# Patient Record
Sex: MALE | Race: WHITE | NOT HISPANIC OR LATINO
[De-identification: names, ages, dates, MRNs, and addresses within clinical notes are randomized per-mention and may not be internally consistent; named-entity substitution may affect disease eponyms.]

---

## 2017-01-31 PROBLEM — Z00.00 ENCOUNTER FOR PREVENTIVE HEALTH EXAMINATION: Status: ACTIVE | Noted: 2017-01-31

## 2017-02-07 ENCOUNTER — APPOINTMENT (OUTPATIENT)
Age: 57
End: 2017-02-07

## 2017-02-07 DIAGNOSIS — S81.801A UNSPECIFIED OPEN WOUND, RIGHT LOWER LEG, INITIAL ENCOUNTER: ICD-10-CM

## 2017-05-13 ENCOUNTER — INPATIENT (INPATIENT)
Facility: HOSPITAL | Age: 57
LOS: 4 days | Discharge: NURSING, REHAB OR LONG-TERM CARE | End: 2017-05-18
Attending: INTERNAL MEDICINE | Admitting: INTERNAL MEDICINE

## 2017-06-28 DIAGNOSIS — R94.5 ABNORMAL RESULTS OF LIVER FUNCTION STUDIES: ICD-10-CM

## 2017-06-28 DIAGNOSIS — E87.1 HYPO-OSMOLALITY AND HYPONATREMIA: ICD-10-CM

## 2017-06-28 DIAGNOSIS — L30.8 OTHER SPECIFIED DERMATITIS: ICD-10-CM

## 2017-06-28 DIAGNOSIS — M10.9 GOUT, UNSPECIFIED: ICD-10-CM

## 2017-06-28 DIAGNOSIS — B87.0 CUTANEOUS MYIASIS: ICD-10-CM

## 2017-06-28 DIAGNOSIS — I87.8 OTHER SPECIFIED DISORDERS OF VEINS: ICD-10-CM

## 2017-06-28 DIAGNOSIS — R26.89 OTHER ABNORMALITIES OF GAIT AND MOBILITY: ICD-10-CM
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
## 2017-06-28 DIAGNOSIS — M19.90 UNSPECIFIED OSTEOARTHRITIS, UNSPECIFIED SITE: ICD-10-CM

## 2017-06-28 DIAGNOSIS — F10.20 ALCOHOL DEPENDENCE, UNCOMPLICATED: ICD-10-CM

## 2017-06-28 DIAGNOSIS — L03.116 CELLULITIS OF LEFT LOWER LIMB: ICD-10-CM

## 2017-06-28 DIAGNOSIS — D72.818 OTHER DECREASED WHITE BLOOD CELL COUNT: ICD-10-CM

## 2017-06-28 DIAGNOSIS — I10 ESSENTIAL (PRIMARY) HYPERTENSION: ICD-10-CM

## 2017-06-28 DIAGNOSIS — Z91.013 ALLERGY TO SEAFOOD: ICD-10-CM

## 2017-06-28 DIAGNOSIS — Z91.018 ALLERGY TO OTHER FOODS: ICD-10-CM

## 2019-09-04 ENCOUNTER — EMERGENCY (EMERGENCY)
Facility: HOSPITAL | Age: 59
LOS: 0 days | Discharge: NURSING, REHAB OR LONG-TERM CARE | End: 2019-09-04
Attending: EMERGENCY MEDICINE | Admitting: EMERGENCY MEDICINE
Payer: MEDICAID

## 2019-09-04 VITALS
RESPIRATION RATE: 18 BRPM | SYSTOLIC BLOOD PRESSURE: 154 MMHG | DIASTOLIC BLOOD PRESSURE: 87 MMHG | HEART RATE: 90 BPM | OXYGEN SATURATION: 99 %

## 2019-09-04 VITALS
TEMPERATURE: 97 F | OXYGEN SATURATION: 99 % | SYSTOLIC BLOOD PRESSURE: 143 MMHG | HEART RATE: 113 BPM | RESPIRATION RATE: 17 BRPM | DIASTOLIC BLOOD PRESSURE: 75 MMHG

## 2019-09-04 DIAGNOSIS — Z91.018 ALLERGY TO OTHER FOODS: ICD-10-CM

## 2019-09-04 DIAGNOSIS — F10.129 ALCOHOL ABUSE WITH INTOXICATION, UNSPECIFIED: ICD-10-CM

## 2019-09-04 DIAGNOSIS — Z87.891 PERSONAL HISTORY OF NICOTINE DEPENDENCE: ICD-10-CM

## 2019-09-04 PROCEDURE — 99284 EMERGENCY DEPT VISIT MOD MDM: CPT

## 2019-09-04 NOTE — ED PROVIDER NOTE - NSFOLLOWUPINSTRUCTIONS_ED_ALL_ED_FT
Alcohol Use Disorder  Alcohol use disorder is when your drinking disrupts your daily life. When you have this condition, you drink too much alcohol and you cannot control your drinking.    Alcohol use disorder can cause serious problems with your physical health. It can affect your brain, heart, liver, pancreas, immune system, stomach, and intestines. Alcohol use disorder can increase your risk for certain cancers and cause problems with your mental health, such as depression, anxiety, psychosis, delirium, and dementia. People with this disorder risk hurting themselves and others.    What are the causes?  This condition is caused by drinking too much alcohol over time. It is not caused by drinking too much alcohol only one or two times. Some people with this condition drink alcohol to cope with or escape from negative life events. Others drink to relieve pain or symptoms of mental illness.    What increases the risk?  You are more likely to develop this condition if:    You have a family history of alcohol use disorder.  Your culture encourages drinking to the point of intoxication, or makes alcohol easy to get.  You had a mood or conduct disorder in childhood.  You have been a victim of abuse.  You are an adolescent and:    You have poor grades or difficulties in school.  Your caregivers do not talk to you about saying no to alcohol, or supervise your activities.  You are impulsive or you have trouble with self-control.      What are the signs or symptoms?  Symptoms of this condition include:    Drinking more than you want to.  Drinking for longer than you want to.  Trying several times to drink less or to control your drinking.  Spending a lot of time getting alcohol, drinking, or recovering from drinking.  Craving alcohol.  Having problems at work, at school, or at home due to drinking.  Having problems in relationships due to drinking.  Drinking when it is dangerous to drink, such as before driving a car.  Continuing to drink even though you know you might have a physical or mental problem related to drinking.  Needing more and more alcohol to get the same effect you want from the alcohol (building up tolerance).  Having symptoms of withdrawal when you stop drinking. Symptoms of withdrawal include:    Fatigue.  Nightmares.  Trouble sleeping.  Depression.  Anxiety.  Fever.  Seizures.  Severe confusion.  Feeling or seeing things that are not there (hallucinations).  Tremors.  Rapid heart rate.  Rapid breathing.  High blood pressure.    Drinking to avoid symptoms of withdrawal.    How is this diagnosed?  This condition is diagnosed with an assessment. Your health care provider may start the assessment by asking three or four questions about your drinking.    Your health care provider may perform a physical exam or do lab tests to see if you have physical problems resulting from alcohol use. She or he may refer you to a mental health professional for evaluation.    How is this treated?  Some people with alcohol use disorder are able to reduce their alcohol use to low-risk levels. Others need to completely quit drinking alcohol. When necessary, mental health professionals with specialized training in substance use treatment can help. Your health care provider can help you decide how severe your alcohol use disorder is and what type of treatment you need. The following forms of treatment are available:    Detoxification. Detoxification involves quitting drinking and using prescription medicines within the first week to help lessen withdrawal symptoms. This treatment is important for people who have had withdrawal symptoms before and for heavy drinkers who are likely to have withdrawal symptoms. Alcohol withdrawal can be dangerous, and in severe cases, it can cause death. Detoxification may be provided in a home, community, or primary care setting, or in a hospital or substance use treatment facility.  Counseling. This treatment is also called talk therapy. It is provided by substance use treatment counselors. A counselor can address the reasons you use alcohol and suggest ways to keep you from drinking again or to prevent problem drinking. The goals of talk therapy are to:    Find healthy activities and ways for you to cope with stress.  Identify and avoid the things that trigger your alcohol use.  Help you learn how to handle cravings.    Medicines. Medicines can help treat alcohol use disorder by:    Decreasing alcohol cravings.  Decreasing the positive feeling you have when you drink alcohol.  Causing an uncomfortable physical reaction when you drink alcohol (aversion therapy).    Support groups. Support groups are led by people who have quit drinking. They provide emotional support, advice, and guidance.    ImageThese forms of treatment are often combined. Some people with this condition benefit from a combination of treatments provided by specialized substance use treatment centers.    Follow these instructions at home:  Take over-the-counter and prescription medicines only as told by your health care provider.  Check with your health care provider before starting any new medicines.  Ask friends and family members not to offer you alcohol.  Avoid situations where alcohol is served, including gatherings where others are drinking alcohol.  Create a plan for what to do when you are tempted to use alcohol.  Find hobbies or activities that you enjoy that do not include alcohol.  Keep all follow-up visits as told by your health care provider. This is important.  How is this prevented?  If you drink, limit alcohol intake to no more than 1 drink a day for nonpregnant women and 2 drinks a day for men. One drink equals 12 oz of beer, 5 oz of wine, or 1½ oz of hard liquor.  If you have a mental health condition, get treatment and support.  Do not give alcohol to adolescents.  If you are an adolescent:    Do not drink alcohol.  Do not be afraid to say no if someone offers you alcohol. Speak up about why you do not want to drink. You can be a positive role model for your friends and set a good example for those around you by not drinking alcohol.  If your friends drink, spend time with others who do not drink alcohol. Make new friends who do not use alcohol.  Find healthy ways to manage stress and emotions, such as meditation or deep breathing, exercise, spending time in nature, listening to music, or talking with a trusted friend or family member.    Contact a health care provider if:  You are not able to take your medicines as told.  Your symptoms get worse.  You return to drinking alcohol (relapse) and your symptoms get worse.  Get help right away if:  You have thoughts about hurting yourself or others.  If you ever feel like you may hurt yourself or others, or have thoughts about taking your own life, get help right away. You can go to your nearest emergency department or call:     Your local emergency services (911 in the [place]   A suicide crisis helpline, such as the National Suicide Prevention Lifeline at 1-987.168.4483. This is open 24 hours a day.     Summary  Alcohol use disorder is when your drinking disrupts your daily life. When you have this condition, you drink too much alcohol and you cannot control your drinking.  Treatment may include detoxification, counseling, medicine, and support groups.  Ask friends and family members not to offer you alcohol. Avoid situations where alcohol is served.  Get help right away if you have thoughts about hurting yourself or others.  This information is not intended to replace advice given to you by your health care provider. Make sure you discuss any questions you have with your health care provider.

## 2019-09-04 NOTE — ED PROVIDER NOTE - PHYSICAL EXAMINATION
VITALS:  I have reviewed the initial vital signs.  GENERAL: Well-developed, well-nourished, in no acute distress. Nontoxic.  HEENT: NC/AT. Sclera clear. No conjunctival injection. EOMI, PERRLA. Mucous membranes moist. No tongue fasciculations.   NECK: supple w FROM.  CARDIO: irregularly irregular, normal rate. No murmurs, rubs, or gallops. No peripheral edema. 2+ radial pulses b/l.   PULM: Normal effort. No tachypnea or retractions. CTA b/l without wheezes, rales, or rhonchi.  MSK: Normal, steady gait. FROM to extremities x4.  GI: Abdomen soft and non-distended.  SKIN: Warm, dry. No pallor or rashes. Capillary refill <2 seconds.  NEURO: A&Ox3. Speech clear. No gross motor/sensory deficits. No tremor.  PSYCH: No SI. Calm, cooperative.

## 2019-09-04 NOTE — ED PROVIDER NOTE - OBJECTIVE STATEMENT
59 year old male w hx of etoh abuse presents to the ED via EMS from [facility] for alcohol intoxication. Patient states he drinks multiple beers a day, last drank 2 hours prior to arrival. Denies additional substance use. Denies SI/HI, visual/auditory hallucinations, falls, head trauma, LOC, vomiting, diarrhea. Does not wish to have detox from alcohol. 59 year old male w hx of etoh abuse, HTN presents to the ED via EMS from [facility] for alcohol intoxication. Patient states he drinks multiple beers a day, last drank 2 hours prior to arrival. Denies additional substance use. Denies SI/HI, visual/auditory hallucinations, falls, head trauma, LOC, vomiting, diarrhea. Does not wish to have detox from alcohol.

## 2019-09-04 NOTE — ED PROVIDER NOTE - CLINICAL SUMMARY MEDICAL DECISION MAKING FREE TEXT BOX
Patient is asymptomatic at this time he denies any headche, chest pain, sob suicidal or homicidal ideation he is tolerating po and ambulating with a steady gait with no trauma I will discharge at this time

## 2019-09-04 NOTE — ED PROVIDER NOTE - NS ED ROS FT
CONSTITUTIONAL: (-) fevers, (-) chills, (-) decreased appetite  EYES: (-) vision changes, (-) blurry vision, (-) double vision  ENT: (-) congestion, (-) rhinorrhea  NECK: (-) neck pain, (-) neck stiffness  CARDIO: (-) chest pain, (-) palpitations  PULM: (-) cough, (-) sputum, (-) shortness of breath  GI: (-) nausea, (-) vomiting, (-) diarrhea, (-) abdominal pain  MSK: (-) joint swelling, (-) joint stiffness, (-) gait difficulty  SKIN: (-) rashes, (-) wounds, (-) ecchymosis  NEURO: (-) headache, (-) head injury, (-) LOC, (-) dizziness, (-) lightheadedness, (-) syncope, (-) speech changes, (-) confusion, (-) weakness, (-) seizures  PSYCH: (-) suicidal ideation, (-) homicidal ideation, (-) depression, (-) anxiety, (-) visual hallucinations, (-) auditory hallucinations, (-) agitation    *all other systems negative except as documented above and in the HPI*

## 2019-09-04 NOTE — ED ADULT NURSE NOTE - NSIMPLEMENTINTERV_GEN_ALL_ED
Implemented All Fall Risk Interventions:  [place] to call system. Call bell, personal items and telephone within reach. Instruct patient to call for assistance. Room bathroom lighting operational. Non-slip footwear when patient is off stretcher. Physically safe environment: no spills, clutter or unnecessary equipment. Stretcher in lowest position, wheels locked, appropriate side rails in place. Provide visual cue, wrist band, yellow gown, etc. Monitor gait and stability. Monitor for mental status changes and reorient to person, place, and time. Review medications for side effects contributing to fall risk. Reinforce activity limits and safety measures with patient and family.

## 2019-09-04 NOTE — ED PROVIDER NOTE - PATIENT PORTAL LINK FT
You can access the FollowMyHealth Patient Portal offered by [facility] by registering at the following website: [email or web address]. By joining [facility]’s FollowMyHealth portal, you will also be able to view your health information using other applications (apps) compatible with our system.

## 2019-09-04 NOTE — ED ADULT NURSE NOTE - CHIEF COMPLAINT QUOTE
BIBA via PCA from [facility], [facility] states pt was sent for alcohol intoxication.

## 2019-09-04 NOTE — ED PROVIDER NOTE - NSFOLLOWUPCLINICS_GEN_ALL_ED_FT
[facility]  Detox Mgmt  [address] [place] [address]  Phone: [phone]  Fax:   Follow Up Time: Routine

## 2019-09-04 NOTE — ED PROVIDER NOTE - ATTENDING CONTRIBUTION TO CARE
I was present for and supervised the key and critical aspects of the procedures performed during the care of the patient. Patient presents for evaluation of alcohol intoxication patient states he was drinking today, he is not homicidal or suicidal he denies any chest pain or shortness of breath he denies any trauma or falls   On physical exam he is nc/at perrla eomi oropharynx clear cta b/l, rrr s1s2 noted abd-soft nt nd bs+ ext from with no focal deficits   A/P- we obtained accucheck which is normal patient is tolerating po at this time I will discharge

## 2019-09-04 NOTE — ED ADULT TRIAGE NOTE - CHIEF COMPLAINT QUOTE
BIBA via PCA from [facility], [facility] states pt was sent for alcohol intoxication.

## 2019-09-04 NOTE — ED PROVIDER NOTE - PROGRESS NOTE DETAILS
Patient ambulating with steady gait, at full meal. No complaints at this time. Will dc back to [facility].

## 2019-09-07 ENCOUNTER — INPATIENT (INPATIENT)
Facility: HOSPITAL | Age: 59
LOS: 3 days | Discharge: HOME | End: 2019-09-11
Attending: INTERNAL MEDICINE | Admitting: INTERNAL MEDICINE
Payer: MEDICAID

## 2019-09-07 VITALS
HEART RATE: 126 BPM | OXYGEN SATURATION: 97 % | RESPIRATION RATE: 19 BRPM | DIASTOLIC BLOOD PRESSURE: 84 MMHG | SYSTOLIC BLOOD PRESSURE: 162 MMHG | TEMPERATURE: 98 F | WEIGHT: 253.09 LBS

## 2019-09-07 LAB
ANION GAP SERPL CALC-SCNC: 23 MMOL/L — HIGH (ref 7–14)
APAP SERPL-MCNC: <5 UG/ML — LOW (ref 10–30)
APPEARANCE UR: CLEAR — SIGNIFICANT CHANGE UP
BACTERIA # UR AUTO: ABNORMAL
BASE EXCESS BLDV CALC-SCNC: -6.2 MMOL/L — LOW (ref -2–2)
BASOPHILS # BLD AUTO: 0.13 K/UL — SIGNIFICANT CHANGE UP (ref 0–0.2)
BASOPHILS NFR BLD AUTO: 1.6 % — HIGH (ref 0–1)
BILIRUB UR-MCNC: NEGATIVE — SIGNIFICANT CHANGE UP
BUN SERPL-MCNC: 20 MG/DL — SIGNIFICANT CHANGE UP (ref 10–20)
CA-I SERPL-SCNC: 1.13 MMOL/L — SIGNIFICANT CHANGE UP (ref 1.12–1.3)
CALCIUM SERPL-MCNC: 9.6 MG/DL — SIGNIFICANT CHANGE UP (ref 8.5–10.1)
CHLORIDE SERPL-SCNC: 104 MMOL/L — SIGNIFICANT CHANGE UP (ref 98–110)
CO2 SERPL-SCNC: 18 MMOL/L — SIGNIFICANT CHANGE UP (ref 17–32)
COLOR SPEC: YELLOW — SIGNIFICANT CHANGE UP
CREAT SERPL-MCNC: 2 MG/DL — HIGH (ref 0.7–1.5)
DIFF PNL FLD: ABNORMAL
EOSINOPHIL # BLD AUTO: 0.01 K/UL — SIGNIFICANT CHANGE UP (ref 0–0.7)
EOSINOPHIL NFR BLD AUTO: 0.1 % — SIGNIFICANT CHANGE UP (ref 0–8)
EPI CELLS # UR: ABNORMAL /HPF
ETHANOL SERPL-MCNC: 271 MG/DL — HIGH
GAS PNL BLDV: 144 MMOL/L — SIGNIFICANT CHANGE UP (ref 136–145)
GAS PNL BLDV: SIGNIFICANT CHANGE UP
GLUCOSE SERPL-MCNC: 102 MG/DL — HIGH (ref 70–99)
GLUCOSE UR QL: NEGATIVE MG/DL — SIGNIFICANT CHANGE UP
HCO3 BLDV-SCNC: 18 MMOL/L — LOW (ref 22–29)
HCT VFR BLD CALC: 32.7 % — LOW (ref 42–52)
HCT VFR BLDA CALC: 33.9 % — LOW (ref 34–44)
HGB BLD CALC-MCNC: 11.1 G/DL — LOW (ref 14–18)
HGB BLD-MCNC: 11.2 G/DL — LOW (ref 14–18)
HOROWITZ INDEX BLDV+IHG-RTO: 21 — SIGNIFICANT CHANGE UP
IMM GRANULOCYTES NFR BLD AUTO: 0.4 % — HIGH (ref 0.1–0.3)
KETONES UR-MCNC: ABNORMAL
LACTATE BLDV-MCNC: 6.7 MMOL/L — HIGH (ref 0.5–1.6)
LACTATE SERPL-SCNC: 6.8 MMOL/L — CRITICAL HIGH (ref 0.5–2.2)
LEUKOCYTE ESTERASE UR-ACNC: NEGATIVE — SIGNIFICANT CHANGE UP
LYMPHOCYTES # BLD AUTO: 2.2 K/UL — SIGNIFICANT CHANGE UP (ref 1.2–3.4)
LYMPHOCYTES # BLD AUTO: 27 % — SIGNIFICANT CHANGE UP (ref 20.5–51.1)
MCHC RBC-ENTMCNC: 33.7 PG — HIGH (ref 27–31)
MCHC RBC-ENTMCNC: 34.3 G/DL — SIGNIFICANT CHANGE UP (ref 32–37)
MCV RBC AUTO: 98.5 FL — HIGH (ref 80–94)
MONOCYTES # BLD AUTO: 0.38 K/UL — SIGNIFICANT CHANGE UP (ref 0.1–0.6)
MONOCYTES NFR BLD AUTO: 4.7 % — SIGNIFICANT CHANGE UP (ref 1.7–9.3)
NEUTROPHILS # BLD AUTO: 5.41 K/UL — SIGNIFICANT CHANGE UP (ref 1.4–6.5)
NEUTROPHILS NFR BLD AUTO: 66.2 % — SIGNIFICANT CHANGE UP (ref 42.2–75.2)
NITRITE UR-MCNC: NEGATIVE — SIGNIFICANT CHANGE UP
NRBC # BLD: 0 /100 WBCS — SIGNIFICANT CHANGE UP (ref 0–0)
PCO2 BLDV: 28 MMHG — LOW (ref 41–51)
PH BLDV: 7.4 — SIGNIFICANT CHANGE UP (ref 7.26–7.43)
PH UR: 5.5 — SIGNIFICANT CHANGE UP (ref 5–8)
PLATELET # BLD AUTO: 242 K/UL — SIGNIFICANT CHANGE UP (ref 130–400)
PO2 BLDV: 40 MMHG — SIGNIFICANT CHANGE UP (ref 20–40)
POTASSIUM BLDV-SCNC: 4.4 MMOL/L — SIGNIFICANT CHANGE UP (ref 3.3–5.6)
POTASSIUM SERPL-MCNC: 4.7 MMOL/L — SIGNIFICANT CHANGE UP (ref 3.5–5)
POTASSIUM SERPL-SCNC: 4.7 MMOL/L — SIGNIFICANT CHANGE UP (ref 3.5–5)
PROT UR-MCNC: ABNORMAL MG/DL
RBC # BLD: 3.32 M/UL — LOW (ref 4.7–6.1)
RBC # FLD: 13.7 % — SIGNIFICANT CHANGE UP (ref 11.5–14.5)
RBC CASTS # UR COMP ASSIST: SIGNIFICANT CHANGE UP /HPF
SALICYLATES SERPL-MCNC: <0.3 MG/DL — LOW (ref 4–30)
SAO2 % BLDV: 68 % — SIGNIFICANT CHANGE UP
SODIUM SERPL-SCNC: 145 MMOL/L — SIGNIFICANT CHANGE UP (ref 135–146)
SP GR SPEC: 1.02 — SIGNIFICANT CHANGE UP (ref 1.01–1.03)
UROBILINOGEN FLD QL: 0.2 MG/DL — SIGNIFICANT CHANGE UP (ref 0.2–0.2)
WBC # BLD: 8.16 K/UL — SIGNIFICANT CHANGE UP (ref 4.8–10.8)
WBC # FLD AUTO: 8.16 K/UL — SIGNIFICANT CHANGE UP (ref 4.8–10.8)

## 2019-09-07 PROCEDURE — 71045 X-RAY EXAM CHEST 1 VIEW: CPT | Mod: 26

## 2019-09-07 PROCEDURE — 99285 EMERGENCY DEPT VISIT HI MDM: CPT

## 2019-09-07 RX ORDER — LOSARTAN POTASSIUM 100 MG/1
100 TABLET, FILM COATED ORAL DAILY
Refills: 0 | Status: DISCONTINUED | OUTPATIENT
Start: 2019-09-07 | End: 2019-09-11

## 2019-09-07 RX ORDER — SODIUM CHLORIDE 9 MG/ML
1000 INJECTION INTRAMUSCULAR; INTRAVENOUS; SUBCUTANEOUS ONCE
Refills: 0 | Status: COMPLETED | OUTPATIENT
Start: 2019-09-07 | End: 2019-09-07

## 2019-09-07 RX ORDER — ZOLPIDEM TARTRATE 10 MG/1
5 TABLET ORAL ONCE
Refills: 0 | Status: DISCONTINUED | OUTPATIENT
Start: 2019-09-07 | End: 2019-09-07

## 2019-09-07 RX ORDER — FOLIC ACID 0.8 MG
1 TABLET ORAL DAILY
Refills: 0 | Status: DISCONTINUED | OUTPATIENT
Start: 2019-09-07 | End: 2019-09-11

## 2019-09-07 RX ORDER — MECLIZINE HCL 12.5 MG
12.5 TABLET ORAL THREE TIMES A DAY
Refills: 0 | Status: DISCONTINUED | OUTPATIENT
Start: 2019-09-07 | End: 2019-09-11

## 2019-09-07 RX ORDER — THIAMINE MONONITRATE (VIT B1) 100 MG
100 TABLET ORAL ONCE
Refills: 0 | Status: COMPLETED | OUTPATIENT
Start: 2019-09-07 | End: 2019-09-07

## 2019-09-07 RX ORDER — HEPARIN SODIUM 5000 [USP'U]/ML
5000 INJECTION INTRAVENOUS; SUBCUTANEOUS EVERY 12 HOURS
Refills: 0 | Status: DISCONTINUED | OUTPATIENT
Start: 2019-09-07 | End: 2019-09-11

## 2019-09-07 RX ORDER — PANTOPRAZOLE SODIUM 20 MG/1
40 TABLET, DELAYED RELEASE ORAL
Refills: 0 | Status: DISCONTINUED | OUTPATIENT
Start: 2019-09-07 | End: 2019-09-11

## 2019-09-07 RX ORDER — SODIUM CHLORIDE 9 MG/ML
1000 INJECTION, SOLUTION INTRAVENOUS ONCE
Refills: 0 | Status: COMPLETED | OUTPATIENT
Start: 2019-09-07 | End: 2019-09-07

## 2019-09-07 RX ORDER — SODIUM CHLORIDE 9 MG/ML
1000 INJECTION, SOLUTION INTRAVENOUS
Refills: 0 | Status: DISCONTINUED | OUTPATIENT
Start: 2019-09-07 | End: 2019-09-10

## 2019-09-07 RX ADMIN — SODIUM CHLORIDE 1000 MILLILITER(S): 9 INJECTION, SOLUTION INTRAVENOUS at 19:21

## 2019-09-07 RX ADMIN — SODIUM CHLORIDE 75 MILLILITER(S): 9 INJECTION, SOLUTION INTRAVENOUS at 22:16

## 2019-09-07 RX ADMIN — Medication 12.5 MILLIGRAM(S): at 22:41

## 2019-09-07 RX ADMIN — Medication 50 MILLIGRAM(S): at 22:11

## 2019-09-07 RX ADMIN — Medication 1 MILLIGRAM(S): at 20:31

## 2019-09-07 RX ADMIN — SODIUM CHLORIDE 1000 MILLILITER(S): 9 INJECTION INTRAMUSCULAR; INTRAVENOUS; SUBCUTANEOUS at 15:43

## 2019-09-07 RX ADMIN — Medication 100 MILLIGRAM(S): at 20:30

## 2019-09-07 NOTE — ED PROVIDER NOTE - PROGRESS NOTE DETAILS
FS normal, tolerating po w/o difficulty. Moving all extremities w/o difficulty. Dc  to [facility]. Return for fever/chills, CP, SOB, fall, AMS. noted to be tachycardic upon dc. Holding dc, will order IVF and reassess. Pt asymptoamtic at this time, repeat physical exam unremarkable aside from HR. endorsed to Dr Oconnor dr. carrillo aware of admission

## 2019-09-07 NOTE — ED PROVIDER NOTE - NSFOLLOWUPCLINICS_GEN_ALL_ED_FT
A Family Medicine Doctor  Family Medicine  .  [place]   Phone:   Fax:   Follow Up Time: 1-3 Days    [facility]  Detox Mgmt  [address] [place] [address]  Phone: [phone]  Fax:   Follow Up Time: 1-3 Days

## 2019-09-07 NOTE — H&P ADULT - ATTENDING COMMENTS
I agree with the above history ,physical and plan which I Have reviewed and examined independently    patient seen and examined    NAD  chest clear  heart s1 and s2 normal   ext no edema    lab as above    admitted w ethanol withdrawal     librium protocol  stable now  detox  consult I agree with the above history ,physical and plan which I Have reviewed and examined independently    patient seen and examined    NAD  chest clear  heart s1 and s2 normal   ext no edema    lab as above    admitted w ethanol withdrawal   tremor   librium protocol  stable now  detox  consult  IVF

## 2019-09-07 NOTE — H&P ADULT - ASSESSMENT
A/P:    1. EtOH withdrawal  -Pt wants to go to detox  -Start librium taper  -CIWA Ativan protocol    2. Tachycardia and tremors 2/2 above  -As per above management     3. Mixed picture: HAGMA + Metabolic alkalosis  -Due to EtOH/lactate and dehydration  -C/w IVFs (LR)  -Recheck a BMP and lactate in the AM    4. Hx of HTN  -C/w home meds    5. Hx of vertigo  -C/w home meds    GI and DVT PPX A/P:    1. EtOH withdrawal  -Pt wants to go to detox  -Start librium taper  -CIWA Ativan protocol    2. Tachycardia and tremors 2/2 above  -As per above management     3. ANDRZEJ likely pre-renal  -Will send urine lytes  -Repeat BMp in the AM    4. Mixed picture: HAGMA + Metabolic alkalosis  -Due to EtOH/lactate and dehydration  -C/w IVFs (LR)  -Recheck a BMP and lactate in the AM    5. Hx of HTN  -C/w home meds    6. Hx of vertigo  -C/w home meds    GI and DVT PPX

## 2019-09-07 NOTE — ED PROVIDER NOTE - PHYSICAL EXAMINATION
Constitutional: Well developed, well nourished. NAD. Good general hygiene  Head: Atraumatic.  Eyes: PERRLA. EOMI without discomfort.   ENT: No nasal discharge. Mucous membranes moist.  Neck: Supple. Painless ROM.  Cardiovascular: Sinus tachycardia. No murmurs.  Pulmonary: Normal respiratory rate and effort. Lungs clear to auscultation bilaterally. No wheezing, rales, or rhonchi. Bilateral, equal lung expansion.   Abdominal: Soft. Nondistended. Nontender. No rebound or guarding.   Extremities. Pelvis stable. No lower extremity edema. Symmetric calves.  Skin: No rashes.   Neuro: AAOx3. No focal neurological deficits.

## 2019-09-07 NOTE — ED ADULT NURSE NOTE - NSIMPLEMENTINTERV_GEN_ALL_ED
Implemented All Fall Risk Interventions:  [place] to call system. Call bell, personal items and telephone within reach. Instruct patient to call for assistance. Room bathroom lighting operational. Non-slip footwear when patient is off stretcher. Physically safe environment: no spills, clutter or unnecessary equipment. Stretcher in lowest position, wheels locked, appropriate side rails in place. Provide visual cue, wrist band, yellow gown, etc. Monitor gait and stability. Monitor for mental status changes and reorient to person, place, and time. Review medications for side effects contributing to fall risk. Reinforce activity limits and safety measures with patient and family.

## 2019-09-07 NOTE — ED PROVIDER NOTE - ATTENDING CONTRIBUTION TO CARE
59y male sent from [facility] for intox, pt admits to drinking, has no complaints, on exam vital signs appreciated, head nc/at, perrla, EOMI, conj pink op clear neck supple cor tachy, reg,  lungs cta abd snt no c/c/e chronic venous stasis changes pulses equal calves nontender neuro intact, will discharge once clinically sober

## 2019-09-07 NOTE — ED PROVIDER NOTE - CARE PLAN
Principal Discharge DX:	Alcohol abuse Principal Discharge DX:	Alcohol abuse  Secondary Diagnosis:	ANDRZEJ (acute kidney injury) Principal Discharge DX:	Alcohol abuse  Secondary Diagnosis:	ANDRZEJ (acute kidney injury)  Secondary Diagnosis:	Lactic acidosis  Secondary Diagnosis:	Tachycardia

## 2019-09-07 NOTE — ED PROVIDER NOTE - PATIENT PORTAL LINK FT
You can access the FollowMyHealth Patient Portal offered by [facility] by registering at the following website: [email or web address]. By joining [facility]’s FollowMyHealth portal, you will also be able to view your health information using other applications (apps) compatible with our system.

## 2019-09-07 NOTE — H&P ADULT - HISTORY OF PRESENT ILLNESS
58 YO M with a PMH of EtOH abuse (Hx of EtOH withdrawals), vertigo, and HTN who presented to the hospital intoxicated requesting detox. Denies any symptoms presently. Drinks 1-2 pints of whiskey daily for past several years. Last drink was this AM. No N/V/D, ABD pain, fevers, chills, CP, SOB, dysuria, black/bloody stools, hematemesis, or leg swelling.     In the ED, it was noted that the pt was tachy with an elevated lactate. A work-up was negative for source. IVFs and Ativan administered. Given thiamine. Downtrending lactate. + for ANDRZEJ. Admit to medicine.

## 2019-09-07 NOTE — ED PROVIDER NOTE - OBJECTIVE STATEMENT
59M h/o alcohol dependence BIBEMS from [facility] for alcohol intox. Last drink this AM was pint of vodka. Normally drinks daily. Denies any complaints -- no recent falls, CP, SOB, HA, back pain, fever, chills, vomiting, diarrhea, rash.

## 2019-09-07 NOTE — ED PROVIDER NOTE - CLINICAL SUMMARY MEDICAL DECISION MAKING FREE TEXT BOX
Chart reviewed. Labs noted for elevated Cr, etoh and lactate. Still tachycardic despite IVF. Repeat lactate 6.7. Will admit.

## 2019-09-07 NOTE — ED ADULT NURSE REASSESSMENT NOTE - NS ED NURSE REASSESS COMMENT FT1
Upon discharge vitals, patient noted to be tachycardic to 135bpm. MD Quintero made aware and ordered for IV fluids and EKG to be done. Transport canceled and d/c postponed

## 2019-09-07 NOTE — H&P ADULT - NSHPSOCIALHISTORY_GEN_ALL_CORE
SHX:  -Denies any tobacco or drug use  -EtOH use daily (1-2 pints of whiskey daily)  -Lives at NH ([facility])  -Ambulates without cane/walker at home

## 2019-09-07 NOTE — ED PROVIDER NOTE - NS ED ROS FT
General: No fever, chills, or weakness.  Eyes:  No visual changes, eye pain or discharge.  ENMT:  No hearing changes, pain, no sore throat or runny nose, no difficulty swallowing  Cardiac:  No chest pain, SOB or edema. No chest pain with exertion.  Respiratory:  No cough or respiratory distress. No hemoptysis. No history of asthma or RAD.  GI:  No nausea, vomiting, diarrhea or abdominal pain.  :  No dysuria, frequency or burning.  MS:  No myalgia, muscle weakness, joint pain or back pain.  Neuro:  No headache.  No LOC. No change in ambulation. No dizziness.  Skin:  No skin rash.

## 2019-09-07 NOTE — H&P ADULT - NSHPPHYSICALEXAM_GEN_ALL_CORE
Vital Signs Last 24 Hrs  T(C): 37.2 (07 Sep 2019 21:10), Max: 37.8 (07 Sep 2019 17:02)  T(F): 99 (07 Sep 2019 21:10), Max: 100.1 (07 Sep 2019 17:02)  HR: 116 (07 Sep 2019 21:10) (116 - 146)  BP: 133/63 (07 Sep 2019 21:10) (133/63 - 162/84)  BP(mean): --  RR: 18 (07 Sep 2019 21:10) (18 - 19)  SpO2: 97% (07 Sep 2019 21:10) (95% - 97%)    General: WN/WD NAD  Neurology: A&Ox3, nonfocal, FELDER x 4  Eyes: PERRL/EOMI  ENT/Neck: Tongue fasciculations present  Respiratory: CTA B/L, No wheezing, rales, rhonchi  CV: Tachycardic, S1S2, No M/G/R  Abdominal: Soft, NT, ND +BS, Obese  Extremities: No edema, + peripheral pulses. Visible tremors of B/L UEs  Skin: No Rashes, Hematoma, Ecchymosis

## 2019-09-08 LAB
-  COAGULASE NEGATIVE STAPHYLOCOCCUS: SIGNIFICANT CHANGE UP
ALBUMIN SERPL ELPH-MCNC: 4.1 G/DL — SIGNIFICANT CHANGE UP (ref 3.5–5.2)
ALP SERPL-CCNC: 85 U/L — SIGNIFICANT CHANGE UP (ref 30–115)
ALT FLD-CCNC: 31 U/L — SIGNIFICANT CHANGE UP (ref 0–41)
ANION GAP SERPL CALC-SCNC: 16 MMOL/L — HIGH (ref 7–14)
AST SERPL-CCNC: 79 U/L — HIGH (ref 0–41)
BILIRUB DIRECT SERPL-MCNC: 0.5 MG/DL — HIGH (ref 0–0.2)
BILIRUB INDIRECT FLD-MCNC: 1.7 MG/DL — HIGH (ref 0.2–1.2)
BILIRUB SERPL-MCNC: 2.2 MG/DL — HIGH (ref 0.2–1.2)
BUN SERPL-MCNC: 12 MG/DL — SIGNIFICANT CHANGE UP (ref 10–20)
CALCIUM SERPL-MCNC: 8.8 MG/DL — SIGNIFICANT CHANGE UP (ref 8.5–10.1)
CALCIUM UR-MCNC: 3 MG/DL — SIGNIFICANT CHANGE UP
CHLORIDE SERPL-SCNC: 102 MMOL/L — SIGNIFICANT CHANGE UP (ref 98–110)
CHLORIDE UR-SCNC: 118 — SIGNIFICANT CHANGE UP
CO2 SERPL-SCNC: 22 MMOL/L — SIGNIFICANT CHANGE UP (ref 17–32)
CREAT ?TM UR-MCNC: 95 MG/DL — SIGNIFICANT CHANGE UP
CREAT SERPL-MCNC: 1.5 MG/DL — SIGNIFICANT CHANGE UP (ref 0.7–1.5)
GLUCOSE SERPL-MCNC: 107 MG/DL — HIGH (ref 70–99)
GRAM STN FLD: SIGNIFICANT CHANGE UP
GRAM STN FLD: SIGNIFICANT CHANGE UP
LACTATE SERPL-SCNC: 0.9 MMOL/L — SIGNIFICANT CHANGE UP (ref 0.5–2.2)
MAGNESIUM SERPL-MCNC: 1.7 MG/DL — LOW (ref 1.8–2.4)
MAGNESIUM UR-MCNC: 3.2 MG/DL — SIGNIFICANT CHANGE UP
METHOD TYPE: SIGNIFICANT CHANGE UP
OSMOLALITY UR: 512 MOS/KG — SIGNIFICANT CHANGE UP (ref 50–1400)
PHOSPHATE SERPL-MCNC: 2.5 MG/DL — SIGNIFICANT CHANGE UP (ref 2.1–4.9)
POTASSIUM SERPL-MCNC: 4.3 MMOL/L — SIGNIFICANT CHANGE UP (ref 3.5–5)
POTASSIUM SERPL-SCNC: 4.3 MMOL/L — SIGNIFICANT CHANGE UP (ref 3.5–5)
POTASSIUM UR-SCNC: 40 MMOL/L — SIGNIFICANT CHANGE UP
PROT SERPL-MCNC: 6.3 G/DL — SIGNIFICANT CHANGE UP (ref 6–8)
SODIUM SERPL-SCNC: 140 MMOL/L — SIGNIFICANT CHANGE UP (ref 135–146)
SODIUM UR-SCNC: 117 MMOL/L — SIGNIFICANT CHANGE UP
SPECIMEN SOURCE: SIGNIFICANT CHANGE UP
SPECIMEN SOURCE: SIGNIFICANT CHANGE UP

## 2019-09-08 RX ORDER — MAGNESIUM OXIDE 400 MG ORAL TABLET 241.3 MG
400 TABLET ORAL ONCE
Refills: 0 | Status: COMPLETED | OUTPATIENT
Start: 2019-09-08 | End: 2019-09-08

## 2019-09-08 RX ORDER — BENZOCAINE AND MENTHOL 5; 1 G/100ML; G/100ML
1 LIQUID ORAL
Refills: 0 | Status: COMPLETED | OUTPATIENT
Start: 2019-09-08 | End: 2019-09-09

## 2019-09-08 RX ORDER — CEFAZOLIN SODIUM 1 G
1000 VIAL (EA) INJECTION EVERY 8 HOURS
Refills: 0 | Status: DISCONTINUED | OUTPATIENT
Start: 2019-09-08 | End: 2019-09-10

## 2019-09-08 RX ORDER — ONDANSETRON 8 MG/1
4 TABLET, FILM COATED ORAL EVERY 8 HOURS
Refills: 0 | Status: DISCONTINUED | OUTPATIENT
Start: 2019-09-08 | End: 2019-09-11

## 2019-09-08 RX ADMIN — Medication 2 MILLIGRAM(S): at 07:35

## 2019-09-08 RX ADMIN — ONDANSETRON 4 MILLIGRAM(S): 8 TABLET, FILM COATED ORAL at 02:14

## 2019-09-08 RX ADMIN — PANTOPRAZOLE SODIUM 40 MILLIGRAM(S): 20 TABLET, DELAYED RELEASE ORAL at 05:05

## 2019-09-08 RX ADMIN — HEPARIN SODIUM 5000 UNIT(S): 5000 INJECTION INTRAVENOUS; SUBCUTANEOUS at 05:05

## 2019-09-08 RX ADMIN — ZOLPIDEM TARTRATE 5 MILLIGRAM(S): 10 TABLET ORAL at 00:37

## 2019-09-08 RX ADMIN — Medication 2 MILLIGRAM(S): at 02:38

## 2019-09-08 RX ADMIN — Medication 2 MILLIGRAM(S): at 00:01

## 2019-09-08 RX ADMIN — Medication 25 MILLIGRAM(S): at 22:04

## 2019-09-08 RX ADMIN — Medication 100 MILLIGRAM(S): at 22:04

## 2019-09-08 RX ADMIN — Medication 12.5 MILLIGRAM(S): at 22:02

## 2019-09-08 RX ADMIN — Medication 12.5 MILLIGRAM(S): at 05:05

## 2019-09-08 RX ADMIN — SODIUM CHLORIDE 75 MILLILITER(S): 9 INJECTION, SOLUTION INTRAVENOUS at 11:48

## 2019-09-08 RX ADMIN — Medication 1 MILLIGRAM(S): at 11:48

## 2019-09-08 RX ADMIN — MAGNESIUM OXIDE 400 MG ORAL TABLET 400 MILLIGRAM(S): 241.3 TABLET ORAL at 16:34

## 2019-09-08 RX ADMIN — Medication 2 MILLIGRAM(S): at 16:34

## 2019-09-08 RX ADMIN — LOSARTAN POTASSIUM 100 MILLIGRAM(S): 100 TABLET, FILM COATED ORAL at 05:05

## 2019-09-08 RX ADMIN — Medication 12.5 MILLIGRAM(S): at 16:34

## 2019-09-08 RX ADMIN — Medication 1 TABLET(S): at 11:48

## 2019-09-08 NOTE — CHART NOTE - NSCHARTNOTEFT_GEN_A_CORE
Patient says Dr. Tellez is his PMD.  Discussed with Dr. Shi - he will see the pt today.   Pt transferred to his service

## 2019-09-08 NOTE — CHART NOTE - NSCHARTNOTEFT_GEN_A_CORE
MEDICAL PA NOTES   CALL FROM THE LAB FOR BLOOD C&S SEND ON 9/7 RESULT SHOWS   GROWTH IN ANAEROBIC BOTTLE   GRAM POSITIVE COCCI IN CLUSTERS AND GRAM POSITIVE COCCI IN PAIRS AND CHAINS .  RESULT D&W DR EDGAR OLIVEIRA   PATIENT IS ON ANCEF IV 1 GRAM Q 8 HOURS   ID CONSULT ORDERED TO EVALUATE THE RESULT

## 2019-09-08 NOTE — CHART NOTE - NSCHARTNOTEFT_GEN_A_CORE
Called for positive blood cultures for GPC in clusters. Will start Ancef but suspect contaminent as pateint is afebrile Called for positive blood cultures for GPC in clusters. Will start Ancef but suspect containment as patient is afebrile. I spoke to patient about this

## 2019-09-09 LAB
-  COAGULASE NEGATIVE STAPHYLOCOCCUS: SIGNIFICANT CHANGE UP
CULTURE RESULTS: SIGNIFICANT CHANGE UP
CULTURE RESULTS: SIGNIFICANT CHANGE UP
ERYTHROCYTE [SEDIMENTATION RATE] IN BLOOD: 45 MM/HR — HIGH (ref 0–10)
GLUCOSE BLDC GLUCOMTR-MCNC: 137 MG/DL — HIGH (ref 70–99)
HCV AB S/CO SERPL IA: 0.19 S/CO — SIGNIFICANT CHANGE UP (ref 0–0.99)
HCV AB SERPL-IMP: SIGNIFICANT CHANGE UP
METHOD TYPE: SIGNIFICANT CHANGE UP
ORGANISM # SPEC MICROSCOPIC CNT: SIGNIFICANT CHANGE UP
ORGANISM # SPEC MICROSCOPIC CNT: SIGNIFICANT CHANGE UP
SPECIMEN SOURCE: SIGNIFICANT CHANGE UP
SPECIMEN SOURCE: SIGNIFICANT CHANGE UP

## 2019-09-09 RX ORDER — MAGNESIUM OXIDE 400 MG ORAL TABLET 241.3 MG
400 TABLET ORAL EVERY 8 HOURS
Refills: 0 | Status: COMPLETED | OUTPATIENT
Start: 2019-09-09 | End: 2019-09-09

## 2019-09-09 RX ADMIN — SODIUM CHLORIDE 75 MILLILITER(S): 9 INJECTION, SOLUTION INTRAVENOUS at 11:57

## 2019-09-09 RX ADMIN — HEPARIN SODIUM 5000 UNIT(S): 5000 INJECTION INTRAVENOUS; SUBCUTANEOUS at 05:35

## 2019-09-09 RX ADMIN — BENZOCAINE AND MENTHOL 1 LOZENGE: 5; 1 LIQUID ORAL at 11:56

## 2019-09-09 RX ADMIN — BENZOCAINE AND MENTHOL 1 LOZENGE: 5; 1 LIQUID ORAL at 17:56

## 2019-09-09 RX ADMIN — Medication 12.5 MILLIGRAM(S): at 05:34

## 2019-09-09 RX ADMIN — LOSARTAN POTASSIUM 100 MILLIGRAM(S): 100 TABLET, FILM COATED ORAL at 05:35

## 2019-09-09 RX ADMIN — Medication 1 MILLIGRAM(S): at 11:56

## 2019-09-09 RX ADMIN — HEPARIN SODIUM 5000 UNIT(S): 5000 INJECTION INTRAVENOUS; SUBCUTANEOUS at 17:55

## 2019-09-09 RX ADMIN — Medication 12.5 MILLIGRAM(S): at 21:21

## 2019-09-09 RX ADMIN — PANTOPRAZOLE SODIUM 40 MILLIGRAM(S): 20 TABLET, DELAYED RELEASE ORAL at 05:34

## 2019-09-09 RX ADMIN — Medication 10 MILLIGRAM(S): at 17:54

## 2019-09-09 RX ADMIN — MAGNESIUM OXIDE 400 MG ORAL TABLET 400 MILLIGRAM(S): 241.3 TABLET ORAL at 13:41

## 2019-09-09 RX ADMIN — MAGNESIUM OXIDE 400 MG ORAL TABLET 400 MILLIGRAM(S): 241.3 TABLET ORAL at 21:21

## 2019-09-09 RX ADMIN — Medication 100 MILLIGRAM(S): at 05:33

## 2019-09-09 RX ADMIN — Medication 10 MILLIGRAM(S): at 12:34

## 2019-09-09 RX ADMIN — Medication 100 MILLIGRAM(S): at 21:21

## 2019-09-09 RX ADMIN — Medication 1 TABLET(S): at 11:56

## 2019-09-09 RX ADMIN — Medication 12.5 MILLIGRAM(S): at 13:41

## 2019-09-09 RX ADMIN — Medication 100 MILLIGRAM(S): at 13:40

## 2019-09-09 NOTE — CONSULT NOTE ADULT - ASSESSMENT
ASSESSMENT  59y M admitted with ANDRZEJ ACUTE KIDNEY INJURY ALCOHOL ABUSE      PROBLEMS  1. Alcohol abuse     2. Pt with lactic acidosis    New problem with additional W/U   acute illness with systemic symptoms     On ceFAZolin   IVPB 1000 milliGRAM(s) IV Intermittent every 8 hours    B/C with  Gram Positive Cocci in Clusters    Gram Positive Cocci in Pairs and Chains  Grew Coagulase negative Staph  Pt was afebrile    2. Resolving ANDRZEJ    3. Hypomagnesemia      PLAN  Would continue cefazolin for now.  Await anaerobic blood culture to make sure that pt doesn't grow anaerobic strep  - Repeat Blood Cultures x2  ESR, CRP

## 2019-09-09 NOTE — CHART NOTE - NSCHARTNOTEFT_GEN_A_CORE
Saw Pt at bed side.    Pt denies any new complaints. SAVANNAH Ortiz and I drew blood cultures using an aseptic technique. Pt understood the purpose of repeat blood cultures. Pt is receiving IV ABX/ANCEF now.     Reviewed labs, imaging and new consults. Ordered blood work for tomorrow to monitor the progress of Hypomagnesemia, ammonia, LFTs. Magnesium was replated.     Pt is on Librium protocol, 3 doses as per attending.     Case discussed with the attending

## 2019-09-09 NOTE — CONSULT NOTE ADULT - SUBJECTIVE AND OBJECTIVE BOX
CAT ALSTON  59y, Male  Allergy: Beef (Unknown)  Cheese (Unknown)  Milk (Unknown)  No Known Drug Allergies  shellfish (Unknown)      CHIEF COMPLAINT: EtOH intoxication (07 Sep 2019 21:28)      HPI:  60 YO M with a PMH of EtOH abuse (Hx of EtOH withdrawals), vertigo, and HTN who presented to the hospital intoxicated requesting detox. Denies any symptoms presently. Drinks 1-2 pints of whiskey daily for past several years. Last drink was this AM. No N/V/D, ABD pain, fevers, chills, CP, SOB, dysuria, black/bloody stools, hematemesis, or leg swelling.     In the ED, it was noted that the pt was tachy with an elevated lactate. A work-up was negative for source. IVFs and Ativan administered. Given thiamine. Downtrending lactate. + for ANDRZEJ. Admit to medicine. (07 Sep 2019 21:28)    FAMILY HISTORY:  No pertinent family history in first degree relatives    PAST MEDICAL & SURGICAL HISTORY:  Anemia  Vertigo  Alcohol dependence  No significant past surgical history      Substance Use (  ) never used  (  ) IVDU (  ) Other:  Tobacco Usage:  (   ) never smoked   (   ) former smoker   (   ) current smoker   Alcohol Usage: (   ) social  ( x  ) daily use (   ) denies  Sexual History: NA      ROS  General: Denies fevers, chills, nightsweats, weight loss  HEENT: Denies headache, rhinorrhea, sore throat, eye pain  CV: Denies CP, palpitations  PULM: Denies SOB, cough  GI: Denies abdominal pain, diarrhea  : Denies dysuria, hematuria  MSK: Denies arthralgias  SKIN: Denies rash   NEURO: Denies paresthesias, weakness  PSYCH: Denies depression    VITALS:  T(F): 97.9, Max: 99.4 (19 @ 05:22)  HR: 104  BP: 149/72  RR: 16Vital Signs Last 24 Hrs  T(C): 36.6 (09 Sep 2019 09:54), Max: 37.4 (09 Sep 2019 05:22)  T(F): 97.9 (09 Sep 2019 09:54), Max: 99.4 (09 Sep 2019 05:22)  HR: 104 (09 Sep 2019 05:22) (90 - 104)  BP: 149/72 (09 Sep 2019 05:22) (137/77 - 152/69)  BP(mean): --  RR: 16 (09 Sep 2019 05:22) (16 - 16)  SpO2: --    PHYSICAL EXAM:  Gen: NAD, resting in bed  HEENT: Normocephalic, atraumatic  Neck: supple, no lymphadenopathy  CV: Regular rate & regular rhythm  Lungs: decreased BS at bases, no fremitus  Abdomen: Soft, BS present  Ext: Warm, well perfused  Neuro: non focal, awake  Skin: no rash, no erythema    TESTS & MEASUREMENTS:                        11.2   8.16  )-----------( 242      ( 07 Sep 2019 17:10 )             32.7         140  |  102  |  12  ----------------------------<  107<H>  4.3   |  22  |  1.5    Ca    8.8      08 Sep 2019 09:10  Phos  2.5       Mg     1.7         TPro  6.3  /  Alb  4.1  /  TBili  2.2<H>  /  DBili  0.5<H>  /  AST  79<H>  /  ALT  31  /  AlkPhos  85        LIVER FUNCTIONS - ( 08 Sep 2019 09:10 )  Alb: 4.1 g/dL / Pro: 6.3 g/dL / ALK PHOS: 85 U/L / ALT: 31 U/L / AST: 79 U/L / GGT: x           Urinalysis Basic - ( 07 Sep 2019 20:00 )    Color: Yellow / Appearance: Clear / S.025 / pH: x  Gluc: x / Ketone: Trace  / Bili: Negative / Urobili: 0.2 mg/dL   Blood: x / Protein: Trace mg/dL / Nitrite: Negative   Leuk Esterase: Negative / RBC: 1-2 /HPF / WBC x   Sq Epi: x / Non Sq Epi: Occasional /HPF / Bacteria: Few        Culture - Urine (collected 19 @ 20:00)  Source: .Urine Clean Catch (Midstream)  Final Report (19 @ 07:02):    <10,000 CFU/mL Normal Urogenital Sandra    Culture - Blood (collected 19 @ 17:37)  Source: .Blood Blood  Gram Stain (19 @ 23:14):    Growth in anaerobic bottle:    Gram Positive Cocci in Clusters    Gram Positive Cocci in Pairs and Chains  Preliminary Report (19 @ 23:15):    Growth in anaerobic bottle:    Gram Positive Cocci in Clusters    Gram Positive Cocci in Pairs and Chains    "Due to technical problems, Proteus sp. will Not be reported as part of    the BCID panel until further notice"    ***Blood Panel PCR results on thisspecimen are available    approximately 3 hours after the Gram stain result.***    Gram stain, PCR, and/or culture results may not always    correspond due to difference in methodologies.    ************************************************************    This PCR assay was performed using [employer].    The following targets are tested for: Enterococcus,    vancomycin resistant enterococci, Listeria monocytogenes,    coagulase negative staphylococci, S. aureus,    methicillin resistant S. aureus, Streptococcus agalactiae    (Group B), S. pneumoniae, S. pyogenes (Group A),    Acinetobacter baumannii, Enterobacter cloacae, E. coli,    Klebsiella oxytoca, K. pneumoniae, Proteus sp.,    Serratia marcescens, Haemophilus influenzae,    Neisseria meningitidis, Pseudomonas aeruginosa, Candida    albicans, C. glabrata, C krusei, C parapsilosis,    C. tropicalis and the KPC resistance gene.  Organism: Blood Culture PCR (19 @ 01:00)  Organism: Blood Culture PCR (19 @ 01:00)      -  Coagulase negative Staphylococcus: Detec      Method Type: PCR    Culture - Blood (collected 19 @ 17:30)  Source: .Blood Blood  Gram Stain (19 @ 20:12):    Growth in aerobic bottle: Gram Positive Cocci in Clusters  Preliminary Report (19 @ 20:12):    Growth in aerobic bottle: Gram Positive Cocci in Clusters    "Due to technical problems, Proteus sp. will Not be reported as part of    the BCID panel until further notice"    ***Blood Panel PCR results on this specimen are available    approximately 3 hours after the Gram stain result.***    Gram stain, PCR, and/or culture results may not always    correspond due to difference in methodologies.    ************************************************************    This PCR assay was performed using [employer].    The following targets are tested for: Enterococcus,    vancomycin resistant enterococci, Listeria monocytogenes,    coagulase negative staphylococci, S. aureus,    methicillin resistant S. aureus, Streptococcus agalactiae    (Group B), S. pneumoniae, S.pyogenes (Group A),    Acinetobacter baumannii, Enterobacter cloacae, E. coli,    Klebsiella oxytoca, K. pneumoniae, Proteus sp.,    Serratia marcescens, Haemophilus influenzae,    Neisseria meningitidis, Pseudomonas aeruginosa, Candida    albicans, C. glabrata, C krusei, C parapsilosis,    C. tropicalis and the KPC resistance gene.  Organism: Blood Culture PCR (19 @ 21:53)  Organism: Blood Culture PCR (19 @ 21:53)      -  Coagulase negative Staphylococcus: Detec      Method Type: PCR        Lactate, Blood: 0.9 mmol/L (19 @ 09:10)  Blood Gas Venous - Lactate: 6.7 mmoL/L (19 @ 20:50)  Lactate, Blood: 6.8 mmol/L (19 @ 17:37)      INFECTIOUS DISEASES TESTING      RADIOLOGY & ADDITIONAL TESTS:  I have personally reviewed the last Chest xray  CXR  Xray Chest 1 View- PORTABLE-Urgent:   EXAM:  XR CHEST PORTABLE URGENT 1V            PROCEDURE DATE:  2019            INTERPRETATION:  Clinical History / Reason for exam: Shortness of breath    Comparison : Chest radiograph 2017.      Technique/Positioning: Portable frontal view.    Findings:    Support devices: None.    Cardiac/mediastinum/hilum: Unremarkable.    Lung parenchyma/Pleura: Within normal limits.    Skeleton/soft tissues: Chronic fracture deformity and sclerosis of the   right fifth rib    Impression:      Noradiographic evidence of acute cardiopulmonary disease.                    HALEIGH TITUS M.D., ATTENDING RADIOLOGIST  This document has been electronically signed. Sep  8 2019  6:29AM             (19 @ 19:07)      CT      CARDIOLOGY TESTING  12 Lead ECG:   Ventricular Rate 135 BPM    Atrial Rate 135 BPM    P-R Interval 136 ms    QRS Duration 82 ms    Q-T Interval 292 ms    QTC Calculation(Bezet) 438 ms    P Axis 51 degrees    R Axis 43 degrees    T Axis 49 degrees    Diagnosis Line Sinus tachycardia  Otherwise normal ECG    Confirmed by GABBY HERNANDEZ MD ([id number]) on 2019 8:38:42 AM (19 @ 15:22)      MEDICATIONS  benzocaine 15 mG/menthol 3.6 mG Lozenge 1  ceFAZolin   IVPB 1000  chlordiazePOXIDE 10  folic acid 1  heparin  Injectable 5000  lactated ringers. 1000  losartan 100  meclizine 12.5  multivitamin 1  pantoprazole    Tablet 40      ANTIBIOTICS:  ceFAZolin   IVPB 1000 milliGRAM(s) IV Intermittent every 8 hours      All available historical data has been reviewed

## 2019-09-10 ENCOUNTER — TRANSCRIPTION ENCOUNTER (OUTPATIENT)
Age: 59
End: 2019-09-10

## 2019-09-10 DIAGNOSIS — M10.49 OTHER SECONDARY GOUT, MULTIPLE SITES: ICD-10-CM

## 2019-09-10 DIAGNOSIS — R78.81 BACTEREMIA: ICD-10-CM

## 2019-09-10 LAB
-  AMPICILLIN/SULBACTAM: SIGNIFICANT CHANGE UP
-  CEFAZOLIN: SIGNIFICANT CHANGE UP
-  CLINDAMYCIN: SIGNIFICANT CHANGE UP
-  ERYTHROMYCIN: SIGNIFICANT CHANGE UP
-  GENTAMICIN: SIGNIFICANT CHANGE UP
-  OXACILLIN: SIGNIFICANT CHANGE UP
-  RIFAMPIN: SIGNIFICANT CHANGE UP
-  TETRACYCLINE: SIGNIFICANT CHANGE UP
-  TRIMETHOPRIM/SULFAMETHOXAZOLE: SIGNIFICANT CHANGE UP
-  VANCOMYCIN: SIGNIFICANT CHANGE UP
ALBUMIN SERPL ELPH-MCNC: 3.9 G/DL — SIGNIFICANT CHANGE UP (ref 3.5–5.2)
ALP SERPL-CCNC: 75 U/L — SIGNIFICANT CHANGE UP (ref 30–115)
ALT FLD-CCNC: 25 U/L — SIGNIFICANT CHANGE UP (ref 0–41)
AMMONIA BLD-MCNC: 25 UMOL/L — SIGNIFICANT CHANGE UP (ref 11–55)
ANION GAP SERPL CALC-SCNC: 12 MMOL/L — SIGNIFICANT CHANGE UP (ref 7–14)
AST SERPL-CCNC: 46 U/L — HIGH (ref 0–41)
BILIRUB DIRECT SERPL-MCNC: 0.4 MG/DL — HIGH (ref 0–0.2)
BILIRUB INDIRECT FLD-MCNC: 1 MG/DL — SIGNIFICANT CHANGE UP (ref 0.2–1.2)
BILIRUB SERPL-MCNC: 1.4 MG/DL — HIGH (ref 0.2–1.2)
BUN SERPL-MCNC: 9 MG/DL — LOW (ref 10–20)
CALCIUM SERPL-MCNC: 8.8 MG/DL — SIGNIFICANT CHANGE UP (ref 8.5–10.1)
CHLORIDE SERPL-SCNC: 104 MMOL/L — SIGNIFICANT CHANGE UP (ref 98–110)
CO2 SERPL-SCNC: 25 MMOL/L — SIGNIFICANT CHANGE UP (ref 17–32)
CREAT SERPL-MCNC: 1.5 MG/DL — SIGNIFICANT CHANGE UP (ref 0.7–1.5)
CRP SERPL-MCNC: 3.36 MG/DL — HIGH (ref 0–0.4)
CULTURE RESULTS: SIGNIFICANT CHANGE UP
GLUCOSE SERPL-MCNC: 92 MG/DL — SIGNIFICANT CHANGE UP (ref 70–99)
HCT VFR BLD CALC: 28.9 % — LOW (ref 42–52)
HGB BLD-MCNC: 10 G/DL — LOW (ref 14–18)
MAGNESIUM SERPL-MCNC: 1.5 MG/DL — LOW (ref 1.8–2.4)
MCHC RBC-ENTMCNC: 33.1 PG — HIGH (ref 27–31)
MCHC RBC-ENTMCNC: 34.6 G/DL — SIGNIFICANT CHANGE UP (ref 32–37)
MCV RBC AUTO: 95.7 FL — HIGH (ref 80–94)
METHOD TYPE: SIGNIFICANT CHANGE UP
NRBC # BLD: 0 /100 WBCS — SIGNIFICANT CHANGE UP (ref 0–0)
ORGANISM # SPEC MICROSCOPIC CNT: SIGNIFICANT CHANGE UP
PLATELET # BLD AUTO: 107 K/UL — LOW (ref 130–400)
POTASSIUM SERPL-MCNC: 3.9 MMOL/L — SIGNIFICANT CHANGE UP (ref 3.5–5)
POTASSIUM SERPL-SCNC: 3.9 MMOL/L — SIGNIFICANT CHANGE UP (ref 3.5–5)
PROT SERPL-MCNC: 6.4 G/DL — SIGNIFICANT CHANGE UP (ref 6–8)
RBC # BLD: 3.02 M/UL — LOW (ref 4.7–6.1)
RBC # FLD: 12.9 % — SIGNIFICANT CHANGE UP (ref 11.5–14.5)
SODIUM SERPL-SCNC: 141 MMOL/L — SIGNIFICANT CHANGE UP (ref 135–146)
SPECIMEN SOURCE: SIGNIFICANT CHANGE UP
WBC # BLD: 4.61 K/UL — LOW (ref 4.8–10.8)
WBC # FLD AUTO: 4.61 K/UL — LOW (ref 4.8–10.8)

## 2019-09-10 RX ORDER — MAGNESIUM SULFATE 500 MG/ML
1 VIAL (ML) INJECTION ONCE
Refills: 0 | Status: COMPLETED | OUTPATIENT
Start: 2019-09-10 | End: 2019-09-10

## 2019-09-10 RX ORDER — ACETAMINOPHEN 500 MG
650 TABLET ORAL EVERY 6 HOURS
Refills: 0 | Status: DISCONTINUED | OUTPATIENT
Start: 2019-09-10 | End: 2019-09-11

## 2019-09-10 RX ADMIN — Medication 40 MILLIGRAM(S): at 08:50

## 2019-09-10 RX ADMIN — Medication 10 MILLIGRAM(S): at 05:03

## 2019-09-10 RX ADMIN — Medication 12.5 MILLIGRAM(S): at 05:03

## 2019-09-10 RX ADMIN — HEPARIN SODIUM 5000 UNIT(S): 5000 INJECTION INTRAVENOUS; SUBCUTANEOUS at 17:55

## 2019-09-10 RX ADMIN — Medication 650 MILLIGRAM(S): at 02:02

## 2019-09-10 RX ADMIN — Medication 12.5 MILLIGRAM(S): at 14:51

## 2019-09-10 RX ADMIN — Medication 12.5 MILLIGRAM(S): at 21:55

## 2019-09-10 RX ADMIN — SODIUM CHLORIDE 75 MILLILITER(S): 9 INJECTION, SOLUTION INTRAVENOUS at 05:04

## 2019-09-10 RX ADMIN — PANTOPRAZOLE SODIUM 40 MILLIGRAM(S): 20 TABLET, DELAYED RELEASE ORAL at 06:11

## 2019-09-10 RX ADMIN — Medication 100 GRAM(S): at 17:55

## 2019-09-10 RX ADMIN — Medication 650 MILLIGRAM(S): at 01:02

## 2019-09-10 RX ADMIN — Medication 1 TABLET(S): at 11:29

## 2019-09-10 RX ADMIN — Medication 1 MILLIGRAM(S): at 11:29

## 2019-09-10 RX ADMIN — LOSARTAN POTASSIUM 100 MILLIGRAM(S): 100 TABLET, FILM COATED ORAL at 05:03

## 2019-09-10 RX ADMIN — Medication 100 MILLIGRAM(S): at 05:04

## 2019-09-10 RX ADMIN — HEPARIN SODIUM 5000 UNIT(S): 5000 INJECTION INTRAVENOUS; SUBCUTANEOUS at 05:03

## 2019-09-10 NOTE — DISCHARGE NOTE PROVIDER - NSDCCPCAREPLAN_GEN_ALL_CORE_FT
PRINCIPAL DISCHARGE DIAGNOSIS  Diagnosis: Alcohol abuse  Assessment and Plan of Treatment: - Resolved      SECONDARY DISCHARGE DIAGNOSES  Diagnosis: Gout  Assessment and Plan of Treatment: On steroids PO   - Continue PO steroids as prescribed    Diagnosis: Tachycardia  Assessment and Plan of Treatment: - resolved    Diagnosis: Lactic acidosis  Assessment and Plan of Treatment: - resolved    Diagnosis: ANDRZEJ (acute kidney injury)  Assessment and Plan of Treatment: - resolved

## 2019-09-10 NOTE — CHART NOTE - NSCHARTNOTEFT_GEN_A_CORE
Saw Pt at bed side while having dinner.    Pt denies any new complaints. Pt understands that he has to continue taking prednisone after discharge as prescribed.     Reviewed labs, imaging and new consults     Case discussed with the attending Saw Pt at bed side while having dinner.    Pt denies any new complaints. Pt understands that he has to continue taking prednisone after discharge as prescribed.     Reviewed labs, imaging and new consults. Replated Mg for hypomagnesemia. Low Platelets - will repeat CBC tomorrow in am.     Case discussed with the attending

## 2019-09-10 NOTE — PROGRESS NOTE ADULT - PROBLEM SELECTOR PLAN 1
symptoms suggestive of crystal arthropathy   trial of oral steroids   NO clinical evidence of infection   dc planning

## 2019-09-10 NOTE — DISCHARGE NOTE PROVIDER - CARE PROVIDER_API CALL
Pauly Walls)  Internal Medicine  [address] [place] [address]  Phone: [phone]  Fax: [phone]  Follow Up Time: 2 weeks

## 2019-09-11 ENCOUNTER — TRANSCRIPTION ENCOUNTER (OUTPATIENT)
Age: 59
End: 2019-09-11

## 2019-09-11 VITALS
DIASTOLIC BLOOD PRESSURE: 61 MMHG | TEMPERATURE: 97 F | SYSTOLIC BLOOD PRESSURE: 130 MMHG | HEART RATE: 84 BPM | RESPIRATION RATE: 16 BRPM

## 2019-09-11 LAB
ANION GAP SERPL CALC-SCNC: 11 MMOL/L — SIGNIFICANT CHANGE UP (ref 7–14)
BUN SERPL-MCNC: 14 MG/DL — SIGNIFICANT CHANGE UP (ref 10–20)
CALCIUM SERPL-MCNC: 9.3 MG/DL — SIGNIFICANT CHANGE UP (ref 8.5–10.1)
CHLORIDE SERPL-SCNC: 106 MMOL/L — SIGNIFICANT CHANGE UP (ref 98–110)
CO2 SERPL-SCNC: 25 MMOL/L — SIGNIFICANT CHANGE UP (ref 17–32)
CREAT SERPL-MCNC: 1.3 MG/DL — SIGNIFICANT CHANGE UP (ref 0.7–1.5)
GLUCOSE SERPL-MCNC: 116 MG/DL — HIGH (ref 70–99)
HCT VFR BLD CALC: 28.1 % — LOW (ref 42–52)
HGB BLD-MCNC: 9.8 G/DL — LOW (ref 14–18)
MAGNESIUM SERPL-MCNC: 1.9 MG/DL — SIGNIFICANT CHANGE UP (ref 1.8–2.4)
MCHC RBC-ENTMCNC: 33.6 PG — HIGH (ref 27–31)
MCHC RBC-ENTMCNC: 34.9 G/DL — SIGNIFICANT CHANGE UP (ref 32–37)
MCV RBC AUTO: 96.2 FL — HIGH (ref 80–94)
NRBC # BLD: 0 /100 WBCS — SIGNIFICANT CHANGE UP (ref 0–0)
PLATELET # BLD AUTO: 104 K/UL — LOW (ref 130–400)
POTASSIUM SERPL-MCNC: 4 MMOL/L — SIGNIFICANT CHANGE UP (ref 3.5–5)
POTASSIUM SERPL-SCNC: 4 MMOL/L — SIGNIFICANT CHANGE UP (ref 3.5–5)
RBC # BLD: 2.92 M/UL — LOW (ref 4.7–6.1)
RBC # FLD: 13 % — SIGNIFICANT CHANGE UP (ref 11.5–14.5)
SODIUM SERPL-SCNC: 142 MMOL/L — SIGNIFICANT CHANGE UP (ref 135–146)
WBC # BLD: 7.13 K/UL — SIGNIFICANT CHANGE UP (ref 4.8–10.8)
WBC # FLD AUTO: 7.13 K/UL — SIGNIFICANT CHANGE UP (ref 4.8–10.8)

## 2019-09-11 RX ORDER — MAGNESIUM SULFATE 500 MG/ML
2 VIAL (ML) INJECTION ONCE
Refills: 0 | Status: DISCONTINUED | OUTPATIENT
Start: 2019-09-11 | End: 2019-09-11

## 2019-09-11 RX ADMIN — Medication 1 MILLIGRAM(S): at 11:24

## 2019-09-11 RX ADMIN — Medication 40 MILLIGRAM(S): at 05:02

## 2019-09-11 RX ADMIN — HEPARIN SODIUM 5000 UNIT(S): 5000 INJECTION INTRAVENOUS; SUBCUTANEOUS at 05:02

## 2019-09-11 RX ADMIN — Medication 12.5 MILLIGRAM(S): at 05:02

## 2019-09-11 RX ADMIN — Medication 12.5 MILLIGRAM(S): at 13:27

## 2019-09-11 RX ADMIN — PANTOPRAZOLE SODIUM 40 MILLIGRAM(S): 20 TABLET, DELAYED RELEASE ORAL at 05:02

## 2019-09-11 RX ADMIN — Medication 1 TABLET(S): at 11:24

## 2019-09-11 RX ADMIN — LOSARTAN POTASSIUM 100 MILLIGRAM(S): 100 TABLET, FILM COATED ORAL at 05:02

## 2019-09-11 NOTE — SBIRT NOTE ADULT - NSSBIRTBRIEFINTDET_GEN_A_CORE
ARU offered and greatly encouraged.  Pt declined.  Out-pt services and locations offered including AA meetings.  Pt's health and ETOH use discussed.  Pt will think about attending out-pt.

## 2019-09-11 NOTE — PROGRESS NOTE ADULT - ASSESSMENT
ac gouty arthritis  no bacteremia/no sepsis  etoh withdrawal new  martha  lactic acidosis new  hypomagnesemia new      dc iv abx  po prednisone  dc ivf  id apprec  rehab/pt suppl mag  anticipate dc in am    spent greater than 30 mins
ac gouty arthritis  no bacteremia/no sepsis  etoh withdrawal new  martha  lactic acidosis new  hypomagnesemia new      dc iv abx  po prednisone for 3 more days  fu lytes at adult home  dc today
etoh withdrawal new  martha  lactic acidosis new  hypomagnesemia new  sepsis new  bacteremia gram positive cocci    id apprec  iv ancef  ?2decho  librium prn  continue ivf   fu lytes  oob/chair dvt proph

## 2019-09-11 NOTE — PROGRESS NOTE ADULT - SUBJECTIVE AND OBJECTIVE BOX
Patient was seen and examined. Spoke with RN. Chart reviewed.  febrile, c/o pain right knee, worse with mopvement,, fatigue malaise, left wrist pain mildly improved   ha  other ros negative      No events overnight.  Vital Signs Last 24 Hrs  T(F): 99.3 (10 Sep 2019 05:11), Max: 101.1 (10 Sep 2019 01:00)  HR: 83 (10 Sep 2019 05:11) (83 - 101)  BP: 112/66 (10 Sep 2019 05:11) (112/66 - 154/71)  SpO2: 96% (10 Sep 2019 06:23) (96% - 96%)  MEDICATIONS  (STANDING):  folic acid 1 milliGRAM(s) Oral daily  heparin  Injectable 5000 Unit(s) SubCutaneous every 12 hours  lactated ringers. 1000 milliLiter(s) (75 mL/Hr) IV Continuous <Continuous>  losartan 100 milliGRAM(s) Oral daily  meclizine 12.5 milliGRAM(s) Oral three times a day  multivitamin 1 Tablet(s) Oral daily  pantoprazole    Tablet 40 milliGRAM(s) Oral before breakfast  predniSONE   Tablet 40 milliGRAM(s) Oral daily    MEDICATIONS  (PRN):  acetaminophen   Tablet .. 650 milliGRAM(s) Oral every 6 hours PRN Temp greater or equal to 38C (100.4F), Mild Pain (1 - 3)  LORazepam     Tablet 2 milliGRAM(s) Oral every 2 hours PRN CIWA-Ar score increase by 2 points and a total score of 7 or less  ondansetron Injectable 4 milliGRAM(s) IV Push every 8 hours PRN Nausea and/or Vomiting    Labs:                Culture - Urine (collected 07 Sep 2019 20:00)  Source: .Urine Clean Catch (Midstream)  Final Report (09 Sep 2019 07:02):    <10,000 CFU/mL Normal Urogenital Sandra    Culture - Blood (collected 07 Sep 2019 17:37)  Source: .Blood Blood  Gram Stain (08 Sep 2019 23:14):    Growth in anaerobic bottle:    Gram Positive Cocci in Clusters    Gram Positive Cocci in Pairs and Chains  Final Report (09 Sep 2019 20:06):    Growth in anaerobic bottle:    Staphylococcus epidermidis and    Granulicatella adiacens "Susceptibilities not performed"    "Due to technical problems, Proteus sp. will Not be reported as part of    the BCID panel until further notice"    ***Blood Panel PCRresults on this specimen are available    approximately 3 hours after the Gram stain result.***    Gram stain, PCR, and/or culture results may not always    correspond due to difference in methodologies.    ************************************************************    This PCR assay was performed using [employer].    The following targets are tested for: Enterococcus,    vancomycin resistant enterococci, Listeria monocytogenes,    coagulase negative staphylococci, S. aureus,    methicillin resistant S. aureus, Streptococcus agalactiae    (Group B), S. pneumoniae, S. pyogenes (Group A),    Acinetobacter baumannii, Enterobacter cloacae, E. coli,    Klebsiella oxytoca, K. pneumoniae, Proteus sp.,    Serratia marcescens, Haemophilus influenzae,    Neisseria meningitidis, Pseudomonas aeruginosa, Candida    albicans, C. glabrata, C krusei, C parapsilosis,    C. tropicalis and the KPC resistance gene.  Organism: Blood Culture PCR (09 Sep 2019 20:06)  Organism: Blood Culture PCR (09 Sep 2019 20:06)    Culture - Blood (collected 07 Sep 2019 17:30)  Source: .Blood Blood  Gram Stain (08 Sep 2019 20:12):    Growth in aerobic bottle: Gram Positive Cocci in Clusters  Preliminary Report (09 Sep 2019 18:14):    Growth in aerobic bottle: Staphylococcus epidermidis    "Due to technical problems, Proteus sp. will Not be reported as part of    the BCID panel until further notice"    ***Blood Panel PCR results on this specimen are available    approximately 3 hours after the Gram stain result.***    Gram stain, PCR, and/or culture results may not always    correspond due to difference in methodologies.    ************************************************************    This PCR assay was performed using [employer].    The following targets are tested for: Enterococcus,    vancomycin resistant enterococci, Listeria monocytogenes,    coagulase negative staphylococci, S. aureus,    methicillin resistant S. aureus, Streptococcus agalactiae    (Group B), S. pneumoniae, S. pyogenes (Group A),    Acinetobacter baumannii, Enterobacter cloacae, E. coli,    Klebsiella oxytoca, K. pneumoniae, Proteus sp.,    Serratia marcescens, Haemophilus influenzae,    Neisseria meningitidis, Pseudomonas aeruginosa, Candida    albicans, C. glabrata, C krusei, C parapsilosis,    C. tropicalis and the KPC resistance gene.  Organism: Blood Culture PCR (08 Sep 2019 21:53)  Organism: Blood Culture PCR (08 Sep 2019 21:53)        Radiology:    General: comfortable, NAD  Neurology: A&Ox3, nonfocal  Head:  Normocephalic, atraumatic  ENT:  Mucosa moist, no ulcerations  Neck:  Supple, no JVD,   Skin: no breakdowns (as per RN)  Resp: CTA B/L  CV: RRR, S1S2,   GI: Soft, NT, bowel sounds  MS: right knee swollen/effusion edema, + peripheral pulses, FROM all 4 extremity  left wrist swoeeln
CAT ALSTON  59y, Male  Allergy: Beef (Unknown)  Cheese (Unknown)  Milk (Unknown)  No Known Drug Allergies  shellfish (Unknown)      CHIEF COMPLAINT: EtOH intoxication (09 Sep 2019 11:30)      INTERVAL EVENTS/HPI  - No acute events overnight  - T(F): , Max: 101.1 (09-10-19 @ 01:00)  - Denies any worsening symptoms  - Tolerating medication    ROS  10 system review- neg     SOCIAL HISTORY    Substance Use (  X) never used  (  ) IVDU (  ) Other:  Tobacco Usage:  (   ) never smoked   ( X  ) former smoker   (   ) current smoker   Alcohol Usage: (   ) social  (   X) daily use (   ) denies  Sexual History: not relevant       FH noncontributory     VITALS:  T(F): 99.3, Max: 101.1 (09-10-19 @ 01:00)  HR: 83  BP: 112/66  RR: 16Vital Signs Last 24 Hrs  T(C): 37.4 (10 Sep 2019 05:11), Max: 38.4 (10 Sep 2019 01:00)  T(F): 99.3 (10 Sep 2019 05:11), Max: 101.1 (10 Sep 2019 01:00)  HR: 83 (10 Sep 2019 05:11) (83 - 101)  BP: 112/66 (10 Sep 2019 05:11) (112/66 - 154/71)  BP(mean): --  RR: 16 (10 Sep 2019 05:11) (16 - 17)  SpO2: 96% (10 Sep 2019 06:23) (96% - 96%)    PHYSICAL EXAM:  Gen: NAD, resting in bed  HEENT: Normocephalic, atraumatic  Neck: supple, no lymphadenopathy  CV:s1 s 2+   Lungs: clear   Abdomen: Soft, BS present  Ext: Warm, well perfused. Tender Right ankle and bilat elbows- swollen   Neuro: non focal, awake  Skin: no rash, no erythema      TESTS & MEASUREMENTS:    09-08    140  |  102  |  12  ----------------------------<  107<H>  4.3   |  22  |  1.5    Ca    8.8      08 Sep 2019 09:10  Phos  2.5     09-08  Mg     1.7     09-08    TPro  6.3  /  Alb  4.1  /  TBili  2.2<H>  /  DBili  0.5<H>  /  AST  79<H>  /  ALT  31  /  AlkPhos  85  09-08      LIVER FUNCTIONS - ( 08 Sep 2019 09:10 )  Alb: 4.1 g/dL / Pro: 6.3 g/dL / ALK PHOS: 85 U/L / ALT: 31 U/L / AST: 79 U/L / GGT: x               Culture - Urine (collected 09-07-19 @ 20:00)  Source: .Urine Clean Catch (Midstream)  Final Report (09-09-19 @ 07:02):    <10,000 CFU/mL Normal Urogenital Sandra    Culture - Blood (collected 09-07-19 @ 17:37)  Source: .Blood Blood  Gram Stain (09-08-19 @ 23:14):    Growth in anaerobic bottle:    Gram Positive Cocci in Clusters    Gram Positive Cocci in Pairs and Chains  Final Report (09-09-19 @ 20:06):    Growth in anaerobic bottle:    Staphylococcus epidermidis and    Granulicatella adiacens "Susceptibilities not performed"    "Due to technical problems, Proteus sp. will Not be reported as part of    the BCID panel until further notice"    ***Blood Panel PCRresults on this specimen are available    approximately 3 hours after the Gram stain result.***    Gram stain, PCR, and/or culture results may not always    correspond due to difference in methodologies.    ************************************************************    This PCR assay was performed using [employer]    The following targets are tested for: Enterococcus,    vancomycin resistant enterococci, Listeria monocytogenes,    coagulase negative staphylococci, S. aureus,    methicillin resistant S. aureus, Streptococcus agalactiae    (Group B), S. pneumoniae, S. pyogenes (Group A),    Acinetobacter baumannii, Enterobacter cloacae, E. coli,    Klebsiella oxytoca, K. pneumoniae, Proteus sp.,    Serratia marcescens, Haemophilus influenzae,    Neisseria meningitidis, Pseudomonas aeruginosa, Candida    albicans, C. glabrata, C krusei, C parapsilosis,    C. tropicalis and the KPC resistance gene.  Organism: Blood Culture PCR (09-09-19 @ 20:06)  Organism: Blood Culture PCR (09-09-19 @ 20:06)      -  Coagulase negative Staphylococcus: Detec      Method Type: PCR    Culture - Blood (collected 09-07-19 @ 17:30)  Source: .Blood Blood  Gram Stain (09-08-19 @ 20:12):    Growth in aerobic bottle: Gram Positive Cocci in Clusters  Preliminary Report (09-09-19 @ 18:14):    Growth in aerobic bottle: Staphylococcus epidermidis    "Due to technical problems, Proteus sp. will Not be reported as part of    the BCID panel until further notice"    ***Blood Panel PCR results on this specimen are available    approximately 3 hours after the Gram stain result.***    Gram stain, PCR, and/or culture results may not always    correspond due to difference in methodologies.    ************************************************************    This PCR assay was performed using [employer]    The following targets are tested for: Enterococcus,    vancomycin resistant enterococci, Listeria monocytogenes,    coagulase negative staphylococci, S. aureus,    methicillin resistant S. aureus, Streptococcus agalactiae    (Group B), S. pneumoniae, S. pyogenes (Group A),    Acinetobacter baumannii, Enterobacter cloacae, E. coli,    Klebsiella oxytoca, K. pneumoniae, Proteus sp.,    Serratia marcescens, Haemophilus influenzae,    Neisseria meningitidis, Pseudomonas aeruginosa, Candida    albicans, C. glabrata, C krusei, C parapsilosis,    C. tropicalis and the KPC resistance gene.  Organism: Blood Culture PCR (09-08-19 @ 21:53)  Organism: Blood Culture PCR (09-08-19 @ 21:53)      -  Coagulase negative Staphylococcus: Detec      Method Type: PCR        Lactate, Blood: 0.9 mmol/L (09-08-19 @ 09:10)  Blood Gas Venous - Lactate: 6.7 mmoL/L (09-07-19 @ 20:50)  Lactate, Blood: 6.8 mmol/L (09-07-19 @ 17:37)      INFECTIOUS DISEASES TESTING  Hepatitis C Virus Interpretation: Nonreact (09-08-19 @ 09:10)      RADIOLOGY & ADDITIONAL TESTS:  I have personally reviewed the last Chest xray  CXR  Xray Chest 1 View- PORTABLE-Urgent:   EXAM:  XR CHEST PORTABLE URGENT 1V            PROCEDURE DATE:  09/07/2019            INTERPRETATION:  Clinical History / Reason for exam: Shortness of breath    Comparison : Chest radiograph 5/13/2017.      Technique/Positioning: Portable frontal view.    Findings:    Support devices: None.    Cardiac/mediastinum/hilum: Unremarkable.    Lung parenchyma/Pleura: Within normal limits.    Skeleton/soft tissues: Chronic fracture deformity and sclerosis of the   right fifth rib    Impression:      Noradiographic evidence of acute cardiopulmonary disease.                    HALEIGH TITUS M.D., ATTENDING RADIOLOGIST  This document has been electronically signed. Sep  8 2019  6:29AM             (09-07-19 @ 19:07)      CT      CARDIOLOGY TESTING  12 Lead ECG:   Ventricular Rate 135 BPM    Atrial Rate 135 BPM    P-R Interval 136 ms    QRS Duration 82 ms    Q-T Interval 292 ms    QTC Calculation(Bezet) 438 ms    P Axis 51 degrees    R Axis 43 degrees    T Axis 49 degrees    Diagnosis Line Sinus tachycardia  Otherwise normal ECG    Confirmed by DAVID OLIVEIRA, GABBY ([id number]) on 9/8/2019 8:38:42 AM (09-07-19 @ 15:22)      MEDICATIONS  folic acid 1  heparin  Injectable 5000  lactated ringers. 1000  losartan 100  meclizine 12.5  multivitamin 1  pantoprazole    Tablet 40  predniSONE   Tablet 40      ANTIBIOTICS:
Patient was seen and examined. Spoke with RN. Chart reviewed.  comf, intermittent jitteriness  ros othe negative,    No events overnight.  Vital Signs Last 24 Hrs  T(F): 97.9 (09 Sep 2019 09:54), Max: 99.4 (09 Sep 2019 05:22)  HR: 104 (09 Sep 2019 05:22) (90 - 104)  BP: 149/72 (09 Sep 2019 05:22) (137/77 - 152/69)  SpO2: --  MEDICATIONS  (STANDING):  benzocaine 15 mG/menthol 3.6 mG Lozenge 1 Lozenge Oral four times a day  ceFAZolin   IVPB 1000 milliGRAM(s) IV Intermittent every 8 hours  chlordiazePOXIDE 10 milliGRAM(s) Oral once  folic acid 1 milliGRAM(s) Oral daily  heparin  Injectable 5000 Unit(s) SubCutaneous every 12 hours  lactated ringers. 1000 milliLiter(s) (75 mL/Hr) IV Continuous <Continuous>  losartan 100 milliGRAM(s) Oral daily  meclizine 12.5 milliGRAM(s) Oral three times a day  multivitamin 1 Tablet(s) Oral daily  pantoprazole    Tablet 40 milliGRAM(s) Oral before breakfast    MEDICATIONS  (PRN):  LORazepam     Tablet 2 milliGRAM(s) Oral every 2 hours PRN CIWA-Ar score increase by 2 points and a total score of 7 or less  ondansetron Injectable 4 milliGRAM(s) IV Push every 8 hours PRN Nausea and/or Vomiting    Labs:                        11.2   8.16  )-----------( 242      ( 07 Sep 2019 17:10 )             32.7     08 Sep 2019 09:10    140    |  102    |  12     ----------------------------<  107    4.3     |  22     |  1.5    07 Sep 2019 17:10    145    |  104    |  20     ----------------------------<  102    4.7     |  18     |  2.0      Ca    8.8        08 Sep 2019 09:10  Ca    9.6        07 Sep 2019 17:10  Phos  2.5       08 Sep 2019 09:10  Mg     1.7       08 Sep 2019 09:10    TPro  6.3    /  Alb  4.1    /  TBili  2.2    /  DBili  0.5    /  AST  79     /  ALT  31     /  AlkPhos  85     08 Sep 2019 09:10      Urinalysis Basic - ( 07 Sep 2019 20:00 )    Color: Yellow / Appearance: Clear / S.025 / pH: x  Gluc: x / Ketone: Trace  / Bili: Negative / Urobili: 0.2 mg/dL   Blood: x / Protein: Trace mg/dL / Nitrite: Negative   Leuk Esterase: Negative / RBC: 1-2 /HPF / WBC x   Sq Epi: x / Non Sq Epi: Occasional /HPF / Bacteria: Few        Culture - Urine (collected 07 Sep 2019 20:00)  Source: .Urine Clean Catch (Midstream)  Final Report (09 Sep 2019 07:02):    <10,000 CFU/mL Normal Urogenital Sandra    Culture - Blood (collected 07 Sep 2019 17:37)  Source: .Blood Blood  Gram Stain (08 Sep 2019 23:14):    Growth in anaerobic bottle:    Gram Positive Cocci in Clusters    Gram Positive Cocci in Pairs and Chains  Preliminary Report (08 Sep 2019 23:15):    Growth in anaerobic bottle:    Gram Positive Cocci in Clusters    Gram Positive Cocci in Pairs and Chains    "Due to technical problems, Proteus sp. will Not be reported as part of    the BCID panel until further notice"    ***Blood Panel PCR results on thisspecimen are available    approximately 3 hours after the Gram stain result.***    Gram stain, PCR, and/or culture results may not always    correspond due to difference in methodologies.    ************************************************************    This PCR assay was performed using [employer].    The following targets are tested for: Enterococcus,    vancomycin resistant enterococci, Listeria monocytogenes,    coagulase negative staphylococci, S. aureus,    methicillin resistant S. aureus, Streptococcus agalactiae    (Group B), S. pneumoniae, S. pyogenes (Group A),    Acinetobacter baumannii, Enterobacter cloacae, E. coli,    Klebsiella oxytoca, K. pneumoniae, Proteus sp.,    Serratia marcescens, Haemophilus influenzae,    Neisseria meningitidis, Pseudomonas aeruginosa, Candida    albicans, C. glabrata, C krusei, C parapsilosis,    C. tropicalis and the KPC resistance gene.  Organism: Blood Culture PCR (09 Sep 2019 01:00)  Organism: Blood Culture PCR (09 Sep 2019 01:00)    Culture - Blood (collected 07 Sep 2019 17:30)  Source: .Blood Blood  Gram Stain (08 Sep 2019 20:12):    Growth in aerobic bottle: Gram Positive Cocci in Clusters  Preliminary Report (08 Sep 2019 20:12):    Growth in aerobic bottle: Gram Positive Cocci in Clusters    "Due to technical problems, Proteus sp. will Not be reported as part of    the BCID panel until further notice"    ***Blood Panel PCR results on this specimen are available    approximately 3 hours after the Gram stain result.***    Gram stain, PCR, and/or culture results may not always    correspond due to difference in methodologies.    ************************************************************    This PCR assay was performed using [employer].    The following targets are tested for: Enterococcus,    vancomycin resistant enterococci, Listeria monocytogenes,    coagulase negative staphylococci, S. aureus,    methicillin resistant S. aureus, Streptococcus agalactiae    (Group B), S. pneumoniae, S.pyogenes (Group A),    Acinetobacter baumannii, Enterobacter cloacae, E. coli,    Klebsiella oxytoca, K. pneumoniae, Proteus sp.,    Serratia marcescens, Haemophilus influenzae,    Neisseria meningitidis, Pseudomonas aeruginosa, Candida    albicans, C. glabrata, C krusei, C parapsilosis,    C. tropicalis and the KPC resistance gene.  Organism: Blood Culture PCR (08 Sep 2019 21:53)  Organism: Blood Culture PCR (08 Sep 2019 21:53)        Radiology:    General: comfortable, NAD  Neurology: A&Ox3, nonfocal  Head:  Normocephalic, atraumatic  ENT:  Mucosa moist, no ulcerations  Neck:  Supple, no JVD,   Skin: no breakdowns (as per RN)  Resp: CTA B/L  CV: RRR, S1S2,   GI: Soft, NT, bowel sounds  MS: No edema, + peripheral pulses, FROM all 4 extremity
Patient was seen and examined. Spoke with RN. Chart reviewed.  pain in right knee and left wrist improved,   ambulating well  other ros luzma    counseled pt on avoiding alcohol/abuse and spent greater than 30 mins on dc planning to adult home/with pa/staff  dc today      No events overnight.  Vital Signs Last 24 Hrs  T(F): 97.1 (11 Sep 2019 05:31), Max: 98 (10 Sep 2019 14:07)  HR: 84 (11 Sep 2019 05:31) (84 - 99)  BP: 130/61 (11 Sep 2019 05:31) (128/71 - 165/83)  SpO2: --  MEDICATIONS  (STANDING):  folic acid 1 milliGRAM(s) Oral daily  heparin  Injectable 5000 Unit(s) SubCutaneous every 12 hours  losartan 100 milliGRAM(s) Oral daily  meclizine 12.5 milliGRAM(s) Oral three times a day  multivitamin 1 Tablet(s) Oral daily  pantoprazole    Tablet 40 milliGRAM(s) Oral before breakfast  predniSONE   Tablet 40 milliGRAM(s) Oral daily    MEDICATIONS  (PRN):  acetaminophen   Tablet .. 650 milliGRAM(s) Oral every 6 hours PRN Temp greater or equal to 38C (100.4F), Mild Pain (1 - 3)  LORazepam     Tablet 2 milliGRAM(s) Oral every 2 hours PRN CIWA-Ar score increase by 2 points and a total score of 7 or less  ondansetron Injectable 4 milliGRAM(s) IV Push every 8 hours PRN Nausea and/or Vomiting    Labs:                        9.8    7.13  )-----------( 104      ( 11 Sep 2019 06:16 )             28.1                         10.0   4.61  )-----------( 107      ( 10 Sep 2019 07:03 )             28.9     11 Sep 2019 06:16    142    |  106    |  14     ----------------------------<  116    4.0     |  25     |  1.3    10 Sep 2019 07:03    141    |  104    |  9      ----------------------------<  92     3.9     |  25     |  1.5      Ca    9.3        11 Sep 2019 06:16  Ca    8.8        10 Sep 2019 07:03  Mg     1.9       11 Sep 2019 06:16  Mg     1.5       10 Sep 2019 07:03    TPro  6.4    /  Alb  3.9    /  TBili  1.4    /  DBili  0.4    /  AST  46     /  ALT  25     /  AlkPhos  75     10 Sep 2019 07:03          Culture - Blood (collected 09 Sep 2019 15:15)  Source: .Blood None  Preliminary Report (10 Sep 2019 22:01):    No growth to date.        Radiology:    General: comfortable, NAD  Neurology: A&Ox3, nonfocal  Head:  Normocephalic, atraumatic  ENT:  Mucosa moist, no ulcerations  Neck:  Supple, no JVD,   Skin: no breakdown  Resp: CTA B/L  CV: RRR, S1S2,   GI: Soft, NT, bowel sounds  MS: No edema, + peripheral pulses, FROM all 4 extremity

## 2019-09-11 NOTE — DISCHARGE NOTE NURSING/CASE MANAGEMENT/SOCIAL WORK - PATIENT PORTAL LINK FT
You can access the FollowMyHealth Patient Portal offered by [facility] by registering at the following website: [email or web address]. By joining [employer]’s FollowMyHealth portal, you will also be able to view your health information using other applications (apps) compatible with our system.

## 2019-09-13 DIAGNOSIS — E87.2 ACIDOSIS: ICD-10-CM

## 2019-09-13 DIAGNOSIS — N17.9 ACUTE KIDNEY FAILURE, UNSPECIFIED: ICD-10-CM

## 2019-09-13 DIAGNOSIS — F10.229 ALCOHOL DEPENDENCE WITH INTOXICATION, UNSPECIFIED: ICD-10-CM

## 2019-09-13 DIAGNOSIS — I10 ESSENTIAL (PRIMARY) HYPERTENSION: ICD-10-CM

## 2019-09-13 DIAGNOSIS — F10.10 ALCOHOL ABUSE, UNCOMPLICATED: ICD-10-CM

## 2019-09-13 DIAGNOSIS — M10.49 OTHER SECONDARY GOUT, MULTIPLE SITES: ICD-10-CM

## 2019-09-13 DIAGNOSIS — F10.239 ALCOHOL DEPENDENCE WITH WITHDRAWAL, UNSPECIFIED: ICD-10-CM

## 2019-09-13 DIAGNOSIS — E83.42 HYPOMAGNESEMIA: ICD-10-CM

## 2019-09-13 DIAGNOSIS — R00.0 TACHYCARDIA, UNSPECIFIED: ICD-10-CM

## 2019-09-14 LAB
CULTURE RESULTS: SIGNIFICANT CHANGE UP
SPECIMEN SOURCE: SIGNIFICANT CHANGE UP

## 2019-10-06 ENCOUNTER — EMERGENCY (EMERGENCY)
Facility: HOSPITAL | Age: 59
LOS: 0 days | Discharge: NURSING, REHAB OR LONG-TERM CARE | End: 2019-10-06
Attending: EMERGENCY MEDICINE | Admitting: EMERGENCY MEDICINE
Payer: MEDICAID

## 2019-10-06 VITALS
DIASTOLIC BLOOD PRESSURE: 72 MMHG | SYSTOLIC BLOOD PRESSURE: 136 MMHG | HEART RATE: 98 BPM | TEMPERATURE: 97 F | OXYGEN SATURATION: 99 % | WEIGHT: 220.02 LBS | RESPIRATION RATE: 19 BRPM

## 2019-10-06 DIAGNOSIS — F10.229 ALCOHOL DEPENDENCE WITH INTOXICATION, UNSPECIFIED: ICD-10-CM

## 2019-10-06 DIAGNOSIS — Z91.011 ALLERGY TO MILK PRODUCTS: ICD-10-CM

## 2019-10-06 DIAGNOSIS — Z91.018 ALLERGY TO OTHER FOODS: ICD-10-CM

## 2019-10-06 DIAGNOSIS — Z91.013 ALLERGY TO SEAFOOD: ICD-10-CM

## 2019-10-06 PROCEDURE — 99284 EMERGENCY DEPT VISIT MOD MDM: CPT

## 2019-10-06 RX ORDER — THIAMINE MONONITRATE (VIT B1) 100 MG
100 TABLET ORAL ONCE
Refills: 0 | Status: COMPLETED | OUTPATIENT
Start: 2019-10-06 | End: 2019-10-06

## 2019-10-06 RX ADMIN — Medication 100 MILLIGRAM(S): at 18:34

## 2019-10-06 NOTE — ED PROVIDER NOTE - PHYSICAL EXAMINATION
VITALS:  I have reviewed the initial vital signs.  GENERAL: NC/AT. Well-developed, well-nourished, in no acute distress. Nontoxic.  HEENT: Sclera clear. No conjunctival injection or pallor. EOMI, PERRLA. No nystagmus. Mucous membranes moist. No tongue fasciculations.   NECK: supple w FROM. No paraspinal muscle ttp. No midline cervical spinous tenderness, step offs, or deformity.  CARDIO: RRR, nl S1 and S2. No murmurs, rubs, or gallops.  PULM: Normal effort. CTA b/l without wheezes, rales, or rhonchi.  MSK: No midline thoracic or lumbar spinous tenderness, step offs, or deformity. Normal, steady gait. FROM to extremities x4. No joint swelling, erythema, or ttp.  GI: Abdomen soft and non-distended. Nontender.  SKIN: Warm, dry.  NEURO: A&Ox3. Speech clear. CN II-XII intact. 5/5 strength to upper and lower extremities b/l. Sensation intact and equal throughout. No pronator drift. Finger to nose intact. Normal heel to shin.

## 2019-10-06 NOTE — ED PROVIDER NOTE - OBJECTIVE STATEMENT
59 year old male w hx of EtOH abuse presents to the ED via EMS from [place] for alcohol intoxication. Patient drinks daily, admits to drinking 1 pint of vodka and whiskey just prior to arrival. Denies falls, head injury, LOC, vision changes, chest pain, shortness of breath, n/v, seizures, SI/HI, visual/auditory hallucinations. Denies other recreational drug use.

## 2019-10-06 NOTE — ED PROVIDER NOTE - CLINICAL SUMMARY MEDICAL DECISION MAKING FREE TEXT BOX
pt with clinical intoxication, ambualted in ed, tolerated po. stable for dc back to [facility]

## 2019-10-06 NOTE — ED PROVIDER NOTE - NS ED ROS FT
CONSTITUTIONAL: (-) fevers, (-) chills  EYES: (-) vision changes, (-) blurry vision, (-) double vision  NECK: (-) neck pain, (-) neck stiffness  CARDIO: (-) chest pain, (-) palpitations, (-) edema  PULM: (-) cough, (-) sputum, (-) shortness of breath  GI: (-) nausea, (-) vomiting, (-) abdominal pain  HEME: (-) easy bruising, (-) easy bleeding  MSK: (-) back pain, (-) gait difficulty  SKIN: (-) rashes, (-) wounds  NEURO: (-) headache, (-) head injury, (-) LOC, (-) dizziness, (-) lightheadedness, (-) syncope, (-) speech changes, (-) confusion, (-) numbness, (-) weakness, (-) paresthesias, (-) seizures  PSYCH: (-) suicidal ideation, (-) homicidal ideation, (-) depression, (-) anxiety, (-) visual hallucinations, (-) auditory hallucinations, (-) agitation    *all other systems negative except as documented above and in the HPI*

## 2019-10-06 NOTE — ED ADULT NURSE NOTE - CHIEF COMPLAINT QUOTE
pt states he drank 1/2 pint of whisky and vodka, Denies Suicidal or Homicidal ideations, BIBA via [facility] from [facility] residence.

## 2019-10-06 NOTE — ED ADULT TRIAGE NOTE - CHIEF COMPLAINT QUOTE
pt states he drank 1/2 pint of whisky and vodka, Denies Suicidal or Homicidal ideations, BIBA via [facility] from [facility] residence.

## 2019-10-06 NOTE — ED PROVIDER NOTE - NSFOLLOWUPCLINICS_GEN_ALL_ED_FT
[facility]  Detox Mgmt  [address] [place] [address]  Phone: [phone]  Fax:   Follow Up Time: 1-3 Days

## 2019-10-06 NOTE — ED PROVIDER NOTE - PATIENT PORTAL LINK FT
You can access the FollowMyHealth Patient Portal offered by [facility] by registering at the following website: [email or web address]. By joining [employer]’s FollowMyHealth portal, you will also be able to view your health information using other applications (apps) compatible with our system.

## 2019-10-06 NOTE — ED PROVIDER NOTE - ATTENDING CONTRIBUTION TO CARE
59 yr old male [place] here for eval of alcohol intox. pt admit to drinking prior to arrival. no si/hi. no falls. pt on exam moving all ext, ncat, s1s2 ctab soft nt/nd, clinically intoxicated

## 2019-10-07 PROBLEM — F10.20 ALCOHOL DEPENDENCE, UNCOMPLICATED: Chronic | Status: ACTIVE | Noted: 2019-09-07

## 2019-10-08 ENCOUNTER — EMERGENCY (EMERGENCY)
Facility: HOSPITAL | Age: 59
LOS: 0 days | Discharge: HOME | End: 2019-10-08
Attending: EMERGENCY MEDICINE | Admitting: EMERGENCY MEDICINE
Payer: MEDICAID

## 2019-10-08 VITALS
RESPIRATION RATE: 17 BRPM | TEMPERATURE: 98 F | DIASTOLIC BLOOD PRESSURE: 78 MMHG | SYSTOLIC BLOOD PRESSURE: 167 MMHG | HEART RATE: 111 BPM | OXYGEN SATURATION: 97 %

## 2019-10-08 VITALS
DIASTOLIC BLOOD PRESSURE: 62 MMHG | RESPIRATION RATE: 18 BRPM | HEART RATE: 126 BPM | OXYGEN SATURATION: 97 % | SYSTOLIC BLOOD PRESSURE: 160 MMHG

## 2019-10-08 DIAGNOSIS — F10.129 ALCOHOL ABUSE WITH INTOXICATION, UNSPECIFIED: ICD-10-CM

## 2019-10-08 DIAGNOSIS — F10.229 ALCOHOL DEPENDENCE WITH INTOXICATION, UNSPECIFIED: ICD-10-CM

## 2019-10-08 DIAGNOSIS — Z91.013 ALLERGY TO SEAFOOD: ICD-10-CM

## 2019-10-08 DIAGNOSIS — Z91.011 ALLERGY TO MILK PRODUCTS: ICD-10-CM

## 2019-10-08 DIAGNOSIS — Z91.018 ALLERGY TO OTHER FOODS: ICD-10-CM

## 2019-10-08 PROCEDURE — 99284 EMERGENCY DEPT VISIT MOD MDM: CPT

## 2019-10-08 RX ORDER — THIAMINE MONONITRATE (VIT B1) 100 MG
100 TABLET ORAL ONCE
Refills: 0 | Status: COMPLETED | OUTPATIENT
Start: 2019-10-08 | End: 2019-10-08

## 2019-10-08 RX ORDER — ACETAMINOPHEN 500 MG
650 TABLET ORAL ONCE
Refills: 0 | Status: COMPLETED | OUTPATIENT
Start: 2019-10-08 | End: 2019-10-08

## 2019-10-08 RX ADMIN — Medication 100 MILLIGRAM(S): at 13:20

## 2019-10-08 RX ADMIN — Medication 650 MILLIGRAM(S): at 14:38

## 2019-10-08 RX ADMIN — Medication 50 MILLIGRAM(S): at 14:42

## 2019-10-08 NOTE — ED PROVIDER NOTE - PROGRESS NOTE DETAILS
The patient is now awake and alert, clinically sober.  Able to walk a straight line.  Repeat exam and neuro/cranial nerve exams normal.  No evidence of head/neck trauma.  Patient denies any pain or other complaints.  Denies cp/sob/ha/abd pain.  Abd soft, lungs clear, heart exam normal.  Gomez po chal-lenge.  Patient says only used alcohol no other substances.  Denies any physical or sexual assault.  Feels much better and pt feels safe for discharge.  No evidence of intoxication at this time or alcohol withdrawal.  No other complaints on discharge.

## 2019-10-08 NOTE — ED ADULT NURSE NOTE - NS ED PATIENT SAFETY CONCERN
It would be better for him to increase the atorvastatin to 40 mg daily (less risk of side effects with some lowering of triglycerides).    Unable to assess due to medical condition

## 2019-10-08 NOTE — ED ADULT NURSE NOTE - NSIMPLEMENTINTERV_GEN_ALL_ED
Implemented All Fall with Harm Risk Interventions:  [place] to call system. Call bell, personal items and telephone within reach. Instruct patient to call for assistance. Room bathroom lighting operational. Non-slip footwear when patient is off stretcher. Physically safe environment: no spills, clutter or unnecessary equipment. Stretcher in lowest position, wheels locked, appropriate side rails in place. Provide visual cue, wrist band, yellow gown, etc. Monitor gait and stability. Monitor for mental status changes and reorient to person, place, and time. Review medications for side effects contributing to fall risk. Reinforce activity limits and safety measures with patient and family. Provide visual clues: red socks.

## 2019-10-08 NOTE — ED PROVIDER NOTE - PATIENT PORTAL LINK FT
You can access the FollowMyHealth Patient Portal offered by [facility] by registering at the following website: [email or web address]. By joining [employer]’s FollowMyHealth portal, you will also be able to view your health information using other applications (apps) compatible with our system.

## 2019-10-08 NOTE — ED PROVIDER NOTE - ATTENDING CONTRIBUTION TO CARE
Pt sent for eval secondary to etoh intox.  Pt is cooperative.  Neg si/hi/ah/vh.  L:CTAB, CV:tachy, s1s2, Neg abd ttp.  a/p: Pt refused detox eval or admission.  Will be kept in the ED till clinically sober.

## 2019-10-08 NOTE — ED PROVIDER NOTE - OBJECTIVE STATEMENT
59 year old male hx etoh abuse presenting with intox. Patient states he drinks everyday and drank an unknown amount of alcohol, last drink 2 hours ago. Denies hx of seizures from etoh withdrawal. Patient sent in by [facility] for intox. Patient denies falls, injuries, chills, n/v/d, chest pain, shortness of breath, abd pain. Patient seen in ED yesterday with similar presentation and d/c back to nursing home.

## 2019-10-08 NOTE — ED PROVIDER NOTE - CLINICAL SUMMARY MEDICAL DECISION MAKING FREE TEXT BOX
Patient felt better during ED stay. Gomez po.  Ambulates with steady gait.  Pt wishes to be d/c home.  Patient was discharged from the ED. Verbal instructions were given, including instructions to return to ED immediately for any new, worsening, or concerning symptoms.  I also discussed the follow-up plan and post ED care.  Patient verbalized understanding to me. Written discharge instructions additionally given.

## 2020-01-07 ENCOUNTER — EMERGENCY (EMERGENCY)
Facility: HOSPITAL | Age: 60
LOS: 0 days | Discharge: HOME | End: 2020-01-07
Attending: EMERGENCY MEDICINE | Admitting: EMERGENCY MEDICINE
Payer: MEDICAID

## 2020-01-07 VITALS
DIASTOLIC BLOOD PRESSURE: 62 MMHG | RESPIRATION RATE: 18 BRPM | SYSTOLIC BLOOD PRESSURE: 117 MMHG | HEART RATE: 98 BPM | OXYGEN SATURATION: 96 % | TEMPERATURE: 98 F

## 2020-01-07 VITALS
SYSTOLIC BLOOD PRESSURE: 161 MMHG | RESPIRATION RATE: 18 BRPM | HEART RATE: 128 BPM | HEIGHT: 70 IN | TEMPERATURE: 97 F | OXYGEN SATURATION: 97 % | DIASTOLIC BLOOD PRESSURE: 66 MMHG | WEIGHT: 229.94 LBS

## 2020-01-07 DIAGNOSIS — Z91.018 ALLERGY TO OTHER FOODS: ICD-10-CM

## 2020-01-07 DIAGNOSIS — F10.129 ALCOHOL ABUSE WITH INTOXICATION, UNSPECIFIED: ICD-10-CM

## 2020-01-07 DIAGNOSIS — Z91.013 ALLERGY TO SEAFOOD: ICD-10-CM

## 2020-01-07 PROCEDURE — 99284 EMERGENCY DEPT VISIT MOD MDM: CPT

## 2020-01-07 NOTE — ED PROVIDER NOTE - OBJECTIVE STATEMENT
sx gradual onset mild no exac/relieving sx 60 yo male, here for etoh intoxc. sx gradual onset mild no exac/relieving sx. denies si/hi, cp, sob. no complaints at this time.

## 2020-01-07 NOTE — ED PROVIDER NOTE - PATIENT PORTAL LINK FT
You can access the FollowMyHealth Patient Portal offered by [facility] by registering at the following website: [email or web address]. By joining [employer]’s FollowMyHealth portal, you will also be able to view your health information using other applications (apps) compatible with our system.

## 2020-01-07 NOTE — ED PROVIDER NOTE - PHYSICAL EXAMINATION
Physical Exam    Vital Signs: I have reviewed the initial vital signs.  Constitutional: well-nourished, appears stated age, no acute distress  Eyes: Conjunctiva pink, Sclera clear,   Cardiovascular: S1 and S2, regular rate, regular rhythm, well-perfused extremities, radial pulses equal and 2+  Respiratory: unlabored respiratory effort, clear to auscultation bilaterally no wheezing, rales and rhonchi  Gastrointestinal: soft, non-tender abdomen, no pulsatile mass, normal bowl sounds  Musculoskeletal: supple neck, no lower extremity edema, no midline tenderness  Integumentary: warm, dry, no rash  Neurologic: awake, alert, nvi

## 2020-01-07 NOTE — ED PROVIDER NOTE - ATTENDING CONTRIBUTION TO CARE
60yo M history as above presenting with intoxication. Per pt, has drank today, no other complaints. Denies trauma. Does not want detox.   Constitutional: Well appearing. No acute distress. Non toxic.   Eyes: PERRLA. Extraocular movements intact, no entrapment. Conjunctiva normal.   ENT: No nasal discharge. Moist mucus membranes.  Neck: Supple, non tender, full range of motion.  CV: RRR no murmurs, rubs, or gallops. +S1S2.   Pulm: Clear to auscultation bilaterally. Normal work of breathing.  Abd: soft NT ND +BS.   Ext: Warm and well perfused x4, moving all extremities, no edema.   Psy: Cooperative, appropriate.   Skin: Warm, dry, no rash  Neuro: CN2-12 grossly intact no sensory or motor deficits throughout, no drift

## 2020-01-07 NOTE — ED PROVIDER NOTE - NSFOLLOWUPINSTRUCTIONS_ED_ALL_ED_FT
Alcohol Abuse    Alcohol intoxication occurs when the amount of alcohol that a person has consumed impairs his or her ability to mentally and physically function. Chronic alcohol consumption can also lead to a variety of health issues including neurological disease, stomach disease, heart disease, liver disease, etc. Do not drive after drinking alcohol.     SEEK IMMEDIATE MEDICAL CARE IF YOU HAVE THE FOLLOWING SYMPTOMS: seizures, vomiting blood, blood in your stool, lightheadedness/dizziness, or becoming shaky to tremulous when you stop drinking.

## 2020-01-07 NOTE — ED PROVIDER NOTE - NS ED ATTENDING STATEMENT MOD
I have personally performed a face to face diagnostic evaluation on this patient. I have reviewed the ACP note and agree with the history, exam and plan of care, except as noted.
caffeine

## 2020-01-07 NOTE — ED ADULT NURSE NOTE - NSIMPLEMENTINTERV_GEN_ALL_ED
Implemented All Fall with Harm Risk Interventions:  [place] to call system. Call bell, personal items and telephone within reach. Instruct patient to call for assistance. Room bathroom lighting operational. Non-slip footwear when patient is off stretcher. Physically safe environment: no spills, clutter or unnecessary equipment. Stretcher in lowest position, wheels locked, appropriate side rails in place. Provide visual cue, wrist band, yellow gown, etc. Monitor gait and stability. Monitor for mental status changes and reorient to person, place, and time. Review medications for side effects contributing to fall risk. Reinforce activity limits and safety measures with patient and family. Provide visual clues: red socks.

## 2020-01-07 NOTE — ED PROVIDER NOTE - NSFOLLOWUPCLINICS_GEN_ALL_ED_FT
[facility]  Detox Mgmt  [address] [place] [address]  Phone: [phone]  Fax:   Follow Up Time:

## 2020-01-07 NOTE — ED PROVIDER NOTE - CLINICAL SUMMARY MEDICAL DECISION MAKING FREE TEXT BOX
58yo M history as above presenting with intoxication. Per pt, has drank today, no other complaints. Denies trauma. Does not want detox. Pt awake, alert, no slurring of speech, ambulatory without difficulty, no ataxia, PERRLA, RRR no MRG, lungs CTA b/l, abd S NT ND. No complaints at this time, denies SI, HI, AV hallucinations, wishes to be discharged, pt clinically sober and displays capacity- no further questions or concerns at this time.

## 2020-01-08 ENCOUNTER — EMERGENCY (EMERGENCY)
Facility: HOSPITAL | Age: 60
LOS: 0 days | Discharge: NURSING, REHAB OR LONG-TERM CARE | End: 2020-01-08
Attending: EMERGENCY MEDICINE | Admitting: EMERGENCY MEDICINE
Payer: MEDICAID

## 2020-01-08 VITALS
RESPIRATION RATE: 18 BRPM | OXYGEN SATURATION: 95 % | SYSTOLIC BLOOD PRESSURE: 100 MMHG | HEART RATE: 100 BPM | DIASTOLIC BLOOD PRESSURE: 56 MMHG

## 2020-01-08 VITALS
SYSTOLIC BLOOD PRESSURE: 136 MMHG | HEIGHT: 70 IN | DIASTOLIC BLOOD PRESSURE: 68 MMHG | RESPIRATION RATE: 18 BRPM | HEART RATE: 88 BPM | OXYGEN SATURATION: 96 % | TEMPERATURE: 97 F

## 2020-01-08 DIAGNOSIS — F10.120 ALCOHOL ABUSE WITH INTOXICATION, UNCOMPLICATED: ICD-10-CM

## 2020-01-08 DIAGNOSIS — Z91.011 ALLERGY TO MILK PRODUCTS: ICD-10-CM

## 2020-01-08 DIAGNOSIS — Z91.013 ALLERGY TO SEAFOOD: ICD-10-CM

## 2020-01-08 DIAGNOSIS — Z91.018 ALLERGY TO OTHER FOODS: ICD-10-CM

## 2020-01-08 PROCEDURE — 99284 EMERGENCY DEPT VISIT MOD MDM: CPT

## 2020-01-08 NOTE — ED PROVIDER NOTE - PATIENT PORTAL LINK FT
You can access the FollowMyHealth Patient Portal offered by [facility] by registering at the following website: [email or web address]. By joining [employer]’s FollowMyHealth portal, you will also be able to view your health information using other applications (apps) compatible with our system.

## 2020-01-08 NOTE — ED ADULT TRIAGE NOTE - CHIEF COMPLAINT QUOTE
BIBA from [facility] as per EMS pt is intoxicated. per pt "I was drinking vodka"

## 2020-01-08 NOTE — ED PROVIDER NOTE - PHYSICAL EXAMINATION
CONST: NAD  CARD: S1 S2; No jvd  RESP: Equal BS B/L, No wheezes, rhonchi or rales. No distress  SKIN: Warm, dry, no acute rashes. Good turgor  NEURO: A&Ox3, No focal deficits. Steady gait.

## 2020-01-08 NOTE — ED PROVIDER NOTE - OBJECTIVE STATEMENT
59y M pmh etoh abuse sent from [place] for alcohol intake. Now pt states he drank a small amount of vodka. Denies fever, ha, cp, sob, weakness, numbness, n/v, trauma

## 2020-01-08 NOTE — ED PROVIDER NOTE - ATTENDING CONTRIBUTION TO CARE
59 nyo M PMHx noted presents from residence for alcohol intoxication.  Pt admits to drinking vodka, not suicidal, no CP, no n/v. On exam pt in NAD AAO x 3, no signs of trauma, steady gait, Lungs CTA  B/L, no wrr, OP clear, PERRL, no tongue fasiculations, no tremor, abd nt nd, soft

## 2020-01-08 NOTE — ED PROVIDER NOTE - NS ED ROS FT
Constitutional: (-) fever  Eyes/ENT: (-) blurry vision, (-) epistaxis  Cardiovascular: (-) chest pain, (-) syncope  Respiratory: (-) cough, (-) shortness of breath  Gastrointestinal: (-) vomiting, (-) diarrhea  Musculoskeletal: (-) neck pain, (-) back pain, (-) joint pain  Integumentary: (-) rash, (-) edema  Neurological: (-) headache, (-) altered mental status  Psychiatric: (-) si/hi,  (-) hallucinations  Allergic/Immunologic: (-) pruritus

## 2020-01-10 ENCOUNTER — INPATIENT (INPATIENT)
Facility: HOSPITAL | Age: 60
LOS: 2 days | Discharge: HOME | End: 2020-01-13
Attending: INTERNAL MEDICINE | Admitting: INTERNAL MEDICINE
Payer: MEDICAID

## 2020-01-10 VITALS
DIASTOLIC BLOOD PRESSURE: 72 MMHG | HEIGHT: 70 IN | RESPIRATION RATE: 16 BRPM | HEART RATE: 124 BPM | TEMPERATURE: 98 F | SYSTOLIC BLOOD PRESSURE: 155 MMHG

## 2020-01-10 DIAGNOSIS — I10 ESSENTIAL (PRIMARY) HYPERTENSION: ICD-10-CM

## 2020-01-10 DIAGNOSIS — N18.9 CHRONIC KIDNEY DISEASE, UNSPECIFIED: ICD-10-CM

## 2020-01-10 DIAGNOSIS — F10.20 ALCOHOL DEPENDENCE, UNCOMPLICATED: ICD-10-CM

## 2020-01-10 LAB
ALBUMIN SERPL ELPH-MCNC: 4.7 G/DL — SIGNIFICANT CHANGE UP (ref 3.5–5.2)
ALP SERPL-CCNC: 81 U/L — SIGNIFICANT CHANGE UP (ref 30–115)
ALT FLD-CCNC: 21 U/L — SIGNIFICANT CHANGE UP (ref 0–41)
ANION GAP SERPL CALC-SCNC: 24 MMOL/L — HIGH (ref 7–14)
APAP SERPL-MCNC: <5 UG/ML — LOW (ref 10–30)
AST SERPL-CCNC: 56 U/L — HIGH (ref 0–41)
BASE EXCESS BLDV CALC-SCNC: -6 MMOL/L — LOW (ref -2–2)
BASOPHILS # BLD AUTO: 0.1 K/UL — SIGNIFICANT CHANGE UP (ref 0–0.2)
BASOPHILS NFR BLD AUTO: 1.3 % — HIGH (ref 0–1)
BILIRUB SERPL-MCNC: 1.9 MG/DL — HIGH (ref 0.2–1.2)
BUN SERPL-MCNC: 20 MG/DL — SIGNIFICANT CHANGE UP (ref 10–20)
CA-I SERPL-SCNC: 1.17 MMOL/L — SIGNIFICANT CHANGE UP (ref 1.12–1.3)
CALCIUM SERPL-MCNC: 9.5 MG/DL — SIGNIFICANT CHANGE UP (ref 8.5–10.1)
CHLORIDE SERPL-SCNC: 97 MMOL/L — LOW (ref 98–110)
CO2 SERPL-SCNC: 17 MMOL/L — SIGNIFICANT CHANGE UP (ref 17–32)
CREAT SERPL-MCNC: 1.6 MG/DL — HIGH (ref 0.7–1.5)
EOSINOPHIL # BLD AUTO: 0 K/UL — SIGNIFICANT CHANGE UP (ref 0–0.7)
EOSINOPHIL NFR BLD AUTO: 0 % — SIGNIFICANT CHANGE UP (ref 0–8)
ETHANOL SERPL-MCNC: 43 MG/DL — HIGH
GAS PNL BLDV: 136 MMOL/L — SIGNIFICANT CHANGE UP (ref 136–145)
GAS PNL BLDV: SIGNIFICANT CHANGE UP
GLUCOSE SERPL-MCNC: 102 MG/DL — HIGH (ref 70–99)
HAV IGM SER-ACNC: SIGNIFICANT CHANGE UP
HBV CORE IGM SER-ACNC: SIGNIFICANT CHANGE UP
HBV SURFACE AG SER-ACNC: SIGNIFICANT CHANGE UP
HCO3 BLDV-SCNC: 19 MMOL/L — LOW (ref 22–29)
HCT VFR BLD CALC: 33.2 % — LOW (ref 42–52)
HCT VFR BLDA CALC: 36.5 % — SIGNIFICANT CHANGE UP (ref 34–44)
HCV AB S/CO SERPL IA: 0.2 S/CO — SIGNIFICANT CHANGE UP (ref 0–0.99)
HCV AB SERPL-IMP: SIGNIFICANT CHANGE UP
HGB BLD CALC-MCNC: 11.9 G/DL — LOW (ref 14–18)
HGB BLD-MCNC: 11.5 G/DL — LOW (ref 14–18)
IMM GRANULOCYTES NFR BLD AUTO: 0.3 % — SIGNIFICANT CHANGE UP (ref 0.1–0.3)
LACTATE BLDV-MCNC: 4.6 MMOL/L — HIGH (ref 0.5–1.6)
LYMPHOCYTES # BLD AUTO: 1.01 K/UL — LOW (ref 1.2–3.4)
LYMPHOCYTES # BLD AUTO: 13.2 % — LOW (ref 20.5–51.1)
MCHC RBC-ENTMCNC: 32.1 PG — HIGH (ref 27–31)
MCHC RBC-ENTMCNC: 34.6 G/DL — SIGNIFICANT CHANGE UP (ref 32–37)
MCV RBC AUTO: 92.7 FL — SIGNIFICANT CHANGE UP (ref 80–94)
MONOCYTES # BLD AUTO: 0.4 K/UL — SIGNIFICANT CHANGE UP (ref 0.1–0.6)
MONOCYTES NFR BLD AUTO: 5.2 % — SIGNIFICANT CHANGE UP (ref 1.7–9.3)
NEUTROPHILS # BLD AUTO: 6.12 K/UL — SIGNIFICANT CHANGE UP (ref 1.4–6.5)
NEUTROPHILS NFR BLD AUTO: 80 % — HIGH (ref 42.2–75.2)
NRBC # BLD: 0 /100 WBCS — SIGNIFICANT CHANGE UP (ref 0–0)
PCO2 BLDV: 33 MMHG — LOW (ref 41–51)
PH BLDV: 7.36 — SIGNIFICANT CHANGE UP (ref 7.26–7.43)
PLATELET # BLD AUTO: 132 K/UL — SIGNIFICANT CHANGE UP (ref 130–400)
PO2 BLDV: 51 MMHG — HIGH (ref 20–40)
POTASSIUM BLDV-SCNC: 5.1 MMOL/L — SIGNIFICANT CHANGE UP (ref 3.3–5.6)
POTASSIUM SERPL-MCNC: 5.4 MMOL/L — HIGH (ref 3.5–5)
POTASSIUM SERPL-SCNC: 5.4 MMOL/L — HIGH (ref 3.5–5)
PROT SERPL-MCNC: 7.4 G/DL — SIGNIFICANT CHANGE UP (ref 6–8)
RBC # BLD: 3.58 M/UL — LOW (ref 4.7–6.1)
RBC # FLD: 14.2 % — SIGNIFICANT CHANGE UP (ref 11.5–14.5)
SALICYLATES SERPL-MCNC: <0.3 MG/DL — LOW (ref 4–30)
SAO2 % BLDV: 80 % — SIGNIFICANT CHANGE UP
SODIUM SERPL-SCNC: 138 MMOL/L — SIGNIFICANT CHANGE UP (ref 135–146)
WBC # BLD: 7.65 K/UL — SIGNIFICANT CHANGE UP (ref 4.8–10.8)
WBC # FLD AUTO: 7.65 K/UL — SIGNIFICANT CHANGE UP (ref 4.8–10.8)

## 2020-01-10 PROCEDURE — 93010 ELECTROCARDIOGRAM REPORT: CPT

## 2020-01-10 PROCEDURE — 99285 EMERGENCY DEPT VISIT HI MDM: CPT

## 2020-01-10 RX ORDER — DIAZEPAM 5 MG
10 TABLET ORAL ONCE
Refills: 0 | Status: DISCONTINUED | OUTPATIENT
Start: 2020-01-10 | End: 2020-01-10

## 2020-01-10 RX ORDER — MECLIZINE HCL 12.5 MG
1 TABLET ORAL
Qty: 0 | Refills: 0 | DISCHARGE

## 2020-01-10 RX ORDER — MAGNESIUM HYDROXIDE 400 MG/1
30 TABLET, CHEWABLE ORAL ONCE
Refills: 0 | Status: DISCONTINUED | OUTPATIENT
Start: 2020-01-10 | End: 2020-01-13

## 2020-01-10 RX ORDER — ONDANSETRON 8 MG/1
4 TABLET, FILM COATED ORAL EVERY 6 HOURS
Refills: 0 | Status: DISCONTINUED | OUTPATIENT
Start: 2020-01-10 | End: 2020-01-13

## 2020-01-10 RX ORDER — FOLIC ACID 0.8 MG
1 TABLET ORAL DAILY
Refills: 0 | Status: DISCONTINUED | OUTPATIENT
Start: 2020-01-11 | End: 2020-01-13

## 2020-01-10 RX ORDER — PREGABALIN 225 MG/1
1000 CAPSULE ORAL DAILY
Refills: 0 | Status: DISCONTINUED | OUTPATIENT
Start: 2020-01-11 | End: 2020-01-13

## 2020-01-10 RX ORDER — SODIUM CHLORIDE 9 MG/ML
1000 INJECTION, SOLUTION INTRAVENOUS ONCE
Refills: 0 | Status: COMPLETED | OUTPATIENT
Start: 2020-01-10 | End: 2020-01-10

## 2020-01-10 RX ORDER — ACAMPROSATE CALCIUM 333 MG/1
2 TABLET, DELAYED RELEASE ORAL
Qty: 0 | Refills: 0 | DISCHARGE

## 2020-01-10 RX ORDER — THIAMINE MONONITRATE (VIT B1) 100 MG
10 TABLET ORAL ONCE
Refills: 0 | Status: DISCONTINUED | OUTPATIENT
Start: 2020-01-10 | End: 2020-01-10

## 2020-01-10 RX ORDER — LOSARTAN POTASSIUM 100 MG/1
1 TABLET, FILM COATED ORAL
Qty: 0 | Refills: 0 | DISCHARGE

## 2020-01-10 RX ORDER — INDOMETHACIN 50 MG
50 CAPSULE ORAL EVERY 8 HOURS
Refills: 0 | Status: DISCONTINUED | OUTPATIENT
Start: 2020-01-10 | End: 2020-01-13

## 2020-01-10 RX ORDER — PANTOPRAZOLE SODIUM 20 MG/1
40 TABLET, DELAYED RELEASE ORAL
Refills: 0 | Status: DISCONTINUED | OUTPATIENT
Start: 2020-01-11 | End: 2020-01-13

## 2020-01-10 RX ORDER — THIAMINE MONONITRATE (VIT B1) 100 MG
100 TABLET ORAL ONCE
Refills: 0 | Status: COMPLETED | OUTPATIENT
Start: 2020-01-10 | End: 2020-01-10

## 2020-01-10 RX ORDER — ACETAMINOPHEN 500 MG
650 TABLET ORAL EVERY 8 HOURS
Refills: 0 | Status: DISCONTINUED | OUTPATIENT
Start: 2020-01-10 | End: 2020-01-13

## 2020-01-10 RX ORDER — METHOCARBAMOL 500 MG/1
500 TABLET, FILM COATED ORAL EVERY 6 HOURS
Refills: 0 | Status: DISCONTINUED | OUTPATIENT
Start: 2020-01-10 | End: 2020-01-13

## 2020-01-10 RX ORDER — AMLODIPINE BESYLATE 2.5 MG/1
5 TABLET ORAL DAILY
Refills: 0 | Status: DISCONTINUED | OUTPATIENT
Start: 2020-01-11 | End: 2020-01-13

## 2020-01-10 RX ORDER — HYDROXYZINE HCL 10 MG
50 TABLET ORAL EVERY 6 HOURS
Refills: 0 | Status: DISCONTINUED | OUTPATIENT
Start: 2020-01-10 | End: 2020-01-13

## 2020-01-10 RX ORDER — VALSARTAN 80 MG/1
320 TABLET ORAL DAILY
Refills: 0 | Status: DISCONTINUED | OUTPATIENT
Start: 2020-01-10 | End: 2020-01-13

## 2020-01-10 RX ORDER — GUAIFENESIN/DEXTROMETHORPHAN 600MG-30MG
5 TABLET, EXTENDED RELEASE 12 HR ORAL EVERY 4 HOURS
Refills: 0 | Status: DISCONTINUED | OUTPATIENT
Start: 2020-01-10 | End: 2020-01-13

## 2020-01-10 RX ORDER — SODIUM CHLORIDE 9 MG/ML
1000 INJECTION INTRAMUSCULAR; INTRAVENOUS; SUBCUTANEOUS ONCE
Refills: 0 | Status: COMPLETED | OUTPATIENT
Start: 2020-01-10 | End: 2020-01-10

## 2020-01-10 RX ORDER — HYDROXYZINE HCL 10 MG
100 TABLET ORAL AT BEDTIME
Refills: 0 | Status: DISCONTINUED | OUTPATIENT
Start: 2020-01-10 | End: 2020-01-13

## 2020-01-10 RX ADMIN — SODIUM CHLORIDE 2000 MILLILITER(S): 9 INJECTION, SOLUTION INTRAVENOUS at 08:03

## 2020-01-10 RX ADMIN — SODIUM CHLORIDE 1000 MILLILITER(S): 9 INJECTION INTRAMUSCULAR; INTRAVENOUS; SUBCUTANEOUS at 13:32

## 2020-01-10 RX ADMIN — Medication 100 MILLIGRAM(S): at 08:03

## 2020-01-10 RX ADMIN — Medication 10 MILLIGRAM(S): at 08:03

## 2020-01-10 RX ADMIN — SODIUM CHLORIDE 1000 MILLILITER(S): 9 INJECTION, SOLUTION INTRAVENOUS at 07:28

## 2020-01-10 RX ADMIN — Medication 25 MILLIGRAM(S): at 05:41

## 2020-01-10 RX ADMIN — Medication 10 MILLIGRAM(S): at 09:31

## 2020-01-10 RX ADMIN — Medication 50 MILLIGRAM(S): at 07:27

## 2020-01-10 NOTE — H&P ADULT - NSHPLABSRESULTS_GEN_ALL_CORE
11.5   7.65  )-----------( 132      ( 10 Rich 2020 07:06 )             33.2       01-10    138  |  97<L>  |  20  ----------------------------<  102<H>  5.4<H>   |  17  |  1.6<H>    Ca    9.5      10 Rich 2020 07:06    TPro  7.4  /  Alb  4.7  /  TBili  1.9<H>  /  DBili  x   /  AST  56<H>  /  ALT  21  /  AlkPhos  81  01-10            POCT Blood Glucose.: 101 mg/dL (10 Rich 2020 05:40)

## 2020-01-10 NOTE — ED PROVIDER NOTE - PHYSICAL EXAMINATION
VITAL SIGNS: I have reviewed nursing notes and confirm.  CONSTITUTIONAL: Well-developed; well-nourished; in no acute distress. pt comfortable.  SKIN: skin exam is warm and dry, no acute rash.   HEAD: Normocephalic; atraumatic.  EYES:  EOM intact; conjunctiva and sclera clear.  ENT: No nasal discharge; airway clear. moist oral mucosa; uvula at midline. no pharyngeal erythema, edema exudate or vesicles.  TMs with good light reflex b/l.    NECK: Supple; non tender.  CARD: S1, S2 normal; no murmurs, gallops, or rubs. Regular rate and rhythm. posterior tibial and radial pulses 2+  RESP: No wheezes, rales or rhonchi. cta b/l. no use of accessory muscles. no retractions  ABD: Normal bowel sounds; soft; non-distended; non-tender; no rebound. negative psoas, rovsign's and murphys.  EXT: Normal ROM. No  cyanosis or edema. +tremors to hands   BACK: No cva tenderness  LYMPH: No acute cervical adenopathy.  NEURO: Alert, oriented, grossly unremarkable.    PSYCH: Cooperative, appropriate.

## 2020-01-10 NOTE — ED PROVIDER NOTE - OBJECTIVE STATEMENT
60y/o M w/ hx of alcohol dependency, vertigo, anemia presents for tremors that started about an hour ago; no hx of of seizures.  pt last drink was at 12am (had 3 beers).  pt denies cp or sob. no vomiting or diarrhea.   no other symptoms.

## 2020-01-10 NOTE — ED ADULT NURSE NOTE - NSIMPLEMENTINTERV_GEN_ALL_ED
Implemented All Fall Risk Interventions:  [place] to call system. Call bell, personal items and telephone within reach. Instruct patient to call for assistance. Room bathroom lighting operational. Non-slip footwear when patient is off stretcher. Physically safe environment: no spills, clutter or unnecessary equipment. Stretcher in lowest position, wheels locked, appropriate side rails in place. Provide visual cue, wrist band, yellow gown, etc. Monitor gait and stability. Monitor for mental status changes and reorient to person, place, and time. Review medications for side effects contributing to fall risk. Reinforce activity limits and safety measures with patient and family.

## 2020-01-10 NOTE — ED ADULT TRIAGE NOTE - CHIEF COMPLAINT QUOTE
Pt reports having tremors since the afternoon. Pt report last drink of ETOH at 12, 3 cans of beer. Pt awake, speaking in full sentences, denies chest pain and sob.

## 2020-01-10 NOTE — H&P ADULT - NSHPPHYSICALEXAM_GEN_ALL_CORE
PHYSICAL EXAM:    Vital Signs Last 24 Hrs    T(F): 98 (01-10-20 @ 16:11), Max: 98 (01-10-20 @ 04:56)  HR: 104 (01-10-20 @ 16:11) (104 - 124)  BP: 173/81 (01-10-20 @ 16:11)  RR: 16 (01-10-20 @ 16:11) (16 - 16)  SpO2: 98% (01-10-20 @ 06:45) (98% - 98%)    Constitutional: NAD, A&O    Eyes: PERRLA    Respiratory: +air entry, no rales, no rhonchi, no wheezes    Cardiovascular: +S1 and S2, regular rate and rhythm    Gastrointestinal: +BS, soft, non-tender, not distended    Extremities:  ** + Hand tremors,no edema, no calf tenderness    Vascular: +dorsal pedis and radial pulses, no extremity cyanosis    Neurological: sensation intact, ROM equal B/L, CN II-XII intact    Skin: no rashes, normal turgor

## 2020-01-10 NOTE — SBIRT NOTE ADULT - NSSBIRTBRIEFINTDET_GEN_A_CORE
Provided SBIRT services: Full screen positive. Referral to Treatment attempted. Screening results were reviewed with the patient and patient was provided information about healthy guidelines and potential negative consequences associated with level of risk. Motivation and readiness to reduce or stop use was discussed and goals and activities to make changes were suggested/offered. Options discussed for further evaluation and treatment, but referral to treatment was not completed because patient already going to detox/referral handled by site staff.

## 2020-01-10 NOTE — H&P ADULT - NSHPREVIEWOFSYSTEMS_GEN_ALL_CORE
y/o male admitted for Alcohol Detox.  -Drinking X 10-15 yrs  -Drinks 4-5 days/week  -AVG intake:  3  12oz beers, and 1/2-1 Pt Vodka/day  -Tremors: Yes,  Blackouts: Yes, Hallucinations: No, Withdrawal Seizures: No    Drugs: No  Psych Hx: No  HIV, Syphillis, Hepatitis exposure/testing: he doesn't know results

## 2020-01-10 NOTE — H&P ADULT - ATTENDING COMMENTS
Patient interviewed and examined. Pt is A&O in NAD He states his withdrawal symptoms are improving VS: 133/6875/16/98.1 Edentulous    Chart reviewed.    PA's H&P noted and modified, as appropriate. Pt with multiple medical problems/CKD    Case discussed on team rounds    Following is my summary of the case.    Admitted for detox: from __X__ED, ___Intake, ____Med/Surg Floor    Alcohol___X_   Opioid_____  Benzo___ Other_____    Substance amount, duration of use, last usage, and prior attempts at detox or rehabs, are outlined above in the H&P and discussed with patient.    Associated withdrawal symptoms presents.  Comorbid conditions noted. Chronic and Stable.    Past Medical Hx, Psych Hx, family Hx, Social Hx from H&P reviewed and NO changes.    Old medical record and medication Hx. Reviewed    Following items reviewed and addressed:  1. labs  2. EKG  3. Imaging from PACs module Renal Diet    Examination: no change from PA's exam.    Place on following protocol  _____Medically Managed  __X__Medically Supervised    Ciwa___X__Librium taper____Ativan taper___Methadone taper___ Phenobarb taper____ Suboxone Induction____MMTP____    Narcan Kit Offered    Psych Consult __X__N/A  ___Ordered    Physical Therapy  ___X    ___  Ordered    Aftercare disposition to be addressed by counselors.    Estimated length of stay 3-5 days.

## 2020-01-10 NOTE — ED PROVIDER NOTE - ATTENDING CONTRIBUTION TO CARE
60 yo male with PMH alcohol abuse presents c/o tremors this AM. States he feels the "shakes" when he is withdrawing and has been drinking daily the past week. Denies any HA, dizziness, CP, SOB, fevers, V/D or abdominal pain. No reported hx seizures. Last detox admission "a long time ago" per pt. Lives in [facility].    VITAL SIGNS: noted  CONSTITUTIONAL: Well-developed; well-nourished; in no acute distress  HEAD: Normocephalic; atraumatic  EYES: PERRL, EOM intact; conjunctiva and sclera clear  ENT: No nasal discharge; airway clear. MMM  NECK: Supple; non tender.    CARD: S1, S2 normal; no murmurs, gallops, or rubs. Regular rate and rhythm  RESP: CTAB/L, no wheezes, rales or rhonchi  ABD: Normal bowel sounds; soft; non-distended; non-tender   EXT: Normal ROM. No calf tenderness or edema. Distal pulses intact  NEURO: Alert, oriented. Grossly unremarkable. No focal deficits, + mild tremors   SKIN: Skin exam is warm and dry

## 2020-01-10 NOTE — H&P ADULT - NSHPSOCIALHISTORY_GEN_ALL_CORE
Tobacco use:  X18 yrs, 1 1/2 PPD stopped 15 yrs ago.  EtOH use: Yes  Illicit drug use: No  Marital Status: Single

## 2020-01-10 NOTE — H&P ADULT - NSICDXPASTMEDICALHX_GEN_ALL_CORE_FT
PAST MEDICAL HISTORY:  Alcohol dependence     CKD (chronic kidney disease)     Gout     Hard of hearing     HTN (hypertension)     Seasonal allergies     Tobacco dependency     Vertigo

## 2020-01-10 NOTE — H&P ADULT - HISTORY OF PRESENT ILLNESS
60 y/o male admitted for Alcohol Detox.  -Drinking X 10-15 yrs  -Drinks 4-5 days/week  -AVG intake:  3  12oz beers, and 1/2-1 Pt Vodka/day  -Tremors: Yes,  Blackouts: Yes, Hallucinations: No, Withdrawal Seizures: No    Drugs: No  Psych Hx: No  HIV, Syphillis, Hepatitis exposure/testing: he doesn't know results

## 2020-01-10 NOTE — ED PROVIDER NOTE - PROGRESS NOTE DETAILS
Pt requesting detox admission- spoke with detox and bed available. Pts name given and per detox pt has bed. Labs sent, EKG sinus tachy 113. Pt s/o to me ~ 7 am and seen shortly after signout. Pt is admitted to detox awaiting TP> Pt is awake and alert. Tremulous. Added additional benzos and IVF. labs with AG will get VBG and additioanl IVF. Pt s/o to me ~ 7 am and seen shortly after signout. Pt is admitted to detox awaiting TP> Pt is awake and alert. Tremulous. Added additional benzos and IV as well as folate. labs with AG will get VBG and additioanl IVF. pt stable from prior eval; slight improvement in tremor. Additional valium was ordered. Awaiting on bed in detox

## 2020-01-11 LAB
ANION GAP SERPL CALC-SCNC: 17 MMOL/L — HIGH (ref 7–14)
BUN SERPL-MCNC: 14 MG/DL — SIGNIFICANT CHANGE UP (ref 10–20)
CALCIUM SERPL-MCNC: 9.5 MG/DL — SIGNIFICANT CHANGE UP (ref 8.5–10.1)
CHLORIDE SERPL-SCNC: 102 MMOL/L — SIGNIFICANT CHANGE UP (ref 98–110)
CO2 SERPL-SCNC: 22 MMOL/L — SIGNIFICANT CHANGE UP (ref 17–32)
CREAT SERPL-MCNC: 1.4 MG/DL — SIGNIFICANT CHANGE UP (ref 0.7–1.5)
GLUCOSE SERPL-MCNC: 119 MG/DL — HIGH (ref 70–99)
POTASSIUM SERPL-MCNC: 4.3 MMOL/L — SIGNIFICANT CHANGE UP (ref 3.5–5)
POTASSIUM SERPL-SCNC: 4.3 MMOL/L — SIGNIFICANT CHANGE UP (ref 3.5–5)
SODIUM SERPL-SCNC: 141 MMOL/L — SIGNIFICANT CHANGE UP (ref 135–146)

## 2020-01-11 PROCEDURE — 99221 1ST HOSP IP/OBS SF/LOW 40: CPT

## 2020-01-11 RX ADMIN — AMLODIPINE BESYLATE 5 MILLIGRAM(S): 2.5 TABLET ORAL at 08:29

## 2020-01-11 RX ADMIN — Medication 1 TABLET(S): at 08:29

## 2020-01-11 RX ADMIN — Medication 1 MILLIGRAM(S): at 08:29

## 2020-01-11 RX ADMIN — VALSARTAN 320 MILLIGRAM(S): 80 TABLET ORAL at 08:29

## 2020-01-11 RX ADMIN — PREGABALIN 1000 MICROGRAM(S): 225 CAPSULE ORAL at 08:29

## 2020-01-11 RX ADMIN — PANTOPRAZOLE SODIUM 40 MILLIGRAM(S): 20 TABLET, DELAYED RELEASE ORAL at 05:54

## 2020-01-12 PROCEDURE — 99231 SBSQ HOSP IP/OBS SF/LOW 25: CPT

## 2020-01-12 RX ADMIN — Medication 1 MILLIGRAM(S): at 09:28

## 2020-01-12 RX ADMIN — PANTOPRAZOLE SODIUM 40 MILLIGRAM(S): 20 TABLET, DELAYED RELEASE ORAL at 06:18

## 2020-01-12 RX ADMIN — PREGABALIN 1000 MICROGRAM(S): 225 CAPSULE ORAL at 09:28

## 2020-01-12 RX ADMIN — Medication 1 TABLET(S): at 09:28

## 2020-01-12 RX ADMIN — Medication 50 MILLIGRAM(S): at 09:29

## 2020-01-12 RX ADMIN — AMLODIPINE BESYLATE 5 MILLIGRAM(S): 2.5 TABLET ORAL at 09:28

## 2020-01-12 RX ADMIN — VALSARTAN 320 MILLIGRAM(S): 80 TABLET ORAL at 09:29

## 2020-01-12 NOTE — CHART NOTE - NSCHARTNOTEFT_GEN_A_CORE
Subsequent Inpatient Encounter                                       Detox Unit    [facility]   59y   Male      Chief Complaint:    Follow up for Alcohol  Dependency    HPI:     I reviewed previous notes. No Change, except if noted below.             Detail:_    ROS:   I reviewed with patient.  No changes from previous notes except if noted below.             Detail: _    PFSH I reviewed with patient. No changes from previous notes except if noted below.             Detail_    Medication reconciliation performed.    MEDICATIONS  (STANDING):  amLODIPine   Tablet 5 milliGRAM(s) Oral daily  cyanocobalamin 1000 MICROGram(s) Oral daily  folic acid 1 milliGRAM(s) Oral daily  multivitamin 1 Tablet(s) Oral daily  pantoprazole    Tablet 40 milliGRAM(s) Oral before breakfast  valsartan 320 milliGRAM(s) Oral daily      MEDICATIONS  (PRN):  acetaminophen   Tablet .. 650 milliGRAM(s) Oral every 8 hours PRN Mild Pain (1 - 3)  aluminum hydroxide/magnesium hydroxide/simethicone Suspension 30 milliLiter(s) Oral every 6 hours PRN Heartburn  bismuth subsalicylate Liquid 30 milliLiter(s) Oral every 6 hours PRN Diarrhea  chlordiazePOXIDE 25 milliGRAM(s) Oral every 2 hours PRN CIWA-Ar score  8 - 15  chlordiazePOXIDE 50 milliGRAM(s) Oral every 1 hour PRN CIWA-Ar score GREATER THAN 15  guaifenesin/dextromethorphan  Syrup 5 milliLiter(s) Oral every 4 hours PRN Cough  hydrOXYzine hydrochloride 50 milliGRAM(s) Oral every 6 hours PRN Anxiety  hydrOXYzine hydrochloride 100 milliGRAM(s) Oral at bedtime PRN insomnia  indomethacin 50 milliGRAM(s) Oral every 8 hours PRN Mild Pain (1 - 3)  magnesium hydroxide Suspension 30 milliLiter(s) Oral once PRN Constipation  methocarbamol 500 milliGRAM(s) Oral every 6 hours PRN muscle pain  ondansetron   Disintegrating Tablet 4 milliGRAM(s) Oral every 6 hours PRN Nausea and/or Vomiting      T(C): 36.6 (01-12-20 @ 00:00), Max: 37.3 (01-11-20 @ 14:07)  HR: 91 (01-12-20 @ 00:00) (91 - 111)  BP: 120/66 (01-12-20 @ 00:00) (120/66 - 143/70)  RR: 16 (01-12-20 @ 00:00) (16 - 16)  SpO2: --    PHYSICAL EXAM:      Constitutional: NAD, A&O x3    Eyes: PERRLA, no conjuctivitis    Neck: no lymphadenopathy    Respiratory: +air entry, no rales, no rhonchi, no wheezes    Cardiovascular: +S1 and S2, regular rate and rhythm    Gastrointestinal: +BS, soft, non-tender, not distended    Extremities:  no edema, no calf tenderness    Skin: no rashes, normal turgor      01-11    141  |  102  |  14  ----------------------------<  119<H>  4.3   |  22  |  1.4    Ca    9.5      11 Jan 2020 07:55      Hepatitis B Surface Antigen: Nonreact (01-10-20 @ 07:06)  Hepatitis C Virus S/CO Ratio: 0.20 S/CO (01-10-20 @ 07:06)    Hepatitis C Virus Interpretation: Nonreact (01-10-20 @ 07:06)            Impression and Plan:    Primary Diagnosis:  Alcohol Dependency                                Medication: Librium Protocol/ CIWA Protocol    Secondary Diagnosis:      HTN                                                            Medication: on meds    Tertiary Diagnosis:                                                                       Medication      Continue Detox Protocols. Use of PRNS as needed for withdrawal and comfort.    Adjustments to protocols:    Labs/ Tests reviewed.    Tests ordered:     Likely Disposition: _X__Home       ___Rehab       ___Outpatient Program    ___Self Help     _____Other    Estimated Length of stay:_3___

## 2020-01-13 VITALS
TEMPERATURE: 97 F | SYSTOLIC BLOOD PRESSURE: 154 MMHG | DIASTOLIC BLOOD PRESSURE: 72 MMHG | HEART RATE: 92 BPM | RESPIRATION RATE: 18 BRPM

## 2020-01-13 PROCEDURE — 99231 SBSQ HOSP IP/OBS SF/LOW 25: CPT

## 2020-01-13 RX ADMIN — Medication 1 TABLET(S): at 09:32

## 2020-01-13 RX ADMIN — PREGABALIN 1000 MICROGRAM(S): 225 CAPSULE ORAL at 09:32

## 2020-01-13 RX ADMIN — PANTOPRAZOLE SODIUM 40 MILLIGRAM(S): 20 TABLET, DELAYED RELEASE ORAL at 09:32

## 2020-01-13 RX ADMIN — VALSARTAN 320 MILLIGRAM(S): 80 TABLET ORAL at 09:32

## 2020-01-13 RX ADMIN — AMLODIPINE BESYLATE 5 MILLIGRAM(S): 2.5 TABLET ORAL at 09:32

## 2020-01-13 RX ADMIN — Medication 1 MILLIGRAM(S): at 09:32

## 2020-01-21 NOTE — CHART NOTE - NSCHARTNOTEFT_GEN_A_CORE
Subsequent Inpatient Encounter      1/13/2020                                 Detox Unit    [facility]   59y   Male      Chief Complaint: Pt states he is feeling wel and ready to be discharged    Follow up for Alcohol  Dependency    HPI:     I reviewed previous notes. No Change, except if noted below.             Detail:_    ROS:   I reviewed with patient.  No changes from previous notes except if noted below.             Detail: _    PFSH I reviewed with patient. No changes from previous notes except if noted below.             Detail_    Medication reconciliation performed.    MEDICATIONS  (STANDING):      MEDICATIONS  (PRN):      T(C): --97.2  HR: --92  BP: --119/69  RR: --16  SpO2: --    PHYSICAL EXAM:      Constitutional: NAD, A&O x3    Impression and Plan: Detoxification completed, pt is stable for discharge  home    Primary Diagnosis:  Alcohol Dependency                                Medication: Librium Protocol/ CIWA Protocol    Secondary Diagnosis:                                                                  Medication:    Tertiary Diagnosis:                                                                       Medication      Continue Detox Protocols. Use of PRNS as needed for withdrawal and comfort.    Adjustments to protocols:    Labs/ Tests reviewed.    Tests ordered:     Likely Disposition: _X__Home       ___Rehab       ___Outpatient Program    ___Self Help     _____Other    Estimated Length of stay:___3_

## 2020-01-21 NOTE — CHART NOTE - NSCHARTNOTEFT_GEN_A_CORE
Subsequent Inpatient Encounter                                       Detox Unit    CAT ALSTON   59y   Male      Chief Complaint:  Pt without complaints of withdrawal    Follow up for Alcohol  Dependency    HPI:     I reviewed previous notes. No Change, except if noted below.             Detail:_    ROS:   I reviewed with patient.  No changes from previous notes except if noted below.             Detail: _    PFSH I reviewed with patient. No changes from previous notes except if noted below.             Detail_    Medication reconciliation performed.    MEDICATIONS  (STANDING):      MEDICATIONS  (PRN):      T(C): --98.7  HR: --78  BP: --118/63  RR: --16  SpO2: --    PHYSICAL EXAM:      Constitutional: NAD, A&O x3    Impression and Plan: Continue Librium Protocol    Primary Diagnosis:  Alcohol Dependency                                Medication: Librium Protocol/ CIWA Protocol    Secondary Diagnosis:                                                                  Medication:    Tertiary Diagnosis:                                                                       Medication      Continue Detox Protocols. Use of PRNS as needed for withdrawal and comfort.    Adjustments to protocols:    Labs/ Tests reviewed.    Tests ordered:     Likely Disposition: ___Home       ___Rehab       ___Outpatient Program    ___Self Help     _____Other [facility]    Estimated Length of stay:__3__ Subsequent Inpatient Encounter         1/12/2020                              Detox Unit    CAT ALSTON   59y   Male      Chief Complaint:  Pt without complaints of withdrawal    Follow up for Alcohol  Dependency    HPI:     I reviewed previous notes. No Change, except if noted below.             Detail:_    ROS:   I reviewed with patient.  No changes from previous notes except if noted below.             Detail: _    PFSH I reviewed with patient. No changes from previous notes except if noted below.             Detail_    Medication reconciliation performed.    MEDICATIONS  (STANDING):      MEDICATIONS  (PRN):      T(C): --98.7  HR: --78  BP: --118/63  RR: --16  SpO2: --    PHYSICAL EXAM:      Constitutional: NAD, A&O x3    Impression and Plan: Continue Librium Protocol    Primary Diagnosis:  Alcohol Dependency                                Medication: Librium Protocol/ CIWA Protocol    Secondary Diagnosis:                                                                  Medication:    Tertiary Diagnosis:                                                                       Medication      Continue Detox Protocols. Use of PRNS as needed for withdrawal and comfort.    Adjustments to protocols:    Labs/ Tests reviewed.    Tests ordered:     Likely Disposition: ___Home       ___Rehab       ___Outpatient Program    ___Self Help     _____Other [facility]    Estimated Length of stay:__3__

## 2020-01-21 NOTE — CHART NOTE - NSCHARTNOTEFT_GEN_A_CORE
Initial Inpatient Encounter   1/11//2020                                    Detox Unit    CAT ALSTON   59y   Male      Chief Complaint: Alcohol Withdrawal    Follow up for Alcohol  Dependency    HPI:     I reviewed the Admission H/P. No Change, except if noted below.             Detail:_    ROS:   I reviewed with patient.  withdrawal symptoms improving.             Detail: _    PFSH I reviewed with patient. No changes from previous notes except if noted below.             Detail_    Medication reconciliation performed.    MEDICATIONS  (STANDING):      MEDICATIONS  (PRN):      T(C): --97.6  HR: --81  BP: --133/68  RR: --16  SpO2: --    PHYSICAL EXAM:    Constitutional: NAD, A&O x3    Impression and Plan:    Primary Diagnosis:  Alcohol Dependency                                Medication: Librium Protocol/ CIWA Protocol    Secondary Diagnosis:                                                                  Medication:    Tertiary Diagnosis:                                                                       Medication      Continue Detox Protocols. Use of PRNS as needed for withdrawal and comfort.    Adjustments to protocols:    Labs/ Tests reviewed.    Tests ordered:     Likely Disposition: ___Home       ___Rehab       ___Outpatient Program    ___Self Help     ___X__Other    Estimated Length of stay:___3_

## 2020-01-23 DIAGNOSIS — Z91.018 ALLERGY TO OTHER FOODS: ICD-10-CM

## 2020-01-23 DIAGNOSIS — F17.210 NICOTINE DEPENDENCE, CIGARETTES, UNCOMPLICATED: ICD-10-CM

## 2020-01-23 DIAGNOSIS — F10.20 ALCOHOL DEPENDENCE, UNCOMPLICATED: ICD-10-CM

## 2020-01-23 DIAGNOSIS — Z91.013 ALLERGY TO SEAFOOD: ICD-10-CM

## 2020-01-23 DIAGNOSIS — E87.5 HYPERKALEMIA: ICD-10-CM

## 2020-01-23 DIAGNOSIS — M10.9 GOUT, UNSPECIFIED: ICD-10-CM

## 2020-01-23 DIAGNOSIS — I12.9 HYPERTENSIVE CHRONIC KIDNEY DISEASE WITH STAGE 1 THROUGH STAGE 4 CHRONIC KIDNEY DISEASE, OR UNSPECIFIED CHRONIC KIDNEY DISEASE: ICD-10-CM

## 2020-01-23 DIAGNOSIS — N18.3 CHRONIC KIDNEY DISEASE, STAGE 3 (MODERATE): ICD-10-CM

## 2020-02-04 NOTE — CHART NOTE - NSCHARTNOTEFT_GEN_A_CORE
The patient was admitted to the inpt detox unit CDU, for   ETOH__X__ Opioid___  Benzo____Polysubstance _____ Dependency.    Pt was admitted from ED_X___, Intake____, Med/surg Floor_______.    Details are present in the preceding History & Physical section and follow up chart notes.  Patient was evaluated on daily detox team  rounds.  Withdrawal symptoms and signs were reviewed on a daily basis, and the protocols were adjusted accordingly.    Labs and imaging results were reviewed and discussed with the patient.    All questions from the patient were addressed.  The patient was seen by the Chemical dependency counselors, and different options for after care were discussed.  The patient attended groups, meetings and 1:1 sessions with the counselors.  Narcane Kit was offered and instructions given prior to discharge.    Psychiatry consultation reviewed___X___, N/A______    Physical therapy evaluation reviewed__X___, N/A____    Pt was given copies of labs and imaging reports, if applicable.    Prescriptions if needed, were sent through [employer] system to the pharmacy and are noted in the discharge instruction sheet.    After care was arranged by counselors and pt was discharged to:    Home__X-[facility] _, Outpt. Program___, Rehab ___, Long term____ [facility] ____ IPP____ SNF____, AMA___, Admin Discharge____    Principal Diagnosis: Alcohol Dependency___X_ Opioid Dependency___ Benzo Dependency____ Polysubstance Dependency____

## 2020-03-16 ENCOUNTER — INPATIENT (INPATIENT)
Facility: HOSPITAL | Age: 60
LOS: 2 days | Discharge: HOME | End: 2020-03-19
Attending: INTERNAL MEDICINE | Admitting: INTERNAL MEDICINE
Payer: MEDICAID

## 2020-03-16 VITALS
RESPIRATION RATE: 19 BRPM | WEIGHT: 250 LBS | SYSTOLIC BLOOD PRESSURE: 166 MMHG | HEART RATE: 115 BPM | OXYGEN SATURATION: 100 % | TEMPERATURE: 98 F | DIASTOLIC BLOOD PRESSURE: 72 MMHG | HEIGHT: 71 IN

## 2020-03-16 LAB
ALBUMIN SERPL ELPH-MCNC: 4.4 G/DL — SIGNIFICANT CHANGE UP (ref 3.5–5.2)
ALP SERPL-CCNC: 95 U/L — SIGNIFICANT CHANGE UP (ref 30–115)
ALT FLD-CCNC: 39 U/L — SIGNIFICANT CHANGE UP (ref 0–41)
ANION GAP SERPL CALC-SCNC: 22 MMOL/L — HIGH (ref 7–14)
APAP SERPL-MCNC: <5 UG/ML — LOW (ref 10–30)
AST SERPL-CCNC: 80 U/L — HIGH (ref 0–41)
BASE EXCESS BLDV CALC-SCNC: -14.3 MMOL/L — LOW (ref -2–2)
BASE EXCESS BLDV CALC-SCNC: -5.6 MMOL/L — LOW (ref -2–2)
BILIRUB DIRECT SERPL-MCNC: 0.7 MG/DL — HIGH (ref 0–0.2)
BILIRUB INDIRECT FLD-MCNC: 2.5 MG/DL — HIGH (ref 0.2–1.2)
BILIRUB SERPL-MCNC: 3.2 MG/DL — HIGH (ref 0.2–1.2)
BLD GP AB SCN SERPL QL: SIGNIFICANT CHANGE UP
BUN SERPL-MCNC: 32 MG/DL — HIGH (ref 10–20)
CA-I SERPL-SCNC: 0.81 MMOL/L — LOW (ref 1.12–1.3)
CA-I SERPL-SCNC: 1.2 MMOL/L — SIGNIFICANT CHANGE UP (ref 1.12–1.3)
CALCIUM SERPL-MCNC: 9.2 MG/DL — SIGNIFICANT CHANGE UP (ref 8.5–10.1)
CHLORIDE SERPL-SCNC: 93 MMOL/L — LOW (ref 98–110)
CO2 SERPL-SCNC: 17 MMOL/L — SIGNIFICANT CHANGE UP (ref 17–32)
CREAT SERPL-MCNC: 2.8 MG/DL — HIGH (ref 0.7–1.5)
ETHANOL SERPL-MCNC: <10 MG/DL — SIGNIFICANT CHANGE UP
GAS PNL BLDV: 135 MMOL/L — LOW (ref 136–145)
GAS PNL BLDV: 142 MMOL/L — SIGNIFICANT CHANGE UP (ref 136–145)
GAS PNL BLDV: SIGNIFICANT CHANGE UP
GAS PNL BLDV: SIGNIFICANT CHANGE UP
GLUCOSE SERPL-MCNC: 131 MG/DL — HIGH (ref 70–99)
HCO3 BLDV-SCNC: 10 MMOL/L — LOW (ref 22–29)
HCO3 BLDV-SCNC: 18 MMOL/L — LOW (ref 22–29)
HCT VFR BLD CALC: 21 % — LOW (ref 42–52)
HCT VFR BLD CALC: 27 % — LOW (ref 42–52)
HCT VFR BLDA CALC: 15.8 % — LOW (ref 34–44)
HCT VFR BLDA CALC: 38.9 % — SIGNIFICANT CHANGE UP (ref 34–44)
HGB BLD CALC-MCNC: 12.7 G/DL — LOW (ref 14–18)
HGB BLD CALC-MCNC: 5.1 G/DL — LOW (ref 14–18)
HGB BLD-MCNC: 7.3 G/DL — LOW (ref 14–18)
HGB BLD-MCNC: 9.4 G/DL — LOW (ref 14–18)
HOROWITZ INDEX BLDV+IHG-RTO: 21 — SIGNIFICANT CHANGE UP
HOROWITZ INDEX BLDV+IHG-RTO: 21 — SIGNIFICANT CHANGE UP
LACTATE BLDV-MCNC: 0.6 MMOL/L — SIGNIFICANT CHANGE UP (ref 0.5–1.6)
LACTATE BLDV-MCNC: 1.3 MMOL/L — SIGNIFICANT CHANGE UP (ref 0.5–1.6)
LDH SERPL L TO P-CCNC: 371 U/L — HIGH (ref 50–242)
LIDOCAIN IGE QN: 85 U/L — HIGH (ref 7–60)
MAGNESIUM SERPL-MCNC: 1.6 MG/DL — LOW (ref 1.8–2.4)
MCHC RBC-ENTMCNC: 31.9 PG — HIGH (ref 27–31)
MCHC RBC-ENTMCNC: 32.7 PG — HIGH (ref 27–31)
MCHC RBC-ENTMCNC: 34.8 G/DL — SIGNIFICANT CHANGE UP (ref 32–37)
MCHC RBC-ENTMCNC: 34.8 G/DL — SIGNIFICANT CHANGE UP (ref 32–37)
MCV RBC AUTO: 91.5 FL — SIGNIFICANT CHANGE UP (ref 80–94)
MCV RBC AUTO: 94.2 FL — HIGH (ref 80–94)
NRBC # BLD: 0 /100 WBCS — SIGNIFICANT CHANGE UP (ref 0–0)
NRBC # BLD: 0 /100 WBCS — SIGNIFICANT CHANGE UP (ref 0–0)
PCO2 BLDV: 20 MMHG — LOW (ref 41–51)
PCO2 BLDV: 30 MMHG — LOW (ref 41–51)
PH BLDV: 7.32 — SIGNIFICANT CHANGE UP (ref 7.26–7.43)
PH BLDV: 7.39 — SIGNIFICANT CHANGE UP (ref 7.26–7.43)
PHOSPHATE SERPL-MCNC: 2.6 MG/DL — SIGNIFICANT CHANGE UP (ref 2.1–4.9)
PLATELET # BLD AUTO: 31 K/UL — LOW (ref 130–400)
PLATELET # BLD AUTO: 70 K/UL — LOW (ref 130–400)
PO2 BLDV: 46 MMHG — HIGH (ref 20–40)
PO2 BLDV: 70 MMHG — HIGH (ref 20–40)
POTASSIUM BLDV-SCNC: 2.3 MMOL/L — LOW (ref 3.3–5.6)
POTASSIUM BLDV-SCNC: 4.7 MMOL/L — SIGNIFICANT CHANGE UP (ref 3.3–5.6)
POTASSIUM SERPL-MCNC: 5.3 MMOL/L — HIGH (ref 3.5–5)
POTASSIUM SERPL-SCNC: 5.3 MMOL/L — HIGH (ref 3.5–5)
PROT SERPL-MCNC: 6.9 G/DL — SIGNIFICANT CHANGE UP (ref 6–8)
RBC # BLD: 2.23 M/UL — LOW (ref 4.7–6.1)
RBC # BLD: 2.95 M/UL — LOW (ref 4.7–6.1)
RBC # FLD: 15.7 % — HIGH (ref 11.5–14.5)
RBC # FLD: 15.9 % — HIGH (ref 11.5–14.5)
SALICYLATES SERPL-MCNC: <0.3 MG/DL — LOW (ref 4–30)
SAO2 % BLDV: 81 % — SIGNIFICANT CHANGE UP
SAO2 % BLDV: 93 % — SIGNIFICANT CHANGE UP
SODIUM SERPL-SCNC: 132 MMOL/L — LOW (ref 135–146)
TROPONIN T SERPL-MCNC: <0.01 NG/ML — SIGNIFICANT CHANGE UP
WBC # BLD: 3.86 K/UL — LOW (ref 4.8–10.8)
WBC # BLD: 4.56 K/UL — LOW (ref 4.8–10.8)
WBC # FLD AUTO: 3.86 K/UL — LOW (ref 4.8–10.8)
WBC # FLD AUTO: 4.56 K/UL — LOW (ref 4.8–10.8)

## 2020-03-16 PROCEDURE — 71045 X-RAY EXAM CHEST 1 VIEW: CPT | Mod: 26

## 2020-03-16 PROCEDURE — 99285 EMERGENCY DEPT VISIT HI MDM: CPT

## 2020-03-16 RX ORDER — SODIUM CHLORIDE 9 MG/ML
2000 INJECTION INTRAMUSCULAR; INTRAVENOUS; SUBCUTANEOUS ONCE
Refills: 0 | Status: COMPLETED | OUTPATIENT
Start: 2020-03-16 | End: 2020-03-16

## 2020-03-16 RX ORDER — PANTOPRAZOLE SODIUM 20 MG/1
40 TABLET, DELAYED RELEASE ORAL ONCE
Refills: 0 | Status: COMPLETED | OUTPATIENT
Start: 2020-03-16 | End: 2020-03-16

## 2020-03-16 RX ORDER — SODIUM CHLORIDE 9 MG/ML
1000 INJECTION, SOLUTION INTRAVENOUS ONCE
Refills: 0 | Status: COMPLETED | OUTPATIENT
Start: 2020-03-16 | End: 2020-03-16

## 2020-03-16 RX ORDER — THIAMINE MONONITRATE (VIT B1) 100 MG
100 TABLET ORAL ONCE
Refills: 0 | Status: COMPLETED | OUTPATIENT
Start: 2020-03-16 | End: 2020-03-16

## 2020-03-16 RX ORDER — FOLIC ACID 0.8 MG
1 TABLET ORAL ONCE
Refills: 0 | Status: COMPLETED | OUTPATIENT
Start: 2020-03-16 | End: 2020-03-16

## 2020-03-16 RX ORDER — SODIUM CHLORIDE 9 MG/ML
1000 INJECTION, SOLUTION INTRAVENOUS
Refills: 0 | Status: DISCONTINUED | OUTPATIENT
Start: 2020-03-16 | End: 2020-03-17

## 2020-03-16 RX ORDER — MAGNESIUM SULFATE 500 MG/ML
2 VIAL (ML) INJECTION ONCE
Refills: 0 | Status: COMPLETED | OUTPATIENT
Start: 2020-03-16 | End: 2020-03-16

## 2020-03-16 RX ADMIN — Medication 50 MILLIGRAM(S): at 20:30

## 2020-03-16 RX ADMIN — Medication 1 MILLIGRAM(S): at 21:34

## 2020-03-16 RX ADMIN — SODIUM CHLORIDE 2000 MILLILITER(S): 9 INJECTION INTRAMUSCULAR; INTRAVENOUS; SUBCUTANEOUS at 20:26

## 2020-03-16 RX ADMIN — Medication 50 GRAM(S): at 21:35

## 2020-03-16 RX ADMIN — Medication 1 MILLIGRAM(S): at 21:39

## 2020-03-16 RX ADMIN — PANTOPRAZOLE SODIUM 40 MILLIGRAM(S): 20 TABLET, DELAYED RELEASE ORAL at 20:31

## 2020-03-16 RX ADMIN — Medication 100 MILLIGRAM(S): at 20:31

## 2020-03-16 RX ADMIN — SODIUM CHLORIDE 1000 MILLILITER(S): 9 INJECTION, SOLUTION INTRAVENOUS at 23:10

## 2020-03-16 NOTE — ED PROVIDER NOTE - OBJECTIVE STATEMENT
Pt is a 59 year old male with PMH ETOH abuse, HTN,  presents to ED c/o abdominal pain. Pt states pain began this afternoon. Pain is mild, aching, locates to mid-epigastric and RUQ, non radiating with no alleviating or aggravating factors. Pt is a 59 year old male with PMH ETOH abuse, HTN,  presents to ED c/o abdominal pain. Pt states pain began this afternoon. Pain is mild, aching, locates to mid-epigastric and RUQ, non radiating with no alleviating or aggravating factors. Pt states also attests to nausea and vomiting. Vomit described as dark, non coffee ground appearance or BRB. Pt denies chest pain, sob. Denies fever chills or body aches. Pt is a 59 year old male with PMH ETOH abuse, HTN,  presents to ED c/o abdominal pain. Pt states pain began this afternoon. Pain is mild, aching, locates to mid-epigastric and RUQ, non radiating with no alleviating or aggravating factors. Pt states also attests to nausea and vomiting. Vomit described as dark, non coffee ground appearance or BRB. Pt denies chest pain, sob. Denies fever chills or body aches. Drinks beer and hard liquor daily, last intake in AM

## 2020-03-16 NOTE — ED PROVIDER NOTE - NS ED ROS FT
Constitutional: (-) fever  Eyes/ENT: (-) blurry vision, (-) epistaxis  Cardiovascular: (-) chest pain, (-) syncope  Respiratory: (-) cough, (-) shortness of breath  Gastrointestinal: (+) vomiting, (-) diarrhea (+) nausea (+) abdominal pain  Musculoskeletal: (-) neck pain, (-) back pain, (-) joint pain  Integumentary: (-) rash, (-) edema  Neurological: (-) headache, (-) altered mental status  Psychiatric: (-) hallucinations  Allergic/Immunologic: (-) pruritus

## 2020-03-16 NOTE — ED PROVIDER NOTE - PHYSICAL EXAMINATION
Physical Exam    Vital Signs: I have reviewed the initial vital signs.  Constitutional: well-nourished, appears stated age, no acute distress  Eyes: Conjunctiva pink, Sclera clear, PERRLA, EOMI.  Cardiovascular: S1 and S2, regular rate, regular rhythm, well-perfused extremities, radial pulses equal and 2+  Respiratory: unlabored respiratory effort, clear to auscultation bilaterally no wheezing, rales and rhonchi  Gastrointestinal: soft, tender abdomen to RUQ and mid-epigastric area, no pulsatile mass, normal bowl sounds  Rectal:  Musculoskeletal: supple neck, no lower extremity edema, no midline tenderness  Integumentary: warm, dry, no rash  Neurologic: awake, alert, cranial nerves II-XII grossly intact, extremities’ motor and sensory functions grossly intact  Psychiatric: appropriate mood, appropriate affect Physical Exam    Vital Signs: I have reviewed the initial vital signs.  Constitutional: well-nourished, appears stated age, no acute distress  Eyes: Conjunctiva pink, Sclera clear, PERRLA, EOMI.  Cardiovascular: S1 and S2, regular rate, regular rhythm, well-perfused extremities, radial pulses equal and 2+  Respiratory: unlabored respiratory effort, clear to auscultation bilaterally no wheezing, rales and rhonchi  Gastrointestinal: soft, tender abdomen to RUQ and mid-epigastric area, no pulsatile mass, normal bowl sounds  Rectal: no BRBPR, no ext hem or fissures noted  Musculoskeletal: supple neck, no lower extremity edema, no midline tenderness  Integumentary: warm, dry, no rash  Neurologic: awake, alert, cranial nerves II-XII grossly intact, extremities’ motor and sensory functions grossly intact  Psychiatric: appropriate mood, appropriate affect

## 2020-03-16 NOTE — ED PROVIDER NOTE - CLINICAL SUMMARY MEDICAL DECISION MAKING FREE TEXT BOX
59yM pmhx etoh daily,  ckd htn  pw tremors  - last  beer 1130am -  a/w n/v,  abdominal pain in ED  Pt pancytopenia, acute on chronic  kidney injury ,  met acidosis -  ETOH < 10 -   ativan  given  d5W for  aka,  CT  no acute pathology   admitted to medicine

## 2020-03-16 NOTE — ED PROVIDER NOTE - PMH
Alcohol dependence    CKD (chronic kidney disease)    Gout    Hard of hearing    HTN (hypertension)    Seasonal allergies    Tobacco dependency    Vertigo

## 2020-03-16 NOTE — ED PROVIDER NOTE - CARE PLAN
Principal Discharge DX:	Acute kidney injury  Secondary Diagnosis:	Anemia  Secondary Diagnosis:	Abdominal pain  Secondary Diagnosis:	Alcohol withdrawal

## 2020-03-16 NOTE — ED PROVIDER NOTE - PROGRESS NOTE DETAILS
discrepancy between CMP K and VBG K, will order ABG prior to treatment I was directly involved in the care of this patient. PA Fellow Covarrubias note/plan reviewed and agreed.

## 2020-03-17 PROBLEM — J30.2 OTHER SEASONAL ALLERGIC RHINITIS: Chronic | Status: ACTIVE | Noted: 2020-01-10

## 2020-03-17 PROBLEM — I10 ESSENTIAL (PRIMARY) HYPERTENSION: Chronic | Status: ACTIVE | Noted: 2020-01-10

## 2020-03-17 PROBLEM — H91.90 UNSPECIFIED HEARING LOSS, UNSPECIFIED EAR: Chronic | Status: ACTIVE | Noted: 2020-01-10

## 2020-03-17 LAB
ALBUMIN SERPL ELPH-MCNC: 3.6 G/DL — SIGNIFICANT CHANGE UP (ref 3.5–5.2)
ALBUMIN SERPL ELPH-MCNC: 3.7 G/DL — SIGNIFICANT CHANGE UP (ref 3.5–5.2)
ALP SERPL-CCNC: 84 U/L — SIGNIFICANT CHANGE UP (ref 30–115)
ALP SERPL-CCNC: 88 U/L — SIGNIFICANT CHANGE UP (ref 30–115)
ALT FLD-CCNC: 29 U/L — SIGNIFICANT CHANGE UP (ref 0–41)
ALT FLD-CCNC: 29 U/L — SIGNIFICANT CHANGE UP (ref 0–41)
ANION GAP SERPL CALC-SCNC: 14 MMOL/L — SIGNIFICANT CHANGE UP (ref 7–14)
ANION GAP SERPL CALC-SCNC: 14 MMOL/L — SIGNIFICANT CHANGE UP (ref 7–14)
APPEARANCE UR: CLEAR — SIGNIFICANT CHANGE UP
AST SERPL-CCNC: 57 U/L — HIGH (ref 0–41)
AST SERPL-CCNC: 58 U/L — HIGH (ref 0–41)
BASOPHILS # BLD AUTO: 0.03 K/UL — SIGNIFICANT CHANGE UP (ref 0–0.2)
BASOPHILS NFR BLD AUTO: 0.9 % — SIGNIFICANT CHANGE UP (ref 0–1)
BILIRUB DIRECT SERPL-MCNC: 0.7 MG/DL — HIGH (ref 0–0.2)
BILIRUB INDIRECT FLD-MCNC: 1.7 MG/DL — HIGH (ref 0.2–1.2)
BILIRUB SERPL-MCNC: 2.4 MG/DL — HIGH (ref 0.2–1.2)
BILIRUB SERPL-MCNC: 2.5 MG/DL — HIGH (ref 0.2–1.2)
BILIRUB UR-MCNC: NEGATIVE — SIGNIFICANT CHANGE UP
BUN SERPL-MCNC: 19 MG/DL — SIGNIFICANT CHANGE UP (ref 10–20)
BUN SERPL-MCNC: 22 MG/DL — HIGH (ref 10–20)
CALCIUM SERPL-MCNC: 8.4 MG/DL — LOW (ref 8.5–10.1)
CALCIUM SERPL-MCNC: 8.5 MG/DL — SIGNIFICANT CHANGE UP (ref 8.5–10.1)
CHLORIDE SERPL-SCNC: 102 MMOL/L — SIGNIFICANT CHANGE UP (ref 98–110)
CHLORIDE SERPL-SCNC: 102 MMOL/L — SIGNIFICANT CHANGE UP (ref 98–110)
CO2 SERPL-SCNC: 21 MMOL/L — SIGNIFICANT CHANGE UP (ref 17–32)
CO2 SERPL-SCNC: 22 MMOL/L — SIGNIFICANT CHANGE UP (ref 17–32)
COLOR SPEC: YELLOW — SIGNIFICANT CHANGE UP
CREAT SERPL-MCNC: 1.8 MG/DL — HIGH (ref 0.7–1.5)
CREAT SERPL-MCNC: 1.9 MG/DL — HIGH (ref 0.7–1.5)
DIFF PNL FLD: NEGATIVE — SIGNIFICANT CHANGE UP
EOSINOPHIL # BLD AUTO: 0.05 K/UL — SIGNIFICANT CHANGE UP (ref 0–0.7)
EOSINOPHIL NFR BLD AUTO: 1.4 % — SIGNIFICANT CHANGE UP (ref 0–8)
FERRITIN SERPL-MCNC: 1647 NG/ML — HIGH (ref 30–400)
FOLATE SERPL-MCNC: >20 NG/ML — SIGNIFICANT CHANGE UP
GLUCOSE SERPL-MCNC: 136 MG/DL — HIGH (ref 70–99)
GLUCOSE SERPL-MCNC: 171 MG/DL — HIGH (ref 70–99)
GLUCOSE UR QL: NEGATIVE MG/DL — SIGNIFICANT CHANGE UP
HCT VFR BLD CALC: 27.4 % — LOW (ref 42–52)
HCT VFR BLD CALC: 28.2 % — LOW (ref 42–52)
HGB BLD-MCNC: 9.5 G/DL — LOW (ref 14–18)
HGB BLD-MCNC: 9.8 G/DL — LOW (ref 14–18)
HYPOCHROMIA BLD QL: SLIGHT — SIGNIFICANT CHANGE UP
IMM GRANULOCYTES NFR BLD AUTO: 0.3 % — SIGNIFICANT CHANGE UP (ref 0.1–0.3)
IRON SATN MFR SERPL: 154 UG/DL — HIGH (ref 35–150)
IRON SATN MFR SERPL: 82 % — HIGH (ref 15–50)
KETONES UR-MCNC: 15
LEUKOCYTE ESTERASE UR-ACNC: NEGATIVE — SIGNIFICANT CHANGE UP
LYMPHOCYTES # BLD AUTO: 0.78 K/UL — LOW (ref 1.2–3.4)
LYMPHOCYTES # BLD AUTO: 22.2 % — SIGNIFICANT CHANGE UP (ref 20.5–51.1)
MAGNESIUM SERPL-MCNC: 2 MG/DL — SIGNIFICANT CHANGE UP (ref 1.8–2.4)
MANUAL SMEAR VERIFICATION: SIGNIFICANT CHANGE UP
MCHC RBC-ENTMCNC: 31.8 PG — HIGH (ref 27–31)
MCHC RBC-ENTMCNC: 32.1 PG — HIGH (ref 27–31)
MCHC RBC-ENTMCNC: 34.7 G/DL — SIGNIFICANT CHANGE UP (ref 32–37)
MCHC RBC-ENTMCNC: 34.8 G/DL — SIGNIFICANT CHANGE UP (ref 32–37)
MCV RBC AUTO: 91.6 FL — SIGNIFICANT CHANGE UP (ref 80–94)
MCV RBC AUTO: 92.6 FL — SIGNIFICANT CHANGE UP (ref 80–94)
MONOCYTES # BLD AUTO: 0.21 K/UL — SIGNIFICANT CHANGE UP (ref 0.1–0.6)
MONOCYTES NFR BLD AUTO: 6 % — SIGNIFICANT CHANGE UP (ref 1.7–9.3)
NEUTROPHILS # BLD AUTO: 2.44 K/UL — SIGNIFICANT CHANGE UP (ref 1.4–6.5)
NEUTROPHILS NFR BLD AUTO: 69.2 % — SIGNIFICANT CHANGE UP (ref 42.2–75.2)
NITRITE UR-MCNC: NEGATIVE — SIGNIFICANT CHANGE UP
NRBC # BLD: 0 /100 WBCS — SIGNIFICANT CHANGE UP (ref 0–0)
NRBC # BLD: 0 /100 WBCS — SIGNIFICANT CHANGE UP (ref 0–0)
PH UR: 6 — SIGNIFICANT CHANGE UP (ref 5–8)
PHOSPHATE SERPL-MCNC: 1.7 MG/DL — LOW (ref 2.1–4.9)
PLAT MORPH BLD: NORMAL — SIGNIFICANT CHANGE UP
PLATELET # BLD AUTO: 62 K/UL — LOW (ref 130–400)
PLATELET # BLD AUTO: 63 K/UL — LOW (ref 130–400)
PLATELET CLUMP BLD QL SMEAR: SIGNIFICANT CHANGE UP
PLATELET COUNT - ESTIMATE: ABNORMAL
POTASSIUM SERPL-MCNC: 4.1 MMOL/L — SIGNIFICANT CHANGE UP (ref 3.5–5)
POTASSIUM SERPL-MCNC: 4.6 MMOL/L — SIGNIFICANT CHANGE UP (ref 3.5–5)
POTASSIUM SERPL-SCNC: 4.1 MMOL/L — SIGNIFICANT CHANGE UP (ref 3.5–5)
POTASSIUM SERPL-SCNC: 4.6 MMOL/L — SIGNIFICANT CHANGE UP (ref 3.5–5)
PROT SERPL-MCNC: 5.9 G/DL — LOW (ref 6–8)
PROT SERPL-MCNC: 5.9 G/DL — LOW (ref 6–8)
PROT UR-MCNC: NEGATIVE MG/DL — SIGNIFICANT CHANGE UP
RBC # BLD: 2.96 M/UL — LOW (ref 4.7–6.1)
RBC # BLD: 3.08 M/UL — LOW (ref 4.7–6.1)
RBC # FLD: 15.5 % — HIGH (ref 11.5–14.5)
RBC # FLD: 15.7 % — HIGH (ref 11.5–14.5)
RBC BLD AUTO: NORMAL — SIGNIFICANT CHANGE UP
SODIUM SERPL-SCNC: 137 MMOL/L — SIGNIFICANT CHANGE UP (ref 135–146)
SODIUM SERPL-SCNC: 138 MMOL/L — SIGNIFICANT CHANGE UP (ref 135–146)
SP GR SPEC: 1.01 — SIGNIFICANT CHANGE UP (ref 1.01–1.03)
TIBC SERPL-MCNC: 187 UG/DL — LOW (ref 220–430)
UIBC SERPL-MCNC: 33 UG/DL — LOW (ref 110–370)
UROBILINOGEN FLD QL: 0.2 MG/DL — SIGNIFICANT CHANGE UP (ref 0.2–0.2)
VIT B12 SERPL-MCNC: 399 PG/ML — SIGNIFICANT CHANGE UP (ref 232–1245)
WBC # BLD: 3.52 K/UL — LOW (ref 4.8–10.8)
WBC # BLD: 3.89 K/UL — LOW (ref 4.8–10.8)
WBC # FLD AUTO: 3.52 K/UL — LOW (ref 4.8–10.8)
WBC # FLD AUTO: 3.89 K/UL — LOW (ref 4.8–10.8)

## 2020-03-17 PROCEDURE — 74176 CT ABD & PELVIS W/O CONTRAST: CPT | Mod: 26

## 2020-03-17 PROCEDURE — 76770 US EXAM ABDO BACK WALL COMP: CPT | Mod: 26

## 2020-03-17 PROCEDURE — 99223 1ST HOSP IP/OBS HIGH 75: CPT

## 2020-03-17 RX ORDER — FOLIC ACID 0.8 MG
1 TABLET ORAL DAILY
Refills: 0 | Status: DISCONTINUED | OUTPATIENT
Start: 2020-03-17 | End: 2020-03-19

## 2020-03-17 RX ORDER — SODIUM CHLORIDE 9 MG/ML
1000 INJECTION INTRAMUSCULAR; INTRAVENOUS; SUBCUTANEOUS
Refills: 0 | Status: DISCONTINUED | OUTPATIENT
Start: 2020-03-17 | End: 2020-03-19

## 2020-03-17 RX ORDER — AMLODIPINE BESYLATE 2.5 MG/1
5 TABLET ORAL DAILY
Refills: 0 | Status: DISCONTINUED | OUTPATIENT
Start: 2020-03-17 | End: 2020-03-19

## 2020-03-17 RX ORDER — THIAMINE MONONITRATE (VIT B1) 100 MG
100 TABLET ORAL DAILY
Refills: 0 | Status: COMPLETED | OUTPATIENT
Start: 2020-03-17 | End: 2020-03-19

## 2020-03-17 RX ORDER — PANTOPRAZOLE SODIUM 20 MG/1
40 TABLET, DELAYED RELEASE ORAL
Refills: 0 | Status: DISCONTINUED | OUTPATIENT
Start: 2020-03-17 | End: 2020-03-19

## 2020-03-17 RX ORDER — VALSARTAN 80 MG/1
320 TABLET ORAL DAILY
Refills: 0 | Status: DISCONTINUED | OUTPATIENT
Start: 2020-03-17 | End: 2020-03-17

## 2020-03-17 RX ORDER — TAMSULOSIN HYDROCHLORIDE 0.4 MG/1
0.4 CAPSULE ORAL AT BEDTIME
Refills: 0 | Status: DISCONTINUED | OUTPATIENT
Start: 2020-03-17 | End: 2020-03-19

## 2020-03-17 RX ORDER — HEPARIN SODIUM 5000 [USP'U]/ML
5000 INJECTION INTRAVENOUS; SUBCUTANEOUS EVERY 12 HOURS
Refills: 0 | Status: DISCONTINUED | OUTPATIENT
Start: 2020-03-17 | End: 2020-03-18

## 2020-03-17 RX ORDER — CHLORHEXIDINE GLUCONATE 213 G/1000ML
1 SOLUTION TOPICAL
Refills: 0 | Status: DISCONTINUED | OUTPATIENT
Start: 2020-03-17 | End: 2020-03-19

## 2020-03-17 RX ADMIN — Medication 1 MILLIGRAM(S): at 12:57

## 2020-03-17 RX ADMIN — PANTOPRAZOLE SODIUM 40 MILLIGRAM(S): 20 TABLET, DELAYED RELEASE ORAL at 06:30

## 2020-03-17 RX ADMIN — HEPARIN SODIUM 5000 UNIT(S): 5000 INJECTION INTRAVENOUS; SUBCUTANEOUS at 17:15

## 2020-03-17 RX ADMIN — Medication 100 MILLIGRAM(S): at 12:57

## 2020-03-17 RX ADMIN — AMLODIPINE BESYLATE 5 MILLIGRAM(S): 2.5 TABLET ORAL at 06:30

## 2020-03-17 RX ADMIN — SODIUM CHLORIDE 200 MILLILITER(S): 9 INJECTION, SOLUTION INTRAVENOUS at 00:13

## 2020-03-17 RX ADMIN — SODIUM CHLORIDE 75 MILLILITER(S): 9 INJECTION INTRAMUSCULAR; INTRAVENOUS; SUBCUTANEOUS at 03:17

## 2020-03-17 RX ADMIN — Medication 1 TABLET(S): at 12:57

## 2020-03-17 RX ADMIN — CHLORHEXIDINE GLUCONATE 1 APPLICATION(S): 213 SOLUTION TOPICAL at 06:30

## 2020-03-17 RX ADMIN — TAMSULOSIN HYDROCHLORIDE 0.4 MILLIGRAM(S): 0.4 CAPSULE ORAL at 21:22

## 2020-03-17 NOTE — H&P ADULT - ASSESSMENT
58 yo M presented with complaint of abdominal pain.    #Abdominal Pain, resolved.    #ANDRZEJ on CKD III, Urinary Retention: monitor BUN/Cr, f/u kidney bladder US, castellon initial output:    #Pancytopenia: f/u repeat CBC with differential in AM    #ETOH Abuse: cessation counseled, CIWA score of 3, continue monitoring    #Suspected Folate, Thiamine, Magnesium Deficiency: repleted, continue supplementation    #HTN: continue home medications     Disposition: Return to facility once medically stable. 58 yo M presented with complaint of abdominal pain.    #Abdominal Pain, resolved.    #ANDRZEJ on CKD III, Urinary Retention: monitor BUN/Cr, f/u kidney bladder US, castellon initial output:    #Pancytopenia: f/u repeat CBC with differential in AM    #ETOH Abuse: cessation counseled, CIWA score of 3, continue monitoring, patient unsure if he is still taking campral please f/u with residence in AM    #Suspected Folate, Thiamine, Magnesium Deficiency: repleted, continue supplementation    #HTN: continue home medications     Disposition: Return to facility once medically stable. 58 yo M presented with complaint of abdominal pain.    #Abdominal Pain, resolved.    #ANDRZEJ on CKD III, Urinary Retention: monitor BUN/Cr, f/u kidney bladder US, castellon initial output: 450    #Pancytopenia: f/u repeat CBC with differential in AM    #ETOH Abuse: cessation counseled, CIWA score of 3, continue monitoring, patient unsure if he is still taking campral please f/u with residence in AM    #Suspected Folate, Thiamine, Magnesium Deficiency: repleted, continue supplementation    #HTN: continue home medications     Disposition: Return to facility once medically stable.

## 2020-03-17 NOTE — H&P ADULT - NSHPSOCIALHISTORY_GEN_ALL_CORE
patient states he has cut back from drinking now drinks only 1 can of beer a day, former smoker, denies illicit drug abuse

## 2020-03-17 NOTE — CONSULT NOTE ADULT - ASSESSMENT
59 yr old male with c/o abdominal pain, possible ETOH withdrawals and also found to have urinary retention and ANDRZEJ on CKD; no hydronephrosis on CT or sono; ANDRZEJ improving after castellon placement/hydration 59 yr old male with c/o abdominal pain, possible ETOH withdrawals and also found to have urinary retention and ANDRZEJ on CKD; no hydronephrosis on CT or sono; ANDRZEJ improving after castellon placement/hydration    1. R/O Bladder outlet obstruction  Case D/W Dr. Mccarty:  - keep castellon for now   - start flomax   - may give TOV after being on flomax for 48 hrs. If patient is to be discharged home prior to that, D/C with castellon to leg bag and F/U in office for TOV

## 2020-03-17 NOTE — CONSULT NOTE ADULT - SUBJECTIVE AND OBJECTIVE BOX
CAT ALSTON [id number]  59y Male      HPI:  58 yo M from [facility] with PMHx of ETOH Abuse, HTN, CKD III presented with complaint of epigastric, aching, nonradiating pain following episode of nonbloody, nonbilious vomitus on day of presentation, abdominal pain has since resolved while in ED. Patient also complains of feeling tremulous, last drink of one can of beer on morning of presentation. Patient denies fever, chills, nausea, chest pain, sick contacts or recent travel. (17 Mar 2020 01:07)    UROLOGY:   consult requested due to CT showing distended bladder as well as bedside sono showing approx 500 cc urine; castellon placed at that time while on medical floor. Pt is poor historian, a resident of [facility]: denies any difficulty urinating; admits to nocturia but cannot give accurate frequency; has never seen a urologist nor has he been told he has an enlarged prostate. He has hx ETOH abuse.  He also has hx CKD, follows with Dr. Tellez    PAST MEDICAL & SURGICAL HISTORY:  CKD (chronic kidney disease)  Seasonal allergies  Gout  Hard of hearing  HTN (hypertension)  Tobacco dependency  Vertigo  Alcohol dependence  No significant past surgical history    MEDICATIONS  (STANDING):  amLODIPine   Tablet 5 milliGRAM(s) Oral daily  chlorhexidine 4% Liquid 1 Application(s) Topical <User Schedule>  folic acid 1 milliGRAM(s) Oral daily  multivitamin 1 Tablet(s) Oral daily  pantoprazole    Tablet 40 milliGRAM(s) Oral before breakfast  sodium chloride 0.9%. 1000 milliLiter(s) (75 mL/Hr) IV Continuous <Continuous>  thiamine 100 milliGRAM(s) Oral daily    MEDICATIONS  (PRN):  LORazepam   Injectable 2 milliGRAM(s) IV Push every 2 hours PRN CIWA-Ar score increase by 2 points and a total score of 7 or less      Allergies    No Known Allergies    Intolerances    REVIEW OF SYSTEMS    [x ] A ten-point review of systems was otherwise negative except as noted.  [ ] Due to altered mental status/intubation, subjective information were not able to be obtained from the patient. History was obtained, to the extent possible, from review of the chart and collateral sources of information.      Vital Signs Last 24 Hrs  T(C): 37 (17 Mar 2020 14:41), Max: 37.4 (16 Mar 2020 21:20)  T(F): 98.6 (17 Mar 2020 14:41), Max: 99.4 (16 Mar 2020 21:20)  HR: 92 (17 Mar 2020 14:41) (88 - 115)  BP: 119/63 (17 Mar 2020 14:41) (119/63 - 166/72)  BP(mean): --  RR: 16 (17 Mar 2020 14:41) (16 - 19)  SpO2: 99% (17 Mar 2020 09:46) (97% - 100%)    I&O's Detail    16 Mar 2020 07:01  -  17 Mar 2020 07:00  --------------------------------------------------------  IN:    sodium chloride 0.9%.: 75 mL  Total IN: 75 mL    OUT:    Indwelling Catheter - Urethral: 900 mL  Total OUT: 900 mL    Total NET: -825 mL      17 Mar 2020 07:01  -  17 Mar 2020 15:06  --------------------------------------------------------  IN:    Oral Fluid: 540 mL    sodium chloride 0.9%.: 600 mL  Total IN: 1140 mL    OUT:    Indwelling Catheter - Urethral: 750 mL (mildly concentrated yellow)  Total OUT: 750 mL    Total NET: 390 mL    PHYSICAL EXAM:  GENERAL: NAD, well-appearing  CHEST/LUNG: Clear to auscultation bilaterally  HEART: Regular rate and rhythm  ABDOMEN: Soft, Nontender, Nondistended;   EXTREMITIES:  No clubbing, cyanosis, or edema      LABS:  Labs:  CAPILLARY BLOOD GLUCOSE                              9.8    3.89  )-----------( 62       ( 17 Mar 2020 11:27 )             28.2       Auto Immature Granulocyte %: 0.3 % (20 @ 04:30)  Auto Neutrophil %: 69.2 % (20 @ 04:30)        137  |  102  |  19  ----------------------------<  171<H>  4.6   |  21  |  1.8<H>      Calcium, Total Serum: 8.4 mg/dL (20 @ 11:27)      LFTs:             5.9  | 2.5  | 57       ------------------[88      ( 17 Mar 2020 11:27 )  3.6  | x    | 29          Lipase:x      Amylase:x         Blood Gas Venous - Lactate: 1.3 mmoL/L (20 @ 23:25)  Blood Gas Venous - Lactate: 0.6 mmoL/L (20 @ 22:18)      Coags:    CARDIAC MARKERS ( 16 Mar 2020 20:41 )  x     / <0.01 ng/mL / x     / x     / x            Alcohol, Blood: <10 mg/dL (20 @ 20:39)    Urinalysis Basic - ( 17 Mar 2020 06:49 )    Color: Yellow / Appearance: Clear / S.015 / pH: x  Gluc: x / Ketone: 15  / Bili: Negative / Urobili: 0.2 mg/dL   Blood: x / Protein: Negative mg/dL / Nitrite: Negative   Leuk Esterase: Negative / RBC: x / WBC x   Sq Epi: x / Non Sq Epi: x / Bacteria: x            RADIOLOGY & ADDITIONAL STUDIES: CAT ALSTON [id number]  59y Male      HPI:  58 yo M from [facility] with PMHx of ETOH Abuse, HTN, CKD III presented with complaint of epigastric, aching, nonradiating pain following episode of nonbloody, nonbilious vomitus on day of presentation, abdominal pain has since resolved while in ED. Patient also complains of feeling tremulous, last drink of one can of beer on morning of presentation. Patient denies fever, chills, nausea, chest pain, sick contacts or recent travel. (17 Mar 2020 01:07)    UROLOGY:   consult requested due to CT showing distended bladder as well as bedside sono showing approx 500 cc urine; castellon placed at that time while on medical floor. Pt is poor historian, a resident of [facility]: denies any difficulty urinating; admits to nocturia but cannot give accurate frequency; has never seen a urologist nor has he been told he has an enlarged prostate. He has hx ETOH abuse.  He now has ANDRZEJ on CKD, (baseline  creat approx 1.4), improved (2.8 -- 1.8)    PAST MEDICAL & SURGICAL HISTORY:  CKD (chronic kidney disease)  Seasonal allergies  Gout  Hard of hearing  HTN (hypertension)  Tobacco dependency  Vertigo  Alcohol dependence  No significant past surgical history    MEDICATIONS  (STANDING):  amLODIPine   Tablet 5 milliGRAM(s) Oral daily  chlorhexidine 4% Liquid 1 Application(s) Topical <User Schedule>  folic acid 1 milliGRAM(s) Oral daily  multivitamin 1 Tablet(s) Oral daily  pantoprazole    Tablet 40 milliGRAM(s) Oral before breakfast  sodium chloride 0.9%. 1000 milliLiter(s) (75 mL/Hr) IV Continuous <Continuous>  thiamine 100 milliGRAM(s) Oral daily    MEDICATIONS  (PRN):  LORazepam   Injectable 2 milliGRAM(s) IV Push every 2 hours PRN CIWA-Ar score increase by 2 points and a total score of 7 or less      Allergies    No Known Allergies    Intolerances    REVIEW OF SYSTEMS    [x ] A ten-point review of systems was otherwise negative except as noted.  [ ] Due to altered mental status/intubation, subjective information were not able to be obtained from the patient. History was obtained, to the extent possible, from review of the chart and collateral sources of information.      Vital Signs Last 24 Hrs  T(C): 37 (17 Mar 2020 14:41), Max: 37.4 (16 Mar 2020 21:20)  T(F): 98.6 (17 Mar 2020 14:41), Max: 99.4 (16 Mar 2020 21:20)  HR: 92 (17 Mar 2020 14:41) (88 - 115)  BP: 119/63 (17 Mar 2020 14:41) (119/63 - 166/72)  BP(mean): --  RR: 16 (17 Mar 2020 14:41) (16 - 19)  SpO2: 99% (17 Mar 2020 09:46) (97% - 100%)    I&O's Detail    16 Mar 2020 07:01  -  17 Mar 2020 07:00  --------------------------------------------------------  IN:    sodium chloride 0.9%.: 75 mL  Total IN: 75 mL    OUT:    Indwelling Catheter - Urethral: 900 mL  Total OUT: 900 mL    Total NET: -825 mL      17 Mar 2020 07:01  -  17 Mar 2020 15:06  --------------------------------------------------------  IN:    Oral Fluid: 540 mL    sodium chloride 0.9%.: 600 mL  Total IN: 1140 mL    OUT:    Indwelling Catheter - Urethral: 750 mL (mildly concentrated yellow)  Total OUT: 750 mL    Total NET: 390 mL    PHYSICAL EXAM:  GENERAL: NAD, well-appearing  ABDOMEN: Soft, Nontender, Nondistended; castellon in place, patent      LABS:  Labs:  CAPILLARY BLOOD GLUCOSE                              9.8    3.89  )-----------( 62       ( 17 Mar 2020 11:27 )             28.2       Auto Immature Granulocyte %: 0.3 % (20 @ 04:30)  Auto Neutrophil %: 69.2 % (20 @ 04:30)        137  |  102  |  19  ----------------------------<  171<H>  4.6   |  21  |  1.8<H>        Creatinine: 1.8 ( @ 11:27)    Creatinine: 1.9 ( @ 04:30)    Creatinine: 2.8 ( 20:39)        Calcium, Total Serum: 8.4 mg/dL (20 @ 11:27)      LFTs:             5.9  | 2.5  | 57       ------------------[88      ( 17 Mar 2020 11:27 )  3.6  | x    | 29          Lipase:x      Amylase:x         Blood Gas Venous - Lactate: 1.3 mmoL/L (20 @ 23:25)  Blood Gas Venous - Lactate: 0.6 mmoL/L (20 @ 22:18)      Coags:    CARDIAC MARKERS ( 16 Mar 2020 20:41 )  x     / <0.01 ng/mL / x     / x     / x            Alcohol, Blood: <10 mg/dL (20 @ 20:39)    Urinalysis Basic - ( 17 Mar 2020 06:49 )    Color: Yellow / Appearance: Clear / S.015 / pH: x  Gluc: x / Ketone: 15  / Bili: Negative / Urobili: 0.2 mg/dL   Blood: x / Protein: Negative mg/dL / Nitrite: Negative   Leuk Esterase: Negative / RBC: x / WBC x   Sq Epi: x / Non Sq Epi: x / Bacteria: x    RADIOLOGY & ADDITIONAL STUDIES:  US Kidney and Bladder (20 @ 08:58) >  URINARY BLADDER: Contracted around a Castellon catheter.     IMPRESSION:    No hydronephrosis    CT Abdomen and Pelvis No Cont (20 @ 00:08) >  IMPRESSION:     No CT evidence for acute abdominopelvic pathology.    Hepatic steatosis.    Additional comments: Urinary bladder distended to the level of the umbilicus. Correlation can be made for urinary retention.

## 2020-03-17 NOTE — H&P ADULT - HISTORY OF PRESENT ILLNESS
58 yo M from [facility] with PMHx of ETOH Abuse, HTN, CKD III presented with complaint of epigastric, aching, nonradiating pain following episode of nonbloody, nonbilious vomitus on day of presentation, abdominal pain has since resolved while in ED. Patient also complains of feeling tremulous, last drink of one can of beer on morning of presentation. Patient denies fever, chills, nausea, chest pain, sick contacts or recent travel.

## 2020-03-17 NOTE — CHART NOTE - NSCHARTNOTEFT_GEN_A_CORE
Patient seen and examined.  Chart, Labs and imaging reviewed.   Necessary labs and consults ordered.   Plan of care discussed with Patient. Follow up Urology recs. Continue Will for today and monitor BMP.

## 2020-03-17 NOTE — H&P ADULT - NSHPPHYSICALEXAM_GEN_ALL_CORE
PHYSICAL EXAM:    CONSTITUTIONAL: NAD, obese  ENMT: EOMI, PERRLA, No tonsillar erythema, exudates, or enlargement, neck supple, No JVD, poor dentition   PSYCH: Alert & Oriented X3, denies auditory or visual hallucinations   RESPIRATORY: Clear to percussion bilaterally; No rales, rhonchi, wheezing, or rubs  CARDIOVASCULAR: Regular rate and rhythm; No murmurs, rubs, or gallops, negative edema  GASTROINTESTINAL: Soft, Nontender, Nondistended; Bowel sounds present  EXTREMITIES:  2+ Peripheral Pulses, No clubbing, cyanosis  SKIN: No rashes or lesions

## 2020-03-17 NOTE — H&P ADULT - NSHPLABSRESULTS_GEN_ALL_CORE
9.4    4.56  )-----------( 70       ( 16 Mar 2020 23:45 )             27.0       03-16    132<L>  |  93<L>  |  32<H>  ----------------------------<  131<H>  5.3<H>   |  17  |  2.8<H>    Ca    9.2      16 Mar 2020 20:39  Phos  2.6     03-16  Mg     1.6     03-16    TPro  6.9  /  Alb  4.4  /  TBili  3.2<H>  /  DBili  0.7<H>  /  AST  80<H>  /  ALT  39  /  AlkPhos  95  03-16           CARDIAC MARKERS ( 16 Mar 2020 20:41 )  x     / <0.01 ng/mL / x     / x     / x            CT ABDOMEN AND PELVIS    IMPRESSION:     No CT evidence for acute abdominopelvic pathology.    Hepatic steatosis.    Additional comments: Urinary bladder distended to the level of the umbilicus. Correlation can be made for urinary retention.

## 2020-03-18 LAB
ANION GAP SERPL CALC-SCNC: 12 MMOL/L — SIGNIFICANT CHANGE UP (ref 7–14)
ANISOCYTOSIS BLD QL: SLIGHT — SIGNIFICANT CHANGE UP
BUN SERPL-MCNC: 14 MG/DL — SIGNIFICANT CHANGE UP (ref 10–20)
CALCIUM SERPL-MCNC: 8.4 MG/DL — LOW (ref 8.5–10.1)
CHLORIDE SERPL-SCNC: 104 MMOL/L — SIGNIFICANT CHANGE UP (ref 98–110)
CO2 SERPL-SCNC: 22 MMOL/L — SIGNIFICANT CHANGE UP (ref 17–32)
CREAT SERPL-MCNC: 1.4 MG/DL — SIGNIFICANT CHANGE UP (ref 0.7–1.5)
GLUCOSE SERPL-MCNC: 114 MG/DL — HIGH (ref 70–99)
HCT VFR BLD CALC: 27.5 % — LOW (ref 42–52)
HGB BLD-MCNC: 9.5 G/DL — LOW (ref 14–18)
MCHC RBC-ENTMCNC: 32.1 PG — HIGH (ref 27–31)
MCHC RBC-ENTMCNC: 34.5 G/DL — SIGNIFICANT CHANGE UP (ref 32–37)
MCV RBC AUTO: 92.9 FL — SIGNIFICANT CHANGE UP (ref 80–94)
NEUTS BAND # BLD: 2 % — SIGNIFICANT CHANGE UP (ref 0–6)
NRBC # BLD: 0 /100 WBCS — SIGNIFICANT CHANGE UP (ref 0–0)
NRBC # BLD: 0 /100 — SIGNIFICANT CHANGE UP (ref 0–0)
PLAT MORPH BLD: NORMAL — SIGNIFICANT CHANGE UP
PLATELET # BLD AUTO: 50 K/UL — LOW (ref 130–400)
PLATELET COUNT - ESTIMATE: ABNORMAL
POTASSIUM SERPL-MCNC: 4 MMOL/L — SIGNIFICANT CHANGE UP (ref 3.5–5)
POTASSIUM SERPL-SCNC: 4 MMOL/L — SIGNIFICANT CHANGE UP (ref 3.5–5)
RBC # BLD: 2.96 M/UL — LOW (ref 4.7–6.1)
RBC # FLD: 15.7 % — HIGH (ref 11.5–14.5)
RBC BLD AUTO: SIGNIFICANT CHANGE UP
SODIUM SERPL-SCNC: 138 MMOL/L — SIGNIFICANT CHANGE UP (ref 135–146)
WBC # BLD: 2.66 K/UL — LOW (ref 4.8–10.8)
WBC # FLD AUTO: 2.66 K/UL — LOW (ref 4.8–10.8)

## 2020-03-18 PROCEDURE — 99233 SBSQ HOSP IP/OBS HIGH 50: CPT

## 2020-03-18 RX ADMIN — Medication 1 TABLET(S): at 11:44

## 2020-03-18 RX ADMIN — SODIUM CHLORIDE 75 MILLILITER(S): 9 INJECTION INTRAMUSCULAR; INTRAVENOUS; SUBCUTANEOUS at 21:13

## 2020-03-18 RX ADMIN — HEPARIN SODIUM 5000 UNIT(S): 5000 INJECTION INTRAVENOUS; SUBCUTANEOUS at 05:19

## 2020-03-18 RX ADMIN — TAMSULOSIN HYDROCHLORIDE 0.4 MILLIGRAM(S): 0.4 CAPSULE ORAL at 21:13

## 2020-03-18 RX ADMIN — Medication 1 MILLIGRAM(S): at 11:44

## 2020-03-18 RX ADMIN — AMLODIPINE BESYLATE 5 MILLIGRAM(S): 2.5 TABLET ORAL at 05:19

## 2020-03-18 RX ADMIN — Medication 100 MILLIGRAM(S): at 11:44

## 2020-03-18 RX ADMIN — PANTOPRAZOLE SODIUM 40 MILLIGRAM(S): 20 TABLET, DELAYED RELEASE ORAL at 05:19

## 2020-03-18 NOTE — PROGRESS NOTE ADULT - ASSESSMENT
60 yo M from [facility] with PMHx of ETOH Abuse, HTN, CKD III presented with complaint of epigastric, aching, nonradiating pain following episode of nonbloody, nonbilious vomitus on day of presentation, abdominal pain has since resolved while in ED. Patient also complains of feeling tremulous, last drink of one can of beer on morning of presentation. Patient denies fever, chills, nausea, chest pain, sick contacts or recent travel.        1) Abdominal Pain  -resolved.    2) ANDRZEJ on CKD III/Acute Urinary Retention:   -BUN/Cr improved numbers, f/u kidney bladder US with no hydronephrosis   -continue with castellon (initial output: 450cc)  - follow up     3) Thrombocytopenia  -likely underlying ETOH hx     4) ETOH Abuse:  -monitor for withdrawals     5) Suspected Folate, Thiamine, Magnesium and Phosphorus Deficiency  -repleted, continue supplementation     6) HTN:   -continue home medications   -monitor BP     compression device for dvt ppx   GI ppx     # Progress Note Handoff  PENDING as follows  consults:  follow up   Test: Kidney function   Family discussion: discussed with patient   Disposition: home once clinically stable     Attending Physician Dr. Prerna Coto # [id number]

## 2020-03-18 NOTE — PHYSICAL THERAPY INITIAL EVALUATION ADULT - PASSIVE RANGE OF MOTION EXAMINATION, REHAB EVAL
no Passive ROM deficits were identified/BLE AAROM / PROM WFL grossly throughout , assessed in sitting

## 2020-03-18 NOTE — PHYSICAL THERAPY INITIAL EVALUATION ADULT - GENERAL OBSERVATIONS, REHAB EVAL
11:05 - 11:28. Chart reviewed. Patient available to be seen for physical therapy, confirmed with nurse. Patient encountered semi-reclined in bed, +castellon. Denies pain at rest, would like to walk with PT now.

## 2020-03-18 NOTE — PHYSICAL THERAPY INITIAL EVALUATION ADULT - GAIT DEVIATIONS NOTED, PT EVAL
decreased step length/decreased heel strike / push off/decreased magnolia/increased time in double stance/decreased weight-shifting ability

## 2020-03-18 NOTE — PROGRESS NOTE ADULT - SUBJECTIVE AND OBJECTIVE BOX
CAT ALSTON  59y  Male      Patient is a 59y old  Male who presents with a chief complaint of ANDRZEJ (17 Mar 2020 15:00)      INTERVAL HPI/OVERNIGHT EVENTS:  pt seen and examined at bedside this morning   - follow up     Vital Signs Last 24 Hrs  T(C): 36.4 (18 Mar 2020 05:40), Max: 37.2 (17 Mar 2020 21:10)  T(F): 97.5 (18 Mar 2020 05:40), Max: 98.9 (17 Mar 2020 21:10)  HR: 87 (18 Mar 2020 05:40) (80 - 92)  BP: 123/60 (18 Mar 2020 05:40) (113/59 - 123/60)  RR: 16 (18 Mar 2020 05:40) (16 - 16)  SpO2: 99% (17 Mar 2020 09:46) (99% - 99%)    PHYSICAL EXAM:  GENERAL: NAD, well-groomed, well-developed  HEAD:  Atraumatic, Normocephalic  EYES: EOMI, PERRLA, conjunctiva and sclera clear  NERVOUS SYSTEM:  Alert & Oriented X 4, Good concentration; Motor Strength 5/5 B/L upper and lower extremities; DTRs 2+ intact and symmetric  CHEST/LUNG: Clear to percussion bilaterally; No rales, rhonchi, wheezing, or rubs  CV/HEART: Regular rate and rhythm; No murmurs, rubs, or gallops  GI/ABDOMEN: Soft, Nontender, Nondistended; Bowel sounds present  EXTREMITIES:  2+ Peripheral Pulses, No clubbing, cyanosis, or edema  SKIN: No rashes or lesions    LAB:                        9.8    3.89  )-----------( 62       ( 17 Mar 2020 11:27 )             28.2     03-17    137  |  102  |  19  ----------------------------<  171<H>  4.6   |  21  |  1.8<H>    Ca    8.4<L>      17 Mar 2020 11:27  Phos  1.7     03-17  Mg     2.0     -17    TPro  5.9<L>  /  Alb  3.6  /  TBili  2.5<H>  /  DBili  x   /  AST  57<H>  /  ALT  29  /  AlkPhos  88  03-17    CARDIAC MARKERS ( 16 Mar 2020 20:41 )  x     / <0.01 ng/mL / x     / x     / x          Daily     Daily   CAPILLARY BLOOD GLUCOSE      Urinalysis Basic - ( 17 Mar 2020 06:49 )    Color: Yellow / Appearance: Clear / S.015 / pH: x  Gluc: x / Ketone: 15  / Bili: Negative / Urobili: 0.2 mg/dL   Blood: x / Protein: Negative mg/dL / Nitrite: Negative   Leuk Esterase: Negative / RBC: x / WBC x   Sq Epi: x / Non Sq Epi: x / Bacteria: x    LIVER FUNCTIONS - ( 17 Mar 2020 11:27 )  Alb: 3.6 g/dL / Pro: 5.9 g/dL / ALK PHOS: 88 U/L / ALT: 29 U/L / AST: 57 U/L / GGT: x           RADIOLOGY:    Imaging Personally Reviewed:  [ Y ] YES  [ ] NO    HEALTH ISSUES - PROBLEM Dx:    MEDS:  amLODIPine   Tablet 5 milliGRAM(s) Oral daily  chlorhexidine 4% Liquid 1 Application(s) Topical <User Schedule>  folic acid 1 milliGRAM(s) Oral daily  heparin  Injectable 5000 Unit(s) SubCutaneous every 12 hours  LORazepam   Injectable 2 milliGRAM(s) IV Push every 2 hours PRN  multivitamin 1 Tablet(s) Oral daily  pantoprazole    Tablet 40 milliGRAM(s) Oral before breakfast  sodium chloride 0.9%. 1000 milliLiter(s) IV Continuous <Continuous>  tamsulosin 0.4 milliGRAM(s) Oral at bedtime  thiamine 100 milliGRAM(s) Oral daily        # Progress Note Handoff  PENDING as follows  consults:  Test: CBC  Other:  Family discussion:  Disposition:  Home ____ or SNF _likely____ Other _____ Unknown at this time ____ GAMALIEL CAT  59y  Male      Patient is a 59y old  Male who presents with a chief complaint of ANDRZEJ (17 Mar 2020 15:00)      INTERVAL HPI/OVERNIGHT EVENTS:  pt seen and examined at bedside this morning   - follow up   -Will renewed     Vital Signs Last 24 Hrs  T(C): 36.4 (18 Mar 2020 05:40), Max: 37.2 (17 Mar 2020 21:10)  T(F): 97.5 (18 Mar 2020 05:40), Max: 98.9 (17 Mar 2020 21:10)  HR: 87 (18 Mar 2020 05:40) (80 - 92)  BP: 123/60 (18 Mar 2020 05:40) (113/59 - 123/60)  RR: 16 (18 Mar 2020 05:40) (16 - 16)  SpO2: 99% (17 Mar 2020 09:46) (99% - 99%)    PHYSICAL EXAM:  GENERAL: NAD, well-groomed, well-developed  HEAD:  Atraumatic, Normocephalic  EYES: EOMI, PERRLA, conjunctiva and sclera clear  NERVOUS SYSTEM:  Alert & Oriented X 4, Good concentration; Motor Strength 5/5 B/L upper and lower extremities; DTRs 2+ intact and symmetric  CHEST/LUNG: Clear to percussion bilaterally; No rales, rhonchi, wheezing, or rubs  CV/HEART: Regular rate and rhythm; No murmurs, rubs, or gallops  GI/ABDOMEN: Soft, Nontender, Nondistended; Bowel sounds present  EXTREMITIES:  2+ Peripheral Pulses, No clubbing, cyanosis, or edema  SKIN: No rashes or lesions    LAB:                        9.8    3.89  )-----------( 62       ( 17 Mar 2020 11:27 )             28.2     03-17    137  |  102  |  19  ----------------------------<  171<H>  4.6   |  21  |  1.8<H>    Ca    8.4<L>      17 Mar 2020 11:27  Phos  1.7     03-17  Mg     2.0     03-17    TPro  5.9<L>  /  Alb  3.6  /  TBili  2.5<H>  /  DBili  x   /  AST  57<H>  /  ALT  29  /  AlkPhos  88  03-17    CARDIAC MARKERS ( 16 Mar 2020 20:41 )  x     / <0.01 ng/mL / x     / x     / x          Daily     Daily   CAPILLARY BLOOD GLUCOSE      Urinalysis Basic - ( 17 Mar 2020 06:49 )    Color: Yellow / Appearance: Clear / S.015 / pH: x  Gluc: x / Ketone: 15  / Bili: Negative / Urobili: 0.2 mg/dL   Blood: x / Protein: Negative mg/dL / Nitrite: Negative   Leuk Esterase: Negative / RBC: x / WBC x   Sq Epi: x / Non Sq Epi: x / Bacteria: x    LIVER FUNCTIONS - ( 17 Mar 2020 11:27 )  Alb: 3.6 g/dL / Pro: 5.9 g/dL / ALK PHOS: 88 U/L / ALT: 29 U/L / AST: 57 U/L / GGT: x           RADIOLOGY:    Imaging Personally Reviewed:  [ Y ] YES  [ ] NO    HEALTH ISSUES - PROBLEM Dx:    MEDS:  amLODIPine   Tablet 5 milliGRAM(s) Oral daily  chlorhexidine 4% Liquid 1 Application(s) Topical <User Schedule>  folic acid 1 milliGRAM(s) Oral daily  heparin  Injectable 5000 Unit(s) SubCutaneous every 12 hours  LORazepam   Injectable 2 milliGRAM(s) IV Push every 2 hours PRN  multivitamin 1 Tablet(s) Oral daily  pantoprazole    Tablet 40 milliGRAM(s) Oral before breakfast  sodium chloride 0.9%. 1000 milliLiter(s) IV Continuous <Continuous>  tamsulosin 0.4 milliGRAM(s) Oral at bedtime  thiamine 100 milliGRAM(s) Oral daily

## 2020-03-19 ENCOUNTER — TRANSCRIPTION ENCOUNTER (OUTPATIENT)
Age: 60
End: 2020-03-19

## 2020-03-19 VITALS
RESPIRATION RATE: 16 BRPM | HEART RATE: 111 BPM | TEMPERATURE: 98 F | DIASTOLIC BLOOD PRESSURE: 70 MMHG | SYSTOLIC BLOOD PRESSURE: 146 MMHG

## 2020-03-19 LAB
ANION GAP SERPL CALC-SCNC: 13 MMOL/L — SIGNIFICANT CHANGE UP (ref 7–14)
BUN SERPL-MCNC: 12 MG/DL — SIGNIFICANT CHANGE UP (ref 10–20)
CALCIUM SERPL-MCNC: 8.8 MG/DL — SIGNIFICANT CHANGE UP (ref 8.5–10.1)
CHLORIDE SERPL-SCNC: 103 MMOL/L — SIGNIFICANT CHANGE UP (ref 98–110)
CO2 SERPL-SCNC: 22 MMOL/L — SIGNIFICANT CHANGE UP (ref 17–32)
CREAT SERPL-MCNC: 1.3 MG/DL — SIGNIFICANT CHANGE UP (ref 0.7–1.5)
GLUCOSE SERPL-MCNC: 100 MG/DL — HIGH (ref 70–99)
HCT VFR BLD CALC: 27.6 % — LOW (ref 42–52)
HGB BLD-MCNC: 9.5 G/DL — LOW (ref 14–18)
MAGNESIUM SERPL-MCNC: 1.5 MG/DL — LOW (ref 1.8–2.4)
MCHC RBC-ENTMCNC: 32 PG — HIGH (ref 27–31)
MCHC RBC-ENTMCNC: 34.4 G/DL — SIGNIFICANT CHANGE UP (ref 32–37)
MCV RBC AUTO: 92.9 FL — SIGNIFICANT CHANGE UP (ref 80–94)
NRBC # BLD: 0 /100 WBCS — SIGNIFICANT CHANGE UP (ref 0–0)
PHOSPHATE SERPL-MCNC: 2.6 MG/DL — SIGNIFICANT CHANGE UP (ref 2.1–4.9)
PLATELET # BLD AUTO: 51 K/UL — LOW (ref 130–400)
POTASSIUM SERPL-MCNC: 4.2 MMOL/L — SIGNIFICANT CHANGE UP (ref 3.5–5)
POTASSIUM SERPL-SCNC: 4.2 MMOL/L — SIGNIFICANT CHANGE UP (ref 3.5–5)
RBC # BLD: 2.97 M/UL — LOW (ref 4.7–6.1)
RBC # FLD: 15.4 % — HIGH (ref 11.5–14.5)
SODIUM SERPL-SCNC: 138 MMOL/L — SIGNIFICANT CHANGE UP (ref 135–146)
WBC # BLD: 4.34 K/UL — LOW (ref 4.8–10.8)
WBC # FLD AUTO: 4.34 K/UL — LOW (ref 4.8–10.8)

## 2020-03-19 PROCEDURE — 99239 HOSP IP/OBS DSCHRG MGMT >30: CPT

## 2020-03-19 RX ORDER — TAMSULOSIN HYDROCHLORIDE 0.4 MG/1
1 CAPSULE ORAL
Qty: 30 | Refills: 0
Start: 2020-03-19 | End: 2020-04-17

## 2020-03-19 RX ORDER — TAMSULOSIN HYDROCHLORIDE 0.4 MG/1
1 CAPSULE ORAL
Qty: 0 | Refills: 0 | DISCHARGE
Start: 2020-03-19

## 2020-03-19 RX ORDER — ACAMPROSATE CALCIUM 333 MG/1
1 TABLET, DELAYED RELEASE ORAL
Qty: 0 | Refills: 0 | DISCHARGE

## 2020-03-19 RX ORDER — ACETAMINOPHEN 500 MG
650 TABLET ORAL EVERY 6 HOURS
Refills: 0 | Status: DISCONTINUED | OUTPATIENT
Start: 2020-03-19 | End: 2020-03-19

## 2020-03-19 RX ORDER — VALSARTAN 80 MG/1
1 TABLET ORAL
Qty: 0 | Refills: 0 | DISCHARGE

## 2020-03-19 RX ORDER — MAGNESIUM OXIDE 400 MG ORAL TABLET 241.3 MG
400 TABLET ORAL ONCE
Refills: 0 | Status: DISCONTINUED | OUTPATIENT
Start: 2020-03-19 | End: 2020-03-19

## 2020-03-19 RX ORDER — INDOMETHACIN 50 MG
1 CAPSULE ORAL
Qty: 0 | Refills: 0 | DISCHARGE

## 2020-03-19 RX ADMIN — Medication 100 MILLIGRAM(S): at 11:00

## 2020-03-19 RX ADMIN — Medication 1 MILLIGRAM(S): at 11:00

## 2020-03-19 RX ADMIN — PANTOPRAZOLE SODIUM 40 MILLIGRAM(S): 20 TABLET, DELAYED RELEASE ORAL at 05:26

## 2020-03-19 RX ADMIN — AMLODIPINE BESYLATE 5 MILLIGRAM(S): 2.5 TABLET ORAL at 05:26

## 2020-03-19 RX ADMIN — SODIUM CHLORIDE 75 MILLILITER(S): 9 INJECTION INTRAMUSCULAR; INTRAVENOUS; SUBCUTANEOUS at 11:00

## 2020-03-19 RX ADMIN — Medication 650 MILLIGRAM(S): at 10:43

## 2020-03-19 RX ADMIN — Medication 1 TABLET(S): at 11:00

## 2020-03-19 RX ADMIN — Medication 650 MILLIGRAM(S): at 10:16

## 2020-03-19 NOTE — DISCHARGE NOTE PROVIDER - HOSPITAL COURSE
to be completed by attending ..        58 yo M from [facility] with PMHx of ETOH Abuse, HTN, CKD III presented with complaint of epigastric, aching, nonradiating pain following episode of nonbloody, nonbilious vomitus on day of presentation, abdominal pain has since resolved while in ED. Patient also complains of feeling tremulous, last drink of one can of beer on morning of presentation. Patient denies fever, chills, nausea, chest pain, sick contacts or recent travel.          ANDRZEJ and abdominal pain resolved.  Pt with acute urinary retention/castellon inserted with TOV (passes/voided); seen by ; stable for discharge         -pt also evaluated by PT, did well using walker and does not require Rehab facility dispo    -seen and examined on the day of discharge and stable for d/c back to [facility]    -spent over 35 mins d/c planning ...        58 yo M from [facility] with PMHx of ETOH Abuse, HTN, CKD III presented with complaint of epigastric, aching, nonradiating pain following episode of nonbloody, nonbilious vomitus on day of presentation, abdominal pain has since resolved while in ED. Patient also complains of feeling tremulous, last drink of one can of beer on morning of presentation. Patient denies fever, chills, nausea, chest pain, sick contacts or recent travel.          ANDRZEJ and abdominal pain resolved.  Pt with acute urinary retention/castellon inserted with TOV (passes/voided); seen by ; stable for discharge         -pt also evaluated by PT, did well using walker and does not require Rehab facility dispo    -seen and examined on the day of discharge and stable for d/c back to [facility]    -spent over 35 mins d/c planning

## 2020-03-19 NOTE — DISCHARGE NOTE PROVIDER - NSDCCPCAREPLAN_GEN_ALL_CORE_FT
PRINCIPAL DISCHARGE DIAGNOSIS  Diagnosis: Acute kidney injury  Assessment and Plan of Treatment: You had worsening of your kidney function because you were unable to urinate.  A catheter was placed and helped to drain your bladder.  It was removed and *******.  You were started on a medication to help you urinate called Flomax, continue to take this,    Make sure to follow up with your Urologist and Primary Doctor on discharge.  Your      SECONDARY DISCHARGE DIAGNOSES  Diagnosis: Alcohol withdrawal  Assessment and Plan of Treatment: Resolved.  Abstain from Alcohol    Diagnosis: Abdominal pain  Assessment and Plan of Treatment: Resolved    Diagnosis: Anemia  Assessment and Plan of Treatment: Outpatient follow up with your primary doctor PRINCIPAL DISCHARGE DIAGNOSIS  Diagnosis: Acute kidney injury  Assessment and Plan of Treatment: You had worsening of your kidney function because you were unable to urinate.  A catheter was placed and helped to drain your bladder.  It was removed and you urinated.  You were started on a medication to help you urinate called Flomax, continue to take this,    Make sure to follow up with your Urologist and Primary Doctor on discharge.  Your      SECONDARY DISCHARGE DIAGNOSES  Diagnosis: Alcohol withdrawal  Assessment and Plan of Treatment: Resolved.  Abstain from Alcohol    Diagnosis: Abdominal pain  Assessment and Plan of Treatment: Resolved    Diagnosis: Anemia  Assessment and Plan of Treatment: Outpatient follow up with your primary doctor

## 2020-03-19 NOTE — PROGRESS NOTE ADULT - SUBJECTIVE AND OBJECTIVE BOX
GAMALIEL CAT  59y  Male      Patient is a 59y old  Male who presents with a chief complaint of ANDRZEJ (17 Mar 2020 15:00)      INTERVAL HPI/OVERNIGHT EVENTS:  pt seen and examined at bedside this morning   - follow up noted; TOV today  -pt ambulated with PT using walker; safe for d./c back to previous home/[facility]; no need for STR dispo  -stable for home d/c once voids; if fails, re-insert castellon and d/c with the castellon with an outpatient follow up with      Vital Signs Last 24 Hrs  T(C): 37.2 (19 Mar 2020 05:48), Max: 37.2 (19 Mar 2020 05:48)  T(F): 99 (19 Mar 2020 05:48), Max: 99 (19 Mar 2020 05:48)  HR: 87 (19 Mar 2020 05:48) (83 - 94)  BP: 145/64 (19 Mar 2020 05:48) (120/58 - 145/64)  RR: 16 (19 Mar 2020 05:48) (16 - 16)    PHYSICAL EXAM:  GENERAL: NAD, well-groomed, well-developed  HEAD:  Atraumatic, Normocephalic  EYES: EOMI, PERRLA, conjunctiva and sclera clear  NERVOUS SYSTEM:  Alert & Oriented X 4, Good concentration; Motor Strength 5/5 B/L upper and lower extremities; DTRs 2+ intact and symmetric  CHEST/LUNG: Clear to percussion bilaterally; No rales, rhonchi, wheezing, or rubs  CV/HEART: Regular rate and rhythm; No murmurs, rubs, or gallops  GI/ABDOMEN: Soft, Nontender, Nondistended; Bowel sounds present  EXTREMITIES:  2+ Peripheral Pulses, No clubbing, cyanosis, or edema  SKIN: No rashes or lesions    LAB:                           9.5    4.34  )-----------( 51       ( 19 Mar 2020 06:34 )             27.6   03-19    138  |  103  |  12  ----------------------------<  100<H>  4.2   |  22  |  1.3    Ca    8.8      19 Mar 2020 06:34  Phos  2.6     03-19  Mg     1.5     03-19    CARDIAC MARKERS ( 16 Mar 2020 20:41 )  x     / <0.01 ng/mL / x     / x     / x          Daily     Daily   CAPILLARY BLOOD GLUCOSE      Urinalysis Basic - ( 17 Mar 2020 06:49 )    Color: Yellow / Appearance: Clear / S.015 / pH: x  Gluc: x / Ketone: 15  / Bili: Negative / Urobili: 0.2 mg/dL   Blood: x / Protein: Negative mg/dL / Nitrite: Negative   Leuk Esterase: Negative / RBC: x / WBC x   Sq Epi: x / Non Sq Epi: x / Bacteria: x    LIVER FUNCTIONS - ( 17 Mar 2020 11:27 )  Alb: 3.6 g/dL / Pro: 5.9 g/dL / ALK PHOS: 88 U/L / ALT: 29 U/L / AST: 57 U/L / GGT: x           RADIOLOGY:    Imaging Personally Reviewed:  [ Y ] YES  [ ] NO    HEALTH ISSUES - PROBLEM Dx:    MEDICATIONS  (STANDING):  amLODIPine   Tablet 5 milliGRAM(s) Oral daily  chlorhexidine 4% Liquid 1 Application(s) Topical <User Schedule>  folic acid 1 milliGRAM(s) Oral daily  multivitamin 1 Tablet(s) Oral daily  pantoprazole    Tablet 40 milliGRAM(s) Oral before breakfast  sodium chloride 0.9%. 1000 milliLiter(s) (75 mL/Hr) IV Continuous <Continuous>  tamsulosin 0.4 milliGRAM(s) Oral at bedtime    MEDICATIONS  (PRN):  acetaminophen   Tablet .. 650 milliGRAM(s) Oral every 6 hours PRN Mild Pain (1 - 3)  LORazepam   Injectable 2 milliGRAM(s) IV Push every 2 hours PRN CIWA-Ar score increase by 2 points and a total score of 7 or less

## 2020-03-19 NOTE — SBIRT NOTE ADULT - NSSBIRTBRIEFINTDET_GEN_A_CORE
Various out programs discussed including AA meetings.  Health concerns & pt's comorbidities discussed at length.

## 2020-03-19 NOTE — DISCHARGE NOTE PROVIDER - PROVIDER TOKENS
PROVIDER:[TOKEN:[[id number]]],PROVIDER:[TOKEN:[[id number]]] PROVIDER:[TOKEN:[[id number]]],PROVIDER:[TOKEN:[[id number]]],PROVIDER:[TOKEN:[[id number]]]

## 2020-03-19 NOTE — DISCHARGE NOTE NURSING/CASE MANAGEMENT/SOCIAL WORK - PATIENT PORTAL LINK FT
You can access the FollowMyHealth Patient Portal offered by [facility] by registering at the following website: [email or web address]. By joining [employer]’s FollowMyHealth portal, you will also be able to view your health information using other applications (apps) compatible with our system.

## 2020-03-19 NOTE — DISCHARGE NOTE PROVIDER - NSDCMRMEDTOKEN_GEN_ALL_CORE_FT
folic acid 1 mg oral tablet: 1 tab(s) orally once a day  Norvasc 5 mg oral tablet: 1 tab(s) orally once a day  Protonix 40 mg oral granule, delayed release: 1 each orally once a day  tamsulosin 0.4 mg oral capsule: 1 cap(s) orally once a day (at bedtime)  Vitamin B-12 100 mcg oral tablet: 1 tab(s) orally once a day

## 2020-03-19 NOTE — DISCHARGE NOTE PROVIDER - CARE PROVIDER_API CALL
Eugene Tellez)  Internal Medicine  [address] [place] [address]  Phone: [phone]  Fax: [phone]  Follow Up Time:     Edinson Mccarty)  Urology  [address] [place] [address], [address]  [place] [address]  Phone: [phone]  Fax: [phone]  Follow Up Time: Eugene Tellez)  Internal Medicine  [address] [place] [address]  Phone: [phone]  Fax: [phone]  Follow Up Time:     Edinson Mccarty)  Urology  [address] [place] [address], [address]  [place] [address]  Phone: [phone]  Fax: [phone]  Follow Up Time:     Ml Alvarez)  HematologyOncology; Internal Medicine; Medical Oncology  [address] [place] [address]  Phone: [phone]  Fax: [phone]  Follow Up Time:

## 2020-03-19 NOTE — PROGRESS NOTE ADULT - ASSESSMENT
60 yo M from [facility] with PMHx of ETOH Abuse, HTN, CKD III presented with complaint of epigastric, aching, nonradiating pain following episode of nonbloody, nonbilious vomitus on day of presentation, abdominal pain has since resolved while in ED. Patient also complains of feeling tremulous, last drink of one can of beer on morning of presentation. Patient denies fever, chills, nausea, chest pain, sick contacts or recent travel.        1) Abdominal Pain  -resolved.    2) ANDRZEJ on CKD III/Acute Urinary Retention:   -BUN/Cr improved numbers, kidney bladder US with no hydronephrosis   - follow up noted  -TOV today; if fails, re-insert castellon and d/c with castellon with an outpatient follow up with      3) Thrombocytopenia  -likely underlying ETOH hx   -outpatient follow up with GI and lab monitoring     4) ETOH Abuse:  -monitor for withdrawals     5) Suspected Folate, Thiamine, Magnesium and Phosphorus Deficiency  -repleted, continue supplementation     6) HTN:   -continue home medications   -monitor BP     compression device for dvt ppx   GI ppx     # Progress Note Handoff  PENDING as follows  consults:  follow up   Test: Kidney function   Family discussion: discussed with patient who comprehends his medical care and is agreeable for TOV today with d/c planning   Disposition: [facility] today [facility]    Attending Physician Dr. Prerna Coto # [id number]

## 2020-03-19 NOTE — DISCHARGE NOTE PROVIDER - CARE PROVIDERS DIRECT ADDRESSES
[email or web address]

## 2020-03-23 DIAGNOSIS — N17.9 ACUTE KIDNEY FAILURE, UNSPECIFIED: ICD-10-CM

## 2020-03-23 DIAGNOSIS — R10.9 UNSPECIFIED ABDOMINAL PAIN: ICD-10-CM

## 2020-03-23 DIAGNOSIS — I12.9 HYPERTENSIVE CHRONIC KIDNEY DISEASE WITH STAGE 1 THROUGH STAGE 4 CHRONIC KIDNEY DISEASE, OR UNSPECIFIED CHRONIC KIDNEY DISEASE: ICD-10-CM

## 2020-03-23 DIAGNOSIS — F10.239 ALCOHOL DEPENDENCE WITH WITHDRAWAL, UNSPECIFIED: ICD-10-CM

## 2020-03-23 DIAGNOSIS — D69.6 THROMBOCYTOPENIA, UNSPECIFIED: ICD-10-CM

## 2020-03-23 DIAGNOSIS — D64.9 ANEMIA, UNSPECIFIED: ICD-10-CM

## 2020-03-23 DIAGNOSIS — Z87.891 PERSONAL HISTORY OF NICOTINE DEPENDENCE: ICD-10-CM

## 2020-03-23 DIAGNOSIS — N18.3 CHRONIC KIDNEY DISEASE, STAGE 3 (MODERATE): ICD-10-CM

## 2020-12-12 NOTE — ED PROVIDER NOTE - NSFOLLOWUPINSTRUCTIONS_ED_ALL_ED_FT
Patient is seen and examined at the bed side, spiked fever earlier and currently is afebrile. The WBC count is normal.       REVIEW OF SYSTEMS: All other review systems are negative      ALLERGIES: No Known Allergies      Vital Signs Last 24 Hrs  T(C): 37.1 (12 Dec 2020 15:47), Max: 39.3 (11 Dec 2020 20:47)  T(F): 98.8 (12 Dec 2020 15:47), Max: 102.8 (11 Dec 2020 20:47)  HR: 103 (12 Dec 2020 13:54) (93 - 115)  BP: 117/80 (12 Dec 2020 13:54) (111/65 - 124/81)  BP(mean): --  RR: 17 (12 Dec 2020 13:54) (17 - 18)  SpO2: 97% (12 Dec 2020 13:54) (96% - 100%)      PHYSICAL EXAM:  GENERAL: Not in distress, on oxygen via NC  CHEST/LUNG: Not using accessory muscles   HEART: s1 and s2 present  ABDOMEN:  Nontender and  Nondistended  EXTREMITIES: B/L UE edematous improved  CNS: Awake and Alert         LABS:                        8.3    9.01  )-----------( 319      ( 12 Dec 2020 06:49 )             28.3                           8.0    7.91  )-----------( 316      ( 11 Dec 2020 06:51 )             27.2                9.4    13.14 )-----------( 149      ( 14 Nov 2020 07:58 )             28.7         12-12    143  |  105  |  11  ----------------------------<  93  4.1   |  34<H>  |  0.60    Ca    8.4      12 Dec 2020 06:49  Phos  3.4     12-12  Mg     1.9     12-12    TPro  6.7  /  Alb  1.9<L>  /  TBili  1.4<H>  /  DBili  x   /  AST  49<H>  /  ALT  35  /  AlkPhos  333<H>  12-12 11-06    138  |  104  |  37<H>  ----------------------------<  81  3.9   |  25  |  2.37<H>    Ca    8.1<L>      06 Nov 2020 06:20  Phos  3.4     11-06  Mg     2.0     11-06    TPro  5.1<L>  /  Alb  2.8<L>  /  TBili  9.2<H>  /  DBili  x   /  AST  233<H>  /  ALT  99<H>  /  AlkPhos  96  11-06      Vancomycin Level, Trough (11.07.20 @ 21:49)   Vancomycin Level, Trough: 16.1:     Vancomycin Level, Trough (11.07.20 @ 07:08) : 19.5  Vancomycin Level, Trough (11.06.20 @ 06:20) : 19.9:   Procalcitonin, Serum (11.28.20 @ 15:38) : 1.55:       MEDICATIONS  (STANDING):    ascorbic acid 500 milliGRAM(s) Oral two times a day  chlorhexidine 2% Cloths 1 Application(s) Topical daily  DAPTOmycin IVPB 350 milliGRAM(s) IV Intermittent every 24 hours  enoxaparin Injectable 50 milliGRAM(s) SubCutaneous daily  fluconAZOLE IVPB 200 milliGRAM(s) IV Intermittent every 24 hours  fluconAZOLE IVPB      furosemide    Tablet 20 milliGRAM(s) Oral daily  lactobacillus acidophilus 1 Tablet(s) Oral daily  multivitamin/minerals 1 Tablet(s) Oral daily  mupirocin 2% Ointment 1 Application(s) Topical every 12 hours  nystatin Powder 1 Application(s) Topical two times a day  pantoprazole   Suspension 40 milliGRAM(s) Enteral Tube daily  rifAXIMin 550 milliGRAM(s) Oral two times a day  zinc sulfate 220 milliGRAM(s) Oral daily        RADIOLOGY & ADDITIONAL TESTS:    12/9/20 : Xray Chest 1 View- PORTABLE-Routine (Xray Chest 1 View- PORTABLE-Routine in AM.) (12.09.20 @ 11:54) >  IMPRESSION: Persistent effusions left greater than right.      11/27/20 : Xray Chest 1 View- PORTABLE-Urgent (Xray Chest 1 View- PORTABLE-Urgent .) (11.27.20 @ 06:53) Increased interstitial lung markings with bilateral consolidations right greater than left. Small right pleural effusion. Overall worsening compared to prior exam.      11/24/20 : MR Pelvis Bony Only No Cont (11.24.20 @ 18:02) : Osteomyelitis of the lower sacrum and coccyx with overlying cutaneous ulceration,      11/19/20 : Xray Chest 1 View- PORTABLE-Urgent (Xray Chest 1 View- PORTABLE-Urgent .) (11.19.20 @ 23:53): Improvement in bilateral airspace disease with persistent bibasilar consolidation and small effusions.    11/4/20 : Xray Chest 1 View- PORTABLE-Routine (Xray Chest 1 View- PORTABLE-Routine in AM.) (11.04.20 @ 10:59) Reexpansion of the left lung with residual left pleural effusion and/or infiltrate.  Right pulmonary edema unchanged.  Tubes and catheters in satisfactory position.      10/25/20: Xray Chest 1 View- PORTABLE-Routine (Xray Chest 1 View- PORTABLE-Routine in AM.) (10.25.20 @ 09:21) Frontal expiratory view of the chest shows the heart to be similar in size. Endotracheal tube, right jugular line and feeding tube remain present. The lungs show similar left upper lobe infiltrate with progression of right perihilar infiltrate. Pleural effusions are similar. There is no evidence of pneumothorax.    10/21/20 : CT Abdomen and Pelvis w/ Oral Cont and w/ IV Cont (10.21.20 @ 15:59) Mural thickening of the left colon and rectum. Liquid stool in the colon. Apparent segmental mural thickening of the mid to distal small bowel and the proximal duodenum. Findings may represent nonspecific enterocolitis, noninfectious inflammatory bowel disease, or ischemic bowel. Clinical correlation is recommended. The celiac axis artery, SMA, and KINA are patent without stenosis.    Dilatation of the mid small bowel is again noted. Oral contrast has reached the terminal ileum. Findings may represent small bowel obstruction, or ileus related to nonspecific enterocolitis.   Cholelithiasis. Moderate to large ascites in the abdomen, increased since the previous examination. 5 mm nonspecific noncalcified left upper lobe lung nodule; if the patient's is in the high risk category (i.e. smoker), follow-up chest CT may be pursued in 12 months to ensure stability. Combination of atelectasis and consolidation in the left lower lobe.  Possible 1.0 cm hypodense lesion in the left lobe of the thyroid. Thyroid ultrasound may be pursued for further evaluation.   Mild bilateral pleural effusions.  Aging determinate compression fracture at T5 vertebra.        MICROBIOLOGY DATA:  MRSA/MSSA PCR (12.01.20 @ 01:16)   MRSA PCR Result.: Detected:    MRSA/MSSA PCR (11.28.20 @ 11:34)   MRSA PCR Result.: Detected:     Urine Microscopic-Add On (NC) (11.20.20 @ 04:12)   Red Blood Cell - Urine: 5-10 /HPF   White Blood Cell - Urine: 11-25 /HPF   Calcium Oxalate Crystals: Few /HPF   Bacteria: Moderate /HPF   Comment - Urine: few amorphous urates   Epithelial Cells: Few /HPF     Culture - Blood (11.19.20 @ 10:19)   Specimen Source: .Blood Blood-Peripheral   Culture Results: No growth to date.     Culture - Surgical Swab (11.12.20 @ 05:01)   - Ampicillin: R >8 Predicts results to ampicillin/sulbactam, amoxacillin-clavulanate and piperacillin-tazobactam.   - Levofloxacin: R >4   - Linezolid: S 1   - Tetra/Doxy: R >8   - Vancomycin: R >16   Specimen Source: .Surgical Swab Sacral decub   Culture Results:   Few Enterococcus faecium (vancomycin resistant)   Rare Candida albicans "Susceptibilities not performed"   Organism Identification: Enterococcus faecium (vancomycin resistant)   Organism: Enterococcus faecium (vancomycin resistant)   Method Type: STEWART     Culture - Surgical Swab (11.12.20 @ 05:01)   Specimen Source: .Surgical Swab Sacral decub   Culture Results:  Few Enterococcus faecium Susceptibility to follow.   Rare Candida albicans "Susceptibilities not performed"     Culture - Sputum . (10.26.20 @ 00:26)   - Ampicillin/Sulbactam: R <=8/4   - Cefazolin: R >16   - Ceftazidime: I 16   - Clindamycin: R >4   - Erythromycin: R >4   - Gentamicin: S <=1 Should not be used as monotherapy   - Levofloxacin: S <=0.5   - Linezolid: S 2   - Oxacillin: R >2   - Penicillin: R >8   - RIF- Rifampin: S <=1 Should not be used as monotherapy   - Tetra/Doxy: S <=1   - Trimethoprim/Sulfamethoxazole: S <=0.5/9.5   - Trimethoprim/Sulfamethoxazole: S <=0.5/9.5   - Vancomycin: S 1     MRSA/MSSA PCR (10.21.20 @ 09:41)   MRSA PCR Result.: Detected:       Culture - Blood (10.20.20 @ 22:09)   Specimen Source: .Blood Blood   Culture Results:  No growth to date.     Culture - Blood (10.20.20 @ 22:09)   Specimen Source: .Blood Blood   Culture Results:   No growth to date.     Culture - Blood in AM (10.16.20 @ 10:13)   Specimen Source: .Blood Blood-Peripheral   Culture Results: No growth to date.     Culture - Blood in AM (10.16.20 @ 10:13)   Specimen Source: .Blood Blood-Peripheral   Culture Results: No growth to date.     Culture - Blood (10.13.20 @ 22:23)   Growth in anaerobic bottle: Gram Negative Rods   Specimen Source: .Blood Blood-Peripheral   Organism: Blood Culture PCR   Culture Results: Growth in anaerobic bottle: Bacteroides fragilis   "Susceptibilities not performed"     Culture - Blood (10.13.20 @ 22:23)   Specimen Source: .Blood Blood-Peripheral   Culture Results:   No growth to date.          -Follow up with your Primary Care Provider in 1-3 days  -Return to ED for worsening symptoms or concerns.    Alcohol Abuse    Alcohol intoxication occurs when the amount of alcohol that a person has consumed impairs his or her ability to mentally and physically function. Chronic alcohol consumption can also lead to a variety of health issues including neurological disease, stomach disease, heart disease, liver disease, etc. Do not drive after drinking alcohol. Drinking enough alcohol to end up in an Emergency Room suggests you may have an alcohol abuse problem. Seek help at a drug addiction center.    SEEK IMMEDIATE MEDICAL CARE IF YOU HAVE ANY OF THE FOLLOWING SYMPTOMS: seizures, vomiting blood, blood in your stool, lightheadedness/dizziness, or becoming shaky to tremulous when you stop drinking.

## 2021-04-08 ENCOUNTER — EMERGENCY (EMERGENCY)
Facility: HOSPITAL | Age: 61
LOS: 0 days | Discharge: HOME | End: 2021-04-08
Attending: EMERGENCY MEDICINE | Admitting: EMERGENCY MEDICINE
Payer: MEDICAID

## 2021-04-08 VITALS
DIASTOLIC BLOOD PRESSURE: 72 MMHG | OXYGEN SATURATION: 99 % | SYSTOLIC BLOOD PRESSURE: 149 MMHG | RESPIRATION RATE: 18 BRPM | WEIGHT: 259.04 LBS | HEART RATE: 80 BPM | HEIGHT: 66 IN | TEMPERATURE: 97 F

## 2021-04-08 VITALS
HEART RATE: 98 BPM | TEMPERATURE: 98 F | SYSTOLIC BLOOD PRESSURE: 129 MMHG | OXYGEN SATURATION: 98 % | DIASTOLIC BLOOD PRESSURE: 64 MMHG | RESPIRATION RATE: 18 BRPM
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
## 2021-04-08 DIAGNOSIS — F10.129 ALCOHOL ABUSE WITH INTOXICATION, UNSPECIFIED: ICD-10-CM

## 2021-04-08 DIAGNOSIS — Z79.899 OTHER LONG TERM (CURRENT) DRUG THERAPY: ICD-10-CM
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
## 2021-04-08 DIAGNOSIS — F10.10 ALCOHOL ABUSE, UNCOMPLICATED: ICD-10-CM

## 2021-04-08 DIAGNOSIS — I10 ESSENTIAL (PRIMARY) HYPERTENSION: ICD-10-CM

## 2021-04-08 DIAGNOSIS — Z88.8 ALLERGY STATUS TO OTHER DRUGS, MEDICAMENTS AND BIOLOGICAL SUBSTANCES: ICD-10-CM

## 2021-04-08 DIAGNOSIS — Z91.013 ALLERGY TO SEAFOOD: ICD-10-CM
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
## 2021-04-08 PROCEDURE — 70450 CT HEAD/BRAIN W/O DYE: CPT | Mod: 26

## 2021-04-08 PROCEDURE — 72125 CT NECK SPINE W/O DYE: CPT | Mod: 26

## 2021-04-08 PROCEDURE — 71045 X-RAY EXAM CHEST 1 VIEW: CPT | Mod: 26

## 2021-04-08 PROCEDURE — 72170 X-RAY EXAM OF PELVIS: CPT | Mod: 26

## 2021-04-08 PROCEDURE — 99285 EMERGENCY DEPT VISIT HI MDM: CPT

## 2021-04-08 NOTE — ED PROVIDER NOTE - ATTENDING CONTRIBUTION TO CARE
61 yo M PMHx noted presents from residence for evaluation of fall.  Pt was drinking beer and felt unsteady and hit head against wall, no LOC, no n/v.  On exma pt in NAD AAO x 3, GCS 15, no obvious signs of head injury, PERRL, no midline vertebral tenderness, ext atrumatic, FROM, abd soft nt nd

## 2021-04-08 NOTE — ED ADULT NURSE NOTE - DRUG PRE-SCREENING (DAST -1)
Patient seen and examined at bedside.  Does not look very good. Had episodes of desaturation throughout the night, one episode of uncontrolled shaking this morning. His mentation is also slightly less compared to yesterday. Lasix drip was off for a little while.    Positive mild JVP  S1 and S2 regular,  One plus edema  Minimal crackles in the back.    Vital signs 103/68 pulse 83 respirations 20 SPO2 100%.    Assessment/plan    End-stage coronary artery disease  End-stage congestive heart failure, cold and wet    We again had discussion today regarding his treatment which we were not able to escalate and appears to be pretty ineffective. He needs to make a decision if he wants to be referred to advanced heart failure/transplant to evaluate his further options. In case he refuses, he was made aware that his prognosis would be very poor, a few months at most.   Statement Selected

## 2021-04-08 NOTE — ED PROVIDER NOTE - OBJECTIVE STATEMENT
60 year old M from [facility], etoh abuse, HTN presenting to ED after fall. Pt sts was drinking beer, felt unsteady and hit L side of head onto wall. No ac use 60 year old M from [facility], etoh abuse, HTN presenting to ED after fall. Pt sts was drinking beer, felt unsteady and hit L side of head onto wall. No ac use. Pt denies any other complaints. No headache, neck pain, dizziness, nausea, vomiting, back pain, abd pain, decreased rom, abrasions.

## 2021-04-08 NOTE — ED PROVIDER NOTE - PHYSICAL EXAMINATION
CONSTITUTIONAL: Well-appearing; well-nourished; in no apparent distress.   EYES: PERRL; EOM intact.   ENT: normal nose; no rhinorrhea; normal pharynx with no tonsillar hypertrophy.   NECK: Supple; non-tender; no cervical lymphadenopathy.   CARDIOVASCULAR: Normal S1, S2; no murmurs, rubs, or gallops.   RESPIRATORY: Normal chest excursion with respiration; breath sounds clear and equal bilaterally; no wheezes, rhonchi, or rales.  GI/: Normal bowel sounds; non-distended; non-tender; no palpable organomegaly.   MS: No evidence of trauma or deformity. Normal ROM in all four extremities; non-tender to palpation; distal pulses are normal.   SKIN: Normal for age and race; warm; dry; good turgor; no apparent lesions or exudate.   NEURO/PSYCH: A & O x 3; Pt intox but in nad. Following commands well. Moving all extrem. No apparent harm to self/others

## 2021-04-08 NOTE — ED ADULT TRIAGE NOTE - CHIEF COMPLAINT QUOTE
sent   from    [facility]    residence   for    drinking   and   falling    sustaining    bruise   to   scalp    no   LOC

## 2021-04-08 NOTE — ED PROVIDER NOTE - PATIENT PORTAL LINK FT
You can access the FollowMyHealth Patient Portal offered by [facility] by registering at the following website: [email or web address]. By joining [facility]’s FollowMyHealth portal, you will also be able to view your health information using other applications (apps) compatible with our system.

## 2021-04-08 NOTE — ED PROVIDER NOTE - NSFOLLOWUPINSTRUCTIONS_ED_ALL_ED_FT
Fall Prevention in the Home, Adult  Falls can cause injuries and can affect people from all age groups. There are many simple things that you can do to make your home safe and to help prevent falls. Ask for help when making these changes, if needed.    What actions can I take to prevent falls?  General instructions     Use good lighting in all rooms. Replace any light bulbs that burn out.  Turn on lights if it is dark. Use night-lights.  Place frequently used items in easy-to-reach places. Lower the shelves around your home if necessary.  Set up furniture so that there are clear paths around it. Avoid moving your furniture around.  Remove throw rugs and other tripping hazards from the floor.  Avoid walking on wet floors.  Fix any uneven floor surfaces.  Add color or contrast paint or tape to grab bars and handrails in your home. Place contrasting color strips on the first and last steps of stairways.  When you use a stepladder, make sure that it is completely opened and that the sides are firmly locked. Have someone hold the ladder while you are using it. Do not climb a closed stepladder.  Be aware of any and all pets.  What can I do in the bathroom?     Keep the floor dry. Immediately clean up any water that spills onto the floor.  Remove soap buildup in the tub or shower on a regular basis.  Use non-skid mats or decals on the floor of the tub or shower.  Attach bath mats securely with double-sided, non-slip rug tape.  If you need to sit down while you are in the shower, use a plastic, non-slip stool.  Image ImageInstall grab bars by the toilet and in the tub and shower. Do not use towel bars as grab bars.  What can I do in the bedroom?     Make sure that a bedside light is easy to reach.  Do not use oversized bedding that drapes onto the floor.  Have a firm chair that has side arms to use for getting dressed.  What can I do in the kitchen?     Clean up any spills right away.  If you need to reach for something above you, use a sturdy step stool that has a grab bar.  Keep electrical cables out of the way.  Do not use floor polish or wax that makes floors slippery. If you must use wax, make sure that it is non-skid floor wax.  What can I do in the stairways?     Do not leave any items on the stairs.  Make sure that you have a light switch at the top of the stairs and the bottom of the stairs. Have them installed if you do not have them.  Make sure that there are handrails on both sides of the stairs. Fix handrails that are broken or loose. Make sure that handrails are as long as the stairways.  Install non-slip stair treads on all stairs in your home.  Avoid having throw rugs at the top or bottom of stairways, or secure the rugs with carpet tape to prevent them from moving.  Choose a carpet design that does not hide the edge of steps on the stairway.  Check any carpeting to make sure that it is firmly attached to the stairs. Fix any carpet that is loose or worn.  What can I do on the outside of my home?     Use bright outdoor lighting.  Regularly repair the edges of walkways and driveways and fix any cracks.  Remove high doorway thresholds.  Trim any shrubbery on the main path into your home.  Regularly check that handrails are securely fastened and in good repair. Both sides of any steps should have handrails.  Install guardrails along the edges of any raised decks or porches.  Clear walkways of debris and clutter, including tools and rocks.  Have leaves, snow, and ice cleared regularly.  Use sand or salt on walkways during winter months.  In the garage, clean up any spills right away, including grease or oil spills.  What other actions can I take?     Wear closed-toe shoes that fit well and support your feet. Wear shoes that have rubber soles or low heels.  Use mobility aids as needed, such as canes, walkers, scooters, and crutches.  Review your medicines with your health care provider. Some medicines can cause dizziness or changes in blood pressure, which increase your risk of falling.  Talk with your health care provider about other ways that you can decrease your risk of falls. This may include working with a physical therapist or  to improve your strength, balance, and endurance.    Where to find more information  Centers for Disease Control and Prevention, TRU: https://www.cdc.gov  [employer] [place] [employer] Aging: https://wz6ebnx.jadyn.nih.gov  Contact a health care provider if:  You are afraid of falling at home.  You feel weak, drowsy, or dizzy at home.  You fall at home.  Summary  There are many simple things that you can do to make your home safe and to help prevent falls.  Ways to make your home safe include removing tripping hazards and installing grab bars in the bathroom.  Ask for help when making these changes in your home.  This information is not intended to replace advice given to you by your health care provider. Make sure you discuss any questions you have with your health care provider.

## 2021-04-08 NOTE — ED PROVIDER NOTE - WET READ LAUNCH FT
There are 2 Wet Read(s) to document. There is 1 Wet Read(s) to document. There are no Wet Read(s) to document.

## 2021-04-08 NOTE — ED PROVIDER NOTE - CLINICAL SUMMARY MEDICAL DECISION MAKING FREE TEXT BOX
No Imaging reviewed and results d/w pt.  Pt observed until clinical sobriety.  Pt able to bend down to tie shoes, ambulate with steady gait, clear speech and po tolerant,  Will d/c back to residence

## 2021-04-08 NOTE — ED ADULT NURSE NOTE - CHIEF COMPLAINT QUOTE
sent   from    [facility]    residence   for    drinking   and   falling    sustaining    bruise   to   scalp    no   LOC

## 2021-04-09 NOTE — SBIRT NOTE ADULT - NSSBIRTAUDITSCORE_GEN_A_CORE_CAL
Oral xeloda being sent through [facility] as referral number [id number] states. Writer reached out to patient, patient states that her insurance changed. Pt is now using [employer] which is on file in Epic. RN notified auth department. Margie in auth department states they will look into this and send us a message regarding the new pharmacy to send Xeloda to.  
[phone]

## 2021-05-27 ENCOUNTER — INPATIENT (INPATIENT)
Facility: HOSPITAL | Age: 61
LOS: 6 days | Discharge: NURSING, REHAB OR LONG-TERM CARE | End: 2021-06-03
Attending: INTERNAL MEDICINE | Admitting: INTERNAL MEDICINE
Payer: MEDICAID

## 2021-05-27 VITALS
OXYGEN SATURATION: 97 % | SYSTOLIC BLOOD PRESSURE: 138 MMHG | HEART RATE: 121 BPM | DIASTOLIC BLOOD PRESSURE: 78 MMHG | RESPIRATION RATE: 20 BRPM | HEIGHT: 66 IN | TEMPERATURE: 99 F

## 2021-05-27 LAB
ALBUMIN SERPL ELPH-MCNC: 4.6 G/DL — SIGNIFICANT CHANGE UP (ref 3.5–5.2)
ALP SERPL-CCNC: 106 U/L — SIGNIFICANT CHANGE UP (ref 30–115)
ALT FLD-CCNC: 37 U/L — SIGNIFICANT CHANGE UP (ref 0–41)
ANION GAP SERPL CALC-SCNC: 24 MMOL/L — HIGH (ref 7–14)
ANISOCYTOSIS BLD QL: SLIGHT — SIGNIFICANT CHANGE UP
AST SERPL-CCNC: 117 U/L — HIGH (ref 0–41)
BASE EXCESS BLDV CALC-SCNC: 2.9 MMOL/L — HIGH (ref -2–2)
BASOPHILS # BLD AUTO: 0.13 K/UL — SIGNIFICANT CHANGE UP (ref 0–0.2)
BASOPHILS NFR BLD AUTO: 2.6 % — HIGH (ref 0–1)
BILIRUB SERPL-MCNC: 2.5 MG/DL — HIGH (ref 0.2–1.2)
BUN SERPL-MCNC: 4 MG/DL — LOW (ref 10–20)
CA-I SERPL-SCNC: 1.1 MMOL/L — LOW (ref 1.12–1.3)
CALCIUM SERPL-MCNC: 8.7 MG/DL — SIGNIFICANT CHANGE UP (ref 8.5–10.1)
CHLORIDE SERPL-SCNC: 96 MMOL/L — LOW (ref 98–110)
CO2 SERPL-SCNC: 17 MMOL/L — SIGNIFICANT CHANGE UP (ref 17–32)
CREAT SERPL-MCNC: 1.2 MG/DL — SIGNIFICANT CHANGE UP (ref 0.7–1.5)
EOSINOPHIL # BLD AUTO: 0 K/UL — SIGNIFICANT CHANGE UP (ref 0–0.7)
EOSINOPHIL NFR BLD AUTO: 0 % — SIGNIFICANT CHANGE UP (ref 0–8)
ETHANOL SERPL-MCNC: 79 MG/DL — HIGH
GAS PNL BLDV: 138 MMOL/L — SIGNIFICANT CHANGE UP (ref 136–145)
GAS PNL BLDV: SIGNIFICANT CHANGE UP
GAS PNL BLDV: SIGNIFICANT CHANGE UP
GIANT PLATELETS BLD QL SMEAR: PRESENT — SIGNIFICANT CHANGE UP
GLUCOSE SERPL-MCNC: 116 MG/DL — HIGH (ref 70–99)
HCO3 BLDV-SCNC: 27 MMOL/L — SIGNIFICANT CHANGE UP (ref 22–29)
HCT VFR BLD CALC: 33.1 % — LOW (ref 42–52)
HCT VFR BLDA CALC: 36 % — SIGNIFICANT CHANGE UP (ref 34–44)
HGB BLD CALC-MCNC: 11.7 G/DL — LOW (ref 14–18)
HGB BLD-MCNC: 11.4 G/DL — LOW (ref 14–18)
LACTATE BLDV-MCNC: 2.6 MMOL/L — HIGH (ref 0.5–1.6)
LACTATE SERPL-SCNC: 1.2 MMOL/L — SIGNIFICANT CHANGE UP (ref 0.7–2)
LACTATE SERPL-SCNC: 7.3 MMOL/L — CRITICAL HIGH (ref 0.7–2)
LYMPHOCYTES # BLD AUTO: 0.4 K/UL — LOW (ref 1.2–3.4)
LYMPHOCYTES # BLD AUTO: 7.9 % — LOW (ref 20.5–51.1)
MAGNESIUM SERPL-MCNC: 1.2 MG/DL — LOW (ref 1.8–2.4)
MANUAL SMEAR VERIFICATION: SIGNIFICANT CHANGE UP
MCHC RBC-ENTMCNC: 34.4 G/DL — SIGNIFICANT CHANGE UP (ref 32–37)
MCHC RBC-ENTMCNC: 34.5 PG — HIGH (ref 27–31)
MCV RBC AUTO: 100.3 FL — HIGH (ref 80–94)
MONOCYTES # BLD AUTO: 0.05 K/UL — LOW (ref 0.1–0.6)
MONOCYTES NFR BLD AUTO: 0.9 % — LOW (ref 1.7–9.3)
NEUTROPHILS # BLD AUTO: 4.5 K/UL — SIGNIFICANT CHANGE UP (ref 1.4–6.5)
NEUTROPHILS NFR BLD AUTO: 88.6 % — HIGH (ref 42.2–75.2)
PCO2 BLDV: 40 MMHG — LOW (ref 41–51)
PH BLDV: 7.44 — HIGH (ref 7.26–7.43)
PLAT MORPH BLD: NORMAL — SIGNIFICANT CHANGE UP
PLATELET # BLD AUTO: 84 K/UL — LOW (ref 130–400)
PO2 BLDV: 30 MMHG — SIGNIFICANT CHANGE UP (ref 20–40)
POTASSIUM BLDV-SCNC: 3.7 MMOL/L — SIGNIFICANT CHANGE UP (ref 3.3–5.6)
POTASSIUM SERPL-MCNC: 4.6 MMOL/L — SIGNIFICANT CHANGE UP (ref 3.5–5)
POTASSIUM SERPL-SCNC: 4.6 MMOL/L — SIGNIFICANT CHANGE UP (ref 3.5–5)
PROT SERPL-MCNC: 7.4 G/DL — SIGNIFICANT CHANGE UP (ref 6–8)
RBC # BLD: 3.3 M/UL — LOW (ref 4.7–6.1)
RBC # FLD: 15.6 % — HIGH (ref 11.5–14.5)
RBC BLD AUTO: ABNORMAL
SAO2 % BLDV: 53 % — SIGNIFICANT CHANGE UP
SARS-COV-2 RNA SPEC QL NAA+PROBE: SIGNIFICANT CHANGE UP
SODIUM SERPL-SCNC: 137 MMOL/L — SIGNIFICANT CHANGE UP (ref 135–146)
STOMATOCYTES BLD QL SMEAR: SLIGHT — SIGNIFICANT CHANGE UP
WBC # BLD: 5.08 K/UL — SIGNIFICANT CHANGE UP (ref 4.8–10.8)
WBC # FLD AUTO: 5.08 K/UL — SIGNIFICANT CHANGE UP (ref 4.8–10.8)

## 2021-05-27 PROCEDURE — 93010 ELECTROCARDIOGRAM REPORT: CPT | Mod: 76

## 2021-05-27 PROCEDURE — 99291 CRITICAL CARE FIRST HOUR: CPT

## 2021-05-27 PROCEDURE — 99222 1ST HOSP IP/OBS MODERATE 55: CPT

## 2021-05-27 RX ORDER — ONDANSETRON 8 MG/1
4 TABLET, FILM COATED ORAL ONCE
Refills: 0 | Status: COMPLETED | OUTPATIENT
Start: 2021-05-27 | End: 2021-05-27

## 2021-05-27 RX ORDER — SODIUM CHLORIDE 9 MG/ML
1000 INJECTION, SOLUTION INTRAVENOUS ONCE
Refills: 0 | Status: COMPLETED | OUTPATIENT
Start: 2021-05-27 | End: 2021-05-27

## 2021-05-27 RX ORDER — THIAMINE MONONITRATE (VIT B1) 100 MG
500 TABLET ORAL DAILY
Refills: 0 | Status: COMPLETED | OUTPATIENT
Start: 2021-05-27 | End: 2021-05-30

## 2021-05-27 RX ORDER — AMLODIPINE BESYLATE 2.5 MG/1
5 TABLET ORAL DAILY
Refills: 0 | Status: DISCONTINUED | OUTPATIENT
Start: 2021-05-27 | End: 2021-06-01

## 2021-05-27 RX ORDER — TAMSULOSIN HYDROCHLORIDE 0.4 MG/1
0.4 CAPSULE ORAL AT BEDTIME
Refills: 0 | Status: DISCONTINUED | OUTPATIENT
Start: 2021-05-27 | End: 2021-06-03

## 2021-05-27 RX ORDER — HEPARIN SODIUM 5000 [USP'U]/ML
5000 INJECTION INTRAVENOUS; SUBCUTANEOUS EVERY 12 HOURS
Refills: 0 | Status: DISCONTINUED | OUTPATIENT
Start: 2021-05-27 | End: 2021-06-03

## 2021-05-27 RX ORDER — FOLIC ACID 0.8 MG
1 TABLET ORAL
Qty: 0 | Refills: 0 | DISCHARGE

## 2021-05-27 RX ORDER — PANTOPRAZOLE SODIUM 20 MG/1
1 TABLET, DELAYED RELEASE ORAL
Qty: 0 | Refills: 0 | DISCHARGE

## 2021-05-27 RX ORDER — PREGABALIN 225 MG/1
1 CAPSULE ORAL
Qty: 0 | Refills: 0 | DISCHARGE

## 2021-05-27 RX ORDER — FOLIC ACID 0.8 MG
1 TABLET ORAL DAILY
Refills: 0 | Status: DISCONTINUED | OUTPATIENT
Start: 2021-05-27 | End: 2021-06-03

## 2021-05-27 RX ORDER — SODIUM CHLORIDE 9 MG/ML
1000 INJECTION, SOLUTION INTRAVENOUS
Refills: 0 | Status: DISCONTINUED | OUTPATIENT
Start: 2021-05-27 | End: 2021-05-28

## 2021-05-27 RX ORDER — THIAMINE MONONITRATE (VIT B1) 100 MG
100 TABLET ORAL ONCE
Refills: 0 | Status: COMPLETED | OUTPATIENT
Start: 2021-05-27 | End: 2021-05-27

## 2021-05-27 RX ORDER — MAGNESIUM SULFATE 500 MG/ML
2 VIAL (ML) INJECTION ONCE
Refills: 0 | Status: COMPLETED | OUTPATIENT
Start: 2021-05-27 | End: 2021-05-27

## 2021-05-27 RX ADMIN — Medication 1 MILLIGRAM(S): at 10:24

## 2021-05-27 RX ADMIN — Medication 25 MILLIGRAM(S): at 10:24

## 2021-05-27 RX ADMIN — ONDANSETRON 4 MILLIGRAM(S): 8 TABLET, FILM COATED ORAL at 13:28

## 2021-05-27 RX ADMIN — SODIUM CHLORIDE 1000 MILLILITER(S): 9 INJECTION, SOLUTION INTRAVENOUS at 14:45

## 2021-05-27 RX ADMIN — Medication 100 MILLIGRAM(S): at 10:24

## 2021-05-27 RX ADMIN — TAMSULOSIN HYDROCHLORIDE 0.4 MILLIGRAM(S): 0.4 CAPSULE ORAL at 20:53

## 2021-05-27 RX ADMIN — SODIUM CHLORIDE 125 MILLILITER(S): 9 INJECTION, SOLUTION INTRAVENOUS at 16:45

## 2021-05-27 RX ADMIN — Medication 50 GRAM(S): at 11:29

## 2021-05-27 RX ADMIN — HEPARIN SODIUM 5000 UNIT(S): 5000 INJECTION INTRAVENOUS; SUBCUTANEOUS at 19:01

## 2021-05-27 RX ADMIN — SODIUM CHLORIDE 1000 MILLILITER(S): 9 INJECTION, SOLUTION INTRAVENOUS at 10:24

## 2021-05-27 RX ADMIN — Medication 1 MILLIGRAM(S): at 10:49

## 2021-05-27 RX ADMIN — Medication 3 MILLIGRAM(S): at 20:52

## 2021-05-27 RX ADMIN — Medication 3 MILLIGRAM(S): at 16:45

## 2021-05-27 RX ADMIN — Medication 2 MILLIGRAM(S): at 13:28

## 2021-05-27 RX ADMIN — Medication 2 MILLIGRAM(S): at 11:45

## 2021-05-27 NOTE — H&P ADULT - HISTORY OF PRESENT ILLNESS
61 yr M with  ETOH Abuse, HTN, CKD III and gout presented to ER for tremors.  pt is chronic alcohol abuser, last drink was this morning, he presented to ER for excessive hands tremor, shaking and not feeling well.  pt denies fever, LOC, fall, seizure, syncope, chest pain, SOB, abd pain or urinary symptoms.    in ER; Tachycardia 124, Lactate 7, ciwa score 10, alcohol level of 70, s/p librium and ativan, repeated lactate 2.7, admitted for alcohol intoxication.  60 yr M with  ETOH Abuse, HTN, CKD III and gout presented to ER for tremors.  pt is chronic alcohol abuser, last drink was this morning, he presented to ER for excessive hands tremor, shaking and not feeling well.  pt denies fever, LOC, fall, seizure, syncope, chest pain, SOB, abd pain or urinary symptoms.    in ER; Tachycardia 124, Lactate 7, ciwa score 10, alcohol level of 70, s/p librium and ativan, repeated lactate 2.7, admitted for alcohol intoxication.

## 2021-05-27 NOTE — ED ADULT NURSE NOTE - OBJECTIVE STATEMENT
Pt c/o shaking that started early this am. Pt states he drinks 1 pint of vodka a day. Pt said he stopped drinking earlier than normal last night. Tried drinking vodka this am to stop shaking but did not stop

## 2021-05-27 NOTE — H&P ADULT - NSHPLABSRESULTS_GEN_ALL_CORE
LABS:                        11.4   5.08  )-----------( 84       ( 27 May 2021 10:24 )             33.1     27 May 2021 10:24    137    |  96     |  4      ----------------------------<  116    4.6     |  17     |  1.2      Ca    8.7        27 May 2021 10:24  Mg     1.2       27 May 2021 10:24    TPro  7.4    /  Alb  4.6    /  TBili  2.5    /  DBili  x      /  AST  117    /  ALT  37     /  AlkPhos  106    27 May 2021 10:24    Lactate, Blood: 7.3: TYPE:(C=Critical, N=Notification, A=Abnormal) C  TESTS: LACT  DATE/TIME CALLED: 05/27/2021 14:01:40 EDT  CALLED TO: SAVANNAH WILKS  READ BACK (2 Patient Identifiers)(Y/N): Y  READ BACK VALUES (Y/N): Y  CALLED BY: RG mmol/L (05.27.21 @ 10:24)  Blood Gas Venous - Lactate: 2.6: Elevated lactate. Consider ordering follow-up lactate to trend. mmoL/L (05.27.21 @ 14:39)

## 2021-05-27 NOTE — ED PROVIDER NOTE - PHYSICAL EXAMINATION
CONSTITUTIONAL: anxious appearing male, +tremors, +nausea  SKIN: skin exam is warm and dry  HEAD: Normocephalic; atraumatic.  EYES: PERRL, 3 mm bilateral, no nystagmus, EOM intact; conjunctiva and sclera clear.  ENT: MMM  NECK: ROM intact.  CARD: tachycardiac, normal rhythm  RESP: Good air movement bilaterally  ABD: soft; non-distended; non-tender.  EXT: Normal ROM.   NEURO: awake, alert, following commands, oriented, grossly unremarkable. No Focal deficits. GCS 15.

## 2021-05-27 NOTE — ED PROVIDER NOTE - ATTENDING CONTRIBUTION TO CARE
67 y/o male h/o HTN, CAD, gout, daily alcohol use (1 pint vodka / day) now p/w BL UE tremors since waking this AM, persistent, denies modifying factors (drank some vodka this AM w/o improvement), denies n/v, diaphoresis, headache, clouded sensorium, anxiety, tactile disturbances, visual or auditory hallucinations or other complaints at present. No signs trauma, infection, cerebrovascular event.    Old chart reviewed.  I have reviewed and agree with the initial nursing note, except as documented in my note.    VSS, mildly tachycardic, awake, alert, non-toxic appearing, lying comfortably, in NAD, + BL UE tremor, does not appear agitated, ears clear, oropharynx clear, mmm, no JVD or bruit, no skin rash, diaphoresis, cutaneous signs of trauma, chest CTAB, non-labored breathing, no w/r/r, +S1/S2, RRR, no m/r/g, abdomen soft, NT, ND, +BS, no peripheral edema or deformities, alert to person, place and time, clear speech and steady gait.

## 2021-05-27 NOTE — ED ADULT NURSE NOTE - NSIMPLEMENTINTERV_GEN_ALL_ED
Implemented All Fall with Harm Risk Interventions:  [place] to call system. Call bell, personal items and telephone within reach. Instruct patient to call for assistance. Room bathroom lighting operational. Non-slip footwear when patient is off stretcher. Physically safe environment: no spills, clutter or unnecessary equipment. Stretcher in lowest position, wheels locked, appropriate side rails in place. Provide visual cue, wrist band, yellow gown, etc. Monitor gait and stability. Monitor for mental status changes and reorient to person, place, and time. Review medications for side effects contributing to fall risk. Reinforce activity limits and safety measures with patient and family. Provide visual clues: red socks.

## 2021-05-27 NOTE — ED PROVIDER NOTE - CRITICAL CARE ATTENDING CONTRIBUTION TO CARE
65 y/o male h/o HTN, CAD, gout, daily alcohol use (1 pint vodka / day) now p/w BL UE tremors since waking this AM, persistent, denies modifying factors (drank some vodka this AM w/o improvement), denies n/v, diaphoresis, headache, clouded sensorium, anxiety, tactile disturbances, visual or auditory hallucinations or other complaints at present. No signs trauma, infection, cerebrovascular event.    Old chart reviewed.  I have reviewed and agree with the initial nursing note, except as documented in my note.    VSS, mildly tachycardic, awake, alert, non-toxic appearing, lying comfortably, in NAD, + BL UE tremor, does not appear agitated, ears clear, oropharynx clear, mmm, no JVD or bruit, no skin rash, diaphoresis, cutaneous signs of trauma, chest CTAB, non-labored breathing, no w/r/r, +S1/S2, RRR, no m/r/g, abdomen soft, NT, ND, +BS, no peripheral edema or deformities, alert to person, place and time, clear speech and steady gait. I personally evaluated the patient. I reviewed the Physician Assistant’s note (as assigned above), and agree with the findings and plan except as documented in my note.    67 y/o male h/o HTN, CAD, gout, daily alcohol use (1 pint vodka / day) now p/w BL UE tremors since waking this AM, persistent, denies modifying factors (drank some vodka this AM w/o improvement), denies n/v, diaphoresis, headache, clouded sensorium, anxiety, tactile disturbances, visual or auditory hallucinations or other complaints at present. No signs trauma, infection, cerebrovascular event.    Old chart reviewed.  I have reviewed and agree with the initial nursing note, except as documented in my note.    VSS, mildly tachycardic, awake, alert, non-toxic appearing, lying comfortably, in NAD, + BL UE tremor, does not appear agitated, ears clear, oropharynx clear, mmm, no JVD or bruit, no skin rash, diaphoresis, cutaneous signs of trauma, chest CTAB, non-labored breathing, no w/r/r, +S1/S2, RRR, no m/r/g, abdomen soft, NT, ND, +BS, no peripheral edema or deformities, alert to person, place and time, clear speech and steady gait.

## 2021-05-27 NOTE — CONSULT NOTE ADULT - ASSESSMENT
IMPRESSION:    ETOH withdrawal  No hx of DTs or withdrawal seziures  Lactic acidosis    PLAN:    CNS: CIWA protocol, ativan prn, thiamine, folate, multivitamin    HEENT:  Oral care    PULMONARY:  HOB @ 45 degrees. aspiration precaution    CARDIOVASCULAR: LR 100cc/h, repeat lactic acid    GI: NPO    RENAL:  F/u  lytes.  Correct as needed.  Accurate I/O    INFECTIOUS DISEASE: no abx    HEMATOLOGICAL:  DVT prophylaxis.     ENDOCRINE:  Follow up FS.  Insulin protocol if needed.    MUSCULOSKELETAL: Bedrest    LINES/GOMEZ: none    CODE STATUS: FULL CODE    DISPOSITION: Pt does not require ICU at this time, recall if any change in status     IMPRESSION:    ETOH withdrawal  No hx of DTs or withdrawal seziures  Lactic acidosis  Chronic thrombocytopenia     PLAN:    CNS: CIWA protocol, IV ativan standing taper for now, thiamine, folate, multivitamin    HEENT:  Oral care    PULMONARY:  HOB @ 45 degrees. aspiration precaution    CARDIOVASCULAR: LR 100cc/h, repeat lactic acid    GI: NPO, SLP eval    RENAL:  F/u  lytes.  Correct as needed.  Accurate I/O    INFECTIOUS DISEASE: no abx    HEMATOLOGICAL:  DVT prophylaxis.     ENDOCRINE:  Follow up FS.  Insulin protocol if needed.    MUSCULOSKELETAL: Bedrest    LINES/GOMEZ: none    CODE STATUS: FULL CODE    DISPOSITION: Pt does not require ICU at this time, recall if any change in status     IMPRESSION:    ETOH withdrawal  No hx of DTs or withdrawal seziures  Lactic acidosis  Chronic thrombocytopenia     PLAN:    CNS: CIWA protocol, symptom-triggered therapy with IV ativan, thiamine, folate, multivitamin    HEENT:  Oral care    PULMONARY:  HOB @ 45 degrees. aspiration precaution    CARDIOVASCULAR: LR 100cc/h, repeat lactic acid    GI: NPO, SLP eval    RENAL:  F/u  lytes.  Correct as needed.  Accurate I/O    INFECTIOUS DISEASE: no abx    HEMATOLOGICAL:  DVT prophylaxis.     ENDOCRINE:  Follow up FS.  Insulin protocol if needed.    MUSCULOSKELETAL: Bedrest    LINES/GOMEZ: none    CODE STATUS: FULL CODE    DISPOSITION: Pt does not require ICU at this time, recall if any change in status     IMPRESSION:    ETOH withdrawal  No hx of DTs or withdrawal seziures  Lactic acidosis  Chronic thrombocytopenia     PLAN:    CNS: CIWA protocol, symptom-triggered therapy with IV ativan, can use precedex if need, thiamine, folate, multivitamin    HEENT:  Oral care    PULMONARY:  HOB @ 45 degrees. aspiration precaution    CARDIOVASCULAR: LR 100cc/h, repeat lactic acid    GI: NPO, SLP eval    RENAL:  F/u  lytes.  Correct as needed.  Accurate I/O    INFECTIOUS DISEASE: no abx    HEMATOLOGICAL:  DVT prophylaxis.     ENDOCRINE:  Follow up FS.  Insulin protocol if needed.    MUSCULOSKELETAL: Bedrest    LINES/GOMEZ: none    CODE STATUS: FULL CODE    DISPOSITION: Step down unit     IMPRESSION:    ETOH withdrawal   No hx of DTs or withdrawal seziures  Lactic acidosis  Chronic thrombocytopenia     PLAN:    CNS: CIWA protocol, symptom-triggered therapy with IV ativan, can use precedex if need, thiamine, folate, multivitamin    HEENT:  Oral care    PULMONARY:  HOB @ 45 degrees. aspiration precaution    CARDIOVASCULAR: LR 100cc/h, repeat lactic acid    GI: NPO, SLP eval    RENAL:  F/u  lytes.  Correct as needed.  Accurate I/O    INFECTIOUS DISEASE: pancx  HEMATOLOGICAL:  DVT prophylaxis.     ENDOCRINE:  Follow up FS.  Insulin protocol if needed.    MUSCULOSKELETAL: Bedrest    LINES/GOMEZ: none    CODE STATUS: FULL CODE    DISPOSITION: Step down unit

## 2021-05-27 NOTE — ED PROVIDER NOTE - MDM ORDERS SUBMITTED SELECTION
Please let him know: His A1c is 7.1, up from 6.6 3 months ago. His total cholesterol is 183, down from 256 four months ago. Triglycerides are 206. HDL is 36. The LDL is 111, down from 151 last year. Overall, his cholesterol has improved dramatically. Meanwhile, his renal function labs show that he is in acute kidney failure with a creatinine of 1.92 and his BUN of 30. His albumin is mildly low at 3.7. LFTs are normal.  His CBC shows a mildly low hemoglobin of 12.5. MCV is normal.  Continues to drink at least 8 cups of water each day in order to prevent dehydration that caused his acute kidney failure. Please schedule him for labs next week. We will recheck his renal function.     Dr. Vy Mayberry  [facility] [address], [address] [place] [address] Str.  Phone: [phone]  Fax: [phone] Labs/EKG/Imaging Studies/Medications

## 2021-05-27 NOTE — ED PROVIDER NOTE - OBJECTIVE STATEMENT
60 year old male, past medical history alcohol use disorder, htn, hdl, who presents with alcohol withdrawal. patient reports began having tremors last night, last drink this morning, endorses daily alcohol use 1-2 pints vodka/day.

## 2021-05-27 NOTE — CONSULT NOTE ADULT - ATTENDING COMMENTS
events noted, alcohol withdrawal/ high LA ? seizure/ active drinker    - IVF  - thiamine/ folic acid  - ativan protocol  - pancx  - if needed add precedex  - SDU

## 2021-05-27 NOTE — ED PROVIDER NOTE - NS ED ROS FT
Review of Systems:  	•	CONSTITUTIONAL: no fever, no chills  	•	SKIN: no rash  	•	HEMATOLOGIC: no bleeding, no bruising  	•	EYES: no eye pain, no blurry vision  	•	ENT: no sore throat  	•	RESPIRATORY: no shortness of breath, no cough  	•	CARDIAC: no chest pain, no palpitations  	•	GI: +nausea. no abd pain, no vomiting, no diarrhea  	•	MUSCULOSKELETAL: no joint paint, no swelling, no redness  	•	NEUROLOGIC: +tremors, +weakness. no syncope, no loc  	•	PSYCH: no anxiety, non suicidal, non homicidal, no hallucination, no depression

## 2021-05-27 NOTE — H&P ADULT - ASSESSMENT
61 yr M with  ETOH Abuse, HTN, CKD III and gout presented to ER for tremors.    #alcohol intoxication  - start CIWA protcol with Ativan IV taper  - IV RL 100cc/hr  - repeat lactate level   - seizure and fall precaution  - monitor K and Mg, replete accordingly  - addiction meds    #HAGMA due to alcohol intoxication  - c/w IVF and repeat lactate    #HTN: Norvasc    #urinary retention due to suspected BPH: flomax    #chronic thrombocytopenia: at baseline, likley from alcohol induce BM suppression VS cirrhosis    #elevated LFTs and bili: suspected underlying alcoholic cirrhosis VS hepatic steatohepatitis  - trend LFTs, send coag, check RUQ    #CKD3: GFR at baseline    #suspected folate and thiamione def: start supplements     #Hx of gout       #Diet: CLL for now  #DVT pro: heparin sq  #GI pro: not indicated  #Dispo: SDU   60 yr M with  ETOH Abuse, HTN, CKD III and gout presented to ER for tremors.    #alcohol intoxication  - start CIWA protcol with Ativan IV taper  - IV RL 100cc/hr  - repeat lactate level   - seizure and fall precaution  - monitor K and Mg, replete accordingly  - addiction meds    #HAGMA due to alcohol intoxication  - c/w IVF and repeat lactate    #HTN: Norvasc    #urinary retention due to suspected BPH: flomax    #chronic thrombocytopenia: at baseline, likley from alcohol induce BM suppression VS cirrhosis    #elevated LFTs and bili: suspected underlying alcoholic cirrhosis VS hepatic steatohepatitis  - trend LFTs, send coag, check RUQ    #CKD3: GFR at baseline    #suspected folate and thiamione def: start supplements     #Hx of gout       #Diet: CLL for now  #DVT pro: heparin sq  #GI pro: not indicated  #Dispo: SDU

## 2021-05-27 NOTE — H&P ADULT - ATTENDING COMMENTS
61 yo unfortunate man with  ETOH Abuse, HTN, CKD III and gout admitted with lactic acidosis due to alcohol intoxication; noted with early withdrawal symptoms- now improved    Pt seen and examined- spoke with RN    Feeling better    Had straight cath with 800 cc    Chart reviewed- agree with above    IV fluids    Lactate much improved, continue to monitor    ativan protocoal    thiamine/folate    monitor closely for urinary retention- cath as needed    CATCH team    addiction medicine eval    OOB; fall precautions    DVT proph

## 2021-05-27 NOTE — ED PROVIDER NOTE - PROGRESS NOTE DETAILS
WILKS: patient tachycardiac, obvious tremors, no tongue fasciculations, speaking full sentences. denies chest pain/sob, n/v/d. rectal temp 99. will give thiamine/ativan/librium and reassess. LASHAUN: patient with persistent tremors, will give ativan and reasssess patient comfortable. VSS. sleeping, arousable, improvement of tremors. patient actively vomiting, nb/nb emesis. CIWA. will give anti-emetic and ativan 2. discussed case w/ icu for evaluation, will evaluate patient in ed.

## 2021-05-27 NOTE — CONSULT NOTE ADULT - SUBJECTIVE AND OBJECTIVE BOX
Patient is a 60y old  Male who presents with a chief complaint of     HPI:      Allergies    Beef (Hives)  dairy products (Hives)  No Known Drug Allergies  shellfish (Hives)    Intolerances        folic acid 1 mg oral tablet: 1 tab(s) orally once a day (10 Rich 2020 17:33)  Norvasc 5 mg oral tablet: 1 tab(s) orally once a day (10 Rich 2020 17:33)  Protonix 40 mg oral granule, delayed release: 1 each orally once a day (10 Rich 2020 17:33)  Vitamin B-12 100 mcg oral tablet: 1 tab(s) orally once a day (10 Rich 2020 17:33)      PAST MEDICAL & SURGICAL HISTORY:  Alcohol dependence    Vertigo    Tobacco dependency    HTN (hypertension)    Hard of hearing    Gout    Seasonal allergies    CKD (chronic kidney disease)    No significant past surgical history        SOCIAL HX:     Smoking                          ETOH  Occupation                             FAMILY HISTORY:  Family history of gastric cancer  Mom    Family hx of lung cancer  Smoker      :  No known cardiovacular/pulmonary family hisotry     All ROS are negative except per HPI     PHYSICAL EXAM    ICU Vital Signs Last 24 Hrs  T(C): 37 (27 May 2021 09:28), Max: 37 (27 May 2021 09:28)  T(F): 98.6 (27 May 2021 09:28), Max: 98.6 (27 May 2021 09:28)  HR: 117 (27 May 2021 13:28) (92 - 121)  BP: 157/89 (27 May 2021 13:28) (138/78 - 157/89)  BP(mean): --  ABP: --  ABP(mean): --  RR: 20 (27 May 2021 09:28) (20 - 20)  SpO2: 97% (27 May 2021 09:28) (97% - 97%)      CONSTITUTIONAL:  Well nourished.  NAD    ENT:   Airway patent,   Mouth with normal mucosa.   No thrush    EYES:   pupils equal,   round and reactive to light.    CARDIAC:   Normal rate,   Regular rhythm.    Heart sounds S1, S2.   No edema    Vascular:   normal systolic impulse  no bruits    RESPIRATORY:   No wheezing   Normal chest expansion  No use of accessory muscles    GASTROINTESTINAL:  Abdomen soft   Non-tender,   No guarding,   + BS    GENITOURINARY  normal genitalia for sex  no edema    MUSCULOSKELETAL:   Range of motion is not limited,  Nno clubbing, cyanosis    NEUROLOGICAL:   Alert and oriented   No motor or sensory deficits.  Pertinent DTRs normal    SKIN:   Skin normal color for race,   Warm and dry  No evidence of rash.    PSYCHIATRIC:   Normal mood and affect.   No apparent risk to self or others.    HEME LYMPH:   No cervical  lymphadenopathy.  No inguinal lymphadenopathy        LABS:                          11.4   5.08  )-----------( 84       ( 27 May 2021 10:24 )             33.1                                               05-27    137  |  96<L>  |  4<L>  ----------------------------<  116<H>  4.6   |  17  |  1.2    Ca    8.7      27 May 2021 10:24  Mg     1.2     05-27    TPro  7.4  /  Alb  4.6  /  TBili  2.5<H>  /  DBili  x   /  AST  117<H>  /  ALT  37  /  AlkPhos  106  05-27                                                                                           LIVER FUNCTIONS - ( 27 May 2021 10:24 )  Alb: 4.6 g/dL / Pro: 7.4 g/dL / ALK PHOS: 106 U/L / ALT: 37 U/L / AST: 117 U/L / GGT: x                                                                                                                                                CXR interpreted by me:    MEDICATIONS  (STANDING):  lactated ringers Bolus 1000 milliLiter(s) IV Bolus once    MEDICATIONS  (PRN):         Patient is a 60y old  Male who presents with a chief complaint of tremors    HPI:    61 yo M poor historian, etoh abuse with multiple presentations to ed for intoxication, no hx of DTs or withdrawal seizures, HTN, CKD, doesnt take any meds, presented to ed for tremors and not feeling well. Tachycardic at presentation, other vitals stable, not in distress, lactic acidosis, high alcohol level. ICU called to eval for withdrawls. Given ativan 6, librium 25, he is calm on my exam.    Allergies    Beef (Hives)  dairy products (Hives)  No Known Drug Allergies  shellfish (Hives)    Intolerances        folic acid 1 mg oral tablet: 1 tab(s) orally once a day (10 Rich 2020 17:33)  Norvasc 5 mg oral tablet: 1 tab(s) orally once a day (10 Rich 2020 17:33)  Protonix 40 mg oral granule, delayed release: 1 each orally once a day (10 Rich 2020 17:33)  Vitamin B-12 100 mcg oral tablet: 1 tab(s) orally once a day (10 Rich 2020 17:33)      PAST MEDICAL & SURGICAL HISTORY:  Alcohol dependence    Vertigo    Tobacco dependency    HTN (hypertension)    Hard of hearing    Gout    Seasonal allergies    CKD (chronic kidney disease)    No significant past surgical history        SOCIAL HX:     Smoking   former heavy smoker                       ETOH pint vodka per day                         FAMILY HISTORY:  Family history of gastric cancer  Mom    Family hx of lung cancer  Smoker      :  No known cardiovacular/pulmonary family hisotry     All ROS are negative except per HPI     PHYSICAL EXAM    ICU Vital Signs Last 24 Hrs  T(C): 37 (27 May 2021 09:28), Max: 37 (27 May 2021 09:28)  T(F): 98.6 (27 May 2021 09:28), Max: 98.6 (27 May 2021 09:28)  HR: 117 (27 May 2021 13:28) (92 - 121)  BP: 157/89 (27 May 2021 13:28) (138/78 - 157/89)  BP(mean): --  ABP: --  ABP(mean): --  RR: 20 (27 May 2021 09:28) (20 - 20)  SpO2: 97% (27 May 2021 09:28) (97% - 97%)      CONSTITUTIONAL:  chronic ill appearing    ENT:   poor dentition  Airway patent,   Mouth with normal mucosa.   No thrush    EYES:   pupils equal,   round and reactive to light.    CARDIAC:   Normal rate,   Regular rhythm.    Heart sounds S1, S2.   No edema    Vascular:   normal systolic impulse  no bruits    RESPIRATORY:   No wheezing   Normal chest expansion  No use of accessory muscles    GASTROINTESTINAL:  Abdomen soft   Non-tender,   No guarding,   + BS    GENITOURINARY  normal genitalia for sex  no edema    MUSCULOSKELETAL:   Range of motion is not limited,  Nno clubbing, cyanosis  leg swelling angel, stasis dermaitits    NEUROLOGICAL:   drowsy but arousal to physical stimuli, not tremulous  follows simple commands  a/ox2  No motor or sensory deficits.  Pertinent DTRs normal    SKIN:   Skin normal color for race,   Warm and dry  No evidence of rash.    HEME LYMPH:   No cervical  lymphadenopathy.  No inguinal lymphadenopathy        LABS:                          11.4   5.08  )-----------( 84       ( 27 May 2021 10:24 )             33.1                                               05-27    137  |  96<L>  |  4<L>  ----------------------------<  116<H>  4.6   |  17  |  1.2    Ca    8.7      27 May 2021 10:24  Mg     1.2     05-27    TPro  7.4  /  Alb  4.6  /  TBili  2.5<H>  /  DBili  x   /  AST  117<H>  /  ALT  37  /  AlkPhos  106  05-27                                                                                           LIVER FUNCTIONS - ( 27 May 2021 10:24 )  Alb: 4.6 g/dL / Pro: 7.4 g/dL / ALK PHOS: 106 U/L / ALT: 37 U/L / AST: 117 U/L / GGT: x                                                                                                                                                CXR interpreted by me:    MEDICATIONS  (STANDING):  lactated ringers Bolus 1000 milliLiter(s) IV Bolus once    MEDICATIONS  (PRN):         Patient is a 60y old  Male who presents with a chief complaint of tremors    HPI:    61 yo M poor historian, etoh abuse with multiple presentations to ed for intoxication, no hx of DTs or withdrawal seizures ( detox admission 4 years ago), HTN, CKD, doesnt take any meds, presented to ed for tremors and not feeling well. in ed was Tachycardic at presentation, not in distress, lactic acidosis, high alcohol level. ICU called to eval for withdrawals Given ativan 6, librium 25, was given also IVF with improvement in lactic acidosis    Allergies    Beef (Hives)  dairy products (Hives)  No Known Drug Allergies  shellfish (Hives)    Intolerances        folic acid 1 mg oral tablet: 1 tab(s) orally once a day (10 Rich 2020 17:33)  Norvasc 5 mg oral tablet: 1 tab(s) orally once a day (10 Rich 2020 17:33)  Protonix 40 mg oral granule, delayed release: 1 each orally once a day (10 Rich 2020 17:33)  Vitamin B-12 100 mcg oral tablet: 1 tab(s) orally once a day (10 Rich 2020 17:33)      PAST MEDICAL & SURGICAL HISTORY:  Alcohol dependence    Vertigo    Tobacco dependency    HTN (hypertension)    Hard of hearing    Gout    Seasonal allergies    CKD (chronic kidney disease)    No significant past surgical history        SOCIAL HX:     Smoking   former heavy smoker                       ETOH pint vodka per day                         FAMILY HISTORY:  Family history of gastric cancer  Mom    Family hx of lung cancer  Smoker      :  No known cardiovacular/pulmonary family hisotry     All ROS are negative except per HPI     PHYSICAL EXAM    ICU Vital Signs Last 24 Hrs  T(C): 37 (27 May 2021 09:28), Max: 37 (27 May 2021 09:28)  T(F): 98.6 (27 May 2021 09:28), Max: 98.6 (27 May 2021 09:28)  HR: 117 (27 May 2021 13:28) (92 - 121)  BP: 157/89 (27 May 2021 13:28) (138/78 - 157/89)  RR: 20 (27 May 2021 09:28) (20 - 20)  SpO2: 97% (27 May 2021 09:28) (97% - 97%)      CONSTITUTIONAL:  chronic ill appearing    ENT:   poor dentition  Airway patent,   Mouth with normal mucosa.   No thrush    EYES:   pupils equal,   round and reactive to light.    CARDIAC:   Normal rate,   Regular rhythm.    Heart sounds S1, S2.   No edema    Vascular:   normal systolic impulse  no bruits    RESPIRATORY:   No wheezing   Normal chest expansion  No use of accessory muscles    GASTROINTESTINAL:  Abdomen soft   Non-tender,   No guarding,   + BS      MUSCULOSKELETAL:   Range of motion is not limited,  Nno clubbing, cyanosis  leg swelling angel, stasis dermaitits    NEUROLOGICAL:   drowsy but arousal to physical stimuli, tremor  follows simple commands  a/ox2  No motor or sensory deficits.  Pertinent DTRs normal    SKIN:   Skin normal color for race,   Warm and dry  No evidence of rash.        LABS:                          11.4   5.08  )-----------( 84       ( 27 May 2021 10:24 )             33.1                                               05-27    137  |  96<L>  |  4<L>  ----------------------------<  116<H>  4.6   |  17  |  1.2    Ca    8.7      27 May 2021 10:24  Mg     1.2     05-27    TPro  7.4  /  Alb  4.6  /  TBili  2.5<H>  /  DBili  x   /  AST  117<H>  /  ALT  37  /  AlkPhos  106  05-27                                                                                           LIVER FUNCTIONS - ( 27 May 2021 10:24 )  Alb: 4.6 g/dL / Pro: 7.4 g/dL / ALK PHOS: 106 U/L / ALT: 37 U/L / AST: 117 U/L / GGT: x                                                                                                                                                CXR interpreted by me:    MEDICATIONS  (STANDING):  lactated ringers Bolus 1000 milliLiter(s) IV Bolus once    MEDICATIONS  (PRN):

## 2021-05-27 NOTE — ED ADULT TRIAGE NOTE - IDEAL BODY WEIGHT(KG)
May 8, 2019    Jazzy Borges  [address] [facility] [address]      Dear Jazzy Borges:    Your doctor has referred you to the [facility]. We are sending this letter to remind you to make an appointment with us to complete the referral process.     Please call us at [phone] to schedule an appointment. We look forward to seeing you soon.     If you received a call and have been scheduled, please disregard this letter.       Sincerely,        [facility]   [address] [place] [id number]  [phone]   64

## 2021-05-27 NOTE — SBIRT NOTE ADULT - NSSBIRTBRIEFINTDET_GEN_A_CORE
Provided SBIRT services:  Full Screen Positive. Brief Intervention Performed. Screening results were reviewed with the patient and patient was provided information about healthy guidelines and potential negative consequences associated with level of risk. Motivation and readiness to reduce or stop use was discussed and goals and activities to make changes were suggested/offered.     Provided SBIRT services: Full screen positive. Referral to Treatment attempted. Screening results were reviewed with the patient and patient was provided information about healthy guidelines and potential negative consequences associated with level of risk. Motivation and readiness to reduce or stop use was discussed and goals and activities to make changes were suggested/offered.   Options discussed for further evaluation and treatment, but referral to treatment was not completed because patient requires medical care. Pt ambivalent about substance use treatment. Requested [facility] CATCH meet with pt and reeval during admission.

## 2021-05-27 NOTE — H&P ADULT - NSHPPHYSICALEXAM_GEN_ALL_CORE
CONSTITUTIONAL: No acute distress, although  AAOx3 but drowsy  HEAD: Atraumatic, normocephalic  EYES: EOM intact, PERRLA, conjunctiva and sclera clear  ENT: Supple, no masses, no thyromegaly, no bruits, no JVD; moist mucous membranes  PULMONARY: Clear to auscultation bilaterally; no wheezes, rales, or rhonchi  CARDIOVASCULAR: Regular rate and rhythm; no murmurs, rubs, or gallops  GASTROINTESTINAL: Soft, non-tender, non-distended; bowel sounds present  MUSCULOSKELETAL: 2+ peripheral pulses; no clubbing, no cyanosis, no edema  NEUROLOGY: non-focal  SKIN: No rashes or lesions; warm and dry

## 2021-05-28 LAB
ALBUMIN SERPL ELPH-MCNC: 4.2 G/DL — SIGNIFICANT CHANGE UP (ref 3.5–5.2)
ALBUMIN SERPL ELPH-MCNC: 4.4 G/DL — SIGNIFICANT CHANGE UP (ref 3.5–5.2)
ALP SERPL-CCNC: 102 U/L — SIGNIFICANT CHANGE UP (ref 30–115)
ALP SERPL-CCNC: 98 U/L — SIGNIFICANT CHANGE UP (ref 30–115)
ALT FLD-CCNC: 27 U/L — SIGNIFICANT CHANGE UP (ref 0–41)
ALT FLD-CCNC: 28 U/L — SIGNIFICANT CHANGE UP (ref 0–41)
ANION GAP SERPL CALC-SCNC: 13 MMOL/L — SIGNIFICANT CHANGE UP (ref 7–14)
ANION GAP SERPL CALC-SCNC: 14 MMOL/L — SIGNIFICANT CHANGE UP (ref 7–14)
ANION GAP SERPL CALC-SCNC: 18 MMOL/L — HIGH (ref 7–14)
APTT BLD: 35.6 SEC — SIGNIFICANT CHANGE UP (ref 27–39.2)
AST SERPL-CCNC: 63 U/L — HIGH (ref 0–41)
AST SERPL-CCNC: 64 U/L — HIGH (ref 0–41)
BASOPHILS # BLD AUTO: 0.02 K/UL — SIGNIFICANT CHANGE UP (ref 0–0.2)
BASOPHILS # BLD AUTO: 0.04 K/UL — SIGNIFICANT CHANGE UP (ref 0–0.2)
BASOPHILS NFR BLD AUTO: 0.5 % — SIGNIFICANT CHANGE UP (ref 0–1)
BASOPHILS NFR BLD AUTO: 1 % — SIGNIFICANT CHANGE UP (ref 0–1)
BILIRUB SERPL-MCNC: 3.8 MG/DL — HIGH (ref 0.2–1.2)
BILIRUB SERPL-MCNC: 4.1 MG/DL — HIGH (ref 0.2–1.2)
BUN SERPL-MCNC: 5 MG/DL — LOW (ref 10–20)
BUN SERPL-MCNC: 6 MG/DL — LOW (ref 10–20)
BUN SERPL-MCNC: 6 MG/DL — LOW (ref 10–20)
CALCIUM SERPL-MCNC: 8.8 MG/DL — SIGNIFICANT CHANGE UP (ref 8.5–10.1)
CALCIUM SERPL-MCNC: 8.8 MG/DL — SIGNIFICANT CHANGE UP (ref 8.5–10.1)
CALCIUM SERPL-MCNC: 9.2 MG/DL — SIGNIFICANT CHANGE UP (ref 8.5–10.1)
CHLORIDE SERPL-SCNC: 95 MMOL/L — LOW (ref 98–110)
CHLORIDE SERPL-SCNC: 96 MMOL/L — LOW (ref 98–110)
CHLORIDE SERPL-SCNC: 96 MMOL/L — LOW (ref 98–110)
CO2 SERPL-SCNC: 24 MMOL/L — SIGNIFICANT CHANGE UP (ref 17–32)
CO2 SERPL-SCNC: 27 MMOL/L — SIGNIFICANT CHANGE UP (ref 17–32)
CO2 SERPL-SCNC: 27 MMOL/L — SIGNIFICANT CHANGE UP (ref 17–32)
CREAT SERPL-MCNC: 0.9 MG/DL — SIGNIFICANT CHANGE UP (ref 0.7–1.5)
EOSINOPHIL # BLD AUTO: 0.01 K/UL — SIGNIFICANT CHANGE UP (ref 0–0.7)
EOSINOPHIL # BLD AUTO: 0.02 K/UL — SIGNIFICANT CHANGE UP (ref 0–0.7)
EOSINOPHIL NFR BLD AUTO: 0.3 % — SIGNIFICANT CHANGE UP (ref 0–8)
EOSINOPHIL NFR BLD AUTO: 0.5 % — SIGNIFICANT CHANGE UP (ref 0–8)
GLUCOSE SERPL-MCNC: 109 MG/DL — HIGH (ref 70–99)
GLUCOSE SERPL-MCNC: 86 MG/DL — SIGNIFICANT CHANGE UP (ref 70–99)
GLUCOSE SERPL-MCNC: 87 MG/DL — SIGNIFICANT CHANGE UP (ref 70–99)
HCT VFR BLD CALC: 35.4 % — LOW (ref 42–52)
HCT VFR BLD CALC: 35.6 % — LOW (ref 42–52)
HGB BLD-MCNC: 12.5 G/DL — LOW (ref 14–18)
HGB BLD-MCNC: 12.6 G/DL — LOW (ref 14–18)
IMM GRANULOCYTES NFR BLD AUTO: 0.2 % — SIGNIFICANT CHANGE UP (ref 0.1–0.3)
IMM GRANULOCYTES NFR BLD AUTO: 0.5 % — HIGH (ref 0.1–0.3)
INR BLD: 1.2 RATIO — SIGNIFICANT CHANGE UP (ref 0.65–1.3)
LACTATE SERPL-SCNC: 1.3 MMOL/L — SIGNIFICANT CHANGE UP (ref 0.7–2)
LYMPHOCYTES # BLD AUTO: 0.79 K/UL — LOW (ref 1.2–3.4)
LYMPHOCYTES # BLD AUTO: 0.81 K/UL — LOW (ref 1.2–3.4)
LYMPHOCYTES # BLD AUTO: 18.9 % — LOW (ref 20.5–51.1)
LYMPHOCYTES # BLD AUTO: 20.3 % — LOW (ref 20.5–51.1)
MAGNESIUM SERPL-MCNC: 1.7 MG/DL — LOW (ref 1.8–2.4)
MCHC RBC-ENTMCNC: 34.8 PG — HIGH (ref 27–31)
MCHC RBC-ENTMCNC: 35.1 G/DL — SIGNIFICANT CHANGE UP (ref 32–37)
MCHC RBC-ENTMCNC: 35.1 PG — HIGH (ref 27–31)
MCHC RBC-ENTMCNC: 35.6 G/DL — SIGNIFICANT CHANGE UP (ref 32–37)
MCV RBC AUTO: 98.6 FL — HIGH (ref 80–94)
MCV RBC AUTO: 99.2 FL — HIGH (ref 80–94)
MONOCYTES # BLD AUTO: 0.2 K/UL — SIGNIFICANT CHANGE UP (ref 0.1–0.6)
MONOCYTES # BLD AUTO: 0.21 K/UL — SIGNIFICANT CHANGE UP (ref 0.1–0.6)
MONOCYTES NFR BLD AUTO: 5 % — SIGNIFICANT CHANGE UP (ref 1.7–9.3)
MONOCYTES NFR BLD AUTO: 5 % — SIGNIFICANT CHANGE UP (ref 1.7–9.3)
NEUTROPHILS # BLD AUTO: 2.93 K/UL — SIGNIFICANT CHANGE UP (ref 1.4–6.5)
NEUTROPHILS # BLD AUTO: 3.1 K/UL — SIGNIFICANT CHANGE UP (ref 1.4–6.5)
NEUTROPHILS NFR BLD AUTO: 73.4 % — SIGNIFICANT CHANGE UP (ref 42.2–75.2)
NEUTROPHILS NFR BLD AUTO: 74.4 % — SIGNIFICANT CHANGE UP (ref 42.2–75.2)
NRBC # BLD: 0 /100 WBCS — SIGNIFICANT CHANGE UP (ref 0–0)
NRBC # BLD: 0 /100 WBCS — SIGNIFICANT CHANGE UP (ref 0–0)
PHOSPHATE SERPL-MCNC: 2 MG/DL — LOW (ref 2.1–4.9)
PHOSPHATE SERPL-MCNC: 2.1 MG/DL — SIGNIFICANT CHANGE UP (ref 2.1–4.9)
PLATELET # BLD AUTO: 62 K/UL — LOW (ref 130–400)
PLATELET # BLD AUTO: 68 K/UL — LOW (ref 130–400)
POTASSIUM SERPL-MCNC: 3.3 MMOL/L — LOW (ref 3.5–5)
POTASSIUM SERPL-MCNC: 3.4 MMOL/L — LOW (ref 3.5–5)
POTASSIUM SERPL-MCNC: 3.5 MMOL/L — SIGNIFICANT CHANGE UP (ref 3.5–5)
POTASSIUM SERPL-SCNC: 3.3 MMOL/L — LOW (ref 3.5–5)
POTASSIUM SERPL-SCNC: 3.4 MMOL/L — LOW (ref 3.5–5)
POTASSIUM SERPL-SCNC: 3.5 MMOL/L — SIGNIFICANT CHANGE UP (ref 3.5–5)
PROT SERPL-MCNC: 6.8 G/DL — SIGNIFICANT CHANGE UP (ref 6–8)
PROT SERPL-MCNC: 6.9 G/DL — SIGNIFICANT CHANGE UP (ref 6–8)
PROTHROM AB SERPL-ACNC: 13.8 SEC — HIGH (ref 9.95–12.87)
RBC # BLD: 3.59 M/UL — LOW (ref 4.7–6.1)
RBC # BLD: 3.59 M/UL — LOW (ref 4.7–6.1)
RBC # FLD: 14.6 % — HIGH (ref 11.5–14.5)
RBC # FLD: 14.7 % — HIGH (ref 11.5–14.5)
SODIUM SERPL-SCNC: 135 MMOL/L — SIGNIFICANT CHANGE UP (ref 135–146)
SODIUM SERPL-SCNC: 137 MMOL/L — SIGNIFICANT CHANGE UP (ref 135–146)
SODIUM SERPL-SCNC: 138 MMOL/L — SIGNIFICANT CHANGE UP (ref 135–146)
WBC # BLD: 3.99 K/UL — LOW (ref 4.8–10.8)
WBC # BLD: 4.17 K/UL — LOW (ref 4.8–10.8)
WBC # FLD AUTO: 3.99 K/UL — LOW (ref 4.8–10.8)
WBC # FLD AUTO: 4.17 K/UL — LOW (ref 4.8–10.8)

## 2021-05-28 PROCEDURE — 76705 ECHO EXAM OF ABDOMEN: CPT | Mod: 26

## 2021-05-28 PROCEDURE — 99232 SBSQ HOSP IP/OBS MODERATE 35: CPT

## 2021-05-28 RX ORDER — MAGNESIUM SULFATE 500 MG/ML
2 VIAL (ML) INJECTION EVERY 4 HOURS
Refills: 0 | Status: COMPLETED | OUTPATIENT
Start: 2021-05-28 | End: 2021-05-28

## 2021-05-28 RX ORDER — POTASSIUM CHLORIDE 20 MEQ
40 PACKET (EA) ORAL ONCE
Refills: 0 | Status: COMPLETED | OUTPATIENT
Start: 2021-05-28 | End: 2021-05-28

## 2021-05-28 RX ORDER — SODIUM CHLORIDE 9 MG/ML
1000 INJECTION, SOLUTION INTRAVENOUS
Refills: 0 | Status: DISCONTINUED | OUTPATIENT
Start: 2021-05-28 | End: 2021-06-01

## 2021-05-28 RX ORDER — POTASSIUM CHLORIDE 20 MEQ
20 PACKET (EA) ORAL DAILY
Refills: 0 | Status: DISCONTINUED | OUTPATIENT
Start: 2021-05-28 | End: 2021-06-03

## 2021-05-28 RX ADMIN — Medication 20 MILLIEQUIVALENT(S): at 11:30

## 2021-05-28 RX ADMIN — Medication 1 MILLIGRAM(S): at 11:30

## 2021-05-28 RX ADMIN — Medication 3 MILLIGRAM(S): at 09:35

## 2021-05-28 RX ADMIN — TAMSULOSIN HYDROCHLORIDE 0.4 MILLIGRAM(S): 0.4 CAPSULE ORAL at 22:02

## 2021-05-28 RX ADMIN — Medication 25 GRAM(S): at 15:25

## 2021-05-28 RX ADMIN — Medication 2 MILLIGRAM(S): at 22:02

## 2021-05-28 RX ADMIN — Medication 105 MILLIGRAM(S): at 12:30

## 2021-05-28 RX ADMIN — SODIUM CHLORIDE 75 MILLILITER(S): 9 INJECTION, SOLUTION INTRAVENOUS at 22:06

## 2021-05-28 RX ADMIN — Medication 2 MILLIGRAM(S): at 15:39

## 2021-05-28 RX ADMIN — HEPARIN SODIUM 5000 UNIT(S): 5000 INJECTION INTRAVENOUS; SUBCUTANEOUS at 17:05

## 2021-05-28 RX ADMIN — AMLODIPINE BESYLATE 5 MILLIGRAM(S): 2.5 TABLET ORAL at 06:20

## 2021-05-28 RX ADMIN — HEPARIN SODIUM 5000 UNIT(S): 5000 INJECTION INTRAVENOUS; SUBCUTANEOUS at 06:21

## 2021-05-28 RX ADMIN — Medication 3 MILLIGRAM(S): at 01:15

## 2021-05-28 RX ADMIN — SODIUM CHLORIDE 75 MILLILITER(S): 9 INJECTION, SOLUTION INTRAVENOUS at 11:12

## 2021-05-28 RX ADMIN — Medication 40 MILLIEQUIVALENT(S): at 12:01

## 2021-05-28 RX ADMIN — SODIUM CHLORIDE 125 MILLILITER(S): 9 INJECTION, SOLUTION INTRAVENOUS at 06:21

## 2021-05-28 RX ADMIN — Medication 25 GRAM(S): at 17:05

## 2021-05-28 RX ADMIN — Medication 2 MILLIGRAM(S): at 18:00

## 2021-05-28 RX ADMIN — Medication 3 MILLIGRAM(S): at 06:20

## 2021-05-28 NOTE — CHART NOTE - NSCHARTNOTEFT_GEN_A_CORE
Transfer Note    Transfer from: [facility]  Transfer to:  (X) Medicine    (  ) Telemetry    (  ) RCU    (  ) Palliative    (  ) Stroke Unit    (  ) _______________  Accepting physician:      Breanne COURSE:  Patient admitted for [facility] unit for ETOH withdrawal w/ CIWA score of 16. Continued on CIWA protocol w/ Ativan taper. CIWA score decreased to 7 this AM. Patient stable for d/g to medical floor.    ASSESSMENT & PLAN:   60 yr M with  ETOH Abuse, HTN, CKD III and gout presented to ER for tremors    #Alcohol Intoxication  - c/w CIWA protocol with Ativan IV taper  - c/w LR @ 75cc/hr  - Seizure and fall precaution  - Addiction meds consult    #HAGMA due to alcohol intoxication  - c/w IVF    #HTN - controlled  - c/w Norvasc    #urinary retention  - Likely due to BPH  - c/w Flomax    #Chronic Thrombocytopenia  - At baseline, likely from alcohol induce BM suppression VS cirrhosis    #elevated LFTs and bili: suspected underlying alcoholic cirrhosis VS hepatic steatohepatitis  - RUQ: Diffuse hepatic steatosis. Gallbladder sludge and punctate cholelithiasis  - /AST 63/ALT 27  - INR 1.2  - Trend LFTs    #CKD3: GFR at baseline    #Suspected folate and thiamine def  - c/w Thiamine and Folate supplements     #Hx of Gout     #Diet: CLL for now  #DVT pro: heparin sq  #GI pro: not indicated  #Dispo: SDU        For Follow-Up:          Vital Signs Last 24 Hrs  T(C): 36.2 (28 May 2021 08:00), Max: 36.6 (28 May 2021 04:00)  T(F): 97.2 (28 May 2021 08:00), Max: 97.9 (28 May 2021 04:00)  HR: 90 (28 May 2021 12:00) (81 - 106)  BP: 143/74 (28 May 2021 12:00) (128/72 - 144/80)  BP(mean): 101 (28 May 2021 12:00) (95 - 106)  RR: 18 (28 May 2021 12:00) (18 - 20)  SpO2: 99% (28 May 2021 12:00) (96% - 99%)  I&O's Summary    27 May 2021 07:01  -  28 May 2021 07:00  --------------------------------------------------------  IN: 0 mL / OUT: 800 mL / NET: -800 mL    28 May 2021 07:01  -  28 May 2021 13:59  --------------------------------------------------------  IN: 1195 mL / OUT: 0 mL / NET: 1195 mL          MEDICATIONS  (STANDING):  amLODIPine   Tablet 5 milliGRAM(s) Oral daily  folic acid 1 milliGRAM(s) Oral daily  heparin   Injectable 5000 Unit(s) SubCutaneous every 12 hours  lactated ringers. 1000 milliLiter(s) (75 mL/Hr) IV Continuous <Continuous>  LORazepam   Injectable   IV Push   LORazepam   Injectable 2 milliGRAM(s) IV Push every 4 hours  magnesium sulfate  IVPB 2 Gram(s) IV Intermittent every 4 hours  potassium chloride    Tablet ER 20 milliEquivalent(s) Oral daily  tamsulosin 0.4 milliGRAM(s) Oral at bedtime  thiamine IVPB 500 milliGRAM(s) IV Intermittent daily    MEDICATIONS  (PRN):        LABS                                            12.6                  Neurophils% (auto):   73.4   (05-28 @ 12:00):    3.99 )-----------(62           Lymphocytes% (auto):  20.3                                          35.4                   Eosinphils% (auto):   0.3      Manual%: Neutrophils x    ; Lymphocytes x    ; Eosinophils x    ; Bands%: x    ; Blasts x                                    135    |  95     |  5                   Calcium: 8.8   / iCa: x      (05-28 @ 12:00)    ----------------------------<  109       Magnesium: 1.7                              3.3     |  27     |  0.9              Phosphorous: x        TPro  6.8    /  Alb  4.2    /  TBili  3.8    /  DBili  x      /  AST  63     /  ALT  27     /  AlkPhos  102    28 May 2021 12:00    ( 05-28 @ 07:44 )   PT: 13.80 sec;   INR: 1.20 ratio  aPTT: 35.6 sec

## 2021-05-29 LAB
ALBUMIN SERPL ELPH-MCNC: 3.8 G/DL — SIGNIFICANT CHANGE UP (ref 3.5–5.2)
ALBUMIN SERPL ELPH-MCNC: 4.1 G/DL — SIGNIFICANT CHANGE UP (ref 3.5–5.2)
ALP SERPL-CCNC: 80 U/L — SIGNIFICANT CHANGE UP (ref 30–115)
ALP SERPL-CCNC: 89 U/L — SIGNIFICANT CHANGE UP (ref 30–115)
ALT FLD-CCNC: 23 U/L — SIGNIFICANT CHANGE UP (ref 0–41)
ALT FLD-CCNC: 28 U/L — SIGNIFICANT CHANGE UP (ref 0–41)
ANION GAP SERPL CALC-SCNC: 12 MMOL/L — SIGNIFICANT CHANGE UP (ref 7–14)
ANION GAP SERPL CALC-SCNC: 12 MMOL/L — SIGNIFICANT CHANGE UP (ref 7–14)
AST SERPL-CCNC: 67 U/L — HIGH (ref 0–41)
AST SERPL-CCNC: 73 U/L — HIGH (ref 0–41)
BASOPHILS # BLD AUTO: 0.03 K/UL — SIGNIFICANT CHANGE UP (ref 0–0.2)
BASOPHILS NFR BLD AUTO: 0.9 % — SIGNIFICANT CHANGE UP (ref 0–1)
BILIRUB SERPL-MCNC: 2.1 MG/DL — HIGH (ref 0.2–1.2)
BILIRUB SERPL-MCNC: 2.7 MG/DL — HIGH (ref 0.2–1.2)
BUN SERPL-MCNC: 3 MG/DL — LOW (ref 10–20)
BUN SERPL-MCNC: 4 MG/DL — LOW (ref 10–20)
CALCIUM SERPL-MCNC: 8.7 MG/DL — SIGNIFICANT CHANGE UP (ref 8.5–10.1)
CALCIUM SERPL-MCNC: 8.7 MG/DL — SIGNIFICANT CHANGE UP (ref 8.5–10.1)
CHLORIDE SERPL-SCNC: 100 MMOL/L — SIGNIFICANT CHANGE UP (ref 98–110)
CHLORIDE SERPL-SCNC: 99 MMOL/L — SIGNIFICANT CHANGE UP (ref 98–110)
CO2 SERPL-SCNC: 24 MMOL/L — SIGNIFICANT CHANGE UP (ref 17–32)
CO2 SERPL-SCNC: 25 MMOL/L — SIGNIFICANT CHANGE UP (ref 17–32)
COVID-19 SPIKE DOMAIN AB INTERP: POSITIVE
COVID-19 SPIKE DOMAIN ANTIBODY RESULT: >250 U/ML — HIGH
CREAT SERPL-MCNC: 0.8 MG/DL — SIGNIFICANT CHANGE UP (ref 0.7–1.5)
CREAT SERPL-MCNC: 0.9 MG/DL — SIGNIFICANT CHANGE UP (ref 0.7–1.5)
EOSINOPHIL # BLD AUTO: 0.05 K/UL — SIGNIFICANT CHANGE UP (ref 0–0.7)
EOSINOPHIL NFR BLD AUTO: 1.6 % — SIGNIFICANT CHANGE UP (ref 0–8)
GLUCOSE SERPL-MCNC: 113 MG/DL — HIGH (ref 70–99)
GLUCOSE SERPL-MCNC: 118 MG/DL — HIGH (ref 70–99)
HCT VFR BLD CALC: 32.2 % — LOW (ref 42–52)
HGB BLD-MCNC: 11.4 G/DL — LOW (ref 14–18)
IMM GRANULOCYTES NFR BLD AUTO: 0.3 % — SIGNIFICANT CHANGE UP (ref 0.1–0.3)
INR BLD: 1.24 RATIO — SIGNIFICANT CHANGE UP (ref 0.65–1.3)
LYMPHOCYTES # BLD AUTO: 0.59 K/UL — LOW (ref 1.2–3.4)
LYMPHOCYTES # BLD AUTO: 18.6 % — LOW (ref 20.5–51.1)
MAGNESIUM SERPL-MCNC: 2.1 MG/DL — SIGNIFICANT CHANGE UP (ref 1.8–2.4)
MCHC RBC-ENTMCNC: 35.4 G/DL — SIGNIFICANT CHANGE UP (ref 32–37)
MCHC RBC-ENTMCNC: 35.5 PG — HIGH (ref 27–31)
MCV RBC AUTO: 100.3 FL — HIGH (ref 80–94)
MONOCYTES # BLD AUTO: 0.19 K/UL — SIGNIFICANT CHANGE UP (ref 0.1–0.6)
MONOCYTES NFR BLD AUTO: 6 % — SIGNIFICANT CHANGE UP (ref 1.7–9.3)
NEUTROPHILS # BLD AUTO: 2.31 K/UL — SIGNIFICANT CHANGE UP (ref 1.4–6.5)
NEUTROPHILS NFR BLD AUTO: 72.6 % — SIGNIFICANT CHANGE UP (ref 42.2–75.2)
NRBC # BLD: 0 /100 WBCS — SIGNIFICANT CHANGE UP (ref 0–0)
PHOSPHATE SERPL-MCNC: 1.8 MG/DL — LOW (ref 2.1–4.9)
PLATELET # BLD AUTO: 54 K/UL — LOW (ref 130–400)
POTASSIUM SERPL-MCNC: 3.4 MMOL/L — LOW (ref 3.5–5)
POTASSIUM SERPL-MCNC: 4 MMOL/L — SIGNIFICANT CHANGE UP (ref 3.5–5)
POTASSIUM SERPL-SCNC: 3.4 MMOL/L — LOW (ref 3.5–5)
POTASSIUM SERPL-SCNC: 4 MMOL/L — SIGNIFICANT CHANGE UP (ref 3.5–5)
PROT SERPL-MCNC: 6.1 G/DL — SIGNIFICANT CHANGE UP (ref 6–8)
PROT SERPL-MCNC: 6.2 G/DL — SIGNIFICANT CHANGE UP (ref 6–8)
PROTHROM AB SERPL-ACNC: 14.3 SEC — HIGH (ref 9.95–12.87)
RBC # BLD: 3.21 M/UL — LOW (ref 4.7–6.1)
RBC # FLD: 14.7 % — HIGH (ref 11.5–14.5)
SARS-COV-2 IGG+IGM SERPL QL IA: >250 U/ML — HIGH
SARS-COV-2 IGG+IGM SERPL QL IA: POSITIVE
SODIUM SERPL-SCNC: 135 MMOL/L — SIGNIFICANT CHANGE UP (ref 135–146)
SODIUM SERPL-SCNC: 137 MMOL/L — SIGNIFICANT CHANGE UP (ref 135–146)
WBC # BLD: 3.18 K/UL — LOW (ref 4.8–10.8)
WBC # FLD AUTO: 3.18 K/UL — LOW (ref 4.8–10.8)

## 2021-05-29 PROCEDURE — 99232 SBSQ HOSP IP/OBS MODERATE 35: CPT

## 2021-05-29 PROCEDURE — 71045 X-RAY EXAM CHEST 1 VIEW: CPT | Mod: 26

## 2021-05-29 RX ORDER — POTASSIUM PHOSPHATE, MONOBASIC POTASSIUM PHOSPHATE, DIBASIC 236; 224 MG/ML; MG/ML
30 INJECTION, SOLUTION INTRAVENOUS ONCE
Refills: 0 | Status: COMPLETED | OUTPATIENT
Start: 2021-05-29 | End: 2021-05-29

## 2021-05-29 RX ADMIN — Medication 2 MILLIGRAM(S): at 02:16

## 2021-05-29 RX ADMIN — AMLODIPINE BESYLATE 5 MILLIGRAM(S): 2.5 TABLET ORAL at 05:35

## 2021-05-29 RX ADMIN — Medication 1 MILLIGRAM(S): at 14:47

## 2021-05-29 RX ADMIN — HEPARIN SODIUM 5000 UNIT(S): 5000 INJECTION INTRAVENOUS; SUBCUTANEOUS at 05:35

## 2021-05-29 RX ADMIN — Medication 105 MILLIGRAM(S): at 17:45

## 2021-05-29 RX ADMIN — Medication 20 MILLIEQUIVALENT(S): at 13:07

## 2021-05-29 RX ADMIN — Medication 1 MILLIGRAM(S): at 17:44

## 2021-05-29 RX ADMIN — Medication 2 MILLIGRAM(S): at 10:41

## 2021-05-29 RX ADMIN — TAMSULOSIN HYDROCHLORIDE 0.4 MILLIGRAM(S): 0.4 CAPSULE ORAL at 21:28

## 2021-05-29 RX ADMIN — SODIUM CHLORIDE 75 MILLILITER(S): 9 INJECTION, SOLUTION INTRAVENOUS at 18:45

## 2021-05-29 RX ADMIN — HEPARIN SODIUM 5000 UNIT(S): 5000 INJECTION INTRAVENOUS; SUBCUTANEOUS at 17:45

## 2021-05-29 RX ADMIN — Medication 2 MILLIGRAM(S): at 05:40

## 2021-05-29 RX ADMIN — Medication 1 MILLIGRAM(S): at 21:28

## 2021-05-29 RX ADMIN — POTASSIUM PHOSPHATE, MONOBASIC POTASSIUM PHOSPHATE, DIBASIC 83.33 MILLIMOLE(S): 236; 224 INJECTION, SOLUTION INTRAVENOUS at 19:38

## 2021-05-29 RX ADMIN — Medication 1 MILLIGRAM(S): at 13:07

## 2021-05-30 LAB
ALBUMIN SERPL ELPH-MCNC: 4.2 G/DL — SIGNIFICANT CHANGE UP (ref 3.5–5.2)
ALP SERPL-CCNC: 84 U/L — SIGNIFICANT CHANGE UP (ref 30–115)
ALT FLD-CCNC: 26 U/L — SIGNIFICANT CHANGE UP (ref 0–41)
ANION GAP SERPL CALC-SCNC: 11 MMOL/L — SIGNIFICANT CHANGE UP (ref 7–14)
AST SERPL-CCNC: 63 U/L — HIGH (ref 0–41)
BASOPHILS # BLD AUTO: 0.03 K/UL — SIGNIFICANT CHANGE UP (ref 0–0.2)
BASOPHILS NFR BLD AUTO: 0.6 % — SIGNIFICANT CHANGE UP (ref 0–1)
BILIRUB SERPL-MCNC: 2.6 MG/DL — HIGH (ref 0.2–1.2)
BUN SERPL-MCNC: 3 MG/DL — LOW (ref 10–20)
CALCIUM SERPL-MCNC: 9.4 MG/DL — SIGNIFICANT CHANGE UP (ref 8.5–10.1)
CHLORIDE SERPL-SCNC: 98 MMOL/L — SIGNIFICANT CHANGE UP (ref 98–110)
CO2 SERPL-SCNC: 26 MMOL/L — SIGNIFICANT CHANGE UP (ref 17–32)
CREAT SERPL-MCNC: 0.8 MG/DL — SIGNIFICANT CHANGE UP (ref 0.7–1.5)
EOSINOPHIL # BLD AUTO: 0.07 K/UL — SIGNIFICANT CHANGE UP (ref 0–0.7)
EOSINOPHIL NFR BLD AUTO: 1.3 % — SIGNIFICANT CHANGE UP (ref 0–8)
GLUCOSE SERPL-MCNC: 119 MG/DL — HIGH (ref 70–99)
HCT VFR BLD CALC: 34.6 % — LOW (ref 42–52)
HGB BLD-MCNC: 12.4 G/DL — LOW (ref 14–18)
IMM GRANULOCYTES NFR BLD AUTO: 0.2 % — SIGNIFICANT CHANGE UP (ref 0.1–0.3)
LYMPHOCYTES # BLD AUTO: 1.02 K/UL — LOW (ref 1.2–3.4)
LYMPHOCYTES # BLD AUTO: 19.6 % — LOW (ref 20.5–51.1)
MAGNESIUM SERPL-MCNC: 1.8 MG/DL — SIGNIFICANT CHANGE UP (ref 1.8–2.4)
MCHC RBC-ENTMCNC: 35.8 G/DL — SIGNIFICANT CHANGE UP (ref 32–37)
MCHC RBC-ENTMCNC: 36.4 PG — HIGH (ref 27–31)
MCV RBC AUTO: 101.5 FL — HIGH (ref 80–94)
MONOCYTES # BLD AUTO: 0.42 K/UL — SIGNIFICANT CHANGE UP (ref 0.1–0.6)
MONOCYTES NFR BLD AUTO: 8.1 % — SIGNIFICANT CHANGE UP (ref 1.7–9.3)
NEUTROPHILS # BLD AUTO: 3.65 K/UL — SIGNIFICANT CHANGE UP (ref 1.4–6.5)
NEUTROPHILS NFR BLD AUTO: 70.2 % — SIGNIFICANT CHANGE UP (ref 42.2–75.2)
NRBC # BLD: 0 /100 WBCS — SIGNIFICANT CHANGE UP (ref 0–0)
PHOSPHATE SERPL-MCNC: 3.2 MG/DL — SIGNIFICANT CHANGE UP (ref 2.1–4.9)
PLATELET # BLD AUTO: 74 K/UL — LOW (ref 130–400)
POTASSIUM SERPL-MCNC: 4.3 MMOL/L — SIGNIFICANT CHANGE UP (ref 3.5–5)
POTASSIUM SERPL-SCNC: 4.3 MMOL/L — SIGNIFICANT CHANGE UP (ref 3.5–5)
PROT SERPL-MCNC: 6.7 G/DL — SIGNIFICANT CHANGE UP (ref 6–8)
RBC # BLD: 3.41 M/UL — LOW (ref 4.7–6.1)
RBC # FLD: 14.8 % — HIGH (ref 11.5–14.5)
SODIUM SERPL-SCNC: 135 MMOL/L — SIGNIFICANT CHANGE UP (ref 135–146)
WBC # BLD: 5.2 K/UL — SIGNIFICANT CHANGE UP (ref 4.8–10.8)
WBC # FLD AUTO: 5.2 K/UL — SIGNIFICANT CHANGE UP (ref 4.8–10.8)

## 2021-05-30 PROCEDURE — 73080 X-RAY EXAM OF ELBOW: CPT | Mod: 26,RT

## 2021-05-30 RX ORDER — IBUPROFEN 200 MG
600 TABLET ORAL EVERY 8 HOURS
Refills: 0 | Status: DISCONTINUED | OUTPATIENT
Start: 2021-05-30 | End: 2021-06-03

## 2021-05-30 RX ADMIN — AMLODIPINE BESYLATE 5 MILLIGRAM(S): 2.5 TABLET ORAL at 05:07

## 2021-05-30 RX ADMIN — Medication 40 MILLIGRAM(S): at 16:48

## 2021-05-30 RX ADMIN — Medication 0.5 MILLIGRAM(S): at 14:19

## 2021-05-30 RX ADMIN — Medication 0.5 MILLIGRAM(S): at 17:38

## 2021-05-30 RX ADMIN — Medication 0.5 MILLIGRAM(S): at 22:03

## 2021-05-30 RX ADMIN — HEPARIN SODIUM 5000 UNIT(S): 5000 INJECTION INTRAVENOUS; SUBCUTANEOUS at 17:38

## 2021-05-30 RX ADMIN — TAMSULOSIN HYDROCHLORIDE 0.4 MILLIGRAM(S): 0.4 CAPSULE ORAL at 22:03

## 2021-05-30 RX ADMIN — Medication 105 MILLIGRAM(S): at 11:15

## 2021-05-30 RX ADMIN — Medication 600 MILLIGRAM(S): at 17:38

## 2021-05-30 RX ADMIN — HEPARIN SODIUM 5000 UNIT(S): 5000 INJECTION INTRAVENOUS; SUBCUTANEOUS at 05:08

## 2021-05-30 RX ADMIN — Medication 1 MILLIGRAM(S): at 10:41

## 2021-05-30 RX ADMIN — Medication 1 MILLIGRAM(S): at 11:15

## 2021-05-30 RX ADMIN — Medication 20 MILLIEQUIVALENT(S): at 11:15

## 2021-05-30 RX ADMIN — Medication 1 MILLIGRAM(S): at 05:07

## 2021-05-30 RX ADMIN — Medication 600 MILLIGRAM(S): at 18:08

## 2021-05-30 NOTE — CHART NOTE - NSCHARTNOTEFT_GEN_A_CORE
"Subjective:       Patient ID: Yao Alan is a 43 y.o. male.    Chief Complaint: Malignant neoplasm of upper lobe of right lung  Oncologic History:  Mr. Yao Alan is a 42-year-old male who has a long history of smoking and was seen in the Pulmonary Clinic by Dr. Valle on 03/05/2015 with abnormal CT scan.    Apparently, he went to the [facility] in February with coughing as well as chest pain. A chest x-ray was done, which revealed a left upper lobe lung mass. Followup CT scan was done on 02/12/2015 and that revealed posterior superior mediastinal mass adjacent and prominent enlarged upper left mediastinal lymph nodes, abutting the aortic arch as well as left subclavian artery. A  CT scan of the abdomen was done on 03/03/2015 without contrast and that revealed nonobstructive nephrolithiasis, but a 2.1 cm lytic lesion involving the left iliac wing concerning for metastatic disease given the presence of emphysema and a mediastinal soft tissue mass on the CT chest from 02/12/2015. He saw Dr. Valle after that on 03/05/2015 and underwent an IR guided biopsy of the bone lesion on 03/17/2015. Pathology from that revealed high-grade adenocarcinoma, most likely of lung origin.    His EGFR/EML/ALK is negative.    His PET scan on 3/25/15 revealed Left upper lobe lung lesion with an adjacent para-aortic lymph node, right anterior rib metastasis, right iliac metastasis, and pancreatic metastasis. A pancreatic cancer primary neoplasm is less likely.  MRI brain was negative for mets.  He completed 6 cycles of Carboplatin and Alimta and was on maintenance Alimta until end of March 2016.  His bone scan from 3/16 revealed stable disease. His CT scans also from end of March 2016 revealed "Interval enlargement of left upper lobe lung mass measuring 5.9 x 3.9 cm (previously 3.3 x 2.5 cm). This mass appears to invade the mediastinum and abutting the aortic arch and left subclavian artery"     He is now on Opdivo since March " Patient noted to have severe right elbow pain  Examined patient at bedside, elbow appears to be hot, mildly swollen, no tenderness upon palpation. But patient has significant pain with extension of right elbow.  Given patient's hx of gout and admission for alcohol withdrawal, suspicion for Acute gouty flare is high.  Xray of right elbow, NSAIDS for pain, start prednisone for 5 days   Re-assess pain 2016                HPI Mr. Alan returns for follow up and Opdivo. He notes tooth pain and associated headache  He denies any nausea, vomiting, diarrhea, constipation, abdominal pain, weight loss or loss of appetite, chest pain, shortness of breath, leg swelling, fatigue, pain, headache, dizziness, or mood changes. His ECOG PS is zero. He is by himself          Review of Systems   Constitutional: Negative for appetite change, fatigue and unexpected weight change.   HENT: Negative for mouth sores.    Eyes: Negative for visual disturbance.   Respiratory: Negative for cough and shortness of breath.    Cardiovascular: Negative for chest pain.   Gastrointestinal: Negative for abdominal pain and diarrhea.   Genitourinary: Negative for frequency.   Musculoskeletal: Negative for back pain.   Skin: Negative for rash.   Neurological: Negative for headaches.   Hematological: Negative for adenopathy.   Psychiatric/Behavioral: The patient is not nervous/anxious.    All other systems reviewed and are negative.      Objective:      Physical Exam   Constitutional: He is oriented to person, place, and time. He appears well-developed and well-nourished.   HENT:   Mouth/Throat: No oropharyngeal exudate.   Cardiovascular: Normal rate and normal heart sounds.   Pulmonary/Chest: Effort normal and breath sounds normal. He has no wheezes.   Abdominal: Soft. Bowel sounds are normal. There is no tenderness.   Musculoskeletal: He exhibits no edema or tenderness.   Lymphadenopathy:     He has no cervical adenopathy.   Neurological: He is alert and oriented to person, place, and time. Coordination normal.   Skin: Skin is warm and dry. No rash noted.   Psychiatric: He has a normal mood and affect. Judgment and thought content normal.   Vitals reviewed.      LABS:  WBC   Date Value Ref Range Status   03/25/2019 6.67 3.90 - 12.70 K/uL Final     Hemoglobin   Date Value Ref Range Status   03/25/2019 13.4 (L) 14.0 - 18.0 g/dL Final     Hematocrit    Date Value Ref Range Status   03/25/2019 41.8 40.0 - 54.0 % Final     Platelets   Date Value Ref Range Status   03/25/2019 272 150 - 350 K/uL Final     Gran # (ANC)   Date Value Ref Range Status   03/25/2019 3.6 1.8 - 7.7 K/uL Final     Comment:     The ANC is based on a white cell differential from an   automated cell counter. It has not been microscopically   reviewed for the presence of abnormal cells. Clinical   correlation is required.         Chemistry        Component Value Date/Time     03/25/2019 0808    K 4.2 03/25/2019 0808     03/25/2019 0808    CO2 23 03/25/2019 0808    BUN 12 03/25/2019 0808    CREATININE 1.4 03/25/2019 0808    GLU 84 03/25/2019 0808        Component Value Date/Time    CALCIUM 9.6 03/25/2019 0808    ALKPHOS 63 03/25/2019 0808    AST 20 03/25/2019 0808    ALT 20 03/25/2019 0808    BILITOT 0.3 03/25/2019 0808    ESTGFRAFRICA >60 03/25/2019 0808    EGFRNONAA >60 03/25/2019 0808        TSH   Date Value Ref Range Status   03/08/2019 11.075 (H) 0.400 - 4.000 uIU/mL Final     Free T4   Date Value Ref Range Status   03/08/2019 0.75 0.71 - 1.51 ng/dL Final       Assessment:       1. Primary malignant neoplasm of right lung metastatic to other site    2. Hypothyroidism due to medication    3. Deep vein thrombosis (DVT) of left lower extremity, unspecified chronicity, unspecified vein    4. Pain    5. Tooth abscess        Plan:        1,2. He is doing well and will proceed with Opdivo and will return in 2 weeks for next dose  3. Stable on Xarelto, refill sent  4. Advised him to taper his percocet as he has no evidence of cancer related pain  5. Started Augmentin, advised him to see his dentist, letter provided to take to dentist.    Above care plan was discussed with patient and all questions were addressed to his satisfaction

## 2021-05-31 LAB
ALBUMIN SERPL ELPH-MCNC: 4.2 G/DL — SIGNIFICANT CHANGE UP (ref 3.5–5.2)
ALP SERPL-CCNC: 90 U/L — SIGNIFICANT CHANGE UP (ref 30–115)
ALT FLD-CCNC: 24 U/L — SIGNIFICANT CHANGE UP (ref 0–41)
ANION GAP SERPL CALC-SCNC: 10 MMOL/L — SIGNIFICANT CHANGE UP (ref 7–14)
AST SERPL-CCNC: 37 U/L — SIGNIFICANT CHANGE UP (ref 0–41)
BASOPHILS # BLD AUTO: 0.01 K/UL — SIGNIFICANT CHANGE UP (ref 0–0.2)
BASOPHILS NFR BLD AUTO: 0.2 % — SIGNIFICANT CHANGE UP (ref 0–1)
BILIRUB SERPL-MCNC: 2.6 MG/DL — HIGH (ref 0.2–1.2)
BUN SERPL-MCNC: 6 MG/DL — LOW (ref 10–20)
CALCIUM SERPL-MCNC: 9.3 MG/DL — SIGNIFICANT CHANGE UP (ref 8.5–10.1)
CHLORIDE SERPL-SCNC: 101 MMOL/L — SIGNIFICANT CHANGE UP (ref 98–110)
CO2 SERPL-SCNC: 24 MMOL/L — SIGNIFICANT CHANGE UP (ref 17–32)
CREAT SERPL-MCNC: 0.8 MG/DL — SIGNIFICANT CHANGE UP (ref 0.7–1.5)
EOSINOPHIL # BLD AUTO: 0 K/UL — SIGNIFICANT CHANGE UP (ref 0–0.7)
EOSINOPHIL NFR BLD AUTO: 0 % — SIGNIFICANT CHANGE UP (ref 0–8)
GLUCOSE SERPL-MCNC: 138 MG/DL — HIGH (ref 70–99)
HCT VFR BLD CALC: 36.3 % — LOW (ref 42–52)
HGB BLD-MCNC: 12.8 G/DL — LOW (ref 14–18)
IMM GRANULOCYTES NFR BLD AUTO: 0.4 % — HIGH (ref 0.1–0.3)
LYMPHOCYTES # BLD AUTO: 0.41 K/UL — LOW (ref 1.2–3.4)
LYMPHOCYTES # BLD AUTO: 7.4 % — LOW (ref 20.5–51.1)
MAGNESIUM SERPL-MCNC: 1.9 MG/DL — SIGNIFICANT CHANGE UP (ref 1.8–2.4)
MCHC RBC-ENTMCNC: 35.3 G/DL — SIGNIFICANT CHANGE UP (ref 32–37)
MCHC RBC-ENTMCNC: 35.5 PG — HIGH (ref 27–31)
MCV RBC AUTO: 100.6 FL — HIGH (ref 80–94)
MONOCYTES # BLD AUTO: 0.31 K/UL — SIGNIFICANT CHANGE UP (ref 0.1–0.6)
MONOCYTES NFR BLD AUTO: 5.6 % — SIGNIFICANT CHANGE UP (ref 1.7–9.3)
NEUTROPHILS # BLD AUTO: 4.81 K/UL — SIGNIFICANT CHANGE UP (ref 1.4–6.5)
NEUTROPHILS NFR BLD AUTO: 86.4 % — HIGH (ref 42.2–75.2)
NRBC # BLD: 0 /100 WBCS — SIGNIFICANT CHANGE UP (ref 0–0)
PLATELET # BLD AUTO: 95 K/UL — LOW (ref 130–400)
POTASSIUM SERPL-MCNC: 4.1 MMOL/L — SIGNIFICANT CHANGE UP (ref 3.5–5)
POTASSIUM SERPL-SCNC: 4.1 MMOL/L — SIGNIFICANT CHANGE UP (ref 3.5–5)
PROT SERPL-MCNC: 6.9 G/DL — SIGNIFICANT CHANGE UP (ref 6–8)
RBC # BLD: 3.61 M/UL — LOW (ref 4.7–6.1)
RBC # FLD: 14.4 % — SIGNIFICANT CHANGE UP (ref 11.5–14.5)
SODIUM SERPL-SCNC: 135 MMOL/L — SIGNIFICANT CHANGE UP (ref 135–146)
WBC # BLD: 5.56 K/UL — SIGNIFICANT CHANGE UP (ref 4.8–10.8)
WBC # FLD AUTO: 5.56 K/UL — SIGNIFICANT CHANGE UP (ref 4.8–10.8)

## 2021-05-31 RX ADMIN — HEPARIN SODIUM 5000 UNIT(S): 5000 INJECTION INTRAVENOUS; SUBCUTANEOUS at 05:17

## 2021-05-31 RX ADMIN — HEPARIN SODIUM 5000 UNIT(S): 5000 INJECTION INTRAVENOUS; SUBCUTANEOUS at 17:08

## 2021-05-31 RX ADMIN — Medication 1 MILLIGRAM(S): at 11:24

## 2021-05-31 RX ADMIN — Medication 0.5 MILLIGRAM(S): at 05:18

## 2021-05-31 RX ADMIN — Medication 40 MILLIGRAM(S): at 05:17

## 2021-05-31 RX ADMIN — Medication 0.5 MILLIGRAM(S): at 10:35

## 2021-05-31 RX ADMIN — SODIUM CHLORIDE 75 MILLILITER(S): 9 INJECTION, SOLUTION INTRAVENOUS at 08:50

## 2021-05-31 RX ADMIN — SODIUM CHLORIDE 75 MILLILITER(S): 9 INJECTION, SOLUTION INTRAVENOUS at 10:35

## 2021-05-31 RX ADMIN — TAMSULOSIN HYDROCHLORIDE 0.4 MILLIGRAM(S): 0.4 CAPSULE ORAL at 21:29

## 2021-05-31 RX ADMIN — Medication 20 MILLIEQUIVALENT(S): at 11:24

## 2021-05-31 RX ADMIN — AMLODIPINE BESYLATE 5 MILLIGRAM(S): 2.5 TABLET ORAL at 05:17

## 2021-05-31 NOTE — DIETITIAN INITIAL EVALUATION ADULT. - OTHER INFO
Admit for Alcohol Intoxication. WA protocol. HAGMA due to alcohol intoxication on IVF. HTN - controlled. Suspected folate and thiamine def on supplements

## 2021-05-31 NOTE — PROGRESS NOTE ADULT - ASSESSMENT
IMPRESSION:    ETOH withdrawal   No hx of DTs or withdrawal seziures  Lactic acidosis  Chronic thrombocytopenia     PLAN:    CNS: CIWA protocol, symptom-triggered therapy with IV ativan, can use precedex if need, thiamine, folate, multivitamin    HEENT:  Oral care    PULMONARY:  HOB @ 45 degrees. aspiration precaution    CARDIOVASCULAR: LR 75cc/h, repeat lactic acid reviewed    GI: NPO, SLP eval    RENAL:  F/u  lytes.  Correct as needed.  Accurate I/O    INFECTIOUS DISEASE: pancx  HEMATOLOGICAL:  DVT prophylaxis.     ENDOCRINE:  Follow up FS.  Insulin protocol if needed.    MUSCULOSKELETAL: Bedrest    LINES/GOMEZ: none    CODE STATUS: FULL CODE    DISPOSITION: Step down unit    
60 yr M with  ETOH Abuse, HTN, CKD III and gout presented to ER for tremors    #Alcohol Intoxication  - c/w CIWA protocol with Ativan IV taper  - c/w LR @ 75cc/hr  - Seizure and fall precaution  - Addiction meds consult    #HAGMA due to alcohol intoxication  - c/w IVF    #HTN - controlled  - c/w Norvasc    #urinary retention  - Likely due to BPH  - c/w Flomax    #Chronic Thrombocytopenia  - At baseline, likely from alcohol induce BM suppression VS cirrhosis    #elevated LFTs and bili: suspected underlying alcoholic cirrhosis VS hepatic steatohepatitis  - RUQ: Diffuse hepatic steatosis. Gallbladder sludge and punctate cholelithiasis  - /AST 63/ALT 27  - INR 1.2  - Trend LFTs    #CKD3: GFR at baseline    #Suspected folate and thiamine def  - c/w Thiamine and Folate supplements     #Hx of Gout/ acute flare  - c/w prednisone 40mg (day 2/5)     #Diet: CLL for now  #DVT pro: heparin sq  #GI pro: not indicated  #Dispo: SDU      
IMPRESSION:    ETOH withdrawal   No hx of DTs or withdrawal seziures  Lactic acidosis resolved  Chronic thrombocytopenia     PLAN:    CNS: CIWA protocol, symptom-triggered therapy with IV ativan, po thiamine/ folic acid    HEENT:  Oral care    PULMONARY:  HOB @ 45 degrees. aspiration precaution    CARDIOVASCULAR: dc ivf    GI: NPO, SLP eval    RENAL:  F/u  lytes.  Correct as needed.  Accurate I/O    INFECTIOUS DISEASE: pancx  HEMATOLOGICAL:  DVT prophylaxis.     ENDOCRINE:  Follow up FS.  Insulin protocol if needed.    MUSCULOSKELETAL: Bedrest    LINES/GOMEZ: none    CODE STATUS: FULL CODE    floor

## 2021-05-31 NOTE — DIETITIAN INITIAL EVALUATION ADULT. - OTHER CALCULATIONS
Calories; 1760 - 1936 kcals (MSJ x 1.0 - 1.1 AF) Protein: 70 - 84 gms (1.0 - 1.2 gm/kg  IBW) Fluid: 1ml/kcal or per LIP

## 2021-05-31 NOTE — DIETITIAN INITIAL EVALUATION ADULT. - ORAL INTAKE PTA/DIET HISTORY
pt reports he eats 3 meals/day at home. Avoids beef/shellfish d/t gout. He says he eats them occasionally. Avoids dairy d/t GI issues. Pt missing most teeth. He says he takes a vitamin but unsure what kind ("whatever my facility gives me").

## 2021-05-31 NOTE — DIETITIAN INITIAL EVALUATION ADULT. - PERSON TAUGHT/METHOD
provided gout diet edu. encouraged more vegetable intake when d/c. receptive./verbal instruction/patient instructed

## 2021-05-31 NOTE — DIETITIAN INITIAL EVALUATION ADULT. - PERTINENT MEDS FT
MEDICATIONS  (STANDING):  amLODIPine   Tablet 5 milliGRAM(s) Oral daily  folic acid 1 milliGRAM(s) Oral daily  heparin   Injectable 5000 Unit(s) SubCutaneous every 12 hours  lactated ringers. 1000 milliLiter(s) (75 mL/Hr) IV Continuous <Continuous>  LORazepam   Injectable 0.5 milliGRAM(s) IV Push every 12 hours  LORazepam   Injectable   IV Push   potassium chloride    Tablet ER 20 milliEquivalent(s) Oral daily  predniSONE   Tablet 40 milliGRAM(s) Oral daily  tamsulosin 0.4 milliGRAM(s) Oral at bedtime    MEDICATIONS  (PRN):  ibuprofen  Tablet. 600 milliGRAM(s) Oral every 8 hours PRN Moderate Pain (4 - 6)

## 2021-05-31 NOTE — DIETITIAN INITIAL EVALUATION ADULT. - PHYSCIAL ASSESSMENT
Pt states he was intentionally losing weight by walking more. Used to weight 240#. Now he says he is ~225#. 1+ edema  R elbow. Ecchymosis. No physical signs of malnutrition observed./obese

## 2021-06-01 ENCOUNTER — TRANSCRIPTION ENCOUNTER (OUTPATIENT)
Age: 61
End: 2021-06-01

## 2021-06-01 LAB
ALBUMIN SERPL ELPH-MCNC: 3.9 G/DL — SIGNIFICANT CHANGE UP (ref 3.5–5.2)
ALP SERPL-CCNC: 71 U/L — SIGNIFICANT CHANGE UP (ref 30–115)
ALT FLD-CCNC: 26 U/L — SIGNIFICANT CHANGE UP (ref 0–41)
ANION GAP SERPL CALC-SCNC: 11 MMOL/L — SIGNIFICANT CHANGE UP (ref 7–14)
AST SERPL-CCNC: 72 U/L — HIGH (ref 0–41)
BASOPHILS # BLD AUTO: 0.03 K/UL — SIGNIFICANT CHANGE UP (ref 0–0.2)
BASOPHILS NFR BLD AUTO: 0.5 % — SIGNIFICANT CHANGE UP (ref 0–1)
BILIRUB SERPL-MCNC: 1.9 MG/DL — HIGH (ref 0.2–1.2)
BUN SERPL-MCNC: 8 MG/DL — LOW (ref 10–20)
CALCIUM SERPL-MCNC: 9.1 MG/DL — SIGNIFICANT CHANGE UP (ref 8.5–10.1)
CHLORIDE SERPL-SCNC: 102 MMOL/L — SIGNIFICANT CHANGE UP (ref 98–110)
CO2 SERPL-SCNC: 26 MMOL/L — SIGNIFICANT CHANGE UP (ref 17–32)
CREAT SERPL-MCNC: 0.8 MG/DL — SIGNIFICANT CHANGE UP (ref 0.7–1.5)
EOSINOPHIL # BLD AUTO: 0.04 K/UL — SIGNIFICANT CHANGE UP (ref 0–0.7)
EOSINOPHIL NFR BLD AUTO: 0.7 % — SIGNIFICANT CHANGE UP (ref 0–8)
GLUCOSE SERPL-MCNC: 82 MG/DL — SIGNIFICANT CHANGE UP (ref 70–99)
HCT VFR BLD CALC: 31.3 % — LOW (ref 42–52)
HGB BLD-MCNC: 10.9 G/DL — LOW (ref 14–18)
IMM GRANULOCYTES NFR BLD AUTO: 0.2 % — SIGNIFICANT CHANGE UP (ref 0.1–0.3)
LYMPHOCYTES # BLD AUTO: 1.28 K/UL — SIGNIFICANT CHANGE UP (ref 1.2–3.4)
LYMPHOCYTES # BLD AUTO: 22.4 % — SIGNIFICANT CHANGE UP (ref 20.5–51.1)
MAGNESIUM SERPL-MCNC: 1.7 MG/DL — LOW (ref 1.8–2.4)
MCHC RBC-ENTMCNC: 34.8 G/DL — SIGNIFICANT CHANGE UP (ref 32–37)
MCHC RBC-ENTMCNC: 35.4 PG — HIGH (ref 27–31)
MCV RBC AUTO: 101.6 FL — HIGH (ref 80–94)
MONOCYTES # BLD AUTO: 0.55 K/UL — SIGNIFICANT CHANGE UP (ref 0.1–0.6)
MONOCYTES NFR BLD AUTO: 9.6 % — HIGH (ref 1.7–9.3)
NEUTROPHILS # BLD AUTO: 3.8 K/UL — SIGNIFICANT CHANGE UP (ref 1.4–6.5)
NEUTROPHILS NFR BLD AUTO: 66.6 % — SIGNIFICANT CHANGE UP (ref 42.2–75.2)
NRBC # BLD: 0 /100 WBCS — SIGNIFICANT CHANGE UP (ref 0–0)
PLATELET # BLD AUTO: 104 K/UL — LOW (ref 130–400)
POTASSIUM SERPL-MCNC: 3.5 MMOL/L — SIGNIFICANT CHANGE UP (ref 3.5–5)
POTASSIUM SERPL-SCNC: 3.5 MMOL/L — SIGNIFICANT CHANGE UP (ref 3.5–5)
PROT SERPL-MCNC: 6 G/DL — SIGNIFICANT CHANGE UP (ref 6–8)
RBC # BLD: 3.08 M/UL — LOW (ref 4.7–6.1)
RBC # FLD: 14.9 % — HIGH (ref 11.5–14.5)
SARS-COV-2 RNA SPEC QL NAA+PROBE: SIGNIFICANT CHANGE UP
SODIUM SERPL-SCNC: 139 MMOL/L — SIGNIFICANT CHANGE UP (ref 135–146)
WBC # BLD: 5.71 K/UL — SIGNIFICANT CHANGE UP (ref 4.8–10.8)
WBC # FLD AUTO: 5.71 K/UL — SIGNIFICANT CHANGE UP (ref 4.8–10.8)

## 2021-06-01 RX ORDER — THIAMINE MONONITRATE (VIT B1) 100 MG
1 TABLET ORAL
Qty: 0 | Refills: 0 | DISCHARGE
Start: 2021-06-01

## 2021-06-01 RX ORDER — AMLODIPINE BESYLATE 2.5 MG/1
10 TABLET ORAL DAILY
Refills: 0 | Status: DISCONTINUED | OUTPATIENT
Start: 2021-06-01 | End: 2021-06-03

## 2021-06-01 RX ORDER — FOLIC ACID 0.8 MG
1 TABLET ORAL
Qty: 0 | Refills: 0 | DISCHARGE
Start: 2021-06-01

## 2021-06-01 RX ORDER — AMLODIPINE BESYLATE 2.5 MG/1
5 TABLET ORAL ONCE
Refills: 0 | Status: COMPLETED | OUTPATIENT
Start: 2021-06-01 | End: 2021-06-01

## 2021-06-01 RX ORDER — THIAMINE MONONITRATE (VIT B1) 100 MG
100 TABLET ORAL DAILY
Refills: 0 | Status: COMPLETED | OUTPATIENT
Start: 2021-06-01 | End: 2021-06-03

## 2021-06-01 RX ADMIN — Medication 100 MILLIGRAM(S): at 12:09

## 2021-06-01 RX ADMIN — HEPARIN SODIUM 5000 UNIT(S): 5000 INJECTION INTRAVENOUS; SUBCUTANEOUS at 05:48

## 2021-06-01 RX ADMIN — Medication 20 MILLIEQUIVALENT(S): at 11:28

## 2021-06-01 RX ADMIN — Medication 0.5 MILLIGRAM(S): at 05:47

## 2021-06-01 RX ADMIN — AMLODIPINE BESYLATE 5 MILLIGRAM(S): 2.5 TABLET ORAL at 11:28

## 2021-06-01 RX ADMIN — TAMSULOSIN HYDROCHLORIDE 0.4 MILLIGRAM(S): 0.4 CAPSULE ORAL at 21:29

## 2021-06-01 RX ADMIN — HEPARIN SODIUM 5000 UNIT(S): 5000 INJECTION INTRAVENOUS; SUBCUTANEOUS at 17:05

## 2021-06-01 RX ADMIN — AMLODIPINE BESYLATE 5 MILLIGRAM(S): 2.5 TABLET ORAL at 05:48

## 2021-06-01 RX ADMIN — Medication 1 MILLIGRAM(S): at 11:27

## 2021-06-01 RX ADMIN — Medication 40 MILLIGRAM(S): at 05:48

## 2021-06-01 NOTE — DISCHARGE NOTE PROVIDER - NSDCMRMEDTOKEN_GEN_ALL_CORE_FT
Flomax 0.4 mg oral capsule: 1 cap(s) orally once a day (at bedtime)  folic acid 1 mg oral tablet: 1 tab(s) orally once a day  Norvasc 5 mg oral tablet: 1 tab(s) orally once a day  predniSONE 20 mg oral tablet: 2 tab(s) orally once a day  thiamine 100 mg oral tablet: 1 tab(s) orally once a day   Flomax 0.4 mg oral capsule: 1 cap(s) orally once a day (at bedtime)  folic acid 1 mg oral tablet: 1 tab(s) orally once a day  Norvasc 5 mg oral tablet: 1 tab(s) orally once a day  predniSONE 20 mg oral tablet: 2 tab(s) orally once a day for 2 days, then 1 tablet once a day for 1 day.   stop after 3 days total upon discharge.   thiamine 100 mg oral tablet: 1 tab(s) orally once a day

## 2021-06-01 NOTE — CONSULT NOTE ADULT - SUBJECTIVE AND OBJECTIVE BOX
Spoke with medical team regarding addiction consult placed for alcohol use disorder and recent admission for lactic acidosis secondary to alcohol intoxication.  Patient has completed an ativan taper for withdrawals, is currently not in withdrawal, and medical team does not need further medical recommendations.  They request that  sees the patient for offering outpatient treatment and resources.

## 2021-06-01 NOTE — DISCHARGE NOTE PROVIDER - CARE PROVIDER_API CALL
Eugene Tellez  INTERNAL MEDICINE  [address] [place] [address]  Phone: [phone]  Fax: [phone]  Follow Up Time: 1 week

## 2021-06-01 NOTE — DISCHARGE NOTE PROVIDER - NSDCCPCAREPLAN_GEN_ALL_CORE_FT
PRINCIPAL DISCHARGE DIAGNOSIS  Diagnosis: Alcohol withdrawal  Assessment and Plan of Treatment: You came to the hospital with tremors and a general feeling of malaise in the context of a high alcohol level and recent attestation of alcohol use. Yo were admitted for alcohol withdrawal, started on IV fluids to treat the acidosis from your high alcohol levels, and started on medications to prevent you from going into severe alchol withdrawal.   We began to decrease the amount of withdrawal medication you received and you tolerated this well. This morning you wer enot experiencing any symptoms of alchohol withdrawal, and your most recent acid level showed significant improvement from    Please take your medications as directed, follow up with your primary care physician, and please refrain from heavy alcohol consumption which can damage your brain and your liver. If you experience similar symptoms on discharge please return ot the hospital immediately.

## 2021-06-01 NOTE — PHYSICAL THERAPY INITIAL EVALUATION ADULT - GENERAL OBSERVATIONS, REHAB EVAL
Pt encountered semifowler in bed in NAD, + IV lock R UE,, no complaints, agreeable to PT. 10:55-11:16. Pt encountered semifowler in bed in NAD, + IV lock R UE,, no complaints, agreeable to PT.

## 2021-06-01 NOTE — PHYSICAL THERAPY INITIAL EVALUATION ADULT - GAIT DISTANCE, PT EVAL
5 steps. Pt reported feeling lightheaded/dizzy. BP was taken in sitting R /78mmHg, HR 94/bed to chair

## 2021-06-02 LAB
ANION GAP SERPL CALC-SCNC: 9 MMOL/L — SIGNIFICANT CHANGE UP (ref 7–14)
BASOPHILS # BLD AUTO: 0.01 K/UL — SIGNIFICANT CHANGE UP (ref 0–0.2)
BASOPHILS NFR BLD AUTO: 0.2 % — SIGNIFICANT CHANGE UP (ref 0–1)
BUN SERPL-MCNC: 12 MG/DL — SIGNIFICANT CHANGE UP (ref 10–20)
CALCIUM SERPL-MCNC: 9.5 MG/DL — SIGNIFICANT CHANGE UP (ref 8.5–10.1)
CHLORIDE SERPL-SCNC: 100 MMOL/L — SIGNIFICANT CHANGE UP (ref 98–110)
CO2 SERPL-SCNC: 26 MMOL/L — SIGNIFICANT CHANGE UP (ref 17–32)
CREAT SERPL-MCNC: 0.9 MG/DL — SIGNIFICANT CHANGE UP (ref 0.7–1.5)
EOSINOPHIL # BLD AUTO: 0 K/UL — SIGNIFICANT CHANGE UP (ref 0–0.7)
EOSINOPHIL NFR BLD AUTO: 0 % — SIGNIFICANT CHANGE UP (ref 0–8)
GLUCOSE BLDC GLUCOMTR-MCNC: 112 MG/DL — HIGH (ref 70–99)
GLUCOSE SERPL-MCNC: 105 MG/DL — HIGH (ref 70–99)
HCT VFR BLD CALC: 34.6 % — LOW (ref 42–52)
HGB BLD-MCNC: 11.9 G/DL — LOW (ref 14–18)
IMM GRANULOCYTES NFR BLD AUTO: 0.4 % — HIGH (ref 0.1–0.3)
LYMPHOCYTES # BLD AUTO: 0.66 K/UL — LOW (ref 1.2–3.4)
LYMPHOCYTES # BLD AUTO: 13.4 % — LOW (ref 20.5–51.1)
MAGNESIUM SERPL-MCNC: 1.9 MG/DL — SIGNIFICANT CHANGE UP (ref 1.8–2.4)
MCHC RBC-ENTMCNC: 34.4 G/DL — SIGNIFICANT CHANGE UP (ref 32–37)
MCHC RBC-ENTMCNC: 34.9 PG — HIGH (ref 27–31)
MCV RBC AUTO: 101.5 FL — HIGH (ref 80–94)
MONOCYTES # BLD AUTO: 0.54 K/UL — SIGNIFICANT CHANGE UP (ref 0.1–0.6)
MONOCYTES NFR BLD AUTO: 11 % — HIGH (ref 1.7–9.3)
NEUTROPHILS # BLD AUTO: 3.69 K/UL — SIGNIFICANT CHANGE UP (ref 1.4–6.5)
NEUTROPHILS NFR BLD AUTO: 75 % — SIGNIFICANT CHANGE UP (ref 42.2–75.2)
NRBC # BLD: 0 /100 WBCS — SIGNIFICANT CHANGE UP (ref 0–0)
PLATELET # BLD AUTO: 155 K/UL — SIGNIFICANT CHANGE UP (ref 130–400)
POTASSIUM SERPL-MCNC: 4.4 MMOL/L — SIGNIFICANT CHANGE UP (ref 3.5–5)
POTASSIUM SERPL-SCNC: 4.4 MMOL/L — SIGNIFICANT CHANGE UP (ref 3.5–5)
RBC # BLD: 3.41 M/UL — LOW (ref 4.7–6.1)
RBC # FLD: 14.2 % — SIGNIFICANT CHANGE UP (ref 11.5–14.5)
SODIUM SERPL-SCNC: 135 MMOL/L — SIGNIFICANT CHANGE UP (ref 135–146)
WBC # BLD: 4.92 K/UL — SIGNIFICANT CHANGE UP (ref 4.8–10.8)
WBC # FLD AUTO: 4.92 K/UL — SIGNIFICANT CHANGE UP (ref 4.8–10.8)

## 2021-06-02 RX ORDER — CHLORHEXIDINE GLUCONATE 213 G/1000ML
1 SOLUTION TOPICAL
Refills: 0 | Status: DISCONTINUED | OUTPATIENT
Start: 2021-06-02 | End: 2021-06-03

## 2021-06-02 RX ADMIN — HEPARIN SODIUM 5000 UNIT(S): 5000 INJECTION INTRAVENOUS; SUBCUTANEOUS at 17:07

## 2021-06-02 RX ADMIN — TAMSULOSIN HYDROCHLORIDE 0.4 MILLIGRAM(S): 0.4 CAPSULE ORAL at 21:26

## 2021-06-02 RX ADMIN — Medication 40 MILLIGRAM(S): at 05:42

## 2021-06-02 RX ADMIN — Medication 100 MILLIGRAM(S): at 11:04

## 2021-06-02 RX ADMIN — AMLODIPINE BESYLATE 10 MILLIGRAM(S): 2.5 TABLET ORAL at 05:42

## 2021-06-02 RX ADMIN — Medication 1 MILLIGRAM(S): at 11:03

## 2021-06-02 RX ADMIN — Medication 20 MILLIEQUIVALENT(S): at 11:03

## 2021-06-02 RX ADMIN — HEPARIN SODIUM 5000 UNIT(S): 5000 INJECTION INTRAVENOUS; SUBCUTANEOUS at 05:42

## 2021-06-03 ENCOUNTER — TRANSCRIPTION ENCOUNTER (OUTPATIENT)
Age: 61
End: 2021-06-03

## 2021-06-03 VITALS
RESPIRATION RATE: 18 BRPM | DIASTOLIC BLOOD PRESSURE: 61 MMHG | TEMPERATURE: 97 F | HEART RATE: 75 BPM | SYSTOLIC BLOOD PRESSURE: 129 MMHG

## 2021-06-03 LAB
ALBUMIN SERPL ELPH-MCNC: 3.9 G/DL — SIGNIFICANT CHANGE UP (ref 3.5–5.2)
ALP SERPL-CCNC: 78 U/L — SIGNIFICANT CHANGE UP (ref 30–115)
ALT FLD-CCNC: 59 U/L — HIGH (ref 0–41)
ANION GAP SERPL CALC-SCNC: 12 MMOL/L — SIGNIFICANT CHANGE UP (ref 7–14)
AST SERPL-CCNC: 94 U/L — HIGH (ref 0–41)
BASOPHILS # BLD AUTO: 0.05 K/UL — SIGNIFICANT CHANGE UP (ref 0–0.2)
BASOPHILS NFR BLD AUTO: 1.1 % — HIGH (ref 0–1)
BILIRUB SERPL-MCNC: 1.3 MG/DL — HIGH (ref 0.2–1.2)
BUN SERPL-MCNC: 13 MG/DL — SIGNIFICANT CHANGE UP (ref 10–20)
CALCIUM SERPL-MCNC: 9.3 MG/DL — SIGNIFICANT CHANGE UP (ref 8.5–10.1)
CHLORIDE SERPL-SCNC: 101 MMOL/L — SIGNIFICANT CHANGE UP (ref 98–110)
CO2 SERPL-SCNC: 25 MMOL/L — SIGNIFICANT CHANGE UP (ref 17–32)
CREAT SERPL-MCNC: 0.8 MG/DL — SIGNIFICANT CHANGE UP (ref 0.7–1.5)
EOSINOPHIL # BLD AUTO: 0.02 K/UL — SIGNIFICANT CHANGE UP (ref 0–0.7)
EOSINOPHIL NFR BLD AUTO: 0.4 % — SIGNIFICANT CHANGE UP (ref 0–8)
GLUCOSE SERPL-MCNC: 89 MG/DL — SIGNIFICANT CHANGE UP (ref 70–99)
HCT VFR BLD CALC: 34.3 % — LOW (ref 42–52)
HGB BLD-MCNC: 12 G/DL — LOW (ref 14–18)
IMM GRANULOCYTES NFR BLD AUTO: 0.4 % — HIGH (ref 0.1–0.3)
LYMPHOCYTES # BLD AUTO: 1.48 K/UL — SIGNIFICANT CHANGE UP (ref 1.2–3.4)
LYMPHOCYTES # BLD AUTO: 31.9 % — SIGNIFICANT CHANGE UP (ref 20.5–51.1)
MAGNESIUM SERPL-MCNC: 1.9 MG/DL — SIGNIFICANT CHANGE UP (ref 1.8–2.4)
MCHC RBC-ENTMCNC: 34.9 PG — HIGH (ref 27–31)
MCHC RBC-ENTMCNC: 35 G/DL — SIGNIFICANT CHANGE UP (ref 32–37)
MCV RBC AUTO: 99.7 FL — HIGH (ref 80–94)
MONOCYTES # BLD AUTO: 0.62 K/UL — HIGH (ref 0.1–0.6)
MONOCYTES NFR BLD AUTO: 13.4 % — HIGH (ref 1.7–9.3)
NEUTROPHILS # BLD AUTO: 2.45 K/UL — SIGNIFICANT CHANGE UP (ref 1.4–6.5)
NEUTROPHILS NFR BLD AUTO: 52.8 % — SIGNIFICANT CHANGE UP (ref 42.2–75.2)
NRBC # BLD: 0 /100 WBCS — SIGNIFICANT CHANGE UP (ref 0–0)
PLATELET # BLD AUTO: 145 K/UL — SIGNIFICANT CHANGE UP (ref 130–400)
POTASSIUM SERPL-MCNC: 3.5 MMOL/L — SIGNIFICANT CHANGE UP (ref 3.5–5)
POTASSIUM SERPL-SCNC: 3.5 MMOL/L — SIGNIFICANT CHANGE UP (ref 3.5–5)
PROT SERPL-MCNC: 6.4 G/DL — SIGNIFICANT CHANGE UP (ref 6–8)
RBC # BLD: 3.44 M/UL — LOW (ref 4.7–6.1)
RBC # FLD: 14.3 % — SIGNIFICANT CHANGE UP (ref 11.5–14.5)
SODIUM SERPL-SCNC: 138 MMOL/L — SIGNIFICANT CHANGE UP (ref 135–146)
WBC # BLD: 4.64 K/UL — LOW (ref 4.8–10.8)
WBC # FLD AUTO: 4.64 K/UL — LOW (ref 4.8–10.8)

## 2021-06-03 RX ADMIN — AMLODIPINE BESYLATE 10 MILLIGRAM(S): 2.5 TABLET ORAL at 05:03

## 2021-06-03 RX ADMIN — Medication 1 MILLIGRAM(S): at 11:29

## 2021-06-03 RX ADMIN — HEPARIN SODIUM 5000 UNIT(S): 5000 INJECTION INTRAVENOUS; SUBCUTANEOUS at 05:03

## 2021-06-03 RX ADMIN — Medication 40 MILLIGRAM(S): at 05:03

## 2021-06-03 RX ADMIN — Medication 100 MILLIGRAM(S): at 11:30

## 2021-06-03 RX ADMIN — Medication 20 MILLIEQUIVALENT(S): at 11:29

## 2021-06-03 NOTE — PROGRESS NOTE ADULT - PROVIDER SPECIALTY LIST ADULT
Internal Medicine
Pulmonology
Internal Medicine
Internal Medicine
Pulmonology
Internal Medicine

## 2021-06-03 NOTE — PROGRESS NOTE ADULT - SUBJECTIVE AND OBJECTIVE BOX
CAT ALSTON  61y  Male      Patient is a 61y old  Male who presents with a chief complaint of etho intoxication (31 May 2021 07:09)      INTERVAL HPI/OVERNIGHT EVENTS: No events overnight      REVIEW OF SYSTEMS:  CONSTITUTIONAL: No fever, weight loss, or fatigue  EYES: No eye pain, visual disturbances, or discharge  ENMT:  No difficulty hearing, tinnitus, vertigo; No sinus or throat pain  NECK: No pain or stiffness  BREASTS: No pain, masses, or nipple discharge  RESPIRATORY: No cough, wheezing, chills or hemoptysis; No shortness of breath  CARDIOVASCULAR: No chest pain, palpitations, dizziness, or leg swelling  GASTROINTESTINAL: No abdominal or epigastric pain. No nausea, vomiting, or hematemesis; No diarrhea or constipation. No melena or hematochezia.  GENITOURINARY: No dysuria, frequency, hematuria, or incontinence  NEUROLOGICAL: No headaches, memory loss, loss of strength, numbness, or tremors  SKIN: No itching, burning, rashes, or lesions   LYMPH NODES: No enlarged glands  ENDOCRINE: No heat or cold intolerance; No hair loss  MUSCULOSKELETAL: No joint pain or swelling; No muscle, back, or extremity pain  PSYCHIATRIC: No depression, anxiety, mood swings, or difficulty sleeping  HEME/LYMPH: No easy bruising, or bleeding gums  ALLERY AND IMMUNOLOGIC: No hives or eczema  FAMILY HISTORY:  Family history of gastric cancer  Mom    Family hx of lung cancer  Smoker        T(C): 35.7 (05-31-21 @ 05:00), Max: 37.7 (05-30-21 @ 12:46)  HR: 109 (05-31-21 @ 05:00) (90 - 109)  BP: 148/88 (05-31-21 @ 05:00) (135/67 - 148/88)  RR: 18 (05-31-21 @ 05:00) (18 - 18)  SpO2: 99% (05-31-21 @ 05:00) (97% - 99%)  Wt(kg): --Vital Signs Last 24 Hrs  T(C): 35.7 (31 May 2021 05:00), Max: 37.7 (30 May 2021 12:46)  T(F): 96.2 (31 May 2021 05:00), Max: 99.8 (30 May 2021 12:46)  HR: 109 (31 May 2021 05:00) (90 - 109)  BP: 148/88 (31 May 2021 05:00) (135/67 - 148/88)  BP(mean): --  RR: 18 (31 May 2021 05:00) (18 - 18)  SpO2: 99% (31 May 2021 05:00) (97% - 99%)  Beef (Hives)  dairy products (Hives)  No Known Drug Allergies  shellfish (Hives)      PHYSICAL EXAM:  GENERAL: NAD  NERVOUS SYSTEM:  Alert & Oriented X3, Good concentration; Motor Strength 5/5 B/L upper and lower extremities; DTRs 2+ intact and symmetric  CHEST/LUNG: Clear to percussion bilaterally; No rales, rhonchi, wheezing, or rubs  HEART: Regular rate and rhythm; No murmurs, rubs, or gallops  ABDOMEN: Soft, Nontender, Nondistended; Bowel sounds present  EXTREMITIES:  2+ Peripheral Pulses, No clubbing, cyanosis, or edema  LYMPH: No lymphadenopathy noted  SKIN: No rashes or lesions    Consultant(s) Notes Reviewed:  [x ] YES  [ ] NO  Care Discussed with Consultants/Other Providers [ x] YES  [ ] NO    LABS:      RADIOLOGY & ADDITIONAL TESTS:    Imaging Personally Reviewed:  [ ] YES  [ ] NO  amLODIPine   Tablet 5 milliGRAM(s) Oral daily  folic acid 1 milliGRAM(s) Oral daily  heparin   Injectable 5000 Unit(s) SubCutaneous every 12 hours  ibuprofen  Tablet. 600 milliGRAM(s) Oral every 8 hours PRN  lactated ringers. 1000 milliLiter(s) IV Continuous <Continuous>  LORazepam   Injectable 0.5 milliGRAM(s) IV Push every 12 hours  LORazepam   Injectable 0.5 milliGRAM(s) IV Push every 4 hours  LORazepam   Injectable   IV Push   potassium chloride    Tablet ER 20 milliEquivalent(s) Oral daily  predniSONE   Tablet 40 milliGRAM(s) Oral daily  tamsulosin 0.4 milliGRAM(s) Oral at bedtime      HEALTH ISSUES - PROBLEM Dx:        
Patient was seen and examined. Spoke with RN. Chart reviewed.  No events overnight.  Vital Signs Last 24 Hrs  T(F): 96.2 (31 May 2021 05:00), Max: 99.8 (30 May 2021 12:46)  HR: 109 (31 May 2021 05:00) (90 - 109)  BP: 148/88 (31 May 2021 05:00) (135/67 - 148/88)  SpO2: 99% (31 May 2021 05:00) (97% - 99%)  MEDICATIONS  (STANDING):  amLODIPine   Tablet 5 milliGRAM(s) Oral daily  folic acid 1 milliGRAM(s) Oral daily  heparin   Injectable 5000 Unit(s) SubCutaneous every 12 hours  lactated ringers. 1000 milliLiter(s) (75 mL/Hr) IV Continuous <Continuous>  LORazepam   Injectable 0.5 milliGRAM(s) IV Push every 12 hours  LORazepam   Injectable 0.5 milliGRAM(s) IV Push every 4 hours  LORazepam   Injectable   IV Push   potassium chloride    Tablet ER 20 milliEquivalent(s) Oral daily  predniSONE   Tablet 40 milliGRAM(s) Oral daily  tamsulosin 0.4 milliGRAM(s) Oral at bedtime    MEDICATIONS  (PRN):  ibuprofen  Tablet. 600 milliGRAM(s) Oral every 8 hours PRN Moderate Pain (4 - 6)    Labs:                        12.4   5.20  )-----------( 74       ( 30 May 2021 06:32 )             34.6                         11.4   3.18  )-----------( 54       ( 29 May 2021 08:48 )             32.2     30 May 2021 06:32    135    |  98     |  3      ----------------------------<  119    4.3     |  26     |  0.8    29 May 2021 21:07    135    |  99     |  3      ----------------------------<  118    4.0     |  24     |  0.8      Ca    9.4        30 May 2021 06:32  Ca    8.7        29 May 2021 21:07  Phos  3.2       30 May 2021 06:32  Phos  1.8       29 May 2021 08:48  Mg     1.8       30 May 2021 06:32  Mg     2.1       29 May 2021 08:48    TPro  6.7    /  Alb  4.2    /  TBili  2.6    /  DBili  x      /  AST  63     /  ALT  26     /  AlkPhos  84     30 May 2021 06:32  TPro  6.2    /  Alb  4.1    /  TBili  2.1    /  DBili  x      /  AST  73     /  ALT  28     /  AlkPhos  89     29 May 2021 21:07    PT/INR - ( 29 May 2021 08:48 )   PT: 14.30 sec;   INR: 1.24 ratio               General: comfortable, NAD  Neurology: A&Ox3, nonfocal    A/P:  59 yo unfortunate man with  ETOH Abuse, HTN, CKD III and gout admitted with lactic acidosis due to alcohol intoxication; noted with early withdrawal symptoms; developed likely gout attack in elbow- now improved on prednisone    ativan protocol    thiamine/folate    PO prednisone 40 for 5 days for gout    monitor closely for urinary retention- straight cath as needed    CATCH team    addiction medicine eval    OOB; fall precautions    PT/rehab eval    DC planning for Tuesday as not yet a safe discharge  DVT prophylaxis  Decubitus prevention- all measures as per RN protocol  Please call or text me with any questions or updates      
Patient was seen and examined. Spoke with RN. Chart reviewed.  No events overnight.  Vital Signs Last 24 Hrs  T(F): 99.5 (02 Jun 2021 05:13), Max: 99.5 (02 Jun 2021 05:13)  HR: 78 (02 Jun 2021 05:13) (78 - 94)  BP: 145/77 (02 Jun 2021 05:13) (141/68 - 163/78)  SpO2: 99% (01 Jun 2021 14:38) (98% - 99%)  MEDICATIONS  (STANDING):  amLODIPine   Tablet 10 milliGRAM(s) Oral daily  folic acid 1 milliGRAM(s) Oral daily  heparin   Injectable 5000 Unit(s) SubCutaneous every 12 hours  potassium chloride    Tablet ER 20 milliEquivalent(s) Oral daily  predniSONE   Tablet 40 milliGRAM(s) Oral daily  tamsulosin 0.4 milliGRAM(s) Oral at bedtime  thiamine 100 milliGRAM(s) Oral daily    MEDICATIONS  (PRN):  ibuprofen  Tablet. 600 milliGRAM(s) Oral every 8 hours PRN Moderate Pain (4 - 6)    Labs:                        10.9   5.71  )-----------( 104      ( 01 Jun 2021 06:16 )             31.3     01 Jun 2021 06:16    139    |  102    |  8      ----------------------------<  82     3.5     |  26     |  0.8      Ca    9.1        01 Jun 2021 06:16  Mg     1.7       01 Jun 2021 06:16    TPro  6.0    /  Alb  3.9    /  TBili  1.9    /  DBili  x      /  AST  72     /  ALT  26     /  AlkPhos  71     01 Jun 2021 06:16          General: comfortable, NAD  Neurology: A&Ox3, nonfocal        A/P:  61 yo unfortunate man with  ETOH Abuse, HTN, CKD III and gout admitted with lactic acidosis due to alcohol intoxication; noted with early withdrawal symptoms; developed likely gout attack in elbow- now improved on prednisone    ativan protocol    thiamine/folate    PO prednisone 40 ( day 4/5)  for gout    monitor closely for urinary retention- straight cath as needed    CATCH team f/u appreciated    OOB; ambulate with fall precautions    PT/rehab f/u today    DC planning today to STR  DVT prophylaxis  Decubitus prevention- all measures as per RN protocol  Please call or text me with any questions or updates      
OVERNIGHT EVENTS: events noted, afebrile    Vital Signs Last 24 Hrs  T(C): 36.8 (29 May 2021 04:30), Max: 36.8 (29 May 2021 04:30)  T(F): 98.2 (29 May 2021 04:30), Max: 98.2 (29 May 2021 04:30)  HR: 95 (29 May 2021 04:30) (84 - 106)  BP: 121/71 (29 May 2021 04:30) (110/65 - 143/74)  BP(mean): 90 (29 May 2021 04:30) (86 - 106)  RR: 18 (29 May 2021 04:30) (18 - 20)  SpO2: 95% (29 May 2021 04:30) (95% - 99%)    PHYSICAL EXAMINATION:    GENERAL: comfortable    HEENT: Head is normocephalic and atraumatic.     NECK: Supple.    LUNGS: Clear to auscultation without wheezing, rales or rhonchi; respirations unlabored    HEART: Regular rate and rhythm without murmur.    ABDOMEN: Soft, nontender, and nondistended.      EXTREMITIES: Without any cyanosis, clubbing, rash, lesions or edema.    NEUROLOGIC: tremor    SKIN: No ulceration or induration present.      LABS:                        12.6   3.99  )-----------( 62       ( 28 May 2021 12:00 )             35.4     05-28    135  |  95<L>  |  5<L>  ----------------------------<  109<H>  3.3<L>   |  27  |  0.9    Ca    8.8      28 May 2021 12:00  Phos  2.0     05-28  Mg     1.7     05-28    TPro  6.8  /  Alb  4.2  /  TBili  3.8<H>  /  DBili  x   /  AST  63<H>  /  ALT  27  /  AlkPhos  102  05-28    PT/INR - ( 28 May 2021 07:44 )   PT: 13.80 sec;   INR: 1.20 ratio         PTT - ( 28 May 2021 07:44 )  PTT:35.6 sec                      05-28-21 @ 07:01  -  05-29-21 @ 07:00  --------------------------------------------------------  IN: 1450 mL / OUT: 1300 mL / NET: 150 mL        MICROBIOLOGY:      MEDICATIONS  (STANDING):  amLODIPine   Tablet 5 milliGRAM(s) Oral daily  folic acid 1 milliGRAM(s) Oral daily  heparin   Injectable 5000 Unit(s) SubCutaneous every 12 hours  lactated ringers. 1000 milliLiter(s) (75 mL/Hr) IV Continuous <Continuous>  LORazepam   Injectable   IV Push   LORazepam   Injectable 2 milliGRAM(s) IV Push every 4 hours  LORazepam   Injectable 1 milliGRAM(s) IV Push every 4 hours  potassium chloride    Tablet ER 20 milliEquivalent(s) Oral daily  tamsulosin 0.4 milliGRAM(s) Oral at bedtime  thiamine IVPB 500 milliGRAM(s) IV Intermittent daily    MEDICATIONS  (PRN):      RADIOLOGY & ADDITIONAL STUDIES:    
Patient was seen and examined. Spoke with RN. Chart reviewed.  No events overnight.  Vital Signs Last 24 Hrs  T(F): 98.3 (01 Jun 2021 05:00), Max: 98.4 (31 May 2021 20:58)  HR: 72 (01 Jun 2021 05:00) (72 - 96)  BP: 180/82 (01 Jun 2021 05:00) (148/77 - 180/82)  SpO2: 98% (01 Jun 2021 05:00) (98% - 98%)  MEDICATIONS  (STANDING):  amLODIPine   Tablet 5 milliGRAM(s) Oral daily  folic acid 1 milliGRAM(s) Oral daily  heparin   Injectable 5000 Unit(s) SubCutaneous every 12 hours  lactated ringers. 1000 milliLiter(s) (75 mL/Hr) IV Continuous <Continuous>  LORazepam   Injectable 0.5 milliGRAM(s) IV Push every 12 hours  LORazepam   Injectable   IV Push   potassium chloride    Tablet ER 20 milliEquivalent(s) Oral daily  predniSONE   Tablet 40 milliGRAM(s) Oral daily  tamsulosin 0.4 milliGRAM(s) Oral at bedtime    MEDICATIONS  (PRN):  ibuprofen  Tablet. 600 milliGRAM(s) Oral every 8 hours PRN Moderate Pain (4 - 6)    Labs:                        12.8   5.56  )-----------( 95       ( 31 May 2021 06:11 )             36.3     31 May 2021 06:11    135    |  101    |  6      ----------------------------<  138    4.1     |  24     |  0.8      Ca    9.3        31 May 2021 06:11  Mg     1.9       31 May 2021 06:11    TPro  6.9    /  Alb  4.2    /  TBili  2.6    /  DBili  x      /  AST  37     /  ALT  24     /  AlkPhos  90     31 May 2021 06:11          General: comfortable, NAD  Neurology: A&Ox3, nonfocal        A/P:  59 yo unfortunate man with  ETOH Abuse, HTN, CKD III and gout admitted with lactic acidosis due to alcohol intoxication; noted with early withdrawal symptoms; developed likely gout attack in elbow- now improved on prednisone    ativan protocol    thiamine/folate    PO prednisone 40 ( day 3/5)  for gout    monitor closely for urinary retention- straight cath as needed    CATCH team f/u today    OOB; ambulate with fall precautions    PT/rehab f/u today    DC planning today to STR    DVT prophylaxis  Decubitus prevention- all measures as per RN protocol  Please call or text me with any questions or updates      
    OVERNIGHT EVENTS: events noted, still with tremor  CC/H    Vital Signs Last 24 Hrs  T(C): 36.2 (28 May 2021 08:00), Max: 37 (27 May 2021 09:28)  T(F): 97.2 (28 May 2021 08:00), Max: 98.6 (27 May 2021 09:28)  HR: 106 (28 May 2021 08:00) (81 - 121)  BP: 141/82 (28 May 2021 08:00) (128/72 - 157/89)  BP(mean): 106 (28 May 2021 08:00) (95 - 106)  RR: 18 (28 May 2021 08:00) (18 - 20)  SpO2: 97% (28 May 2021 07:00) (96% - 99%)    PHYSICAL EXAMINATION:    GENERAL: comfortable    HEENT: Head is normocephalic and atraumatic.    NECK: Supple.    LUNGS: dec bs both bases    HEART: Regular rate and rhythm without murmur.    ABDOMEN: Soft, nontender, and nondistended.      EXTREMITIES: Without any cyanosis, clubbing, rash, lesions or edema.    NEUROLOGIC: tremor    SKIN: No ulceration or induration present.      LABS:                        11.4   5.08  )-----------( 84       ( 27 May 2021 10:24 )             33.1     05-27    137  |  96<L>  |  4<L>  ----------------------------<  116<H>  4.6   |  17  |  1.2    Ca    8.7      27 May 2021 10:24  Mg     1.2     05-27    TPro  7.4  /  Alb  4.6  /  TBili  2.5<H>  /  DBili  x   /  AST  117<H>  /  ALT  37  /  AlkPhos  106  05-27                  Lactate, Blood: 1.2 mmol/L (05-27-21 @ 20:00)  Lactate, Blood: 7.3 mmol/L (05-27-21 @ 10:24)          05-27-21 @ 07:01  -  05-28-21 @ 07:00  --------------------------------------------------------  IN: 0 mL / OUT: 800 mL / NET: -800 mL        MICROBIOLOGY:      MEDICATIONS  (STANDING):  amLODIPine   Tablet 5 milliGRAM(s) Oral daily  folic acid 1 milliGRAM(s) Oral daily  heparin   Injectable 5000 Unit(s) SubCutaneous every 12 hours  lactated ringers. 1000 milliLiter(s) (125 mL/Hr) IV Continuous <Continuous>  LORazepam   Injectable   IV Push   LORazepam   Injectable 3 milliGRAM(s) IV Push every 4 hours  LORazepam   Injectable 2 milliGRAM(s) IV Push every 4 hours  tamsulosin 0.4 milliGRAM(s) Oral at bedtime  thiamine IVPB 500 milliGRAM(s) IV Intermittent daily    MEDICATIONS  (PRN):      RADIOLOGY & ADDITIONAL STUDIES:    
Patient was seen and examined. Spoke with RN. Chart reviewed.  No events overnight.  Vital Signs Last 24 Hrs  T(F): 97.8 (03 Jun 2021 05:00), Max: 98.4 (02 Jun 2021 14:05)  HR: 72 (03 Jun 2021 05:00) (72 - 80)  BP: 137/68 (03 Jun 2021 05:00) (121/58 - 140/67)  SpO2: 97% (02 Jun 2021 22:01) (97% - 99%)  MEDICATIONS  (STANDING):  amLODIPine   Tablet 10 milliGRAM(s) Oral daily  chlorhexidine 4% Liquid 1 Application(s) Topical <User Schedule>  folic acid 1 milliGRAM(s) Oral daily  heparin   Injectable 5000 Unit(s) SubCutaneous every 12 hours  potassium chloride    Tablet ER 20 milliEquivalent(s) Oral daily  predniSONE   Tablet 40 milliGRAM(s) Oral daily  tamsulosin 0.4 milliGRAM(s) Oral at bedtime  thiamine 100 milliGRAM(s) Oral daily    MEDICATIONS  (PRN):  ibuprofen  Tablet. 600 milliGRAM(s) Oral every 8 hours PRN Moderate Pain (4 - 6)    Labs:                        12.0   4.64  )-----------( 145      ( 03 Jun 2021 06:03 )             34.3                         11.9   4.92  )-----------( 155      ( 02 Jun 2021 17:25 )             34.6     02 Jun 2021 17:25    135    |  100    |  12     ----------------------------<  105    4.4     |  26     |  0.9      Ca    9.5        02 Jun 2021 17:25  Mg     1.9       02 Jun 2021 17:25            General: comfortable, NAD  exam unchanged      A/P:  61 yo unfortunate man with  ETOH Abuse, HTN, CKD III and gout admitted with lactic acidosis due to alcohol intoxication; noted with early withdrawal symptoms; developed likely gout attack in elbow- now improved on prednisone    ativan protocol    thiamine/folate    DC prednisone after todays dose     monitor closely for urinary retention- straight cath as needed    CATCH team f/u appreciated    OOB; ambulate with fall precautions    PT/rehab f/u today    DC planning today to STR  DVT prophylaxis  Decubitus prevention- all measures as per RN protocol  Please call or text me with any questions or updates      
Patient was seen and examined. Spoke with RN. Chart reviewed.  No events overnight.  Vital Signs Last 24 Hrs  T(F): 98 (30 May 2021 05:00), Max: 98.5 (29 May 2021 21:13)  HR: 105 (30 May 2021 05:00) (88 - 119)  BP: 132/69 (30 May 2021 05:00) (132/69 - 168/86)  SpO2: 100% (30 May 2021 05:00) (97% - 100%)  MEDICATIONS  (STANDING):  amLODIPine   Tablet 5 milliGRAM(s) Oral daily  folic acid 1 milliGRAM(s) Oral daily  heparin   Injectable 5000 Unit(s) SubCutaneous every 12 hours  lactated ringers. 1000 milliLiter(s) (75 mL/Hr) IV Continuous <Continuous>  LORazepam   Injectable   IV Push   LORazepam   Injectable 1 milliGRAM(s) IV Push every 4 hours  LORazepam   Injectable 0.5 milliGRAM(s) IV Push every 4 hours  potassium chloride    Tablet ER 20 milliEquivalent(s) Oral daily  tamsulosin 0.4 milliGRAM(s) Oral at bedtime  thiamine IVPB 500 milliGRAM(s) IV Intermittent daily    MEDICATIONS  (PRN):    Labs:                        12.4   5.20  )-----------( 74       ( 30 May 2021 06:32 )             34.6                         11.4   3.18  )-----------( 54       ( 29 May 2021 08:48 )             32.2     29 May 2021 21:07    135    |  99     |  3      ----------------------------<  118    4.0     |  24     |  0.8    29 May 2021 08:48    137    |  100    |  4      ----------------------------<  113    3.4     |  25     |  0.9      Ca    8.7        29 May 2021 21:07  Ca    8.7        29 May 2021 08:48  Phos  1.8       29 May 2021 08:48  Phos  2.0       28 May 2021 07:44  Mg     2.1       29 May 2021 08:48  Mg     1.7       28 May 2021 12:00    TPro  6.2    /  Alb  4.1    /  TBili  2.1    /  DBili  x      /  AST  73     /  ALT  28     /  AlkPhos  89     29 May 2021 21:07  TPro  6.1    /  Alb  3.8    /  TBili  2.7    /  DBili  x      /  AST  67     /  ALT  23     /  AlkPhos  80     29 May 2021 08:48    PT/INR - ( 29 May 2021 08:48 )   PT: 14.30 sec;   INR: 1.24 ratio         PTT - ( 28 May 2021 07:44 )  PTT:35.6 sec      General: comfortable, NAD  Neurology: A&Ox3, nonfocal        A/P:  61 yo unfortunate man with  ETOH Abuse, HTN, CKD III and gout admitted with lactic acidosis due to alcohol intoxication; noted with early withdrawal symptoms- now improved    ativan protocol    thiamine/folate    monitor closely for urinary retention- straight cath as needed    CATCH team    addiction medicine eval    OOB; fall precautions    PT/rehab eval    DC planning for Tuesday as not yet a safe discharge    DVT prophylaxis  Decubitus prevention- all measures as per RN protocol  Please call or text me with any questions or updates

## 2021-06-03 NOTE — CHART NOTE - NSCHARTNOTEFT_GEN_A_CORE
Patient is no longer experiencing alcohol withdrawal, has been seen by CATCH team, is medically stable, and is awaiting placement ot Rehab facility.       LABS:  cret                        12.0   4.64  )-----------( 145      ( 03 Jun 2021 06:03 )             34.3     06-03    138  |  101  |  13  ----------------------------<  89  3.5   |  25  |  0.8    Ca    9.3      03 Jun 2021 06:03  Mg     1.9     06-03    TPro  6.4  /  Alb  3.9  /  TBili  1.3<H>  /  DBili  x   /  AST  94<H>  /  ALT  59<H>  /  AlkPhos  78  06-03

## 2021-06-03 NOTE — CHART NOTE - NSCHARTNOTEFT_GEN_A_CORE
Registered Dietitian Follow-Up      Patient Profile Reviewed                          Yes [x]   No []     Nutrition History Previously Obtained        Yes [x]  No []       Pertinent Subjective Information:      Pertinent Medical Interventions: ETOH intoxication. per CATCH team, pt no longer experiencing alcohol withdrawal. monitor closely for urinary retention. on thiamine/folate. Pending placement      Diet order: Diet, DASH/TLC:   Sodium & Cholesterol Restricted  For patients receiving Renal Replacement - No Protein Restr, No Conc K, No Conc Phos, Low Sodium  No Beef  No Dairy  No Shellfish (06-01-21 @ 08:49)    Anthropometrics:  Height (cm): 172.7 (05-31-21 @ 13:59)  Weight (kg): 98.2 (05-29-21 @ 16:01)  BMI (kg/m2): 32.9 (05-31-21 @ 13:59)  IBW:     MEDICATIONS  (STANDING):  amLODIPine   Tablet 10 milliGRAM(s) Oral daily  folic acid 1 milliGRAM(s) Oral daily  heparin   Injectable 5000 Unit(s) SubCutaneous every 12 hours  potassium chloride    Tablet ER 20 milliEquivalent(s) Oral daily  predniSONE   Tablet 40 milliGRAM(s) Oral daily  tamsulosin 0.4 milliGRAM(s) Oral at bedtime    Pertinent Labs: 06-03 @ 06:03: Na 138, BUN 13, Cr 0.8, BG 89, K+ 3.5, Phos --, Mg 1.9, Alk Phos 78, ALT/SGPT 59<H>, AST/SGOT 94<H>, HbA1c --    Finger Sticks:  POCT Blood Glucose.: 112 mg/dL (06-02 @ 21:40)    Physical Findings:  - Appearance: alert, 1+ edema R elbow  - GI function: Last BM 6/1 rounded   - Tubes:  - Oral/Mouth cavity: no issues  - Skin: ecchymosis      Nutrition Requirements:  Weight Used:  98.2kg cont from RD initial note      Estimated Energy Needs: 1760 - 1936 kcals (MSJ x 1.0 - 1.1 AF)  Estimated Protein Needs:  70 - 84 gms (1.0 - 1.2 gm/kg  IBW)  Estimated Fluid Needs: 1ml/kcal or per LIP     Nutrient Intake: likely meeting needs     [] Previous Nutrition Diagnosis: Inadequate Oral Intake            [] Ongoing          [x] Resolved    [] No active nutrition diagnosis identified at this time     Nutrition Intervention: meal/snack      Goal/Expected Outcome: Pt to consume >75% of meal tray in 7 days     Indicator/Monitoring: RD to monitor energy intake, diet order, body comp, NFPF    Recommendation:   - d/c renal restrictions  - Continue DASH/TLC, no beef, dairy, shellfish Registered Dietitian Follow-Up      Patient Profile Reviewed                          Yes [x]   No []     Nutrition History Previously Obtained        Yes [x]  No []       Pertinent Subjective Information: Pt is eating well. Reports liking meals so far.      Pertinent Medical Interventions: ETOH intoxication. per CATCH team, pt no longer experiencing alcohol withdrawal. monitor closely for urinary retention. on thiamine/folate. Pending placement      Diet order: Diet, DASH/TLC:   Sodium & Cholesterol Restricted  For patients receiving Renal Replacement - No Protein Restr, No Conc K, No Conc Phos, Low Sodium  No Beef  No Dairy  No Shellfish (06-01-21 @ 08:49)    Anthropometrics:  Height (cm): 172.7 (05-31-21 @ 13:59)  Weight (kg): 98.2 (05-29-21 @ 16:01)  BMI (kg/m2): 32.9 (05-31-21 @ 13:59)  IBW:     MEDICATIONS  (STANDING):  amLODIPine   Tablet 10 milliGRAM(s) Oral daily  folic acid 1 milliGRAM(s) Oral daily  heparin   Injectable 5000 Unit(s) SubCutaneous every 12 hours  potassium chloride    Tablet ER 20 milliEquivalent(s) Oral daily  predniSONE   Tablet 40 milliGRAM(s) Oral daily  tamsulosin 0.4 milliGRAM(s) Oral at bedtime    Pertinent Labs: 06-03 @ 06:03: Na 138, BUN 13, Cr 0.8, BG 89, K+ 3.5, Phos --, Mg 1.9, Alk Phos 78, ALT/SGPT 59<H>, AST/SGOT 94<H>, HbA1c --    Finger Sticks:  POCT Blood Glucose.: 112 mg/dL (06-02 @ 21:40)    Physical Findings:  - Appearance: alert, 1+ edema R elbow  - GI function: Last BM 6/1 rounded   - Tubes:  - Oral/Mouth cavity: no issues  - Skin: ecchymosis      Nutrition Requirements:  Weight Used:  98.2kg cont from RD initial note      Estimated Energy Needs: 1760 - 1936 kcals (MSJ x 1.0 - 1.1 AF)  Estimated Protein Needs:  70 - 84 gms (1.0 - 1.2 gm/kg  IBW)  Estimated Fluid Needs: 1ml/kcal or per LIP     Nutrient Intake: likely meeting needs     [] Previous Nutrition Diagnosis: Inadequate Oral Intake            [] Ongoing          [x] Resolved    [] No active nutrition diagnosis identified at this time     Nutrition Intervention: meal/snack      Goal/Expected Outcome: Pt to consume >75% of meal tray in 7 days     Indicator/Monitoring: RD to monitor energy intake, diet order, body comp, NFPF    Recommendation:   - d/c renal restrictions  - Continue DASH/TLC, no beef, dairy, shellfish

## 2021-06-03 NOTE — DISCHARGE NOTE NURSING/CASE MANAGEMENT/SOCIAL WORK - PATIENT PORTAL LINK FT
You can access the FollowMyHealth Patient Portal offered by [facility] by registering at the following website: [email or web address]. By joining [employer]’s FollowMyHealth portal, you will also be able to view your health information using other applications (apps) compatible with our system.

## 2021-06-07 DIAGNOSIS — K80.20 CALCULUS OF GALLBLADDER WITHOUT CHOLECYSTITIS WITHOUT OBSTRUCTION: ICD-10-CM

## 2021-06-07 DIAGNOSIS — I12.9 HYPERTENSIVE CHRONIC KIDNEY DISEASE WITH STAGE 1 THROUGH STAGE 4 CHRONIC KIDNEY DISEASE, OR UNSPECIFIED CHRONIC KIDNEY DISEASE: ICD-10-CM

## 2021-06-07 DIAGNOSIS — N40.1 BENIGN PROSTATIC HYPERPLASIA WITH LOWER URINARY TRACT SYMPTOMS: ICD-10-CM

## 2021-06-07 DIAGNOSIS — R25.1 TREMOR, UNSPECIFIED: ICD-10-CM

## 2021-06-07 DIAGNOSIS — K70.30 ALCOHOLIC CIRRHOSIS OF LIVER WITHOUT ASCITES: ICD-10-CM

## 2021-06-07 DIAGNOSIS — Z91.013 ALLERGY TO SEAFOOD: ICD-10-CM

## 2021-06-07 DIAGNOSIS — I25.10 ATHEROSCLEROTIC HEART DISEASE OF NATIVE CORONARY ARTERY WITHOUT ANGINA PECTORIS: ICD-10-CM

## 2021-06-07 DIAGNOSIS — M10.9 GOUT, UNSPECIFIED: ICD-10-CM

## 2021-06-07 DIAGNOSIS — F17.200 NICOTINE DEPENDENCE, UNSPECIFIED, UNCOMPLICATED: ICD-10-CM

## 2021-06-07 DIAGNOSIS — N18.30 CHRONIC KIDNEY DISEASE, STAGE 3 UNSPECIFIED: ICD-10-CM
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
## 2021-06-07 DIAGNOSIS — F10.129 ALCOHOL ABUSE WITH INTOXICATION, UNSPECIFIED: ICD-10-CM

## 2021-06-07 DIAGNOSIS — E87.2 ACIDOSIS: ICD-10-CM

## 2021-06-07 DIAGNOSIS — R00.0 TACHYCARDIA, UNSPECIFIED: ICD-10-CM

## 2021-06-07 DIAGNOSIS — I87.2 VENOUS INSUFFICIENCY (CHRONIC) (PERIPHERAL): ICD-10-CM

## 2021-06-07 DIAGNOSIS — K76.0 FATTY (CHANGE OF) LIVER, NOT ELSEWHERE CLASSIFIED: ICD-10-CM

## 2021-06-07 DIAGNOSIS — F10.139 ALCOHOL ABUSE WITH WITHDRAWAL, UNSPECIFIED: ICD-10-CM

## 2021-06-07 DIAGNOSIS — D69.59 OTHER SECONDARY THROMBOCYTOPENIA: ICD-10-CM

## 2021-06-07 DIAGNOSIS — R33.8 OTHER RETENTION OF URINE: ICD-10-CM

## 2021-10-07 ENCOUNTER — INPATIENT (INPATIENT)
Facility: HOSPITAL | Age: 61
LOS: 3 days | Discharge: HOME | End: 2021-10-11
Attending: INTERNAL MEDICINE | Admitting: INTERNAL MEDICINE
Payer: MEDICAID

## 2021-10-07 ENCOUNTER — EMERGENCY (EMERGENCY)
Facility: HOSPITAL | Age: 61
LOS: 0 days | Discharge: HOME | End: 2021-10-07
Attending: EMERGENCY MEDICINE | Admitting: EMERGENCY MEDICINE
Payer: MEDICAID

## 2021-10-07 VITALS
OXYGEN SATURATION: 98 % | RESPIRATION RATE: 18 BRPM | SYSTOLIC BLOOD PRESSURE: 146 MMHG | DIASTOLIC BLOOD PRESSURE: 73 MMHG

## 2021-10-07 VITALS
TEMPERATURE: 99 F | OXYGEN SATURATION: 97 % | DIASTOLIC BLOOD PRESSURE: 71 MMHG | HEART RATE: 105 BPM | SYSTOLIC BLOOD PRESSURE: 146 MMHG | WEIGHT: 190.04 LBS | HEIGHT: 68 IN | RESPIRATION RATE: 18 BRPM

## 2021-10-07 VITALS
RESPIRATION RATE: 18 BRPM | DIASTOLIC BLOOD PRESSURE: 76 MMHG | SYSTOLIC BLOOD PRESSURE: 162 MMHG | HEIGHT: 68 IN | OXYGEN SATURATION: 98 % | HEART RATE: 124 BPM | TEMPERATURE: 98 F

## 2021-10-07 DIAGNOSIS — Z91.018 ALLERGY TO OTHER FOODS: ICD-10-CM

## 2021-10-07 DIAGNOSIS — R07.89 OTHER CHEST PAIN: ICD-10-CM

## 2021-10-07 DIAGNOSIS — I12.9 HYPERTENSIVE CHRONIC KIDNEY DISEASE WITH STAGE 1 THROUGH STAGE 4 CHRONIC KIDNEY DISEASE, OR UNSPECIFIED CHRONIC KIDNEY DISEASE: ICD-10-CM

## 2021-10-07 DIAGNOSIS — F10.129 ALCOHOL ABUSE WITH INTOXICATION, UNSPECIFIED: ICD-10-CM

## 2021-10-07 DIAGNOSIS — Z02.9 ENCOUNTER FOR ADMINISTRATIVE EXAMINATIONS, UNSPECIFIED: ICD-10-CM

## 2021-10-07 DIAGNOSIS — N18.9 CHRONIC KIDNEY DISEASE, UNSPECIFIED: ICD-10-CM

## 2021-10-07 DIAGNOSIS — R41.82 ALTERED MENTAL STATUS, UNSPECIFIED: ICD-10-CM

## 2021-10-07 DIAGNOSIS — R00.0 TACHYCARDIA, UNSPECIFIED: ICD-10-CM
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
## 2021-10-07 DIAGNOSIS — Z87.891 PERSONAL HISTORY OF NICOTINE DEPENDENCE: ICD-10-CM

## 2021-10-07 DIAGNOSIS — Z91.013 ALLERGY TO SEAFOOD: ICD-10-CM

## 2021-10-07 DIAGNOSIS — Z91.011 ALLERGY TO MILK PRODUCTS: ICD-10-CM

## 2021-10-07 LAB
ALBUMIN SERPL ELPH-MCNC: 4.9 G/DL — SIGNIFICANT CHANGE UP (ref 3.5–5.2)
ALBUMIN SERPL ELPH-MCNC: 5.1 G/DL — SIGNIFICANT CHANGE UP (ref 3.5–5.2)
ALP SERPL-CCNC: 100 U/L — SIGNIFICANT CHANGE UP (ref 30–115)
ALP SERPL-CCNC: 94 U/L — SIGNIFICANT CHANGE UP (ref 30–115)
ALT FLD-CCNC: 31 U/L — SIGNIFICANT CHANGE UP (ref 0–41)
ALT FLD-CCNC: 31 U/L — SIGNIFICANT CHANGE UP (ref 0–41)
ANION GAP SERPL CALC-SCNC: 20 MMOL/L — HIGH (ref 7–14)
ANION GAP SERPL CALC-SCNC: 31 MMOL/L — HIGH (ref 7–14)
AST SERPL-CCNC: 39 U/L — SIGNIFICANT CHANGE UP (ref 0–41)
AST SERPL-CCNC: 40 U/L — SIGNIFICANT CHANGE UP (ref 0–41)
BASE EXCESS BLDA CALC-SCNC: -2.9 MMOL/L — LOW (ref -2–3)
BASE EXCESS BLDCOV CALC-SCNC: -8.8 MMOL/L — SIGNIFICANT CHANGE UP (ref -9.3–0.3)
BASOPHILS # BLD AUTO: 0.11 K/UL — SIGNIFICANT CHANGE UP (ref 0–0.2)
BASOPHILS # BLD AUTO: 0.2 K/UL — SIGNIFICANT CHANGE UP (ref 0–0.2)
BASOPHILS NFR BLD AUTO: 0.8 % — SIGNIFICANT CHANGE UP (ref 0–1)
BASOPHILS NFR BLD AUTO: 2.5 % — HIGH (ref 0–1)
BILIRUB SERPL-MCNC: 0.6 MG/DL — SIGNIFICANT CHANGE UP (ref 0.2–1.2)
BILIRUB SERPL-MCNC: 0.9 MG/DL — SIGNIFICANT CHANGE UP (ref 0.2–1.2)
BUN SERPL-MCNC: 14 MG/DL — SIGNIFICANT CHANGE UP (ref 10–20)
BUN SERPL-MCNC: 18 MG/DL — SIGNIFICANT CHANGE UP (ref 10–20)
BUN SERPL-MCNC: 18 MG/DL — SIGNIFICANT CHANGE UP (ref 10–20)
CALCIUM SERPL-MCNC: 9.1 MG/DL — SIGNIFICANT CHANGE UP (ref 8.5–10.1)
CALCIUM SERPL-MCNC: 9.6 MG/DL — SIGNIFICANT CHANGE UP (ref 8.5–10.1)
CALCIUM SERPL-MCNC: 9.6 MG/DL — SIGNIFICANT CHANGE UP (ref 8.5–10.1)
CHLORIDE SERPL-SCNC: 96 MMOL/L — LOW (ref 98–110)
CHLORIDE SERPL-SCNC: 97 MMOL/L — LOW (ref 98–110)
CHLORIDE SERPL-SCNC: 99 MMOL/L — SIGNIFICANT CHANGE UP (ref 98–110)
CO2 SERPL-SCNC: 14 MMOL/L — LOW (ref 17–32)
CO2 SERPL-SCNC: 18 MMOL/L — SIGNIFICANT CHANGE UP (ref 17–32)
CO2 SERPL-SCNC: 8 MMOL/L — CRITICAL LOW (ref 17–32)
CREAT SERPL-MCNC: 1.3 MG/DL — SIGNIFICANT CHANGE UP (ref 0.7–1.5)
CREAT SERPL-MCNC: 1.4 MG/DL — SIGNIFICANT CHANGE UP (ref 0.7–1.5)
CREAT SERPL-MCNC: 1.8 MG/DL — HIGH (ref 0.7–1.5)
EOSINOPHIL # BLD AUTO: 0 K/UL — SIGNIFICANT CHANGE UP (ref 0–0.7)
EOSINOPHIL # BLD AUTO: 0.02 K/UL — SIGNIFICANT CHANGE UP (ref 0–0.7)
EOSINOPHIL NFR BLD AUTO: 0 % — SIGNIFICANT CHANGE UP (ref 0–8)
EOSINOPHIL NFR BLD AUTO: 0.2 % — SIGNIFICANT CHANGE UP (ref 0–8)
ETHANOL SERPL-MCNC: <10 MG/DL — SIGNIFICANT CHANGE UP
GAS PNL BLDCOV: 7.33 — SIGNIFICANT CHANGE UP (ref 7.25–7.45)
GLUCOSE SERPL-MCNC: 153 MG/DL — HIGH (ref 70–99)
GLUCOSE SERPL-MCNC: 179 MG/DL — HIGH (ref 70–99)
GLUCOSE SERPL-MCNC: 87 MG/DL — SIGNIFICANT CHANGE UP (ref 70–99)
HCO3 BLDA-SCNC: 21 MMOL/L — SIGNIFICANT CHANGE UP (ref 21–28)
HCO3 BLDCOV-SCNC: 16 MMOL/L — SIGNIFICANT CHANGE UP
HCT VFR BLD CALC: 39.7 % — LOW (ref 42–52)
HCT VFR BLD CALC: 40.9 % — LOW (ref 42–52)
HGB BLD-MCNC: 13.5 G/DL — LOW (ref 14–18)
HGB BLD-MCNC: 14.3 G/DL — SIGNIFICANT CHANGE UP (ref 14–18)
IMM GRANULOCYTES NFR BLD AUTO: 0.2 % — SIGNIFICANT CHANGE UP (ref 0.1–0.3)
IMM GRANULOCYTES NFR BLD AUTO: 0.4 % — HIGH (ref 0.1–0.3)
LACTATE SERPL-SCNC: 2.4 MMOL/L — HIGH (ref 0.7–2)
LIDOCAIN IGE QN: 19 U/L — SIGNIFICANT CHANGE UP (ref 7–60)
LYMPHOCYTES # BLD AUTO: 0.51 K/UL — LOW (ref 1.2–3.4)
LYMPHOCYTES # BLD AUTO: 2.4 K/UL — SIGNIFICANT CHANGE UP (ref 1.2–3.4)
LYMPHOCYTES # BLD AUTO: 29.6 % — SIGNIFICANT CHANGE UP (ref 20.5–51.1)
LYMPHOCYTES # BLD AUTO: 3.9 % — LOW (ref 20.5–51.1)
MAGNESIUM SERPL-MCNC: 1.8 MG/DL — SIGNIFICANT CHANGE UP (ref 1.8–2.4)
MCHC RBC-ENTMCNC: 33.6 PG — HIGH (ref 27–31)
MCHC RBC-ENTMCNC: 33.8 PG — HIGH (ref 27–31)
MCHC RBC-ENTMCNC: 34 G/DL — SIGNIFICANT CHANGE UP (ref 32–37)
MCHC RBC-ENTMCNC: 35 G/DL — SIGNIFICANT CHANGE UP (ref 32–37)
MCV RBC AUTO: 96.2 FL — HIGH (ref 80–94)
MCV RBC AUTO: 99.5 FL — HIGH (ref 80–94)
MONOCYTES # BLD AUTO: 0.39 K/UL — SIGNIFICANT CHANGE UP (ref 0.1–0.6)
MONOCYTES # BLD AUTO: 0.53 K/UL — SIGNIFICANT CHANGE UP (ref 0.1–0.6)
MONOCYTES NFR BLD AUTO: 4.1 % — SIGNIFICANT CHANGE UP (ref 1.7–9.3)
MONOCYTES NFR BLD AUTO: 4.8 % — SIGNIFICANT CHANGE UP (ref 1.7–9.3)
NEUTROPHILS # BLD AUTO: 11.81 K/UL — HIGH (ref 1.4–6.5)
NEUTROPHILS # BLD AUTO: 5.07 K/UL — SIGNIFICANT CHANGE UP (ref 1.4–6.5)
NEUTROPHILS NFR BLD AUTO: 62.7 % — SIGNIFICANT CHANGE UP (ref 42.2–75.2)
NEUTROPHILS NFR BLD AUTO: 90.8 % — HIGH (ref 42.2–75.2)
NRBC # BLD: 0 /100 WBCS — SIGNIFICANT CHANGE UP (ref 0–0)
NRBC # BLD: 0 /100 WBCS — SIGNIFICANT CHANGE UP (ref 0–0)
OSMOLALITY SERPL: 301 MOS/KG — SIGNIFICANT CHANGE UP (ref 280–301)
PCO2 BLDA: 33 MMHG — LOW (ref 35–48)
PCO2 BLDCOV: 30 MMHG — SIGNIFICANT CHANGE UP (ref 27–49)
PH BLDA: 7.41 — SIGNIFICANT CHANGE UP (ref 7.35–7.45)
PLATELET # BLD AUTO: 272 K/UL — SIGNIFICANT CHANGE UP (ref 130–400)
PLATELET # BLD AUTO: 280 K/UL — SIGNIFICANT CHANGE UP (ref 130–400)
PO2 BLDA: 69 MMHG — LOW (ref 83–108)
PO2 BLDCOA: 55 MMHG — HIGH (ref 17–41)
POTASSIUM SERPL-MCNC: 4.2 MMOL/L — SIGNIFICANT CHANGE UP (ref 3.5–5)
POTASSIUM SERPL-MCNC: 4.9 MMOL/L — SIGNIFICANT CHANGE UP (ref 3.5–5)
POTASSIUM SERPL-MCNC: 5.7 MMOL/L — HIGH (ref 3.5–5)
POTASSIUM SERPL-SCNC: 4.2 MMOL/L — SIGNIFICANT CHANGE UP (ref 3.5–5)
POTASSIUM SERPL-SCNC: 4.9 MMOL/L — SIGNIFICANT CHANGE UP (ref 3.5–5)
POTASSIUM SERPL-SCNC: 5.7 MMOL/L — HIGH (ref 3.5–5)
PROT SERPL-MCNC: 7.7 G/DL — SIGNIFICANT CHANGE UP (ref 6–8)
PROT SERPL-MCNC: 8.1 G/DL — HIGH (ref 6–8)
RBC # BLD: 3.99 M/UL — LOW (ref 4.7–6.1)
RBC # BLD: 4.25 M/UL — LOW (ref 4.7–6.1)
RBC # FLD: 12.5 % — SIGNIFICANT CHANGE UP (ref 11.5–14.5)
RBC # FLD: 12.8 % — SIGNIFICANT CHANGE UP (ref 11.5–14.5)
SAO2 % BLDA: 96.8 % — SIGNIFICANT CHANGE UP (ref 94–98)
SAO2 % BLDCOV: 89.2 % — SIGNIFICANT CHANGE UP
SARS-COV-2 RNA SPEC QL NAA+PROBE: SIGNIFICANT CHANGE UP
SODIUM SERPL-SCNC: 137 MMOL/L — SIGNIFICANT CHANGE UP (ref 135–146)
SODIUM SERPL-SCNC: 141 MMOL/L — SIGNIFICANT CHANGE UP (ref 135–146)
SODIUM SERPL-SCNC: 143 MMOL/L — SIGNIFICANT CHANGE UP (ref 135–146)
TROPONIN T SERPL-MCNC: <0.01 NG/ML — SIGNIFICANT CHANGE UP
WBC # BLD: 13.01 K/UL — HIGH (ref 4.8–10.8)
WBC # BLD: 8.1 K/UL — SIGNIFICANT CHANGE UP (ref 4.8–10.8)
WBC # FLD AUTO: 13.01 K/UL — HIGH (ref 4.8–10.8)
WBC # FLD AUTO: 8.1 K/UL — SIGNIFICANT CHANGE UP (ref 4.8–10.8)

## 2021-10-07 PROCEDURE — 36000 PLACE NEEDLE IN VEIN: CPT

## 2021-10-07 PROCEDURE — 93010 ELECTROCARDIOGRAM REPORT: CPT | Mod: 77

## 2021-10-07 PROCEDURE — 99223 1ST HOSP IP/OBS HIGH 75: CPT | Mod: 25

## 2021-10-07 PROCEDURE — 93010 ELECTROCARDIOGRAM REPORT: CPT | Mod: 76

## 2021-10-07 PROCEDURE — 71045 X-RAY EXAM CHEST 1 VIEW: CPT | Mod: 26,77

## 2021-10-07 PROCEDURE — 76937 US GUIDE VASCULAR ACCESS: CPT | Mod: 26

## 2021-10-07 PROCEDURE — 99285 EMERGENCY DEPT VISIT HI MDM: CPT | Mod: 25

## 2021-10-07 PROCEDURE — 99408 AUDIT/DAST 15-30 MIN: CPT

## 2021-10-07 PROCEDURE — 71045 X-RAY EXAM CHEST 1 VIEW: CPT | Mod: 26

## 2021-10-07 PROCEDURE — 99285 EMERGENCY DEPT VISIT HI MDM: CPT

## 2021-10-07 PROCEDURE — 93010 ELECTROCARDIOGRAM REPORT: CPT

## 2021-10-07 RX ORDER — MAGNESIUM SULFATE 500 MG/ML
2 VIAL (ML) INJECTION ONCE
Refills: 0 | Status: COMPLETED | OUTPATIENT
Start: 2021-10-07 | End: 2021-10-07

## 2021-10-07 RX ORDER — DIAZEPAM 5 MG
10 TABLET ORAL ONCE
Refills: 0 | Status: DISCONTINUED | OUTPATIENT
Start: 2021-10-07 | End: 2021-10-07

## 2021-10-07 RX ORDER — SODIUM BICARBONATE 1 MEQ/ML
650 SYRINGE (ML) INTRAVENOUS THREE TIMES A DAY
Refills: 0 | Status: DISCONTINUED | OUTPATIENT
Start: 2021-10-07 | End: 2021-10-08

## 2021-10-07 RX ORDER — FAMOTIDINE 10 MG/ML
20 INJECTION INTRAVENOUS ONCE
Refills: 0 | Status: COMPLETED | OUTPATIENT
Start: 2021-10-07 | End: 2021-10-07

## 2021-10-07 RX ORDER — SODIUM CHLORIDE 9 MG/ML
1000 INJECTION, SOLUTION INTRAVENOUS ONCE
Refills: 0 | Status: COMPLETED | OUTPATIENT
Start: 2021-10-07 | End: 2021-10-07

## 2021-10-07 RX ORDER — SODIUM CHLORIDE 9 MG/ML
1000 INJECTION, SOLUTION INTRAVENOUS
Refills: 0 | Status: DISCONTINUED | OUTPATIENT
Start: 2021-10-07 | End: 2021-10-07

## 2021-10-07 RX ORDER — SODIUM ZIRCONIUM CYCLOSILICATE 10 G/10G
10 POWDER, FOR SUSPENSION ORAL ONCE
Refills: 0 | Status: DISCONTINUED | OUTPATIENT
Start: 2021-10-07 | End: 2021-10-07

## 2021-10-07 RX ORDER — SODIUM CHLORIDE 9 MG/ML
500 INJECTION, SOLUTION INTRAVENOUS ONCE
Refills: 0 | Status: COMPLETED | OUTPATIENT
Start: 2021-10-07 | End: 2021-10-07

## 2021-10-07 RX ORDER — PANTOPRAZOLE SODIUM 20 MG/1
40 TABLET, DELAYED RELEASE ORAL
Refills: 0 | Status: DISCONTINUED | OUTPATIENT
Start: 2021-10-07 | End: 2021-10-11

## 2021-10-07 RX ORDER — SODIUM CHLORIDE 9 MG/ML
1000 INJECTION, SOLUTION INTRAVENOUS
Refills: 0 | Status: DISCONTINUED | OUTPATIENT
Start: 2021-10-07 | End: 2021-10-08

## 2021-10-07 RX ORDER — FAMOTIDINE 10 MG/ML
20 INJECTION INTRAVENOUS DAILY
Refills: 0 | Status: DISCONTINUED | OUTPATIENT
Start: 2021-10-07 | End: 2021-10-08

## 2021-10-07 RX ORDER — SODIUM ZIRCONIUM CYCLOSILICATE 10 G/10G
10 POWDER, FOR SUSPENSION ORAL EVERY 8 HOURS
Refills: 0 | Status: COMPLETED | OUTPATIENT
Start: 2021-10-07 | End: 2021-10-08

## 2021-10-07 RX ORDER — AMLODIPINE BESYLATE 2.5 MG/1
5 TABLET ORAL DAILY
Refills: 0 | Status: DISCONTINUED | OUTPATIENT
Start: 2021-10-07 | End: 2021-10-11

## 2021-10-07 RX ORDER — THIAMINE MONONITRATE (VIT B1) 100 MG
100 TABLET ORAL DAILY
Refills: 0 | Status: COMPLETED | OUTPATIENT
Start: 2021-10-07 | End: 2021-10-10

## 2021-10-07 RX ORDER — FOLIC ACID 0.8 MG
1 TABLET ORAL DAILY
Refills: 0 | Status: DISCONTINUED | OUTPATIENT
Start: 2021-10-07 | End: 2021-10-11

## 2021-10-07 RX ORDER — HEPARIN SODIUM 5000 [USP'U]/ML
5000 INJECTION INTRAVENOUS; SUBCUTANEOUS EVERY 12 HOURS
Refills: 0 | Status: DISCONTINUED | OUTPATIENT
Start: 2021-10-07 | End: 2021-10-11

## 2021-10-07 RX ADMIN — Medication 100 MILLIGRAM(S): at 19:52

## 2021-10-07 RX ADMIN — Medication 1 MILLIGRAM(S): at 18:45

## 2021-10-07 RX ADMIN — Medication 10 MILLIGRAM(S): at 14:26

## 2021-10-07 RX ADMIN — SODIUM CHLORIDE 1000 MILLILITER(S): 9 INJECTION, SOLUTION INTRAVENOUS at 14:27

## 2021-10-07 RX ADMIN — Medication 650 MILLIGRAM(S): at 20:19

## 2021-10-07 RX ADMIN — FAMOTIDINE 20 MILLIGRAM(S): 10 INJECTION INTRAVENOUS at 16:03

## 2021-10-07 RX ADMIN — SODIUM CHLORIDE 1000 MILLILITER(S): 9 INJECTION, SOLUTION INTRAVENOUS at 18:20

## 2021-10-07 RX ADMIN — SODIUM CHLORIDE 500 MILLILITER(S): 9 INJECTION, SOLUTION INTRAVENOUS at 19:46

## 2021-10-07 RX ADMIN — SODIUM CHLORIDE 100 MILLILITER(S): 9 INJECTION, SOLUTION INTRAVENOUS at 20:19

## 2021-10-07 RX ADMIN — Medication 30 MILLILITER(S): at 06:30

## 2021-10-07 RX ADMIN — Medication 50 GRAM(S): at 16:02

## 2021-10-07 RX ADMIN — SODIUM ZIRCONIUM CYCLOSILICATE 10 GRAM(S): 10 POWDER, FOR SUSPENSION ORAL at 22:07

## 2021-10-07 NOTE — H&P ADULT - NSHPLABSRESULTS_GEN_ALL_CORE
13.5   13.01 )-----------( 272      ( 07 Oct 2021 14:14 )             39.7     10-07    143  |  97<L>  |  18  ----------------------------<  153<H>  4.9   |  8<LL>  |  1.8<H>    Ca    9.6      07 Oct 2021 14:14  Mg     1.8     10-07    TPro  8.1<H>  /  Alb  5.1  /  TBili  0.9  /  DBili  x   /  AST  40  /  ALT  31  /  AlkPhos  94  10-07

## 2021-10-07 NOTE — H&P ADULT - HISTORY OF PRESENT ILLNESS
61 year old male, PMH ETOH abuse, presbycusis, gout, HTN, CKD3 presents due to tremors.  Pt. is a resident at [facility] and reports feeling anxious, restless with b/l arm shakes and came to ED for eval. Pt. also noted 3 episodes of nonbilious nonbloody vomiting and is unable "to keep food down."   Pt. reports drinking half pint of vodka daily last drink yesterday afternoon. Pt. denies abdominal pain, diarrhea chest pain or SOB.  In the ED Pt. tachycardic to 124 (sinus tachy on EKG), with PE showing b/l arm shaking.  Lab work significant for HAGMA (bicarb 14> 8) abd ANDRZEJ (creatinine 1.8 baseline 1.3).    Pt. given valium, pepcid and 1 L LR.  Admitted for Etoh intoxication.

## 2021-10-07 NOTE — H&P ADULT - NSHPREVIEWOFSYSTEMS_GEN_ALL_CORE
CONSTITUTIONAL: b/l arm shakes  RESPIRATORY: No cough, wheezing, hemoptysis; No shortness of breath  CARDIOVASCULAR: No chest pain or palpitations  GASTROINTESTINAL: +, vomiting, no hematemesis; No diarrhea or constipation. No melena or hematochezia.  GENITOURINARY: No dysuria, frequency or hematuria  NEUROLOGICAL: No numbness or weakness  SKIN: No itching, rashes

## 2021-10-07 NOTE — ED ADULT NURSE NOTE - PRIMARY CARE PROVIDER
Constitutional: no fever, no chills  Head: NC, AT   Eyes: no redness   ENMT: + nasal congestion/drainage, no sore throat   CV: no edema  Resp: + cough, + dyspnea  GI: no abdominal pain, no nausea, no vomiting, no diarrhea  : no dysuria, no hematuria   Skin: no lesions, no rashes   Neuro: no LOC, + weakness PMD

## 2021-10-07 NOTE — ED PROVIDER NOTE - ATTENDING CONTRIBUTION TO CARE
62 yo male PMH as noted c/o nausea, few episodes of NBNB emesis and feeling "anxious " .  Seen this AM for CP, had nml ECG and CXR and was d/c home.  Last drink [id number] afternoon.  Denies any trauma, HA, SOB, abdominal or back pain, no black or bloody stools.  Prior h/o ETOH withdrawal, but not seizures.   Non-toxic appearing, well-nourished male in NAD, head AT/NC, PERRL, pink conjunctivae, dry oral mucosa, poor dentition, , nml phonation without drooling or trismus, supple neck without midline spine ttp, nml work of breathing, lungs CTA b/l, equal air entry, RRR, well-perfused extremities, distal pulses intact, abdomen soft, NT/ND, BS present in all quadrants, no midline spine or CVA ttp, no leg edema or unilateral calf swelling, A&Ox3, no focal neuro deficits, hand tremor nml mood and affect.  hydration, labs, benzos, plan to admit for management/prevention of acute ETOHH withdrawal.

## 2021-10-07 NOTE — ED PROVIDER NOTE - OBJECTIVE STATEMENT
61yoM w. pmhx of ckd htn alcohol use disorder who present for anxiety x1d. he drinks 1 pint of vodka daily for 40+ years but stopped drinking yesterday because of nausea. denies cp sob abdominal pain urinary sxs diarrhea. he is sweating more, feeling anxious. no hallucinations. no hx of alcohol withdrawal seizures. he has had alcohol withdrawal in the past. He's had nausea and 3 nbnb vomiting episodes.

## 2021-10-07 NOTE — ED PROVIDER NOTE - CARE PLAN
Principal Discharge DX:	Alcohol withdrawal   1 Principal Discharge DX:	Alcohol withdrawal  Secondary Diagnosis:	Acute kidney injury superimposed on chronic kidney disease

## 2021-10-07 NOTE — ED PROVIDER NOTE - ATTENDING CONTRIBUTION TO CARE
62 yo M pmh of daily alcohol use, HTn, CKD, chronic tachycardia presents with intoxication and chest pain. States that today around 11pm at rest started to have some chest pain, upper chest, bilateral, dull. States that he has chronically elevated HR. Denies any shortness of breath or palpitations. Currently in process of being evaluted by cardiologist Dr. Beaver and is in process of setting up holter monitor. Former smoker, no FH of heart disease. pmd is Dr. Tellez.     CONSTITUTIONAL: Well-developed; well-nourished; in no acute distress.   SKIN: warm, dry  HEAD: Normocephalic; atraumatic.  EYES: PERRL, EOMI, no conjunctival erythema  ENT: No nasal discharge; airway clear.  NECK: Supple; non tender.  CARD: S1, S2 normal;  Regular rate and rhythm.   RESP: No wheezes, rales or rhonchi.  ABD: soft non tender, non distended, no rebound or guarding  EXT: Normal ROM.  5/5 strength in all 4 extremities   LYMPH: No acute cervical adenopathy.  NEURO: Alert, oriented, grossly unremarkable. neurovascularly intact  PSYCH: Cooperative, appropriate.

## 2021-10-07 NOTE — ED PROVIDER NOTE - CLINICAL SUMMARY MEDICAL DECISION MAKING FREE TEXT BOX
60 yo male PMH as noted c/o nausea, few episodes of NBNB emesis and feeling "anxious " .  Seen this AM for CP, had nml ECG and CXR and was d/c home.  Last drink [id number] afternoon.  Denies any trauma, HA, SOB, abdominal or back pain, no black or bloody stools.  Prior h/o ETOH withdrawal, but not seizures.   Non-toxic appearing, well-nourished male in NAD, head AT/NC, PERRL, pink conjunctivae, dry oral mucosa, poor dentition, , nml phonation without drooling or trismus, supple neck without midline spine ttp, nml work of breathing, lungs CTA b/l, equal air entry, RRR, well-perfused extremities, distal pulses intact, abdomen soft, NT/ND, BS present in all quadrants, no midline spine or CVA ttp, no leg edema or unilateral calf swelling, A&Ox3, no focal neuro deficits, hand tremor nml mood and affect.  hydration, labs, benzos, plan to admit for management/prevention of acute ETOHH withdrawal.

## 2021-10-07 NOTE — H&P ADULT - ATTENDING COMMENTS
60 YO M with a PMH of ETOH abuse, presbycusis, gout, HTN, and CKD3 who presents from his NH ([facility]) for eval of tremors that started this AM. Associated with feelings of restlessness and anxiety. Pt usually drinks 1/2 pint of vodka daily and has not had a drink since 0200 (10/7/21). ROS is negative except as above.     In the ED, Chest X-Ray was negative. EtOH level was negative. Pt started on IVFs (LR), thiamine, and Valium.     FMHx: Reviewed, not relevant    Physical exam shows pt in NAD. VSS, afebrile, not hypoxic on RA. A&Ox3. Neuro exam without deficits, motor/sensory intact, no dysarthria, no facial asymmetry. Muscle strength/sensation intact. CTA B/L with no W/C/R. RRR, no M/G/R. ABD is soft and non-tender, normoactive BSs. LEs without swelling. No rashes. Labs and radiology as above.     EtOH abuse with acute withdrawal + EtOH cessation counseling. Valium now and bridge to Librium. CIWA protocol. Valium PRN. IVFs. Trend BMPs, replace electrolytes PRN. Start on Multivitamin/Folate/Thiamine. Monitor VSs. Extensive counseling lasting between 15-30 minutes was held on the benefits of EtOH cessation. Detox consult.    Folate and thiamine deficiency due to EtOH abuse. Start on Folate/Thiamine supplementation prior to glucose admin. IVFs.     Hyperkalemia. Treat now. Repeat BMP in the AM.     Lactate elevation. IVFs. Repeat lactate level in the AM.     Diabetes mellitus with hyperglycemia. A1c. FSs. Insulin standing/correctional dosing    Hx of presbycusis, gout, HTN, and CKD3. Restart home meds, except as stated above. DVT PPX. Inform PCP of pt's admission to hospital. My note supersedes the residents note.     Date seen by Attending: 10/7/21

## 2021-10-07 NOTE — H&P ADULT - NSHPPHYSICALEXAM_GEN_ALL_CORE
GENERAL: NAD, speaks in full sentences, no signs of respiratory distress  HEAD:  Atraumatic, Normocephalic  EYES: EOMI, PERRLA, non-icteric, no injected sclera  NECK: Supple, No JVD  CHEST/LUNG: Clear to auscultation bilaterally; No wheeze; No crackles; No accessory muscles used  HEART: Regular rate and rhythm; No murmurs;   ABDOMEN: Soft, Nontender, Nondistended; Bowel sounds present; No guarding  EXTREMITIES:b/l arm shaking  PSYCH: AAOx3  NEUROLOGY: non-focal  SKIN: No rashes or lesions

## 2021-10-07 NOTE — ED PROVIDER NOTE - NS ED ROS FT
Constitutional: No fever, chills, weight loss, generalized fatigue  Eyes:  No visual hallucinations  Ears:  No auditory hallucination  Neck: No neck pain, stiffness, or edema  Cardiac:  No chest pain  Respiratory:  No SOB or cough  GI:  No abdominal pain, nausea, vomiting, or diarrhea  :  No dysuria, frequency or burning  MS:  No back pain  Neuro:  No headache or weakness.  No LOC  PSYCH: +anxious  Skin:  No skin rash

## 2021-10-07 NOTE — ED PROVIDER NOTE - CLINICAL SUMMARY MEDICAL DECISION MAKING FREE TEXT BOX
Patient presents with alcohol intoxication and chest pain. labs, ekg, cxr done. troponin negative x 2. Patient clinically sober, answering questions appropriately. Walking stably. Discharged with pmd and cardiology follow. Return precautions discussed.

## 2021-10-07 NOTE — ED PROVIDER NOTE - PHYSICAL EXAMINATION
VITAL SIGNS: I have reviewed nursing notes and confirm.  CONSTITUTIONAL: Well-appearing, non-toxic, in NAD  SKIN: Warm dry, normal skin turgor  HEAD: NCAT  EYES: No conjunctival injection, scleral anicteric  ENT: Moist mucous membranes, normal pharynx with no erythema or exudates  NECK: Supple; non tender. Full ROM. No cervical LAD  CARD: RRR, no murmurs, rubs or gallops  RESP: Clear to ausculation bilaterally.  No rales, rhonchi, or wheezing.  ABD: Soft, non-distended, non-tender, no rebound or guarding. No CVA tenderness  EXT: Full ROM, no bony tenderness, no pedal edema, no calf tenderness  NEURO: Normal motor, normal sensory. CN II-XII grossly intact.   PSYCH: Cooperative. Patient is intoxicated.

## 2021-10-07 NOTE — ED PROVIDER NOTE - CARE PROVIDER_API CALL
Eugene Tellez  INTERNAL MEDICINE  [address] [place] [address]  Phone: [phone]  Fax: [phone]  Follow Up Time: 1-3 Days

## 2021-10-07 NOTE — ED PROVIDER NOTE - PROGRESS NOTE DETAILS
RK: patient reassessed. feels better. ciwa 10. requesting meds for reflux. will give pepcid. repleted mg. discussed case with ICU fellow, plan is  to admit to floors on tele monitoring.

## 2021-10-07 NOTE — ED PROVIDER NOTE - PATIENT PORTAL LINK FT
You can access the FollowMyHealth Patient Portal offered by [facility] by registering at the following website: [email or web address]. By joining [employer]’s FollowMyHealth portal, you will also be able to view your health information using other applications (apps) compatible with our system.

## 2021-10-07 NOTE — ED PROVIDER NOTE - PHYSICAL EXAMINATION
CONSTITUTIONAL: Well-developed; well-nourished; in no acute distress  SKIN: warm, dry  HEAD: Normocephalic; atraumatic  EYES:  no conjunctival erythema  ENT: No nasal discharge; airway clear, tongue fasciculations   NECK: Supple; non tender.  CARD: regular tachycardia  RESP: No wheezes, rales or rhonchi  ABD: soft ntnd  EXT: Normal ROM.  tremors with outstretched arms  NEURO: AOx4, moving all extremities  PSYCH: Cooperative, appropriate    CIWA score 17

## 2021-10-07 NOTE — ED ADULT NURSE NOTE - OBJECTIVE STATEMENT
pt states his last drink was yesterday - was discharged earlier today - states he is feeling anxious

## 2021-10-07 NOTE — H&P ADULT - ASSESSMENT
61 year old male, PMH ETOH abuse, presbycusis, gout, HTN, CKD3 presents due to tremors.    #Etoh abuse with HAGMA and ANDRZEJ on CKD3 (creatinine 1.8 baseline 1.3) likely prerenal  -bicarb 8 in ED, VBG being drawn now, lactate now and in am  -s/p 1 L LR, will give another 500cc bolus then LR @100 and oral bicarb  -CIWA protocol, thiamine, folate, pepcid  -monitor k and Mag  -catch team consult, last drink (vodka) was yesterday afternoon  -ANDRZEJ is likely prerenal and should improve with fluids if not send work up (lytes etc.)  -NPO for now advance diet as tolerates    #Hx of HTN   -Pt. reports going [facility] but has not been picking them up since 2019  -per prior notes was on Norvasc willl restart- will need to be resent to pharmacy    #GI ppx-pepcid  #DVT ppx- heparin subq

## 2021-10-07 NOTE — ED ADULT NURSE NOTE - OBJECTIVE STATEMENT
pt is 60 y/o male. pt states "I had a bit too much to drink". pt states he drank approx 1 pint of vodka. pt also states he was experiencing some chest discomfort earlier and has a history of "fast heart rate". cardia monitoring initiated. denies SOB and fevers.

## 2021-10-07 NOTE — ED PROVIDER NOTE - NS ED ROS FT
Constitutional:  No fever, chills, lethargy, or abnormal weight loss  Eyes:  No eye pain or visual changes  ENMT: No nasal discharge, no sore throat. No neck pain or stiffness  Cardiac:  +chest pain. NO palpitations  Respiratory:  No cough or respiratory distress  GI:  No nausea, vomiting, diarrhea or abdominal pain  :  No dysuria, frequency, or hematuria  MS:  No back or joint pain  Neuro:  No headache. No numbness, weakness, or tingling  Skin:  No skin rash or pruritus.   Except as documented in the HPI,  all other systems are negative

## 2021-10-07 NOTE — ED PROVIDER NOTE - OBJECTIVE STATEMENT
The patient is a 61 year old male with a PMHx of HTN and alcohol use disorder presenting to the ED with a chief complaint of chest pain. Per the patient for the past 4 hours he has been having both right and left-sided chest pain described as "someone kicking my chest" that has been constant with no radiation to the arms, back, or neck. There is no associated SOB or cough. He has had similar pain in the past when having a "fast heart rate" which is something he has been evaluated for in the past. He is unsure of any past cardiac diagnoses. The patient today has drank his normal 1.5 pints of vodka. No recent trauma, falls, or head injury; no recent N/V or any other complaints at this time.

## 2021-10-08 LAB
ALBUMIN SERPL ELPH-MCNC: 4.1 G/DL — SIGNIFICANT CHANGE UP (ref 3.5–5.2)
ALBUMIN SERPL ELPH-MCNC: 4.2 G/DL — SIGNIFICANT CHANGE UP (ref 3.5–5.2)
ALP SERPL-CCNC: 81 U/L — SIGNIFICANT CHANGE UP (ref 30–115)
ALP SERPL-CCNC: 86 U/L — SIGNIFICANT CHANGE UP (ref 30–115)
ALT FLD-CCNC: 20 U/L — SIGNIFICANT CHANGE UP (ref 0–41)
ALT FLD-CCNC: 23 U/L — SIGNIFICANT CHANGE UP (ref 0–41)
ANION GAP SERPL CALC-SCNC: 15 MMOL/L — HIGH (ref 7–14)
ANION GAP SERPL CALC-SCNC: 16 MMOL/L — HIGH (ref 7–14)
AST SERPL-CCNC: 28 U/L — SIGNIFICANT CHANGE UP (ref 0–41)
AST SERPL-CCNC: 30 U/L — SIGNIFICANT CHANGE UP (ref 0–41)
BASOPHILS # BLD AUTO: 0.08 K/UL — SIGNIFICANT CHANGE UP (ref 0–0.2)
BASOPHILS NFR BLD AUTO: 1.5 % — HIGH (ref 0–1)
BILIRUB DIRECT SERPL-MCNC: 0.4 MG/DL — HIGH (ref 0–0.2)
BILIRUB INDIRECT FLD-MCNC: 1 MG/DL — SIGNIFICANT CHANGE UP (ref 0.2–1.2)
BILIRUB SERPL-MCNC: 1.4 MG/DL — HIGH (ref 0.2–1.2)
BILIRUB SERPL-MCNC: 1.9 MG/DL — HIGH (ref 0.2–1.2)
BUN SERPL-MCNC: 17 MG/DL — SIGNIFICANT CHANGE UP (ref 10–20)
BUN SERPL-MCNC: 18 MG/DL — SIGNIFICANT CHANGE UP (ref 10–20)
CALCIUM SERPL-MCNC: 8.9 MG/DL — SIGNIFICANT CHANGE UP (ref 8.5–10.1)
CALCIUM SERPL-MCNC: 9.3 MG/DL — SIGNIFICANT CHANGE UP (ref 8.5–10.1)
CHLORIDE SERPL-SCNC: 101 MMOL/L — SIGNIFICANT CHANGE UP (ref 98–110)
CHLORIDE SERPL-SCNC: 102 MMOL/L — SIGNIFICANT CHANGE UP (ref 98–110)
CO2 SERPL-SCNC: 22 MMOL/L — SIGNIFICANT CHANGE UP (ref 17–32)
CO2 SERPL-SCNC: 24 MMOL/L — SIGNIFICANT CHANGE UP (ref 17–32)
COVID-19 SPIKE DOMAIN AB INTERP: POSITIVE
COVID-19 SPIKE DOMAIN ANTIBODY RESULT: >250 U/ML — HIGH
CREAT SERPL-MCNC: 1.2 MG/DL — SIGNIFICANT CHANGE UP (ref 0.7–1.5)
CREAT SERPL-MCNC: 1.4 MG/DL — SIGNIFICANT CHANGE UP (ref 0.7–1.5)
EOSINOPHIL # BLD AUTO: 0.02 K/UL — SIGNIFICANT CHANGE UP (ref 0–0.7)
EOSINOPHIL NFR BLD AUTO: 0.4 % — SIGNIFICANT CHANGE UP (ref 0–8)
GLUCOSE SERPL-MCNC: 174 MG/DL — HIGH (ref 70–99)
GLUCOSE SERPL-MCNC: 93 MG/DL — SIGNIFICANT CHANGE UP (ref 70–99)
HCT VFR BLD CALC: 33.5 % — LOW (ref 42–52)
HGB BLD-MCNC: 11.9 G/DL — LOW (ref 14–18)
IMM GRANULOCYTES NFR BLD AUTO: 0.2 % — SIGNIFICANT CHANGE UP (ref 0.1–0.3)
LACTATE SERPL-SCNC: 1.1 MMOL/L — SIGNIFICANT CHANGE UP (ref 0.7–2)
LYMPHOCYTES # BLD AUTO: 1.25 K/UL — SIGNIFICANT CHANGE UP (ref 1.2–3.4)
LYMPHOCYTES # BLD AUTO: 23.9 % — SIGNIFICANT CHANGE UP (ref 20.5–51.1)
MAGNESIUM SERPL-MCNC: 2 MG/DL — SIGNIFICANT CHANGE UP (ref 1.8–2.4)
MAGNESIUM SERPL-MCNC: 2.2 MG/DL — SIGNIFICANT CHANGE UP (ref 1.8–2.4)
MCHC RBC-ENTMCNC: 34.1 PG — HIGH (ref 27–31)
MCHC RBC-ENTMCNC: 35.5 G/DL — SIGNIFICANT CHANGE UP (ref 32–37)
MCV RBC AUTO: 96 FL — HIGH (ref 80–94)
MONOCYTES # BLD AUTO: 0.38 K/UL — SIGNIFICANT CHANGE UP (ref 0.1–0.6)
MONOCYTES NFR BLD AUTO: 7.3 % — SIGNIFICANT CHANGE UP (ref 1.7–9.3)
NEUTROPHILS # BLD AUTO: 3.5 K/UL — SIGNIFICANT CHANGE UP (ref 1.4–6.5)
NEUTROPHILS NFR BLD AUTO: 66.7 % — SIGNIFICANT CHANGE UP (ref 42.2–75.2)
NRBC # BLD: 0 /100 WBCS — SIGNIFICANT CHANGE UP (ref 0–0)
PHOSPHATE SERPL-MCNC: 1.4 MG/DL — LOW (ref 2.1–4.9)
PLATELET # BLD AUTO: 173 K/UL — SIGNIFICANT CHANGE UP (ref 130–400)
POTASSIUM SERPL-MCNC: 3.7 MMOL/L — SIGNIFICANT CHANGE UP (ref 3.5–5)
POTASSIUM SERPL-MCNC: 4.2 MMOL/L — SIGNIFICANT CHANGE UP (ref 3.5–5)
POTASSIUM SERPL-SCNC: 3.7 MMOL/L — SIGNIFICANT CHANGE UP (ref 3.5–5)
POTASSIUM SERPL-SCNC: 4.2 MMOL/L — SIGNIFICANT CHANGE UP (ref 3.5–5)
PROT SERPL-MCNC: 6.2 G/DL — SIGNIFICANT CHANGE UP (ref 6–8)
PROT SERPL-MCNC: 6.4 G/DL — SIGNIFICANT CHANGE UP (ref 6–8)
RBC # BLD: 3.49 M/UL — LOW (ref 4.7–6.1)
RBC # FLD: 12.4 % — SIGNIFICANT CHANGE UP (ref 11.5–14.5)
SALICYLATES SERPL-MCNC: <0.3 MG/DL — LOW (ref 4–30)
SARS-COV-2 IGG+IGM SERPL QL IA: >250 U/ML — HIGH
SARS-COV-2 IGG+IGM SERPL QL IA: POSITIVE
SODIUM SERPL-SCNC: 140 MMOL/L — SIGNIFICANT CHANGE UP (ref 135–146)
SODIUM SERPL-SCNC: 140 MMOL/L — SIGNIFICANT CHANGE UP (ref 135–146)
TROPONIN T SERPL-MCNC: <0.01 NG/ML — SIGNIFICANT CHANGE UP
WBC # BLD: 5.24 K/UL — SIGNIFICANT CHANGE UP (ref 4.8–10.8)
WBC # FLD AUTO: 5.24 K/UL — SIGNIFICANT CHANGE UP (ref 4.8–10.8)

## 2021-10-08 PROCEDURE — 99233 SBSQ HOSP IP/OBS HIGH 50: CPT

## 2021-10-08 RX ADMIN — Medication 2 MILLIGRAM(S): at 21:58

## 2021-10-08 RX ADMIN — Medication 650 MILLIGRAM(S): at 06:09

## 2021-10-08 RX ADMIN — PANTOPRAZOLE SODIUM 40 MILLIGRAM(S): 20 TABLET, DELAYED RELEASE ORAL at 06:09

## 2021-10-08 RX ADMIN — FAMOTIDINE 20 MILLIGRAM(S): 10 INJECTION INTRAVENOUS at 17:27

## 2021-10-08 RX ADMIN — SODIUM ZIRCONIUM CYCLOSILICATE 10 GRAM(S): 10 POWDER, FOR SUSPENSION ORAL at 06:09

## 2021-10-08 RX ADMIN — AMLODIPINE BESYLATE 5 MILLIGRAM(S): 2.5 TABLET ORAL at 06:09

## 2021-10-08 RX ADMIN — Medication 1 MILLIGRAM(S): at 11:17

## 2021-10-08 RX ADMIN — HEPARIN SODIUM 5000 UNIT(S): 5000 INJECTION INTRAVENOUS; SUBCUTANEOUS at 17:27

## 2021-10-08 RX ADMIN — Medication 2 MILLIGRAM(S): at 17:49

## 2021-10-08 RX ADMIN — HEPARIN SODIUM 5000 UNIT(S): 5000 INJECTION INTRAVENOUS; SUBCUTANEOUS at 06:09

## 2021-10-08 RX ADMIN — Medication 1 TABLET(S): at 11:17

## 2021-10-08 RX ADMIN — Medication 100 MILLIGRAM(S): at 11:17

## 2021-10-08 NOTE — CONSULT NOTE ADULT - SUBJECTIVE AND OBJECTIVE BOX
Addiction medicine consult placed for Nigel Noonan. Per resident, no additional detox recommendations necessary at this time, patient's symptoms are currently well-controlled, though patient does require referral for services.    CATCH team social workers aware, will follow patient.

## 2021-10-08 NOTE — PROGRESS NOTE ADULT - SUBJECTIVE AND OBJECTIVE BOX
CHIEF COMPLAINT:    Patient is a 61y old  Male who presents with a chief complaint of ETOH abuse     INTERVAL HPI/OVERNIGHT EVENTS:    Patient seen and examined at bedside. No acute overnight events occurred.    ROS: Reports tremor at rest, bilateral arm tingling and burning. All other systems are negative.    Vital Signs:    T(F): 97.1 (10-08-21 @ 14:01), Max: 98.3 (10-07-21 @ 19:43)  HR: 71 (10-08-21 @ 14:01) (71 - 128)  BP: 122/60 (10-08-21 @ 14:01) (110/55 - 135/62)  RR: 20 (10-08-21 @ 14:01) (18 - 20)  SpO2: 99% (10-07-21 @ 23:33) (98% - 99%)  I&O's Summary    Daily     Daily Weight in k (08 Oct 2021 05:37)  CAPILLARY BLOOD GLUCOSE          PHYSICAL EXAM:  GENERAL:  NAD  SKIN: No rashes or lesions  HEENT: Atraumatic. Normocephalic. Anicteric  NECK:  No JVD.   PULMONARY: Clear to ausculation bilaterally. No wheezing. No rales  CVS: Normal S1, S2. Regular rate and rhythm. No murmurs.  ABDOMEN/GI: Soft, Nontender, Nondistended; Bowel sounds are present  EXTREMITIES:  No edema B/L LE.  NEUROLOGIC:  No motor deficit.  PSYCH: Alert & oriented x 3, normal affect    Consultant(s) Notes Reviewed:  [x ] YES  [ ] NO      LABS:                        11.9   5.24  )-----------( 173      ( 08 Oct 2021 05:36 )             33.5     10-08    140  |  101  |  18  ----------------------------<  93  4.2   |  24  |  1.2    Ca    9.3      08 Oct 2021 05:36  Mg     2.2     10-08    TPro  6.4  /  Alb  4.2  /  TBili  1.9<H>  /  DBili  x   /  AST  28  /  ALT  23  /  AlkPhos  81  10-08        Trop <0.01, CKMB --, CK --, 10-08-21 @ 11:00  Trop <0.01, CKMB --, CK --, 10-07-21 @ 14:14  Trop <0.01, CKMB --, CK --, 10-07-21 @ 05:40  Trop <0.01, CKMB --, CK --, 10-07-21 @ 03:25        RADIOLOGY & ADDITIONAL TESTS:  Imaging or report Personally Reviewed:  [ ] YES  [ ] NO    Telemetry reviewed independently - no events    Medications:  Standing  amLODIPine   Tablet 5 milliGRAM(s) Oral daily  folic acid 1 milliGRAM(s) Oral daily  heparin   Injectable 5000 Unit(s) SubCutaneous every 12 hours  LORazepam     Tablet   Oral   multivitamin 1 Tablet(s) Oral daily  pantoprazole    Tablet 40 milliGRAM(s) Oral before breakfast  thiamine 100 milliGRAM(s) Oral daily    PRN Meds      Case discussed with resident  Care discussed with pt

## 2021-10-08 NOTE — PROGRESS NOTE ADULT - ASSESSMENT
61 year old male EtOH abuse from [employer] in EtOH w/d    #EtOH abuse with w/d symptoms on admission  - CIWA 5  - K 4.2 Mg 2.2  - thiamine, folate, pepcid  - monitor k and Mag  - catch team consult, last drink (vodka) was yesterday afternoon  - diet in place, pt edu not to eat if nauseated      # ANDRZEJ on CKD3 - resolved  - Cr BL 1.3; 1.8 on admission; 1.2 10/8  - monitor    # HAGMA - resolved  - s/p 1 L LR, will give another 500cc bolus then LR @100 and oral bicarb  - AG 31 on admission; 15 10/8    # HTN   - [facility] but has not been [facility] since 2019  - per prior notes was on Norvasc willl restart- will need to be resent to pharmacy    # DM  - A1C pending    # GI ppx-pepcid  # DVT ppx- heparin subq  # Activity- w/ assistance  # Diet- DASH as tolerated

## 2021-10-08 NOTE — PROGRESS NOTE ADULT - SUBJECTIVE AND OBJECTIVE BOX
LENGTH OF HOSPITAL STAY: 1d    CHIEF COMPLAINT:    Patient is a 61y old  Male who presents with a chief complaint of ETOH abuse (07 Oct 2021 18:36)      OVERNIGHT EVENTS:    - admitted to tele    FOLLOW UP:      HOSPITAL COURSE:    In the ED Pt. tachycardic to 124 (sinus tachy on EKG), with PE showing b/l arm shaking.  Lab work significant for HAGMA (bicarb 14> 8) abd ANDRZEJ (creatinine 1.8 baseline 1.3).    Pt. given valium, pepcid and 1 L LR.  Admitted for Etoh intoxication.    HPI:    61 year old male, PMH ETOH abuse, presbycusis, gout, HTN, CKD3 presents due to tremors.  Pt. is a resident at [facility] and reports feeling anxious, restless with b/l arm shakes and came to ED for eval. Pt. also noted 3 episodes of nonbilious nonbloody vomiting and is unable "to keep food down."   Pt. reports drinking half pint of vodka daily last drink yesterday afternoon. Pt. denies abdominal pain, diarrhea chest pain or SOB.    ALLERGIES:    Beef (Hives)  dairy products (Hives)  No Known Drug Allergies  shellfish (Hives)    PMHx:    Alcohol dependence; Vertigo; Tobacco dependency; HTN (hypertension); Hard of hearing; Gout; Seasonal allergies; CKD (chronic kidney disease); No significant past surgical history    SOCIAL Hx:    - EtOH misuse from [employer]     PHYSICAL EXAM:    Gen: NAD, resting in bed  HEENT: Normocephalic, atraumatic  Neck: supple, no lymphadenopathy  CV: Regular rate & regular rhythm  Lungs: decreased BS at bases, no fremitus  Abdomen: Soft, BS present  Ext: Warm, well perfused  Neuro: non focal, awake  Skin: no rash, no erythema   LENGTH OF HOSPITAL STAY: 1d    CHIEF COMPLAINT:    Patient is a 61y old  Male who presents with a chief complaint of ETOH abuse (07 Oct 2021 18:36)      OVERNIGHT EVENTS:    - admitted to tele    FOLLOW UP:    - H/H (Hg drop from 13.5-11.9, asymptomatic, likely dilutional); d/c tele    HOSPITAL COURSE:    In the ED Pt. tachycardic to 124 (sinus tachy on EKG), with PE showing b/l arm shaking.  Lab work significant for HAGMA (bicarb 14> 8) abd ANDRZEJ (creatinine 1.8 baseline 1.3).    Pt. given valium, pepcid and 1 L LR.  Admitted for Etoh intoxication.    Pt CIWA 5 this AM, HAGMA resolved, ANDRZEJ resolved, diet advanced. No complications.    HPI:    61 year old male, PMH ETOH abuse, presbycusis, gout, HTN, CKD3 presents due to tremors.  Pt. is a resident at [facility] and reports feeling anxious, restless with b/l arm shakes and came to ED for eval. Pt. also noted 3 episodes of nonbilious nonbloody vomiting and is unable "to keep food down."   Pt. reports drinking half pint of vodka daily last drink yesterday afternoon. Pt. denies abdominal pain, diarrhea chest pain or SOB.    ALLERGIES:    Beef (Hives)  dairy products (Hives)  No Known Drug Allergies  shellfish (Hives)    PMHx:    Alcohol dependence; Vertigo; Tobacco dependency; HTN (hypertension); Hard of hearing; Gout; Seasonal allergies; CKD (chronic kidney disease); No significant past surgical history    SOCIAL Hx:    - EtOH misuse from [employer]     MEDICATIONS:  STANDING MEDICATIONS  amLODIPine   Tablet 5 milliGRAM(s) Oral daily  famotidine Injectable 20 milliGRAM(s) IV Push daily  folic acid 1 milliGRAM(s) Oral daily  heparin   Injectable 5000 Unit(s) SubCutaneous every 12 hours  lactated ringers. 1000 milliLiter(s) IV Continuous <Continuous>  multivitamin 1 Tablet(s) Oral daily  pantoprazole    Tablet 40 milliGRAM(s) Oral before breakfast  thiamine 100 milliGRAM(s) Oral daily    PRN MEDICATIONS  LORazepam   Injectable 1 milliGRAM(s) IV Push every 2 hours PRN      LABS:                        11.9   5.24  )-----------( 173      ( 08 Oct 2021 05:36 )             33.5     10-08    140  |  101  |  18  ----------------------------<  93  4.2   |  24  |  1.2    Ca    9.3      08 Oct 2021 05:36  Mg     2.2     10-08    TPro  6.4  /  Alb  4.2  /  TBili  1.9<H>  /  DBili  x   /  AST  28  /  ALT  23  /  AlkPhos  81  10-08        ABG - ( 07 Oct 2021 18:20 )  pH, Arterial: 7.41  pH, Blood: x     /  pCO2: 33    /  pO2: 69    / HCO3: 21    / Base Excess: -2.9  /  SaO2: 96.8              Lactate, Blood: 1.1 mmol/L (10-08-21 @ 05:36)  Lactate, Blood: 2.4 mmol/L *H* (10-07-21 @ 19:51)  Troponin T, Serum: <0.01 ng/mL (10-07-21 @ 14:14)      CARDIAC MARKERS ( 07 Oct 2021 14:14 )  x     / <0.01 ng/mL / x     / x     / x      CARDIAC MARKERS ( 07 Oct 2021 05:40 )  x     / <0.01 ng/mL / x     / x     / x      CARDIAC MARKERS ( 07 Oct 2021 03:25 )  x     / <0.01 ng/mL / x     / x     / x          VITALS:   T(F): 98  HR: 80  BP: 112/74  RR: 18  SpO2: 99%        PHYSICAL EXAM:    Gen: NAD, resting in bed  HEENT: Normocephalic, atraumatic  Neck: supple, no lymphadenopathy  CV: Regular rate & regular rhythm  Lungs: decreased BS at bases, no fremitus  Abdomen: Soft, pendulous, nontender, BS present  Ext: Warm, well perfused  Neuro: non focal, awake  Skin: no rash, no erythema

## 2021-10-08 NOTE — PROGRESS NOTE ADULT - ASSESSMENT
62 yo M PMHx EtOH dependence, CKD III, HTN, DM II presented for evaluation of vomiting. Suspected EtOH withdrawal. Pt drinks 0.5-1pint of vodka daily. Last drink was day before symptoms began.     Suspected etoh withdrawal  Suspected Mg and folate deficiency due to etoh use  - CIWA 10  - atvian taper started, pt agreeable to detoxing  - c/w MVI, folate, b12 supplementation    ANDRZEJ on CKD III  - resolved  - likely due to dehydration from vomiting    Mixed acid base disturbance  Metabolic Alkalosis with respiratory acidosis  - resolved, likely due etoh use and vomiting    HTN   - controlled    DM II  - start check fingerstick  - if FS persistently >180 start basal bolus insulin    dc telemetry    #Progress Note Handoff:  Pending (specify):  ativan protocol  Family discussion: discussed ativan protocol  Disposition: Home_x__/SNF___/Other________/Unknown at this time________

## 2021-10-09 ENCOUNTER — TRANSCRIPTION ENCOUNTER (OUTPATIENT)
Age: 61
End: 2021-10-09

## 2021-10-09 LAB
ALBUMIN SERPL ELPH-MCNC: 4.1 G/DL — SIGNIFICANT CHANGE UP (ref 3.5–5.2)
ALP SERPL-CCNC: 81 U/L — SIGNIFICANT CHANGE UP (ref 30–115)
ALT FLD-CCNC: 20 U/L — SIGNIFICANT CHANGE UP (ref 0–41)
ANION GAP SERPL CALC-SCNC: 14 MMOL/L — SIGNIFICANT CHANGE UP (ref 7–14)
AST SERPL-CCNC: 32 U/L — SIGNIFICANT CHANGE UP (ref 0–41)
BASOPHILS # BLD AUTO: 0.06 K/UL — SIGNIFICANT CHANGE UP (ref 0–0.2)
BASOPHILS NFR BLD AUTO: 1.6 % — HIGH (ref 0–1)
BILIRUB SERPL-MCNC: 1.8 MG/DL — HIGH (ref 0.2–1.2)
BUN SERPL-MCNC: 15 MG/DL — SIGNIFICANT CHANGE UP (ref 10–20)
CALCIUM SERPL-MCNC: 9.1 MG/DL — SIGNIFICANT CHANGE UP (ref 8.5–10.1)
CHLORIDE SERPL-SCNC: 102 MMOL/L — SIGNIFICANT CHANGE UP (ref 98–110)
CO2 SERPL-SCNC: 23 MMOL/L — SIGNIFICANT CHANGE UP (ref 17–32)
CREAT SERPL-MCNC: 1 MG/DL — SIGNIFICANT CHANGE UP (ref 0.7–1.5)
EOSINOPHIL # BLD AUTO: 0.08 K/UL — SIGNIFICANT CHANGE UP (ref 0–0.7)
EOSINOPHIL NFR BLD AUTO: 2.2 % — SIGNIFICANT CHANGE UP (ref 0–8)
GLUCOSE BLDC GLUCOMTR-MCNC: 103 MG/DL — HIGH (ref 70–99)
GLUCOSE BLDC GLUCOMTR-MCNC: 113 MG/DL — HIGH (ref 70–99)
GLUCOSE BLDC GLUCOMTR-MCNC: 114 MG/DL — HIGH (ref 70–99)
GLUCOSE BLDC GLUCOMTR-MCNC: 123 MG/DL — HIGH (ref 70–99)
GLUCOSE SERPL-MCNC: 98 MG/DL — SIGNIFICANT CHANGE UP (ref 70–99)
HCT VFR BLD CALC: 35.4 % — LOW (ref 42–52)
HCT VFR BLD CALC: 36.3 % — LOW (ref 42–52)
HGB BLD-MCNC: 12.4 G/DL — LOW (ref 14–18)
HGB BLD-MCNC: 12.9 G/DL — LOW (ref 14–18)
IMM GRANULOCYTES NFR BLD AUTO: 0.3 % — SIGNIFICANT CHANGE UP (ref 0.1–0.3)
LYMPHOCYTES # BLD AUTO: 1.04 K/UL — LOW (ref 1.2–3.4)
LYMPHOCYTES # BLD AUTO: 28.3 % — SIGNIFICANT CHANGE UP (ref 20.5–51.1)
MAGNESIUM SERPL-MCNC: 1.9 MG/DL — SIGNIFICANT CHANGE UP (ref 1.8–2.4)
MCHC RBC-ENTMCNC: 33.5 PG — HIGH (ref 27–31)
MCHC RBC-ENTMCNC: 33.9 PG — HIGH (ref 27–31)
MCHC RBC-ENTMCNC: 35 G/DL — SIGNIFICANT CHANGE UP (ref 32–37)
MCHC RBC-ENTMCNC: 35.5 G/DL — SIGNIFICANT CHANGE UP (ref 32–37)
MCV RBC AUTO: 95.3 FL — HIGH (ref 80–94)
MCV RBC AUTO: 95.7 FL — HIGH (ref 80–94)
MONOCYTES # BLD AUTO: 0.28 K/UL — SIGNIFICANT CHANGE UP (ref 0.1–0.6)
MONOCYTES NFR BLD AUTO: 7.6 % — SIGNIFICANT CHANGE UP (ref 1.7–9.3)
NEUTROPHILS # BLD AUTO: 2.21 K/UL — SIGNIFICANT CHANGE UP (ref 1.4–6.5)
NEUTROPHILS NFR BLD AUTO: 60 % — SIGNIFICANT CHANGE UP (ref 42.2–75.2)
NRBC # BLD: 0 /100 WBCS — SIGNIFICANT CHANGE UP (ref 0–0)
NRBC # BLD: 0 /100 WBCS — SIGNIFICANT CHANGE UP (ref 0–0)
PHOSPHATE SERPL-MCNC: 2.1 MG/DL — SIGNIFICANT CHANGE UP (ref 2.1–4.9)
PLATELET # BLD AUTO: 133 K/UL — SIGNIFICANT CHANGE UP (ref 130–400)
PLATELET # BLD AUTO: 146 K/UL — SIGNIFICANT CHANGE UP (ref 130–400)
POTASSIUM SERPL-MCNC: 3.6 MMOL/L — SIGNIFICANT CHANGE UP (ref 3.5–5)
POTASSIUM SERPL-SCNC: 3.6 MMOL/L — SIGNIFICANT CHANGE UP (ref 3.5–5)
PROT SERPL-MCNC: 6.3 G/DL — SIGNIFICANT CHANGE UP (ref 6–8)
RBC # BLD: 3.7 M/UL — LOW (ref 4.7–6.1)
RBC # BLD: 3.81 M/UL — LOW (ref 4.7–6.1)
RBC # FLD: 11.9 % — SIGNIFICANT CHANGE UP (ref 11.5–14.5)
RBC # FLD: 12 % — SIGNIFICANT CHANGE UP (ref 11.5–14.5)
SODIUM SERPL-SCNC: 139 MMOL/L — SIGNIFICANT CHANGE UP (ref 135–146)
WBC # BLD: 3.68 K/UL — LOW (ref 4.8–10.8)
WBC # BLD: 5.36 K/UL — SIGNIFICANT CHANGE UP (ref 4.8–10.8)
WBC # FLD AUTO: 3.68 K/UL — LOW (ref 4.8–10.8)
WBC # FLD AUTO: 5.36 K/UL — SIGNIFICANT CHANGE UP (ref 4.8–10.8)

## 2021-10-09 PROCEDURE — 99232 SBSQ HOSP IP/OBS MODERATE 35: CPT

## 2021-10-09 RX ORDER — TAMSULOSIN HYDROCHLORIDE 0.4 MG/1
0.4 CAPSULE ORAL AT BEDTIME
Refills: 0 | Status: DISCONTINUED | OUTPATIENT
Start: 2021-10-09 | End: 2021-10-11

## 2021-10-09 RX ORDER — POTASSIUM CHLORIDE 20 MEQ
20 PACKET (EA) ORAL ONCE
Refills: 0 | Status: COMPLETED | OUTPATIENT
Start: 2021-10-09 | End: 2021-10-09

## 2021-10-09 RX ORDER — MAGNESIUM SULFATE 500 MG/ML
2 VIAL (ML) INJECTION ONCE
Refills: 0 | Status: COMPLETED | OUTPATIENT
Start: 2021-10-09 | End: 2021-10-09

## 2021-10-09 RX ADMIN — HEPARIN SODIUM 5000 UNIT(S): 5000 INJECTION INTRAVENOUS; SUBCUTANEOUS at 18:10

## 2021-10-09 RX ADMIN — AMLODIPINE BESYLATE 5 MILLIGRAM(S): 2.5 TABLET ORAL at 06:27

## 2021-10-09 RX ADMIN — Medication 1.5 MILLIGRAM(S): at 18:10

## 2021-10-09 RX ADMIN — PANTOPRAZOLE SODIUM 40 MILLIGRAM(S): 20 TABLET, DELAYED RELEASE ORAL at 06:27

## 2021-10-09 RX ADMIN — Medication 100 MILLIGRAM(S): at 11:24

## 2021-10-09 RX ADMIN — Medication 2 MILLIGRAM(S): at 14:44

## 2021-10-09 RX ADMIN — Medication 1.5 MILLIGRAM(S): at 21:09

## 2021-10-09 RX ADMIN — TAMSULOSIN HYDROCHLORIDE 0.4 MILLIGRAM(S): 0.4 CAPSULE ORAL at 21:09

## 2021-10-09 RX ADMIN — Medication 25 GRAM(S): at 12:26

## 2021-10-09 RX ADMIN — Medication 1 MILLIGRAM(S): at 11:24

## 2021-10-09 RX ADMIN — HEPARIN SODIUM 5000 UNIT(S): 5000 INJECTION INTRAVENOUS; SUBCUTANEOUS at 06:27

## 2021-10-09 RX ADMIN — Medication 50 MILLIEQUIVALENT(S): at 14:45

## 2021-10-09 RX ADMIN — Medication 2 MILLIGRAM(S): at 06:30

## 2021-10-09 RX ADMIN — Medication 1 TABLET(S): at 11:24

## 2021-10-09 RX ADMIN — Medication 2 MILLIGRAM(S): at 10:56

## 2021-10-09 NOTE — PROGRESS NOTE ADULT - SUBJECTIVE AND OBJECTIVE BOX
CHIEF COMPLAINT:    Patient is a 61y old  Male who presents with a chief complaint of ETOH abuse     INTERVAL HPI/OVERNIGHT EVENTS:    Patient seen and examined at bedside. No acute overnight events occurred.    ROS: Reports continued tremors, nausea, tingling but significantly improved. All other systems are negative.    Vital Signs:    T(F): 98 (10-09-21 @ 14:13), Max: 98 (10-09-21 @ 14:13)  HR: 78 (10-09-21 @ 14:13) (75 - 78)  BP: 132/67 (10-09-21 @ 14:13) (125/65 - 132/67)  RR: 18 (10-09-21 @ 14:13) (18 - 19)  SpO2: --  I&O's Summary    09 Oct 2021 07:01  -  09 Oct 2021 15:07  --------------------------------------------------------  IN: 690 mL / OUT: 0 mL / NET: 690 mL      Daily     Daily Weight in k.5 (09 Oct 2021 05:29)  CAPILLARY BLOOD GLUCOSE      POCT Blood Glucose.: 113 mg/dL (09 Oct 2021 11:36)  POCT Blood Glucose.: 103 mg/dL (09 Oct 2021 07:45)      PHYSICAL EXAM:  GENERAL:  NAD  SKIN: No rashes or lesions  HEENT: Atraumatic. Normocephalic. Anicteric  NECK:  No JVD.   PULMONARY: Clear to ausculation bilaterally. No wheezing. No rales  CVS: Normal S1, S2. Regular rate and rhythm. No murmurs.  ABDOMEN/GI: Soft, Nontender, Nondistended; Bowel sounds are present  EXTREMITIES:  No edema B/L LE.  NEUROLOGIC:  No motor deficit. mild tremor  PSYCH: Alert & oriented x 3, normal affect      LABS:                        12.9   5.36  )-----------( 146      ( 09 Oct 2021 11:50 )             36.3     10-09    139  |  102  |  15  ----------------------------<  98  3.6   |  23  |  1.0    Ca    9.1      09 Oct 2021 07:59  Phos  2.1     10-09  Mg     1.9     10-09    TPro  6.3  /  Alb  4.1  /  TBili  1.8<H>  /  DBili  x   /  AST  32  /  ALT  20  /  AlkPhos  81  10-09        Trop <0.01, CKMB --, CK --, 10-08-21 @ 11:00  Trop <0.01, CKMB --, CK --, 10-07-21 @ 14:14  Trop <0.01, CKMB --, CK --, 10-07-21 @ 05:40  Trop <0.01, CKMB --, CK --, 10-07-21 @ 03:25        RADIOLOGY & ADDITIONAL TESTS:      Medications:  Standing  amLODIPine   Tablet 5 milliGRAM(s) Oral daily  folic acid 1 milliGRAM(s) Oral daily  heparin   Injectable 5000 Unit(s) SubCutaneous every 12 hours  LORazepam     Tablet   Oral   LORazepam     Tablet 1.5 milliGRAM(s) Oral every 4 hours  multivitamin 1 Tablet(s) Oral daily  pantoprazole    Tablet 40 milliGRAM(s) Oral before breakfast  thiamine 100 milliGRAM(s) Oral daily    PRN Meds      Case discussed with resident  Care discussed with pt

## 2021-10-09 NOTE — PROGRESS NOTE ADULT - SUBJECTIVE AND OBJECTIVE BOX
LENGTH OF HOSPITAL STAY: 2d      CHIEF COMPLAINT:    Patient is a 61y old  Male who presents with a chief complaint of ETOH abuse (07 Oct 2021 18:36)      OVERNIGHT EVENTS:    - no overnight events, pt tolerated PO diet    FOLLOW UP:    - H/H (Hg drop from 13.5-11.9, asymptomatic, likely dilutional); d/c tele    HOSPITAL COURSE:    In the ED Pt. tachycardic to 124 (sinus tachy on EKG), with PE showing b/l arm shaking.  Lab work significant for HAGMA (bicarb 14> 8) abd ANDRZEJ (creatinine 1.8 baseline 1.3).    Pt. given valium, pepcid and 1 L LR.  Admitted for Etoh intoxication.    Pt CIWA 5 this AM, HAGMA resolved, ANDRZEJ resolved, diet advanced. No complications.  Pt on lowest taper of ativan. H/H monitored;     HPI:    61 year old male, PMH ETOH abuse, presbycusis, gout, HTN, CKD3 presents due to tremors.  Pt. is a resident at [facility] and reports feeling anxious, restless with b/l arm shakes and came to ED for eval. Pt. also noted 3 episodes of nonbilious nonbloody vomiting and is unable "to keep food down."   Pt. reports drinking half pint of vodka daily last drink yesterday afternoon. Pt. denies abdominal pain, diarrhea chest pain or SOB.    ALLERGIES:    Beef (Hives)  dairy products (Hives)  No Known Drug Allergies  shellfish (Hives)    PMHx:    Alcohol dependence; Vertigo; Tobacco dependency; HTN (hypertension); Hard of hearing; Gout; Seasonal allergies; CKD (chronic kidney disease); No significant past surgical history    SOCIAL Hx:    - EtOH misuse from [employer]       PHYSICAL EXAM:    Gen: NAD, resting in bed  HEENT: Normocephalic, atraumatic  Neck: supple, no lymphadenopathy  CV: Regular rate & regular rhythm  Lungs: decreased BS at bases, no fremitus  Abdomen: Soft, pendulous, nontender, BS present  Ext: Warm, well perfused  Neuro: non focal, awake  Skin: no rash, no erythema   LENGTH OF HOSPITAL STAY: 2d      CHIEF COMPLAINT:    Patient is a 61y old  Male who presents with a chief complaint of ETOH abuse (07 Oct 2021 18:36)      OVERNIGHT EVENTS:    - no overnight events, pt tolerated PO diet    FOLLOW UP:    - H/H (Hg drop from 13.5-11.9, asymptomatic, likely dilutional); d/c tele    HOSPITAL COURSE:    In the ED Pt. tachycardic to 124 (sinus tachy on EKG), with PE showing b/l arm shaking.  Lab work significant for HAGMA (bicarb 14> 8) abd ANDRZEJ (creatinine 1.8 baseline 1.3).    Pt. given valium, pepcid and 1 L LR.  Admitted for Etoh intoxication.    Pt CIWA 5 this AM, HAGMA resolved, ANDRZEJ resolved, diet advanced. No complications.  Pt on lowest taper of ativan. H/H monitored;     HPI:    61 year old male, PMH ETOH abuse, presbycusis, gout, HTN, CKD3 presents due to tremors.  Pt. is a resident at [employer] and reports feeling anxious, restless with b/l arm shakes and came to ED for eval. Pt. also noted 3 episodes of nonbilious nonbloody vomiting and is unable "to keep food down."   Pt. reports drinking half pint of vodka daily last drink yesterday afternoon. Pt. denies abdominal pain, diarrhea chest pain or SOB.    ALLERGIES:    Beef (Hives)  dairy products (Hives)  No Known Drug Allergies  shellfish (Hives)    PMHx:    Alcohol dependence; Vertigo; Tobacco dependency; HTN (hypertension); Hard of hearing; Gout; Seasonal allergies; CKD (chronic kidney disease); No significant past surgical history    SOCIAL Hx:    - EtOH misuse from [employer]     MEDICATIONS:  STANDING MEDICATIONS  amLODIPine   Tablet 5 milliGRAM(s) Oral daily  folic acid 1 milliGRAM(s) Oral daily  heparin   Injectable 5000 Unit(s) SubCutaneous every 12 hours  LORazepam     Tablet 2 milliGRAM(s) Oral every 4 hours  LORazepam     Tablet   Oral   LORazepam     Tablet 1.5 milliGRAM(s) Oral every 4 hours  multivitamin 1 Tablet(s) Oral daily  pantoprazole    Tablet 40 milliGRAM(s) Oral before breakfast  thiamine 100 milliGRAM(s) Oral daily    PRN MEDICATIONS      LABS:                        12.4   3.68  )-----------( 133      ( 09 Oct 2021 07:59 )             35.4     10-09    139  |  102  |  15  ----------------------------<  98  3.6   |  23  |  1.0    Ca    9.1      09 Oct 2021 07:59  Phos  2.1     10-09  Mg     1.9     10-09    TPro  6.3  /  Alb  4.1  /  TBili  1.8<H>  /  DBili  x   /  AST  32  /  ALT  20  /  AlkPhos  81  10-09        ABG - ( 07 Oct 2021 18:20 )  pH, Arterial: 7.41  pH, Blood: x     /  pCO2: 33    /  pO2: 69    / HCO3: 21    / Base Excess: -2.9  /  SaO2: 96.8              Troponin T, Serum: <0.01 ng/mL (10-08-21 @ 11:00)      CARDIAC MARKERS ( 08 Oct 2021 11:00 )  x     / <0.01 ng/mL / x     / x     / x      CARDIAC MARKERS ( 07 Oct 2021 14:14 )  x     / <0.01 ng/mL / x     / x     / x          VITALS:   T(F): 97.5  HR: 77  BP: 131/75  RR: 19  SpO2: --        PHYSICAL EXAM:    Gen: NAD, resting in bed  HEENT: Normocephalic, atraumatic  Neck: supple, no lymphadenopathy  CV: Regular rate & regular rhythm  Lungs: decreased BS at bases, no fremitus  Abdomen: Soft, pendulous, nontender, BS present  Ext: Warm, well perfused  Neuro: non focal, awake  Skin: no rash, no erythema

## 2021-10-09 NOTE — DISCHARGE NOTE PROVIDER - NSDCMRMEDTOKEN_GEN_ALL_CORE_FT
Flomax 0.4 mg oral capsule: 1 cap(s) orally once a day (at bedtime)  folic acid 1 mg oral tablet: 1 tab(s) orally once a day  Norvasc 5 mg oral tablet: 1 tab(s) orally once a day  predniSONE 20 mg oral tablet: 2 tab(s) orally once a day for 2 days, then 1 tablet once a day for 1 day.   stop after 3 days total upon discharge.   thiamine 100 mg oral tablet: 1 tab(s) orally once a day   Flomax 0.4 mg oral capsule: 1 cap(s) orally once a day (at bedtime)  folic acid 1 mg oral tablet: 1 tab(s) orally once a day  Norvasc 5 mg oral tablet: 1 tab(s) orally once a day  thiamine 100 mg oral tablet: 1 tab(s) orally once a day

## 2021-10-09 NOTE — DISCHARGE NOTE PROVIDER - HOSPITAL COURSE
HOSPITAL COURSE:    In the ED Pt. tachycardic to 124 (sinus tachy on EKG), with PE showing b/l arm shaking.  Lab work significant for HAGMA (bicarb 14> 8) abd ANDRZEJ (creatinine 1.8 baseline 1.3).    Pt. given valium, pepcid and 1 L LR.  Admitted for Etoh intoxication.    Pt CIWA continued to downtrend from 10 baseline prior to discharge. HAGMA resolved, ANDRZEJ resolved, diet advanced. No complications.  Pt on lowest taper of ativan. H/H monitored; f/u OP w/ PCP.    HPI:    61 year old male, PMH ETOH abuse, presbycusis, gout, HTN, CKD3 presents due to tremors.  Pt. is a resident at [facility] and reports feeling anxious, restless with b/l arm shakes and came to ED for eval. Pt. also noted 3 episodes of nonbilious nonbloody vomiting and is unable "to keep food down."   Pt. reports drinking half pint of vodka daily last drink yesterday afternoon. Pt. denies abdominal pain, diarrhea chest pain or SOB.   HOSPITAL COURSE:    In the ED Pt. tachycardic to 124 (sinus tachy on EKG), with PE showing b/l arm shaking.  Lab work significant for HAGMA (bicarb 14> 8) abd ANDRZEJ (creatinine 1.8 baseline 1.3).    Pt. given valium, pepcid and 1 L LR.  Admitted for Etoh idetox    Pt CIWA continued to downtrend from 10 baseline prior to discharge. HAGMA resolved, ANDRZEJ resolved, diet advanced. No complications.  Pt on lowest taper of ativan. H/H monitored; f/u OP w/ PCP.    HPI:    61 year old male, PMH ETOH abuse, presbycusis, gout, HTN, CKD3 presents due to tremors.  Pt. is a resident at [employer] and reports feeling anxious, restless with b/l arm shakes and came to ED for eval. Pt. also noted 3 episodes of nonbilious nonbloody vomiting and is unable "to keep food down."   Pt. reports drinking half pint of vodka daily last drink day prior to admission. Pt. denies abdominal pain, diarrhea chest pain or SOB.

## 2021-10-09 NOTE — DISCHARGE NOTE PROVIDER - NSDCCPCAREPLAN_GEN_ALL_CORE_FT
PRINCIPAL DISCHARGE DIAGNOSIS  Diagnosis: Alcohol withdrawal  Assessment and Plan of Treatment: Alcohol Abuse  Alcohol intoxication occurs when the amount of alcohol that a person has consumed impairs his or her ability to mentally and physically function. Chronic alcohol consumption can also lead to a variety of health issues including neurological disease, stomach disease, heart disease, liver disease, etc. Do not drive after drinking alcohol. Drinking enough alcohol to end up in an Emergency Room suggests you may have an alcohol abuse problem. Seek help at a drug addiction center.  SEEK IMMEDIATE MEDICAL CARE IF YOU HAVE ANY OF THE FOLLOWING SYMPTOMS: seizures, vomiting blood, blood in your stool, lightheadedness/dizziness, or becoming shaky to tremulous when you stop drinking.        SECONDARY DISCHARGE DIAGNOSES  Diagnosis: Acute kidney injury superimposed on chronic kidney disease  Assessment and Plan of Treatment: You were noted to have a temporary insult to your kidney function either at the time that you arrived at the hospital or during your stay here. We have monitored your kidney function with blood work during your time here and you are at a level that no longer requires continued hospital level care, but we do recommend that you follow up to continually have your kidney function checked. You can follow up with your primary care doctor, or, if recommended in the discharge paperwork, you should follow up with a Kidney specialist called a Nephrologist.

## 2021-10-09 NOTE — PROGRESS NOTE ADULT - ASSESSMENT
62 yo M PMHx EtOH dependence, CKD III, HTN, DM II presented for evaluation of vomiting. Suspected EtOH withdrawal. Pt drinks 0.5-1 pint of vodka daily. Last drink was day before symptoms began.     Suspected etoh withdrawal  Suspected Mg and folate deficiency due to etoh use  - CIWA 4, was 10 yesteday  - atvian taper started yesterday, pt agreeable to detoxing  - c/w MVI, folate, b12 supplementation    ANDRZEJ on CKD III  - resolved  - likely due to dehydration from vomiting    Mixed acid base disturbance  Metabolic Alkalosis with respiratory acidosis  - resolved, likely due etoh use and vomiting    HTN   - controlled    DM II  - start check fingerstick  - if FS persistently >180 start basal bolus insulin    dc telemetry    #Progress Note Handoff:  Pending (specify):  ativan protocol  Family discussion: discussed ativan protocol  Disposition: Home_x__/SNF___/Other________/Unknown at this time________

## 2021-10-09 NOTE — PROGRESS NOTE ADULT - ASSESSMENT
61 year old male EtOH abuse from [employer] in EtOH w/d    #EtOH abuse with w/d symptoms on admission  - CIWA 5  - K 4.2 Mg 2.2  - thiamine, folate, pepcid  - monitor k and Mag  - catch team consult, last drink (vodka) was yesterday afternoon  - diet in place, pt edu not to eat if nauseated  - lowest ativan taper    # ANDRZEJ on CKD3 - resolved  - Cr BL 1.3; 1.8 on admission; 1.2 10/8  - monitor    # HAGMA - resolved  - s/p 1 L LR, will give another 500cc bolus then LR @100 and oral bicarb  - AG 31 on admission; 15 10/8    # HTN   - Pt. reports [facility] but has not been [facility] them [facility] since 2019  - per prior notes was on Norvasc willl restart- will need to be resent to pharmacy    # DM  - A1C pending    # GI ppx-pepcid  # DVT ppx- heparin subq  # Activity- w/ assistance  # Diet- DASH as tolerated     Pending: stable H/H; resolution of withdrawal     61 year old male EtOH abuse from [employer] in EtOH w/d    #EtOH abuse with w/d symptoms on admission  - CIWA 10 admission --> <4 today  - K 4.2 Mg 2.2  - thiamine, folate, pepcid  - monitor k and Mag  - catch team consult, last drink (vodka) was yesterday afternoon  - diet in place, pt edu not to eat if nauseated  - lowest ativan taper    # ANDRZEJ on CKD3 - resolved  - Cr BL 1.3; 1.8 on admission; 1.2 10/8  - monitor    # HAGMA - resolved  - s/p 1 L LR, will give another 500cc bolus then LR @100 and oral bicarb  - AG 31 on admission; 15 10/8    # HTN   - [facility] not [facility] since 2019  - per prior notes was on Norvasc willl restart- will need to be resent to pharmacy    # DM  - A1C pending    # GI ppx-pepcid  # DVT ppx- heparin subq  # Activity- w/ assistance  # Diet- DASH as tolerated     Pending: stable H/H; resolution of withdrawal

## 2021-10-09 NOTE — DISCHARGE NOTE PROVIDER - CARE PROVIDER_API CALL
Eugene Tellez  INTERNAL MEDICINE  [address] [place] [address]  Phone: [phone]  Fax: [phone]  Follow Up Time:

## 2021-10-10 LAB
ALBUMIN SERPL ELPH-MCNC: 4.1 G/DL — SIGNIFICANT CHANGE UP (ref 3.5–5.2)
ALP SERPL-CCNC: 87 U/L — SIGNIFICANT CHANGE UP (ref 30–115)
ALT FLD-CCNC: 30 U/L — SIGNIFICANT CHANGE UP (ref 0–41)
ANION GAP SERPL CALC-SCNC: 13 MMOL/L — SIGNIFICANT CHANGE UP (ref 7–14)
AST SERPL-CCNC: 53 U/L — HIGH (ref 0–41)
BASOPHILS # BLD AUTO: 0.05 K/UL — SIGNIFICANT CHANGE UP (ref 0–0.2)
BASOPHILS NFR BLD AUTO: 1 % — SIGNIFICANT CHANGE UP (ref 0–1)
BILIRUB SERPL-MCNC: 1.2 MG/DL — SIGNIFICANT CHANGE UP (ref 0.2–1.2)
BUN SERPL-MCNC: 13 MG/DL — SIGNIFICANT CHANGE UP (ref 10–20)
CALCIUM SERPL-MCNC: 9.3 MG/DL — SIGNIFICANT CHANGE UP (ref 8.5–10.1)
CHLORIDE SERPL-SCNC: 101 MMOL/L — SIGNIFICANT CHANGE UP (ref 98–110)
CO2 SERPL-SCNC: 22 MMOL/L — SIGNIFICANT CHANGE UP (ref 17–32)
CREAT SERPL-MCNC: 1 MG/DL — SIGNIFICANT CHANGE UP (ref 0.7–1.5)
EOSINOPHIL # BLD AUTO: 0.17 K/UL — SIGNIFICANT CHANGE UP (ref 0–0.7)
EOSINOPHIL NFR BLD AUTO: 3.3 % — SIGNIFICANT CHANGE UP (ref 0–8)
GLUCOSE BLDC GLUCOMTR-MCNC: 100 MG/DL — HIGH (ref 70–99)
GLUCOSE BLDC GLUCOMTR-MCNC: 101 MG/DL — HIGH (ref 70–99)
GLUCOSE BLDC GLUCOMTR-MCNC: 109 MG/DL — HIGH (ref 70–99)
GLUCOSE BLDC GLUCOMTR-MCNC: 131 MG/DL — HIGH (ref 70–99)
GLUCOSE SERPL-MCNC: 127 MG/DL — HIGH (ref 70–99)
HCT VFR BLD CALC: 35.6 % — LOW (ref 42–52)
HGB BLD-MCNC: 12.5 G/DL — LOW (ref 14–18)
IMM GRANULOCYTES NFR BLD AUTO: 0.4 % — HIGH (ref 0.1–0.3)
LYMPHOCYTES # BLD AUTO: 0.98 K/UL — LOW (ref 1.2–3.4)
LYMPHOCYTES # BLD AUTO: 19.1 % — LOW (ref 20.5–51.1)
MAGNESIUM SERPL-MCNC: 1.8 MG/DL — SIGNIFICANT CHANGE UP (ref 1.8–2.4)
MCHC RBC-ENTMCNC: 33.6 PG — HIGH (ref 27–31)
MCHC RBC-ENTMCNC: 35.1 G/DL — SIGNIFICANT CHANGE UP (ref 32–37)
MCV RBC AUTO: 95.7 FL — HIGH (ref 80–94)
MONOCYTES # BLD AUTO: 0.28 K/UL — SIGNIFICANT CHANGE UP (ref 0.1–0.6)
MONOCYTES NFR BLD AUTO: 5.5 % — SIGNIFICANT CHANGE UP (ref 1.7–9.3)
NEUTROPHILS # BLD AUTO: 3.63 K/UL — SIGNIFICANT CHANGE UP (ref 1.4–6.5)
NEUTROPHILS NFR BLD AUTO: 70.7 % — SIGNIFICANT CHANGE UP (ref 42.2–75.2)
NRBC # BLD: 0 /100 WBCS — SIGNIFICANT CHANGE UP (ref 0–0)
PLATELET # BLD AUTO: 110 K/UL — LOW (ref 130–400)
POTASSIUM SERPL-MCNC: 4.1 MMOL/L — SIGNIFICANT CHANGE UP (ref 3.5–5)
POTASSIUM SERPL-SCNC: 4.1 MMOL/L — SIGNIFICANT CHANGE UP (ref 3.5–5)
PROT SERPL-MCNC: 6.5 G/DL — SIGNIFICANT CHANGE UP (ref 6–8)
RBC # BLD: 3.72 M/UL — LOW (ref 4.7–6.1)
RBC # FLD: 11.9 % — SIGNIFICANT CHANGE UP (ref 11.5–14.5)
SODIUM SERPL-SCNC: 136 MMOL/L — SIGNIFICANT CHANGE UP (ref 135–146)
WBC # BLD: 5.13 K/UL — SIGNIFICANT CHANGE UP (ref 4.8–10.8)
WBC # FLD AUTO: 5.13 K/UL — SIGNIFICANT CHANGE UP (ref 4.8–10.8)

## 2021-10-10 PROCEDURE — 99232 SBSQ HOSP IP/OBS MODERATE 35: CPT

## 2021-10-10 RX ADMIN — Medication 1 MILLIGRAM(S): at 21:37

## 2021-10-10 RX ADMIN — Medication 1 MILLIGRAM(S): at 12:12

## 2021-10-10 RX ADMIN — Medication 1 TABLET(S): at 12:12

## 2021-10-10 RX ADMIN — TAMSULOSIN HYDROCHLORIDE 0.4 MILLIGRAM(S): 0.4 CAPSULE ORAL at 21:37

## 2021-10-10 RX ADMIN — Medication 1.5 MILLIGRAM(S): at 14:34

## 2021-10-10 RX ADMIN — AMLODIPINE BESYLATE 5 MILLIGRAM(S): 2.5 TABLET ORAL at 05:38

## 2021-10-10 RX ADMIN — Medication 1.5 MILLIGRAM(S): at 05:38

## 2021-10-10 RX ADMIN — Medication 100 MILLIGRAM(S): at 12:12

## 2021-10-10 RX ADMIN — HEPARIN SODIUM 5000 UNIT(S): 5000 INJECTION INTRAVENOUS; SUBCUTANEOUS at 05:38

## 2021-10-10 RX ADMIN — Medication 1.5 MILLIGRAM(S): at 10:08

## 2021-10-10 RX ADMIN — PANTOPRAZOLE SODIUM 40 MILLIGRAM(S): 20 TABLET, DELAYED RELEASE ORAL at 05:38

## 2021-10-10 RX ADMIN — Medication 1 MILLIGRAM(S): at 18:00

## 2021-10-10 RX ADMIN — HEPARIN SODIUM 5000 UNIT(S): 5000 INJECTION INTRAVENOUS; SUBCUTANEOUS at 17:45

## 2021-10-10 NOTE — PROGRESS NOTE ADULT - SUBJECTIVE AND OBJECTIVE BOX
CHIEF COMPLAINT:    Patient is a 61y old  Male who presents with a chief complaint of ETOH abuse    INTERVAL HPI/OVERNIGHT EVENTS:    Patient seen and examined at bedside. No acute overnight events occurred.    ROS: Reports mild tremors, no nausea or vomiting. All other systems are negative.    Vital Signs:    T(F): 96.2 (10-10-21 @ 12:41), Max: 98.5 (10-09-21 @ 20:30)  HR: 92 (10-10-21 @ 12:41) (76 - 92)  BP: 155/78 (10-10-21 @ 12:41) (117/61 - 155/78)  RR: 18 (10-10-21 @ 12:41) (18 - 18)  SpO2: --  I&O's Summary    09 Oct 2021 07:01  -  10 Oct 2021 07:00  --------------------------------------------------------  IN: 1290 mL / OUT: 0 mL / NET: 1290 mL      Daily     Daily Weight in k (10 Oct 2021 05:00)  CAPILLARY BLOOD GLUCOSE      POCT Blood Glucose.: 109 mg/dL (10 Oct 2021 11:13)  POCT Blood Glucose.: 100 mg/dL (10 Oct 2021 07:27)  POCT Blood Glucose.: 114 mg/dL (09 Oct 2021 21:07)  POCT Blood Glucose.: 123 mg/dL (09 Oct 2021 16:43)      PHYSICAL EXAM:  GENERAL:  NAD  SKIN: No rashes or lesions  HEENT: Atraumatic. Normocephalic. Anicteric  NECK:  No JVD.   PULMONARY: Clear to ausculation bilaterally. No wheezing. No rales  CVS: Normal S1, S2. Regular rate and rhythm. No murmurs.  ABDOMEN/GI: Soft, Nontender, Nondistended; Bowel sounds are present  EXTREMITIES:  No edema B/L LE.  NEUROLOGIC:  No motor deficit.  PSYCH: Alert & oriented x 3, normal affect      LABS:                        12.5   5.13  )-----------( 110      ( 10 Oct 2021 09:52 )             35.6     10-10    136  |  101  |  13  ----------------------------<  127<H>  4.1   |  22  |  1.0    Ca    9.3      10 Oct 2021 09:52  Phos  2.1     10-09  Mg     1.8     10-10    TPro  6.5  /  Alb  4.1  /  TBili  1.2  /  DBili  x   /  AST  53<H>  /  ALT  30  /  AlkPhos  87  10-10        Trop <0.01, CKMB --, CK --, 10-08-21 @ 11:00        RADIOLOGY & ADDITIONAL TESTS:      Medications:  Standing  amLODIPine   Tablet 5 milliGRAM(s) Oral daily  folic acid 1 milliGRAM(s) Oral daily  heparin   Injectable 5000 Unit(s) SubCutaneous every 12 hours  LORazepam     Tablet   Oral   LORazepam     Tablet 1 milliGRAM(s) Oral every 4 hours  multivitamin 1 Tablet(s) Oral daily  pantoprazole    Tablet 40 milliGRAM(s) Oral before breakfast  tamsulosin 0.4 milliGRAM(s) Oral at bedtime    PRN Meds      Case discussed with resident  Care discussed with pt

## 2021-10-10 NOTE — PROGRESS NOTE ADULT - ASSESSMENT
60 yo M PMHx EtOH dependence, CKD III, HTN, DM II presented for evaluation of vomiting. Suspected EtOH withdrawal. Pt drinks 0.5-1 pint of vodka daily. Last drink was day before symptoms began.     Suspected etoh withdrawal  Suspected Mg and folate deficiency due to etoh use  - CIWA 3,  - c/w atvian taper   - c/w MVI, folate, b12 supplementation    ANDRZEJ on CKD III  - resolved  - likely due to dehydration from vomiting    Mixed acid base disturbance  Metabolic Alkalosis with respiratory acidosis  - resolved, likely due etoh use and vomiting    HTN   - controlled    DM II  - start check fingerstick  - if FS persistently >180 start basal bolus insulin    dc telemetry    #Progress Note Handoff:  Pending (specify):  ativan protocol  Family discussion: discussed ativan protocol  Disposition: Home_x__/SNF___/Other________/Unknown at this time________

## 2021-10-11 ENCOUNTER — TRANSCRIPTION ENCOUNTER (OUTPATIENT)
Age: 61
End: 2021-10-11

## 2021-10-11 VITALS
HEART RATE: 88 BPM | RESPIRATION RATE: 18 BRPM | TEMPERATURE: 97 F | SYSTOLIC BLOOD PRESSURE: 116 MMHG | DIASTOLIC BLOOD PRESSURE: 71 MMHG

## 2021-10-11 LAB
ALBUMIN SERPL ELPH-MCNC: 3.9 G/DL — SIGNIFICANT CHANGE UP (ref 3.5–5.2)
ALP SERPL-CCNC: 85 U/L — SIGNIFICANT CHANGE UP (ref 30–115)
ALT FLD-CCNC: 35 U/L — SIGNIFICANT CHANGE UP (ref 0–41)
ANION GAP SERPL CALC-SCNC: 15 MMOL/L — HIGH (ref 7–14)
AST SERPL-CCNC: 62 U/L — HIGH (ref 0–41)
BASOPHILS # BLD AUTO: 0.07 K/UL — SIGNIFICANT CHANGE UP (ref 0–0.2)
BASOPHILS NFR BLD AUTO: 1.2 % — HIGH (ref 0–1)
BILIRUB SERPL-MCNC: 0.9 MG/DL — SIGNIFICANT CHANGE UP (ref 0.2–1.2)
BUN SERPL-MCNC: 15 MG/DL — SIGNIFICANT CHANGE UP (ref 10–20)
CALCIUM SERPL-MCNC: 8.9 MG/DL — SIGNIFICANT CHANGE UP (ref 8.5–10.1)
CHLORIDE SERPL-SCNC: 101 MMOL/L — SIGNIFICANT CHANGE UP (ref 98–110)
CO2 SERPL-SCNC: 17 MMOL/L — SIGNIFICANT CHANGE UP (ref 17–32)
CREAT SERPL-MCNC: 1.1 MG/DL — SIGNIFICANT CHANGE UP (ref 0.7–1.5)
EOSINOPHIL # BLD AUTO: 0.19 K/UL — SIGNIFICANT CHANGE UP (ref 0–0.7)
EOSINOPHIL NFR BLD AUTO: 3.4 % — SIGNIFICANT CHANGE UP (ref 0–8)
GLUCOSE BLDC GLUCOMTR-MCNC: 100 MG/DL — HIGH (ref 70–99)
GLUCOSE BLDC GLUCOMTR-MCNC: 130 MG/DL — HIGH (ref 70–99)
GLUCOSE BLDC GLUCOMTR-MCNC: 96 MG/DL — SIGNIFICANT CHANGE UP (ref 70–99)
GLUCOSE SERPL-MCNC: 95 MG/DL — SIGNIFICANT CHANGE UP (ref 70–99)
HCT VFR BLD CALC: 36.2 % — LOW (ref 42–52)
HGB BLD-MCNC: 13 G/DL — LOW (ref 14–18)
IMM GRANULOCYTES NFR BLD AUTO: 0.4 % — HIGH (ref 0.1–0.3)
LYMPHOCYTES # BLD AUTO: 1.15 K/UL — LOW (ref 1.2–3.4)
LYMPHOCYTES # BLD AUTO: 20.4 % — LOW (ref 20.5–51.1)
MAGNESIUM SERPL-MCNC: 1.6 MG/DL — LOW (ref 1.8–2.4)
MCHC RBC-ENTMCNC: 33.9 PG — HIGH (ref 27–31)
MCHC RBC-ENTMCNC: 35.9 G/DL — SIGNIFICANT CHANGE UP (ref 32–37)
MCV RBC AUTO: 94.5 FL — HIGH (ref 80–94)
MONOCYTES # BLD AUTO: 0.35 K/UL — SIGNIFICANT CHANGE UP (ref 0.1–0.6)
MONOCYTES NFR BLD AUTO: 6.6 % — SIGNIFICANT CHANGE UP (ref 1.7–9.3)
NEUTROPHILS # BLD AUTO: 3.85 K/UL — SIGNIFICANT CHANGE UP (ref 1.4–6.5)
NEUTROPHILS NFR BLD AUTO: 68.4 % — SIGNIFICANT CHANGE UP (ref 42.2–75.2)
NRBC # BLD: 0 /100 WBCS — SIGNIFICANT CHANGE UP (ref 0–0)
PLATELET # BLD AUTO: 117 K/UL — LOW (ref 130–400)
POTASSIUM SERPL-MCNC: 4.8 MMOL/L — SIGNIFICANT CHANGE UP (ref 3.5–5)
POTASSIUM SERPL-SCNC: 4.8 MMOL/L — SIGNIFICANT CHANGE UP (ref 3.5–5)
PROT SERPL-MCNC: 6.3 G/DL — SIGNIFICANT CHANGE UP (ref 6–8)
RBC # BLD: 3.83 M/UL — LOW (ref 4.7–6.1)
RBC # FLD: 11.9 % — SIGNIFICANT CHANGE UP (ref 11.5–14.5)
SARS-COV-2 RNA SPEC QL NAA+PROBE: SIGNIFICANT CHANGE UP
SODIUM SERPL-SCNC: 133 MMOL/L — LOW (ref 135–146)
WBC # BLD: 5.63 K/UL — SIGNIFICANT CHANGE UP (ref 4.8–10.8)
WBC # FLD AUTO: 5.63 K/UL — SIGNIFICANT CHANGE UP (ref 4.8–10.8)

## 2021-10-11 RX ORDER — MAGNESIUM SULFATE 500 MG/ML
2 VIAL (ML) INJECTION
Refills: 0 | Status: COMPLETED | OUTPATIENT
Start: 2021-10-11 | End: 2021-10-11

## 2021-10-11 RX ADMIN — Medication 1 TABLET(S): at 11:51

## 2021-10-11 RX ADMIN — Medication 0.5 MILLIGRAM(S): at 17:22

## 2021-10-11 RX ADMIN — Medication 1 MILLIGRAM(S): at 11:51

## 2021-10-11 RX ADMIN — AMLODIPINE BESYLATE 5 MILLIGRAM(S): 2.5 TABLET ORAL at 05:21

## 2021-10-11 RX ADMIN — Medication 1 MILLIGRAM(S): at 01:02

## 2021-10-11 RX ADMIN — PANTOPRAZOLE SODIUM 40 MILLIGRAM(S): 20 TABLET, DELAYED RELEASE ORAL at 06:23

## 2021-10-11 RX ADMIN — Medication 1 MILLIGRAM(S): at 14:04

## 2021-10-11 RX ADMIN — HEPARIN SODIUM 5000 UNIT(S): 5000 INJECTION INTRAVENOUS; SUBCUTANEOUS at 05:21

## 2021-10-11 RX ADMIN — Medication 25 GRAM(S): at 14:02

## 2021-10-11 RX ADMIN — Medication 1 MILLIGRAM(S): at 09:38

## 2021-10-11 RX ADMIN — Medication 25 GRAM(S): at 11:53

## 2021-10-11 RX ADMIN — Medication 1 MILLIGRAM(S): at 05:21

## 2021-10-11 NOTE — CHART NOTE - NSCHARTNOTEFT_GEN_A_CORE
Pt seen and examined at bedside. Medically stable for dc home.    PHYSICAL EXAM:  GENERAL: NAD, speaks in full sentences, no signs of respiratory distress  HEAD:  Atraumatic, Normocephalic  EYES: EOMI, PERRLA, conjunctiva and sclera clear  NECK: Supple, No JVD  CHEST/LUNG: Clear to auscultation bilaterally; No wheeze; No crackles; No accessory muscles used  HEART: Regular rate and rhythm; No murmurs;   ABDOMEN: Soft, Nontender, Nondistended; Bowel sounds present; No guarding  EXTREMITIES:  2+ Peripheral Pulses, No cyanosis or edema  PSYCH: AAOx3  NEUROLOGY: non-focal  SKIN: No rashes or lesions
<<<RESIDENT DISCHARGE NOTE>>>     CAT ALSTON  MRN-[id number]    VITAL SIGNS:  T(F): 97.1 (10-11-21 @ 05:03), Max: 99.3 (10-10-21 @ 20:00)  HR: 95 (10-11-21 @ 05:03)  BP: 136/81 (10-11-21 @ 05:03)  SpO2: 96% (10-10-21 @ 20:37)      PHYSICAL EXAM:  GENERAL:  NAD  SKIN: No rashes or lesions  HEENT: Atraumatic. Normocephalic. Anicteric  NECK:  No JVD.   PULMONARY: Clear to ausculation bilaterally. No wheezing. No rales  CVS: Normal S1, S2. Regular rate and rhythm. No murmurs.  ABDOMEN/GI: Soft, Nontender, Nondistended; Bowel sounds are present  EXTREMITIES:  No edema B/L LE.  NEUROLOGIC:  No motor deficit.  PSYCH: Alert & oriented x 3, normal affect    TEST RESULTS:                        12.5   5.13  )-----------( 110      ( 10 Oct 2021 09:52 )             35.6       10-11    133<L>  |  101  |  15  ----------------------------<  95  4.8   |  17  |  1.1    Ca    8.9      11 Oct 2021 07:33  Mg     1.6     10-11    TPro  6.3  /  Alb  3.9  /  TBili  0.9  /  DBili  x   /  AST  62<H>  /  ALT  35  /  AlkPhos  85  10-11          DISCHARGE MEDICATION RECONCILIATION REVIEWED WITH Dr. Robert DALE     DISPOSITION:   [ X ] Home,    [  ] Home with Visiting Nursing Services,   [    ]  SNF/ NH,    [   ] Acute Rehab (4A),   [   ] Other (Specify:_________)

## 2021-10-11 NOTE — DISCHARGE NOTE NURSING/CASE MANAGEMENT/SOCIAL WORK - PATIENT PORTAL LINK FT
You can access the FollowMyHealth Patient Portal offered by [facility] by registering at the following website: [email or web address]. By joining [employer]’s FollowMyHealth portal, you will also be able to view your health information using other applications (apps) compatible with our system.

## 2021-10-13 LAB
DRUG SCREEN, SERUM: SIGNIFICANT CHANGE UP
DRUG SCREEN, SERUM: SIGNIFICANT CHANGE UP

## 2021-10-17 DIAGNOSIS — E86.0 DEHYDRATION: ICD-10-CM

## 2021-10-17 DIAGNOSIS — F10.229 ALCOHOL DEPENDENCE WITH INTOXICATION, UNSPECIFIED: ICD-10-CM

## 2021-10-17 DIAGNOSIS — E51.9 THIAMINE DEFICIENCY, UNSPECIFIED: ICD-10-CM

## 2021-10-17 DIAGNOSIS — Z79.52 LONG TERM (CURRENT) USE OF SYSTEMIC STEROIDS: ICD-10-CM

## 2021-10-17 DIAGNOSIS — Z87.891 PERSONAL HISTORY OF NICOTINE DEPENDENCE: ICD-10-CM

## 2021-10-17 DIAGNOSIS — Z91.014 ALLERGY TO MAMMALIAN MEATS: ICD-10-CM

## 2021-10-17 DIAGNOSIS — H91.10 PRESBYCUSIS, UNSPECIFIED EAR: ICD-10-CM

## 2021-10-17 DIAGNOSIS — Z91.013 ALLERGY TO SEAFOOD: ICD-10-CM

## 2021-10-17 DIAGNOSIS — E53.8 DEFICIENCY OF OTHER SPECIFIED B GROUP VITAMINS: ICD-10-CM

## 2021-10-17 DIAGNOSIS — R25.1 TREMOR, UNSPECIFIED: ICD-10-CM

## 2021-10-17 DIAGNOSIS — E11.65 TYPE 2 DIABETES MELLITUS WITH HYPERGLYCEMIA: ICD-10-CM

## 2021-10-17 DIAGNOSIS — R74.02 ELEVATION OF LEVELS OF LACTIC ACID DEHYDROGENASE [LDH]: ICD-10-CM

## 2021-10-17 DIAGNOSIS — E11.22 TYPE 2 DIABETES MELLITUS WITH DIABETIC CHRONIC KIDNEY DISEASE: ICD-10-CM

## 2021-10-17 DIAGNOSIS — E87.5 HYPERKALEMIA: ICD-10-CM

## 2021-10-17 DIAGNOSIS — Z91.011 ALLERGY TO MILK PRODUCTS: ICD-10-CM

## 2021-10-17 DIAGNOSIS — N18.30 CHRONIC KIDNEY DISEASE, STAGE 3 UNSPECIFIED: ICD-10-CM

## 2021-10-17 DIAGNOSIS — F10.239 ALCOHOL DEPENDENCE WITH WITHDRAWAL, UNSPECIFIED: ICD-10-CM

## 2021-10-17 DIAGNOSIS — N17.9 ACUTE KIDNEY FAILURE, UNSPECIFIED: ICD-10-CM

## 2021-10-17 DIAGNOSIS — E87.4 MIXED DISORDER OF ACID-BASE BALANCE: ICD-10-CM

## 2021-10-17 DIAGNOSIS — Z71.41 ALCOHOL ABUSE COUNSELING AND SURVEILLANCE OF ALCOHOLIC: ICD-10-CM

## 2021-10-17 DIAGNOSIS — I12.9 HYPERTENSIVE CHRONIC KIDNEY DISEASE WITH STAGE 1 THROUGH STAGE 4 CHRONIC KIDNEY DISEASE, OR UNSPECIFIED CHRONIC KIDNEY DISEASE: ICD-10-CM

## 2021-10-20 ENCOUNTER — INPATIENT (INPATIENT)
Facility: HOSPITAL | Age: 61
LOS: 4 days | Discharge: NURSING, REHAB OR LONG-TERM CARE | End: 2021-10-25
Attending: INTERNAL MEDICINE | Admitting: INTERNAL MEDICINE
Payer: MEDICAID

## 2021-10-20 VITALS
SYSTOLIC BLOOD PRESSURE: 158 MMHG | DIASTOLIC BLOOD PRESSURE: 70 MMHG | WEIGHT: 235.01 LBS | OXYGEN SATURATION: 99 % | RESPIRATION RATE: 16 BRPM | HEART RATE: 122 BPM | TEMPERATURE: 98 F | HEIGHT: 68 IN

## 2021-10-20 LAB
ALBUMIN SERPL ELPH-MCNC: 4.2 G/DL — SIGNIFICANT CHANGE UP (ref 3.5–5.2)
ALP SERPL-CCNC: 89 U/L — SIGNIFICANT CHANGE UP (ref 30–115)
ALT FLD-CCNC: 37 U/L — SIGNIFICANT CHANGE UP (ref 0–41)
ANION GAP SERPL CALC-SCNC: 19 MMOL/L — HIGH (ref 7–14)
AST SERPL-CCNC: 70 U/L — HIGH (ref 0–41)
B-OH-BUTYR SERPL-SCNC: <0.2 MMOL/L — SIGNIFICANT CHANGE UP
BASE EXCESS BLDV CALC-SCNC: 2.2 MMOL/L — SIGNIFICANT CHANGE UP (ref -2–3)
BASE EXCESS BLDV CALC-SCNC: 4.1 MMOL/L — HIGH (ref -2–3)
BASOPHILS # BLD AUTO: 0.06 K/UL — SIGNIFICANT CHANGE UP (ref 0–0.2)
BASOPHILS NFR BLD AUTO: 1.1 % — HIGH (ref 0–1)
BILIRUB SERPL-MCNC: 0.9 MG/DL — SIGNIFICANT CHANGE UP (ref 0.2–1.2)
BUN SERPL-MCNC: 10 MG/DL — SIGNIFICANT CHANGE UP (ref 10–20)
CA-I SERPL-SCNC: 1.09 MMOL/L — LOW (ref 1.15–1.33)
CA-I SERPL-SCNC: 1.11 MMOL/L — LOW (ref 1.15–1.33)
CALCIUM SERPL-MCNC: 8.5 MG/DL — SIGNIFICANT CHANGE UP (ref 8.5–10.1)
CHLORIDE SERPL-SCNC: 100 MMOL/L — SIGNIFICANT CHANGE UP (ref 98–110)
CO2 SERPL-SCNC: 21 MMOL/L — SIGNIFICANT CHANGE UP (ref 17–32)
CREAT SERPL-MCNC: 1.2 MG/DL — SIGNIFICANT CHANGE UP (ref 0.7–1.5)
EOSINOPHIL # BLD AUTO: 0 K/UL — SIGNIFICANT CHANGE UP (ref 0–0.7)
EOSINOPHIL NFR BLD AUTO: 0 % — SIGNIFICANT CHANGE UP (ref 0–8)
GAS PNL BLDV: 140 MMOL/L — SIGNIFICANT CHANGE UP (ref 136–145)
GAS PNL BLDV: 140 MMOL/L — SIGNIFICANT CHANGE UP (ref 136–145)
GAS PNL BLDV: SIGNIFICANT CHANGE UP
GAS PNL BLDV: SIGNIFICANT CHANGE UP
GLUCOSE SERPL-MCNC: 181 MG/DL — HIGH (ref 70–99)
HCO3 BLDV-SCNC: 26 MMOL/L — SIGNIFICANT CHANGE UP (ref 22–29)
HCO3 BLDV-SCNC: 28 MMOL/L — SIGNIFICANT CHANGE UP (ref 22–29)
HCT VFR BLD CALC: 34.2 % — LOW (ref 42–52)
HCT VFR BLDA CALC: 35 % — LOW (ref 39–51)
HCT VFR BLDA CALC: 36 % — LOW (ref 39–51)
HGB BLD CALC-MCNC: 11.7 G/DL — LOW (ref 12.6–17.4)
HGB BLD CALC-MCNC: 12.1 G/DL — LOW (ref 12.6–17.4)
HGB BLD-MCNC: 12.1 G/DL — LOW (ref 14–18)
IMM GRANULOCYTES NFR BLD AUTO: 0.2 % — SIGNIFICANT CHANGE UP (ref 0.1–0.3)
LACTATE BLDV-MCNC: 3.9 MMOL/L — HIGH (ref 0.5–2)
LACTATE BLDV-MCNC: 4.9 MMOL/L — CRITICAL HIGH (ref 0.5–2)
LACTATE SERPL-SCNC: 4.1 MMOL/L — CRITICAL HIGH (ref 0.7–2)
LIDOCAIN IGE QN: 25 U/L — SIGNIFICANT CHANGE UP (ref 7–60)
LYMPHOCYTES # BLD AUTO: 0.27 K/UL — LOW (ref 1.2–3.4)
LYMPHOCYTES # BLD AUTO: 4.8 % — LOW (ref 20.5–51.1)
MAGNESIUM SERPL-MCNC: 1.4 MG/DL — LOW (ref 1.8–2.4)
MAGNESIUM SERPL-MCNC: 1.6 MG/DL — LOW (ref 1.8–2.4)
MCHC RBC-ENTMCNC: 33.7 PG — HIGH (ref 27–31)
MCHC RBC-ENTMCNC: 35.4 G/DL — SIGNIFICANT CHANGE UP (ref 32–37)
MCV RBC AUTO: 95.3 FL — HIGH (ref 80–94)
MONOCYTES # BLD AUTO: 0.12 K/UL — SIGNIFICANT CHANGE UP (ref 0.1–0.6)
MONOCYTES NFR BLD AUTO: 2.1 % — SIGNIFICANT CHANGE UP (ref 1.7–9.3)
NEUTROPHILS # BLD AUTO: 5.17 K/UL — SIGNIFICANT CHANGE UP (ref 1.4–6.5)
NEUTROPHILS NFR BLD AUTO: 91.8 % — HIGH (ref 42.2–75.2)
NRBC # BLD: 0 /100 WBCS — SIGNIFICANT CHANGE UP (ref 0–0)
NT-PROBNP SERPL-SCNC: 229 PG/ML — SIGNIFICANT CHANGE UP (ref 0–300)
PCO2 BLDV: 39 MMHG — LOW (ref 42–55)
PCO2 BLDV: 41 MMHG — LOW (ref 42–55)
PH BLDV: 7.44 — HIGH (ref 7.32–7.43)
PH BLDV: 7.45 — HIGH (ref 7.32–7.43)
PLATELET # BLD AUTO: 124 K/UL — LOW (ref 130–400)
PO2 BLDV: 35 MMHG — SIGNIFICANT CHANGE UP
PO2 BLDV: 45 MMHG — SIGNIFICANT CHANGE UP
POTASSIUM BLDV-SCNC: 3.6 MMOL/L — SIGNIFICANT CHANGE UP (ref 3.5–5.1)
POTASSIUM BLDV-SCNC: 3.6 MMOL/L — SIGNIFICANT CHANGE UP (ref 3.5–5.1)
POTASSIUM SERPL-MCNC: 3.9 MMOL/L — SIGNIFICANT CHANGE UP (ref 3.5–5)
POTASSIUM SERPL-SCNC: 3.9 MMOL/L — SIGNIFICANT CHANGE UP (ref 3.5–5)
PROT SERPL-MCNC: 6.6 G/DL — SIGNIFICANT CHANGE UP (ref 6–8)
RBC # BLD: 3.59 M/UL — LOW (ref 4.7–6.1)
RBC # FLD: 12.5 % — SIGNIFICANT CHANGE UP (ref 11.5–14.5)
SAO2 % BLDV: 57.6 % — SIGNIFICANT CHANGE UP
SAO2 % BLDV: 70.9 % — SIGNIFICANT CHANGE UP
SARS-COV-2 RNA SPEC QL NAA+PROBE: SIGNIFICANT CHANGE UP
SODIUM SERPL-SCNC: 140 MMOL/L — SIGNIFICANT CHANGE UP (ref 135–146)
TROPONIN T SERPL-MCNC: <0.01 NG/ML — SIGNIFICANT CHANGE UP
WBC # BLD: 5.63 K/UL — SIGNIFICANT CHANGE UP (ref 4.8–10.8)
WBC # FLD AUTO: 5.63 K/UL — SIGNIFICANT CHANGE UP (ref 4.8–10.8)

## 2021-10-20 PROCEDURE — 93010 ELECTROCARDIOGRAM REPORT: CPT

## 2021-10-20 PROCEDURE — 99284 EMERGENCY DEPT VISIT MOD MDM: CPT

## 2021-10-20 PROCEDURE — 99408 AUDIT/DAST 15-30 MIN: CPT

## 2021-10-20 PROCEDURE — 71045 X-RAY EXAM CHEST 1 VIEW: CPT | Mod: 26

## 2021-10-20 PROCEDURE — 99223 1ST HOSP IP/OBS HIGH 75: CPT | Mod: 25

## 2021-10-20 RX ORDER — ACETAMINOPHEN 500 MG
650 TABLET ORAL EVERY 6 HOURS
Refills: 0 | Status: DISCONTINUED | OUTPATIENT
Start: 2021-10-20 | End: 2021-10-25

## 2021-10-20 RX ORDER — ASPIRIN/CALCIUM CARB/MAGNESIUM 324 MG
324 TABLET ORAL ONCE
Refills: 0 | Status: COMPLETED | OUTPATIENT
Start: 2021-10-20 | End: 2021-10-20

## 2021-10-20 RX ORDER — LANOLIN ALCOHOL/MO/W.PET/CERES
3 CREAM (GRAM) TOPICAL AT BEDTIME
Refills: 0 | Status: DISCONTINUED | OUTPATIENT
Start: 2021-10-20 | End: 2021-10-25

## 2021-10-20 RX ORDER — FOLIC ACID 0.8 MG
1 TABLET ORAL DAILY
Refills: 0 | Status: DISCONTINUED | OUTPATIENT
Start: 2021-10-20 | End: 2021-10-25

## 2021-10-20 RX ORDER — TAMSULOSIN HYDROCHLORIDE 0.4 MG/1
0.4 CAPSULE ORAL AT BEDTIME
Refills: 0 | Status: DISCONTINUED | OUTPATIENT
Start: 2021-10-20 | End: 2021-10-25

## 2021-10-20 RX ORDER — REGADENOSON 0.08 MG/ML
0.4 INJECTION, SOLUTION INTRAVENOUS ONCE
Refills: 0 | Status: DISCONTINUED | OUTPATIENT
Start: 2021-10-20 | End: 2021-10-23

## 2021-10-20 RX ORDER — MAGNESIUM SULFATE 500 MG/ML
2 VIAL (ML) INJECTION ONCE
Refills: 0 | Status: COMPLETED | OUTPATIENT
Start: 2021-10-20 | End: 2021-10-20

## 2021-10-20 RX ORDER — SODIUM CHLORIDE 9 MG/ML
1000 INJECTION INTRAMUSCULAR; INTRAVENOUS; SUBCUTANEOUS ONCE
Refills: 0 | Status: COMPLETED | OUTPATIENT
Start: 2021-10-20 | End: 2021-10-20

## 2021-10-20 RX ORDER — HEPARIN SODIUM 5000 [USP'U]/ML
5000 INJECTION INTRAVENOUS; SUBCUTANEOUS EVERY 8 HOURS
Refills: 0 | Status: DISCONTINUED | OUTPATIENT
Start: 2021-10-20 | End: 2021-10-22

## 2021-10-20 RX ORDER — AMLODIPINE BESYLATE 2.5 MG/1
5 TABLET ORAL DAILY
Refills: 0 | Status: DISCONTINUED | OUTPATIENT
Start: 2021-10-20 | End: 2021-10-25

## 2021-10-20 RX ORDER — ONDANSETRON 8 MG/1
4 TABLET, FILM COATED ORAL EVERY 8 HOURS
Refills: 0 | Status: DISCONTINUED | OUTPATIENT
Start: 2021-10-20 | End: 2021-10-25

## 2021-10-20 RX ORDER — THIAMINE MONONITRATE (VIT B1) 100 MG
100 TABLET ORAL DAILY
Refills: 0 | Status: DISCONTINUED | OUTPATIENT
Start: 2021-10-20 | End: 2021-10-25

## 2021-10-20 RX ORDER — SODIUM CHLORIDE 9 MG/ML
1000 INJECTION, SOLUTION INTRAVENOUS
Refills: 0 | Status: DISCONTINUED | OUTPATIENT
Start: 2021-10-20 | End: 2021-10-22

## 2021-10-20 RX ORDER — FAMOTIDINE 10 MG/ML
20 INJECTION INTRAVENOUS ONCE
Refills: 0 | Status: COMPLETED | OUTPATIENT
Start: 2021-10-20 | End: 2021-10-20

## 2021-10-20 RX ADMIN — SODIUM CHLORIDE 1000 MILLILITER(S): 9 INJECTION INTRAMUSCULAR; INTRAVENOUS; SUBCUTANEOUS at 08:37

## 2021-10-20 RX ADMIN — Medication 2 MILLIGRAM(S): at 18:41

## 2021-10-20 RX ADMIN — Medication 324 MILLIGRAM(S): at 12:50

## 2021-10-20 RX ADMIN — SODIUM CHLORIDE 1000 MILLILITER(S): 9 INJECTION INTRAMUSCULAR; INTRAVENOUS; SUBCUTANEOUS at 09:49

## 2021-10-20 RX ADMIN — Medication 50 MILLIGRAM(S): at 12:50

## 2021-10-20 RX ADMIN — Medication 50 GRAM(S): at 12:51

## 2021-10-20 RX ADMIN — SODIUM CHLORIDE 75 MILLILITER(S): 9 INJECTION, SOLUTION INTRAVENOUS at 20:41

## 2021-10-20 RX ADMIN — FAMOTIDINE 20 MILLIGRAM(S): 10 INJECTION INTRAVENOUS at 08:38

## 2021-10-20 RX ADMIN — TAMSULOSIN HYDROCHLORIDE 0.4 MILLIGRAM(S): 0.4 CAPSULE ORAL at 21:25

## 2021-10-20 RX ADMIN — HEPARIN SODIUM 5000 UNIT(S): 5000 INJECTION INTRAVENOUS; SUBCUTANEOUS at 21:25

## 2021-10-20 RX ADMIN — Medication 1 MILLIGRAM(S): at 08:37

## 2021-10-20 NOTE — CONSULT NOTE ADULT - SUBJECTIVE AND OBJECTIVE BOX
Patient is a 61y old  Male who presents with a chief complaint of Chest Pain (20 Oct 2021 15:47)      REASON FOR CONSULT     HPI:  61 year old male with  PMH of  ETOH abuse, HTN, CKD3 presents to the ED BIBA from [facility] c/o chest pain. Patient describes the pain as going across his chest more on the right that comes and goes. Pain is not associated with physical activity. Patient was worried this morning so he called an ambulance. Patient also admits to drinking a liter of vodka yesterday afternoon which is his normal amount of daily alcohol intake. Patient denies fevers, chills, sob, abdominal pain, N/V/D, dysuria. Patient admits to some leg swelling but denies any pain.   Cardiologist is Dr. Barrientos. Patient is unsure when last stress test was done.     In ED vitals: /70, , RR 16, T 98.5, SpO2 99% on RA   Labs significant for Magnesium 1.4, lactate 4.9, trop neg x1. Patient given 2L IVF in ED, loaded with ASA, and 2g magnesium given      (20 Oct 2021 15:47)      PAST MEDICAL & SURGICAL HISTORY:  Alcohol dependence    Vertigo    Tobacco dependency    HTN (hypertension)    Hard of hearing    Gout    Seasonal allergies    CKD (chronic kidney disease)    No significant past surgical history            SOCIAL HISTORY:     FAMILY HISTORY:  Family history of gastric cancer  Mom    Family hx of lung cancer  Smoker        Beef (Hives)  dairy products (Hives)  No Known Drug Allergies  shellfish (Hives)      MEDICATIONS  (STANDING):  amLODIPine   Tablet 5 milliGRAM(s) Oral daily  folic acid 1 milliGRAM(s) Oral daily  heparin   Injectable 5000 Unit(s) SubCutaneous every 8 hours  lactated ringers. 1000 milliLiter(s) (75 mL/Hr) IV Continuous <Continuous>  regadenoson Injectable 0.4 milliGRAM(s) IV Push once  tamsulosin 0.4 milliGRAM(s) Oral at bedtime  thiamine 100 milliGRAM(s) Oral daily    MEDICATIONS  (PRN):  acetaminophen   Tablet .. 650 milliGRAM(s) Oral every 6 hours PRN Temp greater or equal to 38C (100.4F), Mild Pain (1 - 3)  aluminum hydroxide/magnesium hydroxide/simethicone Suspension 30 milliLiter(s) Oral every 4 hours PRN Dyspepsia  LORazepam   Injectable 2 milliGRAM(s) IV Push every 6 hours PRN Agitation  melatonin 3 milliGRAM(s) Oral at bedtime PRN Insomnia  ondansetron Injectable 4 milliGRAM(s) IV Push every 8 hours PRN Nausea and/or Vomiting      Vital Signs Last 24 Hrs  T(C): 36.3 (20 Oct 2021 15:41), Max: 36.9 (20 Oct 2021 07:08)  T(F): 97.4 (20 Oct 2021 15:41), Max: 98.5 (20 Oct 2021 07:08)  HR: 92 (20 Oct 2021 15:41) (92 - 122)  BP: 141/67 (20 Oct 2021 15:41) (141/67 - 158/70)  BP(mean): --  RR: 18 (20 Oct 2021 15:41) (16 - 18)  SpO2: 98% (20 Oct 2021 15:41) (98% - 99%) I&O's Detail    PHYSICAL EXAM:          REVIEW OF SYSTEMS            ECG:  ECHOCARDIOGRAM:  RADIOLOGY & ADDITIONAL STUDIES:      LABS:                        12.1   5.63  )-----------( 124      ( 20 Oct 2021 08:30 )             34.2     10-20    140  |  100  |  10  ----------------------------<  181<H>  3.9   |  21  |  1.2    Ca    8.5      20 Oct 2021 08:30  Mg     1.6     10-20    TPro  6.6  /  Alb  4.2  /  TBili  0.9  /  DBili  x   /  AST  70<H>  /  ALT  37  /  AlkPhos  89  10-20    CARDIAC MARKERS ( 20 Oct 2021 16:01 )  x     / <0.01 ng/mL / x     / x     / x      CARDIAC MARKERS ( 20 Oct 2021 08:30 )  x     / <0.01 ng/mL / x     / x     / x            I&O's Summary    BNPSerum Pro-Brain Natriuretic Peptide: 229 pg/mL (10-20 @ 08:30)      A   Patient was seen and examined by me in Main ER.  EMR reviewed.    Patient is a 61y old  Male who presents with a chief complaint of Chest Pain (20 Oct 2021 15:47)      REASON FOR CONSULT     HPI:  61 year old male with  PMH of  ETOH abuse, HTN, CKD3 presents to the ED BIBA from [facility] c/o chest pain. Patient describes the pain as going across his chest more on the right that comes and goes. Pain is not associated with physical activity. Patient was worried this morning so he called an ambulance. Patient also admits to drinking a liter of vodka yesterday afternoon which is his normal amount of daily alcohol intake. Patient denies fevers, chills, sob, abdominal pain, N/V/D, dysuria. Patient admits to some leg swelling but denies any pain.   Cardiologist is Dr. Barrientos. Patient is unsure when last stress test was done.     In ED vitals: /70, , RR 16, T 98.5, SpO2 99% on RA   Labs significant for Magnesium 1.4, lactate 4.9, trop neg x1. Patient given 2L IVF in ED, loaded with ASA, and 2g magnesium given      (20 Oct 2021 15:47)      PAST MEDICAL & SURGICAL HISTORY:  Alcohol dependence    Vertigo    Tobacco dependency    HTN (hypertension)    Hard of hearing    Gout    Seasonal allergies    CKD (chronic kidney disease)    No significant past surgical history            SOCIAL HISTORY:     FAMILY HISTORY:  Family history of gastric cancer  Mom    Family hx of lung cancer  Smoker        Beef (Hives)  dairy products (Hives)  No Known Drug Allergies  shellfish (Hives)      MEDICATIONS  (STANDING):  amLODIPine   Tablet 5 milliGRAM(s) Oral daily  folic acid 1 milliGRAM(s) Oral daily  heparin   Injectable 5000 Unit(s) SubCutaneous every 8 hours  lactated ringers. 1000 milliLiter(s) (75 mL/Hr) IV Continuous <Continuous>  regadenoson Injectable 0.4 milliGRAM(s) IV Push once  tamsulosin 0.4 milliGRAM(s) Oral at bedtime  thiamine 100 milliGRAM(s) Oral daily    MEDICATIONS  (PRN):  acetaminophen   Tablet .. 650 milliGRAM(s) Oral every 6 hours PRN Temp greater or equal to 38C (100.4F), Mild Pain (1 - 3)  aluminum hydroxide/magnesium hydroxide/simethicone Suspension 30 milliLiter(s) Oral every 4 hours PRN Dyspepsia  LORazepam   Injectable 2 milliGRAM(s) IV Push every 6 hours PRN Agitation  melatonin 3 milliGRAM(s) Oral at bedtime PRN Insomnia  ondansetron Injectable 4 milliGRAM(s) IV Push every 8 hours PRN Nausea and/or Vomiting      Vital Signs Last 24 Hrs  T(C): 36.3 (20 Oct 2021 15:41), Max: 36.9 (20 Oct 2021 07:08)  T(F): 97.4 (20 Oct 2021 15:41), Max: 98.5 (20 Oct 2021 07:08)  HR: 92 (20 Oct 2021 15:41) (92 - 122)  BP: 141/67 (20 Oct 2021 15:41) (141/67 - 158/70)  BP(mean): --  RR: 18 (20 Oct 2021 15:41) (16 - 18)  SpO2: 98% (20 Oct 2021 15:41) (98% - 99%) I&O's Detail    PHYSICAL EXAM:  Not in apparent distress  Normocephalic; atraumatic  No JVD; regular rhythm; nl S1S2  Bilateral breath sounds  Abdomen soft  No edema  Moving all extremities      REVIEW OF SYSTEMS: Negative except as stated in HPI    ECG: Sinus Rhythm    ECHOCARDIOGRAM:    RADIOLOGY & ADDITIONAL STUDIES:  < from: CT Abdomen and Pelvis No Cont (03.17.20 @ 00:08) >  FINDINGS:    LOWER CHEST: Mild bibasilar subsegmental atelectasis. Coronary artery calcifications..    HEPATOBILIARY: Hepatic steatosis. Unremarkable CT appearance of the gallbladder.    SPLEEN: Unremarkable.    PANCREAS: Unremarkable.    ADRENAL GLANDS: Unremarkable.    KIDNEYS: No hydronephrosis, renal or ureteral calculus.    ABDOMINOPELVIC NODES:Unremarkable.    PELVIC ORGANS: Urinary bladder is distended to the level of the umbilicus.    PERITONEUM/MESENTERY/BOWEL: No evidence of bowel obstruction, pneumoperitoneum, or ascites. Normal caliber appendix.    BONES/SOFT TISSUES: Degenerative changes of the visualized thoracolumbar spine. No acute osseous abnormality. Diffuse idiopathic skeletal hyperostosis of the lower thoracic spine    OTHER: Calcified atherosclerotic disease of the aorta and branch vessels.      IMPRESSION:     No CT evidence for acute abdominopelvic pathology.    Hepatic steatosis.    Additional comments: Urinary bladder distended to the level of the umbilicus. Correlation can be made for urinary retention.        < end of copied text >      < from: Xray Chest 1 View- PORTABLE-Urgent (10.20.21 @ 09:18) >  Findings:    Support devices: Telemetry leads are seen. External pacer overlies left thorax.    Cardiac/mediastinum/hilum: Unremarkable.    Lung parenchyma/Pleura: Within normal limits.    Skeleton/soft tissues: Unchanged.    Impression:    Low lung volumes leads to magnification of the pulmonary interstitium. Grossly unremarkable.    < end of copied text >        LABS:                        12.1   5.63  )-----------( 124      ( 20 Oct 2021 08:30 )             34.2     10-20    140  |  100  |  10  ----------------------------<  181<H>  3.9   |  21  |  1.2    Ca    8.5      20 Oct 2021 08:30  Mg     1.6     10-20    TPro  6.6  /  Alb  4.2  /  TBili  0.9  /  DBili  x   /  AST  70<H>  /  ALT  37  /  AlkPhos  89  10-20    CARDIAC MARKERS ( 20 Oct 2021 16:01 )  x     / <0.01 ng/mL / x     / x     / x      CARDIAC MARKERS ( 20 Oct 2021 08:30 )  x     / <0.01 ng/mL / x     / x     / x            I&O's Summary    BNPSerum Pro-Brain Natriuretic Peptide: 229 pg/mL (10-20 @ 08:30)

## 2021-10-20 NOTE — ED PROVIDER NOTE - CONSTITUTIONAL, MLM
Well appearing, awake, alert, oriented to person, place, time/situation and in no apparent distress. Jamul normal...

## 2021-10-20 NOTE — H&P ADULT - ATTENDING COMMENTS
62 YO M with a PMH of ETOH abuse, HTN, and CKD3 who was BIBEMS from his NH ([facility]) for eval of CP for the past x 2 days. Described as burning, right-sided, non-radiating, and waxes/wanes. - worse with exertion. Associated with - SOB, - nausea, - palpitations, and - diaphoresis. Did not take SLN or ASA prior to arrival with relief of symptoms. Denies any fevers/chills, cough, ABD pain, LE swelling, dysuria, headaches, or rashes.     Of note, pt drinks ~.5-1 L of vodka daily. Last drink yesterday in the PM.     In the ED, cardiac enzymes were negative and an EKG showed no ischemic changes. ASA, Librium, and famotidine given in the ED.     Family hx of early heart disease (-)     Physical exam shows pt in NAD, resting comfortably. VSS, afebrile, not hypoxic on RA. A&Ox3. Neuro exam intact. CTA B/L with no W/C/R. RRR, no M/G/R. ABD is soft and non-tender to palpation, normoactive BSs. LEs without swelling, pulses palpated bilaterally. No rashes. Labs and imaging as above resident note.     Chest pain, atypical, suspect alcoholic gastritis vs ACS. Admit to tele. Serial cardiac enzymes and EKGs. A1c, Lipids, and TSH. ASA given in the ED. Echo. PRN pain meds. Restart home meds.   -Famotidine    EtOH abuse with acute withdrawal + EtOH cessation counseling. Librium taper. CIWA protocol. Valium PRN. IVFs (LR). Trend BMPs, replace electrolytes PRN. Start on Multivitamin/Folate/Thiamine. Monitor VSs. Extensive counseling lasting between 15-30 minutes was held on the benefits of EtOH cessation. Detox consult.    Folate and thiamine deficiency due to EtOH abuse. Start on Folate/Thiamine supplementation prior to glucose admin. IVFs (LR).     HAGMA (High Anion Gap Metabolic Acidosis) from lactic acidosis. IVFs (LR). Repeat lactate and BMP in the AM.     Macrocytic anemia, at baseline. Send B12/Folate levels. Replace PRN.     Magnesium deficiency. Replace. No QTc prolongation present.     Diabetes mellitus with hyperglycemia. A1c. FSs. Insulin standing/correctional dosing    Hx of HTN and CKD3. Restart home meds, except as stated above. DVT PPX. Inform PCP of pt's admission to hospital. My note supersedes the residents note.     Date seen by Attending: 10/20/21

## 2021-10-20 NOTE — H&P ADULT - ASSESSMENT
61 year old male with  PMH of  ETOH abuse, HTN, CKD3 presents to the ED BIBA from [facility] c/o chest pain.    #Chest Pain likely 2/2 costochondritis  - trop neg x1 -> f/u repeat   - EKG sinus tach, no ST changes   - s/p asa 325 in ED   - cardiologist Dr Barrientos   - nuclear stress test ordered     #HAGMA 2/2 lactic acidosis - improving   - Lactate 4.9 -> 3.9 -> f/u repeat   - s/p 2L IVF in ED -> cont at 75 cc/hr     #ETOH Abuse   #Suspected Thiamine and folic acid def   - last drink 10/19   - CIWA protocol   - cont thiamine and folic acid supplements     #Magnesium Deficiency - replete as needed   #HTN - cont amlodipine 5   #BPH - cont flomax   #CKD3 - at baseline Cr 1.2, avoid nephrotoxic agents     #Misc   - Diet DASH   - Activity IAT  - DVT ppx heparin   - GI ppx; none

## 2021-10-20 NOTE — ED PROVIDER NOTE - CLINICAL SUMMARY MEDICAL DECISION MAKING FREE TEXT BOX
60yo M history of HTN CKD ETOH dependency presenting with chest pain, intermittent x2d. Last drink last night. Does not feel tremulous at this time. no LE pain/swelling. no other acute complaints. follows with Dr. Barrientos, unsure when his last stress test was but says it was not recent. Well appearing, NAD, non toxic. NCAT PERRLA EOMI neck supple non tender normal wob cta bl regular tachycardic abdomen s nt nd no rebound no guarding WWPx4 neuro non focal. labs ekg imaging reviewed. vs improved. pt now feeling tremulous. benzo given. will admit for acs r/o and etoh withdrawal

## 2021-10-20 NOTE — ED PROVIDER NOTE - OBJECTIVE STATEMENT
61 year old male with  PMH of  ETOH abuse, HTN, CKD3 presents  to the ED BIBA from [facility] c/o "I have chest pain across my chest on-off since yesterday. I was worried this morning so I called an ambulance. I drank a liter of alcohol last night." no cough/ fever/ chills/ sob/ leg pain/ leg swelling

## 2021-10-20 NOTE — H&P ADULT - NSHPLABSRESULTS_GEN_ALL_CORE
12.1   5.63  )-----------( 124      ( 20 Oct 2021 08:30 )             34.2       10-20    140  |  100  |  10  ----------------------------<  181<H>  3.9   |  21  |  1.2    Ca    8.5      20 Oct 2021 08:30  Mg     1.4     10-20    TPro  6.6  /  Alb  4.2  /  TBili  0.9  /  DBili  x   /  AST  70<H>  /  ALT  37  /  AlkPhos  89  10-20        Lactate Trend    CARDIAC MARKERS ( 20 Oct 2021 08:30 )  x     / <0.01 ng/mL / x     / x     / x        CAPILLARY BLOOD GLUCOSE

## 2021-10-20 NOTE — H&P ADULT - HISTORY OF PRESENT ILLNESS
61 year old male with  PMH of  ETOH abuse, HTN, CKD3 presents to the ED BIBA from [facility] c/o chest pain. Patient describes the pain as going across his chest more on the right that comes and goes. Pain is not associated with physical activity. Patient was worried this morning so he called an ambulance. Patient also admits to drinking a liter of vodka yesterday afternoon which is his normal amount of daily alcohol intake. Patient denies fevers, chills, sob, abdominal pain, N/V/D, dysuria. Patient admits to some leg swelling but denies any pain.   Cardiologist is Dr. Barrientos. Patient is unsure when last stress test was done.     In ED vitals: /70, , RR 16, T 98.5, SpO2 99% on RA   Labs significant for Magnesium 1.4, lactate 4.9, trop neg x1. Patient given 2L IVF in ED, loaded with ASA, and 2g magnesium given

## 2021-10-20 NOTE — H&P ADULT - NSHPPHYSICALEXAM_GEN_ALL_CORE
Vital Signs Last 24 Hrs  T(C): 36.3 (20 Oct 2021 15:41), Max: 36.9 (20 Oct 2021 07:08)  T(F): 97.4 (20 Oct 2021 15:41), Max: 98.5 (20 Oct 2021 07:08)  HR: 92 (20 Oct 2021 15:41) (92 - 122)  BP: 141/67 (20 Oct 2021 15:41) (141/67 - 158/70)  BP(mean): --  RR: 18 (20 Oct 2021 15:41) (16 - 18)  SpO2: 98% (20 Oct 2021 15:41) (98% - 99%)    GENERAL: NAD, lying in bed comfortably  HEAD:  Atraumatic, Normocephalic  EYES: EOMI, conjunctiva and sclera clear  ENT: Moist mucous membranes  NECK: Supple, No JVD  CHEST/LUNG: Clear to auscultation bilaterally; Unlabored respirations  HEART: Regular rate and rhythm; No murmurs, rubs, or gallops  ABDOMEN: Bowel sounds present; Soft, Nontender, Nondistended.   EXTREMITIES: 1+ peripheral edema, No clubbing, cyanosis  NERVOUS SYSTEM:  Alert & Oriented X3, speech clear. No deficits   SKIN: No rashes or lesions

## 2021-10-20 NOTE — CONSULT NOTE ADULT - ASSESSMENT
1] Atypical Chest Pain      ASHD (Coronary Artery Calcifications on prior CTSCAN)  - Trend trops  - For pharmacological nuclear stress test if trops negative  - Recommend 2-D echo    2] Hypertension  - Continue medication    3] Elevated Glucose  - Consider checking Hgb A1C    Will follow    Oscar Holt MD (covering for Dr. Mundo Barrientos)  [phone] Office

## 2021-10-21 LAB
A1C WITH ESTIMATED AVERAGE GLUCOSE RESULT: 5.4 % — SIGNIFICANT CHANGE UP (ref 4–5.6)
ALBUMIN SERPL ELPH-MCNC: 3.6 G/DL — SIGNIFICANT CHANGE UP (ref 3.5–5.2)
ALP SERPL-CCNC: 83 U/L — SIGNIFICANT CHANGE UP (ref 30–115)
ALT FLD-CCNC: 38 U/L — SIGNIFICANT CHANGE UP (ref 0–41)
ANION GAP SERPL CALC-SCNC: 13 MMOL/L — SIGNIFICANT CHANGE UP (ref 7–14)
AST SERPL-CCNC: 77 U/L — HIGH (ref 0–41)
BASOPHILS # BLD AUTO: 0.04 K/UL — SIGNIFICANT CHANGE UP (ref 0–0.2)
BASOPHILS NFR BLD AUTO: 1.7 % — HIGH (ref 0–1)
BILIRUB SERPL-MCNC: 1.3 MG/DL — HIGH (ref 0.2–1.2)
BUN SERPL-MCNC: 5 MG/DL — LOW (ref 10–20)
CALCIUM SERPL-MCNC: 7.9 MG/DL — LOW (ref 8.5–10.1)
CHLORIDE SERPL-SCNC: 100 MMOL/L — SIGNIFICANT CHANGE UP (ref 98–110)
CHOLEST SERPL-MCNC: 93 MG/DL — SIGNIFICANT CHANGE UP
CO2 SERPL-SCNC: 21 MMOL/L — SIGNIFICANT CHANGE UP (ref 17–32)
COVID-19 SPIKE DOMAIN AB INTERP: POSITIVE
COVID-19 SPIKE DOMAIN ANTIBODY RESULT: >250 U/ML — HIGH
CREAT SERPL-MCNC: 1 MG/DL — SIGNIFICANT CHANGE UP (ref 0.7–1.5)
EOSINOPHIL # BLD AUTO: 0.01 K/UL — SIGNIFICANT CHANGE UP (ref 0–0.7)
EOSINOPHIL NFR BLD AUTO: 0.4 % — SIGNIFICANT CHANGE UP (ref 0–8)
ESTIMATED AVERAGE GLUCOSE: 108 MG/DL — SIGNIFICANT CHANGE UP (ref 68–114)
GLUCOSE SERPL-MCNC: 107 MG/DL — HIGH (ref 70–99)
HCT VFR BLD CALC: 32.2 % — LOW (ref 42–52)
HDLC SERPL-MCNC: 38 MG/DL — LOW
HGB BLD-MCNC: 11.6 G/DL — LOW (ref 14–18)
IMM GRANULOCYTES NFR BLD AUTO: 0 % — LOW (ref 0.1–0.3)
LACTATE SERPL-SCNC: 1.3 MMOL/L — SIGNIFICANT CHANGE UP (ref 0.7–2)
LIPID PNL WITH DIRECT LDL SERPL: 36 MG/DL — SIGNIFICANT CHANGE UP
LYMPHOCYTES # BLD AUTO: 0.42 K/UL — LOW (ref 1.2–3.4)
LYMPHOCYTES # BLD AUTO: 17.9 % — LOW (ref 20.5–51.1)
MAGNESIUM SERPL-MCNC: 1.4 MG/DL — LOW (ref 1.8–2.4)
MCHC RBC-ENTMCNC: 33.7 PG — HIGH (ref 27–31)
MCHC RBC-ENTMCNC: 36 G/DL — SIGNIFICANT CHANGE UP (ref 32–37)
MCV RBC AUTO: 93.6 FL — SIGNIFICANT CHANGE UP (ref 80–94)
MONOCYTES # BLD AUTO: 0.16 K/UL — SIGNIFICANT CHANGE UP (ref 0.1–0.6)
MONOCYTES NFR BLD AUTO: 6.8 % — SIGNIFICANT CHANGE UP (ref 1.7–9.3)
NEUTROPHILS # BLD AUTO: 1.71 K/UL — SIGNIFICANT CHANGE UP (ref 1.4–6.5)
NEUTROPHILS NFR BLD AUTO: 73.2 % — SIGNIFICANT CHANGE UP (ref 42.2–75.2)
NON HDL CHOLESTEROL: 55 MG/DL — SIGNIFICANT CHANGE UP
NRBC # BLD: 0 /100 WBCS — SIGNIFICANT CHANGE UP (ref 0–0)
PLATELET # BLD AUTO: 83 K/UL — LOW (ref 130–400)
POTASSIUM SERPL-MCNC: 3.4 MMOL/L — LOW (ref 3.5–5)
POTASSIUM SERPL-SCNC: 3.4 MMOL/L — LOW (ref 3.5–5)
PROT SERPL-MCNC: 5.6 G/DL — LOW (ref 6–8)
RBC # BLD: 3.44 M/UL — LOW (ref 4.7–6.1)
RBC # FLD: 12 % — SIGNIFICANT CHANGE UP (ref 11.5–14.5)
SARS-COV-2 IGG+IGM SERPL QL IA: >250 U/ML — HIGH
SARS-COV-2 IGG+IGM SERPL QL IA: POSITIVE
SODIUM SERPL-SCNC: 134 MMOL/L — LOW (ref 135–146)
TRIGL SERPL-MCNC: 111 MG/DL — SIGNIFICANT CHANGE UP
TROPONIN T SERPL-MCNC: <0.01 NG/ML — SIGNIFICANT CHANGE UP
WBC # BLD: 2.34 K/UL — LOW (ref 4.8–10.8)
WBC # FLD AUTO: 2.34 K/UL — LOW (ref 4.8–10.8)

## 2021-10-21 PROCEDURE — 99233 SBSQ HOSP IP/OBS HIGH 50: CPT

## 2021-10-21 RX ORDER — INFLUENZA VIRUS VACCINE 15; 15; 15; 15 UG/.5ML; UG/.5ML; UG/.5ML; UG/.5ML
0.5 SUSPENSION INTRAMUSCULAR ONCE
Refills: 0 | Status: DISCONTINUED | OUTPATIENT
Start: 2021-10-21 | End: 2021-10-25

## 2021-10-21 RX ORDER — MAGNESIUM SULFATE 500 MG/ML
2 VIAL (ML) INJECTION
Refills: 0 | Status: COMPLETED | OUTPATIENT
Start: 2021-10-21 | End: 2021-10-21

## 2021-10-21 RX ORDER — POTASSIUM CHLORIDE 20 MEQ
40 PACKET (EA) ORAL ONCE
Refills: 0 | Status: COMPLETED | OUTPATIENT
Start: 2021-10-21 | End: 2021-10-21

## 2021-10-21 RX ADMIN — TAMSULOSIN HYDROCHLORIDE 0.4 MILLIGRAM(S): 0.4 CAPSULE ORAL at 22:36

## 2021-10-21 RX ADMIN — HEPARIN SODIUM 5000 UNIT(S): 5000 INJECTION INTRAVENOUS; SUBCUTANEOUS at 22:37

## 2021-10-21 RX ADMIN — Medication 650 MILLIGRAM(S): at 06:28

## 2021-10-21 RX ADMIN — HEPARIN SODIUM 5000 UNIT(S): 5000 INJECTION INTRAVENOUS; SUBCUTANEOUS at 13:34

## 2021-10-21 RX ADMIN — Medication 650 MILLIGRAM(S): at 22:37

## 2021-10-21 RX ADMIN — Medication 50 GRAM(S): at 09:10

## 2021-10-21 RX ADMIN — Medication 40 MILLIEQUIVALENT(S): at 14:13

## 2021-10-21 RX ADMIN — Medication 50 GRAM(S): at 10:43

## 2021-10-21 RX ADMIN — Medication 1 MILLIGRAM(S): at 12:40

## 2021-10-21 RX ADMIN — HEPARIN SODIUM 5000 UNIT(S): 5000 INJECTION INTRAVENOUS; SUBCUTANEOUS at 06:29

## 2021-10-21 RX ADMIN — AMLODIPINE BESYLATE 5 MILLIGRAM(S): 2.5 TABLET ORAL at 06:28

## 2021-10-21 RX ADMIN — Medication 100 MILLIGRAM(S): at 12:40

## 2021-10-21 NOTE — PROGRESS NOTE ADULT - ASSESSMENT
61 year old male with  PMH of  ETOH abuse, HTN, CKD3, admitted last week for alcohol detox now presents to the ED BIBA from [facility] c/o chest pain, admits to 0.5-1L vodka intake per day since discharge, admitted for ACS r/o and monitoring for alcohol withdrawal     #Chest Pain likely 2/2 costochondritis  - trop neg x4 -> will stop trending  - EKG sinus tach, no ST changes   - s/p asa 325 in ED   - cardiologist Dr Barrientos   - nuclear stress test ordered--> discussed with CT tech--> pt on schedule for today    #HAGMA 2/2 lactic acidosis - improving   - Lactate 4.9 -> 3.9 -> f/u repeat   - s/p 2L IVF in ED -> cont at 75 cc/hr     #ETOH Abuse   #Suspected Thiamine and folic acid def   - last drink 10/19   - [facility] protocol   - cont thiamine and folic acid supplements   - pt says he wants to detox, of note he said the same last admission and relapsed  - careful counseling and CATCH team follow up to prevent relapse  - o/p CATCH f/u    #Magnesium Deficiency   1.4 on admission, 1.7 s/p repletion y'd  will replete more aggressively today  f/u repete mag in am     #Hypokalemia  - repleted today  - f/u am potassium    #HTN - cont amlodipine 5   #BPH - cont flomax   #CKD3 - at baseline Cr 1.2, avoid nephrotoxic agents     #Misc   - Diet DASH   - Activity IAT  - DVT ppx heparin   - GI ppx; none

## 2021-10-21 NOTE — PROGRESS NOTE ADULT - ASSESSMENT
61 year old male with  PMH of  ETOH abuse, HTN, CKD3 presents to the ED BIBA from [facility] c/o chest pain. Patient describes the pain as going across his chest more on the right that comes and goes. Pain is not associated with physical activity.       CP  ETOH abuse  HTN  No CKD-3  HAGMA due to Alcoholic ketoacidosis on admission              PLAN:    ·	CE x 4 negative  ·	Cardiology eval noted. Recommended nuclear stress test  ·	ECHO  ·	Monitor CIWA score  ·	Ativan 2 mg ivp q 4h prn for agitation and restlessness  ·	Elevated lactic acid. Due to alcoholic ketoacidosis. S/P IVF. Recheck lactic acid level  ·	HbA1C is 5.4.  ·	Mg is 1.4. Replete Mg. Give magnesium sulfate 4 gm IV.     Progress Note Handoff    Pending (specify):  Consults__Nuclear stress test_______, Tests________, Test Results_______, Other_________  Family discussion:  Disposition: Home___/SNF___/Other________/Unknown at this time________    Gurjit Howard MD  Spectra: [phone] 61 year old male with  PMH of  ETOH abuse, HTN, CKD3 presents to the ED BIBA from [facility] c/o chest pain. Patient describes the pain as going across his chest more on the right that comes and goes. Pain is not associated with physical activity.       CP  ETOH abuse  HTN  No CKD-3  HAGMA due to Alcoholic ketoacidosis on admission  Hyponatremia, Hypokalemia, Hypomagnesemia               PLAN:    ·	CE x 4 negative  ·	Cardiology eval noted. Recommended nuclear stress test  ·	ECHO  ·	Monitor CIWA score  ·	Ativan 2 mg ivp q 4h prn for agitation and restlessness  ·	Elevated lactic acid. Due to alcoholic ketoacidosis. S/P IVF. Recheck lactic acid level  ·	HbA1C is 5.4. Not diabetic.   ·	Mg is 1.4. Replete Mg. Give magnesium sulfate 4 gm IV.   ·	Replete K  ·	Monitor electrolytes    Progress Note Handoff    Pending (specify):  Consults__Nuclear stress test_______, Tests________, Test Results_______, Other_________  Family discussion:  Disposition: Home___/SNF___/Other________/Unknown at this time________    Gurjit Howard MD  Spectra: [phone]

## 2021-10-21 NOTE — PATIENT PROFILE ADULT - VISION (WITH CORRECTIVE LENSES IF THE PATIENT USUALLY WEARS THEM):
has glasses/Partially impaired: cannot see medication labels or newsprint, but can see obstacles in path, and the surrounding layout; can count fingers at arm's length

## 2021-10-21 NOTE — PROGRESS NOTE ADULT - SUBJECTIVE AND OBJECTIVE BOX
GAMALIEL CAT  61y Male    CHIEF COMPLAINT:    Patient is a 61y old  Male who presents with a chief complaint of Chest Pain (20 Oct 2021 20:56)      INTERVAL HPI/OVERNIGHT EVENTS:    Patient seen and examined.    ROS: All other systems are negative.    Vital Signs:    T(F): 97.7 (10-21-21 @ 12:22), Max: 100.4 (10-21-21 @ 05:10)  HR: 89 (10-21-21 @ 12:22) (83 - 96)  BP: 128/67 (10-21-21 @ 12:22) (128/67 - 174/77)  RR: 20 (10-21-21 @ 12:22) (18 - 20)  SpO2: 97% (10-21-21 @ 08:10) (97% - 98%)  I&O's Summary    20 Oct 2021 07:  -  21 Oct 2021 07:00  --------------------------------------------------------  IN: 80 mL / OUT: 0 mL / NET: 80 mL    21 Oct 2021 07:  -  21 Oct 2021 12:31  --------------------------------------------------------  IN: 805 mL / OUT: 700 mL / NET: 105 mL      Daily Height in cm: 177.8 (21 Oct 2021 03:21)    Daily Weight in k.1 (21 Oct 2021 05:10)  CAPILLARY BLOOD GLUCOSE          PHYSICAL EXAM:    GENERAL:  NAD  SKIN: No rashes or lesions  HENT: Atraumatic. Normocephalic. PERRL. Moist membranes.  NECK: Supple, No JVD. No lymphadenopathy.  PULMONARY: CTA B/L. No wheezing. No rales  CVS: Normal S1, S2. Rate and Rhythm are regular. No murmurs.  ABDOMEN/GI: Soft, Nontender, Nondistended; BS present  EXTREMITIES: Peripheral pulses intact. No edema B/L LE.  NEUROLOGIC:  No motor or sensory deficit.  PSYCH: Alert & oriented x 3    Consultant(s) Notes Reviewed:  [x ] YES  [ ] NO  Care Discussed with Consultants/Other Providers [ x] YES  [ ] NO    EKG reviewed  Telemetry reviewed    LABS:                        11.6   2.34  )-----------( 83       ( 21 Oct 2021 06:18 )             32.2     10-21    134<L>  |  100  |  5<L>  ----------------------------<  107<H>  3.4<L>   |  21  |  1.0    Ca    7.9<L>      21 Oct 2021 06:18  Mg     1.4     10-21    TPro  5.6<L>  /  Alb  3.6  /  TBili  1.3<H>  /  DBili  x   /  AST  77<H>  /  ALT  38  /  AlkPhos  83  10-21      Serum Pro-Brain Natriuretic Peptide: 229 pg/mL (10-20-21 @ 08:30)    Trop <0.01, CKMB --, CK --, 10-21-21 @ 06:18  Trop <0.01, CKMB --, CK --, 10-20-21 @ 16:01  Trop <0.01, CKMB --, CK --, 10-20-21 @ 16:00  Trop <0.01, CKMB --, CK --, 10-20-21 @ 08:30        RADIOLOGY & ADDITIONAL TESTS:      Imaging or report Personally Reviewed:  [ ] YES  [ ] NO    Medications:  Standing  amLODIPine   Tablet 5 milliGRAM(s) Oral daily  folic acid 1 milliGRAM(s) Oral daily  heparin   Injectable 5000 Unit(s) SubCutaneous every 8 hours  influenza   Vaccine 0.5 milliLiter(s) IntraMuscular once  lactated ringers. 1000 milliLiter(s) IV Continuous <Continuous>  regadenoson Injectable 0.4 milliGRAM(s) IV Push once  tamsulosin 0.4 milliGRAM(s) Oral at bedtime  thiamine 100 milliGRAM(s) Oral daily    PRN Meds  acetaminophen   Tablet .. 650 milliGRAM(s) Oral every 6 hours PRN  aluminum hydroxide/magnesium hydroxide/simethicone Suspension 30 milliLiter(s) Oral every 4 hours PRN  LORazepam   Injectable 2 milliGRAM(s) IV Push every 6 hours PRN  melatonin 3 milliGRAM(s) Oral at bedtime PRN  ondansetron Injectable 4 milliGRAM(s) IV Push every 8 hours PRN      Case discussed with resident    Care discussed with pt/family           CAT ALSTON  61y Male    CHIEF COMPLAINT:    Patient is a 61y old  Male who presents with a chief complaint of Chest Pain (20 Oct 2021 20:56)      INTERVAL HPI/OVERNIGHT EVENTS:    Patient seen and examined. No cp today. No palpitations. No sob. Complains that his hand are shaking. Stress test today    ROS: All other systems are negative.    Vital Signs:    T(F): 97.7 (10-21-21 @ 12:22), Max: 100.4 (10-21-21 @ 05:10)  HR: 89 (10-21-21 @ 12:22) (83 - 96)  BP: 128/67 (10-21-21 @ 12:22) (128/67 - 174/77)  RR: 20 (10-21-21 @ 12:22) (18 - 20)  SpO2: 97% (10-21-21 @ 08:10) (97% - 98%)  I&O's Summary    20 Oct 2021 07:  -  21 Oct 2021 07:00  --------------------------------------------------------  IN: 80 mL / OUT: 0 mL / NET: 80 mL    21 Oct 2021 07:  -  21 Oct 2021 12:31  --------------------------------------------------------  IN: 805 mL / OUT: 700 mL / NET: 105 mL      Daily Height in cm: 177.8 (21 Oct 2021 03:21)    Daily Weight in k.1 (21 Oct 2021 05:10)  CAPILLARY BLOOD GLUCOSE          PHYSICAL EXAM:    GENERAL:  NAD  SKIN: No rashes or lesions  HENT: Atraumatic. Normocephalic. PERRL. Moist membranes.  NECK: Supple, No JVD. No lymphadenopathy.  PULMONARY: CTA B/L. No wheezing. No rales  CVS: Normal S1, S2. Rate and Rhythm are regular. No murmurs.  ABDOMEN/GI: Soft, Nontender, Nondistended; BS present  EXTREMITIES: Peripheral pulses intact. No edema B/L LE.  NEUROLOGIC:  No motor or sensory deficit.  PSYCH: Alert & oriented x 3    Consultant(s) Notes Reviewed:  [x ] YES  [ ] NO  Care Discussed with Consultants/Other Providers [ x] YES  [ ] NO    EKG reviewed  Telemetry reviewed    LABS:                        11.6   2.34  )-----------( 83       ( 21 Oct 2021 06:18 )             32.2     10-21    134<L>  |  100  |  5<L>  ----------------------------<  107<H>  3.4<L>   |  21  |  1.0    Ca    7.9<L>      21 Oct 2021 06:18  Mg     1.4     10-21    TPro  5.6<L>  /  Alb  3.6  /  TBili  1.3<H>  /  DBili  x   /  AST  77<H>  /  ALT  38  /  AlkPhos  83  10-21      Serum Pro-Brain Natriuretic Peptide: 229 pg/mL (10-20-21 @ 08:30)    Trop <0.01, CKMB --, CK --, 10-21-21 @ 06:18  Trop <0.01, CKMB --, CK --, 10-20-21 @ 16:01  Trop <0.01, CKMB --, CK --, 10-20-21 @ 16:00  Trop <0.01, CKMB --, CK --, 10-20-21 @ 08:30        RADIOLOGY & ADDITIONAL TESTS:      Imaging or report Personally Reviewed:  [ ] YES  [ ] NO    Medications:  Standing  amLODIPine   Tablet 5 milliGRAM(s) Oral daily  folic acid 1 milliGRAM(s) Oral daily  heparin   Injectable 5000 Unit(s) SubCutaneous every 8 hours  influenza   Vaccine 0.5 milliLiter(s) IntraMuscular once  lactated ringers. 1000 milliLiter(s) IV Continuous <Continuous>  regadenoson Injectable 0.4 milliGRAM(s) IV Push once  tamsulosin 0.4 milliGRAM(s) Oral at bedtime  thiamine 100 milliGRAM(s) Oral daily    PRN Meds  acetaminophen   Tablet .. 650 milliGRAM(s) Oral every 6 hours PRN  aluminum hydroxide/magnesium hydroxide/simethicone Suspension 30 milliLiter(s) Oral every 4 hours PRN  LORazepam   Injectable 2 milliGRAM(s) IV Push every 6 hours PRN  melatonin 3 milliGRAM(s) Oral at bedtime PRN  ondansetron Injectable 4 milliGRAM(s) IV Push every 8 hours PRN      Case discussed with resident    Care discussed with pt/family

## 2021-10-21 NOTE — PROGRESS NOTE ADULT - SUBJECTIVE AND OBJECTIVE BOX
SUBJECTIVE:    Patient is a 61y old Male who presents with a chief complaint of Chest Pain (21 Oct 2021 12:30)      HPI:  61 year old male with  PMH of  ETOH abuse, HTN, CKD3 presents to the ED BIBA from [facility] c/o chest pain. Patient describes the pain as going across his chest more on the right that comes and goes. Pain is not associated with physical activity. Patient was worried this morning so he called an ambulance. Patient also admits to drinking a liter of vodka yesterday afternoon which is his normal amount of daily alcohol intake. Patient denies fevers, chills, sob, abdominal pain, N/V/D, dysuria. Patient admits to some leg swelling but denies any pain.   Cardiologist is Dr. Barrientos. Patient is unsure when last stress test was done.     In ED vitals: /70, , RR 16, T 98.5, SpO2 99% on RA   Labs significant for Magnesium 1.4, lactate 4.9, trop neg x1. Patient given 2L IVF in ED, loaded with ASA, and 2g magnesium given      (20 Oct 2021 15:47)      Currently admitted to medicine with the primary diagnosis of Chest pain         Besides the pertinent positives and negatives described above, the ROS was within normal limits.    PAST MEDICAL & SURGICAL HISTORY  Alcohol dependence    Vertigo    Tobacco dependency    HTN (hypertension)    Hard of hearing    Gout    Seasonal allergies    CKD (chronic kidney disease)    No significant past surgical history      SOCIAL HISTORY:    ALLERGIES:  Beef (Hives)  dairy products (Hives)  No Known Drug Allergies  shellfish (Hives)    MEDICATIONS:  STANDING MEDICATIONS  amLODIPine   Tablet 5 milliGRAM(s) Oral daily  folic acid 1 milliGRAM(s) Oral daily  heparin   Injectable 5000 Unit(s) SubCutaneous every 8 hours  influenza   Vaccine 0.5 milliLiter(s) IntraMuscular once  lactated ringers. 1000 milliLiter(s) IV Continuous <Continuous>  potassium chloride    Tablet ER 40 milliEquivalent(s) Oral once  regadenoson Injectable 0.4 milliGRAM(s) IV Push once  tamsulosin 0.4 milliGRAM(s) Oral at bedtime  thiamine 100 milliGRAM(s) Oral daily    PRN MEDICATIONS  acetaminophen   Tablet .. 650 milliGRAM(s) Oral every 6 hours PRN  aluminum hydroxide/magnesium hydroxide/simethicone Suspension 30 milliLiter(s) Oral every 4 hours PRN  LORazepam   Injectable 2 milliGRAM(s) IV Push every 6 hours PRN  melatonin 3 milliGRAM(s) Oral at bedtime PRN  ondansetron Injectable 4 milliGRAM(s) IV Push every 8 hours PRN    VITALS:   T(F): 97.7  HR: 89  BP: 128/67  RR: 20  SpO2: 97%    LABS:                        11.6   2.34  )-----------( 83       ( 21 Oct 2021 06:18 )             32.2     10-21    134<L>  |  100  |  5<L>  ----------------------------<  107<H>  3.4<L>   |  21  |  1.0    Ca    7.9<L>      21 Oct 2021 06:18  Mg     1.4     10-21    TPro  5.6<L>  /  Alb  3.6  /  TBili  1.3<H>  /  DBili  x   /  AST  77<H>  /  ALT  38  /  AlkPhos  83  10-21          Lactate, Blood: 1.3 mmol/L (10-21-21 @ 11:00)  Troponin T, Serum: <0.01 ng/mL (10-21-21 @ 06:18)  Troponin T, Serum: <0.01 ng/mL (10-20-21 @ 16:01)  Lactate, Blood: 4.1 mmol/L *HH* (10-20-21 @ 16:01)  Troponin T, Serum: <0.01 ng/mL (10-20-21 @ 16:00)      CARDIAC MARKERS ( 21 Oct 2021 06:18 )  x     / <0.01 ng/mL / x     / x     / x      CARDIAC MARKERS ( 20 Oct 2021 16:01 )  x     / <0.01 ng/mL / x     / x     / x      CARDIAC MARKERS ( 20 Oct 2021 16:00 )  x     / <0.01 ng/mL / x     / x     / x      CARDIAC MARKERS ( 20 Oct 2021 08:30 )  x     / <0.01 ng/mL / x     / x     / x          RADIOLOGY:    PHYSICAL EXAM:  GEN: No acute distress  LUNGS: Clear to auscultation bilaterally   HEART: Regular  ABD: Soft, non-tender, non-distended.  EXT: NC/NC/NE/2+PP/FELDER/Skin Intact.   NEURO: AAOX3    Intravenous access:   NG tube:   Will Catheter:

## 2021-10-22 LAB
ALBUMIN SERPL ELPH-MCNC: 3.8 G/DL — SIGNIFICANT CHANGE UP (ref 3.5–5.2)
ALP SERPL-CCNC: 95 U/L — SIGNIFICANT CHANGE UP (ref 30–115)
ALT FLD-CCNC: 34 U/L — SIGNIFICANT CHANGE UP (ref 0–41)
ANION GAP SERPL CALC-SCNC: 15 MMOL/L — HIGH (ref 7–14)
APTT BLD: 33.1 SEC — SIGNIFICANT CHANGE UP (ref 27–39.2)
AST SERPL-CCNC: 58 U/L — HIGH (ref 0–41)
BASOPHILS # BLD AUTO: 0.05 K/UL — SIGNIFICANT CHANGE UP (ref 0–0.2)
BASOPHILS NFR BLD AUTO: 1.4 % — HIGH (ref 0–1)
BILIRUB SERPL-MCNC: 1.5 MG/DL — HIGH (ref 0.2–1.2)
BUN SERPL-MCNC: 8 MG/DL — LOW (ref 10–20)
CALCIUM SERPL-MCNC: 8.5 MG/DL — SIGNIFICANT CHANGE UP (ref 8.5–10.1)
CHLORIDE SERPL-SCNC: 102 MMOL/L — SIGNIFICANT CHANGE UP (ref 98–110)
CO2 SERPL-SCNC: 20 MMOL/L — SIGNIFICANT CHANGE UP (ref 17–32)
CREAT SERPL-MCNC: 0.9 MG/DL — SIGNIFICANT CHANGE UP (ref 0.7–1.5)
EOSINOPHIL # BLD AUTO: 0.08 K/UL — SIGNIFICANT CHANGE UP (ref 0–0.7)
EOSINOPHIL NFR BLD AUTO: 2.2 % — SIGNIFICANT CHANGE UP (ref 0–8)
GLUCOSE SERPL-MCNC: 94 MG/DL — SIGNIFICANT CHANGE UP (ref 70–99)
HCT VFR BLD CALC: 35 % — LOW (ref 42–52)
HGB BLD-MCNC: 12.4 G/DL — LOW (ref 14–18)
IMM GRANULOCYTES NFR BLD AUTO: 0.5 % — HIGH (ref 0.1–0.3)
LYMPHOCYTES # BLD AUTO: 0.96 K/UL — LOW (ref 1.2–3.4)
LYMPHOCYTES # BLD AUTO: 25.9 % — SIGNIFICANT CHANGE UP (ref 20.5–51.1)
MAGNESIUM SERPL-MCNC: 1.9 MG/DL — SIGNIFICANT CHANGE UP (ref 1.8–2.4)
MCHC RBC-ENTMCNC: 33.4 PG — HIGH (ref 27–31)
MCHC RBC-ENTMCNC: 35.4 G/DL — SIGNIFICANT CHANGE UP (ref 32–37)
MCV RBC AUTO: 94.3 FL — HIGH (ref 80–94)
MONOCYTES # BLD AUTO: 0.24 K/UL — SIGNIFICANT CHANGE UP (ref 0.1–0.6)
MONOCYTES NFR BLD AUTO: 6.5 % — SIGNIFICANT CHANGE UP (ref 1.7–9.3)
NEUTROPHILS # BLD AUTO: 2.35 K/UL — SIGNIFICANT CHANGE UP (ref 1.4–6.5)
NEUTROPHILS NFR BLD AUTO: 63.5 % — SIGNIFICANT CHANGE UP (ref 42.2–75.2)
NRBC # BLD: 0 /100 WBCS — SIGNIFICANT CHANGE UP (ref 0–0)
PLATELET # BLD AUTO: 68 K/UL — LOW (ref 130–400)
PLATELET # BLD AUTO: 80 K/UL — LOW (ref 130–400)
POTASSIUM SERPL-MCNC: 3.5 MMOL/L — SIGNIFICANT CHANGE UP (ref 3.5–5)
POTASSIUM SERPL-SCNC: 3.5 MMOL/L — SIGNIFICANT CHANGE UP (ref 3.5–5)
PROT SERPL-MCNC: 6.1 G/DL — SIGNIFICANT CHANGE UP (ref 6–8)
RBC # BLD: 3.71 M/UL — LOW (ref 4.7–6.1)
RBC # FLD: 12.1 % — SIGNIFICANT CHANGE UP (ref 11.5–14.5)
SODIUM SERPL-SCNC: 137 MMOL/L — SIGNIFICANT CHANGE UP (ref 135–146)
WBC # BLD: 3.7 K/UL — LOW (ref 4.8–10.8)
WBC # FLD AUTO: 3.7 K/UL — LOW (ref 4.8–10.8)

## 2021-10-22 PROCEDURE — 99233 SBSQ HOSP IP/OBS HIGH 50: CPT

## 2021-10-22 RX ORDER — POTASSIUM CHLORIDE 20 MEQ
40 PACKET (EA) ORAL ONCE
Refills: 0 | Status: COMPLETED | OUTPATIENT
Start: 2021-10-22 | End: 2021-10-22

## 2021-10-22 RX ORDER — ADENOSINE 3 MG/ML
60 INJECTION INTRAVENOUS ONCE
Refills: 0 | Status: DISCONTINUED | OUTPATIENT
Start: 2021-10-22 | End: 2021-10-23

## 2021-10-22 RX ORDER — POTASSIUM CHLORIDE 20 MEQ
40 PACKET (EA) ORAL ONCE
Refills: 0 | Status: DISCONTINUED | OUTPATIENT
Start: 2021-10-22 | End: 2021-10-22

## 2021-10-22 RX ADMIN — TAMSULOSIN HYDROCHLORIDE 0.4 MILLIGRAM(S): 0.4 CAPSULE ORAL at 22:10

## 2021-10-22 RX ADMIN — HEPARIN SODIUM 5000 UNIT(S): 5000 INJECTION INTRAVENOUS; SUBCUTANEOUS at 06:29

## 2021-10-22 RX ADMIN — Medication 1 MILLIGRAM(S): at 11:46

## 2021-10-22 RX ADMIN — Medication 2 MILLIGRAM(S): at 14:28

## 2021-10-22 RX ADMIN — Medication 40 MILLIEQUIVALENT(S): at 11:47

## 2021-10-22 RX ADMIN — Medication 100 MILLIGRAM(S): at 11:46

## 2021-10-22 RX ADMIN — AMLODIPINE BESYLATE 5 MILLIGRAM(S): 2.5 TABLET ORAL at 06:28

## 2021-10-22 NOTE — PROGRESS NOTE ADULT - ASSESSMENT
61 year old male with  PMH of  ETOH abuse, HTN, CKD3, admitted last week for alcohol detox now presents to the ED BIBA from [facility] c/o chest pain, admits to 0.5-1L vodka intake per day since discharge, admitted for ACS r/o and monitoring for alcohol withdrawal     #Chest Pain likely 2/2 costochondritis   (less likely demand ischemia 2ry to alcohol withdrawal autonomic instability with episodic tachycardia >150bpm)  - trop neg x4 -> will stop trending  - EKG sinus tach with reoccurrence today during acute agitation episode    --> tachy to 150's on telemetry, appears to be exclusively sinus tach but refused 12-lead  EKG evaluation    --> resolved s/p 2mg IV ativan, likely alcohol-withdrawal related   - nuclear stress test ordered--> pt refused--> will revisit once withdrawal symptoms resolve  - c/w tele monitoring for now  - IV ativan for CIWA >8 OR acute agitation episodes  - CATCH team consult  #HAGMA 2/2 lactic acidosis - improving   - Lactate 4.9 -> 3.9 -> f/u repeat   - s/p 2L IVF in ED -> good PO fluid intake since admission ->IVF d/c'd    #ETOH Abuse   #Suspected Thiamine and folic acid def   - last drink 10/19   - CIWA protocol   - cont thiamine and folic acid supplements   - pt says he wants to detox, of note he said the same last admission and relapsed  - careful counseling and CATCH team follow up to prevent relapse  - o/p CATCH f/u    #Thrombocytopenia  - Acute drop to <70, was 280 mere 2 weeks ago  - Susoecting bone marrow suppression 2ry to profound alcoholism   - Heparin stopped, HIT panel sent  - Sequentials for now    #Magnesium Deficiency   1.4 on admission, 1.7 s/p repletion y'd  within normal limits today  continue to trend    #Persistent Hypokalemia  - repleted again today, PO 40mEq x1  - f/u am potassium  - may need to d/c on PO KCl if persists    #HTN - cont amlodipine 5   #BPH - cont flomax   #CKD3 - at baseline Cr 1.2, avoid nephrotoxic agents     #Misc   - Diet DASH   - Activity IAT  - Dispo: Acute, f/u PT eval, HIT panel,

## 2021-10-22 NOTE — PHYSICAL THERAPY INITIAL EVALUATION ADULT - PERTINENT HX OF CURRENT PROBLEM, REHAB EVAL
61 year old male with  PMH of  ETOH abuse, HTN, CKD3 presents to the ED BIBA from [facility] c/o chest pain. Patient describes the pain as going across his chest more on the right that comes and goes. Pain is not associated with physical activity.

## 2021-10-22 NOTE — PROGRESS NOTE ADULT - ASSESSMENT
1] Atypical Chest Pain      ASHD (Coronary Artery Calcifications on prior CTSCAN)  - Declined stress test today  - If he continue to refuse cardiac workup, patient may be discharged back to SNF.  He will follow up with his cardiologist.    2] Hypertension  - Continue medication    3] Elevated Glucose  - Consider checking Hgb A1C    Will follow    Oscar Holt MD (covering for Dr. Mundo Barrientos)  [phone] Office

## 2021-10-22 NOTE — PHYSICAL THERAPY INITIAL EVALUATION ADULT - GENERAL OBSERVATIONS, REHAB EVAL
PT IE 1050-1115am. Chart reviewed. Pt encountered semi-reclined in bed. I NAD. + IV lock, + tele. pt c/o "shakiness" of extremities.

## 2021-10-22 NOTE — PROGRESS NOTE ADULT - SUBJECTIVE AND OBJECTIVE BOX
SUBJECTIVE:    Patient is a 61y old Male who presents with a chief complaint of Chest Pain (22 Oct 2021 09:39)      HPI:  61 year old male with  PMH of  ETOH abuse, HTN, CKD3 presents to the ED BIBA from [facility] c/o chest pain. Patient describes the pain as going across his chest more on the right that comes and goes. Pain is not associated with physical activity. Patient was worried this morning so he called an ambulance. Patient also admits to drinking a liter of vodka yesterday afternoon which is his normal amount of daily alcohol intake. Patient denies fevers, chills, sob, abdominal pain, N/V/D, dysuria. Patient admits to some leg swelling but denies any pain.   Cardiologist is Dr. Barrientos. Patient is unsure when last stress test was done.     In ED vitals: /70, , RR 16, T 98.5, SpO2 99% on RA   Labs significant for Magnesium 1.4, lactate 4.9, trop neg x1. Patient given 2L IVF in ED, loaded with ASA, and 2g magnesium given      (20 Oct 2021 15:47)      Currently admitted to medicine with the primary diagnosis of Chest pain         Besides the pertinent positives and negatives described above, the ROS was within normal limits.    PAST MEDICAL & SURGICAL HISTORY  Alcohol dependence    Vertigo    Tobacco dependency    HTN (hypertension)    Hard of hearing    Gout    Seasonal allergies    CKD (chronic kidney disease)    No significant past surgical history      SOCIAL HISTORY:    ALLERGIES:  Beef (Hives)  dairy products (Hives)  No Known Drug Allergies  shellfish (Hives)    MEDICATIONS:  STANDING MEDICATIONS  aDENosine Injectable (ADENOSCAN) 60 milliGRAM(s) IV Bolus once  amLODIPine   Tablet 5 milliGRAM(s) Oral daily  folic acid 1 milliGRAM(s) Oral daily  influenza   Vaccine 0.5 milliLiter(s) IntraMuscular once  regadenoson Injectable 0.4 milliGRAM(s) IV Push once  tamsulosin 0.4 milliGRAM(s) Oral at bedtime  thiamine 100 milliGRAM(s) Oral daily    PRN MEDICATIONS  acetaminophen   Tablet .. 650 milliGRAM(s) Oral every 6 hours PRN  aluminum hydroxide/magnesium hydroxide/simethicone Suspension 30 milliLiter(s) Oral every 4 hours PRN  LORazepam   Injectable 2 milliGRAM(s) IV Push every 6 hours PRN  melatonin 3 milliGRAM(s) Oral at bedtime PRN  ondansetron Injectable 4 milliGRAM(s) IV Push every 8 hours PRN    VITALS:   T(F): 97.3  HR: 94  BP: 178/104  RR: 20  SpO2: 97%    LABS:                        x      x     )-----------( 80       ( 22 Oct 2021 19:00 )             x        10-22    137  |  102  |  8<L>  ----------------------------<  94  3.5   |  20  |  0.9    Ca    8.5      22 Oct 2021 05:10  Mg     1.9     10-22    TPro  6.1  /  Alb  3.8  /  TBili  1.5<H>  /  DBili  x   /  AST  58<H>  /  ALT  34  /  AlkPhos  95  10-22              CARDIAC MARKERS ( 21 Oct 2021 06:18 )  x     / <0.01 ng/mL / x     / x     / x          RADIOLOGY:    PHYSICAL EXAM:  GEN: No acute distress  LUNGS: Clear to auscultation bilaterally   HEART: Regular  ABD: Soft, non-tender, non-distended.  EXT: NC/NC/NE/2+PP/FELDRE/Skin Intact.   NEURO: AAOX3    Intravenous access:   NG tube:   Will Catheter:        SUBJECTIVE:    Patient is a 61y old Male who presents with a chief complaint of Chest Pain (22 Oct 2021 09:39) admitted for ACS r/o with concurrent alcohol withdrawal     TODAY'S EVENTS:    Pt seen and examined at bedside in am. No events overnight. Complains of unsteadiness on his feet, otherwise ROS unchanged. Denies new chest pain. Later when down in stress test lab, pt  acutely agitated/ threateningly combative. Likely withdrawal-related agitation. Pt was returned to his room per his orders and went without stress test       PAST MEDICAL & SURGICAL HISTORY  Alcohol dependence    Vertigo    Tobacco dependency    HTN (hypertension)    Hard of hearing    Gout    Seasonal allergies    CKD (chronic kidney disease)    No significant past surgical history      SOCIAL HISTORY:    ALLERGIES:  Beef (Hives)  dairy products (Hives)  No Known Drug Allergies  shellfish (Hives)    MEDICATIONS:  STANDING MEDICATIONS  aDENosine Injectable (ADENOSCAN) 60 milliGRAM(s) IV Bolus once  amLODIPine   Tablet 5 milliGRAM(s) Oral daily  folic acid 1 milliGRAM(s) Oral daily  influenza   Vaccine 0.5 milliLiter(s) IntraMuscular once  regadenoson Injectable 0.4 milliGRAM(s) IV Push once  tamsulosin 0.4 milliGRAM(s) Oral at bedtime  thiamine 100 milliGRAM(s) Oral daily    PRN MEDICATIONS  acetaminophen   Tablet .. 650 milliGRAM(s) Oral every 6 hours PRN  aluminum hydroxide/magnesium hydroxide/simethicone Suspension 30 milliLiter(s) Oral every 4 hours PRN  LORazepam   Injectable 2 milliGRAM(s) IV Push every 6 hours PRN  melatonin 3 milliGRAM(s) Oral at bedtime PRN  ondansetron Injectable 4 milliGRAM(s) IV Push every 8 hours PRN    VITALS:   T(F): 97.3  HR: 94  BP: 178/104  RR: 20  SpO2: 97%    LABS:                        x      x     )-----------( 80       ( 22 Oct 2021 19:00 )             x        10-22    137  |  102  |  8<L>  ----------------------------<  94  3.5   |  20  |  0.9    Ca    8.5      22 Oct 2021 05:10  Mg     1.9     10-22    TPro  6.1  /  Alb  3.8  /  TBili  1.5<H>  /  DBili  x   /  AST  58<H>  /  ALT  34  /  AlkPhos  95  10-22    CARDIAC MARKERS ( 21 Oct 2021 06:18 )  x     / <0.01 ng/mL / x     / x     / x          RADIOLOGY:< from: Xray Chest 1 View- PORTABLE-Urgent (10.20.21 @ 09:18) >  Impression:    Low lung volumes leads to magnification of the pulmonary interstitium. Grossly unremarkable.        PHYSICAL EXAM:  GENERAL:  NAD  SKIN: No rashes or lesions  HENT: Atraumatic. Normocephalic. PERRL. Moist membranes.  NECK: Supple, No JVD. No lymphadenopathy.  PULMONARY: CTA B/L. No wheezing. No rales  CVS: Normal S1, S2. Rate and Rhythm are regular. No murmurs.  ABDOMEN/GI: Soft, Nontender, Nondistended; BS present  EXTREMITIES: Peripheral pulses intact. No edema B/L LE.  NEUROLOGIC:  Alert & oriented x 3,No motor or sensory deficit. Refusing gait exam (but says he is unsteady)  PSYCH:  drowsy s/p ativan 2mg IV, previously extremely agitated/threatening behaviour

## 2021-10-22 NOTE — PROGRESS NOTE ADULT - SUBJECTIVE AND OBJECTIVE BOX
Vitals:  T(C): 37.1 (10-22-21 @ 21:00), Max: 38 (10-21-21 @ 05:10)  HR: 98 (10-22-21 @ 21:00) (75 - 108)  BP: 122/71 (10-22-21 @ 21:00) (122/71 - 178/104)  RR: 18 (10-22-21 @ 21:00) (17 - 20)  SpO2: 97% (10-22-21 @ 21:02) (97% - 97%)  ECG:    LABS:                        x      x     )-----------( 80       ( 22 Oct 2021 19:00 )             x        10-22    137  |  102  |  8<L>  ----------------------------<  94  3.5   |  20  |  0.9    Ca    8.5      22 Oct 2021 05:10  Mg     1.9     10-22    TPro  6.1  /  Alb  3.8  /  TBili  1.5<H>  /  DBili  x   /  AST  58<H>  /  ALT  34  /  AlkPhos  95  10-22    PTT - ( 22 Oct 2021 19:00 )  PTT:33.1 sec  MEDICATIONS  (STANDING):  aDENosine Injectable (ADENOSCAN) 60 milliGRAM(s) IV Bolus once  amLODIPine   Tablet 5 milliGRAM(s) Oral daily  folic acid 1 milliGRAM(s) Oral daily  influenza   Vaccine 0.5 milliLiter(s) IntraMuscular once  regadenoson Injectable 0.4 milliGRAM(s) IV Push once  tamsulosin 0.4 milliGRAM(s) Oral at bedtime  thiamine 100 milliGRAM(s) Oral daily    MEDICATIONS  (PRN):  acetaminophen   Tablet .. 650 milliGRAM(s) Oral every 6 hours PRN Temp greater or equal to 38C (100.4F), Mild Pain (1 - 3)  aluminum hydroxide/magnesium hydroxide/simethicone Suspension 30 milliLiter(s) Oral every 4 hours PRN Dyspepsia  LORazepam   Injectable 2 milliGRAM(s) IV Push every 6 hours PRN Agitation  melatonin 3 milliGRAM(s) Oral at bedtime PRN Insomnia  ondansetron Injectable 4 milliGRAM(s) IV Push every 8 hours PRN Nausea and/or Vomiting      PHYSICAL EXAM:    Constitutional: appears stated age, well developed/nourished, no acute distress  Eyes: EOM's intact.  PERRLA  ENMT: Normocephalic, atraumatic.  Clear oropharynx.  No ear discharge.  Neck: Jugular veins non-distended; no carotid bruits bilaterally.  Respiratory: respiratory pattern unlabored; no dullness to percussion; lungs clear to auscultation bilaterally.  Cardiovascular: Regular rhythm.  S1 and S2 normal.  No murmur nor rub appreciated.  Abdomen: Soft, non-tender.  Normal bowel sounds.  Extremities: extremities warm; no cyanosis, clubbing or edema.  Pulses: Intact bilaterally  Skin: No gross abnormalities noted.  Musculoskeletal: No gross deformities  Neurological: Alert, oriented x 3.  No focal neurologic deficits noted.           Patient was seen and examined earlier this evening by me on 3C.    He offers no new complaints.  He wants to be discharged.  He states that he will not have the stress test.  He prefers to  follow up with Dr. Barrientos.    Vitals:  T(C): 37.1 (10-22-21 @ 21:00), Max: 38 (10-21-21 @ 05:10)  HR: 98 (10-22-21 @ 21:00) (75 - 108)  BP: 122/71 (10-22-21 @ 21:00) (122/71 - 178/104)  RR: 18 (10-22-21 @ 21:00) (17 - 20)  SpO2: 97% (10-22-21 @ 21:02) (97% - 97%)    Telemetry: Sinus Rhythm    LABS:                        x      x     )-----------( 80       ( 22 Oct 2021 19:00 )             x        10-22    137  |  102  |  8<L>  ----------------------------<  94  3.5   |  20  |  0.9    Ca    8.5      22 Oct 2021 05:10  Mg     1.9     10-22    TPro  6.1  /  Alb  3.8  /  TBili  1.5<H>  /  DBili  x   /  AST  58<H>  /  ALT  34  /  AlkPhos  95  10-22    PTT - ( 22 Oct 2021 19:00 )  PTT:33.1 sec  MEDICATIONS  (STANDING):  aDENosine Injectable (ADENOSCAN) 60 milliGRAM(s) IV Bolus once  amLODIPine   Tablet 5 milliGRAM(s) Oral daily  folic acid 1 milliGRAM(s) Oral daily  influenza   Vaccine 0.5 milliLiter(s) IntraMuscular once  regadenoson Injectable 0.4 milliGRAM(s) IV Push once  tamsulosin 0.4 milliGRAM(s) Oral at bedtime  thiamine 100 milliGRAM(s) Oral daily    MEDICATIONS  (PRN):  acetaminophen   Tablet .. 650 milliGRAM(s) Oral every 6 hours PRN Temp greater or equal to 38C (100.4F), Mild Pain (1 - 3)  aluminum hydroxide/magnesium hydroxide/simethicone Suspension 30 milliLiter(s) Oral every 4 hours PRN Dyspepsia  LORazepam   Injectable 2 milliGRAM(s) IV Push every 6 hours PRN Agitation  melatonin 3 milliGRAM(s) Oral at bedtime PRN Insomnia  ondansetron Injectable 4 milliGRAM(s) IV Push every 8 hours PRN Nausea and/or Vomiting      PHYSICAL EXAM:  Constitutional: appears stated age, well developed/nourished, no acute distress  Eyes: EOM's intact.  PERRLA  ENMT: Normocephalic, atraumatic.  Clear oropharynx.  No ear discharge.  Neck: Jugular veins non-distended; no carotid bruits bilaterally.  Respiratory: respiratory pattern unlabored; no dullness to percussion; lungs clear to auscultation bilaterally.  Cardiovascular: Regular rhythm.  S1 and S2 normal.  No murmur nor rub appreciated.  Abdomen: Soft, non-tender.  Normal bowel sounds.  Extremities: extremities warm; no cyanosis, clubbing or edema.  Pulses: Intact bilaterally  Skin: No gross abnormalities noted.  Musculoskeletal: No gross deformities  Neurological: Alert, oriented x 3.  No focal neurologic deficits noted.

## 2021-10-22 NOTE — PROGRESS NOTE ADULT - SUBJECTIVE AND OBJECTIVE BOX
GAMALIEL CAT  61y Male    CHIEF COMPLAINT:    Patient is a 61y old  Male who presents with a chief complaint of Chest Pain (21 Oct 2021 14:01)      INTERVAL HPI/OVERNIGHT EVENTS:    Patient seen and examined.    ROS: All other systems are negative.    Vital Signs:    T(F): 98.2 (10-22-21 @ 04:54), Max: 100.4 (10-21-21 @ 20:58)  HR: 92 (10-22-21 @ 07:40) (75 - 108)  BP: 175/85 (10-22-21 @ 04:54) (128/67 - 175/85)  RR: 18 (10-22-21 @ 04:54) (17 - 20)  SpO2: 97% (10-22-21 @ 07:40) (97% - 97%)  I&O's Summary    21 Oct 2021 07:  -  22 Oct 2021 07:00  --------------------------------------------------------  IN: 3140 mL / OUT: 2500 mL / NET: 640 mL    22 Oct 2021 07:01  -  22 Oct 2021 09:39  --------------------------------------------------------  IN: 0 mL / OUT: 1000 mL / NET: -1000 mL      Daily     Daily Weight in k.3 (22 Oct 2021 04:54)  CAPILLARY BLOOD GLUCOSE          PHYSICAL EXAM:    GENERAL:  NAD  SKIN: No rashes or lesions  HENT: Atraumatic. Normocephalic. PERRL. Moist membranes.  NECK: Supple, No JVD. No lymphadenopathy.  PULMONARY: CTA B/L. No wheezing. No rales  CVS: Normal S1, S2. Rate and Rhythm are regular. No murmurs.  ABDOMEN/GI: Soft, Nontender, Nondistended; BS present  EXTREMITIES: Peripheral pulses intact. No edema B/L LE.  NEUROLOGIC:  No motor or sensory deficit.  PSYCH: Alert & oriented x 3    Consultant(s) Notes Reviewed:  [x ] YES  [ ] NO  Care Discussed with Consultants/Other Providers [ x] YES  [ ] NO    EKG reviewed  Telemetry reviewed    LABS:                        12.4   3.70  )-----------( 68       ( 22 Oct 2021 05:10 )             35.0     10-22    137  |  102  |  8<L>  ----------------------------<  94  3.5   |  20  |  0.9    Ca    8.5      22 Oct 2021 05:10  Mg     1.9     10-22    TPro  6.1  /  Alb  3.8  /  TBili  1.5<H>  /  DBili  x   /  AST  58<H>  /  ALT  34  /  AlkPhos  95  10-22      Serum Pro-Brain Natriuretic Peptide: 229 pg/mL (10-20-21 @ 08:30)    Trop <0.01, CKMB --, CK --, 10-21-21 @ 06:18  Trop <0.01, CKMB --, CK --, 10-20-21 @ 16:01  Trop <0.01, CKMB --, CK --, 10-20-21 @ 16:00  Trop <0.01, CKMB --, CK --, 10-20-21 @ 08:30        RADIOLOGY & ADDITIONAL TESTS:      Imaging or report Personally Reviewed:  [ ] YES  [ ] NO    Medications:  Standing  amLODIPine   Tablet 5 milliGRAM(s) Oral daily  folic acid 1 milliGRAM(s) Oral daily  heparin   Injectable 5000 Unit(s) SubCutaneous every 8 hours  influenza   Vaccine 0.5 milliLiter(s) IntraMuscular once  lactated ringers. 1000 milliLiter(s) IV Continuous <Continuous>  regadenoson Injectable 0.4 milliGRAM(s) IV Push once  tamsulosin 0.4 milliGRAM(s) Oral at bedtime  thiamine 100 milliGRAM(s) Oral daily    PRN Meds  acetaminophen   Tablet .. 650 milliGRAM(s) Oral every 6 hours PRN  aluminum hydroxide/magnesium hydroxide/simethicone Suspension 30 milliLiter(s) Oral every 4 hours PRN  LORazepam   Injectable 2 milliGRAM(s) IV Push every 6 hours PRN  melatonin 3 milliGRAM(s) Oral at bedtime PRN  ondansetron Injectable 4 milliGRAM(s) IV Push every 8 hours PRN      Case discussed with resident    Care discussed with pt/family           CAT ALSTON  61y Male    CHIEF COMPLAINT:    Patient is a 61y old  Male who presents with a chief complaint of Chest Pain (21 Oct 2021 14:01)      INTERVAL HPI/OVERNIGHT EVENTS:    Patient seen and examined. Complains that he is shaking. No N/V. Also C/O unsteady gait and intermittent cp. No sob.     ROS: All other systems are negative.    Vital Signs:    T(F): 98.2 (10-22-21 @ 04:54), Max: 100.4 (10-21-21 @ 20:58)  HR: 92 (10-22-21 @ 07:40) (75 - 108)  BP: 175/85 (10-22-21 @ 04:54) (128/67 - 175/85)  RR: 18 (10-22-21 @ 04:54) (17 - 20)  SpO2: 97% (10-22-21 @ 07:40) (97% - 97%)  I&O's Summary    21 Oct 2021 07:  -  22 Oct 2021 07:00  --------------------------------------------------------  IN: 3140 mL / OUT: 2500 mL / NET: 640 mL    22 Oct 2021 07:01  -  22 Oct 2021 09:39  --------------------------------------------------------  IN: 0 mL / OUT: 1000 mL / NET: -1000 mL      Daily     Daily Weight in k.3 (22 Oct 2021 04:54)  CAPILLARY BLOOD GLUCOSE          PHYSICAL EXAM:    GENERAL:  NAD  SKIN: No rashes or lesions  HENT: Atraumatic. Normocephalic. PERRL. Moist membranes.  NECK: Supple, No JVD. No lymphadenopathy.  PULMONARY: CTA B/L. No wheezing. No rales  CVS: Normal S1, S2. Rate and Rhythm are regular. No murmurs.  ABDOMEN/GI: Soft, Nontender, Nondistended; BS present  EXTREMITIES: Peripheral pulses intact. No edema B/L LE.  NEUROLOGIC:  No motor or sensory deficit.  PSYCH: Alert & oriented x 3    Consultant(s) Notes Reviewed:  [x ] YES  [ ] NO  Care Discussed with Consultants/Other Providers [ x] YES  [ ] NO    EKG reviewed  Telemetry reviewed    LABS:                        12.4   3.70  )-----------( 68       ( 22 Oct 2021 05:10 )             35.0     10-22    137  |  102  |  8<L>  ----------------------------<  94  3.5   |  20  |  0.9    Ca    8.5      22 Oct 2021 05:10  Mg     1.9     10-22    TPro  6.1  /  Alb  3.8  /  TBili  1.5<H>  /  DBili  x   /  AST  58<H>  /  ALT  34  /  AlkPhos  95  10-22      Serum Pro-Brain Natriuretic Peptide: 229 pg/mL (10-20-21 @ 08:30)    Trop <0.01, CKMB --, CK --, 10-21-21 @ 06:18  Trop <0.01, CKMB --, CK --, 10-20-21 @ 16:01  Trop <0.01, CKMB --, CK --, 10-20-21 @ 16:00  Trop <0.01, CKMB --, CK --, 10-20-21 @ 08:30        RADIOLOGY & ADDITIONAL TESTS:      Imaging or report Personally Reviewed:  [ ] YES  [ ] NO    Medications:  Standing  amLODIPine   Tablet 5 milliGRAM(s) Oral daily  folic acid 1 milliGRAM(s) Oral daily  heparin   Injectable 5000 Unit(s) SubCutaneous every 8 hours  influenza   Vaccine 0.5 milliLiter(s) IntraMuscular once  lactated ringers. 1000 milliLiter(s) IV Continuous <Continuous>  regadenoson Injectable 0.4 milliGRAM(s) IV Push once  tamsulosin 0.4 milliGRAM(s) Oral at bedtime  thiamine 100 milliGRAM(s) Oral daily    PRN Meds  acetaminophen   Tablet .. 650 milliGRAM(s) Oral every 6 hours PRN  aluminum hydroxide/magnesium hydroxide/simethicone Suspension 30 milliLiter(s) Oral every 4 hours PRN  LORazepam   Injectable 2 milliGRAM(s) IV Push every 6 hours PRN  melatonin 3 milliGRAM(s) Oral at bedtime PRN  ondansetron Injectable 4 milliGRAM(s) IV Push every 8 hours PRN      Case discussed with resident    Care discussed with pt/family

## 2021-10-22 NOTE — PROGRESS NOTE ADULT - ASSESSMENT
61 year old male with  PMH of  ETOH abuse, HTN, CKD3 presents to the ED BIBA from [facility] c/o chest pain. Patient describes the pain as going across his chest more on the right that comes and goes. Pain is not associated with physical activity.       CP  ETOH abuse  HTN  No CKD-3  HAGMA due to Alcoholic ketoacidosis on admission  Hyponatremia, Hypokalemia, Hypomagnesemia   Pancytopenia / Thrombocytopenia              PLAN:    ·	Pancytopenia. Likely from bone marrow suppression due to ETOH abuse  ·	Platelets are also trending down. R/O HIT. Hold heparin   ·	CE x 4 negative  ·	Cardiology eval noted. Recommended nuclear stress test  ·	ECHO  ·	Monitor CIWA score  ·	Ativan 2 mg ivp q 4h prn for agitation and restlessness  ·	Repeat lactate level is normal  ·	HbA1C is 5.4. Not diabetic.   ·	Mg is 1.9 today and K is 3.5. Give Kcl 40 meq po x 1 dose  ·	Monitor electrolytes    Progress Note Handoff    Pending (specify):  Consults__Nuclear stress test_______, Tests________, Test Results_______, Other_________  Family discussion:  Disposition: Home___/SNF___/Other________/Unknown at this time________    Gurjit Howard MD  Spectra: [phone] 61 year old male with  PMH of  ETOH abuse, HTN, CKD3 presents to the ED BIBA from [facility] c/o chest pain. Patient describes the pain as going across his chest more on the right that comes and goes. Pain is not associated with physical activity.       CP  ETOH abuse  HTN  No CKD-3  HAGMA due to Alcoholic ketoacidosis on admission  Hyponatremia, Hypokalemia, Hypomagnesemia   Pancytopenia / Thrombocytopenia              PLAN:    ·	Pancytopenia. Likely from bone marrow suppression due to ETOH abuse  ·	Platelets are also trending down. R/O HIT. Hold heparin   ·	CE x 4 negative  ·	Cardiology eval noted. Recommended nuclear stress test  ·	ECHO  ·	Monitor CIWA score  ·	Ativan 2 mg ivp q 4h prn for agitation and restlessness  ·	Repeat lactate level is normal  ·	HbA1C is 5.4. Not diabetic.   ·	Mg is 1.9 today and K is 3.5. Give Kcl 40 meq po x 1 dose  ·	Monitor electrolytes    Progress Note Handoff    Pending (specify):  Consults__PT______, Tests__Nuclear stress test______, Test Results_______, Other_________  Family discussion:  Disposition: Home___/SNF___/Other________/Unknown at this time________    Gurjit Howard MD  Spectra: [phone]

## 2021-10-22 NOTE — PHYSICAL THERAPY INITIAL EVALUATION ADULT - LIVES WITH, PROFILE
pt is a resident of [facility] living [facility]/alone

## 2021-10-22 NOTE — PHYSICAL THERAPY INITIAL EVALUATION ADULT - LEVEL OF INDEPENDENCE: GAIT, REHAB EVAL
pt presents with unstaedy gait, held off further ambulation secondary to unsteady gait./contact guard

## 2021-10-22 NOTE — CHART NOTE - NSCHARTNOTEFT_GEN_A_CORE
Received call from [facility] stress test lab that patient refusing pharmacological stress test. Went to radiology suite to discuss with patient, before I could inquire, patient exclaims he will absolutely not do this test. He demands I do not ask him for his rationale. He says he will not undergo further cardiac testing unless his regular cardiologist is present. Briefly tried to explain the Dr Dye is covering for his cardiologist and in communication with his cardiologist regarding the plan of care, patient raised hand in a fist and threatened things would get "very ugly" if he isnt returned to his room immediately. Will discuss with primary attending. Received call from [facility] stress test lab that patient refusing pharmacological stress test. Went to radiology suite to discuss with patient, before I could inquire, patient exclaims he will absolutely not do this test. He demands I do not ask him for his rationale. He says he will not undergo further cardiac testing unless his regular cardiologist is present. Briefly tried to explain the Dr Dye is covering for his cardiologist and in communication with his cardiologist regarding the plan of care, patient raised hand in a fist and threatened things would get "very ugly" if he isnt returned to his room immediately. Telemtry showed sinus tach during the event. Discussed with primary attending, pt likely withdrawing. Will give stat 2mg IV Ativan and continue to monitor

## 2021-10-23 LAB
ALBUMIN SERPL ELPH-MCNC: 4.1 G/DL — SIGNIFICANT CHANGE UP (ref 3.5–5.2)
ALP SERPL-CCNC: 111 U/L — SIGNIFICANT CHANGE UP (ref 30–115)
ALT FLD-CCNC: 35 U/L — SIGNIFICANT CHANGE UP (ref 0–41)
ANION GAP SERPL CALC-SCNC: 15 MMOL/L — HIGH (ref 7–14)
AST SERPL-CCNC: 55 U/L — HIGH (ref 0–41)
BASOPHILS # BLD AUTO: 0.07 K/UL — SIGNIFICANT CHANGE UP (ref 0–0.2)
BASOPHILS NFR BLD AUTO: 1.2 % — HIGH (ref 0–1)
BILIRUB SERPL-MCNC: 1.7 MG/DL — HIGH (ref 0.2–1.2)
BUN SERPL-MCNC: 17 MG/DL — SIGNIFICANT CHANGE UP (ref 10–20)
CALCIUM SERPL-MCNC: 9 MG/DL — SIGNIFICANT CHANGE UP (ref 8.5–10.1)
CHLORIDE SERPL-SCNC: 103 MMOL/L — SIGNIFICANT CHANGE UP (ref 98–110)
CO2 SERPL-SCNC: 18 MMOL/L — SIGNIFICANT CHANGE UP (ref 17–32)
CREAT SERPL-MCNC: 1 MG/DL — SIGNIFICANT CHANGE UP (ref 0.7–1.5)
EOSINOPHIL # BLD AUTO: 0.18 K/UL — SIGNIFICANT CHANGE UP (ref 0–0.7)
EOSINOPHIL NFR BLD AUTO: 3.2 % — SIGNIFICANT CHANGE UP (ref 0–8)
GLUCOSE SERPL-MCNC: 105 MG/DL — HIGH (ref 70–99)
HCT VFR BLD CALC: 38.9 % — LOW (ref 42–52)
HGB BLD-MCNC: 13.5 G/DL — LOW (ref 14–18)
IMM GRANULOCYTES NFR BLD AUTO: 0.4 % — HIGH (ref 0.1–0.3)
LYMPHOCYTES # BLD AUTO: 1.56 K/UL — SIGNIFICANT CHANGE UP (ref 1.2–3.4)
LYMPHOCYTES # BLD AUTO: 27.3 % — SIGNIFICANT CHANGE UP (ref 20.5–51.1)
MAGNESIUM SERPL-MCNC: 2 MG/DL — SIGNIFICANT CHANGE UP (ref 1.8–2.4)
MCHC RBC-ENTMCNC: 32.9 PG — HIGH (ref 27–31)
MCHC RBC-ENTMCNC: 34.7 G/DL — SIGNIFICANT CHANGE UP (ref 32–37)
MCV RBC AUTO: 94.9 FL — HIGH (ref 80–94)
MONOCYTES # BLD AUTO: 0.34 K/UL — SIGNIFICANT CHANGE UP (ref 0.1–0.6)
MONOCYTES NFR BLD AUTO: 6 % — SIGNIFICANT CHANGE UP (ref 1.7–9.3)
NEUTROPHILS # BLD AUTO: 3.54 K/UL — SIGNIFICANT CHANGE UP (ref 1.4–6.5)
NEUTROPHILS NFR BLD AUTO: 61.9 % — SIGNIFICANT CHANGE UP (ref 42.2–75.2)
NRBC # BLD: 0 /100 WBCS — SIGNIFICANT CHANGE UP (ref 0–0)
PLATELET # BLD AUTO: 90 K/UL — LOW (ref 130–400)
POTASSIUM SERPL-MCNC: 4.2 MMOL/L — SIGNIFICANT CHANGE UP (ref 3.5–5)
POTASSIUM SERPL-SCNC: 4.2 MMOL/L — SIGNIFICANT CHANGE UP (ref 3.5–5)
PROT SERPL-MCNC: 6.9 G/DL — SIGNIFICANT CHANGE UP (ref 6–8)
RBC # BLD: 4.1 M/UL — LOW (ref 4.7–6.1)
RBC # FLD: 12.3 % — SIGNIFICANT CHANGE UP (ref 11.5–14.5)
SODIUM SERPL-SCNC: 136 MMOL/L — SIGNIFICANT CHANGE UP (ref 135–146)
WBC # BLD: 5.71 K/UL — SIGNIFICANT CHANGE UP (ref 4.8–10.8)
WBC # FLD AUTO: 5.71 K/UL — SIGNIFICANT CHANGE UP (ref 4.8–10.8)

## 2021-10-23 PROCEDURE — 99233 SBSQ HOSP IP/OBS HIGH 50: CPT

## 2021-10-23 RX ADMIN — Medication 2 MILLIGRAM(S): at 18:39

## 2021-10-23 RX ADMIN — Medication 3 MILLIGRAM(S): at 22:11

## 2021-10-23 RX ADMIN — Medication 100 MILLIGRAM(S): at 12:15

## 2021-10-23 RX ADMIN — TAMSULOSIN HYDROCHLORIDE 0.4 MILLIGRAM(S): 0.4 CAPSULE ORAL at 22:11

## 2021-10-23 RX ADMIN — AMLODIPINE BESYLATE 5 MILLIGRAM(S): 2.5 TABLET ORAL at 06:24

## 2021-10-23 RX ADMIN — Medication 1 MILLIGRAM(S): at 12:16

## 2021-10-23 NOTE — PROGRESS NOTE ADULT - ASSESSMENT
61 year old male with  PMH of  ETOH abuse, HTN, CKD3, admitted last week for alcohol detox now presents to the ED BIBA from [facility] c/o chest pain, admits to 0.5-1L vodka intake per day since discharge, admitted for ACS r/o and monitoring for alcohol withdrawal     # Chest Pain: R/O CAD  - pt is hemodynamically stable and denies chest pain today   - trop neg x4    - EKG sinus tach      --> tachy to 150's on telemetry, appears to be exclusively sinus tach but refused 12-lead  EKG evaluation    --> resolved s/p 2mg IV ativan, likely alcohol-withdrawal related   - nuclear stress test ordered--> pt refused--> still refusing today stating that he will follow up with Dr. Barrientos as outpatient   - IV ativan for CIWA >8 OR acute agitation episodes  - CATCH team consult    #MIMA 2/2 lactic acidosis - improving   - Lactate 4.9 -> 3.9 -> 1.3    #ETOH Abuse   #Suspected Thiamine and folic acid def   - last drink 10/19   - symp trigerred CIWA protocol   - cont thiamine and folic acid supplements   - pt says he wants to detox, of note he said the same last admission and relapsed  - careful counseling and CATCH team follow up to prevent relapse    #Thrombocytopenia  - Acute drop to <70, was 280 mere 2 weeks ago  - Suspecting bone marrow suppression 2ry to profound alcoholism   - Heparin stopped, HIT panel sent (pending), Sequentials for now    #Magnesium Deficiency : resolved   # Hypokalemia  : resolved   #HTN - cont amlodipine 5   #BPH - cont flomax   #CKD3 - at baseline Cr 1.2, avoid nephrotoxic agents     DVT ppx: SCD  GI PPX: not indicated  Diet DASH   Activity IAT  Dispo:  f/u PT eval, f/u HIT panel, f/u CIWA scores, Pt is from [facility]  & plan to return once stable

## 2021-10-23 NOTE — PROGRESS NOTE ADULT - SUBJECTIVE AND OBJECTIVE BOX
SUBJECTIVE:    Patient is a 61y old Male who presents with a chief complaint of Chest Pain (22 Oct 2021 23:41)  Currently admitted to medicine with the primary diagnosis of Chest pain  Today is hospital day 3d. This morning he is resting comfortably in bed and reports no new issues or overnight events.   denies chest pain, still complains of hand tremors and restlessness.     PAST MEDICAL & SURGICAL HISTORY  Alcohol dependence    Vertigo    Tobacco dependency    HTN (hypertension)    Hard of hearing    Gout    Seasonal allergies    CKD (chronic kidney disease)    No significant past surgical history      SOCIAL HISTORY:  Negative for smoking/alcohol/drug use.     ALLERGIES:  Beef (Hives)  dairy products (Hives)  No Known Drug Allergies  shellfish (Hives)    MEDICATIONS:  STANDING MEDICATIONS  aDENosine Injectable (ADENOSCAN) 60 milliGRAM(s) IV Bolus once  amLODIPine   Tablet 5 milliGRAM(s) Oral daily  folic acid 1 milliGRAM(s) Oral daily  influenza   Vaccine 0.5 milliLiter(s) IntraMuscular once  regadenoson Injectable 0.4 milliGRAM(s) IV Push once  tamsulosin 0.4 milliGRAM(s) Oral at bedtime  thiamine 100 milliGRAM(s) Oral daily    PRN MEDICATIONS  acetaminophen   Tablet .. 650 milliGRAM(s) Oral every 6 hours PRN  aluminum hydroxide/magnesium hydroxide/simethicone Suspension 30 milliLiter(s) Oral every 4 hours PRN  LORazepam   Injectable 2 milliGRAM(s) IV Push every 6 hours PRN  melatonin 3 milliGRAM(s) Oral at bedtime PRN  ondansetron Injectable 4 milliGRAM(s) IV Push every 8 hours PRN    VITALS:   T(F): 97.9  HR: 93  BP: 118/71  RR: 18  SpO2: 97%    LABS:                        13.5   5.71  )-----------( 90       ( 23 Oct 2021 05:51 )             38.9     10-23    136  |  103  |  17  ----------------------------<  105<H>  4.2   |  18  |  1.0    Ca    9.0      23 Oct 2021 05:51  Mg     2.0     10-23    TPro  6.9  /  Alb  4.1  /  TBili  1.7<H>  /  DBili  x   /  AST  55<H>  /  ALT  35  /  AlkPhos  111  10-23    PTT - ( 22 Oct 2021 19:00 )  PTT:33.1 sec      RADIOLOGY:    < from: Xray Chest 1 View- PORTABLE-Urgent (10.20.21 @ 09:18) >  Impression:    Low lung volumes leads to magnification of the pulmonary interstitium. Grossly unremarkable.    < end of copied text >      PHYSICAL EXAM:  GEN: No acute distress  LUNGS: Clear to auscultation bilaterally   HEART: S1/S2 present. RRR.   ABD: Soft, non-tender, non-distended. Bowel sounds present  EXT: No le edema  NEURO: AAOX3

## 2021-10-24 LAB
ANION GAP SERPL CALC-SCNC: 15 MMOL/L — HIGH (ref 7–14)
BASOPHILS # BLD AUTO: 0.06 K/UL — SIGNIFICANT CHANGE UP (ref 0–0.2)
BASOPHILS NFR BLD AUTO: 1.2 % — HIGH (ref 0–1)
BUN SERPL-MCNC: 19 MG/DL — SIGNIFICANT CHANGE UP (ref 10–20)
CALCIUM SERPL-MCNC: 8.8 MG/DL — SIGNIFICANT CHANGE UP (ref 8.5–10.1)
CHLORIDE SERPL-SCNC: 106 MMOL/L — SIGNIFICANT CHANGE UP (ref 98–110)
CO2 SERPL-SCNC: 18 MMOL/L — SIGNIFICANT CHANGE UP (ref 17–32)
CREAT SERPL-MCNC: 1 MG/DL — SIGNIFICANT CHANGE UP (ref 0.7–1.5)
EOSINOPHIL # BLD AUTO: 0.2 K/UL — SIGNIFICANT CHANGE UP (ref 0–0.7)
EOSINOPHIL NFR BLD AUTO: 4.1 % — SIGNIFICANT CHANGE UP (ref 0–8)
GLUCOSE SERPL-MCNC: 94 MG/DL — SIGNIFICANT CHANGE UP (ref 70–99)
HCT VFR BLD CALC: 34.5 % — LOW (ref 42–52)
HEPARIN-PF4 AB RESULT: <0.6 U/ML — SIGNIFICANT CHANGE UP (ref 0–0.9)
HGB BLD-MCNC: 12.2 G/DL — LOW (ref 14–18)
IMM GRANULOCYTES NFR BLD AUTO: 0.2 % — SIGNIFICANT CHANGE UP (ref 0.1–0.3)
LYMPHOCYTES # BLD AUTO: 1.47 K/UL — SIGNIFICANT CHANGE UP (ref 1.2–3.4)
LYMPHOCYTES # BLD AUTO: 30.5 % — SIGNIFICANT CHANGE UP (ref 20.5–51.1)
MAGNESIUM SERPL-MCNC: 2 MG/DL — SIGNIFICANT CHANGE UP (ref 1.8–2.4)
MCHC RBC-ENTMCNC: 33.9 PG — HIGH (ref 27–31)
MCHC RBC-ENTMCNC: 35.4 G/DL — SIGNIFICANT CHANGE UP (ref 32–37)
MCV RBC AUTO: 95.8 FL — HIGH (ref 80–94)
MONOCYTES # BLD AUTO: 0.43 K/UL — SIGNIFICANT CHANGE UP (ref 0.1–0.6)
MONOCYTES NFR BLD AUTO: 8.9 % — SIGNIFICANT CHANGE UP (ref 1.7–9.3)
NEUTROPHILS # BLD AUTO: 2.65 K/UL — SIGNIFICANT CHANGE UP (ref 1.4–6.5)
NEUTROPHILS NFR BLD AUTO: 55.1 % — SIGNIFICANT CHANGE UP (ref 42.2–75.2)
NRBC # BLD: 0 /100 WBCS — SIGNIFICANT CHANGE UP (ref 0–0)
PF4 HEPARIN CMPLX AB SER-ACNC: NEGATIVE — SIGNIFICANT CHANGE UP
PLATELET # BLD AUTO: 83 K/UL — LOW (ref 130–400)
POTASSIUM SERPL-MCNC: 4.1 MMOL/L — SIGNIFICANT CHANGE UP (ref 3.5–5)
POTASSIUM SERPL-SCNC: 4.1 MMOL/L — SIGNIFICANT CHANGE UP (ref 3.5–5)
RBC # BLD: 3.6 M/UL — LOW (ref 4.7–6.1)
RBC # FLD: 12.6 % — SIGNIFICANT CHANGE UP (ref 11.5–14.5)
SARS-COV-2 RNA SPEC QL NAA+PROBE: SIGNIFICANT CHANGE UP
SODIUM SERPL-SCNC: 139 MMOL/L — SIGNIFICANT CHANGE UP (ref 135–146)
WBC # BLD: 4.82 K/UL — SIGNIFICANT CHANGE UP (ref 4.8–10.8)
WBC # FLD AUTO: 4.82 K/UL — SIGNIFICANT CHANGE UP (ref 4.8–10.8)

## 2021-10-24 PROCEDURE — 99232 SBSQ HOSP IP/OBS MODERATE 35: CPT

## 2021-10-24 RX ADMIN — Medication 2 MILLIGRAM(S): at 13:02

## 2021-10-24 RX ADMIN — AMLODIPINE BESYLATE 5 MILLIGRAM(S): 2.5 TABLET ORAL at 06:13

## 2021-10-24 RX ADMIN — Medication 100 MILLIGRAM(S): at 13:03

## 2021-10-24 RX ADMIN — Medication 1 MILLIGRAM(S): at 13:03

## 2021-10-24 RX ADMIN — TAMSULOSIN HYDROCHLORIDE 0.4 MILLIGRAM(S): 0.4 CAPSULE ORAL at 22:00

## 2021-10-24 RX ADMIN — Medication 3 MILLIGRAM(S): at 22:00

## 2021-10-24 NOTE — PROGRESS NOTE ADULT - SUBJECTIVE AND OBJECTIVE BOX
GAMALIEL CAT  61y  Male      Patient is a 61y old  Male who presents with a chief complaint of Chest Pain (23 Oct 2021 14:03)      INTERVAL HPI/OVERNIGHT EVENTS:  He feels ok, no chest pain.   Vital Signs Last 24 Hrs  T(C): 36.8 (24 Oct 2021 13:50), Max: 36.9 (23 Oct 2021 21:20)  T(F): 98.2 (24 Oct 2021 13:50), Max: 98.4 (23 Oct 2021 21:20)  HR: 91 (24 Oct 2021 13:50) (80 - 95)  BP: 117/64 (24 Oct 2021 13:50) (111/58 - 117/64)  BP(mean): --  RR: 17 (24 Oct 2021 13:50) (14 - 17)  SpO2: --      10-23-21 @ 07:01  -  10-24-21 @ 07:00  --------------------------------------------------------  IN: 120 mL / OUT: 0 mL / NET: 120 mL    10-24-21 @ 07:01  -  10-24-21 @ 17:28  --------------------------------------------------------  IN: 1240 mL / OUT: 0 mL / NET: 1240 mL            Consultant(s) Notes Reviewed:  [x ] YES  [ ] NO          MEDICATIONS  (STANDING):  amLODIPine   Tablet 5 milliGRAM(s) Oral daily  folic acid 1 milliGRAM(s) Oral daily  influenza   Vaccine 0.5 milliLiter(s) IntraMuscular once  tamsulosin 0.4 milliGRAM(s) Oral at bedtime  thiamine 100 milliGRAM(s) Oral daily    MEDICATIONS  (PRN):  acetaminophen   Tablet .. 650 milliGRAM(s) Oral every 6 hours PRN Temp greater or equal to 38C (100.4F), Mild Pain (1 - 3)  aluminum hydroxide/magnesium hydroxide/simethicone Suspension 30 milliLiter(s) Oral every 4 hours PRN Dyspepsia  LORazepam   Injectable 2 milliGRAM(s) IV Push every 6 hours PRN Agitation  melatonin 3 milliGRAM(s) Oral at bedtime PRN Insomnia  ondansetron Injectable 4 milliGRAM(s) IV Push every 8 hours PRN Nausea and/or Vomiting      LABS                          12.2   4.82  )-----------( 83       ( 24 Oct 2021 05:38 )             34.5     10-24    139  |  106  |  19  ----------------------------<  94  4.1   |  18  |  1.0    Ca    8.8      24 Oct 2021 05:38  Mg     2.0     10-24    TPro  6.9  /  Alb  4.1  /  TBili  1.7<H>  /  DBili  x   /  AST  55<H>  /  ALT  35  /  AlkPhos  111  10-23        PTT - ( 22 Oct 2021 19:00 )  PTT:33.1 sec  Lactate Trend  10-21 @ 11:00 Lactate:1.3   10-20 @ 16:01 Lactate:4.1         CAPILLARY BLOOD GLUCOSE            RADIOLOGY & ADDITIONAL TESTS:    Imaging Personally Reviewed:  [ ] YES  [ ] NO    HEALTH ISSUES - PROBLEM Dx:          PHYSICAL EXAM:  GENERAL: NAD, well-developed.  HEAD:  Atraumatic, Normocephalic.  EYES: EOMI, PERRLA, conjunctiva and sclera clear.  NECK: Supple, No JVD.  CHEST/LUNG: Clear to auscultation bilaterally; No wheeze.  HEART: Regular rate and rhythm; S1 S2.   ABDOMEN: Soft, Nontender, Nondistended; Bowel sounds present.  EXTREMITIES:  2+ Peripheral Pulses, No clubbing, cyanosis, or edema.  PSYCH: AAOx3.  NEUROLOGY: non-focal.  SKIN: No rashes or lesions.

## 2021-10-24 NOTE — PROGRESS NOTE ADULT - ASSESSMENT
61 year old male with  PMH of  ETOH abuse, HTN, CKD3, admitted last week for alcohol detox now presents to the ED BIBA from [facility] c/o chest pain, admits to 0.5-1L vodka intake per day since discharge, admitted for ACS r/o and monitoring for alcohol withdrawal     A/P:   Chest pain: atypical.   Possibly from musculoskeletal   EKG no ischemic changes.   Troponin x3 negative. ACS ruled out.   Previous CT showed coronary artery calcification.   Cardiology recommended stress test, patient refused.   Discontinue telemetry .     Chronic alcoholism:   Stable, on CIWA protocol.     Thiamin and folate deficiency  continue supplement.     Thrombocytopenia  likely from alcoholic liver disease.   PLT stable.     Alcoholic hepatitis: improved.     HTN - cont amlodipine 5   BPH - cont flomax   CKD3 - at baseline Cr 1.2, avoid nephrotoxic agents     #Progress Note Handoff:  Pending (specify):  COVID 19 swab ordered.   Family discussion:  Disposition: 24 hrs, back to [facility]

## 2021-10-25 ENCOUNTER — TRANSCRIPTION ENCOUNTER (OUTPATIENT)
Age: 61
End: 2021-10-25

## 2021-10-25 VITALS
TEMPERATURE: 98 F | RESPIRATION RATE: 18 BRPM | SYSTOLIC BLOOD PRESSURE: 108 MMHG | DIASTOLIC BLOOD PRESSURE: 72 MMHG | HEART RATE: 71 BPM

## 2021-10-25 LAB
ALBUMIN SERPL ELPH-MCNC: 3.6 G/DL — SIGNIFICANT CHANGE UP (ref 3.5–5.2)
ALP SERPL-CCNC: 84 U/L — SIGNIFICANT CHANGE UP (ref 30–115)
ALT FLD-CCNC: 52 U/L — HIGH (ref 0–41)
ANION GAP SERPL CALC-SCNC: 13 MMOL/L — SIGNIFICANT CHANGE UP (ref 7–14)
AST SERPL-CCNC: 58 U/L — HIGH (ref 0–41)
BASOPHILS # BLD AUTO: 0.08 K/UL — SIGNIFICANT CHANGE UP (ref 0–0.2)
BASOPHILS NFR BLD AUTO: 1.6 % — HIGH (ref 0–1)
BILIRUB SERPL-MCNC: 0.7 MG/DL — SIGNIFICANT CHANGE UP (ref 0.2–1.2)
BUN SERPL-MCNC: 16 MG/DL — SIGNIFICANT CHANGE UP (ref 10–20)
CALCIUM SERPL-MCNC: 8.8 MG/DL — SIGNIFICANT CHANGE UP (ref 8.5–10.1)
CHLORIDE SERPL-SCNC: 106 MMOL/L — SIGNIFICANT CHANGE UP (ref 98–110)
CO2 SERPL-SCNC: 20 MMOL/L — SIGNIFICANT CHANGE UP (ref 17–32)
CREAT SERPL-MCNC: 1 MG/DL — SIGNIFICANT CHANGE UP (ref 0.7–1.5)
EOSINOPHIL # BLD AUTO: 0.18 K/UL — SIGNIFICANT CHANGE UP (ref 0–0.7)
EOSINOPHIL NFR BLD AUTO: 3.6 % — SIGNIFICANT CHANGE UP (ref 0–8)
GLUCOSE SERPL-MCNC: 100 MG/DL — HIGH (ref 70–99)
HCT VFR BLD CALC: 33.5 % — LOW (ref 42–52)
HGB BLD-MCNC: 11.5 G/DL — LOW (ref 14–18)
IMM GRANULOCYTES NFR BLD AUTO: 0.2 % — SIGNIFICANT CHANGE UP (ref 0.1–0.3)
LYMPHOCYTES # BLD AUTO: 1.48 K/UL — SIGNIFICANT CHANGE UP (ref 1.2–3.4)
LYMPHOCYTES # BLD AUTO: 29.2 % — SIGNIFICANT CHANGE UP (ref 20.5–51.1)
MAGNESIUM SERPL-MCNC: 1.8 MG/DL — SIGNIFICANT CHANGE UP (ref 1.8–2.4)
MCHC RBC-ENTMCNC: 33.6 PG — HIGH (ref 27–31)
MCHC RBC-ENTMCNC: 34.3 G/DL — SIGNIFICANT CHANGE UP (ref 32–37)
MCV RBC AUTO: 98 FL — HIGH (ref 80–94)
MONOCYTES # BLD AUTO: 0.55 K/UL — SIGNIFICANT CHANGE UP (ref 0.1–0.6)
MONOCYTES NFR BLD AUTO: 10.9 % — HIGH (ref 1.7–9.3)
NEUTROPHILS # BLD AUTO: 2.76 K/UL — SIGNIFICANT CHANGE UP (ref 1.4–6.5)
NEUTROPHILS NFR BLD AUTO: 54.5 % — SIGNIFICANT CHANGE UP (ref 42.2–75.2)
NRBC # BLD: 0 /100 WBCS — SIGNIFICANT CHANGE UP (ref 0–0)
PLATELET # BLD AUTO: 103 K/UL — LOW (ref 130–400)
POTASSIUM SERPL-MCNC: 4.1 MMOL/L — SIGNIFICANT CHANGE UP (ref 3.5–5)
POTASSIUM SERPL-SCNC: 4.1 MMOL/L — SIGNIFICANT CHANGE UP (ref 3.5–5)
PROT SERPL-MCNC: 5.9 G/DL — LOW (ref 6–8)
RBC # BLD: 3.42 M/UL — LOW (ref 4.7–6.1)
RBC # FLD: 12.7 % — SIGNIFICANT CHANGE UP (ref 11.5–14.5)
SODIUM SERPL-SCNC: 139 MMOL/L — SIGNIFICANT CHANGE UP (ref 135–146)
WBC # BLD: 5.06 K/UL — SIGNIFICANT CHANGE UP (ref 4.8–10.8)
WBC # FLD AUTO: 5.06 K/UL — SIGNIFICANT CHANGE UP (ref 4.8–10.8)

## 2021-10-25 PROCEDURE — 99239 HOSP IP/OBS DSCHRG MGMT >30: CPT

## 2021-10-25 RX ADMIN — AMLODIPINE BESYLATE 5 MILLIGRAM(S): 2.5 TABLET ORAL at 05:41

## 2021-10-25 RX ADMIN — Medication 1 MILLIGRAM(S): at 11:37

## 2021-10-25 RX ADMIN — Medication 100 MILLIGRAM(S): at 11:37

## 2021-10-25 NOTE — PROGRESS NOTE ADULT - ASSESSMENT
1] Atypical Chest Pain      ASHD (Coronary Artery Calcifications on prior CTSCAN)  - May be discharged [facility] back to [facility]  - He will follow up with Dr. Barrientos.  He declines further cardiac workup    2] Hypertension  - Continue medication    3] Elevated Glucose  - Consider checking Hgb A1C    Plan discussed with Medical Attending, Dr Lee Holt MD (covering for Dr. Mundo Barrientos)  [phone] Office

## 2021-10-25 NOTE — DISCHARGE NOTE PROVIDER - NSDCMRMEDTOKEN_GEN_ALL_CORE_FT
Flomax 0.4 mg oral capsule: 1 cap(s) orally once a day (at bedtime)  folic acid 1 mg oral tablet: 1 tab(s) orally once a day  Norvasc 5 mg oral tablet: 1 tab(s) orally once a day  thiamine 100 mg oral tablet: 1 tab(s) orally once a day

## 2021-10-25 NOTE — PROGRESS NOTE ADULT - SUBJECTIVE AND OBJECTIVE BOX
GAMALIEL CAT  61y Male    CHIEF COMPLAINT:    Patient is a 61y old  Male who presents with a chief complaint of Chest Pain (24 Oct 2021 17:22)      INTERVAL HPI/OVERNIGHT EVENTS:    Patient seen and examined.    ROS: All other systems are negative.    Vital Signs:    T(F): 96.9 (10-25-21 @ 05:18), Max: 98.2 (10-24-21 @ 13:50)  HR: 73 (10-25-21 @ 05:18) (73 - 91)  BP: 127/50 (10-25-21 @ 05:18) (117/64 - 129/59)  RR: 18 (10-25-21 @ 05:18) (14 - 18)  SpO2: --  I&O's Summary    24 Oct 2021 07:01  -  25 Oct 2021 07:00  --------------------------------------------------------  IN: 1360 mL / OUT: 0 mL / NET: 1360 mL      Daily     Daily Weight in k (25 Oct 2021 05:43)  CAPILLARY BLOOD GLUCOSE          PHYSICAL EXAM:    GENERAL:  NAD  SKIN: No rashes or lesions  HENT: Atraumatic. Normocephalic. PERRL. Moist membranes.  NECK: Supple, No JVD. No lymphadenopathy.  PULMONARY: CTA B/L. No wheezing. No rales  CVS: Normal S1, S2. Rate and Rhythm are regular. No murmurs.  ABDOMEN/GI: Soft, Nontender, Nondistended; BS present  EXTREMITIES: Peripheral pulses intact. No edema B/L LE.  NEUROLOGIC:  No motor or sensory deficit.  PSYCH: Alert & oriented x 3    Consultant(s) Notes Reviewed:  [x ] YES  [ ] NO  Care Discussed with Consultants/Other Providers [ x] YES  [ ] NO    EKG reviewed  Telemetry reviewed    LABS:                        11.5   5.06  )-----------( 103      ( 25 Oct 2021 05:38 )             33.5     10-25    139  |  106  |  16  ----------------------------<  100<H>  4.1   |  20  |  1.0    Ca    8.8      25 Oct 2021 05:38  Mg     1.8     10-25    TPro  5.9<L>  /  Alb  3.6  /  TBili  0.7  /  DBili  x   /  AST  58<H>  /  ALT  52<H>  /  AlkPhos  84  10-25      Serum Pro-Brain Natriuretic Peptide: 229 pg/mL (10-20-21 @ 08:30)          RADIOLOGY & ADDITIONAL TESTS:      Imaging or report Personally Reviewed:  [ ] YES  [ ] NO    Medications:  Standing  amLODIPine   Tablet 5 milliGRAM(s) Oral daily  folic acid 1 milliGRAM(s) Oral daily  influenza   Vaccine 0.5 milliLiter(s) IntraMuscular once  tamsulosin 0.4 milliGRAM(s) Oral at bedtime  thiamine 100 milliGRAM(s) Oral daily    PRN Meds  acetaminophen   Tablet .. 650 milliGRAM(s) Oral every 6 hours PRN  aluminum hydroxide/magnesium hydroxide/simethicone Suspension 30 milliLiter(s) Oral every 4 hours PRN  LORazepam   Injectable 2 milliGRAM(s) IV Push every 6 hours PRN  melatonin 3 milliGRAM(s) Oral at bedtime PRN  ondansetron Injectable 4 milliGRAM(s) IV Push every 8 hours PRN      Case discussed with resident    Care discussed with pt/family           CAT ALSTON  61y Male    CHIEF COMPLAINT:    Patient is a 61y old  Male who presents with a chief complaint of Chest Pain (24 Oct 2021 17:22)      INTERVAL HPI/OVERNIGHT EVENTS:    Patient seen and examined. No more cp. Feels good. Refused stress test    ROS: All other systems are negative.    Vital Signs:    T(F): 96.9 (10-25-21 @ 05:18), Max: 98.2 (10-24-21 @ 13:50)  HR: 73 (10-25-21 @ 05:18) (73 - 91)  BP: 127/50 (10-25-21 @ 05:18) (117/64 - 129/59)  RR: 18 (10-25-21 @ 05:18) (14 - 18)  SpO2: --  I&O's Summary    24 Oct 2021 07:01  -  25 Oct 2021 07:00  --------------------------------------------------------  IN: 1360 mL / OUT: 0 mL / NET: 1360 mL      Daily     Daily Weight in k (25 Oct 2021 05:43)  CAPILLARY BLOOD GLUCOSE          PHYSICAL EXAM:    GENERAL:  NAD  SKIN: No rashes or lesions  HENT: Atraumatic. Normocephalic. PERRL. Moist membranes.  NECK: Supple, No JVD. No lymphadenopathy.  PULMONARY: CTA B/L. No wheezing. No rales  CVS: Normal S1, S2. Rate and Rhythm are regular. No murmurs.  ABDOMEN/GI: Soft, Nontender, Nondistended; BS present  EXTREMITIES: Peripheral pulses intact. No edema B/L LE.  NEUROLOGIC:  No motor or sensory deficit.  PSYCH: Alert & oriented x 3    Consultant(s) Notes Reviewed:  [x ] YES  [ ] NO  Care Discussed with Consultants/Other Providers [ x] YES  [ ] NO    EKG reviewed  Telemetry reviewed    LABS:                        11.5   5.06  )-----------( 103      ( 25 Oct 2021 05:38 )             33.5     10    139  |  106  |  16  ----------------------------<  100<H>  4.1   |  20  |  1.0    Ca    8.8      25 Oct 2021 05:38  Mg     1.8     10-25    TPro  5.9<L>  /  Alb  3.6  /  TBili  0.7  /  DBili  x   /  AST  58<H>  /  ALT  52<H>  /  AlkPhos  84  10-25      Serum Pro-Brain Natriuretic Peptide: 229 pg/mL (10-20-21 @ 08:30)          RADIOLOGY & ADDITIONAL TESTS:      Imaging or report Personally Reviewed:  [ ] YES  [ ] NO    Medications:  Standing  amLODIPine   Tablet 5 milliGRAM(s) Oral daily  folic acid 1 milliGRAM(s) Oral daily  influenza   Vaccine 0.5 milliLiter(s) IntraMuscular once  tamsulosin 0.4 milliGRAM(s) Oral at bedtime  thiamine 100 milliGRAM(s) Oral daily    PRN Meds  acetaminophen   Tablet .. 650 milliGRAM(s) Oral every 6 hours PRN  aluminum hydroxide/magnesium hydroxide/simethicone Suspension 30 milliLiter(s) Oral every 4 hours PRN  LORazepam   Injectable 2 milliGRAM(s) IV Push every 6 hours PRN  melatonin 3 milliGRAM(s) Oral at bedtime PRN  ondansetron Injectable 4 milliGRAM(s) IV Push every 8 hours PRN      Case discussed with resident    Care discussed with pt/family

## 2021-10-25 NOTE — PROGRESS NOTE ADULT - PROVIDER SPECIALTY LIST ADULT
Internal Medicine
Cardiology
Hospitalist
Hospitalist
Cardiology
Hospitalist
Hospitalist
Internal Medicine
Internal Medicine

## 2021-10-25 NOTE — PROGRESS NOTE ADULT - ASSESSMENT
61 year old male with  PMH of  ETOH abuse, HTN, CKD3 presents to the ED BIBA from [facility] c/o chest pain. Patient describes the pain as going across his chest more on the right that comes and goes. Pain is not associated with physical activity.       CP  ETOH abuse  HTN  No CKD-3  HAGMA due to Alcoholic ketoacidosis on admission  Hyponatremia, Hypokalemia, Hypomagnesemia   Pancytopenia / Thrombocytopenia              PLAN:    ·	Pancytopenia. Likely from bone marrow suppression due to ETOH abuse  ·	Platelets are also trending down. R/O HIT. Hold heparin   ·	CE x 4 negative  ·	Cardiology eval noted. Recommended nuclear stress test  ·	ECHO  ·	Monitor CIWA score  ·	Ativan 2 mg ivp q 4h prn for agitation and restlessness  ·	Repeat lactate level is normal  ·	HbA1C is 5.4. Not diabetic.   ·	Mg is 1.9 today and K is 3.5. Give Kcl 40 meq po x 1 dose  ·	Monitor electrolytes    Progress Note Handoff    Pending (specify):  Consults__PT______, Tests__Nuclear stress test______, Test Results_______, Other_________  Family discussion:  Disposition: Home___/SNF___/Other________/Unknown at this time________    Gurjit Howard MD  Spectra: [phone] 61 year old male with  PMH of  ETOH abuse, HTN, CKD3 presents to the ED BIBA from [facility] c/o chest pain. Patient describes the pain as going across his chest more on the right that comes and goes. Pain is not associated with physical activity.       CP likely musculoskeletal  ETOH abuse  HTN  No CKD-3  HAGMA due to Alcoholic ketoacidosis on admission  Hyponatremia, Hypokalemia, Hypomagnesemia   Pancytopenia / Thrombocytopenia              PLAN:    ·	Pancytopenia improving. Likely due to bone marrow suppression from ETOH abuse.   ·	Thrombocytopenia improving.   ·	CE x 4 negative  ·	Cardiology recommended nuclear stress test but pt refused it. Wants to do it as an out pt with his cardiologist Dr. Huang  ·	Repeat lactate level is normal  ·	HbA1C is 5.4. Not diabetic.   ·	Can D/C back to adult home    * Med rec reviewed. Plan of care D/W the pt. Time spent 46 minutes.       Gurjit Howard MD  Spectra: [phone]

## 2021-10-25 NOTE — DISCHARGE NOTE NURSING/CASE MANAGEMENT/SOCIAL WORK - PATIENT PORTAL LINK FT
You can access the FollowMyHealth Patient Portal offered by [facility] by registering at the following website: [email or web address]. By joining [employer]’s FollowMyHealth portal, you will also be able to view your health information using other applications (apps) compatible with our system.

## 2021-10-25 NOTE — DISCHARGE NOTE PROVIDER - HOSPITAL COURSE
61 year old male with  PMH of  ETOH abuse, HTN, CKD3 presents to the ED BIBA from [facility] [place] c/o chest pain. Patient describes the pain as going across his chest more on the right that comes and goes. Pain is not associated with physical activity. Pt had three negative sets of CE and was ruled out for MI. He was seen by cardiology. They recommended nuclear stress test. Pt refused the stress test. He wants to get it done as an out pt with his cardiologist Dr. Huang. Will be discharged back to adult home today.

## 2021-10-25 NOTE — DISCHARGE NOTE PROVIDER - CARE PROVIDER_API CALL
Mundo Barrientos)  Cardiovascular Disease  [address], [address]  [place] [address]  Phone: [phone]  Fax: [phone]  Follow Up Time:

## 2021-10-25 NOTE — DISCHARGE NOTE PROVIDER - NSDCCPCAREPLAN_GEN_ALL_CORE_FT
PRINCIPAL DISCHARGE DIAGNOSIS  Diagnosis: Chest pain, musculoskeletal  Assessment and Plan of Treatment: Pt was recommended nuclear stress test but he refused it. He will f/u with his cardiologist Dr. Huang for further w/u if needed.      SECONDARY DISCHARGE DIAGNOSES  Diagnosis: Alcohol abuse  Assessment and Plan of Treatment: Counselled to quit drinking

## 2021-10-25 NOTE — PROGRESS NOTE ADULT - SUBJECTIVE AND OBJECTIVE BOX
Patient was seen and examined earlier this morning by me on 3C.  EMR reviewed.    He continues to decline to have cardiac workup.  He prefers to follow up with Dr. Barrientos.    Vitals:  T(C): 36.6 (10-25-21 @ 12:32), Max: 36.9 (10-23-21 @ 21:20)  HR: 71 (10-25-21 @ 12:32) (71 - 106)  BP: 108/72 (10-25-21 @ 12:32) (108/72 - 129/59)  RR: 18 (10-25-21 @ 12:32) (14 - 20)  SpO2: --      LABS:                        11.5   5.06  )-----------( 103      ( 25 Oct 2021 05:38 )             33.5     10-25    139  |  106  |  16  ----------------------------<  100<H>  4.1   |  20  |  1.0    Ca    8.8      25 Oct 2021 05:38  Mg     1.8     10-25    TPro  5.9<L>  /  Alb  3.6  /  TBili  0.7  /  DBili  x   /  AST  58<H>  /  ALT  52<H>  /  AlkPhos  84  10-25      MEDICATIONS  (STANDING):  amLODIPine   Tablet 5 milliGRAM(s) Oral daily  folic acid 1 milliGRAM(s) Oral daily  influenza   Vaccine 0.5 milliLiter(s) IntraMuscular once  tamsulosin 0.4 milliGRAM(s) Oral at bedtime  thiamine 100 milliGRAM(s) Oral daily    MEDICATIONS  (PRN):  acetaminophen   Tablet .. 650 milliGRAM(s) Oral every 6 hours PRN Temp greater or equal to 38C (100.4F), Mild Pain (1 - 3)  aluminum hydroxide/magnesium hydroxide/simethicone Suspension 30 milliLiter(s) Oral every 4 hours PRN Dyspepsia  LORazepam   Injectable 2 milliGRAM(s) IV Push every 6 hours PRN Agitation  melatonin 3 milliGRAM(s) Oral at bedtime PRN Insomnia  ondansetron Injectable 4 milliGRAM(s) IV Push every 8 hours PRN Nausea and/or Vomiting      PHYSICAL EXAM:  Constitutional: appears stated age, well developed/nourished, no acute distress  Eyes: EOM's intact.  PERRLA  ENMT: Normocephalic, atraumatic.    Neck: Jugular veins non-distended; no carotid bruits bilaterally.  Respiratory: lungs clear to auscultation bilaterally.  Cardiovascular: Regular rhythm.  S1 and S2 normal.  No murmur nor rub appreciated.  Abdomen: Soft, non-tender.  Normal bowel sounds.  Extremities: extremities warm; no  edema.  Skin: No gross abnormalities noted.  Musculoskeletal: No gross deformities  Neurological: Alert, oriented x 3.

## 2021-10-28 DIAGNOSIS — Z53.29 PROCEDURE AND TREATMENT NOT CARRIED OUT BECAUSE OF PATIENT'S DECISION FOR OTHER REASONS: ICD-10-CM

## 2021-10-28 DIAGNOSIS — D61.818 OTHER PANCYTOPENIA: ICD-10-CM

## 2021-10-28 DIAGNOSIS — R45.1 RESTLESSNESS AND AGITATION: ICD-10-CM

## 2021-10-28 DIAGNOSIS — E87.2 ACIDOSIS: ICD-10-CM

## 2021-10-28 DIAGNOSIS — F17.200 NICOTINE DEPENDENCE, UNSPECIFIED, UNCOMPLICATED: ICD-10-CM

## 2021-10-28 DIAGNOSIS — N18.30 CHRONIC KIDNEY DISEASE, STAGE 3 UNSPECIFIED: ICD-10-CM

## 2021-10-28 DIAGNOSIS — E51.9 THIAMINE DEFICIENCY, UNSPECIFIED: ICD-10-CM

## 2021-10-28 DIAGNOSIS — E83.42 HYPOMAGNESEMIA: ICD-10-CM

## 2021-10-28 DIAGNOSIS — Z91.011 ALLERGY TO MILK PRODUCTS: ICD-10-CM

## 2021-10-28 DIAGNOSIS — E53.8 DEFICIENCY OF OTHER SPECIFIED B GROUP VITAMINS: ICD-10-CM

## 2021-10-28 DIAGNOSIS — H91.90 UNSPECIFIED HEARING LOSS, UNSPECIFIED EAR: ICD-10-CM

## 2021-10-28 DIAGNOSIS — R42 DIZZINESS AND GIDDINESS: ICD-10-CM

## 2021-10-28 DIAGNOSIS — D69.59 OTHER SECONDARY THROMBOCYTOPENIA: ICD-10-CM

## 2021-10-28 DIAGNOSIS — Z80.1 FAMILY HISTORY OF MALIGNANT NEOPLASM OF TRACHEA, BRONCHUS AND LUNG: ICD-10-CM

## 2021-10-28 DIAGNOSIS — K70.10 ALCOHOLIC HEPATITIS WITHOUT ASCITES: ICD-10-CM

## 2021-10-28 DIAGNOSIS — R25.1 TREMOR, UNSPECIFIED: ICD-10-CM

## 2021-10-28 DIAGNOSIS — R73.9 HYPERGLYCEMIA, UNSPECIFIED: ICD-10-CM

## 2021-10-28 DIAGNOSIS — M10.9 GOUT, UNSPECIFIED: ICD-10-CM

## 2021-10-28 DIAGNOSIS — R07.9 CHEST PAIN, UNSPECIFIED: ICD-10-CM

## 2021-10-28 DIAGNOSIS — D53.9 NUTRITIONAL ANEMIA, UNSPECIFIED: ICD-10-CM

## 2021-10-28 DIAGNOSIS — I25.10 ATHEROSCLEROTIC HEART DISEASE OF NATIVE CORONARY ARTERY WITHOUT ANGINA PECTORIS: ICD-10-CM

## 2021-10-28 DIAGNOSIS — Z80.0 FAMILY HISTORY OF MALIGNANT NEOPLASM OF DIGESTIVE ORGANS: ICD-10-CM

## 2021-10-28 DIAGNOSIS — Z91.014 ALLERGY TO MAMMALIAN MEATS: ICD-10-CM

## 2021-10-28 DIAGNOSIS — F10.239 ALCOHOL DEPENDENCE WITH WITHDRAWAL, UNSPECIFIED: ICD-10-CM

## 2021-10-28 DIAGNOSIS — E87.6 HYPOKALEMIA: ICD-10-CM

## 2021-10-28 DIAGNOSIS — I12.9 HYPERTENSIVE CHRONIC KIDNEY DISEASE WITH STAGE 1 THROUGH STAGE 4 CHRONIC KIDNEY DISEASE, OR UNSPECIFIED CHRONIC KIDNEY DISEASE: ICD-10-CM

## 2021-10-28 DIAGNOSIS — E87.1 HYPO-OSMOLALITY AND HYPONATREMIA: ICD-10-CM

## 2021-10-28 DIAGNOSIS — R26.81 UNSTEADINESS ON FEET: ICD-10-CM

## 2021-10-28 DIAGNOSIS — R07.89 OTHER CHEST PAIN: ICD-10-CM

## 2021-10-28 DIAGNOSIS — N40.0 BENIGN PROSTATIC HYPERPLASIA WITHOUT LOWER URINARY TRACT SYMPTOMS: ICD-10-CM

## 2021-10-28 DIAGNOSIS — J30.2 OTHER SEASONAL ALLERGIC RHINITIS: ICD-10-CM

## 2022-02-09 ENCOUNTER — EMERGENCY (EMERGENCY)
Facility: HOSPITAL | Age: 62
LOS: 0 days | Discharge: HOME | End: 2022-02-09
Attending: EMERGENCY MEDICINE | Admitting: EMERGENCY MEDICINE
Payer: MEDICAID

## 2022-02-09 VITALS
DIASTOLIC BLOOD PRESSURE: 74 MMHG | RESPIRATION RATE: 18 BRPM | HEART RATE: 86 BPM | TEMPERATURE: 98 F | SYSTOLIC BLOOD PRESSURE: 154 MMHG | OXYGEN SATURATION: 97 %

## 2022-02-09 VITALS
HEIGHT: 70 IN | RESPIRATION RATE: 18 BRPM | HEART RATE: 123 BPM | TEMPERATURE: 99 F | DIASTOLIC BLOOD PRESSURE: 86 MMHG | SYSTOLIC BLOOD PRESSURE: 183 MMHG | WEIGHT: 235.01 LBS | OXYGEN SATURATION: 95 %

## 2022-02-09 DIAGNOSIS — R19.7 DIARRHEA, UNSPECIFIED: ICD-10-CM

## 2022-02-09 DIAGNOSIS — R00.2 PALPITATIONS: ICD-10-CM

## 2022-02-09 DIAGNOSIS — R42 DIZZINESS AND GIDDINESS: ICD-10-CM

## 2022-02-09 DIAGNOSIS — Z91.013 ALLERGY TO SEAFOOD: ICD-10-CM

## 2022-02-09 DIAGNOSIS — I12.9 HYPERTENSIVE CHRONIC KIDNEY DISEASE WITH STAGE 1 THROUGH STAGE 4 CHRONIC KIDNEY DISEASE, OR UNSPECIFIED CHRONIC KIDNEY DISEASE: ICD-10-CM

## 2022-02-09 DIAGNOSIS — Z91.011 ALLERGY TO MILK PRODUCTS: ICD-10-CM

## 2022-02-09 DIAGNOSIS — Z20.822 CONTACT WITH AND (SUSPECTED) EXPOSURE TO COVID-19: ICD-10-CM

## 2022-02-09 DIAGNOSIS — N18.9 CHRONIC KIDNEY DISEASE, UNSPECIFIED: ICD-10-CM

## 2022-02-09 DIAGNOSIS — R10.814 LEFT LOWER QUADRANT ABDOMINAL TENDERNESS: ICD-10-CM

## 2022-02-09 DIAGNOSIS — Z91.018 ALLERGY TO OTHER FOODS: ICD-10-CM

## 2022-02-09 LAB
ALBUMIN SERPL ELPH-MCNC: 4.4 G/DL — SIGNIFICANT CHANGE UP (ref 3.5–5.2)
ALP SERPL-CCNC: 87 U/L — SIGNIFICANT CHANGE UP (ref 30–115)
ALT FLD-CCNC: 20 U/L — SIGNIFICANT CHANGE UP (ref 0–41)
ANION GAP SERPL CALC-SCNC: 22 MMOL/L — HIGH (ref 7–14)
AST SERPL-CCNC: 44 U/L — HIGH (ref 0–41)
BASOPHILS # BLD AUTO: 0.1 K/UL — SIGNIFICANT CHANGE UP (ref 0–0.2)
BASOPHILS NFR BLD AUTO: 1.8 % — HIGH (ref 0–1)
BILIRUB SERPL-MCNC: 2.4 MG/DL — HIGH (ref 0.2–1.2)
BUN SERPL-MCNC: 23 MG/DL — HIGH (ref 10–20)
CALCIUM SERPL-MCNC: 11 MG/DL — HIGH (ref 8.5–10.1)
CHLORIDE SERPL-SCNC: 96 MMOL/L — LOW (ref 98–110)
CO2 SERPL-SCNC: 18 MMOL/L — SIGNIFICANT CHANGE UP (ref 17–32)
CREAT SERPL-MCNC: 1.4 MG/DL — SIGNIFICANT CHANGE UP (ref 0.7–1.5)
EOSINOPHIL # BLD AUTO: 0.01 K/UL — SIGNIFICANT CHANGE UP (ref 0–0.7)
EOSINOPHIL NFR BLD AUTO: 0.2 % — SIGNIFICANT CHANGE UP (ref 0–8)
GLUCOSE SERPL-MCNC: 93 MG/DL — SIGNIFICANT CHANGE UP (ref 70–99)
HCT VFR BLD CALC: 39.8 % — LOW (ref 42–52)
HGB BLD-MCNC: 14.5 G/DL — SIGNIFICANT CHANGE UP (ref 14–18)
IMM GRANULOCYTES NFR BLD AUTO: 0.2 % — SIGNIFICANT CHANGE UP (ref 0.1–0.3)
LIDOCAIN IGE QN: 16 U/L — SIGNIFICANT CHANGE UP (ref 7–60)
LYMPHOCYTES # BLD AUTO: 1.27 K/UL — SIGNIFICANT CHANGE UP (ref 1.2–3.4)
LYMPHOCYTES # BLD AUTO: 23.2 % — SIGNIFICANT CHANGE UP (ref 20.5–51.1)
MAGNESIUM SERPL-MCNC: 1.8 MG/DL — SIGNIFICANT CHANGE UP (ref 1.8–2.4)
MCHC RBC-ENTMCNC: 33.8 PG — HIGH (ref 27–31)
MCHC RBC-ENTMCNC: 36.4 G/DL — SIGNIFICANT CHANGE UP (ref 32–37)
MCV RBC AUTO: 92.8 FL — SIGNIFICANT CHANGE UP (ref 80–94)
MONOCYTES # BLD AUTO: 0.35 K/UL — SIGNIFICANT CHANGE UP (ref 0.1–0.6)
MONOCYTES NFR BLD AUTO: 6.4 % — SIGNIFICANT CHANGE UP (ref 1.7–9.3)
NEUTROPHILS # BLD AUTO: 3.73 K/UL — SIGNIFICANT CHANGE UP (ref 1.4–6.5)
NEUTROPHILS NFR BLD AUTO: 68.2 % — SIGNIFICANT CHANGE UP (ref 42.2–75.2)
NRBC # BLD: 0 /100 WBCS — SIGNIFICANT CHANGE UP (ref 0–0)
PLATELET # BLD AUTO: 173 K/UL — SIGNIFICANT CHANGE UP (ref 130–400)
POTASSIUM SERPL-MCNC: 4.3 MMOL/L — SIGNIFICANT CHANGE UP (ref 3.5–5)
POTASSIUM SERPL-SCNC: 4.3 MMOL/L — SIGNIFICANT CHANGE UP (ref 3.5–5)
PROT SERPL-MCNC: 6.9 G/DL — SIGNIFICANT CHANGE UP (ref 6–8)
RBC # BLD: 4.29 M/UL — LOW (ref 4.7–6.1)
RBC # FLD: 13.1 % — SIGNIFICANT CHANGE UP (ref 11.5–14.5)
SARS-COV-2 RNA SPEC QL NAA+PROBE: SIGNIFICANT CHANGE UP
SODIUM SERPL-SCNC: 136 MMOL/L — SIGNIFICANT CHANGE UP (ref 135–146)
TROPONIN T SERPL-MCNC: <0.01 NG/ML — SIGNIFICANT CHANGE UP
WBC # BLD: 5.47 K/UL — SIGNIFICANT CHANGE UP (ref 4.8–10.8)
WBC # FLD AUTO: 5.47 K/UL — SIGNIFICANT CHANGE UP (ref 4.8–10.8)

## 2022-02-09 PROCEDURE — 74177 CT ABD & PELVIS W/CONTRAST: CPT | Mod: 26,MG

## 2022-02-09 PROCEDURE — G1004: CPT

## 2022-02-09 PROCEDURE — 93010 ELECTROCARDIOGRAM REPORT: CPT

## 2022-02-09 PROCEDURE — 71045 X-RAY EXAM CHEST 1 VIEW: CPT | Mod: 26

## 2022-02-09 PROCEDURE — 99285 EMERGENCY DEPT VISIT HI MDM: CPT

## 2022-02-09 RX ORDER — SODIUM CHLORIDE 9 MG/ML
2000 INJECTION, SOLUTION INTRAVENOUS ONCE
Refills: 0 | Status: COMPLETED | OUTPATIENT
Start: 2022-02-09 | End: 2022-02-09

## 2022-02-09 RX ORDER — FAMOTIDINE 10 MG/ML
20 INJECTION INTRAVENOUS ONCE
Refills: 0 | Status: COMPLETED | OUTPATIENT
Start: 2022-02-09 | End: 2022-02-09

## 2022-02-09 RX ORDER — ONDANSETRON 8 MG/1
4 TABLET, FILM COATED ORAL ONCE
Refills: 0 | Status: COMPLETED | OUTPATIENT
Start: 2022-02-09 | End: 2022-02-09

## 2022-02-09 RX ADMIN — FAMOTIDINE 20 MILLIGRAM(S): 10 INJECTION INTRAVENOUS at 08:24

## 2022-02-09 RX ADMIN — SODIUM CHLORIDE 2000 MILLILITER(S): 9 INJECTION, SOLUTION INTRAVENOUS at 08:24

## 2022-02-09 RX ADMIN — ONDANSETRON 4 MILLIGRAM(S): 8 TABLET, FILM COATED ORAL at 08:24

## 2022-02-09 NOTE — ED PROVIDER NOTE - OBJECTIVE STATEMENT
Patient is a 60 y/o M with pmhx of, arthritis, and HTN who presents after experiencing heart racing, dizziness, and diarrhea starting from yesterday. Pt states that yesterday he started to feel unwell but cannot identify any inciting factors. States he has had diarrhea about 10 times today. Also affirms neck and back pain but states these have been present for a while now. States he has not eaten anything since yesterday as his appetite is low Patient is a 62 y/o M with pmhx of, arthritis, and HTN who presents after experiencing heart racing, dizziness, and diarrhea starting from yesterday. Pt states that yesterday he started to feel unwell but cannot identify any inciting factors. States he has had diarrhea about 10 times today. Denies any associated nausea or vomiting. He states he feels some burning in his abdomen as well. States he has not eaten anything since yesterday as his appetite is low but did drink a pint of alcohol in the morning and that he drinks about a pint per day. Pt also affirms chest pain located in the center of his chest and neck pain that is chronic but started to worsen yesterday. Denies any past abdominal surgeries.

## 2022-02-09 NOTE — ED PROVIDER NOTE - CARDIOVASCULAR [-], MLM
[facility], [address] [place] [address]        [facility]- Gastroduodenoscopy (EGD) Procedure Note    Linette Britton  1997  [id number]      Procedure: Endoscopic Gastroduodenoscopy with biopsy    Indication:  Abdominal pain, epigastric, GERD     Pre-operative Diagnosis: see indication above    Post-operative Diagnosis: see findings below    : Tika Cerda MD    Surgical Assistant: None    Implants:  None    Referring Provider: Bree Leroy MD      Anesthesia/Sedation:  MAC anesthesia Propofol        Procedure Details     After infomed consent was obtained for the procedure, with all risks and benefits of procedure explained the patient was taken to the endoscopy suite and placed in the left lateral decubitus position. Following sequential administration of sedation as per above, the endoscope was inserted into the mouth and advanced under direct vision to third portion of the duodenum. A careful inspection was made as the gastroscope was withdrawn, including a retroflexed view of the proximal stomach; findings and interventions are described below. Findings:   Esophagus:hiatal hernia 3 cm in size   Grade 1 erosive esophagitis at G-E junction  Stomach: normal   Duodenum: normal, random biopsies taken      Therapies:  none    Specimens: as above         EBL: None      Complications:   None; patient tolerated the procedure well. Impression:    -See post-procedure diagnoses.     Recommendations:  -Continue acid suppression with protonix  -colonoscopy today    Signed By: Tika Cerda MD     6/11/2020  3:55 PM no palpitations/no peripheral edema/no syncope

## 2022-02-09 NOTE — ED PROVIDER NOTE - CROS ED RESP ALL NEG
Spoke with neurology Dr. Cisneros. Said L arm numbness is now resolved. Does not believe this is a stroke, does not desire intervention or further imaging at this time. Okay for telemetry admission pending other labs. negative...

## 2022-02-09 NOTE — ED ADULT TRIAGE NOTE - CHIEF COMPLAINT QUOTE
I feel dizzy and weak, my heart is racing, I have lower back pain and my stomach is upset, I got the runs - patient

## 2022-02-09 NOTE — ED PROVIDER NOTE - PATIENT PORTAL LINK FT
You can access the FollowMyHealth Patient Portal offered by [facility] by registering at the following website: [email or web address]. By joining [employer]’s FollowMyHealth portal, you will also be able to view your health information using other applications (apps) compatible with our system.

## 2022-02-09 NOTE — ED PROVIDER NOTE - CLINICAL SUMMARY MEDICAL DECISION MAKING FREE TEXT BOX
61-year-old male history of hypertension alcohol abuse CKD 3 presenting with generalized weakness, lightheadedness, myalgias abdominal pain intermittent chest pain.  No history abdominal surgeries.  No urinary symptoms.  No other acute complaints.  Chronically ill appearing, NAD, non toxic. NCAT PERRLA EOMI neck supple non tender normal wob cta bl rrr abdomen s left lower quadrant tenderness to palpation nd no rebound no guarding WWPx4 neuro non focal. Patient feeling improved.  Aware of all results, given a copy of all available results, comfortable with discharge and follow-up outpatient, strict return precautions given. Endorses understanding and aware they can return at any time for further evaluation. No further questions or concerns at this time.

## 2022-02-09 NOTE — ED PROVIDER NOTE - ATTENDING CONTRIBUTION TO CARE
61-year-old male history of hypertension alcohol abuse CKD 3 presenting with generalized weakness, lightheadedness, myalgias abdominal pain intermittent chest pain.  No history abdominal surgeries.  No urinary symptoms.  No other acute complaints.  Chronically ill appearing, NAD, non toxic. NCAT PERRLA EOMI neck supple non tender normal wob cta bl rrr abdomen s left lower quadrant tenderness to palpation nd no rebound no guarding WWPx4 neuro non focal

## 2022-03-12 ENCOUNTER — EMERGENCY (EMERGENCY)
Facility: HOSPITAL | Age: 62
LOS: 0 days | Discharge: HOME | End: 2022-03-12
Attending: EMERGENCY MEDICINE | Admitting: EMERGENCY MEDICINE
Payer: MEDICAID

## 2022-03-12 VITALS
TEMPERATURE: 98 F | HEART RATE: 93 BPM | RESPIRATION RATE: 18 BRPM | OXYGEN SATURATION: 95 % | SYSTOLIC BLOOD PRESSURE: 145 MMHG | DIASTOLIC BLOOD PRESSURE: 71 MMHG

## 2022-03-12 VITALS
HEART RATE: 122 BPM | DIASTOLIC BLOOD PRESSURE: 75 MMHG | OXYGEN SATURATION: 99 % | HEIGHT: 70 IN | SYSTOLIC BLOOD PRESSURE: 138 MMHG | TEMPERATURE: 98 F | RESPIRATION RATE: 18 BRPM | WEIGHT: 190.04 LBS

## 2022-03-12 DIAGNOSIS — R00.2 PALPITATIONS: ICD-10-CM

## 2022-03-12 DIAGNOSIS — I12.9 HYPERTENSIVE CHRONIC KIDNEY DISEASE WITH STAGE 1 THROUGH STAGE 4 CHRONIC KIDNEY DISEASE, OR UNSPECIFIED CHRONIC KIDNEY DISEASE: ICD-10-CM

## 2022-03-12 DIAGNOSIS — N18.30 CHRONIC KIDNEY DISEASE, STAGE 3 UNSPECIFIED: ICD-10-CM

## 2022-03-12 DIAGNOSIS — F41.9 ANXIETY DISORDER, UNSPECIFIED: ICD-10-CM

## 2022-03-12 DIAGNOSIS — Z87.19 PERSONAL HISTORY OF OTHER DISEASES OF THE DIGESTIVE SYSTEM: ICD-10-CM

## 2022-03-12 DIAGNOSIS — F10.129 ALCOHOL ABUSE WITH INTOXICATION, UNSPECIFIED: ICD-10-CM

## 2022-03-12 LAB
ALBUMIN SERPL ELPH-MCNC: 4.6 G/DL — SIGNIFICANT CHANGE UP (ref 3.5–5.2)
ALP SERPL-CCNC: 82 U/L — SIGNIFICANT CHANGE UP (ref 30–115)
ALT FLD-CCNC: 14 U/L — SIGNIFICANT CHANGE UP (ref 0–41)
ANION GAP SERPL CALC-SCNC: 27 MMOL/L — HIGH (ref 7–14)
AST SERPL-CCNC: 37 U/L — SIGNIFICANT CHANGE UP (ref 0–41)
BASOPHILS # BLD AUTO: 0.1 K/UL — SIGNIFICANT CHANGE UP (ref 0–0.2)
BASOPHILS NFR BLD AUTO: 1.1 % — HIGH (ref 0–1)
BILIRUB SERPL-MCNC: 2.3 MG/DL — HIGH (ref 0.2–1.2)
BUN SERPL-MCNC: 14 MG/DL — SIGNIFICANT CHANGE UP (ref 10–20)
CALCIUM SERPL-MCNC: 9.5 MG/DL — SIGNIFICANT CHANGE UP (ref 8.5–10.1)
CHLORIDE SERPL-SCNC: 94 MMOL/L — LOW (ref 98–110)
CO2 SERPL-SCNC: 13 MMOL/L — LOW (ref 17–32)
CREAT SERPL-MCNC: 1.5 MG/DL — SIGNIFICANT CHANGE UP (ref 0.7–1.5)
EGFR: 52 ML/MIN/1.73M2 — LOW
EOSINOPHIL # BLD AUTO: 0.01 K/UL — SIGNIFICANT CHANGE UP (ref 0–0.7)
EOSINOPHIL NFR BLD AUTO: 0.1 % — SIGNIFICANT CHANGE UP (ref 0–8)
GLUCOSE SERPL-MCNC: 88 MG/DL — SIGNIFICANT CHANGE UP (ref 70–99)
HCT VFR BLD CALC: 36.4 % — LOW (ref 42–52)
HGB BLD-MCNC: 13.4 G/DL — LOW (ref 14–18)
IMM GRANULOCYTES NFR BLD AUTO: 0.2 % — SIGNIFICANT CHANGE UP (ref 0.1–0.3)
LYMPHOCYTES # BLD AUTO: 1.25 K/UL — SIGNIFICANT CHANGE UP (ref 1.2–3.4)
LYMPHOCYTES # BLD AUTO: 13.2 % — LOW (ref 20.5–51.1)
MAGNESIUM SERPL-MCNC: 1.6 MG/DL — LOW (ref 1.8–2.4)
MCHC RBC-ENTMCNC: 35 PG — HIGH (ref 27–31)
MCHC RBC-ENTMCNC: 36.8 G/DL — SIGNIFICANT CHANGE UP (ref 32–37)
MCV RBC AUTO: 95 FL — HIGH (ref 80–94)
MONOCYTES # BLD AUTO: 0.53 K/UL — SIGNIFICANT CHANGE UP (ref 0.1–0.6)
MONOCYTES NFR BLD AUTO: 5.6 % — SIGNIFICANT CHANGE UP (ref 1.7–9.3)
NEUTROPHILS # BLD AUTO: 7.58 K/UL — HIGH (ref 1.4–6.5)
NEUTROPHILS NFR BLD AUTO: 79.8 % — HIGH (ref 42.2–75.2)
NRBC # BLD: 0 /100 WBCS — SIGNIFICANT CHANGE UP (ref 0–0)
PLATELET # BLD AUTO: 148 K/UL — SIGNIFICANT CHANGE UP (ref 130–400)
POTASSIUM SERPL-MCNC: 4 MMOL/L — SIGNIFICANT CHANGE UP (ref 3.5–5)
POTASSIUM SERPL-SCNC: 4 MMOL/L — SIGNIFICANT CHANGE UP (ref 3.5–5)
PROT SERPL-MCNC: 6.9 G/DL — SIGNIFICANT CHANGE UP (ref 6–8)
RBC # BLD: 3.83 M/UL — LOW (ref 4.7–6.1)
RBC # FLD: 13.3 % — SIGNIFICANT CHANGE UP (ref 11.5–14.5)
SODIUM SERPL-SCNC: 134 MMOL/L — LOW (ref 135–146)
TROPONIN T SERPL-MCNC: <0.01 NG/ML — SIGNIFICANT CHANGE UP
WBC # BLD: 9.49 K/UL — SIGNIFICANT CHANGE UP (ref 4.8–10.8)
WBC # FLD AUTO: 9.49 K/UL — SIGNIFICANT CHANGE UP (ref 4.8–10.8)

## 2022-03-12 PROCEDURE — 93010 ELECTROCARDIOGRAM REPORT: CPT

## 2022-03-12 PROCEDURE — 99285 EMERGENCY DEPT VISIT HI MDM: CPT

## 2022-03-12 PROCEDURE — 71046 X-RAY EXAM CHEST 2 VIEWS: CPT | Mod: 26

## 2022-03-12 RX ORDER — FAMOTIDINE 10 MG/ML
20 INJECTION INTRAVENOUS ONCE
Refills: 0 | Status: COMPLETED | OUTPATIENT
Start: 2022-03-12 | End: 2022-03-12

## 2022-03-12 RX ORDER — SODIUM CHLORIDE 9 MG/ML
1000 INJECTION, SOLUTION INTRAVENOUS ONCE
Refills: 0 | Status: COMPLETED | OUTPATIENT
Start: 2022-03-12 | End: 2022-03-12

## 2022-03-12 RX ORDER — MAGNESIUM SULFATE 500 MG/ML
2 VIAL (ML) INJECTION ONCE
Refills: 0 | Status: COMPLETED | OUTPATIENT
Start: 2022-03-12 | End: 2022-03-12

## 2022-03-12 RX ADMIN — Medication 25 MILLIGRAM(S): at 03:10

## 2022-03-12 RX ADMIN — Medication 25 GRAM(S): at 03:22

## 2022-03-12 RX ADMIN — FAMOTIDINE 20 MILLIGRAM(S): 10 INJECTION INTRAVENOUS at 03:10

## 2022-03-12 RX ADMIN — SODIUM CHLORIDE 1000 MILLILITER(S): 9 INJECTION, SOLUTION INTRAVENOUS at 03:10

## 2022-03-12 RX ADMIN — Medication 25 MILLIGRAM(S): at 05:34

## 2022-03-12 NOTE — ED ADULT TRIAGE NOTE - CHIEF COMPLAINT QUOTE
pt raya from [facility], c/o "trembling all day", sts he has anxiety

## 2022-03-12 NOTE — ED ADULT NURSE NOTE - OBJECTIVE STATEMENT
patient presents with multiple complaints including anxiety, rapid heart rate, patient is demanding full work up

## 2022-03-12 NOTE — ED PROVIDER NOTE - IV ALTEPLASE DOOR HIDDEN
, pt presented to unit w/ c/o elevated blood pressure and headache, pt has a hx of migraines and has headaches all day everyday, nothing helps, pt just lives with it, some days are better than others   show

## 2022-03-12 NOTE — ED PROVIDER NOTE - ATTENDING CONTRIBUTION TO CARE
61-year-old male with past medical history of alcohol abuse, high blood pressure, CKD stage III presents for hand tremors and palpitations, reports sensation of feeling anxiety.  Patient reports that he drinks about a half a pint of vodka a day, last drink was around 1 PM this afternoon, no known history of withdrawal seizures or delirium tremors.  Patient reports he has a scheduled outpatient stress test with Dr. Huang in April 2022, he had refused one during a previous admission during October 2021 and states he wants to follow-up with his cardiologist regarding this.  Patient denies any drug abuse.  No suicidal homicidal ideations, no visual auditory hallucinations.  Patient reports he has been detox many years ago but has not been to it recently and does not want detox now.  No fever, chills, n/v, cp,  pleuritic cp, sob, palpitations, diaphoresis, cough, ha/lh/dizziness, numbness/tingling, neck pain/ stiffness, abd pain, diarrhea, constipation, melena/brbpr, urinary symptoms, vrauma, weakness, edema, calf pain/swelling/erythema, sick contacts, recent travel or rash.     Vital Signs: I have reviewed the initial vital signs. Constitutional: pt sitting on stretcher speaking full sentences . Integumentary: No rash. No flushing, dryness or diaphoresis. ENT: Dry MM. (+) tongue fasciculations. NECK: Supple, non-tender, no meningeal signs. Cardiovascular: Tachy radial pulses 2/4 b/l. No JVD. Respiratory: BS present b/l, ctabl, no wheezing or crackles, no accessory muscle use, no stridor. Gastrointestinal: BS present throughout all 4 quadrants, soft, nd, nt, no rebound tenderness or guarding, no cvat. Musculoskeletal: FROM, no edema, no calf pain/swelling/erythema. (+) hand tremors. Neurologic: AAOx3, motor 5/5 and sensation intact throughout upper and lower ext, CN II-XII intact, No facial droop. No focal deficits. No clonus or rigidity. No hypo or hyperreflexia.

## 2022-03-12 NOTE — ED PROVIDER NOTE - NSFOLLOWUPINSTRUCTIONS_ED_ALL_ED_FT
Your visit in the emergency department today did not reveal anything immediately life-threatening.    However, it is important that you follow-up with your PRIMARY CARE PHYSICIAN within 3-5 days.  If you do not have a primary care physician, please call your health insurance to select one.  If you do not have health insurance, please schedule an appointment with the [facility]: [phone], located at [address] [place] [address].    ---------------------------------------------------------------------------------------------------    Palpitations    A palpitation is the feeling that your heartbeat is irregular or is faster than normal. It may feel like your heart is fluttering or skipping a beat. They may be caused by many things, including smoking, caffeine, alcohol, stress, and certain medicines. Although most causes of palpitations are not serious, palpitations can be a sign of a serious medical problem. Avoid caffeine, alcohol, and tobacco products at home. Try to reduce stress and anxiety and make sure to get plenty of rest.     SEEK IMMEDIATE MEDICAL CARE IF YOU HAVE ANY OF THE FOLLOWING SYMPTOMS: chest pain, shortness of breath, severe headache, dizziness/lightheadedness, or fainting.    ---------------------------------------------------------------------------------------------------    Tremors    WHAT YOU NEED TO KNOW:    A tremor is a movement you cannot control that occurs in a rhythm. Tremors most commonly occur in the hands. Other common places include the head or face, trunk, or legs. Your voice can also have a tremor and sound shaky when you speak. A tremor may be caused by a nerve problem, too much thyroid hormone, or by certain medicines, caffeine, or alcohol. Tremors may be temporary or permanent. The tremor may go away and return, or worsen with stress. Tremors can happen at any age, but they are more common in later years.    DISCHARGE INSTRUCTIONS:    Contact your healthcare provider if:   •You have a new or worsening tremor.      •You have tremors that make it difficult to do your regular activities.      •You have questions or concerns about your condition or care.      Medicines:   •Medicines may be used to help control some kinds of tremors.      •Take your medicine as directed. Contact your healthcare provider if you think your medicine is not helping or if you have side effects. Tell him or her if you are allergic to any medicine. Keep a list of the medicines, vitamins, and herbs you take. Include the amounts, and when and why you take them. Bring the list or the pill bottles to follow-up visits. Carry your medicine list with you in case of an emergency.      Manage your symptoms:   •Do not have caffeine or other chemicals that affect your nerves. Limit or do not have caffeine. Caffeine can make tremors worse. Talk to your healthcare provider about herbal medicines, teas, and supplements. They may also increase tremors. Do not use illegal drugs.      •Go to physical and occupational therapy as directed. A physical therapist can teach you exercises to help reduce the tremor and improve muscle control. You may be shown how to hold the body part during a tremor to help control the movement. The therapist can also help you build strength and balance. An occupational therapist can show you how to use adaptive devices to help you move more easily.       •Use objects that will help you control movements. You may have more hand control if you add a watch or bracelet to your wrist. It may be easier for you to drink from a straw, or to fill your cup only half full. Cups with lids, such as travel mugs, can also help you drink with more control. Heavy eating utensils can help you eat more easily. A button fastener can help you button clothing if tremors in your hands make this difficult.      •Set a regular sleep schedule. Lack of sleep can make tremors worse. Try to go to bed at the same time each night and wake up at the same time each morning.      Follow up with your healthcare provider as directed: Write down your questions so you remember to ask them during your visits.

## 2022-03-12 NOTE — ED ADULT NURSE NOTE - CHIEF COMPLAINT QUOTE
pt raya from [facility], c/o "trembling all day", sts he has anxiety

## 2022-03-12 NOTE — ED PROVIDER NOTE - PROGRESS NOTE DETAILS
ED Attending ALAINA Nova score ~ 3, pt aware of plan, will continue to monitor and reassess. Resident [place]: JEAN-PAULWA score 2. Pt states he no longer has palpitations, heartburn, or anxiousness. Feels comfortable being discharged. Repeat VS with improved HR to 94. Labs reviewed, hypomagnesemia treated. Resident AZ: JEAN-PAULWA score 1. Pt states he no longer has palpitations, heartburn, or anxiousness. Feels comfortable being discharged. Repeat VS with improved HR to 94. Labs reviewed, hypomagnesemia treated. repeat CIWA score 0, pt tolerating po, reports he wants to leave  as feeling better, no current symptoms, copy of results provided, pt understands importance of follow up with cardiology which he states he has appt with as well as detox, aware of signs and symptoms to return for, will follow up. ED Attending ALAINA Nova  Pt endorsed to [facility] pending transport back to facility.

## 2022-03-12 NOTE — ED PROVIDER NOTE - PATIENT PORTAL LINK FT
You can access the FollowMyHealth Patient Portal offered by [facility] by registering at the following website: [email or web address]. By joining [employer]’s FollowMyHealth portal, you will also be able to view your health information using other applications (apps) compatible with our system.

## 2022-03-12 NOTE — ED PROVIDER NOTE - OBJECTIVE STATEMENT
Pt is a 62 yo M, h/o HTN, GERD, BPH, hard of hearing. Pt presents for tremors, palpitations, and GERD symptoms since this afternoon. He admits that he is a daily drinker, and states that his last drink was this afternoon, and it was the typical 1/2 pint that he has daily. Denies prior alcohol withdrawal seizures or DT. States that he had an episode of hand tremors in the past, and does not remember how it resolved. Denies nausea or vomiting, anxiety (though reported it to triage), agitation, tactile disturbances, phonophobia, photophobia, headache.  No other complaints. No fever/chills, nasal discharge/sore throat, CP, SOB, cough, abd pain, diarrhea, dysuria, hematuria, new joint pain, FND, rashes, trauma.

## 2022-03-12 NOTE — ED ADULT NURSE REASSESSMENT NOTE - NS ED NURSE REASSESS COMMENT FT1
Patient is A&Ox4, fully functional with his walker. Patient ambulated to the restroom independently. Left UA 20g +flush +BR will be removed when ambulance arrives. Will continue to monitor until ambulance arrives.

## 2022-03-12 NOTE — ED PROVIDER NOTE - CARE PLAN
Assessment and plan of treatment:	Plan: EKG, CXR, labs, ivf, librium, reassess.   1 Principal Discharge DX:	Occasional tremors  Assessment and plan of treatment:	Plan: EKG, CXR, labs, ivf, librium, reassess.   Principal Discharge DX:	Alcohol abuse  Assessment and plan of treatment:	Plan: EKG, CXR, labs, ivf, librium, reassess.

## 2022-03-12 NOTE — ED PROVIDER NOTE - NSFOLLOWUPCLINICS_GEN_ALL_ED_FT
[facility]  Detox Mgmt  [address] [place] [address]  Phone: [phone]  Fax:   Follow Up Time: 1-3 Days    [facility]  Medicine  [address], [place]   Phone: [phone]  Fax:   Follow Up Time: 1-3 Days

## 2022-03-12 NOTE — ED PROVIDER NOTE - CLINICAL SUMMARY MEDICAL DECISION MAKING FREE TEXT BOX
Patient is a good candidate to attempt outpatient management. Supportive care and home care discussed in detail. Patient aware they may have to return for re-evaluation and possible admission if outpatient treatment fails. Strict return precautions discussed.  Will follow up as discussed.

## 2022-03-12 NOTE — ED PROVIDER NOTE - PHYSICAL EXAMINATION
Vital signs reviewed; ABCs intact  GENERAL: Well nourished, comfortable  SKIN: Warm, dry  HEAD & NECK: NCAT, supple neck  EYES: EOMI, PER B/L, no scleral icterus, no conjunctival injection, no conjunctival pallor  ENT: MMM; uvula midline; no oropharyngeal erythema or exudates  CARD: Tachycardic, S1, S2; no murmurs, no rubs, no gallops  RESP: Normal respiratory effort, decreased breath sounds bilaterally, no rales, no wheezing  ABD: Soft, ND, NT, no rebound, no guarding, +BS  NEUROMSK: Grossly intact  PSYCH: AAOx3, cooperative, appropriate

## 2022-03-12 NOTE — ED PROVIDER NOTE - NSICDXPASTMEDICALHX_GEN_ALL_CORE_FT
PAST MEDICAL HISTORY:  Alcohol dependence     CKD (chronic kidney disease)     Gout     Hard of hearing     HTN (hypertension)     Seasonal allergies     Vertigo

## 2022-03-30 ENCOUNTER — EMERGENCY (EMERGENCY)
Facility: HOSPITAL | Age: 62
LOS: 0 days | Discharge: HOME | End: 2022-03-30
Attending: EMERGENCY MEDICINE | Admitting: EMERGENCY MEDICINE
Payer: MEDICAID

## 2022-03-30 VITALS
TEMPERATURE: 97 F | DIASTOLIC BLOOD PRESSURE: 65 MMHG | OXYGEN SATURATION: 98 % | HEART RATE: 102 BPM | HEIGHT: 70 IN | WEIGHT: 179.9 LBS | SYSTOLIC BLOOD PRESSURE: 141 MMHG | RESPIRATION RATE: 18 BRPM

## 2022-03-30 VITALS
HEART RATE: 119 BPM | DIASTOLIC BLOOD PRESSURE: 68 MMHG | TEMPERATURE: 98 F | SYSTOLIC BLOOD PRESSURE: 136 MMHG | OXYGEN SATURATION: 98 % | RESPIRATION RATE: 20 BRPM

## 2022-03-30 DIAGNOSIS — R41.82 ALTERED MENTAL STATUS, UNSPECIFIED: ICD-10-CM

## 2022-03-30 DIAGNOSIS — R00.2 PALPITATIONS: ICD-10-CM

## 2022-03-30 DIAGNOSIS — N40.0 BENIGN PROSTATIC HYPERPLASIA WITHOUT LOWER URINARY TRACT SYMPTOMS: ICD-10-CM

## 2022-03-30 DIAGNOSIS — Z91.018 ALLERGY TO OTHER FOODS: ICD-10-CM

## 2022-03-30 DIAGNOSIS — F17.200 NICOTINE DEPENDENCE, UNSPECIFIED, UNCOMPLICATED: ICD-10-CM

## 2022-03-30 DIAGNOSIS — F10.20 ALCOHOL DEPENDENCE, UNCOMPLICATED: ICD-10-CM

## 2022-03-30 DIAGNOSIS — K21.9 GASTRO-ESOPHAGEAL REFLUX DISEASE WITHOUT ESOPHAGITIS: ICD-10-CM

## 2022-03-30 DIAGNOSIS — I12.9 HYPERTENSIVE CHRONIC KIDNEY DISEASE WITH STAGE 1 THROUGH STAGE 4 CHRONIC KIDNEY DISEASE, OR UNSPECIFIED CHRONIC KIDNEY DISEASE: ICD-10-CM

## 2022-03-30 DIAGNOSIS — R00.0 TACHYCARDIA, UNSPECIFIED: ICD-10-CM

## 2022-03-30 DIAGNOSIS — Z91.011 ALLERGY TO MILK PRODUCTS: ICD-10-CM

## 2022-03-30 DIAGNOSIS — Z91.013 ALLERGY TO SEAFOOD: ICD-10-CM

## 2022-03-30 DIAGNOSIS — N18.9 CHRONIC KIDNEY DISEASE, UNSPECIFIED: ICD-10-CM

## 2022-03-30 PROCEDURE — 99284 EMERGENCY DEPT VISIT MOD MDM: CPT

## 2022-03-30 PROCEDURE — 93010 ELECTROCARDIOGRAM REPORT: CPT

## 2022-03-30 RX ADMIN — Medication 50 MILLIGRAM(S): at 05:35

## 2022-03-30 NOTE — ED ADULT TRIAGE NOTE - CHIEF COMPLAINT QUOTE
pt raya from [facility] c/o anxiety due to drinking tonight; pt sts he drank a pint of vodka tonight and that he drinks one pint every day and sometimes "I just get like this"

## 2022-03-30 NOTE — ED ADULT NURSE NOTE - NSIMPLEMENTINTERV_GEN_ALL_ED
Implemented All Fall with Harm Risk Interventions:  [place] to call system. Call bell, personal items and telephone within reach. Instruct patient to call for assistance. Room bathroom lighting operational. Non-slip footwear when patient is off stretcher. Physically safe environment: no spills, clutter or unnecessary equipment. Stretcher in lowest position, wheels locked, appropriate side rails in place. Provide visual cue, wrist band, yellow gown, etc. Monitor gait and stability. Monitor for mental status changes and reorient to person, place, and time. Review medications for side effects contributing to fall risk. Reinforce activity limits and safety measures with patient and family. Provide visual clues: red socks.

## 2022-03-30 NOTE — ED PROVIDER NOTE - OBJECTIVE STATEMENT
61-year-old male with history of HTN, GERD, BPH, difficulty hearing, alcohol dependence presents to the ED for assessment of anxiety.  Patient notes that he drinks about 1/2 pint of alcohol daily, he denies missing this today or drinking last but also notes that sometimes he gets tremors in his hands and anxious despite drinking.  He has no chest pain, dyspnea, nausea, vomiting.  He denies recent trauma or illness.

## 2022-03-30 NOTE — ED ADULT NURSE NOTE - CHIEF COMPLAINT QUOTE
pt raya from [facility] c/o anxiety due to drinking tonight; pt sts he drank a pint of vodka tonight and that he drinks one pint every day and sometimes "I just get like this"

## 2022-03-30 NOTE — ED PROVIDER NOTE - PHYSICAL EXAMINATION
VITAL SIGNS: I have reviewed nursing notes and confirm.  CONSTITUTIONAL: Well-developed; well-nourished; in no acute distress.  SKIN: Skin exam is warm and dry, no acute rash.  HEAD: Normocephalic; atraumatic.  EYES: PERRL, EOM intact; conjunctiva and sclera clear.  ENT: No nasal discharge; airway clear, no tongue fasciculations  NECK: Supple; non tender.  CARD: tachy, regular rhythm  RESP: No wheezes, rales or rhonchi.  ABD: Normal bowel sounds; soft; non-distended; non-tender; no hepatosplenomegaly.  EXT: Normal ROM. No clubbing, cyanosis or edema, no tremor  NEURO: Alert, oriented. Grossly unremarkable. No focal deficits.  PSYCH: Cooperative, appropriate.

## 2022-03-30 NOTE — ED PROVIDER NOTE - CLINICAL SUMMARY MEDICAL DECISION MAKING FREE TEXT BOX
Patient's HR is still high 90s-low 100s, discussed this with patient and he notes he always/often has rapid heart beat.    He is feeling much better, no longer anxious, will dchome.

## 2022-03-30 NOTE — ED PROVIDER NOTE - PATIENT PORTAL LINK FT
You can access the FollowMyHealth Patient Portal offered by [facility] by registering at the following website: [email or web address]. By joining [email or web address]’s FollowMyHealth portal, you will also be able to view your health information using other applications (apps) compatible with our system.

## 2022-03-30 NOTE — ED PROVIDER NOTE - NS ED ROS FT
Constitutional: no fever, chills, no recent weight loss, change in appetite or malaise  Cardiac: see HPI  Respiratory: No cough or respiratory distress  GI: No nausea, vomiting, diarrhea or abdominal pain.  : No dysuria, frequency, urgency or hematuria  MS: no pain to back or extremities, no loss of ROM, no weakness  Neuro: No headache or weakness. No LOC.  Skin: No skin rash.  Psych: see HPI, no SI, HI, hallucinations  Except as documented in the HPI, all other systems are negative.

## 2022-03-30 NOTE — ED ADULT NURSE NOTE - OBJECTIVE STATEMENT
pt states he is very nervous and shaky and uncomfortable in his chest but denies chest pain. pt states it started at 1030pm and as per pt it sometimes happens when he drinks. pt states he drinks 7 days a week and has 1 pint of vodka a day.

## 2022-04-18 ENCOUNTER — INPATIENT (INPATIENT)
Facility: HOSPITAL | Age: 62
LOS: 7 days | Discharge: HOME | End: 2022-04-26
Attending: HOSPITALIST | Admitting: HOSPITALIST
Payer: MEDICAID

## 2022-04-18 VITALS
HEIGHT: 70 IN | TEMPERATURE: 100 F | DIASTOLIC BLOOD PRESSURE: 82 MMHG | OXYGEN SATURATION: 98 % | SYSTOLIC BLOOD PRESSURE: 169 MMHG | RESPIRATION RATE: 20 BRPM | HEART RATE: 119 BPM

## 2022-04-18 LAB
ALBUMIN SERPL ELPH-MCNC: 4.2 G/DL — SIGNIFICANT CHANGE UP (ref 3.5–5.2)
ALBUMIN SERPL ELPH-MCNC: 4.4 G/DL — SIGNIFICANT CHANGE UP (ref 3.5–5.2)
ALP SERPL-CCNC: 73 U/L — SIGNIFICANT CHANGE UP (ref 30–115)
ALP SERPL-CCNC: 80 U/L — SIGNIFICANT CHANGE UP (ref 30–115)
ALT FLD-CCNC: 20 U/L — SIGNIFICANT CHANGE UP (ref 0–41)
ALT FLD-CCNC: 22 U/L — SIGNIFICANT CHANGE UP (ref 0–41)
ANION GAP SERPL CALC-SCNC: 30 MMOL/L — HIGH (ref 7–14)
AST SERPL-CCNC: 46 U/L — HIGH (ref 0–41)
AST SERPL-CCNC: 59 U/L — HIGH (ref 0–41)
BASE EXCESS BLDA CALC-SCNC: -8.2 MMOL/L — LOW (ref -2–3)
BASOPHILS # BLD AUTO: 0.03 K/UL — SIGNIFICANT CHANGE UP (ref 0–0.2)
BASOPHILS NFR BLD AUTO: 0.4 % — SIGNIFICANT CHANGE UP (ref 0–1)
BILIRUB DIRECT SERPL-MCNC: 0.3 MG/DL — SIGNIFICANT CHANGE UP (ref 0–0.3)
BILIRUB INDIRECT FLD-MCNC: 0.7 MG/DL — SIGNIFICANT CHANGE UP (ref 0.2–1.2)
BILIRUB SERPL-MCNC: 1 MG/DL — SIGNIFICANT CHANGE UP (ref 0.2–1.2)
BILIRUB SERPL-MCNC: 1 MG/DL — SIGNIFICANT CHANGE UP (ref 0.2–1.2)
BUN SERPL-MCNC: 15 MG/DL — SIGNIFICANT CHANGE UP (ref 10–20)
CALCIUM SERPL-MCNC: 9.3 MG/DL — SIGNIFICANT CHANGE UP (ref 8.5–10.1)
CHLORIDE SERPL-SCNC: 102 MMOL/L — SIGNIFICANT CHANGE UP (ref 98–110)
CHLORIDE SERPL-SCNC: 97 MMOL/L — LOW (ref 98–110)
CO2 SERPL-SCNC: 10 MMOL/L — LOW (ref 17–32)
CREAT SERPL-MCNC: 1.6 MG/DL — HIGH (ref 0.7–1.5)
EGFR: 48 ML/MIN/1.73M2 — LOW
EOSINOPHIL # BLD AUTO: 0 K/UL — SIGNIFICANT CHANGE UP (ref 0–0.7)
EOSINOPHIL NFR BLD AUTO: 0 % — SIGNIFICANT CHANGE UP (ref 0–8)
ETHANOL SERPL-MCNC: 12 MG/DL — HIGH
GLUCOSE SERPL-MCNC: 93 MG/DL — SIGNIFICANT CHANGE UP (ref 70–99)
HCO3 BLDA-SCNC: 16 MMOL/L — LOW (ref 21–28)
HCT VFR BLD CALC: 34.1 % — LOW (ref 42–52)
HGB BLD-MCNC: 12.1 G/DL — LOW (ref 14–18)
HOROWITZ INDEX BLDA+IHG-RTO: 21 — SIGNIFICANT CHANGE UP
IMM GRANULOCYTES NFR BLD AUTO: 0.5 % — HIGH (ref 0.1–0.3)
LYMPHOCYTES # BLD AUTO: 0.76 K/UL — LOW (ref 1.2–3.4)
LYMPHOCYTES # BLD AUTO: 10 % — LOW (ref 20.5–51.1)
MAGNESIUM SERPL-MCNC: 1.7 MG/DL — LOW (ref 1.8–2.4)
MAGNESIUM SERPL-MCNC: 2 MG/DL — SIGNIFICANT CHANGE UP (ref 1.8–2.4)
MCHC RBC-ENTMCNC: 35.5 G/DL — SIGNIFICANT CHANGE UP (ref 32–37)
MCHC RBC-ENTMCNC: 36.2 PG — HIGH (ref 27–31)
MCV RBC AUTO: 102.1 FL — HIGH (ref 80–94)
MONOCYTES # BLD AUTO: 0.26 K/UL — SIGNIFICANT CHANGE UP (ref 0.1–0.6)
MONOCYTES NFR BLD AUTO: 3.4 % — SIGNIFICANT CHANGE UP (ref 1.7–9.3)
NEUTROPHILS # BLD AUTO: 6.48 K/UL — SIGNIFICANT CHANGE UP (ref 1.4–6.5)
NEUTROPHILS NFR BLD AUTO: 85.7 % — HIGH (ref 42.2–75.2)
NRBC # BLD: 0 /100 WBCS — SIGNIFICANT CHANGE UP (ref 0–0)
PCO2 BLDA: 29 MMHG — LOW (ref 35–48)
PH BLDA: 7.35 — SIGNIFICANT CHANGE UP (ref 7.35–7.45)
PHOSPHATE SERPL-MCNC: 3.1 MG/DL — SIGNIFICANT CHANGE UP (ref 2.1–4.9)
PLATELET # BLD AUTO: 131 K/UL — SIGNIFICANT CHANGE UP (ref 130–400)
PO2 BLDA: 85 MMHG — SIGNIFICANT CHANGE UP (ref 83–108)
POTASSIUM SERPL-MCNC: 5 MMOL/L — SIGNIFICANT CHANGE UP (ref 3.5–5)
POTASSIUM SERPL-MCNC: 5.3 MMOL/L — HIGH (ref 3.5–5)
POTASSIUM SERPL-SCNC: 5 MMOL/L — SIGNIFICANT CHANGE UP (ref 3.5–5)
POTASSIUM SERPL-SCNC: 5.3 MMOL/L — HIGH (ref 3.5–5)
PROT SERPL-MCNC: 6.4 G/DL — SIGNIFICANT CHANGE UP (ref 6–8)
PROT SERPL-MCNC: 7 G/DL — SIGNIFICANT CHANGE UP (ref 6–8)
RBC # BLD: 3.34 M/UL — LOW (ref 4.7–6.1)
RBC # FLD: 13.5 % — SIGNIFICANT CHANGE UP (ref 11.5–14.5)
SAO2 % BLDA: 98.2 % — HIGH (ref 94–98)
SARS-COV-2 RNA SPEC QL NAA+PROBE: SIGNIFICANT CHANGE UP
SODIUM SERPL-SCNC: 137 MMOL/L — SIGNIFICANT CHANGE UP (ref 135–146)
SODIUM SERPL-SCNC: 139 MMOL/L — SIGNIFICANT CHANGE UP (ref 135–146)
TROPONIN T SERPL-MCNC: <0.01 NG/ML — SIGNIFICANT CHANGE UP
WBC # BLD: 7.57 K/UL — SIGNIFICANT CHANGE UP (ref 4.8–10.8)
WBC # FLD AUTO: 7.57 K/UL — SIGNIFICANT CHANGE UP (ref 4.8–10.8)

## 2022-04-18 PROCEDURE — 71045 X-RAY EXAM CHEST 1 VIEW: CPT | Mod: 26

## 2022-04-18 PROCEDURE — 99408 AUDIT/DAST 15-30 MIN: CPT

## 2022-04-18 PROCEDURE — 99223 1ST HOSP IP/OBS HIGH 75: CPT | Mod: 25

## 2022-04-18 PROCEDURE — 99285 EMERGENCY DEPT VISIT HI MDM: CPT

## 2022-04-18 PROCEDURE — 93010 ELECTROCARDIOGRAM REPORT: CPT

## 2022-04-18 RX ORDER — FOLIC ACID 0.8 MG
1 TABLET ORAL DAILY
Refills: 0 | Status: DISCONTINUED | OUTPATIENT
Start: 2022-04-18 | End: 2022-04-26

## 2022-04-18 RX ORDER — TAMSULOSIN HYDROCHLORIDE 0.4 MG/1
0.4 CAPSULE ORAL AT BEDTIME
Refills: 0 | Status: DISCONTINUED | OUTPATIENT
Start: 2022-04-18 | End: 2022-04-26

## 2022-04-18 RX ORDER — SODIUM BICARBONATE 1 MEQ/ML
1300 SYRINGE (ML) INTRAVENOUS THREE TIMES A DAY
Refills: 0 | Status: DISCONTINUED | OUTPATIENT
Start: 2022-04-18 | End: 2022-04-21

## 2022-04-18 RX ORDER — SODIUM BICARBONATE 1 MEQ/ML
650 SYRINGE (ML) INTRAVENOUS EVERY 8 HOURS
Refills: 0 | Status: DISCONTINUED | OUTPATIENT
Start: 2022-04-18 | End: 2022-04-18

## 2022-04-18 RX ORDER — PANTOPRAZOLE SODIUM 20 MG/1
40 TABLET, DELAYED RELEASE ORAL
Refills: 0 | Status: DISCONTINUED | OUTPATIENT
Start: 2022-04-18 | End: 2022-04-26

## 2022-04-18 RX ORDER — DIPHENHYDRAMINE HYDROCHLORIDE AND LIDOCAINE HYDROCHLORIDE AND ALUMINUM HYDROXIDE AND MAGNESIUM HYDRO
30 KIT ONCE
Refills: 0 | Status: COMPLETED | OUTPATIENT
Start: 2022-04-18 | End: 2022-04-18

## 2022-04-18 RX ORDER — DIAZEPAM 5 MG
5 TABLET ORAL ONCE
Refills: 0 | Status: DISCONTINUED | OUTPATIENT
Start: 2022-04-18 | End: 2022-04-18

## 2022-04-18 RX ORDER — HEPARIN SODIUM 5000 [USP'U]/ML
5000 INJECTION INTRAVENOUS; SUBCUTANEOUS EVERY 8 HOURS
Refills: 0 | Status: DISCONTINUED | OUTPATIENT
Start: 2022-04-18 | End: 2022-04-20

## 2022-04-18 RX ORDER — CHLORHEXIDINE GLUCONATE 213 G/1000ML
1 SOLUTION TOPICAL DAILY
Refills: 0 | Status: DISCONTINUED | OUTPATIENT
Start: 2022-04-18 | End: 2022-04-26

## 2022-04-18 RX ORDER — SODIUM CHLORIDE 9 MG/ML
1000 INJECTION, SOLUTION INTRAVENOUS
Refills: 0 | Status: DISCONTINUED | OUTPATIENT
Start: 2022-04-18 | End: 2022-04-19

## 2022-04-18 RX ORDER — THIAMINE MONONITRATE (VIT B1) 100 MG
100 TABLET ORAL DAILY
Refills: 0 | Status: DISCONTINUED | OUTPATIENT
Start: 2022-04-18 | End: 2022-04-26

## 2022-04-18 RX ORDER — AMLODIPINE BESYLATE 2.5 MG/1
5 TABLET ORAL DAILY
Refills: 0 | Status: DISCONTINUED | OUTPATIENT
Start: 2022-04-18 | End: 2022-04-26

## 2022-04-18 RX ORDER — METOPROLOL TARTRATE 50 MG
25 TABLET ORAL DAILY
Refills: 0 | Status: DISCONTINUED | OUTPATIENT
Start: 2022-04-18 | End: 2022-04-26

## 2022-04-18 RX ORDER — SODIUM CHLORIDE 9 MG/ML
1000 INJECTION INTRAMUSCULAR; INTRAVENOUS; SUBCUTANEOUS ONCE
Refills: 0 | Status: COMPLETED | OUTPATIENT
Start: 2022-04-18 | End: 2022-04-18

## 2022-04-18 RX ADMIN — HEPARIN SODIUM 5000 UNIT(S): 5000 INJECTION INTRAVENOUS; SUBCUTANEOUS at 23:28

## 2022-04-18 RX ADMIN — Medication 2 MILLIGRAM(S): at 23:28

## 2022-04-18 RX ADMIN — Medication 100 MILLIGRAM(S): at 18:52

## 2022-04-18 RX ADMIN — SODIUM CHLORIDE 1000 MILLILITER(S): 9 INJECTION INTRAMUSCULAR; INTRAVENOUS; SUBCUTANEOUS at 18:53

## 2022-04-18 RX ADMIN — SODIUM CHLORIDE 75 MILLILITER(S): 9 INJECTION, SOLUTION INTRAVENOUS at 23:39

## 2022-04-18 RX ADMIN — Medication 1 TABLET(S): at 23:31

## 2022-04-18 RX ADMIN — Medication 25 MILLIGRAM(S): at 23:30

## 2022-04-18 RX ADMIN — TAMSULOSIN HYDROCHLORIDE 0.4 MILLIGRAM(S): 0.4 CAPSULE ORAL at 23:29

## 2022-04-18 RX ADMIN — DIPHENHYDRAMINE HYDROCHLORIDE AND LIDOCAINE HYDROCHLORIDE AND ALUMINUM HYDROXIDE AND MAGNESIUM HYDRO 30 MILLILITER(S): KIT at 22:34

## 2022-04-18 NOTE — H&P ADULT - ATTENDING COMMENTS
60 YO M with a PMH of GERD, obesity, HTN, BPH, CKD3, and EtOH abuse who was BIBEMS for eval of N/V for the past x 1 day. Associated with epigastric ABD discomfort. Denies any CP, palpitaitons, LE swelling, SOB, dysuria, or flank pain. ROS is negative except as above.     Of note, pt drinks 1L of Vodka daily for "years" and has not had a drink in 2 days.     In the ED, pt started on IVFs and Librium.     FMHx: Reviewed, not relevant    Physical exam shows pt in NAD. VSS, afebrile, not hypoxic on RA. A&Ox3. Fasciculations present, no fine tremors of outstretched hands. Neuro exam without deficits, motor/sensory intact, no dysarthria, no facial asymmetry. Muscle strength/sensation intact. CTA B/L with no W/C/R. RRR, no M/G/R. ABD is soft and non-tender, normoactive BSs. LEs without swelling. No rashes. Labs and radiology as above.     N/V + epigastric ABD pain from EtOH abuse with acute withdrawal + EtOH cessation counseling. Valium now and bridge to Librium. CIWA protocol (Valium PRN). IVFs (LR). Trend BMPs, replace electrolytes PRN. Start on Multivitamin/Folate/Thiamine. Monitor VSs. Extensive counseling lasting between 15-30 minutes was held on the benefits of EtOH cessation. Detox consult.    Folate and thiamine deficiency due to EtOH abuse. Start on Folate/Thiamine supplementation prior to glucose admin. IVFs (LR).     HAGMA (High Anion Gap Metabolic Acidosis) from ketoacidosis (Suspect starvation) w/ likely EtOH component as well. IVFs (LR). Repeat BMP in the AM. Obtain UA.     Macrocytic anemia, at baseline. Send B12/Folate levels. Replace PRN.     Magnesium deficiency. Replace. No QTc prolongation present.     Hx of GERD, obesity, HTN, BPH, and CKD3. Restart home meds, except as stated above. DVT PPX. Inform PCP of pt's admission to hospital. My note supersedes the residents note.     Date seen by Attendin22

## 2022-04-18 NOTE — ED ADULT TRIAGE NOTE - CHIEF COMPLAINT QUOTE
ELVIRA from [facility]'s residence with c/o chest pain radiating to arms x 1 days, patient denying norvasc at home, +N/V

## 2022-04-18 NOTE — H&P ADULT - NSHPSOCIALHISTORY_GEN_ALL_CORE
BP uncontrolled  Patient on home Clonidine that was stopped during this admit.  Possible rebound HTN.  Restarted home Clonidine.     no smoke history  ETOH daily use 500 ml or less(according to patient)

## 2022-04-18 NOTE — ED PROVIDER NOTE - ATTENDING CONTRIBUTION TO CARE
61-year-old male with past medical history hypertension GERD BPH, alcohol abuse presents to ED for chest pain that has gotten worse.  Associated with nausea and vomiting.  Patient is a daily drinker but not drink alcohol today.  Shortness of breath or palpitations.  No diarrhea or constipation.    Const: Well nourished, well developed, appears stated age  Eyes: PERRL, no conjunctival injection  HENT:  Neck supple without meningismus, poor dentition   CV: tachycardic, Warm, well-perfused extremities  RESP: CTA B/L, no tachypnea   GI: soft, non-tender, non-distended  Neuro: Alert, CNs II-XII grossly intact. Sensation and motor function of extremities grossly intact. tremulous     concern for ACS and alcohol withdrawal   will do labs, EKG, CXR and give librium

## 2022-04-18 NOTE — ED PROVIDER NOTE - NS ED ROS FT
Constitutional: (-) fever  Eyes/ENT: (-) blurry vision, (-) epistaxis  Cardiovascular: (+) chest pain, (-) syncope  Respiratory: (-) cough, (-) shortness of breath  Gastrointestinal: (+) vomiting, (-) diarrhea  : (-) dysuria, (-) hematuria  Musculoskeletal: (-) neck pain, (-) back pain, (-) joint pain  Integumentary: (-) rash, (-) edema  Neurological: (+) tremor, (-) headache, (-) altered mental status  Allergic/Immunologic: (-) pruritus

## 2022-04-18 NOTE — ED ADULT NURSE NOTE - CHIEF COMPLAINT QUOTE
ELVIRA from [facility]'s residence with c/o chest pain radiating to arms x 1 days, patient denying norvasc at home, +N/V

## 2022-04-18 NOTE — ED ADULT NURSE REASSESSMENT NOTE - NS ED NURSE REASSESS COMMENT FT1
Received pt from previous RN in bed awake and alert, breathing with ease on RA and in NAD. pt on continuous cardiac monitor, occasionally tachy to 100bpm. denies N/V at this time, but did come in with those complaints. also verbalized feeling a lot better than when he came in that he does not feel the withdrawal symptoms anymore. very minimal tremors noted to arm raise. pt awaiting MD dispo.

## 2022-04-18 NOTE — ED PROVIDER NOTE - OBJECTIVE STATEMENT
61y M pmh HTN, GERD, BPH, Etoh abuse presents for eval of chest pain. Pt woke early this morning with nb vomiting and burning chest pain that has progressed through the day with development of tremor. No aggravating or relieving factors. States he drinks a pint a day but has not drank today. Denies fever, ha, cough, sob, numbness, dysuria, hematuria, diarrhea

## 2022-04-18 NOTE — H&P ADULT - ASSESSMENT
62 yo male, h.o GERD, obesity, HTN, BPH, CKD alcohol abuse who drinks one pint daily presented for chest discomfort and shivering 62 yo male, h.o GERD, obesity, HTN, BPH, CKD alcohol abuse who drinks one pint daily presented for chest discomfort and shivering    # Hypertension    - c.w amlodipine 5 mg daily  - c.w Toprol 25     # BPH    - c.w Tamsulosin 0.4 mg daily      # CKD III  # High anion gap metabolic acidosis    - renally dosed medication  - avoid nephrotoxins  - monitor BMP, PH, BUN  - will check ABG and Beta hydroxy, serum Osm   - LR 75 cc / hr   - high anion gap metabolic acidosis likely mannitol vs alcoholic ketoacidosis  - check UA for ketones     # DVT ppx Lovenox  # GI ppx protonix  # NPO for now  # Full code   60 yo male, h.o GERD, obesity, HTN, BPH, CKD alcohol abuse who drinks one pint daily presented for chest discomfort and shivering      # Alcohol Withdrawal    - palpitations , shivering  - CIWA protocol  - folic acid , thiamine and multivitamin  - High anion gap MA; r.o alcoholic ketoacidosis check ph, urine ketones, beta hydroxy, blood alcohol level  - seizure and fall precautions      # Chest pain    - no acute ischemic changes  - low voltage EKG  - check TTE  - might need ischemic work up     # Hypertension    - c.w amlodipine 5 mg daily  - c.w Toprol 25     # BPH    - c.w Tamsulosin 0.4 mg daily      # CKD III  # High anion gap metabolic acidosis    - renally dosed medication  - avoid nephrotoxins  - monitor BMP, PH, BUN  - will check ABG and Beta hydroxy, serum Osm   - LR 75 cc / hr   - high anion gap metabolic acidosis likely mannitol vs alcoholic ketoacidosis  - check UA for ketones     # Macrocytic anemia  # Suspected vitamin deficiency    - at baseline  - no evidence of acute bleed or HD compromise  - MCV > 100 check folate and b12  - iron studies as outpatient  - colonoscopy has to be up to date  - keep hb > 7  - start folic acid 1 mg daily     # DVT ppx Lovenox  # GI ppx protonix  # NPO for now  # Full code   62 yo male, h.o GERD, obesity, HTN, BPH, CKD alcohol abuse who drinks one pint daily presented for chest discomfort and shivering    # Alcohol Withdrawal    - palpitations , shivering  - CIWA protocol  - folic acid , thiamine and multivitamin  - High anion gap MA; r.o alcoholic ketoacidosis check ph, urine ketones, beta hydroxy, blood alcohol level  - seizure and fall precautions    # Chest pain    - no acute ischemic changes  - low voltage EKG  - check TTE  - might need ischemic work up     # Hypertension    - c.w amlodipine 5 mg daily  - c.w Toprol 25     # BPH    - c.w Tamsulosin 0.4 mg daily      # CKD III  # High anion gap metabolic acidosis    - renally dosed medication  - avoid nephrotoxins  - monitor BMP, PH, BUN  - will check ABG and Beta hydroxy, serum Osm   - LR 75 cc / hr   - high anion gap metabolic acidosis likely mannitol vs alcoholic ketoacidosis  - check UA for ketones   - sodium bicarb    # Macrocytic anemia  # Suspected vitamin deficiency    - at baseline  - no evidence of acute bleed or HD compromise  - MCV > 100 check folate and b12  - iron studies as outpatient  - colonoscopy has to be up to date  - keep hb > 7  - start folic acid 1 mg daily     # DVT ppx Lovenox  # GI ppx protonix  # NPO for now  # Full code   60 yo male, h.o GERD, obesity, HTN, BPH, CKD alcohol abuse who drinks one pint daily presented for chest discomfort and shivering    # Alcohol Withdrawal    - palpitations , shivering  - CIWA protocol  - folic acid , thiamine and multivitamin  - High anion gap MA; r.o alcoholic ketoacidosis check ph, urine ketones, beta hydroxy, blood alcohol level  - seizure and fall precautions    # Chest pain    - no acute ischemic changes  - low voltage EKG  - check TTE  - might need ischemic work up     # Hypertension    - c.w amlodipine 5 mg daily  - c.w Toprol 25     # BPH    - c.w Tamsulosin 0.4 mg daily      # CKD III  # High anion gap metabolic acidosis    - renally dosed medication  - avoid nephrotoxins  - monitor BMP, PH, BUN  - will check ABG and Beta hydroxy, serum Osm   - LR 75 cc / hr   - high anion gap metabolic acidosis likely mannitol vs alcoholic ketoacidosis  - check UA for ketones   - sodium bicarb    # Macrocytic anemia  # Suspected vitamin deficiency    - at baseline  - no evidence of acute bleed or HD compromise  - MCV > 100 check folate and b12  - iron studies as outpatient  - colonoscopy has to be up to date  - keep hb > 7  - start folic acid 1 mg daily     # DVT ppx Lovenox  # GI ppx protonix  # NPO for now  # Full code  please verify med rec in am

## 2022-04-18 NOTE — H&P ADULT - NSHPPHYSICALEXAM_GEN_ALL_CORE
PHYSICAL EXAM:      Constitutional: NAD , poor dentition , shivering     Respiratory: clear to auscultation b/l     Cardiovascular: RRR no audible murmur     Gastrointestinal: soft non tender, positive bs     Extremities: no LE edema chronic vascular changes     Neurological: A&O grossly intact non focal ; hearing deficit

## 2022-04-18 NOTE — ED PROVIDER NOTE - NS ED MD TWO NIGHTS YN
Review of Systems    Constitutional: (-) fever, (-) chills  Eyes/ENT: (-) blurry vision, (-) epistaxis, (-) sore throat, (+) runny nose, (+) congestion  Cardiovascular: (-) chest pain, (-) syncope  Respiratory: (+) cough, (-) shortness of breath  Gastrointestinal: (-) pain, (-) nausea, (-) vomiting, (-) diarrhea  Musculoskeletal: (-) neck pain, (-) back pain, (-) joint pain  Integumentary: (-) rash, (-) edema  Neurological: (-) headache, (-) altered mental status Yes

## 2022-04-18 NOTE — ED ADULT NURSE NOTE - OBJECTIVE STATEMENT
[facility] chest pain radiating to arms x 1 days, patient denies N/V/D

## 2022-04-18 NOTE — ED ADULT NURSE NOTE - NSFALLRSKINDICATORS_ED_ALL_ED
Cardiac Rehab Initial Assessment      Name: Mitali Cruz  :1953 Allergies:Lisinopril, Metformin, Nifedipine, and Singulair [montelukast]   MRN: [id number] 68 y.o. Physician: Nirmal Fuentes MD   Primary Diagnosis:    Diagnosis Plan   1. S/P TAVR (transcatheter aortic valve replacement)      Event Date: 10/18/21 Specialist: Dr. Singh   Secondary Diagnosis: N/A Risk Stratification:High Risk Note Author: GEOVANY Longoria     Cardiovascular History: Comments N/A     EXERCISE AT HOME  yes  20min  7 days per week    EF: 55%      Source: 08/15/21          Ambulatory Status:Independent  Ambulatory Fall Risk Assessed on Initial Visit: yes 6 Minute Walk Pre- Cardiac Rehab:  Distance:1120ft      RPE:12  Max. HR: 82       SPO2:95%    MET: 2.6                 Resting BP: 134/68 LA, 140/70 RA    Peak BP: 156/72  Recovery BP: 144/74        NUTRITION  Lipids:yes If yes, labs as follows;  Total: No components found for: CHOLESTEROL  HDL:   HDL Cholesterol   Date Value Ref Range Status   2021 23 (L) 40 - 60 mg/dL Final    Lipids continued:  LDL:  LDL Cholesterol    Date Value Ref Range Status   2021 69 0 - 100 mg/dL Final     Triglyceride: No components found for: TRIGLYCERIDE   Weight Management:                 Weight: 241.8  Height: 73 inches                                   BMI: There is no height or weight on file to calculate BMI.   Alcohol Use: none Diabetes:Yes,  Monitors BS at home- yes, Frequency: 3 times daily, Random BS: 120 21    Last HGBA1C with date if applicable:No components found for: A1C         SOCIAL HISTORY  Social History     Socioeconomic History   • Marital status: Single     Spouse name: NA   • Number of children: 0   • Years of education: HS + some college   Tobacco Use   • Smoking status: Never Smoker   • Smokeless tobacco: Never Used   Vaping Use   • Vaping Use: Never used   Substance and Sexual Activity   • Alcohol use: Not Currently   • Drug use: Not Currently   •  Sexual activity: Defer       Educational Level (choose one that applies) some college Learning Barriers:Ready to Learn, Vision    Family Support:yes    Living Arrangement: lives alone    Risk Factors: Stress  Yes, Heredity  Yes If Yes Mother MI, Father enlarged heart, Brother has a pacemaker, Cousin has heart failure., Hyperlipidemia  Yes and Diabetes  Yes If Yes: Do you check blood glucose daily  Yes Today's glucose level 120     Tobacco Adjunct: N/A  Never used tobacco      Comorbidities: Prostate cancer     PSYCHOSOCIAL  Clinical Depression: no    Stress: yes     Assess presence or absence of depression using a valid screening tool: yes      PHYSICAL ASSESSMENT  Influenza vaccine: yes  Pneumococcal vaccine: yes          Angina: no    Describe angina scale of 0 - 4: 0 = none    Today are you having incisional pain? No. If, Yes, Scale: 0        Today are you having any other pain? Yes. If, Yes, Scale: 3    Pt. Always feels pain in shoulder, fingers, and wrist. Diagnosed with Hypertension:yes    Heart Sounds:     Lung Sounds:       Assessment: Pt. Feels light head and dizzy at times. Pt state he thinks its his sugar but will check his glucose levels and it will be in normal range. Staff informed pt. To see his primary care physician or call his cardiologist and to keep tracking his sugar. Staff also asked pt. Inform a staff member if that happens during cardiac rehab.  Orthopedic Problems: Pt. Has carpel tunnel in his left wrist, trigger finger release, and a frozen shoulder.     Are you being hurt, hit, or frightened by anyone at home or in your life? no    Are you being neglected by a caregiver? N/A Shoulder flexibility/Range of motion: Average     Recommended arm activity: Any     Leg flexibility: Average      Chose one: Average    Recommended stretching: Chair        Family attends IA: no Time of arrival: 0900  Time of departure: 0952     Patient Goals: Get 150 minutes of aerobic exercise a week.           11/23/2021  10:25 EST  GEOVANY Longoria   no

## 2022-04-18 NOTE — ED PROVIDER NOTE - NS ED ATTENDING STATEMENT MOD
This was a shared visit with the SURAJ. I reviewed and verified the documentation and independently performed the documented:

## 2022-04-18 NOTE — ED PROVIDER NOTE - CLINICAL SUMMARY MEDICAL DECISION MAKING FREE TEXT BOX
61-year-old male presented to ED for chest pain and nausea vomiting.  Patient is medically tachycardic and.  Patient is admitted for further evaluation management.

## 2022-04-18 NOTE — H&P ADULT - HISTORY OF PRESENT ILLNESS
60 yo male, h.o GERD, obesity, HTN, BPH, CKD alcohol abuse who drinks one pint daily presented for chest discomfort and shivering  Patient did not drink last two days. He presented for shivering that started this AM associated with palpitations and chest discomfort  Symptoms are intermittent, aggravating over the time. Chest discomfort is severe non radiating , dull all over the chest, not related to activity and happens at rest.  patient reports headache, lightheadedness and dizziness ; no visual disturbance   in ED patient had tachycardia on EKG with no acute ischemic changes and low voltage   ED vitals remarkable for tachycardia  ROS negative for nausea, vomiting , diarrhea   ROS otherwise negative ; no syncope no head trauma

## 2022-04-18 NOTE — ED PROVIDER NOTE - PHYSICAL EXAMINATION
CONST: NAD  EYES: Sclera and conjunctiva clear.   ENT: No nasal discharge. Oropharynx normal appearing, no erythema or exudates. No abscess or swelling. Uvula midline.   NECK: Non-tender, no meningeal signs. normal ROM. supple   CARD: Tachycardiac S1 S2; No jvd  RESP: Equal BS B/L, No wheezes, rhonchi or rales. No distress  GI: Soft, non-tender, non-distended. no cva tenderness. normal BS  MS: Normal ROM in all extremities. pulses 2 +. no calf tenderness or swelling  SKIN: Warm, dry, no acute rashes. Good turgor  NEURO: Tremor. A&Ox3, No focal deficits. Strength 5/5 with no sensory deficits. Finger to nose intact. Negative pronator drift

## 2022-04-18 NOTE — H&P ADULT - NSHPLABSRESULTS_GEN_ALL_CORE
12.1   7.57  )-----------( 131      ( 18 Apr 2022 18:54 )             34.1       04-18    137  |  97<L>  |  15  ----------------------------<  93  5.3<H>   |  10<L>  |  1.6<H>    Ca    9.3      18 Apr 2022 18:54  Mg     2.0     04-18    TPro  7.0  /  Alb  4.4  /  TBili  1.0  /  DBili  x   /  AST  59<H>  /  ALT  22  /  AlkPhos  80  04-18

## 2022-04-18 NOTE — ED ADULT NURSE NOTE - NSIMPLEMENTINTERV_GEN_ALL_ED
Implemented All Universal Safety Interventions:  [place] to call system. Call bell, personal items and telephone within reach. Instruct patient to call for assistance. Room bathroom lighting operational. Non-slip footwear when patient is off stretcher. Physically safe environment: no spills, clutter or unnecessary equipment. Stretcher in lowest position, wheels locked, appropriate side rails in place.

## 2022-04-18 NOTE — ED ADULT TRIAGE NOTE - SPO2 (%)
While here for Herceptin pt reported she had recently had a boil a few inches below her right mastectomy site. She had been seen by a primary care NP who lanced the area  about four weeks ago. Pt was put on an antibiotic and was encouraged to apply heat with the intent of getting the rest of the pus out.   Pt has completed her antibiotic and the site has healed over but she still has tenderness and firmness around the site. It is not red or warm to touch. Pt was planning to bring this up at her appt with Dr Banks but that appt was rescheduled by the office. Pt would like to be seen by a provider. Reviewed with KAVIAT Grajeda, who will see pt. Pt is ok to treat.   98

## 2022-04-19 LAB
A1C WITH ESTIMATED AVERAGE GLUCOSE RESULT: 5 % — SIGNIFICANT CHANGE UP (ref 4–5.6)
ALBUMIN SERPL ELPH-MCNC: 4.2 G/DL — SIGNIFICANT CHANGE UP (ref 3.5–5.2)
ALP SERPL-CCNC: 82 U/L — SIGNIFICANT CHANGE UP (ref 30–115)
ALT FLD-CCNC: 19 U/L — SIGNIFICANT CHANGE UP (ref 0–41)
ANION GAP SERPL CALC-SCNC: 12 MMOL/L — SIGNIFICANT CHANGE UP (ref 7–14)
ANION GAP SERPL CALC-SCNC: 17 MMOL/L — HIGH (ref 7–14)
ANION GAP SERPL CALC-SCNC: 23 MMOL/L — HIGH (ref 7–14)
AST SERPL-CCNC: 47 U/L — HIGH (ref 0–41)
B-OH-BUTYR SERPL-SCNC: 5.3 MMOL/L — HIGH
BASOPHILS # BLD AUTO: 0.04 K/UL — SIGNIFICANT CHANGE UP (ref 0–0.2)
BASOPHILS NFR BLD AUTO: 1 % — SIGNIFICANT CHANGE UP (ref 0–1)
BILIRUB DIRECT SERPL-MCNC: 0.4 MG/DL — HIGH (ref 0–0.3)
BILIRUB INDIRECT FLD-MCNC: 1.3 MG/DL — HIGH (ref 0.2–1.2)
BILIRUB SERPL-MCNC: 1.7 MG/DL — HIGH (ref 0.2–1.2)
BUN SERPL-MCNC: 11 MG/DL — SIGNIFICANT CHANGE UP (ref 10–20)
BUN SERPL-MCNC: 12 MG/DL — SIGNIFICANT CHANGE UP (ref 10–20)
BUN SERPL-MCNC: 13 MG/DL — SIGNIFICANT CHANGE UP (ref 10–20)
CALCIUM SERPL-MCNC: 8.7 MG/DL — SIGNIFICANT CHANGE UP (ref 8.5–10.1)
CALCIUM SERPL-MCNC: 8.8 MG/DL — SIGNIFICANT CHANGE UP (ref 8.5–10.1)
CALCIUM SERPL-MCNC: 8.9 MG/DL — SIGNIFICANT CHANGE UP (ref 8.5–10.1)
CHLORIDE SERPL-SCNC: 103 MMOL/L — SIGNIFICANT CHANGE UP (ref 98–110)
CHLORIDE SERPL-SCNC: 99 MMOL/L — SIGNIFICANT CHANGE UP (ref 98–110)
CHOLEST SERPL-MCNC: 145 MG/DL — SIGNIFICANT CHANGE UP
CO2 SERPL-SCNC: 14 MMOL/L — LOW (ref 17–32)
CO2 SERPL-SCNC: 18 MMOL/L — SIGNIFICANT CHANGE UP (ref 17–32)
CO2 SERPL-SCNC: 23 MMOL/L — SIGNIFICANT CHANGE UP (ref 17–32)
CREAT SERPL-MCNC: 1.2 MG/DL — SIGNIFICANT CHANGE UP (ref 0.7–1.5)
CREAT SERPL-MCNC: 1.3 MG/DL — SIGNIFICANT CHANGE UP (ref 0.7–1.5)
CREAT SERPL-MCNC: 1.5 MG/DL — SIGNIFICANT CHANGE UP (ref 0.7–1.5)
EGFR: 52 ML/MIN/1.73M2 — LOW
EGFR: 62 ML/MIN/1.73M2 — SIGNIFICANT CHANGE UP
EGFR: 68 ML/MIN/1.73M2 — SIGNIFICANT CHANGE UP
EOSINOPHIL # BLD AUTO: 0.03 K/UL — SIGNIFICANT CHANGE UP (ref 0–0.7)
EOSINOPHIL NFR BLD AUTO: 0.7 % — SIGNIFICANT CHANGE UP (ref 0–8)
ESTIMATED AVERAGE GLUCOSE: 97 MG/DL — SIGNIFICANT CHANGE UP (ref 68–114)
FOLATE SERPL-MCNC: >20 NG/ML — SIGNIFICANT CHANGE UP
GLUCOSE SERPL-MCNC: 135 MG/DL — HIGH (ref 70–99)
GLUCOSE SERPL-MCNC: 164 MG/DL — HIGH (ref 70–99)
GLUCOSE SERPL-MCNC: 78 MG/DL — SIGNIFICANT CHANGE UP (ref 70–99)
HCT VFR BLD CALC: 31.8 % — LOW (ref 42–52)
HDLC SERPL-MCNC: 42 MG/DL — SIGNIFICANT CHANGE UP
HGB BLD-MCNC: 11.5 G/DL — LOW (ref 14–18)
IMM GRANULOCYTES NFR BLD AUTO: 0.5 % — HIGH (ref 0.1–0.3)
LACTATE SERPL-SCNC: 1.9 MMOL/L — SIGNIFICANT CHANGE UP (ref 0.7–2)
LIPID PNL WITH DIRECT LDL SERPL: 60 MG/DL — SIGNIFICANT CHANGE UP
LYMPHOCYTES # BLD AUTO: 0.95 K/UL — LOW (ref 1.2–3.4)
LYMPHOCYTES # BLD AUTO: 23.5 % — SIGNIFICANT CHANGE UP (ref 20.5–51.1)
MAGNESIUM SERPL-MCNC: 1.7 MG/DL — LOW (ref 1.8–2.4)
MAGNESIUM SERPL-MCNC: 2.3 MG/DL — SIGNIFICANT CHANGE UP (ref 1.8–2.4)
MCHC RBC-ENTMCNC: 35.7 PG — HIGH (ref 27–31)
MCHC RBC-ENTMCNC: 36.2 G/DL — SIGNIFICANT CHANGE UP (ref 32–37)
MCV RBC AUTO: 98.8 FL — HIGH (ref 80–94)
MONOCYTES # BLD AUTO: 0.34 K/UL — SIGNIFICANT CHANGE UP (ref 0.1–0.6)
MONOCYTES NFR BLD AUTO: 8.4 % — SIGNIFICANT CHANGE UP (ref 1.7–9.3)
NEUTROPHILS # BLD AUTO: 2.66 K/UL — SIGNIFICANT CHANGE UP (ref 1.4–6.5)
NEUTROPHILS NFR BLD AUTO: 65.9 % — SIGNIFICANT CHANGE UP (ref 42.2–75.2)
NON HDL CHOLESTEROL: 103 MG/DL — SIGNIFICANT CHANGE UP
NRBC # BLD: 0 /100 WBCS — SIGNIFICANT CHANGE UP (ref 0–0)
OSMOLALITY SERPL: 296 MOS/KG — SIGNIFICANT CHANGE UP (ref 280–301)
PHOSPHATE SERPL-MCNC: 1.4 MG/DL — LOW (ref 2.1–4.9)
PLATELET # BLD AUTO: 108 K/UL — LOW (ref 130–400)
POTASSIUM SERPL-MCNC: 4.3 MMOL/L — SIGNIFICANT CHANGE UP (ref 3.5–5)
POTASSIUM SERPL-MCNC: 5 MMOL/L — SIGNIFICANT CHANGE UP (ref 3.5–5)
POTASSIUM SERPL-SCNC: 4.3 MMOL/L — SIGNIFICANT CHANGE UP (ref 3.5–5)
POTASSIUM SERPL-SCNC: 5 MMOL/L — SIGNIFICANT CHANGE UP (ref 3.5–5)
PROT SERPL-MCNC: 6.1 G/DL — SIGNIFICANT CHANGE UP (ref 6–8)
RBC # BLD: 3.22 M/UL — LOW (ref 4.7–6.1)
RBC # FLD: 13.2 % — SIGNIFICANT CHANGE UP (ref 11.5–14.5)
SODIUM SERPL-SCNC: 134 MMOL/L — LOW (ref 135–146)
SODIUM SERPL-SCNC: 138 MMOL/L — SIGNIFICANT CHANGE UP (ref 135–146)
TRIGL SERPL-MCNC: 215 MG/DL — HIGH
TROPONIN T SERPL-MCNC: <0.01 NG/ML — SIGNIFICANT CHANGE UP
VIT B12 SERPL-MCNC: 638 PG/ML — SIGNIFICANT CHANGE UP (ref 232–1245)
WBC # BLD: 4.04 K/UL — LOW (ref 4.8–10.8)
WBC # FLD AUTO: 4.04 K/UL — LOW (ref 4.8–10.8)

## 2022-04-19 PROCEDURE — 99233 SBSQ HOSP IP/OBS HIGH 50: CPT

## 2022-04-19 PROCEDURE — 76705 ECHO EXAM OF ABDOMEN: CPT | Mod: 26

## 2022-04-19 RX ORDER — SODIUM,POTASSIUM PHOSPHATES 278-250MG
1 POWDER IN PACKET (EA) ORAL ONCE
Refills: 0 | Status: COMPLETED | OUTPATIENT
Start: 2022-04-19 | End: 2022-04-19

## 2022-04-19 RX ORDER — MAGNESIUM SULFATE 500 MG/ML
2 VIAL (ML) INJECTION ONCE
Refills: 0 | Status: COMPLETED | OUTPATIENT
Start: 2022-04-19 | End: 2022-04-19

## 2022-04-19 RX ORDER — SODIUM CHLORIDE 9 MG/ML
1000 INJECTION, SOLUTION INTRAVENOUS
Refills: 0 | Status: DISCONTINUED | OUTPATIENT
Start: 2022-04-19 | End: 2022-04-21

## 2022-04-19 RX ORDER — GUAIFENESIN/DEXTROMETHORPHAN 600MG-30MG
20 TABLET, EXTENDED RELEASE 12 HR ORAL EVERY 6 HOURS
Refills: 0 | Status: DISCONTINUED | OUTPATIENT
Start: 2022-04-19 | End: 2022-04-26

## 2022-04-19 RX ADMIN — Medication 30 MILLILITER(S): at 13:18

## 2022-04-19 RX ADMIN — Medication 1300 MILLIGRAM(S): at 06:11

## 2022-04-19 RX ADMIN — Medication 1 MILLIGRAM(S): at 13:10

## 2022-04-19 RX ADMIN — HEPARIN SODIUM 5000 UNIT(S): 5000 INJECTION INTRAVENOUS; SUBCUTANEOUS at 06:13

## 2022-04-19 RX ADMIN — HEPARIN SODIUM 5000 UNIT(S): 5000 INJECTION INTRAVENOUS; SUBCUTANEOUS at 13:10

## 2022-04-19 RX ADMIN — AMLODIPINE BESYLATE 5 MILLIGRAM(S): 2.5 TABLET ORAL at 06:12

## 2022-04-19 RX ADMIN — Medication 25 MILLIGRAM(S): at 17:46

## 2022-04-19 RX ADMIN — HEPARIN SODIUM 5000 UNIT(S): 5000 INJECTION INTRAVENOUS; SUBCUTANEOUS at 22:49

## 2022-04-19 RX ADMIN — Medication 1300 MILLIGRAM(S): at 13:10

## 2022-04-19 RX ADMIN — Medication 25 MILLIGRAM(S): at 06:11

## 2022-04-19 RX ADMIN — Medication 1 TABLET(S): at 13:10

## 2022-04-19 RX ADMIN — Medication 1300 MILLIGRAM(S): at 22:49

## 2022-04-19 RX ADMIN — PANTOPRAZOLE SODIUM 40 MILLIGRAM(S): 20 TABLET, DELAYED RELEASE ORAL at 06:12

## 2022-04-19 RX ADMIN — Medication 5 MILLIGRAM(S): at 00:13

## 2022-04-19 RX ADMIN — Medication 25 MILLIGRAM(S): at 09:53

## 2022-04-19 RX ADMIN — Medication 20 MILLILITER(S): at 22:50

## 2022-04-19 RX ADMIN — TAMSULOSIN HYDROCHLORIDE 0.4 MILLIGRAM(S): 0.4 CAPSULE ORAL at 22:49

## 2022-04-19 RX ADMIN — Medication 25 GRAM(S): at 09:51

## 2022-04-19 RX ADMIN — Medication 100 MILLIGRAM(S): at 13:10

## 2022-04-19 RX ADMIN — Medication 1 PACKET(S): at 17:46

## 2022-04-19 RX ADMIN — Medication 25 MILLIGRAM(S): at 13:10

## 2022-04-19 RX ADMIN — SODIUM CHLORIDE 75 MILLILITER(S): 9 INJECTION, SOLUTION INTRAVENOUS at 13:11

## 2022-04-19 RX ADMIN — Medication 25 MILLIGRAM(S): at 22:49

## 2022-04-19 RX ADMIN — SODIUM CHLORIDE 150 MILLILITER(S): 9 INJECTION, SOLUTION INTRAVENOUS at 06:10

## 2022-04-19 NOTE — SWALLOW BEDSIDE ASSESSMENT ADULT - SLP PERTINENT HISTORY OF CURRENT PROBLEM
62 yo male, h.o GERD, obesity, HTN, BPH, CKD alcohol abuse who drinks one pint daily presented for chest discomfort and shivering. CXR negative

## 2022-04-19 NOTE — PROGRESS NOTE ADULT - SUBJECTIVE AND OBJECTIVE BOX
HPI  Patient is a 61y old Male who presents with a chief complaint of shivering  chest discomfort (18 Apr 2022 21:27)    Currently admitted to medicine with the primary diagnosis of Chest pain       Today is hospital day 1d.     INTERVAL HPI / OVERNIGHT EVENTS:  Patient was examined and seen at bedside. This morning he is resting comfortably in bed and reports no new issues or overnight events.     ROS: Otherwise unremarkable     PAST MEDICAL & SURGICAL HISTORY  Alcohol dependence    Vertigo    HTN (hypertension)    Hard of hearing    Gout    Seasonal allergies    CKD (chronic kidney disease)    No significant past surgical history      ALLERGIES  Beef (Hives)  dairy products (Hives)  No Known Drug Allergies  shellfish (Hives)    MEDICATIONS  STANDING MEDICATIONS  amLODIPine   Tablet 5 milliGRAM(s) Oral daily  chlordiazePOXIDE 25 milliGRAM(s) Oral every 4 hours  chlordiazePOXIDE   Oral   chlorhexidine 4% Liquid 1 Application(s) Topical daily  folic acid 1 milliGRAM(s) Oral daily  heparin   Injectable 5000 Unit(s) SubCutaneous every 8 hours  lactated ringers. 1000 milliLiter(s) IV Continuous <Continuous>  metoprolol succinate ER 25 milliGRAM(s) Oral daily  multivitamin 1 Tablet(s) Oral daily  pantoprazole    Tablet 40 milliGRAM(s) Oral before breakfast  sodium bicarbonate 1300 milliGRAM(s) Oral three times a day  tamsulosin 0.4 milliGRAM(s) Oral at bedtime  thiamine 100 milliGRAM(s) Oral daily    PRN MEDICATIONS  aluminum hydroxide/magnesium hydroxide/simethicone Suspension 30 milliLiter(s) Oral once PRN  LORazepam   Injectable 1 milliGRAM(s) IV Push every 1 hour PRN    VITALS:  T(F): 97.2  HR: 71  BP: 144/77  RR: 18  SpO2: 97%    PHYSICAL EXAM  GEN: NAD, Resting comfortably in bed  PULM: Clear to auscultation bilaterally, No wheezes  CVS: Regular rate and rhythm, S1-S2, no murmurs  ABD: Soft, non-tender, non-distended, no guarding  EXT: No edema  NEURO: A&Ox3, no focal deficits    LABS                        11.5   4.04  )-----------( 108      ( 19 Apr 2022 06:58 )             31.8     04-19    138  |  103  |  12  ----------------------------<  135<H>  5.0   |  18  |  1.3    Ca    8.7      19 Apr 2022 06:58  Phos  3.1     04-18  Mg     1.7     04-19    TPro  6.4  /  Alb  4.2  /  TBili  1.0  /  DBili  0.3  /  AST  46<H>  /  ALT  20  /  AlkPhos  73  04-18        ABG - ( 18 Apr 2022 22:58 )  pH, Arterial: 7.35  pH, Blood: x     /  pCO2: 29    /  pO2: 85    / HCO3: 16    / Base Excess: -8.2  /  SaO2: 98.2              Troponin T, Serum: <0.01 ng/mL (04-19-22 @ 06:58)  Lactate, Blood: 1.9 mmol/L (04-19-22 @ 06:58)  Troponin T, Serum: <0.01 ng/mL (04-18-22 @ 18:54)      CARDIAC MARKERS ( 19 Apr 2022 06:58 )  x     / <0.01 ng/mL / x     / x     / x      CARDIAC MARKERS ( 18 Apr 2022 18:54 )  x     / <0.01 ng/mL / x     / x     / x          RADIOLOGY  ACC: [id number] EXAM:  XR CHEST PORTABLE URGENT 1V                          PROCEDURE DATE:  04/18/2022          INTERPRETATION:  Clinical History / Reason for exam: Pain    Comparison : Chest radiograph March 12, 2022.    Technique/Positioning: Low lung volume.    Findings:    Support devices: None.    Cardiac/mediastinum/hilum: Magnified, unchanged.    Lung parenchyma/Pleura: Within normal limits.    Skeleton/soft tissues: Stable.    Impression:    Low lung volume.    No acute infiltrates.

## 2022-04-19 NOTE — PROGRESS NOTE ADULT - ASSESSMENT
60 yo male, h.o GERD, obesity, HTN, BPH, CKD alcohol abuse who drinks one pint daily presented for chest discomfort and shivering    # Alcohol Withdrawal  - palpitations , shivering  - CIWA protocol with Librium   - folic acid , thiamine and multivitamin  - seizure and fall precautions  - CATCH team consult     # Chest pain - resolved   - no acute ischemic changes  - low voltage EKG  - check TTE  - might need ischemic work up     # Hypertension  - c.w amlodipine 5 mg daily  - c.w Toprol 25     # BPH  - c.w Tamsulosin 0.4 mg daily      # CKD III  # High anion gap metabolic acidosis - improving   - renally dosed medication  - avoid nephrotoxins  - monitor BMP, PH, BUN  - LR 75 cc / hr   - high anion gap metabolic acidosis likely mannitol vs alcoholic ketoacidosis  - BHB 5.6, lactate 3.0  - continue with sodium bicarb 1300mg TID    # Macrocytic anemia  # Suspected vitamin deficiency  - at baseline  - no evidence of acute bleed or HD compromise  - MCV > 100 check folate and b12  - iron studies as outpatient  - colonoscopy has to be up to date  - keep hb > 7  - start folic acid 1 mg daily     # DVT ppx Lovenox  # GI ppx protonix  # Advance diet   # Full code

## 2022-04-20 LAB
ALBUMIN SERPL ELPH-MCNC: 4.3 G/DL — SIGNIFICANT CHANGE UP (ref 3.5–5.2)
ALP SERPL-CCNC: 75 U/L — SIGNIFICANT CHANGE UP (ref 30–115)
ALT FLD-CCNC: 20 U/L — SIGNIFICANT CHANGE UP (ref 0–41)
ANION GAP SERPL CALC-SCNC: 16 MMOL/L — HIGH (ref 7–14)
AST SERPL-CCNC: 51 U/L — HIGH (ref 0–41)
BASOPHILS # BLD AUTO: 0.03 K/UL — SIGNIFICANT CHANGE UP (ref 0–0.2)
BASOPHILS NFR BLD AUTO: 0.5 % — SIGNIFICANT CHANGE UP (ref 0–1)
BILIRUB SERPL-MCNC: 1.7 MG/DL — HIGH (ref 0.2–1.2)
BUN SERPL-MCNC: 8 MG/DL — LOW (ref 10–20)
CALCIUM SERPL-MCNC: 8.9 MG/DL — SIGNIFICANT CHANGE UP (ref 8.5–10.1)
CHLORIDE SERPL-SCNC: 99 MMOL/L — SIGNIFICANT CHANGE UP (ref 98–110)
CO2 SERPL-SCNC: 22 MMOL/L — SIGNIFICANT CHANGE UP (ref 17–32)
CREAT SERPL-MCNC: 1.2 MG/DL — SIGNIFICANT CHANGE UP (ref 0.7–1.5)
EGFR: 68 ML/MIN/1.73M2 — SIGNIFICANT CHANGE UP
EOSINOPHIL # BLD AUTO: 0.01 K/UL — SIGNIFICANT CHANGE UP (ref 0–0.7)
EOSINOPHIL NFR BLD AUTO: 0.2 % — SIGNIFICANT CHANGE UP (ref 0–8)
GLUCOSE SERPL-MCNC: 144 MG/DL — HIGH (ref 70–99)
HCT VFR BLD CALC: 33.4 % — LOW (ref 42–52)
HGB BLD-MCNC: 12.1 G/DL — LOW (ref 14–18)
IMM GRANULOCYTES NFR BLD AUTO: 0.3 % — SIGNIFICANT CHANGE UP (ref 0.1–0.3)
LYMPHOCYTES # BLD AUTO: 0.68 K/UL — LOW (ref 1.2–3.4)
LYMPHOCYTES # BLD AUTO: 11.3 % — LOW (ref 20.5–51.1)
MAGNESIUM SERPL-MCNC: 1.7 MG/DL — LOW (ref 1.8–2.4)
MAGNESIUM SERPL-MCNC: 2.4 MG/DL — SIGNIFICANT CHANGE UP (ref 1.8–2.4)
MCHC RBC-ENTMCNC: 36 PG — HIGH (ref 27–31)
MCHC RBC-ENTMCNC: 36.2 G/DL — SIGNIFICANT CHANGE UP (ref 32–37)
MCV RBC AUTO: 99.4 FL — HIGH (ref 80–94)
MONOCYTES # BLD AUTO: 0.32 K/UL — SIGNIFICANT CHANGE UP (ref 0.1–0.6)
MONOCYTES NFR BLD AUTO: 5.3 % — SIGNIFICANT CHANGE UP (ref 1.7–9.3)
NEUTROPHILS # BLD AUTO: 4.94 K/UL — SIGNIFICANT CHANGE UP (ref 1.4–6.5)
NEUTROPHILS NFR BLD AUTO: 82.4 % — HIGH (ref 42.2–75.2)
NRBC # BLD: 0 /100 WBCS — SIGNIFICANT CHANGE UP (ref 0–0)
PHOSPHATE SERPL-MCNC: 0.6 MG/DL — LOW (ref 2.1–4.9)
PHOSPHATE SERPL-MCNC: 2.6 MG/DL — SIGNIFICANT CHANGE UP (ref 2.1–4.9)
PLATELET # BLD AUTO: 107 K/UL — LOW (ref 130–400)
POTASSIUM SERPL-MCNC: 3.9 MMOL/L — SIGNIFICANT CHANGE UP (ref 3.5–5)
POTASSIUM SERPL-SCNC: 3.9 MMOL/L — SIGNIFICANT CHANGE UP (ref 3.5–5)
PROT SERPL-MCNC: 6.4 G/DL — SIGNIFICANT CHANGE UP (ref 6–8)
RBC # BLD: 3.36 M/UL — LOW (ref 4.7–6.1)
RBC # FLD: 13 % — SIGNIFICANT CHANGE UP (ref 11.5–14.5)
SODIUM SERPL-SCNC: 137 MMOL/L — SIGNIFICANT CHANGE UP (ref 135–146)
WBC # BLD: 6 K/UL — SIGNIFICANT CHANGE UP (ref 4.8–10.8)
WBC # FLD AUTO: 6 K/UL — SIGNIFICANT CHANGE UP (ref 4.8–10.8)

## 2022-04-20 PROCEDURE — 93306 TTE W/DOPPLER COMPLETE: CPT | Mod: 26

## 2022-04-20 PROCEDURE — 99232 SBSQ HOSP IP/OBS MODERATE 35: CPT

## 2022-04-20 RX ORDER — MAGNESIUM SULFATE 500 MG/ML
2 VIAL (ML) INJECTION
Refills: 0 | Status: COMPLETED | OUTPATIENT
Start: 2022-04-20 | End: 2022-04-20

## 2022-04-20 RX ORDER — ENOXAPARIN SODIUM 100 MG/ML
40 INJECTION SUBCUTANEOUS EVERY 24 HOURS
Refills: 0 | Status: DISCONTINUED | OUTPATIENT
Start: 2022-04-20 | End: 2022-04-26

## 2022-04-20 RX ADMIN — Medication 20 MILLILITER(S): at 06:24

## 2022-04-20 RX ADMIN — Medication 25 MILLIGRAM(S): at 06:23

## 2022-04-20 RX ADMIN — Medication 100 MILLIGRAM(S): at 13:35

## 2022-04-20 RX ADMIN — PANTOPRAZOLE SODIUM 40 MILLIGRAM(S): 20 TABLET, DELAYED RELEASE ORAL at 06:22

## 2022-04-20 RX ADMIN — Medication 1 TABLET(S): at 13:35

## 2022-04-20 RX ADMIN — Medication 25 GRAM(S): at 16:54

## 2022-04-20 RX ADMIN — Medication 25 MILLIGRAM(S): at 03:34

## 2022-04-20 RX ADMIN — Medication 1300 MILLIGRAM(S): at 21:56

## 2022-04-20 RX ADMIN — TAMSULOSIN HYDROCHLORIDE 0.4 MILLIGRAM(S): 0.4 CAPSULE ORAL at 21:56

## 2022-04-20 RX ADMIN — Medication 20 MILLIGRAM(S): at 21:56

## 2022-04-20 RX ADMIN — Medication 20 MILLILITER(S): at 13:36

## 2022-04-20 RX ADMIN — Medication 20 MILLILITER(S): at 19:00

## 2022-04-20 RX ADMIN — Medication 25 GRAM(S): at 14:32

## 2022-04-20 RX ADMIN — Medication 1300 MILLIGRAM(S): at 13:35

## 2022-04-20 RX ADMIN — HEPARIN SODIUM 5000 UNIT(S): 5000 INJECTION INTRAVENOUS; SUBCUTANEOUS at 06:23

## 2022-04-20 RX ADMIN — Medication 1 MILLIGRAM(S): at 13:35

## 2022-04-20 RX ADMIN — Medication 1300 MILLIGRAM(S): at 06:23

## 2022-04-20 RX ADMIN — ENOXAPARIN SODIUM 40 MILLIGRAM(S): 100 INJECTION SUBCUTANEOUS at 17:56

## 2022-04-20 RX ADMIN — Medication 20 MILLIGRAM(S): at 17:57

## 2022-04-20 RX ADMIN — Medication 85 MILLIMOLE(S): at 13:37

## 2022-04-20 RX ADMIN — Medication 25 MILLIGRAM(S): at 07:49

## 2022-04-20 RX ADMIN — AMLODIPINE BESYLATE 5 MILLIGRAM(S): 2.5 TABLET ORAL at 06:22

## 2022-04-20 RX ADMIN — Medication 20 MILLIGRAM(S): at 13:37

## 2022-04-20 NOTE — PATIENT PROFILE ADULT - FALL HARM RISK - UNIVERSAL INTERVENTIONS
Bed in lowest position, wheels locked, appropriate side rails in place/Call bell, personal items and telephone in reach/Instruct patient to call for assistance before getting out of bed or chair/Non-slip footwear when patient is out of bed/[place] to call system/Physically safe environment - no spills, clutter or unnecessary equipment/Purposeful Proactive Rounding/Room/bathroom lighting operational, light cord in reach

## 2022-04-20 NOTE — PROGRESS NOTE ADULT - ASSESSMENT
62 yo male, h.o GERD, obesity, HTN, BPH, CKD alcohol abuse who drinks one pint daily presented for chest discomfort and shivering    # Alcohol Withdrawal  - Continue with librium taper  - C/w folic lnan2aw po once daily   - C/w thiamine 100mg po once daily    - C/w multivitamin  - seizure and fall precautions    # Chest pain  - no acute ischemic changes  - low voltage EKG  - check TTE  - Trop < 0.01 X2    # Hypertension  - c.w amlodipine 5 mg daily  - c.w Toprol 25     # BPH  - c.w Tamsulosin 0.4 mg daily    # High anion gap metabolic acidosis  - monitor BMP, PH, BUN  - will check ABG and Beta hydroxy, serum Osm   - LR 75 cc / hr   - Likely secondary to alcoholic ketoacidosis    # Macrocytic anemia  #Macrocytosis  - Macrocytosis likely secondary to alcoholism  - B12 and folate normal  - MCV > 100  - start folic acid 1 mg daily     #Hypophosphatemia:   #Hypomagnesemia:   - Secondary to alcoholism  - Phosphorus 0.6 --> Iv replete given, will need to follow up night labs  - MG replete given    # DVT ppx Lovenox  # GI ppx protonix       62 yo male, h.o GERD, obesity, HTN, BPH, CKD alcohol abuse who drinks one pint daily presented for chest discomfort and shivering    # Alcohol Withdrawal  - Continue with librium taper  - C/w folic emtx5iv po once daily   - C/w thiamine 100mg po once daily    - C/w multivitamin  - seizure and fall precautions  - Low grade fever: 100.3, Low threshhold to start unasyn/Augmentin for suspected Aspiration    #Chest pain  - no acute ischemic changes  - low voltage EKG  - check TTE  - Trop < 0.01 X2    # Hypertension  - c.w amlodipine 5 mg daily  - c.w Toprol 25     # BPH  - c.w Tamsulosin 0.4 mg daily    # High anion gap metabolic acidosis  - monitor BMP, PH, BUN  - will check ABG and Beta hydroxy, serum Osm   - LR 75 cc / hr   - Likely secondary to alcoholic ketoacidosis    #Macrocytic anemia  #Macrocytosis  - MCV: 99.4  - Macrocytosis likely secondary to alcoholism  - B12 and folate normal  - C/w folic ybmv6so po once daily   - C/w thiamine 100mg po once daily    - C/w multivitamin    #Hypophosphatemia:   #Hypomagnesemia:   - Secondary to alcoholism  - Phosphorus 0.6 --> Iv replete given, will need to follow up night labs  - MG replete given    # DVT ppx Lovenox  # GI ppx protonix

## 2022-04-20 NOTE — PROGRESS NOTE ADULT - SUBJECTIVE AND OBJECTIVE BOX
SUBJECTIVE:    Patient is a 61y old Male who presents with a chief complaint of shivering  chest discomfort (19 Apr 2022 10:29)    Currently admitted to medicine with the primary diagnosis of Chest pain       Today is hospital day 2d. This morning he is resting comfortably in bed and reports no new issues or overnight events.     PAST MEDICAL & SURGICAL HISTORY  Alcohol dependence    Vertigo    HTN (hypertension)    Hard of hearing    Gout    Seasonal allergies    CKD (chronic kidney disease)    No significant past surgical history      SOCIAL HISTORY:  Negative for smoking/alcohol/drug use.     ALLERGIES:  Beef (Hives)  dairy products (Hives)  No Known Drug Allergies  shellfish (Hives)    MEDICATIONS:  STANDING MEDICATIONS  amLODIPine   Tablet 5 milliGRAM(s) Oral daily  chlordiazePOXIDE   Oral   chlordiazePOXIDE 20 milliGRAM(s) Oral every 4 hours  chlorhexidine 4% Liquid 1 Application(s) Topical daily  folic acid 1 milliGRAM(s) Oral daily  guaifenesin/dextromethorphan Oral Liquid 20 milliLiter(s) Oral every 6 hours  heparin   Injectable 5000 Unit(s) SubCutaneous every 8 hours  lactated ringers. 1000 milliLiter(s) IV Continuous <Continuous>  magnesium sulfate  IVPB 2 Gram(s) IV Intermittent every 2 hours  metoprolol succinate ER 25 milliGRAM(s) Oral daily  multivitamin 1 Tablet(s) Oral daily  pantoprazole    Tablet 40 milliGRAM(s) Oral before breakfast  sodium bicarbonate 1300 milliGRAM(s) Oral three times a day  sodium phosphate IVPB 30 milliMole(s) IV Intermittent once  tamsulosin 0.4 milliGRAM(s) Oral at bedtime  thiamine 100 milliGRAM(s) Oral daily    PRN MEDICATIONS  LORazepam   Injectable 1 milliGRAM(s) IV Push every 1 hour PRN    VITALS:   T(F): 100.3  HR: 96  BP: 141/67  RR: 19  SpO2: 99%    LABS:                        12.1   6.00  )-----------( 107      ( 20 Apr 2022 07:29 )             33.4     04-20    137  |  99  |  8<L>  ----------------------------<  144<H>  3.9   |  22  |  1.2    Ca    8.9      20 Apr 2022 07:29  Phos  0.6     04-20  Mg     1.7     04-20    TPro  6.4  /  Alb  4.3  /  TBili  1.7<H>  /  DBili  x   /  AST  51<H>  /  ALT  20  /  AlkPhos  75  04-20  ABG - ( 18 Apr 2022 22:58 )  pH, Arterial: 7.35  pH, Blood: x     /  pCO2: 29    /  pO2: 85    / HCO3: 16    / Base Excess: -8.2  /  SaO2: 98.2      CARDIAC MARKERS ( 19 Apr 2022 06:58 )  x     / <0.01 ng/mL / x     / x     / x      CARDIAC MARKERS ( 18 Apr 2022 18:54 )  x     / <0.01 ng/mL / x     / x     / x          RADIOLOGY:

## 2022-04-21 LAB
ALBUMIN SERPL ELPH-MCNC: 3.6 G/DL — SIGNIFICANT CHANGE UP (ref 3.5–5.2)
ALP SERPL-CCNC: 60 U/L — SIGNIFICANT CHANGE UP (ref 30–115)
ALT FLD-CCNC: 17 U/L — SIGNIFICANT CHANGE UP (ref 0–41)
ANION GAP SERPL CALC-SCNC: 9 MMOL/L — SIGNIFICANT CHANGE UP (ref 7–14)
AST SERPL-CCNC: 41 U/L — SIGNIFICANT CHANGE UP (ref 0–41)
BILIRUB SERPL-MCNC: 1.3 MG/DL — HIGH (ref 0.2–1.2)
BUN SERPL-MCNC: 7 MG/DL — LOW (ref 10–20)
CALCIUM SERPL-MCNC: 8.2 MG/DL — LOW (ref 8.5–10.1)
CHLORIDE SERPL-SCNC: 103 MMOL/L — SIGNIFICANT CHANGE UP (ref 98–110)
CO2 SERPL-SCNC: 25 MMOL/L — SIGNIFICANT CHANGE UP (ref 17–32)
CREAT SERPL-MCNC: 1 MG/DL — SIGNIFICANT CHANGE UP (ref 0.7–1.5)
EGFR: 85 ML/MIN/1.73M2 — SIGNIFICANT CHANGE UP
ETHYLENE GLYCOL SERPLBLD-MCNC: <5 MG/DL — SIGNIFICANT CHANGE UP
GLUCOSE SERPL-MCNC: 103 MG/DL — HIGH (ref 70–99)
HCT VFR BLD CALC: 29.3 % — LOW (ref 42–52)
HGB BLD-MCNC: 10.5 G/DL — LOW (ref 14–18)
MAGNESIUM SERPL-MCNC: 2.4 MG/DL — SIGNIFICANT CHANGE UP (ref 1.8–2.4)
MCHC RBC-ENTMCNC: 35.8 G/DL — SIGNIFICANT CHANGE UP (ref 32–37)
MCHC RBC-ENTMCNC: 35.8 PG — HIGH (ref 27–31)
MCV RBC AUTO: 100 FL — HIGH (ref 80–94)
METHANOL BLD-MCNC: <.01 G/DL — SIGNIFICANT CHANGE UP (ref 0–0.01)
NRBC # BLD: 0 /100 WBCS — SIGNIFICANT CHANGE UP (ref 0–0)
PHOSPHATE SERPL-MCNC: 2.4 MG/DL — SIGNIFICANT CHANGE UP (ref 2.1–4.9)
PLATELET # BLD AUTO: 72 K/UL — LOW (ref 130–400)
POTASSIUM SERPL-MCNC: 3.5 MMOL/L — SIGNIFICANT CHANGE UP (ref 3.5–5)
POTASSIUM SERPL-SCNC: 3.5 MMOL/L — SIGNIFICANT CHANGE UP (ref 3.5–5)
PROT SERPL-MCNC: 5.6 G/DL — LOW (ref 6–8)
RBC # BLD: 2.93 M/UL — LOW (ref 4.7–6.1)
RBC # FLD: 12.8 % — SIGNIFICANT CHANGE UP (ref 11.5–14.5)
SODIUM SERPL-SCNC: 137 MMOL/L — SIGNIFICANT CHANGE UP (ref 135–146)
WBC # BLD: 3.84 K/UL — LOW (ref 4.8–10.8)
WBC # FLD AUTO: 3.84 K/UL — LOW (ref 4.8–10.8)

## 2022-04-21 PROCEDURE — 71045 X-RAY EXAM CHEST 1 VIEW: CPT | Mod: 26

## 2022-04-21 PROCEDURE — 99233 SBSQ HOSP IP/OBS HIGH 50: CPT

## 2022-04-21 RX ADMIN — Medication 15 MILLIGRAM(S): at 13:10

## 2022-04-21 RX ADMIN — Medication 20 MILLIGRAM(S): at 06:26

## 2022-04-21 RX ADMIN — Medication 100 MILLIGRAM(S): at 12:29

## 2022-04-21 RX ADMIN — Medication 1 TABLET(S): at 12:29

## 2022-04-21 RX ADMIN — ENOXAPARIN SODIUM 40 MILLIGRAM(S): 100 INJECTION SUBCUTANEOUS at 17:19

## 2022-04-21 RX ADMIN — Medication 15 MILLIGRAM(S): at 17:19

## 2022-04-21 RX ADMIN — Medication 15 MILLIGRAM(S): at 21:45

## 2022-04-21 RX ADMIN — CHLORHEXIDINE GLUCONATE 1 APPLICATION(S): 213 SOLUTION TOPICAL at 12:30

## 2022-04-21 RX ADMIN — Medication 20 MILLILITER(S): at 17:18

## 2022-04-21 RX ADMIN — Medication 25 MILLIGRAM(S): at 06:17

## 2022-04-21 RX ADMIN — AMLODIPINE BESYLATE 5 MILLIGRAM(S): 2.5 TABLET ORAL at 06:18

## 2022-04-21 RX ADMIN — Medication 20 MILLILITER(S): at 06:18

## 2022-04-21 RX ADMIN — SODIUM CHLORIDE 75 MILLILITER(S): 9 INJECTION, SOLUTION INTRAVENOUS at 00:09

## 2022-04-21 RX ADMIN — Medication 1 MILLIGRAM(S): at 12:29

## 2022-04-21 RX ADMIN — Medication 1300 MILLIGRAM(S): at 06:17

## 2022-04-21 RX ADMIN — PANTOPRAZOLE SODIUM 40 MILLIGRAM(S): 20 TABLET, DELAYED RELEASE ORAL at 06:17

## 2022-04-21 RX ADMIN — TAMSULOSIN HYDROCHLORIDE 0.4 MILLIGRAM(S): 0.4 CAPSULE ORAL at 21:46

## 2022-04-21 RX ADMIN — Medication 20 MILLILITER(S): at 12:31

## 2022-04-21 RX ADMIN — Medication 15 MILLIGRAM(S): at 10:19

## 2022-04-21 RX ADMIN — Medication 20 MILLILITER(S): at 00:01

## 2022-04-21 NOTE — PROGRESS NOTE ADULT - ASSESSMENT
60 yo M PMHx etoh dependence, HTN, BPH, CKD II presented for evaluation of chest pain. Pt reports it diffuse over entire chest. Pt was also vomiting day of presentation and subsequently felt lightheaded.    Etoh withdrawal  - c/w librium taper    Suspect folate and thiamine defieicny due to etoh use  - c/w supplementation    Atypical chest pain  - pt was seen by cardiology in 10/2021 and was offered a stress test but declined  - as per cardiology note then there was calcifications on prior CCTA  - EKG NSR, normal troponin  - f/u Dr. Iliana esquivel    Fever  - Tmax 100.6  - temp on admission 100.3  - f/u UA, CXR  - if fever returns, order covid-19 test/RVP    Pancytopenia  - could be bone marrow suppression from alcohol  - Will monitor for now    Hypophosphatemia and Hypomagnesemia  - likely due to etoh use     Lactic acidosis  HAGMA  - present on admission  - resolved    BPH  - c/w flomax    DVT Prophylaxis: Lovenox SC  Diet: DASH  GI Prophylaxis: Not Indicated  Activity: IAT  Code Status: Full  Disposition: Acute

## 2022-04-21 NOTE — PROGRESS NOTE ADULT - SUBJECTIVE AND OBJECTIVE BOX
CHIEF COMPLAINT:    Patient is a 61y old  Male who presents with a chief complaint of shivering  chest discomfort     INTERVAL HPI/OVERNIGHT EVENTS:    Patient seen and examined at bedside. No acute overnight events occurred.    ROS: Denies SOB, chest pain. All other systems are negative.    Vital Signs:    T(F): 97.4 (04-21-22 @ 06:07), Max: 100.6 (04-20-22 @ 21:59)  HR: 71 (04-21-22 @ 06:07) (71 - 80)  BP: 121/74 (04-21-22 @ 06:07) (121/60 - 145/65)  RR: 18 (04-21-22 @ 06:07) (18 - 19)  SpO2: 96% (04-21-22 @ 08:47) (96% - 97%)  I&O's Summary    20 Apr 2022 07:01  -  21 Apr 2022 07:00  --------------------------------------------------------  IN: 0 mL / OUT: 1050 mL / NET: -1050 mL      PHYSICAL EXAM:  GENERAL:  NAD  SKIN: No rashes or lesions  HEENT: Atraumatic. Normocephalic. Anicteric  NECK:  No JVD.   PULMONARY: Clear to ausculation bilaterally. No wheezing. No rales  CVS: Normal S1, S2. Regular rate and rhythm. No murmurs.  ABDOMEN/GI: Soft, Nontender, Nondistended; Bowel sounds are present  EXTREMITIES:  No edema B/L LE.  NEUROLOGIC:  No motor deficit.  PSYCH: Alert & oriented x 3, normal affect    LABS:                        10.5   3.84  )-----------( 72       ( 21 Apr 2022 05:30 )             29.3     04-21    137  |  103  |  7<L>  ----------------------------<  103<H>  3.5   |  25  |  1.0    Ca    8.2<L>      21 Apr 2022 05:30  Phos  2.4     04-21  Mg     2.4     04-21    TPro  5.6<L>  /  Alb  3.6  /  TBili  1.3<H>  /  DBili  x   /  AST  41  /  ALT  17  /  AlkPhos  60  04-21        Trop <0.01, CKMB --, CK --, 04-19-22 @ 06:58  Trop <0.01, CKMB --, CK --, 04-18-22 @ 18:54        RADIOLOGY & ADDITIONAL TESTS:  Imaging or report Personally Reviewed:  [ ] YES  [ ] NO    Telemetry reviewed independently - no events    Medications:  Standing  amLODIPine   Tablet 5 milliGRAM(s) Oral daily  chlordiazePOXIDE 15 milliGRAM(s) Oral every 4 hours  chlordiazePOXIDE   Oral   chlorhexidine 4% Liquid 1 Application(s) Topical daily  enoxaparin Injectable 40 milliGRAM(s) SubCutaneous every 24 hours  folic acid 1 milliGRAM(s) Oral daily  guaifenesin/dextromethorphan Oral Liquid 20 milliLiter(s) Oral every 6 hours  metoprolol succinate ER 25 milliGRAM(s) Oral daily  multivitamin 1 Tablet(s) Oral daily  pantoprazole    Tablet 40 milliGRAM(s) Oral before breakfast  sodium bicarbonate 1300 milliGRAM(s) Oral three times a day  tamsulosin 0.4 milliGRAM(s) Oral at bedtime  thiamine 100 milliGRAM(s) Oral daily    PRN Meds  LORazepam   Injectable 1 milliGRAM(s) IV Push every 1 hour PRN      Case discussed with resident  Care discussed with pt

## 2022-04-21 NOTE — PROGRESS NOTE ADULT - ASSESSMENT
60 yo M PMHx etoh dependence, HTN, BPH, CKD II presented for evaluation of chest pain. Pt reports it diffuse over entire chest. Pt was also vomiting day of presentation and subsequently felt lightheaded.    Etoh withdrawal  - CIWA 1  - c/w librium taper    Suspect folate and thiamine defieicny due to etoh use  - c/w supplementation    Chest pain  - pt was seen by cardiology in 10/2021 and was offered a stress test but declined  - as per cardiology note then there was calcifications on prior CCTA  - awaiting cardiology eval  - EKG NSR, normal troponin    Fever  - occurred last night  - Tmax 100.6  - temp on admission 100.3  - check UA, CXR  - if fever returns repeat covid-19 test/RVP    Pancytopenia  - could be bone marrow suppression from alcohol  - plts decreased -- 4T 3 making low probability of HIT. Will monitor for now        Hypophosphatemia and Hypomagnesemia  - likely due to etoh use     Lactic acidosis  HAGMA  - present on admission  - resolved    BPH  - c/w flomax    DVT px  Full Code    Progress Note Handoff:  Pending (specify):  cardio, librium taper, hopeful dc in AM  Family discussion: discussed taper, cardio  Disposition: Home___/SNF___/Other___[facility]_____/Unknown at this time________

## 2022-04-21 NOTE — SBIRT NOTE ADULT - NSSBIRTBRIEFINTDET_GEN_A_CORE
Pt. conveyed awareness and insight into current alcohol use, and was provided with psychoeducation about the impact that alcohol use can have on health and overall functioning. Sw encouraged Pt. to remain aware of moderate alcohol use to sustain low risk of negative health complications from drinking.  Pt is being seen by CATCH team and was provided with resources to outpatient tx option at the [facility] , he expresses receptiveness .

## 2022-04-21 NOTE — PROGRESS NOTE ADULT - SUBJECTIVE AND OBJECTIVE BOX
SUBJECTIVE:  Patient is a 61y old Male who presents with a chief complaint of shivering  chest discomfort (21 Apr 2022 11:52)   Currently admitted to medicine with the primary diagnosis of Chest pain     Today is hospital day 3d. There are no new issues or overnight events.     HPI  HPI:  62 yo male, h.o GERD, obesity, HTN, BPH, CKD alcohol abuse who drinks one pint daily presented for chest discomfort and shivering  Patient did not drink last two days. He presented for shivering that started this AM associated with palpitations and chest discomfort  Symptoms are intermittent, aggravating over the time. Chest discomfort is severe non radiating , dull all over the chest, not related to activity and happens at rest.  patient reports headache, lightheadedness and dizziness ; no visual disturbance   in ED patient had tachycardia on EKG with no acute ischemic changes and low voltage   ED vitals remarkable for tachycardia  ROS negative for nausea, vomiting , diarrhea   ROS otherwise negative ; no syncope no head trauma  (18 Apr 2022 21:27)      PAST MEDICAL & SURGICAL HISTORY  Alcohol dependence    Vertigo    HTN (hypertension)    Hard of hearing    Gout    Seasonal allergies    CKD (chronic kidney disease)    No significant past surgical history      ALLERGIES:  Beef (Hives)  dairy products (Hives)  No Known Drug Allergies  shellfish (Hives)    MEDICATIONS:  HOME MEDICATIONS  Flomax 0.4 mg oral capsule: 1 cap(s) orally once a day (at bedtime)  folic acid 1 mg oral tablet: 1 tab(s) orally once a day  Norvasc 5 mg oral tablet: 1 tab(s) orally once a day  thiamine 100 mg oral tablet: 1 tab(s) orally once a day    STANDING MEDICATIONS  amLODIPine   Tablet 5 milliGRAM(s) Oral daily  chlordiazePOXIDE 15 milliGRAM(s) Oral every 4 hours  chlordiazePOXIDE   Oral   chlorhexidine 4% Liquid 1 Application(s) Topical daily  enoxaparin Injectable 40 milliGRAM(s) SubCutaneous every 24 hours  folic acid 1 milliGRAM(s) Oral daily  guaifenesin/dextromethorphan Oral Liquid 20 milliLiter(s) Oral every 6 hours  metoprolol succinate ER 25 milliGRAM(s) Oral daily  multivitamin 1 Tablet(s) Oral daily  pantoprazole    Tablet 40 milliGRAM(s) Oral before breakfast  tamsulosin 0.4 milliGRAM(s) Oral at bedtime  thiamine 100 milliGRAM(s) Oral daily    PRN MEDICATIONS  LORazepam   Injectable 1 milliGRAM(s) IV Push every 1 hour PRN    VITALS:   T(C): 36.4 (04-21-22 @ 08:47), Max: 38.1 (04-20-22 @ 21:59)  T(F): 97.6 (04-21-22 @ 08:47), Max: 100.6 (04-20-22 @ 21:59)  HR: 76 (04-21-22 @ 08:47) (71 - 80)  BP: 126/58 (04-21-22 @ 08:47) (121/60 - 145/65)  BP(mean): --  ABP: --  ABP(mean): --  RR: 18 (04-21-22 @ 08:47) (18 - 19)  SpO2: 96% (04-21-22 @ 08:47) (96% - 97%)  LABS:                        10.5   3.84  )-----------( 72       ( 21 Apr 2022 05:30 )             29.3     04-21    137  |  103  |  7<L>  ----------------------------<  103<H>  3.5   |  25  |  1.0    Ca    8.2<L>      21 Apr 2022 05:30  Phos  2.4     04-21  Mg     2.4     04-21    TPro  5.6<L>  /  Alb  3.6  /  TBili  1.3<H>  /  DBili  x   /  AST  41  /  ALT  17  /  AlkPhos  60  04-21        I&O's Detail    20 Apr 2022 07:01  -  21 Apr 2022 07:00  --------------------------------------------------------  IN:  Total IN: 0 mL    OUT:    Voided (mL): 1050 mL  Total OUT: 1050 mL    Total NET: -1050 mL      21 Apr 2022 07:01  -  21 Apr 2022 14:42  --------------------------------------------------------  IN:    Lactated Ringers: 300 mL    Oral Fluid: 450 mL  Total IN: 750 mL    OUT:    Voided (mL): 500 mL  Total OUT: 500 mL    Total NET: 250 mL                    RADIOLOGY:  EKG  12 Lead ECG:   Ventricular Rate 117 BPM    Atrial Rate 117 BPM    P-R Interval 160 ms    QRS Duration 80 ms    Q-T Interval 312 ms    QTC Calculation(Bazett) 435 ms    P Axis 54 degrees    R Axis 34 degrees    T Axis 18 degrees    Diagnosis Line Sinus tachycardia  Low voltage QRS  Borderline ECG    Confirmed by Kate Talbert MD ([id number]) on 4/19/2022 6:39:13 AM (04-18-22 @ 17:44)  12 Lead ECG:   Ventricular Rate 112 BPM    Atrial Rate 112 BPM    P-R Interval 160 ms    QRS Duration 72 ms    Q-T Interval 314 ms    QTC Calculation(Bazett) 428 ms    R Axis 36 degrees    T Axis 22 degrees    Diagnosis Line Sinus tachycardia  Nonspecific ST abnormality  Abnormal ECG    Confirmed by Trudy Iverson ([id number]) on 3/30/2022 5:48:37 PM (03-30-22 @ 05:02)    Xray Chest 1 View- PORTABLE-Urgent:   ACC: [id number] EXAM:  XR CHEST PORTABLE URGENT 1V                          PROCEDURE DATE:  04/18/2022          INTERPRETATION:  Clinical History / Reason for exam: Pain    Comparison : Chest radiograph March 12, 2022.    Technique/Positioning: Low lung volume.    Findings:    Support devices: None.    Cardiac/mediastinum/hilum: Magnified, unchanged.    Lung parenchyma/Pleura: Within normal limits.    Skeleton/soft tissues: Stable.    Impression:    Low lung volume.    No acute infiltrates.    --- End of Report ---            RICHARD HERNANDEZ MD; Attending Radiologist  This document has been electronically signed. Apr 19 2022  6:23AM (04-18-22 @ 19:49)    PHYSICAL EXAM:  GEN: No acute distress  HEENT: Normocephalic  NECK: Supple  LUNGS: Decreased breathe sounds  HEART: Regular  ABD: Soft, non-tender, non-distended.  EXT: NE/2+PP  NEURO: AAOX3  PSYCH: Mood is appropriate, following commands

## 2022-04-22 LAB
ALBUMIN SERPL ELPH-MCNC: 3.5 G/DL — SIGNIFICANT CHANGE UP (ref 3.5–5.2)
ALP SERPL-CCNC: 65 U/L — SIGNIFICANT CHANGE UP (ref 30–115)
ALT FLD-CCNC: 19 U/L — SIGNIFICANT CHANGE UP (ref 0–41)
ANION GAP SERPL CALC-SCNC: 10 MMOL/L — SIGNIFICANT CHANGE UP (ref 7–14)
AST SERPL-CCNC: 38 U/L — SIGNIFICANT CHANGE UP (ref 0–41)
BASOPHILS # BLD AUTO: 0.04 K/UL — SIGNIFICANT CHANGE UP (ref 0–0.2)
BASOPHILS NFR BLD AUTO: 0.9 % — SIGNIFICANT CHANGE UP (ref 0–1)
BILIRUB SERPL-MCNC: 0.7 MG/DL — SIGNIFICANT CHANGE UP (ref 0.2–1.2)
BUN SERPL-MCNC: 9 MG/DL — LOW (ref 10–20)
CALCIUM SERPL-MCNC: 8.4 MG/DL — LOW (ref 8.5–10.1)
CHLORIDE SERPL-SCNC: 102 MMOL/L — SIGNIFICANT CHANGE UP (ref 98–110)
CO2 SERPL-SCNC: 23 MMOL/L — SIGNIFICANT CHANGE UP (ref 17–32)
CREAT SERPL-MCNC: 1 MG/DL — SIGNIFICANT CHANGE UP (ref 0.7–1.5)
EGFR: 85 ML/MIN/1.73M2 — SIGNIFICANT CHANGE UP
EOSINOPHIL # BLD AUTO: 0.14 K/UL — SIGNIFICANT CHANGE UP (ref 0–0.7)
EOSINOPHIL NFR BLD AUTO: 3.2 % — SIGNIFICANT CHANGE UP (ref 0–8)
GLUCOSE SERPL-MCNC: 109 MG/DL — HIGH (ref 70–99)
HCT VFR BLD CALC: 29.5 % — LOW (ref 42–52)
HGB BLD-MCNC: 10.4 G/DL — LOW (ref 14–18)
IMM GRANULOCYTES NFR BLD AUTO: 0.2 % — SIGNIFICANT CHANGE UP (ref 0.1–0.3)
LYMPHOCYTES # BLD AUTO: 0.83 K/UL — LOW (ref 1.2–3.4)
LYMPHOCYTES # BLD AUTO: 19 % — LOW (ref 20.5–51.1)
MAGNESIUM SERPL-MCNC: 2 MG/DL — SIGNIFICANT CHANGE UP (ref 1.8–2.4)
MCHC RBC-ENTMCNC: 35.3 G/DL — SIGNIFICANT CHANGE UP (ref 32–37)
MCHC RBC-ENTMCNC: 35.6 PG — HIGH (ref 27–31)
MCV RBC AUTO: 101 FL — HIGH (ref 80–94)
MONOCYTES # BLD AUTO: 0.29 K/UL — SIGNIFICANT CHANGE UP (ref 0.1–0.6)
MONOCYTES NFR BLD AUTO: 6.7 % — SIGNIFICANT CHANGE UP (ref 1.7–9.3)
NEUTROPHILS # BLD AUTO: 3.05 K/UL — SIGNIFICANT CHANGE UP (ref 1.4–6.5)
NEUTROPHILS NFR BLD AUTO: 70 % — SIGNIFICANT CHANGE UP (ref 42.2–75.2)
NRBC # BLD: 0 /100 WBCS — SIGNIFICANT CHANGE UP (ref 0–0)
PLATELET # BLD AUTO: 75 K/UL — LOW (ref 130–400)
POTASSIUM SERPL-MCNC: 3.2 MMOL/L — LOW (ref 3.5–5)
POTASSIUM SERPL-SCNC: 3.2 MMOL/L — LOW (ref 3.5–5)
PROT SERPL-MCNC: 5.6 G/DL — LOW (ref 6–8)
RBC # BLD: 2.92 M/UL — LOW (ref 4.7–6.1)
RBC # FLD: 12.7 % — SIGNIFICANT CHANGE UP (ref 11.5–14.5)
SARS-COV-2 RNA SPEC QL NAA+PROBE: SIGNIFICANT CHANGE UP
SODIUM SERPL-SCNC: 135 MMOL/L — SIGNIFICANT CHANGE UP (ref 135–146)
WBC # BLD: 4.36 K/UL — LOW (ref 4.8–10.8)
WBC # FLD AUTO: 4.36 K/UL — LOW (ref 4.8–10.8)

## 2022-04-22 PROCEDURE — 99233 SBSQ HOSP IP/OBS HIGH 50: CPT

## 2022-04-22 RX ORDER — ACETAMINOPHEN 500 MG
650 TABLET ORAL EVERY 6 HOURS
Refills: 0 | Status: DISCONTINUED | OUTPATIENT
Start: 2022-04-22 | End: 2022-04-26

## 2022-04-22 RX ORDER — POTASSIUM CHLORIDE 20 MEQ
40 PACKET (EA) ORAL ONCE
Refills: 0 | Status: COMPLETED | OUTPATIENT
Start: 2022-04-22 | End: 2022-04-22

## 2022-04-22 RX ORDER — REGADENOSON 0.08 MG/ML
0.4 INJECTION, SOLUTION INTRAVENOUS ONCE
Refills: 0 | Status: DISCONTINUED | OUTPATIENT
Start: 2022-04-22 | End: 2022-04-25

## 2022-04-22 RX ADMIN — Medication 10 MILLIGRAM(S): at 22:37

## 2022-04-22 RX ADMIN — Medication 20 MILLILITER(S): at 11:48

## 2022-04-22 RX ADMIN — Medication 40 MILLIEQUIVALENT(S): at 10:40

## 2022-04-22 RX ADMIN — AMLODIPINE BESYLATE 5 MILLIGRAM(S): 2.5 TABLET ORAL at 05:21

## 2022-04-22 RX ADMIN — Medication 15 MILLIGRAM(S): at 05:21

## 2022-04-22 RX ADMIN — Medication 100 MILLIGRAM(S): at 11:47

## 2022-04-22 RX ADMIN — Medication 650 MILLIGRAM(S): at 05:20

## 2022-04-22 RX ADMIN — Medication 25 MILLIGRAM(S): at 05:21

## 2022-04-22 RX ADMIN — Medication 20 MILLILITER(S): at 19:37

## 2022-04-22 RX ADMIN — Medication 20 MILLILITER(S): at 05:20

## 2022-04-22 RX ADMIN — Medication 20 MILLILITER(S): at 23:15

## 2022-04-22 RX ADMIN — TAMSULOSIN HYDROCHLORIDE 0.4 MILLIGRAM(S): 0.4 CAPSULE ORAL at 22:38

## 2022-04-22 RX ADMIN — CHLORHEXIDINE GLUCONATE 1 APPLICATION(S): 213 SOLUTION TOPICAL at 17:31

## 2022-04-22 RX ADMIN — Medication 10 MILLIGRAM(S): at 17:30

## 2022-04-22 RX ADMIN — PANTOPRAZOLE SODIUM 40 MILLIGRAM(S): 20 TABLET, DELAYED RELEASE ORAL at 05:21

## 2022-04-22 RX ADMIN — Medication 1 MILLIGRAM(S): at 11:47

## 2022-04-22 RX ADMIN — Medication 10 MILLIGRAM(S): at 10:40

## 2022-04-22 RX ADMIN — Medication 10 MILLIGRAM(S): at 14:49

## 2022-04-22 RX ADMIN — ENOXAPARIN SODIUM 40 MILLIGRAM(S): 100 INJECTION SUBCUTANEOUS at 17:30

## 2022-04-22 RX ADMIN — Medication 1 TABLET(S): at 11:47

## 2022-04-22 NOTE — CONSULT NOTE ADULT - SUBJECTIVE AND OBJECTIVE BOX
Patient was seen and examined by me on 3C earlier today.  EMR reviewed.    Mr. Nigel Noonan is an 61-year-old  male with a past medical history of ASHD (Coronary Calcifications on CTSCAN Chest ), Hypertension, CKD, BPH, GERD, Obesity and Alcohol Use.    He presented at [facility] with complains of chest discomfort with shivering.  He denies any exertional chest pain with the amount of limited physical activity he is able to do.  He walks with a walker.    __________________________________________________________________________________________    Patient is a 61y old  Male who presents with a chief complaint of shivering  chest discomfort (22 Apr 2022 16:27)      REASON FOR CONSULT     HPI:  60 yo male, h.o GERD, obesity, HTN, BPH, CKD alcohol abuse who drinks one pint daily presented for chest discomfort and shivering  Patient did not drink last two days. He presented for shivering that started this AM associated with palpitations and chest discomfort  Symptoms are intermittent, aggravating over the time. Chest discomfort is severe non radiating , dull all over the chest, not related to activity and happens at rest.  patient reports headache, lightheadedness and dizziness ; no visual disturbance   in ED patient had tachycardia on EKG with no acute ischemic changes and low voltage   ED vitals remarkable for tachycardia  ROS negative for nausea, vomiting , diarrhea   ROS otherwise negative ; no syncope no head trauma  (18 Apr 2022 21:27)      PAST MEDICAL & SURGICAL HISTORY:  Alcohol dependence    Vertigo    HTN (hypertension)    Hard of hearing    Gout    Seasonal allergies    CKD (chronic kidney disease)    No significant past surgical history            SOCIAL HISTORY:     FAMILY HISTORY:  Family history of gastric cancer  Mom    Family hx of lung cancer  Smoker        Beef (Hives)  dairy products (Hives)  No Known Drug Allergies  shellfish (Hives)      MEDICATIONS  (STANDING):  amLODIPine   Tablet 5 milliGRAM(s) Oral daily  chlordiazePOXIDE   Oral   chlordiazePOXIDE 10 milliGRAM(s) Oral every 4 hours  chlorhexidine 4% Liquid 1 Application(s) Topical daily  enoxaparin Injectable 40 milliGRAM(s) SubCutaneous every 24 hours  folic acid 1 milliGRAM(s) Oral daily  guaifenesin/dextromethorphan Oral Liquid 20 milliLiter(s) Oral every 6 hours  metoprolol succinate ER 25 milliGRAM(s) Oral daily  multivitamin 1 Tablet(s) Oral daily  pantoprazole    Tablet 40 milliGRAM(s) Oral before breakfast  regadenoson Injectable 0.4 milliGRAM(s) IV Push once  tamsulosin 0.4 milliGRAM(s) Oral at bedtime  thiamine 100 milliGRAM(s) Oral daily    MEDICATIONS  (PRN):  acetaminophen     Tablet .. 650 milliGRAM(s) Oral every 6 hours PRN Temp greater or equal to 38C (100.4F), Moderate Pain (4 - 6), Severe Pain (7 - 10)  LORazepam   Injectable 1 milliGRAM(s) IV Push every 1 hour PRN Alcohol withdrawal      Vital Signs Last 24 Hrs  T(C): 36.2 (22 Apr 2022 13:22), Max: 37.8 (22 Apr 2022 05:25)  T(F): 97.2 (22 Apr 2022 13:22), Max: 100 (22 Apr 2022 05:25)  HR: 67 (22 Apr 2022 13:22) (67 - 81)  BP: 129/72 (22 Apr 2022 13:22) (115/62 - 129/72)  BP(mean): --  RR: 18 (22 Apr 2022 13:22) (18 - 18)  SpO2: -- I&O's Detail    21 Apr 2022 07:01  -  22 Apr 2022 07:00  --------------------------------------------------------  IN:    Lactated Ringers: 300 mL    Oral Fluid: 450 mL  Total IN: 750 mL    OUT:    Voided (mL): 500 mL  Total OUT: 500 mL    Total NET: 250 mL      22 Apr 2022 07:01  -  22 Apr 2022 17:33  --------------------------------------------------------  IN:    Oral Fluid: 446 mL  Total IN: 446 mL    OUT:  Total OUT: 0 mL    Total NET: 446 mL        PHYSICAL EXAM:  Not in apparent distress  Normocephalic; atraumatic; EOMs intact; poor dentition  Alert, oriented x 3  No JVD; regular rhythm; nl S1S2  Bilateral breath sounds  Abdomen soft   No edema  Moving all extremities    REVIEW OF SYSTEMS: Negative except as stated in HPI.  Walks with walker.    ECG: Sinus Rhythm    ECHOCARDIOGRAM:  < from: TTE Echo Complete w/o Contrast w/ Doppler (04.20.22 @ 12:45) >    Summary:   1. Normalglobal left ventricular systolic function.   2. LV Ejection Fraction by Johnson's Method with a biplane EF of 67 %.   3. Normal left ventricular internal cavity size.   4. The mean global longitudinal peak strain by speckle tracking is   -15.6% whichis reduced.   5. Normal left atrial size.   6. Normal right atrial size.   7. No evidence of mitral valve regurgitation.   8. LA volume Index is 27.6 ml/m² ml/m2.    PHYSICIAN INTERPRETATION:  Left Ventricle: The left ventricular internal cavity sizeis normal. Left   ventricular wall thickness is normal. There is no left ventricular   hypertrophy. Global LV systolic function was normal. Spectral Doppler   shows normal pattern of LV diastolic filling. Normal LV filling   pressures. The mean global longitudinal peak strain by speckle tracking   is -15.6% which is reduced.      LV Wall Scoring:  All segments are normal.    Right Ventricle: Normal right ventricular size and function. TV S' 0.2   m/s.  Left Atrium: Normal left atrial size. LA volume Index is 27.6 ml/m² ml/m2.  Right Atrium: Normal right atrial size. RA Area is 14.55 cm² cm2.  Pericardium: There is no evidence of pericardial effusion.  Mitral Valve: Structurally normal mitral valve, with normal leaflet   excursion. No evidenceof mitral valve regurgitation is seen.  Tricuspid Valve: Structurally normal tricuspid valve, with normal leaflet   excursion. Trivial tricuspid regurgitation is visualized.  Aortic Valve: Normal trileaflet aortic valve with normal opening. Peak   transaortic gradient equals 6.7 mmHg, mean transaortic gradient equals   3.8 mmHg, the calculated aortic valve area equals 3.43 cm² by the   continuity equation consistent with normally opening aortic valve. No   evidence of aortic valve regurgitation is seen.  Pulmonic Valve: Structurally normal pulmonic valve, with normal leaflet   excursion. No indication of pulmonic valve regurgitation.  Aorta: The aortic root and ascending aorta are structurally normal, with   no evidence of dilitation.  Pulmonary Artery: The main pulmonary artery is normal in size.  Venous: The inferior vena cava was normal sized, with respiratory size   variation less than 50%.    < end of copied text >    RADIOLOGY & ADDITIONAL STUDIES:    < from: CT Abdomen and Pelvis No Cont (03.17.20 @ 00:08) >  FINDINGS:    LOWER CHEST: Mild bibasilar subsegmental atelectasis. Coronary artery calcifications..    HEPATOBILIARY: Hepatic steatosis. Unremarkable CT appearance of the gallbladder.    SPLEEN: Unremarkable.    PANCREAS: Unremarkable.    ADRENAL GLANDS: Unremarkable.    KIDNEYS: No hydronephrosis, renal or ureteral calculus.    ABDOMINOPELVIC NODES:Unremarkable.    PELVIC ORGANS: Urinary bladder is distended to the level of the umbilicus.    PERITONEUM/MESENTERY/BOWEL: No evidence of bowel obstruction, pneumoperitoneum, or ascites. Normal caliber appendix.    BONES/SOFT TISSUES: Degenerative changes of the visualized thoracolumbar spine. No acute osseous abnormality. Diffuse idiopathic skeletal hyperostosis of the lower thoracic spine    OTHER: Calcified atherosclerotic disease of the aorta and branch vessels.      IMPRESSION:     No CT evidence for acute abdominopelvic pathology.    Hepatic steatosis.    Additional comments: Urinary bladder distended to the level of the umbilicus. Correlation can be made for urinary retention.      < end of copied text >  < from: Xray Chest 1 View- PORTABLE-Routine (Xray Chest 1 View- PORTABLE-Routine .) (04.21.22 @ 14:04) >  Findings:    Support devices: None.    Cardiac/mediastinum/hilum: Stable, magnified.    Lung parenchyma/Pleura: No focal consolidation, effusion or pneumothorax.    Skeleton/soft tissues: Stable.    Impression:    No radiographic evidence of acute cardiopulmonary disease.    < end of copied text >    LABS:                        10.4   4.36  )-----------( 75       ( 22 Apr 2022 04:30 )             29.5     04-22    135  |  102  |  9<L>  ----------------------------<  109<H>  3.2<L>   |  23  |  1.0    Ca    8.4<L>      22 Apr 2022 04:30  Phos  2.4     04-21  Mg     2.0     04-22    TPro  5.6<L>  /  Alb  3.5  /  TBili  0.7  /  DBili  x   /  AST  38  /  ALT  19  /  AlkPhos  65  04-22          I&O's Summary    21 Apr 2022 07:01  -  22 Apr 2022 07:00  --------------------------------------------------------  IN: 750 mL / OUT: 500 mL / NET: 250 mL    22 Apr 2022 07:01  -  22 Apr 2022 17:33  --------------------------------------------------------  IN: 446 mL / OUT: 0 mL / NET: 446 mL

## 2022-04-22 NOTE — PROGRESS NOTE ADULT - SUBJECTIVE AND OBJECTIVE BOX
CHIEF COMPLAINT:    Patient is a 61y old  Male who presents with a chief complaint of shivering  chest discomfort (2022 08:01)      INTERVAL HPI/OVERNIGHT EVENTS:    Patient seen and examined at bedside. No acute overnight events occurred.    ROS: Denies chest pain. Reports sore throat and runny nose. All other systems are negative.    Vital Signs:    T(F): 97.2 (22 @ 13:22), Max: 100 (22 @ 05:25)  HR: 67 (22 @ 13:22) (67 - 81)  BP: 129/72 (22 @ 13:22) (115/62 - 129/72)  RR: 18 (22 @ 13:22) (18 - 18)  SpO2: --  I&O's Summary    2022 07:  -  2022 07:00  --------------------------------------------------------  IN: 750 mL / OUT: 500 mL / NET: 250 mL    2022 07:  -  2022 16:27  --------------------------------------------------------  IN: 446 mL / OUT: 0 mL / NET: 446 mL      Daily     Daily Weight in k (2022 05:25)  CAPILLARY BLOOD GLUCOSE          PHYSICAL EXAM:  GENERAL:  NAD  SKIN: No rashes or lesions  HEENT: Atraumatic. Normocephalic. Anicteric  NECK:  No JVD.   PULMONARY: Clear to ausculation bilaterally. No wheezing. No rales  CVS: Normal S1, S2. Regular rate and rhythm. No murmurs.  ABDOMEN/GI: Soft, Nontender, Nondistended; Bowel sounds are present  EXTREMITIES:  No edema B/L LE.  NEUROLOGIC:  No motor deficit.  PSYCH: Alert & oriented x 3, normal affect    Consultant(s) Notes Reviewed:  [x ] YES  [ ] NO  Care Discussed with Consultants/Other Providers [ x] YES  [ ] NO    LABS:                        10.4   4.36  )-----------( 75       ( 2022 04:30 )             29.5     04-    135  |  102  |  9<L>  ----------------------------<  109<H>  3.2<L>   |  23  |  1.0    Ca    8.4<L>      2022 04:30  Phos  2.4     -  Mg     2.0         TPro  5.6<L>  /  Alb  3.5  /  TBili  0.7  /  DBili  x   /  AST  38  /  ALT  19  /  AlkPhos  65  -            Culture - Blood (collected 2022 19:14)  Source: .Blood Blood  Preliminary Report (2022 01:02):    No growth to date.        RADIOLOGY & ADDITIONAL TESTS:  Imaging or report Personally Reviewed:  [ ] YES  [ ] NO    Telemetry reviewed independently - no events    Medications:  Standing  amLODIPine   Tablet 5 milliGRAM(s) Oral daily  chlordiazePOXIDE   Oral   chlordiazePOXIDE 10 milliGRAM(s) Oral every 4 hours  chlorhexidine 4% Liquid 1 Application(s) Topical daily  enoxaparin Injectable 40 milliGRAM(s) SubCutaneous every 24 hours  folic acid 1 milliGRAM(s) Oral daily  guaifenesin/dextromethorphan Oral Liquid 20 milliLiter(s) Oral every 6 hours  metoprolol succinate ER 25 milliGRAM(s) Oral daily  multivitamin 1 Tablet(s) Oral daily  pantoprazole    Tablet 40 milliGRAM(s) Oral before breakfast  regadenoson Injectable 0.4 milliGRAM(s) IV Push once  tamsulosin 0.4 milliGRAM(s) Oral at bedtime  thiamine 100 milliGRAM(s) Oral daily    PRN Meds  acetaminophen     Tablet .. 650 milliGRAM(s) Oral every 6 hours PRN  LORazepam   Injectable 1 milliGRAM(s) IV Push every 1 hour PRN      Case discussed with resident  Care discussed with pt

## 2022-04-22 NOTE — CONSULT NOTE ADULT - ASSESSMENT
1] Atypical Chest Pain      ASHD (Coronary Artery Calcifications on CTSCAN Chest in [place] 2020)  - Option of pharmacological MPI discussed with the patient at bedside  - He is not a candidate for ETT or exercise nuclear stress test.  He walks with a walker  - Further recommendations pending result of MPI.  If patient declines MPI, patient may be discharged and follow up with Dr. Barrientos (primary cardiologist).    2] Hypertension  - Continue to monitor    3] Alcohol Dependence  - Patient counseled on adverse effects of alcohol use.    Plan discussed Medical Attending, Dr. Kimberly Cruz this morning.  [phone] planning per primary team    Will follow    -Oscar Holt MD (covering for Dr. Mundo Barrientos)  [phone] Office

## 2022-04-22 NOTE — PROGRESS NOTE ADULT - ASSESSMENT
62 yo M PMHx etoh dependence, HTN, BPH, CKD II presented for evaluation of chest pain. Pt reports it diffuse over entire chest. Pt was also vomiting day of presentation and subsequently felt lightheaded.    Etoh withdrawal  - CIWA 0  - c/w librium taper    Suspect folate and thiamine defieicny due to etoh use  - c/w supplementation    Chest pain  - pt was seen by cardiology in 10/2021 and was offered a stress test but declined  - as per cardiology note then there was calcifications on prior Ct chest  - pending adenosine stress test  - EKG NSR, normal troponin    Fever  Sore throat  - check covid  - occurred two nights ago  - Tmax 100.6  - temp on admission 100.3  - cxr unchanged    Pancytopenia  - could be bone marrow suppression from alcohol  - plts decreased -- 4T 3 making low probability of HIT. Will monitor for now    Hypophosphatemia and Hypomagnesemia  - likely due to etoh use     Lactic acidosis  HAGMA  - present on admission  - resolved    BPH  - c/w flomax    DVT px  Full Code    Progress Note Handoff:  Pending (specify):  adenosine stress test  Family discussion: discussed taper, stress test at [facility] with pt with cardiology  Disposition: Home___/[facility]___/Other___[facility]_____/Unknown at this time________

## 2022-04-22 NOTE — PROGRESS NOTE ADULT - SUBJECTIVE AND OBJECTIVE BOX
SUBJECTIVE:  Patient is a 61y old Male who presents with a chief complaint of shivering  chest discomfort (21 Apr 2022 14:42)   Currently admitted to medicine with the primary diagnosis of Chest pain     Today is hospital day 4d. There are no new issues or overnight events.     HPI  HPI:  62 yo male, h.o GERD, obesity, HTN, BPH, CKD alcohol abuse who drinks one pint daily presented for chest discomfort and shivering  Patient did not drink last two days. He presented for shivering that started this AM associated with palpitations and chest discomfort  Symptoms are intermittent, aggravating over the time. Chest discomfort is severe non radiating , dull all over the chest, not related to activity and happens at rest.  patient reports headache, lightheadedness and dizziness ; no visual disturbance   in ED patient had tachycardia on EKG with no acute ischemic changes and low voltage   ED vitals remarkable for tachycardia  ROS negative for nausea, vomiting , diarrhea   ROS otherwise negative ; no syncope no head trauma  (18 Apr 2022 21:27)      PAST MEDICAL & SURGICAL HISTORY  Alcohol dependence    Vertigo    HTN (hypertension)    Hard of hearing    Gout    Seasonal allergies    CKD (chronic kidney disease)    No significant past surgical history      ALLERGIES:  Beef (Hives)  dairy products (Hives)  No Known Drug Allergies  shellfish (Hives)    MEDICATIONS:  HOME MEDICATIONS  Flomax 0.4 mg oral capsule: 1 cap(s) orally once a day (at bedtime)  folic acid 1 mg oral tablet: 1 tab(s) orally once a day  Norvasc 5 mg oral tablet: 1 tab(s) orally once a day  thiamine 100 mg oral tablet: 1 tab(s) orally once a day    STANDING MEDICATIONS  amLODIPine   Tablet 5 milliGRAM(s) Oral daily  chlordiazePOXIDE   Oral   chlordiazePOXIDE 10 milliGRAM(s) Oral every 4 hours  chlorhexidine 4% Liquid 1 Application(s) Topical daily  enoxaparin Injectable 40 milliGRAM(s) SubCutaneous every 24 hours  folic acid 1 milliGRAM(s) Oral daily  guaifenesin/dextromethorphan Oral Liquid 20 milliLiter(s) Oral every 6 hours  metoprolol succinate ER 25 milliGRAM(s) Oral daily  multivitamin 1 Tablet(s) Oral daily  pantoprazole    Tablet 40 milliGRAM(s) Oral before breakfast  tamsulosin 0.4 milliGRAM(s) Oral at bedtime  thiamine 100 milliGRAM(s) Oral daily    PRN MEDICATIONS  acetaminophen     Tablet .. 650 milliGRAM(s) Oral every 6 hours PRN  LORazepam   Injectable 1 milliGRAM(s) IV Push every 1 hour PRN    VITALS:   T(C): 36.8 (04-22-22 @ 06:57), Max: 37.8 (04-22-22 @ 05:25)  T(F): 98.3 (04-22-22 @ 06:57), Max: 100 (04-22-22 @ 05:25)  HR: 75 (04-22-22 @ 05:25) (75 - 81)  BP: 123/61 (04-22-22 @ 05:25) (115/62 - 126/58)  BP(mean): --  ABP: --  ABP(mean): --  RR: 18 (04-22-22 @ 05:25) (18 - 18)  SpO2: 96% (04-21-22 @ 08:47) (96% - 96%)  LABS:                        10.5   3.84  )-----------( 72       ( 21 Apr 2022 05:30 )             29.3     04-21    137  |  103  |  7<L>  ----------------------------<  103<H>  3.5   |  25  |  1.0    Ca    8.2<L>      21 Apr 2022 05:30  Phos  2.4     04-21  Mg     2.4     04-21    TPro  5.6<L>  /  Alb  3.6  /  TBili  1.3<H>  /  DBili  x   /  AST  41  /  ALT  17  /  AlkPhos  60  04-21        I&O's Detail    21 Apr 2022 07:01  -  22 Apr 2022 07:00  --------------------------------------------------------  IN:    Lactated Ringers: 300 mL    Oral Fluid: 450 mL  Total IN: 750 mL    OUT:    Voided (mL): 500 mL  Total OUT: 500 mL    Total NET: 250 mL                Culture - Blood (collected 20 Apr 2022 19:14)  Source: .Blood Blood  Preliminary Report (22 Apr 2022 01:02):    No growth to date.          RADIOLOGY:  EKG  12 Lead ECG:   Ventricular Rate 117 BPM    Atrial Rate 117 BPM    P-R Interval 160 ms    QRS Duration 80 ms    Q-T Interval 312 ms    QTC Calculation(Bazett) 435 ms    P Axis 54 degrees    R Axis 34 degrees    T Axis 18 degrees    Diagnosis Line Sinus tachycardia  Low voltage QRS  Borderline ECG    Confirmed by Kate Talbert MD ([id number]) on 4/19/2022 6:39:13 AM (04-18-22 @ 17:44)  12 Lead ECG:   Ventricular Rate 112 BPM    Atrial Rate 112 BPM    P-R Interval 160 ms    QRS Duration 72 ms    Q-T Interval 314 ms    QTC Calculation(Bazett) 428 ms    R Axis 36 degrees    T Axis 22 degrees    Diagnosis Line Sinus tachycardia  Nonspecific ST abnormality  Abnormal ECG    Confirmed by Trudy Iverson ([id number]) on 3/30/2022 5:48:37 PM (03-30-22 @ 05:02)    Xray Chest 1 View- PORTABLE-Routine:   ACC: [id number] EXAM:  XR CHEST PORTABLE ROUTINE 1V                          PROCEDURE DATE:  04/21/2022          INTERPRETATION:  Clinical History / Reason for exam: Fever    Comparison : Chest radiograph 4/18/2022.    Technique/Positioning: Single AP chest radiograph.    Findings:    Support devices: None.    Cardiac/mediastinum/hilum: Stable, magnified.    Lung parenchyma/Pleura: No focal consolidation, effusion or pneumothorax.    Skeleton/soft tissues: Stable.    Impression:    No radiographic evidence of acute cardiopulmonary disease.        --- End of Report ---            KAZ NEAL MD; Attending Radiologist  This document has been electronically signed. Apr 21 2022  3:14PM (04-21-22 @ 14:04)  Xray Chest 1 View- PORTABLE-Urgent:   ACC: [id number] EXAM:  XR CHEST PORTABLE URGENT 1V                          PROCEDURE DATE:  04/18/2022          INTERPRETATION:  Clinical History / Reason for exam: Pain    Comparison : Chest radiograph March 12, 2022.    Technique/Positioning: Low lung volume.    Findings:    Support devices: None.    Cardiac/mediastinum/hilum: Magnified, unchanged.    Lung parenchyma/Pleura: Within normal limits.    Skeleton/soft tissues: Stable.    Impression:    Low lung volume.    No acute infiltrates.    --- End of Report ---            RICHARD HERNANDEZ MD; Attending Radiologist  This document has been electronically signed. Apr 19 2022  6:23AM (04-18-22 @ 19:49)    PHYSICAL EXAM:  GEN: No acute distress  HEENT: Normocephalic  NECK: Supple  LUNGS: Decreased breathe sounds  HEART: Regular  ABD: Soft, non-tender, non-distended.  EXT: NE/2+PP  NEURO: AAOX3  PSYCH: Mood is appropriate, following commands

## 2022-04-23 LAB
ALBUMIN SERPL ELPH-MCNC: 3.6 G/DL — SIGNIFICANT CHANGE UP (ref 3.5–5.2)
ALP SERPL-CCNC: 64 U/L — SIGNIFICANT CHANGE UP (ref 30–115)
ALT FLD-CCNC: 30 U/L — SIGNIFICANT CHANGE UP (ref 0–41)
ANION GAP SERPL CALC-SCNC: 12 MMOL/L — SIGNIFICANT CHANGE UP (ref 7–14)
AST SERPL-CCNC: 54 U/L — HIGH (ref 0–41)
BASOPHILS # BLD AUTO: 0.04 K/UL — SIGNIFICANT CHANGE UP (ref 0–0.2)
BASOPHILS NFR BLD AUTO: 0.8 % — SIGNIFICANT CHANGE UP (ref 0–1)
BILIRUB SERPL-MCNC: 0.6 MG/DL — SIGNIFICANT CHANGE UP (ref 0.2–1.2)
BUN SERPL-MCNC: 10 MG/DL — SIGNIFICANT CHANGE UP (ref 10–20)
CALCIUM SERPL-MCNC: 8.8 MG/DL — SIGNIFICANT CHANGE UP (ref 8.5–10.1)
CHLORIDE SERPL-SCNC: 105 MMOL/L — SIGNIFICANT CHANGE UP (ref 98–110)
CO2 SERPL-SCNC: 22 MMOL/L — SIGNIFICANT CHANGE UP (ref 17–32)
CREAT SERPL-MCNC: 1 MG/DL — SIGNIFICANT CHANGE UP (ref 0.7–1.5)
EGFR: 85 ML/MIN/1.73M2 — SIGNIFICANT CHANGE UP
EOSINOPHIL # BLD AUTO: 0.25 K/UL — SIGNIFICANT CHANGE UP (ref 0–0.7)
EOSINOPHIL NFR BLD AUTO: 4.8 % — SIGNIFICANT CHANGE UP (ref 0–8)
GLUCOSE SERPL-MCNC: 124 MG/DL — HIGH (ref 70–99)
HCT VFR BLD CALC: 32 % — LOW (ref 42–52)
HGB BLD-MCNC: 11.1 G/DL — LOW (ref 14–18)
IMM GRANULOCYTES NFR BLD AUTO: 0.6 % — HIGH (ref 0.1–0.3)
LYMPHOCYTES # BLD AUTO: 1.02 K/UL — LOW (ref 1.2–3.4)
LYMPHOCYTES # BLD AUTO: 19.8 % — LOW (ref 20.5–51.1)
MAGNESIUM SERPL-MCNC: 1.9 MG/DL — SIGNIFICANT CHANGE UP (ref 1.8–2.4)
MCHC RBC-ENTMCNC: 34.7 G/DL — SIGNIFICANT CHANGE UP (ref 32–37)
MCHC RBC-ENTMCNC: 35.7 PG — HIGH (ref 27–31)
MCV RBC AUTO: 102.9 FL — HIGH (ref 80–94)
MONOCYTES # BLD AUTO: 0.29 K/UL — SIGNIFICANT CHANGE UP (ref 0.1–0.6)
MONOCYTES NFR BLD AUTO: 5.6 % — SIGNIFICANT CHANGE UP (ref 1.7–9.3)
NEUTROPHILS # BLD AUTO: 3.53 K/UL — SIGNIFICANT CHANGE UP (ref 1.4–6.5)
NEUTROPHILS NFR BLD AUTO: 68.4 % — SIGNIFICANT CHANGE UP (ref 42.2–75.2)
NRBC # BLD: 0 /100 WBCS — SIGNIFICANT CHANGE UP (ref 0–0)
PLATELET # BLD AUTO: 94 K/UL — LOW (ref 130–400)
POTASSIUM SERPL-MCNC: 3.8 MMOL/L — SIGNIFICANT CHANGE UP (ref 3.5–5)
POTASSIUM SERPL-SCNC: 3.8 MMOL/L — SIGNIFICANT CHANGE UP (ref 3.5–5)
PROT SERPL-MCNC: 5.8 G/DL — LOW (ref 6–8)
RBC # BLD: 3.11 M/UL — LOW (ref 4.7–6.1)
RBC # FLD: 13 % — SIGNIFICANT CHANGE UP (ref 11.5–14.5)
SODIUM SERPL-SCNC: 139 MMOL/L — SIGNIFICANT CHANGE UP (ref 135–146)
WBC # BLD: 5.16 K/UL — SIGNIFICANT CHANGE UP (ref 4.8–10.8)
WBC # FLD AUTO: 5.16 K/UL — SIGNIFICANT CHANGE UP (ref 4.8–10.8)

## 2022-04-23 PROCEDURE — 93016 CV STRESS TEST SUPVJ ONLY: CPT

## 2022-04-23 PROCEDURE — 93018 CV STRESS TEST I&R ONLY: CPT

## 2022-04-23 PROCEDURE — 99232 SBSQ HOSP IP/OBS MODERATE 35: CPT

## 2022-04-23 PROCEDURE — 78452 HT MUSCLE IMAGE SPECT MULT: CPT | Mod: 26

## 2022-04-23 RX ADMIN — Medication 10 MILLIGRAM(S): at 07:43

## 2022-04-23 RX ADMIN — TAMSULOSIN HYDROCHLORIDE 0.4 MILLIGRAM(S): 0.4 CAPSULE ORAL at 22:05

## 2022-04-23 RX ADMIN — Medication 25 MILLIGRAM(S): at 06:13

## 2022-04-23 RX ADMIN — AMLODIPINE BESYLATE 5 MILLIGRAM(S): 2.5 TABLET ORAL at 06:13

## 2022-04-23 RX ADMIN — Medication 10 MILLIGRAM(S): at 04:34

## 2022-04-23 RX ADMIN — Medication 1 TABLET(S): at 12:05

## 2022-04-23 RX ADMIN — PANTOPRAZOLE SODIUM 40 MILLIGRAM(S): 20 TABLET, DELAYED RELEASE ORAL at 06:14

## 2022-04-23 RX ADMIN — Medication 20 MILLILITER(S): at 06:13

## 2022-04-23 RX ADMIN — Medication 1 MILLIGRAM(S): at 12:05

## 2022-04-23 RX ADMIN — Medication 20 MILLILITER(S): at 17:18

## 2022-04-23 RX ADMIN — Medication 20 MILLILITER(S): at 12:05

## 2022-04-23 RX ADMIN — ENOXAPARIN SODIUM 40 MILLIGRAM(S): 100 INJECTION SUBCUTANEOUS at 17:18

## 2022-04-23 RX ADMIN — Medication 100 MILLIGRAM(S): at 12:05

## 2022-04-23 NOTE — PROGRESS NOTE ADULT - SUBJECTIVE AND OBJECTIVE BOX
Patient was seen and examined by me on 3C earlier.  EMR reviewed.    No new complaints.  Comfortable in bed.  Denies chest pain.    Vitals:  T(C): 36.5 (04-23-22 @ 19:44), Max: 37.8 (04-22-22 @ 05:25)  HR: 66 (04-23-22 @ 19:44) (66 - 75)  BP: 135/65 (04-23-22 @ 19:44) (121/65 - 135/65)  RR: 20 (04-23-22 @ 19:44) (16 - 20)  SpO2: 97% (04-23-22 @ 17:00) (97% - 97%)    Telemetry: Sinus Rhythm    LABS:                        11.1   5.16  )-----------( 94       ( 23 Apr 2022 06:30 )             32.0     04-23    139  |  105  |  10  ----------------------------<  124<H>  3.8   |  22  |  1.0    Ca    8.8      23 Apr 2022 06:30  Mg     1.9     04-23    TPro  5.8<L>  /  Alb  3.6  /  TBili  0.6  /  DBili  x   /  AST  54<H>  /  ALT  30  /  AlkPhos  64  04-23      MEDICATIONS  (STANDING):  amLODIPine   Tablet 5 milliGRAM(s) Oral daily  chlorhexidine 4% Liquid 1 Application(s) Topical daily  enoxaparin Injectable 40 milliGRAM(s) SubCutaneous every 24 hours  folic acid 1 milliGRAM(s) Oral daily  guaifenesin/dextromethorphan Oral Liquid 20 milliLiter(s) Oral every 6 hours  metoprolol succinate ER 25 milliGRAM(s) Oral daily  multivitamin 1 Tablet(s) Oral daily  pantoprazole    Tablet 40 milliGRAM(s) Oral before breakfast  regadenoson Injectable 0.4 milliGRAM(s) IV Push once  tamsulosin 0.4 milliGRAM(s) Oral at bedtime  thiamine 100 milliGRAM(s) Oral daily    MEDICATIONS  (PRN):  acetaminophen     Tablet .. 650 milliGRAM(s) Oral every 6 hours PRN Temp greater or equal to 38C (100.4F), Moderate Pain (4 - 6), Severe Pain (7 - 10)  LORazepam   Injectable 1 milliGRAM(s) IV Push every 1 hour PRN Alcohol withdrawal      PHYSICAL EXAM:  Constitutional: appears stated age, well developed/nourished, no acute distress  Eyes: EOM's intact.  PERRLA  ENMT: Normocephalic, atraumatic.  e.  Neck: Jugular veins non-distended; no carotid bruits bilaterally.  Respiratory: respiratory pattern unlabored;  lungs clear to auscultation bilaterally.  Cardiovascular: Regular rhythm.  S1 and S2 normal.  No murmur nor rub appreciated.  Abdomen: Soft, non-tender.  Normal bowel sounds.  Extremities: extremities warm; no edema.  Skin: No gross abnormalities noted.  Musculoskeletal: No gross deformities  Neurological: Alert, oriented x 3.

## 2022-04-23 NOTE — PROGRESS NOTE ADULT - ASSESSMENT
1] Atypical Chest Pain      ASHD (Coronary Artery Calcifications on CTSCAN Chest in [place] 2020)  - Follow up on Adenosine MPI study    2] Hypertension  - Continue to monitor    3] Alcohol Dependence  - Patient counseled on adverse effects of alcohol use.    Discharge planning per primary team    Will follow    -Oscar Holt MD (covering for Dr. Mundo Barrientos)  [phone] Office

## 2022-04-23 NOTE — PROGRESS NOTE ADULT - SUBJECTIVE AND OBJECTIVE BOX
CHIEF COMPLAINT:    Patient is a 61y old  Male who presents with a chief complaint of shivering  chest discomfort (2022 17:32)      INTERVAL HPI/OVERNIGHT EVENTS:    Patient seen and examined at bedside. No acute overnight events occurred.    ROS: Denies SOB, chest pain. All other systems are negative.    Vital Signs:    T(F): 98 (22 @ 05:48), Max: 99.2 (22 @ 20:15)  HR: 68 (22 @ 05:48) (67 - 68)  BP: 121/66 (22 @ 05:48) (121/65 - 129/72)  RR: 18 (22 @ 20:15) (18 - 18)  SpO2: --  I&O's Summary    2022 07:01  -  2022 07:00  --------------------------------------------------------  IN: 446 mL / OUT: 0 mL / NET: 446 mL      Daily     Daily Weight in k.8 (2022 05:48)  CAPILLARY BLOOD GLUCOSE    PHYSICAL EXAM:  GENERAL:  NAD  SKIN: No rashes or lesions  HEENT: Atraumatic. Normocephalic. Anicteric  NECK:  No JVD.   PULMONARY: Clear to ausculation bilaterally. No wheezing. No rales  CVS: Normal S1, S2. Regular rate and rhythm. No murmurs.  ABDOMEN/GI: Soft, Nontender, Nondistended; Bowel sounds are present  EXTREMITIES:  No edema B/L LE.  NEUROLOGIC:  No motor deficit.  PSYCH: Alert & oriented x 3, normal affect    LABS:                        11.1   5.16  )-----------( 94       ( 2022 06:30 )             32.0         139  |  105  |  10  ----------------------------<  124<H>  3.8   |  22  |  1.0    Ca    8.8      2022 06:30  Mg     1.9         TPro  5.8<L>  /  Alb  3.6  /  TBili  0.6  /  DBili  x   /  AST  54<H>  /  ALT  30  /  AlkPhos  64  04-23      Culture - Blood (collected 2022 06:30)  Source: .Blood Blood  Preliminary Report (2022 19:01):    No growth to date.    Culture - Blood (collected 2022 19:14)  Source: .Blood Blood  Preliminary Report (2022 01:02):    No growth to date.        RADIOLOGY & ADDITIONAL TESTS:      Medications:  Standing  amLODIPine   Tablet 5 milliGRAM(s) Oral daily  chlorhexidine 4% Liquid 1 Application(s) Topical daily  enoxaparin Injectable 40 milliGRAM(s) SubCutaneous every 24 hours  folic acid 1 milliGRAM(s) Oral daily  guaifenesin/dextromethorphan Oral Liquid 20 milliLiter(s) Oral every 6 hours  metoprolol succinate ER 25 milliGRAM(s) Oral daily  multivitamin 1 Tablet(s) Oral daily  pantoprazole    Tablet 40 milliGRAM(s) Oral before breakfast  regadenoson Injectable 0.4 milliGRAM(s) IV Push once  tamsulosin 0.4 milliGRAM(s) Oral at bedtime  thiamine 100 milliGRAM(s) Oral daily    PRN Meds  acetaminophen     Tablet .. 650 milliGRAM(s) Oral every 6 hours PRN  LORazepam   Injectable 1 milliGRAM(s) IV Push every 1 hour PRN      Case discussed with resident  Care discussed with pt

## 2022-04-23 NOTE — PROGRESS NOTE ADULT - ASSESSMENT
60 yo M PMHx etoh dependence, HTN, BPH, CKD II presented for evaluation of chest pain. Pt reports it diffuse over entire chest. Pt was also vomiting day of presentation and subsequently felt lightheaded.    Etoh withdrawal  - CIWA 0  - s/p librium taper    Suspect folate and thiamine defieicny due to etoh use  - c/w supplementation    Chest pain  - pt was seen by cardiology in 10/2021 and was offered a stress test but declined  - as per cardiology note then there was calcifications on prior Ct chest  - pending adenosine stress test  - EKG NSR, normal troponin    Fever  Sore throat  - repeat covid negative  - resolved    Pancytopenia  - could be bone marrow suppression from alcohol  - plts decreased -- 4T 3 making low probability of HIT. Will monitor for now    Hypophosphatemia and Hypomagnesemia  - likely due to etoh use     Lactic acidosis  HAGMA  - present on admission  - resolved    BPH  - c/w flomax    DVT px  Full Code    Progress Note Handoff:  Pending (specify):  adenosine stress test  Family discussion: discussed pending stress test  Disposition: Home___/SNF___/Other___[facility]_____/Unknown at this time________

## 2022-04-24 LAB
ALBUMIN SERPL ELPH-MCNC: 3.6 G/DL — SIGNIFICANT CHANGE UP (ref 3.5–5.2)
ALP SERPL-CCNC: 59 U/L — SIGNIFICANT CHANGE UP (ref 30–115)
ALT FLD-CCNC: 43 U/L — HIGH (ref 0–41)
ANION GAP SERPL CALC-SCNC: 13 MMOL/L — SIGNIFICANT CHANGE UP (ref 7–14)
AST SERPL-CCNC: 60 U/L — HIGH (ref 0–41)
BASOPHILS # BLD AUTO: 0.05 K/UL — SIGNIFICANT CHANGE UP (ref 0–0.2)
BASOPHILS NFR BLD AUTO: 0.9 % — SIGNIFICANT CHANGE UP (ref 0–1)
BILIRUB SERPL-MCNC: 0.6 MG/DL — SIGNIFICANT CHANGE UP (ref 0.2–1.2)
BUN SERPL-MCNC: 10 MG/DL — SIGNIFICANT CHANGE UP (ref 10–20)
CALCIUM SERPL-MCNC: 8.9 MG/DL — SIGNIFICANT CHANGE UP (ref 8.5–10.1)
CHLORIDE SERPL-SCNC: 105 MMOL/L — SIGNIFICANT CHANGE UP (ref 98–110)
CO2 SERPL-SCNC: 22 MMOL/L — SIGNIFICANT CHANGE UP (ref 17–32)
CREAT SERPL-MCNC: 1.1 MG/DL — SIGNIFICANT CHANGE UP (ref 0.7–1.5)
EGFR: 76 ML/MIN/1.73M2 — SIGNIFICANT CHANGE UP
EOSINOPHIL # BLD AUTO: 0.24 K/UL — SIGNIFICANT CHANGE UP (ref 0–0.7)
EOSINOPHIL NFR BLD AUTO: 4.5 % — SIGNIFICANT CHANGE UP (ref 0–8)
GLUCOSE SERPL-MCNC: 101 MG/DL — HIGH (ref 70–99)
HCT VFR BLD CALC: 32.1 % — LOW (ref 42–52)
HGB BLD-MCNC: 11.4 G/DL — LOW (ref 14–18)
IMM GRANULOCYTES NFR BLD AUTO: 0.6 % — HIGH (ref 0.1–0.3)
LYMPHOCYTES # BLD AUTO: 1.16 K/UL — LOW (ref 1.2–3.4)
LYMPHOCYTES # BLD AUTO: 21.7 % — SIGNIFICANT CHANGE UP (ref 20.5–51.1)
MAGNESIUM SERPL-MCNC: 1.9 MG/DL — SIGNIFICANT CHANGE UP (ref 1.8–2.4)
MCHC RBC-ENTMCNC: 35.5 G/DL — SIGNIFICANT CHANGE UP (ref 32–37)
MCHC RBC-ENTMCNC: 36 PG — HIGH (ref 27–31)
MCV RBC AUTO: 101.3 FL — HIGH (ref 80–94)
MONOCYTES # BLD AUTO: 0.56 K/UL — SIGNIFICANT CHANGE UP (ref 0.1–0.6)
MONOCYTES NFR BLD AUTO: 10.5 % — HIGH (ref 1.7–9.3)
NEUTROPHILS # BLD AUTO: 3.3 K/UL — SIGNIFICANT CHANGE UP (ref 1.4–6.5)
NEUTROPHILS NFR BLD AUTO: 61.8 % — SIGNIFICANT CHANGE UP (ref 42.2–75.2)
NRBC # BLD: 0 /100 WBCS — SIGNIFICANT CHANGE UP (ref 0–0)
PLATELET # BLD AUTO: 117 K/UL — LOW (ref 130–400)
POTASSIUM SERPL-MCNC: 3.9 MMOL/L — SIGNIFICANT CHANGE UP (ref 3.5–5)
POTASSIUM SERPL-SCNC: 3.9 MMOL/L — SIGNIFICANT CHANGE UP (ref 3.5–5)
PROT SERPL-MCNC: 5.9 G/DL — LOW (ref 6–8)
RBC # BLD: 3.17 M/UL — LOW (ref 4.7–6.1)
RBC # FLD: 12.8 % — SIGNIFICANT CHANGE UP (ref 11.5–14.5)
SODIUM SERPL-SCNC: 140 MMOL/L — SIGNIFICANT CHANGE UP (ref 135–146)
WBC # BLD: 5.34 K/UL — SIGNIFICANT CHANGE UP (ref 4.8–10.8)
WBC # FLD AUTO: 5.34 K/UL — SIGNIFICANT CHANGE UP (ref 4.8–10.8)

## 2022-04-24 PROCEDURE — 99232 SBSQ HOSP IP/OBS MODERATE 35: CPT

## 2022-04-24 RX ORDER — POTASSIUM CHLORIDE 20 MEQ
20 PACKET (EA) ORAL ONCE
Refills: 0 | Status: COMPLETED | OUTPATIENT
Start: 2022-04-24 | End: 2022-04-24

## 2022-04-24 RX ORDER — MAGNESIUM SULFATE 500 MG/ML
1 VIAL (ML) INJECTION ONCE
Refills: 0 | Status: COMPLETED | OUTPATIENT
Start: 2022-04-24 | End: 2022-04-24

## 2022-04-24 RX ADMIN — Medication 1 TABLET(S): at 11:23

## 2022-04-24 RX ADMIN — Medication 20 MILLILITER(S): at 17:04

## 2022-04-24 RX ADMIN — Medication 20 MILLILITER(S): at 05:52

## 2022-04-24 RX ADMIN — PANTOPRAZOLE SODIUM 40 MILLIGRAM(S): 20 TABLET, DELAYED RELEASE ORAL at 05:51

## 2022-04-24 RX ADMIN — Medication 650 MILLIGRAM(S): at 23:59

## 2022-04-24 RX ADMIN — Medication 20 MILLIEQUIVALENT(S): at 10:34

## 2022-04-24 RX ADMIN — AMLODIPINE BESYLATE 5 MILLIGRAM(S): 2.5 TABLET ORAL at 05:51

## 2022-04-24 RX ADMIN — Medication 25 MILLIGRAM(S): at 05:52

## 2022-04-24 RX ADMIN — ENOXAPARIN SODIUM 40 MILLIGRAM(S): 100 INJECTION SUBCUTANEOUS at 17:04

## 2022-04-24 RX ADMIN — Medication 100 GRAM(S): at 09:51

## 2022-04-24 RX ADMIN — Medication 20 MILLILITER(S): at 11:24

## 2022-04-24 RX ADMIN — Medication 100 MILLIGRAM(S): at 11:23

## 2022-04-24 RX ADMIN — Medication 20 MILLILITER(S): at 01:12

## 2022-04-24 RX ADMIN — TAMSULOSIN HYDROCHLORIDE 0.4 MILLIGRAM(S): 0.4 CAPSULE ORAL at 21:34

## 2022-04-24 RX ADMIN — Medication 20 MILLILITER(S): at 23:59

## 2022-04-24 RX ADMIN — Medication 1 MILLIGRAM(S): at 11:23

## 2022-04-24 RX ADMIN — Medication 650 MILLIGRAM(S): at 21:33

## 2022-04-24 NOTE — PROGRESS NOTE ADULT - ASSESSMENT
ASSESSMENT & PLAN:  Mr Nonoan is a 61 YOM w a PMH of EtOH dependence, Virtigo, HTN, CKD, and Gout presented 4/18 for diffuse chest pain and vomiting. Patient now in EtOH detox on CIWA     Problem List    #Etoh withdrawal  CIWA today is 0  Patient denies trembling, diaphoresis, nausea, somnolence, SOB, CP, tachycardia, changes in vision or mentation   - s/p librium taper    #Suspect folate and thiamine defieicny due to etoh use  - c/w supplementation    #Chest pain RESOLVED   pt was seen by cardiology in 10/2021 and was offered a stress test but declined  as per cardiology note then there was calcifications on prior Ct chest  - normal adenosine stress test  - EKG NSR, normal troponin  - dc telemetry    #Fever RESOLVED   Sore throat  - repeat covid negative      #Pancytopenia  likely secondary to bone marrow suppression from alcohol  - plts decreased -- 4T 3 making low probability of HIT. Will monitor for now    #Hypophosphatemia and Hypomagnesemia  - likely due to etoh use     #BPH  - c/w flomax      #Misc  - DVT Prophylaxis: enoxaparin 40mg once daily   - GI Prophylaxis: protonix 40 qd  - Diet: DASH/TLC  - Activity: as tolerated   - IV Fluids: NA  - Code Status: full

## 2022-04-24 NOTE — PROGRESS NOTE ADULT - SUBJECTIVE AND OBJECTIVE BOX
CAT ALSTON 61y Male  MRN#: [id number]   Hospital Day: 6d    SUBJECTIVE  Patient is a 61y old Male who presents with a chief complaint of shivering  chest discomfort (24 Apr 2022 10:53)  Currently admitted to medicine with the primary diagnosis of Chest pain      INTERVAL HPI AND OVERNIGHT EVENTS:  Patient was examined and seen at bedside. This morning he is resting comfortably in bed and reports no issues or overnight events.    REVIEW OF SYMPTOMS:  CONSTITUTIONAL: No weakness, fevers or chills; No headaches  EYES: No visual changes, eye pain, or discharge  ENT: No vertigo; No ear pain or change in hearing; No sore throat or difficulty swallowing  NECK: No pain or stiffness  RESPIRATORY: No cough, wheezing, or hemoptysis; No shortness of breath  CARDIOVASCULAR: No chest pain or palpitations  GASTROINTESTINAL: No abdominal or epigastric pain; No nausea, vomiting, or hematemesis; No diarrhea or constipation; No melena or hematochezia  GENITOURINARY: No dysuria, frequency or hematuria  MUSCULOSKELETAL: No joint pain, no muscle pain, no weakness  NEUROLOGICAL: No numbness or weakness  SKIN: No itching or rashes    OBJECTIVE  PAST MEDICAL & SURGICAL HISTORY  Alcohol dependence    Vertigo    HTN (hypertension)    Hard of hearing    Gout    Seasonal allergies    CKD (chronic kidney disease)    No significant past surgical history      ALLERGIES:  Beef (Hives)  dairy products (Hives)  No Known Drug Allergies  shellfish (Hives)    MEDICATIONS:  STANDING MEDICATIONS  amLODIPine   Tablet 5 milliGRAM(s) Oral daily  chlorhexidine 4% Liquid 1 Application(s) Topical daily  enoxaparin Injectable 40 milliGRAM(s) SubCutaneous every 24 hours  folic acid 1 milliGRAM(s) Oral daily  guaifenesin/dextromethorphan Oral Liquid 20 milliLiter(s) Oral every 6 hours  metoprolol succinate ER 25 milliGRAM(s) Oral daily  multivitamin 1 Tablet(s) Oral daily  pantoprazole    Tablet 40 milliGRAM(s) Oral before breakfast  regadenoson Injectable 0.4 milliGRAM(s) IV Push once  tamsulosin 0.4 milliGRAM(s) Oral at bedtime  thiamine 100 milliGRAM(s) Oral daily    PRN MEDICATIONS  acetaminophen     Tablet .. 650 milliGRAM(s) Oral every 6 hours PRN  LORazepam   Injectable 1 milliGRAM(s) IV Push every 1 hour PRN      VITAL SIGNS: Last 24 Hours  T(C): 36.5 (24 Apr 2022 05:01), Max: 36.5 (23 Apr 2022 19:44)  T(F): 97.7 (24 Apr 2022 05:01), Max: 97.7 (23 Apr 2022 19:44)  HR: 73 (24 Apr 2022 05:01) (66 - 73)  BP: 123/74 (24 Apr 2022 05:01) (123/66 - 135/65)  BP(mean): 94 (23 Apr 2022 19:44) (94 - 94)  RR: 18 (24 Apr 2022 07:35) (16 - 20)  SpO2: 98% (24 Apr 2022 07:35) (97% - 98%)    LABS:                        11.4   5.34  )-----------( 117      ( 24 Apr 2022 06:01 )             32.1     04-24    140  |  105  |  10  ----------------------------<  101<H>  3.9   |  22  |  1.1    Ca    8.9      24 Apr 2022 06:01  Mg     1.9     04-24    TPro  5.9<L>  /  Alb  3.6  /  TBili  0.6  /  DBili  x   /  AST  60<H>  /  ALT  43<H>  /  AlkPhos  59  04-24                  RADIOLOGY:      PHYSICAL EXAM:  CONSTITUTIONAL: No acute distress, well-developed, well-groomed, AAOx3  HEAD: Atraumatic, normocephalic  EYES: EOM intact, PERRLA, conjunctiva and sclera clear  ENT: Supple, no masses, no thyromegaly, no bruits, no JVD; moist mucous membranes  PULMONARY: Clear to auscultation bilaterally; no wheezes, rales, or rhonchi  CARDIOVASCULAR: Regular rate and rhythm; no murmurs, rubs, or gallops  GASTROINTESTINAL: Soft, non-tender, non-distended; bowel sounds present  MUSCULOSKELETAL: 2+ peripheral pulses; no clubbing, no cyanosis, no edema  NEUROLOGY: non-focal  SKIN: No rashes or lesions; warm and dry

## 2022-04-24 NOTE — PROGRESS NOTE ADULT - ASSESSMENT
62 yo M PMHx etoh dependence, HTN, BPH, CKD II presented for evaluation of chest pain. Pt reports it diffuse over entire chest. Pt was also vomiting day of presentation and subsequently felt lightheaded.    Etoh withdrawal  - CIWA 0  - s/p librium taper    Suspect folate and thiamine defieicny due to etoh use  - c/w supplementation    Chest pain  - pt was seen by cardiology in 10/2021 and was offered a stress test but declined  - as per cardiology note then there was calcifications on prior Ct chest  - normal adenosine stress test  - EKG NSR, normal troponin  - dc telemetry    Fever  Sore throat  - repeat covid negative  - resolved    Pancytopenia  - could be bone marrow suppression from alcohol  - plts decreased -- 4T 3 making low probability of HIT. Will monitor for now    Hypophosphatemia and Hypomagnesemia  - likely due to etoh use     Lactic acidosis  HAGMA  - present on admission  - resolved    BPH  - c/w flomax    DVT px  Full Code    Progress Note Handoff:  Pending (specify):  dc in AM - adult home cannot accept over weekend  Family discussion: discussed normal stress test and healthy diet  Disposition: Home___/SNF___/Other___[facility]_____/Unknown at this time________

## 2022-04-24 NOTE — PROGRESS NOTE ADULT - SUBJECTIVE AND OBJECTIVE BOX
CHIEF COMPLAINT:    Patient is a 61y old  Male who presents with a chief complaint of shivering  chest discomfort (2022 21:18)      INTERVAL HPI/OVERNIGHT EVENTS:    Patient seen and examined at bedside. No acute overnight events occurred.    ROS: Denies chest pain. All other systems are negative.    Vital Signs:    T(F): 97.7 (22 @ 05:01), Max: 97.7 (22 @ 19:44)  HR: 73 (22 @ 05:01) (66 - 73)  BP: 123/74 (22 @ 05:01) (123/66 - 135/65)  RR: 18 (22 @ 07:35) (16 - 20)  SpO2: 98% (22 @ 07:35) (97% - 98%)  I&O's Summary    2022 07:01  -  2022 07:00  --------------------------------------------------------  IN: 0 mL / OUT: 300 mL / NET: -300 mL      Daily     Daily Weight in k.1 (2022 05:01)  CAPILLARY BLOOD GLUCOSE      PHYSICAL EXAM:  GENERAL:  NAD  SKIN: No rashes or lesions  HEENT: Atraumatic. Normocephalic. Anicteric  NECK:  No JVD.   PULMONARY: Clear to ausculation bilaterally. No wheezing. No rales  CVS: Normal S1, S2. Regular rate and rhythm. No murmurs.  ABDOMEN/GI: Soft, Nontender, Nondistended; Bowel sounds are present  EXTREMITIES:  No edema B/L LE.  NEUROLOGIC:  No motor deficit.  PSYCH: Alert & oriented x 3, normal affect    Consultant(s) Notes Reviewed:  [x ] YES  [ ] NO  Care Discussed with Consultants/Other Providers [ x] YES  [ ] NO    LABS:                        11.4   5.34  )-----------( 117      ( 2022 06:01 )             32.1         140  |  105  |  10  ----------------------------<  101<H>  3.9   |  22  |  1.1    Ca    8.9      2022 06:01  Mg     1.9     -24    TPro  5.9<L>  /  Alb  3.6  /  TBili  0.6  /  DBili  x   /  AST  60<H>  /  ALT  43<H>  /  AlkPhos  59  -      RADIOLOGY & ADDITIONAL TESTS:  Imaging or report Personally Reviewed:  [ ] YES  [ ] NO    Telemetry reviewed independently - no acute events    Medications:  Standing  amLODIPine   Tablet 5 milliGRAM(s) Oral daily  chlorhexidine 4% Liquid 1 Application(s) Topical daily  enoxaparin Injectable 40 milliGRAM(s) SubCutaneous every 24 hours  folic acid 1 milliGRAM(s) Oral daily  guaifenesin/dextromethorphan Oral Liquid 20 milliLiter(s) Oral every 6 hours  metoprolol succinate ER 25 milliGRAM(s) Oral daily  multivitamin 1 Tablet(s) Oral daily  pantoprazole    Tablet 40 milliGRAM(s) Oral before breakfast  regadenoson Injectable 0.4 milliGRAM(s) IV Push once  tamsulosin 0.4 milliGRAM(s) Oral at bedtime  thiamine 100 milliGRAM(s) Oral daily    PRN Meds  acetaminophen     Tablet .. 650 milliGRAM(s) Oral every 6 hours PRN  LORazepam   Injectable 1 milliGRAM(s) IV Push every 1 hour PRN      Case discussed with resident  Care discussed with pt

## 2022-04-25 ENCOUNTER — TRANSCRIPTION ENCOUNTER (OUTPATIENT)
Age: 62
End: 2022-04-25

## 2022-04-25 LAB
ALBUMIN SERPL ELPH-MCNC: 3.7 G/DL — SIGNIFICANT CHANGE UP (ref 3.5–5.2)
ALP SERPL-CCNC: 55 U/L — SIGNIFICANT CHANGE UP (ref 30–115)
ALT FLD-CCNC: 50 U/L — HIGH (ref 0–41)
ANION GAP SERPL CALC-SCNC: 10 MMOL/L — SIGNIFICANT CHANGE UP (ref 7–14)
APPEARANCE UR: CLEAR — SIGNIFICANT CHANGE UP
AST SERPL-CCNC: 55 U/L — HIGH (ref 0–41)
BASOPHILS # BLD AUTO: 0.04 K/UL — SIGNIFICANT CHANGE UP (ref 0–0.2)
BASOPHILS NFR BLD AUTO: 0.8 % — SIGNIFICANT CHANGE UP (ref 0–1)
BILIRUB SERPL-MCNC: 0.6 MG/DL — SIGNIFICANT CHANGE UP (ref 0.2–1.2)
BILIRUB UR-MCNC: NEGATIVE — SIGNIFICANT CHANGE UP
BUN SERPL-MCNC: 13 MG/DL — SIGNIFICANT CHANGE UP (ref 10–20)
CALCIUM SERPL-MCNC: 9 MG/DL — SIGNIFICANT CHANGE UP (ref 8.5–10.1)
CHLORIDE SERPL-SCNC: 105 MMOL/L — SIGNIFICANT CHANGE UP (ref 98–110)
CO2 SERPL-SCNC: 23 MMOL/L — SIGNIFICANT CHANGE UP (ref 17–32)
COLOR SPEC: SIGNIFICANT CHANGE UP
CREAT SERPL-MCNC: 1.1 MG/DL — SIGNIFICANT CHANGE UP (ref 0.7–1.5)
DIFF PNL FLD: NEGATIVE — SIGNIFICANT CHANGE UP
EGFR: 76 ML/MIN/1.73M2 — SIGNIFICANT CHANGE UP
EOSINOPHIL # BLD AUTO: 0.13 K/UL — SIGNIFICANT CHANGE UP (ref 0–0.7)
EOSINOPHIL NFR BLD AUTO: 2.6 % — SIGNIFICANT CHANGE UP (ref 0–8)
GLUCOSE SERPL-MCNC: 107 MG/DL — HIGH (ref 70–99)
GLUCOSE UR QL: NEGATIVE — SIGNIFICANT CHANGE UP
HCT VFR BLD CALC: 31.1 % — LOW (ref 42–52)
HGB BLD-MCNC: 10.7 G/DL — LOW (ref 14–18)
IMM GRANULOCYTES NFR BLD AUTO: 0.8 % — HIGH (ref 0.1–0.3)
KETONES UR-MCNC: NEGATIVE — SIGNIFICANT CHANGE UP
LEUKOCYTE ESTERASE UR-ACNC: NEGATIVE — SIGNIFICANT CHANGE UP
LYMPHOCYTES # BLD AUTO: 1.17 K/UL — LOW (ref 1.2–3.4)
LYMPHOCYTES # BLD AUTO: 23.4 % — SIGNIFICANT CHANGE UP (ref 20.5–51.1)
MAGNESIUM SERPL-MCNC: 1.8 MG/DL — SIGNIFICANT CHANGE UP (ref 1.8–2.4)
MCHC RBC-ENTMCNC: 34.4 G/DL — SIGNIFICANT CHANGE UP (ref 32–37)
MCHC RBC-ENTMCNC: 34.9 PG — HIGH (ref 27–31)
MCV RBC AUTO: 101.3 FL — HIGH (ref 80–94)
MONOCYTES # BLD AUTO: 0.75 K/UL — HIGH (ref 0.1–0.6)
MONOCYTES NFR BLD AUTO: 15 % — HIGH (ref 1.7–9.3)
NEUTROPHILS # BLD AUTO: 2.86 K/UL — SIGNIFICANT CHANGE UP (ref 1.4–6.5)
NEUTROPHILS NFR BLD AUTO: 57.4 % — SIGNIFICANT CHANGE UP (ref 42.2–75.2)
NITRITE UR-MCNC: NEGATIVE — SIGNIFICANT CHANGE UP
NRBC # BLD: 0 /100 WBCS — SIGNIFICANT CHANGE UP (ref 0–0)
PH UR: 5.5 — SIGNIFICANT CHANGE UP (ref 5–8)
PLATELET # BLD AUTO: 139 K/UL — SIGNIFICANT CHANGE UP (ref 130–400)
POTASSIUM SERPL-MCNC: 4.2 MMOL/L — SIGNIFICANT CHANGE UP (ref 3.5–5)
POTASSIUM SERPL-SCNC: 4.2 MMOL/L — SIGNIFICANT CHANGE UP (ref 3.5–5)
PROT SERPL-MCNC: 5.8 G/DL — LOW (ref 6–8)
PROT UR-MCNC: NEGATIVE — SIGNIFICANT CHANGE UP
RBC # BLD: 3.07 M/UL — LOW (ref 4.7–6.1)
RBC # FLD: 12.7 % — SIGNIFICANT CHANGE UP (ref 11.5–14.5)
SARS-COV-2 RNA SPEC QL NAA+PROBE: SIGNIFICANT CHANGE UP
SODIUM SERPL-SCNC: 138 MMOL/L — SIGNIFICANT CHANGE UP (ref 135–146)
SP GR SPEC: 1.01 — SIGNIFICANT CHANGE UP (ref 1.01–1.03)
UROBILINOGEN FLD QL: SIGNIFICANT CHANGE UP
WBC # BLD: 4.99 K/UL — SIGNIFICANT CHANGE UP (ref 4.8–10.8)
WBC # FLD AUTO: 4.99 K/UL — SIGNIFICANT CHANGE UP (ref 4.8–10.8)

## 2022-04-25 PROCEDURE — 99233 SBSQ HOSP IP/OBS HIGH 50: CPT

## 2022-04-25 PROCEDURE — 73600 X-RAY EXAM OF ANKLE: CPT | Mod: 26,LT

## 2022-04-25 PROCEDURE — 71045 X-RAY EXAM CHEST 1 VIEW: CPT | Mod: 26

## 2022-04-25 RX ADMIN — Medication 1 MILLIGRAM(S): at 11:35

## 2022-04-25 RX ADMIN — Medication 650 MILLIGRAM(S): at 19:58

## 2022-04-25 RX ADMIN — Medication 1 TABLET(S): at 11:35

## 2022-04-25 RX ADMIN — PANTOPRAZOLE SODIUM 40 MILLIGRAM(S): 20 TABLET, DELAYED RELEASE ORAL at 05:58

## 2022-04-25 RX ADMIN — Medication 20 MILLILITER(S): at 17:09

## 2022-04-25 RX ADMIN — Medication 650 MILLIGRAM(S): at 19:28

## 2022-04-25 RX ADMIN — AMLODIPINE BESYLATE 5 MILLIGRAM(S): 2.5 TABLET ORAL at 05:58

## 2022-04-25 RX ADMIN — Medication 25 MILLIGRAM(S): at 06:00

## 2022-04-25 RX ADMIN — Medication 100 MILLIGRAM(S): at 11:35

## 2022-04-25 RX ADMIN — TAMSULOSIN HYDROCHLORIDE 0.4 MILLIGRAM(S): 0.4 CAPSULE ORAL at 21:31

## 2022-04-25 RX ADMIN — Medication 20 MILLILITER(S): at 06:01

## 2022-04-25 RX ADMIN — ENOXAPARIN SODIUM 40 MILLIGRAM(S): 100 INJECTION SUBCUTANEOUS at 17:10

## 2022-04-25 RX ADMIN — CHLORHEXIDINE GLUCONATE 1 APPLICATION(S): 213 SOLUTION TOPICAL at 11:37

## 2022-04-25 RX ADMIN — Medication 20 MILLILITER(S): at 11:36

## 2022-04-25 NOTE — DISCHARGE NOTE PROVIDER - NSDCMRMEDTOKEN_GEN_ALL_CORE_FT
Flomax 0.4 mg oral capsule: 1 cap(s) orally once a day (at bedtime)  folic acid 1 mg oral tablet: 1 tab(s) orally once a day  Norvasc 5 mg oral tablet: 1 tab(s) orally once a day  thiamine 100 mg oral tablet: 1 tab(s) orally once a day   Flomax 0.4 mg oral capsule: 1 cap(s) orally once a day (at bedtime)  folic acid 1 mg oral tablet: 1 tab(s) orally once a day  loratadine 10 mg oral tablet: 1 tab(s) orally once a day  metoprolol succinate 25 mg oral tablet, extended release: 1 tab(s) orally once a day  Multiple Vitamins oral tablet: 1 tab(s) orally once a day  Norvasc 5 mg oral tablet: 1 tab(s) orally once a day  thiamine 100 mg oral tablet: 1 tab(s) orally once a day

## 2022-04-25 NOTE — PROGRESS NOTE ADULT - SUBJECTIVE AND OBJECTIVE BOX
CHIEF COMPLAINT:    Patient is a 61y old  Male who presents with a chief complaint of shivering  chest discomfort (25 Apr 2022 11:47)      INTERVAL HPI/OVERNIGHT EVENTS:    Patient seen and examined at bedside. Pt developed fever overnight    ROS: Reports left ankle pain.  All other systems are negative.    Medications:  Standing  amLODIPine   Tablet 5 milliGRAM(s) Oral daily  chlorhexidine 4% Liquid 1 Application(s) Topical daily  enoxaparin Injectable 40 milliGRAM(s) SubCutaneous every 24 hours  folic acid 1 milliGRAM(s) Oral daily  guaifenesin/dextromethorphan Oral Liquid 20 milliLiter(s) Oral every 6 hours  metoprolol succinate ER 25 milliGRAM(s) Oral daily  multivitamin 1 Tablet(s) Oral daily  pantoprazole    Tablet 40 milliGRAM(s) Oral before breakfast  tamsulosin 0.4 milliGRAM(s) Oral at bedtime  thiamine 100 milliGRAM(s) Oral daily    PRN Meds  acetaminophen     Tablet .. 650 milliGRAM(s) Oral every 6 hours PRN        Vital Signs:    T(F): 99.2 (04-25-22 @ 13:16), Max: 101 (04-24-22 @ 21:01)  HR: 79 (04-25-22 @ 13:16) (76 - 79)  BP: 128/59 (04-25-22 @ 13:16) (119/68 - 145/67)  RR: 18 (04-25-22 @ 13:16) (18 - 19)  SpO2: --  I&O's Summary    24 Apr 2022 07:01  -  25 Apr 2022 07:00  --------------------------------------------------------  IN: 1300 mL / OUT: 0 mL / NET: 1300 mL      PHYSICAL EXAM:  GENERAL:  NAD  SKIN: No rashes or lesions  HEENT: Atraumatic. Normocephalic. Anicteric  NECK:  No JVD.   PULMONARY: Clear to ausculation bilaterally. No wheezing. No rales  CVS: Normal S1, S2. Regular rate and rhythm. No murmurs.  ABDOMEN/GI: Soft, Nontender, Nondistended; Bowel sounds are present  EXTREMITIES:  No edema B/L LE.  NEUROLOGIC:  No motor deficit.  PSYCH: Alert & oriented x 3, normal affect      LABS:                        10.7   4.99  )-----------( 139      ( 25 Apr 2022 07:01 )             31.1     04-25    138  |  105  |  13  ----------------------------<  107<H>  4.2   |  23  |  1.1    Ca    9.0      25 Apr 2022 07:01  Mg     1.8     04-25    TPro  5.8<L>  /  Alb  3.7  /  TBili  0.6  /  DBili  x   /  AST  55<H>  /  ALT  50<H>  /  AlkPhos  55  04-25      RADIOLOGY & ADDITIONAL TESTS:  Imaging or report Personally Reviewed:  [ ] YES  [ ] NO -->no new images    Telemetry reviewed independently - NSR, no acute events  EKG reviewed independently -->no new EKGs    Consultant(s) Notes Reviewed:  [ ] YES  [ ] NO  Care Discussed with Consultants/Other Providers [ ] YES  [ ] NO    Case discussed with resident  Care discussed with pt

## 2022-04-25 NOTE — DISCHARGE NOTE PROVIDER - HOSPITAL COURSE
62 yo male, h.o GERD, obesity, HTN, BPH, CKD alcohol abuse who drinks one pint daily presented for chest discomfort and shivering  Patient did not drink last two days. He presented for shivering that started this AM associated with palpitations and chest discomfort  Symptoms are intermittent, aggravating over the time. Chest discomfort is severe non radiating , dull all over the chest, not related to activity and happens at rest.  patient reports headache, lightheadedness and dizziness ; no visual disturbance   in ED patient had tachycardia on EKG with no acute ischemic changes and low voltage   ED vitals remarkable for tachycardia  ROS negative for nausea, vomiting , diarrhea   ROS otherwise negative ; no syncope no head trauma         Etoh withdrawal  - CIWA 0  - s/p librium taper    Suspect folate and thiamine defieicny due to etoh use  - c/w supplementation    Chest pain  - pt was seen by cardiology in 10/2021 and was offered a stress test but declined  - as per cardiology note then there was calcifications on prior Ct chest  - normal adenosine stress test  - EKG NSR, normal troponin  - dc telemetry    Fever  Sore throat  - repeat covid negative  - resolved    Pancytopenia  - could be bone marrow suppression from alcohol  - plts decreased -- 4T 3 making low probability of HIT. Will monitor for now    Hypophosphatemia and Hypomagnesemia  - likely due to etoh use     Lactic acidosis  HAGMA  - present on admission  - resolved 60 yo male, h.o GERD, obesity, HTN, BPH, CKD alcohol abuse who drinks one pint daily presented for chest discomfort and shivering  Patient did not drink last two days. He presented for shivering that started this AM associated with palpitations and chest discomfort  Symptoms are intermittent, aggravating over the time. Chest discomfort is severe non radiating , dull all over the chest, not related to activity and happens at rest.  patient reports headache, lightheadedness and dizziness ; no visual disturbance   in ED patient had tachycardia on EKG with no acute ischemic changes and low voltage   ED vitals remarkable for tachycardia  ROS negative for nausea, vomiting , diarrhea   ROS otherwise negative ; no syncope no head trauma     #Etoh withdrawal  - CIWA 0  - s/p librium taper    #Suspect folate and thiamine defieicny due to etoh use  - c/w supplementation    #Chest pain  - pt was seen by cardiology in 10/2021 and was offered a stress test but declined  - as per cardiology note then there was calcifications on prior Ct chest  - normal adenosine stress test  - EKG NSR, normal troponin  - dc telemetry    #Fever  #Sore throat  - repeat covid negative  - resolved    #Pancytopenia  - could be bone marrow suppression from alcohol  - plts decreased -- 4T 3 making low probability of HIT. Will monitor for now    #Hypophosphatemia and Hypomagnesemia  - likely due to etoh use     #Lactic acidosis  #HAGMA  - present on admission  - resolved

## 2022-04-25 NOTE — PROGRESS NOTE ADULT - ASSESSMENT
Mr Saran is a 61 YOM w a PMH of EtOH dependence, Vertigo HTN, CKD, and Gout presented 4/18 for diffuse chest pain and vomiting. Patient now in EtOH detox on CIWA     # Etoh withdrawal  - No tremors on physical. Denies diaphoresis, nausea, somnolence, SOB, CP, tachycardia, changes in vision or mentation   - s/p librium taper  - Seen by catch team    #Suspect folate and thiamine deficiency due to etoh use  - c/w supplementation    #Pancytopenia, improving   - likely secondary to bone marrow suppression from alcohol  - Pl normal, 139  - WBC nl, 4.99    #Vertigo  - Ambulates with walker     #Gout  - F/u xray ankle     #Fevers of unclear etiology  - Possibly gout flare?   - Last T 100.4 4/24  - Bl cx 4/20 and 21st NTD. No leukocytosis  - CXR 4/21 unremarkable    - repeat covid negative  - F/u UA and procal    # CKD  - Cr at bl    #Chest pain, RESOLVED   - pt was seen by cardiology in 10/2021 and was offered a stress test but declined  - as per cardiology note then there was calcifications on prior Ct chest  - normal adenosine stress test  - EKG NSR, normal troponin  - dc telemetry    #BPH  - c/w flomax    DVT Prophylaxis: enoxaparin 40mg once daily   GI Prophylaxis: protonix 40 qd  Diet: DASH/TLC  Activity: as tolerated   Code Status: full   Dispo: d/c to group home when pt defervesces, covid pcr ordered      Mr Saran is a 61 YOM w a PMH of EtOH dependence, Vertigo HTN, CKD, and Gout presented 4/18 for diffuse chest pain and vomiting. Patient now in EtOH detox on CIWA     # Etoh withdrawal  - No tremors on physical. Denies diaphoresis, nausea, somnolence, SOB, CP, tachycardia, changes in vision or mentation   - s/p librium taper  - Seen by catch team    #Suspect folate and thiamine deficiency due to etoh use  - c/w supplementation    #Pancytopenia, improving   - likely secondary to bone marrow suppression from alcohol  - Pl normal, 139  - WBC nl, 4.99    #Vertigo  - Pt denies this diagnosis   - Ambulates with walker     #Gout  - off meds   - pain with movement of L ankle   - No erythema, mild swelling `  - F/u xray L ankle     #Fevers of unclear etiology  - Possibly gout flare?   - Last T 100.4 4/24  - UA (-). Bl cx 4/20 and 21st NTD. No leukocytosis  - CXR 4/21 unremarkable    - repeat covid negative  - F/u procal    # CKD  - Cr at bl    #Chest pain, RESOLVED   - pt was seen by cardiology in 10/2021 and was offered a stress test but declined  - as per cardiology note then there was calcifications on prior Ct chest  - normal adenosine stress test  - EKG NSR, normal troponin  - dc telemetry    #BPH  - c/w flomax    DVT Prophylaxis: enoxaparin 40mg once daily   GI Prophylaxis: protonix 40 qd  Diet: DASH/TLC  Activity: as tolerated   Code Status: full   Dispo: d/c to group home when pt defervesces, covid pcr ordered

## 2022-04-25 NOTE — DISCHARGE NOTE PROVIDER - CARE PROVIDERS DIRECT ADDRESSES
[email or web address]

## 2022-04-25 NOTE — PROGRESS NOTE ADULT - ASSESSMENT
62 yo M PMHx etoh dependence, HTN, BPH, CKD II presented for evaluation of chest pain. Pt reports it diffuse over entire chest. Pt was also vomiting day of presentation and subsequently felt lightheaded.    Etoh withdrawal  - CIWA 0  - s/p librium taper    Suspect folate and thiamine defieicny due to etoh use  - c/w supplementation    Chest pain  - pt was seen by cardiology in 10/2021 and was offered a stress test but declined  - as per cardiology note then there was calcifications on prior Ct chest  - normal adenosine stress test  - EKG NSR, normal troponin  - off telemetry    Fever  - Tmax 101 overnight  - had prior episodes this admission with negative infection work up  - repeat covid tset, monitor  - may need s&s eval--perhaps pt has microaspirations and fevers are due to chemical pneumonitis?    Pancytopenia  - could be bone marrow suppression from alcohol  - resolved    Hypophosphatemia and Hypomagnesemia  - likely due to etoh use     Lactic acidosis  HAGMA  - present on admission  - resolved    BPH  - c/w flomax    DVT px  Full Code    Progress Note Handoff:  Pending (specify):  fever work up  Family discussion: discussed fever  Disposition: Home___/SNF___/Other___[facility]_____/Unknown at this time________

## 2022-04-25 NOTE — DISCHARGE NOTE PROVIDER - NSDCCPCAREPLAN_GEN_ALL_CORE_FT
PRINCIPAL DISCHARGE DIAGNOSIS  Diagnosis: Alcohol withdrawal  Assessment and Plan of Treatment: You came into the hospital with treamors which were found to be due to alcohol withdrawl. We treated you with a librium taper (a medication to help reduce withdrawl sx) and your symptoms improved. Please avoid alcohol intake in the future and follow with the catch team as a outpatient for additonal resources.   If you again begin to experince withdrawl symptoms, please return to the hospital as alcohol withdrawl is a life threatining condition.      SECONDARY DISCHARGE DIAGNOSES  Diagnosis: Chest pain  Assessment and Plan of Treatment: During your hospital stay, you had an episode of chest pain. You were monitored on the telemetry unit (continuous monitoring of your heart rhythm) and were seen by cardiology. You underwent a pharmacologic stress test, a test where you are given a medication to stimulate your heart to work harder in order to see if there is any evidence of reduced blood flow to your heart, this was normal. Please continue to follow with your doctors as a outpatient and take all meds as prescribed. If you again develop chest pain please come to the emergency room for evaluation.    Diagnosis: Folate deficiency  Assessment and Plan of Treatment: Alcohol use causes people to be deficient in vitamins and minerals that are essential to your body's function. You were found to be deficient and started on supplementation. Please continue taking this supplementation and following with your doctors as a outpatinet.    Diagnosis: Thiamine deficiency  Assessment and Plan of Treatment: Alcohol use causes people to be deficient in vitamins and minerals that are essential to your body's function. You were found to be deficient and started on supplementation. Please continue taking this supplementation and following with your doctors as a outpatinet.    Diagnosis: Pancytopenia  Assessment and Plan of Treatment: During your hospital stay you were found to be pancytopenic, meaning your blood cells are present in lower numbers than they should be. This is likely due to alcohol use. Your levels are improving since you were admitted. Please avoid future alcohol use and follow with your doctors as a outpatient for monitoring of this condition. If you develop worsening palpitations, fatigue, or shortness of breath while exerting yourself, please seek evaluation by a medical professional.

## 2022-04-25 NOTE — DISCHARGE NOTE PROVIDER - CARE PROVIDER_API CALL
Eugene Tellez)  Internal Medicine  [address] [place] [address]  Phone: [phone]  Fax: [phone]  Follow Up Time:     Oscar Holt)  Cardiovascular Disease  [address], [address]  [place] [address]  Phone: [phone]  Fax: [phone]  Follow Up Time:

## 2022-04-25 NOTE — PROGRESS NOTE ADULT - SUBJECTIVE AND OBJECTIVE BOX
Hospital Day:  7d    Subjective:    Patient is a 61y old  Male who presents with a chief complaint of shivering  chest discomfort (25 Apr 2022 07:44)      Admitted to medicine for a primary diagnosis of     Past Medical Hx:   No pertinent past medical history    Alcohol dependence    Vertigo    Anemia    Tobacco dependency    HTN (hypertension)    Hard of hearing    Gout    Seasonal allergies    CKD (chronic kidney disease)      Past Sx:  No significant past surgical history      Allergies:  Beef (Hives)  dairy products (Hives)  No Known Drug Allergies  shellfish (Hives)    Current Meds:   Standng Meds:  amLODIPine   Tablet 5 milliGRAM(s) Oral daily  chlorhexidine 4% Liquid 1 Application(s) Topical daily  enoxaparin Injectable 40 milliGRAM(s) SubCutaneous every 24 hours  folic acid 1 milliGRAM(s) Oral daily  guaifenesin/dextromethorphan Oral Liquid 20 milliLiter(s) Oral every 6 hours  metoprolol succinate ER 25 milliGRAM(s) Oral daily  multivitamin 1 Tablet(s) Oral daily  pantoprazole    Tablet 40 milliGRAM(s) Oral before breakfast  tamsulosin 0.4 milliGRAM(s) Oral at bedtime  thiamine 100 milliGRAM(s) Oral daily    PRN Meds:  acetaminophen     Tablet .. 650 milliGRAM(s) Oral every 6 hours PRN Temp greater or equal to 38C (100.4F), Moderate Pain (4 - 6), Severe Pain (7 - 10)    HOME MEDICATIONS:  folic acid 1 mg oral tablet: 1 tab(s) orally once a day  Norvasc 5 mg oral tablet: 1 tab(s) orally once a day  thiamine 100 mg oral tablet: 1 tab(s) orally once a day      Vital Signs:   T(F): 99.3 (04-25-22 @ 05:37), Max: 101 (04-24-22 @ 21:01)  HR: 76 (04-25-22 @ 05:37) (76 - 82)  BP: 119/68 (04-25-22 @ 05:37) (119/68 - 145/67)  RR: 19 (04-24-22 @ 21:01) (18 - 19)  SpO2: --      04-24-22 @ 07:01  -  04-25-22 @ 07:00  --------------------------------------------------------  IN: 1300 mL / OUT: 0 mL / NET: 1300 mL        Physical Exam:   GENERAL: NAD  HEENT: NCAT  CHEST/LUNG: CTAB  HEART: Regular rate and rhythm; s1 s2 appreciated, No murmurs, rubs, or gallops  ABDOMEN: Soft, Nontender, Nondistended; Bowel sounds present  EXTREMITIES: No LE edema b/l  SKIN: no rashes, no new lesions  NERVOUS SYSTEM:  Alert & Oriented X3  LINES/CATHETERS:        Labs:                         10.7   4.99  )-----------( 139      ( 25 Apr 2022 07:01 )             31.1     Neutophil% 57.4, Lymphocyte% 23.4, Monocyte% 15.0, Bands% 0.8 04-25-22 @ 07:01    25 Apr 2022 07:01    138    |  105    |  13     ----------------------------<  107    4.2     |  23     |  1.1      Ca    9.0        25 Apr 2022 07:01  Mg     1.8       25 Apr 2022 07:01    TPro  5.8    /  Alb  3.7    /  TBili  0.6    /  DBili  x      /  AST  55     /  ALT  50     /  AlkPhos  55     25 Apr 2022 07:01                          Culture - Blood (collected 04-21-22 @ 06:30)  Source: .Blood Blood  Preliminary Report (04-22-22 @ 19:01):    No growth to date.    Culture - Blood (collected 04-20-22 @ 19:14)  Source: .Blood Blood  Preliminary Report (04-22-22 @ 01:02):    No growth to date.         Hospital Day:  7d    Subjective:    Patient is a 61y old  Male who presents with a chief complaint of vomiting. No overnight events. In no distress this am.       Admitted to medicine for a primary diagnosis of ETOH withdrawal     Past Medical Hx:   No pertinent past medical history    Alcohol dependence    Vertigo    Anemia    Tobacco dependency    HTN (hypertension)    Hard of hearing    Gout    Seasonal allergies    CKD (chronic kidney disease)      Past Sx:  No significant past surgical history      Allergies:  Beef (Hives)  dairy products (Hives)  No Known Drug Allergies  shellfish (Hives)    Current Meds:   Standng Meds:  amLODIPine   Tablet 5 milliGRAM(s) Oral daily  chlorhexidine 4% Liquid 1 Application(s) Topical daily  enoxaparin Injectable 40 milliGRAM(s) SubCutaneous every 24 hours  folic acid 1 milliGRAM(s) Oral daily  guaifenesin/dextromethorphan Oral Liquid 20 milliLiter(s) Oral every 6 hours  metoprolol succinate ER 25 milliGRAM(s) Oral daily  multivitamin 1 Tablet(s) Oral daily  pantoprazole    Tablet 40 milliGRAM(s) Oral before breakfast  tamsulosin 0.4 milliGRAM(s) Oral at bedtime  thiamine 100 milliGRAM(s) Oral daily    PRN Meds:  acetaminophen     Tablet .. 650 milliGRAM(s) Oral every 6 hours PRN Temp greater or equal to 38C (100.4F), Moderate Pain (4 - 6), Severe Pain (7 - 10)    HOME MEDICATIONS:  folic acid 1 mg oral tablet: 1 tab(s) orally once a day  Norvasc 5 mg oral tablet: 1 tab(s) orally once a day  thiamine 100 mg oral tablet: 1 tab(s) orally once a day      Vital Signs:   T(F): 99.3 (04-25-22 @ 05:37), Max: 101 (04-24-22 @ 21:01)  HR: 76 (04-25-22 @ 05:37) (76 - 82)  BP: 119/68 (04-25-22 @ 05:37) (119/68 - 145/67)  RR: 19 (04-24-22 @ 21:01) (18 - 19)  SpO2: --      04-24-22 @ 07:01  -  04-25-22 @ 07:00  --------------------------------------------------------  IN: 1300 mL / OUT: 0 mL / NET: 1300 mL        Physical Exam:   GENERAL: NAD  HEENT: poor dentition  CHEST/LUNG: CTAB  HEART: Regular rate and rhythm; s1 s2 appreciated, No murmurs, rubs, or gallops  ABDOMEN: Soft, Nontender, Nondistended; Bowel sounds present  EXTREMITIES: warm extremities b/l. Mild tenderness with movement of L ankle. No LE edema b/l  SKIN: no rashes, no new lesions, no erythema in L ankle   NERVOUS SYSTEM:  Alert & Oriented X3        Labs:                         10.7   4.99  )-----------( 139      ( 25 Apr 2022 07:01 )             31.1     Neutophil% 57.4, Lymphocyte% 23.4, Monocyte% 15.0, Bands% 0.8 04-25-22 @ 07:01    25 Apr 2022 07:01    138    |  105    |  13     ----------------------------<  107    4.2     |  23     |  1.1      Ca    9.0        25 Apr 2022 07:01  Mg     1.8       25 Apr 2022 07:01    TPro  5.8    /  Alb  3.7    /  TBili  0.6    /  DBili  x      /  AST  55     /  ALT  50     /  AlkPhos  55     25 Apr 2022 07:01                          Culture - Blood (collected 04-21-22 @ 06:30)  Source: .Blood Blood  Preliminary Report (04-22-22 @ 19:01):    No growth to date.    Culture - Blood (collected 04-20-22 @ 19:14)  Source: .Blood Blood  Preliminary Report (04-22-22 @ 01:02):    No growth to date.

## 2022-04-26 ENCOUNTER — TRANSCRIPTION ENCOUNTER (OUTPATIENT)
Age: 62
End: 2022-04-26

## 2022-04-26 VITALS
SYSTOLIC BLOOD PRESSURE: 120 MMHG | RESPIRATION RATE: 18 BRPM | TEMPERATURE: 98 F | DIASTOLIC BLOOD PRESSURE: 60 MMHG | HEART RATE: 85 BPM

## 2022-04-26 LAB
ALBUMIN SERPL ELPH-MCNC: 4.1 G/DL — SIGNIFICANT CHANGE UP (ref 3.5–5.2)
ALP SERPL-CCNC: 61 U/L — SIGNIFICANT CHANGE UP (ref 30–115)
ALT FLD-CCNC: 58 U/L — HIGH (ref 0–41)
ANION GAP SERPL CALC-SCNC: 14 MMOL/L — SIGNIFICANT CHANGE UP (ref 7–14)
AST SERPL-CCNC: 57 U/L — HIGH (ref 0–41)
BASOPHILS # BLD AUTO: 0.08 K/UL — SIGNIFICANT CHANGE UP (ref 0–0.2)
BASOPHILS NFR BLD AUTO: 1.5 % — HIGH (ref 0–1)
BILIRUB SERPL-MCNC: 0.8 MG/DL — SIGNIFICANT CHANGE UP (ref 0.2–1.2)
BUN SERPL-MCNC: 12 MG/DL — SIGNIFICANT CHANGE UP (ref 10–20)
CALCIUM SERPL-MCNC: 9.5 MG/DL — SIGNIFICANT CHANGE UP (ref 8.5–10.1)
CHLORIDE SERPL-SCNC: 103 MMOL/L — SIGNIFICANT CHANGE UP (ref 98–110)
CO2 SERPL-SCNC: 22 MMOL/L — SIGNIFICANT CHANGE UP (ref 17–32)
CREAT SERPL-MCNC: 1 MG/DL — SIGNIFICANT CHANGE UP (ref 0.7–1.5)
CULTURE RESULTS: SIGNIFICANT CHANGE UP
CULTURE RESULTS: SIGNIFICANT CHANGE UP
EGFR: 85 ML/MIN/1.73M2 — SIGNIFICANT CHANGE UP
EOSINOPHIL # BLD AUTO: 0.2 K/UL — SIGNIFICANT CHANGE UP (ref 0–0.7)
EOSINOPHIL NFR BLD AUTO: 3.7 % — SIGNIFICANT CHANGE UP (ref 0–8)
GLUCOSE SERPL-MCNC: 91 MG/DL — SIGNIFICANT CHANGE UP (ref 70–99)
HCT VFR BLD CALC: 33.3 % — LOW (ref 42–52)
HGB BLD-MCNC: 11.8 G/DL — LOW (ref 14–18)
IMM GRANULOCYTES NFR BLD AUTO: 1.5 % — HIGH (ref 0.1–0.3)
LYMPHOCYTES # BLD AUTO: 1.37 K/UL — SIGNIFICANT CHANGE UP (ref 1.2–3.4)
LYMPHOCYTES # BLD AUTO: 25.7 % — SIGNIFICANT CHANGE UP (ref 20.5–51.1)
MAGNESIUM SERPL-MCNC: 2 MG/DL — SIGNIFICANT CHANGE UP (ref 1.8–2.4)
MCHC RBC-ENTMCNC: 35.4 G/DL — SIGNIFICANT CHANGE UP (ref 32–37)
MCHC RBC-ENTMCNC: 35.8 PG — HIGH (ref 27–31)
MCV RBC AUTO: 100.9 FL — HIGH (ref 80–94)
MONOCYTES # BLD AUTO: 0.77 K/UL — HIGH (ref 0.1–0.6)
MONOCYTES NFR BLD AUTO: 14.4 % — HIGH (ref 1.7–9.3)
NEUTROPHILS # BLD AUTO: 2.84 K/UL — SIGNIFICANT CHANGE UP (ref 1.4–6.5)
NEUTROPHILS NFR BLD AUTO: 53.2 % — SIGNIFICANT CHANGE UP (ref 42.2–75.2)
NRBC # BLD: 0 /100 WBCS — SIGNIFICANT CHANGE UP (ref 0–0)
PLATELET # BLD AUTO: 178 K/UL — SIGNIFICANT CHANGE UP (ref 130–400)
POTASSIUM SERPL-MCNC: 4.2 MMOL/L — SIGNIFICANT CHANGE UP (ref 3.5–5)
POTASSIUM SERPL-SCNC: 4.2 MMOL/L — SIGNIFICANT CHANGE UP (ref 3.5–5)
PROCALCITONIN SERPL-MCNC: 0.1 NG/ML — SIGNIFICANT CHANGE UP (ref 0.02–0.1)
PROT SERPL-MCNC: 6.6 G/DL — SIGNIFICANT CHANGE UP (ref 6–8)
RBC # BLD: 3.3 M/UL — LOW (ref 4.7–6.1)
RBC # FLD: 12.9 % — SIGNIFICANT CHANGE UP (ref 11.5–14.5)
SODIUM SERPL-SCNC: 139 MMOL/L — SIGNIFICANT CHANGE UP (ref 135–146)
SPECIMEN SOURCE: SIGNIFICANT CHANGE UP
SPECIMEN SOURCE: SIGNIFICANT CHANGE UP
WBC # BLD: 5.34 K/UL — SIGNIFICANT CHANGE UP (ref 4.8–10.8)
WBC # FLD AUTO: 5.34 K/UL — SIGNIFICANT CHANGE UP (ref 4.8–10.8)

## 2022-04-26 PROCEDURE — 99232 SBSQ HOSP IP/OBS MODERATE 35: CPT

## 2022-04-26 RX ORDER — LIDOCAINE 4 G/100G
1 CREAM TOPICAL EVERY 24 HOURS
Refills: 0 | Status: DISCONTINUED | OUTPATIENT
Start: 2022-04-26 | End: 2022-04-26

## 2022-04-26 RX ORDER — LORATADINE 10 MG/1
1 TABLET ORAL
Qty: 30 | Refills: 0
Start: 2022-04-26 | End: 2022-05-25

## 2022-04-26 RX ORDER — LORATADINE 10 MG/1
10 TABLET ORAL DAILY
Refills: 0 | Status: DISCONTINUED | OUTPATIENT
Start: 2022-04-26 | End: 2022-04-26

## 2022-04-26 RX ORDER — THIAMINE MONONITRATE (VIT B1) 100 MG
1 TABLET ORAL
Qty: 30 | Refills: 3
Start: 2022-04-26 | End: 2022-08-23

## 2022-04-26 RX ORDER — METOPROLOL TARTRATE 50 MG
1 TABLET ORAL
Qty: 30 | Refills: 0
Start: 2022-04-26 | End: 2022-05-25

## 2022-04-26 RX ADMIN — Medication 100 MILLIGRAM(S): at 11:43

## 2022-04-26 RX ADMIN — Medication 20 MILLILITER(S): at 11:45

## 2022-04-26 RX ADMIN — Medication 20 MILLILITER(S): at 05:16

## 2022-04-26 RX ADMIN — Medication 1 MILLIGRAM(S): at 11:44

## 2022-04-26 RX ADMIN — LIDOCAINE 1 PATCH: 4 CREAM TOPICAL at 11:45

## 2022-04-26 RX ADMIN — LORATADINE 10 MILLIGRAM(S): 10 TABLET ORAL at 11:44

## 2022-04-26 RX ADMIN — LIDOCAINE 1 PATCH: 4 CREAM TOPICAL at 16:37

## 2022-04-26 RX ADMIN — Medication 1 TABLET(S): at 11:44

## 2022-04-26 RX ADMIN — PANTOPRAZOLE SODIUM 40 MILLIGRAM(S): 20 TABLET, DELAYED RELEASE ORAL at 05:20

## 2022-04-26 RX ADMIN — AMLODIPINE BESYLATE 5 MILLIGRAM(S): 2.5 TABLET ORAL at 05:15

## 2022-04-26 RX ADMIN — Medication 25 MILLIGRAM(S): at 05:15

## 2022-04-26 NOTE — DISCHARGE NOTE NURSING/CASE MANAGEMENT/SOCIAL WORK - PATIENT PORTAL LINK FT
You can access the FollowMyHealth Patient Portal offered by [facility] by registering at the following website: [email or web address]. By joining [employer]’s FollowMyHealth portal, you will also be able to view your health information using other applications (apps) compatible with our system.

## 2022-04-26 NOTE — DISCHARGE NOTE NURSING/CASE MANAGEMENT/SOCIAL WORK - NSTRANSFERBELONGINGSDISPO_GEN_A_NUR
Phoned to review breast cancer treatment summary/ survivorship care plan.  No answer, left message to call back.  
not applicable

## 2022-04-26 NOTE — PROGRESS NOTE ADULT - REASON FOR ADMISSION
shivering  chest discomfort

## 2022-04-26 NOTE — PROGRESS NOTE ADULT - PROVIDER SPECIALTY LIST ADULT
Cardiology
Internal Medicine

## 2022-04-26 NOTE — DISCHARGE NOTE NURSING/CASE MANAGEMENT/SOCIAL WORK - NSDCPEFALRISK_GEN_ALL_CORE
For information on Fall & Injury Prevention, visit: [email or web address] OR  [email or web address] OR  https://www.cdc.gov/steadi/patient.html

## 2022-04-26 NOTE — PROGRESS NOTE ADULT - SUBJECTIVE AND OBJECTIVE BOX
CAT NOONAN 61y Male  MRN#: [id number]   CODE STATUS: FULL     Hospital Day: 8d    Pt is currently admitted with the primary diagnosis of etoh withdrawl     SUBJECTIVE  Hospital Course: 60 yo male, h.o GERD, obesity, HTN, BPH, CKD alcohol abuse who drinks one pint daily presented for chest discomfort and shivering  Patient did not drink last two days. He presented for shivering that started this AM associated with palpitations and chest discomfort  Symptoms are intermittent, aggravating over the time. Chest discomfort is severe non radiating , dull all over the chest, not related to activity and happens at rest.  patient reports headache, lightheadedness and dizziness ; no visual disturbance   in ED patient had tachycardia on EKG with no acute ischemic changes and low voltage   ED vitals remarkable for tachycardia  ROS negative for nausea, vomiting , diarrhea   ROS otherwise negative ; no syncope no head trauma     Overnight events: none    Interval hx/Subjective complaints: Pt feeling well, c/o persistent cough for last few months otherwise no complaints.     Pt denies any fevers, chills, fatigue, CP, palpitations, cough, SOB, abdominal pain, n/v/d, hematochezia, melena, weakness, numbness, tingling, or other FND.                    ----------------------------------------------------------  OBJECTIVE  PAST MEDICAL & SURGICAL HISTORY  Alcohol dependence    Vertigo    HTN (hypertension)    Hard of hearing    Gout    Seasonal allergies    CKD (chronic kidney disease)    No significant past surgical history                                              -----------------------------------------------------------  ALLERGIES:  Beef (Hives)  dairy products (Hives)  No Known Drug Allergies  shellfish (Hives)                                            ------------------------------------------------------------    HOME MEDICATIONS  Home Medications:  folic acid 1 mg oral tablet: 1 tab(s) orally once a day (20 Oct 2021 15:59)  Norvasc 5 mg oral tablet: 1 tab(s) orally once a day (20 Oct 2021 15:59)  thiamine 100 mg oral tablet: 1 tab(s) orally once a day (20 Oct 2021 15:59)                           MEDICATIONS:  STANDING MEDICATIONS  amLODIPine   Tablet 5 milliGRAM(s) Oral daily  chlorhexidine 4% Liquid 1 Application(s) Topical daily  enoxaparin Injectable 40 milliGRAM(s) SubCutaneous every 24 hours  folic acid 1 milliGRAM(s) Oral daily  guaifenesin/dextromethorphan Oral Liquid 20 milliLiter(s) Oral every 6 hours  metoprolol succinate ER 25 milliGRAM(s) Oral daily  multivitamin 1 Tablet(s) Oral daily  pantoprazole    Tablet 40 milliGRAM(s) Oral before breakfast  tamsulosin 0.4 milliGRAM(s) Oral at bedtime  thiamine 100 milliGRAM(s) Oral daily    PRN MEDICATIONS  acetaminophen     Tablet .. 650 milliGRAM(s) Oral every 6 hours PRN                                            ------------------------------------------------------------  VITAL SIGNS: Last 24 Hours  T(C): 36.4 (2022 05:03), Max: 37.6 (2022 16:31)  T(F): 97.5 (2022 05:03), Max: 99.6 (2022 16:31)  HR: 75 (2022 05:03) (75 - 79)  BP: 129/66 (2022 05:03) (123/58 - 129/66)  BP(mean): --  RR: 18 (2022 05:03) (18 - 18)  SpO2: 98% (2022 16:31) (98% - 98%)      22 @ 07:01  -  22 @ 07:00  --------------------------------------------------------  IN: 886 mL / OUT: 0 mL / NET: 886 mL                                             --------------------------------------------------------------  LABS:                        10.7   4.99  )-----------( 139      ( 2022 07:01 )             31.1         138  |  105  |  13  ----------------------------<  107<H>  4.2   |  23  |  1.1    Ca    9.0      2022 07:01  Mg     1.8         TPro  5.8<L>  /  Alb  3.7  /  TBili  0.6  /  DBili  x   /  AST  55<H>  /  ALT  50<H>  /  AlkPhos  55        Urinalysis Basic - ( 2022 12:00 )    Color: Light Yellow / Appearance: Clear / S.011 / pH: x  Gluc: x / Ketone: Negative  / Bili: Negative / Urobili: <2 mg/dL   Blood: x / Protein: Negative / Nitrite: Negative   Leuk Esterase: Negative / RBC: x / WBC x   Sq Epi: x / Non Sq Epi: x / Bacteria: x                                                            -------------------------------------------------------------  RADIOLOGY:                                            --------------------------------------------------------------  PHYSICAL EXAM:  General: Well appearing, laying in bed in nad  HEENT: ncat, eomi, mmm  LUNGS: ctab  HEART: s1/s2, rrr  ABDOMEN: soft, ntnd, bs+  EXT: wwp, no cyanosis, clubbing, edema  NEURO: aox4, padgett  SKIN: intact no rashes or lesions                                          --------------------------------------------------------------    ASSESSMENT & PLAN  Mr Noonan is a 61 YOM w a PMH of EtOH dependence, Vertigo HTN, CKD, and Gout presented  for diffuse chest pain and vomiting. Patient now in EtOH detox on CIWA     #Etoh withdrawal  - No tremors on physical. Denies diaphoresis, nausea, somnolence, SOB, CP, tachycardia, changes in vision or mentation   - s/p librium taper (last dose  am)   - CIWA 0   - Seen by catch team    #Suspect folate and thiamine deficiency due to etoh use  - c/w supplementation    #Gout  - off meds   - pain with movement of L ankle (improving, no further pain , no erythema)   - No erythema, mild swelling   - F/u xray L ankle     #Fevers of unclear etiology  - Possibly gout flare?   - Last T 100.4   - UA (-). Bl cx  and  NTD. No leukocytosis  - CXR  unremarkable    - repeat covid negative  - procal  0.10  - monitor abx     #Pancytopenia, improving   - likely secondary to bone marrow suppression from alcohol  - Pl normal, 139  - WBC nl, 4.99    # CKD  - Cr at bl  - avoid nephrotoxic meds   - outpt f/u     #H/o Vertigo?  - Pt denies this diagnosis   - Ambulates with walker     #Chest pain, RESOLVED   - pt was seen by cardiology in 10/2021 and was offered a stress test but declined  - as per cardiology note then there was calcifications on prior Ct chest  - normal adenosine stress test   - EKG NSR, normal troponin  - dc telemetry    #BPH  - c/w flomax    #Misc   - DVT Prophylaxis: enoxaparin 40mg once daily   - GI Prophylaxis: protonix 40 qd  - Diet: DASH/TLC  - Activity: as tolerated   - Code Status: full   - Dispo: d/c to group home when pt defervesces, covid pcr ordered     #Handoff   - pending placement

## 2022-04-26 NOTE — PROGRESS NOTE ADULT - ATTENDING COMMENTS
60 yo M PMHx etoh dependence, HTN, BPH, CKD II presented for evaluation of chest pain. Pt reports it diffuse over entire chest. Pt was also vomiting day of presentation and subsequently felt lightheaded. Returning to [facility] tomorrow 4/26. All medical issues resolved as detailed as above
62 yo male, h.o GERD, obesity, HTN, BPH, CKD alcohol abuse who drinks one pint daily presented for chest discomfort and shivering. Found to be in alcohol withdrawal.    #Alcohol withdrawal  #Hx of alcohol abuse  - Cont librium taper CIWA protocol  - Ativan IV prn for severe withdrawal sxs  - CATCH team consult    #Chest discomfort  Possibly related to alcohol use, gastritis/esophagitis  Troponins negative x2  - f/u echocardiogram, can discontinue telemetry monitoring if unremarkable    # Hypertension  - c.w amlodipine 5 mg daily  - c.w Toprol 25     # BPH  - c.w Tamsulosin 0.4 mg daily    # CKD III  # High anion gap metabolic acidosis - improving   - continue with sodium bicarb 1300mg TID    # Macrocytic anemia  # Suspected vitamin b12/folate deficiency  - at baseline  - no evidence of acute bleed or HD compromise  - MCV > 100 check folate and b12  - iron studies as outpatient  - colonoscopy has to be up to date  - keep hb > 7  - start folic acid 1 mg daily     #Suspected thiamine deficiency  - Cont PO supplement    DVT PPX heparin    #Progress Note Handoff  Pending (specify): Alcohol withdrawal taper, echo  Family discussion:  d/w pt regarding management of alcohol withdrawal  Disposition: Home
Patient seen and examined. Case discussed with resident physician, and case management.     still c/o not feeling well and shakiness    # alcohol withdrawal   Continue with librium taper  folic acid, thiamine, multivitamin    #Low grade fever: 100.3, 1 episode; no leucocytosis, monitor    #Hypophosphatemia and Hypomagnesemia: due to alcoholism  replace and monitor    # High anion gap metabolic acidosis- likely due to alcoholic ketoacidosis; resolved  continue IV lfuids    #Chest pain-atypical; improved  TTE pending    # Hypertension  # BPH  #Macrocytic anemia- hg stable; monitor    pending: improvement in withdrawal symptoms

## 2022-05-02 DIAGNOSIS — N40.0 BENIGN PROSTATIC HYPERPLASIA WITHOUT LOWER URINARY TRACT SYMPTOMS: ICD-10-CM

## 2022-05-02 DIAGNOSIS — E66.9 OBESITY, UNSPECIFIED: ICD-10-CM

## 2022-05-02 DIAGNOSIS — R07.89 OTHER CHEST PAIN: ICD-10-CM

## 2022-05-02 DIAGNOSIS — N18.2 CHRONIC KIDNEY DISEASE, STAGE 2 (MILD): ICD-10-CM

## 2022-05-02 DIAGNOSIS — I12.9 HYPERTENSIVE CHRONIC KIDNEY DISEASE WITH STAGE 1 THROUGH STAGE 4 CHRONIC KIDNEY DISEASE, OR UNSPECIFIED CHRONIC KIDNEY DISEASE: ICD-10-CM

## 2022-05-02 DIAGNOSIS — F10.239 ALCOHOL DEPENDENCE WITH WITHDRAWAL, UNSPECIFIED: ICD-10-CM

## 2022-05-02 DIAGNOSIS — E83.42 HYPOMAGNESEMIA: ICD-10-CM
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
## 2022-05-02 DIAGNOSIS — Z91.013 ALLERGY TO SEAFOOD: ICD-10-CM

## 2022-05-02 DIAGNOSIS — E53.8 DEFICIENCY OF OTHER SPECIFIED B GROUP VITAMINS: ICD-10-CM

## 2022-05-02 DIAGNOSIS — Z91.011 ALLERGY TO MILK PRODUCTS: ICD-10-CM

## 2022-05-02 DIAGNOSIS — Z20.822 CONTACT WITH AND (SUSPECTED) EXPOSURE TO COVID-19: ICD-10-CM

## 2022-05-02 DIAGNOSIS — M10.9 GOUT, UNSPECIFIED: ICD-10-CM

## 2022-05-02 DIAGNOSIS — E87.2 ACIDOSIS: ICD-10-CM

## 2022-05-02 DIAGNOSIS — E83.39 OTHER DISORDERS OF PHOSPHORUS METABOLISM: ICD-10-CM

## 2022-05-02 DIAGNOSIS — D61.818 OTHER PANCYTOPENIA: ICD-10-CM

## 2022-05-02 DIAGNOSIS — E51.8 OTHER MANIFESTATIONS OF THIAMINE DEFICIENCY: ICD-10-CM

## 2022-05-02 DIAGNOSIS — R50.9 FEVER, UNSPECIFIED: ICD-10-CM

## 2022-05-02 DIAGNOSIS — K21.9 GASTRO-ESOPHAGEAL REFLUX DISEASE WITHOUT ESOPHAGITIS: ICD-10-CM

## 2022-05-02 DIAGNOSIS — D75.89 OTHER SPECIFIED DISEASES OF BLOOD AND BLOOD-FORMING ORGANS: ICD-10-CM

## 2022-05-02 DIAGNOSIS — I25.10 ATHEROSCLEROTIC HEART DISEASE OF NATIVE CORONARY ARTERY WITHOUT ANGINA PECTORIS: ICD-10-CM

## 2022-05-02 DIAGNOSIS — Z91.014 ALLERGY TO MAMMALIAN MEATS: ICD-10-CM

## 2022-05-15 ENCOUNTER — EMERGENCY (EMERGENCY)
Facility: HOSPITAL | Age: 62
LOS: 0 days | Discharge: NURSING, REHAB OR LONG-TERM CARE | End: 2022-05-16
Attending: EMERGENCY MEDICINE | Admitting: EMERGENCY MEDICINE
Payer: MEDICAID

## 2022-05-15 VITALS
HEART RATE: 111 BPM | OXYGEN SATURATION: 95 % | SYSTOLIC BLOOD PRESSURE: 95 MMHG | RESPIRATION RATE: 18 BRPM | DIASTOLIC BLOOD PRESSURE: 63 MMHG | TEMPERATURE: 98 F | WEIGHT: 244.05 LBS | HEIGHT: 70 IN

## 2022-05-15 DIAGNOSIS — Z91.018 ALLERGY TO OTHER FOODS: ICD-10-CM

## 2022-05-15 DIAGNOSIS — R00.0 TACHYCARDIA, UNSPECIFIED: ICD-10-CM

## 2022-05-15 DIAGNOSIS — R25.3 FASCICULATION: ICD-10-CM

## 2022-05-15 DIAGNOSIS — Z91.011 ALLERGY TO MILK PRODUCTS: ICD-10-CM

## 2022-05-15 DIAGNOSIS — F10.239 ALCOHOL DEPENDENCE WITH WITHDRAWAL, UNSPECIFIED: ICD-10-CM

## 2022-05-15 DIAGNOSIS — M10.9 GOUT, UNSPECIFIED: ICD-10-CM

## 2022-05-15 DIAGNOSIS — Z91.013 ALLERGY TO SEAFOOD: ICD-10-CM

## 2022-05-15 DIAGNOSIS — N18.9 CHRONIC KIDNEY DISEASE, UNSPECIFIED: ICD-10-CM

## 2022-05-15 DIAGNOSIS — R00.2 PALPITATIONS: ICD-10-CM

## 2022-05-15 DIAGNOSIS — Z87.891 PERSONAL HISTORY OF NICOTINE DEPENDENCE: ICD-10-CM

## 2022-05-15 DIAGNOSIS — I12.9 HYPERTENSIVE CHRONIC KIDNEY DISEASE WITH STAGE 1 THROUGH STAGE 4 CHRONIC KIDNEY DISEASE, OR UNSPECIFIED CHRONIC KIDNEY DISEASE: ICD-10-CM

## 2022-05-15 PROCEDURE — 93010 ELECTROCARDIOGRAM REPORT: CPT

## 2022-05-15 PROCEDURE — 99284 EMERGENCY DEPT VISIT MOD MDM: CPT

## 2022-05-15 RX ORDER — ONDANSETRON 8 MG/1
4 TABLET, FILM COATED ORAL ONCE
Refills: 0 | Status: COMPLETED | OUTPATIENT
Start: 2022-05-15 | End: 2022-05-15

## 2022-05-15 RX ADMIN — ONDANSETRON 4 MILLIGRAM(S): 8 TABLET, FILM COATED ORAL at 23:19

## 2022-05-15 NOTE — ED PROVIDER NOTE - PATIENT PORTAL LINK FT
You can access the FollowMyHealth Patient Portal offered by [facility] by registering at the following website: [email or web address]. By joining [facility]’s FollowMyHealth portal, you will also be able to view your health information using other applications (apps) compatible with our system.

## 2022-05-15 NOTE — ED PROVIDER NOTE - PROGRESS NOTE DETAILS
Patient has low CIWA score.  We will treat with Librium.  Encourage alcohol cessation.  Follow-up with detox and outpatient

## 2022-05-15 NOTE — ED PROVIDER NOTE - ATTENDING APP SHARED VISIT CONTRIBUTION OF CARE
I personally evaluated the patient. I reviewed the Resident´s or Physician Assistant´s note (as assigned above), and agree with the findings and plan except as documented in my note.  61-year-old male, history of alcohol abuse, presents to the ED with shakes and nausea.  Last drink 3 PM.  Denies chest pain or shortness of breath.  Exam shows alert patient in no distress, HEENT NCAT PERRL, neck supple, throat no exudates, lungs clear, RR S1S2, abdomen soft NT +BS, no CCE, +fasciculations, neuro A&OX3 no deficits.

## 2022-05-15 NOTE — ED PROVIDER NOTE - NS ED ROS FT
Constitutional: See HPI.  No fever, chills, no recent weight loss, change in appetite or malaise  Eyes: no redness/discharge/pain/vision changes  ENT: no rhinorrhea/ear pain/sore throat  Cardiac: See HPI.  No chest pain, SOB or edema.  Respiratory: No cough or respiratory distress  GI: No nausea, vomiting, diarrhea or abdominal pain.  : No dysuria, frequency, urgency or hematuria  MS: no pain to back or extremities, no loss of ROM, no weakness  Neuro: No headache or weakness. No LOC.  Skin: No skin rash.  Endocrine: No history of thyroid disease or diabetes.

## 2022-05-15 NOTE — ED PROVIDER NOTE - OBJECTIVE STATEMENT
61 years old male history of hypertension, CKD, alcohol abuse presents from adult home for medical evaluations.  As per patient, patient was feeling "shivering" and palpitations so he wants something different evaluations.  Patient admits last drink was earlier this today.  Patient otherwise denies fever, chills, chest pain, abdominal pain, vomiting and diarrhea.

## 2022-05-15 NOTE — ED PROVIDER NOTE - CLINICAL SUMMARY MEDICAL DECISION MAKING FREE TEXT BOX
EKG sinus tach no acute changes.  Given Librium and Zofran with symptomatic relief.  Will DC and refer to outpatient detox.

## 2022-05-15 NOTE — ED ADULT NURSE REASSESSMENT NOTE - NS ED NURSE REASSESS COMMENT FT1
pt a&ox3, pt stated his last drink was 4 pm. pt drank 1/2 pint of liquor   pt drinks daily, drinks beer and liquor   pt placed on bed alarm, on and functioning   pt placed in hospital gown   safety maintained

## 2022-05-15 NOTE — ED ADULT NURSE NOTE - NSIMPLEMENTINTERV_GEN_ALL_ED
Implemented All Fall Risk Interventions:  [place] to call system. Call bell, personal items and telephone within reach. Instruct patient to call for assistance. Room bathroom lighting operational. Non-slip footwear when patient is off stretcher. Physically safe environment: no spills, clutter or unnecessary equipment. Stretcher in lowest position, wheels locked, appropriate side rails in place. Provide visual cue, wrist band, yellow gown, etc. Monitor gait and stability. Monitor for mental status changes and reorient to person, place, and time. Review medications for side effects contributing to fall risk. Reinforce activity limits and safety measures with patient and family.

## 2022-05-15 NOTE — ED PROVIDER NOTE - NSFOLLOWUPCLINICS_GEN_ALL_ED_FT
[facility]  Detox Mgmt  [address] [place] [address]  Phone: [phone]  Fax:

## 2022-05-16 VITALS
RESPIRATION RATE: 18 BRPM | TEMPERATURE: 96 F | OXYGEN SATURATION: 97 % | SYSTOLIC BLOOD PRESSURE: 118 MMHG | DIASTOLIC BLOOD PRESSURE: 54 MMHG | HEART RATE: 113 BPM

## 2022-05-16 RX ADMIN — Medication 50 MILLIGRAM(S): at 00:05

## 2022-07-20 NOTE — ED PROVIDER NOTE - TOBACCO USE
[FreeTextEntry1] : In summary, this is a  64 year old male with a history of HTN, HLD, obesity, COVID PNA c/b DVT/PE and atrial fibrillation in 3/2021 here for first time appointment for management of his atrial fibrillation.  He is likely now newly persistent atrial fibrillation with mainly elevated ventricular rates, which is likely contributing to his worsening dyspnea among his recent pneumonia and other chronic comorbidities. Options regarding management, including a rate control strategy with AV venkat blocking agents, or rhythm control strategies employing anti-arrhythmic drugs and/or catheter ablation were reviewed. The rationale for the procedure as well as its risks--including but not limited to bleeding, vascular injury, pericardial effusion/tamponade, heart block requiring pacemaker, stroke, and death--were reviewed in detail. After consideration of this information, the decision was made to proceed with the procedure after recovery from his pneumonia. \par \par Mr. Sánchez appeared to understand the whole discussion and verbalized that all of his questions were answered to his satisfaction.\par \par Thank you for allowing me to be involved in the care of this pleasant man. Please feel free to contact me with any questions.\par \par  \par \par Javy Grace MD\par  of Cardiology\par Electrophysiology Section\[address], [address] [place] [address]\[facility] Office: [phone]\par Fax: [phone]  [EKG obtained to assist in diagnosis and management of assessed problem(s)] : EKG obtained to assist in diagnosis and management of assessed problem(s) Never smoker

## 2022-07-23 ENCOUNTER — EMERGENCY (EMERGENCY)
Facility: HOSPITAL | Age: 62
LOS: 0 days | Discharge: HOME | End: 2022-07-23
Attending: STUDENT IN AN ORGANIZED HEALTH CARE EDUCATION/TRAINING PROGRAM | Admitting: STUDENT IN AN ORGANIZED HEALTH CARE EDUCATION/TRAINING PROGRAM

## 2022-07-23 VITALS
RESPIRATION RATE: 18 BRPM | HEART RATE: 76 BPM | WEIGHT: 244.05 LBS | OXYGEN SATURATION: 97 % | TEMPERATURE: 98 F | SYSTOLIC BLOOD PRESSURE: 151 MMHG | DIASTOLIC BLOOD PRESSURE: 77 MMHG | HEIGHT: 70 IN

## 2022-07-23 DIAGNOSIS — F10.20 ALCOHOL DEPENDENCE, UNCOMPLICATED: ICD-10-CM

## 2022-07-23 DIAGNOSIS — J45.909 UNSPECIFIED ASTHMA, UNCOMPLICATED: ICD-10-CM

## 2022-07-23 DIAGNOSIS — M79.671 PAIN IN RIGHT FOOT: ICD-10-CM

## 2022-07-23 DIAGNOSIS — Z87.891 PERSONAL HISTORY OF NICOTINE DEPENDENCE: ICD-10-CM

## 2022-07-23 DIAGNOSIS — I12.9 HYPERTENSIVE CHRONIC KIDNEY DISEASE WITH STAGE 1 THROUGH STAGE 4 CHRONIC KIDNEY DISEASE, OR UNSPECIFIED CHRONIC KIDNEY DISEASE: ICD-10-CM

## 2022-07-23 DIAGNOSIS — Z91.018 ALLERGY TO OTHER FOODS: ICD-10-CM

## 2022-07-23 DIAGNOSIS — N18.9 CHRONIC KIDNEY DISEASE, UNSPECIFIED: ICD-10-CM

## 2022-07-23 DIAGNOSIS — R42 DIZZINESS AND GIDDINESS: ICD-10-CM

## 2022-07-23 DIAGNOSIS — M10.9 GOUT, UNSPECIFIED: ICD-10-CM

## 2022-07-23 DIAGNOSIS — Z91.013 ALLERGY TO SEAFOOD: ICD-10-CM

## 2022-07-23 DIAGNOSIS — M19.90 UNSPECIFIED OSTEOARTHRITIS, UNSPECIFIED SITE: ICD-10-CM

## 2022-07-23 DIAGNOSIS — Z91.011 ALLERGY TO MILK PRODUCTS: ICD-10-CM

## 2022-07-23 PROCEDURE — 99284 EMERGENCY DEPT VISIT MOD MDM: CPT

## 2022-07-23 PROCEDURE — 73630 X-RAY EXAM OF FOOT: CPT | Mod: 26,RT

## 2022-07-23 PROCEDURE — 73610 X-RAY EXAM OF ANKLE: CPT | Mod: 26,RT

## 2022-07-23 RX ORDER — ACETAMINOPHEN 500 MG
975 TABLET ORAL EVERY 6 HOURS
Refills: 0 | Status: DISCONTINUED | OUTPATIENT
Start: 2022-07-23 | End: 2022-07-23

## 2022-07-23 RX ORDER — IBUPROFEN 200 MG
600 TABLET ORAL ONCE
Refills: 0 | Status: COMPLETED | OUTPATIENT
Start: 2022-07-23 | End: 2022-07-23

## 2022-07-23 RX ADMIN — Medication 975 MILLIGRAM(S): at 11:06

## 2022-07-23 RX ADMIN — Medication 975 MILLIGRAM(S): at 11:39

## 2022-07-23 RX ADMIN — Medication 600 MILLIGRAM(S): at 11:06

## 2022-07-23 RX ADMIN — Medication 600 MILLIGRAM(S): at 11:38

## 2022-07-23 NOTE — ED ADULT NURSE NOTE - INTERVENTIONS DEFINITIONS
[place] to call system/Call bell, personal items and telephone within reach/Instruct patient to call for assistance/Room bathroom lighting operational/Physically safe environment: no spills, clutter or unnecessary equipment/Stretcher in lowest position, wheels locked, appropriate side rails in place/Monitor gait and stability/Reinforce activity limits and safety measures with patient and family

## 2022-07-23 NOTE — ED PROVIDER NOTE - NSFOLLOWUPCLINICS_GEN_ALL_ED_FT
[facility]  Podiatry  .  [place]   Phone: [phone]  Fax:   Follow Up Time: 1-3 Days
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
## 2022-07-23 NOTE — ED PROVIDER NOTE - PATIENT PORTAL LINK FT
You can access the FollowMyHealth Patient Portal offered by [facility] by registering at the following website: [email or web address]. By joining [employer]’s FollowMyHealth portal, you will also be able to view your health information using other applications (apps) compatible with our system.

## 2022-07-23 NOTE — ED PROVIDER NOTE - CLINICAL SUMMARY MEDICAL DECISION MAKING FREE TEXT BOX
.    62-year-old male, past medical history as noted p/w waxing and waning right foot pain since yesterday.  Pain was worse with waking, and with bearing weight.  There were no clear alleviating factors.  He had similar pain in left foot a few days ago that resolved.  No weakness, numbness, or trauma.  Agree with exam above.  Patient is NAD.  Foot is atraumatic inspection, normal sensation and motor, + pain to plantar aspect of right foot.  X-ray foot and ankle negative for acute displaced fracture or dislocation.  Pain improved with medication in the ED.  IMP: Foot pain.  Patient stable for discharge with continued outpatient follow-up.  Patient understands signs and symptoms for ED return.  Patient understands need supportive care.  DC home.    .

## 2022-07-23 NOTE — ED ADULT NURSE NOTE - NS ED NURSE RECORD ANOTHER VITAL SIGN
Up to date on screenings as above, flu today  Discussed maintaining healthy diet and exercise (150 mins/week)  No difficulty with stress or sleep, mood stable  Screening labs deferred due to age and absence of symptoms and risk factors  No age appropriate cancer screen, fam hx reviewed  Testicular self exam reviewed with patient  Safe sex practices reviewed       Yes

## 2022-07-23 NOTE — ED PROVIDER NOTE - PHYSICAL EXAMINATION
GENERAL: Well-developed; well-nourished; in no acute distress.   SKIN: warm, dry, overlying chronic skin changes to bilateral feet and LE   HEAD: Normocephalic; atraumatic.  CARD: S1, S2 normal; no murmurs, gallops, or rubs. Regular rate and rhythm. <2sec capillary refill bilateral LE  RESP: LCTAB; No wheezes, rales, rhonchi, or stridor.  EXT: R foot TTP over plantar surface, partial dorsal, medial and lateral malleoli. No swelling, no erythema. No LE edema bilaterally. No calf tenderness. Decreased ROM of R foot plantarflexion and dorsiflexion secondary to pain. No palpable pulses bilateral DP and TP   NEURO: Alert, oriented, grossly unremarkable, normal sensation to bilateral feet  PSYCH: Cooperative, appropriate.

## 2022-07-23 NOTE — ED ADULT NURSE NOTE - OBJECTIVE STATEMENT
pt c/o of right foot pain since yesterday. He states he is unable to walk or bear weight on that foot.

## 2022-07-23 NOTE — ED PROVIDER NOTE - OBJECTIVE STATEMENT
61 y/o male, with PMHx of OA, gout, CKD, and HTN, presents to the ED with constant R foot pain onset yesterday. The patient states he is unable to bear weight on the R side since in the middle of the night last night when he got up to use the restroom. The pain is 8/10 and is exacerbated by touch, movement, and weight bearing. There are no alleviating factors. He states he had a similar pain in his left foot a few days ago, that resolved on its own. He denies trauma, recent fall, new shoes, swelling, redness, numbness, or tingling.

## 2022-07-23 NOTE — ED PROVIDER NOTE - NS ED ROS FT
Constitutional: No fevers, chills, or malaise.  HEENT: No headache, visual changes, eye pain, hearing changes, running nose, or sore throat.  Cardiac:  No chest pain, SOB, leg edema, or leg pain.  Respiratory:  No cough, respiratory distress, or hemoptysis.  MS: +R foot pain. No myalgia, muscle weakness, or back pain.  Neuro:  No dizziness, LOC, paralysis, or N/T.  Skin:  No skin rash. No swelling, erythema  Endocrine: No polyuria, polyphagia, or polydipsia.

## 2022-07-25 ENCOUNTER — EMERGENCY (EMERGENCY)
Facility: HOSPITAL | Age: 62
LOS: 0 days | Discharge: HOME | End: 2022-07-25
Attending: EMERGENCY MEDICINE | Admitting: EMERGENCY MEDICINE

## 2022-07-25 VITALS
SYSTOLIC BLOOD PRESSURE: 136 MMHG | RESPIRATION RATE: 17 BRPM | OXYGEN SATURATION: 97 % | HEART RATE: 85 BPM | HEIGHT: 70 IN | DIASTOLIC BLOOD PRESSURE: 64 MMHG | TEMPERATURE: 98 F | WEIGHT: 235.01 LBS

## 2022-07-25 DIAGNOSIS — M79.671 PAIN IN RIGHT FOOT: ICD-10-CM

## 2022-07-25 DIAGNOSIS — M79.674 PAIN IN RIGHT TOE(S): ICD-10-CM

## 2022-07-25 DIAGNOSIS — F17.200 NICOTINE DEPENDENCE, UNSPECIFIED, UNCOMPLICATED: ICD-10-CM

## 2022-07-25 DIAGNOSIS — I12.9 HYPERTENSIVE CHRONIC KIDNEY DISEASE WITH STAGE 1 THROUGH STAGE 4 CHRONIC KIDNEY DISEASE, OR UNSPECIFIED CHRONIC KIDNEY DISEASE: ICD-10-CM

## 2022-07-25 DIAGNOSIS — M10.9 GOUT, UNSPECIFIED: ICD-10-CM

## 2022-07-25 DIAGNOSIS — Z91.013 ALLERGY TO SEAFOOD: ICD-10-CM

## 2022-07-25 DIAGNOSIS — N18.9 CHRONIC KIDNEY DISEASE, UNSPECIFIED: ICD-10-CM

## 2022-07-25 DIAGNOSIS — Z91.011 ALLERGY TO MILK PRODUCTS: ICD-10-CM

## 2022-07-25 DIAGNOSIS — F10.20 ALCOHOL DEPENDENCE, UNCOMPLICATED: ICD-10-CM

## 2022-07-25 DIAGNOSIS — Z91.048 OTHER NONMEDICINAL SUBSTANCE ALLERGY STATUS: ICD-10-CM

## 2022-07-25 DIAGNOSIS — Z91.018 ALLERGY TO OTHER FOODS: ICD-10-CM

## 2022-07-25 PROCEDURE — 99284 EMERGENCY DEPT VISIT MOD MDM: CPT

## 2022-07-25 RX ORDER — ACETAMINOPHEN 500 MG
2 TABLET ORAL
Qty: 12 | Refills: 0
Start: 2022-07-25

## 2022-07-25 RX ORDER — IBUPROFEN 200 MG
1 TABLET ORAL
Qty: 9 | Refills: 0
Start: 2022-07-25

## 2022-07-25 RX ORDER — KETOROLAC TROMETHAMINE 30 MG/ML
30 SYRINGE (ML) INJECTION ONCE
Refills: 0 | Status: DISCONTINUED | OUTPATIENT
Start: 2022-07-25 | End: 2022-07-25

## 2022-07-25 RX ADMIN — Medication 30 MILLIGRAM(S): at 10:31

## 2022-07-25 NOTE — ED PROVIDER NOTE - CLINICAL SUMMARY MEDICAL DECISION MAKING FREE TEXT BOX
Appearance low suspicion for cellulitis or septic arthritis. No trauma and no evidence of acute fracture. No evidence of limb ischemia. Character and localization low suspicion for DVT. Character could be consistent with his gout. He has not attempted any analgesia for this. Given Toradol. Patient is well appearing, NAD, afebrile, hemodynamically stable. Any available tests and studies were discussed with patient. Discharged with instructions in further symptomatic care, return precautions, and need for podiatry f/u.

## 2022-07-25 NOTE — ED PROVIDER NOTE - OBJECTIVE STATEMENT
62-year-old male with history of gout, alcohol dependence, hypertension, presents from [facility] with pain from his right heel down to his right medial sole and right big toe, patient states pain has been present for the past few days, worsened with pressure and ranging of the foot. He reports, and review of EMR confirms, that he was here yesterday, and x-ray shows a chronic medial malleolar fracture, and he was Ace wrapped and referred for podiatry. However, patient states that he is at [facility] and he has not gotten any medication for his pain, including ibuprofen or acetaminophen. Denies trauma/fall, fever, and all other symptoms.

## 2022-07-25 NOTE — ED PROVIDER NOTE - NSFOLLOWUPCLINICS_GEN_ALL_ED_FT
[facility]  Podiatry  .  [place]   Phone: [phone]  Fax:   Follow Up Time: 1-3 Days

## 2022-07-25 NOTE — ED PROVIDER NOTE - NSFOLLOWUPINSTRUCTIONS_ED_ALL_ED_FT
Please follow up with your primary care doctor and podiatrist in 1-2 days.  Please use ibuprofen or acetaminophen as needed for pain.  Please return to the emergency department if you have worsening pain, swelling, discoloration, numbness, fever, or any other symptoms.      Foot Pain    Many things can cause foot pain. Some common causes are:  •An injury.  •A sprain.  •Arthritis.  •Blisters.  •Bunions.    Follow these instructions at home:    Managing pain, stiffness, and swelling   Bag of ice on a towel on the skin.   If directed, put ice on the painful area:  •Put ice in a plastic bag.  •Place a towel between your skin and the bag.  •Leave the ice on for 20 minutes, 2–3 times a day.    Activity   • Do not stand or walk for long periods.  •Return to your normal activities as told by your health care provider. Ask your health care provider what activities are safe for you.  •Do stretches to relieve foot pain and stiffness as told by your health care provider.  • Do not lift anything that is heavier than 10 lb (4.5 kg), or the limit that you are told, until your health care provider says that it is safe. Lifting a lot of weight can put added pressure on your feet.    Lifestyle   •Wear comfortable, supportive shoes that fit you well. Do not wear high heels.  •Keep your feet clean and dry.    General instructions   •Take over-the-counter and prescription medicines only as told by your health care provider.  •Rub your foot gently.  •Pay attention to any changes in your symptoms.  •Keep all follow-up visits as told by your health care provider. This is important.    Contact a health care provider if:  •Your pain does not get better after a few days of self-care.  •Your pain gets worse.  •You cannot stand on your foot.    Get help right away if:  •Your foot is numb or tingling.  •Your foot or toes are swollen.  •Your foot or toes turn white or blue.  •You have warmth and redness along your foot.    Summary  •Common causes of foot pain are injury, sprain, arthritis, blisters, or bunions.  •Ice, medicines, and comfortable shoes may help foot pain.  •Contact your health care provider if your pain does not get better after a few days of self-care.    This information is not intended to replace advice given to you by your health care provider. Make sure you discuss any questions you have with your health care provider.

## 2022-07-25 NOTE — ED ADULT TRIAGE NOTE - PATIENT ON (OXYGEN DELIVERY METHOD)
room air Cosentyx Counseling:  I discussed with the patient the risks of Cosentyx including but not limited to worsening of Crohn's disease, immunosuppression, allergic reactions and infections.  The patient understands that monitoring is required including a PPD at baseline and must alert us or the primary physician if symptoms of infection or other concerning signs are noted.

## 2022-07-25 NOTE — ED PROVIDER NOTE - PHYSICAL EXAMINATION
Afebrile, hemodynamically stable, saturating well  NAD, well appearing, sitting comfortably in bed, no WOB, speaking full sentences  Head NCAT  EOMI grossly, anicteric  MMM  Breathing comfortably on room air  AAO, CN's 3-12 grossly intact  FELDER spontaneously, appearance of bilateral PVD with bilateral skin darkening, symmetric warmth, pulses, <2 sec cap refil, full plantar and dorsi flexion, generalized TTP of entire foot without specific localization, skin warm, dry

## 2022-07-25 NOTE — ED ADULT NURSE NOTE - NSIMPLEMENTINTERV_GEN_ALL_ED
Implemented All Universal Safety Interventions:  [place] to call system. Call bell, personal items and telephone within reach. Instruct patient to call for assistance. Room bathroom lighting operational. Non-slip footwear when patient is off stretcher. Physically safe environment: no spills, clutter or unnecessary equipment. Stretcher in lowest position, wheels locked, appropriate side rails in place.

## 2022-07-25 NOTE — ED PROVIDER NOTE - PATIENT PORTAL LINK FT
You can access the FollowMyHealth Patient Portal offered by [facility] by registering at the following website: [email or web address]. By joining [employer]’s FollowMyHealth portal, you will also be able to view your health information using other applications (apps) compatible with our system.

## 2022-08-01 ENCOUNTER — APPOINTMENT (OUTPATIENT)
Dept: PODIATRY | Facility: CLINIC | Age: 62
End: 2022-08-01

## 2022-08-01 ENCOUNTER — OUTPATIENT (OUTPATIENT)
Dept: OUTPATIENT SERVICES | Facility: HOSPITAL | Age: 62
LOS: 1 days | Discharge: HOME | End: 2022-08-01

## 2022-08-01 PROCEDURE — 99203 OFFICE O/P NEW LOW 30 MIN: CPT | Mod: GC

## 2022-08-02 NOTE — END OF VISIT
[] : Resident [FreeTextEntry3] : mild tenderness on palpation of right medial band of the plantar fascia. no pain with engagement of windless \par patient 50% improved\par continued with nsaid's prn\par return 3 weeks if pain persist

## 2022-08-02 NOTE — HISTORY OF PRESENT ILLNESS
[Sneakers] : josaih [FreeTextEntry1] : Patient complaining of pain in right foot that began 1 week ago. He presented to ER for the pain. Pain has improved 50% since last week. Taking ibuprofen as needed. Wrapping foot with ACE daily for swelling.

## 2022-08-02 NOTE — REASON FOR VISIT
[Initial Visit] : an initial visit for [Ankle Pain] : ankle pain [FreeTextEntry2] : Medial aspect of ankle

## 2022-08-02 NOTE — ASSESSMENT
[FreeTextEntry1] : Chronic fracture of medial malleolus, right\par No acute processes\par \par -X rays reviewed, no acute findings\par -ACE wrap applied to right foot\par -Patient to follow up in 3 weeks

## 2022-08-02 NOTE — PHYSICAL EXAM
[General Appearance - Alert] : alert [General Appearance - In No Acute Distress] : in no acute distress [2+] : right foot dorsalis pedis 2+ [Normal Foot/Ankle] : Both lower extremities were exposed and visualized. Standing exam demonstrates normal foot posture and alignment. Hindfoot exam shows no hindfoot valgus or varus [Skin Color & Pigmentation] : normal skin color and pigmentation [Skin Turgor] : normal skin turgor [] : no rash [Sensation] : the sensory exam was normal to light touch and pinprick [No Focal Deficits] : no focal deficits [Oriented To Time, Place, And Person] : oriented to person, place, and time [Impaired Insight] : insight and judgment were intact [Affect] : the affect was normal [Delayed in the Right Toes] : capillary refills normal in right toes [Ankle Swelling (On Exam)] : not present [Norman's Test For Right Radial Artery Patency] : negative right [de-identified] : m

## 2022-08-03 RX ORDER — AMMONIUM LACTATE 120 MG/G
12 CREAM TOPICAL
Refills: 0 | Status: ACTIVE | COMMUNITY

## 2022-08-03 RX ORDER — TAMSULOSIN HYDROCHLORIDE 0.4 MG/1
0.4 CAPSULE ORAL
Refills: 0 | Status: ACTIVE | COMMUNITY

## 2022-08-03 RX ORDER — CYANOCOBALAMIN (VITAMIN B-12) 100 MCG
100 TABLET ORAL
Refills: 0 | Status: ACTIVE | COMMUNITY

## 2022-08-03 RX ORDER — ACETAMINOPHEN 500 MG
500 TABLET ORAL
Refills: 0 | Status: ACTIVE | COMMUNITY

## 2022-08-03 RX ORDER — METOPROLOL SUCCINATE 25 MG/1
25 TABLET, EXTENDED RELEASE ORAL
Refills: 0 | Status: ACTIVE | COMMUNITY

## 2022-08-03 RX ORDER — MV-MIN/FOLIC/VIT K/LYCOP/COQ10 200-100MCG
CAPSULE ORAL
Refills: 0 | Status: ACTIVE | COMMUNITY

## 2022-08-03 RX ORDER — CODEINE PHOSPHATE/GUAIFENESIN 10-100MG/5
SYRUP ORAL
Refills: 0 | Status: ACTIVE | COMMUNITY

## 2022-08-03 RX ORDER — FOLIC ACID 1 MG/1
1 TABLET ORAL
Refills: 0 | Status: ACTIVE | COMMUNITY

## 2022-08-17 ENCOUNTER — EMERGENCY (EMERGENCY)
Facility: HOSPITAL | Age: 62
LOS: 0 days | Discharge: HOME | End: 2022-08-17
Attending: STUDENT IN AN ORGANIZED HEALTH CARE EDUCATION/TRAINING PROGRAM | Admitting: STUDENT IN AN ORGANIZED HEALTH CARE EDUCATION/TRAINING PROGRAM

## 2022-08-17 VITALS
SYSTOLIC BLOOD PRESSURE: 166 MMHG | HEART RATE: 75 BPM | TEMPERATURE: 98 F | RESPIRATION RATE: 16 BRPM | OXYGEN SATURATION: 99 % | WEIGHT: 235.01 LBS | HEIGHT: 70 IN | DIASTOLIC BLOOD PRESSURE: 84 MMHG

## 2022-08-17 DIAGNOSIS — Z91.013 ALLERGY TO SEAFOOD: ICD-10-CM

## 2022-08-17 DIAGNOSIS — M25.571 PAIN IN RIGHT ANKLE AND JOINTS OF RIGHT FOOT: ICD-10-CM

## 2022-08-17 DIAGNOSIS — Z91.018 ALLERGY TO OTHER FOODS: ICD-10-CM

## 2022-08-17 DIAGNOSIS — G89.29 OTHER CHRONIC PAIN: ICD-10-CM

## 2022-08-17 DIAGNOSIS — M10.9 GOUT, UNSPECIFIED: ICD-10-CM

## 2022-08-17 DIAGNOSIS — I10 ESSENTIAL (PRIMARY) HYPERTENSION: ICD-10-CM

## 2022-08-17 DIAGNOSIS — Z91.011 ALLERGY TO MILK PRODUCTS: ICD-10-CM

## 2022-08-17 PROCEDURE — 99283 EMERGENCY DEPT VISIT LOW MDM: CPT

## 2022-08-17 RX ORDER — ACETAMINOPHEN 500 MG
975 TABLET ORAL ONCE
Refills: 0 | Status: COMPLETED | OUTPATIENT
Start: 2022-08-17 | End: 2022-08-17

## 2022-08-17 RX ADMIN — Medication 975 MILLIGRAM(S): at 08:02

## 2022-08-17 NOTE — ED PROVIDER NOTE - OBJECTIVE STATEMENT
61 yo M pmhx gout, htn, chronic L medial malleolar fracture presenting to the ED for evaluation of chronic L ankle pain. pt reports he has known chronic fracture states he attempted follow up with ortho however needs a referral. Denies any new trauma/injury to ankle. Pt states pain is mild, throbbing, non radiating, ankle pain, worsening with ambulation. Pt denies fever, chills, numbness/tingling, weakness.

## 2022-08-17 NOTE — ED PROVIDER NOTE - NSFOLLOWUPCLINICS_GEN_ALL_ED_FT
[facility]  Orthpedic  [address], [place]   Phone: [phone]  Fax:   Follow Up Time: 1-3 Days

## 2022-08-17 NOTE — ED ADULT NURSE NOTE - CHIEF COMPLAINT QUOTE
ELVIRA from [facility] c/o right ankle pain, states he has a previous fracture but did not follow up with ortho

## 2022-08-17 NOTE — ED ADULT NURSE NOTE - OBJECTIVE STATEMENT
ELVIRA from [facility] c/o right ankle pain, states he has a previous fracture but did not follow up with ortho

## 2022-08-17 NOTE — ED PROVIDER NOTE - NSFOLLOWUPINSTRUCTIONS_ED_ALL_ED_FT
Our Emergency Department Referral Coordinators will be reaching out ot you in the next 24-48 hours from 9:00am to 5:00pm (Monday to Friday) with a follow up appointment. Please expect a phone call from the hospital in that time frame. If you do not receive a call or if you have any questions or concerns, you can reach them at [phone] or [phone].      Ankle Pain    Many things can cause ankle pain, including an injury to the area and overuse of the ankle. The ankle joint holds your body weight and allows you to move around. Ankle pain can occur on either side or the back of one ankle or both ankles. Ankle pain may be sharp and burning or dull and aching. There may be tenderness, stiffness, redness, or warmth around the ankle.     HOME CARE INSTRUCTIONS  Activity    Rest your ankle as told by your health care provider. Avoid any activities that cause ankle pain.  Do exercises as told by your health care provider.  Ask your health care provider if you can drive.    Using a Brace, a Bandage, or Crutches    If you were given a brace:  Wear it as told by your health care provider.  Remove it when you take a bath or a shower.  Try not to move your ankle very much, but wiggle your toes from time to time. This helps to prevent swelling.  If you were given an elastic bandage:  Remove it when you take a bath or a shower.  Try not to move your ankle very much, but wiggle your toes from time to time. This helps to prevent swelling.  Adjust the bandage to make it more comfortable if it feels too tight.  Loosen the bandage if you have numbness or tingling in your foot or if your foot turns cold and blue.  If you have crutches, use them as told by your health care provider. Continue to use them until you can walk without feeling pain in your ankle.    Managing Pain, Stiffness, and Swelling    Raise (elevate) your ankle above the level of your heart while you are sitting or lying down.  If directed, apply ice to the area:  Put ice in a plastic bag.  Place a towel between your skin and the bag.  Leave the ice on for 20 minutes, 2–3 times per day.    General Instructions    Keep all follow-up visits as told by your health care provider. This is important.  Record this information that may be helpful for you and your health care provider:  How often you have ankle pain.  Where the pain is located.  What the pain feels like.  Take over-the-counter and prescription medicines only as told by your health care provider.    SEEK MEDICAL CARE IF:  Your pain gets worse.  Your pain is not relieved with medicines.  You have a fever or chills.  You are having more trouble with walking.  You have new symptoms.    SEEK IMMEDIATE MEDICAL CARE IF:  Your foot, leg, toes, or ankle tingles or becomes numb.  Your foot, leg, toes, or ankle becomes swollen.  Your foot, leg, toes, or ankle turns pale or blue.    ADDITIONAL NOTES AND INSTRUCTIONS    Please follow up with your Primary MD in 24-48 hr.  Seek immediate medical care for any new/worsening signs or symptoms.

## 2022-08-17 NOTE — ED PROVIDER NOTE - PATIENT PORTAL LINK FT
You can access the FollowMyHealth Patient Portal offered by [facility] by registering at the following website: [email or web address]. By joining [employer]’s FollowMyHealth portal, you will also be able to view your health information using other applications (apps) compatible with our system.

## 2022-08-17 NOTE — ED PROVIDER NOTE - CLINICAL SUMMARY MEDICAL DECISION MAKING FREE TEXT BOX
Patient presents to the ER for referral to see an orthopedist. He has chronic L medial malleolar fracture and went to ortho but they told him that he needed a referral in order to see him. He has been ambulatory, denies acute trauma. vs noted, triage note reviewed. Agree with exam as above. Pt given f/u, referral and return precautions and verbalizes understanding.

## 2022-08-17 NOTE — ED PROVIDER NOTE - NS ED ROS FT
Constitutional: No fever, chills.  Eyes:  No visual changes  ENMT:  No neck pain  Cardiac:  No chest pain  Respiratory:  No cough, SOB  GI:  No nausea, vomiting, diarrhea, abdominal pain.  :  No dysuria, hematuria  MS:  (+)L ankle/foot pain.   Neuro:  No numbness/tingling or weakness  Skin:  No skin rash    Except as documented in the HPI,  all other systems are negative.

## 2022-08-17 NOTE — ED PROVIDER NOTE - PHYSICAL EXAMINATION
GENERAL: Well-nourished, Well-developed. NAD.  HEAD: No visible or palpable bumps or hematomas. No ecchymosis behind ears B/L.  Eyes: PERRLA, EOMI. No asymmetry. No nystagmus. No conjunctival injection. Non-icteric sclera.  ENMT: MMM.   Neck: Supple. FROM  CVS: RRR  MSK: Extremities w/o deformity or ttp. No visible signs of trauma such as ecchymosis, erythema, or swelling. FROM of L ankle and foot.   EXT: Radial and pedal pulses present B/L. No calf tenderness or swelling B/L. No palpable cords. No pedal edema B/L.  Neuro: NVI

## 2022-08-17 NOTE — ED ADULT TRIAGE NOTE - CHIEF COMPLAINT QUOTE
ELVIRA from [facility] c/o right ankle pain, states he has a previous fracture but did not follow up with ortho

## 2022-08-18 ENCOUNTER — EMERGENCY (EMERGENCY)
Facility: HOSPITAL | Age: 62
LOS: 0 days | Discharge: HOME | End: 2022-08-18
Attending: EMERGENCY MEDICINE

## 2022-08-18 VITALS
TEMPERATURE: 99 F | HEIGHT: 70 IN | HEART RATE: 111 BPM | DIASTOLIC BLOOD PRESSURE: 85 MMHG | WEIGHT: 235.01 LBS | RESPIRATION RATE: 18 BRPM | SYSTOLIC BLOOD PRESSURE: 169 MMHG | OXYGEN SATURATION: 96 %

## 2022-08-18 VITALS
RESPIRATION RATE: 18 BRPM | DIASTOLIC BLOOD PRESSURE: 61 MMHG | SYSTOLIC BLOOD PRESSURE: 130 MMHG | OXYGEN SATURATION: 97 % | HEART RATE: 125 BPM

## 2022-08-18 DIAGNOSIS — M79.671 PAIN IN RIGHT FOOT: ICD-10-CM

## 2022-08-18 DIAGNOSIS — I10 ESSENTIAL (PRIMARY) HYPERTENSION: ICD-10-CM

## 2022-08-18 DIAGNOSIS — G89.29 OTHER CHRONIC PAIN: ICD-10-CM

## 2022-08-18 DIAGNOSIS — M10.9 GOUT, UNSPECIFIED: ICD-10-CM

## 2022-08-18 DIAGNOSIS — F19.20 OTHER PSYCHOACTIVE SUBSTANCE DEPENDENCE, UNCOMPLICATED: ICD-10-CM

## 2022-08-18 DIAGNOSIS — Z87.828 PERSONAL HISTORY OF OTHER (HEALED) PHYSICAL INJURY AND TRAUMA: ICD-10-CM

## 2022-08-18 DIAGNOSIS — Z91.013 ALLERGY TO SEAFOOD: ICD-10-CM

## 2022-08-18 DIAGNOSIS — R00.0 TACHYCARDIA, UNSPECIFIED: ICD-10-CM

## 2022-08-18 DIAGNOSIS — Z91.011 ALLERGY TO MILK PRODUCTS: ICD-10-CM

## 2022-08-18 DIAGNOSIS — Z91.018 ALLERGY TO OTHER FOODS: ICD-10-CM

## 2022-08-18 DIAGNOSIS — F10.239 ALCOHOL DEPENDENCE WITH WITHDRAWAL, UNSPECIFIED: ICD-10-CM

## 2022-08-18 PROCEDURE — 99284 EMERGENCY DEPT VISIT MOD MDM: CPT

## 2022-08-18 PROCEDURE — 73610 X-RAY EXAM OF ANKLE: CPT | Mod: 26,RT

## 2022-08-18 PROCEDURE — 73620 X-RAY EXAM OF FOOT: CPT | Mod: 26,RT

## 2022-08-18 RX ORDER — ONDANSETRON 8 MG/1
4 TABLET, FILM COATED ORAL ONCE
Refills: 0 | Status: COMPLETED | OUTPATIENT
Start: 2022-08-18 | End: 2022-08-18

## 2022-08-18 RX ORDER — IBUPROFEN 200 MG
1 TABLET ORAL
Qty: 21 | Refills: 0
Start: 2022-08-18 | End: 2022-08-24

## 2022-08-18 RX ORDER — IBUPROFEN 200 MG
600 TABLET ORAL ONCE
Refills: 0 | Status: COMPLETED | OUTPATIENT
Start: 2022-08-18 | End: 2022-08-18

## 2022-08-18 RX ADMIN — Medication 1 MILLIGRAM(S): at 11:25

## 2022-08-18 RX ADMIN — ONDANSETRON 4 MILLIGRAM(S): 8 TABLET, FILM COATED ORAL at 10:03

## 2022-08-18 RX ADMIN — Medication 600 MILLIGRAM(S): at 02:31

## 2022-08-18 RX ADMIN — Medication 50 MILLIGRAM(S): at 10:33

## 2022-08-18 NOTE — ED ADULT NURSE NOTE - OBJECTIVE STATEMENT
ELVIRA with complaints of right foot since this morning. Sent from assisted living. Seen yesterday morning with same complaints. Sensation intacxt pulses intact AOx4

## 2022-08-18 NOTE — ED PROVIDER NOTE - NSFOLLOWUPCLINICS_GEN_ALL_ED_FT
[facility]  Podiatry  .  [place]   Phone: [phone]  Fax:

## 2022-08-18 NOTE — ED PROVIDER NOTE - PATIENT PORTAL LINK FT
You can access the FollowMyHealth Patient Portal offered by [facility] by registering at the following website: [email or web address]. By joining [employer]’s FollowMyHealth portal, you will also be able to view your health information using other applications (apps) compatible with our system. You can access the FollowMyHealth Patient Portal offered by [facility] by registering at the following website: [email or web address]. By joining [employer]’s FollowMyHealth portal, you will also be able to view your health information using other applications (apps) compatible with our system.

## 2022-08-18 NOTE — ED PROVIDER NOTE - CARE PLAN
Principal Discharge DX:	Chronic foot pain, right   1 Principal Discharge DX:	Chronic foot pain, right  Secondary Diagnosis:	Alcohol dependence with withdrawal

## 2022-08-18 NOTE — ED ADULT TRIAGE NOTE - CHIEF COMPLAINT QUOTE
ELVIRA with complaints of right foot since this morning. Sent from assisted living ELVIRA with complaints of right foot since this morning. Sent from assisted living. Seen yesterday morning with same complaints

## 2022-08-18 NOTE — ED PROVIDER NOTE - NSFOLLOWUPINSTRUCTIONS_ED_ALL_ED_FT
Chronic Pain    WHAT YOU NEED TO KNOW:    Chronic pain is pain that does not get better for 3 months or longer. Chronic pain may hurt all the time, or come and go.    DISCHARGE INSTRUCTIONS:    Call your local emergency number or have someone else call (911 in the [place]) if:   •You are breathing slower than normal, or you have trouble breathing.      •You cannot be awakened.      •You have a seizure.      Call your doctor if:   •Your heart feels like it is jumping or fluttering.      •You cannot think clearly.      •You have side effects from prescription pain medicine, such as itching, nausea, or vomiting.      •You have trouble sleeping.      •Your pain gets worse, even after you take medicine.      •You don't think the medicine is working.      •You have questions or concerns about your condition or care.      Medicines: You may need any of the following:  •Acetaminophen decreases pain and fever. It is available without a doctor's order. Ask how much to take and how often to take it. Follow directions. Read the labels of all other medicines you are using to see if they also contain acetaminophen, or ask your doctor or pharmacist. Acetaminophen can cause liver damage if not taken correctly.      •NSAIDs, such as ibuprofen, help decrease swelling, pain, and fever. This medicine is available with or without a doctor's order. NSAIDs can cause stomach bleeding or kidney problems in certain people. If you take blood thinner medicine, always ask your healthcare provider if NSAIDs are safe for you. Always read the medicine label and follow directions.      •Prescription pain medicine called narcotics or opioids may be given for certain types of chronic pain. Ask your healthcare provider how to take this medicine safely.      •Anesthetics can be rubbed on your skin or injected into a nerve or muscle to numb an area.      •Other medicines may reduce pain, anxiety, muscle tension, or swelling.      •Take your medicine as directed. Contact your healthcare provider if you think your medicine is not helping or if you have side effects. Tell him or her if you are allergic to any medicine. Keep a list of the medicines, vitamins, and herbs you take. Include the amounts, and when and why you take them. Bring the list or the pill bottles to follow-up visits. Carry your medicine list with you in case of an emergency.      Manage your chronic pain:   •Apply heat on the area in pain for 20 to 30 minutes every 2 hours for as many days as directed. Heat helps decrease pain and muscle spasms.      •Apply ice on the part of your body that hurts for 15 to 20 minutes every hour or as directed. Use an ice pack, or put crushed ice in a plastic bag. Cover it with a towel. Ice decreases pain and swelling, and helps prevent tissue damage.      •Go to physical therapy as directed. A physical therapist teaches you exercises to help improve movement and strength, and to decrease pain.      •Exercise for 30 minutes, 3 times a week. Regular physical activity can help decrease pain and improve your quality of life. Ask your healthcare provider about the best exercise plan for your type of pain.      •Get enough sleep. Create a relaxing bedtime routine. Go to sleep and wake up at the same time every day. Avoid caffeine in the afternoon.      •Talk with a counselor or therapist. A type of counseling called cognitive behavioral therapy (CBT) can help your chronic pain by changing the way you think about it. CBT can also improve your mood, sleep, and ability to move.      What you must know if you take narcotic pain medicine:   •You may need to take a bowel movement softener. The most common side effect of prescription pain medicine is constipation. Bowel movement softeners are available over the counter.      •Do not mix prescription pain medicines. This can cause an overdose of medicine, which can become life-threatening. Read labels. Make sure you know the ingredients in all of your medicines.      •Do not drink alcohol when you take prescription pain medicine. It is not safe to mix narcotics or opioids with alcohol or illegal drugs.      •Prescription pain medicine may impair your ability to drive or work safely. They may also cause dizziness and increase your risk for falling.      •Store prescription pain medicine in a safe location at home. Keep your medicine away from children and other people. Never share your medicine with anyone.      Follow up with your healthcare provider as directed: You may be referred to a pain specialist. Write down your questions so you remember to ask them during your visits.

## 2022-08-18 NOTE — ED PROVIDER NOTE - OBJECTIVE STATEMENT
61-year-old male with history of gout, substance dependence, chronic resting tachycardia, HTN, here for assessment of pain to right foot.  The patient has a chronic right lateral mall fracture and has intermittent pain to the area.  He was seen yesterday for the same pain and discharged without intervention.  He denies recent fall or trauma.  Notes this pain does not feel like his previous episodes of gout.

## 2022-08-18 NOTE — ED PROVIDER NOTE - PROGRESS NOTE DETAILS
ADDENDUM by Ranjana Decker M.D.: I received sign-off from Dr. GRACIE Garcia. 62yoM with h/o gout, chronic resting tachycardia, HTN, alcohol abuse, presented with foot pain similar to multiple past presentations, discharged, I was supervising attending pending transport only. I was asked to see pt due to anxiety. Pt states he feels anxious and nervous due to his alcohol drinking, states last drink last night. Has now been here 6.5 hrs pending transport. On exam, NAD, well appearing, sitting comfortably in chair but tremulous, tongue fasciculations, then vomited NBNB. Will given Zofran, Librium, reassess. Significantly improved and no more vomiting. Still some tremors. Will given PO ativan. Significantly improved and no more vomiting. Still some tremors. Will given PO ativan. Well appearing, does not appear to be in significant withdrawal.

## 2022-08-18 NOTE — ED ADULT NURSE NOTE - CHIEF COMPLAINT QUOTE
ELVIRA with complaints of right foot since this morning. Sent from assisted living. Seen yesterday morning with same complaints

## 2022-08-18 NOTE — ED PROVIDER NOTE - PHYSICAL EXAMINATION
VITAL SIGNS: I have reviewed nursing notes and confirm.  CONSTITUTIONAL: Well-developed; well-nourished; in no acute distress.  SKIN: Skin exam is warm and dry, no acute rash, no broken skin, redness, warmth  HEAD: Normocephalic; atraumatic.  EYES: PERRL, EOM intact; conjunctiva and sclera clear.  ENT: No nasal discharge; airway clear.  CARD: RRR, no murmur  RESP: No wheezes, rales or rhonchi.  ABD: Normal bowel sounds; soft; non-distended; non-tender  EXT: Normal ROM. ttp over lateral mal of R foot  NEURO: Alert, oriented. Grossly unremarkable. No focal deficits.  PSYCH: Cooperative, appropriate.

## 2022-08-18 NOTE — ED ADULT TRIAGE NOTE - ARRIVAL FROM
[facility] living [facility]

## 2022-08-19 ENCOUNTER — APPOINTMENT (OUTPATIENT)
Dept: OTOLARYNGOLOGY | Facility: CLINIC | Age: 62
End: 2022-08-19

## 2022-08-24 ENCOUNTER — OUTPATIENT (OUTPATIENT)
Dept: OUTPATIENT SERVICES | Facility: HOSPITAL | Age: 62
LOS: 1 days | Discharge: HOME | End: 2022-08-24

## 2022-08-24 ENCOUNTER — APPOINTMENT (OUTPATIENT)
Dept: PODIATRY | Facility: CLINIC | Age: 62
End: 2022-08-24

## 2022-08-24 PROCEDURE — 99212 OFFICE O/P EST SF 10 MIN: CPT

## 2022-08-24 NOTE — ASSESSMENT
[FreeTextEntry1] : pt is asymptomatic\par return to regular activity, no restrictions\par return as needed

## 2022-08-24 NOTE — HISTORY OF PRESENT ILLNESS
[Sneakers] : josiah [FreeTextEntry1] : Patient complaining of pain in right foot that began 1 week ago. He presented to ER for the pain. Pain has improved 50% since last week. Taking ibuprofen as needed. Wrapping foot with ACE daily for swelling. \par \par 8/24-returns for continued care. Pt denies any pain on todays visit

## 2022-08-24 NOTE — PHYSICAL EXAM
[General Appearance - Alert] : alert [General Appearance - In No Acute Distress] : in no acute distress [2+] : right foot dorsalis pedis 2+ [Normal Foot/Ankle] : Both lower extremities were exposed and visualized. Standing exam demonstrates normal foot posture and alignment. Hindfoot exam shows no hindfoot valgus or varus [Skin Color & Pigmentation] : normal skin color and pigmentation [Skin Turgor] : normal skin turgor [] : no rash [Sensation] : the sensory exam was normal to light touch and pinprick [No Focal Deficits] : no focal deficits [Oriented To Time, Place, And Person] : oriented to person, place, and time [Impaired Insight] : insight and judgment were intact [Affect] : the affect was normal [Ankle Swelling (On Exam)] : not present [Delayed in the Right Toes] : capillary refills normal in right toes [Norman's Test For Right Radial Artery Patency] : negative right

## 2022-09-12 ENCOUNTER — EMERGENCY (EMERGENCY)
Facility: HOSPITAL | Age: 62
LOS: 0 days | Discharge: HOME | End: 2022-09-13
Attending: EMERGENCY MEDICINE | Admitting: EMERGENCY MEDICINE

## 2022-09-12 VITALS
TEMPERATURE: 98 F | DIASTOLIC BLOOD PRESSURE: 86 MMHG | HEIGHT: 70 IN | RESPIRATION RATE: 18 BRPM | OXYGEN SATURATION: 98 % | SYSTOLIC BLOOD PRESSURE: 128 MMHG | HEART RATE: 116 BPM

## 2022-09-12 DIAGNOSIS — R11.0 NAUSEA: ICD-10-CM

## 2022-09-12 DIAGNOSIS — Z20.822 CONTACT WITH AND (SUSPECTED) EXPOSURE TO COVID-19: ICD-10-CM

## 2022-09-12 DIAGNOSIS — R00.0 TACHYCARDIA, UNSPECIFIED: ICD-10-CM

## 2022-09-12 DIAGNOSIS — Z87.891 PERSONAL HISTORY OF NICOTINE DEPENDENCE: ICD-10-CM

## 2022-09-12 DIAGNOSIS — I12.9 HYPERTENSIVE CHRONIC KIDNEY DISEASE WITH STAGE 1 THROUGH STAGE 4 CHRONIC KIDNEY DISEASE, OR UNSPECIFIED CHRONIC KIDNEY DISEASE: ICD-10-CM

## 2022-09-12 DIAGNOSIS — R42 DIZZINESS AND GIDDINESS: ICD-10-CM

## 2022-09-12 DIAGNOSIS — M10.9 GOUT, UNSPECIFIED: ICD-10-CM

## 2022-09-12 DIAGNOSIS — R07.89 OTHER CHEST PAIN: ICD-10-CM

## 2022-09-12 DIAGNOSIS — R94.31 ABNORMAL ELECTROCARDIOGRAM [ECG] [EKG]: ICD-10-CM

## 2022-09-12 DIAGNOSIS — F10.20 ALCOHOL DEPENDENCE, UNCOMPLICATED: ICD-10-CM

## 2022-09-12 DIAGNOSIS — Z91.013 ALLERGY TO SEAFOOD: ICD-10-CM

## 2022-09-12 DIAGNOSIS — Z91.011 ALLERGY TO MILK PRODUCTS: ICD-10-CM

## 2022-09-12 DIAGNOSIS — N18.9 CHRONIC KIDNEY DISEASE, UNSPECIFIED: ICD-10-CM

## 2022-09-12 DIAGNOSIS — R00.2 PALPITATIONS: ICD-10-CM

## 2022-09-12 DIAGNOSIS — H91.90 UNSPECIFIED HEARING LOSS, UNSPECIFIED EAR: ICD-10-CM

## 2022-09-12 DIAGNOSIS — Z91.014 ALLERGY TO MAMMALIAN MEATS: ICD-10-CM

## 2022-09-12 PROCEDURE — 99285 EMERGENCY DEPT VISIT HI MDM: CPT

## 2022-09-12 PROCEDURE — 93010 ELECTROCARDIOGRAM REPORT: CPT

## 2022-09-13 VITALS
OXYGEN SATURATION: 98 % | HEART RATE: 125 BPM | RESPIRATION RATE: 18 BRPM | DIASTOLIC BLOOD PRESSURE: 53 MMHG | SYSTOLIC BLOOD PRESSURE: 125 MMHG | TEMPERATURE: 98 F

## 2022-09-13 LAB
ALBUMIN SERPL ELPH-MCNC: 4.6 G/DL — SIGNIFICANT CHANGE UP (ref 3.5–5.2)
ALP SERPL-CCNC: 93 U/L — SIGNIFICANT CHANGE UP (ref 30–115)
ALT FLD-CCNC: 18 U/L — SIGNIFICANT CHANGE UP (ref 0–41)
ANION GAP SERPL CALC-SCNC: 25 MMOL/L — HIGH (ref 7–14)
AST SERPL-CCNC: 34 U/L — SIGNIFICANT CHANGE UP (ref 0–41)
BASOPHILS # BLD AUTO: 0.16 K/UL — SIGNIFICANT CHANGE UP (ref 0–0.2)
BASOPHILS NFR BLD AUTO: 1.4 % — HIGH (ref 0–1)
BILIRUB SERPL-MCNC: 1.8 MG/DL — HIGH (ref 0.2–1.2)
BUN SERPL-MCNC: 21 MG/DL — HIGH (ref 10–20)
CALCIUM SERPL-MCNC: 9.3 MG/DL — SIGNIFICANT CHANGE UP (ref 8.4–10.5)
CHLORIDE SERPL-SCNC: 99 MMOL/L — SIGNIFICANT CHANGE UP (ref 98–110)
CO2 SERPL-SCNC: 17 MMOL/L — SIGNIFICANT CHANGE UP (ref 17–32)
CREAT SERPL-MCNC: 1.4 MG/DL — SIGNIFICANT CHANGE UP (ref 0.7–1.5)
D DIMER BLD IA.RAPID-MCNC: 390 NG/ML DDU — HIGH (ref 0–230)
EGFR: 56 ML/MIN/1.73M2 — LOW
EOSINOPHIL # BLD AUTO: 0.04 K/UL — SIGNIFICANT CHANGE UP (ref 0–0.7)
EOSINOPHIL NFR BLD AUTO: 0.4 % — SIGNIFICANT CHANGE UP (ref 0–8)
FLUAV AG NPH QL: SIGNIFICANT CHANGE UP
FLUBV AG NPH QL: SIGNIFICANT CHANGE UP
GLUCOSE SERPL-MCNC: 81 MG/DL — SIGNIFICANT CHANGE UP (ref 70–99)
HCT VFR BLD CALC: 39.6 % — LOW (ref 42–52)
HGB BLD-MCNC: 14.3 G/DL — SIGNIFICANT CHANGE UP (ref 14–18)
IMM GRANULOCYTES NFR BLD AUTO: 0.4 % — HIGH (ref 0.1–0.3)
LYMPHOCYTES # BLD AUTO: 3.54 K/UL — HIGH (ref 1.2–3.4)
LYMPHOCYTES # BLD AUTO: 31.4 % — SIGNIFICANT CHANGE UP (ref 20.5–51.1)
MCHC RBC-ENTMCNC: 32.8 PG — HIGH (ref 27–31)
MCHC RBC-ENTMCNC: 36.1 G/DL — SIGNIFICANT CHANGE UP (ref 32–37)
MCV RBC AUTO: 90.8 FL — SIGNIFICANT CHANGE UP (ref 80–94)
MONOCYTES # BLD AUTO: 0.43 K/UL — SIGNIFICANT CHANGE UP (ref 0.1–0.6)
MONOCYTES NFR BLD AUTO: 3.8 % — SIGNIFICANT CHANGE UP (ref 1.7–9.3)
NEUTROPHILS # BLD AUTO: 7.06 K/UL — HIGH (ref 1.4–6.5)
NEUTROPHILS NFR BLD AUTO: 62.6 % — SIGNIFICANT CHANGE UP (ref 42.2–75.2)
NRBC # BLD: 0 /100 WBCS — SIGNIFICANT CHANGE UP (ref 0–0)
NT-PROBNP SERPL-SCNC: 47 PG/ML — SIGNIFICANT CHANGE UP (ref 0–300)
PLATELET # BLD AUTO: 183 K/UL — SIGNIFICANT CHANGE UP (ref 130–400)
POTASSIUM SERPL-MCNC: 4.1 MMOL/L — SIGNIFICANT CHANGE UP (ref 3.5–5)
POTASSIUM SERPL-SCNC: 4.1 MMOL/L — SIGNIFICANT CHANGE UP (ref 3.5–5)
PROT SERPL-MCNC: 7.1 G/DL — SIGNIFICANT CHANGE UP (ref 6–8)
RBC # BLD: 4.36 M/UL — LOW (ref 4.7–6.1)
RBC # FLD: 14.1 % — SIGNIFICANT CHANGE UP (ref 11.5–14.5)
RSV RNA NPH QL NAA+NON-PROBE: SIGNIFICANT CHANGE UP
SARS-COV-2 RNA SPEC QL NAA+PROBE: SIGNIFICANT CHANGE UP
SODIUM SERPL-SCNC: 141 MMOL/L — SIGNIFICANT CHANGE UP (ref 135–146)
TROPONIN T SERPL-MCNC: <0.01 NG/ML — SIGNIFICANT CHANGE UP
TROPONIN T SERPL-MCNC: <0.01 NG/ML — SIGNIFICANT CHANGE UP
WBC # BLD: 11.27 K/UL — HIGH (ref 4.8–10.8)
WBC # FLD AUTO: 11.27 K/UL — HIGH (ref 4.8–10.8)

## 2022-09-13 PROCEDURE — G1004: CPT

## 2022-09-13 PROCEDURE — 93010 ELECTROCARDIOGRAM REPORT: CPT

## 2022-09-13 PROCEDURE — 93010 ELECTROCARDIOGRAM REPORT: CPT | Mod: 77

## 2022-09-13 PROCEDURE — 71046 X-RAY EXAM CHEST 2 VIEWS: CPT | Mod: 26

## 2022-09-13 PROCEDURE — 71275 CT ANGIOGRAPHY CHEST: CPT | Mod: 26,ME

## 2022-09-13 RX ORDER — FAMOTIDINE 10 MG/ML
20 INJECTION INTRAVENOUS ONCE
Refills: 0 | Status: DISCONTINUED | OUTPATIENT
Start: 2022-09-13 | End: 2022-09-13

## 2022-09-13 RX ORDER — SODIUM CHLORIDE 9 MG/ML
2000 INJECTION INTRAMUSCULAR; INTRAVENOUS; SUBCUTANEOUS ONCE
Refills: 0 | Status: COMPLETED | OUTPATIENT
Start: 2022-09-13 | End: 2022-09-13

## 2022-09-13 RX ORDER — ONDANSETRON 8 MG/1
4 TABLET, FILM COATED ORAL ONCE
Refills: 0 | Status: COMPLETED | OUTPATIENT
Start: 2022-09-13 | End: 2022-09-13

## 2022-09-13 RX ORDER — FAMOTIDINE 10 MG/ML
20 INJECTION INTRAVENOUS ONCE
Refills: 0 | Status: COMPLETED | OUTPATIENT
Start: 2022-09-13 | End: 2022-09-13

## 2022-09-13 RX ADMIN — Medication 50 MILLIGRAM(S): at 05:19

## 2022-09-13 RX ADMIN — SODIUM CHLORIDE 2000 MILLILITER(S): 9 INJECTION INTRAMUSCULAR; INTRAVENOUS; SUBCUTANEOUS at 03:35

## 2022-09-13 RX ADMIN — ONDANSETRON 4 MILLIGRAM(S): 8 TABLET, FILM COATED ORAL at 04:49

## 2022-09-13 RX ADMIN — Medication 30 MILLILITER(S): at 03:34

## 2022-09-13 RX ADMIN — FAMOTIDINE 104 MILLIGRAM(S): 10 INJECTION INTRAVENOUS at 04:48

## 2022-09-13 NOTE — ED PROVIDER NOTE - PHYSICAL EXAMINATION
Progress Note  [facility]        PATIENT DETAILS  Date: 8/23/2020   Elba Salas  89 year old female  [id number]  [id number]  PCP: Rakan Rai MD   LOS: 0 days     CC   Chief Complaint   Patient presents with   • Diarrhea Adult        Subjective  No acute events overnight. Still frustrated she can't go home but otherwise no complaints this morning. Did have BM yesterday but not loose. Denies lightheadedness, dizziness, chest pain, chest pressure, sob, cough, wheezing, abd pain, nausea, vomiting,, constipation, dysuria, numbness, weakness, or tingling.     ROS  See above    Meds  See Epic MAR     IVF  • dextrose 5 % infusion         VITALS  Vital Last Value 24 Hour Range   Temperature 98.1 °F (36.7 °C) Temp  Min: 97.9 °F (36.6 °C)  Max: 98.1 °F (36.7 °C)   Pulse 83 Pulse  Min: 82  Max: 98   Respiratory 18 Resp  Min: 18  Max: 26   Blood Pressure (!) 162/82 BP  Min: 140/78  Max: 162/82   Pulse Oximetry 92 % SpO2  Min: 92 %  Max: 93 %   CVP   No data recorded     Vital Today Admit   Weight 77 kg Weight: 81.1 kg   Height N/A Height: 5' (152.4 cm)   BMI N/A BMI (Calculated): 34.92     BMI  Body mass index is 28.25 kg/m².     PHYSICAL EXAM  General - Patient is in no acute distress.  Patient is conversing freely in full sentences. Obese.  Head - Atraumatic, normocephalic  Eyes - Extra ocular muscles intact. Pupils, equal, round, and reactive to light. Sclerae are anicteric  ENT - No drainage from nasal mucosa or nares. Oropharyngeal mucous membranes are moist. Neck is soft and supple  LYMPHNODES - No cervical or supraclavicular lymphadenopathy   CV - Regular rate and rhythm without additional heartsounds. Peripheral pulses are 2+ and symmetric. Cap refill <2 seconds.  PULM - CTAB w/o wheezing, rhonchi or rales   ABDOMEN - Soft, non-tender and non-distended. Bowel sounds are normoactive. No guarding or rebound tenderness. No hepatosplenomegaly.  MUSCULOSKELETAL - Warm  and well perfused. No cyanosis, clubbing, or edema.  SKIN - Warm, no rashes or lesions.  NEURO - CNs II-XII are grossly intact.  Strength, sensation, and coordination of RLE/RUE/LLE/LUEs are grossly intact.  PSYCH - Alert and oriented to person, place and time. Recent and remote memory intact.      LABS  I personally reviewed all recent labs    Recent Labs   Lab 08/23/20  0620 08/22/20  0605 08/21/20  0606 08/20/20  1644  08/20/20  1234   SODIUM 142 141 141  --   --  139   POTASSIUM 4.5 4.6 4.6  --   --  5.1   CHLORIDE 104 104 105  --   --  104   CO2 29 29 28  --   --  25   GLUCOSE 164* 170* 191*  --   --  274*   BUN 17 15 16  --   --  28*   CREATININE 0.92 0.98* 0.84  --    < > 1.06*   CALCIUM 9.1 9.1 9.0  --   --  8.7   ALBUMIN  --   --   --   --   --  3.2*   MG 1.7 1.9 1.7  --   --   --    BILIRUBIN  --   --   --   --   --  0.4   ALKPT  --   --   --   --   --  58   LACTA  --   --   --  1.3  --   --    AST  --   --   --   --   --  24   GPT  --   --   --   --   --  24   TOTPROTEIN  --   --   --   --   --  6.8   LIPA  --   --   --   --   --  50*   INR  --   --   --   --   --  1.0   PTT  --   --   --   --   --  23    < > = values in this interval not displayed.     No results found  Recent Labs   Lab 08/21/20  0606 08/20/20  1234   WBC 5.7 8.6   RBC 3.93* 3.87*   HGB 12.3 12.1   HCT 38.5 38.7    166   MCV 98.0 100.0     Recent Labs   Lab 08/20/20  1234   PTT 23     Urinalysis  Recent Labs   Lab 08/20/20  1243   USPG 1.016   UPH 5.0   UKET Negative   UPROT Negative   UGLU Negative   UBILI Negative   UROB 0.2   UWBC Negative   UNITR Negative   URBC Small*         Microbiology   Specimen Description (no units)   Date Value   08/05/2019 URINE, CLEAN CATCH/MIDSTREAM   08/05/2019 BLOOD, PERIPHERAL HAND, LEFT   08/05/2019 BLOOD, PERIPHERAL ANTECUBITAL,RIGHT   05/10/2019 URINE, CATHETERIZED, STRAIGHT      CULTURE (no units)   Date Value   08/05/2019     >100,000 CFU/mL MULTIPLE ORGANISMS ISOLATED WITH NO PREDOMINANT  TYPE, CONSISTENT WITH CONTAMINATION. CONSIDER RECOLLECTION.   08/05/2019 NO GROWTH 5 DAYS.   08/05/2019 NO GROWTH 5 DAYS.   05/10/2019 (P)    >100,000 CFU/mL ESCHERICHIA COLI (Multiple drug resistant organism) Contact precautions required   05/10/2019     E.COLI MDRO CALLED TO, READ BACK AND CONFIRMED FABRIZIO (RN) 5/11/2019 2105 BY [id number]        IMAGING  No results found.    I personally reviewed all recent imaging      ASSESSMENT AND PLAN  Elba Salas is a 89 year old female with PMH significant for HTN, HLD, CKD, DM, anemia, RA, OA, osteoporosis, chronic pain, insomnia, depression, anxiety who is admitted for:     Admission diagnoses    Diarrhea - possibly c. diff    Dehydration/hypovolemia    RONNY on CKD    Altered mental status - 2/2 the above    Lactic acidosis - 2/2 the above    Suspect COVID-19 infection - less likely    HypoMg     Other relevant diagnoses  HTN, HLD, CKD, DM, anemia, RA, OA, osteoporosis, chronic pain, insomnia, depression, anxiety     8/20:  Admit to obs  Follow up stool studies  If C. Diff negative, can start imodium  Low residue diet, mIVF  Trend lactate  Monitor mentation with the above  Follow up covid testing, covid isolation in the mean time  Carb consistent diet, home lantus at reduced dose, SSI  Hold home oral antihyperglycemics, otherwise continue home meds as appropriate    8/21  No more diarrhea. Will collect and send stool studies if further diarrhea. Continue low residue diet. Cr now improved, and lactate normal on repeat. Will stop IVF. Lactate now normal. Follow up covid testing, continue covid isolation in the mean time. Continue carb consistent diet, home lantus at reduced dose, and SSI. Hold home oral antihyperglycemics, otherwise continue home meds as appropriate. PT/OT recommending assisted living, 24 hour assist. Could discharge today as no more diarrhea making c. Diff unlikely, though could still be covid related. Unfortunately, facility needs negative covid test  prior to accepting her back and will not take admission over the weekend. Plan for discharge on Monday.     8/22  No more diarrhea. Will collect and send stool studies if further diarrhea. Continue low residue diet. Cr now normal. Follow up covid testing, continue covid isolation in the mean time. Continue carb consistent diet, home lantus at reduced dose, and SSI. Hold home oral antihyperglycemics, otherwise continue home meds as appropriate. PT/OT recommending assisted living, 24 hour assist. Could discharge today as no more diarrhea making C. Diff unlikely, though could still be covid related. Unfortunately, facility needs negative covid test prior to accepting her back and will not take admission over the weekend. Plan for discharge on Monday.      8/23  No more diarrhea. Will collect and send stool studies if further diarrhea. Advance diet to general. Follow up covid testing, continue covid isolation in the mean time. Continue carb consistent diet, home lantus at reduced dose, and SSI. Replace lytes per protocol. Hold home oral antihyperglycemics, otherwise continue home meds as appropriate. PT/OT recommending assisted living, 24 hour assist. Medically ready for discharge as no more diarrhea making C. Diff unlikely, though could still be covid related. Unfortunately, facility needs negative covid test prior to accepting her back and will not take admission over the weekend. Plan for discharge on Monday.      Diet: Low residue, carb consistent  DVT ppx: Lovenox  Code:   Code Status: Do Not Resuscitate    Cody Penn MD  Hospitalist - Internal Medicine/Pediatrics  Pager - [phone]                                                     VITAL SIGNS: I have reviewed nursing notes and confirm.  CONSTITUTIONAL: Well-developed; well-nourished; in no acute distress.  SKIN: Skin exam is warm and dry, no acute rash.  HEAD: Normocephalic; atraumatic.  EYES: PERRL, EOM intact; conjunctiva and sclera clear.  ENT: No nasal discharge; airway clear.   NECK: Supple; non tender.  Chest wall: nontender  CARD: S1, S2 normal; no murmurs, gallops, or rubs. Regular rate and rhythm.  RESP: No wheezes, rales or rhonchi.  ABD: Normal bowel sounds; soft; non-distended; non-tender; no hepatosplenomegaly.  EXT: Normal ROM. No clubbing, cyanosis or edema.  LYMPH: No acute cervical adenopathy.  NEURO: Alert, oriented. Grossly unremarkable. No focal deficits.  PSYCH: Cooperative, appropriate.

## 2022-09-13 NOTE — ED PROVIDER NOTE - NSFOLLOWUPINSTRUCTIONS_ED_ALL_ED_FT
Follow up with your cardiologist. Call for an appointment.     Chest Pain    Chest pain can be caused by many different conditions which may or may not be dangerous. Causes include heartburn, lung infections, heart attack, blood clot in lungs, skin infections, strain or damage to muscle, cartilage, or bones, etc. In addition to a history and physical examination, an electrocardiogram (ECG) or other lab tests may have been performed to determine the cause of your chest pain. Follow up with your primary care provider or with a cardiologist as instructed.     SEEK IMMEDIATE MEDICAL CARE IF YOU HAVE ANY OF THE FOLLOWING SYMPTOMS: worsening chest pain, coughing up blood, unexplained back/neck/jaw pain, severe abdominal pain, dizziness or lightheadedness, fainting, shortness of breath, sweaty or clammy skin, vomiting, or racing heart beat. These symptoms may represent a serious problem that is an emergency. Do not wait to see if the symptoms will go away. Get medical help right away. Call 911 and do not drive yourself to the hospital.

## 2022-09-13 NOTE — ED PROVIDER NOTE - CLINICAL SUMMARY MEDICAL DECISION MAKING FREE TEXT BOX
Case signed out to me by Dr. Vences.  Patient presented with bilateral chest discomfort today.  Patient notes associated bloating sensation and mild nausea.  Of note, patient had negative nuclear stress test 5 months ago.'    In ED patient tachycardic however he has chronic resting tachycardia documented in his chart and patient confirms that he was diagnosed with this earlier this year by his cardiologist.    Labs including troponin and delta troponin are negative.  D-dimer was positive so CT was done.  No PE noted.    Labs are incidentally notable for an anion gap of 25.  The patient initially stated that his last drink was yesterday afternoon but when he was pressed further states he may have drank later into the night that he initially recalled.    The patient was reassessed, he is feeling better, still has resting tachycardia will sleeping comfortably.  Low suspicion for acute cardiac ischemia, chest x-ray without pneumonia or pneumothorax.  CTA negative for PE.  Will DC home with close monitoring of symptoms, return precautions.    The patient has no tremors, fasciculations to suggest acute withdrawal however he did receive Librium as preventative measure.

## 2022-09-13 NOTE — ED PROVIDER NOTE - PATIENT PORTAL LINK FT
You can access the FollowMyHealth Patient Portal offered by Health system by registering at the following website: [email or web address]. By joining [facility] FollowMyHealth portal, you will also be able to view your health information using other applications (apps) compatible with our system.

## 2022-09-13 NOTE — ED PROVIDER NOTE - OBJECTIVE STATEMENT
62-year-old male with past medical history of being hard of hearing, hypertension, presents to the ED for chest pain since this evening.  Patient describes chest pain as a "discomfort" to both left and right chest wall area, states he was lightheadedness, and had nausea.  Denies any shortness of breath, vomiting, diarrhea, cough, sweats, fatigue, belly pain.  Does feel little "bloated", but has normal BMs, normal urine output.  Patient states that he quit smoking many years ago, but has started to drink again on a daily basis.  Denies any drug use.  No family history of heart disease, but did have a nuclear stress test earlier this year which he was told was okay.    ALL: nkda, beef, dairy, shellfish  PMH as above  Surgeries: denies  SH: quit smoking 20 yrs ago, +etoh daily (last drink this afternoon), no drugs  PMD [facility] denies CAD  Cardio Dr Barrientos 62-year-old male with past medical history of being hard of hearing, hypertension, presents to the ED for chest pain since this evening.  Patient describes chest pain as a "discomfort" to both left and right chest wall area, states he was lightheadedness, and had nausea.  Denies any shortness of breath, vomiting, diarrhea, cough, sweats, fatigue, belly pain.  Does feel little "bloated", but has normal BMs, normal urine output.  Patient states that he quit smoking many years ago, but has started to drink again on a daily basis.  Denies any drug use.  No family history of heart disease, but did have a nuclear stress test earlier this year (April 2022) which he was normal (results in our EMR).    ALL: nkda, beef, dairy, shellfish  PMH as above  Surgeries: denies  SH: quit smoking 20 yrs ago, +etoh daily (last drink this afternoon), no drugs  PMD [place]   denies CAD  Cardio Dr Barrientos 62-year-old male from [facility] with past medical history of being hard of hearing, hypertension, anemia, Etoh dependency, vertigo, presents to the ED for chest pain since this evening.  Patient describes chest pain as a "discomfort" to both left and right chest wall area, states he was lightheadedness, and had nausea.  Denies any shortness of breath, vomiting, diarrhea, cough, sweats, fatigue, belly pain.  Does feel little "bloated", but has normal BMs, normal urine output.  Patient states that he quit smoking many years ago, but has started to drink again on a daily basis.  Denies any drug use.  No family history of heart disease, but did have a nuclear stress test earlier this year (April 2022) which he was normal (results in our EMR).    ALL: nkda, beef, dairy, shellfish  PMH as above  Surgeries: denies  Meds: claritin, tamsulosin, folate, mvi, toprol xl, vit b-12, tylenol, motirin, lac hydrin 12%  SH: quit smoking 20 yrs ago, +etoh daily (last drink this afternoon), no drugs  PMD [place]  FH denies CAD  Cardio Dr Barrientos

## 2022-09-20 ENCOUNTER — APPOINTMENT (OUTPATIENT)
Dept: PODIATRY | Facility: CLINIC | Age: 62
End: 2022-09-20

## 2022-09-20 VITALS
TEMPERATURE: 97 F | BODY MASS INDEX: 32.93 KG/M2 | SYSTOLIC BLOOD PRESSURE: 130 MMHG | DIASTOLIC BLOOD PRESSURE: 78 MMHG | WEIGHT: 230 LBS | HEIGHT: 70 IN | OXYGEN SATURATION: 98 % | HEART RATE: 90 BPM

## 2022-09-20 DIAGNOSIS — M79.671 PAIN IN RIGHT FOOT: ICD-10-CM

## 2022-09-20 DIAGNOSIS — M72.2 PLANTAR FASCIAL FIBROMATOSIS: ICD-10-CM

## 2022-09-20 DIAGNOSIS — M10.9 GOUT, UNSPECIFIED: ICD-10-CM

## 2022-09-20 PROCEDURE — 99213 OFFICE O/P EST LOW 20 MIN: CPT

## 2022-09-23 ENCOUNTER — APPOINTMENT (OUTPATIENT)
Dept: ORTHOPEDIC SURGERY | Facility: CLINIC | Age: 62
End: 2022-09-23

## 2022-10-02 PROBLEM — M72.2 PLANTAR FASCIITIS, RIGHT: Status: ACTIVE | Noted: 2022-08-02

## 2022-10-02 PROBLEM — M79.671 ACUTE FOOT PAIN, RIGHT: Status: ACTIVE | Noted: 2022-08-02

## 2022-10-02 NOTE — PHYSICAL EXAM
[General Appearance - Alert] : alert [General Appearance - In No Acute Distress] : in no acute distress [Ankle Swelling (On Exam)] : not present [Delayed in the Right Toes] : capillary refills normal in right toes [2+] : right foot dorsalis pedis 2+ [Normal Foot/Ankle] : Both lower extremities were exposed and visualized. Standing exam demonstrates normal foot posture and alignment. Hindfoot exam shows no hindfoot valgus or varus [Norman's Test For Right Radial Artery Patency] : negative right [Skin Color & Pigmentation] : normal skin color and pigmentation [Skin Turgor] : normal skin turgor [] : no rash [Sensation] : the sensory exam was normal to light touch and pinprick [No Focal Deficits] : no focal deficits [Oriented To Time, Place, And Person] : oriented to person, place, and time [Impaired Insight] : insight and judgment were intact [Affect] : the affect was normal

## 2022-10-04 ENCOUNTER — APPOINTMENT (OUTPATIENT)
Dept: PODIATRY | Facility: CLINIC | Age: 62
End: 2022-10-04

## 2022-10-14 ENCOUNTER — EMERGENCY (EMERGENCY)
Facility: HOSPITAL | Age: 62
LOS: 0 days | Discharge: HOME | End: 2022-10-14
Attending: EMERGENCY MEDICINE | Admitting: EMERGENCY MEDICINE

## 2022-10-14 VITALS
RESPIRATION RATE: 18 BRPM | DIASTOLIC BLOOD PRESSURE: 88 MMHG | HEIGHT: 70 IN | WEIGHT: 240.08 LBS | OXYGEN SATURATION: 98 % | TEMPERATURE: 98 F | HEART RATE: 104 BPM | SYSTOLIC BLOOD PRESSURE: 170 MMHG

## 2022-10-14 DIAGNOSIS — R10.13 EPIGASTRIC PAIN: ICD-10-CM

## 2022-10-14 DIAGNOSIS — Z91.013 ALLERGY TO SEAFOOD: ICD-10-CM

## 2022-10-14 DIAGNOSIS — Z91.018 ALLERGY TO OTHER FOODS: ICD-10-CM

## 2022-10-14 DIAGNOSIS — R11.0 NAUSEA: ICD-10-CM

## 2022-10-14 DIAGNOSIS — F17.200 NICOTINE DEPENDENCE, UNSPECIFIED, UNCOMPLICATED: ICD-10-CM

## 2022-10-14 DIAGNOSIS — Z91.011 ALLERGY TO MILK PRODUCTS: ICD-10-CM

## 2022-10-14 DIAGNOSIS — I10 ESSENTIAL (PRIMARY) HYPERTENSION: ICD-10-CM

## 2022-10-14 LAB
ALBUMIN SERPL ELPH-MCNC: 4.6 G/DL — SIGNIFICANT CHANGE UP (ref 3.5–5.2)
ALP SERPL-CCNC: 73 U/L — SIGNIFICANT CHANGE UP (ref 30–115)
ALT FLD-CCNC: 17 U/L — SIGNIFICANT CHANGE UP (ref 0–41)
ANION GAP SERPL CALC-SCNC: 16 MMOL/L — HIGH (ref 7–14)
AST SERPL-CCNC: 45 U/L — HIGH (ref 0–41)
BILIRUB SERPL-MCNC: 0.9 MG/DL — SIGNIFICANT CHANGE UP (ref 0.2–1.2)
BUN SERPL-MCNC: 18 MG/DL — SIGNIFICANT CHANGE UP (ref 10–20)
CALCIUM SERPL-MCNC: 9.6 MG/DL — SIGNIFICANT CHANGE UP (ref 8.4–10.5)
CHLORIDE SERPL-SCNC: 100 MMOL/L — SIGNIFICANT CHANGE UP (ref 98–110)
CO2 SERPL-SCNC: 21 MMOL/L — SIGNIFICANT CHANGE UP (ref 17–32)
CREAT SERPL-MCNC: 1.4 MG/DL — SIGNIFICANT CHANGE UP (ref 0.7–1.5)
EGFR: 56 ML/MIN/1.73M2 — LOW
GLUCOSE SERPL-MCNC: 122 MG/DL — HIGH (ref 70–99)
LIDOCAIN IGE QN: 27 U/L — SIGNIFICANT CHANGE UP (ref 7–60)
POTASSIUM SERPL-MCNC: 5.5 MMOL/L — HIGH (ref 3.5–5)
POTASSIUM SERPL-SCNC: 5.5 MMOL/L — HIGH (ref 3.5–5)
PROT SERPL-MCNC: 7.3 G/DL — SIGNIFICANT CHANGE UP (ref 6–8)
SODIUM SERPL-SCNC: 137 MMOL/L — SIGNIFICANT CHANGE UP (ref 135–146)
TROPONIN T SERPL-MCNC: <0.01 NG/ML — SIGNIFICANT CHANGE UP

## 2022-10-14 PROCEDURE — 93010 ELECTROCARDIOGRAM REPORT: CPT

## 2022-10-14 PROCEDURE — 99285 EMERGENCY DEPT VISIT HI MDM: CPT

## 2022-10-14 RX ORDER — ONDANSETRON 8 MG/1
4 TABLET, FILM COATED ORAL ONCE
Refills: 0 | Status: COMPLETED | OUTPATIENT
Start: 2022-10-14 | End: 2022-10-14

## 2022-10-14 RX ORDER — FAMOTIDINE 10 MG/ML
20 INJECTION INTRAVENOUS ONCE
Refills: 0 | Status: COMPLETED | OUTPATIENT
Start: 2022-10-14 | End: 2022-10-14

## 2022-10-14 RX ADMIN — FAMOTIDINE 20 MILLIGRAM(S): 10 INJECTION INTRAVENOUS at 03:52

## 2022-10-14 RX ADMIN — ONDANSETRON 4 MILLIGRAM(S): 8 TABLET, FILM COATED ORAL at 03:52

## 2022-10-14 NOTE — ED PROVIDER NOTE - CLINICAL SUMMARY MEDICAL DECISION MAKING FREE TEXT BOX
Patient feeling better, tolerating PO -- Labs reviewed, CBC hemolyzed, CMP with normal lytes (K slightly hemolyzed), lipase, negative trop, normal EKG.    Sx not consistent with cardiac ischemia.    Will dc with close follow up, return precautions.

## 2022-10-14 NOTE — ED PROVIDER NOTE - PATIENT PORTAL LINK FT
You can access the FollowMyHealth Patient Portal offered by [facility] by registering at the following website: [email or web address]. By joining [employer]’s FollowMyHealth portal, you will also be able to view your health information using other applications (apps) compatible with our system.

## 2022-10-14 NOTE — ED PROVIDER NOTE - PHYSICAL EXAMINATION
VITAL SIGNS: I have reviewed nursing notes and confirm.  CONSTITUTIONAL: Well-developed; well-nourished; in no acute distress.  SKIN: Skin exam is warm and dry, no acute rash.  HEAD: Normocephalic; atraumatic.  EYES: PERRL, EOM intact; conjunctiva and sclera clear.  ENT: No nasal discharge; airway clear.  CARD: S1, S2 normal; no murmurs, gallops, or rubs. Regular rate and rhythm.  RESP: No wheezes, rales or rhonchi.  ABD: Normal bowel sounds; soft; non-distended; non-tender, no rebound or guarding  EXT: Normal ROM. No clubbing, cyanosis or edema.  NEURO: Alert, oriented. Grossly unremarkable. No focal deficits.  PSYCH: Cooperative, appropriate.

## 2022-10-14 NOTE — ED ADULT NURSE NOTE - OBJECTIVE STATEMENT
patient c/o epigastric pain, nausea which began after eating lunch today -- notes nausea, increased mucous, no vomiting.

## 2022-10-14 NOTE — ED PROVIDER NOTE - OBJECTIVE STATEMENT
63 yo M, from [facility]'s residence, hx of HTN, substance dependence, chronic resting tachycardia here for epigastric pain, nausea which began after eating lunch today -- notes nausea, increased mucous, no vomiting.    No CP, dyspnea, denies palpitations despite triage note.     Denies recent drug, alcohol use.

## 2022-10-14 NOTE — ED PROVIDER NOTE - MDM PATIENT STATEMENT FOR ADDL TREATMENT
Patient with one or more new problems requiring additional work-up/treatment. Mastoid Interpolation Flap Text: A decision was made to reconstruct the defect utilizing an interpolation axial flap and a staged reconstruction.  A telfa template was made of the defect.  This telfa template was then used to outline the mastoid interpolation flap.  The donor area for the pedicle flap was then injected with anesthesia.  The flap was excised through the skin and subcutaneous tissue down to the layer of the underlying musculature.  The pedicle flap was carefully excised within this deep plane to maintain its blood supply.  The edges of the donor site were undermined.   The donor site was closed in a primary fashion.  The pedicle was then rotated into position and sutured.  Once the tube was sutured into place, adequate blood supply was confirmed with blanching and refill.  The pedicle was then wrapped with xeroform gauze and dressed appropriately with a telfa and gauze bandage to ensure continued blood supply and protect the attached pedicle.

## 2022-10-14 NOTE — ED PROVIDER NOTE - NSFOLLOWUPINSTRUCTIONS_ED_ALL_ED_FT
Indigestion    WHAT YOU NEED TO KNOW:    What is indigestion? Indigestion, or dyspepsia, is stomach discomfort, feeling full quickly, or pain or burning in your esophagus or stomach. The cause may not be known.     What increases my risk for indigestion?   •Anxiety, depression, or stress      •Smoking      •Caffeine, alcohol, spicy foods, or high-fat foods      •Medicines such as NSAIDs, steroids, narcotics, or antibiotics      •Inflammation of the esophagus or stomach      •An ulcer in your stomach or intestine       What other signs and symptoms may occur with indigestion?   •Feeling bloated or full, even without eating much      •Heartburn      •Large amounts of gas or burping      •A bad taste in your mouth      •Nausea or vomiting      How is indigestion diagnosed and treated? You may need blood tests or an upper endoscopy to find out what is causing your indigestion. An upper endoscopy is a procedure to look at your esophagus and stomach with a scope. A scope is a long, bendable tube with a light and camera on the end of it. Healthcare providers may treat any underlying condition causing your indigestion. You may need to stop taking medicines that are causing your indigestion. You may also need medicines to help decrease the amount of acid in your stomach.    How can I manage my symptoms?   •Do not eat foods that can irritate your stomach, such as spicy or fatty foods. Do not have drinks that contain caffeine or alcohol. Chocolate, peppermint, spearmint, and citrus may also make your symptoms worse. Eat small meals several times a day instead of large meals.       •Limit medicines that irritate your stomach, such as NSAIDs, steroids, or narcotics. Your healthcare provider may suggest another medicine that is less irritating. Ask your healthcare provider before you take any over-the-counter medicine.      •Find ways to decrease stress. Learn new ways to relax, such as exercise, deep breathing, meditation, or listening to music.       •Do not smoke. Nicotine and other chemicals in cigarettes and cigars can cause indigestion. Ask your healthcare provider for information if you currently smoke and need help to quit. E-cigarettes or smokeless tobacco still contain nicotine. Talk to your healthcare provider before you use these products.       When should I seek immediate care?   •You have trouble swallowing.      •You have severe abdominal pain that does not go away even after you take pain medicine.      •Your bowel movement is black or you vomit blood.      •You have severe nausea or vomiting.      •You feel a mass or lump in your abdomen.      When should I contact my healthcare provider?   •You have pain, discomfort, or constipation.       •You have moderate nausea with vomiting and bloating.      •Your skin looks pale, and you feel weaker and more tired than usual.       •You have questions or concerns about your condition or care.      CARE AGREEMENT:    You have the right to help plan your care. Learn about your health condition and how it may be treated. Discuss treatment options with your healthcare providers to decide what care you want to receive. You always have the right to refuse treatment.         Nausea, Adult      Nausea is the feeling of having an upset stomach or that you are about to vomit. Nausea on its own is not usually a serious concern, but it may be an early sign of a more serious medical problem. As nausea gets worse, it can lead to vomiting. If vomiting develops, or if you are not able to drink enough fluids, you are at risk of becoming dehydrated.    Dehydration can make you tired and thirsty, cause you to have a dry mouth, and decrease how often you urinate. Older adults and people with other diseases or a weak disease-fighting system (immune system) are at higher risk for dehydration. The main goals of treating your nausea are:  •To relieve your nausea.      •To limit repeated nausea episodes.      •To prevent vomiting and dehydration.        Follow these instructions at home:    Watch your symptoms for any changes. Tell your health care provider about them.      Eating and drinking       A bottle of clear fruit juice and glass of water.        A sign showing that a person should not drink alcohol.     •Take an oral rehydration solution (ORS). This is a drink that is sold at pharmacies and retail stores.      •Drink clear fluids slowly and in small amounts as you are able. Clear fluids include water, ice chips, low-calorie sports drinks, and fruit juice that has water added (diluted fruit juice).      •Eat bland, easy-to-digest foods in small amounts as you are able. These foods include bananas, applesauce, rice, lean meats, toast, and crackers.      •Avoid drinking fluids that contain a lot of sugar or caffeine, such as energy drinks, sports drinks, and soda.      •Avoid alcohol.      •Avoid spicy or fatty foods.      General instructions     •Take over-the-counter and prescription medicines only as told by your health care provider.      •Rest at home while you recover.      •Drink enough fluid to keep your urine pale yellow.      •Breathe slowly and deeply when you feel nauseous.      •Avoid smelling things that have strong odors.      •Wash your hands often using soap and water for at least 20 seconds. If soap and water are not available, use hand .      •Make sure that everyone in your household washes their hands well and often.      •Keep all follow-up visits. This is important.        Contact a health care provider if:    •Your nausea gets worse.      •Your nausea does not go away after two days.      •You vomit multiple times.      •You cannot drink fluids without vomiting.    •You have any of the following:  •New symptoms.      •A fever.      •A headache.      •Muscle cramps.      •A rash.      •Pain while urinating.        •You feel light-headed or dizzy.        Get help right away if:    •You have pain in your chest, neck, arm, or jaw.      •You feel extremely weak or you faint.      •You have vomit that is bright red or looks like coffee grounds.      •You have bloody or black stools (feces) or stools that look like tar.      •You have a severe headache, a stiff neck, or both.      •You have severe pain, cramping, or bloating in your abdomen.      •You have difficulty breathing or are breathing very quickly.      •Your heart is beating very quickly.      •Your skin feels cold and clammy.      •You feel confused.    •You have signs of dehydration, such as:  •Dark urine, very little urine, or no urine.      •Cracked lips.      •Dry mouth.      •Sunken eyes.      •Sleepiness.      •Weakness.        These symptoms may be an emergency. Get help right away. Call 911.    • Do not wait to see if the symptoms will go away.       • Do not drive yourself to the hospital.         Summary    •Nausea is the feeling that you have an upset stomach or that you are about to vomit. Nausea on its own is not usually a serious concern, but it may be an early sign of a more serious medical problem.      •If vomiting develops, or if you are not able to drink enough fluids, you are at risk of becoming dehydrated.      •Follow recommendations for eating and drinking and take over-the-counter and prescription medicines only as told by your health care provider.      •Contact a health care provider right away if your symptoms worsen or you have new symptoms.      •Keep all follow-up visits. This is important.      This information is not intended to replace advice given to you by your health care provider. Make sure you discuss any questions you have with your health care provider.

## 2022-10-14 NOTE — ED PROVIDER NOTE - NS ED ROS FT
Constitutional: no fever, chills, no recent weight loss, change in appetite or malaise  Cardiac: see HPI  Respiratory: No cough or respiratory distress  GI: see HPI  MS: no pain to back or extremities, no loss of ROM, no weakness  Neuro: No headache or weakness. No LOC.  Skin: No skin rash.  Except as documented in the HPI, all other systems are negative.

## 2022-10-16 ENCOUNTER — INPATIENT (INPATIENT)
Facility: HOSPITAL | Age: 62
LOS: 9 days | Discharge: HOME | End: 2022-10-26
Attending: STUDENT IN AN ORGANIZED HEALTH CARE EDUCATION/TRAINING PROGRAM | Admitting: INTERNAL MEDICINE
Payer: MEDICAID

## 2022-10-16 VITALS
HEIGHT: 70 IN | RESPIRATION RATE: 17 BRPM | TEMPERATURE: 98 F | DIASTOLIC BLOOD PRESSURE: 68 MMHG | OXYGEN SATURATION: 98 % | HEART RATE: 98 BPM | SYSTOLIC BLOOD PRESSURE: 137 MMHG | WEIGHT: 199.96 LBS

## 2022-10-16 LAB
ALBUMIN SERPL ELPH-MCNC: 4.4 G/DL — SIGNIFICANT CHANGE UP (ref 3.5–5.2)
ALP SERPL-CCNC: 81 U/L — SIGNIFICANT CHANGE UP (ref 30–115)
ALT FLD-CCNC: 15 U/L — SIGNIFICANT CHANGE UP (ref 0–41)
ANION GAP SERPL CALC-SCNC: 32 MMOL/L — HIGH (ref 7–14)
AST SERPL-CCNC: 27 U/L — SIGNIFICANT CHANGE UP (ref 0–41)
B-OH-BUTYR SERPL-SCNC: 2.6 MMOL/L — HIGH
BASE EXCESS BLDV CALC-SCNC: -13.6 MMOL/L — LOW (ref -2–3)
BASE EXCESS BLDV CALC-SCNC: -4 MMOL/L — LOW (ref -2–3)
BASOPHILS # BLD AUTO: 0.07 K/UL — SIGNIFICANT CHANGE UP (ref 0–0.2)
BASOPHILS NFR BLD AUTO: 0.4 % — SIGNIFICANT CHANGE UP (ref 0–1)
BILIRUB SERPL-MCNC: 1.6 MG/DL — HIGH (ref 0.2–1.2)
BUN SERPL-MCNC: 18 MG/DL — SIGNIFICANT CHANGE UP (ref 10–20)
CA-I SERPL-SCNC: 0.77 MMOL/L — LOW (ref 1.15–1.33)
CA-I SERPL-SCNC: 1.09 MMOL/L — LOW (ref 1.15–1.33)
CALCIUM SERPL-MCNC: 9.1 MG/DL — SIGNIFICANT CHANGE UP (ref 8.4–10.4)
CHLORIDE SERPL-SCNC: 96 MMOL/L — LOW (ref 98–110)
CO2 SERPL-SCNC: 13 MMOL/L — LOW (ref 17–32)
CREAT SERPL-MCNC: 1.5 MG/DL — SIGNIFICANT CHANGE UP (ref 0.7–1.5)
EGFR: 52 ML/MIN/1.73M2 — LOW
EOSINOPHIL # BLD AUTO: 0 K/UL — SIGNIFICANT CHANGE UP (ref 0–0.7)
EOSINOPHIL NFR BLD AUTO: 0 % — SIGNIFICANT CHANGE UP (ref 0–8)
GAS PNL BLDV: 134 MMOL/L — LOW (ref 136–145)
GAS PNL BLDV: 140 MMOL/L — SIGNIFICANT CHANGE UP (ref 136–145)
GAS PNL BLDV: SIGNIFICANT CHANGE UP
GAS PNL BLDV: SIGNIFICANT CHANGE UP
GLUCOSE SERPL-MCNC: 105 MG/DL — HIGH (ref 70–99)
HCO3 BLDV-SCNC: 10 MMOL/L — CRITICAL LOW (ref 22–29)
HCO3 BLDV-SCNC: 20 MMOL/L — LOW (ref 22–29)
HCT VFR BLD CALC: 38.2 % — LOW (ref 42–52)
HCT VFR BLDA CALC: 25 % — LOW (ref 39–51)
HCT VFR BLDA CALC: 39 % — SIGNIFICANT CHANGE UP (ref 39–51)
HGB BLD CALC-MCNC: 13.1 G/DL — SIGNIFICANT CHANGE UP (ref 12.6–17.4)
HGB BLD CALC-MCNC: 8.4 G/DL — LOW (ref 12.6–17.4)
HGB BLD-MCNC: 13.4 G/DL — LOW (ref 14–18)
IMM GRANULOCYTES NFR BLD AUTO: 0.4 % — HIGH (ref 0.1–0.3)
LACTATE BLDV-MCNC: 2 MMOL/L — SIGNIFICANT CHANGE UP (ref 0.5–2)
LACTATE BLDV-MCNC: 3.3 MMOL/L — HIGH (ref 0.5–2)
LIDOCAIN IGE QN: 16 U/L — SIGNIFICANT CHANGE UP (ref 7–60)
LYMPHOCYTES # BLD AUTO: 1.16 K/UL — LOW (ref 1.2–3.4)
LYMPHOCYTES # BLD AUTO: 6.7 % — LOW (ref 20.5–51.1)
MCHC RBC-ENTMCNC: 32.8 PG — HIGH (ref 27–31)
MCHC RBC-ENTMCNC: 35.1 G/DL — SIGNIFICANT CHANGE UP (ref 32–37)
MCV RBC AUTO: 93.6 FL — SIGNIFICANT CHANGE UP (ref 80–94)
MONOCYTES # BLD AUTO: 0.47 K/UL — SIGNIFICANT CHANGE UP (ref 0.1–0.6)
MONOCYTES NFR BLD AUTO: 2.7 % — SIGNIFICANT CHANGE UP (ref 1.7–9.3)
NEUTROPHILS # BLD AUTO: 15.66 K/UL — HIGH (ref 1.4–6.5)
NEUTROPHILS NFR BLD AUTO: 89.8 % — HIGH (ref 42.2–75.2)
NRBC # BLD: 0 /100 WBCS — SIGNIFICANT CHANGE UP (ref 0–0)
PCO2 BLDV: 18 MMHG — LOW (ref 42–55)
PCO2 BLDV: 31 MMHG — LOW (ref 42–55)
PH BLDV: 7.36 — SIGNIFICANT CHANGE UP (ref 7.32–7.43)
PH BLDV: 7.41 — SIGNIFICANT CHANGE UP (ref 7.32–7.43)
PLATELET # BLD AUTO: 140 K/UL — SIGNIFICANT CHANGE UP (ref 130–400)
PO2 BLDV: 51 MMHG — SIGNIFICANT CHANGE UP
PO2 BLDV: 70 MMHG — SIGNIFICANT CHANGE UP
POTASSIUM BLDV-SCNC: 2.5 MMOL/L — CRITICAL LOW (ref 3.5–5.1)
POTASSIUM BLDV-SCNC: 5 MMOL/L — SIGNIFICANT CHANGE UP (ref 3.5–5.1)
POTASSIUM SERPL-MCNC: 4.2 MMOL/L — SIGNIFICANT CHANGE UP (ref 3.5–5)
POTASSIUM SERPL-SCNC: 4.2 MMOL/L — SIGNIFICANT CHANGE UP (ref 3.5–5)
PROT SERPL-MCNC: 6.9 G/DL — SIGNIFICANT CHANGE UP (ref 6–8)
RBC # BLD: 4.08 M/UL — LOW (ref 4.7–6.1)
RBC # FLD: 15.1 % — HIGH (ref 11.5–14.5)
SAO2 % BLDV: 84 % — SIGNIFICANT CHANGE UP
SAO2 % BLDV: 96.3 % — SIGNIFICANT CHANGE UP
SARS-COV-2 RNA SPEC QL NAA+PROBE: SIGNIFICANT CHANGE UP
SODIUM SERPL-SCNC: 141 MMOL/L — SIGNIFICANT CHANGE UP (ref 135–146)
TROPONIN T SERPL-MCNC: <0.01 NG/ML — SIGNIFICANT CHANGE UP
WBC # BLD: 17.43 K/UL — HIGH (ref 4.8–10.8)
WBC # FLD AUTO: 17.43 K/UL — HIGH (ref 4.8–10.8)

## 2022-10-16 PROCEDURE — 93010 ELECTROCARDIOGRAM REPORT: CPT

## 2022-10-16 PROCEDURE — 74177 CT ABD & PELVIS W/CONTRAST: CPT | Mod: 26,MA

## 2022-10-16 PROCEDURE — 99285 EMERGENCY DEPT VISIT HI MDM: CPT

## 2022-10-16 PROCEDURE — 71045 X-RAY EXAM CHEST 1 VIEW: CPT | Mod: 26

## 2022-10-16 RX ORDER — DIAZEPAM 5 MG
5 TABLET ORAL ONCE
Refills: 0 | Status: DISCONTINUED | OUTPATIENT
Start: 2022-10-16 | End: 2022-10-16

## 2022-10-16 RX ORDER — PYRIDOXINE HCL (VITAMIN B6) 100 MG
100 TABLET ORAL ONCE
Refills: 0 | Status: COMPLETED | OUTPATIENT
Start: 2022-10-16 | End: 2022-10-16

## 2022-10-16 RX ORDER — ONDANSETRON 8 MG/1
4 TABLET, FILM COATED ORAL ONCE
Refills: 0 | Status: COMPLETED | OUTPATIENT
Start: 2022-10-16 | End: 2022-10-16

## 2022-10-16 RX ORDER — DEXMEDETOMIDINE HYDROCHLORIDE IN 0.9% SODIUM CHLORIDE 4 UG/ML
0.2 INJECTION INTRAVENOUS
Qty: 200 | Refills: 0 | Status: DISCONTINUED | OUTPATIENT
Start: 2022-10-16 | End: 2022-10-17

## 2022-10-16 RX ORDER — SODIUM CHLORIDE 9 MG/ML
1000 INJECTION, SOLUTION INTRAVENOUS ONCE
Refills: 0 | Status: DISCONTINUED | OUTPATIENT
Start: 2022-10-16 | End: 2022-10-16

## 2022-10-16 RX ORDER — FOLIC ACID 0.8 MG
1 TABLET ORAL DAILY
Refills: 0 | Status: DISCONTINUED | OUTPATIENT
Start: 2022-10-16 | End: 2022-10-26

## 2022-10-16 RX ORDER — THIAMINE MONONITRATE (VIT B1) 100 MG
100 TABLET ORAL ONCE
Refills: 0 | Status: COMPLETED | OUTPATIENT
Start: 2022-10-16 | End: 2022-10-16

## 2022-10-16 RX ORDER — SODIUM CHLORIDE 9 MG/ML
1000 INJECTION INTRAMUSCULAR; INTRAVENOUS; SUBCUTANEOUS ONCE
Refills: 0 | Status: COMPLETED | OUTPATIENT
Start: 2022-10-16 | End: 2022-10-16

## 2022-10-16 RX ORDER — LABETALOL HCL 100 MG
100 TABLET ORAL ONCE
Refills: 0 | Status: COMPLETED | OUTPATIENT
Start: 2022-10-16 | End: 2022-10-16

## 2022-10-16 RX ORDER — FAMOTIDINE 10 MG/ML
20 INJECTION INTRAVENOUS ONCE
Refills: 0 | Status: COMPLETED | OUTPATIENT
Start: 2022-10-16 | End: 2022-10-16

## 2022-10-16 RX ORDER — THIAMINE MONONITRATE (VIT B1) 100 MG
100 TABLET ORAL DAILY
Refills: 0 | Status: DISCONTINUED | OUTPATIENT
Start: 2022-10-16 | End: 2022-10-18

## 2022-10-16 RX ADMIN — Medication 100 MILLIGRAM(S): at 20:29

## 2022-10-16 RX ADMIN — Medication 2 MILLIGRAM(S): at 23:46

## 2022-10-16 RX ADMIN — FAMOTIDINE 20 MILLIGRAM(S): 10 INJECTION INTRAVENOUS at 18:13

## 2022-10-16 RX ADMIN — SODIUM CHLORIDE 1000 MILLILITER(S): 9 INJECTION INTRAMUSCULAR; INTRAVENOUS; SUBCUTANEOUS at 18:26

## 2022-10-16 RX ADMIN — Medication 100 MILLIGRAM(S): at 21:53

## 2022-10-16 RX ADMIN — Medication 5 MILLIGRAM(S): at 20:32

## 2022-10-16 RX ADMIN — Medication 5 MILLIGRAM(S): at 21:28

## 2022-10-16 RX ADMIN — ONDANSETRON 4 MILLIGRAM(S): 8 TABLET, FILM COATED ORAL at 18:13

## 2022-10-16 RX ADMIN — Medication 30 MILLILITER(S): at 18:13

## 2022-10-16 RX ADMIN — SODIUM CHLORIDE 1000 MILLILITER(S): 9 INJECTION INTRAMUSCULAR; INTRAVENOUS; SUBCUTANEOUS at 22:31

## 2022-10-16 NOTE — H&P ADULT - ATTENDING COMMENTS
events noted, N/V alcohol abuse, HAGMA, alcoholic/ starvation ketoacidosis/ high LA, IVF, thiamine, folic acid, repeat LA, Ativan per CIWA protocol

## 2022-10-16 NOTE — ED ADULT NURSE NOTE - NSIMPLEMENTINTERV_GEN_ALL_ED
Implemented All Universal Safety Interventions:  [place] to call system. Call bell, personal items and telephone within reach. Instruct patient to call for assistance. Room bathroom lighting operational. Non-slip footwear when patient is off stretcher. Physically safe environment: no spills, clutter or unnecessary equipment. Stretcher in lowest position, wheels locked, appropriate side rails in place.

## 2022-10-16 NOTE — H&P ADULT - HISTORY OF PRESENT ILLNESS
63 y/o M with pmhx etoh abuse, CKD, GERD, gout, HTN, presents to ED c/o n/v all day. He was seen recently in ED for same complaints, dc after labs and meds, felt better. Patient denies any blood in vomiting. Reports that he has central chest pain when he throws up mutiple times, pain is sharp in character, non-radiating, not associated with exertion. Denies any active chest pain. Patient reports drinking 1 pint of vodka daily for the past few days, last drink was last night. Denies fever/chill/HA/dizziness/chest pain/palpitation/sob/abd pain//d/ black stool/bloody.    In ED: Temp = 98; HR = 98; BP = 137/68; RR = 17; SaO2 = 98% omn room air. Work up noted for WBC 17k, bicarb 13 with AG 32, T. bili 1.6, BHB 2.6, Lactate 3.3, CT A/P: hepatic steatosis, cholelithiasis.  Patient received NS 2L bolus, Zofran, Valium, Pepcid, thiamine 100 mg, pyridoxine 100 mg.

## 2022-10-16 NOTE — ED PROVIDER NOTE - PHYSICAL EXAMINATION
CONSTITUTIONAL: Well-appearing; well-nourished; in no apparent distress.   NECK: Supple; non-tender; no cervical lymphadenopathy. No JVD.   CARDIOVASCULAR: Normal S1, S2; no murmurs, rubs, or gallops.   RESPIRATORY: Normal chest excursion with respiration; breath sounds clear and equal bilaterally; no wheezes, rhonchi, or rales.  GI/: mild diffuse ttp; Normal bowel sounds; non-distended  MS: No evidence of trauma or deformity. Normal ROM in all four extremities; non-tender to palpation; distal pulses are normal.   SKIN: Normal for age and race; warm; dry; good turgor; no apparent lesions or exudate.   NEURO/PSYCH: A & O x 4; grossly unremarkable. mood and manner are appropriate

## 2022-10-16 NOTE — ED PROVIDER NOTE - CRITICAL CARE ATTENDING CONTRIBUTION TO CARE
Patient evaluated with SAVANNAH Paul.     61 y/o M with pmhx etoh abuse, CKD, GERD, gout, HTN, presents to ED Complaining of chest pain, abdominal pain nausea vomiting.  Patient states last drink was last night.  States that he drinks daily approximately 1 pint of vodka.  Patient noted to be tachycardic to 130s on my exam, sinus tachycardia.  Patient noted to have tongue fasciculations and upper extremity tremors.    Lungs are clear to oscillation bilaterally, abdomen is soft nontender nondistended.  No lower extremity edema.  Alert and oriented x3 bedside.

## 2022-10-16 NOTE — H&P ADULT - NSHPLABSRESULTS_GEN_ALL_CORE
.                          13.4   17.43 )-----------( 140      ( 16 Oct 2022 19:06 )             38.2     10-16    141  |  96<L>  |  18  ----------------------------<  105<H>  4.2   |  13<L>  |  1.5    Ca    9.1      16 Oct 2022 19:06    TPro  6.9  /  Alb  4.4  /  TBili  1.6<H>  /  DBili  x   /  AST  27  /  ALT  15  /  AlkPhos  81  10-16        CARDIAC MARKERS ( 16 Oct 2022 19:06 )  x     / <0.01 ng/mL / x     / x     / x          COVID-19 PCR: NotDetec (16 Oct 2022 22:06)  COVID-19 PCR: NotDetec (25 Apr 2022 11:34)        VBG - ( 16 Oct 2022 22:09 )  pH: 7.41  /  pCO2: 31    /  pO2: 51    / HCO3: 20    / Base Excess: 3.30  /  SaO2: 84.0  /  Lactate: 3.30     VBG - ( 16 Oct 2022 21:26 )  pH: 7.36  /  pCO2: 18    /  pO2: 70    / HCO3: 10    / Base Excess: 2.00  /  SaO2: 96.3  /  Lactate: 2.00       12 Lead ECG:   Ventricular Rate 143 BPM  QTC Calculation(Bazett) 456 ms    Diagnosis Line Sinus tachycardia  Otherwise normal ECG    Confirmed by Harlan Houser ([id number]) on 10/16/2022 5:08:56 PM (10-16-22 @ 16:40)      RADIOLOGY & ADDITIONAL STUDIES: .                          13.4   17.43 )-----------( 140      ( 16 Oct 2022 19:06 )             38.2     10-16    141  |  96<L>  |  18  ----------------------------<  105<H>  4.2   |  13<L>  |  1.5    Ca    9.1      16 Oct 2022 19:06    TPro  6.9  /  Alb  4.4  /  TBili  1.6<H>  /  DBili  x   /  AST  27  /  ALT  15  /  AlkPhos  81  10-16      Lipase, Serum: 16 U/L (10.16.22 @ 19:06)    CARDIAC MARKERS ( 16 Oct 2022 19:06 )  x     / <0.01 ng/mL / x     / x     / x          COVID-19 PCR: NotDetec (16 Oct 2022 22:06)  COVID-19 PCR: NotDetec (25 Apr 2022 11:34)    Beta Hydroxy-Butyrate: 2.6 mmoL/L (10.16.22 @ 21:00)  Blood Gas Venous - Lactate: 3.30: Elevated lactate. Consider ordering follow-up lactate to trend. mmol/L (10.16.22 @ 22:09)    VBG - ( 16 Oct 2022 22:09 )  pH: 7.41  /  pCO2: 31    /  pO2: 51    / HCO3: 20    / Base Excess: 3.30  /  SaO2: 84.0  /  Lactate: 3.30     VBG - ( 16 Oct 2022 21:26 )  pH: 7.36  /  pCO2: 18    /  pO2: 70    / HCO3: 10    / Base Excess: 2.00  /  SaO2: 96.3  /  Lactate: 2.00       12 Lead ECG:   Ventricular Rate 143 BPM  QTC Calculation(Bazett) 456 ms    Diagnosis Line Sinus tachycardia  Otherwise normal ECG    Confirmed by Harlan Houser ([id number]) on 10/16/2022 5:08:56 PM (10-16-22 @ 16:40)      RADIOLOGY & ADDITIONAL STUDIES:  < from: CT Abdomen and Pelvis w/ IV Cont (10.16.22 @ 19:35) >    1. No evidenceof acute intra-abdominal pathology.  2. Hepatic steatosis.  3. Cholelithiasis.    < end of copied text >

## 2022-10-16 NOTE — ED ADULT TRIAGE NOTE - CHIEF COMPLAINT QUOTE
pt bibems from [facility] for vomiting and chest pain. seen at [facility] today and was dced

## 2022-10-16 NOTE — H&P ADULT - ASSESSMENT
IMPRESSION:        PLAN:  CNS:   - Avoid CNS depressants    HEENT:   - Oral care.       PULMONARY:   - HOB @ 45.   - Aspiration precautions   - Monitor Pulse Ox. Keep > 93%. Supplement as needed.    CARDIOVASCULAR:       GI:   - GI prophylaxis  - Feeding  - Bowel regimen    RENAL:   - Avoid nephrotoxic agents   - Follow up lytes. Correct as needed    INFECTIOUS DISEASE:   - Antibiotics per ID  - Follow cultures    HEMATOLOGICAL:   - DVT prophylaxis    ENDOCRINE:   - Monitor FS  - Sliding scale, Basal/Bolus regimen if needed    MUSCULOSKELETAL:   - Ambulate as tolerated       CODE STATUS: Full code    DISPOSITION: MICU   61 y/o M with pmhx etoh abuse, CKD, GERD, gout, HTN, presents to ED c/o n/v all day. He was seen recently in ED for same complaints, dc after labs and meds, felt better. Patient denies any blood in vomiting. Reports that he has central chest pain when he throws up mutiple times, pain is sharp in character, non-radiating, not associated with exertion. Denies any active chest pain. Patient reports drinking 1 pint of vodka daily for the past few days, last drink was last night.      IMPRESSION:  # Alcohol withdrawal / Alcohol ketoacidosis  # Lactic acidosis  # HTN urgency  # Cholelithiasis  # hyperbilirubinemia / hepatic steatosis    PLAN:  CNS:   - Precedex infusion  - started on Ativan taper, Thiamine, folate + D5% at 75 cc/hr  - addiction medicine consult placed.    HEENT:   - Oral care.       PULMONARY:   - HOB @ 45.   - Aspiration precautions   - Monitor Pulse Ox. Keep > 93%. Supplement as needed.    CARDIOVASCULAR:   - s/p labetalol 100 mg po x 1  - c/w home dose of Toprol xl 25 mg q daily  - monitor BP    GI:   - Zofran PRN. Monitor QTc  - GI prophylaxis  - NPO for now  - Trend LFTs  - outpt surgery eval for cholecystectomy    RENAL:   - serum creatinine around baseline.  - Avoid nephrotoxic agents   - Follow up lytes. Correct as needed    INFECTIOUS DISEASE:   - Leukocytosis likely reactive. no obvious signs of infection  - monitor off antibiotics  - infectious w/up if needed    HEMATOLOGICAL:   - DVT prophylaxis: heparin s/q    ENDOCRINE:   - Monitor blood glucose    MUSCULOSKELETAL:   - Ambulate as tolerated     DISPOSITION: MICU   63 y/o M with pmhx etoh abuse, CKD, GERD, gout, HTN, presents to ED c/o n/v all day. He was seen recently in ED for same complaints, dc after labs and meds, felt better. Patient denies any blood in vomiting. Reports that he has central chest pain when he throws up mutiple times, pain is sharp in character, non-radiating, not associated with exertion. Denies any active chest pain. Patient reports drinking 1 pint of vodka daily for the past few days, last drink was last night.      IMPRESSION:  # Alcohol withdrawal / Alcohol ketoacidosis/ starvation ketoacidosis  # Lactic acidosis  # HTN urgency  # Cholelithiasis  # hyperbilirubinemia / hepatic steatosis    PLAN:  CNS:   - Precedex infusion  - started on Ativan taper, Thiamine, folate + D5% at 75 cc/hr  - addiction medicine consult placed.    HEENT:   - Oral care.       PULMONARY:   - HOB @ 45.   - Aspiration precautions   - Monitor Pulse Ox. Keep > 93%. Supplement as needed.    CARDIOVASCULAR:   - s/p labetalol 100 mg po x 1  - c/w home dose of Toprol xl 25 mg q daily  - monitor BP    GI:   - Zofran PRN. Monitor QTc  - GI prophylaxis  - NPO for now  - Trend LFTs  - outpt surgery eval for cholecystectomy    RENAL:   - serum creatinine around baseline.  - Avoid nephrotoxic agents   - Follow up lytes. Correct as needed    INFECTIOUS DISEASE:   - Leukocytosis likely reactive. no obvious signs of infection  - monitor off antibiotics  - infectious w/up if needed    HEMATOLOGICAL:   - DVT prophylaxis: heparin s/q    ENDOCRINE:   - Monitor blood glucose    MUSCULOSKELETAL:   - Ambulate as tolerated     DISPOSITION: MICU

## 2022-10-16 NOTE — PROVIDER CONTACT NOTE (OTHER) - ASSESSMENT
pt a&ox4. CIWA score 5. pt feels palpitation but states its a normal feeling for him, believes it to be his anxiety.

## 2022-10-16 NOTE — H&P ADULT - NSHPPHYSICALEXAM_GEN_ALL_CORE
VITALS:  T(F): 98 (10-16-22 @ 19:51), Max: 98 (10-16-22 @ 16:38)  HR: 124 (10-16-22 @ 23:00) (98 - 134)  BP: 193/63 (10-16-22 @ 23:00) (131/61 - 193/63)  RR: 18 (10-16-22 @ 22:19) (17 - 18)  SpO2: 97% (10-16-22 @ 23:00) (97% - 98%)      PHYSICAL EXAM:  GENERAL: NAD, well-developed  CHEST/LUNG: CTAB; No wheeze  HEART: RRR; No murmurs, rubs, or gallops  ABDOMEN: Soft, NT/ND; BS present  EXTREMITIES:  No cyanosis, or edema  NEUROLOGY: AAOx3  SKIN: No rashes or lesions VITALS:  T(F): 98 (10-16-22 @ 19:51), Max: 98 (10-16-22 @ 16:38)  HR: 124 (10-16-22 @ 23:00) (98 - 134)  BP: 193/63 (10-16-22 @ 23:00) (131/61 - 193/63)  RR: 18 (10-16-22 @ 22:19) (17 - 18)  SpO2: 97% (10-16-22 @ 23:00) (97% - 98%)      PHYSICAL EXAM:  GENERAL: NAD, well-developed  CHEST/LUNG: CTAB; No wheeze, mild tenderness over sternum  HEART: RRR; No murmurs, rubs, or gallops  ABDOMEN: Soft, NT/ND; BS present  EXTREMITIES:  No cyanosis, or edema  NEUROLOGY: AAOx3  SKIN: No rashes or lesions

## 2022-10-16 NOTE — ED PROVIDER NOTE - NS ED ROS FT
Constitutional: no fever, chills, no recent weight loss, change in appetite or malaise  Eyes: no redness/discharge/pain/vision changes  ENT: no rhinorrhea/ear pain/sore throat  Cardiac: No chest pain, SOB or edema.  Respiratory: No cough or respiratory distress  GI: No diarrhea or abdominal pain.  : No dysuria, frequency, urgency or hematuria  MS: no pain to back or extremities, no loss of ROM, no weakness  Neuro: No headache or weakness. No LOC.  Skin: No skin rash.  Endocrine: No history of thyroid disease or diabetes.  Except as documented in the HPI, all other systems are negative.

## 2022-10-16 NOTE — ED ADULT NURSE NOTE - CHIEF COMPLAINT QUOTE
pt bibems from [facility] for vomiting and chest pain. seen at [facility] today and was dced

## 2022-10-16 NOTE — ED PROVIDER NOTE - OBJECTIVE STATEMENT
pt with pmhx etoh abuse, CKD, gout, HTN, presents to ED c/o n/v all day. was seen recently for same, dc after labs and meds, felt better. denies drinking recently. Denies fever/chill/HA/dizziness/chest pain/palpitation/sob/abd pain//d/ black stool/bloody stool/urinary sxs

## 2022-10-16 NOTE — ED PROVIDER NOTE - CLINICAL SUMMARY MEDICAL DECISION MAKING FREE TEXT BOX
Patient presenting with vomiting and chest pain x1 day.  Patient noted to be in alcohol withdrawal, sinus tachycardia to 130s tremulousness, fasciculations on exam.  Requiring multiple doses of benzodiazepines.  Patient also noted to be in alcohol/starvation ketoacidosis.  Admitted to ICU for close monitoring of alcohol withdrawal.

## 2022-10-16 NOTE — ED PROVIDER NOTE - PROGRESS NOTE DETAILS
pt ellie, now admits to drinking yesterday. ciwa 15 after meds karolina 10, d/.w fellow and approved for ICU

## 2022-10-17 LAB
ALBUMIN SERPL ELPH-MCNC: 3.9 G/DL — SIGNIFICANT CHANGE UP (ref 3.5–5.2)
ALBUMIN SERPL ELPH-MCNC: 4.1 G/DL — SIGNIFICANT CHANGE UP (ref 3.5–5.2)
ALBUMIN SERPL ELPH-MCNC: 4.2 G/DL — SIGNIFICANT CHANGE UP (ref 3.5–5.2)
ALP SERPL-CCNC: 71 U/L — SIGNIFICANT CHANGE UP (ref 30–115)
ALP SERPL-CCNC: 72 U/L — SIGNIFICANT CHANGE UP (ref 30–115)
ALP SERPL-CCNC: 72 U/L — SIGNIFICANT CHANGE UP (ref 30–115)
ALT FLD-CCNC: 11 U/L — SIGNIFICANT CHANGE UP (ref 0–41)
ALT FLD-CCNC: 12 U/L — SIGNIFICANT CHANGE UP (ref 0–41)
ALT FLD-CCNC: 13 U/L — SIGNIFICANT CHANGE UP (ref 0–41)
ANION GAP SERPL CALC-SCNC: 17 MMOL/L — HIGH (ref 7–14)
ANION GAP SERPL CALC-SCNC: 22 MMOL/L — HIGH (ref 7–14)
AST SERPL-CCNC: 25 U/L — SIGNIFICANT CHANGE UP (ref 0–41)
AST SERPL-CCNC: 27 U/L — SIGNIFICANT CHANGE UP (ref 0–41)
AST SERPL-CCNC: 28 U/L — SIGNIFICANT CHANGE UP (ref 0–41)
BASOPHILS # BLD AUTO: 0.06 K/UL — SIGNIFICANT CHANGE UP (ref 0–0.2)
BASOPHILS NFR BLD AUTO: 0.7 % — SIGNIFICANT CHANGE UP (ref 0–1)
BILIRUB DIRECT SERPL-MCNC: 0.4 MG/DL — HIGH (ref 0–0.3)
BILIRUB INDIRECT FLD-MCNC: 1.8 MG/DL — HIGH (ref 0.2–1.2)
BILIRUB SERPL-MCNC: 2.2 MG/DL — HIGH (ref 0.2–1.2)
BILIRUB SERPL-MCNC: 2.3 MG/DL — HIGH (ref 0.2–1.2)
BILIRUB SERPL-MCNC: 2.5 MG/DL — HIGH (ref 0.2–1.2)
BUN SERPL-MCNC: 14 MG/DL — SIGNIFICANT CHANGE UP (ref 10–20)
BUN SERPL-MCNC: 16 MG/DL — SIGNIFICANT CHANGE UP (ref 10–20)
CALCIUM SERPL-MCNC: 8.2 MG/DL — LOW (ref 8.4–10.5)
CALCIUM SERPL-MCNC: 8.4 MG/DL — SIGNIFICANT CHANGE UP (ref 8.4–10.5)
CHLORIDE SERPL-SCNC: 101 MMOL/L — SIGNIFICANT CHANGE UP (ref 98–110)
CHLORIDE SERPL-SCNC: 98 MMOL/L — SIGNIFICANT CHANGE UP (ref 98–110)
CO2 SERPL-SCNC: 16 MMOL/L — LOW (ref 17–32)
CO2 SERPL-SCNC: 22 MMOL/L — SIGNIFICANT CHANGE UP (ref 17–32)
CREAT SERPL-MCNC: 1.3 MG/DL — SIGNIFICANT CHANGE UP (ref 0.7–1.5)
CREAT SERPL-MCNC: 1.4 MG/DL — SIGNIFICANT CHANGE UP (ref 0.7–1.5)
EGFR: 56 ML/MIN/1.73M2 — LOW
EGFR: 62 ML/MIN/1.73M2 — SIGNIFICANT CHANGE UP
EOSINOPHIL # BLD AUTO: 0.04 K/UL — SIGNIFICANT CHANGE UP (ref 0–0.7)
EOSINOPHIL NFR BLD AUTO: 0.4 % — SIGNIFICANT CHANGE UP (ref 0–8)
ETHANOL SERPL-MCNC: <10 MG/DL — SIGNIFICANT CHANGE UP
GLUCOSE SERPL-MCNC: 125 MG/DL — HIGH (ref 70–99)
GLUCOSE SERPL-MCNC: 128 MG/DL — HIGH (ref 70–99)
HCT VFR BLD CALC: 33.3 % — LOW (ref 42–52)
HGB BLD-MCNC: 12 G/DL — LOW (ref 14–18)
IMM GRANULOCYTES NFR BLD AUTO: 0.3 % — SIGNIFICANT CHANGE UP (ref 0.1–0.3)
LACTATE SERPL-SCNC: 1.7 MMOL/L — SIGNIFICANT CHANGE UP (ref 0.7–2)
LYMPHOCYTES # BLD AUTO: 1.64 K/UL — SIGNIFICANT CHANGE UP (ref 1.2–3.4)
LYMPHOCYTES # BLD AUTO: 17.9 % — LOW (ref 20.5–51.1)
MAGNESIUM SERPL-MCNC: 1.3 MG/DL — LOW (ref 1.8–2.4)
MAGNESIUM SERPL-MCNC: 1.5 MG/DL — LOW (ref 1.8–2.4)
MCHC RBC-ENTMCNC: 33 PG — HIGH (ref 27–31)
MCHC RBC-ENTMCNC: 36 G/DL — SIGNIFICANT CHANGE UP (ref 32–37)
MCV RBC AUTO: 91.5 FL — SIGNIFICANT CHANGE UP (ref 80–94)
MONOCYTES # BLD AUTO: 0.35 K/UL — SIGNIFICANT CHANGE UP (ref 0.1–0.6)
MONOCYTES NFR BLD AUTO: 3.8 % — SIGNIFICANT CHANGE UP (ref 1.7–9.3)
MRSA PCR RESULT.: POSITIVE
NEUTROPHILS # BLD AUTO: 7.06 K/UL — HIGH (ref 1.4–6.5)
NEUTROPHILS NFR BLD AUTO: 76.9 % — HIGH (ref 42.2–75.2)
NRBC # BLD: 0 /100 WBCS — SIGNIFICANT CHANGE UP (ref 0–0)
PHOSPHATE SERPL-MCNC: 2.4 MG/DL — SIGNIFICANT CHANGE UP (ref 2.1–4.9)
PLATELET # BLD AUTO: 102 K/UL — LOW (ref 130–400)
POTASSIUM SERPL-MCNC: 3.8 MMOL/L — SIGNIFICANT CHANGE UP (ref 3.5–5)
POTASSIUM SERPL-MCNC: 4.3 MMOL/L — SIGNIFICANT CHANGE UP (ref 3.5–5)
POTASSIUM SERPL-SCNC: 3.8 MMOL/L — SIGNIFICANT CHANGE UP (ref 3.5–5)
POTASSIUM SERPL-SCNC: 4.3 MMOL/L — SIGNIFICANT CHANGE UP (ref 3.5–5)
PROT SERPL-MCNC: 6 G/DL — SIGNIFICANT CHANGE UP (ref 6–8)
PROT SERPL-MCNC: 6.2 G/DL — SIGNIFICANT CHANGE UP (ref 6–8)
PROT SERPL-MCNC: 6.3 G/DL — SIGNIFICANT CHANGE UP (ref 6–8)
RBC # BLD: 3.64 M/UL — LOW (ref 4.7–6.1)
RBC # FLD: 15.1 % — HIGH (ref 11.5–14.5)
SODIUM SERPL-SCNC: 136 MMOL/L — SIGNIFICANT CHANGE UP (ref 135–146)
SODIUM SERPL-SCNC: 140 MMOL/L — SIGNIFICANT CHANGE UP (ref 135–146)
WBC # BLD: 9.18 K/UL — SIGNIFICANT CHANGE UP (ref 4.8–10.8)
WBC # FLD AUTO: 9.18 K/UL — SIGNIFICANT CHANGE UP (ref 4.8–10.8)

## 2022-10-17 PROCEDURE — 99223 1ST HOSP IP/OBS HIGH 75: CPT

## 2022-10-17 PROCEDURE — 99221 1ST HOSP IP/OBS SF/LOW 40: CPT

## 2022-10-17 RX ORDER — SODIUM CHLORIDE 9 MG/ML
1000 INJECTION, SOLUTION INTRAVENOUS
Refills: 0 | Status: DISCONTINUED | OUTPATIENT
Start: 2022-10-17 | End: 2022-10-18

## 2022-10-17 RX ORDER — PREGABALIN 225 MG/1
1000 CAPSULE ORAL ONCE
Refills: 0 | Status: COMPLETED | OUTPATIENT
Start: 2022-10-17 | End: 2022-10-18

## 2022-10-17 RX ORDER — CHLORHEXIDINE GLUCONATE 213 G/1000ML
1 SOLUTION TOPICAL EVERY 12 HOURS
Refills: 0 | Status: COMPLETED | OUTPATIENT
Start: 2022-10-17 | End: 2022-10-17

## 2022-10-17 RX ORDER — SODIUM CHLORIDE 9 MG/ML
1000 INJECTION, SOLUTION INTRAVENOUS
Refills: 0 | Status: DISCONTINUED | OUTPATIENT
Start: 2022-10-17 | End: 2022-10-17

## 2022-10-17 RX ORDER — MUPIROCIN 20 MG/G
1 OINTMENT TOPICAL
Refills: 0 | Status: DISCONTINUED | OUTPATIENT
Start: 2022-10-17 | End: 2022-10-26

## 2022-10-17 RX ORDER — INFLUENZA VIRUS VACCINE 15; 15; 15; 15 UG/.5ML; UG/.5ML; UG/.5ML; UG/.5ML
0.5 SUSPENSION INTRAMUSCULAR ONCE
Refills: 0 | Status: DISCONTINUED | OUTPATIENT
Start: 2022-10-17 | End: 2022-10-26

## 2022-10-17 RX ORDER — AMLODIPINE BESYLATE 2.5 MG/1
1 TABLET ORAL
Qty: 0 | Refills: 0 | DISCHARGE

## 2022-10-17 RX ORDER — MAGNESIUM SULFATE 500 MG/ML
2 VIAL (ML) INJECTION
Refills: 0 | Status: COMPLETED | OUTPATIENT
Start: 2022-10-17 | End: 2022-10-17

## 2022-10-17 RX ORDER — MUPIROCIN 20 MG/G
1 OINTMENT TOPICAL
Refills: 0 | Status: DISCONTINUED | OUTPATIENT
Start: 2022-10-17 | End: 2022-10-17

## 2022-10-17 RX ORDER — TAMSULOSIN HYDROCHLORIDE 0.4 MG/1
0.4 CAPSULE ORAL AT BEDTIME
Refills: 0 | Status: DISCONTINUED | OUTPATIENT
Start: 2022-10-17 | End: 2022-10-26

## 2022-10-17 RX ORDER — HEPARIN SODIUM 5000 [USP'U]/ML
5000 INJECTION INTRAVENOUS; SUBCUTANEOUS EVERY 8 HOURS
Refills: 0 | Status: DISCONTINUED | OUTPATIENT
Start: 2022-10-17 | End: 2022-10-26

## 2022-10-17 RX ORDER — PANTOPRAZOLE SODIUM 20 MG/1
40 TABLET, DELAYED RELEASE ORAL
Refills: 0 | Status: DISCONTINUED | OUTPATIENT
Start: 2022-10-17 | End: 2022-10-26

## 2022-10-17 RX ORDER — METOPROLOL TARTRATE 50 MG
25 TABLET ORAL DAILY
Refills: 0 | Status: DISCONTINUED | OUTPATIENT
Start: 2022-10-17 | End: 2022-10-26

## 2022-10-17 RX ADMIN — Medication 25 GRAM(S): at 18:00

## 2022-10-17 RX ADMIN — PANTOPRAZOLE SODIUM 40 MILLIGRAM(S): 20 TABLET, DELAYED RELEASE ORAL at 05:51

## 2022-10-17 RX ADMIN — Medication 2 MILLIGRAM(S): at 05:52

## 2022-10-17 RX ADMIN — Medication 25 MILLIGRAM(S): at 05:51

## 2022-10-17 RX ADMIN — TAMSULOSIN HYDROCHLORIDE 0.4 MILLIGRAM(S): 0.4 CAPSULE ORAL at 22:21

## 2022-10-17 RX ADMIN — Medication 100 MILLIGRAM(S): at 13:57

## 2022-10-17 RX ADMIN — CHLORHEXIDINE GLUCONATE 1 APPLICATION(S): 213 SOLUTION TOPICAL at 18:22

## 2022-10-17 RX ADMIN — Medication 1 MILLIGRAM(S): at 13:56

## 2022-10-17 RX ADMIN — Medication 25 GRAM(S): at 13:54

## 2022-10-17 RX ADMIN — Medication 25 GRAM(S): at 10:03

## 2022-10-17 RX ADMIN — Medication 2 MILLIGRAM(S): at 02:15

## 2022-10-17 RX ADMIN — Medication 2 MILLIGRAM(S): at 10:11

## 2022-10-17 RX ADMIN — Medication 2 MILLIGRAM(S): at 14:01

## 2022-10-17 RX ADMIN — Medication 1 TABLET(S): at 13:58

## 2022-10-17 RX ADMIN — SODIUM CHLORIDE 125 MILLILITER(S): 9 INJECTION, SOLUTION INTRAVENOUS at 10:03

## 2022-10-17 RX ADMIN — MUPIROCIN 1 APPLICATION(S): 20 OINTMENT TOPICAL at 18:23

## 2022-10-17 RX ADMIN — SODIUM CHLORIDE 75 MILLILITER(S): 9 INJECTION, SOLUTION INTRAVENOUS at 00:45

## 2022-10-17 RX ADMIN — CHLORHEXIDINE GLUCONATE 1 APPLICATION(S): 213 SOLUTION TOPICAL at 05:52

## 2022-10-17 RX ADMIN — HEPARIN SODIUM 5000 UNIT(S): 5000 INJECTION INTRAVENOUS; SUBCUTANEOUS at 13:58

## 2022-10-17 RX ADMIN — HEPARIN SODIUM 5000 UNIT(S): 5000 INJECTION INTRAVENOUS; SUBCUTANEOUS at 05:52

## 2022-10-17 RX ADMIN — Medication 100 MILLIGRAM(S): at 00:02

## 2022-10-17 RX ADMIN — HEPARIN SODIUM 5000 UNIT(S): 5000 INJECTION INTRAVENOUS; SUBCUTANEOUS at 22:23

## 2022-10-17 NOTE — SBIRT NOTE ADULT - NSSBIRTALCPASSREFTXDET_GEN_A_CORE
Pt stated he drinks 1 pint of Vodka 4 times a week.  SW assessed Pt to determine readiness for change in her drinking patterns, and to assess for whether Pt would be receptive to treatment.  Pt indicated that he would be interested in outpatient treatment programs.  SW referred pt to [facility] team

## 2022-10-17 NOTE — PROGRESS NOTE ADULT - ASSESSMENT
61 y/o M with pmhx etoh abuse, CKD, GERD, gout, HTN, presents to ED c/o n/v all day. He was seen recently in ED for same complaints, dc after labs and meds, felt better. Patient denies any blood in vomiting. Reports that he has central chest pain when he throws up multiple times, pain is sharp in character, non-radiating, not associated with exertion. Denies any active chest pain. Patient reports drinking 1 pint of vodka daily for the past few days, last drink was last night.      IMPRESSION:  # Alcohol withdrawal / Alcohol ketoacidosis/ starvation ketoacidosis  # Lactic acidosis  - Lactate 1.7, AG 17 - 10/17  - Leukocytosis likely reactive. no obvious signs of infection  - started on Ativan symptom triggered mgmt, Thiamine, folate + D5/LR at 125 cc/hr  - addiction medicine consult placed. F/u recs  - Zofran PRN. Monitor QTc  - thiamine 500 mg IVP q8h x 9 doses (followed by thiamine 100 mg TID)  - vitamin B12 1000 micrograms subcutaneous daily x 5 days    # HTN urgency  - s/p labetalol 100 mg po x 1  - c/w home dose of Toprol xl 25 mg q daily  - monitor BP    # Cholelithiasis  - Trend LFTs  - outpt surgery eval for cholecystectomy    # hyperbilirubinemia / hepatic steatosis  -Total Bili 2.5  -Continue to trend       - DVT prophylaxis: heparin s/q        DISPOSITION: Downgrade when gap closes

## 2022-10-17 NOTE — CONSULT NOTE ADULT - SUBJECTIVE AND OBJECTIVE BOX
Chief Complaint: alcohol use disorder complicated by nausea/vomiting     HPI:      Patient is a 63 y/o male with CKD, HTN, GERD, and alcohol use disorder admitted 10/16/22 for nausea and vomiting.    Patient is fatigued on interview, with lorazepam taper, but rousable for brief interview with vital signs stable under close observation in ICU. Patient reports that he has been drinking vodka 1 pint daily for many years. He denies ever having had a seizure from alcohol use and reports having been in treatment many times. Patient appears to recognize the severity of his alcohol use disorder and to desire to reduce use/stop, as well as to be interested in starting naltrexone at today's visit, but too somnolent at this time in order to make a fully autonomous decision re: initiation.     Patient denies SI, HI, and auditory/visual hallucinations at this encounter.    Family History  Non-contributory except as described in HPI     Review of Systems  Negative except as described in HPI    Vitals    Vital Signs Last 24 Hrs  T(C): 36.9 (17 Oct 2022 08:00), Max: 37 (17 Oct 2022 00:00)  T(F): 98.4 (17 Oct 2022 08:00), Max: 98.6 (17 Oct 2022 00:00)  HR: 84 (17 Oct 2022 14:00) (82 - 134)  BP: 123/65 (17 Oct 2022 14:00) (123/65 - 193/72)  BP(mean): 80 (17 Oct 2022 14:00) (78 - 118)  RR: 18 (17 Oct 2022 14:00) (14 - 22)  SpO2: 96% (17 Oct 2022 14:00) (96% - 98%)    Parameters below as of 17 Oct 2022 06:00  Patient On (Oxygen Delivery Method): room air    Labs     AST 28  ALT 12  Cr 1.3    Mental Status Exam     Appearance: Patient is a male, hospital attire and good hygiene, AAOx4, sedated on interview  Behavior: Appropriate during processing, calm, cooperative  Speech: Slow rate and rhythm, soft volume and soft tone  Mood: "doing a little better"  Affect: Neutral  Thought Process: Goal directed, linear and logical  Thought Content: No elicit delusions, obsessions, or phobias and denies suicidal and homicidal ideations  Perceptions: Not responding to any internal stimulus  Cognitive Functioning: Somnolent  Insight: Fair  Judgement: Fair  Reliability: Good     -------------------------------------------------------     Plan:       1. Alcohol Use Disorder, Severe  Drinking 1 pint vodka daily. Somnolent on evaluation, unable to engage in meaningful interview.    - patient appears to be sedated, recommend to change from lorazepam taper to symptom-triggered alcohol withdrawal management with lorazepam 2 mg IVP q1h PRN per CIWA protocol for CIWA scores 8 or above (until patient no longer scoring 8 or above on CIWA for 24-48 hours)    - recommend thiamine 500 mg IVP q8h x 9 doses (followed by thiamine 100 mg TID)    - recommend vitamin B12 1000 micrograms subcutaneous daily x 5 days    - once patient is able to engage in meaningful interview and is no longer somnolent, please strongly encourage towards trial of naltrexone 50 mg daily     - defer gabapentin at this time in setting of CKD diagnosis, although not fully contraindicated    - for coordination with [facility] team SW to provide referral/resources for psychosocial services          -----        Isabel Abraham MD, MS, MPH  Addiction Medicine & Preventive Medicine/Public Health Physician    Greater than 50% of time spent in face-to-face interaction with patient and/or family and/or coordination of care: 40 minutes Chief Complaint: alcohol use disorder complicated by nausea/vomiting     HPI:      Patient is a 61 y/o male with HTN, GERD, and alcohol use disorder admitted 10/16/22 for nausea and vomiting.    Patient is fatigued on interview, with lorazepam taper, but rousable for brief interview with vital signs stable under close observation in ICU. Patient reports that he has been drinking vodka 1 pint daily for many years. He denies ever having had a seizure from alcohol use and reports having been in treatment many times. Patient appears to recognize the severity of his alcohol use disorder and to desire to reduce use/stop, as well as to be interested in starting naltrexone at today's visit, but too somnolent at this time in order to make a fully autonomous decision re: initiation.     Patient denies SI, HI, and auditory/visual hallucinations at this encounter.    Family History  Non-contributory except as described in HPI     Review of Systems  Negative except as described in HPI    Vitals    Vital Signs Last 24 Hrs  T(C): 36.9 (17 Oct 2022 08:00), Max: 37 (17 Oct 2022 00:00)  T(F): 98.4 (17 Oct 2022 08:00), Max: 98.6 (17 Oct 2022 00:00)  HR: 84 (17 Oct 2022 14:00) (82 - 134)  BP: 123/65 (17 Oct 2022 14:00) (123/65 - 193/72)  BP(mean): 80 (17 Oct 2022 14:00) (78 - 118)  RR: 18 (17 Oct 2022 14:00) (14 - 22)  SpO2: 96% (17 Oct 2022 14:00) (96% - 98%)    Parameters below as of 17 Oct 2022 06:00  Patient On (Oxygen Delivery Method): room air    Labs     AST 28  ALT 12  Cr 1.3    Mental Status Exam     Appearance: Patient is a male, hospital attire and good hygiene, AAOx4, sedated on interview  Behavior: Appropriate during processing, calm, cooperative  Speech: Slow rate and rhythm, soft volume and soft tone  Mood: "doing a little better"  Affect: Neutral  Thought Process: Goal directed, linear and logical  Thought Content: No elicit delusions, obsessions, or phobias and denies suicidal and homicidal ideations  Perceptions: Not responding to any internal stimulus  Cognitive Functioning: Somnolent  Insight: Fair  Judgement: Fair  Reliability: Good     -------------------------------------------------------     Plan:       1. Alcohol Use Disorder, Severe  Drinking 1 pint vodka daily. Somnolent on evaluation, unable to engage in meaningful interview.    - patient appears to be sedated, recommend to change from lorazepam taper to symptom-triggered alcohol withdrawal management with lorazepam 2 mg IVP q1h PRN per CIWA protocol for CIWA scores 8 or above (until patient no longer scoring 8 or above on CIWA for 24-48 hours)    - recommend thiamine 500 mg IVP q8h x 9 doses (followed by thiamine 100 mg TID)    - recommend vitamin B12 1000 micrograms subcutaneous daily x 5 days    - once patient is able to engage in meaningful interview and is no longer somnolent, please strongly encourage towards trial of naltrexone 50 mg daily     - defer gabapentin at this time in setting of CKD diagnosis, although not fully contraindicated    - for coordination with [facility] team SW to provide referral/resources for psychosocial services          -----        Isabel Abraham MD, MS, MPH  Addiction Medicine & Preventive Medicine/Public Health Physician    Greater than 50% of time spent in face-to-face interaction with patient and/or family and/or coordination of care: 40 minutes

## 2022-10-17 NOTE — PATIENT PROFILE ADULT - FALL HARM RISK - RISK INTERVENTIONS
Assistance OOB with selected safe patient handling equipment/Assistance with ambulation/Communicate Fall Risk and Risk Factors to all staff, patient, and family/Monitor for mental status changes/Monitor gait and stability/Reinforce activity limits and safety measures with patient and family/Toileting schedule using arm’s reach rule for commode and bathroom/Use of alarms - bed, chair and/or voice tab/Visual Cue: Yellow wristband/Bed in lowest position, wheels locked, appropriate side rails in place/Call bell, personal items and telephone in reach/Instruct patient to call for assistance before getting out of bed or chair/Non-slip footwear when patient is out of bed/[place] to call system/Physically safe environment - no spills, clutter or unnecessary equipment/Purposeful Proactive Rounding/Room/bathroom lighting operational, light cord in reach

## 2022-10-17 NOTE — PROGRESS NOTE ADULT - SUBJECTIVE AND OBJECTIVE BOX
Location: [facility]  A ([facility] ICU)  Patient Name: CAT ALSTON  Age: 62y  Gender: Male    Past Medical and Surgical History:  Alcohol dependence    Vertigo    HTN (hypertension)    Hard of hearing    Gout    Seasonal allergies    CKD (chronic kidney disease)    No significant past surgical history        Social History:  Social History:      Allergies:  Beef (Hives)  dairy products (Hives)  No Known Drug Allergies  shellfish (Hives)      Patient is a 62y old Male who presents with a chief complaint of   Primary diagnosis of ALCOHOL DEPENDENCE WITH WITHDRAWAL        Progress Note  This morning patient was seen and examined at bedside.    Today is hospital day 1d.    Hospital Course      Overnight events      Vital Signs in the last 24 hours   Vitals Summary T(C): 36.9 (10-17-22 @ 08:00), Max: 37 (10-17-22 @ 00:00)  HR: 84 (10-17-22 @ 14:00) (82 - 134)  BP: 123/65 (10-17-22 @ 14:00) (123/65 - 193/72)  RR: 18 (10-17-22 @ 14:00) (14 - 22)  SpO2: 96% (10-17-22 @ 14:00) (96% - 98%)  Vent Data   Intake/ Output   10-16-22 @ 07:01  -  10-17-22 @ 07:00  --------------------------------------------------------  IN: 600 mL / OUT: 1100 mL / NET: -500 mL          Physical Exam  * General Appearance: Alert, cooperative, interactive, well-groomed, oriented to time, place, and person, in no acute distress  * Head: Normocephalic, without obvious abnormality, atraumatic  * Eyes: PERRL, conjunctiva/corneas clear, EOM's intact,  * Ears: Normal TM's and external ear canals bilaterally  * Nose: Nares normal, septum midline, mucosa normal, no drainage or sinus tenderness  * Throat: Lips, mucosa, and tongue normal; teeth and gums normal  * Neck: Supple, symmetrical, trachea midline, no adenopathy;   * Thyroid:  No enlargement/tenderness/nodules; no carotid bruit or JVD  * Back: Symmetric, no curvature, ROM normal, no CVA tenderness  * Lungs: Respirations unlabored, Good bilateral air entry, normal breath sounds (Clear to auscultation bilaterally, no audible wheezes, crackles, or rhonchi)  * Chest Wall: No tenderness or deformity  * Heart: Regular Rate and Rhythm, normal S1 and S2, no audible murmur, rub, or gallop  * Abdomen: Symmetric, non-distended, no scar, soft, non-tender, bowel sounds active all four quadrants,  * Extremities: Extremities normal, atraumatic, no cyanosis, no lower extremity pitting edema bilaterally, adequate dorsalis pedis pulses  * Pulses: 2+ and symmetric all extremities  * Skin: Skin color, texture, turgor normal, no rashes or lesions  * Lymph nodes: Cervical, supraclavicular, and axillary nodes normal  * Neurologic:	 CNII-XII intact, normal strength, sensation and reflexes     throughout      Investigations   Laboratory Workup  - CBC:                        12.0   9.18  )-----------( 102      ( 17 Oct 2022 05:08 )             33.3     - Chemistry:  10-17    140  |  101  |  14  ----------------------------<  128<H>  3.8   |  22  |  1.4    Ca    8.4      17 Oct 2022 12:02  Phos  2.4     10-17  Mg     1.5     10-17    TPro  6.0  /  Alb  3.9  /  TBili  2.5<H>  /  DBili  x   /  AST  25  /  ALT  11  /  AlkPhos  72  10-17    - Coagulation Studies:    - ABG:    - Cardiac Markers:  CARDIAC MARKERS ( 16 Oct 2022 19:06 )  x     / <0.01 ng/mL / x     / x     / x            Microbiological Workup        Radiological Workup  *      Current Medications  Standing Medications  chlorhexidine 2% Cloths 1 Application(s) Topical every 12 hours  dextrose 5% + lactated ringers. 1000 milliLiter(s) (125 mL/Hr) IV Continuous <Continuous>  folic acid 1 milliGRAM(s) Oral daily  heparin   Injectable 5000 Unit(s) SubCutaneous every 8 hours  influenza   Vaccine 0.5 milliLiter(s) IntraMuscular once  magnesium sulfate  IVPB 2 Gram(s) IV Intermittent every 2 hours  metoprolol succinate ER 25 milliGRAM(s) Oral daily  multivitamin 1 Tablet(s) Oral daily  mupirocin 2% Ointment 1 Application(s) Topical two times a day  pantoprazole    Tablet 40 milliGRAM(s) Oral before breakfast  tamsulosin 0.4 milliGRAM(s) Oral at bedtime  thiamine 100 milliGRAM(s) Oral daily    PRN Medications  LORazepam   Injectable 2 milliGRAM(s) IV Push every 1 hour PRN Symptom-triggered: each CIWA -Ar score 8 or GREATER    Singles Doses Administered  (ADM OVERRIDE) 1 each &lt;see task&gt; GiveOnce  (ADM OVERRIDE) 1 each &lt;see task&gt; GiveOnce  (ADM OVERRIDE) 1 each &lt;see task&gt; GiveOnce  (ADM OVERRIDE) 1 each &lt;see task&gt; GiveOnce  (ADM OVERRIDE) 1 each &lt;see task&gt; GiveOnce  aluminum hydroxide/magnesium hydroxide/simethicone Suspension 30 milliLiter(s) Oral once  diazepam  Injectable 5 milliGRAM(s) IV Push Once  diazepam  Injectable 5 milliGRAM(s) IV Push Once  famotidine    Tablet 20 milliGRAM(s) Oral once  labetalol 100 milliGRAM(s) Oral once  ondansetron Injectable 4 milliGRAM(s) IV Push once  pyridoxine Injectable 100 milliGRAM(s) IV Push Once  sodium chloride 0.9% Bolus 1000 milliLiter(s) IV Bolus once  sodium chloride 0.9% Bolus 1000 milliLiter(s) IV Bolus once  thiamine Injectable 100 milliGRAM(s) IV Push Once

## 2022-10-17 NOTE — SBIRT NOTE ADULT - NSSBIRTDRGPOSREINDET_GEN_A_CORE
Pt indicated he does not use drugs.  SW provided with positive reinforcement and psychoeducation about continued abstinence from drug use.

## 2022-10-18 LAB
ALBUMIN SERPL ELPH-MCNC: 3.7 G/DL — SIGNIFICANT CHANGE UP (ref 3.5–5.2)
ALBUMIN SERPL ELPH-MCNC: 4 G/DL — SIGNIFICANT CHANGE UP (ref 3.5–5.2)
ALP SERPL-CCNC: 64 U/L — SIGNIFICANT CHANGE UP (ref 30–115)
ALP SERPL-CCNC: 78 U/L — SIGNIFICANT CHANGE UP (ref 30–115)
ALT FLD-CCNC: 11 U/L — SIGNIFICANT CHANGE UP (ref 0–41)
ALT FLD-CCNC: 17 U/L — SIGNIFICANT CHANGE UP (ref 0–41)
ANION GAP SERPL CALC-SCNC: 8 MMOL/L — SIGNIFICANT CHANGE UP (ref 7–14)
ANION GAP SERPL CALC-SCNC: 9 MMOL/L — SIGNIFICANT CHANGE UP (ref 7–14)
AST SERPL-CCNC: 16 U/L — SIGNIFICANT CHANGE UP (ref 0–41)
AST SERPL-CCNC: 25 U/L — SIGNIFICANT CHANGE UP (ref 0–41)
BASOPHILS # BLD AUTO: 0.02 K/UL — SIGNIFICANT CHANGE UP (ref 0–0.2)
BASOPHILS # BLD AUTO: 0.04 K/UL — SIGNIFICANT CHANGE UP (ref 0–0.2)
BASOPHILS NFR BLD AUTO: 0.2 % — SIGNIFICANT CHANGE UP (ref 0–1)
BASOPHILS NFR BLD AUTO: 0.9 % — SIGNIFICANT CHANGE UP (ref 0–1)
BILIRUB SERPL-MCNC: 0.5 MG/DL — SIGNIFICANT CHANGE UP (ref 0.2–1.2)
BILIRUB SERPL-MCNC: 1.7 MG/DL — HIGH (ref 0.2–1.2)
BUN SERPL-MCNC: 10 MG/DL — SIGNIFICANT CHANGE UP (ref 10–20)
BUN SERPL-MCNC: 7 MG/DL — LOW (ref 10–20)
CALCIUM SERPL-MCNC: 8.5 MG/DL — SIGNIFICANT CHANGE UP (ref 8.4–10.5)
CALCIUM SERPL-MCNC: 8.6 MG/DL — SIGNIFICANT CHANGE UP (ref 8.4–10.5)
CHLORIDE SERPL-SCNC: 103 MMOL/L — SIGNIFICANT CHANGE UP (ref 98–110)
CHLORIDE SERPL-SCNC: 111 MMOL/L — HIGH (ref 98–110)
CO2 SERPL-SCNC: 24 MMOL/L — SIGNIFICANT CHANGE UP (ref 17–32)
CO2 SERPL-SCNC: 27 MMOL/L — SIGNIFICANT CHANGE UP (ref 17–32)
CREAT SERPL-MCNC: 0.6 MG/DL — LOW (ref 0.7–1.5)
CREAT SERPL-MCNC: 1.1 MG/DL — SIGNIFICANT CHANGE UP (ref 0.7–1.5)
EGFR: 108 ML/MIN/1.73M2 — SIGNIFICANT CHANGE UP
EGFR: 75 ML/MIN/1.73M2 — SIGNIFICANT CHANGE UP
EOSINOPHIL # BLD AUTO: 0.1 K/UL — SIGNIFICANT CHANGE UP (ref 0–0.7)
EOSINOPHIL # BLD AUTO: 0.23 K/UL — SIGNIFICANT CHANGE UP (ref 0–0.7)
EOSINOPHIL NFR BLD AUTO: 1 % — SIGNIFICANT CHANGE UP (ref 0–8)
EOSINOPHIL NFR BLD AUTO: 4.9 % — SIGNIFICANT CHANGE UP (ref 0–8)
GIANT PLATELETS BLD QL SMEAR: PRESENT — SIGNIFICANT CHANGE UP
GLUCOSE SERPL-MCNC: 109 MG/DL — HIGH (ref 70–99)
GLUCOSE SERPL-MCNC: 115 MG/DL — HIGH (ref 70–99)
HCT VFR BLD CALC: 33.9 % — LOW (ref 42–52)
HCT VFR BLD CALC: 36.4 % — LOW (ref 42–52)
HGB BLD-MCNC: 11.9 G/DL — LOW (ref 14–18)
HGB BLD-MCNC: 12 G/DL — LOW (ref 14–18)
IMM GRANULOCYTES NFR BLD AUTO: 0.2 % — SIGNIFICANT CHANGE UP (ref 0.1–0.3)
IMM GRANULOCYTES NFR BLD AUTO: 0.5 % — HIGH (ref 0.1–0.3)
LYMPHOCYTES # BLD AUTO: 1.14 K/UL — LOW (ref 1.2–3.4)
LYMPHOCYTES # BLD AUTO: 1.37 K/UL — SIGNIFICANT CHANGE UP (ref 1.2–3.4)
LYMPHOCYTES # BLD AUTO: 10.9 % — LOW (ref 20.5–51.1)
LYMPHOCYTES # BLD AUTO: 29.5 % — SIGNIFICANT CHANGE UP (ref 20.5–51.1)
MAGNESIUM SERPL-MCNC: 2.1 MG/DL — SIGNIFICANT CHANGE UP (ref 1.8–2.4)
MAGNESIUM SERPL-MCNC: 2.3 MG/DL — SIGNIFICANT CHANGE UP (ref 1.8–2.4)
MANUAL SMEAR VERIFICATION: SIGNIFICANT CHANGE UP
MCHC RBC-ENTMCNC: 30.4 PG — SIGNIFICANT CHANGE UP (ref 27–31)
MCHC RBC-ENTMCNC: 32.9 PG — HIGH (ref 27–31)
MCHC RBC-ENTMCNC: 33 G/DL — SIGNIFICANT CHANGE UP (ref 32–37)
MCHC RBC-ENTMCNC: 35.1 G/DL — SIGNIFICANT CHANGE UP (ref 32–37)
MCV RBC AUTO: 92.2 FL — SIGNIFICANT CHANGE UP (ref 80–94)
MCV RBC AUTO: 93.6 FL — SIGNIFICANT CHANGE UP (ref 80–94)
MONOCYTES # BLD AUTO: 0.21 K/UL — SIGNIFICANT CHANGE UP (ref 0.1–0.6)
MONOCYTES # BLD AUTO: 0.99 K/UL — HIGH (ref 0.1–0.6)
MONOCYTES NFR BLD AUTO: 4.5 % — SIGNIFICANT CHANGE UP (ref 1.7–9.3)
MONOCYTES NFR BLD AUTO: 9.4 % — HIGH (ref 1.7–9.3)
NEUTROPHILS # BLD AUTO: 2.79 K/UL — SIGNIFICANT CHANGE UP (ref 1.4–6.5)
NEUTROPHILS # BLD AUTO: 8.2 K/UL — HIGH (ref 1.4–6.5)
NEUTROPHILS NFR BLD AUTO: 60 % — SIGNIFICANT CHANGE UP (ref 42.2–75.2)
NEUTROPHILS NFR BLD AUTO: 78 % — HIGH (ref 42.2–75.2)
NRBC # BLD: 0 /100 WBCS — SIGNIFICANT CHANGE UP (ref 0–0)
NRBC # BLD: 0 /100 WBCS — SIGNIFICANT CHANGE UP (ref 0–0)
PLAT MORPH BLD: NORMAL — SIGNIFICANT CHANGE UP
PLATELET # BLD AUTO: 195 K/UL — SIGNIFICANT CHANGE UP (ref 130–400)
PLATELET # BLD AUTO: 86 K/UL — LOW (ref 130–400)
POLYCHROMASIA BLD QL SMEAR: SLIGHT — SIGNIFICANT CHANGE UP
POTASSIUM SERPL-MCNC: 3.6 MMOL/L — SIGNIFICANT CHANGE UP (ref 3.5–5)
POTASSIUM SERPL-MCNC: 3.7 MMOL/L — SIGNIFICANT CHANGE UP (ref 3.5–5)
POTASSIUM SERPL-SCNC: 3.6 MMOL/L — SIGNIFICANT CHANGE UP (ref 3.5–5)
POTASSIUM SERPL-SCNC: 3.7 MMOL/L — SIGNIFICANT CHANGE UP (ref 3.5–5)
PROT SERPL-MCNC: 6 G/DL — SIGNIFICANT CHANGE UP (ref 6–8)
PROT SERPL-MCNC: 6.4 G/DL — SIGNIFICANT CHANGE UP (ref 6–8)
RBC # BLD: 3.62 M/UL — LOW (ref 4.7–6.1)
RBC # BLD: 3.95 M/UL — LOW (ref 4.7–6.1)
RBC # FLD: 13.2 % — SIGNIFICANT CHANGE UP (ref 11.5–14.5)
RBC # FLD: 14.7 % — HIGH (ref 11.5–14.5)
RBC BLD AUTO: NORMAL — SIGNIFICANT CHANGE UP
SMUDGE CELLS # BLD: PRESENT — SIGNIFICANT CHANGE UP
SODIUM SERPL-SCNC: 139 MMOL/L — SIGNIFICANT CHANGE UP (ref 135–146)
SODIUM SERPL-SCNC: 143 MMOL/L — SIGNIFICANT CHANGE UP (ref 135–146)
WBC # BLD: 10.5 K/UL — SIGNIFICANT CHANGE UP (ref 4.8–10.8)
WBC # BLD: 4.65 K/UL — LOW (ref 4.8–10.8)
WBC # FLD AUTO: 10.5 K/UL — SIGNIFICANT CHANGE UP (ref 4.8–10.8)
WBC # FLD AUTO: 4.65 K/UL — LOW (ref 4.8–10.8)

## 2022-10-18 PROCEDURE — 99233 SBSQ HOSP IP/OBS HIGH 50: CPT

## 2022-10-18 RX ORDER — THIAMINE MONONITRATE (VIT B1) 100 MG
500 TABLET ORAL EVERY 8 HOURS
Refills: 0 | Status: COMPLETED | OUTPATIENT
Start: 2022-10-18 | End: 2022-10-21

## 2022-10-18 RX ADMIN — HEPARIN SODIUM 5000 UNIT(S): 5000 INJECTION INTRAVENOUS; SUBCUTANEOUS at 22:26

## 2022-10-18 RX ADMIN — Medication 1 TABLET(S): at 13:28

## 2022-10-18 RX ADMIN — MUPIROCIN 1 APPLICATION(S): 20 OINTMENT TOPICAL at 05:03

## 2022-10-18 RX ADMIN — Medication 25 MILLIGRAM(S): at 05:02

## 2022-10-18 RX ADMIN — Medication 105 MILLIGRAM(S): at 22:49

## 2022-10-18 RX ADMIN — Medication 1 MILLIGRAM(S): at 13:28

## 2022-10-18 RX ADMIN — HEPARIN SODIUM 5000 UNIT(S): 5000 INJECTION INTRAVENOUS; SUBCUTANEOUS at 05:02

## 2022-10-18 RX ADMIN — TAMSULOSIN HYDROCHLORIDE 0.4 MILLIGRAM(S): 0.4 CAPSULE ORAL at 22:25

## 2022-10-18 RX ADMIN — PREGABALIN 1000 MICROGRAM(S): 225 CAPSULE ORAL at 05:03

## 2022-10-18 RX ADMIN — PANTOPRAZOLE SODIUM 40 MILLIGRAM(S): 20 TABLET, DELAYED RELEASE ORAL at 05:03

## 2022-10-18 RX ADMIN — Medication 105 MILLIGRAM(S): at 13:27

## 2022-10-18 RX ADMIN — HEPARIN SODIUM 5000 UNIT(S): 5000 INJECTION INTRAVENOUS; SUBCUTANEOUS at 13:28

## 2022-10-18 RX ADMIN — MUPIROCIN 1 APPLICATION(S): 20 OINTMENT TOPICAL at 17:21

## 2022-10-18 NOTE — CHART NOTE - NSCHARTNOTEFT_GEN_A_CORE
Transfer Note    Transfer from: ICU  Transfer to:  Floors  Accepting physician:    Hospital Course: 61 y/o M with pmhx etoh abuse, CKD, GERD, gout, HTN, presents to ED c/o n/v all day. He was seen recently in ED for same complaints, dc after labs and meds, felt better. Patient denies any blood in vomiting. Reports that he has central chest pain when he throws up multiple times, pain is sharp in character, non-radiating, not associated with exertion. Denies any active chest pain. Patient reports drinking 1 pint of vodka daily for the past few days, last drink was last night.   In ED: Temp = 98; HR = 98; BP = 137/68; RR = 17; SaO2 = 98% omn room air. Work up noted for WBC 17k, bicarb 13 with AG 32, T. bili 1.6, BHB 2.6, Lactate 3.3, CT A/P: hepatic steatosis, cholelithiasis.  Patient received NS 2L bolus, Zofran, Valium, Pepcid, thiamine 100 mg, pyridoxine 100 mg. Pt admitted to ICU for Alcohol withdrawal / Alcohol ketoacidosis/ starvation ketoacidosis.         Interim Events:       MEDICATIONS:  STANDING MEDICATIONS  folic acid 1 milliGRAM(s) Oral daily  heparin   Injectable 5000 Unit(s) SubCutaneous every 8 hours  influenza   Vaccine 0.5 milliLiter(s) IntraMuscular once  metoprolol succinate ER 25 milliGRAM(s) Oral daily  multivitamin 1 Tablet(s) Oral daily  mupirocin 2% Ointment 1 Application(s) Topical two times a day  pantoprazole    Tablet 40 milliGRAM(s) Oral before breakfast  tamsulosin 0.4 milliGRAM(s) Oral at bedtime  thiamine IVPB 500 milliGRAM(s) IV Intermittent every 8 hours    PRN MEDICATIONS  LORazepam   Injectable 2 milliGRAM(s) IV Push every 1 hour PRN      VITAL SIGNS: Last 24 Hours  T(C): 36 (18 Oct 2022 08:00), Max: 36.7 (18 Oct 2022 04:00)  T(F): 96.8 (18 Oct 2022 08:00), Max: 98 (18 Oct 2022 04:00)  HR: 98 (18 Oct 2022 09:00) (72 - 98)  BP: 140/60 (18 Oct 2022 09:00) (97/62 - 164/74)  BP(mean): 88 (18 Oct 2022 09:00) (69 - 111)  RR: 16 (18 Oct 2022 09:00) (12 - 31)  SpO2: 97% (18 Oct 2022 09:00) (95% - 99%)    LABS:                        11.9   4.65  )-----------( 86       ( 18 Oct 2022 05:40 )             33.9     10-18    139  |  103  |  7<L>  ----------------------------<  115<H>  3.6   |  27  |  1.1    Ca    8.5      18 Oct 2022 05:40  Phos  2.4     10-17  Mg     2.3     10-18    TPro  6.0  /  Alb  4.0  /  TBili  1.7<H>  /  DBili  x   /  AST  25  /  ALT  11  /  AlkPhos  64  10-18            CARDIAC MARKERS ( 16 Oct 2022 19:06 )  x     / <0.01 ng/mL / x     / x     / x          RADIOLOGY:    ASSESSMENT & PLAN:         For Follow-Up: Transfer Note    Transfer from: ICU  Transfer to:  Floors  Accepting physician:    Hospital Course: 61 y/o M with pmhx etoh abuse, CKD, GERD, gout, HTN, presents to ED c/o n/v all day. He was seen recently in ED for same complaints, dc after labs and meds, felt better. Patient denies any blood in vomiting. Reports that he has central chest pain when he throws up multiple times, pain is sharp in character, non-radiating, not associated with exertion. Denies any active chest pain. Patient reports drinking 1 pint of vodka daily for the past few days, last drink was last night.   In ED: Temp = 98; HR = 98; BP = 137/68; RR = 17; SaO2 = 98% omn room air. Work up noted for WBC 17k, bicarb 13 with AG 32, T. bili 1.6, BHB 2.6, Lactate 3.3, CT A/P: hepatic steatosis, cholelithiasis.  Patient received NS 2L bolus, Zofran, Valium, Pepcid, thiamine 100 mg, pyridoxine 100 mg. Pt admitted to ICU for Alcohol withdrawal / Alcohol ketoacidosis/ starvation ketoacidosis.   In the ICU patient initially had CIWA of 5, quickly downtrended to 0. Patient has remained stable with no withdrawal signs. AG closed at 9, lactate 1.7. addiction med following. Patient is will downgraded to floors.          Interim Events:       MEDICATIONS:  STANDING MEDICATIONS  folic acid 1 milliGRAM(s) Oral daily  heparin   Injectable 5000 Unit(s) SubCutaneous every 8 hours  influenza   Vaccine 0.5 milliLiter(s) IntraMuscular once  metoprolol succinate ER 25 milliGRAM(s) Oral daily  multivitamin 1 Tablet(s) Oral daily  mupirocin 2% Ointment 1 Application(s) Topical two times a day  pantoprazole    Tablet 40 milliGRAM(s) Oral before breakfast  tamsulosin 0.4 milliGRAM(s) Oral at bedtime  thiamine IVPB 500 milliGRAM(s) IV Intermittent every 8 hours    PRN MEDICATIONS  LORazepam   Injectable 2 milliGRAM(s) IV Push every 1 hour PRN      VITAL SIGNS: Last 24 Hours  T(C): 36 (18 Oct 2022 08:00), Max: 36.7 (18 Oct 2022 04:00)  T(F): 96.8 (18 Oct 2022 08:00), Max: 98 (18 Oct 2022 04:00)  HR: 98 (18 Oct 2022 09:00) (72 - 98)  BP: 140/60 (18 Oct 2022 09:00) (97/62 - 164/74)  BP(mean): 88 (18 Oct 2022 09:00) (69 - 111)  RR: 16 (18 Oct 2022 09:00) (12 - 31)  SpO2: 97% (18 Oct 2022 09:00) (95% - 99%)    LABS:                        11.9   4.65  )-----------( 86       ( 18 Oct 2022 05:40 )             33.9     10-18    139  |  103  |  7<L>  ----------------------------<  115<H>  3.6   |  27  |  1.1    Ca    8.5      18 Oct 2022 05:40  Phos  2.4     10-17  Mg     2.3     10-18    TPro  6.0  /  Alb  4.0  /  TBili  1.7<H>  /  DBili  x   /  AST  25  /  ALT  11  /  AlkPhos  64  10-18            CARDIAC MARKERS ( 16 Oct 2022 19:06 )  x     / <0.01 ng/mL / x     / x     / x          RADIOLOGY:    ASSESSMENT & PLAN:   61 y/o M with pmhx etoh abuse, CKD, GERD, gout, HTN, presents to ED c/o n/v all day. He was seen recently in ED for same complaints, dc after labs and meds, felt better. Patient denies any blood in vomiting. Reports that he has central chest pain when he throws up multiple times, pain is sharp in character, non-radiating, not associated with exertion. Denies any active chest pain. Patient reports drinking 1 pint of vodka daily for the past few days, last drink was last night.      IMPRESSION:  # Alcohol withdrawal / Alcohol ketoacidosis/ starvation ketoacidosis  # Lactic acidosis  - Lactate 1.7, AG 17 - 10/17  - Leukocytosis likely reactive. no obvious signs of infection  - started on Ativan symptom triggered mgmt, Thiamine, folate + D5/LR at 125 cc/hr  - addiction medicine consult placed. F/u recs  - Zofran PRN. Monitor QTc  - thiamine 500 mg IVP q8h x 9 doses (followed by thiamine 100 mg TID)  - vitamin B12 1000 micrograms subcutaneous daily x 5 days    # HTN urgency  - s/p labetalol 100 mg po x 1  - c/w home dose of Toprol xl 25 mg q daily  - monitor BP    # Cholelithiasis  - Trend LFTs  - outpt surgery eval for cholecystectomy    # hyperbilirubinemia / hepatic steatosis  -Total Bili 2.5  -Continue to trend       - DVT prophylaxis: heparin s/q        DISPOSITION: Downgrade when gap cl        For Follow-Up: Transfer Note    Transfer from: ICU  Transfer to:  Floors  Accepting physician:    Hospital Course: 63 y/o M with pmhx etoh abuse, CKD, GERD, gout, HTN, presents to ED c/o n/v all day. He was seen recently in ED for same complaints, dc after labs and meds, felt better. Patient denies any blood in vomiting. Reports that he has central chest pain when he throws up multiple times, pain is sharp in character, non-radiating, not associated with exertion. Denies any active chest pain. Patient reports drinking 1 pint of vodka daily for the past few days, last drink was last night.   In ED: Temp = 98; HR = 98; BP = 137/68; RR = 17; SaO2 = 98% omn room air. Work up noted for WBC 17k, bicarb 13 with AG 32, T. bili 1.6, BHB 2.6, Lactate 3.3, CT A/P: hepatic steatosis, cholelithiasis.  Patient received NS 2L bolus, Zofran, Valium, Pepcid, thiamine 100 mg, pyridoxine 100 mg. Pt admitted to ICU for Alcohol withdrawal / Alcohol ketoacidosis/ starvation ketoacidosis.   In the ICU patient initially had CIWA of 5, quickly downtrended to 0. Patient has remained stable with no withdrawal signs. AG closed at 9, lactate 1.7. addiction med following. Patient will be downgraded to floors.        Interim Events: NO overnight events       MEDICATIONS:  STANDING MEDICATIONS  folic acid 1 milliGRAM(s) Oral daily  heparin   Injectable 5000 Unit(s) SubCutaneous every 8 hours  influenza   Vaccine 0.5 milliLiter(s) IntraMuscular once  metoprolol succinate ER 25 milliGRAM(s) Oral daily  multivitamin 1 Tablet(s) Oral daily  mupirocin 2% Ointment 1 Application(s) Topical two times a day  pantoprazole    Tablet 40 milliGRAM(s) Oral before breakfast  tamsulosin 0.4 milliGRAM(s) Oral at bedtime  thiamine IVPB 500 milliGRAM(s) IV Intermittent every 8 hours    PRN MEDICATIONS  LORazepam   Injectable 2 milliGRAM(s) IV Push every 1 hour PRN      VITAL SIGNS: Last 24 Hours  T(C): 36 (18 Oct 2022 08:00), Max: 36.7 (18 Oct 2022 04:00)  T(F): 96.8 (18 Oct 2022 08:00), Max: 98 (18 Oct 2022 04:00)  HR: 98 (18 Oct 2022 09:00) (72 - 98)  BP: 140/60 (18 Oct 2022 09:00) (97/62 - 164/74)  BP(mean): 88 (18 Oct 2022 09:00) (69 - 111)  RR: 16 (18 Oct 2022 09:00) (12 - 31)  SpO2: 97% (18 Oct 2022 09:00) (95% - 99%)    LABS:                        11.9   4.65  )-----------( 86       ( 18 Oct 2022 05:40 )             33.9     10-18    139  |  103  |  7<L>  ----------------------------<  115<H>  3.6   |  27  |  1.1    Ca    8.5      18 Oct 2022 05:40  Phos  2.4     10-17  Mg     2.3     10-18    TPro  6.0  /  Alb  4.0  /  TBili  1.7<H>  /  DBili  x   /  AST  25  /  ALT  11  /  AlkPhos  64  10-18            CARDIAC MARKERS ( 16 Oct 2022 19:06 )  x     / <0.01 ng/mL / x     / x     / x          RADIOLOGY:    ASSESSMENT & PLAN:   63 y/o M with pmhx etoh abuse, CKD, GERD, gout, HTN, presents to ED c/o n/v all day. He was seen recently in ED for same complaints, dc after labs and meds, felt better. Patient denies any blood in vomiting. Reports that he has central chest pain when he throws up multiple times, pain is sharp in character, non-radiating, not associated with exertion. Denies any active chest pain. Patient reports drinking 1 pint of vodka daily for the past few days, last drink was 10/16.      IMPRESSION:  # Alcohol withdrawal / Alcohol ketoacidosis/ starvation ketoacidosis  # Lactic acidosis  - Lactate 1.7, AG 17 - 10/17  - Leukocytosis likely reactive. no obvious signs of infection  - started on Ativan symptom triggered mgmt, Thiamine, folate   - addiction medicine consult placed. F/u recs  - strongly encourage towards trial of naltrexone 50 mg daily   - Zofran PRN. Monitor QTc  - thiamine 500 mg IVP q8h x 9 doses (followed by thiamine 100 mg TID)  - vitamin B12 1000 micrograms subcutaneous daily x 5 days    # HTN urgency  - s/p labetalol 100 mg po x 1  - c/w home dose of Toprol xl 25 mg q daily  - monitor BP    # Cholelithiasis  - LFT's wnl  - outpt surgery eval for cholecystectomy    # hyperbilirubinemia / hepatic steatosis  -Total Bili 2-> 1.7 downtrending   -Continue to trend       - DVT prophylaxis: heparin s/q      Follow up: ANGEL, outpt surgery eval for cholecystectomy, [facility] team SW to provide referral/resources for psychosocial services, continue conversation regarding naltrexone therapy.  DISPOSITION: Downgrade to floors        For Follow-Up:

## 2022-10-18 NOTE — PROGRESS NOTE ADULT - ASSESSMENT
IMPRESSION:  # Alcohol withdrawal / Alcohol ketoacidosis/ starvation ketoacidosis improved  # Lactic acidosis resolved  # HTN urgency  # Cholelithiasis  # hyperbilirubinemia / hepatic steatosis    PLAN:    CNS:  ATIVAN PER CIWA    HEENT:   - Oral care.       PULMONARY:   - HOB @ 45.   - Aspiration precautions   - Monitor Pulse Ox. Keep > 93%. Supplement as needed.    CARDIOVASCULAR:   DC IVF  BP MEDS    GI:   -PO FEEDS    RENAL:   - serum creatinine around baseline.      INFECTIOUS DISEASE: PRCAL    HEMATOLOGICAL:   - DVT prophylaxis: heparin s/q    ENDOCRINE:   - Monitor blood glucose    MUSCULOSKELETAL:   - Ambulate as tolerated     DISPOSITION: FLOOR

## 2022-10-18 NOTE — PROGRESS NOTE ADULT - SUBJECTIVE AND OBJECTIVE BOX
Over Night Events: Events noted, d5  cc/h, CIWA 0    PHYSICAL EXAM    ICU Vital Signs Last 24 Hrs  T(C): 36 (18 Oct 2022 08:00), Max: 36.7 (18 Oct 2022 04:00)  T(F): 96.8 (18 Oct 2022 08:00), Max: 98 (18 Oct 2022 04:00)  HR: 72 (18 Oct 2022 08:00) (72 - 98)  BP: 160/68 (18 Oct 2022 08:00) (97/62 - 164/74)  BP(mean): 106 (18 Oct 2022 08:00) (69 - 111)  RR: 16 (18 Oct 2022 08:00) (12 - 31)  SpO2: 97% (18 Oct 2022 08:00) (95% - 99%)    O2 Parameters below as of 18 Oct 2022 08:00  Patient On (Oxygen Delivery Method): room air            General: comfortable  Lungs: Bilateral BS  Cardiovascular: Regular   Abdomen: Soft, Positive BS  Extremities: No clubbing   Skin: Warm  Neurological: Non focal       10-17-22 @ 07:01  -  10-18-22 @ 07:00  --------------------------------------------------------  IN:    dextrose 5%: 150 mL    dextrose 5% + lactated ringers: 2125 mL    IV PiggyBack: 125 mL  Total IN: 2400 mL    OUT:    Voided (mL): 1800 mL  Total OUT: 1800 mL    Total NET: 600 mL          LABS:                          11.9   4.65  )-----------( 86       ( 18 Oct 2022 05:40 )             33.9                                               10-18    139  |  103  |  7<L>  ----------------------------<  115<H>  3.6   |  27  |  1.1    Ca    8.5      18 Oct 2022 05:40  Phos  2.4     10-17  Mg     2.3     10-18    TPro  6.0  /  Alb  4.0  /  TBili  1.7<H>  /  DBili  x   /  AST  25  /  ALT  11  /  AlkPhos  64  10-18                                                 CARDIAC MARKERS ( 16 Oct 2022 19:06 )  x     / <0.01 ng/mL / x     / x     / x                                                LIVER FUNCTIONS - ( 18 Oct 2022 05:40 )  Alb: 4.0 g/dL / Pro: 6.0 g/dL / ALK PHOS: 64 U/L / ALT: 11 U/L / AST: 25 U/L / GGT: x                                                                                                                                       MEDICATIONS  (STANDING):  dextrose 5% + lactated ringers. 1000 milliLiter(s) (125 mL/Hr) IV Continuous <Continuous>  folic acid 1 milliGRAM(s) Oral daily  heparin   Injectable 5000 Unit(s) SubCutaneous every 8 hours  influenza   Vaccine 0.5 milliLiter(s) IntraMuscular once  metoprolol succinate ER 25 milliGRAM(s) Oral daily  multivitamin 1 Tablet(s) Oral daily  mupirocin 2% Ointment 1 Application(s) Topical two times a day  pantoprazole    Tablet 40 milliGRAM(s) Oral before breakfast  tamsulosin 0.4 milliGRAM(s) Oral at bedtime  thiamine 100 milliGRAM(s) Oral daily    MEDICATIONS  (PRN):  LORazepam   Injectable 2 milliGRAM(s) IV Push every 1 hour PRN Symptom-triggered: each CIWA -Ar score 8 or GREATER

## 2022-10-19 PROCEDURE — 99232 SBSQ HOSP IP/OBS MODERATE 35: CPT

## 2022-10-19 PROCEDURE — 99233 SBSQ HOSP IP/OBS HIGH 50: CPT

## 2022-10-19 RX ORDER — THIAMINE MONONITRATE (VIT B1) 100 MG
500 TABLET ORAL DAILY
Refills: 0 | Status: DISCONTINUED | OUTPATIENT
Start: 2022-10-19 | End: 2022-10-19

## 2022-10-19 RX ORDER — POLYETHYLENE GLYCOL 3350 17 G/17G
17 POWDER, FOR SOLUTION ORAL DAILY
Refills: 0 | Status: DISCONTINUED | OUTPATIENT
Start: 2022-10-19 | End: 2022-10-26

## 2022-10-19 RX ADMIN — MUPIROCIN 1 APPLICATION(S): 20 OINTMENT TOPICAL at 05:16

## 2022-10-19 RX ADMIN — Medication 25 MILLIGRAM(S): at 05:18

## 2022-10-19 RX ADMIN — MUPIROCIN 1 APPLICATION(S): 20 OINTMENT TOPICAL at 17:47

## 2022-10-19 RX ADMIN — Medication 105 MILLIGRAM(S): at 21:06

## 2022-10-19 RX ADMIN — TAMSULOSIN HYDROCHLORIDE 0.4 MILLIGRAM(S): 0.4 CAPSULE ORAL at 21:05

## 2022-10-19 RX ADMIN — HEPARIN SODIUM 5000 UNIT(S): 5000 INJECTION INTRAVENOUS; SUBCUTANEOUS at 14:39

## 2022-10-19 RX ADMIN — Medication 105 MILLIGRAM(S): at 05:06

## 2022-10-19 RX ADMIN — PANTOPRAZOLE SODIUM 40 MILLIGRAM(S): 20 TABLET, DELAYED RELEASE ORAL at 05:18

## 2022-10-19 RX ADMIN — POLYETHYLENE GLYCOL 3350 17 GRAM(S): 17 POWDER, FOR SOLUTION ORAL at 17:47

## 2022-10-19 RX ADMIN — HEPARIN SODIUM 5000 UNIT(S): 5000 INJECTION INTRAVENOUS; SUBCUTANEOUS at 05:18

## 2022-10-19 RX ADMIN — Medication 105 MILLIGRAM(S): at 14:39

## 2022-10-19 RX ADMIN — HEPARIN SODIUM 5000 UNIT(S): 5000 INJECTION INTRAVENOUS; SUBCUTANEOUS at 21:05

## 2022-10-19 RX ADMIN — Medication 1 TABLET(S): at 11:18

## 2022-10-19 RX ADMIN — Medication 1 MILLIGRAM(S): at 11:18

## 2022-10-19 NOTE — PROGRESS NOTE ADULT - SUBJECTIVE AND OBJECTIVE BOX
CAT ALSTON  62y  [facility] [address]      Patient is a 62y old  Male who presents with a chief complaint of AMS (18 Oct 2022 08:35)      INTERVAL HPI/OVERNIGHT EVENTS:    Patient still feel anxious. he reported being unstable upon walking  doesn't look he has interest in cutting on his drinking     REVIEW OF SYSTEMS:        FAMILY HISTORY:  Family history of gastric cancer  Mom    Family hx of lung cancer  Smoker        T(C): 36.9 (10-19-22 @ 14:41), Max: 36.9 (10-18-22 @ 21:04)  HR: 79 (10-19-22 @ 14:41) (75 - 98)  BP: 157/104 (10-19-22 @ 14:41) (130/72 - 157/104)  RR: 20 (10-19-22 @ 14:41) (18 - 20)  SpO2: 98% (10-19-22 @ 05:08) (97% - 98%)  Wt(kg): --Vital Signs Last 24 Hrs  T(C): 36.9 (19 Oct 2022 14:41), Max: 36.9 (18 Oct 2022 21:04)  T(F): 98.5 (19 Oct 2022 14:41), Max: 98.5 (19 Oct 2022 14:41)  HR: 79 (19 Oct 2022 14:41) (75 - 98)  BP: 157/104 (19 Oct 2022 14:41) (130/72 - 157/104)  BP(mean): 88 (18 Oct 2022 19:00) (88 - 102)  RR: 20 (19 Oct 2022 14:41) (18 - 20)  SpO2: 98% (19 Oct 2022 05:08) (97% - 98%)    Parameters below as of 19 Oct 2022 05:08  Patient On (Oxygen Delivery Method): room air        PHYSICAL EXAM:  GENERAL: NAD, well-groomed, well-developed  EYES: possible nystagmus   PULM: Clear to auscultation bilaterally  CARDIAC: Regular rate and rhythm  GI: Soft, Nontender, Nondistended; Bowel sounds present  EXTREMITIES:  2+ Peripheral Pulses    Consultant(s) Notes Reviewed:  [x ] YES  [ ] NO  Care Discussed with Consultants/Other Providers [ x] YES  [ ] NO    LABS:                            11.9   4.65  )-----------( 86       ( 18 Oct 2022 05:40 )             33.9   10-18    139  |  103  |  7<L>  ----------------------------<  115<H>  3.6   |  27  |  1.1    Ca    8.5      18 Oct 2022 05:40  Mg     2.3     10-18    TPro  6.0  /  Alb  4.0  /  TBili  1.7<H>  /  DBili  x   /  AST  25  /  ALT  11  /  AlkPhos  64  10-18            folic acid 1 milliGRAM(s) Oral daily  heparin   Injectable 5000 Unit(s) SubCutaneous every 8 hours  influenza   Vaccine 0.5 milliLiter(s) IntraMuscular once  LORazepam   Injectable 2 milliGRAM(s) IV Push every 1 hour PRN  metoprolol succinate ER 25 milliGRAM(s) Oral daily  multivitamin 1 Tablet(s) Oral daily  mupirocin 2% Ointment 1 Application(s) Topical two times a day  pantoprazole    Tablet 40 milliGRAM(s) Oral before breakfast  polyethylene glycol 3350 17 Gram(s) Oral daily  tamsulosin 0.4 milliGRAM(s) Oral at bedtime  thiamine 500 milliGRAM(s) Oral daily  thiamine IVPB 500 milliGRAM(s) IV Intermittent every 8 hours      61 y/o M with pmhx etoh abuse, CKD, GERD, gout, HTN, presents to ED c/o n/v all day. He was seen recently in ED for same complaints, dc after labs and meds, felt better. Patient denies any blood in vomiting. Reports that he has central chest pain when he throws up multiple times, pain is sharp in character, non-radiating, not associated with exertion. Denies any active chest pain. Patient reports drinking 1 pint of vodka daily for the past few days, last drink was last night.        1. Alcohol abuse complicated by  Alcohol withdrawal / Alcohol ketoacidosis/ starvation ketoacidosis resolved   - Lactate 1.7, AG 17 - 10/17  - Leukocytosis likely reactive. no obvious signs of infection   -CIWA protocol on Ativan   - Cont high dose thiamine due to nystamust  - Cont vitamin b12   - addiction medicine consult placed.    2. HTN urgency  - s/p labetalol 100 mg po x 1  - c/w home dose of Toprol xl 25 mg q daily  - monitor BP    3.  Cholelithiasis  - Trend LFTs  - outpt surgery eval for cholecystectomy    4.  hyperbilirubinemia likely due to acloholic hepatitis   -Total Bili 2.5      5. DVT/GI px               Case Discussed with House Staff   30  minutes spent on total encounter; more than 50% of the visit was spent counseling and/or coordinating care by the attending physician.   Spectra [phone]

## 2022-10-19 NOTE — CONSULT NOTE ADULT - SUBJECTIVE AND OBJECTIVE BOX
Chief Complaint: alcohol use disorder complicated by nausea/vomiting     HPI:      Patient is a 61 y/o male with HTN, GERD, and alcohol use disorder admitted 10/16/22 for nausea and vomiting. Seen in initial consultation by this writer 10/17/22, but was somnolent at the time and unable to engage in extensive meaningful interview. Please see 10/17/22 addiction medicine attending note for more details.     Patient is moderately tangential on interview, having difficulty addressing questions on alcohol use specifically but able to be redirected with multiple attempts, and appears to have additional difficulty in interview as a result of hearing difficulties (with patient reporting that he does not want to use his hearing aids anymore despite encouragement by this writer). He expresses distress at having recently noticed increased gait instability over time, which patient is able to affirm understanding may be at least partly related to his alcohol use. The majority of interview was spent attempting to steer conversation back to discussions on alcohol use and motivating patient to engage some level of treatment for alcohol use disorder, as he defers suggestion to attend recovery meetings, intensive outpatient treatment, or counseling. Patient is amenable to trial naltrexone and gabapentin at this time.    Patient appears slightly/moderately motivated to reduce use/remain abstinent from alcohol use and plans to engage some supportive service recommendations.     Patient denies SI, HI, and auditory/visual hallucinations at this encounter.    Family History  Non-contributory except as described in HPI     Review of Systems  Negative except as described in HPI    Vitals    Vital Signs Last 24 Hrs  T(C): 36.9 (19 Oct 2022 14:41), Max: 36.9 (18 Oct 2022 21:04)  T(F): 98.5 (19 Oct 2022 14:41), Max: 98.5 (19 Oct 2022 14:41)  HR: 79 (19 Oct 2022 14:41) (75 - 98)  BP: 157/104 (19 Oct 2022 14:41) (130/72 - 157/104)  BP(mean): 88 (18 Oct 2022 19:00) (88 - 102)  RR: 20 (19 Oct 2022 14:41) (18 - 20)  SpO2: 98% (19 Oct 2022 05:08) (97% - 98%)    Parameters below as of 19 Oct 2022 05:08  Patient On (Oxygen Delivery Method): room air    Labs     AST 25  ALT 11  Cr 1.1    Mental Status Exam     Appearance: Patient is a male, hospital attire and fair hygiene, AAOx4  Behavior: Appropriate during processing, calm, cooperative and respectful  Speech: Regular rate and rhythm, normal volume and normal tone  Mood: "doing a little better"  Affect: Neutral  Thought Process: Goal directed, linear and logical, occasionally tangential  Thought Content: No elicit delusions, obsessions, or phobias and denies suicidal and homicidal ideations  Perceptions: Not responding to any internal stimulus  Cognitive Functioning: Alert  Insight: Fair  Judgement: Fair  Reliability: Good     -------------------------------------------------------     Plan:     1. Alcohol Use Disorder, Severe  Patient is slightly/moderately motivated to engage treatment at this time.     - recommend to continue symptom-triggered alcohol withdrawal management with lorazepam 2 mg q1h PRN per CIWA protocol for CIWA scores 8 or above, but can change from IV to PO (until patient no longer scoring 8 or above on CIWA for ~24 hours)    - vitamin recommendations provided on 10/19/22, please see note- would recommend vitamin B12 1000 micrograms subcutaneous daily x another 3 days    - recommend to start naltrexone 50 mg daily    - recommend to start gabapentin 200 mg BID     - for coordination with [facility] team SW to provide referral/resources for psychosocial services      -----        Isabel Abraham MD, MS, MPH  Addiction Medicine & Preventive Medicine/Public Health Physician    Greater than 50% of time spent in face-to-face interaction with patient and/or family and/or coordination of care: 40 minutes Chief Complaint: alcohol use disorder complicated by nausea/vomiting     HPI:      Patient is a 63 y/o male with HTN, GERD, and alcohol use disorder admitted 10/16/22 for nausea and vomiting. Seen in initial consultation by this writer 10/17/22, but was somnolent at the time and unable to engage in extensive meaningful interview. Please see 10/17/22 addiction medicine attending note for more details.     Patient is moderately tangential on interview, having difficulty addressing questions on alcohol use specifically but otherwise able to engage in conversation and able to be redirected with multiple attempts- also appears to have additional difficulty in interview as a result of hearing impairments (with patient reporting that he does not want to use his hearing aids anymore despite encouragement by this writer). He expresses distress at having recently noticed increased gait instability over time, which patient is able to affirm understanding may be at least partly related to his alcohol use. The majority of interview was spent attempting to steer conversation back to discussions on alcohol use and motivating patient to engage some level of treatment for alcohol use disorder, as he defers suggestion to attend recovery meetings, intensive outpatient treatment, or counseling. Patient is amenable to trial naltrexone and gabapentin at this time.    Patient appears slightly/moderately motivated to reduce use/remain abstinent from alcohol use and plans to engage some supportive service recommendations.     Patient denies SI, HI, and auditory/visual hallucinations at this encounter.    Family History  Non-contributory except as described in HPI     Review of Systems  Negative except as described in HPI    Vitals    Vital Signs Last 24 Hrs  T(C): 36.9 (19 Oct 2022 14:41), Max: 36.9 (18 Oct 2022 21:04)  T(F): 98.5 (19 Oct 2022 14:41), Max: 98.5 (19 Oct 2022 14:41)  HR: 79 (19 Oct 2022 14:41) (75 - 98)  BP: 157/104 (19 Oct 2022 14:41) (130/72 - 157/104)  BP(mean): 88 (18 Oct 2022 19:00) (88 - 102)  RR: 20 (19 Oct 2022 14:41) (18 - 20)  SpO2: 98% (19 Oct 2022 05:08) (97% - 98%)    Parameters below as of 19 Oct 2022 05:08  Patient On (Oxygen Delivery Method): room air    Labs     AST 25  ALT 11  Cr 1.1    Mental Status Exam     Appearance: Patient is a male, hospital attire and fair hygiene, AAOx4  Behavior: Appropriate during processing, calm, cooperative and respectful  Speech: Regular rate and rhythm, normal volume and normal tone  Mood: "doing a little better"  Affect: Neutral  Thought Process: Goal directed, linear and logical, occasionally tangential  Thought Content: No elicit delusions, obsessions, or phobias and denies suicidal and homicidal ideations  Perceptions: Not responding to any internal stimulus  Cognitive Functioning: Alert  Insight: Fair  Judgement: Fair  Reliability: Good     -------------------------------------------------------     Plan:     1. Alcohol Use Disorder, Severe  Patient is slightly/moderately motivated to engage treatment at this time.     - recommend to continue symptom-triggered alcohol withdrawal management with lorazepam 2 mg q1h PRN per CIWA protocol for CIWA scores 8 or above, but can change from IV to PO (until patient no longer scoring 8 or above on CIWA for ~24 hours)    - vitamin recommendations provided on 10/19/22, please see note- would recommend vitamin B12 1000 micrograms subcutaneous daily x another 3 days    - recommend to start naltrexone 50 mg daily    - recommend to start gabapentin 200 mg BID     - for coordination with [facility] team SW to provide referral/resources for psychosocial services      -----        Isabel Abraham MD, MS, MPH  Addiction Medicine & Preventive Medicine/Public Health Physician    Greater than 50% of time spent in face-to-face interaction with patient and/or family and/or coordination of care: 35 minutes Chief Complaint: alcohol use disorder complicated by nausea/vomiting     HPI:      Patient is a 63 y/o male with HTN, GERD, and alcohol use disorder admitted 10/16/22 for nausea and vomiting. Seen in initial consultation by this writer 10/17/22, but was somnolent at the time and unable to engage in extensive meaningful interview. Please see 10/17/22 addiction medicine attending note for more details.     Patient is moderately tangential on interview, having difficulty addressing questions on alcohol use specifically but otherwise able to engage in conversation and able to be redirected with multiple attempts- also appears to have additional difficulty in interview as a result of hearing impairments (with patient reporting that he does not want to use his hearing aids anymore despite encouragement by this writer). He expresses distress at having recently noticed increased gait instability over time, which patient is able to affirm understanding may be at least partly related to his alcohol use. The majority of interview was spent attempting to steer conversation back to discussions on alcohol use and motivating patient to engage some level of treatment for alcohol use disorder, as he defers suggestion to attend recovery meetings, intensive outpatient treatment, or counseling. Patient is amenable to trial naltrexone and gabapentin at this time.    Patient appears slightly/moderately motivated to reduce use/remain abstinent from alcohol use and plans to engage some supportive service recommendations.     Patient denies SI, HI, and auditory/visual hallucinations at this encounter.    Family History  Non-contributory except as described in HPI     Review of Systems  Negative except as described in HPI    Vitals    Vital Signs Last 24 Hrs  T(C): 36.9 (19 Oct 2022 14:41), Max: 36.9 (18 Oct 2022 21:04)  T(F): 98.5 (19 Oct 2022 14:41), Max: 98.5 (19 Oct 2022 14:41)  HR: 79 (19 Oct 2022 14:41) (75 - 98)  BP: 157/104 (19 Oct 2022 14:41) (130/72 - 157/104)  BP(mean): 88 (18 Oct 2022 19:00) (88 - 102)  RR: 20 (19 Oct 2022 14:41) (18 - 20)  SpO2: 98% (19 Oct 2022 05:08) (97% - 98%)    Parameters below as of 19 Oct 2022 05:08  Patient On (Oxygen Delivery Method): room air    Labs     AST 25  ALT 11  Cr 1.1    Mental Status Exam     Appearance: Patient is a male, hospital attire and fair hygiene, AAOx4  Behavior: Appropriate during processing, calm, cooperative and respectful  Speech: Regular rate and rhythm, normal volume and normal tone  Mood: "doing a little better"  Affect: Neutral  Thought Process: Goal directed, linear and logical, occasionally tangential  Thought Content: No elicit delusions, obsessions, or phobias and denies suicidal and homicidal ideations  Perceptions: Not responding to any internal stimulus  Cognitive Functioning: Alert  Insight: Fair  Judgement: Fair  Reliability: Good     -------------------------------------------------------     Plan:     1. Alcohol Use Disorder, Severe  Patient is slightly/moderately motivated to engage treatment at this time.     - recommend to continue symptom-triggered alcohol withdrawal management with lorazepam 2 mg q1h PRN per CIWA protocol for CIWA scores 8 or above, but can change from IV to PO (until patient no longer scoring 8 or above on CIWA for ~24 hours)    - vitamin recommendations provided on 10/19/22, please see note- would recommend vitamin B12 1000 micrograms subcutaneous daily x another 3 days    - recommend to start naltrexone 50 mg daily, please discharge with 30-day prescription    - recommend to start gabapentin 200 mg BID, please discharge with 30-day prescription    - for coordination with [facility] team SW to provide referral/resources for psychosocial services      -----        Isabel Abraham MD, MS, MPH  Addiction Medicine & Preventive Medicine/Public Health Physician    Greater than 50% of time spent in face-to-face interaction with patient and/or family and/or coordination of care: 35 minutes

## 2022-10-20 ENCOUNTER — TRANSCRIPTION ENCOUNTER (OUTPATIENT)
Age: 62
End: 2022-10-20

## 2022-10-20 LAB
ALBUMIN SERPL ELPH-MCNC: 4.2 G/DL — SIGNIFICANT CHANGE UP (ref 3.5–5.2)
ALP SERPL-CCNC: 71 U/L — SIGNIFICANT CHANGE UP (ref 30–115)
ALT FLD-CCNC: 20 U/L — SIGNIFICANT CHANGE UP (ref 0–41)
ANION GAP SERPL CALC-SCNC: 11 MMOL/L — SIGNIFICANT CHANGE UP (ref 7–14)
AST SERPL-CCNC: 41 U/L — SIGNIFICANT CHANGE UP (ref 0–41)
BILIRUB SERPL-MCNC: 1.2 MG/DL — SIGNIFICANT CHANGE UP (ref 0.2–1.2)
BUN SERPL-MCNC: 10 MG/DL — SIGNIFICANT CHANGE UP (ref 10–20)
CALCIUM SERPL-MCNC: 9.2 MG/DL — SIGNIFICANT CHANGE UP (ref 8.4–10.5)
CHLORIDE SERPL-SCNC: 102 MMOL/L — SIGNIFICANT CHANGE UP (ref 98–110)
CO2 SERPL-SCNC: 24 MMOL/L — SIGNIFICANT CHANGE UP (ref 17–32)
CREAT SERPL-MCNC: 1.2 MG/DL — SIGNIFICANT CHANGE UP (ref 0.7–1.5)
EGFR: 68 ML/MIN/1.73M2 — SIGNIFICANT CHANGE UP
GLUCOSE SERPL-MCNC: 86 MG/DL — SIGNIFICANT CHANGE UP (ref 70–99)
HCT VFR BLD CALC: 34.5 % — LOW (ref 42–52)
HGB BLD-MCNC: 12 G/DL — LOW (ref 14–18)
MAGNESIUM SERPL-MCNC: 1.9 MG/DL — SIGNIFICANT CHANGE UP (ref 1.8–2.4)
MCHC RBC-ENTMCNC: 33.1 PG — HIGH (ref 27–31)
MCHC RBC-ENTMCNC: 34.8 G/DL — SIGNIFICANT CHANGE UP (ref 32–37)
MCV RBC AUTO: 95 FL — HIGH (ref 80–94)
NRBC # BLD: 0 /100 WBCS — SIGNIFICANT CHANGE UP (ref 0–0)
PLATELET # BLD AUTO: 90 K/UL — LOW (ref 130–400)
POTASSIUM SERPL-MCNC: 3.8 MMOL/L — SIGNIFICANT CHANGE UP (ref 3.5–5)
POTASSIUM SERPL-SCNC: 3.8 MMOL/L — SIGNIFICANT CHANGE UP (ref 3.5–5)
PROT SERPL-MCNC: 6.5 G/DL — SIGNIFICANT CHANGE UP (ref 6–8)
RBC # BLD: 3.63 M/UL — LOW (ref 4.7–6.1)
RBC # FLD: 14.7 % — HIGH (ref 11.5–14.5)
SODIUM SERPL-SCNC: 137 MMOL/L — SIGNIFICANT CHANGE UP (ref 135–146)
WBC # BLD: 5.19 K/UL — SIGNIFICANT CHANGE UP (ref 4.8–10.8)
WBC # FLD AUTO: 5.19 K/UL — SIGNIFICANT CHANGE UP (ref 4.8–10.8)

## 2022-10-20 PROCEDURE — 99239 HOSP IP/OBS DSCHRG MGMT >30: CPT

## 2022-10-20 RX ORDER — GABAPENTIN 400 MG/1
2 CAPSULE ORAL
Qty: 120 | Refills: 0
Start: 2022-10-20 | End: 2022-11-18

## 2022-10-20 RX ORDER — THIAMINE MONONITRATE (VIT B1) 100 MG
1 TABLET ORAL
Qty: 30 | Refills: 3
Start: 2022-10-20 | End: 2023-02-16

## 2022-10-20 RX ORDER — PREGABALIN 225 MG/1
1000 CAPSULE ORAL DAILY
Refills: 0 | Status: COMPLETED | OUTPATIENT
Start: 2022-10-20 | End: 2022-10-22

## 2022-10-20 RX ORDER — GABAPENTIN 400 MG/1
200 CAPSULE ORAL
Refills: 0 | Status: DISCONTINUED | OUTPATIENT
Start: 2022-10-20 | End: 2022-10-26

## 2022-10-20 RX ORDER — NALTREXONE HYDROCHLORIDE 50 MG/1
1 TABLET, FILM COATED ORAL
Qty: 30 | Refills: 0
Start: 2022-10-20 | End: 2022-11-18

## 2022-10-20 RX ORDER — NALTREXONE HYDROCHLORIDE 50 MG/1
50 TABLET, FILM COATED ORAL DAILY
Refills: 0 | Status: DISCONTINUED | OUTPATIENT
Start: 2022-10-20 | End: 2022-10-26

## 2022-10-20 RX ADMIN — Medication 25 MILLIGRAM(S): at 05:41

## 2022-10-20 RX ADMIN — MUPIROCIN 1 APPLICATION(S): 20 OINTMENT TOPICAL at 17:20

## 2022-10-20 RX ADMIN — Medication 1 MILLIGRAM(S): at 11:29

## 2022-10-20 RX ADMIN — TAMSULOSIN HYDROCHLORIDE 0.4 MILLIGRAM(S): 0.4 CAPSULE ORAL at 21:56

## 2022-10-20 RX ADMIN — NALTREXONE HYDROCHLORIDE 50 MILLIGRAM(S): 50 TABLET, FILM COATED ORAL at 12:44

## 2022-10-20 RX ADMIN — HEPARIN SODIUM 5000 UNIT(S): 5000 INJECTION INTRAVENOUS; SUBCUTANEOUS at 15:01

## 2022-10-20 RX ADMIN — POLYETHYLENE GLYCOL 3350 17 GRAM(S): 17 POWDER, FOR SOLUTION ORAL at 11:29

## 2022-10-20 RX ADMIN — GABAPENTIN 200 MILLIGRAM(S): 400 CAPSULE ORAL at 17:20

## 2022-10-20 RX ADMIN — Medication 105 MILLIGRAM(S): at 17:18

## 2022-10-20 RX ADMIN — HEPARIN SODIUM 5000 UNIT(S): 5000 INJECTION INTRAVENOUS; SUBCUTANEOUS at 21:56

## 2022-10-20 RX ADMIN — Medication 105 MILLIGRAM(S): at 21:56

## 2022-10-20 RX ADMIN — Medication 105 MILLIGRAM(S): at 05:41

## 2022-10-20 RX ADMIN — HEPARIN SODIUM 5000 UNIT(S): 5000 INJECTION INTRAVENOUS; SUBCUTANEOUS at 05:41

## 2022-10-20 RX ADMIN — MUPIROCIN 1 APPLICATION(S): 20 OINTMENT TOPICAL at 05:35

## 2022-10-20 RX ADMIN — Medication 1 TABLET(S): at 11:29

## 2022-10-20 RX ADMIN — PANTOPRAZOLE SODIUM 40 MILLIGRAM(S): 20 TABLET, DELAYED RELEASE ORAL at 05:41

## 2022-10-20 RX ADMIN — PREGABALIN 1000 MICROGRAM(S): 225 CAPSULE ORAL at 12:44

## 2022-10-20 NOTE — PHYSICAL THERAPY INITIAL EVALUATION ADULT - GENERAL OBSERVATIONS, REHAB EVAL
Patient was seen from 14:00-14:30 for PT IE. Patient was rec'd in semi reclined in bed, +IVL, NAD, agreeable to participate in PT.

## 2022-10-20 NOTE — DISCHARGE NOTE PROVIDER - CARE PROVIDER_API CALL
Eugene Tellez)  Internal Medicine  [address] [place] [address]  Phone: [phone]  Fax: [phone]  Follow Up Time: 1 week

## 2022-10-20 NOTE — PHYSICAL THERAPY INITIAL EVALUATION ADULT - ASR WT BEARING STATUS EVAL
"gen surg  Less abd pain, feels well, elder liquids, having bms  Blood pressure (!) 103/50, pulse 80, temperature 98.3 °F (36.8 °C), temperature source Oral, resp. rate 17, height 5' 6" (1.676 m), weight 100.5 kg (221 lb 9 oz), last menstrual period 07/10/2019, SpO2 98 %, not currently breastfeeding.  abd soft, nl bs, nd, n masses, mild suprapubic tenderness-improved,no guarding  A/p prob sigmoid diverticulitis- adv diet, if elder po and doing well prob ok for dc lkater today on po abx, d/w pt should f/u my office 1-2 wks, will refer to GI at that time for cscope  " no weight-bearing restrictions

## 2022-10-20 NOTE — PHYSICAL THERAPY INITIAL EVALUATION ADULT - GAIT DISTANCE, PT EVAL
3 steps forward, 3 steps backward. Patient c/o dizziness and swaying feeling and was unsafe to continue further with ambulation. Refer to vitals

## 2022-10-20 NOTE — DISCHARGE NOTE PROVIDER - ATTENDING DISCHARGE PHYSICAL EXAMINATION:
VITALS:   T(C): 36 (10-20-22 @ 14:17), Max: 37 (10-19-22 @ 20:22)  HR: 79 (10-20-22 @ 14:17) (72 - 84)  BP: 149/72 (10-20-22 @ 14:17) (139/71 - 151/70)  RR: 20 (10-20-22 @ 14:17) (18 - 20)  SpO2: --    GENERAL: NAD, lying in bed comfortably  EYES: horizontal nystagmus noted   HEART: Regular rate and rhythm,  LUNGS: Unlabored respirations.  Clear to auscultation bilaterally, no crackles, wheezing, or rhonchi  ABDOMEN: Soft, nontender, nondistended, +BS  EXTREMITIES: 2+ peripheral pulses bilaterally.   VITALS:   T(C): 36 (10-20-22 @ 14:17), Max: 37 (10-19-22 @ 20:22)  HR: 79 (10-20-22 @ 14:17) (72 - 84)  BP: 149/72 (10-20-22 @ 14:17) (139/71 - 151/70)  RR: 20 (10-20-22 @ 14:17) (18 - 20)  SpO2: --    GENERAL: NAD, lying in bed comfortably  EYES: horizontal nystagmus noted   HEART: Regular rate and rhythm,  LUNGS: Unlabored respirations.  Clear to auscultation bilaterally, no crackles, wheezing, or rhonchi  ABDOMEN: Soft, nontender, nondistended, +BS  EXTREMITIES: Right knee swelling improved after tap

## 2022-10-20 NOTE — DISCHARGE NOTE PROVIDER - NSDCCPCAREPLAN_GEN_ALL_CORE_FT
PRINCIPAL DISCHARGE DIAGNOSIS  Diagnosis: Alcohol dependence with withdrawal  Assessment and Plan of Treatment: You were admitted to the hospital for withdrawal from  During your stay, you were treated appropriately and started on two new medications: naltrexone and gabapentin. Please follow up with your primary care physician.  Follow these instructions at home:     Medicines   •Take over-the-counter and prescription medicines only as told by your health care provider.  •Ask before starting any new medicines, herbs, or supplements.  General instructions   •Ask friends and family members to support your choice to stay sober.  •Avoid situations where alcohol is served.  •Create a plan to deal with tempting situations.  •Attend support groups regularly.  •Practice hobbies or activities you enjoy.  • Do not drink and drive.  •Keep all follow-up visits as told by your health care provider. This is important.  •If you have a mental health condition, seek treatment. Develop a healthy lifestyle through:  •Meditation or deep breathing.  •Exercise.  •Spending time in nature.   •Listening to music.  •Talking with a trusted friend or family member.  Where to find more information  •Substance Abuse and Mental Health Services Administration: samhsa.gov  •Alcoholics Anonymous: aa.org  Contact a health care provider if:  •You cannot take your medicines as told.  •Your symptoms get worse or you experience symptoms of withdrawal when you stop drinking.  •You start drinking again (relapse) and your symptoms get worse.  Get help right away if:  •You have thoughts about hurting yourself or others.  If you ever feel like you may hurt yourself or others, or have thoughts about taking your own life, get help right away. Go to your nearest emergency department or:    • Call your local emergency services (911 in the [place]   • Call a suicide crisis helpline, such as the National Suicide Prevention Lifeline at 1-195.938.8410 or 410 in the [place] This is open 24 hours a day in the       PRINCIPAL DISCHARGE DIAGNOSIS  Diagnosis: Alcohol dependence with withdrawal  Assessment and Plan of Treatment: You were admitted to the hospital for withdrawal from  During your stay, you were treated appropriately and started on two new medications: naltrexone and gabapentin. Please follow up with your primary care physician.  Follow these instructions at home:     Medicines   •Take over-the-counter and prescription medicines only as told by your health care provider.  •Ask before starting any new medicines, herbs, or supplements.  General instructions   •Ask friends and family members to support your choice to stay sober.  •Avoid situations where alcohol is served.  •Create a plan to deal with tempting situations.  •Attend support groups regularly.  •Practice hobbies or activities you enjoy.  • Do not drink and drive.  •Keep all follow-up visits as told by your health care provider. This is important.  •If you have a mental health condition, seek treatment. Develop a healthy lifestyle through:  •Meditation or deep breathing.  •Exercise.  •Spending time in nature.   •Listening to music.  •Talking with a trusted friend or family member.  Where to find more information  •Substance Abuse and Mental Health Services Administration: samhsa.gov  •Alcoholics Anonymous: aa.org  Contact a health care provider if:  •You cannot take your medicines as told.  •Your symptoms get worse or you experience symptoms of withdrawal when you stop drinking.  •You start drinking again (relapse) and your symptoms get worse.  Get help right away if:  •You have thoughts about hurting yourself or others.  If you ever feel like you may hurt yourself or others, or have thoughts about taking your own life, get help right away. Go to your nearest emergency department or:    • Call your local emergency services (911 in the [place]   • Call a suicide crisis helpline, such as the National Suicide Prevention Lifeline at 1-243.840.6497 or 309 in the [place] This is open 24 hours a day in the      SECONDARY DISCHARGE DIAGNOSES  Diagnosis: 2019 novel coronavirus disease (COVID-19)  Assessment and Plan of Treatment: During your admission, you tested positive for COVID19. You were asymptomatic and do not need treatment at this time. Please quarantine to prevent the spread of the virus.     PRINCIPAL DISCHARGE DIAGNOSIS  Diagnosis: Alcohol dependence with withdrawal  Assessment and Plan of Treatment: You were admitted to the hospital for withdrawal from  During your stay, you were treated appropriately and started on two new medications: naltrexone and gabapentin. Please follow up with your primary care physician.  Follow these instructions at home:     Medicines   •Take over-the-counter and prescription medicines only as told by your health care provider.  •Ask before starting any new medicines, herbs, or supplements.  General instructions   •Ask friends and family members to support your choice to stay sober.  •Avoid situations where alcohol is served.  •Create a plan to deal with tempting situations.  •Attend support groups regularly.  •Practice hobbies or activities you enjoy.  • Do not drink and drive.  •Keep all follow-up visits as told by your health care provider. This is important.  •If you have a mental health condition, seek treatment. Develop a healthy lifestyle through:  •Meditation or deep breathing.  •Exercise.  •Spending time in nature.   •Listening to music.  •Talking with a trusted friend or family member.  Where to find more information  •Substance Abuse and Mental Health Services Administration: samhsa.gov  •Alcoholics Anonymous: aa.org  Contact a health care provider if:  •You cannot take your medicines as told.  •Your symptoms get worse or you experience symptoms of withdrawal when you stop drinking.  •You start drinking again (relapse) and your symptoms get worse.  Get help right away if:  •You have thoughts about hurting yourself or others.  If you ever feel like you may hurt yourself or others, or have thoughts about taking your own life, get help right away. Go to your nearest emergency department or:    • Call your local emergency services (911 in the [place]   • Call a suicide crisis helpline, such as the National Suicide Prevention Lifeline at 1-331.305.1651 or 557 in the [place] This is open 24 hours a day in the      SECONDARY DISCHARGE DIAGNOSES  Diagnosis: 2019 novel coronavirus disease (COVID-19)  Assessment and Plan of Treatment: During your admission, you tested positive for COVID19. You were asymptomatic and do not need treatment at this time. Please quarantine to prevent the spread of the virus.    Diagnosis: Gout flare  Assessment and Plan of Treatment:      PRINCIPAL DISCHARGE DIAGNOSIS  Diagnosis: Alcohol dependence with withdrawal  Assessment and Plan of Treatment: You were admitted to the hospital for withdrawal from  During your stay, you were treated appropriately and started on two new medications: naltrexone and gabapentin. Please follow up with your primary care physician.  Follow these instructions at home:     Medicines   •Take over-the-counter and prescription medicines only as told by your health care provider.  •Ask before starting any new medicines, herbs, or supplements.  General instructions   •Ask friends and family members to support your choice to stay sober.  •Avoid situations where alcohol is served.  •Create a plan to deal with tempting situations.  •Attend support groups regularly.  •Practice hobbies or activities you enjoy.  • Do not drink and drive.  •Keep all follow-up visits as told by your health care provider. This is important.  •If you have a mental health condition, seek treatment. Develop a healthy lifestyle through:  •Meditation or deep breathing.  •Exercise.  •Spending time in nature.   •Listening to music.  •Talking with a trusted friend or family member.  Where to find more information  •Substance Abuse and Mental Health Services Administration: samhsa.gov  •Alcoholics Anonymous: aa.org  Contact a health care provider if:  •You cannot take your medicines as told.  •Your symptoms get worse or you experience symptoms of withdrawal when you stop drinking.  •You start drinking again (relapse) and your symptoms get worse.  Get help right away if:  •You have thoughts about hurting yourself or others.  If you ever feel like you may hurt yourself or others, or have thoughts about taking your own life, get help right away. Go to your nearest emergency department or:    • Call your local emergency services (911 in the [place]   • Call a suicide crisis helpline, such as the National Suicide Prevention Lifeline at 1-981.680.2761 or 091 in the [place] This is open 24 hours a day in the      SECONDARY DISCHARGE DIAGNOSES  Diagnosis: Gout flare  Assessment and Plan of Treatment: You developed knee pain during yourt hosptialization and we performed an arthocentesis which is a prtocuedure in which we removed sdome fluid from your knee for testing ehicvh showed you have gout which is duer to a build up or uric acid which can crystalize and cause pain. We started you on a short course of prednisone which will help with the paijn. Please follow up with your PCP or see a rheumatologist for further management. Avoid foods high in purines such as red meats    Diagnosis: 2019 novel coronavirus disease (COVID-19)  Assessment and Plan of Treatment: During your admission, you tested positive for COVID19. You were asymptomatic and do not need treatment at this time. Please quarantine to prevent the spread of the virus.

## 2022-10-20 NOTE — PHYSICAL THERAPY INITIAL EVALUATION ADULT - NSACTIVITYREC_GEN_A_PT
Patient requires assistance with functional mobility. PT recommends D/C to rehabilitation facility when medically appropriate. Refer to evaluation note for details.

## 2022-10-20 NOTE — DISCHARGE NOTE PROVIDER - HOSPITAL COURSE
61 y/o M with pmhx etoh abuse, CKD, GERD, gout, HTN, presents to ED c/o n/v all day. He was seen recently in ED for same complaints, dc after labs and meds, felt better. Patient denies any blood in vomiting. Reports that he has central chest pain when he throws up multiple times, pain is sharp in character, non-radiating, not associated with exertion. Denies any active chest pain. Patient reports drinking 1 pint of vodka daily for the past few days, last drink was last night.    During his hospital course he was treated for the following problems:    1. Alcohol abuse complicated by  Alcohol withdrawal / Alcohol ketoacidosis/ starvation ketoacidosis resolved   - Lactate 1.7, AG 17 - 10/17  - Leukocytosis likely reactive. no obvious signs of infection  - CIWA protocol on Ativan   - Cont high dose thiamine due to nystamust  - Cont vitamin b12   - seen by addiction medicine  - c/w naltrexone 50mg daily (d/c w/ 30 day script)  - c/w gabapentin 200mg BID (d/c w/ 30 day script)    2. HTN urgency  - s/p labetalol 100 mg po x 1  - c/w home dose of Toprol xl 25 mg q daily  - monitor BP    3.  Cholelithiasis  - Trend LFTs  - outpt surgery eval for cholecystectomy    4.  hyperbilirubinemia likely due to alcoholic hepatitis   -Total Bili 2.5 63 y/o M with pmhx etoh abuse, CKD, GERD, gout, HTN, presents to ED c/o n/v all day. He was seen recently in ED for same complaints, dc after labs and meds, felt better. Patient denies any blood in vomiting. Reports that he has central chest pain when he throws up multiple times, pain is sharp in character, non-radiating, not associated with exertion. Denies any active chest pain. Patient reports drinking 1 pint of vodka daily for the past few days, last drink was last night.    During his hospital course he was treated for the following problems:    1. Alcohol abuse complicated by  Alcohol withdrawal / Alcohol ketoacidosis/ starvation ketoacidosis resolved   - Lactate 1.7, AG 17 - 10/17  - Leukocytosis likely reactive. no obvious signs of infection  - CIWA protocol on Ativan   - Was on high dose thiamine. DC on high dose vit b1 for possible b1 def  - Cont vitamin b12   - seen by addiction medicine  - c/w naltrexone 50mg daily (d/c w/ 30 day script)  - c/w gabapentin 200mg BID (d/c w/ 30 day script)  - PT eval before dc     2. HTN urgency  - s/p labetalol 100 mg po x 1  - c/w home dose of Toprol xl 25 mg q daily  - monitor BP    3.  Cholelithiasis  - Trend LFTs  - outpt surgery eval for cholecystectomy    4.  hyperbilirubinemia likely due to alcoholic hepatitis resolving   -Total Bili 2.5    Patient feels well. he has no complains except being unsteady upon walking. no other events noted    he will need to fu with PMD for regular check up 61 y/o M with pmhx etoh abuse, CKD, GERD, gout, HTN, presents to ED c/o n/v all day. He was seen recently in ED for same complaints, dc after labs and meds, felt better. Patient denies any blood in vomiting. Reports that he has central chest pain when he throws up multiple times, pain is sharp in character, non-radiating, not associated with exertion. Denies any active chest pain. Patient reports drinking 1 pint of vodka daily for the past few days, last drink was last night.    During his hospital course he was treated for the following problems:    1. Alcohol abuse complicated by  Alcohol withdrawal / Alcohol ketoacidosis/ starvation ketoacidosis resolved   - Lactate 1.7, AG 17 - 10/17  - Leukocytosis likely reactive. no obvious signs of infection  - CIWA protocol on Ativan   - Was on high dose thiamine. DC on high dose vit b1 for possible b1 def  - Cont vitamin b12   - seen by addiction medicine  - c/w naltrexone 50mg daily (d/c w/ 30 day script)  - c/w gabapentin 200mg BID (d/c w/ 30 day script)  - PT eval before dc     2. HTN urgency  - s/p labetalol 100 mg po x 1  - c/w home dose of Toprol xl 25 mg q daily  - monitor BP    3.  Cholelithiasis  - Trend LFTs  - outpt surgery eval for cholecystectomy    4.  hyperbilirubinemia likely due to alcoholic hepatitis resolving   -Total Bili 2.5    5. Asymptomatic COVID 19   - Enhance contact and droplet isolation   - No need for remdisivir     Patient feels well. he has no complains except being unsteady upon walking. no other events noted    he will need to fu with PMD for regular check up 63 y/o M with pmhx etoh abuse, CKD, GERD, gout, HTN, presents to ED c/o n/v all day. He was seen recently in ED for same complaints, dc after labs and meds, felt better. Patient denies any blood in vomiting. Reports that he has central chest pain when he throws up multiple times, pain is sharp in character, non-radiating, not associated with exertion. Denies any active chest pain. Patient reports drinking 1 pint of vodka daily for the past few days, last drink was last night.  In the hospital, patient was treated for the following conditions:       1. Alcohol abuse complicated by  Alcohol withdrawal / Alcohol ketoacidosis/ starvation ketoacidosis resolved   - Lactate 1.7, AG 17 - 10/17  - Leukocytosis likely reactive. no obvious signs of infection  - CIWA protocol on Ativan   - Was on high dose thiamine. DC on high dose vit b1 for possible b1 def  - Cont vitamin b12   - seen by addiction medicine  - c/w naltrexone 50mg daily (d/c w/ 30 day script)  - c/w gabapentin 200mg BID (d/c w/ 30 day script)  - PT eval:  a) need rehab. PT-reeval before dc as patient refusing Rehab     2. HTN urgency  - s/p labetalol 100 mg po x 1  - c/w home dose of Toprol xl 25 mg q daily    3.  Cholelithiasis  - Trend LFTs  - outpt surgery eval for cholecystectomy    4.  hyperbilirubinemia likely due to alcoholic hepatitis resolving     5. Asymtpomatic COVID 19   - Enhance contact and droplet isolation   - No need for remdisivir     6. Acute right knee pain without other inflammatory signs likely acute gout attack   - Was on Naproxen 500mg po bid d:3. DC on naproxen bid for 7 days   - CRP elevated but procalcitonin negative   - Can't give opioid while on natroxone.   - Arthrocentesis. cell count 30 000 wbc in favor of gout. Crystal:pending. cx:pending   - Ortho consult appreciated     7. Facial rash looks like eczema resolved  - hydrocortisone 2.5%.. DC on hydrocortisone 2.5% bid for 5 days      Patient feels well. he has no complains except being unsteady upon walking. no other events noted    he will need to fu with PMD for regular check up

## 2022-10-20 NOTE — DISCHARGE NOTE PROVIDER - NSDCMRMEDTOKEN_GEN_ALL_CORE_FT
Flomax 0.4 mg oral capsule: 1 cap(s) orally once a day (at bedtime)  folic acid 1 mg oral tablet: 1 tab(s) orally once a day  gabapentin 100 mg oral capsule: 2 cap(s) orally 2 times a day  loratadine 10 mg oral tablet: 1 tab(s) orally once a day  metoprolol succinate 25 mg oral tablet, extended release: 1 tab(s) orally once a day  Multiple Vitamins oral tablet: 1 tab(s) orally once a day  naltrexone 50 mg oral tablet: 1 tab(s) orally once a day  thiamine 100 mg oral tablet: 1 tab(s) orally once a day  Vitamin B12 100 mcg oral tablet: 1 tab(s) orally once a day   Flomax 0.4 mg oral capsule: 1 cap(s) orally once a day (at bedtime)  folic acid 1 mg oral tablet: 1 tab(s) orally once a day  gabapentin 100 mg oral capsule: 2 cap(s) orally 2 times a day  hydrocortisone 2.5% topical ointment: 1 application topically 2 times a day  loratadine 10 mg oral tablet: 1 tab(s) orally once a day  metoprolol succinate 25 mg oral tablet, extended release: 1 tab(s) orally once a day  Multiple Vitamins oral tablet: 1 tab(s) orally once a day  naltrexone 50 mg oral tablet: 1 tab(s) orally once a day  naproxen 500 mg oral tablet: 1 tab(s) orally 2 times a day  omeprazole 40 mg oral delayed release capsule: 1 cap(s) orally once a day   thiamine 100 mg oral tablet: 1 tab(s) orally once a day  Vitamin B12 100 mcg oral tablet: 1 tab(s) orally once a day

## 2022-10-20 NOTE — PROGRESS NOTE ADULT - SUBJECTIVE AND OBJECTIVE BOX
63 y/o M with pmhx etoh abuse, CKD, GERD, gout, HTN, presents to ED c/o n/v all day. He was seen recently in ED for same complaints, dc after labs and meds, felt better. Patient denies any blood in vomiting. Reports that he has central chest pain when he throws up multiple times, pain is sharp in character, non-radiating, not associated with exertion. Denies any active chest pain. Patient reports drinking 1 pint of vodka daily for the past few days, last drink was last night.    During his hospital course he was treated for the following problems:    1. Alcohol abuse complicated by  Alcohol withdrawal / Alcohol ketoacidosis/ starvation ketoacidosis resolved   - Lactate 1.7, AG 17 - 10/17  - Leukocytosis likely reactive. no obvious signs of infection  - CIWA protocol on Ativan   - Was on high dose thiamine. DC on high dose vit b1 for possible b1 def  - Cont vitamin b12   - seen by addiction medicine  - c/w naltrexone 50mg daily (d/c w/ 30 day script)  - c/w gabapentin 200mg BID (d/c w/ 30 day script)  - PT eval:  a) need rehab     2. HTN urgency  - s/p labetalol 100 mg po x 1  - c/w home dose of Toprol xl 25 mg q daily  - monitor BP    3.  Cholelithiasis  - Trend LFTs  - outpt surgery eval for cholecystectomy    4.  hyperbilirubinemia likely due to alcoholic hepatitis resolving   -Total Bili 2.5      Patient feels well. he has no complains except unsteady gait that is improving. plan to dc once rehab bed is available

## 2022-10-21 ENCOUNTER — TRANSCRIPTION ENCOUNTER (OUTPATIENT)
Age: 62
End: 2022-10-21

## 2022-10-21 LAB — SARS-COV-2 RNA SPEC QL NAA+PROBE: DETECTED

## 2022-10-21 PROCEDURE — 99231 SBSQ HOSP IP/OBS SF/LOW 25: CPT

## 2022-10-21 RX ORDER — ACETAMINOPHEN 500 MG
650 TABLET ORAL EVERY 4 HOURS
Refills: 0 | Status: DISCONTINUED | OUTPATIENT
Start: 2022-10-21 | End: 2022-10-26

## 2022-10-21 RX ADMIN — Medication 105 MILLIGRAM(S): at 06:18

## 2022-10-21 RX ADMIN — Medication 1 TABLET(S): at 11:52

## 2022-10-21 RX ADMIN — Medication 1 MILLIGRAM(S): at 11:52

## 2022-10-21 RX ADMIN — TAMSULOSIN HYDROCHLORIDE 0.4 MILLIGRAM(S): 0.4 CAPSULE ORAL at 22:27

## 2022-10-21 RX ADMIN — Medication 25 MILLIGRAM(S): at 06:09

## 2022-10-21 RX ADMIN — PANTOPRAZOLE SODIUM 40 MILLIGRAM(S): 20 TABLET, DELAYED RELEASE ORAL at 06:09

## 2022-10-21 RX ADMIN — NALTREXONE HYDROCHLORIDE 50 MILLIGRAM(S): 50 TABLET, FILM COATED ORAL at 11:52

## 2022-10-21 RX ADMIN — GABAPENTIN 200 MILLIGRAM(S): 400 CAPSULE ORAL at 17:32

## 2022-10-21 RX ADMIN — HEPARIN SODIUM 5000 UNIT(S): 5000 INJECTION INTRAVENOUS; SUBCUTANEOUS at 06:09

## 2022-10-21 RX ADMIN — HEPARIN SODIUM 5000 UNIT(S): 5000 INJECTION INTRAVENOUS; SUBCUTANEOUS at 14:09

## 2022-10-21 RX ADMIN — PREGABALIN 1000 MICROGRAM(S): 225 CAPSULE ORAL at 11:52

## 2022-10-21 RX ADMIN — POLYETHYLENE GLYCOL 3350 17 GRAM(S): 17 POWDER, FOR SOLUTION ORAL at 11:51

## 2022-10-21 RX ADMIN — MUPIROCIN 1 APPLICATION(S): 20 OINTMENT TOPICAL at 17:32

## 2022-10-21 RX ADMIN — HEPARIN SODIUM 5000 UNIT(S): 5000 INJECTION INTRAVENOUS; SUBCUTANEOUS at 22:27

## 2022-10-21 RX ADMIN — GABAPENTIN 200 MILLIGRAM(S): 400 CAPSULE ORAL at 06:13

## 2022-10-21 NOTE — DISCHARGE NOTE NURSING/CASE MANAGEMENT/SOCIAL WORK - PATIENT PORTAL LINK FT
You can access the FollowMyHealth Patient Portal offered by [facility] by registering at the following website: [email or web address]. By joining [facility]’s FollowMyHealth portal, you will also be able to view your health information using other applications (apps) compatible with our system.

## 2022-10-21 NOTE — DISCHARGE NOTE NURSING/CASE MANAGEMENT/SOCIAL WORK - NSDCPEFALRISK_GEN_ALL_CORE
For information on Fall & Injury Prevention, visit: [email or web address] OR  [email or web address] OR  https://www.cdc.gov/steadi/patient.html

## 2022-10-21 NOTE — PROGRESS NOTE ADULT - SUBJECTIVE AND OBJECTIVE BOX
CAT ALSTON  62y  [facility] [address]      Patient is a 62y old  Male who presents with a chief complaint of AMS (18 Oct 2022 08:35)      INTERVAL HPI/OVERNIGHT EVENTS:  Patient feels well. he has no complains.   he is agreeable with rehab     Unfortunately, his screening test for covid was positive.     REVIEW OF SYSTEMS:        FAMILY HISTORY:  Family history of gastric cancer  Mom    Family hx of lung cancer  Smoker        T(C): 36.8 (10-21-22 @ 05:23), Max: 36.8 (10-21-22 @ 05:23)  HR: 101 (10-21-22 @ 05:23) (79 - 101)  BP: 152/75 (10-21-22 @ 05:23) (141/71 - 152/75)  RR: 18 (10-21-22 @ 05:23) (18 - 20)  SpO2: --  Wt(kg): --Vital Signs Last 24 Hrs  T(C): 36.8 (21 Oct 2022 05:23), Max: 36.8 (21 Oct 2022 05:23)  T(F): 98.2 (21 Oct 2022 05:23), Max: 98.2 (21 Oct 2022 05:23)  HR: 101 (21 Oct 2022 05:23) (79 - 101)  BP: 152/75 (21 Oct 2022 05:23) (141/71 - 152/75)  BP(mean): --  RR: 18 (21 Oct 2022 05:23) (18 - 20)  SpO2: --        PHYSICAL EXAM:  GENERAL: NAD, well-groomed, well-developed  PULM: Clear to auscultation bilaterally  CARDIAC: Regular rate and rhythm;  GI: Soft, Nontender, Nondistended; Bowel sounds present  EXTREMITIES:  2+ Peripheral Pulses    Consultant(s) Notes Reviewed:  [x ] YES  [ ] NO  Care Discussed with Consultants/Other Providers [ x] YES  [ ] NO    LABS:                            12.0   5.19  )-----------( 90       ( 20 Oct 2022 06:45 )             34.5   10-20    137  |  102  |  10  ----------------------------<  86  3.8   |  24  |  1.2    Ca    9.2      20 Oct 2022 06:45  Mg     1.9     10-20    TPro  6.5  /  Alb  4.2  /  TBili  1.2  /  DBili  x   /  AST  41  /  ALT  20  /  AlkPhos  71  10-20            cyanocobalamin Injectable 1000 MICROGram(s) SubCutaneous daily  folic acid 1 milliGRAM(s) Oral daily  gabapentin 200 milliGRAM(s) Oral two times a day  heparin   Injectable 5000 Unit(s) SubCutaneous every 8 hours  influenza   Vaccine 0.5 milliLiter(s) IntraMuscular once  LORazepam   Injectable 2 milliGRAM(s) IV Push every 1 hour PRN  metoprolol succinate ER 25 milliGRAM(s) Oral daily  multivitamin 1 Tablet(s) Oral daily  mupirocin 2% Ointment 1 Application(s) Topical two times a day  naltrexone 50 milliGRAM(s) Oral daily  pantoprazole    Tablet 40 milliGRAM(s) Oral before breakfast  polyethylene glycol 3350 17 Gram(s) Oral daily  tamsulosin 0.4 milliGRAM(s) Oral at bedtime      61 y/o M with pmhx etoh abuse, CKD, GERD, gout, HTN, presents to ED c/o n/v all day. He was seen recently in ED for same complaints, dc after labs and meds, felt better. Patient denies any blood in vomiting. Reports that he has central chest pain when he throws up multiple times, pain is sharp in character, non-radiating, not associated with exertion. Denies any active chest pain. Patient reports drinking 1 pint of vodka daily for the past few days, last drink was last night.      1. Alcohol abuse complicated by  Alcohol withdrawal / Alcohol ketoacidosis/ starvation ketoacidosis resolved   - Lactate 1.7, AG 17 - 10/17  - Leukocytosis likely reactive. no obvious signs of infection  - CIWA protocol on Ativan   - Was on high dose thiamine. DC on high dose vit b1 for possible b1 def  - Cont vitamin b12   - seen by addiction medicine  - c/w naltrexone 50mg daily (d/c w/ 30 day script)  - c/w gabapentin 200mg BID (d/c w/ 30 day script)  - PT eval:  a) need rehab     2. HTN urgency  - s/p labetalol 100 mg po x 1  - c/w home dose of Toprol xl 25 mg q daily  - monitor BP    3.  Cholelithiasis  - Trend LFTs  - outpt surgery eval for cholecystectomy    4.  hyperbilirubinemia likely due to alcoholic hepatitis resolving     5. Asymtpomatic COVID 19   - Enhance contact and droplet isolation   - No need for remdisivir           Case Discussed with House Staff   20 minutes spent on total encounter; more than 50% of the visit was spent counseling and/or coordinating care by the attending physician.   Spectra [phone]               [phone]

## 2022-10-22 PROCEDURE — 99231 SBSQ HOSP IP/OBS SF/LOW 25: CPT

## 2022-10-22 RX ADMIN — HEPARIN SODIUM 5000 UNIT(S): 5000 INJECTION INTRAVENOUS; SUBCUTANEOUS at 13:22

## 2022-10-22 RX ADMIN — GABAPENTIN 200 MILLIGRAM(S): 400 CAPSULE ORAL at 05:28

## 2022-10-22 RX ADMIN — HEPARIN SODIUM 5000 UNIT(S): 5000 INJECTION INTRAVENOUS; SUBCUTANEOUS at 05:28

## 2022-10-22 RX ADMIN — MUPIROCIN 1 APPLICATION(S): 20 OINTMENT TOPICAL at 17:30

## 2022-10-22 RX ADMIN — Medication 1 TABLET(S): at 13:22

## 2022-10-22 RX ADMIN — MUPIROCIN 1 APPLICATION(S): 20 OINTMENT TOPICAL at 05:27

## 2022-10-22 RX ADMIN — NALTREXONE HYDROCHLORIDE 50 MILLIGRAM(S): 50 TABLET, FILM COATED ORAL at 13:22

## 2022-10-22 RX ADMIN — Medication 1 MILLIGRAM(S): at 13:21

## 2022-10-22 RX ADMIN — PANTOPRAZOLE SODIUM 40 MILLIGRAM(S): 20 TABLET, DELAYED RELEASE ORAL at 05:29

## 2022-10-22 RX ADMIN — GABAPENTIN 200 MILLIGRAM(S): 400 CAPSULE ORAL at 17:30

## 2022-10-22 RX ADMIN — PREGABALIN 1000 MICROGRAM(S): 225 CAPSULE ORAL at 13:22

## 2022-10-22 RX ADMIN — POLYETHYLENE GLYCOL 3350 17 GRAM(S): 17 POWDER, FOR SOLUTION ORAL at 13:22

## 2022-10-22 RX ADMIN — HEPARIN SODIUM 5000 UNIT(S): 5000 INJECTION INTRAVENOUS; SUBCUTANEOUS at 22:24

## 2022-10-22 RX ADMIN — TAMSULOSIN HYDROCHLORIDE 0.4 MILLIGRAM(S): 0.4 CAPSULE ORAL at 22:24

## 2022-10-22 RX ADMIN — Medication 25 MILLIGRAM(S): at 05:28

## 2022-10-22 NOTE — PROGRESS NOTE ADULT - SUBJECTIVE AND OBJECTIVE BOX
CAT ALSTON  62y  [facility] [address]      Patient is a 62y old  Male who presents with a chief complaint of AMS (18 Oct 2022 08:35)      INTERVAL HPI/OVERNIGHT EVENTS:  Patient feels well. he has no complains.   no overnight events     Unfortunately, his screening test for covid was positive.     REVIEW OF SYSTEMS:        FAMILY HISTORY:  Family history of gastric cancer  Mom    Family hx of lung cancer  Smoker        T(C): 36.8 (10-21-22 @ 05:23), Max: 36.8 (10-21-22 @ 05:23)  HR: 101 (10-21-22 @ 05:23) (79 - 101)  BP: 152/75 (10-21-22 @ 05:23) (141/71 - 152/75)  RR: 18 (10-21-22 @ 05:23) (18 - 20)  SpO2: --  Wt(kg): --Vital Signs Last 24 Hrs  T(C): 36.8 (21 Oct 2022 05:23), Max: 36.8 (21 Oct 2022 05:23)  T(F): 98.2 (21 Oct 2022 05:23), Max: 98.2 (21 Oct 2022 05:23)  HR: 101 (21 Oct 2022 05:23) (79 - 101)  BP: 152/75 (21 Oct 2022 05:23) (141/71 - 152/75)  BP(mean): --  RR: 18 (21 Oct 2022 05:23) (18 - 20)  SpO2: --        PHYSICAL EXAM:  GENERAL: NAD, well-groomed, well-developed  PULM: Clear to auscultation bilaterally  CARDIAC: Regular rate and rhythm;  GI: Soft, Nontender, Nondistended; Bowel sounds present  EXTREMITIES:  2+ Peripheral Pulses    Consultant(s) Notes Reviewed:  [x ] YES  [ ] NO  Care Discussed with Consultants/Other Providers [ x] YES  [ ] NO    LABS:                            12.0   5.19  )-----------( 90       ( 20 Oct 2022 06:45 )             34.5   10-20    137  |  102  |  10  ----------------------------<  86  3.8   |  24  |  1.2    Ca    9.2      20 Oct 2022 06:45  Mg     1.9     10-20    TPro  6.5  /  Alb  4.2  /  TBili  1.2  /  DBili  x   /  AST  41  /  ALT  20  /  AlkPhos  71  10-20            cyanocobalamin Injectable 1000 MICROGram(s) SubCutaneous daily  folic acid 1 milliGRAM(s) Oral daily  gabapentin 200 milliGRAM(s) Oral two times a day  heparin   Injectable 5000 Unit(s) SubCutaneous every 8 hours  influenza   Vaccine 0.5 milliLiter(s) IntraMuscular once  LORazepam   Injectable 2 milliGRAM(s) IV Push every 1 hour PRN  metoprolol succinate ER 25 milliGRAM(s) Oral daily  multivitamin 1 Tablet(s) Oral daily  mupirocin 2% Ointment 1 Application(s) Topical two times a day  naltrexone 50 milliGRAM(s) Oral daily  pantoprazole    Tablet 40 milliGRAM(s) Oral before breakfast  polyethylene glycol 3350 17 Gram(s) Oral daily  tamsulosin 0.4 milliGRAM(s) Oral at bedtime      61 y/o M with pmhx etoh abuse, CKD, GERD, gout, HTN, presents to ED c/o n/v all day. He was seen recently in ED for same complaints, dc after labs and meds, felt better. Patient denies any blood in vomiting. Reports that he has central chest pain when he throws up multiple times, pain is sharp in character, non-radiating, not associated with exertion. Denies any active chest pain. Patient reports drinking 1 pint of vodka daily for the past few days, last drink was last night.      1. Alcohol abuse complicated by  Alcohol withdrawal / Alcohol ketoacidosis/ starvation ketoacidosis resolved   - Lactate 1.7, AG 17 - 10/17  - Leukocytosis likely reactive. no obvious signs of infection  - CIWA protocol on Ativan   - Was on high dose thiamine. DC on high dose vit b1 for possible b1 def  - Cont vitamin b12   - seen by addiction medicine  - c/w naltrexone 50mg daily (d/c w/ 30 day script)  - c/w gabapentin 200mg BID (d/c w/ 30 day script)  - PT eval:  a) need rehab     2. HTN urgency  - s/p labetalol 100 mg po x 1  - c/w home dose of Toprol xl 25 mg q daily  - monitor BP    3.  Cholelithiasis  - Trend LFTs  - outpt surgery eval for cholecystectomy    4.  hyperbilirubinemia likely due to alcoholic hepatitis resolving     5. Asymtpomatic COVID 19   - Enhance contact and droplet isolation   - No need for remdisivir     patient will be discharge once is bed is available at Rehab for covid pts      Case Discussed with House Staff   16 minutes spent on total encounter; more than 50% of the visit was spent counseling and/or coordinating care by the attending physician.   Spectra [phone]

## 2022-10-23 LAB
CRP SERPL-MCNC: 57.4 MG/L — HIGH
D DIMER BLD IA.RAPID-MCNC: 699 NG/ML DDU — HIGH (ref 0–230)
HCT VFR BLD CALC: 35 % — LOW (ref 42–52)
HGB BLD-MCNC: 11.9 G/DL — LOW (ref 14–18)
MCHC RBC-ENTMCNC: 32.8 PG — HIGH (ref 27–31)
MCHC RBC-ENTMCNC: 34 G/DL — SIGNIFICANT CHANGE UP (ref 32–37)
MCV RBC AUTO: 96.4 FL — HIGH (ref 80–94)
NRBC # BLD: 0 /100 WBCS — SIGNIFICANT CHANGE UP (ref 0–0)
PLATELET # BLD AUTO: 107 K/UL — LOW (ref 130–400)
RBC # BLD: 3.63 M/UL — LOW (ref 4.7–6.1)
RBC # FLD: 14.9 % — HIGH (ref 11.5–14.5)
WBC # BLD: 6.33 K/UL — SIGNIFICANT CHANGE UP (ref 4.8–10.8)
WBC # FLD AUTO: 6.33 K/UL — SIGNIFICANT CHANGE UP (ref 4.8–10.8)

## 2022-10-23 PROCEDURE — 99231 SBSQ HOSP IP/OBS SF/LOW 25: CPT

## 2022-10-23 RX ORDER — HYDROCORTISONE 1 %
1 OINTMENT (GRAM) TOPICAL
Refills: 0 | Status: DISCONTINUED | OUTPATIENT
Start: 2022-10-23 | End: 2022-10-26

## 2022-10-23 RX ADMIN — TAMSULOSIN HYDROCHLORIDE 0.4 MILLIGRAM(S): 0.4 CAPSULE ORAL at 21:57

## 2022-10-23 RX ADMIN — GABAPENTIN 200 MILLIGRAM(S): 400 CAPSULE ORAL at 06:42

## 2022-10-23 RX ADMIN — NALTREXONE HYDROCHLORIDE 50 MILLIGRAM(S): 50 TABLET, FILM COATED ORAL at 16:01

## 2022-10-23 RX ADMIN — PANTOPRAZOLE SODIUM 40 MILLIGRAM(S): 20 TABLET, DELAYED RELEASE ORAL at 06:43

## 2022-10-23 RX ADMIN — HEPARIN SODIUM 5000 UNIT(S): 5000 INJECTION INTRAVENOUS; SUBCUTANEOUS at 13:11

## 2022-10-23 RX ADMIN — HEPARIN SODIUM 5000 UNIT(S): 5000 INJECTION INTRAVENOUS; SUBCUTANEOUS at 21:56

## 2022-10-23 RX ADMIN — Medication 500 MILLIGRAM(S): at 18:03

## 2022-10-23 RX ADMIN — Medication 500 MILLIGRAM(S): at 16:04

## 2022-10-23 RX ADMIN — Medication 25 MILLIGRAM(S): at 06:42

## 2022-10-23 RX ADMIN — Medication 500 MILLIGRAM(S): at 19:57

## 2022-10-23 RX ADMIN — GABAPENTIN 200 MILLIGRAM(S): 400 CAPSULE ORAL at 18:02

## 2022-10-23 RX ADMIN — MUPIROCIN 1 APPLICATION(S): 20 OINTMENT TOPICAL at 06:42

## 2022-10-23 RX ADMIN — Medication 1 MILLIGRAM(S): at 12:38

## 2022-10-23 RX ADMIN — HEPARIN SODIUM 5000 UNIT(S): 5000 INJECTION INTRAVENOUS; SUBCUTANEOUS at 06:42

## 2022-10-23 RX ADMIN — POLYETHYLENE GLYCOL 3350 17 GRAM(S): 17 POWDER, FOR SOLUTION ORAL at 12:37

## 2022-10-23 RX ADMIN — Medication 1 TABLET(S): at 12:38

## 2022-10-23 NOTE — PROGRESS NOTE ADULT - SUBJECTIVE AND OBJECTIVE BOX
CAT ALSTON  62y  [facility] [address]      Patient is a 62y old  Male who presents with a chief complaint of AMS (18 Oct 2022 08:35)      INTERVAL HPI/OVERNIGHT EVENTS:  Patient feels well. reporting right knee pain that happen suddenly   facial rash noted. non-itching     REVIEW OF SYSTEMS:        FAMILY HISTORY:  Family history of gastric cancer  Mom    Family hx of lung cancer  Smoker        T(C): 36.8 (10-21-22 @ 05:23), Max: 36.8 (10-21-22 @ 05:23)  HR: 101 (10-21-22 @ 05:23) (79 - 101)  BP: 152/75 (10-21-22 @ 05:23) (141/71 - 152/75)  RR: 18 (10-21-22 @ 05:23) (18 - 20)  SpO2: --  Wt(kg): --Vital Signs Last 24 Hrs  T(C): 36.8 (21 Oct 2022 05:23), Max: 36.8 (21 Oct 2022 05:23)  T(F): 98.2 (21 Oct 2022 05:23), Max: 98.2 (21 Oct 2022 05:23)  HR: 101 (21 Oct 2022 05:23) (79 - 101)  BP: 152/75 (21 Oct 2022 05:23) (141/71 - 152/75)  BP(mean): --  RR: 18 (21 Oct 2022 05:23) (18 - 20)  SpO2: --        PHYSICAL EXAM:  GENERAL: NAD, facial rash noted   PULM: Clear to auscultation bilaterally  CARDIAC: Regular rate and rhythm;  GI: Soft, Nontender, Nondistended; Bowel sounds present  EXTREMITIES:  2+ Peripheral Pulses, right  knee pain but no erythema, clear swelling or problem with extension     Consultant(s) Notes Reviewed:  [x ] YES  [ ] NO  Care Discussed with Consultants/Other Providers [ x] YES  [ ] NO    LABS:                            12.0   5.19  )-----------( 90       ( 20 Oct 2022 06:45 )             34.5   10-20    137  |  102  |  10  ----------------------------<  86  3.8   |  24  |  1.2    Ca    9.2      20 Oct 2022 06:45  Mg     1.9     10-20    TPro  6.5  /  Alb  4.2  /  TBili  1.2  /  DBili  x   /  AST  41  /  ALT  20  /  AlkPhos  71  10-20            cyanocobalamin Injectable 1000 MICROGram(s) SubCutaneous daily  folic acid 1 milliGRAM(s) Oral daily  gabapentin 200 milliGRAM(s) Oral two times a day  heparin   Injectable 5000 Unit(s) SubCutaneous every 8 hours  influenza   Vaccine 0.5 milliLiter(s) IntraMuscular once  LORazepam   Injectable 2 milliGRAM(s) IV Push every 1 hour PRN  metoprolol succinate ER 25 milliGRAM(s) Oral daily  multivitamin 1 Tablet(s) Oral daily  mupirocin 2% Ointment 1 Application(s) Topical two times a day  naltrexone 50 milliGRAM(s) Oral daily  pantoprazole    Tablet 40 milliGRAM(s) Oral before breakfast  polyethylene glycol 3350 17 Gram(s) Oral daily  tamsulosin 0.4 milliGRAM(s) Oral at bedtime      61 y/o M with pmhx etoh abuse, CKD, GERD, gout, HTN, presents to ED c/o n/v all day. He was seen recently in ED for same complaints, dc after labs and meds, felt better. Patient denies any blood in vomiting. Reports that he has central chest pain when he throws up multiple times, pain is sharp in character, non-radiating, not associated with exertion. Denies any active chest pain. Patient reports drinking 1 pint of vodka daily for the past few days, last drink was last night.      1. Alcohol abuse complicated by  Alcohol withdrawal / Alcohol ketoacidosis/ starvation ketoacidosis resolved   - Lactate 1.7, AG 17 - 10/17  - Leukocytosis likely reactive. no obvious signs of infection  - CIWA protocol on Ativan   - Was on high dose thiamine. DC on high dose vit b1 for possible b1 def  - Cont vitamin b12   - seen by addiction medicine  - c/w naltrexone 50mg daily (d/c w/ 30 day script)  - c/w gabapentin 200mg BID (d/c w/ 30 day script)  - PT eval:  a) need rehab     2. HTN urgency  - s/p labetalol 100 mg po x 1  - c/w home dose of Toprol xl 25 mg q daily  - monitor BP    3.  Cholelithiasis  - Trend LFTs  - outpt surgery eval for cholecystectomy    4.  hyperbilirubinemia likely due to alcoholic hepatitis resolving     5. Asymtpomatic COVID 19   - Enhance contact and droplet isolation   - No need for remdisivir     6. Acute right knee pain without other inflammatory signs might be gout   - Start Naproxen 500mg po bid   - Send crp, procalcitonin     7. Facial rash  - hydrocortisone 2.5%       Case Discussed with House Staff   20 minutes spent on total encounter; more than 50% of the visit was spent counseling and/or coordinating care by the attending physician.   Spectra [phone]                     CAT ALSTON  62y  [facility] [address]      Patient is a 62y old  Male who presents with a chief complaint of AMS (18 Oct 2022 08:35)      INTERVAL HPI/OVERNIGHT EVENTS:  Patient feels well.Still experiencing knee pain no significant improvement despite NSAID      REVIEW OF SYSTEMS:        FAMILY HISTORY:  Family history of gastric cancer  Mom    Family hx of lung cancer  Smoker        T(C): 36.8 (10-21-22 @ 05:23), Max: 36.8 (10-21-22 @ 05:23)  HR: 101 (10-21-22 @ 05:23) (79 - 101)  BP: 152/75 (10-21-22 @ 05:23) (141/71 - 152/75)  RR: 18 (10-21-22 @ 05:23) (18 - 20)  SpO2: --  Wt(kg): --Vital Signs Last 24 Hrs  T(C): 36.8 (21 Oct 2022 05:23), Max: 36.8 (21 Oct 2022 05:23)  T(F): 98.2 (21 Oct 2022 05:23), Max: 98.2 (21 Oct 2022 05:23)  HR: 101 (21 Oct 2022 05:23) (79 - 101)  BP: 152/75 (21 Oct 2022 05:23) (141/71 - 152/75)  BP(mean): --  RR: 18 (21 Oct 2022 05:23) (18 - 20)  SpO2: --        PHYSICAL EXAM:  GENERAL: NAD, facial rash noted   PULM: Clear to auscultation bilaterally  CARDIAC: Regular rate and rhythm;  GI: Soft, Nontender, Nondistended; Bowel sounds present  EXTREMITIES:  2+ Peripheral Pulses, right  knee pain but no erythema, clear swelling or problem with extension     Consultant(s) Notes Reviewed:  [x ] YES  [ ] NO  Care Discussed with Consultants/Other Providers [ x] YES  [ ] NO    LABS:                            12.0   5.19  )-----------( 90       ( 20 Oct 2022 06:45 )             34.5   10-20    137  |  102  |  10  ----------------------------<  86  3.8   |  24  |  1.2    Ca    9.2      20 Oct 2022 06:45  Mg     1.9     10-20    TPro  6.5  /  Alb  4.2  /  TBili  1.2  /  DBili  x   /  AST  41  /  ALT  20  /  AlkPhos  71  10-20            cyanocobalamin Injectable 1000 MICROGram(s) SubCutaneous daily  folic acid 1 milliGRAM(s) Oral daily  gabapentin 200 milliGRAM(s) Oral two times a day  heparin   Injectable 5000 Unit(s) SubCutaneous every 8 hours  influenza   Vaccine 0.5 milliLiter(s) IntraMuscular once  LORazepam   Injectable 2 milliGRAM(s) IV Push every 1 hour PRN  metoprolol succinate ER 25 milliGRAM(s) Oral daily  multivitamin 1 Tablet(s) Oral daily  mupirocin 2% Ointment 1 Application(s) Topical two times a day  naltrexone 50 milliGRAM(s) Oral daily  pantoprazole    Tablet 40 milliGRAM(s) Oral before breakfast  polyethylene glycol 3350 17 Gram(s) Oral daily  tamsulosin 0.4 milliGRAM(s) Oral at bedtime      61 y/o M with pmhx etoh abuse, CKD, GERD, gout, HTN, presents to ED c/o n/v all day. He was seen recently in ED for same complaints, dc after labs and meds, felt better. Patient denies any blood in vomiting. Reports that he has central chest pain when he throws up multiple times, pain is sharp in character, non-radiating, not associated with exertion. Denies any active chest pain. Patient reports drinking 1 pint of vodka daily for the past few days, last drink was last night.      1. Alcohol abuse complicated by  Alcohol withdrawal / Alcohol ketoacidosis/ starvation ketoacidosis resolved   - Lactate 1.7, AG 17 - 10/17  - Leukocytosis likely reactive. no obvious signs of infection  - CIWA protocol on Ativan   - Was on high dose thiamine. DC on high dose vit b1 for possible b1 def  - Cont vitamin b12   - seen by addiction medicine  - c/w naltrexone 50mg daily (d/c w/ 30 day script)  - c/w gabapentin 200mg BID (d/c w/ 30 day script)  - PT eval:  a) need rehab     2. HTN urgency  - s/p labetalol 100 mg po x 1  - c/w home dose of Toprol xl 25 mg q daily  - monitor BP    3.  Cholelithiasis  - Trend LFTs  - outpt surgery eval for cholecystectomy    4.  hyperbilirubinemia likely due to alcoholic hepatitis resolving     5. Asymtpomatic COVID 19   - Enhance contact and droplet isolation   - No need for remdisivir     6. Acute right knee pain without other inflammatory signs likely acute gout attack   - Cont  Naproxen 500mg po bid d:2   - CRP elevated but procalcitonin negative   - Can't give opioid while on natroxone.   - Arthrocentesis. cell count 30 000 wbc in favor of gout. Crystal:pending. cx:pending   - Ortho consult appreciated       7. Facial rash  - hydrocortisone 2.5%       Case Discussed with House Staff   20 minutes spent on total encounter; more than 50% of the visit was spent counseling and/or coordinating care by the attending physician.   Spectra [phone]

## 2022-10-24 LAB
ANION GAP SERPL CALC-SCNC: 12 MMOL/L — SIGNIFICANT CHANGE UP (ref 7–14)
B PERT IGG+IGM PNL SER: ABNORMAL
BUN SERPL-MCNC: 11 MG/DL — SIGNIFICANT CHANGE UP (ref 10–20)
CALCIUM SERPL-MCNC: 9.1 MG/DL — SIGNIFICANT CHANGE UP (ref 8.4–10.5)
CHLORIDE SERPL-SCNC: 104 MMOL/L — SIGNIFICANT CHANGE UP (ref 98–110)
CO2 SERPL-SCNC: 23 MMOL/L — SIGNIFICANT CHANGE UP (ref 17–32)
COLOR FLD: YELLOW — SIGNIFICANT CHANGE UP
CREAT SERPL-MCNC: 1.4 MG/DL — SIGNIFICANT CHANGE UP (ref 0.7–1.5)
EGFR: 56 ML/MIN/1.73M2 — LOW
GLUCOSE SERPL-MCNC: 131 MG/DL — HIGH (ref 70–99)
HCT VFR BLD CALC: 32.9 % — LOW (ref 42–52)
HGB BLD-MCNC: 11.6 G/DL — LOW (ref 14–18)
LYMPHOCYTES # FLD: 12 — SIGNIFICANT CHANGE UP
MAGNESIUM SERPL-MCNC: 1.8 MG/DL — SIGNIFICANT CHANGE UP (ref 1.8–2.4)
MCHC RBC-ENTMCNC: 33 PG — HIGH (ref 27–31)
MCHC RBC-ENTMCNC: 35.3 G/DL — SIGNIFICANT CHANGE UP (ref 32–37)
MCV RBC AUTO: 93.7 FL — SIGNIFICANT CHANGE UP (ref 80–94)
NEUTROPHILS-BODY FLUID: 88 % — SIGNIFICANT CHANGE UP
NRBC # BLD: 0 /100 WBCS — SIGNIFICANT CHANGE UP (ref 0–0)
PLATELET # BLD AUTO: 136 K/UL — SIGNIFICANT CHANGE UP (ref 130–400)
POTASSIUM SERPL-MCNC: 4.1 MMOL/L — SIGNIFICANT CHANGE UP (ref 3.5–5)
POTASSIUM SERPL-SCNC: 4.1 MMOL/L — SIGNIFICANT CHANGE UP (ref 3.5–5)
PROCALCITONIN SERPL-MCNC: 0.09 NG/ML — SIGNIFICANT CHANGE UP (ref 0.02–0.1)
RBC # BLD: 3.51 M/UL — LOW (ref 4.7–6.1)
RBC # FLD: 15.1 % — HIGH (ref 11.5–14.5)
RCV VOL RI: 1000 /UL — HIGH (ref 0–0)
SODIUM SERPL-SCNC: 139 MMOL/L — SIGNIFICANT CHANGE UP (ref 135–146)
TOTAL NUCLEATED CELL COUNT, BODY FLUID: SIGNIFICANT CHANGE UP /UL
WBC # BLD: 6.66 K/UL — SIGNIFICANT CHANGE UP (ref 4.8–10.8)
WBC # FLD AUTO: 6.66 K/UL — SIGNIFICANT CHANGE UP (ref 4.8–10.8)

## 2022-10-24 PROCEDURE — 73562 X-RAY EXAM OF KNEE 3: CPT | Mod: 26,RT

## 2022-10-24 PROCEDURE — 99232 SBSQ HOSP IP/OBS MODERATE 35: CPT

## 2022-10-24 RX ORDER — SODIUM CHLORIDE 9 MG/ML
1000 INJECTION INTRAMUSCULAR; INTRAVENOUS; SUBCUTANEOUS
Refills: 0 | Status: DISCONTINUED | OUTPATIENT
Start: 2022-10-24 | End: 2022-10-26

## 2022-10-24 RX ADMIN — Medication 25 MILLIGRAM(S): at 06:33

## 2022-10-24 RX ADMIN — GABAPENTIN 200 MILLIGRAM(S): 400 CAPSULE ORAL at 06:27

## 2022-10-24 RX ADMIN — HEPARIN SODIUM 5000 UNIT(S): 5000 INJECTION INTRAVENOUS; SUBCUTANEOUS at 22:15

## 2022-10-24 RX ADMIN — Medication 1 MILLIGRAM(S): at 12:30

## 2022-10-24 RX ADMIN — HEPARIN SODIUM 5000 UNIT(S): 5000 INJECTION INTRAVENOUS; SUBCUTANEOUS at 15:36

## 2022-10-24 RX ADMIN — Medication 500 MILLIGRAM(S): at 07:10

## 2022-10-24 RX ADMIN — HEPARIN SODIUM 5000 UNIT(S): 5000 INJECTION INTRAVENOUS; SUBCUTANEOUS at 06:28

## 2022-10-24 RX ADMIN — SODIUM CHLORIDE 100 MILLILITER(S): 9 INJECTION INTRAMUSCULAR; INTRAVENOUS; SUBCUTANEOUS at 22:15

## 2022-10-24 RX ADMIN — Medication 1 APPLICATION(S): at 18:17

## 2022-10-24 RX ADMIN — MUPIROCIN 1 APPLICATION(S): 20 OINTMENT TOPICAL at 06:28

## 2022-10-24 RX ADMIN — Medication 1 APPLICATION(S): at 06:29

## 2022-10-24 RX ADMIN — Medication 500 MILLIGRAM(S): at 18:16

## 2022-10-24 RX ADMIN — PANTOPRAZOLE SODIUM 40 MILLIGRAM(S): 20 TABLET, DELAYED RELEASE ORAL at 06:33

## 2022-10-24 RX ADMIN — NALTREXONE HYDROCHLORIDE 50 MILLIGRAM(S): 50 TABLET, FILM COATED ORAL at 12:31

## 2022-10-24 RX ADMIN — MUPIROCIN 1 APPLICATION(S): 20 OINTMENT TOPICAL at 18:17

## 2022-10-24 RX ADMIN — TAMSULOSIN HYDROCHLORIDE 0.4 MILLIGRAM(S): 0.4 CAPSULE ORAL at 22:15

## 2022-10-24 RX ADMIN — Medication 1 TABLET(S): at 12:30

## 2022-10-24 RX ADMIN — Medication 500 MILLIGRAM(S): at 08:30

## 2022-10-24 RX ADMIN — Medication 500 MILLIGRAM(S): at 19:20

## 2022-10-24 RX ADMIN — GABAPENTIN 200 MILLIGRAM(S): 400 CAPSULE ORAL at 18:16

## 2022-10-24 NOTE — PROCEDURE NOTE - ADDITIONAL PROCEDURE DETAILS
cell count , crystals, gram stain and culture sent   patient informed to remain NPO until cell count results

## 2022-10-24 NOTE — PROGRESS NOTE ADULT - PROVIDER SPECIALTY LIST ADULT
Hospitalist
Critical Care
Internal Medicine

## 2022-10-24 NOTE — PROGRESS NOTE ADULT - SUBJECTIVE AND OBJECTIVE BOX
CAT ALSTON  62y  [facility] [address]      Patient is a 62y old  Male who presents with a chief complaint of AMS (18 Oct 2022 08:35)      INTERVAL HPI/OVERNIGHT EVENTS:  Patient feels well.Still experiencing knee pain no significant improvement despite NSAID      REVIEW OF SYSTEMS:        FAMILY HISTORY:  Family history of gastric cancer  Mom    Family hx of lung cancer  Smoker        T(C): 36.8 (10-21-22 @ 05:23), Max: 36.8 (10-21-22 @ 05:23)  HR: 101 (10-21-22 @ 05:23) (79 - 101)  BP: 152/75 (10-21-22 @ 05:23) (141/71 - 152/75)  RR: 18 (10-21-22 @ 05:23) (18 - 20)  SpO2: --  Wt(kg): --Vital Signs Last 24 Hrs  T(C): 36.8 (21 Oct 2022 05:23), Max: 36.8 (21 Oct 2022 05:23)  T(F): 98.2 (21 Oct 2022 05:23), Max: 98.2 (21 Oct 2022 05:23)  HR: 101 (21 Oct 2022 05:23) (79 - 101)  BP: 152/75 (21 Oct 2022 05:23) (141/71 - 152/75)  BP(mean): --  RR: 18 (21 Oct 2022 05:23) (18 - 20)  SpO2: --        PHYSICAL EXAM:  GENERAL: NAD, facial rash noted   PULM: Clear to auscultation bilaterally  CARDIAC: Regular rate and rhythm;  GI: Soft, Nontender, Nondistended; Bowel sounds present  EXTREMITIES:  2+ Peripheral Pulses, right  knee pain with swelling  but no erythema,     Consultant(s) Notes Reviewed:  [x ] YES  [ ] NO  Care Discussed with Consultants/Other Providers [ x] YES  [ ] NO    LABS:                            12.0   5.19  )-----------( 90       ( 20 Oct 2022 06:45 )             34.5   10-20    137  |  102  |  10  ----------------------------<  86  3.8   |  24  |  1.2    Ca    9.2      20 Oct 2022 06:45  Mg     1.9     10-20    TPro  6.5  /  Alb  4.2  /  TBili  1.2  /  DBili  x   /  AST  41  /  ALT  20  /  AlkPhos  71  10-20            cyanocobalamin Injectable 1000 MICROGram(s) SubCutaneous daily  folic acid 1 milliGRAM(s) Oral daily  gabapentin 200 milliGRAM(s) Oral two times a day  heparin   Injectable 5000 Unit(s) SubCutaneous every 8 hours  influenza   Vaccine 0.5 milliLiter(s) IntraMuscular once  LORazepam   Injectable 2 milliGRAM(s) IV Push every 1 hour PRN  metoprolol succinate ER 25 milliGRAM(s) Oral daily  multivitamin 1 Tablet(s) Oral daily  mupirocin 2% Ointment 1 Application(s) Topical two times a day  naltrexone 50 milliGRAM(s) Oral daily  pantoprazole    Tablet 40 milliGRAM(s) Oral before breakfast  polyethylene glycol 3350 17 Gram(s) Oral daily  tamsulosin 0.4 milliGRAM(s) Oral at bedtime      61 y/o M with pmhx etoh abuse, CKD, GERD, gout, HTN, presents to ED c/o n/v all day. He was seen recently in ED for same complaints, dc after labs and meds, felt better. Patient denies any blood in vomiting. Reports that he has central chest pain when he throws up multiple times, pain is sharp in character, non-radiating, not associated with exertion. Denies any active chest pain. Patient reports drinking 1 pint of vodka daily for the past few days, last drink was last night.      1. Alcohol abuse complicated by  Alcohol withdrawal / Alcohol ketoacidosis/ starvation ketoacidosis resolved   - Lactate 1.7, AG 17 - 10/17  - Leukocytosis likely reactive. no obvious signs of infection  - CIWA protocol on Ativan   - Was on high dose thiamine. DC on high dose vit b1 for possible b1 def  - Cont vitamin b12   - seen by addiction medicine  - c/w naltrexone 50mg daily (d/c w/ 30 day script)  - c/w gabapentin 200mg BID (d/c w/ 30 day script)  - PT eval:  a) need rehab     2. HTN urgency  - s/p labetalol 100 mg po x 1  - c/w home dose of Toprol xl 25 mg q daily  - monitor BP    3.  Cholelithiasis  - Trend LFTs  - outpt surgery eval for cholecystectomy    4.  hyperbilirubinemia likely due to alcoholic hepatitis resolving     5. Asymtpomatic COVID 19   - Enhance contact and droplet isolation   - No need for remdisivir     6. Acute right knee pain without other inflammatory signs likely acute gout attack   - Cont  Naproxen 500mg po bid d:2   - CRP elevated but procalcitonin negative   - Can't give opioid while on natroxone.   - Arthrocentesis. cell count 30 000 wbc in favor of gout. Crystal:pending. cx:pending   - Ortho consult appreciated     7. Facial rash  - hydrocortisone 2.5%       Case Discussed with House Staff   30 minutes spent on total encounter; more than 50% of the visit was spent counseling and/or coordinating care by the attending physician.   Spectra [phone]

## 2022-10-24 NOTE — CONSULT NOTE ADULT - CONSULT REASON
alcohol use disorder complicated by nausea/vomiting
alcohol use disorder complicated by nausea/vomiting
knee pain

## 2022-10-24 NOTE — CONSULT NOTE ADULT - SUBJECTIVE AND OBJECTIVE BOX
Orthopaedic Surgery Consult Note    HPI:  62yMale admitted to the hospitals 10 days ago for hepatic steatosis and cholelithiasis. During admission patient developed right knee pain. Pt states that the knee pain began yesterday and has gotten worse over the last 24 hours. he states that he has had knee pain previously but did not feel similar to this. He admits to a history of having gout in the past but does not recall being treated for it or if he had an aspiration of either of his knees prior. he denies any trauma to the knee prior to admission or during admission.  Denies being on any anticoagulation. Community ambulator at home, using walker in the hospital.       Allergies    Beef (Hives)  dairy products (Hives)  No Known Drug Allergies  shellfish (Hives)    Intolerances      PAST MEDICAL & SURGICAL HISTORY:  Alcohol dependence  Vertigo  HTN (hypertension)  Hard of hearing  Gout  Seasonal alergies  CKD (chronic kidney disease)  No significant past surgical history      MEDICATIONS  (STANDING):  folic acid 1 milliGRAM(s) Oral daily  gabapentin 200 milliGRAM(s) Oral two times a day  heparin   Injectable 5000 Unit(s) SubCutaneous every 8 hours  hydrocortisone 2.5% Ointment 1 Application(s) Topical two times a day  influenza   Vaccine 0.5 milliLiter(s) IntraMuscular once  metoprolol succinate ER 25 milliGRAM(s) Oral daily  multivitamin 1 Tablet(s) Oral daily  mupirocin 2% Ointment 1 Application(s) Topical two times a day  naltrexone 50 milliGRAM(s) Oral daily  naproxen 500 milliGRAM(s) Oral two times a day  pantoprazole    Tablet 40 milliGRAM(s) Oral before breakfast  polyethylene glycol 3350 17 Gram(s) Oral daily  tamsulosin 0.4 milliGRAM(s) Oral at bedtime    MEDICATIONS  (PRN):  acetaminophen     Tablet .. 650 milliGRAM(s) Oral every 4 hours PRN Temp greater or equal to 38.5C (101.3F)  LORazepam   Injectable 2 milliGRAM(s) IV Push every 1 hour PRN Symptom-triggered: each CIWA -Ar score 8 or GREATER      Physical Exam:  Patient laying in bed in NAD      Right knee:  skin intact, no erythema, no ecchymosis  +effusion, + warmth   painful ROM 0-45  SILT sp/dp/t/sural/saph  firing quads/hamstrings/ta/ehl/fhl/gs                        11.6   6.66  )-----------( 136      ( 24 Oct 2022 13:09 )             32.9       Imaging:     A/P: 62yMale    -Discussed with       Orthopaedic Surgery Consult Note    HPI:  62yMale admitted to the hospitals 10 days ago for hepatic steatosis and cholelithiasis. During admission patient developed right knee pain. Pt states that the knee pain began yesterday and has gotten worse over the last 24 hours. he states that he has had knee pain previously but did not feel similar to this. He admits to a history of having gout in the past but does not recall being treated for it or if he had an aspiration of either of his knees prior. he denies any trauma to the knee prior to admission or during admission.  Denies being on any anticoagulation. Community ambulator at home, using walker in the hospital.       Allergies    Beef (Hives)  dairy products (Hives)  No Known Drug Allergies  shellfish (Hives)    Intolerances      PAST MEDICAL & SURGICAL HISTORY:  Alcohol dependence  Vertigo  HTN (hypertension)  Hard of hearing  Gout  Seasonal alergies  CKD (chronic kidney disease)  No significant past surgical history      MEDICATIONS  (STANDING):  folic acid 1 milliGRAM(s) Oral daily  gabapentin 200 milliGRAM(s) Oral two times a day  heparin   Injectable 5000 Unit(s) SubCutaneous every 8 hours  hydrocortisone 2.5% Ointment 1 Application(s) Topical two times a day  influenza   Vaccine 0.5 milliLiter(s) IntraMuscular once  metoprolol succinate ER 25 milliGRAM(s) Oral daily  multivitamin 1 Tablet(s) Oral daily  mupirocin 2% Ointment 1 Application(s) Topical two times a day  naltrexone 50 milliGRAM(s) Oral daily  naproxen 500 milliGRAM(s) Oral two times a day  pantoprazole    Tablet 40 milliGRAM(s) Oral before breakfast  polyethylene glycol 3350 17 Gram(s) Oral daily  tamsulosin 0.4 milliGRAM(s) Oral at bedtime    MEDICATIONS  (PRN):  acetaminophen     Tablet .. 650 milliGRAM(s) Oral every 4 hours PRN Temp greater or equal to 38.5C (101.3F)  LORazepam   Injectable 2 milliGRAM(s) IV Push every 1 hour PRN Symptom-triggered: each CIWA -Ar score 8 or GREATER      Physical Exam:  Patient laying in bed in NAD      Right knee:  skin intact, no erythema, no ecchymosis  +effusion, + warmth   painful ROM 0-45  SILT sp/dp/t/sural/saph  firing quads/hamstrings/ta/ehl/fhl/gs                        11.6   6.66  )-----------( 136      ( 24 Oct 2022 13:09 )             32.9     C-Reactive Protein, Serum: 57.4 mg/L (10.23.22 @ 17:57)   Imaging: right knee: no acute fractures or dislocations    A/P: 62yMale with right knee effusion; rule out septic joint vs. gout flare up  -Right knee aspiration done under sterile technique; prepped with iodine and chlorhexidine  -34cc yellow fluid aspiration from the knee joint, superior lateral needle insertion  -patient tolerate procedure well with no complications  -Cell count STAT, gram stain and culture, crystals all sent to Lab  -NPO for now; patient ate breakfast this morning; informed to not eat until cell count has resulted  -discussed with patient that if cell count is indicative of a septic joint he would require surgial treatment; if gout flare can be managed with medications  -ortho following        Orthopaedic Surgery Consult Note    HPI:  62yMale admitted to the hospitals 10 days ago for hepatic steatosis and cholelithiasis. During admission patient developed right knee pain. Pt states that the knee pain began yesterday and has gotten worse over the last 24 hours. he states that he has had knee pain previously but did not feel similar to this. He admits to a history of having gout in the past but does not recall being treated for it or if he had an aspiration of either of his knees prior. he denies any trauma to the knee prior to admission or during admission.  Denies being on any anticoagulation. Community ambulator at home, using walker in the hospital.       Allergies    Beef (Hives)  dairy products (Hives)  No Known Drug Allergies  shellfish (Hives)    Intolerances      PAST MEDICAL & SURGICAL HISTORY:  Alcohol dependence  Vertigo  HTN (hypertension)  Hard of hearing  Gout  Seasonal alergies  CKD (chronic kidney disease)  No significant past surgical history      MEDICATIONS  (STANDING):  folic acid 1 milliGRAM(s) Oral daily  gabapentin 200 milliGRAM(s) Oral two times a day  heparin   Injectable 5000 Unit(s) SubCutaneous every 8 hours  hydrocortisone 2.5% Ointment 1 Application(s) Topical two times a day  influenza   Vaccine 0.5 milliLiter(s) IntraMuscular once  metoprolol succinate ER 25 milliGRAM(s) Oral daily  multivitamin 1 Tablet(s) Oral daily  mupirocin 2% Ointment 1 Application(s) Topical two times a day  naltrexone 50 milliGRAM(s) Oral daily  naproxen 500 milliGRAM(s) Oral two times a day  pantoprazole    Tablet 40 milliGRAM(s) Oral before breakfast  polyethylene glycol 3350 17 Gram(s) Oral daily  tamsulosin 0.4 milliGRAM(s) Oral at bedtime    MEDICATIONS  (PRN):  acetaminophen     Tablet .. 650 milliGRAM(s) Oral every 4 hours PRN Temp greater or equal to 38.5C (101.3F)  LORazepam   Injectable 2 milliGRAM(s) IV Push every 1 hour PRN Symptom-triggered: each CIWA -Ar score 8 or GREATER      Physical Exam:  Patient laying in bed in NAD      Right knee:  skin intact, no erythema, no ecchymosis  +effusion, + warmth   painful ROM 0-45  SILT sp/dp/t/sural/saph  firing quads/hamstrings/ta/ehl/fhl/gs                        11.6   6.66  )-----------( 136      ( 24 Oct 2022 13:09 )             32.9     C-Reactive Protein, Serum: 57.4 mg/L (10.23.22 @ 17:57)   Imaging: right knee: no acute fractures or dislocations    A/P: 62yMale with right knee effusion; rule out septic joint vs. gout flare up  -Due to patients physical exam, elevated CRP - patient indicated for arthrocentesis and lab eval of fluid to rulw out infection vs. gout flare up  -Right knee aspiration done under sterile technique; prepped with iodine and chlorhexidine  -34cc yellow fluid aspiration from the knee joint, superior lateral needle insertion  -patient tolerate procedure well with no complications  -Cell count STAT, gram stain and culture, crystals all sent to Lab  -NPO for now; patient ate breakfast this morning; informed to not eat until cell count has resulted  -discussed with patient that if cell count is indicative of a septic joint he would require surgial treatment; if gout flare can be managed with medications  -ortho following

## 2022-10-25 LAB
ALBUMIN SERPL ELPH-MCNC: 3.8 G/DL — SIGNIFICANT CHANGE UP (ref 3.5–5.2)
ALP SERPL-CCNC: 54 U/L — SIGNIFICANT CHANGE UP (ref 30–115)
ALT FLD-CCNC: 14 U/L — SIGNIFICANT CHANGE UP (ref 0–41)
ANION GAP SERPL CALC-SCNC: 11 MMOL/L — SIGNIFICANT CHANGE UP (ref 7–14)
AST SERPL-CCNC: 15 U/L — SIGNIFICANT CHANGE UP (ref 0–41)
BILIRUB SERPL-MCNC: 1.1 MG/DL — SIGNIFICANT CHANGE UP (ref 0.2–1.2)
BUN SERPL-MCNC: 11 MG/DL — SIGNIFICANT CHANGE UP (ref 10–20)
CALCIUM SERPL-MCNC: 9 MG/DL — SIGNIFICANT CHANGE UP (ref 8.4–10.5)
CHLORIDE SERPL-SCNC: 104 MMOL/L — SIGNIFICANT CHANGE UP (ref 98–110)
CO2 SERPL-SCNC: 23 MMOL/L — SIGNIFICANT CHANGE UP (ref 17–32)
CREAT SERPL-MCNC: 1.1 MG/DL — SIGNIFICANT CHANGE UP (ref 0.7–1.5)
EGFR: 75 ML/MIN/1.73M2 — SIGNIFICANT CHANGE UP
GLUCOSE SERPL-MCNC: 93 MG/DL — SIGNIFICANT CHANGE UP (ref 70–99)
GRAM STN FLD: SIGNIFICANT CHANGE UP
HCT VFR BLD CALC: 31.3 % — LOW (ref 42–52)
HGB BLD-MCNC: 11 G/DL — LOW (ref 14–18)
MAGNESIUM SERPL-MCNC: 2 MG/DL — SIGNIFICANT CHANGE UP (ref 1.8–2.4)
MCHC RBC-ENTMCNC: 33.1 PG — HIGH (ref 27–31)
MCHC RBC-ENTMCNC: 35.1 G/DL — SIGNIFICANT CHANGE UP (ref 32–37)
MCV RBC AUTO: 94.3 FL — HIGH (ref 80–94)
NRBC # BLD: 0 /100 WBCS — SIGNIFICANT CHANGE UP (ref 0–0)
PLATELET # BLD AUTO: 138 K/UL — SIGNIFICANT CHANGE UP (ref 130–400)
POTASSIUM SERPL-MCNC: 3.7 MMOL/L — SIGNIFICANT CHANGE UP (ref 3.5–5)
POTASSIUM SERPL-SCNC: 3.7 MMOL/L — SIGNIFICANT CHANGE UP (ref 3.5–5)
PROT SERPL-MCNC: 6.1 G/DL — SIGNIFICANT CHANGE UP (ref 6–8)
RBC # BLD: 3.32 M/UL — LOW (ref 4.7–6.1)
RBC # FLD: 14.9 % — HIGH (ref 11.5–14.5)
SODIUM SERPL-SCNC: 138 MMOL/L — SIGNIFICANT CHANGE UP (ref 135–146)
SPECIMEN SOURCE: SIGNIFICANT CHANGE UP
SYNOVIAL CRYSTALS CLARITY: ABNORMAL
SYNOVIAL CRYSTALS COLOR: YELLOW
SYNOVIAL CRYSTALS ID: ABNORMAL
SYNOVIAL CRYSTALS TUBE: SIGNIFICANT CHANGE UP
URATE SERPL-MCNC: 7.8 MG/DL — SIGNIFICANT CHANGE UP (ref 3.4–8.8)
WBC # BLD: 5.66 K/UL — SIGNIFICANT CHANGE UP (ref 4.8–10.8)
WBC # FLD AUTO: 5.66 K/UL — SIGNIFICANT CHANGE UP (ref 4.8–10.8)

## 2022-10-25 PROCEDURE — 99231 SBSQ HOSP IP/OBS SF/LOW 25: CPT

## 2022-10-25 RX ORDER — OMEPRAZOLE 10 MG/1
1 CAPSULE, DELAYED RELEASE ORAL
Qty: 15 | Refills: 0
Start: 2022-10-25 | End: 2022-11-08

## 2022-10-25 RX ORDER — HYDROCORTISONE 1 %
1 OINTMENT (GRAM) TOPICAL
Qty: 30 | Refills: 0
Start: 2022-10-25 | End: 2022-10-29

## 2022-10-25 RX ADMIN — Medication 500 MILLIGRAM(S): at 05:29

## 2022-10-25 RX ADMIN — HEPARIN SODIUM 5000 UNIT(S): 5000 INJECTION INTRAVENOUS; SUBCUTANEOUS at 13:29

## 2022-10-25 RX ADMIN — TAMSULOSIN HYDROCHLORIDE 0.4 MILLIGRAM(S): 0.4 CAPSULE ORAL at 21:18

## 2022-10-25 RX ADMIN — NALTREXONE HYDROCHLORIDE 50 MILLIGRAM(S): 50 TABLET, FILM COATED ORAL at 12:16

## 2022-10-25 RX ADMIN — POLYETHYLENE GLYCOL 3350 17 GRAM(S): 17 POWDER, FOR SOLUTION ORAL at 12:16

## 2022-10-25 RX ADMIN — Medication 25 MILLIGRAM(S): at 07:41

## 2022-10-25 RX ADMIN — Medication 1 TABLET(S): at 12:16

## 2022-10-25 RX ADMIN — MUPIROCIN 1 APPLICATION(S): 20 OINTMENT TOPICAL at 05:38

## 2022-10-25 RX ADMIN — Medication 1 MILLIGRAM(S): at 12:15

## 2022-10-25 RX ADMIN — Medication 500 MILLIGRAM(S): at 17:38

## 2022-10-25 RX ADMIN — HEPARIN SODIUM 5000 UNIT(S): 5000 INJECTION INTRAVENOUS; SUBCUTANEOUS at 21:18

## 2022-10-25 RX ADMIN — HEPARIN SODIUM 5000 UNIT(S): 5000 INJECTION INTRAVENOUS; SUBCUTANEOUS at 05:29

## 2022-10-25 RX ADMIN — Medication 1 APPLICATION(S): at 17:39

## 2022-10-25 RX ADMIN — GABAPENTIN 200 MILLIGRAM(S): 400 CAPSULE ORAL at 05:29

## 2022-10-25 RX ADMIN — MUPIROCIN 1 APPLICATION(S): 20 OINTMENT TOPICAL at 17:38

## 2022-10-25 RX ADMIN — PANTOPRAZOLE SODIUM 40 MILLIGRAM(S): 20 TABLET, DELAYED RELEASE ORAL at 05:29

## 2022-10-25 RX ADMIN — Medication 1 APPLICATION(S): at 05:38

## 2022-10-25 RX ADMIN — GABAPENTIN 200 MILLIGRAM(S): 400 CAPSULE ORAL at 17:38

## 2022-10-25 RX ADMIN — Medication 500 MILLIGRAM(S): at 17:40

## 2022-10-25 NOTE — CHART NOTE - NSCHARTNOTEFT_GEN_A_CORE
Patient cleared by PT to be discharged home to [facility]. Per CM, [facility] will receive patient tomorrow.

## 2022-10-26 VITALS
HEART RATE: 73 BPM | SYSTOLIC BLOOD PRESSURE: 128 MMHG | OXYGEN SATURATION: 96 % | DIASTOLIC BLOOD PRESSURE: 66 MMHG | TEMPERATURE: 98 F | RESPIRATION RATE: 18 BRPM

## 2022-10-26 LAB
ALBUMIN SERPL ELPH-MCNC: 3.4 G/DL — LOW (ref 3.5–5.2)
ALP SERPL-CCNC: 59 U/L — SIGNIFICANT CHANGE UP (ref 30–115)
ALT FLD-CCNC: 14 U/L — SIGNIFICANT CHANGE UP (ref 0–41)
ANION GAP SERPL CALC-SCNC: 12 MMOL/L — SIGNIFICANT CHANGE UP (ref 7–14)
AST SERPL-CCNC: 14 U/L — SIGNIFICANT CHANGE UP (ref 0–41)
BILIRUB SERPL-MCNC: 0.6 MG/DL — SIGNIFICANT CHANGE UP (ref 0.2–1.2)
BUN SERPL-MCNC: 13 MG/DL — SIGNIFICANT CHANGE UP (ref 10–20)
CALCIUM SERPL-MCNC: 9.2 MG/DL — SIGNIFICANT CHANGE UP (ref 8.4–10.5)
CHLORIDE SERPL-SCNC: 106 MMOL/L — SIGNIFICANT CHANGE UP (ref 98–110)
CO2 SERPL-SCNC: 23 MMOL/L — SIGNIFICANT CHANGE UP (ref 17–32)
CREAT SERPL-MCNC: 1.3 MG/DL — SIGNIFICANT CHANGE UP (ref 0.7–1.5)
EGFR: 62 ML/MIN/1.73M2 — SIGNIFICANT CHANGE UP
GLUCOSE SERPL-MCNC: 110 MG/DL — HIGH (ref 70–99)
HCT VFR BLD CALC: 31.1 % — LOW (ref 42–52)
HGB BLD-MCNC: 10.9 G/DL — LOW (ref 14–18)
MAGNESIUM SERPL-MCNC: 2 MG/DL — SIGNIFICANT CHANGE UP (ref 1.8–2.4)
MCHC RBC-ENTMCNC: 33.1 PG — HIGH (ref 27–31)
MCHC RBC-ENTMCNC: 35 G/DL — SIGNIFICANT CHANGE UP (ref 32–37)
MCV RBC AUTO: 94.5 FL — HIGH (ref 80–94)
NRBC # BLD: 0 /100 WBCS — SIGNIFICANT CHANGE UP (ref 0–0)
PLATELET # BLD AUTO: 164 K/UL — SIGNIFICANT CHANGE UP (ref 130–400)
POTASSIUM SERPL-MCNC: 4 MMOL/L — SIGNIFICANT CHANGE UP (ref 3.5–5)
POTASSIUM SERPL-SCNC: 4 MMOL/L — SIGNIFICANT CHANGE UP (ref 3.5–5)
PROT SERPL-MCNC: 5.9 G/DL — LOW (ref 6–8)
RBC # BLD: 3.29 M/UL — LOW (ref 4.7–6.1)
RBC # FLD: 14.9 % — HIGH (ref 11.5–14.5)
SARS-COV-2 RNA SPEC QL NAA+PROBE: SIGNIFICANT CHANGE UP
SODIUM SERPL-SCNC: 141 MMOL/L — SIGNIFICANT CHANGE UP (ref 135–146)
WBC # BLD: 5.24 K/UL — SIGNIFICANT CHANGE UP (ref 4.8–10.8)
WBC # FLD AUTO: 5.24 K/UL — SIGNIFICANT CHANGE UP (ref 4.8–10.8)

## 2022-10-26 PROCEDURE — 99239 HOSP IP/OBS DSCHRG MGMT >30: CPT

## 2022-10-26 RX ADMIN — Medication 1 MILLIGRAM(S): at 11:57

## 2022-10-26 RX ADMIN — POLYETHYLENE GLYCOL 3350 17 GRAM(S): 17 POWDER, FOR SOLUTION ORAL at 11:57

## 2022-10-26 RX ADMIN — HEPARIN SODIUM 5000 UNIT(S): 5000 INJECTION INTRAVENOUS; SUBCUTANEOUS at 13:47

## 2022-10-26 RX ADMIN — MUPIROCIN 1 APPLICATION(S): 20 OINTMENT TOPICAL at 18:55

## 2022-10-26 RX ADMIN — GABAPENTIN 200 MILLIGRAM(S): 400 CAPSULE ORAL at 17:22

## 2022-10-26 RX ADMIN — HEPARIN SODIUM 5000 UNIT(S): 5000 INJECTION INTRAVENOUS; SUBCUTANEOUS at 05:59

## 2022-10-26 RX ADMIN — Medication 500 MILLIGRAM(S): at 05:59

## 2022-10-26 RX ADMIN — PANTOPRAZOLE SODIUM 40 MILLIGRAM(S): 20 TABLET, DELAYED RELEASE ORAL at 05:58

## 2022-10-26 RX ADMIN — Medication 500 MILLIGRAM(S): at 06:30

## 2022-10-26 RX ADMIN — Medication 1 TABLET(S): at 11:57

## 2022-10-26 RX ADMIN — Medication 500 MILLIGRAM(S): at 17:22

## 2022-10-26 RX ADMIN — NALTREXONE HYDROCHLORIDE 50 MILLIGRAM(S): 50 TABLET, FILM COATED ORAL at 11:57

## 2022-10-26 RX ADMIN — Medication 1 APPLICATION(S): at 18:55

## 2022-10-26 RX ADMIN — Medication 25 MILLIGRAM(S): at 05:59

## 2022-10-26 RX ADMIN — GABAPENTIN 200 MILLIGRAM(S): 400 CAPSULE ORAL at 05:59

## 2022-11-07 LAB
CULTURE RESULTS: SIGNIFICANT CHANGE UP
SPECIMEN SOURCE: SIGNIFICANT CHANGE UP

## 2022-11-23 NOTE — ED ADULT NURSE NOTE - NS PRO AD NO ADVANCE DIRECTIVE
Samples Given: Gentle cleansers and moisturizers Initiate Treatment: Switch to Mineral based makeup, like almay. \\nSelf test at home, makeup products on skin. Reintroduce slowly once flare resolves. Detail Level: Zone Continue Regimen: Hydrocortisone oint, bid until healed, taper off w/ improvement. Has tube at home. No

## 2022-12-08 ENCOUNTER — INPATIENT (INPATIENT)
Facility: HOSPITAL | Age: 62
LOS: 3 days | Discharge: NURSING, REHAB OR LONG-TERM CARE | End: 2022-12-12
Attending: HOSPITALIST | Admitting: HOSPITALIST

## 2022-12-08 VITALS
DIASTOLIC BLOOD PRESSURE: 75 MMHG | RESPIRATION RATE: 16 BRPM | HEIGHT: 70 IN | TEMPERATURE: 99 F | OXYGEN SATURATION: 95 % | HEART RATE: 101 BPM | SYSTOLIC BLOOD PRESSURE: 140 MMHG

## 2022-12-08 DIAGNOSIS — I80.229 PHLEBITIS AND THROMBOPHLEBITIS OF UNSPECIFIED POPLITEAL VEIN: ICD-10-CM

## 2022-12-08 LAB
ALBUMIN SERPL ELPH-MCNC: 4.6 G/DL — SIGNIFICANT CHANGE UP (ref 3.5–5.2)
ALP SERPL-CCNC: 120 U/L — HIGH (ref 30–115)
ALT FLD-CCNC: 10 U/L — SIGNIFICANT CHANGE UP (ref 0–41)
ANION GAP SERPL CALC-SCNC: 17 MMOL/L — HIGH (ref 7–14)
APTT BLD: 34.8 SEC — SIGNIFICANT CHANGE UP (ref 27–39.2)
AST SERPL-CCNC: 18 U/L — SIGNIFICANT CHANGE UP (ref 0–41)
BILIRUB SERPL-MCNC: 1 MG/DL — SIGNIFICANT CHANGE UP (ref 0.2–1.2)
BLD GP AB SCN SERPL QL: SIGNIFICANT CHANGE UP
BUN SERPL-MCNC: 8 MG/DL — LOW (ref 10–20)
CALCIUM SERPL-MCNC: 9.6 MG/DL — SIGNIFICANT CHANGE UP (ref 8.4–10.4)
CHLORIDE SERPL-SCNC: 100 MMOL/L — SIGNIFICANT CHANGE UP (ref 98–110)
CK SERPL-CCNC: 104 U/L — SIGNIFICANT CHANGE UP (ref 0–225)
CO2 SERPL-SCNC: 22 MMOL/L — SIGNIFICANT CHANGE UP (ref 17–32)
CREAT SERPL-MCNC: 1.2 MG/DL — SIGNIFICANT CHANGE UP (ref 0.7–1.5)
EGFR: 68 ML/MIN/1.73M2 — SIGNIFICANT CHANGE UP
GLUCOSE SERPL-MCNC: 94 MG/DL — SIGNIFICANT CHANGE UP (ref 70–99)
HCT VFR BLD CALC: 39 % — LOW (ref 42–52)
HGB BLD-MCNC: 13.9 G/DL — LOW (ref 14–18)
INR BLD: 1.15 RATIO — SIGNIFICANT CHANGE UP (ref 0.65–1.3)
MAGNESIUM SERPL-MCNC: 1.7 MG/DL — LOW (ref 1.8–2.4)
MCHC RBC-ENTMCNC: 33.3 PG — HIGH (ref 27–31)
MCHC RBC-ENTMCNC: 35.6 G/DL — SIGNIFICANT CHANGE UP (ref 32–37)
MCV RBC AUTO: 93.3 FL — SIGNIFICANT CHANGE UP (ref 80–94)
NRBC # BLD: 0 /100 WBCS — SIGNIFICANT CHANGE UP (ref 0–0)
PLATELET # BLD AUTO: 143 K/UL — SIGNIFICANT CHANGE UP (ref 130–400)
POTASSIUM SERPL-MCNC: 4.4 MMOL/L — SIGNIFICANT CHANGE UP (ref 3.5–5)
POTASSIUM SERPL-SCNC: 4.4 MMOL/L — SIGNIFICANT CHANGE UP (ref 3.5–5)
PROT SERPL-MCNC: 7.5 G/DL — SIGNIFICANT CHANGE UP (ref 6–8)
PROTHROM AB SERPL-ACNC: 13.2 SEC — HIGH (ref 9.95–12.87)
RBC # BLD: 4.18 M/UL — LOW (ref 4.7–6.1)
RBC # FLD: 15.8 % — HIGH (ref 11.5–14.5)
SARS-COV-2 RNA SPEC QL NAA+PROBE: SIGNIFICANT CHANGE UP
SODIUM SERPL-SCNC: 139 MMOL/L — SIGNIFICANT CHANGE UP (ref 135–146)
WBC # BLD: 8.05 K/UL — SIGNIFICANT CHANGE UP (ref 4.8–10.8)
WBC # FLD AUTO: 8.05 K/UL — SIGNIFICANT CHANGE UP (ref 4.8–10.8)

## 2022-12-08 PROCEDURE — 99285 EMERGENCY DEPT VISIT HI MDM: CPT

## 2022-12-08 PROCEDURE — 99222 1ST HOSP IP/OBS MODERATE 55: CPT

## 2022-12-08 PROCEDURE — 93970 EXTREMITY STUDY: CPT | Mod: 26

## 2022-12-08 PROCEDURE — 99223 1ST HOSP IP/OBS HIGH 75: CPT

## 2022-12-08 RX ORDER — HEPARIN SODIUM 5000 [USP'U]/ML
9000 INJECTION INTRAVENOUS; SUBCUTANEOUS ONCE
Refills: 0 | Status: COMPLETED | OUTPATIENT
Start: 2022-12-08 | End: 2022-12-08

## 2022-12-08 RX ORDER — MAGNESIUM SULFATE 500 MG/ML
2 VIAL (ML) INJECTION ONCE
Refills: 0 | Status: COMPLETED | OUTPATIENT
Start: 2022-12-08 | End: 2022-12-08

## 2022-12-08 RX ORDER — HEPARIN SODIUM 5000 [USP'U]/ML
INJECTION INTRAVENOUS; SUBCUTANEOUS
Qty: 25000 | Refills: 0 | Status: DISCONTINUED | OUTPATIENT
Start: 2022-12-08 | End: 2022-12-09

## 2022-12-08 RX ADMIN — HEPARIN SODIUM 9000 UNIT(S): 5000 INJECTION INTRAVENOUS; SUBCUTANEOUS at 23:13

## 2022-12-08 RX ADMIN — Medication 25 GRAM(S): at 22:23

## 2022-12-08 RX ADMIN — HEPARIN SODIUM 1900 UNIT(S)/HR: 5000 INJECTION INTRAVENOUS; SUBCUTANEOUS at 23:14

## 2022-12-08 NOTE — CONSULT NOTE ADULT - NS ATTEND AMEND GEN_ALL_CORE FT
I personally reviewed the venous duplex images- there is bilateral DVT.  Patient has got better with elevation and anticoagulation.  Would recommend continuing AC and FU as outpatient.

## 2022-12-08 NOTE — H&P ADULT - HISTORY OF PRESENT ILLNESS
61yo M w/ PMH EtOH abuse, CKD2, gout, HTN was brought in from [facility] for left leg swelling and pain. This morning, the patient started to experience pain and swelling and had difficulty ambulating as a result. At baseline he ambulates with a rolling walker. He denies any trauma or inciting event. He said this has never happened to him before, nor has he ever had blood clots in the past and is not on any anticoagulation. He denies any fever chills, recent travel, headache, dizziness, lightheadedness, nausea, vomiting, chest pain, sob, palpitations, abd pain, constipation, diarrhea, or urinary symptoms.     In the ED  Vitals: T 99, /75, , RR 16, SpO2 95% on RA  Labs: Hgb 13.9, AG 17, AlkP 120, Mg 1.7  Venous duplex performed and vascular surgery was consulted to review the findings of Rt popliteal DVT, Left CFV, GSV DVT, LT popliteal DVT, Left gastroc/pop DVT.     Pt given Mg and started on a heparin drip.      63yo M w/ PMH EtOH abuse, CKD2, gout, HTN was brought in from [facility] for left leg swelling and pain. This morning, the patient started to experience pain and swelling and had difficulty ambulating as a result. The pain was left inner thigh, behind left knee, and left calf. At baseline he ambulates with a rolling walker. He denies any trauma or inciting event. He said this has never happened to him before, nor has he ever had blood clots in the past and is not on any anticoagulation. He currently denies any fever chills, recent travel, headache, dizziness, lightheadedness, nausea, vomiting, chest pain, sob, palpitations, abd pain, constipation, diarrhea, or urinary symptoms.     In the ED  Vitals: T 99, /75, , RR 16, SpO2 95% on RA  Labs: Hgb 13.9, AG 17, AlkP 120, Mg 1.7  Venous duplex performed and vascular surgery was consulted to review the findings of Rt popliteal DVT, Left CFV, GSV DVT, LT popliteal DVT, Left gastroc/pop DVT.     Pt given Mg and started on a heparin drip.      63yo M w/ PMH EtOH abuse, CKD2, gout, HTN, and arthritis was brought in from [facility] for left leg swelling and pain. This morning, the patient started to experience pain and swelling and had difficulty ambulating as a result. The pain was left inner thigh, behind left knee, and left calf. At baseline he ambulates with a rolling walker. He denies any trauma or inciting event. He said this has never happened to him before, nor has he ever had blood clots in the past and is not on any anticoagulation. He currently denies any fever chills, recent travel, headache, dizziness, lightheadedness, nausea, vomiting, chest pain, sob, palpitations, abd pain, constipation, diarrhea, or urinary symptoms.     In the ED  Vitals: T 99, /75, , RR 16, SpO2 95% on RA  Labs: Hgb 13.9, AG 17, AlkP 120, Mg 1.7  Venous duplex performed and vascular surgery was consulted to review the findings of Rt popliteal DVT, Left CFV, GSV DVT, LT popliteal DVT, Left gastroc/pop DVT.     Pt given Mg and started on a heparin drip.      61yo M w/ PMH EtOH abuse, CKD2, gout, HTN, and arthritis was brought in from [facility] for left leg swelling and pain. This morning, the patient started to experience pain and swelling and had difficulty ambulating as a result. The pain was left inner thigh, behind left knee, and left calf. At baseline he ambulates with a rolling walker. He denies any trauma or inciting event. He said this has never happened to him before, nor has he ever had blood clots in the past and is not on any anticoagulation. He currently denies any fever chills, recent travel, headache, dizziness, lightheadedness, nausea, vomiting, chest pain, sob, palpitations, abd pain, constipation, diarrhea, or urinary symptoms.     In the ED  Vitals: T 99, /75, , RR 16, SpO2 95% on RA  Labs: Hgb 13.9, AG 17, AlkP 120, Mg 1.7, blood alcohol <10  Venous duplex performed and vascular surgery was consulted to review the findings of Rt popliteal DVT, Left CFV, GSV DVT, LT popliteal DVT, Left gastroc/pop DVT.     Pt given Mg and started on a heparin drip.

## 2022-12-08 NOTE — ED PROVIDER NOTE - OBJECTIVE STATEMENT
Pt is a 62 y.o Male with PMhx of ethanol abuse, CKD, gout, HTN BIBA to ED from [facility] with chief complaint of lower left leg pain. Per pt, when he woke up earlier this morning he noticed his left leg was stiff with pain and swelling. Pt states he is having a hard time ambulating due to the pain. Denies any trauma, fever, chills, recent travel, surgery or immobilizations. Previous smoker stopped approximately 15 years ago. Pt admits to drinking half a pint of vodka PTA. Denies any HA, CP, SOB, cough, abdominal pain, nausea, vomiting, diarrhea, constipation or acute rash. Pt denies any previous blood clots or leg swelling in the past.

## 2022-12-08 NOTE — ED ADULT NURSE NOTE - OBJECTIVE STATEMENT
pt came to ed by ambulance for c/o of leg swelling and pain in left leg, stated muscles is very stiff as well, hard to ambulate

## 2022-12-08 NOTE — H&P ADULT - ASSESSMENT
61yo M w/ PMH EtOH abuse, CKD2, gout, HTN was brought in from [facility] for left leg swelling and pain and was found to have multiple right and left lower extremity DVTs. 63yo M w/ PMH EtOH abuse, CKD2, gout, HTN was brought in from [facility] for left leg swelling and pain and was found to have multiple right and left lower extremity DVTs.    #Multiple R and L lower extremity DVTs  - Venous duplex findings of Rt popliteal DVT, Left CFV, GSV DVT, LT popliteal DVT, Left gastroc/pop DVT  - Started heparin ggt  - Vascular surgery following    #EtOH abuse  - Drinks half pint vodka a day  - CIWA score 0 at time of admission  - CIWA protocol     #HTN    Diet: DASH  DVT ppx: heparin ggt  GI ppx: protonix  Activity: IAT 61yo M w/ PMH EtOH abuse, CKD2, gout, HTN, and arthritis was brought in from [facility] for left leg swelling and pain and was found to have multiple right and left lower extremity DVTs.    #Multiple R and L lower extremity DVTs  - Pain and swelling of left inner thigh, behind left knee, and left calf  - Venous duplex findings of Rt popliteal DVT, Left CFV, GSV DVT, LT popliteal DVT, Left gastroc/pop DVT  - Started heparin ggt  - Vascular surgery following    #EtOH abuse  #Poss hx of opiate use  - Drinks half pint vodka a day  - CIWA score 0 at time of admission  - CIWA protocol   - c/w folic acid, b12, thiamine   - f/u folic acid, b12 levels  - c/w naltrexone 50 (confirmed dose with [facility])    #HTN  - c/w toprol    #GERD  - not on medications  - start pantoprazole    Diet: DASH  DVT ppx: heparin ggt  GI ppx: protonix  Activity: IAT

## 2022-12-08 NOTE — ED ADULT NURSE NOTE - NSIMPLEMENTINTERV_GEN_ALL_ED
Implemented All Fall with Harm Risk Interventions:  [place] to call system. Call bell, personal items and telephone within reach. Instruct patient to call for assistance. Room bathroom lighting operational. Non-slip footwear when patient is off stretcher. Physically safe environment: no spills, clutter or unnecessary equipment. Stretcher in lowest position, wheels locked, appropriate side rails in place. Provide visual cue, wrist band, yellow gown, etc. Monitor gait and stability. Monitor for mental status changes and reorient to person, place, and time. Review medications for side effects contributing to fall risk. Reinforce activity limits and safety measures with patient and family. Provide visual clues: red socks.

## 2022-12-08 NOTE — CONSULT NOTE ADULT - ASSESSMENT
ASSESSMENT:  62yM w/ PMHx of HTN, CKD  who presented with sudden onset left lower extremity pain with DVT sonogram that demonstrates multiple right and left lower extremity DVT's. Venous duplex findings of Rt popliteal DVT,  Left CFV, GSV DVT, LT popliteal DVT , Left gastroc/pop DVT.     Physical exam findings, imaging, and labs as documented above.     PLAN:  -Recommend medical admission for management of extensive clot burden in LLE.   -Start heparin drip  -Will follow    Lines/Tubes: PIV    Above plan discussed with Attending Surgeon Dr. Parada  , patient, patient family, and Primary team  12-08-22 @ 20:12

## 2022-12-08 NOTE — H&P ADULT - ATTENDING COMMENTS
Patient seen and examined at bedside independently of the residents. I read the resident's note and agree with the plan with the additions and corrections as noted below.    REVIEW OF SYSTEMS:  CONSTITUTIONAL: No weakness, fevers or chills  EYES/ENT: No visual changes;  No vertigo or throat pain   NECK: No pain or stiffness  RESPIRATORY: No cough, wheezing, hemoptysis; No shortness of breath  CARDIOVASCULAR: No chest pain or palpitations  GASTROINTESTINAL: No abdominal or epigastric pain. No nausea, vomiting, or hematemesis; No diarrhea or constipation. No melena or hematochezia.  GENITOURINARY: No dysuria, frequency or hematuria  NEUROLOGICAL: No numbness or weakness  MSK: LLE pain and swelling.   SKIN: No itching, rashes.     PMH: EtOH abuse, CKD stage 2, Gout, HTN     FHx: Reviewed. No fhx of asthma/copd, No fhx of liver and pulmonary disease. No fhx of hematological disorder.     Physical Exam:  GEN: No acute distress. Awake, Alert and oriented x 3.   Head: Atraumatic, Normocephalic.   Eye: PEERLA. No sclera icterus. EOMI.   ENT: Normal oropharynx, no thyromegaly, no mass, no lymphadenopathy.   LUNGS: Clear to auscultation bilaterally. No wheeze/rales/crackles.   HEART: Normal. S1/S2 present. RRR. No murmur/gallops.   ABD: Soft, non-tender, non-distended. Bowel sounds present.   EXT: No pitting edema. No erythema. No tenderness.  Integumentary: No rash, No sore, No petechia.   NEURO: CN III-XII intact. Strength: 5/5 b/l ULE. Sensory intact b/l ULE.     Vital Signs Last 24 Hrs  T(C): 37.3 (09 Dec 2022 00:44), Max: 37.3 (09 Dec 2022 00:44)  T(F): 99.2 (09 Dec 2022 00:44), Max: 99.2 (09 Dec 2022 00:44)  HR: 92 (09 Dec 2022 00:44) (92 - 101)  BP: 143/72 (09 Dec 2022 00:44) (140/75 - 143/72)  BP(mean): --  RR: 19 (09 Dec 2022 00:44) (16 - 19)  SpO2: 96% (09 Dec 2022 00:44) (95% - 96%)    Parameters below as of 09 Dec 2022 00:44  Patient On (Oxygen Delivery Method): room air      Please see the above notes for Labs and radiology.     Assessment and Plan:     61 yo M with hx of EtOH abuse, CKD stage 2, Gout, HTN was brought in from [facility] for left leg swelling and pain. Patient never had colonoscopy in the past. Was a heavy smoker (smoked 1-2 PPD for > 20 years, quitted about 15 years ago).     Acute B/L LE DVT   - f/u official venous duplex   - started on heparin drip (monitor PTT)  - f/u Vascular   - Patient may need hypercoag w/u outpatient as well as age-appropriate malignancy screening.     Alcohol abuse/Suspected thiamine deficiency  - [facility] protocol   - thiamine, folic acid and multivitamin    Hypomagnesemia   - replete prn.     CKD stage 2   - serum Cr at baseline. monitor BMP and electrolytes     HTN - on Toprol   BPH - flomax    DVT ppx: Heparin drip  GI ppx: PPI  Diet: DASH  Activity: as tolerated.     Date seen by the attendin2022.  Total time spent: 70 minutes.

## 2022-12-08 NOTE — ED PROVIDER NOTE - PHYSICAL EXAMINATION
VITAL SIGNS: noted  CONSTITUTIONAL: Well-developed; well-nourished; in no acute distress  HEAD: Normocephalic; atraumatic  EYES: PERRL, EOM intact; conjunctiva and sclera clear  ENT: No nasal discharge; MMM  NECK: Supple; non tender.   CARD: S1, S2 normal; no murmurs, gallops, or rubs. Regular rate and rhythm  RESP: CTAB/L, no wheezes, rales or rhonchi  ABD: Normal bowel sounds; soft; non-distended; non-tender; no organomegaly.   EXT: + Left leg hypertonic thigh muscles, + non-pitting edema, + calf tenderness to palpation, no erythema. + B/L lower leg venous stasis. No dixie deformities. Normal ROM. Distal pulses intact  NEURO: Awake and alert, oriented. Grossly unremarkable. No focal deficits.  SKIN: Skin exam is warm and dry, no acute rash VITAL SIGNS: noted  CONSTITUTIONAL: Well-developed; well-nourished; in no acute distress  HEAD: Normocephalic; atraumatic  EYES: PERRL, EOM intact; conjunctiva and sclera clear  ENT: No nasal discharge; MMM  NECK: Supple; non tender.   CARD: S1, S2 normal; no murmurs, gallops, or rubs. Regular rate and rhythm  RESP: CTAB/L, no wheezes, rales or rhonchi  ABD: Normal bowel sounds; soft; non-distended; non-tender; no organomegaly.   EXT: + Left leg hypertonic thigh muscles, + non-pitting edema, + calf tenderness to palpation, no erythema. + B/L chronic lower leg venous stasis. no ulcers noted. No dixie deformities. Normal ROM. Distal pulses intact  NEURO: Awake and alert, oriented. Grossly unremarkable. No focal deficits.  SKIN: Skin exam is warm and dry, no acute rash

## 2022-12-08 NOTE — ED ADULT TRIAGE NOTE - CHIEF COMPLAINT QUOTE
Patient ELVIRA from [facility] with complaints of L leg pain and swelling. Patient also reports the muscles in his leg is very stiff. Patient ELVIRA from [facility] with complaints of L leg pain and swelling. Patient also reports the muscles in his leg is very stiff and states he cannot ambulate.

## 2022-12-08 NOTE — ED PROVIDER NOTE - ATTENDING CONTRIBUTION TO CARE
62yM HTN CKD alcohol abuse sent from [facility] p/w LLE discomfort - feels his L posterior thigh is stiff and his whole leg but steve thigh is swollen.  No fever, CP, SOB, n/v/d.  Did drink alcohol today (as per his usual).

## 2022-12-08 NOTE — ED PROVIDER NOTE - CLINICAL SUMMARY MEDICAL DECISION MAKING FREE TEXT BOX
62yM HTN CKD alcohol abuse p/w L leg pain, ?swelling and "stiffness" x 1d.  Pt well appearing w/o resp distress or hypoperfusion.  US w/ extensive LLE DVT.  Vasc consulted for possible intervention given size of extensive clot - recommend heparin gtt and final decision about possible intervention in the morning.

## 2022-12-08 NOTE — H&P ADULT - NSHPPHYSICALEXAM_GEN_ALL_CORE
GENERAL: NAD, well-groomed, well-developed  HEAD:  Atraumatic, Normocephalic  EYES: EOMI, PERRLA, conjunctiva and sclera clear  ENMT: No tonsillar erythema, exudates, or enlargement; Moist mucous membranes, Good dentition, No lesions  NECK: Supple, No JVD, Normal thyroid  NERVOUS SYSTEM:  Alert & Oriented X3, Good concentration; Motor Strength 5/5 B/L upper and lower extremities; DTRs 2+ intact and symmetric  CHEST/LUNG: Clear to auscultation bilaterally; No rales, rhonchi, wheezing, or rubs  HEART: Regular rate and rhythm. Normal S1/S1. No murmurs, rubs, or gallops  ABDOMEN: Soft, Nontender, Nondistended; Bowel sounds present  EXTREMITIES:  2+ Peripheral Pulses, No clubbing, cyanosis, or edema  LYMPH: No lymphadenopathy noted  SKIN: No rashes or lesions GENERAL: NAD  HEAD:  Atraumatic, Normocephalic  NECK: Supple  NERVOUS SYSTEM:  Alert & Oriented X3, Good concentration; Moves all 4 extremities  CHEST/LUNG: Clear to auscultation bilaterally; No rales, rhonchi, wheezing, or rubs  HEART: Regular rate and rhythm. Normal S1/S1. No murmurs, rubs, or gallops  ABDOMEN: Distended, Soft, Nontender; Bowel sounds present  EXTREMITIES: not TTP, 2+ Peripheral Pulses, No clubbing, cyanosis, or edema. sensation intact in b/l sp/dp/t. L knee flexion limited due to pain

## 2022-12-08 NOTE — ED PROVIDER NOTE - PROGRESS NOTE DETAILS
SH  Consulted Vascular surgery in regard to prelim Venous Duplex of b/l lower extremity results   Vascular has agreed to see pt EK - spoke to vascular resident - no emergent intervention overnight - adm to medicine with heparin gtt and vasc fellow will reassess in AM for possible thrombectomy or IVC filter

## 2022-12-08 NOTE — ED ADULT NURSE NOTE - CHIEF COMPLAINT QUOTE
Patient ELVIRA from [facility] with complaints of L leg pain and swelling. Patient also reports the muscles in his leg is very stiff and states he cannot ambulate.

## 2022-12-08 NOTE — H&P ADULT - NSHPLABSRESULTS_GEN_ALL_CORE
LABS:  12-08 @ 20:30 Creatine 104 U/L [0 - 225]  cret                        13.9   8.05  )-----------( 143      ( 08 Dec 2022 20:30 )             39.0     12-08    139  |  100  |  8<L>  ----------------------------<  94  4.4   |  22  |  1.2    Ca    9.6      08 Dec 2022 20:30  Mg     1.7     12-08    TPro  7.5  /  Alb  4.6  /  TBili  1.0  /  DBili  x   /  AST  18  /  ALT  10  /  AlkPhos  120<H>  12-08    PT/INR - ( 08 Dec 2022 22:25 )   PT: 13.20 sec;   INR: 1.15 ratio         PTT - ( 08 Dec 2022 22:25 )  PTT:34.8 sec

## 2022-12-08 NOTE — ED ADULT NURSE NOTE - NS ED NURSE RECORD ANOTHER VITAL SIGN
PAST SURGICAL HISTORY:  H/O spinal fusion     History of tonsillectomy     S/P ACL repair x2     Yes

## 2022-12-09 ENCOUNTER — TRANSCRIPTION ENCOUNTER (OUTPATIENT)
Age: 62
End: 2022-12-09

## 2022-12-09 LAB
ALBUMIN SERPL ELPH-MCNC: 4.3 G/DL — SIGNIFICANT CHANGE UP (ref 3.5–5.2)
ALP SERPL-CCNC: 106 U/L — SIGNIFICANT CHANGE UP (ref 30–115)
ALT FLD-CCNC: 8 U/L — SIGNIFICANT CHANGE UP (ref 0–41)
ANION GAP SERPL CALC-SCNC: 18 MMOL/L — HIGH (ref 7–14)
APTT BLD: >200 SEC — CRITICAL HIGH (ref 27–39.2)
AST SERPL-CCNC: 15 U/L — SIGNIFICANT CHANGE UP (ref 0–41)
BASOPHILS # BLD AUTO: 0.11 K/UL — SIGNIFICANT CHANGE UP (ref 0–0.2)
BASOPHILS NFR BLD AUTO: 1.2 % — HIGH (ref 0–1)
BILIRUB SERPL-MCNC: 2.1 MG/DL — HIGH (ref 0.2–1.2)
BUN SERPL-MCNC: 9 MG/DL — LOW (ref 10–20)
CALCIUM SERPL-MCNC: 9.3 MG/DL — SIGNIFICANT CHANGE UP (ref 8.4–10.4)
CHLORIDE SERPL-SCNC: 101 MMOL/L — SIGNIFICANT CHANGE UP (ref 98–110)
CO2 SERPL-SCNC: 21 MMOL/L — SIGNIFICANT CHANGE UP (ref 17–32)
CREAT SERPL-MCNC: 1.2 MG/DL — SIGNIFICANT CHANGE UP (ref 0.7–1.5)
D DIMER BLD IA.RAPID-MCNC: 2583 NG/ML DDU — HIGH
EGFR: 68 ML/MIN/1.73M2 — SIGNIFICANT CHANGE UP
EOSINOPHIL # BLD AUTO: 0.9 K/UL — HIGH (ref 0–0.7)
EOSINOPHIL NFR BLD AUTO: 9.8 % — HIGH (ref 0–8)
ETHANOL SERPL-MCNC: <10 MG/DL — SIGNIFICANT CHANGE UP
GLUCOSE SERPL-MCNC: 109 MG/DL — HIGH (ref 70–99)
HCT VFR BLD CALC: 39.3 % — LOW (ref 42–52)
HGB BLD-MCNC: 13.3 G/DL — LOW (ref 14–18)
IMM GRANULOCYTES NFR BLD AUTO: 0.4 % — HIGH (ref 0.1–0.3)
LYMPHOCYTES # BLD AUTO: 1.98 K/UL — SIGNIFICANT CHANGE UP (ref 1.2–3.4)
LYMPHOCYTES # BLD AUTO: 21.5 % — SIGNIFICANT CHANGE UP (ref 20.5–51.1)
MAGNESIUM SERPL-MCNC: 2.1 MG/DL — SIGNIFICANT CHANGE UP (ref 1.8–2.4)
MCHC RBC-ENTMCNC: 32 PG — HIGH (ref 27–31)
MCHC RBC-ENTMCNC: 33.8 G/DL — SIGNIFICANT CHANGE UP (ref 32–37)
MCV RBC AUTO: 94.7 FL — HIGH (ref 80–94)
MONOCYTES # BLD AUTO: 0.65 K/UL — HIGH (ref 0.1–0.6)
MONOCYTES NFR BLD AUTO: 7.1 % — SIGNIFICANT CHANGE UP (ref 1.7–9.3)
NEUTROPHILS # BLD AUTO: 5.53 K/UL — SIGNIFICANT CHANGE UP (ref 1.4–6.5)
NEUTROPHILS NFR BLD AUTO: 60 % — SIGNIFICANT CHANGE UP (ref 42.2–75.2)
NRBC # BLD: 0 /100 WBCS — SIGNIFICANT CHANGE UP (ref 0–0)
PLATELET # BLD AUTO: 126 K/UL — LOW (ref 130–400)
POTASSIUM SERPL-MCNC: 4 MMOL/L — SIGNIFICANT CHANGE UP (ref 3.5–5)
POTASSIUM SERPL-SCNC: 4 MMOL/L — SIGNIFICANT CHANGE UP (ref 3.5–5)
PROT SERPL-MCNC: 6.9 G/DL — SIGNIFICANT CHANGE UP (ref 6–8)
RBC # BLD: 4.15 M/UL — LOW (ref 4.7–6.1)
RBC # FLD: 15.9 % — HIGH (ref 11.5–14.5)
SODIUM SERPL-SCNC: 140 MMOL/L — SIGNIFICANT CHANGE UP (ref 135–146)
WBC # BLD: 9.21 K/UL — SIGNIFICANT CHANGE UP (ref 4.8–10.8)
WBC # FLD AUTO: 9.21 K/UL — SIGNIFICANT CHANGE UP (ref 4.8–10.8)

## 2022-12-09 PROCEDURE — 93010 ELECTROCARDIOGRAM REPORT: CPT

## 2022-12-09 PROCEDURE — 99233 SBSQ HOSP IP/OBS HIGH 50: CPT

## 2022-12-09 RX ORDER — APIXABAN 2.5 MG/1
10 TABLET, FILM COATED ORAL EVERY 12 HOURS
Refills: 0 | Status: DISCONTINUED | OUTPATIENT
Start: 2022-12-09 | End: 2022-12-09

## 2022-12-09 RX ORDER — THIAMINE MONONITRATE (VIT B1) 100 MG
100 TABLET ORAL DAILY
Refills: 0 | Status: DISCONTINUED | OUTPATIENT
Start: 2022-12-09 | End: 2022-12-12

## 2022-12-09 RX ORDER — HEPARIN SODIUM 5000 [USP'U]/ML
1700 INJECTION INTRAVENOUS; SUBCUTANEOUS
Qty: 25000 | Refills: 0 | Status: DISCONTINUED | OUTPATIENT
Start: 2022-12-09 | End: 2022-12-09

## 2022-12-09 RX ORDER — PANTOPRAZOLE SODIUM 20 MG/1
1 TABLET, DELAYED RELEASE ORAL
Qty: 0 | Refills: 0 | DISCHARGE
Start: 2022-12-09

## 2022-12-09 RX ORDER — TAMSULOSIN HYDROCHLORIDE 0.4 MG/1
0.4 CAPSULE ORAL AT BEDTIME
Refills: 0 | Status: DISCONTINUED | OUTPATIENT
Start: 2022-12-09 | End: 2022-12-12

## 2022-12-09 RX ORDER — KETOROLAC TROMETHAMINE 30 MG/ML
15 SYRINGE (ML) INJECTION ONCE
Refills: 0 | Status: DISCONTINUED | OUTPATIENT
Start: 2022-12-09 | End: 2022-12-10

## 2022-12-09 RX ORDER — PREGABALIN 225 MG/1
1000 CAPSULE ORAL DAILY
Refills: 0 | Status: DISCONTINUED | OUTPATIENT
Start: 2022-12-09 | End: 2022-12-12

## 2022-12-09 RX ORDER — FOLIC ACID 0.8 MG
1 TABLET ORAL DAILY
Refills: 0 | Status: DISCONTINUED | OUTPATIENT
Start: 2022-12-09 | End: 2022-12-12

## 2022-12-09 RX ORDER — PANTOPRAZOLE SODIUM 20 MG/1
40 TABLET, DELAYED RELEASE ORAL
Refills: 0 | Status: DISCONTINUED | OUTPATIENT
Start: 2022-12-09 | End: 2022-12-12

## 2022-12-09 RX ORDER — ACETAMINOPHEN 500 MG
650 TABLET ORAL EVERY 6 HOURS
Refills: 0 | Status: DISCONTINUED | OUTPATIENT
Start: 2022-12-09 | End: 2022-12-12

## 2022-12-09 RX ORDER — HEPARIN SODIUM 5000 [USP'U]/ML
1600 INJECTION INTRAVENOUS; SUBCUTANEOUS
Qty: 25000 | Refills: 0 | Status: DISCONTINUED | OUTPATIENT
Start: 2022-12-09 | End: 2022-12-09

## 2022-12-09 RX ORDER — LORATADINE 10 MG/1
10 TABLET ORAL DAILY
Refills: 0 | Status: DISCONTINUED | OUTPATIENT
Start: 2022-12-09 | End: 2022-12-12

## 2022-12-09 RX ORDER — NALTREXONE HYDROCHLORIDE 50 MG/1
50 TABLET, FILM COATED ORAL DAILY
Refills: 0 | Status: DISCONTINUED | OUTPATIENT
Start: 2022-12-09 | End: 2022-12-12

## 2022-12-09 RX ORDER — METOPROLOL TARTRATE 50 MG
25 TABLET ORAL DAILY
Refills: 0 | Status: DISCONTINUED | OUTPATIENT
Start: 2022-12-09 | End: 2022-12-12

## 2022-12-09 RX ORDER — APIXABAN 2.5 MG/1
1 TABLET, FILM COATED ORAL
Qty: 74 | Refills: 1
Start: 2022-12-09 | End: 2023-02-20

## 2022-12-09 RX ORDER — APIXABAN 2.5 MG/1
1 TABLET, FILM COATED ORAL
Qty: 60 | Refills: 1
Start: 2022-12-09 | End: 2023-02-06

## 2022-12-09 RX ORDER — GABAPENTIN 400 MG/1
200 CAPSULE ORAL
Refills: 0 | Status: DISCONTINUED | OUTPATIENT
Start: 2022-12-09 | End: 2022-12-12

## 2022-12-09 RX ADMIN — Medication 100 MILLIGRAM(S): at 12:43

## 2022-12-09 RX ADMIN — PREGABALIN 1000 MICROGRAM(S): 225 CAPSULE ORAL at 12:44

## 2022-12-09 RX ADMIN — PANTOPRAZOLE SODIUM 40 MILLIGRAM(S): 20 TABLET, DELAYED RELEASE ORAL at 06:10

## 2022-12-09 RX ADMIN — Medication 650 MILLIGRAM(S): at 21:54

## 2022-12-09 RX ADMIN — Medication 650 MILLIGRAM(S): at 22:45

## 2022-12-09 RX ADMIN — NALTREXONE HYDROCHLORIDE 50 MILLIGRAM(S): 50 TABLET, FILM COATED ORAL at 12:12

## 2022-12-09 RX ADMIN — GABAPENTIN 200 MILLIGRAM(S): 400 CAPSULE ORAL at 06:10

## 2022-12-09 RX ADMIN — Medication 1 TABLET(S): at 12:11

## 2022-12-09 RX ADMIN — GABAPENTIN 200 MILLIGRAM(S): 400 CAPSULE ORAL at 17:21

## 2022-12-09 RX ADMIN — Medication 1 MILLIGRAM(S): at 12:11

## 2022-12-09 RX ADMIN — HEPARIN SODIUM 0 UNIT(S)/HR: 5000 INJECTION INTRAVENOUS; SUBCUTANEOUS at 09:17

## 2022-12-09 RX ADMIN — TAMSULOSIN HYDROCHLORIDE 0.4 MILLIGRAM(S): 0.4 CAPSULE ORAL at 22:57

## 2022-12-09 RX ADMIN — HEPARIN SODIUM 1600 UNIT(S)/HR: 5000 INJECTION INTRAVENOUS; SUBCUTANEOUS at 11:35

## 2022-12-09 RX ADMIN — LORATADINE 10 MILLIGRAM(S): 10 TABLET ORAL at 12:11

## 2022-12-09 RX ADMIN — Medication 25 MILLIGRAM(S): at 06:14
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
## 2022-12-09 NOTE — DISCHARGE NOTE PROVIDER - CARE PROVIDERS DIRECT ADDRESSES
,[email or web address]

## 2022-12-09 NOTE — DISCHARGE NOTE PROVIDER - NSDCFUADDAPPT_GEN_ALL_CORE_FT
We gave you referral to hematologist Dr. Stewart - please call to schedule an appointment and establish care.

## 2022-12-09 NOTE — DISCHARGE NOTE PROVIDER - PROVIDER TOKENS
PROVIDER:[TOKEN:[[id number]],FOLLOWUP:[2 weeks]],PROVIDER:[TOKEN:[[id number]],FOLLOWUP:[2 weeks]] PROVIDER:[TOKEN:[[id number]],FOLLOWUP:[1 week]],PROVIDER:[TOKEN:[[id number]],FOLLOWUP:[1 month]],PROVIDER:[TOKEN:[[id number]],FOLLOWUP:[2 weeks]]

## 2022-12-09 NOTE — PHYSICAL THERAPY INITIAL EVALUATION ADULT - PERTINENT HX OF CURRENT PROBLEM, REHAB EVAL
61yo M w/ PMH EtOH abuse, CKD2, gout, HTN, and arthritis was brought in from [facility] for left leg swelling and pain. This morning, the patient started to experience pain and swelling and had difficulty ambulating as a result. The pain was left inner thigh, behind left knee, and left calf. At baseline he ambulates with a rolling walker. He denies any trauma or inciting event. He said this has never happened to him before, nor has he ever had blood clots in the past and is not on any anticoagulation. He currently denies any fever chills, recent travel, headache, dizziness, lightheadedness, nausea, vomiting, chest pain, sob, palpitations, abd pain, constipation, diarrhea, or urinary symptoms.

## 2022-12-09 NOTE — DISCHARGE NOTE PROVIDER - CARE PROVIDER_API CALL
Eugene Tellez)  Internal Medicine  [address] [place] [address]  Phone: [phone]  Fax: [phone]  Follow Up Time: 2 weeks    Eliane Stewart)  Hematology; Internal Medicine; Medical Oncology  [address] [place] [id number]  Phone: [phone]  Fax: [phone]  Follow Up Time: 2 weeks   Eugene Tellez)  Internal Medicine  [address] [place] [address]  Phone: [phone]  Fax: [phone]  Follow Up Time: 1 week    Eliane Stewart)  Hematology; Internal Medicine; Medical Oncology  [address] [place] [address]  Phone: [phone]  Fax: [phone]  Follow Up Time: 1 month    Maureen Hughes)  Surgery; Vascular Surgery  [address] [place] [address]  Phone: [phone]  Fax: [phone]  Follow Up Time: 2 weeks

## 2022-12-09 NOTE — PROGRESS NOTE ADULT - TIME BILLING
62M PMHx alcohol abuse, gout, HTN here with LLE swelling, pain.    #LLE swelling, pain due to BL LE DVT  with inability to ambulate  va duplex prelim with DVT R pop, L common fem, fem, deep fem, pop, post tibial  f/u official report  ambulates short distance daily, but predominately lays on couch or bed; no travel  no intervention per vasc  change hep gtt to eliquis loading dose  PT  #Alcohol abuse  symptom triggered ciwa  naltrexone 50  #Gout  outpt f/u  #HTN  toprol 25  #DVT ppx  eliquis  #Folic acid def, suspected  replete  #Thiamine def, suspected  replete    #Progress Note Handoff:  Pending (specify):  Consults_________, Tests________, Test Results_______, Other___f/u official va duplex report, PT______  Family discussion: d/w pt at bedside re: treatment plan, primary dx  Disposition: Home___/SNF___/Other________/Unknown at this time____x____

## 2022-12-09 NOTE — DISCHARGE NOTE PROVIDER - NSDCQMAMI_CARD_ALL_CORE
Olivia Lewis is a 68 year old female presenting with bilateral eyes itching w/ R eye drainage and watery x 1 week.    Medication verified, no changes  Denies known Latex allergy or symptoms of Latex sensitivity     No

## 2022-12-09 NOTE — DISCHARGE NOTE PROVIDER - NSDCCPCAREPLAN_GEN_ALL_CORE_FT
PRINCIPAL DISCHARGE DIAGNOSIS  Diagnosis: Left leg DVT  Assessment and Plan of Treatment: You were found to have blood clots in your legs. Blood clots can happen from multiple reasons including injury/trauma, immobilization, smoking or it can even be hereditary. We did not find any clear reason why the blood clot formed, it is likely that this was unprovoked. In this case you need to follow up with a hematologist (blood doctor) to further investigate the cause of your blood clot. In the meantinme you will have to take the blood thinner medication. This medication is twice a day and will prevent the clot from getting worse. Over time the clot will be healed. If you notived worsening leg swelling or pain or have increased difficulty breathing/shortness of breath please come back to the ED.       PRINCIPAL DISCHARGE DIAGNOSIS  Diagnosis: Left leg DVT  Assessment and Plan of Treatment: You were found to have blood clots in your legs. Blood clots can happen from multiple reasons including injury/trauma, immobilization, smoking or it can even be hereditary. We did not find any clear reason why the blood clot formed, it is likely that this was unprovoked. In this case you need to follow up with a hematologist (blood doctor) to further investigate the cause of your blood clot. Please also follow up with your primary care doctor for routine cancer screenings as this can sometimes be a cause of clots. In the meantinme you will have to take the blood thinner medication. This medication is twice a day and will prevent the clot from getting worse. Over time the clot will be healed. If you notived worsening leg swelling or pain or have increased difficulty breathing/shortness of breath please come back to the ED.       PRINCIPAL DISCHARGE DIAGNOSIS  Diagnosis: Left leg DVT  Assessment and Plan of Treatment: You were found to have blood clots in your legs. Blood clots can happen from multiple reasons including injury/trauma, immobilization, smoking or it can even be hereditary. It could be due to a recent Covid but we also recommend t follow up with a hematologist (blood doctor) to further investigate the cause of your blood clot. Please also follow up with your primary care doctor for routine cancer screenings as this can sometimes be a cause of clots. In the meantinme you will have to take the blood thinner medication. This medication is twice a day and will prevent the clot from getting worse. Over time the clot will be healed. If you notived worsening leg swelling or pain or have increased difficulty breathing/shortness of breath please come back to the ED. Please follow up with vascular surgeon as outpt. Avoid NSAIDs while on blood thinners.      SECONDARY DISCHARGE DIAGNOSES  Diagnosis: Pseudogout  Assessment and Plan of Treatment: improved.

## 2022-12-09 NOTE — DISCHARGE NOTE PROVIDER - NSDCMRMEDTOKEN_GEN_ALL_CORE_FT
apixaban 5 mg oral tablet: 1 tab(s) orally 2 times a day  (Take 10mg tablet two times a day for 7 days : end date 12/15/2022)   After 1 week take 5mg tablet two times a day   Flomax 0.4 mg oral capsule: 1 cap(s) orally once a day (at bedtime)  folic acid 1 mg oral tablet: 1 tab(s) orally once a day  gabapentin 100 mg oral capsule: 2 cap(s) orally 2 times a day  loratadine 10 mg oral tablet: 1 tab(s) orally once a day  metoprolol succinate 25 mg oral tablet, extended release: 1 tab(s) orally once a day  Multiple Vitamins oral tablet: 1 tab(s) orally once a day  naltrexone 50 mg oral tablet: 1 tab(s) orally once a day  pantoprazole 40 mg oral delayed release tablet: 1 tab(s) orally once a day (before a meal)  thiamine 100 mg oral tablet: 1 tab(s) orally once a day  Vitamin B12 100 mcg oral tablet: 1 tab(s) orally once a day   apixaban 5 mg oral tablet: 2 tab(s) orally every 12 hours 12/12/22 6PM - 12/16/22 6PM  1 tab(s) orally every 12 hours starting 12/17/22  Flomax 0.4 mg oral capsule: 1 cap(s) orally once a day (at bedtime)  folic acid 1 mg oral tablet: 1 tab(s) orally once a day  gabapentin 100 mg oral capsule: 2 cap(s) orally 2 times a day  loratadine 10 mg oral tablet: 1 tab(s) orally once a day  metoprolol succinate 25 mg oral tablet, extended release: 1 tab(s) orally once a day  Multiple Vitamins oral tablet: 1 tab(s) orally once a day  naltrexone 50 mg oral tablet: 1 tab(s) orally once a day  pantoprazole 40 mg oral delayed release tablet: 1 tab(s) orally once a day (before a meal)  thiamine 100 mg oral tablet: 1 tab(s) orally once a day  Vitamin B12 100 mcg oral tablet: 1 tab(s) orally once a day

## 2022-12-10 LAB
APTT BLD: 36.5 SEC — SIGNIFICANT CHANGE UP (ref 27–39.2)
FOLATE SERPL-MCNC: >20 NG/ML — SIGNIFICANT CHANGE UP
HCT VFR BLD CALC: 38.5 % — LOW (ref 42–52)
HGB BLD-MCNC: 13.7 G/DL — LOW (ref 14–18)
MCHC RBC-ENTMCNC: 33.5 PG — HIGH (ref 27–31)
MCHC RBC-ENTMCNC: 35.6 G/DL — SIGNIFICANT CHANGE UP (ref 32–37)
MCV RBC AUTO: 94.1 FL — HIGH (ref 80–94)
NRBC # BLD: 0 /100 WBCS — SIGNIFICANT CHANGE UP (ref 0–0)
PLATELET # BLD AUTO: 120 K/UL — LOW (ref 130–400)
RBC # BLD: 4.09 M/UL — LOW (ref 4.7–6.1)
RBC # FLD: 15.9 % — HIGH (ref 11.5–14.5)
VIT B12 SERPL-MCNC: 696 PG/ML — SIGNIFICANT CHANGE UP (ref 232–1245)
WBC # BLD: 10.98 K/UL — HIGH (ref 4.8–10.8)
WBC # FLD AUTO: 10.98 K/UL — HIGH (ref 4.8–10.8)

## 2022-12-10 PROCEDURE — 99232 SBSQ HOSP IP/OBS MODERATE 35: CPT

## 2022-12-10 RX ORDER — COLCHICINE 0.6 MG
0.6 TABLET ORAL THREE TIMES A DAY
Refills: 0 | Status: COMPLETED | OUTPATIENT
Start: 2022-12-10 | End: 2022-12-11

## 2022-12-10 RX ORDER — INDOMETHACIN 50 MG
25 CAPSULE ORAL THREE TIMES A DAY
Refills: 0 | Status: DISCONTINUED | OUTPATIENT
Start: 2022-12-10 | End: 2022-12-12

## 2022-12-10 RX ORDER — APIXABAN 2.5 MG/1
10 TABLET, FILM COATED ORAL EVERY 12 HOURS
Refills: 0 | Status: DISCONTINUED | OUTPATIENT
Start: 2022-12-10 | End: 2022-12-12

## 2022-12-10 RX ADMIN — Medication 25 MILLIGRAM(S): at 13:48

## 2022-12-10 RX ADMIN — PANTOPRAZOLE SODIUM 40 MILLIGRAM(S): 20 TABLET, DELAYED RELEASE ORAL at 06:36

## 2022-12-10 RX ADMIN — LORATADINE 10 MILLIGRAM(S): 10 TABLET ORAL at 11:26

## 2022-12-10 RX ADMIN — Medication 1 MILLIGRAM(S): at 11:25

## 2022-12-10 RX ADMIN — Medication 100 MILLIGRAM(S): at 11:25

## 2022-12-10 RX ADMIN — APIXABAN 10 MILLIGRAM(S): 2.5 TABLET, FILM COATED ORAL at 18:02

## 2022-12-10 RX ADMIN — NALTREXONE HYDROCHLORIDE 50 MILLIGRAM(S): 50 TABLET, FILM COATED ORAL at 11:26

## 2022-12-10 RX ADMIN — Medication 0.6 MILLIGRAM(S): at 22:03

## 2022-12-10 RX ADMIN — TAMSULOSIN HYDROCHLORIDE 0.4 MILLIGRAM(S): 0.4 CAPSULE ORAL at 22:02

## 2022-12-10 RX ADMIN — GABAPENTIN 200 MILLIGRAM(S): 400 CAPSULE ORAL at 06:37

## 2022-12-10 RX ADMIN — Medication 25 MILLIGRAM(S): at 22:02

## 2022-12-10 RX ADMIN — GABAPENTIN 200 MILLIGRAM(S): 400 CAPSULE ORAL at 18:02

## 2022-12-10 RX ADMIN — Medication 25 MILLIGRAM(S): at 06:37

## 2022-12-10 RX ADMIN — Medication 1 TABLET(S): at 11:25

## 2022-12-10 RX ADMIN — Medication 15 MILLIGRAM(S): at 00:42

## 2022-12-10 RX ADMIN — Medication 0.6 MILLIGRAM(S): at 13:48

## 2022-12-10 RX ADMIN — PREGABALIN 1000 MICROGRAM(S): 225 CAPSULE ORAL at 18:03

## 2022-12-10 RX ADMIN — Medication 25 MILLIGRAM(S): at 23:02

## 2022-12-10 NOTE — CONSULT NOTE ADULT - ATTENDING COMMENTS
Orthopedic Attending  Patient seen and examined.  PMHx, Psurg Hx, Medications and Allergies reviewed.  X-rays and laboratory reviewed.  Agree with findings, assessment and plan.  Impression: Hx of gout. Radiographs suggestive of OA possible gout.  PE no concerning for acute septic arthritis.  Will continue to follow.  Recommend symptomatic care.

## 2022-12-10 NOTE — CONSULT NOTE ADULT - SUBJECTIVE AND OBJECTIVE BOX
GENERAL SURGERY CONSULT NOTE    Patient: CAT ALSTON , 62y ([birth year])Male   MRN: [id number]  Location: [facility] ED  Visit: 12-08-22 Emergency  Date: 12-08-22 @ 20:12    HPI: 62M with pmhx of HTN, CKD, ETOH dependence presents to ED with sudden onset left lower extremity pain. The patient states it began this morning while getting out of bed, the patient states it feels like his hamstring is very tight and his left leg is more swollen than usual. The patient is able to ambulate at his baseline with a rolling walker. The patient denies any vascular history, is not on anticoagulation. History of cigarette smoking from [age], 1-1.5packs/day, quit 20 years ago. The patient denies any chest pain or shortness of breath. The patient has normal range of motion, sensation and motor intact in bilateral lower extremities. Vascular surgery consulted for venous duplex findings of Rt popliteal DVT,  Left CFV, GSV DVT, LT popliteal DVT , Left gastroc/pop DVT.       PAST MEDICAL & SURGICAL HISTORY:  Alcohol dependence  Vertigo  HTN (hypertension)  Hard of hearing  Gout  Seasonal allergies  CKD (chronic kidney disease)  No significant past surgical history          Home Medications:  folic acid 1 mg oral tablet: 1 tab(s) orally once a day (20 Oct 2021 15:59)  Vitamin B12 100 mcg oral tablet: 1 tab(s) orally once a day (17 Oct 2022 00:39)        VITALS:  T(F): 99 (12-08-22 @ 16:27), Max: 99 (12-08-22 @ 16:27)  HR: 101 (12-08-22 @ 16:27) (101 - 101)  BP: 140/75 (12-08-22 @ 16:27) (140/75 - 140/75)  RR: 16 (12-08-22 @ 16:27) (16 - 16)  SpO2: 95% (12-08-22 @ 16:27) (95% - 95%)    PHYSICAL EXAM:  GENERAL: NAD, well-appearing  CHEST/LUNG: Clear to auscultation bilaterally  HEART: Regular rate and rhythm  ABDOMEN: Soft, Nontender, Nondistended; Bowel sounds present  EXTREMITIES: BL LE vascular staining, toenail discoloration, motor and sensation intact, no signs of ischemia. Palpable DP/PT BL LE. No clubbing, cyanosis, or edema    MEDICATIONS  (STANDING):    MEDICATIONS  (PRN):      LAB/STUDIES:  pending                          IMAGING:  Venous duplex read pending    ACCESS DEVICES:  [x ] Peripheral IV       
Pt Name: CAT ALSTON  MRN: [id number]      HPI: 62yMale presents with pain inR. elbow for several days which began suddenly and without a reported inciting cause. States that he has also had R. knee pain over the same period. Patient states he has not had a fever. Patient states he has a history of gout with a recent flare in october ands tates pain is very similar to that of his last gout flare.   Patient denies head trauma or LOC. Denies pain elsewhere. Denies paresthesias.      PAST MEDICAL & SURGICAL HISTORY:  Alcohol dependence      Vertigo      HTN (hypertension)      Hard of hearing      Gout      Seasonal allergies      CKD (chronic kidney disease)      No significant past surgical history          Allergies: Beef (Hives)  dairy products (Hives)  No Known Drug Allergies  shellfish (Hives)      Medications: acetaminophen     Tablet .. 650 milliGRAM(s) Oral every 6 hours PRN  apixaban 10 milliGRAM(s) Oral every 12 hours  colchicine 0.6 milliGRAM(s) Oral three times a day  cyanocobalamin 1000 MICROGram(s) Oral daily  folic acid 1 milliGRAM(s) Oral daily  gabapentin 200 milliGRAM(s) Oral two times a day  indomethacin 25 milliGRAM(s) Oral three times a day  loratadine 10 milliGRAM(s) Oral daily  LORazepam   Injectable 2 milliGRAM(s) IV Push every 2 hours PRN  metoprolol succinate ER 25 milliGRAM(s) Oral daily  multivitamin 1 Tablet(s) Oral daily  naltrexone 50 milliGRAM(s) Oral daily  pantoprazole    Tablet 40 milliGRAM(s) Oral before breakfast  tamsulosin 0.4 milliGRAM(s) Oral at bedtime  thiamine 100 milliGRAM(s) Oral daily        PHYSICAL EXAM:    Vital Signs Last 24 Hrs  T(C): 38.1 (10 Dec 2022 14:07), Max: 38.1 (10 Dec 2022 14:07)  T(F): 100.5 (10 Dec 2022 14:07), Max: 100.5 (10 Dec 2022 14:07)  HR: 92 (10 Dec 2022 14:07) (87 - 92)  BP: 129/59 (10 Dec 2022 14:07) (129/59 - 135/64)  BP(mean): --  RR: 18 (10 Dec 2022 14:07) (18 - 18)  SpO2: --        Physical Exam:  General: NAD, Alert, Awake and oriented  Neurologic: No gross deficits, moving all 4s.  Head: NCAT without abrasions, lacerations, or ecchymosis to head, face, or scalp  Respiratory: Unlabored breathing   Abdomen: Soft, Nontender, no guarding.  Musculoskeletal:      PELVIS:No open skin or wounds. Pelvis stable to AP and Lat compression. Able to actively SLR bilaterally.     RUE:  No open skin or wounds  ttp ABOUT THE ELBOW   15 degree arc of motion   SILT in axillary, musculocutaneous,  radial, median, and ulnar distributions.   AIN/PIN/U motor intact  2+ radial pulse with brisk cap refill at distal finger tips.   Compartments soft and compressible.  =  RLE:  No open skin or wounds  70 degree arc of motion   Able to actively SLR.  SILT DP/SP/T/Chen/Sa.   EHL/FHL/TA/Gs motor intact.  2+ DP/PT pulses with brisk cap refill distally.  Compartments soft and compressible.   No pain on passive stretch.    Labs:                        13.7   10.98 )-----------( 120      ( 10 Dec 2022 07:58 )             38.5     12-09    140  |  101  |  9<L>  ----------------------------<  109<H>  4.0   |  21  |  1.2    Ca    9.3      09 Dec 2022 07:18  Mg     2.1     12-09    TPro  6.9  /  Alb  4.3  /  TBili  2.1<H>  /  DBili  x   /  AST  15  /  ALT  8   /  AlkPhos  106  12-09    PT/INR - ( 08 Dec 2022 22:25 )   PT: 13.20 sec;   INR: 1.15 ratio         PTT - ( 10 Dec 2022 07:58 )  PTT:36.5 sec    Imaging: No obvious fracture or dislocation.     A/P:    62y Male with polyarthralgias w/ recent arthrocenteses from 10/2022 significant for gout. Given timeline, as well as the fact patient is afebrile without a WBC, septic arthritis far less likely than gout. Would recommend trial of steroids if not contraindicated.     - Would recommend R. elbow XR   -Pain control  -WBAT BL UE/LE   - No further acute orthopaedic surgery intervention 
ambulatory

## 2022-12-11 LAB
ANION GAP SERPL CALC-SCNC: 15 MMOL/L — HIGH (ref 7–14)
BASOPHILS # BLD AUTO: 0.11 K/UL — SIGNIFICANT CHANGE UP (ref 0–0.2)
BASOPHILS NFR BLD AUTO: 0.9 % — SIGNIFICANT CHANGE UP (ref 0–1)
BUN SERPL-MCNC: 14 MG/DL — SIGNIFICANT CHANGE UP (ref 10–20)
CALCIUM SERPL-MCNC: 9.1 MG/DL — SIGNIFICANT CHANGE UP (ref 8.4–10.5)
CHLORIDE SERPL-SCNC: 100 MMOL/L — SIGNIFICANT CHANGE UP (ref 98–110)
CO2 SERPL-SCNC: 21 MMOL/L — SIGNIFICANT CHANGE UP (ref 17–32)
CREAT SERPL-MCNC: 1.3 MG/DL — SIGNIFICANT CHANGE UP (ref 0.7–1.5)
EGFR: 62 ML/MIN/1.73M2 — SIGNIFICANT CHANGE UP
EOSINOPHIL # BLD AUTO: 0.77 K/UL — HIGH (ref 0–0.7)
EOSINOPHIL NFR BLD AUTO: 6.5 % — SIGNIFICANT CHANGE UP (ref 0–8)
GLUCOSE SERPL-MCNC: 122 MG/DL — HIGH (ref 70–99)
HCT VFR BLD CALC: 39.7 % — LOW (ref 42–52)
HGB BLD-MCNC: 13.6 G/DL — LOW (ref 14–18)
IMM GRANULOCYTES NFR BLD AUTO: 0.4 % — HIGH (ref 0.1–0.3)
LYMPHOCYTES # BLD AUTO: 1.87 K/UL — SIGNIFICANT CHANGE UP (ref 1.2–3.4)
LYMPHOCYTES # BLD AUTO: 15.8 % — LOW (ref 20.5–51.1)
MAGNESIUM SERPL-MCNC: 2.2 MG/DL — SIGNIFICANT CHANGE UP (ref 1.8–2.4)
MCHC RBC-ENTMCNC: 32.9 PG — HIGH (ref 27–31)
MCHC RBC-ENTMCNC: 34.3 G/DL — SIGNIFICANT CHANGE UP (ref 32–37)
MCV RBC AUTO: 95.9 FL — HIGH (ref 80–94)
MONOCYTES # BLD AUTO: 0.9 K/UL — HIGH (ref 0.1–0.6)
MONOCYTES NFR BLD AUTO: 7.6 % — SIGNIFICANT CHANGE UP (ref 1.7–9.3)
NEUTROPHILS # BLD AUTO: 8.11 K/UL — HIGH (ref 1.4–6.5)
NEUTROPHILS NFR BLD AUTO: 68.8 % — SIGNIFICANT CHANGE UP (ref 42.2–75.2)
NRBC # BLD: 0 /100 WBCS — SIGNIFICANT CHANGE UP (ref 0–0)
PLATELET # BLD AUTO: 143 K/UL — SIGNIFICANT CHANGE UP (ref 130–400)
POTASSIUM SERPL-MCNC: 4.2 MMOL/L — SIGNIFICANT CHANGE UP (ref 3.5–5)
POTASSIUM SERPL-SCNC: 4.2 MMOL/L — SIGNIFICANT CHANGE UP (ref 3.5–5)
RBC # BLD: 4.14 M/UL — LOW (ref 4.7–6.1)
RBC # FLD: 15.6 % — HIGH (ref 11.5–14.5)
SODIUM SERPL-SCNC: 136 MMOL/L — SIGNIFICANT CHANGE UP (ref 135–146)
WBC # BLD: 11.81 K/UL — HIGH (ref 4.8–10.8)
WBC # FLD AUTO: 11.81 K/UL — HIGH (ref 4.8–10.8)

## 2022-12-11 PROCEDURE — 99232 SBSQ HOSP IP/OBS MODERATE 35: CPT

## 2022-12-11 PROCEDURE — 73070 X-RAY EXAM OF ELBOW: CPT | Mod: 26,RT

## 2022-12-11 RX ADMIN — APIXABAN 10 MILLIGRAM(S): 2.5 TABLET, FILM COATED ORAL at 17:50

## 2022-12-11 RX ADMIN — TAMSULOSIN HYDROCHLORIDE 0.4 MILLIGRAM(S): 0.4 CAPSULE ORAL at 21:39

## 2022-12-11 RX ADMIN — APIXABAN 10 MILLIGRAM(S): 2.5 TABLET, FILM COATED ORAL at 05:31

## 2022-12-11 RX ADMIN — Medication 25 MILLIGRAM(S): at 21:39

## 2022-12-11 RX ADMIN — GABAPENTIN 200 MILLIGRAM(S): 400 CAPSULE ORAL at 05:32

## 2022-12-11 RX ADMIN — PREGABALIN 1000 MICROGRAM(S): 225 CAPSULE ORAL at 11:32

## 2022-12-11 RX ADMIN — Medication 1 TABLET(S): at 11:32

## 2022-12-11 RX ADMIN — Medication 25 MILLIGRAM(S): at 06:32

## 2022-12-11 RX ADMIN — NALTREXONE HYDROCHLORIDE 50 MILLIGRAM(S): 50 TABLET, FILM COATED ORAL at 11:32

## 2022-12-11 RX ADMIN — Medication 0.6 MILLIGRAM(S): at 05:32

## 2022-12-11 RX ADMIN — Medication 25 MILLIGRAM(S): at 05:31

## 2022-12-11 RX ADMIN — LORATADINE 10 MILLIGRAM(S): 10 TABLET ORAL at 11:32

## 2022-12-11 RX ADMIN — GABAPENTIN 200 MILLIGRAM(S): 400 CAPSULE ORAL at 17:50

## 2022-12-11 RX ADMIN — PANTOPRAZOLE SODIUM 40 MILLIGRAM(S): 20 TABLET, DELAYED RELEASE ORAL at 05:32

## 2022-12-11 RX ADMIN — Medication 100 MILLIGRAM(S): at 11:32

## 2022-12-11 RX ADMIN — Medication 25 MILLIGRAM(S): at 11:32

## 2022-12-11 RX ADMIN — Medication 1 MILLIGRAM(S): at 11:33

## 2022-12-11 RX ADMIN — Medication 25 MILLIGRAM(S): at 05:32

## 2022-12-12 ENCOUNTER — TRANSCRIPTION ENCOUNTER (OUTPATIENT)
Age: 62
End: 2022-12-12

## 2022-12-12 VITALS
HEART RATE: 79 BPM | SYSTOLIC BLOOD PRESSURE: 130 MMHG | RESPIRATION RATE: 18 BRPM | TEMPERATURE: 97 F | DIASTOLIC BLOOD PRESSURE: 61 MMHG

## 2022-12-12 LAB
HCT VFR BLD CALC: 35.6 % — LOW (ref 42–52)
HGB BLD-MCNC: 11.9 G/DL — LOW (ref 14–18)
MCHC RBC-ENTMCNC: 32.2 PG — HIGH (ref 27–31)
MCHC RBC-ENTMCNC: 33.4 G/DL — SIGNIFICANT CHANGE UP (ref 32–37)
MCV RBC AUTO: 96.2 FL — HIGH (ref 80–94)
NRBC # BLD: 0 /100 WBCS — SIGNIFICANT CHANGE UP (ref 0–0)
PLATELET # BLD AUTO: 154 K/UL — SIGNIFICANT CHANGE UP (ref 130–400)
RBC # BLD: 3.7 M/UL — LOW (ref 4.7–6.1)
RBC # FLD: 15.4 % — HIGH (ref 11.5–14.5)
WBC # BLD: 8.59 K/UL — SIGNIFICANT CHANGE UP (ref 4.8–10.8)
WBC # FLD AUTO: 8.59 K/UL — SIGNIFICANT CHANGE UP (ref 4.8–10.8)

## 2022-12-12 PROCEDURE — 99239 HOSP IP/OBS DSCHRG MGMT >30: CPT

## 2022-12-12 RX ORDER — PANTOPRAZOLE SODIUM 20 MG/1
1 TABLET, DELAYED RELEASE ORAL
Qty: 30 | Refills: 0
Start: 2022-12-12 | End: 2023-01-10

## 2022-12-12 RX ORDER — APIXABAN 2.5 MG/1
2 TABLET, FILM COATED ORAL
Qty: 69 | Refills: 0
Start: 2022-12-12 | End: 2023-01-10

## 2022-12-12 RX ADMIN — PANTOPRAZOLE SODIUM 40 MILLIGRAM(S): 20 TABLET, DELAYED RELEASE ORAL at 07:24

## 2022-12-12 RX ADMIN — LORATADINE 10 MILLIGRAM(S): 10 TABLET ORAL at 11:17

## 2022-12-12 RX ADMIN — Medication 25 MILLIGRAM(S): at 05:32

## 2022-12-12 RX ADMIN — Medication 100 MILLIGRAM(S): at 11:18

## 2022-12-12 RX ADMIN — Medication 1 MILLIGRAM(S): at 11:17

## 2022-12-12 RX ADMIN — PREGABALIN 1000 MICROGRAM(S): 225 CAPSULE ORAL at 11:18

## 2022-12-12 RX ADMIN — Medication 25 MILLIGRAM(S): at 05:33

## 2022-12-12 RX ADMIN — NALTREXONE HYDROCHLORIDE 50 MILLIGRAM(S): 50 TABLET, FILM COATED ORAL at 11:18

## 2022-12-12 RX ADMIN — GABAPENTIN 200 MILLIGRAM(S): 400 CAPSULE ORAL at 05:33

## 2022-12-12 RX ADMIN — Medication 1 TABLET(S): at 11:17

## 2022-12-12 RX ADMIN — APIXABAN 10 MILLIGRAM(S): 2.5 TABLET, FILM COATED ORAL at 05:33

## 2022-12-12 NOTE — PROGRESS NOTE ADULT - ASSESSMENT
62M PMHx alcohol abuse, gout, HTN here with LLE swelling, pain.    #LLE swelling, pain due to BL LE DVT  with inability to ambulate  va duplex prelim with DVT R pop, L common fem, fem, deep fem, pop, post tibial  ambulates short distance daily, but predominately lays on couch or bed; no travel  no intervention per vasc  change hep gtt to eliquis loading dose  PT  #Alcohol abuse  symptom triggered ciwa  naltrexone 50  #Gout  outpt f/u  #HTN  toprol 25  #DVT ppx  eliquis  #Folic acid def, suspected  replete  #Thiamine def, suspected  replete    Elbow and knee pain  Will treat as gout  Ortho recommended Right Elbow xray  similar episode , the joint had to be tapped     #Progress Note Handoff:  Pending (specify):  othro f/u    Family discussion: d/w pt at bedside re: treatment plan, primary dx     Shelia Deal MD  Attending Hospitalist     
62YOM from [facility]. PMH: EtOH abuse, CKD2, gout, HTN, and arthritis presented 12/8/22 for LLE swelling and pain w/ difficulty ambulating xfew hours. 12/8 Duplex (+) Rt popliteal DVT, Left CFV, GSV DVT, LT popliteal DVT, Left gastroc/pop DVT.    #LLE swelling, pain due to BL LE DVT  - < from: [facility] Duplex Lower Ext Vein Scan, Bilat (12.08.22 @ 19:24) >vRight: Acute appearing deep venous thrombus within the right popliteal vein. Superficial thrombophlebitis of the greater saphenous vein at the mid  calf and tributaries in the calf.Left: Acute appearing deep venous thrombosis of the common femoral, femoral, deep femoral, popliteal, gastrocnemius and posterior tibial veins. Superficial thrombophlebitis within the calf tributary veins.  - Vascular sx consult: no intervention per vasc  - Able to ambulate 25ft with PT 12/9  - S/p heparin infusion initially.   - Transitioned to Eliquis 12/10 Loading dose of 10mg BID for 7 days (12/10-12/16)      #Alcohol abuse  - symptom triggered ciwa  - naltrexone 50    #Gout  - Patient complained R elbow and Knee pain. Red hot and tender. Likely gout flare, but pt reported previous episode being tapped in his knee and given IV abx recently. Unable to provide further details.   - Ortho consult: Likely gout. No concern for septic arthritis, WBAT, no further intervention   - S/p Colchicine 0.6mg x3 doses 12/10-12/11  - Indomethacin 25mg TID started 12/10  - c/w Protonix 40mg    #HTN  -  c/w toprol 25    #DVT ppx  eliquis    #Folic acid def, suspected  #Thiamine def, suspected  - replete    #Misc  - Diet: DASH  - GI: PPI  - DVT: Eliquis  - Dispo: Medicine    
  62M PMHx alcohol abuse, gout, HTN here with LLE swelling, pain.    #LLE swelling, pain due to BL LE DVT  with inability to ambulate  va duplex prelim with DVT R pop, L common fem, fem, deep fem, pop, post tibial  ambulates short distance daily, but predominately lays on couch or bed; no travel  no intervention per vasc  change hep gtt to eliquis loading dose  PT  #Alcohol abuse  symptom triggered ciwa  naltrexone 50  #Gout  outpt f/u  #HTN  toprol 25  #DVT ppx  eliquis  #Folic acid def, suspected  replete  #Thiamine def, suspected  replete    Elbow and knee pain  Will treat as gout  will get ortho involved   similar episode , the joint had to be tapped     #Progress Note Handoff:  Pending (specify):  othro f/u    Family discussion: d/w pt at bedside re: treatment plan, primary dx     Shelia Deal MD  Attending Hospitalist
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
## 2022-12-12 NOTE — DISCHARGE NOTE NURSING/CASE MANAGEMENT/SOCIAL WORK - NSDCPEFALRISK_GEN_ALL_CORE
For information on Fall & Injury Prevention, visit: [email or web address] OR  [email or web address] OR  https://www.cdc.gov/steadi/patient.html

## 2022-12-12 NOTE — DISCHARGE NOTE NURSING/CASE MANAGEMENT/SOCIAL WORK - PATIENT PORTAL LINK FT
You can access the FollowMyHealth Patient Portal offered by [facility] by registering at the following website: [email or web address]. By joining [facility]’s FollowMyHealth portal, you will also be able to view your health information using other applications (apps) compatible with our system.

## 2022-12-12 NOTE — PROGRESS NOTE ADULT - PROVIDER SPECIALTY LIST ADULT
Internal Medicine
Hospitalist
Internal Medicine
Hospitalist
Internal Medicine
Present to ER with c/o of headache for 2 weeks. Pt states he has pain in the back of the head for 2 weeks now, pt took advil with no relief. Denies any past medical history. Denies any chest pain, or shortness of breath. Denies any photosensitivity.

## 2022-12-14 NOTE — PATIENT PROFILE ADULT - DO YOU NEED ADDITIONAL SERVICES TO MANAGE ANY OF THESE MEDICAL CONDITIONS AT HOME?
Received confirmation from [employer] that appeal is in process. Can take 15-30 days. Submitted 12/15.    Lorena Mack RN     no

## 2022-12-16 DIAGNOSIS — I12.9 HYPERTENSIVE CHRONIC KIDNEY DISEASE WITH STAGE 1 THROUGH STAGE 4 CHRONIC KIDNEY DISEASE, OR UNSPECIFIED CHRONIC KIDNEY DISEASE: ICD-10-CM

## 2022-12-16 DIAGNOSIS — M10.9 GOUT, UNSPECIFIED: ICD-10-CM

## 2022-12-16 DIAGNOSIS — F10.20 ALCOHOL DEPENDENCE, UNCOMPLICATED: ICD-10-CM

## 2022-12-16 DIAGNOSIS — I82.431 ACUTE EMBOLISM AND THROMBOSIS OF RIGHT POPLITEAL VEIN: ICD-10-CM

## 2022-12-16 DIAGNOSIS — K21.9 GASTRO-ESOPHAGEAL REFLUX DISEASE WITHOUT ESOPHAGITIS: ICD-10-CM

## 2022-12-16 DIAGNOSIS — I82.491 ACUTE EMBOLISM AND THROMBOSIS OF OTHER SPECIFIED DEEP VEIN OF RIGHT LOWER EXTREMITY: ICD-10-CM

## 2022-12-16 DIAGNOSIS — I82.432 ACUTE EMBOLISM AND THROMBOSIS OF LEFT POPLITEAL VEIN: ICD-10-CM

## 2022-12-16 DIAGNOSIS — Z91.018 ALLERGY TO OTHER FOODS: ICD-10-CM

## 2022-12-16 DIAGNOSIS — Z87.891 PERSONAL HISTORY OF NICOTINE DEPENDENCE: ICD-10-CM

## 2022-12-16 DIAGNOSIS — Z20.822 CONTACT WITH AND (SUSPECTED) EXPOSURE TO COVID-19: ICD-10-CM

## 2022-12-16 DIAGNOSIS — E51.9 THIAMINE DEFICIENCY, UNSPECIFIED: ICD-10-CM

## 2022-12-16 DIAGNOSIS — I82.462 ACUTE EMBOLISM AND THROMBOSIS OF LEFT CALF MUSCULAR VEIN: ICD-10-CM

## 2022-12-16 DIAGNOSIS — Z91.013 ALLERGY TO SEAFOOD: ICD-10-CM

## 2022-12-16 DIAGNOSIS — E83.42 HYPOMAGNESEMIA: ICD-10-CM

## 2022-12-16 DIAGNOSIS — I80.229 PHLEBITIS AND THROMBOPHLEBITIS OF UNSPECIFIED POPLITEAL VEIN: ICD-10-CM

## 2022-12-16 DIAGNOSIS — E53.8 DEFICIENCY OF OTHER SPECIFIED B GROUP VITAMINS: ICD-10-CM

## 2022-12-16 DIAGNOSIS — N18.2 CHRONIC KIDNEY DISEASE, STAGE 2 (MILD): ICD-10-CM

## 2022-12-16 DIAGNOSIS — I82.412 ACUTE EMBOLISM AND THROMBOSIS OF LEFT FEMORAL VEIN: ICD-10-CM

## 2022-12-20 ENCOUNTER — INPATIENT (INPATIENT)
Facility: HOSPITAL | Age: 62
LOS: 2 days | Discharge: HOME | End: 2022-12-23
Attending: HOSPITALIST | Admitting: HOSPITALIST
Payer: MEDICAID

## 2022-12-20 VITALS
WEIGHT: 240.08 LBS | RESPIRATION RATE: 19 BRPM | TEMPERATURE: 98 F | OXYGEN SATURATION: 97 % | HEART RATE: 91 BPM | DIASTOLIC BLOOD PRESSURE: 65 MMHG | HEIGHT: 70 IN | SYSTOLIC BLOOD PRESSURE: 141 MMHG

## 2022-12-20 DIAGNOSIS — I26.99 OTHER PULMONARY EMBOLISM WITHOUT ACUTE COR PULMONALE: ICD-10-CM

## 2022-12-20 LAB
ALBUMIN SERPL ELPH-MCNC: 4.3 G/DL — SIGNIFICANT CHANGE UP (ref 3.5–5.2)
ALBUMIN SERPL ELPH-MCNC: 4.6 G/DL — SIGNIFICANT CHANGE UP (ref 3.5–5.2)
ALBUMIN SERPL ELPH-MCNC: 4.6 G/DL — SIGNIFICANT CHANGE UP (ref 3.5–5.2)
ALP SERPL-CCNC: 103 U/L — SIGNIFICANT CHANGE UP (ref 30–115)
ALP SERPL-CCNC: 106 U/L — SIGNIFICANT CHANGE UP (ref 30–115)
ALP SERPL-CCNC: 96 U/L — SIGNIFICANT CHANGE UP (ref 30–115)
ALT FLD-CCNC: 12 U/L — SIGNIFICANT CHANGE UP (ref 0–41)
ANION GAP SERPL CALC-SCNC: 21 MMOL/L — HIGH (ref 7–14)
ANION GAP SERPL CALC-SCNC: 21 MMOL/L — HIGH (ref 7–14)
APTT BLD: 39.7 SEC — HIGH (ref 27–39.2)
AST SERPL-CCNC: 23 U/L — SIGNIFICANT CHANGE UP (ref 0–41)
AST SERPL-CCNC: 26 U/L — SIGNIFICANT CHANGE UP (ref 0–41)
AST SERPL-CCNC: 27 U/L — SIGNIFICANT CHANGE UP (ref 0–41)
BASOPHILS # BLD AUTO: 0.14 K/UL — SIGNIFICANT CHANGE UP (ref 0–0.2)
BASOPHILS NFR BLD AUTO: 2.4 % — HIGH (ref 0–1)
BILIRUB DIRECT SERPL-MCNC: 0.2 MG/DL — SIGNIFICANT CHANGE UP (ref 0–0.3)
BILIRUB DIRECT SERPL-MCNC: 0.4 MG/DL — HIGH (ref 0–0.3)
BILIRUB INDIRECT FLD-MCNC: 0.6 MG/DL — SIGNIFICANT CHANGE UP (ref 0.2–1.2)
BILIRUB INDIRECT FLD-MCNC: 0.8 MG/DL — SIGNIFICANT CHANGE UP (ref 0.2–1.2)
BILIRUB SERPL-MCNC: 0.8 MG/DL — SIGNIFICANT CHANGE UP (ref 0.2–1.2)
BILIRUB SERPL-MCNC: 0.8 MG/DL — SIGNIFICANT CHANGE UP (ref 0.2–1.2)
BILIRUB SERPL-MCNC: 1.2 MG/DL — SIGNIFICANT CHANGE UP (ref 0.2–1.2)
BUN SERPL-MCNC: 11 MG/DL — SIGNIFICANT CHANGE UP (ref 10–20)
BUN SERPL-MCNC: 11 MG/DL — SIGNIFICANT CHANGE UP (ref 10–20)
CALCIUM SERPL-MCNC: 9.3 MG/DL — SIGNIFICANT CHANGE UP (ref 8.4–10.5)
CALCIUM SERPL-MCNC: 9.4 MG/DL — SIGNIFICANT CHANGE UP (ref 8.4–10.5)
CHLORIDE SERPL-SCNC: 97 MMOL/L — LOW (ref 98–110)
CHLORIDE SERPL-SCNC: 99 MMOL/L — SIGNIFICANT CHANGE UP (ref 98–110)
CO2 SERPL-SCNC: 19 MMOL/L — SIGNIFICANT CHANGE UP (ref 17–32)
CO2 SERPL-SCNC: 21 MMOL/L — SIGNIFICANT CHANGE UP (ref 17–32)
CREAT SERPL-MCNC: 1.3 MG/DL — SIGNIFICANT CHANGE UP (ref 0.7–1.5)
CREAT SERPL-MCNC: 1.5 MG/DL — SIGNIFICANT CHANGE UP (ref 0.7–1.5)
EGFR: 52 ML/MIN/1.73M2 — LOW
EGFR: 62 ML/MIN/1.73M2 — SIGNIFICANT CHANGE UP
EOSINOPHIL # BLD AUTO: 0.04 K/UL — SIGNIFICANT CHANGE UP (ref 0–0.7)
EOSINOPHIL NFR BLD AUTO: 0.7 % — SIGNIFICANT CHANGE UP (ref 0–8)
GLUCOSE SERPL-MCNC: 71 MG/DL — SIGNIFICANT CHANGE UP (ref 70–99)
GLUCOSE SERPL-MCNC: 82 MG/DL — SIGNIFICANT CHANGE UP (ref 70–99)
HCT VFR BLD CALC: 38.6 % — LOW (ref 42–52)
HGB BLD-MCNC: 13.2 G/DL — LOW (ref 14–18)
IMM GRANULOCYTES NFR BLD AUTO: 0.3 % — SIGNIFICANT CHANGE UP (ref 0.1–0.3)
INR BLD: 1.18 RATIO — SIGNIFICANT CHANGE UP (ref 0.65–1.3)
LYMPHOCYTES # BLD AUTO: 1.47 K/UL — SIGNIFICANT CHANGE UP (ref 1.2–3.4)
LYMPHOCYTES # BLD AUTO: 25 % — SIGNIFICANT CHANGE UP (ref 20.5–51.1)
MAGNESIUM SERPL-MCNC: 1.8 MG/DL — SIGNIFICANT CHANGE UP (ref 1.8–2.4)
MAGNESIUM SERPL-MCNC: 1.8 MG/DL — SIGNIFICANT CHANGE UP (ref 1.8–2.4)
MCHC RBC-ENTMCNC: 32.4 PG — HIGH (ref 27–31)
MCHC RBC-ENTMCNC: 34.2 G/DL — SIGNIFICANT CHANGE UP (ref 32–37)
MCV RBC AUTO: 94.8 FL — HIGH (ref 80–94)
MONOCYTES # BLD AUTO: 0.21 K/UL — SIGNIFICANT CHANGE UP (ref 0.1–0.6)
MONOCYTES NFR BLD AUTO: 3.6 % — SIGNIFICANT CHANGE UP (ref 1.7–9.3)
NEUTROPHILS # BLD AUTO: 4 K/UL — SIGNIFICANT CHANGE UP (ref 1.4–6.5)
NEUTROPHILS NFR BLD AUTO: 68 % — SIGNIFICANT CHANGE UP (ref 42.2–75.2)
NRBC # BLD: 0 /100 WBCS — SIGNIFICANT CHANGE UP (ref 0–0)
NT-PROBNP SERPL-SCNC: 178 PG/ML — SIGNIFICANT CHANGE UP (ref 0–300)
PHOSPHATE SERPL-MCNC: 3.7 MG/DL — SIGNIFICANT CHANGE UP (ref 2.1–4.9)
PLATELET # BLD AUTO: 263 K/UL — SIGNIFICANT CHANGE UP (ref 130–400)
POTASSIUM SERPL-MCNC: 4.6 MMOL/L — SIGNIFICANT CHANGE UP (ref 3.5–5)
POTASSIUM SERPL-MCNC: 5.1 MMOL/L — HIGH (ref 3.5–5)
POTASSIUM SERPL-SCNC: 4.6 MMOL/L — SIGNIFICANT CHANGE UP (ref 3.5–5)
POTASSIUM SERPL-SCNC: 5.1 MMOL/L — HIGH (ref 3.5–5)
PROT SERPL-MCNC: 7 G/DL — SIGNIFICANT CHANGE UP (ref 6–8)
PROT SERPL-MCNC: 7.2 G/DL — SIGNIFICANT CHANGE UP (ref 6–8)
PROT SERPL-MCNC: 7.3 G/DL — SIGNIFICANT CHANGE UP (ref 6–8)
PROTHROM AB SERPL-ACNC: 13.5 SEC — HIGH (ref 9.95–12.87)
RBC # BLD: 4.07 M/UL — LOW (ref 4.7–6.1)
RBC # FLD: 15.2 % — HIGH (ref 11.5–14.5)
SARS-COV-2 RNA SPEC QL NAA+PROBE: SIGNIFICANT CHANGE UP
SODIUM SERPL-SCNC: 137 MMOL/L — SIGNIFICANT CHANGE UP (ref 135–146)
SODIUM SERPL-SCNC: 141 MMOL/L — SIGNIFICANT CHANGE UP (ref 135–146)
TROPONIN T SERPL-MCNC: <0.01 NG/ML — SIGNIFICANT CHANGE UP
TROPONIN T SERPL-MCNC: <0.01 NG/ML — SIGNIFICANT CHANGE UP
WBC # BLD: 5.88 K/UL — SIGNIFICANT CHANGE UP (ref 4.8–10.8)
WBC # FLD AUTO: 5.88 K/UL — SIGNIFICANT CHANGE UP (ref 4.8–10.8)

## 2022-12-20 PROCEDURE — 99408 AUDIT/DAST 15-30 MIN: CPT

## 2022-12-20 PROCEDURE — 71045 X-RAY EXAM CHEST 1 VIEW: CPT | Mod: 26

## 2022-12-20 PROCEDURE — 99223 1ST HOSP IP/OBS HIGH 75: CPT

## 2022-12-20 PROCEDURE — 93010 ELECTROCARDIOGRAM REPORT: CPT

## 2022-12-20 PROCEDURE — 99222 1ST HOSP IP/OBS MODERATE 55: CPT

## 2022-12-20 PROCEDURE — 71275 CT ANGIOGRAPHY CHEST: CPT | Mod: 26,MA

## 2022-12-20 PROCEDURE — 99285 EMERGENCY DEPT VISIT HI MDM: CPT

## 2022-12-20 PROCEDURE — 76770 US EXAM ABDO BACK WALL COMP: CPT | Mod: 26

## 2022-12-20 RX ORDER — ENOXAPARIN SODIUM 100 MG/ML
100 INJECTION SUBCUTANEOUS EVERY 12 HOURS
Refills: 0 | Status: DISCONTINUED | OUTPATIENT
Start: 2022-12-20 | End: 2022-12-23

## 2022-12-20 RX ORDER — THIAMINE MONONITRATE (VIT B1) 100 MG
100 TABLET ORAL DAILY
Refills: 0 | Status: DISCONTINUED | OUTPATIENT
Start: 2022-12-20 | End: 2022-12-23

## 2022-12-20 RX ORDER — PREGABALIN 225 MG/1
1000 CAPSULE ORAL DAILY
Refills: 0 | Status: DISCONTINUED | OUTPATIENT
Start: 2022-12-20 | End: 2022-12-23

## 2022-12-20 RX ORDER — HEPARIN SODIUM 5000 [USP'U]/ML
9000 INJECTION INTRAVENOUS; SUBCUTANEOUS EVERY 6 HOURS
Refills: 0 | Status: DISCONTINUED | OUTPATIENT
Start: 2022-12-20 | End: 2022-12-20

## 2022-12-20 RX ORDER — METOPROLOL TARTRATE 50 MG
25 TABLET ORAL DAILY
Refills: 0 | Status: DISCONTINUED | OUTPATIENT
Start: 2022-12-20 | End: 2022-12-20

## 2022-12-20 RX ORDER — ENOXAPARIN SODIUM 100 MG/ML
100 INJECTION SUBCUTANEOUS EVERY 12 HOURS
Refills: 0 | Status: DISCONTINUED | OUTPATIENT
Start: 2022-12-20 | End: 2022-12-20

## 2022-12-20 RX ORDER — FOLIC ACID 0.8 MG
1 TABLET ORAL DAILY
Refills: 0 | Status: DISCONTINUED | OUTPATIENT
Start: 2022-12-20 | End: 2022-12-23

## 2022-12-20 RX ORDER — HEPARIN SODIUM 5000 [USP'U]/ML
9000 INJECTION INTRAVENOUS; SUBCUTANEOUS ONCE
Refills: 0 | Status: DISCONTINUED | OUTPATIENT
Start: 2022-12-20 | End: 2022-12-20

## 2022-12-20 RX ORDER — ONDANSETRON 8 MG/1
4 TABLET, FILM COATED ORAL EVERY 8 HOURS
Refills: 0 | Status: DISCONTINUED | OUTPATIENT
Start: 2022-12-20 | End: 2022-12-23

## 2022-12-20 RX ORDER — HEPARIN SODIUM 5000 [USP'U]/ML
4000 INJECTION INTRAVENOUS; SUBCUTANEOUS EVERY 6 HOURS
Refills: 0 | Status: DISCONTINUED | OUTPATIENT
Start: 2022-12-20 | End: 2022-12-20

## 2022-12-20 RX ORDER — PANTOPRAZOLE SODIUM 20 MG/1
40 TABLET, DELAYED RELEASE ORAL
Refills: 0 | Status: DISCONTINUED | OUTPATIENT
Start: 2022-12-20 | End: 2022-12-23

## 2022-12-20 RX ORDER — GABAPENTIN 400 MG/1
200 CAPSULE ORAL
Refills: 0 | Status: DISCONTINUED | OUTPATIENT
Start: 2022-12-20 | End: 2022-12-23

## 2022-12-20 RX ORDER — NALTREXONE HYDROCHLORIDE 50 MG/1
50 TABLET, FILM COATED ORAL DAILY
Refills: 0 | Status: DISCONTINUED | OUTPATIENT
Start: 2022-12-20 | End: 2022-12-23

## 2022-12-20 RX ORDER — TAMSULOSIN HYDROCHLORIDE 0.4 MG/1
0.4 CAPSULE ORAL AT BEDTIME
Refills: 0 | Status: DISCONTINUED | OUTPATIENT
Start: 2022-12-20 | End: 2022-12-23

## 2022-12-20 RX ORDER — LANOLIN ALCOHOL/MO/W.PET/CERES
3 CREAM (GRAM) TOPICAL AT BEDTIME
Refills: 0 | Status: DISCONTINUED | OUTPATIENT
Start: 2022-12-20 | End: 2022-12-23

## 2022-12-20 RX ORDER — ACETAMINOPHEN 500 MG
650 TABLET ORAL EVERY 6 HOURS
Refills: 0 | Status: DISCONTINUED | OUTPATIENT
Start: 2022-12-20 | End: 2022-12-23

## 2022-12-20 RX ORDER — CHLORHEXIDINE GLUCONATE 213 G/1000ML
1 SOLUTION TOPICAL
Refills: 0 | Status: DISCONTINUED | OUTPATIENT
Start: 2022-12-20 | End: 2022-12-23

## 2022-12-20 RX ORDER — HEPARIN SODIUM 5000 [USP'U]/ML
INJECTION INTRAVENOUS; SUBCUTANEOUS
Qty: 25000 | Refills: 0 | Status: DISCONTINUED | OUTPATIENT
Start: 2022-12-20 | End: 2022-12-20

## 2022-12-20 RX ORDER — ENOXAPARIN SODIUM 100 MG/ML
100 INJECTION SUBCUTANEOUS ONCE
Refills: 0 | Status: COMPLETED | OUTPATIENT
Start: 2022-12-20 | End: 2022-12-20

## 2022-12-20 RX ADMIN — Medication 2 MILLIGRAM(S): at 18:54

## 2022-12-20 RX ADMIN — TAMSULOSIN HYDROCHLORIDE 0.4 MILLIGRAM(S): 0.4 CAPSULE ORAL at 23:40

## 2022-12-20 RX ADMIN — GABAPENTIN 200 MILLIGRAM(S): 400 CAPSULE ORAL at 17:16

## 2022-12-20 RX ADMIN — ENOXAPARIN SODIUM 100 MILLIGRAM(S): 100 INJECTION SUBCUTANEOUS at 14:28

## 2022-12-20 RX ADMIN — ENOXAPARIN SODIUM 100 MILLIGRAM(S): 100 INJECTION SUBCUTANEOUS at 23:41

## 2022-12-20 NOTE — H&P ADULT - ASSESSMENT
61yo M w/ PMH EtOH abuse, CKD2, gout, HTN, and arthritis was brought in from [facility] for palpitations and anxiety found to have PE, d/c'd 12/12 on eliquis for DVTs;    #PE  #Multiple R and L lower extremity DVTs  - Pain and swelling of left inner thigh, behind left knee, and left calf  - Venous duplex findings of Rt popliteal DVT, Left CFV, GSV DVT, LT popliteal DVT, Left gastroc/pop DVT  - mild right heart strain on CT  - pulm rec's appreciated  - f/u IR recs  - f/u TTE  - if no IR procedure then can resume lovenox  - f/u H/O Dr. Stewart OP on d/c    #EtOH abuse  #Poss hx of opiate use  - Drinks pint vodka a day  - CIWA score ~11 (tremulous, anxious, but anxiety about medical condition may be confounding CIWA score) at time of admission  - CIWA protocol, sxs triggered prn  - c/w folic acid, b12, thiamine   - c/w naltrexone 50 (confirmed dose with [facility])    #HTN  - hold toprol for now    #GERD  - c/w pantoprazole    Diet: NPO for now, DASH/TLC soft once procedure ruled out  DVT ppx: full a/c once IR procedure ruled out  GI ppx: protonix  Activity: IAT   61yo M w/ PMH EtOH abuse, CKD2, gout, HTN, and arthritis was brought in from [facility] for palpitations and anxiety found to have PE, d/c'd 12/12 on eliquis for DVTs;    #PE  #Multiple R and L lower extremity DVTs  - Pain and swelling of left inner thigh, behind left knee, and left calf  - Venous duplex findings of Rt popliteal DVT, Left CFV, GSV DVT, LT popliteal DVT, Left gastroc/pop DVT  - mild right heart strain on CT  - pulm rec's appreciated  - f/u IR recs  - f/u TTE  - if no IR procedure then can resume lovenox  - f/u H/O Dr. Stewart OP on d/c    #EtOH abuse  #Poss hx of opiate use  - Drinks pint vodka a day  - CIWA score ~11 (tremulous, anxious, but anxiety about medical condition may be confounding CIWA score) at time of admission  - CIWA protocol, sxs triggered prn  - c/w folic acid, b12, thiamine   - c/w naltrexone 50 (confirmed dose with [facility])    #HTN  - c/w home toprol, hold for hypotension    #GERD  - c/w pantoprazole    Diet: NPO for now, DASH/TLC soft once procedure ruled out  DVT ppx: full a/c once IR procedure ruled out  GI ppx: protonix  Activity: IAT   61yo M w/ PMH EtOH abuse, CKD2, gout, HTN, and arthritis was brought in from [facility] for palpitations and anxiety found to have PE, d/c'd 12/12 on eliquis for DVTs;    #PE  #Multiple R and L lower extremity DVTs  - Pain and swelling of left inner thigh, behind left knee, and left calf  - Venous duplex findings of Rt popliteal DVT, Left CFV, GSV DVT, LT popliteal DVT, Left gastroc/pop DVT  - mild right heart strain on CT  - pulm rec's appreciated  - f/u IR recs  - f/u TTE  - s/p 100 lovenox by ED;   - if IR procedure then start heparin gtt  - else give second lovenox; start eliquis in the AM  - f/u H/O Dr. Stewart OP on d/c    #EtOH abuse  #Poss hx of opiate use  - Drinks pint vodka a day  - CIWA score ~11 (tremulous, anxious, but anxiety about medical condition may be confounding CIWA score) at time of admission  - CIWA protocol, sxs triggered prn  - c/w folic acid, b12, thiamine   - c/w naltrexone 50 (confirmed dose with [facility])    #HTN  - c/w home toprol, hold for hypotension    #GERD  - c/w pantoprazole    # neuropathy  - c/w home gabapentin    # BPH  - c/w home flomax    Diet: NPO for now, DASH/TLC soft once procedure ruled out  DVT ppx: full a/c once IR procedure ruled out  GI ppx: protonix  Activity: IAT   63yo M w/ PMH EtOH abuse, CKD2, gout, HTN, and arthritis was brought in from [facility] for palpitations and anxiety found to have PE, d/c'd 12/12 on eliquis for DVTs;    #PE  #Multiple R and L lower extremity DVTs  - Pain and swelling of left inner thigh, behind left knee, and left calf  - Venous duplex findings of Rt popliteal DVT, Left CFV, GSV DVT, LT popliteal DVT, Left gastroc/pop DVT  - mild right heart strain on CT  - pulm rec's appreciated  - IR recs appreciated  - f/u TTE  - c/w lovenox 100bid  - transition to eliquis in AM  - f/u H/O Dr. Stewart OP on d/c    #EtOH abuse  #Poss hx of opiate use  - Drinks pint vodka a day  - CIWA score ~11 (tremulous, anxious, but anxiety about medical condition may be confounding CIWA score) at time of admission  - CIWA protocol, sxs triggered prn  - c/w folic acid, b12, thiamine   - c/w naltrexone 50 (confirmed dose with [facility])    #HTN  - c/w home toprol, hold for hypotension    #GERD  - c/w pantoprazole    # neuropathy  - c/w home gabapentin    # BPH  - c/w home flomax    Diet: DASH/TLC  DVT ppx: full a/c on lovenox  GI ppx: protonix  Activity: IAT

## 2022-12-20 NOTE — H&P ADULT - ATTENDING COMMENTS
CTA : 1. Acute bilateral pulmonary emboli involving distal aspects of bilateral   main pulmonary arteries, extending into segmental and subsegmental   branches of the right lower lobe, left lower lobe and lingular pulmonary   arteries; mild right heart strain is noted.  2. Small wedge-shaped right lower lobe opacity is new since October 2022,   suspicious for a small pulmonary infarct.      IMPRESSION   Acute Submassive PE likely Provoked  with small Pulmonary infarct   >Hemodynamically Stable   >Hx DVT and prescribed  Eliquis 12/12  Compliance?  >Mild RH Strain  > No intervention after  Evaluation by IR  > c/w Lovenox for now consider other NIAC on discharge   > Tele monitoring f/u TTE trops  >Crit care rec appreciated   Hx ETOH abuse   > CIWA protocol   >Suspected Thiamin and folate deficiencies - c/w supplementation  > c/w naltrexone 50  Hx HTN - Holding BB  for now  Resume  on Discharge   Hx GERD - PPI  Hx BPH - Flow max hold if hypotensive   Hx neuropathy -  gabapentin CTA : 1. Acute bilateral pulmonary emboli involving distal aspects of bilateral   main pulmonary arteries, extending into segmental and subsegmental   branches of the right lower lobe, left lower lobe and lingular pulmonary   arteries; mild right heart strain is noted.  2. Small wedge-shaped right lower lobe opacity is new since October 2022,   suspicious for a small pulmonary infarct.    VITAL SIGNS: AFebrile, vital signs stable  CONSTITUTIONAL: Well-developed; well-nourished; in no acute distress.  SKIN: Skin exam is warm and dry, no acute rash.  HEAD: Normocephalic; atraumatic.  EYES: Pupils equal round reactive to light, Extraocular movements intact; conjunctiva and sclera clear.  ENT: No nasal discharge; airway clear. Moist mucus membranes.  NECK: Supple; non tender. No rigidity  CARD: Regular rate and rhythm. Normal S1, S2; no murmurs, gallops, or rubs.  RESP: Lungs clear to auscultation bilaterally. No wheezes, rales or rhonchi.  ABD: Abdomen soft; non-tender; non-distended;  no hepatosplenomegaly. No costovertebral angle tenderness.   EXT: Normal ROM. No clubbing, cyanosis or edema. No calf tenderness to palpation.  NEURO: Alert and oriented x 3. No focal deficits.  PSYCH: Cooperative, appropriate.       IMPRESSION   Acute Submassive PE likely Provoked  with small Pulmonary infarct   >Hemodynamically Stable   >Hx DVT and prescribed  Eliquis 12/12  Compliance?  >Mild RH Strain  > No intervention after  Evaluation by IR  > c/w Lovenox for now consider other NIAC on discharge   > Tele monitoring f/u TTE trops  >Crit care rec appreciated   Hx ETOH abuse   > CIWA protocol   >Suspected Thiamin and folate deficiencies - c/w supplementation  > c/w naltrexone 50  Hx HTN - Holding BB  for now  Resume  on Discharge   Hx GERD - PPI  Hx BPH - Flow max hold if hypotensive   Hx neuropathy -  gabapentin    Seen on 12/20

## 2022-12-20 NOTE — H&P ADULT - NSHPLABSRESULTS_GEN_ALL_CORE
MEDICATIONS:  STANDING MEDICATIONS  chlorhexidine 2% Cloths 1 Application(s) Topical <User Schedule>  enoxaparin Injectable 100 milliGRAM(s) SubCutaneous every 12 hours    PRN MEDICATIONS  acetaminophen     Tablet .. 650 milliGRAM(s) Oral every 6 hours PRN  aluminum hydroxide/magnesium hydroxide/simethicone Suspension 30 milliLiter(s) Oral every 4 hours PRN  LORazepam     Tablet 2 milliGRAM(s) Oral every 1 hour PRN  melatonin 3 milliGRAM(s) Oral at bedtime PRN  ondansetron Injectable 4 milliGRAM(s) IV Push every 8 hours PRN      LABS:                        13.2   5.88  )-----------( 263      ( 20 Dec 2022 10:40 )             38.6     12-20    141  |  99  |  11  ----------------------------<  82  4.6   |  21  |  1.5    Ca    9.3      20 Dec 2022 10:40  Mg     1.8     12-20    TPro  7.2  /  Alb  4.6  /  TBili  0.8  /  DBili  x   /  AST  26  /  ALT  12  /  AlkPhos  103  12-20          Troponin T, Serum: <0.01 ng/mL (12-20-22 @ 10:40)      CARDIAC MARKERS ( 20 Dec 2022 10:40 )  x     / <0.01 ng/mL / x     / x     / x        RADIOLOGY:    < from: CT Angio Chest PE Protocol w/ IV Cont (12.20.22 @ 13:26) >        IMPRESSION:    1. Acute bilateral pulmonary emboli involving distal aspects of bilateral   main pulmonary arteries, extending into segmental and subsegmental   branches of the right lower lobe, left lower lobe and lingular pulmonary   arteries; mild right heart strain is noted.    2. Small wedge-shaped right lower lobe opacity is new since October 2022,   suspicious for a small pulmonary infarct.      Findings were discussed with Dr. Oneal at 1:30 PM on December 20, 2022.    --- End of Report ---        < end of copied text >        VITALS:   T(F): 98.3  HR: 91  BP: 141/65  RR: 19  SpO2: 97%

## 2022-12-20 NOTE — ED PROVIDER NOTE - NS ED ROS FT
Review of Systems:  	•	CONSTITUTIONAL: no fever    	•	SKIN: no rash   	•	ENT: no sore throat   	•	RESPIRATORY: +shortness of breath, no cough  	•	CARDIAC: +chest pain   	•	GI: no abd pain, no nausea, no vomiting, no diarrhea   	•	MUSCULOSKELETAL: no joint paint, no swelling, no redness  	•	NEUROLOGIC: no weakness, no headache   	•	PSYCH: no anxiety, non suicidal, non homicidal, no hallucination, no depression

## 2022-12-20 NOTE — H&P ADULT - HISTORY OF PRESENT ILLNESS
63yo man w/ PMH EtOH abuse, CKD2, gout, HTN, and arthritis was brought in from [facility] for "not feeling right", associated with palpitations, anxiety, R shoulder/arm pain, and SOB    Pt endorses being in usual state of health on waking up this morning but over the course of the morning the above sxs arose and worsened.     Of note pt was recently d/c'd from [facility] on eliquis for DVT; per pt [facility] manages his medication and he never misses a dose.    Also of note, pt endorses ongoing EtOH misuse; endorses 1 pint of vodka a day - he begins drinking in the AM and finishes the pint before bed; last drink was this AM    Pt denies CP, PATHAK, n/v/d, fevers, wt changes    ED  /65 HR 91 RR 19 97% RA T 98.3F  Hg 13.2 Trops negative   CT PE BL PE distl BL pulmonary arteries extending into the segmental and subsegmental arteries; mild right heart strain; Wedge shaped right lower lobe opacity likely PE  Pt seen by pulm  Pt was given enoxaparin and admitted to SDU

## 2022-12-20 NOTE — H&P ADULT - NSHPREVIEWOFSYSTEMS_GEN_ALL_CORE
General:	denies night sweats, wt changes, cold/heat intolerance    Skin: denies rashes, pruritis, nail/hair changes  	  Ophthalmologic: denies vision changes, denies eye discharge or irritation  	  ENMT: denies nasal discharge, hearing changes, mouth sores, difficulty swallowing    Respiratory and Thorax:denies cough, denies difficulty breathing, endorses shortness of breath  	  Cardiovascular: denies CP, denies PATHAK, endorses palpitation    Gastrointestinal: denies n/v/d    Genitourinary: denies urgency, burning, nocturia    Musculoskeletal: endorses right shoulder and right elbow pain    Neurological: denies dizziness, syncope, HA    Psychiatric: denies si/hi and hallucinations; endorses anxiety/nervousness     Hematology/Lymphatics: denies easy bleeding/bruising    Endocrine: denies wt changes, hair/nail changes, denies cold/heat sensitivity

## 2022-12-20 NOTE — ED PROVIDER NOTE - OBJECTIVE STATEMENT
62 year old male, past medical history htn, hdl, etoh abuse, ckd, gout, arthritis, LLE DVT on eliquis, who presents with chest pain. patient with intermittent episodes of chest tightness and shortness of breath that began last night prompting ed eval. denies f/c, hemoptysis, back pain, abd pain, vomiting/diarrhea, syncope. patient has been compliant with meds since dc, last drink last night, no hx alcohol withdrawal seizures.

## 2022-12-20 NOTE — ED PROVIDER NOTE - PHYSICAL EXAMINATION
CONSTITUTIONAL: Well-developed; well-nourished; in no acute distress, nontoxic appearing  SKIN: skin exam is warm and dry  HEAD: Normocephalic; atraumatic  ENT: MMM  CARD: S1, S2 normal, no murmur  RESP:  Good air movement bilaterally  ABD: soft; non-distended; non-tender   EXT: Normal ROM  NEURO: awake, alert, following commands, oriented, grossly unremarkable. No Focal deficits. GCS 15.   PSYCH: Cooperative, appropriate.

## 2022-12-20 NOTE — CONSULT NOTE ADULT - ASSESSMENT
IMPRESSION:  Intermediate Low Risk PE  HO DVT on Eliquis   HO Alcohol use    PLAN:    CNS: CIWA. Thiamine , folic acid     HEENT: Oral care    PULMONARY:  HOB @ 45 degrees.  Aspiration precautions     CARDIOVASCULAR: Keep I=O. Obtain ECHO. CE x3     GI: GI prophylaxis.  Feeding.  Bowel regimen     RENAL:  Follow up lytes.  Correct as needed    INFECTIOUS DISEASE: Follow up cultures. procal    HEMATOLOGICAL:  DVT prophylaxis. Full dose ac with Lovenox. Hem/onc f/u    ENDOCRINE:  Follow up FS.  Insulin protocol if needed.    MUSCULOSKELETAL: bed rest    Tele         IMPRESSION:  Submassive Low Risk PE  Recent history of hospital admission with broken ankle   HO DVT was on Eliquis   HO Alcohol use    PLAN:    CNS: CIWA. Thiamine , folic acid     HEENT: Oral care    PULMONARY:  HOB @ 45 degrees.  Aspiration precautions.  On RA     CARDIOVASCULAR: Keep I=O. Obtain ECHO. CE x3     GI: GI prophylaxis.  Feeding.  Bowel regimen     RENAL:  Follow up lytes.  Correct as needed    INFECTIOUS DISEASE: Follow up cultures.      HEMATOLOGICAL:  DVT prophylaxis. Full dose ac with Lovenox. Hem/onc eval     ENDOCRINE:  Follow up FS.  Insulin protocol if needed.    MUSCULOSKELETAL: bed rest    SDU        IMPRESSION:  Submassive Low Risk PE  Recent history of hospital admission with broken ankle   HO DVT was on Eliquis   ANDRZEJ   HO Alcohol use    PLAN:    CNS: CIWA. Thiamine , folic acid     HEENT: Oral care    PULMONARY:  HOB @ 45 degrees.  Aspiration precautions.  On RA     CARDIOVASCULAR: Keep I=O. Obtain ECHO. CE x3     GI: GI prophylaxis.  Feeding.  Bowel regimen     RENAL:  Follow up lytes.  Correct as needed.  Kidney bladder US     INFECTIOUS DISEASE: Follow up cultures.      HEMATOLOGICAL:  DVT prophylaxis. Full dose ac with Lovenox. Hem/onc eval     ENDOCRINE:  Follow up FS.  Insulin protocol if needed.    MUSCULOSKELETAL: bed rest    SDU

## 2022-12-20 NOTE — ED PROVIDER NOTE - CLINICAL SUMMARY MEDICAL DECISION MAKING FREE TEXT BOX
62-year-old male presented for chest pain and shortness of breath.  Patient has history of DVT thus a CT angio study for PE was performed.  Patient was found to have pulmonary embolism with also mild right heart strain on imaging.  Troponin and BNP were negative on labs.  Patient was hemodynamically stable and well-appearing.  Patient started on anticoagulation.  Patient's case discussed with ICU who recommend no surgical intervention required at this time.  Patient will continue medical management and admitted for further care.

## 2022-12-20 NOTE — H&P ADULT - NSHPPHYSICALEXAM_GEN_ALL_CORE
CONSTITUTIONAL: NAD, anxious and fidgeting     EYES: PERRLA and symmetric, EOMI, No conjunctival or scleral injection, non-icteric    ENMT: Oral mucosa with moist membranes. No external nasal lesions; poor dentition, missing teeth; no gross hearing impairment noted.    NECK: Supple, symmetric and without tracheal deviation; thyroid gland not enlarged and without palpable masses    RESPIRATORY: No respiratory distress, no use of accessory muscles; CTA b/l, no wheezes, rales or rhonchi, no dullness or hyperresonance to percussion, no tactile fremitus, no subcutaneous emphysema    CARDIOVASCULAR: RRRR, +S1S2, no murmur appreciated, no rubs, no gallops; no JVD; no peripheral edema    Vascular: no carotid bruits; no abdominal bruit; carotid pulse palpable, radial pulse palpable, femoral pulse palpable, posterior tibialis pulse palpable    GASTROINTESTINAL: Soft, non tender, non distended, no rebound, no guarding; No palpable masses; no hepatosplenomegaly; no hernia palpated;    MUSCULOSKELETAL: no significant limitation on ROM, moves all extremities equally against resistance    SKIN: No rashes or ulcers noted; no subcutaneous nodules or induration palpable, hyperpigmented BL LE    NEUROLOGIC: CN II-XII intact; sensation intact in upper and lower extremities b/l to light touch;     PSYCHIATRIC: Appropriate insight/judgment; A+O x 3, mood and affect appropriate, recent/remote memory intact

## 2022-12-21 LAB
A1C WITH ESTIMATED AVERAGE GLUCOSE RESULT: 5.3 % — SIGNIFICANT CHANGE UP (ref 4–5.6)
ALBUMIN SERPL ELPH-MCNC: 4.7 G/DL — SIGNIFICANT CHANGE UP (ref 3.5–5.2)
ALP SERPL-CCNC: 99 U/L — SIGNIFICANT CHANGE UP (ref 30–115)
ALT FLD-CCNC: 11 U/L — SIGNIFICANT CHANGE UP (ref 0–41)
ANION GAP SERPL CALC-SCNC: 21 MMOL/L — HIGH (ref 7–14)
AST SERPL-CCNC: 22 U/L — SIGNIFICANT CHANGE UP (ref 0–41)
BASOPHILS # BLD AUTO: 0.08 K/UL — SIGNIFICANT CHANGE UP (ref 0–0.2)
BASOPHILS NFR BLD AUTO: 1.6 % — HIGH (ref 0–1)
BILIRUB SERPL-MCNC: 1.5 MG/DL — HIGH (ref 0.2–1.2)
BUN SERPL-MCNC: 13 MG/DL — SIGNIFICANT CHANGE UP (ref 10–20)
CALCIUM SERPL-MCNC: 9.6 MG/DL — SIGNIFICANT CHANGE UP (ref 8.4–10.5)
CHLORIDE SERPL-SCNC: 97 MMOL/L — LOW (ref 98–110)
CHOLEST SERPL-MCNC: 201 MG/DL — HIGH
CO2 SERPL-SCNC: 22 MMOL/L — SIGNIFICANT CHANGE UP (ref 17–32)
CREAT SERPL-MCNC: 1.3 MG/DL — SIGNIFICANT CHANGE UP (ref 0.7–1.5)
EGFR: 62 ML/MIN/1.73M2 — SIGNIFICANT CHANGE UP
EOSINOPHIL # BLD AUTO: 0.1 K/UL — SIGNIFICANT CHANGE UP (ref 0–0.7)
EOSINOPHIL NFR BLD AUTO: 1.9 % — SIGNIFICANT CHANGE UP (ref 0–8)
ESTIMATED AVERAGE GLUCOSE: 105 MG/DL — SIGNIFICANT CHANGE UP (ref 68–114)
GLUCOSE SERPL-MCNC: 85 MG/DL — SIGNIFICANT CHANGE UP (ref 70–99)
HCT VFR BLD CALC: 39.3 % — LOW (ref 42–52)
HDLC SERPL-MCNC: 49 MG/DL — SIGNIFICANT CHANGE UP
HGB BLD-MCNC: 12.8 G/DL — LOW (ref 14–18)
IMM GRANULOCYTES NFR BLD AUTO: 0.2 % — SIGNIFICANT CHANGE UP (ref 0.1–0.3)
LIPID PNL WITH DIRECT LDL SERPL: 115 MG/DL — HIGH
LYMPHOCYTES # BLD AUTO: 1.12 K/UL — LOW (ref 1.2–3.4)
LYMPHOCYTES # BLD AUTO: 21.7 % — SIGNIFICANT CHANGE UP (ref 20.5–51.1)
MAGNESIUM SERPL-MCNC: 1.9 MG/DL — SIGNIFICANT CHANGE UP (ref 1.8–2.4)
MCHC RBC-ENTMCNC: 31.2 PG — HIGH (ref 27–31)
MCHC RBC-ENTMCNC: 32.6 G/DL — SIGNIFICANT CHANGE UP (ref 32–37)
MCV RBC AUTO: 95.9 FL — HIGH (ref 80–94)
MONOCYTES # BLD AUTO: 0.34 K/UL — SIGNIFICANT CHANGE UP (ref 0.1–0.6)
MONOCYTES NFR BLD AUTO: 6.6 % — SIGNIFICANT CHANGE UP (ref 1.7–9.3)
NEUTROPHILS # BLD AUTO: 3.51 K/UL — SIGNIFICANT CHANGE UP (ref 1.4–6.5)
NEUTROPHILS NFR BLD AUTO: 68 % — SIGNIFICANT CHANGE UP (ref 42.2–75.2)
NON HDL CHOLESTEROL: 152 MG/DL — HIGH
NRBC # BLD: 0 /100 WBCS — SIGNIFICANT CHANGE UP (ref 0–0)
PHOSPHATE SERPL-MCNC: 3.3 MG/DL — SIGNIFICANT CHANGE UP (ref 2.1–4.9)
PLATELET # BLD AUTO: 237 K/UL — SIGNIFICANT CHANGE UP (ref 130–400)
POTASSIUM SERPL-MCNC: 4.7 MMOL/L — SIGNIFICANT CHANGE UP (ref 3.5–5)
POTASSIUM SERPL-SCNC: 4.7 MMOL/L — SIGNIFICANT CHANGE UP (ref 3.5–5)
PROT SERPL-MCNC: 7.2 G/DL — SIGNIFICANT CHANGE UP (ref 6–8)
RBC # BLD: 4.1 M/UL — LOW (ref 4.7–6.1)
RBC # FLD: 15.5 % — HIGH (ref 11.5–14.5)
SODIUM SERPL-SCNC: 140 MMOL/L — SIGNIFICANT CHANGE UP (ref 135–146)
TRIGL SERPL-MCNC: 181 MG/DL — HIGH
TROPONIN T SERPL-MCNC: <0.01 NG/ML — SIGNIFICANT CHANGE UP
WBC # BLD: 5.16 K/UL — SIGNIFICANT CHANGE UP (ref 4.8–10.8)
WBC # FLD AUTO: 5.16 K/UL — SIGNIFICANT CHANGE UP (ref 4.8–10.8)

## 2022-12-21 PROCEDURE — 93010 ELECTROCARDIOGRAM REPORT: CPT

## 2022-12-21 PROCEDURE — 99232 SBSQ HOSP IP/OBS MODERATE 35: CPT

## 2022-12-21 PROCEDURE — 99222 1ST HOSP IP/OBS MODERATE 55: CPT

## 2022-12-21 RX ORDER — INFLUENZA VIRUS VACCINE 15; 15; 15; 15 UG/.5ML; UG/.5ML; UG/.5ML; UG/.5ML
0.5 SUSPENSION INTRAMUSCULAR ONCE
Refills: 0 | Status: DISCONTINUED | OUTPATIENT
Start: 2022-12-21 | End: 2022-12-23

## 2022-12-21 RX ADMIN — Medication 1 MILLIGRAM(S): at 11:49

## 2022-12-21 RX ADMIN — NALTREXONE HYDROCHLORIDE 50 MILLIGRAM(S): 50 TABLET, FILM COATED ORAL at 11:49

## 2022-12-21 RX ADMIN — PREGABALIN 1000 MICROGRAM(S): 225 CAPSULE ORAL at 11:50

## 2022-12-21 RX ADMIN — TAMSULOSIN HYDROCHLORIDE 0.4 MILLIGRAM(S): 0.4 CAPSULE ORAL at 21:29

## 2022-12-21 RX ADMIN — GABAPENTIN 200 MILLIGRAM(S): 400 CAPSULE ORAL at 05:21

## 2022-12-21 RX ADMIN — Medication 1 TABLET(S): at 11:49

## 2022-12-21 RX ADMIN — GABAPENTIN 200 MILLIGRAM(S): 400 CAPSULE ORAL at 18:17

## 2022-12-21 RX ADMIN — ENOXAPARIN SODIUM 100 MILLIGRAM(S): 100 INJECTION SUBCUTANEOUS at 11:50

## 2022-12-21 RX ADMIN — PANTOPRAZOLE SODIUM 40 MILLIGRAM(S): 20 TABLET, DELAYED RELEASE ORAL at 05:21

## 2022-12-21 RX ADMIN — Medication 100 MILLIGRAM(S): at 11:50

## 2022-12-21 NOTE — CONSULT NOTE ADULT - ATTENDING COMMENTS
IMPRESSION:  Submassive Low Risk PE  Recent history of hospital admission with broken ankle   HO DVT was on Eliquis   HO Alcohol use'    Plan as outlined above
seen/examined w/ hemonc team; note reviewed; case discussed:    given an aggressive nature of VTE (development of more extensive DVT AND bilateral pulmonary embolism) while on NOAC, recommend to change the treatment; options include lovenox 1mg/kg BID or coumadin with bridging of lovenox    pt is willing to try lovenox SQ BID    if he does not tolerate this treatment, proceed to coumadin (INR 2-3)    NOTE: if pt chooses coumadin, make sure to bridge with lovenox for 5 (FIVE) days after therapeutic INR is achieved. Upon achieving therapeutic INR, not all vit K dependent coagulation factors are inhibites and it takes usually 5 days to inhibit all vit K dependent factors; otherwise, inhibition of protein C and Protein S coud lead to a more hypercoagulable state

## 2022-12-21 NOTE — CONSULT NOTE ADULT - ASSESSMENT
3yo man w/ PMH EtOH abuse, CKD2, gout, HTN, and arthritis was brought in from [facility] for "not feeling right", associated with palpitations, anxiety, R shoulder/arm pain, and SOB 61yo man w/ PMH EtOH abuse, CKD2, gout, HTN, recently diagnosed B/L LE DVT in 12/22 and discharged on Eliquis, arthritis was brought in from [facility] for "not feeling right", associated with palpitations, anxiety, R shoulder/arm pain, and SOB and was found to have PE.    Hematology and Oncology consulted      63yo man w/ PMH EtOH abuse, CKD2, gout, HTN, recently diagnosed B/L LE DVT in 12/22 and discharged on Eliquis, arthritis was brought in from [facility] for "not feeling right", associated with palpitations, anxiety, R shoulder/arm pain, and SOB and was found to have PE.    Hematology and Oncology consulted due to newly diagnosed PE and recent hx of DVT on eliquis.    #Newly diagnosed PE; no evidence of right heart strain.    # Recently diagnosed B/L LE  DVT 12/22  #  -        Plan   61yo man w/ PMH EtOH abuse, CKD2, gout, HTN, recently diagnosed B/L LE DVT in 12/22 and discharged on Eliquis, arthritis was brought in from [facility] for "not feeling right", associated with palpitations, anxiety, R shoulder/arm pain, and SOB and was found to have PE.    Hematology and Oncology consulted due to newly diagnosed PE and recent hx of DVT on eliquis.    #Recurrent Venous thromboembolism.  #Acute bilateral PE; no evidence of right heart strain? Unprovoked 12/20/22.  # Recently diagnosed B/L LE  DVT 12/8/22    -No provoking factors noted. However pt mentioned that he was not mobile ans stayed in bed most of the time  when he was diagnosed with DVT on 12/8/22.  -Venous Duplex of LE: 12/8/22: Acute DVT in right popliteal and acute DVT in left common femoral, femoral, deep femoral, popliteal, gastrocnemius and posterior tibial vein.  -Pt was discharged on eliquis and was taking medication as prescribed. Hem mentioned he doesn't remember the exact name of medication however he was taking whatever   -CTA Chest 12/21/22: Acute b/l pe. Small pulmonary infarct.    Plan  -As per documentation and pt,  pt was compliant with Eliquis at  [facility]  - Eliquis failure unlikely however would recs to switch to  Lovenox 1 mg/kg bid.   -If pt doesn't want to take Lovenox injections in future  can consider to bridiging to warfarin.  - follow up as out patient with hematology. he has an appointment in 1/122/ 2023 AT 3 PM with Dr. Stewart.  -Age appropriate cancer screening.

## 2022-12-21 NOTE — PATIENT PROFILE ADULT - FALL HARM RISK - RISK INTERVENTIONS
Assistance OOB with selected safe patient handling equipment/Assistance with ambulation/Communicate Fall Risk and Risk Factors to all staff, patient, and family/Monitor for mental status changes/Monitor gait and stability/Reinforce activity limits and safety measures with patient and family/Toileting schedule using arm’s reach rule for commode and bathroom/Use of alarms - bed, chair and/or voice tab/Visual Cue: Yellow wristband/Bed in lowest position, wheels locked, appropriate side rails in place/Call bell, personal items and telephone in reach/Instruct patient to call for assistance before getting out of bed or chair/Non-slip footwear when patient is out of bed/Bliss to call system/Physically safe environment - no spills, clutter or unnecessary equipment/Purposeful Proactive Rounding/Room/bathroom lighting operational, light cord in reach

## 2022-12-21 NOTE — PROGRESS NOTE ADULT - ASSESSMENT
61yo M w/ PMH EtOH abuse, CKD2, gout, HTN, and arthritis found to have PE despite being on DOAC    #PEs and multiple R and L lower extremity DVTs  - on Lovenox  - heme f/u for failed AC?  - vascular f/u, may benefit from IVC filter     #EtOH abuse  #?hx of opiate use  - on naltrexone 50    #HTN  - c/w toprol    #GERD  - c/w pantoprazole    # neuropathy  - c/w gabapentin    # BPH  - c/w flomax    f/u vasc, and heme.... acute

## 2022-12-21 NOTE — CONSULT NOTE ADULT - ASSESSMENT
ASSESSMENT:  62yM w/ PMHx EtOH abuse, CKD2, gout, HTN, and arthritis brought in for palpitations, anxiety, R shoulder/arm pain, and SOB. CT imaging revealed pulmonary embolism. Patient of unknown compliance to anticoagulation.     PLAN:  - Chart and imaging reviewed   - Patient scheduled for IVC Filter with vascular surgery on 12/22/22  - Please pre-op patient tonight 12/21 (Type and Screen, Coags, CBC w/ diff, BMP, Mag, Phos) (COVID negative)  - Vascular Surgery [phone] for any questions or concerns

## 2022-12-21 NOTE — PATIENT PROFILE ADULT - LIVING ENVIRONMENT
[facility] CARDIOLOGY  [address] [place] [address], [address] [facility], [address] [place] [address]  PHONE: [phone]        22        NAME:  Amanda Fox  : 1945  MRN: [id number]       SUBJECTIVE:   Amanda Fox is a 65 y.o. male seen for a follow up visit regarding the following: The patient has a hx of VT,PAF,CAD,PAD,SHERI,primary hypertension,COPD,and an ischemic cardiomyopathy with ICD. He returns for scheduled follow up. Fiona Pulido reports doing ok except a recent fall resulted in lumbar compression fractures. Chief Complaint   Patient presents with    Coronary Artery Disease     6 month follow up       HPI:    Chest Pain   The patient is experiencing no pain. Associated symptoms include back pain. Pertinent negatives include no abdominal pain, claudication, cough, diaphoresis, dizziness, exertional chest pressure, fever, headaches, hemoptysis, irregular heartbeat, leg pain, lower extremity edema, malaise/fatigue, nausea, near-syncope, numbness, orthopnea, palpitations, PND, shortness of breath, sputum production, syncope, vomiting or weakness. Past Medical History, Past Surgical History, Family history, Social History, and Medications were all reviewed with the patient today and updated as necessary.          Current Outpatient Medications:     calcitonin (MIACALCIN) 200 UNIT/ACT nasal spray, 1 spray by Nasal route daily, Disp: 1 each, Rfl: 1    albuterol sulfate  (90 Base) MCG/ACT inhaler, USE 2 PUFFS BY INHALATION 4 TIMES DAILY AS NEEDED FOR WHEEZING OR SHORTNESS OF BREATH., Disp: , Rfl:     amiodarone (CORDARONE) 200 MG tablet, Take 200 mg by mouth 2 times daily, Disp: , Rfl:     apixaban (ELIQUIS) 5 MG TABS tablet, Take 5 mg by mouth every 12 hours, Disp: , Rfl:     ascorbic acid (VITAMIN C) 500 MG tablet, Take 500 mg by mouth, Disp: , Rfl:     carvedilol (COREG) 25 MG tablet, Take 25 mg by mouth 2 times daily (with meals), Disp: , Rfl:     Cholecalciferol 50 MCG (2000) TABS, Take 2,000 Units by mouth, Disp: , Rfl:     clopidogrel (PLAVIX) 75 MG tablet, Take 75 mg by mouth daily, Disp: , Rfl:     fluticasone (FLONASE) 50 MCG/ACT nasal spray, USE 1 OR 2 SPRAYS IN EACH NOSTRIL EVERY DAY., Disp: , Rfl:     furosemide (LASIX) 40 MG tablet, Take 20 mg by mouth daily TAKE 1 TABLET BY MOUTH EVERY DAY, Disp: , Rfl:     glipiZIDE (GLUCOTROL XL) 10 MG extended release tablet, TAKE 1 TABLET BY MOUTH TWICE DAILY, Disp: , Rfl:     glucosamine-chondroitin 750-600 MG TABS tablet, Take by mouth 2 times daily, Disp: , Rfl:     guaiFENesin 1200 MG TB12, Take 1,200 mg by mouth 2 times daily as needed, Disp: , Rfl:     latanoprost (XALATAN) 0.005 % ophthalmic solution, Apply 1 drop to eye, Disp: , Rfl:     magnesium oxide (MAG-OX) 400 (240 Mg) MG tablet, TAKE 1 TABLET BY MOUTH EVERY DAY, Disp: , Rfl:     metFORMIN (GLUCOPHAGE) 500 MG tablet, TAKE TWO TABLETS BY MOUTH 2 TIMES A DAY, Disp: , Rfl:     montelukast (SINGULAIR) 10 MG tablet, TAKE 1 TABLET BY MOUTH EVERY DAY AT BEDTIME, Disp: , Rfl:     nitroGLYCERIN (NITROSTAT) 0.4 MG SL tablet, Place 0.4 mg under the tongue, Disp: , Rfl:     polyethylene glycol (GLYCOLAX) 17 GM/SCOOP powder, Take 17 g by mouth daily, Disp: , Rfl:     rosuvastatin (CRESTOR) 10 MG tablet, TAKE ONE TABLET BY MOUTH ONCE A DAY, Disp: , Rfl:     SITagliptin (JANUVIA) 100 MG tablet, TAKE 1 TABLET BY MOUTH EVERY DAY, Disp: , Rfl:     valsartan (DIOVAN) 320 MG tablet, Take 320 mg by mouth daily, Disp: , Rfl:   Allergies   Allergen Reactions    Azithromycin Hives    Oxaprozin Other (See Comments)     ELEVATED BLOOD PRESSURE     Past Medical History:   Diagnosis Date    Acute respiratory failure with hypoxia (HCC) 10/23/2014    MINI (acute kidney injury) ([facility] 9/15/2014    [facility]     MINI (acute kidney injury) ([facility]     Allergic rhinitis 11/10/2015    Asthma 11/10/2015    Benign essential hypertension 11/10/2015    Benign neoplasm of colon 11/10/2015    CAD (coronary artery disease) 11/10/2015    CAD (coronary artery disease) 1998, 1999    mix2, 3 stents [id number]    CAP (community acquired pneumonia) 12/31/2020    Cardiomyopathy ([address] 07/69/0236    Complicated wound infection 11/10/2015    COPD (chronic obstructive pulmonary disease) with emphysema ([address] 11/10/2015    COPD exacerbation ([address] 10/12/2011    COVID-19 12/31/2020    Diabetes mellitus type 2, controlled ([address] 11/10/2015    Diabetic neuropathy ([address] 11/10/2015    Disruption of wound, unspecified 10/9/2014    Encounter for long-term (current) use of other high-risk medications 11/10/2015    Extremity atherosclerosis with intermittent claudication ([address] 11/10/2015    H/O endarterectomy 11/10/2015    Hiatal hernia 11/10/2015    Hyperglycemia due to type 1 diabetes mellitus ([address] 11/10/2015    Hyperlipidemia 11/10/2015    ICD (implantable cardioverter-defibrillator) in place 6/16/2022    Monomorphic ventricular tachycardia ([address] 12/31/2020    Myocardial infarction ([address] 1999    Myocardial infarction ([address] 11/10/2015    Obesity 11/10/2015    Orthostatic hypotension 11/10/2015    Osteoarthritis 11/10/2015    Peripheral vascular disease ([address] 11/10/2015    PNA (pneumonia) 2/8/2019    PVC (premature ventricular contraction)     Rash 67/86/6692    Seroma complicating a procedure 10/2/2014    Sleep apnea     cpap    Toe laceration     Type 2 diabetes with nephropathy ([address]     Uncontrolled type 2 diabetes mellitus ([address] 11/10/2015    Vertiginous syndromes and other disorders of vestibular system 11/10/2015     Past Surgical History:   Procedure Laterality Date    CARDIAC CATHETERIZATION  12/05/2018    LV EF=40%. LM:nl. LAD:30-40% mid stenosis. LCX OM1:95% stenosis. RCA:100% occlusion at RV branch.     CORONARY ANGIOPLASTY WITH STENT PLACEMENT  12/05/2018    LCX OM1:2.5 x 12 [place] RAKESH    CORONARY ANGIOPLASTY WITH STENT PLACEMENT  1999    [place] ABDOMEN SURGERY PROC UNLISTED 1960    abdominal cyst removed    [place] CARDIAC SURG PROCEDURE UNLIST  1999    stents x3    VASCULAR SURGERY  11/5/14    Repair of right common femoral artery     VASCULAR SURGERY  9/11/14    tiffanie femoral endarterectomy     Family History   Problem Relation Age of Onset    Breast Cancer Mother     Hypertension Mother     Other Father         DIVERTICULITIS    Crohn's Disease Sister     Lung Disease Brother         mesothioloma    Diabetes Sister     Heart Attack Father     Heart Disease Father     Stroke Mother       Social History     Tobacco Use    Smoking status: Former Smoker     Packs/day: 1.00     Quit date: 10/2/2011     Years since quitting: 10.7    Smokeless tobacco: Current User   Substance Use Topics    Alcohol use: Yes     Alcohol/week: 0.0 standard drinks       ROS:    Review of Systems   Constitutional: Negative for chills, decreased appetite, diaphoresis, fever and malaise/fatigue. HENT: Negative for congestion, hearing loss, hoarse voice and nosebleeds. Eyes: Negative for blurred vision. Cardiovascular: Negative for chest pain, claudication, cyanosis, dyspnea on exertion, irregular heartbeat, leg swelling, near-syncope, orthopnea, palpitations, paroxysmal nocturnal dyspnea and syncope. Respiratory: Negative for cough, hemoptysis, shortness of breath, sputum production and wheezing. Endocrine: Negative for polydipsia, polyphagia and polyuria. Skin: Negative for color change. Musculoskeletal: Positive for back pain and falls. Gastrointestinal: Negative for abdominal pain, bowel incontinence, diarrhea, hematemesis, hematochezia, nausea and vomiting. Genitourinary: Negative for dysuria, frequency and hematuria. Neurological: Negative for dizziness, focal weakness, headaches, light-headedness, loss of balance, numbness, sensory change and weakness. Psychiatric/Behavioral: Negative for altered mental status and memory loss.            PHYSICAL EXAM:   /70   Pulse 60 [place]. ICD (implantable cardioverter-defibrillator) in place:Device clinic as scheduled. Disposition:    Return in about 6 months (around 12/16/2022).                 Kya Gandara MD  6/16/2022  2:31 PM no

## 2022-12-21 NOTE — CONSULT NOTE ADULT - CONSULT REASON
PE thrombectomy
Hx of DVt early december 2022 discharged on eliquis  now found to have PE.
PERT
Pulmonary Embolism and Infarct

## 2022-12-21 NOTE — CONSULT NOTE ADULT - SUBJECTIVE AND OBJECTIVE BOX
INTERVENTIONAL RADIOLOGY CONSULT:     HPI:  63yo man w/ PMH EtOH abuse, CKD2, gout, HTN, and arthritis was brought in from [facility] for "not feeling right", associated with palpitations, anxiety, R shoulder/arm pain, and SOB    Pt endorses being in usual state of health on waking up this morning but over the course of the morning the above sxs arose and worsened.     Of note pt was recently d/c'd from [facility] on eliquis for DVT; per pt [facility] manages his medication and he never misses a dose.    Also of note, pt endorses ongoing EtOH misuse; endorses 1 pint of vodka a day - he begins drinking in the AM and finishes the pint before bed; last drink was this AM    Pt denies CP, PATHAK, n/v/d, fevers, wt changes    ED  /65 HR 91 RR 19 97% RA T 98.3F  Hg 13.2 Trops negative   CT PE BL PE distl BL pulmonary arteries extending into the segmental and subsegmental arteries; mild right heart strain; Wedge shaped right lower lobe opacity likely PE  Pt seen by pulm  Pt was given enoxaparin and admitted to SDU     (20 Dec 2022 15:08)      PAST MEDICAL & SURGICAL HISTORY:  Alcohol dependence      Vertigo      HTN (hypertension)      Hard of hearing      Gout      Seasonal allergies      CKD (chronic kidney disease)      No significant past surgical history          MEDICATIONS  (STANDING):  chlorhexidine 2% Cloths 1 Application(s) Topical <User Schedule>  cyanocobalamin 1000 MICROGram(s) Oral daily  folic acid 1 milliGRAM(s) Oral daily  gabapentin 200 milliGRAM(s) Oral two times a day  metoprolol succinate ER 25 milliGRAM(s) Oral daily  multivitamin 1 Tablet(s) Oral daily  naltrexone 50 milliGRAM(s) Oral daily  pantoprazole    Tablet 40 milliGRAM(s) Oral before breakfast  tamsulosin 0.4 milliGRAM(s) Oral at bedtime  thiamine 100 milliGRAM(s) Oral daily    MEDICATIONS  (PRN):  acetaminophen     Tablet .. 650 milliGRAM(s) Oral every 6 hours PRN Temp greater or equal to 38C (100.4F), Mild Pain (1 - 3)  aluminum hydroxide/magnesium hydroxide/simethicone Suspension 30 milliLiter(s) Oral every 4 hours PRN Dyspepsia  LORazepam     Tablet 2 milliGRAM(s) Oral every 1 hour PRN Symptom-triggered: each CIWA -Ar score 8 or GREATER  melatonin 3 milliGRAM(s) Oral at bedtime PRN Insomnia  ondansetron Injectable 4 milliGRAM(s) IV Push every 8 hours PRN Nausea and/or Vomiting      Allergies    Beef (Hives)  dairy products (Hives)  No Known Drug Allergies  shellfish (Hives)    Intolerances        Physical Exam:   Vital Signs Last 24 Hrs  T(C): 36.3 (20 Dec 2022 16:17), Max: 36.8 (20 Dec 2022 07:34)  T(F): 97.3 (20 Dec 2022 16:17), Max: 98.3 (20 Dec 2022 07:34)  HR: 85 (20 Dec 2022 16:17) (85 - 91)  BP: 157/70 (20 Dec 2022 16:17) (141/65 - 160/96)  BP(mean): --  RR: 17 (20 Dec 2022 16:17) (17 - 19)  SpO2: 100% (20 Dec 2022 16:17) (97% - 100%)    Parameters below as of 20 Dec 2022 07:34  Patient On (Oxygen Delivery Method): room air        Labs:                         13.2   5.88  )-----------( 263      ( 20 Dec 2022 10:40 )             38.6     12-20    141  |  99  |  11  ----------------------------<  82  4.6   |  21  |  1.5    Ca    9.3      20 Dec 2022 10:40  Mg     1.8     12-20    TPro  7.2  /  Alb  4.6  /  TBili  0.8  /  DBili  x   /  AST  26  /  ALT  12  /  AlkPhos  103  12-20        Pertinent labs:                      13.2   5.88  )-----------( 263      ( 20 Dec 2022 10:40 )             38.6       12-20    141  |  99  |  11  ----------------------------<  82  4.6   |  21  |  1.5    Ca    9.3      20 Dec 2022 10:40  Mg     1.8     12-20    TPro  7.2  /  Alb  4.6  /  TBili  0.8  /  DBili  x   /  AST  26  /  ALT  12  /  AlkPhos  103  12-20          ASSESSMENT/PLAN:   HPI:  63yo man w/ PMH EtOH abuse, CKD2, gout, HTN, and arthritis was brought in from [facility] for "not feeling right", associated with palpitations, anxiety, R shoulder/arm pain, and SOB. CT reveals b/l PE. At this time patient remains hemodynamically stable, saturating 100% on RA, without laboratory marker evidence of heart strain.     - Radiology imaging reviewed.  - No IR intervention at this time for pulmonary emboli.   - Continue expectant management.    Thank you for the courtesy of this consult, please call [phone] with any further questions.   
Patient is a 62y old  Male who presents with a chief complaint of     HPI:62 year old male, past medical history htn, hdl, etoh abuse, ckd, gout, arthritis, LLE DVT on eliquis, who presents with chest pain. patient with intermittent episodes of chest tightness and shortness of breath that began last night prompting ed eval. denies f/c, hemoptysis, back pain, abd pain, vomiting/diarrhea, syncope. patient has been compliant with meds since dc, last drink last night, no hx alcohol withdrawal seizures.  MICU called to evaluate for PE       PAST MEDICAL & SURGICAL HISTORY:  Alcohol dependence      Vertigo      HTN (hypertension)      Hard of hearing      Gout      Seasonal allergies      CKD (chronic kidney disease)      No significant past surgical history          SOCIAL HX:   Smoking   UTO                    FAMILY HISTORY:  Family history of gastric cancer  Mom    Family hx of lung cancer  Smoker      :  No known cardiovacular family hisotry     Review Of Systems:     All ROS are negative except per HPI       Allergies    Beef (Hives)  dairy products (Hives)  No Known Drug Allergies  shellfish (Hives)    Intolerances          PHYSICAL EXAM    ICU Vital Signs Last 24 Hrs  T(C): 36.8 (20 Dec 2022 07:34), Max: 36.8 (20 Dec 2022 07:34)  T(F): 98.3 (20 Dec 2022 07:34), Max: 98.3 (20 Dec 2022 07:34)  HR: 91 (20 Dec 2022 07:34) (91 - 91)  BP: 141/65 (20 Dec 2022 07:34) (141/65 - 141/65)  RR: 19 (20 Dec 2022 07:34) (19 - 19)  SpO2: 97% (20 Dec 2022 07:34) (97% - 97%)    O2 Parameters below as of 20 Dec 2022 07:34  Patient On (Oxygen Delivery Method): room air            CONSTITUTIONAL:  NAD     ENT:   Airway patent,   Mouth with normal mucosa.   No thrush      CARDIAC:   Normal rate,   Regular rhythm.    No edema      Vascular:   normal systolic impulse  no bruits    RESPIRATORY:   No wheezing  Bilateral BS   Not tachypneic,  No use of accessory muscles    GASTROINTESTINAL:  Abdomen soft,   Non-tender,   No guarding,   + BS      NEUROLOGICAL:   Alert and oriented   No motor deficits.               LABS:                          13.2   5.88  )-----------( 263      ( 20 Dec 2022 10:40 )             38.6                                               12-20    141  |  99  |  11  ----------------------------<  82  4.6   |  21  |  1.5    Ca    9.3      20 Dec 2022 10:40  Mg     1.8     12-20    TPro  7.2  /  Alb  4.6  /  TBili  0.8  /  DBili  x   /  AST  26  /  ALT  12  /  AlkPhos  103  12-20                                                 CARDIAC MARKERS ( 20 Dec 2022 10:40 )  x     / <0.01 ng/mL / x     / x     / x                                                LIVER FUNCTIONS - ( 20 Dec 2022 10:40 )  Alb: 4.6 g/dL / Pro: 7.2 g/dL / ALK PHOS: 103 U/L / ALT: 12 U/L / AST: 26 U/L / GGT: x                                                                                                  CXR interpreted by me     MEDICATIONS  (STANDING):  enoxaparin Injectable 100 milliGRAM(s) SubCutaneous Once    MEDICATIONS  (PRN):        
GENERAL SURGERY CONSULT NOTE    Patient: CAT ALSTON , 62y ([birth year])Male   MRN: [id number]  Location: [facility] [id number] B  Visit: 12-20-22 Inpatient  Date: 12-21-22 @ 19:25    HPI:  63yo man w/ PMH EtOH abuse, CKD2, gout, HTN, and arthritis was brought in from [facility] for "not feeling right", associated with palpitations, anxiety, R shoulder/arm pain, and SOB  Pt endorses being in usual state of health on waking up this morning but over the course of the morning the above sxs arose and worsened.   Of note pt was recently d/c'd from [facility] on eliquis for DVT; per pt [facility] manages his medication and he never misses a dose.  Also of note, pt endorses ongoing EtOH misuse; endorses 1 pint of vodka a day - he begins drinking in the AM and finishes the pint before bed; last drink was this AM  Pt denies CP, PATHAK, n/v/d, fevers, wt changes    ED  /65 HR 91 RR 19 97% RA T 98.3F  Hg 13.2 Trops negative   CT PE BL PE distl BL pulmonary arteries extending into the segmental and subsegmental arteries; mild right heart strain; Wedge shaped right lower lobe opacity likely PE  Pt seen by pulm  Pt was given enoxaparin and admitted to SDU     (20 Dec 2022 15:08)    VASCULAR SURGERY: Consulted for CT imaging revealing pulmonary embolism. Patient seen and evaluated at bedside. He is asymptomatic on supplemental oxygen. Denies chest pain, shortness of breath, nausea, vomiting, lightheadedness, or dizziness. Patient states that he does not know if he was taking anticoagulation because he doesn't believe it was delivered to the pharmacy at [facility]. Patient had difficulty remembering whether his DVT prophylaxis was given to him at [facility]. He states that he was "feeling off" with slight lightheadedness, chest tightness, and anxiety that caused him to come in to be evaluated. Patient states that he was actively drinking prior to hospitalization.     PAST MEDICAL & SURGICAL HISTORY:  Alcohol dependence  Vertigo  HTN (hypertension)  Hard of hearing  Gout  Seasonal allergies  CKD (chronic kidney disease)  No significant past surgical history    Home Medications:  folic acid 1 mg oral tablet: 1 tab(s) orally once a day (09 Dec 2022 01:56)  Vitamin B12 100 mcg oral tablet: 1 tab(s) orally once a day (09 Dec 2022 01:56)    VITALS:  T(F): 97.4 (12-21-22 @ 14:57), Max: 97.6 (12-21-22 @ 07:48)  HR: 79 (12-21-22 @ 14:57) (75 - 91)  BP: 133/63 (12-21-22 @ 14:57) (133/60 - 164/73)  RR: 18 (12-21-22 @ 14:57) (18 - 18)  SpO2: 98% (12-21-22 @ 07:48) (98% - 100%)    PHYSICAL EXAM:  General: NAD, AAOx3, calm and cooperative  HEENT: NCAT  Cardiac: No peripheral cyanosis or pallor, extremities well perfused   Respiratory: Non-labored breathing, equal chest rise bilaterally, on supplemental oxygen   Abdomen: Soft, non-distended  Musculoskeletal: Strength 5/5 BL UE/LE, ROM intact, compartments soft  Neuro: At baseline  Vascular: Pulses 2+ throughout, extremities well perfused  Skin: Warm/dry, normal color, no jaundice    MEDICATIONS  (STANDING):  chlorhexidine 2% Cloths 1 Application(s) Topical <User Schedule>  cyanocobalamin 1000 MICROGram(s) Oral daily  enoxaparin Injectable 100 milliGRAM(s) SubCutaneous every 12 hours  folic acid 1 milliGRAM(s) Oral daily  gabapentin 200 milliGRAM(s) Oral two times a day  influenza   Vaccine 0.5 milliLiter(s) IntraMuscular once  multivitamin 1 Tablet(s) Oral daily  naltrexone 50 milliGRAM(s) Oral daily  pantoprazole    Tablet 40 milliGRAM(s) Oral before breakfast  tamsulosin 0.4 milliGRAM(s) Oral at bedtime  thiamine 100 milliGRAM(s) Oral daily    MEDICATIONS  (PRN):  acetaminophen     Tablet .. 650 milliGRAM(s) Oral every 6 hours PRN Temp greater or equal to 38C (100.4F), Mild Pain (1 - 3)  aluminum hydroxide/magnesium hydroxide/simethicone Suspension 30 milliLiter(s) Oral every 4 hours PRN Dyspepsia  LORazepam   Injectable 2 milliGRAM(s) IV Push every 1 hour PRN Symptom-triggered: each CIWA -Ar score 8 or GREATER  melatonin 3 milliGRAM(s) Oral at bedtime PRN Insomnia  ondansetron Injectable 4 milliGRAM(s) IV Push every 8 hours PRN Nausea and/or Vomiting      LAB/STUDIES:                        12.8   5.16  )-----------( 237      ( 21 Dec 2022 07:06 )             39.3     12-21    140  |  97<L>  |  13  ----------------------------<  85  4.7   |  22  |  1.3    Ca    9.6      21 Dec 2022 07:06  Phos  3.3     12-21  Mg     1.9     12-21    TPro  7.2  /  Alb  4.7  /  TBili  1.5<H>  /  DBili  x   /  AST  22  /  ALT  11  /  AlkPhos  99  12-21    PT/INR - ( 20 Dec 2022 16:48 )   PT: 13.50 sec;   INR: 1.18 ratio         PTT - ( 20 Dec 2022 16:48 )  PTT:39.7 sec  LIVER FUNCTIONS - ( 21 Dec 2022 07:06 )  Alb: 4.7 g/dL / Pro: 7.2 g/dL / ALK PHOS: 99 U/L / ALT: 11 U/L / AST: 22 U/L / GGT: x             CARDIAC MARKERS ( 20 Dec 2022 22:15 )  x     / <0.01 ng/mL / x     / x     / x      CARDIAC MARKERS ( 20 Dec 2022 16:48 )  x     / <0.01 ng/mL / x     / x     / x      CARDIAC MARKERS ( 20 Dec 2022 10:40 )  x     / <0.01 ng/mL / x     / x     / x        IMAGING:    CT Angio Chest PE Protocol w/ IV Cont:     1. Acute bilateral pulmonary emboli involving distal aspects of bilateral   main pulmonary arteries, extending into segmental and subsegmental   branches of the right lower lobe, left lower lobe and lingular pulmonary   arteries; mild right heart strain is noted.    2. Small wedge-shaped right lower lobe opacity is new since October 2022,   suspicious for a small pulmonary infarct.    ACCESS DEVICES:  [x] Peripheral IV  [ ] Central Venous Line	[ ] R	[ ] L	[ ] IJ	[ ] Fem	[ ] SC	Placed:   [ ] Arterial Line		[ ] R	[ ] L	[ ] Fem	[ ] Rad	[ ] Ax	Placed:   [ ] PICC:					[ ] Mediport  [ ] Urinary Catheter, Date Placed:     
Patient is a 62y old  Male who presents with a chief complaint of PE (20 Dec 2022 17:01)      HPI:  61yo man w/ PMH EtOH abuse, CKD2, gout, HTN, and arthritis was brought in from [facility] for "not feeling right", associated with palpitations, anxiety, R shoulder/arm pain, and SOB    Pt endorses being in usual state of health on waking up this morning but over the course of the morning the above sxs arose and worsened.     Of note pt was recently d/c'd from [facility] on eliquis for DVT; per pt [facility] manages his medication and he never misses a dose.    Also of note, pt endorses ongoing EtOH misuse; endorses 1 pint of vodka a day - he begins drinking in the AM and finishes the pint before bed; last drink was this AM    Pt denies CP, PATHAK, n/v/d, fevers, wt changes    ED  /65 HR 91 RR 19 97% RA T 98.3F  Hg 13.2 Trops negative   CT PE BL PE distl BL pulmonary arteries extending into the segmental and subsegmental arteries; mild right heart strain; Wedge shaped right lower lobe opacity likely PE  Pt seen by pulm  Pt was given enoxaparin and admitted to SDU     (20 Dec 2022 15:08)       ROS:  Negative except for:    PAST MEDICAL & SURGICAL HISTORY:  Alcohol dependence      Vertigo      HTN (hypertension)      Hard of hearing      Gout      Seasonal allergies      CKD (chronic kidney disease)      No significant past surgical history          SOCIAL HISTORY: alcoholic, former smoker.    FAMILY HISTORY:  Family history of gastric cancer  Mom    Family hx of lung cancer  Smoker          MEDICATIONS  (STANDING):  chlorhexidine 2% Cloths 1 Application(s) Topical <User Schedule>  cyanocobalamin 1000 MICROGram(s) Oral daily  enoxaparin Injectable 100 milliGRAM(s) SubCutaneous every 12 hours  folic acid 1 milliGRAM(s) Oral daily  gabapentin 200 milliGRAM(s) Oral two times a day  influenza   Vaccine 0.5 milliLiter(s) IntraMuscular once  multivitamin 1 Tablet(s) Oral daily  naltrexone 50 milliGRAM(s) Oral daily  pantoprazole    Tablet 40 milliGRAM(s) Oral before breakfast  tamsulosin 0.4 milliGRAM(s) Oral at bedtime  thiamine 100 milliGRAM(s) Oral daily    MEDICATIONS  (PRN):  acetaminophen     Tablet .. 650 milliGRAM(s) Oral every 6 hours PRN Temp greater or equal to 38C (100.4F), Mild Pain (1 - 3)  aluminum hydroxide/magnesium hydroxide/simethicone Suspension 30 milliLiter(s) Oral every 4 hours PRN Dyspepsia  LORazepam   Injectable 2 milliGRAM(s) IV Push every 1 hour PRN Symptom-triggered: each CIWA -Ar score 8 or GREATER  melatonin 3 milliGRAM(s) Oral at bedtime PRN Insomnia  ondansetron Injectable 4 milliGRAM(s) IV Push every 8 hours PRN Nausea and/or Vomiting      Weight (kg): 113.6 (12-21-22 @ 14:57)  Allergies    Beef (Hives)  dairy products (Hives)  No Known Drug Allergies  shellfish (Hives)    Intolerances        Vital Signs Last 24 Hrs  T(C): 36.3 (21 Dec 2022 14:57), Max: 36.4 (21 Dec 2022 07:48)  T(F): 97.4 (21 Dec 2022 14:57), Max: 97.6 (21 Dec 2022 07:48)  HR: 79 (21 Dec 2022 14:57) (75 - 95)  BP: 133/63 (21 Dec 2022 14:57) (126/81 - 164/73)  BP(mean): --  RR: 18 (21 Dec 2022 14:57) (17 - 18)  SpO2: 98% (21 Dec 2022 07:48) (98% - 100%)    Parameters below as of 21 Dec 2022 02:03  Patient On (Oxygen Delivery Method): nasal cannula  O2 Flow (L/min): 2      PHYSICAL EXAM  General: adult in NAD  HEENT: clear oropharynx, anicteric sclera, pink conjunctiva  Neck: supple  CV: normal S1/S2 with no murmur rubs or gallops  Lungs: positive air movement b/l ant lungs,clear to auscultation, no wheezes, no rales  Abdomen: soft non-tender non-distended, no hepatosplenomegaly  Ext: no clubbing cyanosis or edema  Skin: no rashes and no petechiae  Neuro: alert and oriented X 4, no focal deficits      LABS:                          12.8   5.16  )-----------( 237      ( 21 Dec 2022 07:06 )             39.3         Mean Cell Volume : 95.9 fL  Mean Cell Hemoglobin : 31.2 pg  Mean Cell Hemoglobin Concentration : 32.6 g/dL  Auto Neutrophil # : 3.51 K/uL  Auto Lymphocyte # : 1.12 K/uL  Auto Monocyte # : 0.34 K/uL  Auto Eosinophil # : 0.10 K/uL  Auto Basophil # : 0.08 K/uL  Auto Neutrophil % : 68.0 %  Auto Lymphocyte % : 21.7 %  Auto Monocyte % : 6.6 %  Auto Eosinophil % : 1.9 %  Auto Basophil % : 1.6 %      Serial CBC's  12-21 @ 07:06  Hct-39.3 / Hgb-12.8 / Plat-237 / RBC-4.10 / WBC-5.16  Serial CBC's  12-20 @ 10:40  Hct-38.6 / Hgb-13.2 / Plat-263 / RBC-4.07 / WBC-5.88      12-21    140  |  97<L>  |  13  ----------------------------<  85  4.7   |  22  |  1.3    Ca    9.6      21 Dec 2022 07:06  Phos  3.3     12-21  Mg     1.9     12-21    TPro  7.2  /  Alb  4.7  /  TBili  1.5<H>  /  DBili  x   /  AST  22  /  ALT  11  /  AlkPhos  99  12-21      PT/INR - ( 20 Dec 2022 16:48 )   PT: 13.50 sec;   INR: 1.18 ratio         PTT - ( 20 Dec 2022 16:48 )  PTT:39.7 sec        RADIOLOGY & ADDITIONAL STUDIES:< from: CT Angio Chest PE Protocol w/ IV Cont (12.20.22 @ 13:26) >  IMPRESSION:    1. Acute bilateral pulmonary emboli involving distal aspects of bilateral   main pulmonary arteries, extending into segmental and subsegmental   branches of the right lower lobe, left lower lobe and lingular pulmonary   arteries; mild right heart strain is noted.    2. Small wedge-shaped right lower lobe opacity is new since October 2022,   suspicious for a small pulmonary infarct.    < end of copied text >  < from: [facility] Duplex Lower Ext Vein Scan, Bilat (12.08.22 @ 19:24) >    Impression:    Right:  Acute appearing deep venous thrombus within the right popliteal vein.   Superficial thrombophlebitis of the greater saphenous vein at the mid   calf and tributaries in the calf.    Left:  Acute appearing deep venous thrombosis of the common femoral, femoral,   deep femoral, popliteal, gastrocnemius and posterior tibial veins.   Superficial thrombophlebitis within the calf tributary veins.    ICD-10:M79.89    < end of copied text >

## 2022-12-22 ENCOUNTER — TRANSCRIPTION ENCOUNTER (OUTPATIENT)
Age: 62
End: 2022-12-22

## 2022-12-22 LAB
ALBUMIN SERPL ELPH-MCNC: 4.3 G/DL — SIGNIFICANT CHANGE UP (ref 3.5–5.2)
ALBUMIN SERPL ELPH-MCNC: 4.3 G/DL — SIGNIFICANT CHANGE UP (ref 3.5–5.2)
ALP SERPL-CCNC: 100 U/L — SIGNIFICANT CHANGE UP (ref 30–115)
ALP SERPL-CCNC: 89 U/L — SIGNIFICANT CHANGE UP (ref 30–115)
ALT FLD-CCNC: 7 U/L — SIGNIFICANT CHANGE UP (ref 0–41)
ALT FLD-CCNC: 9 U/L — SIGNIFICANT CHANGE UP (ref 0–41)
ANION GAP SERPL CALC-SCNC: 11 MMOL/L — SIGNIFICANT CHANGE UP (ref 7–14)
ANION GAP SERPL CALC-SCNC: 13 MMOL/L — SIGNIFICANT CHANGE UP (ref 7–14)
APTT BLD: 39.7 SEC — HIGH (ref 27–39.2)
AST SERPL-CCNC: 18 U/L — SIGNIFICANT CHANGE UP (ref 0–41)
AST SERPL-CCNC: 20 U/L — SIGNIFICANT CHANGE UP (ref 0–41)
BASOPHILS # BLD AUTO: 0.07 K/UL — SIGNIFICANT CHANGE UP (ref 0–0.2)
BASOPHILS # BLD AUTO: 0.08 K/UL — SIGNIFICANT CHANGE UP (ref 0–0.2)
BASOPHILS NFR BLD AUTO: 1.3 % — HIGH (ref 0–1)
BASOPHILS NFR BLD AUTO: 1.4 % — HIGH (ref 0–1)
BILIRUB SERPL-MCNC: 1.1 MG/DL — SIGNIFICANT CHANGE UP (ref 0.2–1.2)
BILIRUB SERPL-MCNC: 1.2 MG/DL — SIGNIFICANT CHANGE UP (ref 0.2–1.2)
BLD GP AB SCN SERPL QL: SIGNIFICANT CHANGE UP
BUN SERPL-MCNC: 11 MG/DL — SIGNIFICANT CHANGE UP (ref 10–20)
BUN SERPL-MCNC: 12 MG/DL — SIGNIFICANT CHANGE UP (ref 10–20)
CALCIUM SERPL-MCNC: 8.9 MG/DL — SIGNIFICANT CHANGE UP (ref 8.4–10.5)
CALCIUM SERPL-MCNC: 9.3 MG/DL — SIGNIFICANT CHANGE UP (ref 8.4–10.4)
CHLORIDE SERPL-SCNC: 96 MMOL/L — LOW (ref 98–110)
CHLORIDE SERPL-SCNC: 98 MMOL/L — SIGNIFICANT CHANGE UP (ref 98–110)
CO2 SERPL-SCNC: 26 MMOL/L — SIGNIFICANT CHANGE UP (ref 17–32)
CO2 SERPL-SCNC: 26 MMOL/L — SIGNIFICANT CHANGE UP (ref 17–32)
CREAT SERPL-MCNC: 1.2 MG/DL — SIGNIFICANT CHANGE UP (ref 0.7–1.5)
CREAT SERPL-MCNC: 1.3 MG/DL — SIGNIFICANT CHANGE UP (ref 0.7–1.5)
EGFR: 62 ML/MIN/1.73M2 — SIGNIFICANT CHANGE UP
EGFR: 68 ML/MIN/1.73M2 — SIGNIFICANT CHANGE UP
EOSINOPHIL # BLD AUTO: 0.26 K/UL — SIGNIFICANT CHANGE UP (ref 0–0.7)
EOSINOPHIL # BLD AUTO: 0.29 K/UL — SIGNIFICANT CHANGE UP (ref 0–0.7)
EOSINOPHIL NFR BLD AUTO: 4.4 % — SIGNIFICANT CHANGE UP (ref 0–8)
EOSINOPHIL NFR BLD AUTO: 5.8 % — SIGNIFICANT CHANGE UP (ref 0–8)
GLUCOSE SERPL-MCNC: 106 MG/DL — HIGH (ref 70–99)
GLUCOSE SERPL-MCNC: 113 MG/DL — HIGH (ref 70–99)
HCT VFR BLD CALC: 34.8 % — LOW (ref 42–52)
HCT VFR BLD CALC: 35 % — LOW (ref 42–52)
HGB BLD-MCNC: 12.4 G/DL — LOW (ref 14–18)
HGB BLD-MCNC: 12.4 G/DL — LOW (ref 14–18)
IMM GRANULOCYTES NFR BLD AUTO: 0.2 % — SIGNIFICANT CHANGE UP (ref 0.1–0.3)
IMM GRANULOCYTES NFR BLD AUTO: 0.2 % — SIGNIFICANT CHANGE UP (ref 0.1–0.3)
INR BLD: 1.13 RATIO — SIGNIFICANT CHANGE UP (ref 0.65–1.3)
LYMPHOCYTES # BLD AUTO: 1.15 K/UL — LOW (ref 1.2–3.4)
LYMPHOCYTES # BLD AUTO: 1.36 K/UL — SIGNIFICANT CHANGE UP (ref 1.2–3.4)
LYMPHOCYTES # BLD AUTO: 22.9 % — SIGNIFICANT CHANGE UP (ref 20.5–51.1)
LYMPHOCYTES # BLD AUTO: 23 % — SIGNIFICANT CHANGE UP (ref 20.5–51.1)
MAGNESIUM SERPL-MCNC: 1.6 MG/DL — LOW (ref 1.8–2.4)
MAGNESIUM SERPL-MCNC: 1.6 MG/DL — LOW (ref 1.8–2.4)
MCHC RBC-ENTMCNC: 33 PG — HIGH (ref 27–31)
MCHC RBC-ENTMCNC: 33.2 PG — HIGH (ref 27–31)
MCHC RBC-ENTMCNC: 35.4 G/DL — SIGNIFICANT CHANGE UP (ref 32–37)
MCHC RBC-ENTMCNC: 35.6 G/DL — SIGNIFICANT CHANGE UP (ref 32–37)
MCV RBC AUTO: 93 FL — SIGNIFICANT CHANGE UP (ref 80–94)
MCV RBC AUTO: 93.1 FL — SIGNIFICANT CHANGE UP (ref 80–94)
MONOCYTES # BLD AUTO: 0.24 K/UL — SIGNIFICANT CHANGE UP (ref 0.1–0.6)
MONOCYTES # BLD AUTO: 0.28 K/UL — SIGNIFICANT CHANGE UP (ref 0.1–0.6)
MONOCYTES NFR BLD AUTO: 4.7 % — SIGNIFICANT CHANGE UP (ref 1.7–9.3)
MONOCYTES NFR BLD AUTO: 4.8 % — SIGNIFICANT CHANGE UP (ref 1.7–9.3)
NEUTROPHILS # BLD AUTO: 3.25 K/UL — SIGNIFICANT CHANGE UP (ref 1.4–6.5)
NEUTROPHILS # BLD AUTO: 3.94 K/UL — SIGNIFICANT CHANGE UP (ref 1.4–6.5)
NEUTROPHILS NFR BLD AUTO: 64.8 % — SIGNIFICANT CHANGE UP (ref 42.2–75.2)
NEUTROPHILS NFR BLD AUTO: 66.5 % — SIGNIFICANT CHANGE UP (ref 42.2–75.2)
NRBC # BLD: 0 /100 WBCS — SIGNIFICANT CHANGE UP (ref 0–0)
NRBC # BLD: 0 /100 WBCS — SIGNIFICANT CHANGE UP (ref 0–0)
PLATELET # BLD AUTO: 175 K/UL — SIGNIFICANT CHANGE UP (ref 130–400)
PLATELET # BLD AUTO: 180 K/UL — SIGNIFICANT CHANGE UP (ref 130–400)
POTASSIUM SERPL-MCNC: 3.8 MMOL/L — SIGNIFICANT CHANGE UP (ref 3.5–5)
POTASSIUM SERPL-MCNC: 3.8 MMOL/L — SIGNIFICANT CHANGE UP (ref 3.5–5)
POTASSIUM SERPL-SCNC: 3.8 MMOL/L — SIGNIFICANT CHANGE UP (ref 3.5–5)
POTASSIUM SERPL-SCNC: 3.8 MMOL/L — SIGNIFICANT CHANGE UP (ref 3.5–5)
PROT SERPL-MCNC: 6.7 G/DL — SIGNIFICANT CHANGE UP (ref 6–8)
PROT SERPL-MCNC: 6.9 G/DL — SIGNIFICANT CHANGE UP (ref 6–8)
PROTHROM AB SERPL-ACNC: 12.9 SEC — HIGH (ref 9.95–12.87)
RBC # BLD: 3.74 M/UL — LOW (ref 4.7–6.1)
RBC # BLD: 3.76 M/UL — LOW (ref 4.7–6.1)
RBC # FLD: 15.3 % — HIGH (ref 11.5–14.5)
RBC # FLD: 15.5 % — HIGH (ref 11.5–14.5)
SODIUM SERPL-SCNC: 135 MMOL/L — SIGNIFICANT CHANGE UP (ref 135–146)
SODIUM SERPL-SCNC: 135 MMOL/L — SIGNIFICANT CHANGE UP (ref 135–146)
WBC # BLD: 5.01 K/UL — SIGNIFICANT CHANGE UP (ref 4.8–10.8)
WBC # BLD: 5.93 K/UL — SIGNIFICANT CHANGE UP (ref 4.8–10.8)
WBC # FLD AUTO: 5.01 K/UL — SIGNIFICANT CHANGE UP (ref 4.8–10.8)
WBC # FLD AUTO: 5.93 K/UL — SIGNIFICANT CHANGE UP (ref 4.8–10.8)

## 2022-12-22 PROCEDURE — 99233 SBSQ HOSP IP/OBS HIGH 50: CPT

## 2022-12-22 PROCEDURE — 93306 TTE W/DOPPLER COMPLETE: CPT | Mod: 26

## 2022-12-22 PROCEDURE — 99232 SBSQ HOSP IP/OBS MODERATE 35: CPT

## 2022-12-22 RX ORDER — ENOXAPARIN SODIUM 100 MG/ML
100 INJECTION SUBCUTANEOUS
Qty: 20 | Refills: 0
Start: 2022-12-22 | End: 2022-12-31

## 2022-12-22 RX ORDER — MAGNESIUM SULFATE 500 MG/ML
2 VIAL (ML) INJECTION ONCE
Refills: 0 | Status: COMPLETED | OUTPATIENT
Start: 2022-12-22 | End: 2022-12-22

## 2022-12-22 RX ADMIN — GABAPENTIN 200 MILLIGRAM(S): 400 CAPSULE ORAL at 17:45

## 2022-12-22 RX ADMIN — Medication 100 MILLIGRAM(S): at 12:43

## 2022-12-22 RX ADMIN — CHLORHEXIDINE GLUCONATE 1 APPLICATION(S): 213 SOLUTION TOPICAL at 05:23

## 2022-12-22 RX ADMIN — PANTOPRAZOLE SODIUM 40 MILLIGRAM(S): 20 TABLET, DELAYED RELEASE ORAL at 05:26

## 2022-12-22 RX ADMIN — Medication 3 MILLIGRAM(S): at 21:52

## 2022-12-22 RX ADMIN — Medication 1 TABLET(S): at 12:43

## 2022-12-22 RX ADMIN — PREGABALIN 1000 MICROGRAM(S): 225 CAPSULE ORAL at 12:43

## 2022-12-22 RX ADMIN — GABAPENTIN 200 MILLIGRAM(S): 400 CAPSULE ORAL at 05:26

## 2022-12-22 RX ADMIN — ENOXAPARIN SODIUM 100 MILLIGRAM(S): 100 INJECTION SUBCUTANEOUS at 17:46

## 2022-12-22 RX ADMIN — TAMSULOSIN HYDROCHLORIDE 0.4 MILLIGRAM(S): 0.4 CAPSULE ORAL at 21:52

## 2022-12-22 RX ADMIN — Medication 25 GRAM(S): at 22:21

## 2022-12-22 RX ADMIN — Medication 1 MILLIGRAM(S): at 12:43

## 2022-12-22 NOTE — PROGRESS NOTE ADULT - ASSESSMENT
61yo man w/ PMH EtOH abuse, CKD2, gout, HTN, recently diagnosed B/L LE DVT in 12/22 and discharged on Eliquis, arthritis was brought in from [facility] for "not feeling right", associated with palpitations, anxiety, R shoulder/arm pain, and SOB and was found to have PE.    Hematology and Oncology consulted due to newly diagnosed PE and recent hx of DVT on eliquis.    #Recurrent Venous thromboembolism.  #Acute bilateral PE; no evidence of right heart strain? Unprovoked 12/20/22.  # Recently diagnosed B/L LE  DVT 12/8/22    -No provoking factors noted. However pt mentioned that he was not mobile ans stayed in bed most of the time  when he was diagnosed with DVT on 12/8/22.  -Venous Duplex of LE: 12/8/22: Acute DVT in right popliteal and acute DVT in left common femoral, femoral, deep femoral, popliteal, gastrocnemius and posterior tibial vein.  -Pt was discharged on eliquis and was taking medication as prescribed. Hem mentioned he doesn't remember the exact name of medication however he was taking whatever   -CTA Chest 12/21/22: Acute b/l pe. Small pulmonary infarct.    Plan  -As per documentation and pt,  pt was compliant with Eliquis at  [facility]  - Eliquis failure unlikely however would recs to switch to  Lovenox 1 mg/kg bid.   -If pt doesn't want to take Lovenox injections in future  can consider to bridiging to warfarin.   -would recs either lovenox alone or bridiging to warfarin.  -wouldn' t recs any other form of anticoagulation as he deveoped PE on eliquis.  -wouldn't recs any IVC filter.  - follow up as out patient with hematology. he has an appointment in 1/122/ 2023 AT 3 PM with Dr. Stewart.  -Age appropriate cancer screening.  61yo man w/ PMH EtOH abuse, CKD2, gout, HTN, recently diagnosed B/L LE DVT in 12/22 and discharged on Eliquis, arthritis was brought in from [facility] for "not feeling right", associated with palpitations, anxiety, R shoulder/arm pain, and SOB and was found to have PE.    Hematology and Oncology consulted due to newly diagnosed PE and recent hx of DVT on eliquis.    #Recurrent Venous thromboembolism.  #Acute bilateral PE; no evidence of right heart strain? Unprovoked 12/20/22.  # Recently diagnosed B/L LE  DVT 12/8/22    -No provoking factors noted. However pt mentioned that he was not mobile ans stayed in bed most of the time  when he was diagnosed with DVT on 12/8/22.  -Venous Duplex of LE: 12/8/22: Acute DVT in right popliteal and acute DVT in left common femoral, femoral, deep femoral, popliteal, gastrocnemius and posterior tibial vein.  -Pt was discharged on eliquis and was taking medication as prescribed. Hem mentioned he doesn't remember the exact name of medication however he was taking whatever   -CTA Chest 12/21/22: Acute b/l pe. Small pulmonary infarct.    Plan  -As per documentation and pt,  pt was compliant with Eliquis at  [facility]  - Eliquis failure is very possible  - recommend  to switch therapy: options include lovenox SQ vs coumadin; pt opted to start  Lovenox 1 mg/kg bid.   -If pt doesn't want to take Lovenox injections in future  can consider to bridiging to warfarin.       -would NOT recs  IVC filter : pt has already developed PE and there is no contraindications to administer anticoagulation and IVC by itself becomes a nodus of VTE   - follow up as out patient with hematology. he has an appointment in 1/122/ 2023 AT 3 PM with Dr. Stewart.  -Age appropriate cancer screening.

## 2022-12-22 NOTE — DISCHARGE NOTE PROVIDER - NSDCMRMEDTOKEN_GEN_ALL_CORE_FT
Flomax 0.4 mg oral capsule: 1 cap(s) orally once a day (at bedtime)  folic acid 1 mg oral tablet: 1 tab(s) orally once a day  gabapentin 100 mg oral capsule: 2 cap(s) orally 2 times a day  loratadine 10 mg oral tablet: 1 tab(s) orally once a day  metoprolol succinate 25 mg oral tablet, extended release: 1 tab(s) orally once a day  Multiple Vitamins oral tablet: 1 tab(s) orally once a day  naltrexone 50 mg oral tablet: 1 tab(s) orally once a day  pantoprazole 40 mg oral delayed release tablet: 1 tab(s) orally once a day (before a meal)  thiamine 100 mg oral tablet: 1 tab(s) orally once a day  Vitamin B12 100 mcg oral tablet: 1 tab(s) orally once a day   enoxaparin 100 mg/mL injectable solution: 100 milligram(s) subcutaneously 2 times a day   Flomax 0.4 mg oral capsule: 1 cap(s) orally once a day (at bedtime)  folic acid 1 mg oral tablet: 1 tab(s) orally once a day  gabapentin 100 mg oral capsule: 2 cap(s) orally 2 times a day  loratadine 10 mg oral tablet: 1 tab(s) orally once a day  metoprolol succinate 25 mg oral tablet, extended release: 1 tab(s) orally once a day  Multiple Vitamins oral tablet: 1 tab(s) orally once a day  naltrexone 50 mg oral tablet: 1 tab(s) orally once a day  pantoprazole 40 mg oral delayed release tablet: 1 tab(s) orally once a day (before a meal)  thiamine 100 mg oral tablet: 1 tab(s) orally once a day  Vitamin B12 100 mcg oral tablet: 1 tab(s) orally once a day

## 2022-12-22 NOTE — DISCHARGE NOTE PROVIDER - NSDCCPCAREPLAN_GEN_ALL_CORE_FT
PRINCIPAL DISCHARGE DIAGNOSIS  Diagnosis: Pulmonary embolism and infarction  Assessment and Plan of Treatment: You had a clot in your lungs. This can cause you to feel shortness of breath, dyspneic, have a high heart rate, and you may have chest pain when you take deep breaths. If any of these symptoms worsen, please go the the nearest ED. You are being treated with blood thinners and you should follow up with the coumadin clinic to bridge from the Lovenox to coumadin.

## 2022-12-22 NOTE — DISCHARGE NOTE PROVIDER - PROVIDER TOKENS
PROVIDER:[TOKEN:[[id number]]],PROVIDER:[TOKEN:[[id number]]],PROVIDER:[TOKEN:[[id number]],FOLLOWUP:[2 weeks]] PROVIDER:[TOKEN:[[id number]]],PROVIDER:[TOKEN:[[id number]]],PROVIDER:[TOKEN:[[id number]],FOLLOWUP:[2 weeks]],FREE:[LAST:[coumadin],FIRST:[clinic],PHONE:[(   )    -],FAX:[(   )    -],ADDRESS:[[address]  [phone]],SCHEDULEDAPPT:[12/26/2022]]

## 2022-12-22 NOTE — DISCHARGE NOTE PROVIDER - HOSPITAL COURSE
63yo M w/ PMH EtOH abuse, CKD2, gout, HTN, and arthritis found to have PE despite being on DOAC    #PEs and multiple R and L lower extremity DVTs  #+RHS  - on Lovenox  - heme consult appreciated, pt failed anticoagulant, will need lovenox and warfarrin bridge  - had extensive discussion with coumadin clinic, juanpablo and TRA, the plan will be to send the pt on Lovenox injections, coumadin clinic made an appt for the pt on 12/27 to start bridging with coumadin. Please see SW note for further details  - NO IVC recc, procedure cancelled by Vascular  - echo      #EtOH abuse  #?hx of opiate use  - on naltrexone 50    #HTN  - c/w Toprol    #GERD  - c/w pantoprazole    # neuropathy  - c/w gabapentin    # BPH  - c/w Flomax   63yo M w/ PMH EtOH abuse, CKD2, gout, HTN, and arthritis found to have PE despite being on DOAC    #PEs and multiple R and L lower extremity DVTs  #+RHS  - on Lovenox  - heme consult appreciated, pt failed anticoagulant, will need lovenox and warfarrin bridge  - had extensive discussion with coumadin clinic, juanpablo and TRA, the plan will be to send the pt on Lovenox injections, coumadin clinic made an appt for the pt on 12/27 to start bridging with coumadin. Please see TRA note for further details  - NO IVC recc, procedure cancelled by Vascular  - echo  < from: TTE Echo Complete w/ Contrast w/ Doppler (12.22.22 @ 13:12) >    Summary:   1. Technically difficult study.   2. Normal global left ventricular systolic function.   3. LV Ejection Fraction by Johnson's Method with a biplane EF of 61 %.   4. Probably normal RV function.   5. Endocardial visualization was enhanced with intravenous echo contrast.   6. Compared to the previous study 4/20/2022, the findings are similar.    Plan to dc on loevonx 100 mg bid and follow up coumadin clinic on monday. pt has appt      #EtOH abuse  #?hx of opiate use  - on naltrexone 50    #HTN  - c/w Toprol    #GERD  - c/w pantoprazole    # neuropathy  - c/w gabapentin    # BPH  - c/w Flomax

## 2022-12-22 NOTE — ED PROVIDER NOTE - NSTIMEPROVIDERCAREINITIATE_GEN_ER
Left message for patient at      Telephone Information:   Mobile [phone]    to schedule procedure.  Patient to return call to Open Access Scheduling Patient Line [phone].   04-Sep-2019 12:11

## 2022-12-22 NOTE — DISCHARGE NOTE PROVIDER - ATTENDING DISCHARGE PHYSICAL EXAMINATION:
Attending attestation  Attending DC note  Pt seen and examined at bedside. No cp or sob.   vitals, labs, exam stable  Hospital course as above.  Plan dw pt and agreed to plan  Medically cleared for DC. Med recc completed.  HEATHER resident. Spent 32 mins on case

## 2022-12-22 NOTE — CHART NOTE - NSCHARTNOTEFT_GEN_A_CORE
Spoke with hematology attending and discussed his plan with our vascular attending Dr. Verdugo  IVC filter procedure cancelled  Please, follow heme recommendations to switch AC to a different medication    Follow up in the office in 2-3 weeks  Dr. Ryan Verdugo/Dr. Bass  Any questions, please, call [phone]

## 2022-12-22 NOTE — DISCHARGE NOTE PROVIDER - CARE PROVIDER_API CALL
Eugene Tellez)  Internal Medicine  [address] [place] [address]  Phone: [phone]  Fax: [phone]  Follow Up Time:     Eliane Stewart)  Hematology; Internal Medicine; Medical Oncology  [address] [place] [address]  Phone: [phone]  Fax: [phone]  Follow Up Time:     Ryan Verdugo)  Surgery; Vascular Surgery  [address] [place] [address]  Phone: [phone]  Fax: [phone]  Follow Up Time: 2 weeks   Eugene Tellez)  Internal Medicine  [address] [place] [address]  Phone: [phone]  Fax: [phone]  Follow Up Time:     Eliane Stewart)  Hematology; Internal Medicine; Medical Oncology  [address] [place] [address]  Phone: [phone]  Fax: [phone]  Follow Up Time:     Ryan Verdugo)  Surgery; Vascular Surgery  [address] [place] [address]  Phone: [phone]  Fax: [phone]  Follow Up Time: 2 weeks    coumagnel luis, Justice Diaz - [phone]  Phone: (   )    -  Fax: (   )    -  Scheduled Appointment: 12/26/2022

## 2022-12-22 NOTE — PROGRESS NOTE ADULT - ASSESSMENT
61yo M w/ PMH EtOH abuse, CKD2, gout, HTN, and arthritis found to have PE despite being on DOAC    #PEs and multiple R and L lower extremity DVTs  #+RHS  - on Lovenox  - heme consult appreciated, pt failed anticoagulant, will need lovenox and warfarrin bridge  - had extensive discussion with coumadin clinic, juanpablo and TRA, the plan will be to send the pt on lovenox injections, coumadin clinic made an appt for the pt on 12/27 to start bridging with coumadin. Please see SW note for further details  - NO IVC recc, procedure cancelled by Vascular  - echo      #EtOH abuse  #?hx of opiate use  - on naltrexone 50    #HTN  - c/w toprol    #GERD  - c/w pantoprazole    # neuropathy  - c/w gabapentin    # BPH  - c/w flomax    #Progress Note Handoff  Pending (specify):  echo  Disposition:  plan will be to send the pt on lovenox injections, coumadin clinic made an appt for the pt on 12/27 to start bridging with coumadin. Please see SW note for further details, juanpablo requesting dc 12/23  Anticipated date: 12/23, anticpated

## 2022-12-22 NOTE — DISCHARGE NOTE PROVIDER - CARE PROVIDERS DIRECT ADDRESSES
,[email or web address]_Unknown

## 2022-12-22 NOTE — PROGRESS NOTE ADULT - ASSESSMENT
61yo M w/ PMH EtOH abuse, CKD2, gout, HTN, and arthritis was brought in from [facility] for palpitations and anxiety found to have PE, d/c'd 12/12 on eliquis for DVTs;    #Submassive low risk PE, unprovoked  #Recent history of hospital admission with broken ankle   #Multiple R and L lower extremity DVTs while on eliquis  - IR recs:  - No IR intervention at this time for pulmonary emboli.   - Continue expectant management.  - Pain and swelling of left inner thigh, behind left knee, and left calf- pulm rec's appreciated  - f/u TTE-ordered not performed  - lovenox injections, coumadin clinic made an appt for the pt on 12/27 to start bridging with coumadin.   Target (INR 2-3)  - f/u H/O Dr. Stewart OP on d/c    #EtOH abuse  #Poss hx of opiate use  - Drinks pint vodka a day  - CIWA score ~11 (tremulous, anxious, but anxiety about medical condition may be confounding CIWA score) at time of admission  - c/w folic acid, b12, thiamine   - c/w naltrexone 50 (confirmed dose with [facility])    #HTN  - c/w home toprol, hold for hypotension    #GERD  - c/w pantoprazole    # neuropathy  - c/w home gabapentin    # BPH  - c/w home flomax    `Pending (specify):  echo, anticipated for tommorow.

## 2022-12-22 NOTE — PROGRESS NOTE ADULT - SUBJECTIVE AND OBJECTIVE BOX
CAT ALSTON 62y Male  MRN#: [id number]     SUBJECTIVE  Patient is a 62y old Male who presents with a chief complaint of PE (21 Dec 2022 19:24)      Today is hospital day 2d, and this morning he is lying in bed without distress.   No acute overnight events.     OBJECTIVE  PAST MEDICAL & SURGICAL HISTORY  Alcohol dependence    Vertigo    HTN (hypertension)    Hard of hearing    Gout    Seasonal allergies    CKD (chronic kidney disease)    No significant past surgical history      ALLERGIES:  Beef (Hives)  dairy products (Hives)  No Known Drug Allergies  shellfish (Hives)    MEDICATIONS:  STANDING MEDICATIONS  chlorhexidine 2% Cloths 1 Application(s) Topical <User Schedule>  cyanocobalamin 1000 MICROGram(s) Oral daily  enoxaparin Injectable 100 milliGRAM(s) SubCutaneous every 12 hours  folic acid 1 milliGRAM(s) Oral daily  gabapentin 200 milliGRAM(s) Oral two times a day  influenza   Vaccine 0.5 milliLiter(s) IntraMuscular once  multivitamin 1 Tablet(s) Oral daily  naltrexone 50 milliGRAM(s) Oral daily  pantoprazole    Tablet 40 milliGRAM(s) Oral before breakfast  tamsulosin 0.4 milliGRAM(s) Oral at bedtime  thiamine 100 milliGRAM(s) Oral daily    PRN MEDICATIONS  acetaminophen     Tablet .. 650 milliGRAM(s) Oral every 6 hours PRN  aluminum hydroxide/magnesium hydroxide/simethicone Suspension 30 milliLiter(s) Oral every 4 hours PRN  LORazepam   Injectable 2 milliGRAM(s) IV Push every 1 hour PRN  melatonin 3 milliGRAM(s) Oral at bedtime PRN  ondansetron Injectable 4 milliGRAM(s) IV Push every 8 hours PRN    HOME MEDICATIONS  Home Medications:  folic acid 1 mg oral tablet: 1 tab(s) orally once a day (09 Dec 2022 01:56)  Vitamin B12 100 mcg oral tablet: 1 tab(s) orally once a day (09 Dec 2022 01:56)      VITAL SIGNS: Last 24 Hours  T(C): 36.6 (22 Dec 2022 04:46), Max: 36.9 (21 Dec 2022 19:48)  T(F): 97.9 (22 Dec 2022 04:46), Max: 98.5 (21 Dec 2022 19:48)  HR: 85 (22 Dec 2022 04:46) (79 - 86)  BP: 133/71 (22 Dec 2022 04:46) (133/63 - 137/71)  BP(mean): --  RR: 18 (22 Dec 2022 04:46) (18 - 19)  SpO2: 98% (21 Dec 2022 21:00) (98% - 98%)    12-21-22 @ 07:01  -  12-22-22 @ 07:00  --------------------------------------------------------  IN: 430 mL / OUT: 0 mL / NET: 430 mL        LABS:                        12.4   5.01  )-----------( 175      ( 22 Dec 2022 06:37 )             34.8     12-22    135  |  98  |  11  ----------------------------<  113<H>  3.8   |  26  |  1.2    Ca    9.3      22 Dec 2022 06:37  Phos  3.3     12-21  Mg     1.6     12-22    TPro  6.9  /  Alb  4.3  /  TBili  1.2  /  DBili  x   /  AST  18  /  ALT  7   /  AlkPhos  89  12-22    LIVER FUNCTIONS - ( 22 Dec 2022 06:37 )  Alb: 4.3 g/dL / Pro: 6.9 g/dL / ALK PHOS: 89 U/L / ALT: 7 U/L / AST: 18 U/L / GGT: x           PT/INR - ( 21 Dec 2022 22:52 )   PT: 12.90 sec;   INR: 1.13 ratio         PTT - ( 21 Dec 2022 22:52 )  PTT:39.7 sec          CARDIAC MARKERS ( 20 Dec 2022 22:15 )  x     / <0.01 ng/mL / x     / x     / x      CARDIAC MARKERS ( 20 Dec 2022 16:48 )  x     / <0.01 ng/mL / x     / x     / x          CAPILLARY BLOOD GLUCOSE          RADIOLOGY:    PHYSICAL EXAM:  PHYSICAL EXAM:  GENERAL: NAD, AAO x 4, 62y M  HEAD:  Atraumatic, Normocephalic  EYES: EOMI, conjunctivae clear and sclerae white  NECK: Supple, No JVD  CHEST/LUNG: Clear to auscultation bilaterally; No wheeze; No crackles; No accessory muscles used  HEART: Regular rate and rhythm; No murmurs;   ABDOMEN: Soft, Nontender, Nondistended; Bowel sounds present; No guarding  EXTREMITIES:  2+ Peripheral Pulses, No cyanosis or edema  NEUROLOGY: non-focal    ADMISSION SUMMARY  Patient is a 62y old Male who presents with a chief complaint of PE (21 Dec 2022 19:24)   
Pt seen and examined at bedside. No CP or SOB.          PAST MEDICAL & SURGICAL HISTORY:  Alcohol dependence    Vertigo    HTN (hypertension)    Hard of hearing    Gout    Seasonal allergies    CKD (chronic kidney disease)    No significant past surgical history        VITAL SIGNS (Last 24 hrs):  T(C): 36.6 (22 @ 04:46), Max: 36.9 (22 @ 19:48)  HR: 85 (22 @ 04:46) (85 - 86)  BP: 133/71 (22 @ 04:46) (133/71 - 137/71)  RR: 18 (22 @ 04:46) (18 - 19)  SpO2: 98% (22 @ 21:00) (98% - 98%)  Wt(kg): --  Daily     Daily Weight in k.4 (22 Dec 2022 04:46)    I&O's Summary    21 Dec 2022 07:01  -  22 Dec 2022 07:00  --------------------------------------------------------  IN: 430 mL / OUT: 0 mL / NET: 430 mL        PHYSICAL EXAM:  GENERAL: NAD, well-developed  HEAD:  Atraumatic, Normocephalic  EYES: EOMI, PERRLA, conjunctiva and sclera clear  NECK: Supple, No JVD  CHEST/LUNG: Clear to auscultation bilaterally; No wheeze  HEART: Regular rate and rhythm; No murmurs, rubs, or gallops  ABDOMEN: Soft, Nontender, Nondistended; Bowel sounds present  EXTREMITIES:  2+ Peripheral Pulses, No clubbing, cyanosis, or edema  PSYCH: AAOx3  NEUROLOGY: non-focal  SKIN: No rashes or lesions    Labs Reviewed  Spoke to patient in regards to abnormal labs.    CBC Full  -  ( 22 Dec 2022 06:37 )  WBC Count : 5.01 K/uL  Hemoglobin : 12.4 g/dL  Hematocrit : 34.8 %  Platelet Count - Automated : 175 K/uL  Mean Cell Volume : 93.0 fL  Mean Cell Hemoglobin : 33.2 pg  Mean Cell Hemoglobin Concentration : 35.6 g/dL  Auto Neutrophil # : 3.25 K/uL  Auto Lymphocyte # : 1.15 K/uL  Auto Monocyte # : 0.24 K/uL  Auto Eosinophil # : 0.29 K/uL  Auto Basophil # : 0.07 K/uL  Auto Neutrophil % : 64.8 %  Auto Lymphocyte % : 23.0 %  Auto Monocyte % : 4.8 %  Auto Eosinophil % : 5.8 %  Auto Basophil % : 1.4 %    BMP:     @ 06:37    Blood Urea Nitrogen - 11  Calcium - 9.3  Carbond Dioxide - 26  Chloride - 98  Creatinine - 1.2  Glucose - 113  Potassium - 3.8  Sodium - 135      Hemoglobin A1c -   PT/INR - ( 21 Dec 2022 22:52 )   PT: 12.90 sec;   INR: 1.13 ratio         PTT - ( 21 Dec 2022 22:52 )  PTT:39.7 sec  Urine Culture:        COVID Labs  CRP:      D-Dimer:            Imaging reviewed independently and reviewed read    < from: CT Angio Chest PE Protocol w/ IV Cont (22 @ 13:26) >  IMPRESSION:    1. Acute bilateral pulmonary emboli involving distal aspects of bilateral   main pulmonary arteries, extending into segmental and subsegmental   branches of the right lower lobe, left lower lobe and lingular pulmonary   arteries; mild right heart strain is noted.    2. Small wedge-shaped right lower lobe opacity is new since 2022,   suspicious for a small pulmonary infarct.      Findings were discussed with Dr. Oneal at 1:30 PM on 2022.    < end of copied text >      MEDICATIONS  (STANDING):  chlorhexidine 2% Cloths 1 Application(s) Topical <User Schedule>  cyanocobalamin 1000 MICROGram(s) Oral daily  enoxaparin Injectable 100 milliGRAM(s) SubCutaneous every 12 hours  folic acid 1 milliGRAM(s) Oral daily  gabapentin 200 milliGRAM(s) Oral two times a day  influenza   Vaccine 0.5 milliLiter(s) IntraMuscular once  magnesium sulfate  IVPB 2 Gram(s) IV Intermittent once  multivitamin 1 Tablet(s) Oral daily  naltrexone 50 milliGRAM(s) Oral daily  pantoprazole    Tablet 40 milliGRAM(s) Oral before breakfast  tamsulosin 0.4 milliGRAM(s) Oral at bedtime  thiamine 100 milliGRAM(s) Oral daily    MEDICATIONS  (PRN):  acetaminophen     Tablet .. 650 milliGRAM(s) Oral every 6 hours PRN Temp greater or equal to 38C (100.4F), Mild Pain (1 - 3)  aluminum hydroxide/magnesium hydroxide/simethicone Suspension 30 milliLiter(s) Oral every 4 hours PRN Dyspepsia  LORazepam   Injectable 2 milliGRAM(s) IV Push every 1 hour PRN Symptom-triggered: each CIWA -Ar score 8 or GREATER  melatonin 3 milliGRAM(s) Oral at bedtime PRN Insomnia  ondansetron Injectable 4 milliGRAM(s) IV Push every 8 hours PRN Nausea and/or Vomiting      
Pt seen and examined. Pt denies missing any doses of his medications    T(F): , Max: 98.5 (12-21-22 @ 19:48)  HR: 86 (12-21-22 @ 19:48) (75 - 91)  BP: 137/71 (12-21-22 @ 19:48)  RR: 18 (12-21-22 @ 19:48)  SpO2: 98% (12-21-22 @ 07:48)  IN: 430 mL / OUT: 0 mL / NET: 430 mL    113.6  General: No apparent distress  Cardiovascular: S1, S2  Gastrointestinal: Soft, Non-tender, Non-distended  Respiratory: Good air entry bilaterally  Musculoskeletal: Moves all extremities  Lymphatic: No edema  Neurologic: No gross motor deficit  Dermatologic: Skin dry  PT/INR - ( 20 Dec 2022 16:48 )   PT: 13.50 sec;   INR: 1.18 ratio         PTT - ( 20 Dec 2022 16:48 )  PTT:39.7 sec                        12.8   5.16  )-----------( 237      ( 21 Dec 2022 07:06 )             39.3     12-21    140  |  97<L>  |  13  ----------------------------<  85  4.7   |  22  |  1.3    Ca    9.6      21 Dec 2022 07:06  Phos  3.3     12-21  Mg     1.9     12-21    TPro  7.2  /  Alb  4.7  /  TBili  1.5<H>  /  DBili  x   /  AST  22  /  ALT  11  /  AlkPhos  99  12-21    
OVERNIGHT EVENTS: IVC filter placement d/c'd. Patient is also not enthusiastic about the procedure, will have to persue anticoagulation and most likely will require coumadin clinic f/u    SUBJECTIVE / INTERVAL HPI: Patient seen and examined at bedside.     VITAL SIGNS:  Vital Signs Last 24 Hrs  T(C): 36.6 (22 Dec 2022 04:46), Max: 36.9 (21 Dec 2022 19:48)  T(F): 97.9 (22 Dec 2022 04:46), Max: 98.5 (21 Dec 2022 19:48)  HR: 85 (22 Dec 2022 04:46) (85 - 86)  BP: 133/71 (22 Dec 2022 04:46) (133/71 - 137/71)  BP(mean): --  RR: 18 (22 Dec 2022 04:46) (18 - 19)  SpO2: 98% (21 Dec 2022 21:00) (98% - 98%)    Parameters below as of 21 Dec 2022 21:00  Patient On (Oxygen Delivery Method): nasal cannula  O2 Flow (L/min): 2      PHYSICAL EXAM:    General: Well developed, well nourished, no acute distress  HEENT: NC/AT; PERRL, anicteric sclera; MMM  Neck: supple  Cardiovascular: +S1/S2, RRR, no murmurs, rubs, gallops  Respiratory: CTA B/L; no W/R/R  Gastrointestinal: soft, NT/ND; +BSx4  Extremities: WWP; no edema, clubbing or cyanosis  Vascular: 2+ radial, DP/PT pulses B/L      MEDICATIONS:  MEDICATIONS  (STANDING):  chlorhexidine 2% Cloths 1 Application(s) Topical <User Schedule>  cyanocobalamin 1000 MICROGram(s) Oral daily  enoxaparin Injectable 100 milliGRAM(s) SubCutaneous every 12 hours  folic acid 1 milliGRAM(s) Oral daily  gabapentin 200 milliGRAM(s) Oral two times a day  influenza   Vaccine 0.5 milliLiter(s) IntraMuscular once  magnesium sulfate  IVPB 2 Gram(s) IV Intermittent once  multivitamin 1 Tablet(s) Oral daily  naltrexone 50 milliGRAM(s) Oral daily  pantoprazole    Tablet 40 milliGRAM(s) Oral before breakfast  tamsulosin 0.4 milliGRAM(s) Oral at bedtime  thiamine 100 milliGRAM(s) Oral daily    MEDICATIONS  (PRN):  acetaminophen     Tablet .. 650 milliGRAM(s) Oral every 6 hours PRN Temp greater or equal to 38C (100.4F), Mild Pain (1 - 3)  aluminum hydroxide/magnesium hydroxide/simethicone Suspension 30 milliLiter(s) Oral every 4 hours PRN Dyspepsia  LORazepam   Injectable 2 milliGRAM(s) IV Push every 1 hour PRN Symptom-triggered: each CIWA -Ar score 8 or GREATER  melatonin 3 milliGRAM(s) Oral at bedtime PRN Insomnia  ondansetron Injectable 4 milliGRAM(s) IV Push every 8 hours PRN Nausea and/or Vomiting      ALLERGIES:  Allergies    Beef (Hives)  dairy products (Hives)  No Known Drug Allergies  shellfish (Hives)    Intolerances        LABS:                        12.4   5.01  )-----------( 175      ( 22 Dec 2022 06:37 )             34.8     12-22    135  |  98  |  11  ----------------------------<  113<H>  3.8   |  26  |  1.2    Ca    9.3      22 Dec 2022 06:37  Phos  3.3     12-21  Mg     1.6     12-22    TPro  6.9  /  Alb  4.3  /  TBili  1.2  /  DBili  x   /  AST  18  /  ALT  7   /  AlkPhos  89  12-22    PT/INR - ( 21 Dec 2022 22:52 )   PT: 12.90 sec;   INR: 1.13 ratio         PTT - ( 21 Dec 2022 22:52 )  PTT:39.7 sec    CAPILLARY BLOOD GLUCOSE          RADIOLOGY & ADDITIONAL TESTS: Reviewed.    PLAN:

## 2022-12-23 ENCOUNTER — TRANSCRIPTION ENCOUNTER (OUTPATIENT)
Age: 62
End: 2022-12-23

## 2022-12-23 VITALS
DIASTOLIC BLOOD PRESSURE: 71 MMHG | SYSTOLIC BLOOD PRESSURE: 153 MMHG | HEART RATE: 95 BPM | TEMPERATURE: 98 F | RESPIRATION RATE: 18 BRPM

## 2022-12-23 LAB
ALBUMIN SERPL ELPH-MCNC: 4 G/DL — SIGNIFICANT CHANGE UP (ref 3.5–5.2)
ALP SERPL-CCNC: 85 U/L — SIGNIFICANT CHANGE UP (ref 30–115)
ALT FLD-CCNC: 7 U/L — SIGNIFICANT CHANGE UP (ref 0–41)
ANION GAP SERPL CALC-SCNC: 16 MMOL/L — HIGH (ref 7–14)
APTT BLD: 38.9 SEC — SIGNIFICANT CHANGE UP (ref 27–39.2)
AST SERPL-CCNC: 19 U/L — SIGNIFICANT CHANGE UP (ref 0–41)
BASOPHILS # BLD AUTO: 0.07 K/UL — SIGNIFICANT CHANGE UP (ref 0–0.2)
BASOPHILS NFR BLD AUTO: 0.9 % — SIGNIFICANT CHANGE UP (ref 0–1)
BILIRUB SERPL-MCNC: 1.4 MG/DL — HIGH (ref 0.2–1.2)
BUN SERPL-MCNC: 8 MG/DL — LOW (ref 10–20)
CALCIUM SERPL-MCNC: 8.9 MG/DL — SIGNIFICANT CHANGE UP (ref 8.4–10.5)
CHLORIDE SERPL-SCNC: 100 MMOL/L — SIGNIFICANT CHANGE UP (ref 98–110)
CO2 SERPL-SCNC: 21 MMOL/L — SIGNIFICANT CHANGE UP (ref 17–32)
CREAT SERPL-MCNC: 1.2 MG/DL — SIGNIFICANT CHANGE UP (ref 0.7–1.5)
EGFR: 68 ML/MIN/1.73M2 — SIGNIFICANT CHANGE UP
EOSINOPHIL # BLD AUTO: 0.23 K/UL — SIGNIFICANT CHANGE UP (ref 0–0.7)
EOSINOPHIL NFR BLD AUTO: 2.9 % — SIGNIFICANT CHANGE UP (ref 0–8)
GLUCOSE SERPL-MCNC: 110 MG/DL — HIGH (ref 70–99)
HCT VFR BLD CALC: 36.2 % — LOW (ref 42–52)
HGB BLD-MCNC: 12.7 G/DL — LOW (ref 14–18)
IMM GRANULOCYTES NFR BLD AUTO: 1 % — HIGH (ref 0.1–0.3)
INR BLD: 1.08 RATIO — SIGNIFICANT CHANGE UP (ref 0.65–1.3)
LYMPHOCYTES # BLD AUTO: 1.1 K/UL — LOW (ref 1.2–3.4)
LYMPHOCYTES # BLD AUTO: 13.9 % — LOW (ref 20.5–51.1)
MCHC RBC-ENTMCNC: 32.7 PG — HIGH (ref 27–31)
MCHC RBC-ENTMCNC: 35.1 G/DL — SIGNIFICANT CHANGE UP (ref 32–37)
MCV RBC AUTO: 93.3 FL — SIGNIFICANT CHANGE UP (ref 80–94)
MONOCYTES # BLD AUTO: 0.42 K/UL — SIGNIFICANT CHANGE UP (ref 0.1–0.6)
MONOCYTES NFR BLD AUTO: 5.3 % — SIGNIFICANT CHANGE UP (ref 1.7–9.3)
NEUTROPHILS # BLD AUTO: 5.99 K/UL — SIGNIFICANT CHANGE UP (ref 1.4–6.5)
NEUTROPHILS NFR BLD AUTO: 76 % — HIGH (ref 42.2–75.2)
NRBC # BLD: 0 /100 WBCS — SIGNIFICANT CHANGE UP (ref 0–0)
PLATELET # BLD AUTO: 172 K/UL — SIGNIFICANT CHANGE UP (ref 130–400)
POTASSIUM SERPL-MCNC: 3.7 MMOL/L — SIGNIFICANT CHANGE UP (ref 3.5–5)
POTASSIUM SERPL-SCNC: 3.7 MMOL/L — SIGNIFICANT CHANGE UP (ref 3.5–5)
PROT SERPL-MCNC: 6.6 G/DL — SIGNIFICANT CHANGE UP (ref 6–8)
PROTHROM AB SERPL-ACNC: 12.4 SEC — SIGNIFICANT CHANGE UP (ref 9.95–12.87)
RBC # BLD: 3.88 M/UL — LOW (ref 4.7–6.1)
RBC # FLD: 14.9 % — HIGH (ref 11.5–14.5)
SODIUM SERPL-SCNC: 137 MMOL/L — SIGNIFICANT CHANGE UP (ref 135–146)
WBC # BLD: 7.89 K/UL — SIGNIFICANT CHANGE UP (ref 4.8–10.8)
WBC # FLD AUTO: 7.89 K/UL — SIGNIFICANT CHANGE UP (ref 4.8–10.8)

## 2022-12-23 PROCEDURE — 99239 HOSP IP/OBS DSCHRG MGMT >30: CPT

## 2022-12-23 RX ORDER — ENOXAPARIN SODIUM 100 MG/ML
100 INJECTION SUBCUTANEOUS
Qty: 30 | Refills: 0
Start: 2022-12-23 | End: 2023-01-06

## 2022-12-23 RX ADMIN — Medication 1 MILLIGRAM(S): at 12:17

## 2022-12-23 RX ADMIN — NALTREXONE HYDROCHLORIDE 50 MILLIGRAM(S): 50 TABLET, FILM COATED ORAL at 17:53

## 2022-12-23 RX ADMIN — PREGABALIN 1000 MICROGRAM(S): 225 CAPSULE ORAL at 12:16

## 2022-12-23 RX ADMIN — ENOXAPARIN SODIUM 100 MILLIGRAM(S): 100 INJECTION SUBCUTANEOUS at 17:55

## 2022-12-23 RX ADMIN — GABAPENTIN 200 MILLIGRAM(S): 400 CAPSULE ORAL at 17:54

## 2022-12-23 RX ADMIN — Medication 100 MILLIGRAM(S): at 12:16

## 2022-12-23 RX ADMIN — Medication 1 TABLET(S): at 12:17

## 2022-12-23 RX ADMIN — PANTOPRAZOLE SODIUM 40 MILLIGRAM(S): 20 TABLET, DELAYED RELEASE ORAL at 05:22

## 2022-12-23 RX ADMIN — CHLORHEXIDINE GLUCONATE 1 APPLICATION(S): 213 SOLUTION TOPICAL at 05:20

## 2022-12-23 RX ADMIN — Medication 650 MILLIGRAM(S): at 18:46

## 2022-12-23 RX ADMIN — ENOXAPARIN SODIUM 100 MILLIGRAM(S): 100 INJECTION SUBCUTANEOUS at 05:21

## 2022-12-23 RX ADMIN — GABAPENTIN 200 MILLIGRAM(S): 400 CAPSULE ORAL at 05:22

## 2022-12-23 NOTE — DISCHARGE NOTE NURSING/CASE MANAGEMENT/SOCIAL WORK - PATIENT PORTAL LINK FT
You can access the FollowMyHealth Patient Portal offered by [facility] by registering at the following website: [email or web address]. By joining [employer]’s FollowMyHealth portal, you will also be able to view your health information using other applications (apps) compatible with our system.

## 2022-12-23 NOTE — DISCHARGE NOTE NURSING/CASE MANAGEMENT/SOCIAL WORK - NSDCPEFALRISK_GEN_ALL_CORE
For information on Fall & Injury Prevention, visit: [email or web address] OR  [email or web address] OR  https://www.cdc.gov/steadi/patient.html

## 2022-12-26 ENCOUNTER — INPATIENT (INPATIENT)
Facility: HOSPITAL | Age: 62
LOS: 1 days | Discharge: HOME | End: 2022-12-28
Attending: HOSPITALIST | Admitting: HOSPITALIST
Payer: MEDICAID

## 2022-12-26 VITALS
HEIGHT: 70 IN | DIASTOLIC BLOOD PRESSURE: 67 MMHG | SYSTOLIC BLOOD PRESSURE: 143 MMHG | HEART RATE: 90 BPM | RESPIRATION RATE: 18 BRPM | OXYGEN SATURATION: 98 % | TEMPERATURE: 99 F

## 2022-12-26 LAB
ALBUMIN SERPL ELPH-MCNC: 4.5 G/DL — SIGNIFICANT CHANGE UP (ref 3.5–5.2)
ALP SERPL-CCNC: 77 U/L — SIGNIFICANT CHANGE UP (ref 30–115)
ALT FLD-CCNC: 8 U/L — SIGNIFICANT CHANGE UP (ref 0–41)
ANION GAP SERPL CALC-SCNC: 16 MMOL/L — HIGH (ref 7–14)
APTT BLD: 32.1 SEC — SIGNIFICANT CHANGE UP (ref 27–39.2)
AST SERPL-CCNC: 22 U/L — SIGNIFICANT CHANGE UP (ref 0–41)
BASOPHILS # BLD AUTO: 0.05 K/UL — SIGNIFICANT CHANGE UP (ref 0–0.2)
BASOPHILS NFR BLD AUTO: 0.6 % — SIGNIFICANT CHANGE UP (ref 0–1)
BILIRUB SERPL-MCNC: 1.5 MG/DL — HIGH (ref 0.2–1.2)
BUN SERPL-MCNC: 12 MG/DL — SIGNIFICANT CHANGE UP (ref 10–20)
CALCIUM SERPL-MCNC: 9.5 MG/DL — SIGNIFICANT CHANGE UP (ref 8.4–10.4)
CHLORIDE SERPL-SCNC: 96 MMOL/L — LOW (ref 98–110)
CO2 SERPL-SCNC: 22 MMOL/L — SIGNIFICANT CHANGE UP (ref 17–32)
CREAT SERPL-MCNC: 1.2 MG/DL — SIGNIFICANT CHANGE UP (ref 0.7–1.5)
EGFR: 68 ML/MIN/1.73M2 — SIGNIFICANT CHANGE UP
EOSINOPHIL # BLD AUTO: 0.03 K/UL — SIGNIFICANT CHANGE UP (ref 0–0.7)
EOSINOPHIL NFR BLD AUTO: 0.4 % — SIGNIFICANT CHANGE UP (ref 0–8)
GLUCOSE SERPL-MCNC: 94 MG/DL — SIGNIFICANT CHANGE UP (ref 70–99)
HCT VFR BLD CALC: 35.6 % — LOW (ref 42–52)
HGB BLD-MCNC: 12.6 G/DL — LOW (ref 14–18)
IMM GRANULOCYTES NFR BLD AUTO: 0.4 % — HIGH (ref 0.1–0.3)
INR BLD: 1.15 RATIO — SIGNIFICANT CHANGE UP (ref 0.65–1.3)
LYMPHOCYTES # BLD AUTO: 1.09 K/UL — LOW (ref 1.2–3.4)
LYMPHOCYTES # BLD AUTO: 13.4 % — LOW (ref 20.5–51.1)
MCHC RBC-ENTMCNC: 32.9 PG — HIGH (ref 27–31)
MCHC RBC-ENTMCNC: 35.4 G/DL — SIGNIFICANT CHANGE UP (ref 32–37)
MCV RBC AUTO: 93 FL — SIGNIFICANT CHANGE UP (ref 80–94)
MONOCYTES # BLD AUTO: 0.98 K/UL — HIGH (ref 0.1–0.6)
MONOCYTES NFR BLD AUTO: 12.1 % — HIGH (ref 1.7–9.3)
NEUTROPHILS # BLD AUTO: 5.94 K/UL — SIGNIFICANT CHANGE UP (ref 1.4–6.5)
NEUTROPHILS NFR BLD AUTO: 73.1 % — SIGNIFICANT CHANGE UP (ref 42.2–75.2)
NRBC # BLD: 0 /100 WBCS — SIGNIFICANT CHANGE UP (ref 0–0)
PLATELET # BLD AUTO: 150 K/UL — SIGNIFICANT CHANGE UP (ref 130–400)
POTASSIUM SERPL-MCNC: 4.1 MMOL/L — SIGNIFICANT CHANGE UP (ref 3.5–5)
POTASSIUM SERPL-SCNC: 4.1 MMOL/L — SIGNIFICANT CHANGE UP (ref 3.5–5)
PROT SERPL-MCNC: 7.4 G/DL — SIGNIFICANT CHANGE UP (ref 6–8)
PROTHROM AB SERPL-ACNC: 13.2 SEC — HIGH (ref 9.95–12.87)
RBC # BLD: 3.83 M/UL — LOW (ref 4.7–6.1)
RBC # FLD: 15.2 % — HIGH (ref 11.5–14.5)
SARS-COV-2 RNA SPEC QL NAA+PROBE: SIGNIFICANT CHANGE UP
SODIUM SERPL-SCNC: 134 MMOL/L — LOW (ref 135–146)
WBC # BLD: 8.12 K/UL — SIGNIFICANT CHANGE UP (ref 4.8–10.8)
WBC # FLD AUTO: 8.12 K/UL — SIGNIFICANT CHANGE UP (ref 4.8–10.8)

## 2022-12-26 PROCEDURE — 93010 ELECTROCARDIOGRAM REPORT: CPT

## 2022-12-26 PROCEDURE — 93970 EXTREMITY STUDY: CPT | Mod: 26

## 2022-12-26 PROCEDURE — 99285 EMERGENCY DEPT VISIT HI MDM: CPT | Mod: 25

## 2022-12-26 PROCEDURE — 76937 US GUIDE VASCULAR ACCESS: CPT | Mod: 26

## 2022-12-26 PROCEDURE — 36000 PLACE NEEDLE IN VEIN: CPT

## 2022-12-26 RX ORDER — ENOXAPARIN SODIUM 100 MG/ML
100 INJECTION SUBCUTANEOUS ONCE
Refills: 0 | Status: DISCONTINUED | OUTPATIENT
Start: 2022-12-26 | End: 2022-12-26

## 2022-12-26 RX ORDER — ENOXAPARIN SODIUM 100 MG/ML
110 INJECTION SUBCUTANEOUS EVERY 12 HOURS
Refills: 0 | Status: DISCONTINUED | OUTPATIENT
Start: 2022-12-26 | End: 2022-12-28

## 2022-12-26 RX ADMIN — ENOXAPARIN SODIUM 110 MILLIGRAM(S): 100 INJECTION SUBCUTANEOUS at 20:59

## 2022-12-26 NOTE — ED PROCEDURE NOTE - ATTENDING CONTRIBUTION TO CARE
I personally supervised the study.  I reviewed the images and interpretation by the resident and have edited where appropriate.

## 2022-12-26 NOTE — ED ADULT TRIAGE NOTE - CHIEF COMPLAINT QUOTE
pt biba from [facility] for b/l arm pain - denies trauma - left arm swelling and tenderness right elbow pain

## 2022-12-26 NOTE — ED PROVIDER NOTE - PROGRESS NOTE DETAILS
Called [facility] living, staff to fax over med list to confirm if pt is on AC. Spoke to Med Tech Briana from [facility] who states they have received the prescription for Lovenox but patient has not received medication since arrival due to pharmacy not sending over the medication, medication was also not delivered today Monday 12/26. Briana is unsure when delivery will come.

## 2022-12-26 NOTE — ED ADULT NURSE NOTE - NSIMPLEMENTINTERV_GEN_ALL_ED
5/25/2021              [place] [address] [address] [id number]         To Whom It May Concern,    Shayna Payan was seen at my office on 5/24/2021 and tested for SARS-CoV-2 (Alinity) was Not Detected.  Please allow him to retur Implemented All Universal Safety Interventions:  [place] to call system. Call bell, personal items and telephone within reach. Instruct patient to call for assistance. Room bathroom lighting operational. Non-slip footwear when patient is off stretcher. Physically safe environment: no spills, clutter or unnecessary equipment. Stretcher in lowest position, wheels locked, appropriate side rails in place.

## 2022-12-26 NOTE — ED PROVIDER NOTE - ATTENDING APP SHARED VISIT CONTRIBUTION OF CARE
62-year-old male past medical history of CKD 2 gout EtOH abuse from [facility] [facility] presents PE discharged on the Lovenox presents today with left-sided arm pain.  Patient has noted that he has not taken any Lovenox as per Satoris's.  Exam shows left-sided upper extremity tender to palpation from wrist all the way up into mid bicep erythema.  NAD, non toxic. NCAT PERRLA EOMI neck supple non tender normal wob cta bl rrr abdomen s nt nd no rebound no guarding WWPx4 neuro non focal,

## 2022-12-26 NOTE — ED ADULT NURSE NOTE - CHIEF COMPLAINT QUOTE
pt biba from [facility] for b/l arm pain - denies trauma - left arm swelling and tenderness right elbow pain

## 2022-12-26 NOTE — ED PROVIDER NOTE - NS ED ROS FT
Review of Systems:  	•	CONSTITUTIONAL - no fever, no diaphoresis, no chills  	•	SKIN - no rash  	•	HEMATOLOGIC - no bleeding, no bruising  	•	EYES - no eye pain, no blurry vision  	•	ENT - no congestion  	•	RESPIRATORY - no shortness of breath, no cough  	•	CARDIAC - no chest pain, no palpitations  	•	GI - no abd pain, no nausea, no vomiting, no diarrhea, no constipation  	•	GENITO-URINARY - no dysuria; no hematuria, no increased urinary frequency  	•	MUSCULOSKELETAL - +Left elbow/wrist/hand pain and swelling, +right elbow pain and swelling.  	•	NEUROLOGIC - no weakness, no headache, no paresthesias, no LOC  	•	PSYCH - no anxiety, no depression  	All other ROS are negative except as documented in HPI.

## 2022-12-26 NOTE — ED PROVIDER NOTE - PHYSICAL EXAMINATION
VITAL SIGNS: I have reviewed nursing notes and confirm.  CONSTITUTIONAL: Patient is in no acute distress.  SKIN: Skin exam is warm and dry, no acute rash.  HEAD: Normocephalic; atraumatic.  EYES: PERRL, EOM intact; conjunctiva and sclera clear.  ENT: No nasal discharge; airway clear.   NECK: Supple; non tender.  CARD: S1, S2 normal; no murmurs, gallops, or rubs. Regular rate and rhythm.  RESP: Clear to auscultation bilaterally. No wheezes, rales or rhonchi.  ABD: Normal bowel sounds; soft; non-distended; non-tender.   EXT: +Left elbow, wrist and hand erythema/warmth/swelling. LUE TTP diffuse encompassing left upper and lower arm. Limited ROM of left elbow/wrist. +TTP, erythema to right elbow. FROM of right UE. Sensation intact.  LYMPH: No acute cervical adenopathy.  NEURO: Alert, oriented. Grossly unremarkable. No focal deficits.  PSYCH: Cooperative, appropriate. VITAL SIGNS: I have reviewed nursing notes and confirm.  CONSTITUTIONAL: Patient is in no acute distress.  SKIN: Skin exam is warm and dry, no acute rash.  HEAD: Normocephalic; atraumatic.  EYES: PERRL, EOM intact; conjunctiva and sclera clear.  ENT: No nasal discharge; airway clear.   NECK: Supple; non tender.  CARD: S1, S2 normal; no murmurs, gallops, or rubs. Regular rate and rhythm.  RESP: Clear to auscultation bilaterally. No wheezes, rales or rhonchi.  ABD: Normal bowel sounds; soft; non-distended; non-tender.   EXT: +Left elbow, wrist and hand localized erythema/warmth/swelling. LUE TTP diffuse encompassing left upper and lower arm. Limited ROM of left elbow/wrist. +TTP, mild erythema to right elbow without right UE TTP or swelling. FROM of right UE. Sensation intact.  LYMPH: No acute cervical adenopathy.  NEURO: Alert, oriented. Grossly unremarkable. No focal deficits.  PSYCH: Cooperative, appropriate.

## 2022-12-26 NOTE — ED PROVIDER NOTE - OBJECTIVE STATEMENT
62YOM from [facility]. PMH: EtOH abuse, CKD2, gout, HTN, and arthritis presented 12/8/22 to this ER for Left leg pain, found to have LLE DVT, and returned 12/20/22 found to have bilateral PEs, initiated on lovenox presents to the ER with c/o atraumatic left arm pain x 4 days and right elbow pain x 1 day. Pt reports pain to left arm affects left elbow, wrist and hand. Right elbow pain began this morning. 62YOM from [facility]. PMH: EtOH abuse, CKD2, gout, HTN, and arthritis presented 12/8/22 to this ER for Left leg pain, found to have LLE DVT, and returned 12/20/22 found to have bilateral PEs, initiated on lovenox presents to the ER with c/o atraumatic left arm pain x 4 days and right elbow pain x 1 day. Pt reports pain to left arm affects left elbow, wrist and hand with associated swelling. Right elbow pain began this morning. Patient reports he was discharged from this hospital 12/23, though has not received any injections while at the assisted living. Reports taking "7" pills in the morning, "5" at night which is provided by staff, though unsure of names. Pt also admits to daily ETOH ingestion, reports last drink was yesterday night about 1/2 pint of Vodka. Pt denies injury, N/T, weakness, CP, SOB, leg pain or other complaints.

## 2022-12-26 NOTE — ED PROVIDER NOTE - CLINICAL SUMMARY MEDICAL DECISION MAKING FREE TEXT BOX
Patient presented with worsening left upper extremity pain x4 days.  Patient recently diagnosed with bilateral PEs, but stating that his facility is unable to give him his Lovenox shots.  Patient concerned that if he goes back to nursing home, he will not receive his Lovenox and therefore his pulmonary emboli will become worse.  On arrival patient afebrile, hemodynamically stable, asymptomatic from pulmonary standpoint.  Exam of arm revealed mild swelling but no overlying discoloration, no evidence of infection, neurovascular intact, compartments soft, no crepitus.  Venous duplex obtained and negative for DVT.  Obtained labs which were grossly unremarkable including no significant leukocytosis, anemia, signs of dehydration/ANDRZEJ, transaminitis or significant electrolyte abnormalities.  Spoke with nursing home who stated that they will not be able to receive his medication until at least 12/26, stating that it could be longer and it is unclear.  Given the above, patient will require admission for doses of his Lovenox until it is possible to sort out when Lovenox we delivered and patient may receive his treatment.  Hemodynamically stable at time of admission.

## 2022-12-26 NOTE — ED PROVIDER NOTE - NSICDXPASTMEDICALHX_GEN_ALL_CORE_FT
PAST MEDICAL HISTORY:  Alcohol dependence     BPH (benign prostatic hyperplasia)     GERD (gastroesophageal reflux disease)     Hard of hearing     HTN (hypertension)     Pseudogout     Seasonal allergies

## 2022-12-27 ENCOUNTER — TRANSCRIPTION ENCOUNTER (OUTPATIENT)
Age: 62
End: 2022-12-27

## 2022-12-27 ENCOUNTER — APPOINTMENT (OUTPATIENT)
Dept: MEDICATION MANAGEMENT | Facility: CLINIC | Age: 62
End: 2022-12-27

## 2022-12-27 VITALS
HEART RATE: 74 BPM | OXYGEN SATURATION: 98 % | TEMPERATURE: 98 F | RESPIRATION RATE: 18 BRPM | DIASTOLIC BLOOD PRESSURE: 58 MMHG | SYSTOLIC BLOOD PRESSURE: 116 MMHG

## 2022-12-27 LAB
ALBUMIN SERPL ELPH-MCNC: 4 G/DL — SIGNIFICANT CHANGE UP (ref 3.5–5.2)
ALP SERPL-CCNC: 71 U/L — SIGNIFICANT CHANGE UP (ref 30–115)
ALT FLD-CCNC: 7 U/L — SIGNIFICANT CHANGE UP (ref 0–41)
ANION GAP SERPL CALC-SCNC: 15 MMOL/L — HIGH (ref 7–14)
AST SERPL-CCNC: 17 U/L — SIGNIFICANT CHANGE UP (ref 0–41)
BASOPHILS # BLD AUTO: 0.07 K/UL — SIGNIFICANT CHANGE UP (ref 0–0.2)
BASOPHILS NFR BLD AUTO: 1 % — SIGNIFICANT CHANGE UP (ref 0–1)
BILIRUB SERPL-MCNC: 1.4 MG/DL — HIGH (ref 0.2–1.2)
BUN SERPL-MCNC: 13 MG/DL — SIGNIFICANT CHANGE UP (ref 10–20)
CALCIUM SERPL-MCNC: 9.5 MG/DL — SIGNIFICANT CHANGE UP (ref 8.4–10.5)
CHLORIDE SERPL-SCNC: 96 MMOL/L — LOW (ref 98–110)
CO2 SERPL-SCNC: 22 MMOL/L — SIGNIFICANT CHANGE UP (ref 17–32)
CREAT SERPL-MCNC: 1.2 MG/DL — SIGNIFICANT CHANGE UP (ref 0.7–1.5)
EGFR: 68 ML/MIN/1.73M2 — SIGNIFICANT CHANGE UP
EOSINOPHIL # BLD AUTO: 0.05 K/UL — SIGNIFICANT CHANGE UP (ref 0–0.7)
EOSINOPHIL NFR BLD AUTO: 0.7 % — SIGNIFICANT CHANGE UP (ref 0–8)
GLUCOSE SERPL-MCNC: 107 MG/DL — HIGH (ref 70–99)
HCT VFR BLD CALC: 35.8 % — LOW (ref 42–52)
HGB BLD-MCNC: 12.3 G/DL — LOW (ref 14–18)
IMM GRANULOCYTES NFR BLD AUTO: 0.4 % — HIGH (ref 0.1–0.3)
LYMPHOCYTES # BLD AUTO: 1.36 K/UL — SIGNIFICANT CHANGE UP (ref 1.2–3.4)
LYMPHOCYTES # BLD AUTO: 19.8 % — LOW (ref 20.5–51.1)
MAGNESIUM SERPL-MCNC: 2 MG/DL — SIGNIFICANT CHANGE UP (ref 1.8–2.4)
MCHC RBC-ENTMCNC: 32.5 PG — HIGH (ref 27–31)
MCHC RBC-ENTMCNC: 34.4 G/DL — SIGNIFICANT CHANGE UP (ref 32–37)
MCV RBC AUTO: 94.5 FL — HIGH (ref 80–94)
MONOCYTES # BLD AUTO: 0.92 K/UL — HIGH (ref 0.1–0.6)
MONOCYTES NFR BLD AUTO: 13.4 % — HIGH (ref 1.7–9.3)
NEUTROPHILS # BLD AUTO: 4.43 K/UL — SIGNIFICANT CHANGE UP (ref 1.4–6.5)
NEUTROPHILS NFR BLD AUTO: 64.7 % — SIGNIFICANT CHANGE UP (ref 42.2–75.2)
NRBC # BLD: 0 /100 WBCS — SIGNIFICANT CHANGE UP (ref 0–0)
PHOSPHATE SERPL-MCNC: 3.8 MG/DL — SIGNIFICANT CHANGE UP (ref 2.1–4.9)
PLATELET # BLD AUTO: 150 K/UL — SIGNIFICANT CHANGE UP (ref 130–400)
POTASSIUM SERPL-MCNC: 3.5 MMOL/L — SIGNIFICANT CHANGE UP (ref 3.5–5)
POTASSIUM SERPL-SCNC: 3.5 MMOL/L — SIGNIFICANT CHANGE UP (ref 3.5–5)
PROT SERPL-MCNC: 6.4 G/DL — SIGNIFICANT CHANGE UP (ref 6–8)
RBC # BLD: 3.79 M/UL — LOW (ref 4.7–6.1)
RBC # FLD: 15.1 % — HIGH (ref 11.5–14.5)
SODIUM SERPL-SCNC: 133 MMOL/L — LOW (ref 135–146)
TROPONIN T SERPL-MCNC: <0.01 NG/ML — SIGNIFICANT CHANGE UP
WBC # BLD: 6.86 K/UL — SIGNIFICANT CHANGE UP (ref 4.8–10.8)
WBC # FLD AUTO: 6.86 K/UL — SIGNIFICANT CHANGE UP (ref 4.8–10.8)

## 2022-12-27 RX ORDER — THIAMINE MONONITRATE (VIT B1) 100 MG
100 TABLET ORAL DAILY
Refills: 0 | Status: DISCONTINUED | OUTPATIENT
Start: 2022-12-27 | End: 2022-12-28

## 2022-12-27 RX ORDER — GABAPENTIN 400 MG/1
200 CAPSULE ORAL
Refills: 0 | Status: DISCONTINUED | OUTPATIENT
Start: 2022-12-27 | End: 2022-12-28

## 2022-12-27 RX ORDER — ACETAMINOPHEN 500 MG
650 TABLET ORAL EVERY 6 HOURS
Refills: 0 | Status: DISCONTINUED | OUTPATIENT
Start: 2022-12-27 | End: 2022-12-28

## 2022-12-27 RX ORDER — FOLIC ACID 0.8 MG
1 TABLET ORAL DAILY
Refills: 0 | Status: DISCONTINUED | OUTPATIENT
Start: 2022-12-27 | End: 2022-12-28

## 2022-12-27 RX ORDER — METOPROLOL TARTRATE 50 MG
25 TABLET ORAL DAILY
Refills: 0 | Status: DISCONTINUED | OUTPATIENT
Start: 2022-12-27 | End: 2022-12-28

## 2022-12-27 RX ORDER — PREGABALIN 225 MG/1
1000 CAPSULE ORAL DAILY
Refills: 0 | Status: DISCONTINUED | OUTPATIENT
Start: 2022-12-27 | End: 2022-12-28

## 2022-12-27 RX ORDER — TRAMADOL HYDROCHLORIDE 50 MG/1
25 TABLET ORAL EVERY 6 HOURS
Refills: 0 | Status: DISCONTINUED | OUTPATIENT
Start: 2022-12-27 | End: 2022-12-28

## 2022-12-27 RX ORDER — ENOXAPARIN SODIUM 100 MG/ML
100 INJECTION SUBCUTANEOUS
Qty: 1 | Refills: 0
Start: 2022-12-27 | End: 2023-01-10

## 2022-12-27 RX ORDER — ACETAMINOPHEN 500 MG
2 TABLET ORAL
Qty: 0 | Refills: 0 | DISCHARGE
Start: 2022-12-27

## 2022-12-27 RX ORDER — PANTOPRAZOLE SODIUM 20 MG/1
40 TABLET, DELAYED RELEASE ORAL
Refills: 0 | Status: DISCONTINUED | OUTPATIENT
Start: 2022-12-27 | End: 2022-12-28

## 2022-12-27 RX ORDER — LORATADINE 10 MG/1
10 TABLET ORAL DAILY
Refills: 0 | Status: DISCONTINUED | OUTPATIENT
Start: 2022-12-27 | End: 2022-12-28

## 2022-12-27 RX ORDER — ENOXAPARIN SODIUM 100 MG/ML
100 INJECTION SUBCUTANEOUS
Qty: 30 | Refills: 0
Start: 2022-12-27 | End: 2023-01-10

## 2022-12-27 RX ORDER — NALTREXONE HYDROCHLORIDE 50 MG/1
50 TABLET, FILM COATED ORAL DAILY
Refills: 0 | Status: DISCONTINUED | OUTPATIENT
Start: 2022-12-27 | End: 2022-12-27

## 2022-12-27 RX ORDER — TAMSULOSIN HYDROCHLORIDE 0.4 MG/1
0.4 CAPSULE ORAL AT BEDTIME
Refills: 0 | Status: DISCONTINUED | OUTPATIENT
Start: 2022-12-27 | End: 2022-12-28

## 2022-12-27 RX ADMIN — Medication 1 TABLET(S): at 11:38

## 2022-12-27 RX ADMIN — PANTOPRAZOLE SODIUM 40 MILLIGRAM(S): 20 TABLET, DELAYED RELEASE ORAL at 06:19

## 2022-12-27 RX ADMIN — Medication 500 MILLIGRAM(S): at 02:48

## 2022-12-27 RX ADMIN — Medication 1 MILLIGRAM(S): at 11:39

## 2022-12-27 RX ADMIN — Medication 25 MILLIGRAM(S): at 06:17

## 2022-12-27 RX ADMIN — Medication 500 MILLIGRAM(S): at 06:17

## 2022-12-27 RX ADMIN — LORATADINE 10 MILLIGRAM(S): 10 TABLET ORAL at 11:39

## 2022-12-27 RX ADMIN — PREGABALIN 1000 MICROGRAM(S): 225 CAPSULE ORAL at 11:39

## 2022-12-27 RX ADMIN — ENOXAPARIN SODIUM 110 MILLIGRAM(S): 100 INJECTION SUBCUTANEOUS at 06:16

## 2022-12-27 RX ADMIN — GABAPENTIN 200 MILLIGRAM(S): 400 CAPSULE ORAL at 06:18

## 2022-12-27 RX ADMIN — Medication 100 MILLIGRAM(S): at 11:38

## 2022-12-27 NOTE — DISCHARGE NOTE PROVIDER - HOSPITAL COURSE
62M w/ h/o VTE (recently diagnosed DVT and PE), severe AUD, pseudogout, GERD, and BPH presenting from [facility] w/ left arm pain x4 days and right elbow pain x1 day. Reports left arm pain affects elbow, wrist, and hand. Associate w/ swelling. Right elbow pain started 12/26 AM. No reported trauma. No reported fevers, chills, CP, palpitations, SOB, abdominal pain, n/v/d, dysuria, or LE swelling.    In ED, pt febrile to 100.4F w/ HR 91, but otherwise HD stable on vitals. B/l upper extremity duplex demonstrated superficial thrombophlebitis in the right cephalic and brachial veins, but otherwise negative for DVT. Admitted to medicine for further evaluation and monitoring.      #Acute Pseudogout  Initially p/w pain, swelling, and warmth over L elbow, L wrist, and R elbow. Diagnosed w/ pseudogout in Oct 2022 based on CPPD crystals in synovial fluid from R knee. Given acute oligoarticular inflammatory joint pain and h/o pseudogout, suspect acute pseudogout. Intra-articular glucocorticoids not feasible given >2 joints involved. Since >36 hours from symptom onset, colchicine poor option for pain relief.   - start naproxen 500mg PO BID  - monitor Hb closely while on NSAID's and AC  - outpatient rheumatology f/u given this is 3rd flare in 2 months    #Acute Bilateral Pulmonary Embolism  #Acute DVT, Bilateral  Recently diagnosed w/ acute b/l DVT's on 12/8/22. Subsequently started on Eliquis w/ good adherence. However, readmitted on 12/20/22 after feeling unwell and found to have extensive acute bilateral PE. Unclear if failure of Eliquis, but pt switched to Lovenox per Heme-Onc w/ plan to bridge to coumadin as outpatient. However, pt reports [facility] did not have Lovenox in stock and pt has not received since discharge from hospital.  - c/w Lovenox 110mg SQ BID  - CM/SW consulted to determine medication issue w/ [facility]; f/u recs  - f/u outpatient w/ [facility] (will need new appt; previous appt for 12/27)  - f/u outpatient w/ heme/onc (Dr. Stewart on 1/12/ 2023 @ 3PM)    #Alcohol Use Disorder, Severe  #Thiamine Deficiency, Suspected  #Folate Deficiency, Suspected  - c/w ativan 2mg IV Q1H PRN CIWA-Ar>8 (symptom-triggered CIWA protocol)  - c/w gabapentin 200mg PO BID  - c/w thiamine 100mg PO QD  - c/w folic acid 1mg PO QD  - addition medicine consulted; f/u recs    #GERD  - c/w pantoprazole 40mg PO QD    #BPH  - c/w tamsulosin 0.4mg PO QD     62M w/ h/o VTE (recently diagnosed DVT and PE), severe AUD, pseudogout, GERD, and BPH presenting from [facility] w/ left arm pain x4 days and right elbow pain x1 day. Reports left arm pain affects elbow, wrist, and hand. Associate w/ swelling. Right elbow pain started 12/26 AM. No reported trauma. No reported fevers, chills, CP, palpitations, SOB, abdominal pain, n/v/d, dysuria, or LE swelling.    In ED, pt febrile to 100.4F w/ HR 91, but otherwise HD stable on vitals. B/l upper extremity duplex demonstrated superficial thrombophlebitis in the right cephalic and brachial veins, but otherwise negative for DVT. Admitted to medicine for further evaluation and monitoring.    #Acute Pseudogout  Initially p/w pain, swelling, and warmth over L elbow, L wrist, and R elbow. Diagnosed w/ pseudogout in Oct 2022 based on CPPD crystals in synovial fluid from R knee. Given acute oligoarticular inflammatory joint pain and h/o pseudogout, suspect acute pseudogout. Intra-articular glucocorticoids not feasible given >2 joints involved. Since >36 hours from symptom onset, colchicine poor option for pain relief.   - started naproxen 500mg PO BID  - monitor Hb closely while on NSAID's and AC  - tramadol if needed for additional pain management  - outpatient rheumatology f/u given this is 3rd flare in 2 months    #Acute Bilateral Pulmonary Embolism  #Acute DVT, Bilateral  Recently diagnosed w/ acute b/l DVT's on 12/8/22. Subsequently started on Eliquis w/ good adherence. However, readmitted on 12/20/22 after feeling unwell and found to have extensive acute bilateral PE. Unclear if failure of Eliquis, but pt switched to Lovenox per Heme-Onc w/ plan to bridge to coumadin as outpatient. However, pt reports [facility] did not have Lovenox in stock and pt has not received since discharge from hospital.  - resumed lovenox inpatient  - ordered lovenox for discharge  - f/u outpatient w/ Cou[facility]in Clinic (will need new appt; previous appt for 12/27)  - f/u outpatient w/ heme/onc (Dr. Stewart on 1/12/ 2023 @ 3PM)    #Alcohol Use Disorder, Severe  #Thiamine Deficiency, Suspected  #Folate Deficiency, Suspected  - c/w ativan 2mg IV Q1H PRN CIWA-Ar>8 (symptom-triggered CIWA protocol)  - c/w gabapentin 200mg PO BID  - c/w thiamine 100mg PO QD  - c/w folic acid 1mg PO QD  - addition medicine consulted; f/u recs    #GERD  - c/w pantoprazole 40mg PO QD    #BPH  - c/w tamsulosin 0.4mg PO QD

## 2022-12-27 NOTE — DISCHARGE NOTE PROVIDER - NSDCMRMEDTOKEN_GEN_ALL_CORE_FT
acetaminophen 325 mg oral tablet: 2 tab(s) orally every 6 hours, As needed, Temp greater or equal to 38C (100.4F), Mild Pain (1 - 3)  enoxaparin 100 mg/mL injectable solution: 100 milligram(s) subcutaneously 2 times a day   Flomax 0.4 mg oral capsule: 1 cap(s) orally once a day (at bedtime)  folic acid 1 mg oral tablet: 1 tab(s) orally once a day  gabapentin 100 mg oral capsule: 2 cap(s) orally 2 times a day  loratadine 10 mg oral tablet: 1 tab(s) orally once a day  Multiple Vitamins oral tablet: 1 tab(s) orally once a day  naltrexone 50 mg oral tablet: 1 tab(s) orally once a day  naproxen 500 mg oral tablet: 1 tab(s) orally 2 times a day  pantoprazole 40 mg oral delayed release tablet: 1 tab(s) orally once a day (before a meal)  thiamine 100 mg oral tablet: 1 tab(s) orally once a day  Vitamin B12 100 mcg oral tablet: 1 tab(s) orally once a day

## 2022-12-27 NOTE — DISCHARGE NOTE PROVIDER - CARE PROVIDER_API CALL
Eugene Tellez)  Internal Medicine  [address] [place] [address]  Phone: [phone]  Fax: [phone]  Follow Up Time: 2 weeks

## 2022-12-27 NOTE — DISCHARGE NOTE NURSING/CASE MANAGEMENT/SOCIAL WORK - NSDCPEFALRISK_GEN_ALL_CORE
For information on Fall & Injury Prevention, visit: [email or web address] OR  [email or web address] OR  https://www.cdc.gov/steadi/patient.html

## 2022-12-27 NOTE — H&P ADULT - ASSESSMENT
62M w/ h/o VTE (recently diagnosed DVT and PE), severe AUD, gout, GERD, and BPH presenting from [facility] w/ left arm pain x4 days and right elbow pain x1 day. Incidentally discovered to not be on Lovenox. Admitted to medicine for elbow pain and medication issue w/ Lovenox.    #Acute Bilateral Pulmonary Embolism  #Acute DVT, Bilateral  Recently diagnosed w/ acute b/l DVT's on 12/8/22. Subsequently started on Eliquis w/ good adherence. However, readmitted on 12/20/22 after feeling unwell and found to have extensive acute bilateral PE. Unclear if failure of Eliquis, but pt switched to Lovenox per Heme-Onc w/ plan to bridge to coumadin as outpatient. However, pt reports [facility] did not have Lovenox in stock and pt has not received since discharge from hospital.  - c/w Lovenox 110mg SQ BID  - CM/SW consulted to determine medication issue w/ [facility]; f/u recs  - f/u outpatient w/ [facility] (will need new appt; previous appt for 12/27)  - f/u outpatient w/ heme/onc (Dr. Stewart on 1/12/ 2023 @ 3PM)    #Alcohol Use Disorder, Severe  #Thiamine Deficiency, Suspected  #Folate Deficiency, Suspected  - c/w ativan 2mg IV Q1H PRN CIWA-Ar>8 (symptom-triggered CIWA protocol)  - c/w gabapentin 200mg PO BID  - c/w thiamine 100mg PO QD  - c/w folic acid 1mg PO QD    #GERD  - c/w pantoprazole 40mg PO QD    #BPH  - c/w tamsulosin 0.4mg PO QD    DVT PPX: Lovenox 110mg SQ BID  GI PPX: pantoprazole 40mg PO QD  DIET: DASH/TLC  ACTIVITY: IAT  CODE STATUS: Full Code  DISPOSITION: From Home    PENDING: CM/SW consult 62M w/ h/o VTE (recently diagnosed DVT and PE), severe AUD, gout, GERD, and BPH presenting from [facility] w/ left arm pain x4 days and right elbow pain x1 day. Incidentally discovered to not be on Lovenox. Admitted to medicine for pseudogout flare and medication issue w/ Lovenox.    #Acute Pseudogout  Initially p/w pain, swelling, and warmth over L elbow, L wrist, and R elbow. Diagnosed w/ pseudogout in Oct 2022 based on CPPD crystals in synovial fluid from R knee. Given acute oligoarticular inflammatory joint pain and h/o pseudogout, suspect acute pseudogout. Intra-articular glucocorticoids not feasible given >2 joints involved. Since >36 hours from symptom onset, colchicine poor option for pain relief.   - start naproxen 500mg PO BID  - monitor Hb closely while on NSAID's and AC  - outpatient rheumatology f/u given this is 3rd flare in 2 months    #Acute Bilateral Pulmonary Embolism  #Acute DVT, Bilateral  Recently diagnosed w/ acute b/l DVT's on 12/8/22. Subsequently started on Eliquis w/ good adherence. However, readmitted on 12/20/22 after feeling unwell and found to have extensive acute bilateral PE. Unclear if failure of Eliquis, but pt switched to Lovenox per Heme-Onc w/ plan to bridge to coumadin as outpatient. However, pt reports [facility] did not have Lovenox in stock and pt has not received since discharge from hospital.  - c/w Lovenox 110mg SQ BID  - CM/SW consulted to determine medication issue w/ [facility]; f/u recs  - f/u outpatient w/ [facility] (will need new appt; previous appt for 12/27)  - f/u outpatient w/ heme/onc (Dr. Stewart on 1/12/ 2023 @ 3PM)    #Alcohol Use Disorder, Severe  #Thiamine Deficiency, Suspected  #Folate Deficiency, Suspected  - c/w ativan 2mg IV Q1H PRN CIWA-Ar>8 (symptom-triggered CIWA protocol)  - c/w gabapentin 200mg PO BID  - c/w thiamine 100mg PO QD  - c/w folic acid 1mg PO QD  - addition medicine consulted; f/u recs    #GERD  - c/w pantoprazole 40mg PO QD    #BPH  - c/w tamsulosin 0.4mg PO QD    DVT PPX: Lovenox 110mg SQ BID  GI PPX: pantoprazole 40mg PO QD  DIET: DASH/TLC  ACTIVITY: IAT  CODE STATUS: Full Code  DISPOSITION: From [facility]    PENDING: improvement in pain; addiction medicine consult; CM/SW consult 62M w/ h/o VTE (recently diagnosed DVT and PE), severe AUD, gout, GERD, and BPH presenting from [facility] w/ left arm pain x4 days and right elbow pain x1 day. Incidentally discovered to not be on Lovenox. Admitted to medicine for pseudogout flare and medication issue w/ Lovenox.    #Acute Pseudogout  Initially p/w pain, swelling, and warmth over L elbow, L wrist, and R elbow. Diagnosed w/ pseudogout in Oct 2022 based on CPPD crystals in synovial fluid from R knee. Given acute oligoarticular inflammatory joint pain and h/o pseudogout, suspect acute pseudogout. Intra-articular glucocorticoids not feasible given >2 joints involved. Since >36 hours from symptom onset, colchicine poor option for pain relief.   - start naproxen 500mg PO BID  - monitor Hb closely while on NSAID's and AC  - outpatient rheumatology f/u given this is 3rd flare in 2 months    #Acute Bilateral Pulmonary Embolism  #Acute DVT, Bilateral  Recently diagnosed w/ acute b/l DVT's on 12/8/22. Subsequently started on Eliquis w/ good adherence. However, readmitted on 12/20/22 after feeling unwell and found to have extensive acute bilateral PE. Unclear if failure of Eliquis, but pt switched to Lovenox per Heme-Onc w/ plan to bridge to coumadin as outpatient. However, pt reports [facility] did not have Lovenox in stock and pt has not received since discharge from hospital.  - c/w Lovenox 110mg SQ BID  - CM/SW consulted to determine medication issue w/ [facility]; f/u recs  - f/u outpatient w/ Cou[facility]in Clinic (will need new appt; previous appt for 12/27)  - f/u outpatient w/ heme/onc (Dr. Stewart on 1/12/ 2023 @ 3PM)    #Alcohol Use Disorder, Severe  #Thiamine Deficiency, Suspected  #Folate Deficiency, Suspected  - c/w ativan 2mg IV Q1H PRN CIWA-Ar>8 (symptom-triggered CIWA protocol)  - c/w gabapentin 200mg PO BID  - c/w thiamine 100mg PO QD  - c/w folic acid 1mg PO QD  - addition medicine consulted; f/u recs    #GERD  - c/w pantoprazole 40mg PO QD    #BPH  - c/w tamsulosin 0.4mg PO QD    DVT PPX: Lovenox 110mg SQ BID  GI PPX: pantoprazole 40mg PO QD  DIET: Regular  ACTIVITY: IAT  CODE STATUS: Full Code  DISPOSITION: From [facility]    PENDING: improvement in pain; addiction medicine consult; CM/SW consult

## 2022-12-27 NOTE — DISCHARGE NOTE PROVIDER - NSFOLLOWUPCLINICS_GEN_ALL_ED_FT
[facility]  Coumadin  [address] [place] [address]  Phone: [phone]  Fax:   Scheduled Appointment: 12/29/2022 10:30 AM

## 2022-12-27 NOTE — DISCHARGE NOTE PROVIDER - NSDCCPCAREPLAN_GEN_ALL_CORE_FT
PRINCIPAL DISCHARGE DIAGNOSIS  Diagnosis: Left arm pain  Assessment and Plan of Treatment: you have pseudogout. you were given medication for it. please follow up with your pcp and rheumatologist      SECONDARY DISCHARGE DIAGNOSES  Diagnosis: Bilateral pulmonary embolism  Assessment and Plan of Treatment: you had a pulmonary embolism and deep vein thrombosis. you were on lovenox but were unable to continue after you were last discharged. medication is now resent. you have an appointment with the coumadin clinic to switch you to coumadin. you will take lovenox until you have switched over. and appointment has been made for you at clinic on 12/29/2022 at 10:30am.

## 2022-12-27 NOTE — H&P ADULT - HISTORY OF PRESENT ILLNESS
62M w/ h/o VTE (recently diagnosed DVT and PE), severe AUD, gout, GERD, and BPH presenting from [facility] w/ left arm pain x4 days and right elbow pain x1 day. Reports left arm pain affects elbow, wrist, and hand. Associate w/ swelling. Right elbow pain started 12/26 AM. No reported trauma. No reported fevers, chills, CP, palpitations, SOB, abdominal pain, n/v/d, dysuria, or LE swelling.    In ED, pt febrile to 100.4F w/ HR 91, but otherwise HD stable on vitals. B/l upper extremity duplex demonstrated superficial thrombophlebitis in the right cephalic and brachial veins, but otherwise negative for DVT. Admitted to medicine for further evaluation and monitoring. 62M w/ h/o VTE (recently diagnosed DVT and PE), severe AUD, pseudogout, GERD, and BPH presenting from [facility] w/ left arm pain x4 days and right elbow pain x1 day. Reports left arm pain affects elbow, wrist, and hand. Associate w/ swelling. Right elbow pain started 12/26 AM. No reported trauma. No reported fevers, chills, CP, palpitations, SOB, abdominal pain, n/v/d, dysuria, or LE swelling.    In ED, pt febrile to 100.4F w/ HR 91, but otherwise HD stable on vitals. B/l upper extremity duplex demonstrated superficial thrombophlebitis in the right cephalic and brachial veins, but otherwise negative for DVT. Admitted to medicine for further evaluation and monitoring.

## 2022-12-27 NOTE — H&P ADULT - NSHPPHYSICALEXAM_GEN_ALL_CORE
VITALS  T(C): 37.4 (12-27-22 @ 00:05), Max: 38 (12-26-22 @ 15:34)  HR: 95 (12-27-22 @ 00:05) (90 - 95)  BP: 147/70 (12-27-22 @ 00:05) (143/67 - 147/70)  RR: 18 (12-27-22 @ 00:05) (18 - 18)  SpO2: 98% (12-27-22 @ 00:05) (98% - 99%)    PHYSICAL EXAM  GENERAL: NAD, well-developed  NEURO: AO x3, PERRLA, EOMI, motor strength intact in 4/4 extremities, sensation intact  HEAD:  Atraumatic, Normocephalic  EYES: conjunctiva and sclera clear  NECK: Supple, No JVD, no lymphadenopathy, no thyromegaly  CHEST/LUNG: Clear to auscultation bilaterally; No wheezes, rales or rhonchi  HEART: Regular rate and rhythm; No murmurs, rubs, or gallops  ABDOMEN: Soft, Nontender, Nondistended; Bowel sounds present, no masses.  EXTREMITIES:  2+ Peripheral Pulses, No clubbing, cyanosis, or edema  SKIN: Warm, dry, intact, no rashes or lesions  PSYCH: affect appropriate VITALS  T(C): 37.4 (12-27-22 @ 00:05), Max: 38 (12-26-22 @ 15:34)  HR: 95 (12-27-22 @ 00:05) (90 - 95)  BP: 147/70 (12-27-22 @ 00:05) (143/67 - 147/70)  RR: 18 (12-27-22 @ 00:05) (18 - 18)  SpO2: 98% (12-27-22 @ 00:05) (98% - 99%)    PHYSICAL EXAM  GENERAL: NAD, well-developed  NEURO: AO x3, PERRLA, EOMI, motor strength intact in 4/4 extremities, sensation intact  HEAD:  Atraumatic, Normocephalic  EYES: conjunctiva and sclera clear  NECK: Supple, No JVD, no lymphadenopathy, no thyromegaly  CHEST/LUNG: Clear to auscultation bilaterally; No wheezes, rales or rhonchi  HEART: Regular rate and rhythm; No murmurs, rubs, or gallops  ABDOMEN: Soft, Nontender, Nondistended; Bowel sounds present, no masses.  EXTREMITIES:  2+ Peripheral Pulses, No clubbing, cyanosis, or edema  MSK: erythema, swelling, and tenderness over L elbow, L wrist, and R elbow w/ ROM limited by pain/swelling  SKIN: Warm, dry, intact, no rashes or lesions  PSYCH: affect appropriate

## 2022-12-27 NOTE — DISCHARGE NOTE NURSING/CASE MANAGEMENT/SOCIAL WORK - PATIENT PORTAL LINK FT
You can access the FollowMyHealth Patient Portal offered by [facility] by registering at the following website: [email or web address]. By joining [facility]’s FollowMyHealth portal, you will also be able to view your health information using other applications (apps) compatible with our system.

## 2022-12-27 NOTE — DISCHARGE NOTE PROVIDER - NSDCFUSCHEDAPPT_GEN_ALL_CORE_FT
[facility]  MEDMGMT OP [address]  Scheduled Appointment: 12/27/2022    Maureen Bass  [facility]  VASCULAR [address]  Scheduled Appointment: 01/09/2023    Eliane Stewart  [facility]  HEMONC [address]  Scheduled Appointment: 01/12/2023     [facility]  MEDMGMT OP [address]  Scheduled Appointment: 12/29/2022    Maureen Bass  [facility] Physician [facility]  VASCULAR [address]  Scheduled Appointment: 01/09/2023    Eliane Stewart  [facility] Physician [facility]  [facility] [address]  Scheduled Appointment: 01/12/2023

## 2022-12-27 NOTE — H&P ADULT - SOCIAL HISTORY: ALCOHOL USE
Drinks EtOH on daily basis (about 1 pint per day). Reports last drink was yesterday night  (about 1/2 pint of Vodka). Drinks EtOH on daily basis (about 1/2 pint per day; previously drank up to 1 pint per day). Reports last drink was yesterday night  (about 1/2 pint of Vodka). Reportedly interested in quitting.

## 2022-12-27 NOTE — H&P ADULT - NSICDXPASTMEDICALHX_GEN_ALL_CORE_FT
PAST MEDICAL HISTORY:  Alcohol dependence     BPH (benign prostatic hyperplasia)     GERD (gastroesophageal reflux disease)     Gout     Hard of hearing     HTN (hypertension)     Seasonal allergies      PAST MEDICAL HISTORY:  Alcohol dependence     BPH (benign prostatic hyperplasia)     GERD (gastroesophageal reflux disease)     Hard of hearing     HTN (hypertension)     Pseudogout     Seasonal allergies

## 2022-12-28 PROCEDURE — 99238 HOSP IP/OBS DSCHRG MGMT 30/<: CPT | Mod: GC

## 2022-12-29 ENCOUNTER — APPOINTMENT (OUTPATIENT)
Dept: MEDICATION MANAGEMENT | Facility: CLINIC | Age: 62
End: 2022-12-29

## 2022-12-29 ENCOUNTER — OUTPATIENT (OUTPATIENT)
Dept: OUTPATIENT SERVICES | Facility: HOSPITAL | Age: 62
LOS: 1 days | Discharge: HOME | End: 2022-12-29

## 2022-12-29 DIAGNOSIS — I48.91 UNSPECIFIED ATRIAL FIBRILLATION: ICD-10-CM

## 2022-12-29 DIAGNOSIS — I82.91 CHRONIC EMBOLISM AND THROMBOSIS OF UNSPECIFIED VEIN: ICD-10-CM

## 2022-12-29 DIAGNOSIS — Z79.01 LONG TERM (CURRENT) USE OF ANTICOAGULANTS: ICD-10-CM

## 2022-12-29 LAB
INR PPP: 1.3 RATIO
POCT-PROTHROMBIN TIME: 15.9 SECS
QUALITY CONTROL: YES

## 2022-12-30 DIAGNOSIS — F41.9 ANXIETY DISORDER, UNSPECIFIED: ICD-10-CM

## 2022-12-30 DIAGNOSIS — Z91.011 ALLERGY TO MILK PRODUCTS: ICD-10-CM

## 2022-12-30 DIAGNOSIS — Z79.01 LONG TERM (CURRENT) USE OF ANTICOAGULANTS: ICD-10-CM

## 2022-12-30 DIAGNOSIS — I82.412 ACUTE EMBOLISM AND THROMBOSIS OF LEFT FEMORAL VEIN: ICD-10-CM

## 2022-12-30 DIAGNOSIS — R06.02 SHORTNESS OF BREATH: ICD-10-CM

## 2022-12-30 DIAGNOSIS — I80.8 PHLEBITIS AND THROMBOPHLEBITIS OF OTHER SITES: ICD-10-CM

## 2022-12-30 DIAGNOSIS — I82.462 ACUTE EMBOLISM AND THROMBOSIS OF LEFT CALF MUSCULAR VEIN: ICD-10-CM

## 2022-12-30 DIAGNOSIS — M10.9 GOUT, UNSPECIFIED: ICD-10-CM

## 2022-12-30 DIAGNOSIS — G62.9 POLYNEUROPATHY, UNSPECIFIED: ICD-10-CM

## 2022-12-30 DIAGNOSIS — I12.9 HYPERTENSIVE CHRONIC KIDNEY DISEASE WITH STAGE 1 THROUGH STAGE 4 CHRONIC KIDNEY DISEASE, OR UNSPECIFIED CHRONIC KIDNEY DISEASE: ICD-10-CM

## 2022-12-30 DIAGNOSIS — Z86.718 PERSONAL HISTORY OF OTHER VENOUS THROMBOSIS AND EMBOLISM: ICD-10-CM

## 2022-12-30 DIAGNOSIS — I26.99 OTHER PULMONARY EMBOLISM WITHOUT ACUTE COR PULMONALE: ICD-10-CM

## 2022-12-30 DIAGNOSIS — I82.442 ACUTE EMBOLISM AND THROMBOSIS OF LEFT TIBIAL VEIN: ICD-10-CM

## 2022-12-30 DIAGNOSIS — F10.20 ALCOHOL DEPENDENCE, UNCOMPLICATED: ICD-10-CM

## 2022-12-30 DIAGNOSIS — I82.433 ACUTE EMBOLISM AND THROMBOSIS OF POPLITEAL VEIN, BILATERAL: ICD-10-CM

## 2022-12-30 DIAGNOSIS — Z20.822 CONTACT WITH AND (SUSPECTED) EXPOSURE TO COVID-19: ICD-10-CM

## 2022-12-30 DIAGNOSIS — Z87.891 PERSONAL HISTORY OF NICOTINE DEPENDENCE: ICD-10-CM

## 2022-12-30 DIAGNOSIS — N18.2 CHRONIC KIDNEY DISEASE, STAGE 2 (MILD): ICD-10-CM

## 2022-12-30 DIAGNOSIS — N40.0 BENIGN PROSTATIC HYPERPLASIA WITHOUT LOWER URINARY TRACT SYMPTOMS: ICD-10-CM

## 2022-12-30 DIAGNOSIS — Z91.013 ALLERGY TO SEAFOOD: ICD-10-CM

## 2022-12-30 DIAGNOSIS — K21.9 GASTRO-ESOPHAGEAL REFLUX DISEASE WITHOUT ESOPHAGITIS: ICD-10-CM

## 2022-12-30 DIAGNOSIS — Z91.014 ALLERGY TO MAMMALIAN MEATS: ICD-10-CM

## 2022-12-30 PROBLEM — M11.20 OTHER CHONDROCALCINOSIS, UNSPECIFIED SITE: Chronic | Status: ACTIVE | Noted: 2022-12-27

## 2023-01-03 ENCOUNTER — APPOINTMENT (OUTPATIENT)
Dept: MEDICATION MANAGEMENT | Facility: CLINIC | Age: 63
End: 2023-01-03

## 2023-01-03 ENCOUNTER — OUTPATIENT (OUTPATIENT)
Dept: OUTPATIENT SERVICES | Facility: HOSPITAL | Age: 63
LOS: 1 days | Discharge: HOME | End: 2023-01-03

## 2023-01-03 DIAGNOSIS — M11.222: ICD-10-CM

## 2023-01-03 DIAGNOSIS — I10 ESSENTIAL (PRIMARY) HYPERTENSION: ICD-10-CM

## 2023-01-03 DIAGNOSIS — Z79.01 LONG TERM (CURRENT) USE OF ANTICOAGULANTS: ICD-10-CM
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
## 2023-01-03 DIAGNOSIS — Z91.013 ALLERGY TO SEAFOOD: ICD-10-CM

## 2023-01-03 DIAGNOSIS — Z91.011 ALLERGY TO MILK PRODUCTS: ICD-10-CM

## 2023-01-03 DIAGNOSIS — M11.232 OTHER CHONDROCALCINOSIS, LEFT WRIST: ICD-10-CM

## 2023-01-03 DIAGNOSIS — I26.99 OTHER PULMONARY EMBOLISM WITHOUT ACUTE COR PULMONALE: ICD-10-CM

## 2023-01-03 DIAGNOSIS — F10.20 ALCOHOL DEPENDENCE, UNCOMPLICATED: ICD-10-CM

## 2023-01-03 DIAGNOSIS — E51.9 THIAMINE DEFICIENCY, UNSPECIFIED: ICD-10-CM

## 2023-01-03 DIAGNOSIS — E53.8 DEFICIENCY OF OTHER SPECIFIED B GROUP VITAMINS: ICD-10-CM

## 2023-01-03 DIAGNOSIS — Z20.822 CONTACT WITH AND (SUSPECTED) EXPOSURE TO COVID-19: ICD-10-CM

## 2023-01-03 DIAGNOSIS — I82.91 CHRONIC EMBOLISM AND THROMBOSIS OF UNSPECIFIED VEIN: ICD-10-CM

## 2023-01-03 DIAGNOSIS — M79.602 PAIN IN LEFT ARM: ICD-10-CM

## 2023-01-03 DIAGNOSIS — M11.221: ICD-10-CM

## 2023-01-03 DIAGNOSIS — J30.2 OTHER SEASONAL ALLERGIC RHINITIS: ICD-10-CM

## 2023-01-03 DIAGNOSIS — I82.403 ACUTE EMBOLISM AND THROMBOSIS OF UNSPECIFIED DEEP VEINS OF LOWER EXTREMITY, BILATERAL: ICD-10-CM

## 2023-01-03 DIAGNOSIS — Z91.014 ALLERGY TO MAMMALIAN MEATS: ICD-10-CM

## 2023-01-03 DIAGNOSIS — K21.9 GASTRO-ESOPHAGEAL REFLUX DISEASE WITHOUT ESOPHAGITIS: ICD-10-CM

## 2023-01-03 DIAGNOSIS — N40.0 BENIGN PROSTATIC HYPERPLASIA WITHOUT LOWER URINARY TRACT SYMPTOMS: ICD-10-CM

## 2023-01-03 LAB
INR PPP: 1 RATIO
POCT-PROTHROMBIN TIME: 13.2 SECS
QUALITY CONTROL: YES

## 2023-01-06 ENCOUNTER — APPOINTMENT (OUTPATIENT)
Dept: MEDICATION MANAGEMENT | Facility: CLINIC | Age: 63
End: 2023-01-06

## 2023-01-06 ENCOUNTER — OUTPATIENT (OUTPATIENT)
Dept: OUTPATIENT SERVICES | Facility: HOSPITAL | Age: 63
LOS: 1 days | Discharge: HOME | End: 2023-01-06

## 2023-01-06 DIAGNOSIS — I82.91 CHRONIC EMBOLISM AND THROMBOSIS OF UNSPECIFIED VEIN: ICD-10-CM

## 2023-01-06 DIAGNOSIS — Z79.01 LONG TERM (CURRENT) USE OF ANTICOAGULANTS: ICD-10-CM

## 2023-01-06 LAB
INR PPP: 1.1 RATIO
POCT-PROTHROMBIN TIME: 12.7 SECS
QUALITY CONTROL: YES

## 2023-01-09 ENCOUNTER — APPOINTMENT (OUTPATIENT)
Dept: VASCULAR SURGERY | Facility: CLINIC | Age: 63
End: 2023-01-09

## 2023-01-10 ENCOUNTER — APPOINTMENT (OUTPATIENT)
Dept: MEDICATION MANAGEMENT | Facility: CLINIC | Age: 63
End: 2023-01-10

## 2023-01-10 ENCOUNTER — OUTPATIENT (OUTPATIENT)
Dept: OUTPATIENT SERVICES | Facility: HOSPITAL | Age: 63
LOS: 1 days | Discharge: HOME | End: 2023-01-10

## 2023-01-10 DIAGNOSIS — I82.91 CHRONIC EMBOLISM AND THROMBOSIS OF UNSPECIFIED VEIN: ICD-10-CM

## 2023-01-10 DIAGNOSIS — Z79.01 LONG TERM (CURRENT) USE OF ANTICOAGULANTS: ICD-10-CM

## 2023-01-10 LAB
INR PPP: 1.3 RATIO
POCT-PROTHROMBIN TIME: 15.9 SECS
QUALITY CONTROL: YES

## 2023-01-12 ENCOUNTER — APPOINTMENT (OUTPATIENT)
Dept: HEMATOLOGY ONCOLOGY | Facility: CLINIC | Age: 63
End: 2023-01-12

## 2023-01-13 ENCOUNTER — OUTPATIENT (OUTPATIENT)
Dept: OUTPATIENT SERVICES | Facility: HOSPITAL | Age: 63
LOS: 1 days | Discharge: HOME | End: 2023-01-13

## 2023-01-13 ENCOUNTER — APPOINTMENT (OUTPATIENT)
Dept: MEDICATION MANAGEMENT | Facility: CLINIC | Age: 63
End: 2023-01-13

## 2023-01-13 DIAGNOSIS — Z79.01 LONG TERM (CURRENT) USE OF ANTICOAGULANTS: ICD-10-CM

## 2023-01-13 DIAGNOSIS — I82.91 CHRONIC EMBOLISM AND THROMBOSIS OF UNSPECIFIED VEIN: ICD-10-CM

## 2023-01-18 ENCOUNTER — OUTPATIENT (OUTPATIENT)
Dept: OUTPATIENT SERVICES | Facility: HOSPITAL | Age: 63
LOS: 1 days | Discharge: HOME | End: 2023-01-18

## 2023-01-18 ENCOUNTER — APPOINTMENT (OUTPATIENT)
Dept: MEDICATION MANAGEMENT | Facility: CLINIC | Age: 63
End: 2023-01-18

## 2023-01-18 DIAGNOSIS — Z79.01 LONG TERM (CURRENT) USE OF ANTICOAGULANTS: ICD-10-CM

## 2023-01-18 DIAGNOSIS — I82.91 CHRONIC EMBOLISM AND THROMBOSIS OF UNSPECIFIED VEIN: ICD-10-CM

## 2023-01-18 LAB
INR PPP: 2.7 RATIO
POCT-PROTHROMBIN TIME: 32.9 SECS
QUALITY CONTROL: YES

## 2023-01-20 ENCOUNTER — EMERGENCY (EMERGENCY)
Facility: HOSPITAL | Age: 63
LOS: 0 days | Discharge: HOME | End: 2023-01-20
Attending: EMERGENCY MEDICINE | Admitting: EMERGENCY MEDICINE
Payer: MEDICAID

## 2023-01-20 VITALS
TEMPERATURE: 97 F | HEART RATE: 90 BPM | RESPIRATION RATE: 18 BRPM | DIASTOLIC BLOOD PRESSURE: 80 MMHG | OXYGEN SATURATION: 96 % | SYSTOLIC BLOOD PRESSURE: 164 MMHG

## 2023-01-20 VITALS
TEMPERATURE: 98 F | DIASTOLIC BLOOD PRESSURE: 67 MMHG | HEART RATE: 79 BPM | OXYGEN SATURATION: 99 % | SYSTOLIC BLOOD PRESSURE: 147 MMHG | RESPIRATION RATE: 18 BRPM

## 2023-01-20 DIAGNOSIS — Z91.030 BEE ALLERGY STATUS: ICD-10-CM

## 2023-01-20 DIAGNOSIS — R07.89 OTHER CHEST PAIN: ICD-10-CM

## 2023-01-20 DIAGNOSIS — R53.1 WEAKNESS: ICD-10-CM

## 2023-01-20 DIAGNOSIS — Z91.013 ALLERGY TO SEAFOOD: ICD-10-CM

## 2023-01-20 DIAGNOSIS — Z86.718 PERSONAL HISTORY OF OTHER VENOUS THROMBOSIS AND EMBOLISM: ICD-10-CM

## 2023-01-20 DIAGNOSIS — Z87.891 PERSONAL HISTORY OF NICOTINE DEPENDENCE: ICD-10-CM

## 2023-01-20 DIAGNOSIS — Z79.01 LONG TERM (CURRENT) USE OF ANTICOAGULANTS: ICD-10-CM

## 2023-01-20 DIAGNOSIS — N40.0 BENIGN PROSTATIC HYPERPLASIA WITHOUT LOWER URINARY TRACT SYMPTOMS: ICD-10-CM

## 2023-01-20 DIAGNOSIS — Z86.59 PERSONAL HISTORY OF OTHER MENTAL AND BEHAVIORAL DISORDERS: ICD-10-CM

## 2023-01-20 DIAGNOSIS — K21.9 GASTRO-ESOPHAGEAL REFLUX DISEASE WITHOUT ESOPHAGITIS: ICD-10-CM

## 2023-01-20 DIAGNOSIS — Z91.011 ALLERGY TO MILK PRODUCTS: ICD-10-CM

## 2023-01-20 DIAGNOSIS — Z20.822 CONTACT WITH AND (SUSPECTED) EXPOSURE TO COVID-19: ICD-10-CM

## 2023-01-20 DIAGNOSIS — Z86.711 PERSONAL HISTORY OF PULMONARY EMBOLISM: ICD-10-CM

## 2023-01-20 DIAGNOSIS — Z91.014 ALLERGY TO MAMMALIAN MEATS: ICD-10-CM

## 2023-01-20 LAB
ALBUMIN SERPL ELPH-MCNC: 4.4 G/DL — SIGNIFICANT CHANGE UP (ref 3.5–5.2)
ALP SERPL-CCNC: 100 U/L — SIGNIFICANT CHANGE UP (ref 30–115)
ALT FLD-CCNC: 13 U/L — SIGNIFICANT CHANGE UP (ref 0–41)
ANION GAP SERPL CALC-SCNC: 17 MMOL/L — HIGH (ref 7–14)
APTT BLD: 42.1 SEC — HIGH (ref 27–39.2)
AST SERPL-CCNC: 40 U/L — SIGNIFICANT CHANGE UP (ref 0–41)
BASOPHILS # BLD AUTO: 0.05 K/UL — SIGNIFICANT CHANGE UP (ref 0–0.2)
BASOPHILS NFR BLD AUTO: 1.2 % — HIGH (ref 0–1)
BILIRUB SERPL-MCNC: 0.7 MG/DL — SIGNIFICANT CHANGE UP (ref 0.2–1.2)
BUN SERPL-MCNC: 7 MG/DL — LOW (ref 10–20)
CALCIUM SERPL-MCNC: 8.8 MG/DL — SIGNIFICANT CHANGE UP (ref 8.4–10.5)
CHLORIDE SERPL-SCNC: 101 MMOL/L — SIGNIFICANT CHANGE UP (ref 98–110)
CO2 SERPL-SCNC: 18 MMOL/L — SIGNIFICANT CHANGE UP (ref 17–32)
CREAT SERPL-MCNC: 1.2 MG/DL — SIGNIFICANT CHANGE UP (ref 0.7–1.5)
EGFR: 68 ML/MIN/1.73M2 — SIGNIFICANT CHANGE UP
EOSINOPHIL # BLD AUTO: 0.03 K/UL — SIGNIFICANT CHANGE UP (ref 0–0.7)
EOSINOPHIL NFR BLD AUTO: 0.7 % — SIGNIFICANT CHANGE UP (ref 0–8)
ETHANOL SERPL-MCNC: <10 MG/DL — SIGNIFICANT CHANGE UP
GLUCOSE SERPL-MCNC: 101 MG/DL — HIGH (ref 70–99)
HCT VFR BLD CALC: 36.4 % — LOW (ref 42–52)
HGB BLD-MCNC: 13 G/DL — LOW (ref 14–18)
IMM GRANULOCYTES NFR BLD AUTO: 0.2 % — SIGNIFICANT CHANGE UP (ref 0.1–0.3)
INR BLD: 7.86 RATIO — CRITICAL HIGH (ref 0.65–1.3)
LIDOCAIN IGE QN: 35 U/L — SIGNIFICANT CHANGE UP (ref 7–60)
LYMPHOCYTES # BLD AUTO: 0.95 K/UL — LOW (ref 1.2–3.4)
LYMPHOCYTES # BLD AUTO: 22.2 % — SIGNIFICANT CHANGE UP (ref 20.5–51.1)
MAGNESIUM SERPL-MCNC: 1.6 MG/DL — LOW (ref 1.8–2.4)
MCHC RBC-ENTMCNC: 32.1 PG — HIGH (ref 27–31)
MCHC RBC-ENTMCNC: 35.7 G/DL — SIGNIFICANT CHANGE UP (ref 32–37)
MCV RBC AUTO: 89.9 FL — SIGNIFICANT CHANGE UP (ref 80–94)
MONOCYTES # BLD AUTO: 0.2 K/UL — SIGNIFICANT CHANGE UP (ref 0.1–0.6)
MONOCYTES NFR BLD AUTO: 4.7 % — SIGNIFICANT CHANGE UP (ref 1.7–9.3)
NEUTROPHILS # BLD AUTO: 3.03 K/UL — SIGNIFICANT CHANGE UP (ref 1.4–6.5)
NEUTROPHILS NFR BLD AUTO: 71 % — SIGNIFICANT CHANGE UP (ref 42.2–75.2)
NRBC # BLD: 0 /100 WBCS — SIGNIFICANT CHANGE UP (ref 0–0)
NT-PROBNP SERPL-SCNC: 65 PG/ML — SIGNIFICANT CHANGE UP (ref 0–300)
PLATELET # BLD AUTO: 110 K/UL — LOW (ref 130–400)
POTASSIUM SERPL-MCNC: 5 MMOL/L — SIGNIFICANT CHANGE UP (ref 3.5–5)
POTASSIUM SERPL-SCNC: 5 MMOL/L — SIGNIFICANT CHANGE UP (ref 3.5–5)
PROT SERPL-MCNC: 7 G/DL — SIGNIFICANT CHANGE UP (ref 6–8)
PROTHROM AB SERPL-ACNC: >40 SEC — HIGH (ref 9.95–12.87)
RBC # BLD: 4.05 M/UL — LOW (ref 4.7–6.1)
RBC # FLD: 15.2 % — HIGH (ref 11.5–14.5)
SARS-COV-2 RNA SPEC QL NAA+PROBE: SIGNIFICANT CHANGE UP
SODIUM SERPL-SCNC: 136 MMOL/L — SIGNIFICANT CHANGE UP (ref 135–146)
TROPONIN T SERPL-MCNC: <0.01 NG/ML — SIGNIFICANT CHANGE UP
TROPONIN T SERPL-MCNC: <0.01 NG/ML — SIGNIFICANT CHANGE UP
WBC # BLD: 4.27 K/UL — LOW (ref 4.8–10.8)
WBC # FLD AUTO: 4.27 K/UL — LOW (ref 4.8–10.8)

## 2023-01-20 PROCEDURE — 71045 X-RAY EXAM CHEST 1 VIEW: CPT | Mod: 26

## 2023-01-20 PROCEDURE — 99285 EMERGENCY DEPT VISIT HI MDM: CPT

## 2023-01-20 PROCEDURE — 93010 ELECTROCARDIOGRAM REPORT: CPT

## 2023-01-20 PROCEDURE — 93010 ELECTROCARDIOGRAM REPORT: CPT | Mod: 77

## 2023-01-20 RX ORDER — MAGNESIUM SULFATE 500 MG/ML
2 VIAL (ML) INJECTION ONCE
Refills: 0 | Status: COMPLETED | OUTPATIENT
Start: 2023-01-20 | End: 2023-01-20

## 2023-01-20 RX ADMIN — Medication 50 MILLIGRAM(S): at 08:04

## 2023-01-20 RX ADMIN — Medication 25 GRAM(S): at 09:27

## 2023-01-20 NOTE — ED ADULT NURSE NOTE - OBJECTIVE STATEMENT
Pt c/o "feeling lousy" & wants to get checked out. Denies chest pain, denies abdominal pain, denies nausea/ vomiting/ diarrhea.

## 2023-01-20 NOTE — ED PROVIDER NOTE - NS ED ROS FT
Eyes:  No visual changes, eye pain or discharge.  ENMT:  No hearing changes, pain, discharge or infections. No neck pain or stiffness.  Cardiac: + cp  No  SOB or edema. No chest pain with exertion.  Respiratory:  No cough or respiratory distress. No hemoptysis. No history of asthma or RAD.  GI:  No nausea, vomiting, diarrhea or abdominal pain.  MS:  No myalgia, muscle weakness, joint pain or back pain.  Neuro:  No headache or weakness.  No LOC.  Skin:  No skin rash.   Endocrine: No history of thyroid disease or diabetes.  Except as documented in the HPI,  all other systems are negative.

## 2023-01-20 NOTE — ED ADULT TRIAGE NOTE - CHIEF COMPLAINT QUOTE
pt raya from adult home stating he has been feeling lousy all day and wants to get checked out from head to toe.

## 2023-01-20 NOTE — ED PROVIDER NOTE - PHYSICAL EXAMINATION
VITAL SIGNS: I have reviewed nursing notes and confirm.  CONSTITUTIONAL: Well-developed; well-nourished  SKIN: skin exam is warm and dry, no acute rash.    HEAD: Normocephalic; atraumatic.  EYES: conjunctiva and sclera clear.  ENT: No nasal discharge; airway clear.  CARD: S1, S2 normal; no murmurs, gallops, or rubs. Regular rate and rhythm.   RESP: No wheezes, rales or rhonchi.  ABD: Normal bowel sounds; soft; non-distended; non-tender  EXT: Normal ROM.  No clubbing, cyanosis or edema.   NEURO: Alert, oriented, grossly unremarkable

## 2023-01-20 NOTE — ED PROVIDER NOTE - PATIENT PORTAL LINK FT
You can access the FollowMyHealth Patient Portal offered by [facility] by registering at the following website: [email or web address]. By joining [employer]’s FollowMyHealth portal, you will also be able to view your health information using other applications (apps) compatible with our system.

## 2023-01-20 NOTE — ED PROVIDER NOTE - CLINICAL SUMMARY MEDICAL DECISION MAKING FREE TEXT BOX
No distress.  VSS.  Non toxic appearing.  EKG non ischemic and labs reassuring.  No acute ED intervention.  DC home.  Strict return instructions discussed.

## 2023-01-20 NOTE — ED PROVIDER NOTE - OBJECTIVE STATEMENT
Patient is a 62-year-old male history of DVT PE currently on Coumadin, pseudogout, GERD, BPH, alcohol abuse last drink last night at 8 PM coming from [facility] for evaluation of "feeling lousy".  Patient notes he had some discomfort in chest yesterday.  Patient denies leg swelling, fever, chills, abdominal pain, cough, sore throat

## 2023-01-23 ENCOUNTER — OUTPATIENT (OUTPATIENT)
Dept: OUTPATIENT SERVICES | Facility: HOSPITAL | Age: 63
LOS: 1 days | Discharge: HOME | End: 2023-01-23

## 2023-01-23 ENCOUNTER — APPOINTMENT (OUTPATIENT)
Dept: VASCULAR SURGERY | Facility: CLINIC | Age: 63
End: 2023-01-23

## 2023-01-23 ENCOUNTER — APPOINTMENT (OUTPATIENT)
Dept: MEDICATION MANAGEMENT | Facility: CLINIC | Age: 63
End: 2023-01-23

## 2023-01-23 DIAGNOSIS — I82.91 CHRONIC EMBOLISM AND THROMBOSIS OF UNSPECIFIED VEIN: ICD-10-CM

## 2023-01-23 DIAGNOSIS — Z79.01 LONG TERM (CURRENT) USE OF ANTICOAGULANTS: ICD-10-CM

## 2023-01-23 LAB
INR PPP: 8 RATIO
POCT-PROTHROMBIN TIME: 96 SECS
QUALITY CONTROL: YES

## 2023-01-25 ENCOUNTER — OUTPATIENT (OUTPATIENT)
Dept: OUTPATIENT SERVICES | Facility: HOSPITAL | Age: 63
LOS: 1 days | Discharge: HOME | End: 2023-01-25

## 2023-01-25 ENCOUNTER — APPOINTMENT (OUTPATIENT)
Dept: MEDICATION MANAGEMENT | Facility: CLINIC | Age: 63
End: 2023-01-25

## 2023-01-25 DIAGNOSIS — Z79.01 LONG TERM (CURRENT) USE OF ANTICOAGULANTS: ICD-10-CM

## 2023-01-25 DIAGNOSIS — I82.91 CHRONIC EMBOLISM AND THROMBOSIS OF UNSPECIFIED VEIN: ICD-10-CM

## 2023-01-25 LAB
INR PPP: 3 RATIO
POCT-PROTHROMBIN TIME: 36.2 SECS
QUALITY CONTROL: YES

## 2023-02-01 ENCOUNTER — APPOINTMENT (OUTPATIENT)
Dept: MEDICATION MANAGEMENT | Facility: CLINIC | Age: 63
End: 2023-02-01

## 2023-02-01 ENCOUNTER — OUTPATIENT (OUTPATIENT)
Dept: OUTPATIENT SERVICES | Facility: HOSPITAL | Age: 63
LOS: 1 days | Discharge: HOME | End: 2023-02-01

## 2023-02-01 DIAGNOSIS — I82.91 CHRONIC EMBOLISM AND THROMBOSIS OF UNSPECIFIED VEIN: ICD-10-CM

## 2023-02-01 DIAGNOSIS — Z79.01 LONG TERM (CURRENT) USE OF ANTICOAGULANTS: ICD-10-CM

## 2023-02-01 LAB
INR PPP: 1.3 RATIO
POCT-PROTHROMBIN TIME: 15.6 SECS
QUALITY CONTROL: YES

## 2023-02-08 ENCOUNTER — APPOINTMENT (OUTPATIENT)
Dept: MEDICATION MANAGEMENT | Facility: CLINIC | Age: 63
End: 2023-02-08

## 2023-02-08 ENCOUNTER — OUTPATIENT (OUTPATIENT)
Dept: OUTPATIENT SERVICES | Facility: HOSPITAL | Age: 63
LOS: 1 days | End: 2023-02-08
Payer: MEDICAID

## 2023-02-08 DIAGNOSIS — I48.91 UNSPECIFIED ATRIAL FIBRILLATION: ICD-10-CM

## 2023-02-08 DIAGNOSIS — Z79.01 LONG TERM (CURRENT) USE OF ANTICOAGULANTS: ICD-10-CM

## 2023-02-08 LAB
INR PPP: 2 RATIO
INR PPP: 2.4 RATIO
POCT-PROTHROMBIN TIME: 23.3 SECS
POCT-PROTHROMBIN TIME: 28.8 SECS
QUALITY CONTROL: YES
QUALITY CONTROL: YES

## 2023-02-08 PROCEDURE — 85610 PROTHROMBIN TIME: CPT

## 2023-02-08 PROCEDURE — 99211 OFF/OP EST MAY X REQ PHY/QHP: CPT

## 2023-02-08 PROCEDURE — 36416 COLLJ CAPILLARY BLOOD SPEC: CPT

## 2023-02-09 DIAGNOSIS — I48.91 UNSPECIFIED ATRIAL FIBRILLATION: ICD-10-CM

## 2023-02-09 DIAGNOSIS — Z79.01 LONG TERM (CURRENT) USE OF ANTICOAGULANTS: ICD-10-CM

## 2023-02-15 ENCOUNTER — APPOINTMENT (OUTPATIENT)
Dept: MEDICATION MANAGEMENT | Facility: CLINIC | Age: 63
End: 2023-02-15

## 2023-02-15 ENCOUNTER — OUTPATIENT (OUTPATIENT)
Dept: OUTPATIENT SERVICES | Facility: HOSPITAL | Age: 63
LOS: 1 days | End: 2023-02-15
Payer: MEDICAID

## 2023-02-15 DIAGNOSIS — Z79.01 LONG TERM (CURRENT) USE OF ANTICOAGULANTS: ICD-10-CM

## 2023-02-15 DIAGNOSIS — I48.91 UNSPECIFIED ATRIAL FIBRILLATION: ICD-10-CM

## 2023-02-15 LAB
INR PPP: 3.2 RATIO
POCT-PROTHROMBIN TIME: 38.9 SECS
QUALITY CONTROL: YES

## 2023-02-15 PROCEDURE — 36416 COLLJ CAPILLARY BLOOD SPEC: CPT

## 2023-02-15 PROCEDURE — 85610 PROTHROMBIN TIME: CPT

## 2023-02-15 PROCEDURE — 99211 OFF/OP EST MAY X REQ PHY/QHP: CPT

## 2023-02-16 DIAGNOSIS — Z79.01 LONG TERM (CURRENT) USE OF ANTICOAGULANTS: ICD-10-CM

## 2023-02-16 DIAGNOSIS — I48.91 UNSPECIFIED ATRIAL FIBRILLATION: ICD-10-CM

## 2023-02-22 ENCOUNTER — OUTPATIENT (OUTPATIENT)
Dept: OUTPATIENT SERVICES | Facility: HOSPITAL | Age: 63
LOS: 1 days | End: 2023-02-22
Payer: MEDICAID

## 2023-02-22 ENCOUNTER — APPOINTMENT (OUTPATIENT)
Dept: MEDICATION MANAGEMENT | Facility: CLINIC | Age: 63
End: 2023-02-22

## 2023-02-22 DIAGNOSIS — Z79.01 LONG TERM (CURRENT) USE OF ANTICOAGULANTS: ICD-10-CM

## 2023-02-22 DIAGNOSIS — I48.91 UNSPECIFIED ATRIAL FIBRILLATION: ICD-10-CM

## 2023-02-22 LAB
INR PPP: 1.3 RATIO
POCT-PROTHROMBIN TIME: 15.6 SECS
QUALITY CONTROL: YES

## 2023-02-22 PROCEDURE — 85610 PROTHROMBIN TIME: CPT

## 2023-02-22 PROCEDURE — 99211 OFF/OP EST MAY X REQ PHY/QHP: CPT

## 2023-02-22 PROCEDURE — 36416 COLLJ CAPILLARY BLOOD SPEC: CPT

## 2023-02-23 DIAGNOSIS — Z79.01 LONG TERM (CURRENT) USE OF ANTICOAGULANTS: ICD-10-CM

## 2023-02-23 DIAGNOSIS — I48.91 UNSPECIFIED ATRIAL FIBRILLATION: ICD-10-CM

## 2023-03-01 ENCOUNTER — OUTPATIENT (OUTPATIENT)
Dept: OUTPATIENT SERVICES | Facility: HOSPITAL | Age: 63
LOS: 1 days | End: 2023-03-01
Payer: MEDICAID

## 2023-03-01 ENCOUNTER — APPOINTMENT (OUTPATIENT)
Dept: MEDICATION MANAGEMENT | Facility: CLINIC | Age: 63
End: 2023-03-01

## 2023-03-01 DIAGNOSIS — I48.91 UNSPECIFIED ATRIAL FIBRILLATION: ICD-10-CM

## 2023-03-01 DIAGNOSIS — Z79.01 LONG TERM (CURRENT) USE OF ANTICOAGULANTS: ICD-10-CM

## 2023-03-01 LAB
INR PPP: 2.1 RATIO
POCT-PROTHROMBIN TIME: 25.4 SECS
QUALITY CONTROL: YES

## 2023-03-01 PROCEDURE — 85610 PROTHROMBIN TIME: CPT

## 2023-03-01 PROCEDURE — 36416 COLLJ CAPILLARY BLOOD SPEC: CPT

## 2023-03-01 PROCEDURE — 99211 OFF/OP EST MAY X REQ PHY/QHP: CPT

## 2023-03-02 DIAGNOSIS — Z79.01 LONG TERM (CURRENT) USE OF ANTICOAGULANTS: ICD-10-CM

## 2023-03-02 DIAGNOSIS — I48.91 UNSPECIFIED ATRIAL FIBRILLATION: ICD-10-CM

## 2023-03-08 ENCOUNTER — APPOINTMENT (OUTPATIENT)
Dept: MEDICATION MANAGEMENT | Facility: CLINIC | Age: 63
End: 2023-03-08

## 2023-03-11 NOTE — ED PROVIDER NOTE - EKG #1 DATE/TIME
Patient arrives via private vehicle from home for c/o itching and rash all over.  Rash and itching started on face and arms and spread over stomach and legs.   07-Sep-2019 16:29

## 2023-03-15 ENCOUNTER — INPATIENT (INPATIENT)
Facility: HOSPITAL | Age: 63
LOS: 11 days | Discharge: HOME | DRG: 661 | End: 2023-03-27
Attending: HOSPITALIST | Admitting: HOSPITALIST
Payer: MEDICAID

## 2023-03-15 ENCOUNTER — APPOINTMENT (OUTPATIENT)
Dept: MEDICATION MANAGEMENT | Facility: CLINIC | Age: 63
End: 2023-03-15

## 2023-03-15 VITALS
TEMPERATURE: 99 F | RESPIRATION RATE: 19 BRPM | SYSTOLIC BLOOD PRESSURE: 169 MMHG | OXYGEN SATURATION: 97 % | DIASTOLIC BLOOD PRESSURE: 76 MMHG | HEART RATE: 89 BPM | HEIGHT: 70 IN

## 2023-03-15 DIAGNOSIS — E87.1 HYPO-OSMOLALITY AND HYPONATREMIA: ICD-10-CM

## 2023-03-15 DIAGNOSIS — I10 ESSENTIAL (PRIMARY) HYPERTENSION: ICD-10-CM

## 2023-03-15 DIAGNOSIS — R04.0 EPISTAXIS: ICD-10-CM

## 2023-03-15 DIAGNOSIS — R07.89 OTHER CHEST PAIN: ICD-10-CM

## 2023-03-15 DIAGNOSIS — Z87.891 PERSONAL HISTORY OF NICOTINE DEPENDENCE: ICD-10-CM

## 2023-03-15 DIAGNOSIS — Z91.014 ALLERGY TO MAMMALIAN MEATS: ICD-10-CM

## 2023-03-15 DIAGNOSIS — E83.39 OTHER DISORDERS OF PHOSPHORUS METABOLISM: ICD-10-CM

## 2023-03-15 DIAGNOSIS — D69.6 THROMBOCYTOPENIA, UNSPECIFIED: ICD-10-CM

## 2023-03-15 DIAGNOSIS — N40.0 BENIGN PROSTATIC HYPERPLASIA WITHOUT LOWER URINARY TRACT SYMPTOMS: ICD-10-CM

## 2023-03-15 DIAGNOSIS — D68.32 HEMORRHAGIC DISORDER DUE TO EXTRINSIC CIRCULATING ANTICOAGULANTS: ICD-10-CM

## 2023-03-15 DIAGNOSIS — E80.7 DISORDER OF BILIRUBIN METABOLISM, UNSPECIFIED: ICD-10-CM

## 2023-03-15 DIAGNOSIS — E83.42 HYPOMAGNESEMIA: ICD-10-CM

## 2023-03-15 DIAGNOSIS — M10.9 GOUT, UNSPECIFIED: ICD-10-CM

## 2023-03-15 DIAGNOSIS — K70.30 ALCOHOLIC CIRRHOSIS OF LIVER WITHOUT ASCITES: ICD-10-CM

## 2023-03-15 DIAGNOSIS — D61.818 OTHER PANCYTOPENIA: ICD-10-CM

## 2023-03-15 DIAGNOSIS — D68.4 ACQUIRED COAGULATION FACTOR DEFICIENCY: ICD-10-CM

## 2023-03-15 DIAGNOSIS — K21.9 GASTRO-ESOPHAGEAL REFLUX DISEASE WITHOUT ESOPHAGITIS: ICD-10-CM

## 2023-03-15 DIAGNOSIS — Z86.711 PERSONAL HISTORY OF PULMONARY EMBOLISM: ICD-10-CM

## 2023-03-15 DIAGNOSIS — E87.21 ACUTE METABOLIC ACIDOSIS: ICD-10-CM

## 2023-03-15 DIAGNOSIS — Z91.013 ALLERGY TO SEAFOOD: ICD-10-CM

## 2023-03-15 DIAGNOSIS — E53.8 DEFICIENCY OF OTHER SPECIFIED B GROUP VITAMINS: ICD-10-CM

## 2023-03-15 DIAGNOSIS — R74.01 ELEVATION OF LEVELS OF LIVER TRANSAMINASE LEVELS: ICD-10-CM

## 2023-03-15 DIAGNOSIS — I82.432 ACUTE EMBOLISM AND THROMBOSIS OF LEFT POPLITEAL VEIN: ICD-10-CM

## 2023-03-15 DIAGNOSIS — K70.10 ALCOHOLIC HEPATITIS WITHOUT ASCITES: ICD-10-CM

## 2023-03-15 DIAGNOSIS — E51.9 THIAMINE DEFICIENCY, UNSPECIFIED: ICD-10-CM

## 2023-03-15 DIAGNOSIS — Z79.01 LONG TERM (CURRENT) USE OF ANTICOAGULANTS: ICD-10-CM

## 2023-03-15 DIAGNOSIS — F10.239 ALCOHOL DEPENDENCE WITH WITHDRAWAL, UNSPECIFIED: ICD-10-CM

## 2023-03-15 DIAGNOSIS — Z91.011 ALLERGY TO MILK PRODUCTS: ICD-10-CM

## 2023-03-15 LAB
ALBUMIN SERPL ELPH-MCNC: 4.9 G/DL — SIGNIFICANT CHANGE UP (ref 3.5–5.2)
ALP SERPL-CCNC: 113 U/L — SIGNIFICANT CHANGE UP (ref 30–115)
ALT FLD-CCNC: 55 U/L — HIGH (ref 0–41)
ANION GAP SERPL CALC-SCNC: 30 MMOL/L — HIGH (ref 7–14)
APTT BLD: 103.2 SEC — CRITICAL HIGH (ref 27–39.2)
AST SERPL-CCNC: 183 U/L — HIGH (ref 0–41)
BASOPHILS # BLD AUTO: 0.05 K/UL — SIGNIFICANT CHANGE UP (ref 0–0.2)
BASOPHILS NFR BLD AUTO: 0.8 % — SIGNIFICANT CHANGE UP (ref 0–1)
BILIRUB SERPL-MCNC: 2 MG/DL — HIGH (ref 0.2–1.2)
BUN SERPL-MCNC: 9 MG/DL — LOW (ref 10–20)
CALCIUM SERPL-MCNC: 9.4 MG/DL — SIGNIFICANT CHANGE UP (ref 8.4–10.5)
CHLORIDE SERPL-SCNC: 92 MMOL/L — LOW (ref 98–110)
CO2 SERPL-SCNC: 16 MMOL/L — LOW (ref 17–32)
CREAT SERPL-MCNC: 1.2 MG/DL — SIGNIFICANT CHANGE UP (ref 0.7–1.5)
EGFR: 68 ML/MIN/1.73M2 — SIGNIFICANT CHANGE UP
EOSINOPHIL # BLD AUTO: 0.01 K/UL — SIGNIFICANT CHANGE UP (ref 0–0.7)
EOSINOPHIL NFR BLD AUTO: 0.2 % — SIGNIFICANT CHANGE UP (ref 0–8)
GLUCOSE SERPL-MCNC: 75 MG/DL — SIGNIFICANT CHANGE UP (ref 70–99)
HCT VFR BLD CALC: 36.4 % — LOW (ref 42–52)
HGB BLD-MCNC: 12.6 G/DL — LOW (ref 14–18)
IMM GRANULOCYTES NFR BLD AUTO: 0.5 % — HIGH (ref 0.1–0.3)
INR BLD: 19.22 RATIO — CRITICAL HIGH (ref 0.65–1.3)
LYMPHOCYTES # BLD AUTO: 1.1 K/UL — LOW (ref 1.2–3.4)
LYMPHOCYTES # BLD AUTO: 16.8 % — LOW (ref 20.5–51.1)
MCHC RBC-ENTMCNC: 34.2 PG — HIGH (ref 27–31)
MCHC RBC-ENTMCNC: 34.6 G/DL — SIGNIFICANT CHANGE UP (ref 32–37)
MCV RBC AUTO: 98.9 FL — HIGH (ref 80–94)
MONOCYTES # BLD AUTO: 0.34 K/UL — SIGNIFICANT CHANGE UP (ref 0.1–0.6)
MONOCYTES NFR BLD AUTO: 5.2 % — SIGNIFICANT CHANGE UP (ref 1.7–9.3)
NEUTROPHILS # BLD AUTO: 5.03 K/UL — SIGNIFICANT CHANGE UP (ref 1.4–6.5)
NEUTROPHILS NFR BLD AUTO: 76.5 % — HIGH (ref 42.2–75.2)
NRBC # BLD: 0 /100 WBCS — SIGNIFICANT CHANGE UP (ref 0–0)
PLATELET # BLD AUTO: 61 K/UL — LOW (ref 130–400)
POTASSIUM SERPL-MCNC: 4.4 MMOL/L — SIGNIFICANT CHANGE UP (ref 3.5–5)
POTASSIUM SERPL-SCNC: 4.4 MMOL/L — SIGNIFICANT CHANGE UP (ref 3.5–5)
PROT SERPL-MCNC: 7.7 G/DL — SIGNIFICANT CHANGE UP (ref 6–8)
PROTHROM AB SERPL-ACNC: >40 SEC — HIGH (ref 9.95–12.87)
RBC # BLD: 3.68 M/UL — LOW (ref 4.7–6.1)
RBC # FLD: 17.1 % — HIGH (ref 11.5–14.5)
SARS-COV-2 RNA SPEC QL NAA+PROBE: SIGNIFICANT CHANGE UP
SODIUM SERPL-SCNC: 138 MMOL/L — SIGNIFICANT CHANGE UP (ref 135–146)
WBC # BLD: 6.56 K/UL — SIGNIFICANT CHANGE UP (ref 4.8–10.8)
WBC # FLD AUTO: 6.56 K/UL — SIGNIFICANT CHANGE UP (ref 4.8–10.8)

## 2023-03-15 PROCEDURE — 82784 ASSAY IGA/IGD/IGG/IGM EACH: CPT

## 2023-03-15 PROCEDURE — 87635 SARS-COV-2 COVID-19 AMP PRB: CPT

## 2023-03-15 PROCEDURE — 82247 BILIRUBIN TOTAL: CPT

## 2023-03-15 PROCEDURE — 86663 EPSTEIN-BARR ANTIBODY: CPT

## 2023-03-15 PROCEDURE — 86664 EPSTEIN-BARR NUCLEAR ANTIGEN: CPT

## 2023-03-15 PROCEDURE — 83520 IMMUNOASSAY QUANT NOS NONAB: CPT

## 2023-03-15 PROCEDURE — 70450 CT HEAD/BRAIN W/O DYE: CPT | Mod: 26,MA

## 2023-03-15 PROCEDURE — 82248 BILIRUBIN DIRECT: CPT

## 2023-03-15 PROCEDURE — 85610 PROTHROMBIN TIME: CPT

## 2023-03-15 PROCEDURE — 86900 BLOOD TYPING SEROLOGIC ABO: CPT

## 2023-03-15 PROCEDURE — 87799 DETECT AGENT NOS DNA QUANT: CPT

## 2023-03-15 PROCEDURE — 0225U NFCT DS DNA&RNA 21 SARSCOV2: CPT

## 2023-03-15 PROCEDURE — 86255 FLUORESCENT ANTIBODY SCREEN: CPT

## 2023-03-15 PROCEDURE — 80074 ACUTE HEPATITIS PANEL: CPT

## 2023-03-15 PROCEDURE — 85025 COMPLETE CBC W/AUTO DIFF WBC: CPT

## 2023-03-15 PROCEDURE — 93970 EXTREMITY STUDY: CPT

## 2023-03-15 PROCEDURE — 86381 MITOCHONDRIAL ANTIBODY EACH: CPT

## 2023-03-15 PROCEDURE — 84466 ASSAY OF TRANSFERRIN: CPT

## 2023-03-15 PROCEDURE — 87040 BLOOD CULTURE FOR BACTERIA: CPT

## 2023-03-15 PROCEDURE — 86376 MICROSOMAL ANTIBODY EACH: CPT

## 2023-03-15 PROCEDURE — 83090 ASSAY OF HOMOCYSTEINE: CPT

## 2023-03-15 PROCEDURE — 97110 THERAPEUTIC EXERCISES: CPT | Mod: GP

## 2023-03-15 PROCEDURE — 84145 PROCALCITONIN (PCT): CPT

## 2023-03-15 PROCEDURE — 86706 HEP B SURFACE ANTIBODY: CPT

## 2023-03-15 PROCEDURE — 87640 STAPH A DNA AMP PROBE: CPT

## 2023-03-15 PROCEDURE — 36415 COLL VENOUS BLD VENIPUNCTURE: CPT

## 2023-03-15 PROCEDURE — 86708 HEPATITIS A ANTIBODY: CPT

## 2023-03-15 PROCEDURE — 86140 C-REACTIVE PROTEIN: CPT

## 2023-03-15 PROCEDURE — 85730 THROMBOPLASTIN TIME PARTIAL: CPT

## 2023-03-15 PROCEDURE — 84100 ASSAY OF PHOSPHORUS: CPT

## 2023-03-15 PROCEDURE — 76700 US EXAM ABDOM COMPLETE: CPT

## 2023-03-15 PROCEDURE — 87641 MR-STAPH DNA AMP PROBE: CPT

## 2023-03-15 PROCEDURE — 82550 ASSAY OF CK (CPK): CPT

## 2023-03-15 PROCEDURE — 99285 EMERGENCY DEPT VISIT HI MDM: CPT

## 2023-03-15 PROCEDURE — 71250 CT THORAX DX C-: CPT

## 2023-03-15 PROCEDURE — 86038 ANTINUCLEAR ANTIBODIES: CPT

## 2023-03-15 PROCEDURE — 83605 ASSAY OF LACTIC ACID: CPT

## 2023-03-15 PROCEDURE — 86850 RBC ANTIBODY SCREEN: CPT

## 2023-03-15 PROCEDURE — 87086 URINE CULTURE/COLONY COUNT: CPT

## 2023-03-15 PROCEDURE — 84484 ASSAY OF TROPONIN QUANT: CPT

## 2023-03-15 PROCEDURE — 93010 ELECTROCARDIOGRAM REPORT: CPT

## 2023-03-15 PROCEDURE — 87389 HIV-1 AG W/HIV-1&-2 AB AG IA: CPT

## 2023-03-15 PROCEDURE — 86665 EPSTEIN-BARR CAPSID VCA: CPT

## 2023-03-15 PROCEDURE — 80076 HEPATIC FUNCTION PANEL: CPT

## 2023-03-15 PROCEDURE — 87077 CULTURE AEROBIC IDENTIFY: CPT

## 2023-03-15 PROCEDURE — 97162 PT EVAL MOD COMPLEX 30 MIN: CPT | Mod: GP

## 2023-03-15 PROCEDURE — 99223 1ST HOSP IP/OBS HIGH 75: CPT

## 2023-03-15 PROCEDURE — 86803 HEPATITIS C AB TEST: CPT

## 2023-03-15 PROCEDURE — 86225 DNA ANTIBODY NATIVE: CPT

## 2023-03-15 PROCEDURE — 86146 BETA-2 GLYCOPROTEIN ANTIBODY: CPT

## 2023-03-15 PROCEDURE — 93005 ELECTROCARDIOGRAM TRACING: CPT

## 2023-03-15 PROCEDURE — 82390 ASSAY OF CERULOPLASMIN: CPT

## 2023-03-15 PROCEDURE — 86790 VIRUS ANTIBODY NOS: CPT

## 2023-03-15 PROCEDURE — 86901 BLOOD TYPING SEROLOGIC RH(D): CPT

## 2023-03-15 PROCEDURE — 81001 URINALYSIS AUTO W/SCOPE: CPT

## 2023-03-15 PROCEDURE — 87529 HSV DNA AMP PROBE: CPT

## 2023-03-15 PROCEDURE — 85652 RBC SED RATE AUTOMATED: CPT

## 2023-03-15 PROCEDURE — 71045 X-RAY EXAM CHEST 1 VIEW: CPT

## 2023-03-15 PROCEDURE — 85027 COMPLETE CBC AUTOMATED: CPT

## 2023-03-15 PROCEDURE — 86147 CARDIOLIPIN ANTIBODY EA IG: CPT

## 2023-03-15 PROCEDURE — 76705 ECHO EXAM OF ABDOMEN: CPT

## 2023-03-15 PROCEDURE — 97116 GAIT TRAINING THERAPY: CPT | Mod: GP

## 2023-03-15 PROCEDURE — 80053 COMPREHEN METABOLIC PANEL: CPT

## 2023-03-15 PROCEDURE — 80048 BASIC METABOLIC PNL TOTAL CA: CPT

## 2023-03-15 PROCEDURE — 87186 SC STD MICRODIL/AGAR DIL: CPT

## 2023-03-15 PROCEDURE — 83735 ASSAY OF MAGNESIUM: CPT

## 2023-03-15 RX ORDER — PHYTONADIONE (VIT K1) 5 MG
5 TABLET ORAL ONCE
Refills: 0 | Status: COMPLETED | OUTPATIENT
Start: 2023-03-15 | End: 2023-03-15

## 2023-03-15 RX ORDER — OXYMETAZOLINE HYDROCHLORIDE 0.5 MG/ML
2 SPRAY NASAL ONCE
Refills: 0 | Status: COMPLETED | OUTPATIENT
Start: 2023-03-15 | End: 2023-03-15

## 2023-03-15 RX ORDER — POLYMYXIN B SULF/TRIMETHOPRIM 10000-1/ML
1 DROPS OPHTHALMIC (EYE) ONCE
Refills: 0 | Status: COMPLETED | OUTPATIENT
Start: 2023-03-15 | End: 2023-03-15

## 2023-03-15 RX ADMIN — Medication 1 DROP(S): at 17:03

## 2023-03-15 RX ADMIN — OXYMETAZOLINE HYDROCHLORIDE 2 SPRAY(S): 0.5 SPRAY NASAL at 18:34

## 2023-03-15 RX ADMIN — Medication 5 MILLIGRAM(S): at 21:35

## 2023-03-15 NOTE — H&P ADULT - NSHPLABSRESULTS_GEN_ALL_CORE
12.6   6.56  )-----------( 61       ( 15 Mar 2023 16:52 )             36.4       03-15    138  |  92<L>  |  9<L>  ----------------------------<  75  4.4   |  16<L>  |  1.2    Ca    9.4      15 Mar 2023 16:52    TPro  7.7  /  Alb  4.9  /  TBili  2.0<H>  /  DBili  x   /  AST  183<H>  /  ALT  55<H>  /  AlkPhos  113  03-15                  PT/INR - ( 15 Mar 2023 16:52 )   PT: >40.00 sec;   INR: 19.22 ratio         PTT - ( 15 Mar 2023 16:52 )  PTT:103.2 sec    Lactate Trend            CAPILLARY BLOOD GLUCOSE

## 2023-03-15 NOTE — ED PROVIDER NOTE - OBJECTIVE STATEMENT
62 year old male with a history of PE/DVT on Warfarin, HTN, severe AUD, pseudogout, GERD, and BPH presents to the ED with lightheadedness and epistaxis. patient states that for the past 4 days he has had multiple episodes of epistaxis from each nostril per day, last episode was prior to arrival however he is not bleeding now. Admits to intermittent lightheadedness worse with change in position from sitting to standing. further admits to slight headache poorly localized as well as chronic shortness of breath. Noted bilateral eye redness, crusting, and yellow discharge. Denies fever, chills, chest pain, nausea, vomiting, diarrhea, dysuria, hematuria, rectal bleeding, leg swelling. Last dose warfarin was yesterday.

## 2023-03-15 NOTE — H&P ADULT - ASSESSMENT
#Epistaxis 2/2 #supratherapeutic INR  #HO Bilateral Pulmonary Embolism   #HO Bilateral DVT  Pt presenting with epistaxis x4 days and INR > 40. Dried blood noted in ED, No active bleed  Diagnosed w/ acute b/l DVT's on 12/8/22. Subsequently started on Eliquis w/ good adherence. However, readmitted on 12/20/22 after feeling unwell and found to have extensive acute bilateral PE - unclear provoked vs unprovoked, unclear if failure of Eliquis. Pt switched to Lovenox per Heme-Onc w/ plan to bridge to coumadin as outpatient. Followed by heme/onc Dr. Stewart  - daily INR  - given Vitamin K 5 mg PO  - hold Coumadin 2/2 elevated INR  - heme-onc eval for consideration of anticoagulation need and options  - check VA duplex, if + consider IVC filter given bleed and elevated INR    #Atypical chest pain  pt reports left chest pain, left elbow pain, left arm pain which began while in ED, non-reproducible. pt poor historian.   need to r/o ACS, however consider pseudogout flare as well given hx multiple flare-ups although no edema or over signs of joint inflammation and this does not explain pt's chest pain  -initial EKG negative  -serial ECG and troponins  -pain control prn; avoid nsaids for now    #Acute transaminitis - predom hepatocellular  #Hyperbilirubinemia  Alk phos nl,  ALT 55 (prev nl), TBili 2.0  likely from etoh use  -check RUQ US, direct bili, hepatitis panel  -daily hepatic panel    #Alcohol Use Disorder, Severe  #Thiamine Deficiency, Suspected  #Folate Deficiency, Suspected  Pt denies active etoh use; underwent rehab after prior admission in [facility]  - monitor for withdrawals  - c/w gabapentin 200mg PO BID  - c/w thiamine 100mg PO QD  - c/w folic acid 1mg PO QD    #HO Pseudogout  Diagnosed w/ pseudogout in Oct 2022 based on CPPD crystals in synovial fluid from R knee; has had multiple flare ups  - on naproxen 500mg PO BID; hold for now  - tramadol if needed for additional pain management  - outpatient rheumatology follow up    #GERD  - c/w pantoprazole 40mg PO QD    #BPH  - c/w tamsulosin 0.4mg PO QD    #Former smoker  -quit 15 years ago; smoked for 20 years prior  -age appropriate screening OP    #Misc  DVT ppx- hold 2/2 elevated INR, check duplex prior to scd  Diet- DASH/TLC  Activity-IAT  Code- Full       #Epistaxis 2/2 #supratherapeutic INR  #HO Bilateral Pulmonary Embolism   #HO Bilateral DVT  Pt presenting with epistaxis x4 days and INR 19. No active bleed. Hgb 12.6. Hd stable.   Hx: Diagnosed w/ acute b/l DVT's on 12/8/22. Subsequently started on Eliquis w/ good adherence. However, readmitted on 12/20/22 after feeling unwell and found to have extensive acute bilateral PE - unclear provoked vs unprovoked, unclear if failure of Eliquis. Pt switched to Lovenox per Heme-Onc w/ plan to bridge to coumadin as outpatient. Followed by heme/onc Dr. Stewart  - daily INR  - given Vitamin K 5 mg PO  - hold Coumadin 2/2 elevated INR  - heme-onc eval for consideration of anticoagulation need and options  - check VA duplex, if + consider IVC filter given bleed and elevated INR    #Atypical chest pain  pt reports left chest pain, left elbow pain, left arm pain which began while in ED, non-reproducible. pt poor historian.   need to r/o ACS, however consider pseudogout flare as well given hx multiple flare-ups although no edema/ signs of joint inflammation and this does not explain pt's chest pain  -initial EKG negative  -serial ECG and troponins  -pain control prn; avoid nsaids     #Alcohol Use Disorder, Severe, with withdrawal   #Thiamine Deficiency, Suspected  #Folate Deficiency, Suspected  pt states he drinks heavily daily, tremors noted in ED  - start CIWA protocol with Ativan Taper (high risk pt)  - c/w gabapentin 200mg PO BID  - c/w thiamine 100mg PO QD  - c/w folic acid 1mg PO QD  - addiction consult    #Acute transaminitis - predom hepatocellular  #Hyperbilirubinemia  Alk phos nl,  ALT 55 (prev nl), TBili 2.0  likely from etoh use  -check RUQ US, direct bili, hepatitis panel, daily hepatic panel, daily INR    #HO Pseudogout  Diagnosed w/ pseudogout in Oct 2022 based on CPPD crystals in synovial fluid from R knee; has had multiple flare ups  - on naproxen 500mg PO BID; hold for now  - tramadol if needed for additional pain management  - outpatient rheumatology follow up    #GERD  - c/w pantoprazole 40mg PO QD    #BPH  - c/w tamsulosin 0.4mg PO QD    #Former smoker  -quit 15 years ago; smoked for 20 years prior  -age appropriate screening OP    #Misc  DVT ppx- hold 2/2 elevated INR, check duplex prior to scd  Diet- DASH/TLC  Activity-IAT  Code- Full       #Epistaxis 2/2 #supratherapeutic INR  #HO Bilateral Pulmonary Embolism   #HO Bilateral DVT  Pt presenting with epistaxis x4 days and INR 19. No active bleed. Hgb 12.6. Hd stable.   Was diagnosed w/ acute b/l DVT's on 12/8/22. Subsequently started on Eliquis w/ good adherence. However, readmitted on 12/20/22 after feeling unwell and found to have extensive acute bilateral PE - unclear provoked vs unprovoked, unclear if failure of Eliquis. Pt switched to Lovenox per Heme-Onc w/ plan to bridge to coumadin as outpatient. Followed by heme/onc Dr. Stewart  - daily INR  - given Vitamin K 5 mg PO  - hold Coumadin 2/2 elevated INR  - heme-onc eval for consideration of anticoagulation need and options  - check VA duplex, if + consider IVC filter given bleed and elevated INR  - r/o cirrhosis i/s/o elevated inr and etoh abuse, check RUQ US (low sensitivity, high specificity), if negative consider CT/MRI    #Atypical chest pain  pt reports left chest pain, left elbow pain, left arm pain which began while in ED, non-reproducible. pt poor historian.   need to r/o ACS, however consider pseudogout flare as well given hx multiple flare-ups although no edema/ signs of joint inflammation and this does not explain pt's chest pain  -initial EKG negative  -serial ECG and troponins  -pain control prn; avoid nsaids     #Alcohol Use Disorder, Severe, with withdrawal   #Thiamine Deficiency, Suspected  #Folate Deficiency, Suspected  pt states he drinks heavily daily, tremors noted in ED  - start CIWA protocol with Ativan Taper (high risk pt)  - c/w gabapentin 200mg PO BID  - c/w thiamine 100mg PO QD  - c/w folic acid 1mg PO QD  - addiction consult    #Acute transaminitis - predom hepatocellular  #Hyperbilirubinemia  Alk phos nl,  ALT 55 (prev nl), TBili 2.0  likely from etoh use  -check RUQ US, direct bili, hepatitis panel, daily hepatic panel, daily INR    #HO Pseudogout  Diagnosed w/ pseudogout in Oct 2022 based on CPPD crystals in synovial fluid from R knee; has had multiple flare ups  - on naproxen 500mg PO BID; hold for now  - tramadol if needed for additional pain management  - outpatient rheumatology follow up    #GERD  - c/w pantoprazole 40mg PO QD    #BPH  - c/w tamsulosin 0.4mg PO QD    #Former smoker  -quit 15 years ago; smoked for 20 years prior  -age appropriate screening OP    #Misc  DVT ppx- hold 2/2 elevated INR, check duplex prior to scd  Diet- DASH/TLC  Activity-IAT  Code- Full       #Epistaxis 2/2 #supratherapeutic INR  #HO Bilateral Pulmonary Embolism   #HO Bilateral DVT  Pt presenting with epistaxis x4 days and INR 19. No active bleed. Hgb 12.6. Hd stable.   Was diagnosed w/ acute b/l DVT's on 12/8/22. Subsequently started on Eliquis w/ good adherence. However, readmitted on 12/20/22 after feeling unwell and found to have extensive acute bilateral PE - unclear provoked vs unprovoked, unclear if failure of Eliquis. Pt switched to Lovenox per Heme-Onc w/ plan to bridge to coumadin as outpatient. Followed by heme/onc Dr. Stewart  R/o cirrhosis i/s/o elevated inr, transaminases and etoh abuse,  - daily INR  - given Vitamin K 5 mg PO  - hold Coumadin 2/2 elevated INR  - heme-onc eval for consideration of anticoagulation need / recommendations  - check VA duplex, if + consider possible IVC filter if unable to restart anticoagulation due to elevated INR  - check RUQ US (low sensitivity, high specificity for cirrhosis), consider CT/MRI    #Atypical chest pain  pt reports left chest pain, left elbow pain, left arm pain which began while in ED, non-reproducible. pt poor historian.   need to r/o ACS, however consider pseudogout flare as well given hx multiple flare-ups although no edema/ signs of joint inflammation and this does not explain pt's chest pain  -initial EKG negative  -serial ECG and troponins  -pain control prn; avoid nsaids     #Alcohol Use Disorder, Severe, with withdrawal   #Thiamine Deficiency, Suspected  #Folate Deficiency, Suspected  pt states he drinks heavily daily, tremors noted in ED  - start CIWA protocol with Ativan Taper (high risk pt)  - c/w gabapentin 200mg PO BID  - c/w thiamine 100mg PO QD  - c/w folic acid 1mg PO QD  - addiction consult    #Acute transaminitis - predom hepatocellular  #Hyperbilirubinemia  Alk phos nl,  ALT 55 (prev nl), TBili 2.0  likely from etoh use  -check RUQ US, direct bili, hepatitis panel, daily hepatic panel, daily INR    #HO Pseudogout  Diagnosed w/ pseudogout in Oct 2022 based on CPPD crystals in synovial fluid from R knee; has had multiple flare ups  - on naproxen 500mg PO BID; hold for now  - tramadol if needed for additional pain management  - outpatient rheumatology follow up    #GERD  - c/w pantoprazole 40mg PO QD    #BPH  - c/w tamsulosin 0.4mg PO QD    #Former smoker  -quit 15 years ago; smoked for 20 years prior  -age appropriate screening OP    #Misc  DVT ppx- hold 2/2 elevated INR, check duplex prior to scd  Diet- DASH/TLC  Activity-IAT  Code- Full

## 2023-03-15 NOTE — ED PROVIDER NOTE - CLINICAL SUMMARY MEDICAL DECISION MAKING FREE TEXT BOX
62 yr old m that presents with epistaxis that has resolved. will check INR, labs, imaging. INR noted to be 19. no active signs of bleeding. pt to be given vitamin k. pt to be admitted.   Labs were ordered and reviewed.  Imaging was ordered and reviewed by me.  Appropriate medications for patient's presenting complaints were ordered and effects were reassessed.  Patient's records (prior hospital, ED visit, and/or nursing home notes if available) were reviewed.  Additional history was obtained from EMS, family, and/or PCP (where available).  Escalation to admission/observation was considered.  Patient requires inpatient hospitalization - medicine.

## 2023-03-15 NOTE — H&P ADULT - HISTORY OF PRESENT ILLNESS
63yo male with hx DVT and PE on Coumadin, severe alcohol use disorder, pseudogout, GERD, and BPH presenting from [facility] for epistaxis x4 days.  Pt reports he is given medications from nurses at his residence. States last bloodwork done was several weeks ago.  Admits to lightheadedness. Denies other sites of bleeding, denies ecchymosis.   While in ED pt reported having chest pain which began 1 hour prior to my assessment, left sided, left elbow and left wrist. Reports had this pain several times before, but does not further elucidate hx. Denies SOB, n/v, diaphoresis, abd pain. Former tobacco use, denies active etoh use. History limited due to pt being a poor unreliable historian. Pt appears to have minimal insight to his medical conditions.     ED: VS wnl. HD stable. No bleeding noted. Labs notable for INR > 40.  Given Vitamin K 5mg PO. Admitted to medicine.    61yo male with hx DVT and PE on Coumadin, severe alcohol use disorder, pseudogout, GERD, and BPH presenting from [facility] for epistaxis x4 days.  Pt reports he is given medications from nurses at his residence. States last bloodwork done was several weeks ago.  Admits to lightheadedness. Denies other sites of bleeding, denies ecchymosis.   While in ED pt reported having chest pain which began 1 hour prior to my assessment, left sided, left elbow and left wrist. Reports had this pain several times before, but does not further elucidate hx. Denies SOB, n/v, diaphoresis, abd pain. Active daily ETOH use, last drink yesterday. Former tobacco use, History limited due to pt being a poor unreliable historian. Pt appears to have minimal insight to his medical conditions. Of note per chart review, pt diagnosed w/ acute b/l DVT's on 12/8/22. Subsequently started on Eliquis w/ good adherence. However, readmitted on 12/20/22 after feeling unwell and found to have extensive acute bilateral PE - unclear if unprovoked, unclear if failure of Eliquis, wasswitched to Lovenox per Heme-Onc with plan to bridge to coumadin as outpatient.    ED: VS wnl. HD stable. No bleeding noted. Labs notable for INR 19. Hgb 12.6. Given Vitamin K 5mg PO. Admitted to medicine.

## 2023-03-15 NOTE — H&P ADULT - NSHPREVIEWOFSYSTEMS_GEN_ALL_CORE
ROS:   CONSTITUTIONAL: No weakness, fevers or chills  EYES/ENT: No visual changes;  No vertigo or throat pain   NECK: No pain or stiffness  RESPIRATORY: No cough, wheezing, hemoptysis; No shortness of breath  CARDIOVASCULAR: No chest pain or palpitations  GASTROINTESTINAL: No abdominal or epigastric pain. No nausea, vomiting, or hematemesis; No diarrhea or constipation. No melena or hematochezia.  GENITOURINARY: No dysuria, frequency or hematuria  NEUROLOGICAL: No numbness or weakness  SKIN: No itching, rashes    PE:  General: no acute distress  Head: atraumatic, normocephalic  Neck: no lymphadenopathy, no tracheal deviation  Eyes: EOMI, no icterus  Lungs/Chest: Clear to auscultation bilaterally, no wheeze  Heart: regular rate, regular rhythm, S1/S2  Abdomen: BS audible, non-distended, soft, non-tender, no rigidity, no guarding  Extremities: no clubbing, no cyanosis, no edema  Skin: warm and dry  Neuro: AAOx3, speech clear and fluent, moving all extremities ROS:   CONSTITUTIONAL: No weakness, fevers or chills  EYES/ENT: No visual changes;  No vertigo or throat pain . +epistaxis  NECK: No pain or stiffness  RESPIRATORY: No cough, wheezing, hemoptysis; No shortness of breath  CARDIOVASCULAR: +chest pain, no palpitations  GASTROINTESTINAL: No abdominal or epigastric pain. No nausea, vomiting, or hematemesis; No diarrhea or constipation. No melena or hematochezia.  GENITOURINARY: No dysuria, frequency or hematuria  NEUROLOGICAL: No numbness or weakness  SKIN: No itching, rashes    PE:  General: no acute distress  Head: normocephalic, dried blood noted in bilateral nares  Neck: no lymphadenopathy, no tracheal deviation  Eyes: EOMI, no icterus  Lungs/Chest: Clear to auscultation bilaterally, no wheeze  Heart: regular rate, regular rhythm, S1/S2  Abdomen: BS audible, non-distended, soft, non-tender, no rigidity, no guarding  Extremities: no clubbing, no cyanosis, no edema  Skin: warm and dry, no ecchymosis  Neuro: AAOx3, speech clear and fluent, moving all extremities

## 2023-03-15 NOTE — H&P ADULT - ATTENDING COMMENTS
Patient seen and examined at bedside independently of the residents. I read the resident's note and agree with the plan with the additions and corrections as noted below. My note supersedes the resident's note.     REVIEW OF SYSTEMS:  CONSTITUTIONAL: No weakness, fevers or chills  EYES/ENT: No visual changes;  No vertigo or throat pain   NECK: No pain or stiffness  RESPIRATORY: No cough, wheezing, hemoptysis; No shortness of breath  CARDIOVASCULAR: No chest pain or palpitations  GASTROINTESTINAL: No abdominal or epigastric pain. No nausea, vomiting, or hematemesis; No diarrhea or constipation. No melena or hematochezia.  GENITOURINARY: No dysuria, frequency or hematuria  NEUROLOGICAL: No numbness or weakness  MSK: No pain. No weakness.   SKIN: No itching, rashes.     PMH:     FHx: Reviewed. No fhx of asthma/copd, No fhx of liver and pulmonary disease. No fhx of hematological disorder.     Physical Exam:  GEN: No acute distress. Awake, Alert and oriented x 3.   Head: Atraumatic, Normocephalic.   Eye: PEERLA. No sclera icterus. EOMI.   ENT: Normal oropharynx, no thyromegaly, no mass, no lymphadenopathy.   LUNGS: Clear to auscultation bilaterally. No wheeze/rales/crackles.   HEART: Normal. S1/S2 present. RRR. No murmur/gallops.   ABD: Soft, non-tender, non-distended. Bowel sounds present.   EXT: No pitting edema. No erythema. No tenderness.  Integumentary: No rash, No sore, No petechia.   NEURO: CN III-XII intact. Strength: 5/5 b/l ULE. Sensory intact b/l ULE.     Vital Signs Last 24 Hrs  T(C): 36.9 (15 Mar 2023 15:42), Max: 37.4 (15 Mar 2023 13:32)  T(F): 98.5 (15 Mar 2023 15:42), Max: 99.4 (15 Mar 2023 13:32)  HR: 77 (15 Mar 2023 15:42) (77 - 89)  BP: 163/77 (15 Mar 2023 15:42) (163/77 - 169/76)  BP(mean): --  RR: 18 (15 Mar 2023 15:42) (18 - 19)  SpO2: 98% (15 Mar 2023 15:42) (97% - 98%)    Parameters below as of 15 Mar 2023 15:42  Patient On (Oxygen Delivery Method): room air      Please see the above notes for Labs and radiology.     Assessment and Plan:     63 yo M with hx of PE/DVT (on coumadin), HTN, GERD, BPH and Pseudogout presents to ED for epistaxis and lightheadedness.     Epigastaxis likely 2/2 supratherapeutic INR  - s/p vit K given in ED.   - Hold coumadin.   - Repeat INR.     Thrombocytopenia   - check peripheral smear  - check RUQ US, hepatitis panel and HIV  - hematology consult.     HTN - c/w home med  GERD - PPI   BPH - c/w home med  Pseudogout - c/w home med.     DVT ppx: Hold coumadin  GI ppx: PPI  Diet: DASH diet  Activity: as tolerated.      Date seen by the attendin/15/2023  Total time spent: 75 minutes. Patient seen and examined at bedside independently of the residents. I read the resident's note and agree with the plan with the additions and corrections as noted below. My note supersedes the resident's note.     REVIEW OF SYSTEMS:  CONSTITUTIONAL: No weakness, fevers or chills  EYES/ENT: No visual changes;  No vertigo or throat pain   NECK: No pain or stiffness  RESPIRATORY: No cough, wheezing, hemoptysis; No shortness of breath  CARDIOVASCULAR: No chest pain or palpitations  GASTROINTESTINAL: No abdominal or epigastric pain. No nausea, vomiting, or hematemesis; No diarrhea or constipation. No melena or hematochezia.  GENITOURINARY: No dysuria, frequency or hematuria  NEUROLOGICAL: No numbness or weakness  MSK: No pain. No weakness.   SKIN: No itching, rashes.     PMH: PE/DVT (on coumadin), HTN, GERD, BPH and Pseudogout     FHx: Reviewed. No fhx of asthma/copd, No fhx of liver and pulmonary disease. No fhx of hematological disorder.     Physical Exam:  GEN: No acute distress. Awake, Alert and oriented x 3.   Head: Atraumatic, Normocephalic.   Eye: PEERLA. No sclera icterus. EOMI.   ENT: Normal oropharynx, no thyromegaly, no mass, no lymphadenopathy.   LUNGS: Clear to auscultation bilaterally. No wheeze/rales/crackles.   HEART: Normal. S1/S2 present. RRR. No murmur/gallops.   ABD: Soft, non-tender, non-distended. Bowel sounds present.   EXT: No pitting edema. No erythema. No tenderness.  Integumentary: No rash, No sore, No petechia.   NEURO: CN III-XII intact. Strength: 5/5 b/l ULE. Sensory intact b/l ULE.     Vital Signs Last 24 Hrs  T(C): 36.9 (15 Mar 2023 15:42), Max: 37.4 (15 Mar 2023 13:32)  T(F): 98.5 (15 Mar 2023 15:42), Max: 99.4 (15 Mar 2023 13:32)  HR: 77 (15 Mar 2023 15:42) (77 - 89)  BP: 163/77 (15 Mar 2023 15:42) (163/77 - 169/76)  BP(mean): --  RR: 18 (15 Mar 2023 15:42) (18 - 19)  SpO2: 98% (15 Mar 2023 15:42) (97% - 98%)    Parameters below as of 15 Mar 2023 15:42  Patient On (Oxygen Delivery Method): room air      Please see the above notes for Labs and radiology.     Assessment and Plan:     63 yo M with hx of PE/DVT (on coumadin), HTN, GERD, BPH and Pseudogout presents to ED for epistaxis and lightheadedness.         Date seen by the attendin/15/2023  Total time spent: 75 minutes. Patient seen and examined at bedside independently of the residents. I read the resident's note and agree with the plan with the additions and corrections as noted below. My note supersedes the resident's note.     REVIEW OF SYSTEMS:  CONSTITUTIONAL: No weakness, fevers or chills  EYES/ENT: No visual changes;  No vertigo or throat pain . + nose bleed.   NECK: No pain or stiffness  RESPIRATORY: No cough, wheezing, hemoptysis; No shortness of breath  CARDIOVASCULAR: No chest pain or palpitations  GASTROINTESTINAL: No abdominal or epigastric pain. No nausea, vomiting, or hematemesis; No diarrhea or constipation. No melena or hematochezia.  GENITOURINARY: No dysuria, frequency or hematuria  NEUROLOGICAL: No numbness or weakness  MSK: No pain. No weakness.   SKIN: No itching, rashes.     PMH: PE/DVT (on coumadin), HTN, GERD, BPH and Pseudogout     FHx: Reviewed. No fhx of asthma/copd, No fhx of liver and pulmonary disease. No fhx of hematological disorder.     Physical Exam:  GEN: No acute distress. Awake, Alert and oriented x 3.   Head: Atraumatic, Normocephalic.   Eye: PEERLA. No sclera icterus. EOMI.   ENT: Normal oropharynx, no thyromegaly, no mass, no lymphadenopathy. + dried blood on b/l nares.   LUNGS: Clear to auscultation bilaterally. No wheeze/rales/crackles.   HEART: Normal. S1/S2 present. RRR. No murmur/gallops.   ABD: Soft, non-tender, non-distended. Bowel sounds present.   EXT: No pitting edema. No erythema. No tenderness.+ mild B/L UE tremors.   Integumentary: No rash, No sore, No petechia.   NEURO: CN III-XII intact. Strength: 5/5 b/l ULE. Sensory intact b/l ULE.     Vital Signs Last 24 Hrs  T(C): 36.9 (15 Mar 2023 15:42), Max: 37.4 (15 Mar 2023 13:32)  T(F): 98.5 (15 Mar 2023 15:42), Max: 99.4 (15 Mar 2023 13:32)  HR: 77 (15 Mar 2023 15:42) (77 - 89)  BP: 163/77 (15 Mar 2023 15:42) (163/77 - 169/76)  BP(mean): --  RR: 18 (15 Mar 2023 15:42) (18 - 19)  SpO2: 98% (15 Mar 2023 15:42) (97% - 98%)    Parameters below as of 15 Mar 2023 15:42  Patient On (Oxygen Delivery Method): room air      Please see the above notes for Labs and radiology.     Assessment and Plan:     63 yo M with hx of PE/DVT (on coumadin), Alcohol abuse (drink a pint of vodka daily), HTN, GERD, BPH and Pseudogout presents to ED for epistaxis x 4 days.     Epistaxis likely 2/2 supratherapeutic INR  - s/p vit K 5 mg x1 given in ED.   - Hold coumadin.   - Repeat INR.     Atypical Chest pain   - EKG NSR. No significant ST/T wave changes.   - check troponin and TTE    Thrombocytopenia   - check peripheral smear  - check US abdomen, hepatitis panel and HIV  - monitor CBC     Alcohol abuse/ Suspected thiamine and folate deficiency  - [facility] protocol  - thiamine, folic acid and multivitamin   - CATCH team.     Hx of pseudogout - supportive care. Outpatient follow up.   HTN - c/w home med  GERD - PPI   BPH - c/w home med    DVT ppx: Hold coumadin  GI ppx: PPI  Diet: DASH diet  Activity: as tolerated.      Date seen by the attendin/15/2023  Total time spent: 75 minutes. Patient seen and examined at bedside independently of the residents. I read the resident's note and agree with the plan with the additions and corrections as noted below. My note supersedes the resident's note.     REVIEW OF SYSTEMS:  CONSTITUTIONAL: No weakness, fevers or chills  EYES/ENT: No visual changes;  No vertigo or throat pain . + nose bleed.   NECK: No pain or stiffness  RESPIRATORY: No cough, wheezing, hemoptysis; No shortness of breath  CARDIOVASCULAR: No chest pain or palpitations  GASTROINTESTINAL: No abdominal or epigastric pain. No nausea, vomiting, or hematemesis; No diarrhea or constipation. No melena or hematochezia.  GENITOURINARY: No dysuria, frequency or hematuria  NEUROLOGICAL: No numbness or weakness  MSK: No pain. No weakness.   SKIN: No itching, rashes.     PMH: PE/DVT (on coumadin), HTN, GERD, BPH and Pseudogout     FHx: Reviewed. No fhx of asthma/copd, No fhx of liver and pulmonary disease. No fhx of hematological disorder.     Physical Exam:  GEN: No acute distress. Awake, Alert and oriented x 3.   Head: Atraumatic, Normocephalic.   Eye: PEERLA. No sclera icterus. EOMI.   ENT: Normal oropharynx, no thyromegaly, no mass, no lymphadenopathy. + dried blood on b/l nares.   LUNGS: Clear to auscultation bilaterally. No wheeze/rales/crackles.   HEART: Normal. S1/S2 present. RRR. No murmur/gallops.   ABD: Soft, non-tender, non-distended. Bowel sounds present.   EXT: No pitting edema. No erythema. No tenderness.+ mild B/L UE tremors.   Integumentary: No rash, No sore, No petechia.   NEURO: CN III-XII intact. Strength: 5/5 b/l ULE. Sensory intact b/l ULE.     Vital Signs Last 24 Hrs  T(C): 36.9 (15 Mar 2023 15:42), Max: 37.4 (15 Mar 2023 13:32)  T(F): 98.5 (15 Mar 2023 15:42), Max: 99.4 (15 Mar 2023 13:32)  HR: 77 (15 Mar 2023 15:42) (77 - 89)  BP: 163/77 (15 Mar 2023 15:42) (163/77 - 169/76)  BP(mean): --  RR: 18 (15 Mar 2023 15:42) (18 - 19)  SpO2: 98% (15 Mar 2023 15:42) (97% - 98%)    Parameters below as of 15 Mar 2023 15:42  Patient On (Oxygen Delivery Method): room air      Please see the above notes for Labs and radiology.     Assessment and Plan:     61 yo M with hx of PE/DVT (on coumadin), Alcohol abuse (drink a pint of vodka daily), HTN, GERD, BPH and Pseudogout presents to ED for epistaxis x 4 days.     Epistaxis likely 2/2 supratherapeutic INR  - s/p vit K 5 mg x1 given in ED.   - Hold coumadin.   - Repeat INR.     Atypical Chest pain   - EKG NSR. No significant ST/T wave changes.   - check troponin and TTE    Thrombocytopenia   - check peripheral smear  - check US abdomen, hepatitis panel and HIV  - monitor CBC     Alcohol abuse/ Suspected thiamine and folate deficiency  - [facility] protocol  - thiamine, folic acid and multivitamin   - CATCH team.     Hx of DVT/PE - on coumadin (hold for now).   Hx of pseudogout - supportive care. Outpatient follow up.   HTN - c/w home med  GERD - PPI   BPH - c/w home med    DVT ppx: Hold coumadin  GI ppx: PPI  Diet: DASH diet  Activity: as tolerated.      Date seen by the attendin/15/2023  Total time spent: 75 minutes.

## 2023-03-15 NOTE — ED PROVIDER NOTE - CARE PLAN
Principal Discharge DX:	Epistaxis  Secondary Diagnosis:	Supratherapeutic INR  Secondary Diagnosis:	Lightheadedness  Secondary Diagnosis:	Thrombocytopenia   1

## 2023-03-15 NOTE — ED PROVIDER NOTE - PHYSICAL EXAMINATION
Physical Exam    Constitutional: No acute distress.   Eyes: Conjunctiva pink, Sclera clear, PERRLA, EOMI.  ENT: No sinus tenderness. Dried blood bilateral nostrils. No oropharyngeal erythema, edema, or exudates. Uvula midline.   Cardiovascular: Regular rate, regular rhythm. No noted murmurs rubs or gallops.  Respiratory: unlabored respiratory effort, clear to auscultation bilaterally no wheezing, rales or rhonchi  Gastrointestinal: Normal bowel sounds. soft, non distended, non-tender abdomen.   Musculoskeletal: supple neck, no midline tenderness. No joint or bony deformity.   Integumentary: warm, dry, no rash  Neurologic: awake, alert, cranial nerves II-XII grossly intact, extremities’ motor and sensory functions grossly intact

## 2023-03-15 NOTE — ED PROVIDER NOTE - ATTENDING APP SHARED VISIT CONTRIBUTION OF CARE
62-year-old male with a past medical history significant for PE DVT on Coumadin hypertension pseudogout GERD and BPH who presents with lightheadedness and epistaxis.  Patient states that for the last 4 days he has been having episodes of epistaxis.  Of note patient is not actively bleeding in the ED.  Patient denies any other medical complaints.    VITAL SIGNS: I have reviewed nursing notes and confirm.  CONSTITUTIONAL: non-toxic, well appearing  SKIN: no rash, no petechiae.  EYES:  EOMI, pink conjunctiva, anicteric  ENT: tongue midline, no exudates, MMM, no active bleeding noted in the nares.   NECK: Supple; no meningismus, no JVD  CARD: RRR, no murmurs, equal radial pulses bilaterally 2+  RESP: CTAB, no respiratory distress  ABD: Soft, non-tender, non-distended, no peritoneal signs, no HSM, no CVA tenderness  EXT: Normal ROM x4. No edema. No calves tenderness  NEURO: Alert, oriented x3. CN2-12 intact, equal strength bilaterally, nl gait.  PSYCH: Cooperative, appropriate.    62 yr old m that presents with epistaxis that has resolved. will check INR, labs, imaging. INR noted to be 19. no active signs of bleeding. pt to be given vitamin k. pt to be admitted.   62 yr old m that presents with epixtasis 62-year-old male with a past medical history significant for PE DVT on Coumadin hypertension pseudogout GERD and BPH who presents with lightheadedness and epistaxis.  Patient states that for the last 4 days he has been having episodes of epistaxis.  Of note patient is not actively bleeding in the ED.  Patient denies any other medical complaints.    VITAL SIGNS: I have reviewed nursing notes and confirm.  CONSTITUTIONAL: non-toxic, well appearing  SKIN: no rash, no petechiae.  EYES:  EOMI, pink conjunctiva, anicteric  ENT: tongue midline, no exudates, MMM, no active bleeding noted in the nares.   NECK: Supple; no meningismus, no JVD  CARD: RRR, no murmurs, equal radial pulses bilaterally 2+  RESP: CTAB, no respiratory distress  ABD: Soft, non-tender, non-distended, no peritoneal signs, no HSM, no CVA tenderness  EXT: Normal ROM x4. No edema. No calves tenderness  NEURO: Alert, oriented x3. CN2-12 intact, equal strength bilaterally, nl gait.  PSYCH: Cooperative, appropriate.    62 yr old m that presents with epistaxis that has resolved. will check INR, labs, imaging. INR noted to be 19. no active signs of bleeding. pt to be given vitamin k. pt to be admitted.

## 2023-03-16 LAB
ALBUMIN SERPL ELPH-MCNC: 4.6 G/DL — SIGNIFICANT CHANGE UP (ref 3.5–5.2)
ALBUMIN SERPL ELPH-MCNC: 4.7 G/DL — SIGNIFICANT CHANGE UP (ref 3.5–5.2)
ALP SERPL-CCNC: 101 U/L — SIGNIFICANT CHANGE UP (ref 30–115)
ALP SERPL-CCNC: 98 U/L — SIGNIFICANT CHANGE UP (ref 30–115)
ALT FLD-CCNC: 45 U/L — HIGH (ref 0–41)
ALT FLD-CCNC: 48 U/L — HIGH (ref 0–41)
ANION GAP SERPL CALC-SCNC: 24 MMOL/L — HIGH (ref 7–14)
APTT BLD: 102.2 SEC — CRITICAL HIGH (ref 27–39.2)
AST SERPL-CCNC: 125 U/L — HIGH (ref 0–41)
AST SERPL-CCNC: 126 U/L — HIGH (ref 0–41)
BASOPHILS # BLD AUTO: 0.04 K/UL — SIGNIFICANT CHANGE UP (ref 0–0.2)
BASOPHILS NFR BLD AUTO: 1.2 % — HIGH (ref 0–1)
BILIRUB DIRECT SERPL-MCNC: 0.8 MG/DL — HIGH (ref 0–0.3)
BILIRUB DIRECT SERPL-MCNC: 0.9 MG/DL — HIGH (ref 0–0.3)
BILIRUB INDIRECT FLD-MCNC: 1.4 MG/DL — HIGH (ref 0.2–1.2)
BILIRUB INDIRECT FLD-MCNC: 1.5 MG/DL — HIGH (ref 0.2–1.2)
BILIRUB SERPL-MCNC: 2.3 MG/DL — HIGH (ref 0.2–1.2)
BUN SERPL-MCNC: 11 MG/DL — SIGNIFICANT CHANGE UP (ref 10–20)
CALCIUM SERPL-MCNC: 9.6 MG/DL — SIGNIFICANT CHANGE UP (ref 8.4–10.5)
CHLORIDE SERPL-SCNC: 95 MMOL/L — LOW (ref 98–110)
CO2 SERPL-SCNC: 16 MMOL/L — LOW (ref 17–32)
CREAT SERPL-MCNC: 1.2 MG/DL — SIGNIFICANT CHANGE UP (ref 0.7–1.5)
EGFR: 68 ML/MIN/1.73M2 — SIGNIFICANT CHANGE UP
EOSINOPHIL # BLD AUTO: 0.07 K/UL — SIGNIFICANT CHANGE UP (ref 0–0.7)
EOSINOPHIL NFR BLD AUTO: 2 % — SIGNIFICANT CHANGE UP (ref 0–8)
GLUCOSE SERPL-MCNC: 95 MG/DL — SIGNIFICANT CHANGE UP (ref 70–99)
HAV IGM SER-ACNC: SIGNIFICANT CHANGE UP
HBV CORE IGM SER-ACNC: SIGNIFICANT CHANGE UP
HBV SURFACE AB SER-ACNC: SIGNIFICANT CHANGE UP
HBV SURFACE AG SER-ACNC: SIGNIFICANT CHANGE UP
HCT VFR BLD CALC: 36.2 % — LOW (ref 42–52)
HCV AB S/CO SERPL IA: 0.15 S/CO — SIGNIFICANT CHANGE UP (ref 0–0.99)
HCV AB SERPL-IMP: SIGNIFICANT CHANGE UP
HGB BLD-MCNC: 12.3 G/DL — LOW (ref 14–18)
HIV 1+2 AB+HIV1 P24 AG SERPL QL IA: SIGNIFICANT CHANGE UP
IMM GRANULOCYTES NFR BLD AUTO: 0 % — LOW (ref 0.1–0.3)
INR BLD: 13.38 RATIO — CRITICAL HIGH (ref 0.65–1.3)
LACTATE SERPL-SCNC: 1 MMOL/L — SIGNIFICANT CHANGE UP (ref 0.7–2)
LYMPHOCYTES # BLD AUTO: 0.87 K/UL — LOW (ref 1.2–3.4)
LYMPHOCYTES # BLD AUTO: 25.1 % — SIGNIFICANT CHANGE UP (ref 20.5–51.1)
MAGNESIUM SERPL-MCNC: 1.7 MG/DL — LOW (ref 1.8–2.4)
MCHC RBC-ENTMCNC: 33.9 PG — HIGH (ref 27–31)
MCHC RBC-ENTMCNC: 34 G/DL — SIGNIFICANT CHANGE UP (ref 32–37)
MCV RBC AUTO: 99.7 FL — HIGH (ref 80–94)
MONOCYTES # BLD AUTO: 0.24 K/UL — SIGNIFICANT CHANGE UP (ref 0.1–0.6)
MONOCYTES NFR BLD AUTO: 6.9 % — SIGNIFICANT CHANGE UP (ref 1.7–9.3)
NEUTROPHILS # BLD AUTO: 2.25 K/UL — SIGNIFICANT CHANGE UP (ref 1.4–6.5)
NEUTROPHILS NFR BLD AUTO: 64.8 % — SIGNIFICANT CHANGE UP (ref 42.2–75.2)
NRBC # BLD: 0 /100 WBCS — SIGNIFICANT CHANGE UP (ref 0–0)
PHOSPHATE SERPL-MCNC: 2 MG/DL — LOW (ref 2.1–4.9)
PLATELET # BLD AUTO: 53 K/UL — LOW (ref 130–400)
POTASSIUM SERPL-MCNC: 4.6 MMOL/L — SIGNIFICANT CHANGE UP (ref 3.5–5)
POTASSIUM SERPL-SCNC: 4.6 MMOL/L — SIGNIFICANT CHANGE UP (ref 3.5–5)
PROT SERPL-MCNC: 7.2 G/DL — SIGNIFICANT CHANGE UP (ref 6–8)
PROT SERPL-MCNC: 7.5 G/DL — SIGNIFICANT CHANGE UP (ref 6–8)
PROTHROM AB SERPL-ACNC: >40 SEC — HIGH (ref 9.95–12.87)
RBC # BLD: 3.63 M/UL — LOW (ref 4.7–6.1)
RBC # FLD: 17.2 % — HIGH (ref 11.5–14.5)
SODIUM SERPL-SCNC: 135 MMOL/L — SIGNIFICANT CHANGE UP (ref 135–146)
TROPONIN T SERPL-MCNC: <0.01 NG/ML — SIGNIFICANT CHANGE UP
WBC # BLD: 3.47 K/UL — LOW (ref 4.8–10.8)
WBC # FLD AUTO: 3.47 K/UL — LOW (ref 4.8–10.8)

## 2023-03-16 PROCEDURE — 93970 EXTREMITY STUDY: CPT | Mod: 26

## 2023-03-16 PROCEDURE — 99233 SBSQ HOSP IP/OBS HIGH 50: CPT

## 2023-03-16 PROCEDURE — 99222 1ST HOSP IP/OBS MODERATE 55: CPT

## 2023-03-16 PROCEDURE — 93010 ELECTROCARDIOGRAM REPORT: CPT

## 2023-03-16 PROCEDURE — 76700 US EXAM ABDOM COMPLETE: CPT | Mod: 26

## 2023-03-16 RX ORDER — SODIUM BICARBONATE 1 MEQ/ML
650 SYRINGE (ML) INTRAVENOUS THREE TIMES A DAY
Refills: 0 | Status: DISCONTINUED | OUTPATIENT
Start: 2023-03-16 | End: 2023-03-18

## 2023-03-16 RX ORDER — PANTOPRAZOLE SODIUM 20 MG/1
40 TABLET, DELAYED RELEASE ORAL
Refills: 0 | Status: DISCONTINUED | OUTPATIENT
Start: 2023-03-16 | End: 2023-03-27

## 2023-03-16 RX ORDER — FOLIC ACID 0.8 MG
1 TABLET ORAL DAILY
Refills: 0 | Status: DISCONTINUED | OUTPATIENT
Start: 2023-03-16 | End: 2023-03-27

## 2023-03-16 RX ORDER — SODIUM CHLORIDE 9 MG/ML
1000 INJECTION INTRAMUSCULAR; INTRAVENOUS; SUBCUTANEOUS
Refills: 0 | Status: DISCONTINUED | OUTPATIENT
Start: 2023-03-16 | End: 2023-03-17

## 2023-03-16 RX ORDER — LORATADINE 10 MG/1
10 TABLET ORAL DAILY
Refills: 0 | Status: DISCONTINUED | OUTPATIENT
Start: 2023-03-16 | End: 2023-03-27

## 2023-03-16 RX ORDER — METOPROLOL TARTRATE 50 MG
25 TABLET ORAL DAILY
Refills: 0 | Status: DISCONTINUED | OUTPATIENT
Start: 2023-03-16 | End: 2023-03-27

## 2023-03-16 RX ORDER — TAMSULOSIN HYDROCHLORIDE 0.4 MG/1
0.4 CAPSULE ORAL AT BEDTIME
Refills: 0 | Status: DISCONTINUED | OUTPATIENT
Start: 2023-03-16 | End: 2023-03-27

## 2023-03-16 RX ORDER — PHYTONADIONE (VIT K1) 5 MG
10 TABLET ORAL ONCE
Refills: 0 | Status: COMPLETED | OUTPATIENT
Start: 2023-03-16 | End: 2023-03-16

## 2023-03-16 RX ORDER — SODIUM,POTASSIUM PHOSPHATES 278-250MG
1 POWDER IN PACKET (EA) ORAL
Refills: 0 | Status: DISCONTINUED | OUTPATIENT
Start: 2023-03-16 | End: 2023-03-17

## 2023-03-16 RX ORDER — NALTREXONE HYDROCHLORIDE 50 MG/1
50 TABLET, FILM COATED ORAL DAILY
Refills: 0 | Status: DISCONTINUED | OUTPATIENT
Start: 2023-03-16 | End: 2023-03-27

## 2023-03-16 RX ORDER — GABAPENTIN 400 MG/1
200 CAPSULE ORAL
Refills: 0 | Status: DISCONTINUED | OUTPATIENT
Start: 2023-03-16 | End: 2023-03-27

## 2023-03-16 RX ORDER — MAGNESIUM SULFATE 500 MG/ML
2 VIAL (ML) INJECTION ONCE
Refills: 0 | Status: DISCONTINUED | OUTPATIENT
Start: 2023-03-16 | End: 2023-03-27

## 2023-03-16 RX ORDER — THIAMINE MONONITRATE (VIT B1) 100 MG
100 TABLET ORAL DAILY
Refills: 0 | Status: DISCONTINUED | OUTPATIENT
Start: 2023-03-16 | End: 2023-03-27

## 2023-03-16 RX ADMIN — Medication 2 MILLIGRAM(S): at 17:23

## 2023-03-16 RX ADMIN — Medication 2 MILLIGRAM(S): at 02:28

## 2023-03-16 RX ADMIN — PANTOPRAZOLE SODIUM 40 MILLIGRAM(S): 20 TABLET, DELAYED RELEASE ORAL at 06:28

## 2023-03-16 RX ADMIN — GABAPENTIN 200 MILLIGRAM(S): 400 CAPSULE ORAL at 06:28

## 2023-03-16 RX ADMIN — Medication 2 MILLIGRAM(S): at 06:28

## 2023-03-16 RX ADMIN — Medication 1 PACKET(S): at 17:25

## 2023-03-16 RX ADMIN — GABAPENTIN 200 MILLIGRAM(S): 400 CAPSULE ORAL at 17:23

## 2023-03-16 RX ADMIN — SODIUM CHLORIDE 75 MILLILITER(S): 9 INJECTION INTRAMUSCULAR; INTRAVENOUS; SUBCUTANEOUS at 10:50

## 2023-03-16 RX ADMIN — LORATADINE 10 MILLIGRAM(S): 10 TABLET ORAL at 11:10

## 2023-03-16 RX ADMIN — Medication 100 MILLIGRAM(S): at 11:09

## 2023-03-16 RX ADMIN — Medication 650 MILLIGRAM(S): at 17:23

## 2023-03-16 RX ADMIN — Medication 102 MILLIGRAM(S): at 11:42

## 2023-03-16 RX ADMIN — Medication 2 MILLIGRAM(S): at 13:08

## 2023-03-16 RX ADMIN — Medication 1 MILLIGRAM(S): at 11:09

## 2023-03-16 RX ADMIN — Medication 2 MILLIGRAM(S): at 21:02

## 2023-03-16 RX ADMIN — Medication 25 MILLIGRAM(S): at 06:28

## 2023-03-16 RX ADMIN — NALTREXONE HYDROCHLORIDE 50 MILLIGRAM(S): 50 TABLET, FILM COATED ORAL at 11:11

## 2023-03-16 RX ADMIN — TAMSULOSIN HYDROCHLORIDE 0.4 MILLIGRAM(S): 0.4 CAPSULE ORAL at 21:02

## 2023-03-16 RX ADMIN — Medication 1 TABLET(S): at 11:09

## 2023-03-16 RX ADMIN — Medication 2 MILLIGRAM(S): at 10:47

## 2023-03-16 RX ADMIN — Medication 650 MILLIGRAM(S): at 21:02

## 2023-03-16 NOTE — CONSULT NOTE ADULT - ASSESSMENT
61yo male with hx DVT and PE on Coumadin, severe alcohol use disorder, pseudogout, GERD, and BPH presenting from [facility] for epistaxis x4 days. Heme/Onc was consulted for supratherapeutic INR.      #Epistaxis secondary to supratherapeutic INR  - INR 19, PT >40,  on admission  - INR readings must be interpreted with caution given patient's liver disease  - US abdomen findings consistent with liver cirrhosis  - Given 5 mg Vitamin K in ED    #History of DVTs and PEs  - On coumadin  - Failed Eliquus back in 12/22      Plan  - Strongly recommend alcohol cessation  - Hold Coumadin for now  - f/u LE duplex  - o/p f/u with Heme/Onc   61yo male with hx DVT and PE on Coumadin, severe alcohol use disorder, pseudogout, GERD, and BPH presenting from [facility] for epistaxis x4 days. Heme/Onc was consulted for supratherapeutic INR.      #Epistaxis secondary to supratherapeutic INR  - INR 19, PT >40,  on admission  - INR readings must be interpreted with caution given patient's liver disease  - US abdomen findings consistent with liver cirrhosis  - Given 5 mg Vitamin K in ED    #Pancytopenia  -likely sec to underlying liver cirrhosis and alcohol use.     #History of DVTs and PEs ?unprovoked  - On coumadin  - Failed Eliquis back in 12/22      Plan  - Get records from NH to see his INR trend as o/p.   - Strongly recommend alcohol cessation  - Hold Coumadin for now and monitor INR. Unclear if vit K would help much given his underlying liver ds. We recommend IV heparin and then eventual bridge to coumadin once INR is in therapeutic range.   - Consider FFP if acutely bleeding.   - f/u LE duplex  - Hepatology f/u for cirrhosis, and GI eval as o/p for EGD/colonoscopy.   - o/p f/u with Heme/Onc   63yo male with hx DVT and PE on Coumadin, severe alcohol use disorder, pseudogout, GERD, and BPH presenting from [facility] for epistaxis x4 days. Heme/Onc was consulted for supratherapeutic INR.      #Epistaxis secondary to supratherapeutic INR  - INR 19, PT >40,  on admission  - INR readings must be interpreted with caution given patient's liver disease  - US abdomen findings consistent with liver cirrhosis  - Given 5 mg Vitamin K in ED    #Pancytopenia  -likely sec to underlying liver cirrhosis and alcohol use.     #History of DVTs and PEs ?unprovoked  - On coumadin  - Failed Eliquis back in 12/22  - Duplex now showed acute and chronic DVT      Plan  - Get records from NH to see his INR trend as o/p.   - Strongly recommend alcohol cessation  - Hold Coumadin for now and monitor INR. Unclear if vit K would help much given his underlying liver ds. We recommend Lovenox 1m/kg BID for anticoagulation when no signs/symptoms of active bleeding  - Consider FFP if acutely bleeding.   - Hepatology f/u for cirrhosis, and GI eval as o/p for EGD/colonoscopy.   - o/p f/u with Heme/Onc

## 2023-03-16 NOTE — PATIENT PROFILE ADULT - FALL HARM RISK - HARM RISK INTERVENTIONS
Assistance with ambulation/Assistance OOB with selected safe patient handling equipment/Communicate Risk of Fall with Harm to all staff/Discuss with provider need for PT consult/Monitor for mental status changes/Monitor gait and stability/Provide patient with walking aids - walker, cane, crutches/Reinforce activity limits and safety measures with patient and family/Review medications for side effects contributing to fall risk/Sit up slowly, dangle for a short time, stand at bedside before walking/Tailored Fall Risk Interventions/Toileting schedule using arm’s reach rule for commode and bathroom/Use of alarms - bed, chair and/or voice tab/Visual Cue: Yellow wristband and red socks/Bed in lowest position, wheels locked, appropriate side rails in place/Call bell, personal items and telephone in reach/Instruct patient to call for assistance before getting out of bed or chair/Non-slip footwear when patient is out of bed/[place] to call system/Physically safe environment - no spills, clutter or unnecessary equipment/Purposeful Proactive Rounding/Room/bathroom lighting operational, light cord in reach

## 2023-03-16 NOTE — PROGRESS NOTE ADULT - SUBJECTIVE AND OBJECTIVE BOX
CAT ALSTON 62y Male  MRN#: [id number]   Hospital Day: 1d    SUBJECTIVE  Patient is a 62y old Male who presents with a chief complaint of Currently admitted to medicine with the primary diagnosis of Epistaxis      INTERVAL HPI AND OVERNIGHT EVENTS:  Patient was examined and seen at bedside. This morning he is resting comfortably in bed and reports no issues or overnight events.    OBJECTIVE  PAST MEDICAL & SURGICAL HISTORY  Alcohol dependence    HTN (hypertension)    Hard of hearing    Seasonal allergies    BPH (benign prostatic hyperplasia)    GERD (gastroesophageal reflux disease)    Pseudogout    No significant past surgical history      ALLERGIES:  Beef (Hives)  dairy products (Hives)  No Known Drug Allergies  shellfish (Hives)    MEDICATIONS:  STANDING MEDICATIONS  folic acid 1 milliGRAM(s) Oral daily  gabapentin 200 milliGRAM(s) Oral two times a day  loratadine 10 milliGRAM(s) Oral daily  LORazepam     Tablet 2 milliGRAM(s) Oral every 4 hours  LORazepam     Tablet   Oral   metoprolol succinate ER 25 milliGRAM(s) Oral daily  multivitamin 1 Tablet(s) Oral daily  naltrexone 50 milliGRAM(s) Oral daily  pantoprazole    Tablet 40 milliGRAM(s) Oral before breakfast  sodium chloride 0.9%. 1000 milliLiter(s) IV Continuous <Continuous>  tamsulosin 0.4 milliGRAM(s) Oral at bedtime  thiamine 100 milliGRAM(s) Oral daily    PRN MEDICATIONS  LORazepam     Tablet 2 milliGRAM(s) Oral every 2 hours PRN      VITAL SIGNS: Last 24 Hours  T(C): 37 (16 Mar 2023 04:33), Max: 37.4 (15 Mar 2023 13:32)  T(F): 98.6 (16 Mar 2023 04:33), Max: 99.4 (15 Mar 2023 13:32)  HR: 87 (16 Mar 2023 04:33) (77 - 89)  BP: 166/69 (16 Mar 2023 04:33) (145/69 - 169/76)  BP(mean): --  RR: 19 (16 Mar 2023 04:33) (18 - 19)  SpO2: 98% (16 Mar 2023 04:33) (97% - 98%)    LABS:                        12.3   3.47  )-----------( 53       ( 16 Mar 2023 08:00 )             36.2     03-16    135  |  95<L>  |  11  ----------------------------<  95  4.6   |  16<L>  |  1.2    Ca    9.6      16 Mar 2023 08:00  Phos  2.0     03-16  Mg     1.7     03-16    TPro  7.2  /  Alb  4.6  /  TBili  2.3<H>  /  DBili  0.8<H>  /  AST  125<H>  /  ALT  45<H>  /  AlkPhos  101  03-16    PT/INR - ( 16 Mar 2023 08:00 )   PT: >40.00 sec;   INR: 13.38 ratio         PTT - ( 16 Mar 2023 08:00 )  PTT:102.2 sec      Troponin T, Serum: <0.01 ng/mL (03-16-23 @ 08:00)      CARDIAC MARKERS ( 16 Mar 2023 08:00 )  x     / <0.01 ng/mL / x     / x     / x          RADIOLOGY:      PHYSICAL EXAM:  CONSTITUTIONAL: No acute distress  HEAD: Atraumatic, normocephalic  EYES: EOM intact, PERRLA, conjunctiva and sclera clear  ENT: moist mucous membranes  PULMONARY: Clear to auscultation bilaterally  CARDIOVASCULAR: Regular rate and rhythm  GASTROINTESTINAL: Soft, non-tender, non-distended; bowel sounds present  MUSCULOSKELETAL: no edema  NEUROLOGY: non-focal  SKIN: warm and dry    ASSESSMENT and PLAN  61 yo M with hx of PE/DVT (on coumadin), Alcohol abuse (drink a pint of vodka daily), HTN, GERD, BPH and Pseudogout presents to ED for epistaxis x 4 days.     #Epistaxis likely 2/2 supratherapeutic INR  - s/p vit K 5 mg x1 given in ED.   - Holding coumadin.   - INR 13.3 (3/16)  - Pt denies taking more coumadin than prescribed.  - Coag derailment secondary to alcoholic cirrhosis?    #Acute DVT in L Popliteal vein  - Pt diagnosed w/ acute b/l DVT's on 12/8/22. Subsequently started on Eliquis w/ good adherence. However, readmitted on 12/20/22 after feeling unwell and found to have extensive acute bilateral PE - unclear if unprovoked, unclear if failure of Eliquis, was switched to Lovenox per Heme-Onc and bridged to coumadin as outpatient.  - US showing chronic R leg DVT and acute L leg DVT  - Heme-onc consulted  - Holding AC given INR. Will wait for hematology recs.     #Atypical Chest pain   - EKG NSR. No significant ST/T wave changes.   - Troponin (-)  - Low suspicion for cardiac etiology of pain    #Thrombocytopenia   - check peripheral smear  - US abdomen showing Nodular contour consistent with cirrhosis  - Hepatology consulted  - hepatitis panel and HIV ordered. Pending results.   - monitor CBC     #Alcohol abuse/ Suspected thiamine and folate deficiency  - [facility] protocol  - c/w thiamine, folic acid and multivitamin   - CATCH team.     #Hx of pseudogout  - Not on a flare  - supportive care. Outpatient follow up.     #HTN   - c/w home med    #GERD   - PPI     #BPH   - c/w home med    DVT ppx: Hold coumadin  GI ppx: PPI  Diet: DASH diet  Activity: as tolerated.

## 2023-03-16 NOTE — PROGRESS NOTE ADULT - ATTENDING COMMENTS
61 yo Male with hx of PE/DVT (on coumadin), Alcohol abuse (drink a pint of vodka daily), HTN, GERD, BPH and Pseudogout presents to ED for epistaxis x 4 days due to coumadin toxicity with high INR levels.    Epistaxis likely 2/2 supratherapeutic INR  - s/p vit K 5 mg x1 given in ED., will give 1 more dose of vit. K 10 mg IV today, f/up INR im am   - Hold coumadin.       Atypical Chest pain - resolved   - EKG NSR. No significant ST/T wave changes.   - negative  troponin     Thrombocytopenia   - Hemeonc on board  - monitor CBC daily     Hypomagnesemia/Hypophosphatemia - supplemented     AG metabolic acidosis- obtain LA level, IVF, sodium bicarb tx.     Alcohol abuse/ Suspected thiamine and folate deficiency  - [facility] protocol  - thiamine, folic acid and multivitamin   - alcohol cessation counseling     Hx of DVT/PE - on coumadin (hold for now). repeated b/l lower ext doppler shows acute and chronic DVT.   Hx of pseudogout - supportive care. Outpatient follow up.   HTN - c/w home med  GERD - PPI   BPH - c/w home med    DVT ppx: Hold coumadin  GI ppx: PPI    #Progress Note Handoff: supplement electrolytes, daily BMP, CBC, Mg, Phos, INR, tx. with 1 dose of vit. K IV , f/up Hemeoc for rec. on acute/chronic dVT noted on venous doppler report.   Family discussion: yes, medical team Disposition: to be determined 63 yo Male with hx of PE/DVT (on coumadin), Alcohol abuse (drink a pint of vodka daily), HTN, GERD, BPH and Pseudogout presents to ED for epistaxis x 4 days due to coumadin toxicity with high INR levels.    Epistaxis likely 2/2 supratherapeutic INR  - s/p vit K 5 mg x1 given in ED., will give 1 more dose of vit. K 10 mg IV today, f/up INR im am   - Hold coumadin.       Atypical Chest pain - resolved   - EKG NSR. No significant ST/T wave changes.   - negative  troponin     Thrombocytopenia   - Hemeonc on board  - monitor CBC daily     Hypomagnesemia/Hypophosphatemia - supplemented     AG metabolic acidosis- obtain LA level, IVF, sodium bicarb tx.     Alcohol abuse/ Suspected thiamine and folate deficiency  Acute  transaminitis due to alcohol use  - abd. us- liver cirrhosis, daily LFT's monitoring   - [facility] protocol  - thiamine, folic acid and multivitamin   - alcohol cessation counseling     Hx of DVT/PE - on coumadin (hold for now). repeated b/l lower ext doppler shows acute and chronic DVT.   Hx of pseudogout - supportive care. Outpatient follow up.   HTN - c/w home med  GERD - PPI   BPH - c/w home med    DVT ppx: Hold coumadin  GI ppx: PPI    #Progress Note Handoff: supplement electrolytes, daily LFT's, BMP, CBC, Mg, Phos, INR, tx. with 1 dose of vit. K IV , f/up Hemeoc for rec. on acute/chronic dVT noted on venous doppler report.   Family discussion: yes, medical team Disposition: to be determined

## 2023-03-17 LAB
ANION GAP SERPL CALC-SCNC: 13 MMOL/L — SIGNIFICANT CHANGE UP (ref 7–14)
APTT BLD: 34.5 SEC — SIGNIFICANT CHANGE UP (ref 27–39.2)
BASOPHILS # BLD AUTO: 0.02 K/UL — SIGNIFICANT CHANGE UP (ref 0–0.2)
BASOPHILS NFR BLD AUTO: 0.5 % — SIGNIFICANT CHANGE UP (ref 0–1)
BUN SERPL-MCNC: 12 MG/DL — SIGNIFICANT CHANGE UP (ref 10–20)
CALCIUM SERPL-MCNC: 9.5 MG/DL — SIGNIFICANT CHANGE UP (ref 8.4–10.4)
CHLORIDE SERPL-SCNC: 95 MMOL/L — LOW (ref 98–110)
CO2 SERPL-SCNC: 25 MMOL/L — SIGNIFICANT CHANGE UP (ref 17–32)
CREAT SERPL-MCNC: 1.2 MG/DL — SIGNIFICANT CHANGE UP (ref 0.7–1.5)
EGFR: 68 ML/MIN/1.73M2 — SIGNIFICANT CHANGE UP
EOSINOPHIL # BLD AUTO: 0.1 K/UL — SIGNIFICANT CHANGE UP (ref 0–0.7)
EOSINOPHIL NFR BLD AUTO: 2.5 % — SIGNIFICANT CHANGE UP (ref 0–8)
GLUCOSE SERPL-MCNC: 139 MG/DL — HIGH (ref 70–99)
HCT VFR BLD CALC: 31.9 % — LOW (ref 42–52)
HGB BLD-MCNC: 11.2 G/DL — LOW (ref 14–18)
IMM GRANULOCYTES NFR BLD AUTO: 0.3 % — SIGNIFICANT CHANGE UP (ref 0.1–0.3)
INR BLD: 1.22 RATIO — SIGNIFICANT CHANGE UP (ref 0.65–1.3)
LYMPHOCYTES # BLD AUTO: 0.8 K/UL — LOW (ref 1.2–3.4)
LYMPHOCYTES # BLD AUTO: 20.2 % — LOW (ref 20.5–51.1)
MAGNESIUM SERPL-MCNC: 1.4 MG/DL — LOW (ref 1.8–2.4)
MCHC RBC-ENTMCNC: 34.5 PG — HIGH (ref 27–31)
MCHC RBC-ENTMCNC: 35.1 G/DL — SIGNIFICANT CHANGE UP (ref 32–37)
MCV RBC AUTO: 98.2 FL — HIGH (ref 80–94)
MONOCYTES # BLD AUTO: 0.31 K/UL — SIGNIFICANT CHANGE UP (ref 0.1–0.6)
MONOCYTES NFR BLD AUTO: 7.8 % — SIGNIFICANT CHANGE UP (ref 1.7–9.3)
NEUTROPHILS # BLD AUTO: 2.73 K/UL — SIGNIFICANT CHANGE UP (ref 1.4–6.5)
NEUTROPHILS NFR BLD AUTO: 68.7 % — SIGNIFICANT CHANGE UP (ref 42.2–75.2)
NRBC # BLD: 0 /100 WBCS — SIGNIFICANT CHANGE UP (ref 0–0)
PHOSPHATE SERPL-MCNC: 1 MG/DL — LOW (ref 2.1–4.9)
PLATELET # BLD AUTO: 53 K/UL — LOW (ref 130–400)
POTASSIUM SERPL-MCNC: 3.8 MMOL/L — SIGNIFICANT CHANGE UP (ref 3.5–5)
POTASSIUM SERPL-SCNC: 3.8 MMOL/L — SIGNIFICANT CHANGE UP (ref 3.5–5)
PROTHROM AB SERPL-ACNC: 14 SEC — HIGH (ref 9.95–12.87)
RBC # BLD: 3.25 M/UL — LOW (ref 4.7–6.1)
RBC # FLD: 16.1 % — HIGH (ref 11.5–14.5)
SODIUM SERPL-SCNC: 133 MMOL/L — LOW (ref 135–146)
WBC # BLD: 3.97 K/UL — LOW (ref 4.8–10.8)
WBC # FLD AUTO: 3.97 K/UL — LOW (ref 4.8–10.8)

## 2023-03-17 PROCEDURE — 99223 1ST HOSP IP/OBS HIGH 75: CPT

## 2023-03-17 PROCEDURE — 99233 SBSQ HOSP IP/OBS HIGH 50: CPT

## 2023-03-17 RX ORDER — ENOXAPARIN SODIUM 100 MG/ML
120 INJECTION SUBCUTANEOUS EVERY 12 HOURS
Refills: 0 | Status: DISCONTINUED | OUTPATIENT
Start: 2023-03-17 | End: 2023-03-27

## 2023-03-17 RX ORDER — MAGNESIUM SULFATE 500 MG/ML
2 VIAL (ML) INJECTION
Refills: 0 | Status: COMPLETED | OUTPATIENT
Start: 2023-03-17 | End: 2023-03-17

## 2023-03-17 RX ORDER — MAGNESIUM SULFATE 500 MG/ML
2 VIAL (ML) INJECTION ONCE
Refills: 0 | Status: COMPLETED | OUTPATIENT
Start: 2023-03-17 | End: 2023-03-17

## 2023-03-17 RX ORDER — SODIUM,POTASSIUM PHOSPHATES 278-250MG
1 POWDER IN PACKET (EA) ORAL EVERY 8 HOURS
Refills: 0 | Status: COMPLETED | OUTPATIENT
Start: 2023-03-17 | End: 2023-03-19

## 2023-03-17 RX ORDER — ENOXAPARIN SODIUM 100 MG/ML
115 INJECTION SUBCUTANEOUS EVERY 12 HOURS
Refills: 0 | Status: DISCONTINUED | OUTPATIENT
Start: 2023-03-17 | End: 2023-03-17

## 2023-03-17 RX ADMIN — Medication 25 GRAM(S): at 13:33

## 2023-03-17 RX ADMIN — Medication 1 PACKET(S): at 22:26

## 2023-03-17 RX ADMIN — GABAPENTIN 200 MILLIGRAM(S): 400 CAPSULE ORAL at 05:35

## 2023-03-17 RX ADMIN — Medication 1.5 MILLIGRAM(S): at 05:34

## 2023-03-17 RX ADMIN — Medication 650 MILLIGRAM(S): at 13:33

## 2023-03-17 RX ADMIN — Medication 1.5 MILLIGRAM(S): at 22:28

## 2023-03-17 RX ADMIN — Medication 1.5 MILLIGRAM(S): at 13:32

## 2023-03-17 RX ADMIN — TAMSULOSIN HYDROCHLORIDE 0.4 MILLIGRAM(S): 0.4 CAPSULE ORAL at 22:26

## 2023-03-17 RX ADMIN — Medication 25 MILLIGRAM(S): at 05:35

## 2023-03-17 RX ADMIN — Medication 1.5 MILLIGRAM(S): at 09:55

## 2023-03-17 RX ADMIN — PANTOPRAZOLE SODIUM 40 MILLIGRAM(S): 20 TABLET, DELAYED RELEASE ORAL at 05:36

## 2023-03-17 RX ADMIN — ENOXAPARIN SODIUM 120 MILLIGRAM(S): 100 INJECTION SUBCUTANEOUS at 17:35

## 2023-03-17 RX ADMIN — Medication 1 TABLET(S): at 11:24

## 2023-03-17 RX ADMIN — Medication 1.5 MILLIGRAM(S): at 01:43

## 2023-03-17 RX ADMIN — Medication 25 GRAM(S): at 17:35

## 2023-03-17 RX ADMIN — Medication 1 MILLIGRAM(S): at 11:23

## 2023-03-17 RX ADMIN — GABAPENTIN 200 MILLIGRAM(S): 400 CAPSULE ORAL at 17:35

## 2023-03-17 RX ADMIN — Medication 650 MILLIGRAM(S): at 05:35

## 2023-03-17 RX ADMIN — Medication 1 PACKET(S): at 05:35

## 2023-03-17 RX ADMIN — Medication 25 GRAM(S): at 11:27

## 2023-03-17 RX ADMIN — Medication 1 PACKET(S): at 13:32

## 2023-03-17 RX ADMIN — Medication 1.5 MILLIGRAM(S): at 17:35

## 2023-03-17 RX ADMIN — Medication 100 MILLIGRAM(S): at 11:24

## 2023-03-17 RX ADMIN — Medication 650 MILLIGRAM(S): at 22:26

## 2023-03-17 RX ADMIN — NALTREXONE HYDROCHLORIDE 50 MILLIGRAM(S): 50 TABLET, FILM COATED ORAL at 11:24

## 2023-03-17 RX ADMIN — LORATADINE 10 MILLIGRAM(S): 10 TABLET ORAL at 11:23

## 2023-03-17 NOTE — CDI QUERY NOTE - NSCDITREATMDFT_GEN_ALL_CORE_HH
Date of Service:  8/7/2020    Chief Complaint:  Right Ureteral stone, status post right ureteroscopy laser lithotripsy stent 07/23/2020.  Here for stent removal    History of Present Illness:  The patient  is a 32-year old female who was last seen in the office 6/23 weight.  She has right ureteral stone.    . She developed right lower quadrant pain yesterday that progressively worsened. She presented to the emergency room 06/22/2020 as her pain was a 9/10. A CT scan was performed that revealed a 3.5 mm calcification seen in the right UVJ with moderate right hydronephrosis and dilation. She was given Toradol for pain with no improvement. She was than given Morphine which seemed to manage her pain. She has prior history of kidney stones however, has a history of right ovarian cyst rupture. Her father does have a history of kidney stones. Today she denies any pain. She denies any fevers, chills, dysuria or gross hematuria    Patient is CT 6/23 revealed evidence that the stone was still present.     Patient returns after right ureteroscopy laser lithotripsy stent 07/23/2020.  She is here for stent removal.    PFSH:   Past Medical History:   Past Medical History:   Diagnosis Date   • Asthma     exercise induced   • Depression    • Encounter for insertion of mirena IUD 04-    Remove by 4-2019   • Encounter for IUD insertion 07-    Mindi. Remove by 7-2018   • Environmental allergies    • GERD (gastroesophageal reflux disease)    • Iron deficiency    • Migraines    • Nephrolithiasis    • Recurrent vaginitis     BV-okay to call in flagyl empirically if with complaint   • Vitamin D insufficiency        Family History:  Family History   Problem Relation Age of Onset   • Asthma Father    • Vision Loss Father    • Allergies Brother    • Cancer Maternal Grandfather    • Hearing Loss Maternal Grandfather    • Vision Loss Maternal Grandfather    • Vision Loss Mother    • Vision Loss Maternal Grandmother    • Arthritis  Paternal Grandmother    • Vision Loss Paternal Grandmother        Surgical History:  Past Surgical History:   Procedure Laterality Date   • Breast lumpectomy     • Cyst removal      hand   • [place] tooth extraction     :    Social History:  Social History     Socioeconomic History   • Marital status: Single     Spouse name: Not on file   • Number of children: Not on file   • Years of education: Not on file   • Highest education level: Not on file   Occupational History   • Not on file   Social Needs   • Financial resource strain: Not on file   • Food insecurity     Worry: Not on file     Inability: Not on file   • Transportation needs     Medical: Not on file     Non-medical: Not on file   Tobacco Use   • Smoking status: Never Smoker   • Smokeless tobacco: Never Used   Substance and Sexual Activity   • Alcohol use: Yes     Comment: Socially only, very rare   • Drug use: No   • Sexual activity: Yes     Partners: Male     Birth control/protection: I.U.D.   Lifestyle   • Physical activity     Days per week: Not on file     Minutes per session: Not on file   • Stress: Not on file   Relationships   • Social connections     Talks on phone: Not on file     Gets together: Not on file     Attends [employer] service: Not on file     Active member of club or organization: Not on file     Attends meetings of clubs or organizations: Not on file     Relationship status: Not on file   • Intimate partner violence     Fear of current or ex partner: Not on file     Emotionally abused: Not on file     Physically abused: Not on file     Forced sexual activity: Not on file   Other Topics Concern   • Not on file   Social History Narrative   • Not on file       Allergies:  ALLERGIES:   Allergen Reactions   • Nickel SWELLING       Medications:  Current Outpatient Medications   Medication Sig Dispense Refill   • Vitamin D, Ergocalciferol, 1.25 mg (50,000 units) capsule Take 1 capsule by mouth 2 days a week. 16 capsule 0   • SUMAtriptan  (IMITREX) 25 MG tablet Take 1 tablet by mouth as needed for Migraine. May repeat in 2 hours one time if symptoms unresolved. 9 tablet 0   • doxycycline monohydrate (MONODOX) 100 MG capsule Take 1 capsule by mouth daily with food and water, more than 30 minutes before lying down. 30 capsule 3   • metroNIDAZOLE (METROGEL) 0.75 % gel Apply a thin layer topically to the entire face 2 times daily for rosacea. 45 g 3   • tretinoin (RETIN-A) 0.1 % cream Apply a thin layer to the entire face once daily at night 20 g 3   • MELATONIN PO      • loratadine (CLARITIN) 10 MG tablet Take 1 tablet by mouth at bedtime. 30 tablet 0   • ibuprofen (MOTRIN) 800 MG tablet Take 1 tablet by mouth every 8 hours as needed for pain. 40 tablet 0   • Levonorgestrel (RAYMON) 13.5 MG IUD      • omeprazole (PRILOSEC) 20 MG capsule Take 1 capsule by mouth daily. 90 capsule 1   • albuterol 108 (90 BASE) MCG/ACT inhaler Inhale 2 puffs into the lungs every 4 hours as needed for Shortness of Breath or Wheezing. 8.5 g 2   • tamsulosin (FLOMAX) 0.4 MG Cap Take 1 capsule by mouth daily after a meal for 7 days. 7 capsule 0     No current facility-administered medications for this visit.        Review of Systems:  Constitutional: Patient denies fever, chills or headache.  Eyes: Patient denies blurred vision, double vision, pain, or glaucoma.  Allergic/Immunologic: Patient denies hay fever or drug allergies.  Neurological: Patient denies tremors, dizzy spells, numbness/tingling or seizures.  Endocrine: Patient denies excessive thirst, too hot, too cold, tired/sluggish or thyroid disease.  Gastrointestinal: Patient denies abdominal pain, nausea, vomiting, indigestion/heartburn, diarrhea or constipation.  Cardiovascular: Patient denies chest pain, varicose veins, high blood pressure or heart valve problems.  Integumentary: Patient denies skin rash, boils or persistent itch.  Musculoskeletal: Patient denies joint pain, neck pain, back pain or joint  Dear Dr. Medina, replacement.  Ear/Nose/Throat/Mouth: Patient denies ear infection, sore throat or sinus problems.  Genitourinary: See symptoms listed in HPI.  Respiratory: Patient denies wheezing, frequent coughing, shortness of breath or chronic obstructive pulmonary disease.  Hematologic/Lymphatic: Patient denies swollen glands or blood clotting problem.  Psychologic: Patient states satisfaction with life and denies depression or suicidal ideations.    Physical Exam:  Visit Vitals  /83   Pulse 84   Resp 18   Ht 5' 5\" (1.651 m)   Wt 69.4 kg   BMI 25.46 kg/m²     Constitutional:Well developed, well nourished, afebrile.    In-Office Testing  Results for orders placed or performed in visit on 08/07/20   POCT URINE DIP NON-AUTO   Result Value    POCT Color Yellow    POCT Appearance Clear    POCT Glucose Urine Negative    POCT Bilirubin Negative    POCT Ketones Negative    POCT Specific [place] 1.015    POCT Occult Blood Small    POCT pH 7.0    POCT Protein Negative    POCT Urobilinogen 0.2    Urine Nitrite Negative    WBC (Leukocyte) Esterase POC Trace     Portions of this note are brought forward from Epifanio Montilla's note; reviewed and edited by me as appropriate.       PROCEDURE: CT ABDOMEN PELVIS FOR KIDNEY STONES WO CONTRAST     COMPARISONS: None     CLINICAL INDICATION: R flank pain     TECHNIQUE (CT KUB): Noncontrast CT was performed through the abdomen and  pelvis using standard technique. Coronal and sagittal reformatted images  were computed.     FINDINGS: Visualized lung bases are clear. Heart is normal in size with no  pericardial effusion. Liver shows no gross abnormalities. Spleen appears  normal. No adrenal masses. Pancreas is unremarkable. Gallbladder shows no  stones or inflammatory change. Abdominal aorta is normal in size. There are  no dilated bowel loops. There is no free air or free fluid. There are no  gross ovarian abnormalities. An IUD is seen in the uterus. There is no  evidence of acute appendicitis.  Visualized bones are unremarkable.     Right kidney shows moderate hydronephrosis. There is no significant right  perirenal inflammatory change. There are no right renal stones. Right  ureter is mildly dilated. There is a right pelvic calcification measuring  3.5 mm in diameter, probably representing a distal right ureteral stone.     There is a 2 tiny punctate stones seen in the inferior pole collecting  system of the left kidney. There is no left hydronephrosis or perirenal  inflammatory change. Left ureter shows no obstructing stones.        IMPRESSION:   1. Findings are suggestive of right obstructive uropathy. There is a 3.5 mm  calcification seen in the right posterior pelvis, probably representing a  distal right ureteral stone near the UVJ. Right kidney shows moderate  hydronephrosis and there is moderate dilatation the right ureter.     2. Left kidney shows 2 punctate nonobstructing inferior pole stones. There  is no hydronephrosis or other findings of obstructive uropathy.     Yes (Patient Portal)    Ref Range & Units 12d ago   WBC 4.2 - 11.0 K/mcL 7.0    RBC 4.00 - 5.20 mil/mcL 4.16    HGB 12.0 - 15.5 g/dL 13.2    HCT 36.0 - 46.5 % 37.8    MCV 78.0 - 100.0 fl 90.9    MCH 26.0 - 34.0 pg 31.7    MCHC 32.0 - 36.5 g/dL 34.9    RDW-CV 11.0 - 15.0 % 11.5     - 450 K/mcL 234    NRBC <=0 /100 WBC 0    Neutrophil, Percent % 51    Lymphocytes, Percent % 42    Mono, Percent % 6    Eosinophils, Percent % 1    Basophils, Percent % 0    Immature Granulocytes % 0    Absolute Neutrophils 1.8 - 7.7 K/mcL 3.5    Absolute Lymphocytes 1.0 - 4.8 K/mcL 2.9    Absolute Monocytes 0.3 - 0.9 K/mcL 0.4    Absolute Eosinophils  0.1 - 0.5 K/mcL 0.1    Absolute Basophils 0.0 - 0.3 K/mcL 0.0    Absolute Immmature Granulocytes 0.0 - 0.2 K/mcL 0.0    RDW-SD 39.0 - 50.0 fL 38.1Low     Resulting Agency  [facility]             Ref Range & Units 12d ago    Fasting Status     Sodium 135 - 145 mmol/L 137    Potassium 3.4 - 5.1 mmol/L  3.7    Chloride 98 - 107 mmol/L 102    Carbon Dioxide 21 - 32 mmol/L 26    Anion Gap 10 - 20 mmol/L 13    Glucose 65 - 99 mg/dL 93    BUN 6 - 20 mg/dL 17    Creatinine 0.51 - 0.95 mg/dL 0.73    Glomerular Filtration Rate >90 >90    Comment: eGFR results = or >90 mL/min/1.73m2 = Normal kidney function.   BUN/ Creatinine Ratio 7 - 25 23    Bilirubin, Total 0.2 - 1.0 mg/dL 0.5    GOT/AST <=37 Units/L 25    Alkaline Phosphatase 45 - 117 Units/L 76    Albumin 3.6 - 5.1 g/dL 4.9    Protein, Total 6.4 - 8.2 g/dL 7.9    Globulin 2.0 - 4.0 g/dL 3.0    A/G Ratio 1.0 - 2.4 1.6    GPT/ALT <64 Units/L 36    Calcium 8.4 - 10.2 mg/dL 9.8    Resulting Agency       Ref Range & Units 12d ago    COLOR, URINALYSIS  Straw    APPEARANCE, URINALYSIS  Clear    GLUCOSE, URINALYSIS Negative mg/dL Negative    BILIRUBIN, URINALYSIS Negative Negative    KETONES, URINALYSIS Negative mg/dL Negative    SPECIFIC GRAVITY, URINALYSIS 1.005 - 1.030 1.005    OCCULT BLOOD, URINALYSIS Negative SmallAbnormal     PH, URINALYSIS 5.0 - 7.0 7.0    PROTEIN, URINALYSIS Negative mg/dL Negative    UROBILINOGEN, URINALYSIS 0.2, 1.0 mg/dL 0.2    NITRITE, URINALYSIS Negative Negative    LEUKOCYTE ESTERASE, URINALYSIS Negative Negative    SQUAMOUS EPITHELIAL, URINALYSIS None Seen, 1 to 5 /hpf 1 to 5    ERYTHROCYTES, URINALYSIS None Seen, 1 to 2 /hpf 1 to 2    LEUKOCYTES, URINALYSIS None Seen, 1 to 5 /hpf 1 to 5    BACTERIA, URINALYSIS None Seen /hpf FewAbnormal     HYALINE CASTS, URINALYSIS None Seen, 1 to 5 /lpf None Seen    Resulting Agency  [facility]         Specimen Collected: 06/22/20 12:11   Last Resulted: 06/22/20 12:39            Assessment and Plan:    Right ureteral stone, left renal stone- a CT scan stone 3.5 mm at the right UV junction.  Patient had 2 punctate nonobstructing l      Patient here for stent removal status post right ureteroscopy laser lithotripsy stent 723.    Patient returns 6 weeks.  I had a brief counseling session with the patient  concerning the diagnosis and treatment options.     *I performed a  review of the patient's medical records.                Ramos Miranda Jr. MD

## 2023-03-17 NOTE — CDI QUERY NOTE - NSCDIOTHERTXTBX_GEN_ALL_CORE_HH
62 M, with Diagnosis:  Epistaxis  Clinical Indicator:   Labs:  3/15/2023:   PT=>40.00    INR= 19.22        PTT= 103.2               3/16/2023:   PT= >40.00  INR=   13.38      PTT= 102.2             3/17/2023:    PT= 14.00     INR=  1.22         PTT= 34.5  Documentation:  3/17/2023: Chart Note: Hepatology consult:  #Coagulopathy:  Monitor INR  3/17/2023:  Progress Note:  Attending:  · Assessment:  63 yo M with hx of PE/DVT (on coumadin)  #Epistaxis likely 2/2 supratherapeutic INR  - s/p vit K 5 mg x1 given in ED.   - Holding coumadin -> will transition to Lovenox  - INR 13.3 (3/16) -> 1.22 (3/17)  - Pt denies taking more coumadin than prescribed.    Meds:  Phytonadione 5mg,  Coumadin on hold    Based on your professional judgment and clinical indicators, can the diagnosis of Epistaxis be further specified as:     -	Epistaxis associated with coagulopathy due to coumadin   -	Epistaxis unrelated to Coagulopathy  -	Other (please specify)  -	Clinically unable to determine    Thank you.

## 2023-03-17 NOTE — CONSULT NOTE ADULT - ATTENDING COMMENTS
62 y o  M with AUD MISTY cirrhosis PE on AC admitted with epistaxis and supratherapeutic INR.  Drinks a pint of beer daily. No DUI. Alcohol rehab few years ago. Worked in [employer]? Retired. Lives in care home. No family support locally. Single no kids.   Epistaxis resolved  Icteric  Abdomen soft non tender No ascites  No asterixis   Decompensated MISTY cirrhosis alcoholic hepatitis with jaundice  High MDF due to high INR from warfarin. T bili 2 no need for steroids  Alcohol rehab  OP follow up.
seen/examined w/ hemonc team; note reviewed; case discussed  the consult is called to give or not to give anti coagulation.   Need to get records if pt has been on coumadin (NEED TO GET VALUES OF INR): this is important to distinguish if coagulopathy is due to mostly effect of alcohol abuse/intoxication leading to the increase of both PT and aPTT, or a combination of the alcohol effect and coumadin. So called auto-anticoagulation has been reported and appears to be a controversial topic with no standard of care, whereas some practitioners advocate to continue blood thinners if necessary and others are against; this should be based on the clinical sitiation and based on the discussion with the patient. Mr Noonan appears to have a new acute VTE  (which makes me doubt he has been compliant with coumadin unless the records from outside will prove therapeutic INR). He does not appear to have mucocutaneous bleeding. His platelets are >50K (this value is most likely due to acute liver injury); I would advocate to proceed with anticoagulation such as lovenox. Discussed with floor attending , who is hesitant to start anticoagulaiton

## 2023-03-17 NOTE — PROGRESS NOTE ADULT - SUBJECTIVE AND OBJECTIVE BOX
CAT ALSTON 62y Male  MRN#: [id number]   CODE STATUS: FULL      SUBJECTIVE  Patient is a 62y old Male who presents with a chief complaint of epistaxis, coumadin toxicity (16 Mar 2023 10:26)    Currently admitted to medicine with the primary diagnosis of Epistaxis    Today is hospital day 2d,   INTERVAL HPI/OVERNIGHT EVENTS:    Examined pt at bedside this Am. There were no acute events overnight.    Present Today:           Will Catheter (x)No/ ()Yes?   Indication:             Central Line (x)No/ ()Yes?   Indication:          IV Fluids (x)No/ ()Yes? Type:  Rate:  Indication:    OBJECTIVE  PAST MEDICAL & SURGICAL HISTORY  Alcohol dependence    HTN (hypertension)    Hard of hearing    Seasonal allergies    BPH (benign prostatic hyperplasia)    GERD (gastroesophageal reflux disease)    Pseudogout    No significant past surgical history      ALLERGIES:  Beef (Hives)  dairy products (Hives)  No Known Drug Allergies  shellfish (Hives)    HOME MEDICATIONS:  Home Medications:  acetaminophen 325 mg oral tablet: 2 tab(s) orally every 6 hours, As needed, Temp greater or equal to 38C (100.4F), Mild Pain (1 - 3) (27 Dec 2022 09:54)  Coumadin 2.5 mg oral tablet: 2 tabs orally at 5pm Monday Wednesday and Saturday.  1 Tab orally at 5pm Sunday, Tuesday, Thursday and Friday (16 Mar 2023 00:24)  Toprol-XL 25 mg oral tablet, extended release: 1 tab(s) orally once a day (16 Mar 2023 00:27)  Vitamin B12 100 mcg oral tablet: 1 tab(s) orally once a day (27 Dec 2022 00:44)    MEDICATIONS:  STANDING MEDICATIONS  folic acid 1 milliGRAM(s) Oral daily  gabapentin 200 milliGRAM(s) Oral two times a day  loratadine 10 milliGRAM(s) Oral daily  LORazepam     Tablet 1.5 milliGRAM(s) Oral every 4 hours  LORazepam     Tablet   Oral   magnesium sulfate  IVPB 2 Gram(s) IV Intermittent once  magnesium sulfate  IVPB 2 Gram(s) IV Intermittent every 2 hours  metoprolol succinate ER 25 milliGRAM(s) Oral daily  multivitamin 1 Tablet(s) Oral daily  naltrexone 50 milliGRAM(s) Oral daily  pantoprazole    Tablet 40 milliGRAM(s) Oral before breakfast  potassium phosphate / sodium phosphate Powder (PHOS-NaK) 1 Packet(s) Oral every 8 hours  sodium bicarbonate 650 milliGRAM(s) Oral three times a day  sodium chloride 0.9%. 1000 milliLiter(s) (75 mL/Hr) IV Continuous <Continuous>  tamsulosin 0.4 milliGRAM(s) Oral at bedtime  thiamine 100 milliGRAM(s) Oral daily    PRN MEDICATIONS  LORazepam     Tablet 2 milliGRAM(s) Oral every 2 hours PRN      VITAL SIGNS: Last 24 Hours  T(C): 37.4 (17 Mar 2023 05:26), Max: 37.4 (17 Mar 2023 05:26)  T(F): 99.3 (17 Mar 2023 05:26), Max: 99.3 (17 Mar 2023 05:26)  HR: 98 (17 Mar 2023 05:26) (92 - 98)  BP: 140/76 (17 Mar 2023 05:26) (138/78 - 146/72)  BP(mean): --  RR: 19 (17 Mar 2023 05:26) (18 - 19)  SpO2: 98% (17 Mar 2023 05:26) (97% - 98%)    LABS:                        11.2   3.97  )-----------( 53       ( 17 Mar 2023 07:15 )             31.9     03-17    133<L>  |  95<L>  |  12  ----------------------------<  139<H>  3.8   |  25  |  1.2    Ca    9.5      17 Mar 2023 07:15  Phos  1.0     03-17  Mg     1.4     03-17    TPro  7.2  /  Alb  4.6  /  TBili  2.3<H>  /  DBili  0.8<H>  /  AST  125<H>  /  ALT  45<H>  /  AlkPhos  101  03-16    PT/INR - ( 17 Mar 2023 07:15 )   PT: 14.00 sec;   INR: 1.22 ratio         PTT - ( 17 Mar 2023 07:15 )  PTT:34.5 sec      Lactate, Blood: 1.0 mmol/L (03-16-23 @ 11:36)    CARDIAC MARKERS ( 16 Mar 2023 08:00 )  x     / <0.01 ng/mL / x     / x     / x            RADIOLOGY:  < from: CT Head No Cont (03.15.23 @ 16:34) >  IMPRESSION:    No acute intracranial pathology.    Stable mild chronic microvascular ischemic changes.    --- End of Report ---    < end of copied text >  < from: US Abdomen Complete (US Abdomen Complete .) (03.16.23 @ 08:51) >  IMPRESSION:  Nodular contour consistent with cirrhosis.        --- End of Report ---    < end of copied text >      < from: [facility] Duplex Lower Ext Vein Scan, Bilat (03.16.23 @ 10:47) >  Impression:    Chronic appearing deep venous thrombus in  the right popliteal vein.    Acute appearing venous thrombosis in the left popliteal vein.    ICD-10:M79.89    --- End of Report ---    < end of copied text >      PHYSICAL EXAM:  CONSTITUTIONAL: No acute distress  HEAD: Atraumatic, normocephalic  EYES: EOM intact, PERRLA, conjunctiva and sclera clear  ENT: moist mucous membranes  PULMONARY: Clear to auscultation bilaterally  CARDIOVASCULAR: Regular rate and rhythm  GASTROINTESTINAL: Soft, non-tender, non-distended; bowel sounds present  MUSCULOSKELETAL: no edema  NEUROLOGY: non-focal  SKIN: warm and dry

## 2023-03-17 NOTE — PROGRESS NOTE ADULT - ATTENDING COMMENTS
Patient is a 63 y/o Male with hx of PE/DVT (on coumadin), Alcohol abuse (drink a pint of vodka daily), HTN, GERD, BPH and Pseudogout presents to ED for epistaxis x 4 days due to coumadin toxicity with high INR levels.    Epistaxis likely 2/2 supratherapeutic INR  - d/c  coumadin  - s/p vit K 5 mg x1 given in ED., s/p  1 more dose of vit. K 10 mg IV on 3/16  -3/17- INR 1.22 as per [facility] start on therapeutic lovenox tx.     Atypical Chest pain - resolved   - EKG NSR. No significant ST/T wave changes.   - negative  troponin     Thrombocytopenia   - [facility] on board  - monitor CBC daily     Hypomagnesemia/Hypophosphatemia - supplemented , monitor electrolytes daily     AG metabolic acidosis- normal LA level, resolved,  - continue  sodium bicarb tx. - plan to d/c on 3/18 if bicarb level remain stable.    Alcohol abuse/ Suspected thiamine and folate deficiency  Acute  transaminitis due to alcohol use  - abd. us- liver cirrhosis, daily LFT's monitoring , Hepatology specialist  eval.   - [facility] protocol  - thiamine, folic acid and multivitamin   - alcohol cessation counseling     Hx of DVT/PE -failed  coumadin tx with acute DVT on repeated venous doppler. Obtain hypercoagulable work up. f/up results. as per [facility] - patient has to be started on therapeutic lovenox tx.    Hx of pseudogout - supportive care. Outpatient follow up.   HTN - c/w home med  GERD - PPI   BPH - c/w home med    DVT ppx: lovenox subc. tx.   GI ppx: PPI    #Progress Note Handoff: supplement electrolytes, daily LFT's, BMP, CBC, Mg, Phos, started on therapeutic lovenox for acute DVT, f/up hypercoag. work up., PT eval.   Family discussion: yes, medical team Disposition: to be determined.     Total time spent to complete patient's bedside assessment, review medical chart, discuss medical plan of care with covering medical team was more than 50 minutes with >50% of time spent face to face with patient, discussion with patient/family and/or coordination of care Patient is a 61 y/o Male with hx of PE/DVT (on coumadin), Alcohol abuse (drink a pint of vodka daily), HTN, GERD, BPH and Pseudogout presents to ED for epistaxis x 4 days due to coumadin toxicity with high INR levels.    Epistaxis likely 2/2 supratherapeutic INR  - d/c  coumadin  - s/p vit K 5 mg x1 given in ED., s/p  1 more dose of vit. K 10 mg IV on 3/16  -3/17- INR 1.22 as per [facility] start on therapeutic lovenox tx.     Atypical Chest pain - resolved   - EKG NSR. No significant ST/T wave changes.   - negative  troponin     Thrombocytopenia   - [facility] on board  - monitor CBC daily     Hypomagnesemia/Hypophosphatemia - supplemented , monitor electrolytes daily     Macrocytic anemia- stable h/h, daily MVI , folate, vit b 12 , folate tx.    AG metabolic acidosis- normal LA level, resolved,  - continue  sodium bicarb tx. - plan to d/c on 3/18 if bicarb level remain stable.    Alcohol abuse/ Suspected thiamine and folate deficiency  Acute  transaminitis due to alcohol use  - abd. us- liver cirrhosis, daily LFT's monitoring , Hepatology specialist  eval.   - [facility] protocol  - thiamine, folic acid and multivitamin   - alcohol cessation counseling     Hx of DVT/PE -failed  coumadin tx with acute DVT on repeated venous doppler. Obtain hypercoagulable work up. f/up results. as per [facility] - patient has to be started on therapeutic lovenox tx.    Hx of pseudogout - supportive care. Outpatient follow up.   HTN - c/w home med  GERD - PPI   BPH - c/w home med    DVT ppx: lovenox subc. tx.   GI ppx: PPI    #Progress Note Handoff: supplement electrolytes, daily LFT's, BMP, CBC, Mg, Phos, started on therapeutic lovenox for acute DVT, f/up hypercoag. work up., PT eval.   Family discussion: yes, medical team Disposition: to be determined.     Total time spent to complete patient's bedside assessment, review medical chart, discuss medical plan of care with covering medical team was more than 50 minutes with >50% of time spent face to face with patient, discussion with patient/family and/or coordination of care

## 2023-03-17 NOTE — CONSULT NOTE ADULT - SUBJECTIVE AND OBJECTIVE BOX
Spoke with medical resident Tanisha who reports patient's alcohol withdrawal symptoms are currently being managed appropriately with Ativan taper and CIWA protocol.  They are currently only requesting CATCH team  services for addiction medicine resources. They do not need further recommendations by Addiction Medicine for medication management of withdrawal symptoms.   CATCH team social workers are made aware of patient. Informed team to reconsult addiction if any new issues arise.
Patient is a 62y old  Male who presents with a chief complaint of epistaxis    HPI:  63yo male with hx DVT and PE on Coumadin, severe alcohol use disorder, pseudogout, GERD, and BPH presenting from [facility] for epistaxis x4 days. Heme/Onc was consulted for supratherapeutic INR. Pt reports he is given medications from nurses at his residence. States last bloodwork done was several weeks ago.  Admits to lightheadedness. Denies other sites of bleeding, denies ecchymosis.   While in ED pt reported having chest pain which began 1 hour prior to my assessment, left sided, left elbow and left wrist. Reports had this pain several times before, but does not further elucidate hx. Denies SOB, n/v, diaphoresis, abd pain. Active daily ETOH use, last drink yesterday. Former tobacco us.  Of note per chart review, pt diagnosed w/ acute b/l DVT's on 12/8/22. Subsequently started on Eliquis w/ good adherence. However, readmitted on 12/20/22 after feeling unwell and found to have extensive acute bilateral PE. Was switched to Lovenox per Heme-Onc with plan to bridge to coumadin as outpatient.    ED: VS wnl. HD stable. No bleeding noted. Labs notable for INR 19. Hgb 12.6. Given Vitamin K 5mg PO.        PAST MEDICAL & SURGICAL HISTORY:  Alcohol dependence  HTN (hypertension)  Hard of hearing  Seasonal allergies  BPH (benign prostatic hyperplasia)  GERD (gastroesophageal reflux disease)  Pseudogout  No significant past surgical history      SOCIAL HISTORY: Current alcohol use. Drinks 0.5 to 1 pint of liquor a day.    FAMILY HISTORY:  Family history of gastric cancer  Mom    Family hx of lung cancer  Smoker        Allergies    Beef (Hives)  dairy products (Hives)  No Known Drug Allergies  shellfish (Hives)    ROS:  Negative except as stated in HPI        Height (cm): 177.8 (03-16-23 @ 02:33)  Weight (kg): 114.3 (03-16-23 @ 02:33)  BMI (kg/m2): 36.2 (03-16-23 @ 02:33)  BSA (m2): 2.3 (03-16-23 @ 02:33)      HOME MEDICATIONS:  acetaminophen 325 mg oral tablet: 2 tab(s) orally every 6 hours, As needed, Temp greater or equal to 38C (100.4F), Mild Pain (1 - 3) (27 Dec 2022 09:54)  Coumadin 2.5 mg oral tablet: 2 tabs orally at 5pm Monday Wednesday and Saturday.  1 Tab orally at 5pm Sunday, Tuesday, Thursday and Friday (16 Mar 2023 00:24)  Toprol-XL 25 mg oral tablet, extended release: 1 tab(s) orally once a day (16 Mar 2023 00:27)  Vitamin B12 100 mcg oral tablet: 1 tab(s) orally once a day (27 Dec 2022 00:44)      Vital Signs Last 24 Hrs  T(C): 37 (16 Mar 2023 04:33), Max: 37.4 (15 Mar 2023 13:32)  T(F): 98.6 (16 Mar 2023 04:33), Max: 99.4 (15 Mar 2023 13:32)  HR: 87 (16 Mar 2023 04:33) (77 - 89)  BP: 166/69 (16 Mar 2023 04:33) (145/69 - 169/76)  BP(mean): --  RR: 19 (16 Mar 2023 04:33) (18 - 19)  SpO2: 98% (16 Mar 2023 04:33) (97% - 98%)    Parameters below as of 16 Mar 2023 04:33  Patient On (Oxygen Delivery Method): room air        PHYSICAL EXAM  General: adult in NAD  HEENT: Dried blood seen in both nostrils.  Neck: supple  CV: normal S1/S2 with no murmur rubs or gallops  Lungs: positive air movement b/l ant lungs, clear to auscultation, no wheezes, no rales  Abdomen: soft non-tender non-distended, no hepatosplenomegaly  Ext: no clubbing cyanosis or edema  Skin: no rashes and no petechiae  Neuro: alert and oriented X 4, no focal deficits    MEDICATIONS  (STANDING):  folic acid 1 milliGRAM(s) Oral daily  gabapentin 200 milliGRAM(s) Oral two times a day  loratadine 10 milliGRAM(s) Oral daily  LORazepam     Tablet 2 milliGRAM(s) Oral every 4 hours  LORazepam     Tablet   Oral   magnesium sulfate  IVPB 2 Gram(s) IV Intermittent once  metoprolol succinate ER 25 milliGRAM(s) Oral daily  multivitamin 1 Tablet(s) Oral daily  naltrexone 50 milliGRAM(s) Oral daily  pantoprazole    Tablet 40 milliGRAM(s) Oral before breakfast  phytonadione  IVPB 10 milliGRAM(s) IV Intermittent once  potassium phosphate / sodium phosphate Powder (PHOS-NaK) 1 Packet(s) Oral two times a day  sodium chloride 0.9%. 1000 milliLiter(s) (75 mL/Hr) IV Continuous <Continuous>  tamsulosin 0.4 milliGRAM(s) Oral at bedtime  thiamine 100 milliGRAM(s) Oral daily    MEDICATIONS  (PRN):  LORazepam     Tablet 2 milliGRAM(s) Oral every 2 hours PRN Symptom-triggered 2 point increase in CIWA-Ar      LABS:                          12.3   3.47  )-----------( 53       ( 16 Mar 2023 08:00 )             36.2         Mean Cell Volume : 99.7 fL  Mean Cell Hemoglobin : 33.9 pg  Mean Cell Hemoglobin Concentration : 34.0 g/dL  Auto Neutrophil # : 2.25 K/uL  Auto Lymphocyte # : 0.87 K/uL  Auto Monocyte # : 0.24 K/uL  Auto Eosinophil # : 0.07 K/uL  Auto Basophil # : 0.04 K/uL  Auto Neutrophil % : 64.8 %  Auto Lymphocyte % : 25.1 %  Auto Monocyte % : 6.9 %  Auto Eosinophil % : 2.0 %  Auto Basophil % : 1.2 %      Serial CBC's  03-16 @ 08:00  Hct-36.2 / Hgb-12.3 / Plat-53 / RBC-3.63 / WBC-3.47  Serial CBC's  03-15 @ 16:52  Hct-36.4 / Hgb-12.6 / Plat-61 / RBC-3.68 / WBC-6.56      03-16    135  |  95<L>  |  11  ----------------------------<  95  4.6   |  16<L>  |  1.2    Ca    9.6      16 Mar 2023 08:00  Phos  2.0     03-16  Mg     1.7     03-16    TPro  7.2  /  Alb  4.6  /  TBili  2.3<H>  /  DBili  0.8<H>  /  AST  125<H>  /  ALT  45<H>  /  AlkPhos  101  03-16      PT/INR - ( 16 Mar 2023 08:00 )   PT: >40.00 sec;   INR: 13.38 ratio         PTT - ( 16 Mar 2023 08:00 )  PTT:102.2 sec      RADIOLOGY & ADDITIONAL STUDIES:    
Gastroenterology Consultation:    Patient is a 62y old  Male who presents with a chief complaint of epistaxis, coumadin toxicity (16 Mar 2023 10:26)        Admitted on: 03-15-23      HPI:    61yo male with hx DVT (12/2022) and PE on Coumadin, AUD (>20 years of etoh abuse with 6 packs of beer daily - last use on 3/15), pseudogout, GERD, and BPH presenting from [facility] for epistaxis x4 days in setting of supratherapeutic INR. Pt reports he is given medications from nurses at his residence. States last bloodwork done was several weeks ago.  Admits to lightheadedness. Denies other sites of bleeding, denies ecchymosis.  Pt was incidentally noted to have new diagnosis of cirrhosis based on biochemistry and imaging and hepatology was consulted for management. Denies N/V/D fever chills, weight loss, acholic stools, dark urine and hx of IVDU use or snorting coccaine      PAST MEDICAL & SURGICAL HISTORY:  Alcohol dependence      HTN (hypertension)      Hard of hearing      Seasonal allergies      BPH (benign prostatic hyperplasia)      GERD (gastroesophageal reflux disease)      Pseudogout      No significant past surgical history            FAMILY HISTORY:  Family history of gastric cancer  Mom    Family hx of lung cancer  Smoker          Social History:  Tobacco: denies   Alcohol: abuse as above  Drugs: denies    Home Medications:  acetaminophen 325 mg oral tablet: 2 tab(s) orally every 6 hours, As needed, Temp greater or equal to 38C (100.4F), Mild Pain (1 - 3) (27 Dec 2022 09:54)  Coumadin 2.5 mg oral tablet: 2 tabs orally at 5pm Monday Wednesday and Saturday.  1 Tab orally at 5pm Sunday, Tuesday, Thursday and Friday (16 Mar 2023 00:24)  Toprol-XL 25 mg oral tablet, extended release: 1 tab(s) orally once a day (16 Mar 2023 00:27)  Vitamin B12 100 mcg oral tablet: 1 tab(s) orally once a day (27 Dec 2022 00:44)        MEDICATIONS  (STANDING):  enoxaparin Injectable 120 milliGRAM(s) SubCutaneous every 12 hours  folic acid 1 milliGRAM(s) Oral daily  gabapentin 200 milliGRAM(s) Oral two times a day  loratadine 10 milliGRAM(s) Oral daily  LORazepam     Tablet 1.5 milliGRAM(s) Oral every 4 hours  LORazepam     Tablet   Oral   magnesium sulfate  IVPB 2 Gram(s) IV Intermittent once  metoprolol succinate ER 25 milliGRAM(s) Oral daily  multivitamin 1 Tablet(s) Oral daily  naltrexone 50 milliGRAM(s) Oral daily  pantoprazole    Tablet 40 milliGRAM(s) Oral before breakfast  potassium phosphate / sodium phosphate Powder (PHOS-NaK) 1 Packet(s) Oral every 8 hours  sodium bicarbonate 650 milliGRAM(s) Oral three times a day  tamsulosin 0.4 milliGRAM(s) Oral at bedtime  thiamine 100 milliGRAM(s) Oral daily    MEDICATIONS  (PRN):  LORazepam     Tablet 2 milliGRAM(s) Oral every 2 hours PRN Symptom-triggered 2 point increase in CIWA-Ar      Allergies  Beef (Hives)  dairy products (Hives)  No Known Drug Allergies  shellfish (Hives)      Review of Systems:   Constitutional:  No Fever, No Chills  ENT/Mouth:  No Hearing Changes,  No Difficulty Swallowing  Eyes:  No Eye Pain, No Vision Changes  Cardiovascular:  No Chest Pain, No Palpitations  Respiratory:  No Cough, No Dyspnea  Gastrointestinal:  As described in HPI          Physical Examination:  T(C): 36.8 (03-17-23 @ 12:25), Max: 37.4 (03-17-23 @ 05:26)  HR: 101 (03-17-23 @ 12:25) (97 - 101)  BP: 126/74 (03-17-23 @ 12:25) (126/74 - 140/76)  RR: 18 (03-17-23 @ 12:25) (18 - 19)  SpO2: 98% (03-17-23 @ 12:25) (97% - 98%)      03-16-23 @ 07:01  -  03-17-23 @ 07:00  --------------------------------------------------------  IN: 0 mL / OUT: 650 mL / NET: -650 mL          GENERAL: AAOx3, no acute distress.  HEAD:  Atraumatic, Normocephalic  EYES: conjunctiva and sclera clear  CHEST/LUNG: Clear to auscultation bilaterally; No wheeze, rhonchi, or rales  HEART: Regular rate and rhythm; normal S1, S2, No murmurs.  ABDOMEN: Soft, nontender, nondistended; Bowel sounds present  SKIN: Intact, no jaundice  Neuro: no asterixis        Data:                        11.2   3.97  )-----------( 53       ( 17 Mar 2023 07:15 )             31.9     Hgb Trend:  11.2  03-17-23 @ 07:15  12.3  03-16-23 @ 08:00  12.6  03-15-23 @ 16:52        03-17    133<L>  |  95<L>  |  12  ----------------------------<  139<H>  3.8   |  25  |  1.2    Ca    9.5      17 Mar 2023 07:15  Phos  1.0     03-17  Mg     1.4     03-17    TPro  7.2  /  Alb  4.6  /  TBili  2.3<H>  /  DBili  0.8<H>  /  AST  125<H>  /  ALT  45<H>  /  AlkPhos  101  03-16    Liver panel trend:  TBili 2.3   /      /   ALT 45   /   AlkP 101   /   Tptn 7.2   /   Alb 4.6    /   DBili 0.8      03-16  TBili 2.0   /      /   ALT 55   /   AlkP 113   /   Tptn 7.7   /   Alb 4.9    /   DBili --      03-15      PT/INR - ( 17 Mar 2023 07:15 )   PT: 14.00 sec;   INR: 1.22 ratio         PTT - ( 17 Mar 2023 07:15 )  PTT:34.5 sec        Radiology:    US Abdomen Complete:   ACC: [id number] EXAM:  US ABDOMEN COMPLETE   ORDERED BY: DELIA ESPINO     PROCEDURE DATE:  03/16/2023          INTERPRETATION:  CLINICAL INFORMATION: Cirrhosis    COMPARISON: December 26, 2022    TECHNIQUE: Sonography of the abdomen.    FINDINGS:  Liver: Nodular contour consistent with cirrhosis.  Bile ducts: Normal caliber. Common bile duct measures 5 mm.  Gallbladder: Within normal limits.  Pancreas: Visualized portions are within normal limits.  Spleen: 8.9 cm. The spleen is not enlarged.  Right kidney: 9.7 cm. No hydronephrosis.  Left kidney: 9.9 cm. No hydronephrosis.  Ascites: None.  Aorta and IVC: Visualized portions are within normal limits.    IMPRESSION:  Nodular contour consistent with cirrhosis.        --- End of Report ---            JUAN ANTONIO COELLO MD; Attending Interventional Radiologist  This document has been electronically signed. Mar 16 2023 10:19AM (03-16-23 @ 08:51)

## 2023-03-17 NOTE — PROGRESS NOTE ADULT - ASSESSMENT
63 yo M with hx of PE/DVT (on coumadin), Alcohol abuse (drink a pint of vodka daily), HTN, GERD, BPH and Pseudogout presents to ED for epistaxis x 4 days.     #Epistaxis likely 2/2 supratherapeutic INR  - As per nursing, pt had minimal epistaxis overnight  - s/p vit K 5 mg x1 given in ED.   - Holding coumadin -> will transition to Lovenox  - INR 13.3 (3/16) -> 1.22 (3/17)  - Pt denies taking more coumadin than prescribed.  - Coag derailment secondary to alcoholic cirrhosis?  - Clotting disorder labs ordered and pending  - Heme/on recs appreciated    #Acute DVT in L Popliteal vein  - Pt diagnosed w/ acute b/l DVT's on 12/8/22. Subsequently started on Eliquis w/ good adherence. However, readmitted on 12/20/22 after feeling unwell and found to have extensive acute bilateral PE - unclear if unprovoked, unclear if failure of Eliquis, was switched to Lovenox per Heme-Onc and bridged to coumadin as outpatient.  - US showing chronic R leg DVT and acute L leg DVT  - Heme-onc consulted  - Holding AC given INR. Will start Lovenox    #Atypical Chest pain   - EKG NSR. No significant ST/T wave changes.   - Troponin (-)  - Low suspicion for cardiac etiology of pain    #Thrombocytopenia   - check peripheral smear  - US abdomen showing Nodular contour consistent with cirrhosis  - Hepatology consulted  - hepatitis panel and HIV ordered. Pending results.   - monitor CBC     #Alcohol abuse/ Suspected thiamine and folate deficiency  - [facility] protocol  - c/w thiamine, folic acid and multivitamin   - CATCH team.     #Hx of pseudogout  - Not on a flare  - supportive care. Outpatient follow up.     #HTN   - c/w home med    #GERD   - PPI     #BPH   - c/w home med    DVT ppx: Lovenox  GI ppx: PPI  Diet: DASH diet  Activity: as tolerated.      Pending: Clotting disorder labs

## 2023-03-17 NOTE — CONSULT NOTE ADULT - ASSESSMENT
61yo male with hx DVT (12/2022) and PE on Coumadin, AUD (>20 years of etoh abuse with 6 packs of beer daily - last use on 3/15), pseudogout, GERD, and BPH presenting from [facility] for epistaxis x4 days in setting of supratherapeutic INR. Pt reports he is given medications from nurses at his residence. States last bloodwork done was several weeks ago.  Admits to lightheadedness. Denies other sites of bleeding, denies ecchymosis.  Pt was incidentally noted to have new diagnosis of cirrhosis based on biochemistry and imaging and hepatology was consulted for management. 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 Normal RV function and no valvulopathies  Imaging: Nodular liver. CBD 5mm no ascites or other signs of portal HTN.     Plan  - Please send Hepatitis A IgM, Hepatitis B core IgM, core Ab total, surface Ag, surface Ab, HCV antibody, HCV RNA, Anti HEV  - Please send Iron studies  - Please send IRA, AMA, anti-Smooth Muscle Ab type 1, Anti liver-kidney microsomal Ab, Anti-soluble liver Ag, immunoglobulin panel, A1AT phenotype  - Please send CMV PCR, EBV PCR, HSV IgM  - Please send Serum Drug screen and Utox  - Please avoid hepatotoxic medications  - Monitor LFTs and INR qdaily   - etoh cessation counselling provided  - [facility] protocol  - Please give MVT, Thiamine and Folate  - Target K>4, Mg>2 and PO4 >1    #Ascites - None  - 2g Na diet    # Hepatic encephalopathy - None  - avoid sedatives and opioids     # Esophageal varices - no reported Hx  - Outpatient fibroscan - if LS >15 and platelets <150 will need variceal screening    #SBP: No H/O SBP    #Coagulopathy:  Monitor INR    #HRS: No H/O HRS    # HCC screening:  - Please f/u as outpatient for US abdomen and AFP every 6 months.    # Lifestyle/Dietary modifications  - Calorie intake 25-40 Kcal/kg/day  - Protein intake: 1.2-1.5 gm/kg/day  - Avoid smoking, alcohol, NSAIDS.  - Abstain from alcohol and all illicit drugs  -Avoid use of herbal and dietary supplements due to potential hepatotoxicity  -Limit use of acetaminophen to <2 grams per day  -Avoid use of nonsteroidal antiinflammatory drugs (NSAIDs)    -Avoid eating any unpasteurized dairy products; avoid eating any raw or undercooked eggs, fish, poultry, or meat; and avoid eating raw/steamed oysters or other shellfish to avoid risk of Vibrio infection.    #Vaccinations:  Please f/u in clinic for being uptodate with HAV/HBV/Influenza/Pneumococcal vaccines.    #LT evaluation  Social status: lives at [facility]. no family and unemployed

## 2023-03-18 LAB
ALBUMIN SERPL ELPH-MCNC: 3.8 G/DL — SIGNIFICANT CHANGE UP (ref 3.5–5.2)
ALP SERPL-CCNC: 86 U/L — SIGNIFICANT CHANGE UP (ref 30–115)
ALT FLD-CCNC: 68 U/L — HIGH (ref 0–41)
ANION GAP SERPL CALC-SCNC: 11 MMOL/L — SIGNIFICANT CHANGE UP (ref 7–14)
ANION GAP SERPL CALC-SCNC: 16 MMOL/L — HIGH (ref 7–14)
APTT BLD: 38.2 SEC — SIGNIFICANT CHANGE UP (ref 27–39.2)
AST SERPL-CCNC: 184 U/L — HIGH (ref 0–41)
BASOPHILS # BLD AUTO: 0.04 K/UL — SIGNIFICANT CHANGE UP (ref 0–0.2)
BASOPHILS NFR BLD AUTO: 1 % — SIGNIFICANT CHANGE UP (ref 0–1)
BILIRUB SERPL-MCNC: 2.5 MG/DL — HIGH (ref 0.2–1.2)
BLD GP AB SCN SERPL QL: SIGNIFICANT CHANGE UP
BUN SERPL-MCNC: 11 MG/DL — SIGNIFICANT CHANGE UP (ref 10–20)
BUN SERPL-MCNC: 9 MG/DL — LOW (ref 10–20)
CALCIUM SERPL-MCNC: 8.5 MG/DL — SIGNIFICANT CHANGE UP (ref 8.4–10.5)
CALCIUM SERPL-MCNC: 8.5 MG/DL — SIGNIFICANT CHANGE UP (ref 8.4–10.5)
CHLORIDE SERPL-SCNC: 93 MMOL/L — LOW (ref 98–110)
CHLORIDE SERPL-SCNC: 98 MMOL/L — SIGNIFICANT CHANGE UP (ref 98–110)
CO2 SERPL-SCNC: 23 MMOL/L — SIGNIFICANT CHANGE UP (ref 17–32)
CO2 SERPL-SCNC: 23 MMOL/L — SIGNIFICANT CHANGE UP (ref 17–32)
CREAT SERPL-MCNC: 1.1 MG/DL — SIGNIFICANT CHANGE UP (ref 0.7–1.5)
CREAT SERPL-MCNC: 1.2 MG/DL — SIGNIFICANT CHANGE UP (ref 0.7–1.5)
EGFR: 68 ML/MIN/1.73M2 — SIGNIFICANT CHANGE UP
EGFR: 75 ML/MIN/1.73M2 — SIGNIFICANT CHANGE UP
EOSINOPHIL # BLD AUTO: 0.06 K/UL — SIGNIFICANT CHANGE UP (ref 0–0.7)
EOSINOPHIL NFR BLD AUTO: 1.6 % — SIGNIFICANT CHANGE UP (ref 0–8)
GLUCOSE SERPL-MCNC: 139 MG/DL — HIGH (ref 70–99)
GLUCOSE SERPL-MCNC: 140 MG/DL — HIGH (ref 70–99)
HCT VFR BLD CALC: 30.4 % — LOW (ref 42–52)
HCT VFR BLD CALC: 30.8 % — LOW (ref 42–52)
HCYS SERPL-MCNC: 12.8 UMOL/L — SIGNIFICANT CHANGE UP
HGB BLD-MCNC: 10.6 G/DL — LOW (ref 14–18)
HGB BLD-MCNC: 10.8 G/DL — LOW (ref 14–18)
IMM GRANULOCYTES NFR BLD AUTO: 0.5 % — HIGH (ref 0.1–0.3)
INR BLD: 1.11 RATIO — SIGNIFICANT CHANGE UP (ref 0.65–1.3)
LACTATE SERPL-SCNC: 1.2 MMOL/L — SIGNIFICANT CHANGE UP (ref 0.7–2)
LYMPHOCYTES # BLD AUTO: 0.94 K/UL — LOW (ref 1.2–3.4)
LYMPHOCYTES # BLD AUTO: 24.6 % — SIGNIFICANT CHANGE UP (ref 20.5–51.1)
MAGNESIUM SERPL-MCNC: 1.9 MG/DL — SIGNIFICANT CHANGE UP (ref 1.8–2.4)
MCHC RBC-ENTMCNC: 34.6 PG — HIGH (ref 27–31)
MCHC RBC-ENTMCNC: 34.7 PG — HIGH (ref 27–31)
MCHC RBC-ENTMCNC: 34.9 G/DL — SIGNIFICANT CHANGE UP (ref 32–37)
MCHC RBC-ENTMCNC: 35.1 G/DL — SIGNIFICANT CHANGE UP (ref 32–37)
MCV RBC AUTO: 99 FL — HIGH (ref 80–94)
MCV RBC AUTO: 99.3 FL — HIGH (ref 80–94)
MONOCYTES # BLD AUTO: 0.43 K/UL — SIGNIFICANT CHANGE UP (ref 0.1–0.6)
MONOCYTES NFR BLD AUTO: 11.3 % — HIGH (ref 1.7–9.3)
NEUTROPHILS # BLD AUTO: 2.33 K/UL — SIGNIFICANT CHANGE UP (ref 1.4–6.5)
NEUTROPHILS NFR BLD AUTO: 61 % — SIGNIFICANT CHANGE UP (ref 42.2–75.2)
NRBC # BLD: 0 /100 WBCS — SIGNIFICANT CHANGE UP (ref 0–0)
NRBC # BLD: 0 /100 WBCS — SIGNIFICANT CHANGE UP (ref 0–0)
PHOSPHATE SERPL-MCNC: 1 MG/DL — LOW (ref 2.1–4.9)
PLATELET # BLD AUTO: 54 K/UL — LOW (ref 130–400)
PLATELET # BLD AUTO: 69 K/UL — LOW (ref 130–400)
POTASSIUM SERPL-MCNC: 3.5 MMOL/L — SIGNIFICANT CHANGE UP (ref 3.5–5)
POTASSIUM SERPL-MCNC: 3.8 MMOL/L — SIGNIFICANT CHANGE UP (ref 3.5–5)
POTASSIUM SERPL-SCNC: 3.5 MMOL/L — SIGNIFICANT CHANGE UP (ref 3.5–5)
POTASSIUM SERPL-SCNC: 3.8 MMOL/L — SIGNIFICANT CHANGE UP (ref 3.5–5)
PROT SERPL-MCNC: 6.2 G/DL — SIGNIFICANT CHANGE UP (ref 6–8)
PROTHROM AB SERPL-ACNC: 12.7 SEC — SIGNIFICANT CHANGE UP (ref 9.95–12.87)
RBC # BLD: 3.06 M/UL — LOW (ref 4.7–6.1)
RBC # BLD: 3.11 M/UL — LOW (ref 4.7–6.1)
RBC # FLD: 15.8 % — HIGH (ref 11.5–14.5)
RBC # FLD: 15.9 % — HIGH (ref 11.5–14.5)
SODIUM SERPL-SCNC: 132 MMOL/L — LOW (ref 135–146)
SODIUM SERPL-SCNC: 132 MMOL/L — LOW (ref 135–146)
WBC # BLD: 3.82 K/UL — LOW (ref 4.8–10.8)
WBC # BLD: 4.54 K/UL — LOW (ref 4.8–10.8)
WBC # FLD AUTO: 3.82 K/UL — LOW (ref 4.8–10.8)
WBC # FLD AUTO: 4.54 K/UL — LOW (ref 4.8–10.8)

## 2023-03-18 PROCEDURE — 71045 X-RAY EXAM CHEST 1 VIEW: CPT | Mod: 26

## 2023-03-18 PROCEDURE — 99233 SBSQ HOSP IP/OBS HIGH 50: CPT

## 2023-03-18 RX ORDER — POTASSIUM PHOSPHATE, MONOBASIC POTASSIUM PHOSPHATE, DIBASIC 236; 224 MG/ML; MG/ML
30 INJECTION, SOLUTION INTRAVENOUS ONCE
Refills: 0 | Status: COMPLETED | OUTPATIENT
Start: 2023-03-18 | End: 2023-03-18

## 2023-03-18 RX ORDER — ACETAMINOPHEN 500 MG
650 TABLET ORAL EVERY 6 HOURS
Refills: 0 | Status: DISCONTINUED | OUTPATIENT
Start: 2023-03-18 | End: 2023-03-27

## 2023-03-18 RX ADMIN — Medication 1 MILLIGRAM(S): at 17:13

## 2023-03-18 RX ADMIN — Medication 1 PACKET(S): at 13:27

## 2023-03-18 RX ADMIN — GABAPENTIN 200 MILLIGRAM(S): 400 CAPSULE ORAL at 17:13

## 2023-03-18 RX ADMIN — TAMSULOSIN HYDROCHLORIDE 0.4 MILLIGRAM(S): 0.4 CAPSULE ORAL at 21:13

## 2023-03-18 RX ADMIN — Medication 1 MILLIGRAM(S): at 12:07

## 2023-03-18 RX ADMIN — Medication 650 MILLIGRAM(S): at 12:11

## 2023-03-18 RX ADMIN — Medication 100 MILLIGRAM(S): at 12:07

## 2023-03-18 RX ADMIN — Medication 650 MILLIGRAM(S): at 19:00

## 2023-03-18 RX ADMIN — Medication 650 MILLIGRAM(S): at 15:00

## 2023-03-18 RX ADMIN — ENOXAPARIN SODIUM 120 MILLIGRAM(S): 100 INJECTION SUBCUTANEOUS at 06:00

## 2023-03-18 RX ADMIN — LORATADINE 10 MILLIGRAM(S): 10 TABLET ORAL at 12:08

## 2023-03-18 RX ADMIN — Medication 1 PACKET(S): at 21:13

## 2023-03-18 RX ADMIN — ENOXAPARIN SODIUM 120 MILLIGRAM(S): 100 INJECTION SUBCUTANEOUS at 17:12

## 2023-03-18 RX ADMIN — Medication 1 MILLIGRAM(S): at 09:02

## 2023-03-18 RX ADMIN — Medication 1 PACKET(S): at 06:00

## 2023-03-18 RX ADMIN — Medication 25 MILLIGRAM(S): at 05:59

## 2023-03-18 RX ADMIN — GABAPENTIN 200 MILLIGRAM(S): 400 CAPSULE ORAL at 05:59

## 2023-03-18 RX ADMIN — Medication 1 MILLIGRAM(S): at 21:14

## 2023-03-18 RX ADMIN — Medication 1 MILLIGRAM(S): at 05:59

## 2023-03-18 RX ADMIN — Medication 1 TABLET(S): at 12:07

## 2023-03-18 RX ADMIN — Medication 1 MILLIGRAM(S): at 02:22

## 2023-03-18 RX ADMIN — NALTREXONE HYDROCHLORIDE 50 MILLIGRAM(S): 50 TABLET, FILM COATED ORAL at 12:09

## 2023-03-18 RX ADMIN — Medication 650 MILLIGRAM(S): at 05:59

## 2023-03-18 RX ADMIN — Medication 650 MILLIGRAM(S): at 18:14

## 2023-03-18 RX ADMIN — PANTOPRAZOLE SODIUM 40 MILLIGRAM(S): 20 TABLET, DELAYED RELEASE ORAL at 06:01

## 2023-03-18 RX ADMIN — POTASSIUM PHOSPHATE, MONOBASIC POTASSIUM PHOSPHATE, DIBASIC 83.33 MILLIMOLE(S): 236; 224 INJECTION, SOLUTION INTRAVENOUS at 10:09

## 2023-03-18 NOTE — PROVIDER CONTACT NOTE (OTHER) - ASSESSMENT
Pt alert and able to say he has been drinking since he came in he completed 1 bottle prior to coming to the unit, pt arrived on 3A 3/16@1:40a.

## 2023-03-18 NOTE — PROVIDER CONTACT NOTE (OTHER) - ACTION/TREATMENT ORDERED:
MD to assess pt, alcohol removed from room, pt belongs searched no other alcohol found, reviewed with pt that he cannot drink while in the hospital.

## 2023-03-18 NOTE — PROGRESS NOTE ADULT - SUBJECTIVE AND OBJECTIVE BOX
CAT ALSTON  [facility] (Back) 026 C ([facility] (Back))        Patient was evaluated and examined  by bedside, no epistaxis today, patient tolerating diet well        REVIEW OF SYSTEMS: unable to obtain, patient is a poor historian         T(C): , Max: 38.3 (03-18-23 @ 12:10)  HR: 101 (03-18-23 @ 12:32)  BP: 130/63 (03-18-23 @ 12:32)  RR: 18 (03-18-23 @ 12:32)  SpO2: 98% (03-18-23 @ 12:32)  CAPILLARY BLOOD GLUCOSE          PHYSICAL EXAM:  General: NAD, AAOX3, patient is laying comfortably in bed  HEENT: AT, NC, Supple, NO JVD, NO CB  Lungs: good breath sounds  B/L, no wheezing, no rhonchi  CVS: normal S1, S2, RRR, NO M/G/R  Abdomen: soft, bowel sounds present, non-tender, non-distended  Extremities: no edema, no clubbing, no cyanosis, positive peripheral pulses b/l  Neuro: no acute focal neurological deficits  Skin: chronic atrophic skin changes on b/l lower ext.       LAB  CBC  Date: 03-18-23 @ 06:20  Mean cell Xirpnbtzmm62.6  Mean cell Hemoglobin Conc34.9  Mean cell Volum 99.3  Platelet count-Automate 54  RBC Count 3.06  Red Cell Distrib Width15.9  WBC Count3.82  % Albumin, Urine--  Hematocrit 30.4  Hemoglobin 10.6  CBC  Date: 03-17-23 @ 07:15  Mean cell Blaqllmqtq82.5  Mean cell Hemoglobin Conc35.1  Mean cell Volum 98.2  Platelet count-Automate 53  RBC Count 3.25  Red Cell Distrib Width16.1  WBC Count3.97  % Albumin, Urine--  Hematocrit 31.9  Hemoglobin 11.2  CBC  Date: 03-16-23 @ 08:00  Mean cell Qyzrbcrsqx30.9  Mean cell Hemoglobin Conc34.0  Mean cell Volum 99.7  Platelet count-Automate 53  RBC Count 3.63  Red Cell Distrib Width17.2  WBC Count3.47  % Albumin, Urine--  Hematocrit 36.2  Hemoglobin 12.3  CBC  Date: 03-15-23 @ 16:52  Mean cell Hxthnyuffw53.2  Mean cell Hemoglobin Conc34.6  Mean cell Volum 98.9  Platelet count-Automate 61  RBC Count 3.68  Red Cell Distrib Width17.1  WBC Count6.56  % Albumin, Urine--  Hematocrit 36.4  Hemoglobin 12.6    [facility]  03-18-23 @ 06:20  Blood Gas Arterial-Calcium,Ionized--  Blood Urea Nitrogen, Serum 11 mg/dL [10 - 20]  Carbon Dioxide, Serum23 mmol/L [17 - 32]  Chloride, Serum98 mmol/L [98 - 110]  Creatinie, Serum1.2 mg/dL [0.7 - 1.5]  Glucose, Azgrr871 mg/dL<H> [70 - 99]  Potassium, Serum3.8 mmol/L [3.5 - 5.0]  Sodium, Serum 132 mmol/L<L> [135 - 146]  [facility]  03-17-23 @ 07:15  Blood Gas Arterial-Calcium,Ionized--  Blood Urea Nitrogen, Serum 12 mg/dL [10 - 20]  Carbon Dioxide, Serum25 mmol/L [17 - 32]  Chloride, Serum95 mmol/L<L> [98 - 110]  Creatinie, Serum1.2 mg/dL [0.7 - 1.5]  Glucose, Xiusg688 mg/dL<H> [70 - 99]  Potassium, Serum3.8 mmol/L [3.5 - 5.0]  Sodium, Serum 133 mmol/L<L> [135 - 146]  [facility]  03-16-23 @ 08:00  Blood Gas Arterial-Calcium,Ionized--  Blood Urea Nitrogen, Serum 11 mg/dL [10 - 20]  Carbon Dioxide, Serum16 mmol/L<L> [17 - 32]  Chloride, Serum95 mmol/L<L> [98 - 110]  Creatinie, Serum1.2 mg/dL [0.7 - 1.5]  Glucose, Serum95 mg/dL [70 - 99]  Potassium, Serum4.6 mmol/L [3.5 - 5.0]  Sodium, Serum 135 mmol/L [135 - 146]  [facility]  03-15-23 @ 16:52  Blood Gas Arterial-Calcium,Ionized--  Blood Urea Nitrogen, Serum 9 mg/dL<L> [10 - 20]  Carbon Dioxide, Serum16 mmol/L<L> [17 - 32]  Chloride, Serum92 mmol/L<L> [98 - 110]  Creatinie, Serum1.2 mg/dL [0.7 - 1.5]  Glucose, Serum75 mg/dL [70 - 99]  Potassium, Serum4.4 mmol/L [3.5 - 5.0]  Sodium, Serum 138 mmol/L [135 - 146]        PT/INR - ( 18 Mar 2023 06:20 )   PT: 12.70 sec;   INR: 1.11 ratio         PTT - ( 18 Mar 2023 06:20 )  PTT:38.2 sec      Medications:  acetaminophen     Tablet .. 650 milliGRAM(s) Oral every 6 hours PRN  enoxaparin Injectable 120 milliGRAM(s) SubCutaneous every 12 hours  folic acid 1 milliGRAM(s) Oral daily  gabapentin 200 milliGRAM(s) Oral two times a day  loratadine 10 milliGRAM(s) Oral daily  LORazepam     Tablet 1 milliGRAM(s) Oral every 4 hours  LORazepam     Tablet   Oral   LORazepam     Tablet 2 milliGRAM(s) Oral every 2 hours PRN  magnesium sulfate  IVPB 2 Gram(s) IV Intermittent once  metoprolol succinate ER 25 milliGRAM(s) Oral daily  multivitamin 1 Tablet(s) Oral daily  naltrexone 50 milliGRAM(s) Oral daily  pantoprazole    Tablet 40 milliGRAM(s) Oral before breakfast  potassium phosphate / sodium phosphate Powder (PHOS-NaK) 1 Packet(s) Oral every 8 hours  tamsulosin 0.4 milliGRAM(s) Oral at bedtime  thiamine 100 milliGRAM(s) Oral daily        Assessment and Plan:  Patient is a 61 y/o Male with hx of PE/DVT (on coumadin), Alcohol abuse (drink a pint of vodka daily), HTN, GERD, BPH and Pseudogout presents to ED for epistaxis x 4 days due to coumadin toxicity with high INR levels.    Epistaxis likely 2/2 supratherapeutic INR due to Coumadin therapy.  -  coumadin was held x 3 days   -3/17- INR 1.22 was corrected post vitamin K tx,  as per hemeonc - patient was started  on therapeutic lovenox tx.     Atypical Chest pain - resolved   - EKG NSR. No significant ST/T wave changes.   - negative  troponin     Thrombocytopenia   - [facility] on board  - monitor CBC daily     Hypomagnesemia/Hypophosphatemia - supplemented , monitor electrolytes daily     Macrocytic anemia- stable h/h, daily MVI , folate, vit b 12 , folate tx.    AG metabolic acidosis- normal LA level, resolved,  -d/c sodium bicarb. tx.     Alcohol abuse/ Suspected thiamine and folate deficiency  Acute  transaminitis due to alcohol use  - abd. us- liver cirrhosis, daily LFT's monitoring , Hepatology specialist  eval.-completed, will f/up all rec. work up   - [facility] protocol  - thiamine, folic acid and multivitamin   - alcohol cessation counseling     Hx of DVT/PE -failed  coumadin tx with acute DVT on repeated venous doppler. Obtain hypercoagulable work up. f/up results. as per Hemeon[facility] - patient was started on therapeutic lovenox tx.    Hx of pseudogout - supportive care. Outpatient follow up.   HTN - c/w home med  GERD - PPI   BPH - c/w home med    DVT ppx: lovenox subc. tx.   GI ppx: PPI    #Progress Note Handoff: supplement electrolytes, daily LFT's, BMP, CBC, Mg, Phos, started on therapeutic lovenox for acute DVT, f/up hypercoag. work up., PT eval.   Family discussion: yes, medical team Disposition: to be determined.     Total time spent to complete patient's bedside assessment, review medical chart, discuss medical plan of care with covering medical team was more than 50 minutes with >50% of time spent face to face with patient, discussion with patient/family and/or coordination of care.

## 2023-03-19 LAB
ANION GAP SERPL CALC-SCNC: 12 MMOL/L — SIGNIFICANT CHANGE UP (ref 7–14)
APTT BLD: 45.8 SEC — HIGH (ref 27–39.2)
BASOPHILS # BLD AUTO: 0.04 K/UL — SIGNIFICANT CHANGE UP (ref 0–0.2)
BASOPHILS NFR BLD AUTO: 0.9 % — SIGNIFICANT CHANGE UP (ref 0–1)
BUN SERPL-MCNC: 11 MG/DL — SIGNIFICANT CHANGE UP (ref 10–20)
CALCIUM SERPL-MCNC: 8.6 MG/DL — SIGNIFICANT CHANGE UP (ref 8.4–10.5)
CHLORIDE SERPL-SCNC: 99 MMOL/L — SIGNIFICANT CHANGE UP (ref 98–110)
CO2 SERPL-SCNC: 25 MMOL/L — SIGNIFICANT CHANGE UP (ref 17–32)
CREAT SERPL-MCNC: 1.1 MG/DL — SIGNIFICANT CHANGE UP (ref 0.7–1.5)
EGFR: 75 ML/MIN/1.73M2 — SIGNIFICANT CHANGE UP
EOSINOPHIL # BLD AUTO: 0.08 K/UL — SIGNIFICANT CHANGE UP (ref 0–0.7)
EOSINOPHIL NFR BLD AUTO: 1.9 % — SIGNIFICANT CHANGE UP (ref 0–8)
GLUCOSE SERPL-MCNC: 166 MG/DL — HIGH (ref 70–99)
HCT VFR BLD CALC: 31.5 % — LOW (ref 42–52)
HGB BLD-MCNC: 11.2 G/DL — LOW (ref 14–18)
IMM GRANULOCYTES NFR BLD AUTO: 0.7 % — HIGH (ref 0.1–0.3)
INR BLD: 1.18 RATIO — SIGNIFICANT CHANGE UP (ref 0.65–1.3)
LYMPHOCYTES # BLD AUTO: 1.09 K/UL — LOW (ref 1.2–3.4)
LYMPHOCYTES # BLD AUTO: 25.2 % — SIGNIFICANT CHANGE UP (ref 20.5–51.1)
MAGNESIUM SERPL-MCNC: 2 MG/DL — SIGNIFICANT CHANGE UP (ref 1.8–2.4)
MCHC RBC-ENTMCNC: 35.4 PG — HIGH (ref 27–31)
MCHC RBC-ENTMCNC: 35.6 G/DL — SIGNIFICANT CHANGE UP (ref 32–37)
MCV RBC AUTO: 99.7 FL — HIGH (ref 80–94)
MONOCYTES # BLD AUTO: 0.61 K/UL — HIGH (ref 0.1–0.6)
MONOCYTES NFR BLD AUTO: 14.1 % — HIGH (ref 1.7–9.3)
NEUTROPHILS # BLD AUTO: 2.47 K/UL — SIGNIFICANT CHANGE UP (ref 1.4–6.5)
NEUTROPHILS NFR BLD AUTO: 57.2 % — SIGNIFICANT CHANGE UP (ref 42.2–75.2)
NRBC # BLD: 0 /100 WBCS — SIGNIFICANT CHANGE UP (ref 0–0)
PHOSPHATE SERPL-MCNC: 3.4 MG/DL — SIGNIFICANT CHANGE UP (ref 2.1–4.9)
PLATELET # BLD AUTO: 72 K/UL — LOW (ref 130–400)
POTASSIUM SERPL-MCNC: 3.4 MMOL/L — LOW (ref 3.5–5)
POTASSIUM SERPL-SCNC: 3.4 MMOL/L — LOW (ref 3.5–5)
PROCALCITONIN SERPL-MCNC: 0.03 NG/ML — SIGNIFICANT CHANGE UP (ref 0.02–0.1)
PROTHROM AB SERPL-ACNC: 13.5 SEC — HIGH (ref 9.95–12.87)
RBC # BLD: 3.16 M/UL — LOW (ref 4.7–6.1)
RBC # FLD: 15.7 % — HIGH (ref 11.5–14.5)
SODIUM SERPL-SCNC: 136 MMOL/L — SIGNIFICANT CHANGE UP (ref 135–146)
WBC # BLD: 4.32 K/UL — LOW (ref 4.8–10.8)
WBC # FLD AUTO: 4.32 K/UL — LOW (ref 4.8–10.8)

## 2023-03-19 PROCEDURE — 99233 SBSQ HOSP IP/OBS HIGH 50: CPT

## 2023-03-19 RX ORDER — POTASSIUM CHLORIDE 20 MEQ
40 PACKET (EA) ORAL ONCE
Refills: 0 | Status: COMPLETED | OUTPATIENT
Start: 2023-03-19 | End: 2023-03-19

## 2023-03-19 RX ORDER — IBUPROFEN 200 MG
200 TABLET ORAL ONCE
Refills: 0 | Status: COMPLETED | OUTPATIENT
Start: 2023-03-19 | End: 2023-03-19

## 2023-03-19 RX ORDER — ACETAMINOPHEN 500 MG
1000 TABLET ORAL ONCE
Refills: 0 | Status: COMPLETED | OUTPATIENT
Start: 2023-03-19 | End: 2023-03-19

## 2023-03-19 RX ORDER — ACETAMINOPHEN 500 MG
650 TABLET ORAL ONCE
Refills: 0 | Status: DISCONTINUED | OUTPATIENT
Start: 2023-03-19 | End: 2023-03-19

## 2023-03-19 RX ORDER — MIDODRINE HYDROCHLORIDE 2.5 MG/1
10 TABLET ORAL THREE TIMES A DAY
Refills: 0 | Status: DISCONTINUED | OUTPATIENT
Start: 2023-03-19 | End: 2023-03-19

## 2023-03-19 RX ADMIN — Medication 25 MILLIGRAM(S): at 05:15

## 2023-03-19 RX ADMIN — GABAPENTIN 200 MILLIGRAM(S): 400 CAPSULE ORAL at 05:16

## 2023-03-19 RX ADMIN — Medication 200 MILLIGRAM(S): at 00:20

## 2023-03-19 RX ADMIN — LORATADINE 10 MILLIGRAM(S): 10 TABLET ORAL at 11:51

## 2023-03-19 RX ADMIN — ENOXAPARIN SODIUM 120 MILLIGRAM(S): 100 INJECTION SUBCUTANEOUS at 05:16

## 2023-03-19 RX ADMIN — Medication 200 MILLIGRAM(S): at 00:50

## 2023-03-19 RX ADMIN — Medication 1 MILLIGRAM(S): at 11:50

## 2023-03-19 RX ADMIN — Medication 1 TABLET(S): at 11:51

## 2023-03-19 RX ADMIN — Medication 400 MILLIGRAM(S): at 01:37

## 2023-03-19 RX ADMIN — Medication 1 PACKET(S): at 05:16

## 2023-03-19 RX ADMIN — TAMSULOSIN HYDROCHLORIDE 0.4 MILLIGRAM(S): 0.4 CAPSULE ORAL at 21:31

## 2023-03-19 RX ADMIN — ENOXAPARIN SODIUM 120 MILLIGRAM(S): 100 INJECTION SUBCUTANEOUS at 17:43

## 2023-03-19 RX ADMIN — Medication 40 MILLIEQUIVALENT(S): at 18:45

## 2023-03-19 RX ADMIN — Medication 650 MILLIGRAM(S): at 11:50

## 2023-03-19 RX ADMIN — Medication 1000 MILLIGRAM(S): at 02:07

## 2023-03-19 RX ADMIN — Medication 400 MILLIGRAM(S): at 18:33

## 2023-03-19 RX ADMIN — Medication 0.5 MILLIGRAM(S): at 05:16

## 2023-03-19 RX ADMIN — PANTOPRAZOLE SODIUM 40 MILLIGRAM(S): 20 TABLET, DELAYED RELEASE ORAL at 05:15

## 2023-03-19 RX ADMIN — NALTREXONE HYDROCHLORIDE 50 MILLIGRAM(S): 50 TABLET, FILM COATED ORAL at 11:51

## 2023-03-19 RX ADMIN — GABAPENTIN 200 MILLIGRAM(S): 400 CAPSULE ORAL at 17:43

## 2023-03-19 RX ADMIN — Medication 0.5 MILLIGRAM(S): at 01:10

## 2023-03-19 RX ADMIN — Medication 0.5 MILLIGRAM(S): at 09:41

## 2023-03-19 RX ADMIN — Medication 650 MILLIGRAM(S): at 12:50

## 2023-03-19 RX ADMIN — Medication 0.5 MILLIGRAM(S): at 13:18

## 2023-03-19 RX ADMIN — Medication 100 MILLIGRAM(S): at 11:51

## 2023-03-19 RX ADMIN — Medication 0.5 MILLIGRAM(S): at 21:35

## 2023-03-19 RX ADMIN — Medication 0.5 MILLIGRAM(S): at 17:43

## 2023-03-19 NOTE — PROGRESS NOTE ADULT - SUBJECTIVE AND OBJECTIVE BOX
CAT ALSTON  [facility] (Back) 026 C ([facility] (Back))        Patient was evaluated and examined  by bedside, no active complains       REVIEW OF SYSTEMS: unable to obtain , patient is forgetful         T(C): , Max: 38.9 (03-19-23 @ 00:20)  HR: 95 (03-19-23 @ 11:57)  BP: 123/64 (03-19-23 @ 11:57)  RR: 18 (03-19-23 @ 11:57)  SpO2: 98% (03-19-23 @ 11:57)  CAPILLARY BLOOD GLUCOSE          PHYSICAL EXAM:  General: NAD, AAOX3, patient is laying comfortably in bed  HEENT: AT, NC, Supple, NO JVD, NO CB  Lungs: good breath sounds  B/L, no wheezing, no rhonchi  CVS: normal S1, S2, RRR, NO M/G/R  Abdomen: soft, bowel sounds present, non-tender, non-distended  Extremities: no edema, no clubbing, no cyanosis, positive peripheral pulses b/l  Neuro: no acute focal neurological deficits  Skin: chronic atrophic skin changes on b/l lower ext.         LAB  CBC  Date: 03-19-23 @ 07:56  Mean cell Jbecnkqsmp40.4  Mean cell Hemoglobin Conc35.6  Mean cell Volum 99.7  Platelet count-Automate 72  RBC Count 3.16  Red Cell Distrib Width15.7  WBC Count4.32  % Albumin, Urine--  Hematocrit 31.5  Hemoglobin 11.2  CBC  Date: 03-18-23 @ 20:42  Mean cell Bwoeqbmfeh27.7  Mean cell Hemoglobin Conc35.1  Mean cell Volum 99.0  Platelet count-Automate 69  RBC Count 3.11  Red Cell Distrib Width15.8  WBC Count4.54  % Albumin, Urine--  Hematocrit 30.8  Hemoglobin 10.8  CBC  Date: 03-18-23 @ 06:20  Mean cell Midtcqqlpa78.6  Mean cell Hemoglobin Conc34.9  Mean cell Volum 99.3  Platelet count-Automate 54  RBC Count 3.06  Red Cell Distrib Width15.9  WBC Count3.82  % Albumin, Urine--  Hematocrit 30.4  Hemoglobin 10.6  CBC  Date: 03-17-23 @ 07:15  Mean cell Hdonwbvklh09.5  Mean cell Hemoglobin Conc35.1  Mean cell Volum 98.2  Platelet count-Automate 53  RBC Count 3.25  Red Cell Distrib Width16.1  WBC Count3.97  % Albumin, Urine--  Hematocrit 31.9  Hemoglobin 11.2  CBC  Date: 03-16-23 @ 08:00  Mean cell Caawtlzwtl89.9  Mean cell Hemoglobin Conc34.0  Mean cell Volum 99.7  Platelet count-Automate 53  RBC Count 3.63  Red Cell Distrib Width17.2  WBC Count3.47  % Albumin, Urine--  Hematocrit 36.2  Hemoglobin 12.3  CBC  Date: 03-15-23 @ 16:52  Mean cell Sjcmjvabls65.2  Mean cell Hemoglobin Conc34.6  Mean cell Volum 98.9  Platelet count-Automate 61  RBC Count 3.68  Red Cell Distrib Width17.1  WBC Count6.56  % Albumin, Urine--  Hematocrit 36.4  Hemoglobin 12.6    [facility]  03-19-23 @ 07:56  Blood Gas Arterial-Calcium,Ionized--  Blood Urea Nitrogen, Serum 11 mg/dL [10 - 20]  Carbon Dioxide, Serum25 mmol/L [17 - 32]  Chloride, Serum99 mmol/L [98 - 110]  Creatinie, Serum1.1 mg/dL [0.7 - 1.5]  Glucose, Qndsf443 mg/dL<H> [70 - 99]  Potassium, Serum3.4 mmol/L<L> [3.5 - 5.0]  Sodium, Serum 136 mmol/L [135 - 146]  [facility]  03-18-23 @ 20:42  Blood Gas Arterial-Calcium,Ionized--  Blood Urea Nitrogen, Serum 9 mg/dL<L> [10 - 20]  Carbon Dioxide, Serum23 mmol/L [17 - 32]  Chloride, Serum93 mmol/L<L> [98 - 110]  Creatinie, Serum1.1 mg/dL [0.7 - 1.5]  Glucose, Ftonw526 mg/dL<H> [70 - 99]  Potassium, Serum3.5 mmol/L [3.5 - 5.0]  Sodium, Serum 132 mmol/L<L> [135 - 146]  [facility]  03-18-23 @ 06:20  Blood Gas Arterial-Calcium,Ionized--  Blood Urea Nitrogen, Serum 11 mg/dL [10 - 20]  Carbon Dioxide, Serum23 mmol/L [17 - 32]  Chloride, Serum98 mmol/L [98 - 110]  Creatinie, Serum1.2 mg/dL [0.7 - 1.5]  Glucose, Zcjmu574 mg/dL<H> [70 - 99]  Potassium, Serum3.8 mmol/L [3.5 - 5.0]  Sodium, Serum 132 mmol/L<L> [135 - 146]  [facility]  03-17-23 @ 07:15  Blood Gas Arterial-Calcium,Ionized--  Blood Urea Nitrogen, Serum 12 mg/dL [10 - 20]  Carbon Dioxide, Serum25 mmol/L [17 - 32]  Chloride, Serum95 mmol/L<L> [98 - 110]  Creatinie, Serum1.2 mg/dL [0.7 - 1.5]  Glucose, Sxutl677 mg/dL<H> [70 - 99]  Potassium, Serum3.8 mmol/L [3.5 - 5.0]  Sodium, Serum 133 mmol/L<L> [135 - 146]  [facility]  03-16-23 @ 08:00  Blood Gas Arterial-Calcium,Ionized--  Blood Urea Nitrogen, Serum 11 mg/dL [10 - 20]  Carbon Dioxide, Serum16 mmol/L<L> [17 - 32]  Chloride, Serum95 mmol/L<L> [98 - 110]  Creatinie, Serum1.2 mg/dL [0.7 - 1.5]  Glucose, Serum95 mg/dL [70 - 99]  Potassium, Serum4.6 mmol/L [3.5 - 5.0]  Sodium, Serum 135 mmol/L [135 - 146]  [facility]  03-15-23 @ 16:52  Blood Gas Arterial-Calcium,Ionized--  Blood Urea Nitrogen, Serum 9 mg/dL<L> [10 - 20]  Carbon Dioxide, Serum16 mmol/L<L> [17 - 32]  Chloride, Serum92 mmol/L<L> [98 - 110]  Creatinie, Serum1.2 mg/dL [0.7 - 1.5]  Glucose, Serum75 mg/dL [70 - 99]  Potassium, Serum4.4 mmol/L [3.5 - 5.0]  Sodium, Serum 138 mmol/L [135 - 146]        PT/INR - ( 19 Mar 2023 07:56 )   PT: 13.50 sec;   INR: 1.18 ratio         PTT - ( 19 Mar 2023 07:56 )  PTT:45.8 sec      Medications:  acetaminophen     Tablet .. 650 milliGRAM(s) Oral every 6 hours PRN  enoxaparin Injectable 120 milliGRAM(s) SubCutaneous every 12 hours  folic acid 1 milliGRAM(s) Oral daily  gabapentin 200 milliGRAM(s) Oral two times a day  loratadine 10 milliGRAM(s) Oral daily  LORazepam     Tablet 0.5 milliGRAM(s) Oral every 4 hours  LORazepam     Tablet   Oral   LORazepam     Tablet 2 milliGRAM(s) Oral every 2 hours PRN  magnesium sulfate  IVPB 2 Gram(s) IV Intermittent once  metoprolol succinate ER 25 milliGRAM(s) Oral daily  multivitamin 1 Tablet(s) Oral daily  naltrexone 50 milliGRAM(s) Oral daily  pantoprazole    Tablet 40 milliGRAM(s) Oral before breakfast  potassium chloride    Tablet ER 40 milliEquivalent(s) Oral once  tamsulosin 0.4 milliGRAM(s) Oral at bedtime  thiamine 100 milliGRAM(s) Oral daily        Assessment and Plan:  Patient is a 61 y/o Male with hx of PE/DVT (on coumadin), Alcohol abuse (drink a pint of vodka daily), HTN, GERD, BPH and Pseudogout presents to ED for epistaxis x 4 days due to coumadin toxicity with high INR levels.    Epistaxis likely 2/2 supratherapeutic INR due to Coumadin therapy.  -  coumadin was held x 3 days , INR was corrected post Vitamin K tx, as per hemeonc - patient was started  on therapeutic lovenox tx.   -3/19- INR 1.18    Atypical Chest pain - resolved   - EKG NSR. No significant ST/T wave changes.   - negative  troponin     Thrombocytopenia   - [facility] on board  - monitor CBC daily     Hypomagnesemia/Hypophosphatemia - supplemented , monitor electrolytes daily     Macrocytic anemia- stable h/h, daily MVI , folate, vit b 12 , folate tx.    AG metabolic acidosis- normal LA level, resolved,  -d/c sodium bicarb. tx.     Alcohol abuse/ Suspected thiamine and folate deficiency  Acute  transaminitis due to alcohol use- LFTs trending down  - abd. us- liver cirrhosis, daily LFT's monitoring , Hepatology specialist  bertram.-completed, will f/up all rec. work up   - [facility] protocol  - thiamine, folic acid and multivitamin   - alcohol cessation counseling     Hx of DVT/PE -failed  coumadin tx with acute DVT on repeated venous doppler. Obtain hypercoagulable work up. f/up results. as per Hemeon[facility] - patient was started on therapeutic lovenox tx.    Hx of pseudogout - supportive care. Outpatient follow up.   HTN - c/w home med  GERD - PPI   BPH - c/w home med    DVT ppx: lovenox subc. tx.   GI ppx: PPI    #Progress Note Handoff: supplement electrolytes, daily LFT's, BMP, CBC, Mg, Phos, continue on therapeutic lovenox for acute DVT, f/up hypercoag. work up., PT as tolerated    Family discussion: yes, medical team Disposition: to be determined.     Total time spent to complete patient's bedside assessment, review medical chart, discuss medical plan of care with covering medical team was more than 50 minutes with >50% of time spent face to face with patient, discussion with patient/family and/or coordination of care.

## 2023-03-20 LAB
ANA TITR SER: NEGATIVE — SIGNIFICANT CHANGE UP
ANION GAP SERPL CALC-SCNC: 12 MMOL/L — SIGNIFICANT CHANGE UP (ref 7–14)
APTT BLD: 46.3 SEC — HIGH (ref 27–39.2)
B2 GLYCOPROT1 AB SER QL: NEGATIVE — SIGNIFICANT CHANGE UP
BASOPHILS # BLD AUTO: 0.03 K/UL — SIGNIFICANT CHANGE UP (ref 0–0.2)
BASOPHILS NFR BLD AUTO: 0.9 % — SIGNIFICANT CHANGE UP (ref 0–1)
BUN SERPL-MCNC: 11 MG/DL — SIGNIFICANT CHANGE UP (ref 10–20)
CALCIUM SERPL-MCNC: 8.9 MG/DL — SIGNIFICANT CHANGE UP (ref 8.4–10.5)
CARDIOLIPIN AB SER-ACNC: NEGATIVE — SIGNIFICANT CHANGE UP
CHLORIDE SERPL-SCNC: 99 MMOL/L — SIGNIFICANT CHANGE UP (ref 98–110)
CO2 SERPL-SCNC: 25 MMOL/L — SIGNIFICANT CHANGE UP (ref 17–32)
CREAT SERPL-MCNC: 1.2 MG/DL — SIGNIFICANT CHANGE UP (ref 0.7–1.5)
EGFR: 68 ML/MIN/1.73M2 — SIGNIFICANT CHANGE UP
EOSINOPHIL # BLD AUTO: 0.06 K/UL — SIGNIFICANT CHANGE UP (ref 0–0.7)
EOSINOPHIL NFR BLD AUTO: 1.7 % — SIGNIFICANT CHANGE UP (ref 0–8)
GLUCOSE SERPL-MCNC: 132 MG/DL — HIGH (ref 70–99)
HCT VFR BLD CALC: 31.6 % — LOW (ref 42–52)
HGB BLD-MCNC: 11 G/DL — LOW (ref 14–18)
IMM GRANULOCYTES NFR BLD AUTO: 0.6 % — HIGH (ref 0.1–0.3)
INR BLD: 1.18 RATIO — SIGNIFICANT CHANGE UP (ref 0.65–1.3)
LYMPHOCYTES # BLD AUTO: 0.69 K/UL — LOW (ref 1.2–3.4)
LYMPHOCYTES # BLD AUTO: 19.8 % — LOW (ref 20.5–51.1)
MAGNESIUM SERPL-MCNC: 1.7 MG/DL — LOW (ref 1.8–2.4)
MCHC RBC-ENTMCNC: 34.8 G/DL — SIGNIFICANT CHANGE UP (ref 32–37)
MCHC RBC-ENTMCNC: 35 PG — HIGH (ref 27–31)
MCV RBC AUTO: 100.6 FL — HIGH (ref 80–94)
MONOCYTES # BLD AUTO: 0.62 K/UL — HIGH (ref 0.1–0.6)
MONOCYTES NFR BLD AUTO: 17.8 % — HIGH (ref 1.7–9.3)
MRSA PCR RESULT.: NEGATIVE — SIGNIFICANT CHANGE UP
NEUTROPHILS # BLD AUTO: 2.06 K/UL — SIGNIFICANT CHANGE UP (ref 1.4–6.5)
NEUTROPHILS NFR BLD AUTO: 59.2 % — SIGNIFICANT CHANGE UP (ref 42.2–75.2)
NRBC # BLD: 0 /100 WBCS — SIGNIFICANT CHANGE UP (ref 0–0)
PLATELET # BLD AUTO: 103 K/UL — LOW (ref 130–400)
POTASSIUM SERPL-MCNC: 3.6 MMOL/L — SIGNIFICANT CHANGE UP (ref 3.5–5)
POTASSIUM SERPL-SCNC: 3.6 MMOL/L — SIGNIFICANT CHANGE UP (ref 3.5–5)
PROTHROM AB SERPL-ACNC: 13.5 SEC — HIGH (ref 9.95–12.87)
RBC # BLD: 3.14 M/UL — LOW (ref 4.7–6.1)
RBC # FLD: 15.6 % — HIGH (ref 11.5–14.5)
SODIUM SERPL-SCNC: 136 MMOL/L — SIGNIFICANT CHANGE UP (ref 135–146)
WBC # BLD: 3.48 K/UL — LOW (ref 4.8–10.8)
WBC # FLD AUTO: 3.48 K/UL — LOW (ref 4.8–10.8)

## 2023-03-20 PROCEDURE — 71250 CT THORAX DX C-: CPT | Mod: 26

## 2023-03-20 PROCEDURE — 99232 SBSQ HOSP IP/OBS MODERATE 35: CPT

## 2023-03-20 RX ORDER — MAGNESIUM SULFATE 500 MG/ML
2 VIAL (ML) INJECTION
Refills: 0 | Status: COMPLETED | OUTPATIENT
Start: 2023-03-20 | End: 2023-03-20

## 2023-03-20 RX ORDER — IBUPROFEN 200 MG
400 TABLET ORAL ONCE
Refills: 0 | Status: COMPLETED | OUTPATIENT
Start: 2023-03-20 | End: 2023-03-20

## 2023-03-20 RX ORDER — IBUPROFEN 200 MG
200 TABLET ORAL ONCE
Refills: 0 | Status: DISCONTINUED | OUTPATIENT
Start: 2023-03-20 | End: 2023-03-20

## 2023-03-20 RX ADMIN — LORATADINE 10 MILLIGRAM(S): 10 TABLET ORAL at 11:12

## 2023-03-20 RX ADMIN — Medication 25 GRAM(S): at 12:41

## 2023-03-20 RX ADMIN — Medication 25 MILLIGRAM(S): at 05:22

## 2023-03-20 RX ADMIN — Medication 25 GRAM(S): at 16:02

## 2023-03-20 RX ADMIN — Medication 1 TABLET(S): at 11:12

## 2023-03-20 RX ADMIN — GABAPENTIN 200 MILLIGRAM(S): 400 CAPSULE ORAL at 05:22

## 2023-03-20 RX ADMIN — ENOXAPARIN SODIUM 120 MILLIGRAM(S): 100 INJECTION SUBCUTANEOUS at 05:22

## 2023-03-20 RX ADMIN — NALTREXONE HYDROCHLORIDE 50 MILLIGRAM(S): 50 TABLET, FILM COATED ORAL at 11:13

## 2023-03-20 RX ADMIN — Medication 1 MILLIGRAM(S): at 11:12

## 2023-03-20 RX ADMIN — GABAPENTIN 200 MILLIGRAM(S): 400 CAPSULE ORAL at 17:13

## 2023-03-20 RX ADMIN — Medication 100 MILLIGRAM(S): at 11:12

## 2023-03-20 RX ADMIN — Medication 0.5 MILLIGRAM(S): at 17:13

## 2023-03-20 RX ADMIN — PANTOPRAZOLE SODIUM 40 MILLIGRAM(S): 20 TABLET, DELAYED RELEASE ORAL at 05:22

## 2023-03-20 RX ADMIN — Medication 400 MILLIGRAM(S): at 06:39

## 2023-03-20 RX ADMIN — ENOXAPARIN SODIUM 120 MILLIGRAM(S): 100 INJECTION SUBCUTANEOUS at 17:14

## 2023-03-20 RX ADMIN — TAMSULOSIN HYDROCHLORIDE 0.4 MILLIGRAM(S): 0.4 CAPSULE ORAL at 21:32

## 2023-03-20 NOTE — PROGRESS NOTE ADULT - ATTENDING COMMENTS
Patient is a 61 y/o Male with hx of PE/DVT (on coumadin), Alcohol abuse (drink a pint of vodka daily), HTN, GERD, BPH and Pseudogout presents to ED for epistaxis x 4 days due to coumadin toxicity with high INR levels.    Epistaxis likely 2/2 supratherapeutic INR due to Coumadin therapy.  -  coumadin was held x 3 days , INR was corrected post Vitamin K tx, as per [facility] - patient was started  on therapeutic lovenox tx.   - repeated INR - subtherapeutic    Atypical Chest pain - resolved   - EKG NSR. No significant ST/T wave changes.   - negative  troponin     Thrombocytopenia   - [facility] on board  - monitor CBC daily     Hypomagnesemia/Hypophosphatemia - supplemented , monitor electrolytes daily     Macrocytic anemia- stable h/h, daily MVI , folate, vit b 12 , folate tx.    AG metabolic acidosis- normal LA level, resolved,  -d/c sodium bicarb. tx.     Alcohol abuse/ Suspected thiamine and folate deficiency  Acute  transaminitis due to alcohol use- LFTs trending down  - abd. us- liver cirrhosis, daily LFT's monitoring , Hepatology specialist  bertram.-completed, will f/up all rec. work up   - [facility] protocol  - thiamine, folic acid and multivitamin   - alcohol cessation counseling     Hx of DVT/PE -failed  coumadin tx with acute DVT on repeated venous doppler. Obtain hypercoagulable work up. f/up results. as per [facility] - patient was started on therapeutic lovenox tx.    Hx of pseudogout - supportive care. Outpatient follow up.   HTN - c/w home med  GERD - PPI   BPH - c/w home med    DVT ppx: lovenox subc. tx.   GI ppx: PPI    #Progress Note Handoff: supplement electrolytes, daily LFT's, BMP, CBC, Mg, Phos, continue on therapeutic lovenox for acute DVT, f/up hypercoag. work up., PT as tolerated    Family discussion: yes, medical team Disposition: ?STR once medically stable     Total time spent to complete patient's bedside assessment, review medical chart, discuss medical plan of care with covering medical team was more than 35 minutes with >50% of time spent face to face with patient, discussion with patient/family and/or coordination of care.

## 2023-03-20 NOTE — PROGRESS NOTE ADULT - SUBJECTIVE AND OBJECTIVE BOX
CAT ALSTON 62y Male  MRN#: [id number]   Hospital Day: 5d    SUBJECTIVE  Patient is a 62y old Male who presents with a chief complaint of epistaxis (19 Mar 2023 14:23)  Currently admitted to medicine with the primary diagnosis of Epistaxis      INTERVAL HPI AND OVERNIGHT EVENTS:  Patient was examined and seen at bedside. This morning he is resting comfortably in bed and reports no issues or overnight events.    OBJECTIVE  PAST MEDICAL & SURGICAL HISTORY  Alcohol dependence    HTN (hypertension)    Hard of hearing    Seasonal allergies    BPH (benign prostatic hyperplasia)    GERD (gastroesophageal reflux disease)    Pseudogout    No significant past surgical history      ALLERGIES:  Beef (Hives)  dairy products (Hives)  No Known Drug Allergies  shellfish (Hives)    MEDICATIONS:  STANDING MEDICATIONS  enoxaparin Injectable 120 milliGRAM(s) SubCutaneous every 12 hours  folic acid 1 milliGRAM(s) Oral daily  gabapentin 200 milliGRAM(s) Oral two times a day  loratadine 10 milliGRAM(s) Oral daily  LORazepam     Tablet 0.5 milliGRAM(s) Oral every 12 hours  LORazepam     Tablet   Oral   magnesium sulfate  IVPB 2 Gram(s) IV Intermittent once  metoprolol succinate ER 25 milliGRAM(s) Oral daily  multivitamin 1 Tablet(s) Oral daily  naltrexone 50 milliGRAM(s) Oral daily  pantoprazole    Tablet 40 milliGRAM(s) Oral before breakfast  tamsulosin 0.4 milliGRAM(s) Oral at bedtime  thiamine 100 milliGRAM(s) Oral daily    PRN MEDICATIONS  acetaminophen     Tablet .. 650 milliGRAM(s) Oral every 6 hours PRN  LORazepam     Tablet 2 milliGRAM(s) Oral every 2 hours PRN      VITAL SIGNS: Last 24 Hours  T(C): 37.9 (20 Mar 2023 07:42), Max: 38.7 (19 Mar 2023 18:14)  T(F): 100.3 (20 Mar 2023 07:42), Max: 101.6 (19 Mar 2023 18:14)  HR: 94 (20 Mar 2023 05:00) (94 - 99)  BP: 125/62 (20 Mar 2023 05:00) (113/77 - 125/62)  BP(mean): --  RR: 18 (19 Mar 2023 11:57) (18 - 18)  SpO2: 97% (20 Mar 2023 07:42) (97% - 98%)    LABS:                        11.0   3.48  )-----------( 103      ( 20 Mar 2023 08:52 )             31.6     03-20    136  |  99  |  11  ----------------------------<  132<H>  3.6   |  25  |  1.2    Ca    8.9      20 Mar 2023 08:52  Phos  3.4     03-19  Mg     1.7     03-20      PT/INR - ( 20 Mar 2023 08:52 )   PT: 13.50 sec;   INR: 1.18 ratio         PTT - ( 20 Mar 2023 08:52 )  PTT:46.3 sec          Culture - Blood (collected 18 Mar 2023 20:42)  Source: .Blood Blood-Peripheral  Preliminary Report (20 Mar 2023 06:01):    No growth to date.          RADIOLOGY:      PHYSICAL EXAM:  CONSTITUTIONAL: No acute distress, AAOx3  HEAD: Atraumatic, normocephalic  EYES: EOM intact, PERRLA, conjunctiva and sclera clear  ENT: moist mucous membranes  PULMONARY: Clear to auscultation bilaterally  CARDIOVASCULAR: Regular rate and rhythm  GASTROINTESTINAL: Soft, non-tender, non-distended; bowel sounds present  MUSCULOSKELETAL: no edema  NEUROLOGY: non-focal  SKIN: warm and dry    ASSESSMENT and PLAN    61 yo M with hx of PE/DVT (on coumadin), Alcohol abuse (drink a pint of vodka daily), HTN, GERD, BPH and Pseudogout presents to ED for epistaxis x 4 days.     #Epistaxis likely 2/2 supratherapeutic INR   Resolved  - INR 1.38 3/20  - s/p vit K 5 mg x1 given in ED.   - On Therapeutic Lovenox, Coumadin on hold.   - Pt denies taking more coumadin than prescribed.    #Acute DVT in L Popliteal vein  - On therapeutic lovenox   - Pt diagnosed w/ acute b/l DVT's on 12/8/22. Subsequently started on Eliquis w/ good adherence. However, readmitted on 12/20/22 after feeling unwell and found to have extensive acute bilateral PE - unclear if unprovoked, unclear if failure of Eliquis, was switched to Lovenox per Heme-Onc and bridged to coumadin as outpatient.  - US showing chronic R leg DVT and acute L leg DVT  - Heme-onc following    #Fever  - 101.6 T max on 3/19  - RVP, Sputum Cx, MRSA swab, UA, UCx sent f/u  - BCx negatibe on 3/18 if fevers persist will re-send   - Non-Contrast CT chest ordered to rule out potential malignancy as a fever source    #Atypical Chest pain   - Resolved  - EKG NSR. No significant ST/T wave changes.   - Troponin (-)  - Low suspicion for cardiac etiology of pain    #Thrombocytopenia   - Stable   - check peripheral smear  - US abdomen showing Nodular contour consistent with cirrhosis  - Hepatology consulted. Outpatient f/u   - hepatitis panel (-) and HIV (-)   - monitor CBC     #Alcohol abuse/ Suspected thiamine and folate deficiency  - [facility] protocol  - c/w thiamine, folic acid and multivitamin   - CATCH team.     #Hx of pseudogout  - Not on a flare  - supportive care. Outpatient follow up.     #HTN   - c/w home med    #GERD   - PPI     #BPH   - c/w home med    DVT ppx: Lovenox  GI ppx: PPI  Diet: DASH diet  Activity: as tolerated.

## 2023-03-21 LAB
ALBUMIN SERPL ELPH-MCNC: 3.4 G/DL — LOW (ref 3.5–5.2)
ALP SERPL-CCNC: 125 U/L — HIGH (ref 30–115)
ALT FLD-CCNC: 289 U/L — HIGH (ref 0–41)
ANION GAP SERPL CALC-SCNC: 14 MMOL/L — SIGNIFICANT CHANGE UP (ref 7–14)
APPEARANCE UR: CLEAR — SIGNIFICANT CHANGE UP
AST SERPL-CCNC: 499 U/L — HIGH (ref 0–41)
BACTERIA # UR AUTO: NEGATIVE — SIGNIFICANT CHANGE UP
BASOPHILS # BLD AUTO: 0.05 K/UL — SIGNIFICANT CHANGE UP (ref 0–0.2)
BASOPHILS NFR BLD AUTO: 1.5 % — HIGH (ref 0–1)
BILIRUB SERPL-MCNC: 3.9 MG/DL — HIGH (ref 0.2–1.2)
BILIRUB UR-MCNC: ABNORMAL
BUN SERPL-MCNC: 13 MG/DL — SIGNIFICANT CHANGE UP (ref 10–20)
CALCIUM SERPL-MCNC: 9 MG/DL — SIGNIFICANT CHANGE UP (ref 8.4–10.5)
CHLORIDE SERPL-SCNC: 99 MMOL/L — SIGNIFICANT CHANGE UP (ref 98–110)
CO2 SERPL-SCNC: 23 MMOL/L — SIGNIFICANT CHANGE UP (ref 17–32)
COLOR SPEC: ABNORMAL
CREAT SERPL-MCNC: 1 MG/DL — SIGNIFICANT CHANGE UP (ref 0.7–1.5)
DIFF PNL FLD: ABNORMAL
DSDNA AB SER-ACNC: <12 IU/ML — SIGNIFICANT CHANGE UP
EGFR: 85 ML/MIN/1.73M2 — SIGNIFICANT CHANGE UP
EOSINOPHIL # BLD AUTO: 0.17 K/UL — SIGNIFICANT CHANGE UP (ref 0–0.7)
EOSINOPHIL NFR BLD AUTO: 5.2 % — SIGNIFICANT CHANGE UP (ref 0–8)
EPI CELLS # UR: 1 /HPF — SIGNIFICANT CHANGE UP (ref 0–5)
GLUCOSE SERPL-MCNC: 117 MG/DL — HIGH (ref 70–99)
GLUCOSE UR QL: SIGNIFICANT CHANGE UP
HCT VFR BLD CALC: 31.4 % — LOW (ref 42–52)
HGB BLD-MCNC: 10.7 G/DL — LOW (ref 14–18)
HYALINE CASTS # UR AUTO: 0 /LPF — SIGNIFICANT CHANGE UP (ref 0–7)
IMM GRANULOCYTES NFR BLD AUTO: 0.9 % — HIGH (ref 0.1–0.3)
INR BLD: 1.16 RATIO — SIGNIFICANT CHANGE UP (ref 0.65–1.3)
KETONES UR-MCNC: NEGATIVE — SIGNIFICANT CHANGE UP
LEUKOCYTE ESTERASE UR-ACNC: NEGATIVE — SIGNIFICANT CHANGE UP
LYMPHOCYTES # BLD AUTO: 1.03 K/UL — LOW (ref 1.2–3.4)
LYMPHOCYTES # BLD AUTO: 31.7 % — SIGNIFICANT CHANGE UP (ref 20.5–51.1)
MAGNESIUM SERPL-MCNC: 2.3 MG/DL — SIGNIFICANT CHANGE UP (ref 1.8–2.4)
MCHC RBC-ENTMCNC: 34.1 G/DL — SIGNIFICANT CHANGE UP (ref 32–37)
MCHC RBC-ENTMCNC: 34.6 PG — HIGH (ref 27–31)
MCV RBC AUTO: 101.6 FL — HIGH (ref 80–94)
MONOCYTES # BLD AUTO: 0.61 K/UL — HIGH (ref 0.1–0.6)
MONOCYTES NFR BLD AUTO: 18.8 % — HIGH (ref 1.7–9.3)
NEUTROPHILS # BLD AUTO: 1.36 K/UL — LOW (ref 1.4–6.5)
NEUTROPHILS NFR BLD AUTO: 41.9 % — LOW (ref 42.2–75.2)
NITRITE UR-MCNC: NEGATIVE — SIGNIFICANT CHANGE UP
NRBC # BLD: 0 /100 WBCS — SIGNIFICANT CHANGE UP (ref 0–0)
PH UR: 5.5 — SIGNIFICANT CHANGE UP (ref 5–8)
PLATELET # BLD AUTO: 139 K/UL — SIGNIFICANT CHANGE UP (ref 130–400)
POTASSIUM SERPL-MCNC: 3.8 MMOL/L — SIGNIFICANT CHANGE UP (ref 3.5–5)
POTASSIUM SERPL-SCNC: 3.8 MMOL/L — SIGNIFICANT CHANGE UP (ref 3.5–5)
PROT SERPL-MCNC: 5.9 G/DL — LOW (ref 6–8)
PROT UR-MCNC: ABNORMAL
PROTHROM AB SERPL-ACNC: 13.3 SEC — HIGH (ref 9.95–12.87)
RAPID RVP RESULT: SIGNIFICANT CHANGE UP
RBC # BLD: 3.09 M/UL — LOW (ref 4.7–6.1)
RBC # FLD: 15.7 % — HIGH (ref 11.5–14.5)
RBC CASTS # UR COMP ASSIST: 6 /HPF — HIGH (ref 0–4)
SARS-COV-2 RNA SPEC QL NAA+PROBE: SIGNIFICANT CHANGE UP
SODIUM SERPL-SCNC: 136 MMOL/L — SIGNIFICANT CHANGE UP (ref 135–146)
SP GR SPEC: 1.03 — SIGNIFICANT CHANGE UP (ref 1.01–1.03)
UROBILINOGEN FLD QL: ABNORMAL
WBC # BLD: 3.25 K/UL — LOW (ref 4.8–10.8)
WBC # FLD AUTO: 3.25 K/UL — LOW (ref 4.8–10.8)
WBC UR QL: 2 /HPF — SIGNIFICANT CHANGE UP (ref 0–5)

## 2023-03-21 PROCEDURE — 76705 ECHO EXAM OF ABDOMEN: CPT | Mod: 26

## 2023-03-21 PROCEDURE — 99232 SBSQ HOSP IP/OBS MODERATE 35: CPT

## 2023-03-21 RX ADMIN — Medication 100 MILLIGRAM(S): at 12:11

## 2023-03-21 RX ADMIN — Medication 1 TABLET(S): at 12:07

## 2023-03-21 RX ADMIN — Medication 650 MILLIGRAM(S): at 23:00

## 2023-03-21 RX ADMIN — Medication 1 MILLIGRAM(S): at 12:08

## 2023-03-21 RX ADMIN — TAMSULOSIN HYDROCHLORIDE 0.4 MILLIGRAM(S): 0.4 CAPSULE ORAL at 22:10

## 2023-03-21 RX ADMIN — Medication 0.5 MILLIGRAM(S): at 05:39

## 2023-03-21 RX ADMIN — Medication 650 MILLIGRAM(S): at 22:10

## 2023-03-21 RX ADMIN — PANTOPRAZOLE SODIUM 40 MILLIGRAM(S): 20 TABLET, DELAYED RELEASE ORAL at 05:38

## 2023-03-21 RX ADMIN — LORATADINE 10 MILLIGRAM(S): 10 TABLET ORAL at 12:08

## 2023-03-21 RX ADMIN — Medication 25 MILLIGRAM(S): at 05:38

## 2023-03-21 RX ADMIN — GABAPENTIN 200 MILLIGRAM(S): 400 CAPSULE ORAL at 05:38

## 2023-03-21 RX ADMIN — ENOXAPARIN SODIUM 120 MILLIGRAM(S): 100 INJECTION SUBCUTANEOUS at 05:38

## 2023-03-21 RX ADMIN — Medication 0.5 MILLIGRAM(S): at 18:41

## 2023-03-21 RX ADMIN — GABAPENTIN 200 MILLIGRAM(S): 400 CAPSULE ORAL at 18:38

## 2023-03-21 RX ADMIN — ENOXAPARIN SODIUM 120 MILLIGRAM(S): 100 INJECTION SUBCUTANEOUS at 18:38

## 2023-03-21 RX ADMIN — NALTREXONE HYDROCHLORIDE 50 MILLIGRAM(S): 50 TABLET, FILM COATED ORAL at 12:08

## 2023-03-21 NOTE — PROGRESS NOTE ADULT - ATTENDING COMMENTS
Patient is a 63 y/o Male with hx of PE/DVT (on coumadin), Alcohol abuse (drink a pint of vodka daily), HTN, GERD, BPH and Pseudogout presents to ED for epistaxis x 4 days due to coumadin toxicity with high INR levels.    Epistaxis likely 2/2 supratherapeutic INR due to Coumadin therapy.  -  coumadin was held x 3 days , INR was corrected post Vitamin K tx, as per [facility] - patient was started  on therapeutic lovenox tx.   - repeated INR - subtherapeutic    Atypical Chest pain - resolved   - EKG NSR. No significant ST/T wave changes.   - negative  troponin     Thrombocytopenia   - [facility] on board  - monitor CBC daily     Hypomagnesemia/Hypophosphatemia - supplemented , monitor electrolytes daily     Macrocytic anemia- stable h/h, daily MVI , folate, vit b 12 , folate tx.    AG metabolic acidosis- normal LA level, resolved,  -d/c sodium bicarb. tx.     Alcohol abuse/ Suspected thiamine and folate deficiency  Acute  transaminitis due to alcohol use- LFTs trending up - reconsulted GI, repeating US liver- f/up results   - abd. us- liver cirrhosis, daily LFT's monitoring , Hepatology specialist  bertram.-completed, will f/up all rec. work up   - [facility] protocol  - thiamine, folic acid and multivitamin   - alcohol cessation counseling     Hx of DVT/PE -failed  coumadin tx with acute DVT on repeated venous doppler. Obtain hypercoagulable work up. f/up results. as per [facility] - patient was started on therapeutic lovenox tx.    Hx of pseudogout - supportive care. Outpatient follow up.   HTN - c/w home med  GERD - PPI   BPH - c/w home med    DVT ppx: lovenox subc. tx.   GI ppx: PPI    #Progress Note Handoff: with worsening LFT's, f/up repeated US abdomen, f/up GI< supplement electrolytes, daily LFT's, BMP, CBC, Mg, Phos, continue on therapeutic lovenox for acute DVT, f/up hypercoag. work up., PT as tolerated    Family discussion: yes, medical team Disposition: STR once medically stable     Total time spent to complete patient's bedside assessment, review medical chart, discuss medical plan of care with covering medical team was more than 35 minutes with >50% of time spent face to face with patient, discussion with patient/family and/or coordination of care.

## 2023-03-21 NOTE — PROGRESS NOTE ADULT - SUBJECTIVE AND OBJECTIVE BOX
CAT ALSTON 62y Male  MRN#: [id number]   Hospital Day: 6d    SUBJECTIVE  Patient is a 62y old Male who presents with a chief complaint of epistaxis (20 Mar 2023 11:10)  Currently admitted to medicine with the primary diagnosis of Epistaxis      INTERVAL HPI AND OVERNIGHT EVENTS:  Patient was examined and seen at bedside. This morning he is resting comfortably in bed and reports no issues or overnight events.    OBJECTIVE  PAST MEDICAL & SURGICAL HISTORY  Alcohol dependence    HTN (hypertension)    Hard of hearing    Seasonal allergies    BPH (benign prostatic hyperplasia)    GERD (gastroesophageal reflux disease)    Pseudogout    No significant past surgical history      ALLERGIES:  Beef (Hives)  dairy products (Hives)  No Known Drug Allergies  shellfish (Hives)    MEDICATIONS:  STANDING MEDICATIONS  enoxaparin Injectable 120 milliGRAM(s) SubCutaneous every 12 hours  folic acid 1 milliGRAM(s) Oral daily  gabapentin 200 milliGRAM(s) Oral two times a day  loratadine 10 milliGRAM(s) Oral daily  LORazepam     Tablet 0.5 milliGRAM(s) Oral every 12 hours  LORazepam     Tablet   Oral   magnesium sulfate  IVPB 2 Gram(s) IV Intermittent once  metoprolol succinate ER 25 milliGRAM(s) Oral daily  multivitamin 1 Tablet(s) Oral daily  naltrexone 50 milliGRAM(s) Oral daily  pantoprazole    Tablet 40 milliGRAM(s) Oral before breakfast  tamsulosin 0.4 milliGRAM(s) Oral at bedtime  thiamine 100 milliGRAM(s) Oral daily    PRN MEDICATIONS  acetaminophen     Tablet .. 650 milliGRAM(s) Oral every 6 hours PRN  LORazepam     Tablet 2 milliGRAM(s) Oral every 2 hours PRN      VITAL SIGNS: Last 24 Hours  T(C): 36.9 (21 Mar 2023 12:02), Max: 37.2 (21 Mar 2023 04:50)  T(F): 98.4 (21 Mar 2023 12:02), Max: 98.9 (21 Mar 2023 04:50)  HR: 82 (21 Mar 2023 12:02) (72 - 121)  BP: 118/78 (21 Mar 2023 12:02) (118/78 - 140/67)  BP(mean): --  RR: 18 (21 Mar 2023 12:02) (18 - 18)  SpO2: 96% (21 Mar 2023 12:02) (95% - 99%)    LABS:                        10.7   3.25  )-----------( 139      ( 21 Mar 2023 07:08 )             31.4     03-21    136  |  99  |  13  ----------------------------<  117<H>  3.8   |  23  |  1.0    Ca    9.0      21 Mar 2023 07:08  Mg     2.3     03-21    TPro  5.9<L>  /  Alb  3.4<L>  /  TBili  3.9<H>  /  DBili  x   /  AST  499<H>  /  ALT  289<H>  /  AlkPhos  125<H>  03-21    PT/INR - ( 21 Mar 2023 07:08 )   PT: 13.30 sec;   INR: 1.16 ratio         PTT - ( 20 Mar 2023 08:52 )  PTT:46.3 sec          Culture - Blood (collected 18 Mar 2023 20:42)  Source: .Blood Blood-Peripheral  Preliminary Report (20 Mar 2023 06:01):    No growth to date.          RADIOLOGY:      PHYSICAL EXAM:  CONSTITUTIONAL: No acute distress, AAOx3  HEAD: Atraumatic, normocephalic  EYES: EOM intact, PERRLA, conjunctiva and sclera clear  ENT: moist mucous membranes  PULMONARY: Clear to auscultation bilaterally  CARDIOVASCULAR: Regular rate and rhythm  GASTROINTESTINAL: Soft, non-tender, non-distended; bowel sounds present  MUSCULOSKELETAL: no edema  NEUROLOGY: non-focal  SKIN: warm and dry      ASSESSMENT and PLAN  63 yo M with hx of PE/DVT (on coumadin), Alcohol abuse (drink a pint of vodka daily), HTN, GERD, BPH and Pseudogout presents to ED for epistaxis x 4 days.       #Transaminitis  -    #Epistaxis likely 2/2 supratherapeutic INR  - Resolved  - INR 1.38 3/20  - s/p vit K 5 mg x1 given in ED.   - On Therapeutic Lovenox, Coumadin on hold.   - Pt denies taking more coumadin than prescribed.    #Acute DVT in L Popliteal vein  - On therapeutic lovenox   - Pt diagnosed w/ acute b/l DVT's on 12/8/22. Subsequently started on Eliquis w/ good adherence. However, readmitted on 12/20/22 after feeling unwell and found to have extensive acute bilateral PE - unclear if unprovoked, unclear if failure of Eliquis, was switched to Lovenox per Heme-Onc and bridged to coumadin as outpatient.  - US showing chronic R leg DVT and acute L leg DVT  - Heme-onc following    #Fever  - 101.6 T max on 3/19  - RVP (-), Sputum Cx, MRSA swab (-), UA, UCx sent f/u  - BCx negatibe on 3/18 if fevers persist will re-send   - Non-Contrast CT chest ordered to rule out potential malignancy as a fever source    #Atypical Chest pain   - Resolved  - EKG NSR. No significant ST/T wave changes.   - Troponin (-)  - Low suspicion for cardiac etiology of pain    #Thrombocytopenia   - Stable   - check peripheral smear  - US abdomen showing Nodular contour consistent with cirrhosis  - Hepatology consulted. Outpatient f/u   - hepatitis panel (-) and HIV (-)   - monitor CBC     #Alcohol abuse/ Suspected thiamine and folate deficiency  - [facility] protocol  - c/w thiamine, folic acid and multivitamin   - CATCH team.     #Hx of pseudogout  - Not on a flare  - supportive care. Outpatient follow up.     #HTN   - c/w home med    #GERD   - PPI     #BPH   - c/w home med    DVT ppx: Lovenox  GI ppx: PPI  Diet: DASH diet  Activity: as tolerated.             CAT ALSTON 62y Male  MRN#: [id number]   Hospital Day: 6d    SUBJECTIVE  Patient is a 62y old Male who presents with a chief complaint of epistaxis (20 Mar 2023 11:10)  Currently admitted to medicine with the primary diagnosis of Epistaxis      INTERVAL HPI AND OVERNIGHT EVENTS:  Patient was examined and seen at bedside. This morning he is resting comfortably in bed and reports no issues or overnight events.    OBJECTIVE  PAST MEDICAL & SURGICAL HISTORY  Alcohol dependence    HTN (hypertension)    Hard of hearing    Seasonal allergies    BPH (benign prostatic hyperplasia)    GERD (gastroesophageal reflux disease)    Pseudogout    No significant past surgical history      ALLERGIES:  Beef (Hives)  dairy products (Hives)  No Known Drug Allergies  shellfish (Hives)    MEDICATIONS:  STANDING MEDICATIONS  enoxaparin Injectable 120 milliGRAM(s) SubCutaneous every 12 hours  folic acid 1 milliGRAM(s) Oral daily  gabapentin 200 milliGRAM(s) Oral two times a day  loratadine 10 milliGRAM(s) Oral daily  LORazepam     Tablet 0.5 milliGRAM(s) Oral every 12 hours  LORazepam     Tablet   Oral   magnesium sulfate  IVPB 2 Gram(s) IV Intermittent once  metoprolol succinate ER 25 milliGRAM(s) Oral daily  multivitamin 1 Tablet(s) Oral daily  naltrexone 50 milliGRAM(s) Oral daily  pantoprazole    Tablet 40 milliGRAM(s) Oral before breakfast  tamsulosin 0.4 milliGRAM(s) Oral at bedtime  thiamine 100 milliGRAM(s) Oral daily    PRN MEDICATIONS  acetaminophen     Tablet .. 650 milliGRAM(s) Oral every 6 hours PRN  LORazepam     Tablet 2 milliGRAM(s) Oral every 2 hours PRN      VITAL SIGNS: Last 24 Hours  T(C): 36.9 (21 Mar 2023 12:02), Max: 37.2 (21 Mar 2023 04:50)  T(F): 98.4 (21 Mar 2023 12:02), Max: 98.9 (21 Mar 2023 04:50)  HR: 82 (21 Mar 2023 12:02) (72 - 121)  BP: 118/78 (21 Mar 2023 12:02) (118/78 - 140/67)  BP(mean): --  RR: 18 (21 Mar 2023 12:02) (18 - 18)  SpO2: 96% (21 Mar 2023 12:02) (95% - 99%)    LABS:                        10.7   3.25  )-----------( 139      ( 21 Mar 2023 07:08 )             31.4     03-21    136  |  99  |  13  ----------------------------<  117<H>  3.8   |  23  |  1.0    Ca    9.0      21 Mar 2023 07:08  Mg     2.3     03-21    TPro  5.9<L>  /  Alb  3.4<L>  /  TBili  3.9<H>  /  DBili  x   /  AST  499<H>  /  ALT  289<H>  /  AlkPhos  125<H>  03-21    PT/INR - ( 21 Mar 2023 07:08 )   PT: 13.30 sec;   INR: 1.16 ratio         PTT - ( 20 Mar 2023 08:52 )  PTT:46.3 sec          Culture - Blood (collected 18 Mar 2023 20:42)  Source: .Blood Blood-Peripheral  Preliminary Report (20 Mar 2023 06:01):    No growth to date.          RADIOLOGY:      PHYSICAL EXAM:  CONSTITUTIONAL: No acute distress, AAOx3  HEAD: Atraumatic, normocephalic  EYES: EOM intact, PERRLA, conjunctiva and sclera clear  ENT: moist mucous membranes  PULMONARY: Clear to auscultation bilaterally  CARDIOVASCULAR: Regular rate and rhythm  GASTROINTESTINAL: Soft, non-tender, non-distended; bowel sounds present  MUSCULOSKELETAL: no edema  NEUROLOGY: non-focal  SKIN: warm and dry      ASSESSMENT and PLAN  63 yo M with hx of PE/DVT (on coumadin), Alcohol abuse (drink a pint of vodka daily), HTN, GERD, BPH and Pseudogout presents to ED for epistaxis x 4 days.       #Transaminitis  - ALT 68 --> 289 ;  --> 499 ; Blilirubin 2.5 --> 3.9 ; Alk phos 86 -->125  - Urine dark. Patient complained of vague myalgias and difficulty ambulating  - Unclear reason for the worsening. No hepatotoxic medications  - Liver ultrasound does without new findings beyond known cirrhosis   - GI consulted  - Hepatology following. Hepatology workup negative so far.     #Epistaxis likely 2/2 supratherapeutic INR  - Resolved  - INR 1.38 3/20  - s/p vit K 5 mg x1 given in ED.   - On Therapeutic Lovenox, Coumadin on hold.   - Pt denies taking more coumadin than prescribed.    #Acute DVT in L Popliteal vein  - On therapeutic lovenox   - Pt diagnosed w/ acute b/l DVT's on 12/8/22. Subsequently started on Eliquis w/ good adherence. However, readmitted on 12/20/22 after feeling unwell and found to have extensive acute bilateral PE - unclear if unprovoked, unclear if failure of Eliquis, was switched to Lovenox per Heme-Onc and bridged to coumadin as outpatient.  - US showing chronic R leg DVT and acute L leg DVT  - Heme-onc following    #Fever  - 101.6 T max on 3/19  - RVP (-), Sputum Cx, MRSA swab (-), UA, UCx sent f/u  - BCx negatibe on 3/18 if fevers persist will re-send   - Non-Contrast CT chest unremarkable    #Atypical Chest pain   - Resolved  - EKG NSR. No significant ST/T wave changes.   - Troponin (-)  - Low suspicion for cardiac etiology of pain    #Thrombocytopenia   - Stable   - check peripheral smear  - US abdomen showing Nodular contour consistent with cirrhosis  - Hepatology consulted. Outpatient f/u   - hepatitis panel (-) and HIV (-)   - monitor CBC     #Alcohol abuse/ Suspected thiamine and folate deficiency  - [facility] protocol  - c/w thiamine, folic acid and multivitamin   - CATCH team.     #Hx of pseudogout  - Not on a flare  - supportive care. Outpatient follow up.     #HTN   - c/w home med    #GERD   - PPI     #BPH   - c/w home med    DVT ppx: Lovenox  GI ppx: PPI  Diet: DASH diet  Activity: as tolerated.

## 2023-03-22 LAB
ALBUMIN SERPL ELPH-MCNC: 3.8 G/DL — SIGNIFICANT CHANGE UP (ref 3.5–5.2)
ALP SERPL-CCNC: 121 U/L — HIGH (ref 30–115)
ALT FLD-CCNC: 198 U/L — HIGH (ref 0–41)
ANION GAP SERPL CALC-SCNC: 11 MMOL/L — SIGNIFICANT CHANGE UP (ref 7–14)
AST SERPL-CCNC: 160 U/L — HIGH (ref 0–41)
BASOPHILS # BLD AUTO: 0.09 K/UL — SIGNIFICANT CHANGE UP (ref 0–0.2)
BASOPHILS NFR BLD AUTO: 1.9 % — HIGH (ref 0–1)
BILIRUB SERPL-MCNC: 2.5 MG/DL — HIGH (ref 0.2–1.2)
BUN SERPL-MCNC: 15 MG/DL — SIGNIFICANT CHANGE UP (ref 10–20)
CALCIUM SERPL-MCNC: 9.1 MG/DL — SIGNIFICANT CHANGE UP (ref 8.4–10.5)
CHLORIDE SERPL-SCNC: 94 MMOL/L — LOW (ref 98–110)
CO2 SERPL-SCNC: 25 MMOL/L — SIGNIFICANT CHANGE UP (ref 17–32)
CREAT SERPL-MCNC: 1.1 MG/DL — SIGNIFICANT CHANGE UP (ref 0.7–1.5)
CRP SERPL-MCNC: 95.9 MG/L — HIGH
EGFR: 75 ML/MIN/1.73M2 — SIGNIFICANT CHANGE UP
EOSINOPHIL # BLD AUTO: 0.17 K/UL — SIGNIFICANT CHANGE UP (ref 0–0.7)
EOSINOPHIL NFR BLD AUTO: 3.5 % — SIGNIFICANT CHANGE UP (ref 0–8)
ERYTHROCYTE [SEDIMENTATION RATE] IN BLOOD: 124 MM/HR — HIGH (ref 0–10)
GLUCOSE SERPL-MCNC: 113 MG/DL — HIGH (ref 70–99)
HCT VFR BLD CALC: 30.2 % — LOW (ref 42–52)
HCV AB S/CO SERPL IA: 0.04 COI — SIGNIFICANT CHANGE UP
HCV AB SERPL-IMP: SIGNIFICANT CHANGE UP
HGB BLD-MCNC: 10.3 G/DL — LOW (ref 14–18)
IMM GRANULOCYTES NFR BLD AUTO: 2.1 % — HIGH (ref 0.1–0.3)
LYMPHOCYTES # BLD AUTO: 1.23 K/UL — SIGNIFICANT CHANGE UP (ref 1.2–3.4)
LYMPHOCYTES # BLD AUTO: 25.6 % — SIGNIFICANT CHANGE UP (ref 20.5–51.1)
MCHC RBC-ENTMCNC: 33.9 PG — HIGH (ref 27–31)
MCHC RBC-ENTMCNC: 34.1 G/DL — SIGNIFICANT CHANGE UP (ref 32–37)
MCV RBC AUTO: 99.3 FL — HIGH (ref 80–94)
MONOCYTES # BLD AUTO: 0.97 K/UL — HIGH (ref 0.1–0.6)
MONOCYTES NFR BLD AUTO: 20.2 % — HIGH (ref 1.7–9.3)
NEUTROPHILS # BLD AUTO: 2.25 K/UL — SIGNIFICANT CHANGE UP (ref 1.4–6.5)
NEUTROPHILS NFR BLD AUTO: 46.7 % — SIGNIFICANT CHANGE UP (ref 42.2–75.2)
NRBC # BLD: 0 /100 WBCS — SIGNIFICANT CHANGE UP (ref 0–0)
PLATELET # BLD AUTO: 209 K/UL — SIGNIFICANT CHANGE UP (ref 130–400)
POTASSIUM SERPL-MCNC: 3.8 MMOL/L — SIGNIFICANT CHANGE UP (ref 3.5–5)
POTASSIUM SERPL-SCNC: 3.8 MMOL/L — SIGNIFICANT CHANGE UP (ref 3.5–5)
PROT SERPL-MCNC: 6.5 G/DL — SIGNIFICANT CHANGE UP (ref 6–8)
RBC # BLD: 3.04 M/UL — LOW (ref 4.7–6.1)
RBC # FLD: 15.7 % — HIGH (ref 11.5–14.5)
SODIUM SERPL-SCNC: 130 MMOL/L — LOW (ref 135–146)
TRANSFERRIN SERPL-MCNC: 164 MG/DL — LOW (ref 200–360)
WBC # BLD: 4.81 K/UL — SIGNIFICANT CHANGE UP (ref 4.8–10.8)
WBC # FLD AUTO: 4.81 K/UL — SIGNIFICANT CHANGE UP (ref 4.8–10.8)

## 2023-03-22 PROCEDURE — 99232 SBSQ HOSP IP/OBS MODERATE 35: CPT

## 2023-03-22 PROCEDURE — 99233 SBSQ HOSP IP/OBS HIGH 50: CPT

## 2023-03-22 RX ADMIN — Medication 1 MILLIGRAM(S): at 12:03

## 2023-03-22 RX ADMIN — TAMSULOSIN HYDROCHLORIDE 0.4 MILLIGRAM(S): 0.4 CAPSULE ORAL at 21:23

## 2023-03-22 RX ADMIN — PANTOPRAZOLE SODIUM 40 MILLIGRAM(S): 20 TABLET, DELAYED RELEASE ORAL at 05:22

## 2023-03-22 RX ADMIN — Medication 100 MILLIGRAM(S): at 12:03

## 2023-03-22 RX ADMIN — GABAPENTIN 200 MILLIGRAM(S): 400 CAPSULE ORAL at 05:21

## 2023-03-22 RX ADMIN — Medication 650 MILLIGRAM(S): at 21:53

## 2023-03-22 RX ADMIN — ENOXAPARIN SODIUM 120 MILLIGRAM(S): 100 INJECTION SUBCUTANEOUS at 05:22

## 2023-03-22 RX ADMIN — ENOXAPARIN SODIUM 120 MILLIGRAM(S): 100 INJECTION SUBCUTANEOUS at 17:28

## 2023-03-22 RX ADMIN — Medication 0.5 MILLIGRAM(S): at 05:21

## 2023-03-22 RX ADMIN — Medication 1 TABLET(S): at 12:04

## 2023-03-22 RX ADMIN — Medication 25 MILLIGRAM(S): at 05:21

## 2023-03-22 RX ADMIN — LORATADINE 10 MILLIGRAM(S): 10 TABLET ORAL at 12:03

## 2023-03-22 RX ADMIN — Medication 650 MILLIGRAM(S): at 21:23

## 2023-03-22 RX ADMIN — GABAPENTIN 200 MILLIGRAM(S): 400 CAPSULE ORAL at 17:28

## 2023-03-22 RX ADMIN — NALTREXONE HYDROCHLORIDE 50 MILLIGRAM(S): 50 TABLET, FILM COATED ORAL at 12:05

## 2023-03-22 NOTE — PROGRESS NOTE ADULT - SUBJECTIVE AND OBJECTIVE BOX
CAT ALSTON 62y Male  MRN#: [id number]   Hospital Day: 7d    SUBJECTIVE  Patient is a 62y old Male who presents with a chief complaint of hemoptysis (21 Mar 2023 14:21)  Currently admitted to medicine with the primary diagnosis of Epistaxis      INTERVAL HPI AND OVERNIGHT EVENTS:  Patient was examined and seen at bedside. This morning he is resting comfortably in bed and reports no issues or overnight events.    OBJECTIVE  PAST MEDICAL & SURGICAL HISTORY  Alcohol dependence    HTN (hypertension)    Hard of hearing    Seasonal allergies    BPH (benign prostatic hyperplasia)    GERD (gastroesophageal reflux disease)    Pseudogout    No significant past surgical history      ALLERGIES:  Beef (Hives)  dairy products (Hives)  No Known Drug Allergies  shellfish (Hives)    MEDICATIONS:  STANDING MEDICATIONS  enoxaparin Injectable 120 milliGRAM(s) SubCutaneous every 12 hours  folic acid 1 milliGRAM(s) Oral daily  gabapentin 200 milliGRAM(s) Oral two times a day  loratadine 10 milliGRAM(s) Oral daily  magnesium sulfate  IVPB 2 Gram(s) IV Intermittent once  metoprolol succinate ER 25 milliGRAM(s) Oral daily  multivitamin 1 Tablet(s) Oral daily  naltrexone 50 milliGRAM(s) Oral daily  pantoprazole    Tablet 40 milliGRAM(s) Oral before breakfast  tamsulosin 0.4 milliGRAM(s) Oral at bedtime  thiamine 100 milliGRAM(s) Oral daily    PRN MEDICATIONS  acetaminophen     Tablet .. 650 milliGRAM(s) Oral every 6 hours PRN  LORazepam     Tablet 2 milliGRAM(s) Oral every 2 hours PRN      VITAL SIGNS: Last 24 Hours  T(C): 37.9 (22 Mar 2023 04:53), Max: 38.4 (21 Mar 2023 19:30)  T(F): 100.2 (22 Mar 2023 04:53), Max: 101.2 (21 Mar 2023 19:30)  HR: 97 (22 Mar 2023 04:53) (82 - 101)  BP: 147/77 (22 Mar 2023 04:53) (118/78 - 147/77)  BP(mean): --  RR: 19 (22 Mar 2023 04:53) (18 - 19)  SpO2: 98% (22 Mar 2023 04:53) (96% - 98%)    LABS:                        10.7   3.25  )-----------( 139      ( 21 Mar 2023 07:08 )             31.4         136  |  99  |  13  ----------------------------<  117<H>  3.8   |  23  |  1.0    Ca    9.0      21 Mar 2023 07:08  Mg     2.3         TPro  5.9<L>  /  Alb  3.4<L>  /  TBili  3.9<H>  /  DBili  x   /  AST  499<H>  /  ALT  289<H>  /  AlkPhos  125<H>      PT/INR - ( 21 Mar 2023 07:08 )   PT: 13.30 sec;   INR: 1.16 ratio         PTT - ( 20 Mar 2023 08:52 )  PTT:46.3 sec  Urinalysis Basic - ( 21 Mar 2023 22:04 )    Color: Nora / Appearance: Clear / S.028 / pH: x  Gluc: x / Ketone: Negative  / Bili: Small / Urobili: 6 mg/dL   Blood: x / Protein: 30 mg/dL / Nitrite: Negative   Leuk Esterase: Negative / RBC: 6 /HPF / WBC 2 /HPF   Sq Epi: x / Non Sq Epi: 1 /HPF / Bacteria: Negative                RADIOLOGY:      PHYSICAL EXAM:  CONSTITUTIONAL: No acute distress, AAOx3  HEAD: Atraumatic, normocephalic  EYES: EOM intact, PERRLA, conjunctiva and sclera clear  ENT: moist mucous membranes  PULMONARY: Clear to auscultation bilaterally  CARDIOVASCULAR: Regular rate and rhythm  GASTROINTESTINAL: Soft, non-tender, non-distended; bowel sounds present  MUSCULOSKELETAL: Complains of LUE pain, and weakness on ambulation  NEUROLOGY: non-focal  SKIN: warm and dry      ASSESSMENT and PLAN  61 yo M with hx of PE/DVT (on coumadin), Alcohol abuse (drink a pint of vodka daily), HTN, GERD, BPH and Pseudogout presents to ED for epistaxis x 4 days.       #Transaminitis  - ALT 68 --> 289 ;  --> 499 ; Blilirubin 2.5 --> 3.9 ; Alk phos 86 -->125  - Urine dark. Patient complained of vague myalgias and difficulty ambulating  - Unclear reason for the worsening. No hepatotoxic medications  - Liver ultrasound does without new findings beyond known cirrhosis   - GI consulted. Pending recs  - Hepatology following. Hepatology workup negative so far.   - Soft abdomen on exam    #Epistaxis likely 2/2 supratherapeutic INR  - Resolved  - INR 1.38 3/20  - s/p vit K 5 mg x1 given in ED.   - On Therapeutic Lovenox, Coumadin on hold.   - Pt denies taking more coumadin than prescribed.    #Acute DVT in L Popliteal vein  - On therapeutic lovenox   - Pt diagnosed w/ acute b/l DVT's on 22. Subsequently started on Eliquis w/ good adherence. However, readmitted on 22 after feeling unwell and found to have extensive acute bilateral PE - unclear if unprovoked, unclear if failure of Eliquis, was switched to Lovenox per Heme-Onc and bridged to coumadin as outpatient.  - US showing chronic R leg DVT and acute L leg DVT  - Heme-onc following    #Fever  - 101.6 T max on 3/19; 101.2 on yesterday.   - RVP (-), Sputum Cx, MRSA swab (-), UA (-) , BCx (-)  UCx sent f/u  - BCx negative on 3/18 if fevers persist will re-send   - Non-Contrast CT chest unremarkable    #Atypical Chest pain   - Resolved  - EKG NSR. No significant ST/T wave changes.   - Troponin (-)  - Low suspicion for cardiac etiology of pain    #Thrombocytopenia   - Stable   - check peripheral smear  - US abdomen showing Nodular contour consistent with cirrhosis  - Hepatology consulted. Outpatient f/u   - hepatitis panel (-) and HIV (-)   - monitor CBC     #Alcohol abuse/ Suspected thiamine and folate deficiency  - [facility] protocol  - c/w thiamine, folic acid and multivitamin   - CATCH team.     #Hx of pseudogout  - Not on a flare  - Will check inflamm markers if upper extremety pains persist. Patient states that is not the same pain he felt on prior flare ups   - supportive care. Outpatient follow up.     #HTN   - c/w home med    #GERD   - PPI     #BPH   - c/w home med    DVT ppx: Lovenox  GI ppx: PPI  Diet: DASH diet  Activity: as tolerated.             CAT ALSTON 62y Male  MRN#: [id number]   Hospital Day: 7d    SUBJECTIVE  Patient is a 62y old Male who presents with a chief complaint of hemoptysis (21 Mar 2023 14:21)  Currently admitted to medicine with the primary diagnosis of Epistaxis      INTERVAL HPI AND OVERNIGHT EVENTS:  Patient was examined and seen at bedside. This morning he is resting comfortably in bed and reports no issues or overnight events.    OBJECTIVE  PAST MEDICAL & SURGICAL HISTORY  Alcohol dependence    HTN (hypertension)    Hard of hearing    Seasonal allergies    BPH (benign prostatic hyperplasia)    GERD (gastroesophageal reflux disease)    Pseudogout    No significant past surgical history      ALLERGIES:  Beef (Hives)  dairy products (Hives)  No Known Drug Allergies  shellfish (Hives)    MEDICATIONS:  STANDING MEDICATIONS  enoxaparin Injectable 120 milliGRAM(s) SubCutaneous every 12 hours  folic acid 1 milliGRAM(s) Oral daily  gabapentin 200 milliGRAM(s) Oral two times a day  loratadine 10 milliGRAM(s) Oral daily  magnesium sulfate  IVPB 2 Gram(s) IV Intermittent once  metoprolol succinate ER 25 milliGRAM(s) Oral daily  multivitamin 1 Tablet(s) Oral daily  naltrexone 50 milliGRAM(s) Oral daily  pantoprazole    Tablet 40 milliGRAM(s) Oral before breakfast  tamsulosin 0.4 milliGRAM(s) Oral at bedtime  thiamine 100 milliGRAM(s) Oral daily    PRN MEDICATIONS  acetaminophen     Tablet .. 650 milliGRAM(s) Oral every 6 hours PRN  LORazepam     Tablet 2 milliGRAM(s) Oral every 2 hours PRN      VITAL SIGNS: Last 24 Hours  T(C): 37.9 (22 Mar 2023 04:53), Max: 38.4 (21 Mar 2023 19:30)  T(F): 100.2 (22 Mar 2023 04:53), Max: 101.2 (21 Mar 2023 19:30)  HR: 97 (22 Mar 2023 04:53) (82 - 101)  BP: 147/77 (22 Mar 2023 04:53) (118/78 - 147/77)  BP(mean): --  RR: 19 (22 Mar 2023 04:53) (18 - 19)  SpO2: 98% (22 Mar 2023 04:53) (96% - 98%)    LABS:                        10.7   3.25  )-----------( 139      ( 21 Mar 2023 07:08 )             31.4         136  |  99  |  13  ----------------------------<  117<H>  3.8   |  23  |  1.0    Ca    9.0      21 Mar 2023 07:08  Mg     2.3         TPro  5.9<L>  /  Alb  3.4<L>  /  TBili  3.9<H>  /  DBili  x   /  AST  499<H>  /  ALT  289<H>  /  AlkPhos  125<H>      PT/INR - ( 21 Mar 2023 07:08 )   PT: 13.30 sec;   INR: 1.16 ratio         PTT - ( 20 Mar 2023 08:52 )  PTT:46.3 sec  Urinalysis Basic - ( 21 Mar 2023 22:04 )    Color: Nora / Appearance: Clear / S.028 / pH: x  Gluc: x / Ketone: Negative  / Bili: Small / Urobili: 6 mg/dL   Blood: x / Protein: 30 mg/dL / Nitrite: Negative   Leuk Esterase: Negative / RBC: 6 /HPF / WBC 2 /HPF   Sq Epi: x / Non Sq Epi: 1 /HPF / Bacteria: Negative                RADIOLOGY:      PHYSICAL EXAM:  CONSTITUTIONAL: No acute distress, AAOx3  HEAD: Atraumatic, normocephalic  EYES: EOM intact, PERRLA, conjunctiva and sclera clear  ENT: moist mucous membranes  PULMONARY: Clear to auscultation bilaterally  CARDIOVASCULAR: Regular rate and rhythm  GASTROINTESTINAL: Soft, non-tender, non-distended; bowel sounds present  MUSCULOSKELETAL: Complains of LUE pain, and weakness on ambulation  NEUROLOGY: non-focal  SKIN: warm and dry      ASSESSMENT and PLAN  63 yo M with hx of PE/DVT (on coumadin), Alcohol abuse (drink a pint of vodka daily), HTN, GERD, BPH and Pseudogout presents to ED for epistaxis x 4 days.       #Transaminitis  - ALT 68 --> 289 ;  --> 499 ; Blilirubin 2.5 --> 3.9 ; Alk phos 86 -->125  - Urine dark. Patient complained of vague myalgias and difficulty ambulating  - Unclear reason for the worsening. No hepatotoxic medications  - Liver ultrasound does without new findings beyond known cirrhosis. No evidence of cholestatic obstructive pattern.   - GI consulted. recommeded  HAV IgG, HBsAb, HBcAb total, Anti HEV, Serum Ferritin, Transferrin Saturation, Ceruloplasmin level, IRA, SMA, immunoglobulins panel, AMA, Anti LK microsomal Ab, anti-soluble liver Ag, EBV, HSV PCR, CMV PCR, IgM ACE levels   - Hepatology following. Hepatology workup negative so far.   - Soft abdomen on exam  -Pt continues to have recurrent fevers and episodes of tachycardia at night without leukocytosis.   - Fractioned bilirrubin/EST/CRP/Procal/RVP/COVID ordered to r/o potential infectious causes     #Epistaxis likely 2/2 supratherapeutic INR  - Resolved  - INR 1.38 3/20  - s/p vit K 5 mg x1 given in ED.   - On Therapeutic Lovenox, Coumadin on hold.   - Pt denies taking more coumadin than prescribed.    #Acute DVT in L Popliteal vein  - On therapeutic lovenox   - Pt diagnosed w/ acute b/l DVT's on 22. Subsequently started on Eliquis w/ good adherence. However, readmitted on 22 after feeling unwell and found to have extensive acute bilateral PE - unclear if unprovoked, unclear if failure of Eliquis, was switched to Lovenox per Heme-Onc and bridged to coumadin as outpatient.  - US showing chronic R leg DVT and acute L leg DVT  - Heme-onc following    #Fever  - 101.6 T max on 3/19; 101.2 on yesterday.   - RVP (-), Sputum Cx, MRSA swab (-), UA (-) , BCx (-)  UCx sent f/u  - BCx negative on 3/18 if fevers persist will re-send   - Non-Contrast CT chest unremarkable    #Atypical Chest pain   - Resolved  - EKG NSR. No significant ST/T wave changes.   - Troponin (-)  - Low suspicion for cardiac etiology of pain    #Thrombocytopenia   - Stable   - check peripheral smear  - US abdomen showing Nodular contour consistent with cirrhosis  - Hepatology consulted. Outpatient f/u   - hepatitis panel (-) and HIV (-)   - monitor CBC     #Alcohol abuse/ Suspected thiamine and folate deficiency  - [facility] protocol  - c/w thiamine, folic acid and multivitamin   - CATCH team.     #Hx of pseudogout  - Not on a flare  - Will check inflamm markers if upper extremety pains persist. Patient states that is not the same pain he felt on prior flare ups   - supportive care. Outpatient follow up.     #HTN   - c/w home med    #GERD   - PPI     #BPH   - c/w home med    DVT ppx: Lovenox  GI ppx: PPI  Diet: DASH diet  Activity: as tolerated.

## 2023-03-22 NOTE — PROGRESS NOTE ADULT - ASSESSMENT
61yo male with hx DVT (12/2022) and PE on Coumadin, AUD (>20 years of etoh abuse with 6 packs of beer daily - last use on 3/15), pseudogout, GERD, and BPH presenting from [facility] for epistaxis x4 days in setting of supratherapeutic INR. Pt reports he is given medications from nurses at his residence. States last bloodwork done was several weeks ago.  Admits to lightheadedness. Denies other sites of bleeding, denies ecchymosis.  Pt was incidentally noted to have new diagnosis of cirrhosis based on biochemistry and imaging and hepatology was consulted for management. 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 Normal RV function and no valvulopathies  US abd 3/16: Nodular liver. CBD 5mm no ascites or other signs of portal HTN.  US abd 3/19: steatosis, no ascites    -MELD Na score today 15  CLD workup - Hepatitis A, B and C negative    Recs:   EGD as an OP   Follow up with hepatology as an OP for cirrhosis and HCC screening   tylenol upto max 2gm/day   alcohol cessation     #)HCC screening: Please f/u as outpatient for US abdomen and AFP every 6 months.  #)Lifestyle/Dietary modifications; Calorie intake 25-40 Kcal/kg/day, Protein intake: 1.2-1.5 gm/kg/day, Avoid smoking, alcohol, NSAIDS.  #)Vaccinations: Please f/u in clinic for being uptodate with HAV/HBV/Influenza/Pneumococcal vaccines.  #)LT evaluation: Social status: lives at [facility]. no family and unemployed    #)Alcoholic Hepatitis/Elevated liver enzymes mixed pattern with hyperbilurubinemia   -LFT uptrending   -LFT at the time of admission 2/113/183/55  -LFT 3/21: 3.9/125/499/289   -received only bactrim on 3/15, no other abx  -Denies any Abx before hospitalization, denies any herbal or OTC medication   -Denies any abdominal pain  -MDF 12.2 today (<32 no indications for steroids)    Recs:   - Trend LFT, INR   - etoh cessation counselling provided  - Thiamine and Folate  - check rest of the CLD work up which includes HAV IgG, HBsAb, HBcAb total, Anti HEV, Serum Ferritin, Transferrin Saturation, Ceruloplasmin level, IRA, SMA, immunoglobulins panel, AMA, Anti LK microsomal Ab, anti-soluble liver Ag, EBV, HSV PCR, CMV PCR, IgM ACE levels    63yo male with hx DVT (12/2022) and PE on Coumadin, AUD (>20 years of etoh abuse with 6 packs of beer daily - last use on 3/15), pseudogout, GERD, and BPH presenting from [facility] for epistaxis x4 days in setting of supratherapeutic INR. Pt reports he is given medications from nurses at his residence. States last bloodwork done was several weeks ago.  Admits to lightheadedness. Denies other sites of bleeding, denies ecchymosis.  Pt was incidentally noted to have new diagnosis of cirrhosis based on biochemistry and imaging and hepatology was consulted for management. Denies N/V/D fever chills, weight loss, acholic stools, dark urine and hx of IVDU use or snorting coccaine    #)Decompensated Liver Cirrhosis secondary to MITSY MELD-Na Score - 44 (supratherapeutic INR), Mary Ellen Score - B (8)  -HE, -Ascites, -EV/EV Bleed, -HRS, -SBP, -HCC, -Jaundice  MDF - 138 (high INR at the time of admission)  Echo - EF 61%. Normal RV function and no valvulopathies  US abd 3/16: Nodular liver. CBD 5mm no ascites or other signs of portal HTN.  US abd 3/19: steatosis, no ascites    -MELD Na score today 15  CLD workup - Hepatitis A, B and C negative    Recs:   EGD as an OP   Follow up with hepatology as an OP for cirrhosis and HCC screening   tylenol upto max 2gm/day   alcohol cessation     #)HCC screening: Please f/u as outpatient for US abdomen and AFP every 6 months.  #)Lifestyle/Dietary modifications; Calorie intake 25-40 Kcal/kg/day, Protein intake: 1.2-1.5 gm/kg/day, Avoid smoking, alcohol, NSAIDS.  #)Vaccinations: Please f/u in clinic for being uptodate with HAV/HBV/Influenza/Pneumococcal vaccines.  #)LT evaluation: Social status: lives at [facility]. no family and unemployed    #)Alcoholic Hepatitis/Elevated liver enzymes mixed pattern with hyperbilurubinemia likely due to alcoholic hepatitis and cirrhosis less likely biliary obstruction (no abd pain, no WBC count, CBD 6mm in US)  -LFT uptrending   -LFT at the time of admission 2/113/183/55  -LFT 3/21: 3.9/125/499/289   -received only bactrim on 3/15, no other abx  -Denies any Abx before hospitalization, denies any herbal or OTC medication   -Denies any abdominal pain  -MDF 12.2 today (<32 no indications for steroids)  -US abd 3/19 CBD 6mm    Recs:   - Trend LFT, INR   - etoh cessation counselling provided  - Thiamine and Folate  - Sepsis work up to r/o any other source of the infection   - check rest of the CLD work up which includes HAV IgG, HBsAb, HBcAb total, Anti HEV, Serum Ferritin, Transferrin Saturation, Ceruloplasmin level, IRA, SMA, immunoglobulins panel, AMA, Anti LK microsomal Ab, anti-soluble liver Ag, EBV, HSV PCR, CMV PCR, IgM ACE levels    61yo male with hx DVT (12/2022) and PE on Coumadin, AUD (>20 years of etoh abuse with 6 packs of beer daily - last use on 3/15), pseudogout, GERD, and BPH presenting from [facility] for epistaxis x4 days in setting of supratherapeutic INR. Pt reports he is given medications from nurses at his residence. States last bloodwork done was several weeks ago.  Admits to lightheadedness. Denies other sites of bleeding, denies ecchymosis.  Pt was incidentally noted to have new diagnosis of cirrhosis based on biochemistry and imaging and hepatology was consulted for management. 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 Normal RV function and no valvulopathies  US abd 3/16: Nodular liver. CBD 5mm no ascites or other signs of portal HTN.  US abd 3/19: steatosis, no ascites    -MELD Na score today 15  CLD workup - Hepatitis A, B and C negative    Recs:   EGD as an OP   Follow up with hepatology as an OP for cirrhosis and HCC screening   tylenol upto max 2gm/day   alcohol cessation     #)HCC screening: Please f/u as outpatient for US abdomen and AFP every 6 months.  #)Lifestyle/Dietary modifications; Calorie intake 25-40 Kcal/kg/day, Protein intake: 1.2-1.5 gm/kg/day, Avoid smoking, alcohol, NSAIDS.  #)Vaccinations: Please f/u in clinic for being uptodate with HAV/HBV/Influenza/Pneumococcal vaccines.  #)LT evaluation: Social status: lives at [facility]. no family and unemployed    #)Alcoholic Hepatitis/Elevated liver enzymes mixed pattern with hyperbilurubinemia likely due to alcoholic hepatitis and cirrhosis less likely biliary obstruction (no abd pain, no WBC count, CBD 6mm in US) vs ?DILI (bactrim on 3/15)  -LFT uptrending   -LFT at the time of admission 2/113/183/55  -LFT 3/21: 3.9/125/499/289   -received only bactrim on 3/15, no other abx  -Denies any Abx before hospitalization, denies any herbal or OTC medication   -Denies any abdominal pain  -MDF 12.2 today (<32 no indications for steroids)  -US abd 3/19 CBD 6mm    Recs:   - Trend LFT, INR   - etoh cessation counselling provided  - Thiamine and Folate  - Sepsis work up to r/o any other source of the infection   - check rest of the CLD work up which includes HAV IgG, HBsAb, HBcAb total, Anti HEV, Serum Ferritin, Transferrin Saturation, Ceruloplasmin level, IRA, SMA, immunoglobulins panel, AMA, Anti LK microsomal Ab, anti-soluble liver Ag, EBV, HSV PCR, CMV PCR, IgM ACE levels

## 2023-03-22 NOTE — PHYSICAL THERAPY INITIAL EVALUATION ADULT - PERTINENT HX OF CURRENT PROBLEM, REHAB EVAL
Clear bilaterally, pupils equal, round and reactive to light. 63yo male with hx DVT and PE on Coumadin, severe alcohol use disorder, pseudogout, GERD, and BPH presenting from [facility] for epistaxis x4 days.  Pt reports he is given medications from nurses at his residence. States last bloodwork done was several weeks ago.  Admits to lightheadedness. Denies other sites of bleeding, denies ecchymosis.   While in ED pt reported having chest pain which began 1 hour prior to my assessment, left sided, left elbow and left wrist. Reports had this pain several times before, but does not further elucidate hx. Denies SOB, n/v, diaphoresis, abd pain. Active daily ETOH use, last drink yesterday. Former tobacco use, History limited due to pt being a poor unreliable historian. Pt appears to have minimal insight to his medical conditions. Of note per chart review, pt diagnosed w/ acute b/l DVT's on 12/8/22. Subsequently started on Eliquis w/ good adherence. However, readmitted on 12/20/22 after feeling unwell and found to have extensive acute bilateral PE - unclear if unprovoked, unclear if failure of Eliquis, wasswitched to Lovenox per Heme-Onc with plan to bridge to coumadin as outpatient.

## 2023-03-22 NOTE — PHYSICAL THERAPY INITIAL EVALUATION ADULT - NSACTIVITYREC_GEN_A_PT
Ther ex's reviewed to perform at b/s throughout day as tolerated for B LE's: knee flex/ext, ankle pumps, SLR.

## 2023-03-22 NOTE — PROGRESS NOTE ADULT - ATTENDING COMMENTS
62 y o  M with AUD MISTY cirrhosis PE on AC admitted with epistaxis and supratherapeutic INR.  Drinks a pint of beer daily. No DUI. Alcohol rehab few years ago. 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 Lives in care home. No family support locally.   Liver test were downtrending and had rise again on 3/21 with T bili 3.9 AST 400s ALT 200s and falling againSingle no kids.   No complaints.  Icteric  Abdomen soft non tender No ascites  No asterixis   Decompensated MISTY cirrhosis MELD Na 17 ( 44 at admission with supra-therapeutic INR  alcoholic hepatitis with jaundice  High MDF due to high INR from warfarin. T bili dropping again no need for steroids  Alcohol rehab  Not a likely LT candidate with poor social support but will review on OP follow up.

## 2023-03-22 NOTE — PHYSICAL THERAPY INITIAL EVALUATION ADULT - RANGE OF MOTION EXAMINATION, REHAB EVAL
B UE's grossly WFL with complaints of L hand/wrist and elbow pain with mobility. B LE's grossly WFL with complaint of R knee pain with mobility.

## 2023-03-22 NOTE — PHYSICAL THERAPY INITIAL EVALUATION ADULT - GENERAL OBSERVATIONS, REHAB EVAL
13:05-13:33. Pt encountered semifowler in bed in NAD, +IV lock R UE, no complaints , agreeable to PT.

## 2023-03-22 NOTE — PROGRESS NOTE ADULT - SUBJECTIVE AND OBJECTIVE BOX
Gastroenterology progress note:     Patient is a 62y old  Male who presents with a chief complaint of hemoptysis (21 Mar 2023 14:21)       Admitted on: 03-15-23    We are following the patient for cirrhosis and elevated LFT      Interval History:  denies any abdominal pain, nausea, vomiting   feeling better        PAST MEDICAL & SURGICAL HISTORY:  Alcohol dependence      HTN (hypertension)      Hard of hearing      Seasonal allergies      BPH (benign prostatic hyperplasia)      GERD (gastroesophageal reflux disease)      Pseudogout      No significant past surgical history          MEDICATIONS  (STANDING):  enoxaparin Injectable 120 milliGRAM(s) SubCutaneous every 12 hours  folic acid 1 milliGRAM(s) Oral daily  gabapentin 200 milliGRAM(s) Oral two times a day  loratadine 10 milliGRAM(s) Oral daily  magnesium sulfate  IVPB 2 Gram(s) IV Intermittent once  metoprolol succinate ER 25 milliGRAM(s) Oral daily  multivitamin 1 Tablet(s) Oral daily  naltrexone 50 milliGRAM(s) Oral daily  pantoprazole    Tablet 40 milliGRAM(s) Oral before breakfast  tamsulosin 0.4 milliGRAM(s) Oral at bedtime  thiamine 100 milliGRAM(s) Oral daily    MEDICATIONS  (PRN):  acetaminophen     Tablet .. 650 milliGRAM(s) Oral every 6 hours PRN Temp greater or equal to 38C (100.4F)  LORazepam     Tablet 2 milliGRAM(s) Oral every 2 hours PRN Symptom-triggered 2 point increase in CIWA-Ar      Allergies  Beef (Hives)  dairy products (Hives)  No Known Drug Allergies  shellfish (Hives)      Review of Systems:   Cardiovascular:  No Chest Pain, No Palpitations  Respiratory:  No Cough, No Dyspnea  Gastrointestinal:  As described in HPI    Physical Examination:  T(C): 37.9 (03-22-23 @ 04:53), Max: 38.4 (03-21-23 @ 19:30)  HR: 97 (03-22-23 @ 04:53) (82 - 101)  BP: 147/77 (03-22-23 @ 04:53) (118/78 - 147/77)  RR: 19 (03-22-23 @ 04:53) (18 - 19)  SpO2: 98% (03-22-23 @ 04:53) (96% - 98%)      Constitutional: No acute distress.  Respiratory:  No signs of respiratory distress. Lung sounds are clear bilaterally.  Cardiovascular:  S1 S2, Regular rate and rhythm.  Abdominal: Abdomen is soft, symmetric, and non-tender without distention.         Data:                        10.7   3.25  )-----------( 139      ( 21 Mar 2023 07:08 )             31.4     Hgb trend:  10.7  03-21-23 @ 07:08  11.0  03-20-23 @ 08:52        03-21    136  |  99  |  13  ----------------------------<  117<H>  3.8   |  23  |  1.0    Ca    9.0      21 Mar 2023 07:08  Mg     2.3     03-21    TPro  5.9<L>  /  Alb  3.4<L>  /  TBili  3.9<H>  /  DBili  x   /  AST  499<H>  /  ALT  289<H>  /  AlkPhos  125<H>  03-21    Liver panel trend:  TBili 3.9   /      /      /   AlkP 125   /   Tptn 5.9   /   Alb 3.4    /   DBili --      03-21  TBili 2.5   /      /   ALT 68   /   AlkP 86   /   Tptn 6.2   /   Alb 3.8    /   DBili --      03-18  TBili 2.3   /      /   ALT 45   /   AlkP 101   /   Tptn 7.2   /   Alb 4.6    /   DBili 0.8      03-16  TBili 2.0   /      /   ALT 55   /   AlkP 113   /   Tptn 7.7   /   Alb 4.9    /   DBili --      03-15      PT/INR - ( 21 Mar 2023 07:08 )   PT: 13.30 sec;   INR: 1.16 ratio                Radiology:    US Abdomen Upper Quadrant Right:   ACC: [id number] EXAM:  US ABDOMEN RT UPR QUADRANT   ORDERED BY: BERE BASHIR     PROCEDURE DATE:  03/21/2023          INTERPRETATION:  CLINICAL INFORMATION: Abnormal LFTs.    COMPARISON: CT chest dated 3/20/2023. Abdominal ultrasound dated   3/16/2023.    TECHNIQUE: Sonography of the right upper quadrant.    FINDINGS:    Liver: Severe hepatic steatosis.  Bile ducts: Normal caliber. Common bile duct measures 0.6 cm.  Gallbladder: No gallstones. Sludge. No wall thickening or pericholecystic  fluid. Negative sonographic Rudolph exam.  Pancreas: Poorly visualized.  Right kidney: 11.1 cm. No hydronephrosis.  Ascites: None.      IMPRESSION:  Severe hepatic steatosis.        --- End of Report ---            PELON GOODWIN MD; Attending Radiologist  This document has been electronically signed. Mar 21 2023 10:57AM (03-21-23 @ 10:29)

## 2023-03-23 LAB
ALBUMIN SERPL ELPH-MCNC: 3.6 G/DL — SIGNIFICANT CHANGE UP (ref 3.5–5.2)
ALP SERPL-CCNC: 101 U/L — SIGNIFICANT CHANGE UP (ref 30–115)
ALT FLD-CCNC: 119 U/L — HIGH (ref 0–41)
ANION GAP SERPL CALC-SCNC: 13 MMOL/L — SIGNIFICANT CHANGE UP (ref 7–14)
AST SERPL-CCNC: 66 U/L — HIGH (ref 0–41)
BASOPHILS # BLD AUTO: 0.08 K/UL — SIGNIFICANT CHANGE UP (ref 0–0.2)
BASOPHILS NFR BLD AUTO: 1.5 % — HIGH (ref 0–1)
BILIRUB DIRECT SERPL-MCNC: 1.1 MG/DL — HIGH (ref 0–0.3)
BILIRUB INDIRECT FLD-MCNC: 1.1 MG/DL — SIGNIFICANT CHANGE UP (ref 0.2–1.2)
BILIRUB SERPL-MCNC: 2.2 MG/DL — HIGH (ref 0.2–1.2)
BILIRUB SERPL-MCNC: 2.2 MG/DL — HIGH (ref 0.2–1.2)
BUN SERPL-MCNC: 17 MG/DL — SIGNIFICANT CHANGE UP (ref 10–20)
CALCIUM SERPL-MCNC: 9.5 MG/DL — SIGNIFICANT CHANGE UP (ref 8.4–10.5)
CERULOPLASMIN SERPL-MCNC: 30 MG/DL — SIGNIFICANT CHANGE UP (ref 15–30)
CHLORIDE SERPL-SCNC: 97 MMOL/L — LOW (ref 98–110)
CO2 SERPL-SCNC: 22 MMOL/L — SIGNIFICANT CHANGE UP (ref 17–32)
CREAT SERPL-MCNC: 1.1 MG/DL — SIGNIFICANT CHANGE UP (ref 0.7–1.5)
EBV EA AB SER IA-ACNC: 72.8 U/ML — HIGH
EBV EA AB TITR SER IF: NEGATIVE — SIGNIFICANT CHANGE UP
EBV EA IGG SER-ACNC: POSITIVE
EBV NA IGG SER IA-ACNC: 11.7 U/ML — SIGNIFICANT CHANGE UP
EBV PATRN SPEC IB-IMP: SIGNIFICANT CHANGE UP
EBV VCA IGG AVIDITY SER QL IA: POSITIVE
EBV VCA IGM SER IA-ACNC: 143 U/ML — HIGH
EBV VCA IGM SER IA-ACNC: 192 U/ML — HIGH
EBV VCA IGM TITR FLD: POSITIVE
EGFR: 75 ML/MIN/1.73M2 — SIGNIFICANT CHANGE UP
EOSINOPHIL # BLD AUTO: 0.05 K/UL — SIGNIFICANT CHANGE UP (ref 0–0.7)
EOSINOPHIL NFR BLD AUTO: 1 % — SIGNIFICANT CHANGE UP (ref 0–8)
GLUCOSE SERPL-MCNC: 135 MG/DL — HIGH (ref 70–99)
HAV IGG SER QL IA: SIGNIFICANT CHANGE UP
HBV SURFACE AB SER-ACNC: <3 MIU/ML — LOW
HBV SURFACE AB SER-ACNC: SIGNIFICANT CHANGE UP
HCT VFR BLD CALC: 28.8 % — LOW (ref 42–52)
HGB BLD-MCNC: 9.9 G/DL — LOW (ref 14–18)
HSV DNA1: SIGNIFICANT CHANGE UP
HSV DNA2: SIGNIFICANT CHANGE UP
HSV1 DNA BLD QL NAA+PROBE: SIGNIFICANT CHANGE UP
HSV2 DNA BLD QL NAA+PROBE: SIGNIFICANT CHANGE UP
IGA FLD-MCNC: 268 MG/DL — SIGNIFICANT CHANGE UP (ref 84–499)
IGG FLD-MCNC: 810 MG/DL — SIGNIFICANT CHANGE UP (ref 610–1660)
IGM SERPL-MCNC: 65 MG/DL — SIGNIFICANT CHANGE UP (ref 35–242)
IMM GRANULOCYTES NFR BLD AUTO: 1.7 % — HIGH (ref 0.1–0.3)
KAPPA LC SER QL IFE: 2.45 MG/DL — HIGH (ref 0.33–1.94)
KAPPA/LAMBDA FREE LIGHT CHAIN RATIO, SERUM: 0.91 RATIO — SIGNIFICANT CHANGE UP (ref 0.26–1.65)
LAMBDA LC SER QL IFE: 2.7 MG/DL — HIGH (ref 0.57–2.63)
LYMPHOCYTES # BLD AUTO: 1.13 K/UL — LOW (ref 1.2–3.4)
LYMPHOCYTES # BLD AUTO: 21.7 % — SIGNIFICANT CHANGE UP (ref 20.5–51.1)
MCHC RBC-ENTMCNC: 34.4 G/DL — SIGNIFICANT CHANGE UP (ref 32–37)
MCHC RBC-ENTMCNC: 34.4 PG — HIGH (ref 27–31)
MCV RBC AUTO: 100 FL — HIGH (ref 80–94)
MITOCHONDRIA AB SER-ACNC: SIGNIFICANT CHANGE UP
MONOCYTES # BLD AUTO: 1.04 K/UL — HIGH (ref 0.1–0.6)
MONOCYTES NFR BLD AUTO: 20 % — HIGH (ref 1.7–9.3)
NEUTROPHILS # BLD AUTO: 2.82 K/UL — SIGNIFICANT CHANGE UP (ref 1.4–6.5)
NEUTROPHILS NFR BLD AUTO: 54.1 % — SIGNIFICANT CHANGE UP (ref 42.2–75.2)
NRBC # BLD: 0 /100 WBCS — SIGNIFICANT CHANGE UP (ref 0–0)
PLATELET # BLD AUTO: 223 K/UL — SIGNIFICANT CHANGE UP (ref 130–400)
POTASSIUM SERPL-MCNC: 3.9 MMOL/L — SIGNIFICANT CHANGE UP (ref 3.5–5)
POTASSIUM SERPL-SCNC: 3.9 MMOL/L — SIGNIFICANT CHANGE UP (ref 3.5–5)
PROT SERPL-MCNC: 6.3 G/DL — SIGNIFICANT CHANGE UP (ref 6–8)
RBC # BLD: 2.88 M/UL — LOW (ref 4.7–6.1)
RBC # FLD: 15.5 % — HIGH (ref 11.5–14.5)
SMOOTH MUSCLE AB SER-ACNC: SIGNIFICANT CHANGE UP
SODIUM SERPL-SCNC: 132 MMOL/L — LOW (ref 135–146)
WBC # BLD: 5.21 K/UL — SIGNIFICANT CHANGE UP (ref 4.8–10.8)
WBC # FLD AUTO: 5.21 K/UL — SIGNIFICANT CHANGE UP (ref 4.8–10.8)

## 2023-03-23 PROCEDURE — 99233 SBSQ HOSP IP/OBS HIGH 50: CPT

## 2023-03-23 PROCEDURE — 99232 SBSQ HOSP IP/OBS MODERATE 35: CPT

## 2023-03-23 RX ORDER — WARFARIN SODIUM 2.5 MG/1
5 TABLET ORAL ONCE
Refills: 0 | Status: COMPLETED | OUTPATIENT
Start: 2023-03-23 | End: 2023-03-23

## 2023-03-23 RX ADMIN — Medication 100 MILLIGRAM(S): at 12:04

## 2023-03-23 RX ADMIN — Medication 1 TABLET(S): at 12:04

## 2023-03-23 RX ADMIN — GABAPENTIN 200 MILLIGRAM(S): 400 CAPSULE ORAL at 17:34

## 2023-03-23 RX ADMIN — TAMSULOSIN HYDROCHLORIDE 0.4 MILLIGRAM(S): 0.4 CAPSULE ORAL at 21:32

## 2023-03-23 RX ADMIN — Medication 650 MILLIGRAM(S): at 19:57

## 2023-03-23 RX ADMIN — Medication 650 MILLIGRAM(S): at 13:56

## 2023-03-23 RX ADMIN — Medication 650 MILLIGRAM(S): at 05:47

## 2023-03-23 RX ADMIN — Medication 1 MILLIGRAM(S): at 12:03

## 2023-03-23 RX ADMIN — NALTREXONE HYDROCHLORIDE 50 MILLIGRAM(S): 50 TABLET, FILM COATED ORAL at 12:04

## 2023-03-23 RX ADMIN — ENOXAPARIN SODIUM 120 MILLIGRAM(S): 100 INJECTION SUBCUTANEOUS at 17:34

## 2023-03-23 RX ADMIN — PANTOPRAZOLE SODIUM 40 MILLIGRAM(S): 20 TABLET, DELAYED RELEASE ORAL at 05:17

## 2023-03-23 RX ADMIN — Medication 650 MILLIGRAM(S): at 12:56

## 2023-03-23 RX ADMIN — Medication 25 MILLIGRAM(S): at 05:16

## 2023-03-23 RX ADMIN — GABAPENTIN 200 MILLIGRAM(S): 400 CAPSULE ORAL at 05:16

## 2023-03-23 RX ADMIN — ENOXAPARIN SODIUM 120 MILLIGRAM(S): 100 INJECTION SUBCUTANEOUS at 05:16

## 2023-03-23 RX ADMIN — Medication 650 MILLIGRAM(S): at 19:27

## 2023-03-23 RX ADMIN — WARFARIN SODIUM 5 MILLIGRAM(S): 2.5 TABLET ORAL at 21:32

## 2023-03-23 RX ADMIN — Medication 650 MILLIGRAM(S): at 05:17

## 2023-03-23 RX ADMIN — LORATADINE 10 MILLIGRAM(S): 10 TABLET ORAL at 12:04

## 2023-03-23 NOTE — PROGRESS NOTE ADULT - SUBJECTIVE AND OBJECTIVE BOX
Gastroenterology progress note:     Patient is a 62y old  Male who presents with a chief complaint of cirrhosis (22 Mar 2023 09:24)       Admitted on: 03-15-23    We are following the patient for elevated LFT      Interval History:  c/o joint pains in th wrist, elbow and knee  denies any abdominal pain, sore throat, nausea, vomiting      PAST MEDICAL & SURGICAL HISTORY:  Alcohol dependence      HTN (hypertension)      Hard of hearing      Seasonal allergies      BPH (benign prostatic hyperplasia)      GERD (gastroesophageal reflux disease)      Pseudogout      No significant past surgical history          MEDICATIONS  (STANDING):  enoxaparin Injectable 120 milliGRAM(s) SubCutaneous every 12 hours  folic acid 1 milliGRAM(s) Oral daily  gabapentin 200 milliGRAM(s) Oral two times a day  loratadine 10 milliGRAM(s) Oral daily  magnesium sulfate  IVPB 2 Gram(s) IV Intermittent once  metoprolol succinate ER 25 milliGRAM(s) Oral daily  multivitamin 1 Tablet(s) Oral daily  naltrexone 50 milliGRAM(s) Oral daily  pantoprazole    Tablet 40 milliGRAM(s) Oral before breakfast  tamsulosin 0.4 milliGRAM(s) Oral at bedtime  thiamine 100 milliGRAM(s) Oral daily  warfarin 5 milliGRAM(s) Oral once    MEDICATIONS  (PRN):  acetaminophen     Tablet .. 650 milliGRAM(s) Oral every 6 hours PRN Temp greater or equal to 38C (100.4F)  LORazepam     Tablet 2 milliGRAM(s) Oral every 2 hours PRN Symptom-triggered 2 point increase in CIWA-Ar      Allergies  Beef (Hives)  dairy products (Hives)  No Known Drug Allergies  shellfish (Hives)      Review of Systems:   Cardiovascular:  No Chest Pain, No Palpitations  Respiratory:  No Cough, No Dyspnea  Gastrointestinal:  As described in HPI    Physical Examination:  T(C): 37.9 (03-23-23 @ 06:34), Max: 38.4 (03-23-23 @ 04:56)  HR: 91 (03-23-23 @ 04:56) (91 - 101)  BP: 152/70 (03-23-23 @ 04:56) (114/67 - 159/77)  RR: 20 (03-23-23 @ 04:56) (18 - 20)  SpO2: 96% (03-23-23 @ 04:56) (96% - 100%)      Constitutional: No acute distress.  Respiratory:  No signs of respiratory distress. Lung sounds are clear bilaterally.  Cardiovascular:  S1 S2, Regular rate and rhythm.  Abdominal: Abdomen is soft, symmetric, and non-tender without distention.         Data:                        9.9    5.21  )-----------( 223      ( 23 Mar 2023 06:27 )             28.8     Hgb trend:  9.9  03-23-23 @ 06:27  10.3  03-22-23 @ 08:51  10.7  03-21-23 @ 07:08      03-23    132<L>  |  97<L>  |  17  ----------------------------<  135<H>  3.9   |  22  |  1.1    Ca    9.5      23 Mar 2023 06:27    TPro  x   /  Alb  x   /  TBili  2.2<H>  /  DBili  1.1<H>  /  AST  x   /  ALT  x   /  AlkPhos  x   03-23    Liver panel trend:  TBili 2.2   /   AST --   /   ALT --   /   AlkP --   /   Tptn --   /   Alb --    /   DBili 1.1      03-23  TBili 2.2   /   AST 66   /      /   AlkP 101   /   Tptn 6.3   /   Alb 3.6    /   DBili --      03-23  TBili 2.5   /      /      /   AlkP 121   /   Tptn 6.5   /   Alb 3.8    /   DBili --      03-22  TBili 3.9   /      /      /   AlkP 125   /   Tptn 5.9   /   Alb 3.4    /   DBili --      03-21  TBili 2.5   /      /   ALT 68   /   AlkP 86   /   Tptn 6.2   /   Alb 3.8    /   DBili --      03-18  TBili 2.3   /      /   ALT 45   /   AlkP 101   /   Tptn 7.2   /   Alb 4.6    /   DBili 0.8      03-16  TBili 2.0   /      /   ALT 55   /   AlkP 113   /   Tptn 7.7   /   Alb 4.9    /   DBili --      03-15          Culture - Urine (collected 21 Mar 2023 22:04)  Source: Clean Catch Clean Catch (Midstream)  Preliminary Report (23 Mar 2023 11:18):    10,000 - 49,000 CFU/mL Gram Positive Cocci in Pairs and Chains         Radiology:

## 2023-03-23 NOTE — PROGRESS NOTE ADULT - ASSESSMENT
63yo male with hx DVT (12/2022) and PE on Coumadin, AUD (>20 years of etoh abuse with 6 packs of beer daily - last use on 3/15), pseudogout, GERD, and BPH presenting from [facility] for epistaxis x4 days in setting of supratherapeutic INR. Pt reports he is given medications from nurses at his residence. States last bloodwork done was several weeks ago.  Admits to lightheadedness. Denies other sites of bleeding, denies ecchymosis.  Pt was incidentally noted to have new diagnosis of cirrhosis based on biochemistry and imaging and hepatology was consulted for management. 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 Normal RV function and no valvulopathies  US abd 3/16: Nodular liver. CBD 5mm no ascites or other signs of portal HTN.  US abd 3/19: steatosis, no ascites    -MELD Na score 15 (3/21)    Recs:   EGD as an OP   Follow up with hepatology as an OP for cirrhosis and HCC screening   tylenol upto max 2gm/day   alcohol cessation     #)HCC screening: Please f/u as outpatient for US abdomen and AFP every 6 months.  #)Lifestyle/Dietary modifications; Calorie intake 25-40 Kcal/kg/day, Protein intake: 1.2-1.5 gm/kg/day, Avoid smoking, alcohol, NSAIDS.  #)Vaccinations: Please f/u in clinic for being uptodate with HAV/HBV/Influenza/Pneumococcal vaccines.  #)LT evaluation: Social status: lives at [facility]. no family and unemployed    #)Alcoholic Hepatitis/Elevated liver enzymes mixed pattern with hyperbilurubinemia likely due to alcoholic hepatitis and cirrhosis less likely biliary obstruction (no abd pain, no WBC count, CBD 6mm in US) vs ?DILI (bactrim on 3/15)  -LFT at the time of admission 2/113/183/55  -LFT 3/21: 3.9/125/499/289 downtrending   -received only bactrim on 3/15, no other abx  -Denies any Abx before hospitalization, denies any herbal or OTC medication   -Denies any abdominal pain  -MDF 12.2 today (<32 no indications for steroids)  -US abd 3/19 CBD 6mm  -CLD workup - Hepatitis A, B and C negative, EBV IgM positive, IRA negative, HSV negative     Recs:   - Trend LFT, INR   - etoh cessation counselling provided  - Thiamine and Folate  - will follow rest of the CLD work up   - check EBV PCR given IgM positive   - suggest rheumatology evaluation given joint pains, fever

## 2023-03-23 NOTE — PROGRESS NOTE ADULT - ATTENDING COMMENTS
62 y o  M with AUD MISTY cirrhosis PE on AC admitted with epistaxis and supratherapeutic INR.  Drinks a pint of beer daily. No DUI. Alcohol rehab few years ago. Worked in [employer]? Retired. Lives in care home. No family support locally.   Liver test were downtrending and had rise again on 3/21 with T bili 3.9 AST 400s ALT 200s and falling again. Single no kids.   Fever. Joint pains wrist elbow small joint knees. No sore throat.   No icterus.  Abdomen soft non tender No ascites  No asterixis   Warm tender swollen wrist elbow small joints and knee  EBV Ig G Ig M +  Febrile illness with joint pains - ? EBV infection Unusual no pharyngeal illness and has symmetrical arthritis unusual for gout.  Pseudogout  could be cause of fever.  Decompensated MISTY cirrhosis MELD Na 17 ( 44 at admission with supra-therapeutic INR  alcoholic hepatitis with jaundice  High MDF due to high INR from warfarin. T bili dropping again no need for steroids  Please check EBV DNA.   Consider rheumatology evaluation - ? joint aspiration  Alcohol rehab  Not a likely LT candidate with poor social support but will review on OP follow up.

## 2023-03-23 NOTE — PROGRESS NOTE ADULT - ATTENDING COMMENTS
still febrile.   LFTs high but improving  EBV +. Quantify with PCR.   symptomatic management. No pharyngeal symptoms. No cervical LNpathy. No splenomegaly on exam.     3/23: Coumadin 5 tonight.

## 2023-03-23 NOTE — PROGRESS NOTE ADULT - SUBJECTIVE AND OBJECTIVE BOX
CAT ALSTON 62y Male  MRN#: [id number]   Hospital Day: 8d    SUBJECTIVE  Patient is a 62y old Male who presents with a chief complaint of cirrhosis (22 Mar 2023 09:24)  Currently admitted to medicine with the primary diagnosis of Epistaxis      INTERVAL HPI AND OVERNIGHT EVENTS:  Patient was examined and seen at bedside. This morning he is resting comfortably in bed and reports no issues or overnight events.    OBJECTIVE  PAST MEDICAL & SURGICAL HISTORY  Alcohol dependence    HTN (hypertension)    Hard of hearing    Seasonal allergies    BPH (benign prostatic hyperplasia)    GERD (gastroesophageal reflux disease)    Pseudogout    No significant past surgical history      ALLERGIES:  Beef (Hives)  dairy products (Hives)  No Known Drug Allergies  shellfish (Hives)    MEDICATIONS:  STANDING MEDICATIONS  enoxaparin Injectable 120 milliGRAM(s) SubCutaneous every 12 hours  folic acid 1 milliGRAM(s) Oral daily  gabapentin 200 milliGRAM(s) Oral two times a day  loratadine 10 milliGRAM(s) Oral daily  magnesium sulfate  IVPB 2 Gram(s) IV Intermittent once  metoprolol succinate ER 25 milliGRAM(s) Oral daily  multivitamin 1 Tablet(s) Oral daily  naltrexone 50 milliGRAM(s) Oral daily  pantoprazole    Tablet 40 milliGRAM(s) Oral before breakfast  tamsulosin 0.4 milliGRAM(s) Oral at bedtime  thiamine 100 milliGRAM(s) Oral daily    PRN MEDICATIONS  acetaminophen     Tablet .. 650 milliGRAM(s) Oral every 6 hours PRN  LORazepam     Tablet 2 milliGRAM(s) Oral every 2 hours PRN      VITAL SIGNS: Last 24 Hours  T(C): 37.9 (23 Mar 2023 06:34), Max: 38.4 (23 Mar 2023 04:56)  T(F): 100.3 (23 Mar 2023 06:34), Max: 101.2 (23 Mar 2023 04:56)  HR: 91 (23 Mar 2023 04:56) (91 - 101)  BP: 152/70 (23 Mar 2023 04:56) (114/67 - 159/77)  BP(mean): --  RR: 20 (23 Mar 2023 04:56) (18 - 20)  SpO2: 96% (23 Mar 2023 04:56) (96% - 100%)    LABS:                        9.9    5.21  )-----------( 223      ( 23 Mar 2023 06:27 )             28.8     03-23    132<L>  |  97<L>  |  17  ----------------------------<  135<H>  3.9   |  22  |  1.1    Ca    9.5      23 Mar 2023 06:27    TPro  6.3  /  Alb  3.6  /  TBili  2.2<H>  /  DBili  x   /  AST  66<H>  /  ALT  119<H>  /  AlkPhos  101        Urinalysis Basic - ( 21 Mar 2023 22:04 )    Color: Nora / Appearance: Clear / S.028 / pH: x  Gluc: x / Ketone: Negative  / Bili: Small / Urobili: 6 mg/dL   Blood: x / Protein: 30 mg/dL / Nitrite: Negative   Leuk Esterase: Negative / RBC: 6 /HPF / WBC 2 /HPF   Sq Epi: x / Non Sq Epi: 1 /HPF / Bacteria: Negative        Sedimentation Rate, Erythrocyte: 124 mm/Hr *H* (23 @ 11:43)          RADIOLOGY:      PHYSICAL EXAM:  CONSTITUTIONAL: No acute distress, AAOx3  HEAD: Atraumatic, normocephalic  EYES: EOM intact, PERRLA, conjunctiva and sclera clear  ENT: moist mucous membranes  PULMONARY: Clear to auscultation bilaterally  CARDIOVASCULAR: Regular rate and rhythm  GASTROINTESTINAL: Soft, non-tender, non-distended; bowel sounds present  MUSCULOSKELETAL: no edema  NEUROLOGY: non-focal  SKIN: warm and dry    ASSESSMENT and PLAN  63 yo M with hx of PE/DVT (on coumadin), Alcohol abuse (drink a pint of vodka daily), HTN, GERD, BPH and Pseudogout presents to ED for epistaxis x 4 days.       #Transaminitis  - ALT 68 --> 289 ;  --> 499 ; Blilirubin 2.5 --> 3.9 ; Alk phos 86 -->125  - Urine dark. Patient complained of vague myalgias and difficulty ambulating  - Unclear reason for the worsening. No hepatotoxic medications  - Liver ultrasound does without new findings beyond known cirrhosis. No evidence of cholestatic obstructive pattern.   - GI consulted. recommeded  HAV IgG, HBsAb, HBcAb total, Anti HEV, Serum Ferritin, Transferrin Saturation, Ceruloplasmin level, IRA, SMA, immunoglobulins panel, AMA, Anti LK microsomal Ab, anti-soluble liver Ag, EBV, HSV PCR, CMV PCR, IgM ACE levels   - Hepatology following. Hepatology workup negative so far.   - Soft abdomen on exam  -Pt continues to have recurrent fevers and episodes of tachycardia at night without leukocytosis.   - Fractioned bilirrubin/EST/CRP/Procal/RVP/COVID ordered to r/o potential infectious causes     #Epistaxis likely 2/2 supratherapeutic INR  - Resolved  - INR 1.38 3/20  - s/p vit K 5 mg x1 given in ED.   - On Therapeutic Lovenox, Coumadin on hold.   - Pt denies taking more coumadin than prescribed.    #Acute DVT in L Popliteal vein  - On therapeutic lovenox   - Pt diagnosed w/ acute b/l DVT's on 22. Subsequently started on Eliquis w/ good adherence. However, readmitted on 22 after feeling unwell and found to have extensive acute bilateral PE - unclear if unprovoked, unclear if failure of Eliquis, was switched to Lovenox per Heme-Onc and bridged to coumadin as outpatient.  - US showing chronic R leg DVT and acute L leg DVT  - Heme-onc following    #Fever  - 101.6 T max on 3/19; 101.2 on yesterday.   - RVP (-), Sputum Cx, MRSA swab (-), UA (-) , BCx (-)  UCx sent f/u  - BCx negative on 3/18 if fevers persist will re-send   - Non-Contrast CT chest unremarkable    #Atypical Chest pain   - Resolved  - EKG NSR. No significant ST/T wave changes.   - Troponin (-)  - Low suspicion for cardiac etiology of pain    #Thrombocytopenia   - Stable   - check peripheral smear  - US abdomen showing Nodular contour consistent with cirrhosis  - Hepatology consulted. Outpatient f/u   - hepatitis panel (-) and HIV (-)   - monitor CBC     #Alcohol abuse/ Suspected thiamine and folate deficiency  - [facility] protocol  - c/w thiamine, folic acid and multivitamin   - CATCH team.     #Hx of pseudogout  - Not on a flare  - Will check inflamm markers if upper extremety pains persist. Patient states that is not the same pain he felt on prior flare ups   - supportive care. Outpatient follow up.     #HTN   - c/w home med    #GERD   - PPI     #BPH   - c/w home med    DVT ppx: Lovenox  GI ppx: PPI  Diet: DASH diet  Activity: as tolerated.             CAT ALSTON 62y Male  MRN#: [id number]   Hospital Day: 8d    SUBJECTIVE  Patient is a 62y old Male who presents with a chief complaint of cirrhosis (22 Mar 2023 09:24)  Currently admitted to medicine with the primary diagnosis of Epistaxis      INTERVAL HPI AND OVERNIGHT EVENTS:  Patient was examined and seen at bedside. This morning he is resting comfortably in bed and reports no issues or overnight events.    OBJECTIVE  PAST MEDICAL & SURGICAL HISTORY  Alcohol dependence    HTN (hypertension)    Hard of hearing    Seasonal allergies    BPH (benign prostatic hyperplasia)    GERD (gastroesophageal reflux disease)    Pseudogout    No significant past surgical history      ALLERGIES:  Beef (Hives)  dairy products (Hives)  No Known Drug Allergies  shellfish (Hives)    MEDICATIONS:  STANDING MEDICATIONS  enoxaparin Injectable 120 milliGRAM(s) SubCutaneous every 12 hours  folic acid 1 milliGRAM(s) Oral daily  gabapentin 200 milliGRAM(s) Oral two times a day  loratadine 10 milliGRAM(s) Oral daily  magnesium sulfate  IVPB 2 Gram(s) IV Intermittent once  metoprolol succinate ER 25 milliGRAM(s) Oral daily  multivitamin 1 Tablet(s) Oral daily  naltrexone 50 milliGRAM(s) Oral daily  pantoprazole    Tablet 40 milliGRAM(s) Oral before breakfast  tamsulosin 0.4 milliGRAM(s) Oral at bedtime  thiamine 100 milliGRAM(s) Oral daily    PRN MEDICATIONS  acetaminophen     Tablet .. 650 milliGRAM(s) Oral every 6 hours PRN  LORazepam     Tablet 2 milliGRAM(s) Oral every 2 hours PRN      VITAL SIGNS: Last 24 Hours  T(C): 37.9 (23 Mar 2023 06:34), Max: 38.4 (23 Mar 2023 04:56)  T(F): 100.3 (23 Mar 2023 06:34), Max: 101.2 (23 Mar 2023 04:56)  HR: 91 (23 Mar 2023 04:56) (91 - 101)  BP: 152/70 (23 Mar 2023 04:56) (114/67 - 159/77)  BP(mean): --  RR: 20 (23 Mar 2023 04:56) (18 - 20)  SpO2: 96% (23 Mar 2023 04:56) (96% - 100%)    LABS:                        9.9    5.21  )-----------( 223      ( 23 Mar 2023 06:27 )             28.8     03-23    132<L>  |  97<L>  |  17  ----------------------------<  135<H>  3.9   |  22  |  1.1    Ca    9.5      23 Mar 2023 06:27    TPro  6.3  /  Alb  3.6  /  TBili  2.2<H>  /  DBili  x   /  AST  66<H>  /  ALT  119<H>  /  AlkPhos  101        Urinalysis Basic - ( 21 Mar 2023 22:04 )    Color: Nora / Appearance: Clear / S.028 / pH: x  Gluc: x / Ketone: Negative  / Bili: Small / Urobili: 6 mg/dL   Blood: x / Protein: 30 mg/dL / Nitrite: Negative   Leuk Esterase: Negative / RBC: 6 /HPF / WBC 2 /HPF   Sq Epi: x / Non Sq Epi: 1 /HPF / Bacteria: Negative        Sedimentation Rate, Erythrocyte: 124 mm/Hr *H* (23 @ 11:43)          RADIOLOGY:      PHYSICAL EXAM:  CONSTITUTIONAL: No acute distress, AAOx3  HEAD: Atraumatic, normocephalic  EYES: EOM intact, PERRLA, conjunctiva and sclera clear  ENT: moist mucous membranes  PULMONARY: Clear to auscultation bilaterally  CARDIOVASCULAR: Regular rate and rhythm  GASTROINTESTINAL: Soft, non-tender, non-distended; bowel sounds present  MUSCULOSKELETAL: no edema  NEUROLOGY: non-focal  SKIN: warm and dry    ASSESSMENT and PLAN  61 yo M with hx of PE/DVT (on coumadin), Alcohol abuse (drink a pint of vodka daily), HTN, GERD, BPH and Pseudogout presents to ED for epistaxis x 4 days.       #Transaminitis  - Improving. Pt back to initial admission levels.   - ALT 68 --> 289 ;  --> 499 ; Blilirubin 2.5 --> 3.9 ; Alk phos 86 -->125  - Urine dark. Patient complained of vague myalgias and difficulty ambulating  - Unclear reason for the worsening. No hepatotoxic medications  - Liver ultrasound does without new findings beyond known cirrhosis. No evidence of cholestatic obstructive pattern.   - GI consulted. recommeded  HAV IgG, HBsAb, HBcAb total, Anti HEV, Serum Ferritin, Transferrin Saturation, Ceruloplasmin level, IRA, SMA, immunoglobulins panel, AMA, Anti LK microsomal Ab, anti-soluble liver Ag, EBV, HSV PCR, CMV PCR, IgM ACE levels   - Hepatology following. Hepatology workup negative so far.   - Soft abdomen on exam  -Pt continues to have recurrent fevers and episodes of tachycardia at night without leukocytosis.   - Fractioned bilirrubin (total 2.2, direct 1.1. indirect 1.1)    #Epistaxis likely 2/2 supratherapeutic INR  - Resolved  - INR 1.38 3/20  - s/p vit K 5 mg x1 given in ED.   - On Therapeutic Lovenox, Coumadin on hold.   - Pt denies taking more coumadin than prescribed.  - Coumadin 5mg ordered for tonight. Daily INR's     #Acute DVT in L Popliteal vein  - On therapeutic lovenox   - Pt diagnosed w/ acute b/l DVT's on 22. Subsequently started on Eliquis w/ good adherence. However, readmitted on 22 after feeling unwell and found to have extensive acute bilateral PE - unclear if unprovoked, unclear if failure of Eliquis, was switched to Lovenox per Heme-Onc and bridged to coumadin as outpatient.  - US showing chronic R leg DVT and acute L leg DVT  - Heme-onc following    #Fever  - EBV (+). This explains new onset myalgias and transaminitis, still having low grade fever at night. Supportive care for now   - 101.6 T max on 3/19; 101.2 on yesterday.   - RVP (-), Sputum Cx, MRSA swab (-), UA (-) , BCx (-)    - BCx negative on 3/18 if fevers persist will re-send   - Non-Contrast CT chest unremarkable  - Procal 0.03  - CRP 75.9    #Atypical Chest pain   - Resolved  - EKG NSR. No significant ST/T wave changes.   - Troponin (-)  - Low suspicion for cardiac etiology of pain    #Thrombocytopenia   - Stable   - check peripheral smear  - US abdomen showing Nodular contour consistent with cirrhosis  - Hepatology consulted. Outpatient f/u   - hepatitis panel (-) and HIV (-)   - monitor CBC     #Alcohol abuse/ Suspected thiamine and folate deficiency  - [facility] protocol  - c/w thiamine, folic acid and multivitamin   - CATCH team.     #Hx of pseudogout  - Not on a flare  - Will check inflamm markers if upper extremety pains persist. Patient states that is not the same pain he felt on prior flare ups   - supportive care. Outpatient follow up.     #HTN   - c/w home med    #GERD   - PPI     #BPH   - c/w home med    DVT ppx: Lovenox  GI ppx: PPI  Diet: DASH diet  Activity: as tolerated.

## 2023-03-24 LAB
-  AMPICILLIN: SIGNIFICANT CHANGE UP
-  CIPROFLOXACIN: SIGNIFICANT CHANGE UP
-  LEVOFLOXACIN: SIGNIFICANT CHANGE UP
-  NITROFURANTOIN: SIGNIFICANT CHANGE UP
-  TETRACYCLINE: SIGNIFICANT CHANGE UP
-  VANCOMYCIN: SIGNIFICANT CHANGE UP
ALBUMIN SERPL ELPH-MCNC: 3.6 G/DL — SIGNIFICANT CHANGE UP (ref 3.5–5.2)
ALP SERPL-CCNC: 88 U/L — SIGNIFICANT CHANGE UP (ref 30–115)
ALT FLD-CCNC: 76 U/L — HIGH (ref 0–41)
ANION GAP SERPL CALC-SCNC: 12 MMOL/L — SIGNIFICANT CHANGE UP (ref 7–14)
AST SERPL-CCNC: 35 U/L — SIGNIFICANT CHANGE UP (ref 0–41)
BASOPHILS # BLD AUTO: 0.08 K/UL — SIGNIFICANT CHANGE UP (ref 0–0.2)
BASOPHILS NFR BLD AUTO: 1.2 % — HIGH (ref 0–1)
BILIRUB SERPL-MCNC: 1.7 MG/DL — HIGH (ref 0.2–1.2)
BUN SERPL-MCNC: 15 MG/DL — SIGNIFICANT CHANGE UP (ref 10–20)
CALCIUM SERPL-MCNC: 9.6 MG/DL — SIGNIFICANT CHANGE UP (ref 8.4–10.5)
CHLORIDE SERPL-SCNC: 98 MMOL/L — SIGNIFICANT CHANGE UP (ref 98–110)
CMV DNA CSF QL NAA+PROBE: SIGNIFICANT CHANGE UP
CMV DNA SPEC NAA+PROBE-LOG#: SIGNIFICANT CHANGE UP LOG10IU/ML
CO2 SERPL-SCNC: 24 MMOL/L — SIGNIFICANT CHANGE UP (ref 17–32)
CREAT SERPL-MCNC: 1 MG/DL — SIGNIFICANT CHANGE UP (ref 0.7–1.5)
CULTURE RESULTS: SIGNIFICANT CHANGE UP
CULTURE RESULTS: SIGNIFICANT CHANGE UP
EBV DNA SERPL NAA+PROBE-ACNC: SIGNIFICANT CHANGE UP IU/ML
EBVPCR LOG: SIGNIFICANT CHANGE UP LOG10IU/ML
EGFR: 85 ML/MIN/1.73M2 — SIGNIFICANT CHANGE UP
EOSINOPHIL # BLD AUTO: 0.05 K/UL — SIGNIFICANT CHANGE UP (ref 0–0.7)
EOSINOPHIL NFR BLD AUTO: 0.7 % — SIGNIFICANT CHANGE UP (ref 0–8)
GLUCOSE SERPL-MCNC: 108 MG/DL — HIGH (ref 70–99)
HCT VFR BLD CALC: 29.8 % — LOW (ref 42–52)
HEV AB FLD QL: POSITIVE
HGB BLD-MCNC: 10.1 G/DL — LOW (ref 14–18)
IMM GRANULOCYTES NFR BLD AUTO: 1.8 % — HIGH (ref 0.1–0.3)
INR BLD: 1.2 RATIO — SIGNIFICANT CHANGE UP (ref 0.65–1.3)
LKM AB SER-ACNC: <20.1 UNITS — SIGNIFICANT CHANGE UP (ref 0–20)
LYMPHOCYTES # BLD AUTO: 1.35 K/UL — SIGNIFICANT CHANGE UP (ref 1.2–3.4)
LYMPHOCYTES # BLD AUTO: 19.8 % — LOW (ref 20.5–51.1)
MCHC RBC-ENTMCNC: 33.9 G/DL — SIGNIFICANT CHANGE UP (ref 32–37)
MCHC RBC-ENTMCNC: 34.4 PG — HIGH (ref 27–31)
MCV RBC AUTO: 101.4 FL — HIGH (ref 80–94)
METHOD TYPE: SIGNIFICANT CHANGE UP
MONOCYTES # BLD AUTO: 0.95 K/UL — HIGH (ref 0.1–0.6)
MONOCYTES NFR BLD AUTO: 13.9 % — HIGH (ref 1.7–9.3)
NEUTROPHILS # BLD AUTO: 4.27 K/UL — SIGNIFICANT CHANGE UP (ref 1.4–6.5)
NEUTROPHILS NFR BLD AUTO: 62.6 % — SIGNIFICANT CHANGE UP (ref 42.2–75.2)
NRBC # BLD: 0 /100 WBCS — SIGNIFICANT CHANGE UP (ref 0–0)
ORGANISM # SPEC MICROSCOPIC CNT: SIGNIFICANT CHANGE UP
ORGANISM # SPEC MICROSCOPIC CNT: SIGNIFICANT CHANGE UP
PLATELET # BLD AUTO: 247 K/UL — SIGNIFICANT CHANGE UP (ref 130–400)
POTASSIUM SERPL-MCNC: 4.1 MMOL/L — SIGNIFICANT CHANGE UP (ref 3.5–5)
POTASSIUM SERPL-SCNC: 4.1 MMOL/L — SIGNIFICANT CHANGE UP (ref 3.5–5)
PROT SERPL-MCNC: 6.5 G/DL — SIGNIFICANT CHANGE UP (ref 6–8)
PROTHROM AB SERPL-ACNC: 13.7 SEC — HIGH (ref 9.95–12.87)
RBC # BLD: 2.94 M/UL — LOW (ref 4.7–6.1)
RBC # FLD: 15.2 % — HIGH (ref 11.5–14.5)
SODIUM SERPL-SCNC: 134 MMOL/L — LOW (ref 135–146)
SPECIMEN SOURCE: SIGNIFICANT CHANGE UP
SPECIMEN SOURCE: SIGNIFICANT CHANGE UP
WBC # BLD: 6.82 K/UL — SIGNIFICANT CHANGE UP (ref 4.8–10.8)
WBC # FLD AUTO: 6.82 K/UL — SIGNIFICANT CHANGE UP (ref 4.8–10.8)

## 2023-03-24 PROCEDURE — 99233 SBSQ HOSP IP/OBS HIGH 50: CPT

## 2023-03-24 RX ORDER — WARFARIN SODIUM 2.5 MG/1
5 TABLET ORAL ONCE
Refills: 0 | Status: DISCONTINUED | OUTPATIENT
Start: 2023-03-24 | End: 2023-03-25

## 2023-03-24 RX ADMIN — Medication 1 MILLIGRAM(S): at 12:03

## 2023-03-24 RX ADMIN — Medication 100 MILLIGRAM(S): at 12:04

## 2023-03-24 RX ADMIN — ENOXAPARIN SODIUM 120 MILLIGRAM(S): 100 INJECTION SUBCUTANEOUS at 05:05

## 2023-03-24 RX ADMIN — TAMSULOSIN HYDROCHLORIDE 0.4 MILLIGRAM(S): 0.4 CAPSULE ORAL at 21:23

## 2023-03-24 RX ADMIN — Medication 1 TABLET(S): at 12:03

## 2023-03-24 RX ADMIN — NALTREXONE HYDROCHLORIDE 50 MILLIGRAM(S): 50 TABLET, FILM COATED ORAL at 12:08

## 2023-03-24 RX ADMIN — Medication 25 MILLIGRAM(S): at 05:04

## 2023-03-24 RX ADMIN — Medication 60 MILLIGRAM(S): at 16:06

## 2023-03-24 RX ADMIN — GABAPENTIN 200 MILLIGRAM(S): 400 CAPSULE ORAL at 17:44

## 2023-03-24 RX ADMIN — GABAPENTIN 200 MILLIGRAM(S): 400 CAPSULE ORAL at 05:04

## 2023-03-24 RX ADMIN — PANTOPRAZOLE SODIUM 40 MILLIGRAM(S): 20 TABLET, DELAYED RELEASE ORAL at 05:05

## 2023-03-24 RX ADMIN — Medication 650 MILLIGRAM(S): at 02:03

## 2023-03-24 RX ADMIN — ENOXAPARIN SODIUM 120 MILLIGRAM(S): 100 INJECTION SUBCUTANEOUS at 17:44

## 2023-03-24 RX ADMIN — WARFARIN SODIUM 5 MILLIGRAM(S): 2.5 TABLET ORAL at 21:23

## 2023-03-24 RX ADMIN — LORATADINE 10 MILLIGRAM(S): 10 TABLET ORAL at 12:03

## 2023-03-24 RX ADMIN — Medication 650 MILLIGRAM(S): at 01:33

## 2023-03-24 NOTE — PROGRESS NOTE ADULT - REASON FOR ADMISSION
epistaxis
cirrhosis
epistaxis
epistaxis
epistaxis, coumadin toxicity
hemoptysis
epistaxis

## 2023-03-24 NOTE — PROGRESS NOTE ADULT - SUBJECTIVE AND OBJECTIVE BOX
CAT ALSTON 62y Male  MRN#: [id number]   Hospital Day: 9d    SUBJECTIVE  Patient is a 62y old Male who presents with a chief complaint of epistaxis (23 Mar 2023 11:57)  Currently admitted to medicine with the primary diagnosis of Epistaxis      INTERVAL HPI AND OVERNIGHT EVENTS:  Patient was examined and seen at bedside. This morning he is resting comfortably in bed and reports no issues or overnight events.    OBJECTIVE  PAST MEDICAL & SURGICAL HISTORY  Alcohol dependence    HTN (hypertension)    Hard of hearing    Seasonal allergies    BPH (benign prostatic hyperplasia)    GERD (gastroesophageal reflux disease)    Pseudogout    No significant past surgical history      ALLERGIES:  Beef (Hives)  dairy products (Hives)  No Known Drug Allergies  shellfish (Hives)    MEDICATIONS:  STANDING MEDICATIONS  enoxaparin Injectable 120 milliGRAM(s) SubCutaneous every 12 hours  folic acid 1 milliGRAM(s) Oral daily  gabapentin 200 milliGRAM(s) Oral two times a day  loratadine 10 milliGRAM(s) Oral daily  magnesium sulfate  IVPB 2 Gram(s) IV Intermittent once  metoprolol succinate ER 25 milliGRAM(s) Oral daily  multivitamin 1 Tablet(s) Oral daily  naltrexone 50 milliGRAM(s) Oral daily  pantoprazole    Tablet 40 milliGRAM(s) Oral before breakfast  tamsulosin 0.4 milliGRAM(s) Oral at bedtime  thiamine 100 milliGRAM(s) Oral daily  warfarin 5 milliGRAM(s) Oral once    PRN MEDICATIONS  acetaminophen     Tablet .. 650 milliGRAM(s) Oral every 6 hours PRN      VITAL SIGNS: Last 24 Hours  T(C): 37.5 (24 Mar 2023 05:15), Max: 38.6 (23 Mar 2023 12:14)  T(F): 99.5 (24 Mar 2023 05:15), Max: 101.5 (23 Mar 2023 12:14)  HR: 78 (24 Mar 2023 05:15) (78 - 100)  BP: 141/72 (24 Mar 2023 05:15) (132/66 - 145/76)  BP(mean): --  RR: 19 (24 Mar 2023 05:15) (18 - 19)  SpO2: 96% (24 Mar 2023 05:15) (96% - 98%)    LABS:                        10.1   6.82  )-----------( 247      ( 24 Mar 2023 06:15 )             29.8     03-24    134<L>  |  98  |  15  ----------------------------<  108<H>  4.1   |  24  |  1.0    Ca    9.6      24 Mar 2023 06:15    TPro  6.5  /  Alb  3.6  /  TBili  1.7<H>  /  DBili  x   /  AST  35  /  ALT  76<H>  /  AlkPhos  88  03-24    PT/INR - ( 24 Mar 2023 06:15 )   PT: 13.70 sec;   INR: 1.20 ratio                   Culture - Urine (collected 21 Mar 2023 22:04)  Source: Clean Catch Clean Catch (Midstream)  Preliminary Report (23 Mar 2023 19:02):    10,000 - 49,000 CFU/mL Enterococcus faecalis          RADIOLOGY:      PHYSICAL EXAM:  ONSTITUTIONAL: No acute distress, AAOx3  HEAD: Atraumatic, normocephalic  EYES: EOM intact, PERRLA, conjunctiva and sclera clear  ENT: moist mucous membranes  PULMONARY: Clear to auscultation bilaterally  CARDIOVASCULAR: Regular rate and rhythm  GASTROINTESTINAL: Soft, non-tender, non-distended; bowel sounds present  MUSCULOSKELETAL: no edema  NEUROLOGY: non-focal  SKIN: warm and dry    ASSESSMENT and PLAN  63 yo M with hx of PE/DVT (on coumadin), Alcohol abuse (drink a pint of vodka daily), HTN, GERD, BPH and Pseudogout presents to ED for epistaxis x 4 days.       #Transaminitis  - Resolving (almost back to baseline)   - ALT 68 --> 289 ;  --> 499 ; Blilirubin 2.5 --> 3.9 ; Alk phos 86 -->125  - Urine dark. Patient complained of vague myalgias and difficulty ambulating  - Unclear reason for the worsening. No hepatotoxic medications  - Liver ultrasound does without new findings beyond known cirrhosis. No evidence of cholestatic obstructive pattern.   - GI consulted. recommeded  HAV IgG, HBsAb, HBcAb total, Anti HEV, Serum Ferritin, Transferrin Saturation, Ceruloplasmin level, IRA, SMA, immunoglobulins panel, AMA, Anti LK microsomal Ab, anti-soluble liver Ag, EBV, HSV PCR, CMV PCR, IgM ACE levels   - Hepatology following. Hepatology workup negative so far.   - Soft abdomen on exam  -Pt continues to have recurrent fevers and episodes of tachycardia at night without leukocytosis.   - Fractioned bilirrubin (total 2.2, direct 1.1. indirect 1.1)    #Epistaxis likely 2/2 supratherapeutic INR  - Resolved  - INR 1.38 3/20  - s/p vit K 5 mg x1 given in ED.   - On Therapeutic Lovenox, Coumadin on hold.   - Pt denies taking more coumadin than prescribed.  - Coumadin 5mg ordered for tonight. Daily INR's     #Acute DVT in L Popliteal vein  - On therapeutic lovenox   - Pt diagnosed w/ acute b/l DVT's on 12/8/22. Subsequently started on Eliquis w/ good adherence. However, readmitted on 12/20/22 after feeling unwell and found to have extensive acute bilateral PE - unclear if unprovoked, unclear if failure of Eliquis, was switched to Lovenox per Heme-Onc and bridged to coumadin as outpatient.  - US showing chronic R leg DVT and acute L leg DVT  - Heme-onc following    #Fever  - EBV (+). Could explain onset myalgias and transaminitis, still having low grade fever at night. Supportive care for now   - Does not appear to be on a pseudogout flare up. Considering Rheum consult, will wait until resolution of viral process to avoid confounding factors  - Continues to have L arm pains and R knee pain without obvious signs of inflammation.   - 101.6 T max on 3/19; 101.2 on yesterday.   - RVP (-), Sputum Cx, MRSA swab (-), UA (-) , BCx (-)    - BCx negative on 3/18 if fevers persist will re-send   - Non-Contrast CT chest unremarkable  - Procal 0.03  - CRP 75.9 ;     #Atypical Chest pain   - Resolved  - EKG NSR. No significant ST/T wave changes.   - Troponin (-)  - Low suspicion for cardiac etiology of pain    #Thrombocytopenia   - Stable   - check peripheral smear  - US abdomen showing Nodular contour consistent with cirrhosis  - Hepatology consulted. Outpatient f/u   - hepatitis panel (-) and HIV (-)   - monitor CBC     #Alcohol abuse/ Suspected thiamine and folate deficiency  - [facility] protocol  - c/w thiamine, folic acid and multivitamin   - CATCH team.     #Hx of pseudogout  - Not on a flare  - Will check inflamm markers if upper extremety pains persist. Patient states that is not the same pain he felt on prior flare ups   - supportive care. Outpatient follow up.     #HTN   - c/w home med    #GERD   - PPI     #BPH   - c/w home med    DVT ppx: Lovenox  GI ppx: PPI  Diet: DASH diet  Activity: as tolerated.         CAT ALSTON 62y Male  MRN#: [id number]   Hospital Day: 9d    SUBJECTIVE  Patient is a 62y old Male who presents with a chief complaint of epistaxis (23 Mar 2023 11:57)  Currently admitted to medicine with the primary diagnosis of Epistaxis      INTERVAL HPI AND OVERNIGHT EVENTS:  Patient was examined and seen at bedside. This morning he is resting comfortably in bed and reports no issues or overnight events.    OBJECTIVE  PAST MEDICAL & SURGICAL HISTORY  Alcohol dependence    HTN (hypertension)    Hard of hearing    Seasonal allergies    BPH (benign prostatic hyperplasia)    GERD (gastroesophageal reflux disease)    Pseudogout    No significant past surgical history      ALLERGIES:  Beef (Hives)  dairy products (Hives)  No Known Drug Allergies  shellfish (Hives)    MEDICATIONS:  STANDING MEDICATIONS  enoxaparin Injectable 120 milliGRAM(s) SubCutaneous every 12 hours  folic acid 1 milliGRAM(s) Oral daily  gabapentin 200 milliGRAM(s) Oral two times a day  loratadine 10 milliGRAM(s) Oral daily  magnesium sulfate  IVPB 2 Gram(s) IV Intermittent once  metoprolol succinate ER 25 milliGRAM(s) Oral daily  multivitamin 1 Tablet(s) Oral daily  naltrexone 50 milliGRAM(s) Oral daily  pantoprazole    Tablet 40 milliGRAM(s) Oral before breakfast  tamsulosin 0.4 milliGRAM(s) Oral at bedtime  thiamine 100 milliGRAM(s) Oral daily  warfarin 5 milliGRAM(s) Oral once    PRN MEDICATIONS  acetaminophen     Tablet .. 650 milliGRAM(s) Oral every 6 hours PRN      VITAL SIGNS: Last 24 Hours  T(C): 37.5 (24 Mar 2023 05:15), Max: 38.6 (23 Mar 2023 12:14)  T(F): 99.5 (24 Mar 2023 05:15), Max: 101.5 (23 Mar 2023 12:14)  HR: 78 (24 Mar 2023 05:15) (78 - 100)  BP: 141/72 (24 Mar 2023 05:15) (132/66 - 145/76)  BP(mean): --  RR: 19 (24 Mar 2023 05:15) (18 - 19)  SpO2: 96% (24 Mar 2023 05:15) (96% - 98%)    LABS:                        10.1   6.82  )-----------( 247      ( 24 Mar 2023 06:15 )             29.8     03-24    134<L>  |  98  |  15  ----------------------------<  108<H>  4.1   |  24  |  1.0    Ca    9.6      24 Mar 2023 06:15    TPro  6.5  /  Alb  3.6  /  TBili  1.7<H>  /  DBili  x   /  AST  35  /  ALT  76<H>  /  AlkPhos  88  03-24    PT/INR - ( 24 Mar 2023 06:15 )   PT: 13.70 sec;   INR: 1.20 ratio                   Culture - Urine (collected 21 Mar 2023 22:04)  Source: Clean Catch Clean Catch (Midstream)  Preliminary Report (23 Mar 2023 19:02):    10,000 - 49,000 CFU/mL Enterococcus faecalis          RADIOLOGY:      PHYSICAL EXAM:  ONSTITUTIONAL: No acute distress, AAOx3  HEAD: Atraumatic, normocephalic  EYES: EOM intact, PERRLA, conjunctiva and sclera clear  ENT: moist mucous membranes  PULMONARY: Clear to auscultation bilaterally  CARDIOVASCULAR: Regular rate and rhythm  GASTROINTESTINAL: Soft, non-tender, non-distended; bowel sounds present  MUSCULOSKELETAL: no edema  NEUROLOGY: non-focal  SKIN: warm and dry    ASSESSMENT and PLAN  63 yo M with hx of PE/DVT (on coumadin), Alcohol abuse (drink a pint of vodka daily), HTN, GERD, BPH and Pseudogout presents to ED for epistaxis x 4 days.       #Transaminitis  - Resolving (almost back to baseline)   - ALT 68 --> 289 ;  --> 499 ; Blilirubin 2.5 --> 3.9 ; Alk phos 86 -->125  - Urine dark. Patient complained of vague myalgias and difficulty ambulating  - Unclear reason for the worsening. No hepatotoxic medications  - Liver ultrasound does without new findings beyond known cirrhosis. No evidence of cholestatic obstructive pattern.   - GI consulted. recommeded  HAV IgG, HBsAb, HBcAb total, Anti HEV, Serum Ferritin, Transferrin Saturation, Ceruloplasmin level, IRA, SMA, immunoglobulins panel, AMA, Anti LK microsomal Ab, anti-soluble liver Ag, EBV, HSV PCR, CMV PCR, IgM ACE levels   - Hepatology following. Hepatology workup negative so far.   - Soft abdomen on exam  -Pt continues to have recurrent fevers and episodes of tachycardia at night without leukocytosis.   - Fractioned bilirrubin (total 2.2, direct 1.1. indirect 1.1)    #Epistaxis likely 2/2 supratherapeutic INR  - Resolved  - INR 1.38 3/20  - s/p vit K 5 mg x1 given in ED.   - On Therapeutic Lovenox, Coumadin on hold.   - Pt denies taking more coumadin than prescribed.  - Coumadin 5mg ordered for tonight. Daily INR's     #Acute DVT in L Popliteal vein  - On therapeutic lovenox   - Pt diagnosed w/ acute b/l DVT's on 12/8/22. Subsequently started on Eliquis w/ good adherence. However, readmitted on 12/20/22 after feeling unwell and found to have extensive acute bilateral PE - unclear if unprovoked, unclear if failure of Eliquis, was switched to Lovenox per Heme-Onc and bridged to coumadin as outpatient.  - US showing chronic R leg DVT and acute L leg DVT  - Heme-onc following    #Fever  - EBV (+). Could explain onset myalgias and transaminitis, still having low grade fever at night. Supportive care for now   - Bilateral tender joints. Likely pseudogout flare up. Started on prednisone 60mg PO once daily    - 101.6 T max on 3/19; 101.2 on yesterday.   - RVP (-), Sputum Cx, MRSA swab (-), UA (-) , BCx (-)    - BCx negative on 3/18 if fevers persist will re-send   - Non-Contrast CT chest unremarkable  - Procal 0.03  - CRP 75.9 ;     #Atypical Chest pain   - Resolved  - EKG NSR. No significant ST/T wave changes.   - Troponin (-)  - Low suspicion for cardiac etiology of pain    #Thrombocytopenia   - Stable   - check peripheral smear  - US abdomen showing Nodular contour consistent with cirrhosis  - Hepatology consulted. Outpatient f/u   - hepatitis panel (-) and HIV (-)   - monitor CBC     #Alcohol abuse/ Suspected thiamine and folate deficiency  - [facility] protocol  - c/w thiamine, folic acid and multivitamin   - CATCH team.     #Hx of pseudogout  - Not on a flare  - Will check inflamm markers if upper extremety pains persist. Patient states that is not the same pain he felt on prior flare ups   - supportive care. Outpatient follow up.     #HTN   - c/w home med    #GERD   - PPI     #BPH   - c/w home med    DVT ppx: Lovenox  GI ppx: PPI  Diet: DASH diet  Activity: as tolerated.

## 2023-03-24 NOTE — PROGRESS NOTE ADULT - ATTENDING COMMENTS
62 y o  M with hx of pseudogout AUD MISTY cirrhosis PE on AC admitted with epistaxis and supratherapeutic INR.  Drinks a pint of beer daily. No DUI. Alcohol rehab few years ago. Worked in [employer]? Retired. Lives in care home. No family support locally.   Liver test were downtrending and had rise again on 3/21 with T bili 3.9 AST 400s ALT 200s and falling again. Single no kids.     Fever better. Still has joint pains wrist elbow small joint knees. No sore throat.   No icterus.  Abdomen soft non tender No ascites  No asterixis   Warm tender swollen wrist elbow small joints  of hand and R knee  Erythema of both elbows    EBV Ig G Ig M + liver tests improved    Febrile illness with joint pains - due to pseudogout  Symmetrical polyarthritis. CPPD crystals in fluid Oct 2022   ? EBV infection Unusual no pharyngeal illness and has  Decompensated MISTY cirrhosis MELD Na 17 ( 44 at admission with supra-therapeutic INR  alcoholic hepatitis with jaundice    High MDF due to high INR from warfarin. T bili dropping again no need for steroids  Please check EBV DNA.   Consider rheumatology evaluation - ? joint aspiration  Alcohol rehab  Not a likely LT candidate with poor social support but will review on OP follow up.

## 2023-03-24 NOTE — PROGRESS NOTE ADULT - SUBJECTIVE AND OBJECTIVE BOX
Gastroenterology progress note:     Patient is a 62y old  Male who presents with a chief complaint of epistaxis (23 Mar 2023 11:57)       Admitted on: 03-15-23    We are following the patient for elevated LFT      Interval History:  denies any abdominal pain, nausea, vomiting   still having joint pains       PAST MEDICAL & SURGICAL HISTORY:  Alcohol dependence      HTN (hypertension)      Hard of hearing      Seasonal allergies      BPH (benign prostatic hyperplasia)      GERD (gastroesophageal reflux disease)      Pseudogout      No significant past surgical history          MEDICATIONS  (STANDING):  enoxaparin Injectable 120 milliGRAM(s) SubCutaneous every 12 hours  folic acid 1 milliGRAM(s) Oral daily  gabapentin 200 milliGRAM(s) Oral two times a day  loratadine 10 milliGRAM(s) Oral daily  magnesium sulfate  IVPB 2 Gram(s) IV Intermittent once  metoprolol succinate ER 25 milliGRAM(s) Oral daily  multivitamin 1 Tablet(s) Oral daily  naltrexone 50 milliGRAM(s) Oral daily  pantoprazole    Tablet 40 milliGRAM(s) Oral before breakfast  predniSONE   Tablet 60 milliGRAM(s) Oral daily  tamsulosin 0.4 milliGRAM(s) Oral at bedtime  thiamine 100 milliGRAM(s) Oral daily  warfarin 5 milliGRAM(s) Oral once    MEDICATIONS  (PRN):  acetaminophen     Tablet .. 650 milliGRAM(s) Oral every 6 hours PRN Temp greater or equal to 38C (100.4F)      Allergies  Beef (Hives)  dairy products (Hives)  No Known Drug Allergies  shellfish (Hives)      Review of Systems:   Cardiovascular:  No Chest Pain, No Palpitations  Respiratory:  No Cough, No Dyspnea  Gastrointestinal:  As described in HPI    Physical Examination:  T(C): 37.7 (03-24-23 @ 12:47), Max: 38.4 (03-23-23 @ 17:39)  HR: 91 (03-24-23 @ 12:47) (78 - 100)  BP: 148/70 (03-24-23 @ 12:47) (132/66 - 148/70)  RR: 18 (03-24-23 @ 12:47) (18 - 19)  SpO2: 98% (03-24-23 @ 12:47) (96% - 98%)      03-23-23 @ 07:01  -  03-24-23 @ 07:00  --------------------------------------------------------  IN: 0 mL / OUT: 900 mL / NET: -900 mL      Constitutional: No acute distress.  Respiratory:  No signs of respiratory distress. Lung sounds are clear bilaterally.  Cardiovascular:  S1 S2, Regular rate and rhythm.  Abdominal: Abdomen is soft, symmetric, and non-tender without distention.        Data:                        10.1   6.82  )-----------( 247      ( 24 Mar 2023 06:15 )             29.8     Hgb trend:  10.1  03-24-23 @ 06:15  9.9  03-23-23 @ 06:27  10.3  03-22-23 @ 08:51        03-24    134<L>  |  98  |  15  ----------------------------<  108<H>  4.1   |  24  |  1.0    Ca    9.6      24 Mar 2023 06:15    TPro  6.5  /  Alb  3.6  /  TBili  1.7<H>  /  DBili  x   /  AST  35  /  ALT  76<H>  /  AlkPhos  88  03-24    Liver panel trend:  TBili 1.7   /   AST 35   /   ALT 76   /   AlkP 88   /   Tptn 6.5   /   Alb 3.6    /   DBili --      03-24  TBili 2.2   /   AST --   /   ALT --   /   AlkP --   /   Tptn --   /   Alb --    /   DBili 1.1      03-23  TBili 2.2   /   AST 66   /      /   AlkP 101   /   Tptn 6.3   /   Alb 3.6    /   DBili --      03-23  TBili 2.5   /      /      /   AlkP 121   /   Tptn 6.5   /   Alb 3.8    /   DBili --      03-22  TBili 3.9   /      /      /   AlkP 125   /   Tptn 5.9   /   Alb 3.4    /   DBili --      03-21  TBili 2.5   /      /   ALT 68   /   AlkP 86   /   Tptn 6.2   /   Alb 3.8    /   DBili --      03-18  TBili 2.3   /      /   ALT 45   /   AlkP 101   /   Tptn 7.2   /   Alb 4.6    /   DBili 0.8      03-16  TBili 2.0   /      /   ALT 55   /   AlkP 113   /   Tptn 7.7   /   Alb 4.9    /   DBili --      03-15      PT/INR - ( 24 Mar 2023 06:15 )   PT: 13.70 sec;   INR: 1.20 ratio             Culture - Urine (collected 21 Mar 2023 22:04)  Source: Clean Catch Clean Catch (Midstream)  Preliminary Report (23 Mar 2023 19:02):    10,000 - 49,000 CFU/mL Enterococcus faecalis         Radiology:

## 2023-03-24 NOTE — PROGRESS NOTE ADULT - ASSESSMENT
61yo male with hx DVT (12/2022) and PE on Coumadin, AUD (>20 years of etoh abuse with 6 packs of beer daily - last use on 3/15), pseudogout, GERD, and BPH presenting from [facility] for epistaxis x4 days in setting of supratherapeutic INR. Pt reports he is given medications from nurses at his residence. States last bloodwork done was several weeks ago.  Admits to lightheadedness. Denies other sites of bleeding, denies ecchymosis.  Pt was incidentally noted to have new diagnosis of cirrhosis based on biochemistry and imaging and hepatology was consulted for management. 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 Normal RV function and no valvulopathies  US abd 3/16: Nodular liver. CBD 5mm no ascites or other signs of portal HTN.  US abd 3/19: steatosis, no ascites    -MELD Na score 15 (3/21)    Recs:   EGD as an OP   Follow up with hepatology as an OP for cirrhosis and HCC screening   tylenol upto max 2gm/day   alcohol cessation     #)HCC screening: Please f/u as outpatient for US abdomen and AFP every 6 months.  #)Lifestyle/Dietary modifications; Calorie intake 25-40 Kcal/kg/day, Protein intake: 1.2-1.5 gm/kg/day, Avoid smoking, alcohol, NSAIDS.  #)Vaccinations: Please f/u in clinic for being uptodate with HAV/HBV/Influenza/Pneumococcal vaccines.  #)LT evaluation: Social status: lives at [facility]. no family and unemployed    #)Alcoholic Hepatitis/Elevated liver enzymes mixed pattern with hyperbilurubinemia likely due to alcoholic hepatitis and cirrhosis less likely biliary obstruction (no abd pain, no WBC count, CBD 6mm in US) vs ?DILI (bactrim on 3/15)  -LFT at the time of admission 2/113/183/55  -LFT 3/21: 3.9/125/499/289 downtrending   -received only bactrim on 3/15, no other abx  -Denies any Abx before hospitalization, denies any herbal or OTC medication   -Denies any abdominal pain  -MDF 12.2 today (<32 no indications for steroids)  -US abd 3/19 CBD 6mm  -CLD workup - negative except for EBV IgM positive    Recs:   - Trend LFT, INR   - etoh cessation counselling provided  - Thiamine and Folate  - pending EBV PCR  - suggest rheumatology evaluation given joint pains, fever   - Follow up with hepatology as an OP

## 2023-03-24 NOTE — PROGRESS NOTE ADULT - ATTENDING COMMENTS
still febrile.   LFTs high but improving  EBV +. Quantify with PCR.   symptomatic management. No pharyngeal symptoms. No cervical LNpathy. No splenomegaly on exam.     H/o PE: On therapeutic lovenox   Coumadin bridging started:    5 (3/23) > 5mg (3/24) > INR 3/25    Prednisone 0.5 mg/kg for likely pseudogout flare in b/l elbows.

## 2023-03-25 LAB
ANION GAP SERPL CALC-SCNC: 15 MMOL/L — HIGH (ref 7–14)
BASOPHILS # BLD AUTO: 0.03 K/UL — SIGNIFICANT CHANGE UP (ref 0–0.2)
BASOPHILS NFR BLD AUTO: 0.4 % — SIGNIFICANT CHANGE UP (ref 0–1)
BUN SERPL-MCNC: 20 MG/DL — SIGNIFICANT CHANGE UP (ref 10–20)
CALCIUM SERPL-MCNC: 9.7 MG/DL — SIGNIFICANT CHANGE UP (ref 8.4–10.5)
CHLORIDE SERPL-SCNC: 97 MMOL/L — LOW (ref 98–110)
CK SERPL-CCNC: 68 U/L — SIGNIFICANT CHANGE UP (ref 0–225)
CO2 SERPL-SCNC: 22 MMOL/L — SIGNIFICANT CHANGE UP (ref 17–32)
CREAT SERPL-MCNC: 1 MG/DL — SIGNIFICANT CHANGE UP (ref 0.7–1.5)
EGFR: 85 ML/MIN/1.73M2 — SIGNIFICANT CHANGE UP
EOSINOPHIL # BLD AUTO: 0 K/UL — SIGNIFICANT CHANGE UP (ref 0–0.7)
EOSINOPHIL NFR BLD AUTO: 0 % — SIGNIFICANT CHANGE UP (ref 0–8)
GLUCOSE SERPL-MCNC: 160 MG/DL — HIGH (ref 70–99)
HCT VFR BLD CALC: 30.8 % — LOW (ref 42–52)
HGB BLD-MCNC: 10.4 G/DL — LOW (ref 14–18)
IMM GRANULOCYTES NFR BLD AUTO: 1 % — HIGH (ref 0.1–0.3)
LYMPHOCYTES # BLD AUTO: 0.94 K/UL — LOW (ref 1.2–3.4)
LYMPHOCYTES # BLD AUTO: 11.9 % — LOW (ref 20.5–51.1)
MAGNESIUM SERPL-MCNC: 2.5 MG/DL — HIGH (ref 1.8–2.4)
MCHC RBC-ENTMCNC: 33.8 G/DL — SIGNIFICANT CHANGE UP (ref 32–37)
MCHC RBC-ENTMCNC: 34.1 PG — HIGH (ref 27–31)
MCV RBC AUTO: 101 FL — HIGH (ref 80–94)
MONOCYTES # BLD AUTO: 0.46 K/UL — SIGNIFICANT CHANGE UP (ref 0.1–0.6)
MONOCYTES NFR BLD AUTO: 5.8 % — SIGNIFICANT CHANGE UP (ref 1.7–9.3)
NEUTROPHILS # BLD AUTO: 6.37 K/UL — SIGNIFICANT CHANGE UP (ref 1.4–6.5)
NEUTROPHILS NFR BLD AUTO: 80.9 % — HIGH (ref 42.2–75.2)
NRBC # BLD: 0 /100 WBCS — SIGNIFICANT CHANGE UP (ref 0–0)
PLATELET # BLD AUTO: 285 K/UL — SIGNIFICANT CHANGE UP (ref 130–400)
POTASSIUM SERPL-MCNC: 4.9 MMOL/L — SIGNIFICANT CHANGE UP (ref 3.5–5)
POTASSIUM SERPL-SCNC: 4.9 MMOL/L — SIGNIFICANT CHANGE UP (ref 3.5–5)
RBC # BLD: 3.05 M/UL — LOW (ref 4.7–6.1)
RBC # FLD: 14.7 % — HIGH (ref 11.5–14.5)
SODIUM SERPL-SCNC: 134 MMOL/L — LOW (ref 135–146)
WBC # BLD: 7.88 K/UL — SIGNIFICANT CHANGE UP (ref 4.8–10.8)
WBC # FLD AUTO: 7.88 K/UL — SIGNIFICANT CHANGE UP (ref 4.8–10.8)

## 2023-03-25 PROCEDURE — 99232 SBSQ HOSP IP/OBS MODERATE 35: CPT

## 2023-03-25 RX ORDER — WARFARIN SODIUM 2.5 MG/1
10 TABLET ORAL ONCE
Refills: 0 | Status: DISCONTINUED | OUTPATIENT
Start: 2023-03-25 | End: 2023-03-26

## 2023-03-25 RX ADMIN — Medication 1 MILLIGRAM(S): at 12:33

## 2023-03-25 RX ADMIN — Medication 1 TABLET(S): at 12:33

## 2023-03-25 RX ADMIN — ENOXAPARIN SODIUM 120 MILLIGRAM(S): 100 INJECTION SUBCUTANEOUS at 18:32

## 2023-03-25 RX ADMIN — ENOXAPARIN SODIUM 120 MILLIGRAM(S): 100 INJECTION SUBCUTANEOUS at 05:53

## 2023-03-25 RX ADMIN — Medication 100 MILLIGRAM(S): at 12:33

## 2023-03-25 RX ADMIN — Medication 60 MILLIGRAM(S): at 05:53

## 2023-03-25 RX ADMIN — NALTREXONE HYDROCHLORIDE 50 MILLIGRAM(S): 50 TABLET, FILM COATED ORAL at 12:33

## 2023-03-25 RX ADMIN — Medication 25 MILLIGRAM(S): at 05:30

## 2023-03-25 RX ADMIN — GABAPENTIN 200 MILLIGRAM(S): 400 CAPSULE ORAL at 05:54

## 2023-03-25 RX ADMIN — TAMSULOSIN HYDROCHLORIDE 0.4 MILLIGRAM(S): 0.4 CAPSULE ORAL at 21:38

## 2023-03-25 RX ADMIN — LORATADINE 10 MILLIGRAM(S): 10 TABLET ORAL at 12:33

## 2023-03-25 RX ADMIN — GABAPENTIN 200 MILLIGRAM(S): 400 CAPSULE ORAL at 18:32

## 2023-03-25 RX ADMIN — PANTOPRAZOLE SODIUM 40 MILLIGRAM(S): 20 TABLET, DELAYED RELEASE ORAL at 05:15

## 2023-03-25 NOTE — PROGRESS NOTE ADULT - ASSESSMENT
63 yo M with hx of PE/DVT (on coumadin), Alcohol abuse (drink a pint of vodka daily), HTN, GERD, BPH and Pseudogout presents to ED for epistaxis x 4 days.       #Transaminitis  - Resolving (almost back to baseline)   - ALT 68 --> 289 ;  --> 499 ; Blilirubin 2.5 --> 3.9 ; Alk phos 86 -->125  - Urine dark. Patient complained of vague myalgias and difficulty ambulating  - Unclear reason for the worsening. No hepatotoxic medications  - Liver ultrasound does without new findings beyond known cirrhosis. No evidence of cholestatic obstructive pattern.   - GI consulted. recommeded  HAV IgG, HBsAb, HBcAb total, Anti HEV, Serum Ferritin, Transferrin Saturation, Ceruloplasmin level, IRA, SMA, immunoglobulins panel, AMA, Anti LK microsomal Ab, anti-soluble liver Ag, EBV, HSV PCR, CMV PCR, IgM ACE levels   - Hepatology following. Hepatology workup negative so far.   - Soft abdomen on exam  -Pt continues to have recurrent fevers and episodes of tachycardia at night without leukocytosis.   - Fractioned bilirrubin (total 2.2, direct 1.1. indirect 1.1)    LFTs high but improving  EBV +. Quantify with PCR.   symptomatic management. No pharyngeal symptoms. No cervical LNpathy. No splenomegaly on exam.       #Epistaxis likely 2/2 supratherapeutic INR  - Resolved  - INR 1.38 3/20  - s/p vit K 5 mg x1 given in ED.   - On Therapeutic Lovenox, Coumadin on hold.   - Pt denies taking more coumadin than prescribed.  - Coumadin 5mg ordered for tonight. Daily INR's       #H/o PE: On therapeutic lovenox   Coumadin bridging started:    5 (3/23) > 5mg (3/24) > 10mg (3/25)    # likely pseudogout flare in b/l elbows:  Prednisone 0.5 mg/kg       #Acute DVT in L Popliteal vein  - On therapeutic lovenox   - Pt diagnosed w/ acute b/l DVT's on 12/8/22. Subsequently started on Eliquis w/ good adherence. However, readmitted on 12/20/22 after feeling unwell and found to have extensive acute bilateral PE - unclear if unprovoked, unclear if failure of Eliquis, was switched to Lovenox per Heme-Onc and bridged to coumadin as outpatient.  - US showing chronic R leg DVT and acute L leg DVT  - Heme-onc following    #Fever  - EBV (+). Could explain onset myalgias and transaminitis, still having low grade fever at night. Supportive care for now   - Bilateral tender joints. Likely pseudogout flare up. Started on prednisone 60mg PO once daily    - 101.6 T max on 3/19; 101.2 on yesterday.   - RVP (-), Sputum Cx, MRSA swab (-), UA (-) , BCx (-)    - BCx negative on 3/18 if fevers persist will re-send   - Non-Contrast CT chest unremarkable  - Procal 0.03  - CRP 75.9 ;     #Atypical Chest pain   - Resolved  - EKG NSR. No significant ST/T wave changes.   - Troponin (-)  - Low suspicion for cardiac etiology of pain    #Thrombocytopenia   - Stable   - check peripheral smear  - US abdomen showing Nodular contour consistent with cirrhosis  - Hepatology consulted. Outpatient f/u   - hepatitis panel (-) and HIV (-)   - monitor CBC     #Alcohol abuse/ Suspected thiamine and folate deficiency  - [facility] protocol  - c/w thiamine, folic acid and multivitamin   - CATCH team.     #Hx of pseudogout  - Not on a flare  - Will check inflamm markers if upper extremety pains persist. Patient states that is not the same pain he felt on prior flare ups   - supportive care. Outpatient follow up.     #HTN   - c/w home med    #GERD   - PPI     #BPH   - c/w home med    DVT ppx: Lovenox  GI ppx: PPI  Diet: DASH diet  Activity: as tolerated.

## 2023-03-25 NOTE — PROGRESS NOTE ADULT - SUBJECTIVE AND OBJECTIVE BOX
S: No newe vnts/complaints  elbow pain better    Physical exam:   unchanged from yest.     Labs/Rad: Noted in EMR  cannot annotate in note with downtime interruption.  (see resident note)

## 2023-03-26 LAB
ALBUMIN SERPL ELPH-MCNC: 3.6 G/DL — SIGNIFICANT CHANGE UP (ref 3.5–5.2)
ALP SERPL-CCNC: 88 U/L — SIGNIFICANT CHANGE UP (ref 30–115)
ALT FLD-CCNC: 64 U/L — HIGH (ref 0–41)
ANION GAP SERPL CALC-SCNC: 13 MMOL/L — SIGNIFICANT CHANGE UP (ref 7–14)
AST SERPL-CCNC: 55 U/L — HIGH (ref 0–41)
BASOPHILS # BLD AUTO: 0.06 K/UL — SIGNIFICANT CHANGE UP (ref 0–0.2)
BASOPHILS NFR BLD AUTO: 0.7 % — SIGNIFICANT CHANGE UP (ref 0–1)
BILIRUB SERPL-MCNC: 1 MG/DL — SIGNIFICANT CHANGE UP (ref 0.2–1.2)
BUN SERPL-MCNC: 24 MG/DL — HIGH (ref 10–20)
CALCIUM SERPL-MCNC: 9.9 MG/DL — SIGNIFICANT CHANGE UP (ref 8.4–10.4)
CHLORIDE SERPL-SCNC: 99 MMOL/L — SIGNIFICANT CHANGE UP (ref 98–110)
CK SERPL-CCNC: 59 U/L — SIGNIFICANT CHANGE UP (ref 0–225)
CO2 SERPL-SCNC: 23 MMOL/L — SIGNIFICANT CHANGE UP (ref 17–32)
CREAT SERPL-MCNC: 1.1 MG/DL — SIGNIFICANT CHANGE UP (ref 0.7–1.5)
EGFR: 75 ML/MIN/1.73M2 — SIGNIFICANT CHANGE UP
EOSINOPHIL # BLD AUTO: 0 K/UL — SIGNIFICANT CHANGE UP (ref 0–0.7)
EOSINOPHIL NFR BLD AUTO: 0 % — SIGNIFICANT CHANGE UP (ref 0–8)
GLUCOSE SERPL-MCNC: 124 MG/DL — HIGH (ref 70–99)
HCT VFR BLD CALC: 29.1 % — LOW (ref 42–52)
HGB BLD-MCNC: 10 G/DL — LOW (ref 14–18)
IMM GRANULOCYTES NFR BLD AUTO: 1.5 % — HIGH (ref 0.1–0.3)
INR BLD: 1.31 RATIO — HIGH (ref 0.65–1.3)
LYMPHOCYTES # BLD AUTO: 0.78 K/UL — LOW (ref 1.2–3.4)
LYMPHOCYTES # BLD AUTO: 9.2 % — LOW (ref 20.5–51.1)
MAGNESIUM SERPL-MCNC: 2.3 MG/DL — SIGNIFICANT CHANGE UP (ref 1.8–2.4)
MCHC RBC-ENTMCNC: 34.4 G/DL — SIGNIFICANT CHANGE UP (ref 32–37)
MCHC RBC-ENTMCNC: 35 PG — HIGH (ref 27–31)
MCV RBC AUTO: 101.7 FL — HIGH (ref 80–94)
MONOCYTES # BLD AUTO: 0.28 K/UL — SIGNIFICANT CHANGE UP (ref 0.1–0.6)
MONOCYTES NFR BLD AUTO: 3.3 % — SIGNIFICANT CHANGE UP (ref 1.7–9.3)
NEUTROPHILS # BLD AUTO: 7.25 K/UL — HIGH (ref 1.4–6.5)
NEUTROPHILS NFR BLD AUTO: 85.3 % — HIGH (ref 42.2–75.2)
NRBC # BLD: 0 /100 WBCS — SIGNIFICANT CHANGE UP (ref 0–0)
PLATELET # BLD AUTO: 357 K/UL — SIGNIFICANT CHANGE UP (ref 130–400)
POTASSIUM SERPL-MCNC: 4.4 MMOL/L — SIGNIFICANT CHANGE UP (ref 3.5–5)
POTASSIUM SERPL-SCNC: 4.4 MMOL/L — SIGNIFICANT CHANGE UP (ref 3.5–5)
PROT SERPL-MCNC: 6.6 G/DL — SIGNIFICANT CHANGE UP (ref 6–8)
PROTHROM AB SERPL-ACNC: 15 SEC — HIGH (ref 9.95–12.87)
RBC # BLD: 2.86 M/UL — LOW (ref 4.7–6.1)
RBC # FLD: 14.9 % — HIGH (ref 11.5–14.5)
SODIUM SERPL-SCNC: 135 MMOL/L — SIGNIFICANT CHANGE UP (ref 135–146)
WBC # BLD: 8.5 K/UL — SIGNIFICANT CHANGE UP (ref 4.8–10.8)
WBC # FLD AUTO: 8.5 K/UL — SIGNIFICANT CHANGE UP (ref 4.8–10.8)

## 2023-03-26 PROCEDURE — 99232 SBSQ HOSP IP/OBS MODERATE 35: CPT

## 2023-03-26 RX ORDER — WARFARIN SODIUM 2.5 MG/1
10 TABLET ORAL ONCE
Refills: 0 | Status: COMPLETED | OUTPATIENT
Start: 2023-03-26 | End: 2023-03-26

## 2023-03-26 RX ADMIN — ENOXAPARIN SODIUM 120 MILLIGRAM(S): 100 INJECTION SUBCUTANEOUS at 18:00

## 2023-03-26 RX ADMIN — Medication 25 MILLIGRAM(S): at 05:21

## 2023-03-26 RX ADMIN — PANTOPRAZOLE SODIUM 40 MILLIGRAM(S): 20 TABLET, DELAYED RELEASE ORAL at 05:21

## 2023-03-26 RX ADMIN — ENOXAPARIN SODIUM 120 MILLIGRAM(S): 100 INJECTION SUBCUTANEOUS at 05:21

## 2023-03-26 RX ADMIN — Medication 1 TABLET(S): at 12:08

## 2023-03-26 RX ADMIN — NALTREXONE HYDROCHLORIDE 50 MILLIGRAM(S): 50 TABLET, FILM COATED ORAL at 12:09

## 2023-03-26 RX ADMIN — Medication 1 MILLIGRAM(S): at 12:06

## 2023-03-26 RX ADMIN — WARFARIN SODIUM 10 MILLIGRAM(S): 2.5 TABLET ORAL at 21:56

## 2023-03-26 RX ADMIN — TAMSULOSIN HYDROCHLORIDE 0.4 MILLIGRAM(S): 0.4 CAPSULE ORAL at 21:56

## 2023-03-26 RX ADMIN — Medication 60 MILLIGRAM(S): at 05:21

## 2023-03-26 RX ADMIN — Medication 100 MILLIGRAM(S): at 12:09

## 2023-03-26 RX ADMIN — GABAPENTIN 200 MILLIGRAM(S): 400 CAPSULE ORAL at 18:00

## 2023-03-26 RX ADMIN — LORATADINE 10 MILLIGRAM(S): 10 TABLET ORAL at 12:10

## 2023-03-26 RX ADMIN — GABAPENTIN 200 MILLIGRAM(S): 400 CAPSULE ORAL at 05:21

## 2023-03-26 NOTE — PROGRESS NOTE ADULT - ASSESSMENT
61 yo M with hx of PE/DVT (on coumadin), Alcohol abuse (drink a pint of vodka daily), HTN, GERD, BPH and Pseudogout presents to ED for epistaxis x 4 days.       #Transaminitis  - Resoleving(almost back to baseline)   - Earlier, ALT 68 --> 289 ;  --> 499 ; Blilirubin 2.5 --> 3.9 ; Alk phos 86 -->125  - Urine dark. Patient complained of vague myalgias and difficulty ambulating  - Unclear reason for the worsening. No hepatotoxic medications  - Liver ultrasound does without new findings beyond known cirrhosis. No evidence of cholestatic obstructive pattern.   - GI consulted. recommeded  HAV IgG, HBsAb, HBcAb total, Anti HEV, Serum Ferritin, Transferrin Saturation, Ceruloplasmin level, IRA, SMA, immunoglobulins panel, AMA, Anti LK microsomal Ab, anti-soluble liver Ag, EBV, HSV PCR, CMV PCR, IgM ACE levels   - Hepatology following. Hepatology workup negative so far.   - Soft abdomen on exam  -Pt continues to have recurrent fevers and episodes of tachycardia at night without leukocytosis.   - Fractioned bilirrubin (total 2.2, direct 1.1. indirect 1.1)    LFTs were elevated  but improved now  EBV +. Quantify with PCR.   symptomatic management. No pharyngeal symptoms. No cervical LNpathy. No splenomegaly on exam.       #Epistaxis likely 2/2 supratherapeutic INR  - Resolved  - INR 1.38 3/20  - s/p vit K 5 mg x1 given in ED.   - On Therapeutic Lovenox, Coumadin on hold.   - Pt denies taking more coumadin than prescribed.    #H/o PE: On therapeutic lovenox   Coumadin bridging started:    5 (3/23) > 5mg (3/24) > 10mg (3/25) [DID NOT RECEIVE THIS DOSE]> 10mg (3/26)    # likely pseudogout flare in b/l elbows:  Prednisone 0.5 mg/kg       #Acute DVT in L Popliteal vein  - On therapeutic lovenox   - Pt diagnosed w/ acute b/l DVT's on 12/8/22. Subsequently started on Eliquis w/ good adherence. However, readmitted on 12/20/22 after feeling unwell and found to have extensive acute bilateral PE - unclear if unprovoked, unclear if failure of Eliquis, was switched to Lovenox per Heme-Onc and bridged to coumadin as outpatient.  - US showing chronic R leg DVT and acute L leg DVT  - Heme-onc following    #Fever  - EBV (+). Could explain onset myalgias and transaminitis, still having low grade fever at night. Supportive care for now   - Bilateral tender joints. Likely pseudogout flare up. Started on prednisone 60mg PO once daily    - 101.6 T max on 3/19; 101.2 on yesterday.   - RVP (-), Sputum Cx, MRSA swab (-), UA (-) , BCx (-)    - BCx negative on 3/18 if fevers persist will re-send   - Non-Contrast CT chest unremarkable  - Procal 0.03  - CRP 75.9 ;     #Atypical Chest pain   - Resolved  - EKG NSR. No significant ST/T wave changes.   - Troponin (-)  - Low suspicion for cardiac etiology of pain    #Thrombocytopenia   - Stable   - check peripheral smear  - US abdomen showing Nodular contour consistent with cirrhosis  - Hepatology consulted. Outpatient f/u   - hepatitis panel (-) and HIV (-)   - monitor CBC     #Alcohol abuse/ Suspected thiamine and folate deficiency  - [facility] protocol  - c/w thiamine, folic acid and multivitamin   - CATCH team.     #Hx of pseudogout  - Not on a flare  - Will check inflamm markers if upper extremety pains persist. Patient states that is not the same pain he felt on prior flare ups   - supportive care. Outpatient follow up.     #HTN   - c/w home med    #GERD   - PPI     #BPH   - c/w home med    DVT ppx: Lovenox  GI ppx: PPI  Diet: DASH diet  Activity: as tolerated.      Likely d/c Monday if remains afebrile.

## 2023-03-26 NOTE — PROGRESS NOTE ADULT - SUBJECTIVE AND OBJECTIVE BOX
S: No new events/complaints      All other pertinent ROS negative.      Vital Signs Last 24 Hrs  T(C): 36.4 (26 Mar 2023 12:41), Max: 36.8 (26 Mar 2023 05:07)  T(F): 97.5 (26 Mar 2023 12:41), Max: 98.2 (26 Mar 2023 05:07)  HR: 76 (26 Mar 2023 12:41) (70 - 76)  BP: 140/75 (26 Mar 2023 12:41) (128/65 - 147/75)  BP(mean): --  RR: 18 (26 Mar 2023 12:41) (18 - 19)  SpO2: 97% (26 Mar 2023 12:41) (97% - 97%)    Parameters below as of 26 Mar 2023 12:41  Patient On (Oxygen Delivery Method): room air,R INDEX      PHYSICAL EXAM:    Constitutional: NAD, awake and alert  HEENT: PERR, EOMI, Normal Hearing, MMM  Neck: Soft and supple, No LAD, No JVD  Respiratory: Breath sounds are clear bilaterally, No wheezing, rales or rhonchi  Cardiovascular: S1 and S2, regular rate and rhythm, no Murmurs, gallops or rubs  Gastrointestinal: Bowel Sounds present, soft, nontender, nondistended, no guarding, no rebound  Extremities: No peripheral edema      MEDICATIONS:  MEDICATIONS  (STANDING):  enoxaparin Injectable 120 milliGRAM(s) SubCutaneous every 12 hours  folic acid 1 milliGRAM(s) Oral daily  gabapentin 200 milliGRAM(s) Oral two times a day  loratadine 10 milliGRAM(s) Oral daily  magnesium sulfate  IVPB 2 Gram(s) IV Intermittent once  metoprolol succinate ER 25 milliGRAM(s) Oral daily  multivitamin 1 Tablet(s) Oral daily  naltrexone 50 milliGRAM(s) Oral daily  pantoprazole    Tablet 40 milliGRAM(s) Oral before breakfast  predniSONE   Tablet 60 milliGRAM(s) Oral daily  tamsulosin 0.4 milliGRAM(s) Oral at bedtime  thiamine 100 milliGRAM(s) Oral daily  warfarin 10 milliGRAM(s) Oral once      LABS: All Labs Reviewed:                        10.0   8.50  )-----------( 357      ( 26 Mar 2023 08:16 )             29.1     03-26    135  |  99  |  24<H>  ----------------------------<  124<H>  4.4   |  23  |  1.1    Ca    9.9      26 Mar 2023 08:16  Mg     2.3     03-26    TPro  6.6  /  Alb  3.6  /  TBili  1.0  /  DBili  x   /  AST  55<H>  /  ALT  64<H>  /  AlkPhos  88  03-26    PT/INR - ( 26 Mar 2023 08:16 )   PT: 15.00 sec;   INR: 1.31 ratio           CARDIAC MARKERS ( 26 Mar 2023 08:16 )  x     / x     / 59 U/L / x     / x      CARDIAC MARKERS ( 25 Mar 2023 10:36 )  x     / x     / 68 U/L / x     / x          Blood Culture: 03-21 @ 22:04  Organism Enterococcus faecalis  Gram Stain Blood -- Gram Stain --  Specimen Source Clean Catch Clean Catch (Midstream)  Culture-Blood --        Radiology: reviewed

## 2023-03-26 NOTE — DOWNTIME INTERRUPTION NOTE - WHICH MANUAL FORMS INITIATED?
See attached patient notes in a chart due to downtime sunrise.
See paper records for clinical information entered during [place] [facility] downtime.

## 2023-03-27 ENCOUNTER — TRANSCRIPTION ENCOUNTER (OUTPATIENT)
Age: 63
End: 2023-03-27

## 2023-03-27 VITALS
RESPIRATION RATE: 18 BRPM | TEMPERATURE: 97 F | SYSTOLIC BLOOD PRESSURE: 153 MMHG | DIASTOLIC BLOOD PRESSURE: 72 MMHG | OXYGEN SATURATION: 98 % | HEART RATE: 98 BPM

## 2023-03-27 LAB
ANION GAP SERPL CALC-SCNC: 11 MMOL/L — SIGNIFICANT CHANGE UP (ref 7–14)
APTT BLD: 37.5 SEC — SIGNIFICANT CHANGE UP (ref 27–39.2)
BASOPHILS # BLD AUTO: 0.07 K/UL — SIGNIFICANT CHANGE UP (ref 0–0.2)
BASOPHILS NFR BLD AUTO: 0.8 % — SIGNIFICANT CHANGE UP (ref 0–1)
BUN SERPL-MCNC: 22 MG/DL — HIGH (ref 10–20)
CALCIUM SERPL-MCNC: 9.8 MG/DL — SIGNIFICANT CHANGE UP (ref 8.4–10.4)
CHLORIDE SERPL-SCNC: 99 MMOL/L — SIGNIFICANT CHANGE UP (ref 98–110)
CO2 SERPL-SCNC: 24 MMOL/L — SIGNIFICANT CHANGE UP (ref 17–32)
CREAT SERPL-MCNC: 1.1 MG/DL — SIGNIFICANT CHANGE UP (ref 0.7–1.5)
EGFR: 75 ML/MIN/1.73M2 — SIGNIFICANT CHANGE UP
EOSINOPHIL # BLD AUTO: 0.02 K/UL — SIGNIFICANT CHANGE UP (ref 0–0.7)
EOSINOPHIL NFR BLD AUTO: 0.2 % — SIGNIFICANT CHANGE UP (ref 0–8)
GLUCOSE SERPL-MCNC: 99 MG/DL — SIGNIFICANT CHANGE UP (ref 70–99)
HCT VFR BLD CALC: 30.5 % — LOW (ref 42–52)
HGB BLD-MCNC: 10.3 G/DL — LOW (ref 14–18)
IMM GRANULOCYTES NFR BLD AUTO: 1.6 % — HIGH (ref 0.1–0.3)
INR BLD: 1.2 RATIO — SIGNIFICANT CHANGE UP (ref 0.65–1.3)
LYMPHOCYTES # BLD AUTO: 1.91 K/UL — SIGNIFICANT CHANGE UP (ref 1.2–3.4)
LYMPHOCYTES # BLD AUTO: 20.9 % — SIGNIFICANT CHANGE UP (ref 20.5–51.1)
MAGNESIUM SERPL-MCNC: 2.2 MG/DL — SIGNIFICANT CHANGE UP (ref 1.8–2.4)
MCHC RBC-ENTMCNC: 33.8 G/DL — SIGNIFICANT CHANGE UP (ref 32–37)
MCHC RBC-ENTMCNC: 34.4 PG — HIGH (ref 27–31)
MCV RBC AUTO: 102 FL — HIGH (ref 80–94)
MONOCYTES # BLD AUTO: 0.38 K/UL — SIGNIFICANT CHANGE UP (ref 0.1–0.6)
MONOCYTES NFR BLD AUTO: 4.2 % — SIGNIFICANT CHANGE UP (ref 1.7–9.3)
NEUTROPHILS # BLD AUTO: 6.62 K/UL — HIGH (ref 1.4–6.5)
NEUTROPHILS NFR BLD AUTO: 72.3 % — SIGNIFICANT CHANGE UP (ref 42.2–75.2)
NRBC # BLD: 0 /100 WBCS — SIGNIFICANT CHANGE UP (ref 0–0)
PLATELET # BLD AUTO: 379 K/UL — SIGNIFICANT CHANGE UP (ref 130–400)
POTASSIUM SERPL-MCNC: 4.1 MMOL/L — SIGNIFICANT CHANGE UP (ref 3.5–5)
POTASSIUM SERPL-SCNC: 4.1 MMOL/L — SIGNIFICANT CHANGE UP (ref 3.5–5)
PROTHROM AB SERPL-ACNC: 13.8 SEC — HIGH (ref 9.95–12.87)
RBC # BLD: 2.99 M/UL — LOW (ref 4.7–6.1)
RBC # FLD: 14.7 % — HIGH (ref 11.5–14.5)
SARS-COV-2 RNA SPEC QL NAA+PROBE: SIGNIFICANT CHANGE UP
SODIUM SERPL-SCNC: 134 MMOL/L — LOW (ref 135–146)
WBC # BLD: 9.15 K/UL — SIGNIFICANT CHANGE UP (ref 4.8–10.8)
WBC # FLD AUTO: 9.15 K/UL — SIGNIFICANT CHANGE UP (ref 4.8–10.8)

## 2023-03-27 PROCEDURE — 99239 HOSP IP/OBS DSCHRG MGMT >30: CPT

## 2023-03-27 PROCEDURE — 99232 SBSQ HOSP IP/OBS MODERATE 35: CPT

## 2023-03-27 RX ORDER — FOLIC ACID 0.8 MG
1 TABLET ORAL
Qty: 0 | Refills: 0 | DISCHARGE
Start: 2023-03-27

## 2023-03-27 RX ORDER — FOLIC ACID 0.8 MG
1 TABLET ORAL
Qty: 30 | Refills: 0
Start: 2023-03-27 | End: 2023-04-25

## 2023-03-27 RX ORDER — WARFARIN SODIUM 2.5 MG/1
1 TABLET ORAL
Qty: 0 | Refills: 0 | DISCHARGE

## 2023-03-27 RX ORDER — WARFARIN SODIUM 2.5 MG/1
1 TABLET ORAL
Qty: 1 | Refills: 0
Start: 2023-03-27 | End: 2023-03-27

## 2023-03-27 RX ORDER — ENOXAPARIN SODIUM 100 MG/ML
120 INJECTION SUBCUTANEOUS
Qty: 6 | Refills: 0
Start: 2023-03-27 | End: 2023-03-29

## 2023-03-27 RX ADMIN — Medication 25 MILLIGRAM(S): at 05:25

## 2023-03-27 RX ADMIN — NALTREXONE HYDROCHLORIDE 50 MILLIGRAM(S): 50 TABLET, FILM COATED ORAL at 11:28

## 2023-03-27 RX ADMIN — ENOXAPARIN SODIUM 120 MILLIGRAM(S): 100 INJECTION SUBCUTANEOUS at 17:48

## 2023-03-27 RX ADMIN — PANTOPRAZOLE SODIUM 40 MILLIGRAM(S): 20 TABLET, DELAYED RELEASE ORAL at 05:25

## 2023-03-27 RX ADMIN — ENOXAPARIN SODIUM 120 MILLIGRAM(S): 100 INJECTION SUBCUTANEOUS at 05:24

## 2023-03-27 RX ADMIN — LORATADINE 10 MILLIGRAM(S): 10 TABLET ORAL at 11:27

## 2023-03-27 RX ADMIN — Medication 100 MILLIGRAM(S): at 11:28

## 2023-03-27 RX ADMIN — GABAPENTIN 200 MILLIGRAM(S): 400 CAPSULE ORAL at 05:25

## 2023-03-27 RX ADMIN — Medication 1 TABLET(S): at 11:27

## 2023-03-27 RX ADMIN — Medication 60 MILLIGRAM(S): at 05:24

## 2023-03-27 RX ADMIN — Medication 1 MILLIGRAM(S): at 11:27

## 2023-03-27 RX ADMIN — GABAPENTIN 200 MILLIGRAM(S): 400 CAPSULE ORAL at 17:48

## 2023-03-27 NOTE — DISCHARGE NOTE PROVIDER - NSDCCPCAREPLAN_GEN_ALL_CORE_FT
PRINCIPAL DISCHARGE DIAGNOSIS  Diagnosis: Epistaxis  Assessment and Plan of Treatment: Epistaxis is a nosebleed. A nosebleed occurs when the blood vessels near the surface of the nasal cavity are injured or damaged.  AFTER YOU LEAVE:  Nasal sprays: Vasoconstrictor nasal spray is a medicine that helps make nasal blood vessels narrower. This limits the blood flow and stops the bleeding. This medicine also decreases the swelling inside your nose and helps you breathe easier. You may also be directed to use saline or other nasal sprays to add moisture to your nose.  Antibiotics: This medicine is given to help treat or prevent an infection caused by bacteria.  Take your medicine as directed. Call your healthcare provider if you think your medicine is not helping or if you have side effects. Tell them if you are allergic to any medicine. Keep a list of the medicines, vitamins, and herbs you take. Include the amounts, and when and why you take them. Bring the list or the pill bottles to follow-up visits. Carry your medicine list with you in case of an emergency.  Follow up with your primary healthcare provider or otolaryngologist as directed: If any packing of your nose was done for the epistaxis, it should be removed within 2 to 3 days. Write down your questions so you remember to ask them during your visits.  First aid:  Sit up and lean forward: This will help prevent you from swallowing blood. Spit blood and saliva into a bowl.  Apply pressure to your nose: Use 2 fingers to pinch your nose shut for 10 minutes. This will help stop the bleeding. Breathe through your mouth.  Apply ice: Use a cold pack or put crushed ice in a bag, cover with a towel, and place on the bridge of your nose.  Nasal packing: Pack your nose with a cotton ball, tissue, tampon, or gauze bandage to stop the bleeding.  Prevent epistaxis:   Avoid nose picking and blowing your nose too hard: You can irritate or damage your nose if you pick it. Blowing your nose too hard may cause the bleeding to start again.  Avoid irritants: Substances that can irritate your nose should be avoided. These includ      SECONDARY DISCHARGE DIAGNOSES  Diagnosis: Supratherapeutic INR  Assessment and Plan of Treatment:     Diagnosis: Lightheadedness  Assessment and Plan of Treatment:     Diagnosis: Thrombocytopenia  Assessment and Plan of Treatment:      PRINCIPAL DISCHARGE DIAGNOSIS  Diagnosis: Epistaxis  Assessment and Plan of Treatment: Epistaxis is a nosebleed. A nosebleed occurs when the blood vessels near the surface of the nasal cavity are injured or damaged.  AFTER YOU LEAVE:  Nasal sprays: Vasoconstrictor nasal spray is a medicine that helps make nasal blood vessels narrower. This limits the blood flow and stops the bleeding. This medicine also decreases the swelling inside your nose and helps you breathe easier. You may also be directed to use saline or other nasal sprays to add moisture to your nose.  Antibiotics: This medicine is given to help treat or prevent an infection caused by bacteria.  Take your medicine as directed. Call your healthcare provider if you think your medicine is not helping or if you have side effects. Tell them if you are allergic to any medicine. Keep a list of the medicines, vitamins, and herbs you take. Include the amounts, and when and why you take them. Bring the list or the pill bottles to follow-up visits. Carry your medicine list with you in case of an emergency.  Follow up with your primary healthcare provider or otolaryngologist as directed:   First aid:  Sit up and lean forward: This will help prevent you from swallowing blood. Spit blood and saliva into a bowl.  Apply pressure to your nose: Use 2 fingers to pinch your nose shut for 10 minutes. This will help stop the bleeding. Breathe through your mouth.  Apply ice: Use a cold pack or put crushed ice in a bag, cover with a towel, and place on the bridge of your nose.  Nasal packing: Pack your nose with a cotton ball, tissue, tampon, or gauze bandage to stop the bleeding.  INR was 15 on admission. After holding, restarted Coumadin here. received 5 >5> x > 10mg > INR 1.2 (3/27)>D/c with advise to take Coumadin 10mg tonight. c/w therapeutic lovenox bridging until INR therapeutic. Patient to see coumadin clinic on 3/28 @ 3PM.   Pseudogout flare: complete Prednisone.  Fevers have since resolved.      SECONDARY DISCHARGE DIAGNOSES  Diagnosis: Supratherapeutic INR  Assessment and Plan of Treatment:     Diagnosis: Lightheadedness  Assessment and Plan of Treatment:     Diagnosis: Thrombocytopenia  Assessment and Plan of Treatment:

## 2023-03-27 NOTE — PROGRESS NOTE ADULT - PROVIDER SPECIALTY LIST ADULT
Hospitalist
Internal Medicine
Hepatology
Hospitalist
Hospitalist
Internal Medicine
Hospitalist
Hepatology

## 2023-03-27 NOTE — DISCHARGE NOTE PROVIDER - HOSPITAL COURSE
63yo male with hx DVT and PE on Coumadin, severe alcohol use disorder, pseudogout, GERD, and BPH presented from [facility] for epistaxis x4 days.  Pt reported that he was given medications from nurses at his residence. Stated last bloodwork done was several weeks ago.  Admited to lightheadedness. Denied other sites of bleeding, denied ecchymosis.   While in ED pt reported having chest pain, left sided, left elbow and left wrist. Reports had this pain several times before, but does not further elucidate hx. Denied SOB, n/v, diaphoresis, abd pain. Active daily ETOH use, last drink one day before presentation. Former tobacco use, History limited due to pt being a poor unreliable historian. Pt appeared to have minimal insight to his medical conditions. Of note per chart review, pt diagnosed w/ acute b/l DVT's on 12/8/22. Subsequently started on Eliquis w/ good adherence. However, readmitted on 12/20/22 after feeling unwell and found to have extensive acute bilateral PE - unclear if unprovoked, unclear if failure of Eliquis, was switched to Lovenox per Heme-Onc with plan to bridge to coumadin as outpatient.    ED: VS wnl. HD stable. No bleeding noted. Labs notable for INR 19. Hgb 12.6. Given Vitamin K 5mg PO. Admitted to medicine.     The hospital course also included the following:  #Transaminitis  - Resoleving(almost back to baseline)   - Earlier, ALT 68 --> 289 ;  --> 499 ; Blilirubin 2.5 --> 3.9 ; Alk phos 86 -->125  - Urine dark. Patient complained of vague myalgias and difficulty ambulating  - Unclear reason for the worsening. No hepatotoxic medications  - Liver ultrasound does without new findings beyond known cirrhosis. No evidence of cholestatic obstructive pattern.   - GI consulted. recommeded  HAV IgG, HBsAb, HBcAb total, Anti HEV, Serum Ferritin, Transferrin Saturation, Ceruloplasmin level, IRA, SMA, immunoglobulins panel, AMA, Anti LK microsomal Ab, anti-soluble liver Ag, EBV, HSV PCR, CMV PCR, IgM ACE levels   - Hepatology following. Hepatology workup negative so far.   - Soft abdomen on exam  -Pt continues to have recurrent fevers and episodes of tachycardia at night without leukocytosis.   - Fractioned bilirrubin (total 2.2, direct 1.1. indirect 1.1)  LFTs were elevated  but improved now  EBV +. Quantify with PCR.   symptomatic management. No pharyngeal symptoms. No cervical LNpathy. No splenomegaly on exam.   #Epistaxis likely 2/2 supratherapeutic INR  - Resolved  - INR 1.38 3/20  - s/p vit K 5 mg x1 given in ED.   - On Therapeutic Lovenox, Coumadin on hold.   - Pt denies taking more coumadin than prescribed.  #H/o PE: On therapeutic lovenox   Coumadin bridging started:    5 (3/23) > 5mg (3/24) > 10mg (3/25) [DID NOT RECEIVE THIS DOSE]> 10mg (3/26)  # likely pseudogout flare in b/l elbows:  Prednisone 0.5 mg/kg   #Acute DVT in L Popliteal vein  - On therapeutic lovenox   - Pt diagnosed w/ acute b/l DVT's on 12/8/22. Subsequently started on Eliquis w/ good adherence. However, readmitted on 12/20/22 after feeling unwell and found to have extensive acute bilateral PE - unclear if unprovoked, unclear if failure of Eliquis, was switched to Lovenox per Heme-Onc and bridged to coumadin as outpatient.  - US showing chronic R leg DVT and acute L leg DVT  - Heme-onc following  #Fever  - EBV (+). Could explain onset myalgias and transaminitis, still having low grade fever at night. Supportive care for now   - Bilateral tender joints. Likely pseudogout flare up. Started on prednisone 60mg PO once daily    - 101.6 T max on 3/19; 101.2 on yesterday.   - RVP (-), Sputum Cx, MRSA swab (-), UA (-) , BCx (-)    - BCx negative on 3/18 if fevers persist will re-send   - Non-Contrast CT chest unremarkable  - Procal 0.03  - CRP 75.9 ;   #Atypical Chest pain   - Resolved  - EKG NSR. No significant ST/T wave changes.   - Troponin (-)  - Low suspicion for cardiac etiology of pain  #Thrombocytopenia   - Stable   - check peripheral smear  - US abdomen showing Nodular contour consistent with cirrhosis  - Hepatology consulted. Outpatient f/u   - hepatitis panel (-) and HIV (-)   - monitor CBC   #Alcohol abuse/ Suspected thiamine and folate deficiency  - [facility] protocol  - c/w thiamine, folic acid and multivitamin   - CATCH team.   #Hx of pseudogout  - Not on a flare  - Will check inflamm markers if upper extremety pains persist. Patient states that is not the same pain he felt on prior flare ups   - supportive care. Outpatient follow up.   #HTN   - c/w home med  #GERD   - PPI   #BPH   - c/w home med  DVT ppx: Lovenox  GI ppx: PPI  Diet: DASH diet  Activity: as tolerated.      The patient is clinically stable, tolerating diet and activity and can be discharged with the advise to follow with the coumadin clinic tomorrow.     63yo male with hx DVT and PE on Coumadin, severe alcohol use disorder, pseudogout, GERD, and BPH presented from [facility] for epistaxis x4 days.  Pt reported that he was given medications from nurses at his residence. Stated last bloodwork done was several weeks ago.  Admited to lightheadedness. Denied other sites of bleeding, denied ecchymosis.   While in ED pt reported having chest pain, left sided, left elbow and left wrist. Reports had this pain several times before, but does not further elucidate hx. Denied SOB, n/v, diaphoresis, abd pain. Active daily ETOH use, last drink one day before presentation. Former tobacco use, History limited due to pt being a poor unreliable historian. Pt appeared to have minimal insight to his medical conditions. Of note per chart review, pt diagnosed w/ acute b/l DVT's on 12/8/22. Subsequently started on Eliquis w/ good adherence. However, readmitted on 12/20/22 after feeling unwell and found to have extensive acute bilateral PE - unclear if unprovoked, unclear if failure of Eliquis, was switched to Lovenox per Heme-Onc with plan to bridge to coumadin as outpatient.    ED: VS wnl. HD stable. No bleeding noted. Labs notable for INR 19. Hgb 12.6. Given Vitamin K 5mg PO. Admitted to medicine.     The hospital course also included the following:  #Transaminitis  - Resoleving(almost back to baseline)   - Earlier, ALT 68 --> 289 ;  --> 499 ; Blilirubin 2.5 --> 3.9 ; Alk phos 86 -->125  - Urine dark. Patient complained of vague myalgias and difficulty ambulating  - Unclear reason for the worsening. No hepatotoxic medications  - Liver ultrasound does without new findings beyond known cirrhosis. No evidence of cholestatic obstructive pattern.   - GI consulted. recommeded  HAV IgG, HBsAb, HBcAb total, Anti HEV, Serum Ferritin, Transferrin Saturation, Ceruloplasmin level, IRA, SMA, immunoglobulins panel, AMA, Anti LK microsomal Ab, anti-soluble liver Ag, EBV, HSV PCR, CMV PCR, IgM ACE levels   - Hepatology following. Hepatology workup negative so far.   - Soft abdomen on exam  -Pt continues to have recurrent fevers and episodes of tachycardia at night without leukocytosis.   - Fractioned bilirrubin (total 2.2, direct 1.1. indirect 1.1)  LFTs were elevated  but improved now  EBV +. Quantify with PCR.   symptomatic management. No pharyngeal symptoms. No cervical LNpathy. No splenomegaly on exam.   #Epistaxis likely 2/2 supratherapeutic INR  - Resolved  - INR 1.38 3/20  - s/p vit K 5 mg x1 given in ED.   - On Therapeutic Lovenox, Coumadin on hold.   - Pt denies taking more coumadin than prescribed.  #H/o PE: On therapeutic lovenox   Coumadin bridging started:    5 (3/23) > 5mg (3/24) > 10mg (3/25) [DID NOT RECEIVE THIS DOSE]> 10mg (3/26)  # likely pseudogout flare in b/l elbows:  Prednisone 0.5 mg/kg   #Acute DVT in L Popliteal vein  - On therapeutic lovenox   - Pt diagnosed w/ acute b/l DVT's on 12/8/22. Subsequently started on Eliquis w/ good adherence. However, readmitted on 12/20/22 after feeling unwell and found to have extensive acute bilateral PE - unclear if unprovoked, unclear if failure of Eliquis, was switched to Lovenox per Heme-Onc and bridged to coumadin as outpatient.  - US showing chronic R leg DVT and acute L leg DVT  - Heme-onc following  #Fever  - EBV (+). Could explain onset myalgias and transaminitis, still having low grade fever at night. Supportive care for now   - Bilateral tender joints. Likely pseudogout flare up. Started on prednisone 60mg PO once daily    - 101.6 T max on 3/19; 101.2 on yesterday.   - RVP (-), Sputum Cx, MRSA swab (-), UA (-) , BCx (-)    - BCx negative on 3/18 if fevers persist will re-send   - Non-Contrast CT chest unremarkable  - Procal 0.03  - CRP 75.9 ;   #Atypical Chest pain   - Resolved  - EKG NSR. No significant ST/T wave changes.   - Troponin (-)  - Low suspicion for cardiac etiology of pain  #Thrombocytopenia   - Stable   - check peripheral smear  - US abdomen showing Nodular contour consistent with cirrhosis  - Hepatology consulted. Outpatient f/u   - hepatitis panel (-) and HIV (-)   - monitor CBC   #Alcohol abuse/ Suspected thiamine and folate deficiency  - [facility] protocol  - c/w thiamine, folic acid and multivitamin   - CATCH team.   #Hx of pseudogout  - Not on a flare  - Will check inflamm markers if upper extremety pains persist. Patient states that is not the same pain he felt on prior flare ups   - supportive care. Outpatient follow up.   #HTN   - c/w home med  #GERD   - PPI   #BPH   - c/w home med  DVT ppx: Lovenox  GI ppx: PPI  Diet: DASH diet  Activity: as tolerated.      The patient is clinically stable, tolerating diet and activity and can be discharged with the advise to follow with the coumadin clinic tomorrow.    Attending NOte:  Patient seen and examined independently. I personally had a face-to-face encounter with the patient, examined the patient myself, personally reviewed labs & Radiology images,  and reviewed the plan of care with the housestaff. Agree with resident's note but my note supersedes that of the resident in the matters hereby listed.

## 2023-03-27 NOTE — DISCHARGE NOTE PROVIDER - CARE PROVIDERS DIRECT ADDRESSES
,[email or web address],DirectAddress_Unknown ,[email or web address],DirectAddress_Unknown,DirectAddress_Unknown

## 2023-03-27 NOTE — DISCHARGE NOTE PROVIDER - CARE PROVIDER_API CALL
Eugene Tellez)  Internal Medicine  [address] [place] [address]  Phone: [phone]  Fax: [phone]  Established Patient  Follow Up Time: 2 weeks    Albin Suarez)  Internal Medicine  [address] [place] [address]  Phone: [phone]  Fax: [phone]  Established Patient  Follow Up Time: 1 week   Eugene Tellez)  Internal Medicine  [address] [place] [address]  Phone: [phone]  Fax: [phone]  Established Patient  Follow Up Time: 2 weeks    Albin Suarez)  Internal Medicine  [address] [place] [address]  Phone: [phone]  Fax: [phone]  Established Patient  Follow Up Time: 1 week    Katharina Kurtz)  [facility]  [address] [place] [address]  Phone: [phone]  Fax: [phone]  Follow Up Time: 1-3 days

## 2023-03-27 NOTE — DISCHARGE NOTE PROVIDER - PROVIDER TOKENS
PROVIDER:[TOKEN:[[id number]],FOLLOWUP:[2 weeks],ESTABLISHEDPATIENT:[T]],PROVIDER:[TOKEN:[[id number]],FOLLOWUP:[1 week],ESTABLISHEDPATIENT:[T]] PROVIDER:[TOKEN:[[id number]],FOLLOWUP:[2 weeks],ESTABLISHEDPATIENT:[T]],PROVIDER:[TOKEN:[[id number]],FOLLOWUP:[1 week],ESTABLISHEDPATIENT:[T]],PROVIDER:[TOKEN:[[id number]],FOLLOWUP:[1-3 days]]

## 2023-03-27 NOTE — PROGRESS NOTE ADULT - ATTENDING COMMENTS
62 y o  M with hx of pseudogout AUD MISTY cirrhosis PE on AC admitted with epistaxis and supratherapeutic INR.  Drinks a pint of beer daily. No DUI. Alcohol rehab few years ago. 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 Lives in care home. No family support locally.   Liver test were downtrending and had rise again on 3/21 with T bili 3.9 AST 400s ALT 200s and falling again. Single no kids.     No complaints. Discharge today  EBV Ig G Ig M + EBV PCR negative  liver tests improved    Febrile illness with joint pains - due to pseudogout  Symmetrical polyarthritis. CPPD crystals in fluid Oct 2022  No evidence of EBV infection other than Ig M+ which likely false positive  Decompensated MISTY cirrhosis MELD Na 17 ( 44 at admission with supra-therapeutic INR  alcoholic hepatitis with jaundice    High MDF due to high INR from warfarin. T bili dropping again no need for steroids  Alcohol rehab  Not a likely LT candidate with poor social support but will review on OP follow up.   OP follow up

## 2023-03-27 NOTE — DISCHARGE NOTE PROVIDER - NSDCFUSCHEDAPPT_GEN_ALL_CORE_FT
Katharina Kurtz  [facility] PreAdmits  Scheduled Appointment: 03/29/2023    [facility]  MEDMT [address] Jeff Jacobsen  Scheduled Appointment: 03/29/2023

## 2023-03-27 NOTE — DISCHARGE NOTE NURSING/CASE MANAGEMENT/SOCIAL WORK - PATIENT PORTAL LINK FT
You can access the FollowMyHealth Patient Portal offered by [facility] by registering at the following website: [email or web address]. By joining [employer]’s FollowMyHealth portal, you will also be able to view your health information using other applications (apps) compatible with our system.

## 2023-03-27 NOTE — PROGRESS NOTE ADULT - ASSESSMENT
63yo male with hx DVT (12/2022) and PE on Coumadin, AUD (>20 years of etoh abuse with 6 packs of beer daily - last use on 3/15), pseudogout, GERD, and BPH presenting from [facility] for epistaxis x4 days in setting of supratherapeutic INR. Pt reports he is given medications from nurses at his residence. States last bloodwork done was several weeks ago.  Admits to lightheadedness. Denies other sites of bleeding, denies ecchymosis.  Pt was incidentally noted to have new diagnosis of cirrhosis based on biochemistry and imaging and hepatology was consulted for management. 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 Normal RV function and no valvulopathies  US abd 3/16: Nodular liver. CBD 5mm no ascites or other signs of portal HTN.  US abd 3/19: steatosis, no ascites    -MELD Na score 15 (3/21)    Recs:   EGD as an OP   Follow up with hepatology as an OP for cirrhosis and HCC screening   tylenol upto max 2gm/day   alcohol cessation     #)HCC screening: Please f/u as outpatient for US abdomen and AFP every 6 months.  #)Lifestyle/Dietary modifications; Calorie intake 25-40 Kcal/kg/day, Protein intake: 1.2-1.5 gm/kg/day, Avoid smoking, alcohol, NSAIDS.  #)Vaccinations: Please f/u in clinic for being uptodate with HAV/HBV/Influenza/Pneumococcal vaccines.  #)LT evaluation: Social status: lives at [facility]. no family and unemployed    #)Alcoholic Hepatitis/Elevated liver enzymes mixed pattern with hyperbilurubinemia likely due to alcoholic hepatitis and cirrhosis less likely biliary obstruction (no abd pain, no WBC count, CBD 6mm in US) vs ?DILI (bactrim on 3/15)  -LFT at the time of admission 2/113/183/55  -LFT 3/21: 3.9/125/499/289 downtrending   -received only bactrim on 3/15, no other abx  -Denies any Abx before hospitalization, denies any herbal or OTC medication   -Denies any abdominal pain  -US abd 3/19 CBD 6mm  -CLD workup - negative except for EBV IgM positive  - EBV DNA negative     Recs:   - Trend LFT, INR   - etoh cessation counselling provided  - Thiamine and Folate  - Follow up with hepatology as an OP

## 2023-03-27 NOTE — PROGRESS NOTE ADULT - SUBJECTIVE AND OBJECTIVE BOX
Gastroenterology progress note:     Patient is a 62y old  Male who presents with a chief complaint of Epistasix (27 Mar 2023 11:32)       Admitted on: 03-15-23    We are following the patient for: cirrhosis        Interval History:    No acute events overnight.       PAST MEDICAL & SURGICAL HISTORY:  Alcohol dependence      HTN (hypertension)      Hard of hearing      Seasonal allergies      BPH (benign prostatic hyperplasia)      GERD (gastroesophageal reflux disease)      Pseudogout      No significant past surgical history          MEDICATIONS  (STANDING):  enoxaparin Injectable 120 milliGRAM(s) SubCutaneous every 12 hours  folic acid 1 milliGRAM(s) Oral daily  gabapentin 200 milliGRAM(s) Oral two times a day  loratadine 10 milliGRAM(s) Oral daily  magnesium sulfate  IVPB 2 Gram(s) IV Intermittent once  metoprolol succinate ER 25 milliGRAM(s) Oral daily  multivitamin 1 Tablet(s) Oral daily  naltrexone 50 milliGRAM(s) Oral daily  pantoprazole    Tablet 40 milliGRAM(s) Oral before breakfast  predniSONE   Tablet 60 milliGRAM(s) Oral daily  tamsulosin 0.4 milliGRAM(s) Oral at bedtime  thiamine 100 milliGRAM(s) Oral daily    MEDICATIONS  (PRN):  acetaminophen     Tablet .. 650 milliGRAM(s) Oral every 6 hours PRN Temp greater or equal to 38C (100.4F)      Allergies  Beef (Hives)  dairy products (Hives)  No Known Drug Allergies  shellfish (Hives)      Review of Systems:   Cardiovascular:  No Chest Pain, No Palpitations  Respiratory:  No Cough, No Dyspnea  Gastrointestinal:  As described in HPI  Skin:  No Skin Lesions, No Jaundice  Neuro:  No Syncope, No Dizziness    Physical Examination:  T(C): 36.2 (03-27-23 @ 13:18), Max: 36.7 (03-27-23 @ 04:48)  HR: 98 (03-27-23 @ 13:18) (72 - 98)  BP: 153/72 (03-27-23 @ 13:18) (134/64 - 153/72)  RR: 18 (03-27-23 @ 13:18) (18 - 19)  SpO2: 98% (03-27-23 @ 13:18) (96% - 98%)        GENERAL: AAOx3, no acute distress.  HEAD:  Atraumatic, Normocephalic  EYES: conjunctiva and sclera clear  NECK: Supple, no JVD or thyromegaly  CHEST/LUNG: Clear to auscultation bilaterally; No wheeze, rhonchi, or rales  HEART: Regular rate and rhythm; normal S1, S2, No murmurs.  ABDOMEN: Soft, nontender, nondistended; Bowel sounds present  NEUROLOGY: No asterixis or tremor.   SKIN: Intact, no jaundice     Data:                        10.3   9.15  )-----------( 379      ( 27 Mar 2023 05:33 )             30.5     Hgb trend:  10.3  03-27-23 @ 05:33  10.0  03-26-23 @ 08:16  10.4  03-25-23 @ 07:09      03-27    134<L>  |  99  |  22<H>  ----------------------------<  99  4.1   |  24  |  1.1    Ca    9.8      27 Mar 2023 05:33  Mg     2.2     03-27    TPro  6.6  /  Alb  3.6  /  TBili  1.0  /  DBili  x   /  AST  55<H>  /  ALT  64<H>  /  AlkPhos  88  03-26    Liver panel trend:  TBili 1.0   /   AST 55   /   ALT 64   /   AlkP 88   /   Tptn 6.6   /   Alb 3.6    /   DBili --      03-26  TBili 1.7   /   AST 35   /   ALT 76   /   AlkP 88   /   Tptn 6.5   /   Alb 3.6    /   DBili --      03-24  TBili 2.2   /   AST --   /   ALT --   /   AlkP --   /   Tptn --   /   Alb --    /   DBili 1.1      03-23  TBili 2.2   /   AST 66   /      /   AlkP 101   /   Tptn 6.3   /   Alb 3.6    /   DBili --      03-23  TBili 2.5   /      /      /   AlkP 121   /   Tptn 6.5   /   Alb 3.8    /   DBili --      03-22  TBili 3.9   /      /      /   AlkP 125   /   Tptn 5.9   /   Alb 3.4    /   DBili --      03-21  TBili 2.5   /      /   ALT 68   /   AlkP 86   /   Tptn 6.2   /   Alb 3.8    /   DBili --      03-18      PT/INR - ( 27 Mar 2023 05:33 )   PT: 13.80 sec;   INR: 1.20 ratio         PTT - ( 27 Mar 2023 05:33 )  PTT:37.5 sec       Radiology:

## 2023-03-27 NOTE — DISCHARGE NOTE PROVIDER - NSDCFUADDINST_GEN_ALL_CORE_FT
Please follow up with your PCP.  Please take Coumadin 10mg tonight.  Please follow up with the coumadin clinic tomorrow AM.  Please follow up with Hepatology.  Please take your medications as prescribed.   Please follow up with your PCP.  Please take Coumadin 10mg tonight.  Please follow up with the coumadin clinic tomorrow AM.  Please follow up with Hepatology.  Please repeat CMP in 1 week.   Please take your medications as prescribed.   Please follow up with your PCP.  Please take Coumadin 10mg tonight.  Please follow up with the coumadin clinic tomorrow 3PM with Katharina Kurtz(NP).  Please continue with Lovenox Subcut 120mg twice a day for 3 more days.  Please follow up with Hepatology.  Please repeat CMP in 1 week.   Please take your medications as prescribed.

## 2023-03-27 NOTE — DISCHARGE NOTE PROVIDER - NSDCMRMEDTOKEN_GEN_ALL_CORE_FT
acetaminophen 325 mg oral tablet: 2 tab(s) orally every 6 hours, As needed, Temp greater or equal to 38C (100.4F), Mild Pain (1 - 3)  Coumadin 2.5 mg oral tablet: 2 tabs orally at 5pm Monday Wednesday and Saturday.  1 Tab orally at 5pm Sunday, Tuesday, Thursday and Friday  Flomax 0.4 mg oral capsule: 1 cap(s) orally once a day (at bedtime)  folic acid 1 mg oral tablet: 1 tab(s) orally once a day  gabapentin 100 mg oral capsule: 2 cap(s) orally 2 times a day  loratadine 10 mg oral tablet: 1 tab(s) orally once a day  Multiple Vitamins oral tablet: 1 tab(s) orally once a day  naltrexone 50 mg oral tablet: 1 tab(s) orally once a day  naproxen 500 mg oral tablet: 1 tab(s) orally 2 times a day  pantoprazole 40 mg oral delayed release tablet: 1 tab(s) orally once a day (before a meal)  thiamine 100 mg oral tablet: 1 tab(s) orally once a day  Toprol-XL 25 mg oral tablet, extended release: 1 tab(s) orally once a day  Vitamin B12 100 mcg oral tablet: 1 tab(s) orally once a day   acetaminophen 325 mg oral tablet: 2 tab(s) orally every 6 hours, As needed, Temp greater or equal to 38C (100.4F), Mild Pain (1 - 3)  Flomax 0.4 mg oral capsule: 1 cap(s) orally once a day (at bedtime)  folic acid 1 mg oral tablet: 1 tab(s) orally once a day  gabapentin 100 mg oral capsule: 2 cap(s) orally 2 times a day  loratadine 10 mg oral tablet: 1 tab(s) orally once a day  Multiple Vitamins oral tablet: 1 tab(s) orally once a day  naltrexone 50 mg oral tablet: 1 tab(s) orally once a day  naproxen 500 mg oral tablet: 1 tab(s) orally 2 times a day  pantoprazole 40 mg oral delayed release tablet: 1 tab(s) orally once a day (before a meal)  predniSONE 20 mg oral tablet: 3 tab(s) orally once a day  thiamine 100 mg oral tablet: 1 tab(s) orally once a day  Toprol-XL 25 mg oral tablet, extended release: 1 tab(s) orally once a day  Vitamin B12 100 mcg oral tablet: 1 tab(s) orally once a day  warfarin 10 mg oral tablet: 1 tab(s) orally once a day to be taken 1 dose today PM and then the patient is to follow coumadin clinic tomorrow for further dosing   acetaminophen 325 mg oral tablet: 2 tab(s) orally every 6 hours, As needed, Temp greater or equal to 38C (100.4F), Mild Pain (1 - 3)  enoxaparin 120 mg/0.8 mL injectable solution: 120 milligram(s) subcutaneously 2 times a day  Flomax 0.4 mg oral capsule: 1 cap(s) orally once a day (at bedtime)  folic acid 1 mg oral tablet: 1 tab(s) orally once a day  gabapentin 100 mg oral capsule: 2 cap(s) orally 2 times a day  loratadine 10 mg oral tablet: 1 tab(s) orally once a day  Multiple Vitamins oral tablet: 1 tab(s) orally once a day  naltrexone 50 mg oral tablet: 1 tab(s) orally once a day  naproxen 500 mg oral tablet: 1 tab(s) orally 2 times a day  pantoprazole 40 mg oral delayed release tablet: 1 tab(s) orally once a day (before a meal)  predniSONE 20 mg oral tablet: 3 tab(s) orally once a day  thiamine 100 mg oral tablet: 1 tab(s) orally once a day  Toprol-XL 25 mg oral tablet, extended release: 1 tab(s) orally once a day  Vitamin B12 100 mcg oral tablet: 1 tab(s) orally once a day  warfarin 10 mg oral tablet: 1 tab(s) orally once a day to be taken 1 dose today PM and then the patient is to follow coumadin clinic tomorrow for further dosing

## 2023-03-28 ENCOUNTER — APPOINTMENT (OUTPATIENT)
Dept: MEDICATION MANAGEMENT | Facility: CLINIC | Age: 63
End: 2023-03-28

## 2023-03-28 LAB
HEV IGM SER QL: SIGNIFICANT CHANGE UP
SOLUBLE LIVER IGG SER IA-ACNC: 1 — SIGNIFICANT CHANGE UP (ref 0–20)

## 2023-03-29 ENCOUNTER — APPOINTMENT (OUTPATIENT)
Dept: MEDICATION MANAGEMENT | Facility: CLINIC | Age: 63
End: 2023-03-29

## 2023-03-31 ENCOUNTER — EMERGENCY (EMERGENCY)
Facility: HOSPITAL | Age: 63
LOS: 0 days | Discharge: HOME | End: 2023-04-01
Attending: EMERGENCY MEDICINE
Payer: MEDICAID

## 2023-03-31 VITALS
DIASTOLIC BLOOD PRESSURE: 57 MMHG | RESPIRATION RATE: 18 BRPM | SYSTOLIC BLOOD PRESSURE: 128 MMHG | HEART RATE: 88 BPM | OXYGEN SATURATION: 98 % | TEMPERATURE: 99 F | HEIGHT: 70 IN | WEIGHT: 235.01 LBS

## 2023-03-31 DIAGNOSIS — R04.0 EPISTAXIS: ICD-10-CM

## 2023-03-31 DIAGNOSIS — I10 ESSENTIAL (PRIMARY) HYPERTENSION: ICD-10-CM

## 2023-03-31 DIAGNOSIS — Z79.01 LONG TERM (CURRENT) USE OF ANTICOAGULANTS: ICD-10-CM

## 2023-03-31 DIAGNOSIS — Z91.011 ALLERGY TO MILK PRODUCTS: ICD-10-CM

## 2023-03-31 DIAGNOSIS — Z87.39 PERSONAL HISTORY OF OTHER DISEASES OF THE MUSCULOSKELETAL SYSTEM AND CONNECTIVE TISSUE: ICD-10-CM

## 2023-03-31 DIAGNOSIS — N40.0 BENIGN PROSTATIC HYPERPLASIA WITHOUT LOWER URINARY TRACT SYMPTOMS: ICD-10-CM

## 2023-03-31 DIAGNOSIS — Z91.018 ALLERGY TO OTHER FOODS: ICD-10-CM

## 2023-03-31 DIAGNOSIS — K21.9 GASTRO-ESOPHAGEAL REFLUX DISEASE WITHOUT ESOPHAGITIS: ICD-10-CM

## 2023-03-31 DIAGNOSIS — Z91.013 ALLERGY TO SEAFOOD: ICD-10-CM

## 2023-03-31 DIAGNOSIS — Z86.59 PERSONAL HISTORY OF OTHER MENTAL AND BEHAVIORAL DISORDERS: ICD-10-CM

## 2023-03-31 DIAGNOSIS — Z87.19 PERSONAL HISTORY OF OTHER DISEASES OF THE DIGESTIVE SYSTEM: ICD-10-CM

## 2023-03-31 DIAGNOSIS — Z87.891 PERSONAL HISTORY OF NICOTINE DEPENDENCE: ICD-10-CM

## 2023-03-31 PROCEDURE — 99282 EMERGENCY DEPT VISIT SF MDM: CPT

## 2023-03-31 PROCEDURE — 99283 EMERGENCY DEPT VISIT LOW MDM: CPT

## 2023-03-31 NOTE — ED ADULT TRIAGE NOTE - CHIEF COMPLAINT QUOTE
He's from [facility]. His nose was bleeding, when we got there it had stopped, but he is complaining of feeling dizzy and lightheaded. He's on Lovenox shots - EMS  Patient switched from Coumadin to Lovenox

## 2023-04-01 VITALS
DIASTOLIC BLOOD PRESSURE: 65 MMHG | TEMPERATURE: 99 F | RESPIRATION RATE: 18 BRPM | OXYGEN SATURATION: 98 % | SYSTOLIC BLOOD PRESSURE: 121 MMHG | HEART RATE: 82 BPM

## 2023-04-01 NOTE — ED PROVIDER NOTE - PATIENT PORTAL LINK FT
You can access the FollowMyHealth Patient Portal offered by [facility] by registering at the following website: [email or web address]. By joining [email or web address]’s FollowMyHealth portal, you will also be able to view your health information using other applications (apps) compatible with our system.

## 2023-04-01 NOTE — ED PROVIDER NOTE - PHYSICAL EXAMINATION
Constitutional: Well developed, well nourished. NAD  Head: Normocephalic, atraumatic.  Eyes: PERRL, EOMI.  ENT:  + mild excoriation noted to right nare - no active bleeding; no blood in posterior pharynx;  Neck: Supple. Painless ROM.  Cardiovascular:   Regular rate and rhythm.    Pulmonary: Normal respiratory rate and effort. Lungs clear to auscultation bilaterally. No wheezing, rales, or rhonchi.  Abdominal: Soft. Nondistended. No rebound, guarding, rigidity.  Extremities. Pelvis stable. No lower extremity edema, symmetric calves.  Skin: No rashes, cyanosis.  Neuro: AAOx3. No focal neurological deficits.  Psych: Normal mood. Normal affect.

## 2023-04-01 NOTE — ED PROVIDER NOTE - CARE PROVIDER_API CALL
Eugene Tellez)  Internal Medicine  [address] [place] [address]  Phone: [phone]  Fax: [phone]  Established Patient  Follow Up Time: 1-3 Days    Reece Walker)  Otolaryngology  [address] [place] [address], [address] [place] [address]  Phone: [phone]  Fax: [phone]  Follow Up Time: 4-6 Days

## 2023-04-01 NOTE — ED PROVIDER NOTE - ATTENDING APP SHARED VISIT CONTRIBUTION OF CARE
62 yold male to ED [facility] alcohol cirrhosis, BPH, Gerd, DVT/Pe was on coumadin - switched to lovenox; pt c/o epistaxis from right nare prior to arrival lasting for 40 minutes controlled with pressure prior to arrival. no trauma. pt states he was blowing his nose which triggered the bleed. no lightheadedness, ap,n,v,cp,sob.      vss  gen- NAD, aaox3  HENT- no active bleeding from nares, no bleeding in oropharynx  card-rrr  lungs-ctab, no wheezing or rhonchi  abd-sntnd, no guarding or rebound  neuro- full str/sensation, cn ii-xii grossly intact, normal coordination

## 2023-04-01 NOTE — ED ADULT NURSE NOTE - CHIEF COMPLAINT QUOTE
He's from [facility]. His nose was bleeding, when we got there it had stopped, but he is complaining of feeling dizzy and lightheaded. He's on Lovenox shots - EMS  Patient switched from Coumadin to Lovenox

## 2023-04-01 NOTE — ED PROVIDER NOTE - CARE PROVIDERS DIRECT ADDRESSES
[email or web address]

## 2023-04-01 NOTE — ED PROVIDER NOTE - OBJECTIVE STATEMENT
62 yold male to ED [facility] alcohol cirrhosis, BPH, Gerd, DVT/Pe was on coumadin - switched to lovenox; pt c/o epistaxis from right nare prior to arrival lasting for 40 minutes controlled with pressure prior to arrival; pt s/p recent admission for same d/c 4 days ago attributed to supratheraputic INR; pt denies easy bruising, or bleeding peripherally;

## 2023-04-01 NOTE — ED PROVIDER NOTE - CLINICAL SUMMARY MEDICAL DECISION MAKING FREE TEXT BOX
pt well appearing, no active bleeding, pt not on coumadin  pt comfortable w/ dc w/ ent f/u, return precautions

## 2023-04-01 NOTE — ED PROVIDER NOTE - PROVIDER TOKENS
PROVIDER:[TOKEN:[[id number]],FOLLOWUP:[1-3 Days],ESTABLISHEDPATIENT:[T]],PROVIDER:[TOKEN:[[id number]],FOLLOWUP:[4-6 Days]]

## 2023-04-01 NOTE — ED PROVIDER NOTE - NS ED ROS FT
Constitutional: No fever, chills.  Eyes: No visual changes.  ENT:  see hpi  Neck: No neck pain or stiffness.  Cardiovascular: No chest pain, palpitations, edema.  Pulmonary: No SOB, cough. No hemoptysis.  Abdominal:  No nausea, vomiting, diarrhea.  : No dysuria, frequency.  Neuro: No headache, syncope, dizziness.  MS: No back pain. No calf pain/swelling.  Psych: No suicidal ideations.

## 2023-04-04 ENCOUNTER — EMERGENCY (EMERGENCY)
Facility: HOSPITAL | Age: 63
LOS: 0 days | Discharge: HOME | End: 2023-04-04
Attending: STUDENT IN AN ORGANIZED HEALTH CARE EDUCATION/TRAINING PROGRAM
Payer: MEDICAID

## 2023-04-04 VITALS
HEIGHT: 70 IN | SYSTOLIC BLOOD PRESSURE: 142 MMHG | RESPIRATION RATE: 14 BRPM | HEART RATE: 92 BPM | WEIGHT: 242.95 LBS | OXYGEN SATURATION: 98 % | TEMPERATURE: 98 F | DIASTOLIC BLOOD PRESSURE: 65 MMHG

## 2023-04-04 DIAGNOSIS — Z86.718 PERSONAL HISTORY OF OTHER VENOUS THROMBOSIS AND EMBOLISM: ICD-10-CM

## 2023-04-04 DIAGNOSIS — Z91.011 ALLERGY TO MILK PRODUCTS: ICD-10-CM

## 2023-04-04 DIAGNOSIS — Z87.19 PERSONAL HISTORY OF OTHER DISEASES OF THE DIGESTIVE SYSTEM: ICD-10-CM

## 2023-04-04 DIAGNOSIS — Z91.013 ALLERGY TO SEAFOOD: ICD-10-CM

## 2023-04-04 DIAGNOSIS — Z79.01 LONG TERM (CURRENT) USE OF ANTICOAGULANTS: ICD-10-CM

## 2023-04-04 DIAGNOSIS — M79.672 PAIN IN LEFT FOOT: ICD-10-CM

## 2023-04-04 DIAGNOSIS — Z91.018 ALLERGY TO OTHER FOODS: ICD-10-CM

## 2023-04-04 DIAGNOSIS — M79.671 PAIN IN RIGHT FOOT: ICD-10-CM

## 2023-04-04 DIAGNOSIS — Z86.59 PERSONAL HISTORY OF OTHER MENTAL AND BEHAVIORAL DISORDERS: ICD-10-CM

## 2023-04-04 DIAGNOSIS — M79.662 PAIN IN LEFT LOWER LEG: ICD-10-CM

## 2023-04-04 DIAGNOSIS — M10.9 GOUT, UNSPECIFIED: ICD-10-CM

## 2023-04-04 DIAGNOSIS — K21.9 GASTRO-ESOPHAGEAL REFLUX DISEASE WITHOUT ESOPHAGITIS: ICD-10-CM

## 2023-04-04 DIAGNOSIS — N40.0 BENIGN PROSTATIC HYPERPLASIA WITHOUT LOWER URINARY TRACT SYMPTOMS: ICD-10-CM

## 2023-04-04 DIAGNOSIS — M19.90 UNSPECIFIED OSTEOARTHRITIS, UNSPECIFIED SITE: ICD-10-CM

## 2023-04-04 DIAGNOSIS — Z86.711 PERSONAL HISTORY OF PULMONARY EMBOLISM: ICD-10-CM

## 2023-04-04 DIAGNOSIS — M79.661 PAIN IN RIGHT LOWER LEG: ICD-10-CM

## 2023-04-04 PROCEDURE — 73610 X-RAY EXAM OF ANKLE: CPT | Mod: 50

## 2023-04-04 PROCEDURE — 73610 X-RAY EXAM OF ANKLE: CPT | Mod: 26,50

## 2023-04-04 PROCEDURE — 99284 EMERGENCY DEPT VISIT MOD MDM: CPT

## 2023-04-04 PROCEDURE — 73630 X-RAY EXAM OF FOOT: CPT | Mod: 26,50

## 2023-04-04 PROCEDURE — 73630 X-RAY EXAM OF FOOT: CPT | Mod: 50

## 2023-04-04 PROCEDURE — 99284 EMERGENCY DEPT VISIT MOD MDM: CPT | Mod: 25

## 2023-04-04 RX ADMIN — Medication 500 MILLIGRAM(S): at 15:25

## 2023-04-04 RX ADMIN — Medication 50 MILLIGRAM(S): at 17:48

## 2023-04-04 NOTE — ED PROVIDER NOTE - PHYSICAL EXAMINATION
CONSTITUTIONAL: Well-developed; well-nourished; NAD  SKIN: warm, dry, w/o rash  HEAD: NCAT  EYES: PERRLA, EOMI, no conjunctival injection  ENT: No nasal discharge; nl OP without erythema or exudates  NECK: Supple, non-tender  CARD: nl S1, S2; RRR, no MRG, no JVD  RESP: CTAB, normal respiratory effort  ABD: BS+, soft, NTND, no HSM  EXT: Normal ROM.  No clubbing, cyanosis or edema; no swelling, erythema, warmth over b/l ankles or feet  NEURO: Alert, oriented, grossly unremarkable  PSYCH: Cooperative, appropriate

## 2023-04-04 NOTE — ED ADULT TRIAGE NOTE - CHIEF COMPLAINT QUOTE
[facility] x3 days hx of arthritis and gout, painful when ambulating

## 2023-04-04 NOTE — ED PROVIDER NOTE - NSFOLLOWUPINSTRUCTIONS_ED_ALL_ED_FT
Our Emergency Department Referral Coordinators will be reaching out to you in the next 24-48 hours from 9:00am to 5:00pm with a follow up appointment. Please expect a phone call from the hospital in that time frame. If you do not receive a call or if you have any questions or concerns, you can reach them at   [phone]      If you have been prescribed medication to control your gout, be sure to take it as prescribed. Avoid foods that are known to trigger gout flares such as: red meat/organ meat, shellfish, refined carbohydrates (ex. white bread, white rice, pasta, sugar), processed foods (ex. chips, snack foods, frozen dinners), sugary beverages, alcohol (no more that 1-2 drinks in a 24hr period).    Arthritis    Arthritis is a term that is commonly used to refer to joint pain or joint disease. There are more than 100 types of arthritis.    CAUSES  The most common cause of this condition is wear and tear of a joint. Other causes include:    Gout.  Inflammation of a joint.  An infection of a joint.  Sprains and other injuries near the joint.  A drug reaction or allergic reaction.    In some cases, the cause may not be known.    SYMPTOMS  The main symptom of this condition is pain in the joint with movement. Other symptoms include:    Redness, swelling, or stiffness at a joint.  Warmth coming from the joint.  Fever.  Overall feeling of illness.    DIAGNOSIS  This condition may be diagnosed with a physical exam and tests, including:    Blood tests.  Urine tests.  Imaging tests, such as MRI, X-rays, or a CT scan.    Sometimes, fluid is removed from a joint for testing.    TREATMENT  Treatment for this condition may involve:    Treatment of the cause, if it is known.  Rest.  Raising (elevating) the joint.  Applying cold or hot packs to the joint.  Medicines to improve symptoms and reduce inflammation.  Injections of a steroid such as cortisone into the joint to help reduce pain and inflammation.    Depending on the cause of your arthritis, you may need to make lifestyle changes to reduce stress on your joint. These changes may include exercising more and losing weight.    HOME CARE INSTRUCTIONS  Medicines    Take over-the-counter and prescription medicines only as told by your health care provider.  Do not take aspirin to relieve pain if gout is suspected.    Activities    Rest your joint if told by your health care provider. Rest is important when your disease is active and your joint feels painful, swollen, or stiff.  Avoid activities that make the pain worse. It is important to balance activity with rest.  Exercise your joint regularly with range-of-motion exercises as told by your health care provider. Try doing low-impact exercise, such as:  Swimming.  Water aerobics.  Biking.  Walking.    Joint Care    If your joint is swollen, keep it elevated if told by your health care provider.   If your joint feels stiff in the morning, try taking a warm shower.  If directed, apply heat to the joint. If you have diabetes, do not apply heat without permission from your health care provider.  Put a towel between the joint and the hot pack or heating pad.  Leave the heat on the area for 20–30 minutes.  If directed, apply ice to the joint:  Put ice in a plastic bag.  Place a towel between your skin and the bag.  Leave the ice on for 20 minutes, 2–3 times per day.  Keep all follow-up visits as told by your health care provider. This is important.    SEEK MEDICAL CARE IF:  The pain gets worse.  You have a fever.    SEEK IMMEDIATE MEDICAL CARE IF:  You develop severe joint pain, swelling, or redness.  Many joints become painful and swollen.  You develop severe back pain.  You develop severe weakness in your leg.  You cannot control your bladder or bowels.    ADDITIONAL NOTES AND INSTRUCTIONS    Please follow up with your Primary MD in 24-48 hr.  Seek immediate medical care for any new/worsening signs or symptoms.

## 2023-04-04 NOTE — ED PROVIDER NOTE - CARE PROVIDER_API CALL
Eugene Tellez)  Internal Medicine  [address] [place] [address]  Phone: [phone]  Fax: [phone]  Established Patient  Follow Up Time: 1-3 Days

## 2023-04-04 NOTE — ED PROVIDER NOTE - OBJECTIVE STATEMENT
Patient is a 62-year-old male with past medical history of arthritis, gout, cirrhosis, BPH, GERD, DVT/PE on Lovenox presenting from [facility] for bilateral ankle, foot pain for the past 3 days.  Patient denies preceding trauma.  Patient denies fever, chills.  Patient denies numbness, weakness.  Patient otherwise well

## 2023-04-04 NOTE — ED PROVIDER NOTE - PATIENT PORTAL LINK FT
You can access the FollowMyHealth Patient Portal offered by [facility] by registering at the following website: [email or web address]. By joining [employer]’s FollowMyHealth portal, you will also be able to view your health information using other applications (apps) compatible with our system.

## 2023-04-04 NOTE — ED PROVIDER NOTE - CLINICAL SUMMARY MEDICAL DECISION MAKING FREE TEXT BOX
62-year-old male past medical history of alcoholic cirrhosis BPH on Coumadin switch to Lovenox arthritis and gout presents with swelling bilateral ankles.  Patient unable to ambulate due to pain.  Denies any shortness of breath fevers chills nausea vomiting chest pain.  Well appearing, NAD, non toxic. NCAT PERRLA EOMI neck supple non tender normal wob cta bl rrr abdomen s nt nd no rebound no guarding [id number] neuro non focal bilateral tenderness to the lateral malleolus.  Most likely secondary to gout reassess xray

## 2023-04-12 ENCOUNTER — APPOINTMENT (OUTPATIENT)
Dept: MEDICATION MANAGEMENT | Facility: CLINIC | Age: 63
End: 2023-04-12

## 2023-04-12 ENCOUNTER — OUTPATIENT (OUTPATIENT)
Dept: OUTPATIENT SERVICES | Facility: HOSPITAL | Age: 63
LOS: 1 days | End: 2023-04-12
Payer: MEDICAID

## 2023-04-12 DIAGNOSIS — I48.91 UNSPECIFIED ATRIAL FIBRILLATION: ICD-10-CM

## 2023-04-12 DIAGNOSIS — Z79.01 LONG TERM (CURRENT) USE OF ANTICOAGULANTS: ICD-10-CM

## 2023-04-12 LAB
INR PPP: 1 RATIO
POCT-PROTHROMBIN TIME: 12.2 SECS
QUALITY CONTROL: YES

## 2023-04-12 PROCEDURE — 36416 COLLJ CAPILLARY BLOOD SPEC: CPT

## 2023-04-12 PROCEDURE — 85610 PROTHROMBIN TIME: CPT

## 2023-04-12 PROCEDURE — 99211 OFF/OP EST MAY X REQ PHY/QHP: CPT

## 2023-04-13 DIAGNOSIS — I48.91 UNSPECIFIED ATRIAL FIBRILLATION: ICD-10-CM

## 2023-04-13 DIAGNOSIS — Z79.01 LONG TERM (CURRENT) USE OF ANTICOAGULANTS: ICD-10-CM

## 2023-04-17 ENCOUNTER — OUTPATIENT (OUTPATIENT)
Dept: OUTPATIENT SERVICES | Facility: HOSPITAL | Age: 63
LOS: 1 days | End: 2023-04-17
Payer: MEDICAID

## 2023-04-17 ENCOUNTER — APPOINTMENT (OUTPATIENT)
Dept: MEDICATION MANAGEMENT | Facility: CLINIC | Age: 63
End: 2023-04-17

## 2023-04-17 DIAGNOSIS — I48.91 UNSPECIFIED ATRIAL FIBRILLATION: ICD-10-CM

## 2023-04-17 DIAGNOSIS — Z79.01 LONG TERM (CURRENT) USE OF ANTICOAGULANTS: ICD-10-CM

## 2023-04-17 LAB
INR PPP: 1 RATIO
POCT-PROTHROMBIN TIME: 11.8 SECS
QUALITY CONTROL: YES

## 2023-04-17 PROCEDURE — 99211 OFF/OP EST MAY X REQ PHY/QHP: CPT

## 2023-04-17 PROCEDURE — 36416 COLLJ CAPILLARY BLOOD SPEC: CPT

## 2023-04-17 PROCEDURE — 85610 PROTHROMBIN TIME: CPT

## 2023-04-18 DIAGNOSIS — I48.91 UNSPECIFIED ATRIAL FIBRILLATION: ICD-10-CM

## 2023-04-18 DIAGNOSIS — Z79.01 LONG TERM (CURRENT) USE OF ANTICOAGULANTS: ICD-10-CM

## 2023-04-20 ENCOUNTER — EMERGENCY (EMERGENCY)
Facility: HOSPITAL | Age: 63
LOS: 0 days | Discharge: HOME | End: 2023-04-20
Attending: EMERGENCY MEDICINE
Payer: MEDICAID

## 2023-04-20 VITALS
DIASTOLIC BLOOD PRESSURE: 65 MMHG | HEIGHT: 70 IN | TEMPERATURE: 99 F | RESPIRATION RATE: 18 BRPM | OXYGEN SATURATION: 100 % | HEART RATE: 90 BPM | SYSTOLIC BLOOD PRESSURE: 149 MMHG | WEIGHT: 235.01 LBS

## 2023-04-20 DIAGNOSIS — Z86.718 PERSONAL HISTORY OF OTHER VENOUS THROMBOSIS AND EMBOLISM: ICD-10-CM

## 2023-04-20 DIAGNOSIS — M10.9 GOUT, UNSPECIFIED: ICD-10-CM

## 2023-04-20 DIAGNOSIS — K74.60 UNSPECIFIED CIRRHOSIS OF LIVER: ICD-10-CM

## 2023-04-20 DIAGNOSIS — Z91.011 ALLERGY TO MILK PRODUCTS: ICD-10-CM

## 2023-04-20 DIAGNOSIS — M79.672 PAIN IN LEFT FOOT: ICD-10-CM

## 2023-04-20 DIAGNOSIS — M79.671 PAIN IN RIGHT FOOT: ICD-10-CM

## 2023-04-20 DIAGNOSIS — Z91.013 ALLERGY TO SEAFOOD: ICD-10-CM

## 2023-04-20 DIAGNOSIS — Z86.711 PERSONAL HISTORY OF PULMONARY EMBOLISM: ICD-10-CM

## 2023-04-20 DIAGNOSIS — Z79.01 LONG TERM (CURRENT) USE OF ANTICOAGULANTS: ICD-10-CM

## 2023-04-20 DIAGNOSIS — Z91.014 ALLERGY TO MAMMALIAN MEATS: ICD-10-CM

## 2023-04-20 DIAGNOSIS — M19.90 UNSPECIFIED OSTEOARTHRITIS, UNSPECIFIED SITE: ICD-10-CM

## 2023-04-20 PROCEDURE — 73630 X-RAY EXAM OF FOOT: CPT | Mod: 50

## 2023-04-20 PROCEDURE — 99283 EMERGENCY DEPT VISIT LOW MDM: CPT | Mod: 25

## 2023-04-20 PROCEDURE — 99284 EMERGENCY DEPT VISIT MOD MDM: CPT

## 2023-04-20 PROCEDURE — 73630 X-RAY EXAM OF FOOT: CPT | Mod: 26,50

## 2023-04-20 RX ORDER — IBUPROFEN 200 MG
600 TABLET ORAL ONCE
Refills: 0 | Status: COMPLETED | OUTPATIENT
Start: 2023-04-20 | End: 2023-04-20

## 2023-04-20 RX ADMIN — Medication 40 MILLIGRAM(S): at 10:50

## 2023-04-20 RX ADMIN — Medication 600 MILLIGRAM(S): at 11:20

## 2023-04-20 RX ADMIN — Medication 600 MILLIGRAM(S): at 10:50

## 2023-04-20 NOTE — ED PROVIDER NOTE - ATTENDING CONTRIBUTION TO CARE
62-year-old male with past medical history of gout, DVT and PE on Lovenox, cirrhosis presents the emergency department for bilateral foot pain.  Patient has prior bone growth area and he says that it is now painful.  Been a long time since he saw podiatrist.  No fever no chills no swelling no erythema no trauma.    Const: Well nourished, well developed, appears stated age  Eyes: PERRL, no conjunctival injection  HENT:  Neck supple without meningismus   CV: RRR, Warm, well-perfused extremities  RESP: CTA B/L, no tachypnea   MSK/skin: Bony growth to dorsal aspect Of bilateral feet.  No erythema.  DP and TP pulses intact.  Neuro: Alert, CNs II-XII grossly intact. Sensation and motor function of extremities grossly intact.  Psych: Appropriate mood and affect.    xray

## 2023-04-20 NOTE — ED PROVIDER NOTE - OBJECTIVE STATEMENT
62-year-old male with past history of gout history of DVT PE on Lovenox arthritis cirrhosis from [facility] with bilateral foot pain worse in the dorsal surface of the foot. Patient has prior bone growth in that area. Patient states that its been harder to ambulate due to pain. Patient denies fever chills calf swelling

## 2023-04-20 NOTE — ED PROVIDER NOTE - PATIENT PORTAL LINK FT
You can access the FollowMyHealth Patient Portal offered by [facility] by registering at the following website: [email or web address]. By joining [employer]’s FollowMyHealth portal, you will also be able to view your health information using other applications (apps) compatible with our system.

## 2023-04-20 NOTE — ED ADULT NURSE NOTE - CHIEF COMPLAINT QUOTE
Patient ELVIRA from [facility] with pain to bilateral feet since Tuesday. Patient denies any fall or trauma. Unable to ambulate due to pain.

## 2023-04-20 NOTE — ED ADULT NURSE NOTE - NSIMPLEMENTINTERV_GEN_ALL_ED
PC to patient, informed of results of MD recommendation.  HCG ordered, lab apt scheduled for 4/28 per patient request.     Implemented All Fall with Harm Risk Interventions:  [place] to call system. Call bell, personal items and telephone within reach. Instruct patient to call for assistance. Room bathroom lighting operational. Non-slip footwear when patient is off stretcher. Physically safe environment: no spills, clutter or unnecessary equipment. Stretcher in lowest position, wheels locked, appropriate side rails in place. Provide visual cue, wrist band, yellow gown, etc. Monitor gait and stability. Monitor for mental status changes and reorient to person, place, and time. Review medications for side effects contributing to fall risk. Reinforce activity limits and safety measures with patient and family. Provide visual clues: red socks.

## 2023-04-20 NOTE — ED ADULT NURSE NOTE - NSIMPLEMENTINTERV_GEN_ALL_ED
Implemented All Fall Risk Interventions:  [place] to call system. Call bell, personal items and telephone within reach. Instruct patient to call for assistance. Room bathroom lighting operational. Non-slip footwear when patient is off stretcher. Physically safe environment: no spills, clutter or unnecessary equipment. Stretcher in lowest position, wheels locked, appropriate side rails in place. Provide visual cue, wrist band, yellow gown, etc. Monitor gait and stability. Monitor for mental status changes and reorient to person, place, and time. Review medications for side effects contributing to fall risk. Reinforce activity limits and safety measures with patient and family.

## 2023-04-20 NOTE — ED PROVIDER NOTE - WR ORDER STATUS 1
Debridement: No Location: Face Scalp Incubation Time: 2 hours Photosensitizer: Levulan Detail Level: Zone Consent: The procedure and risks were reviewed with the patient including but not limited to: burning, pigmentary changes, pain, blistering, scabbing, redness, and the possibility of needing numerous treatments. Strict photoprotection after the procedure was also discussed. Face Incubation Time: 2  hours Pdt Type: MICHELLE-U Frequency Of Pdt: Every 6 months if needed Performed Resulted

## 2023-04-20 NOTE — ED PROVIDER NOTE - NSFOLLOWUPCLINICS_GEN_ALL_ED_FT
[facility]  Podiatry  .  [place]   Phone: [phone]  Fax:   Follow Up Time: 1-3 Days

## 2023-04-20 NOTE — ED PROVIDER NOTE - PHYSICAL EXAMINATION
Const: Well nourished, well developed, appears stated age  Eyes: PERRL, no conjunctival injection  HENT:  Neck supple without meningismus   CV: RRR, Warm, well-perfused extremities  RESP: CTA B/L, no tachypnea   MSK/skin: Bony growth to dorsal aspect Of bilateral feet.  No erythema.  DP and TP pulses intact.  Neuro: Alert, CNs II-XII grossly intact. Sensation and motor function of extremities grossly intact.  Psych: Appropriate mood and affect.

## 2023-04-20 NOTE — ED PROVIDER NOTE - NS ED ROS FT
Constitutional:  No fevers or chills.  Eyes:  No visual changes, eye pain, or discharge.  ENT:  No hearing changes. No sore throat.  Neck:  No neck pain or stiffness.  Cardiac:  No CP or edema.  Resp:  No cough or SOB. No hemoptysis.   GI:  No nausea, vomiting, diarrhea, or abdominal pain.  :  No dysuria, frequency, or hematuria.  MSK:  No myalgias or joint pain/swelling.  (+) foot pain (+) foot growth  Neuro:  No headache, dizziness, or weakness.  Skin:  No skin rash.

## 2023-04-20 NOTE — ED ADULT TRIAGE NOTE - CHIEF COMPLAINT QUOTE
Patient ELVIRA from [facility] with pain to bilateral feet since Tuesday. Patient denies any fall or trauma. Unable to ambulate due to pain.

## 2023-04-20 NOTE — ED PROVIDER NOTE - CLINICAL SUMMARY MEDICAL DECISION MAKING FREE TEXT BOX
62-year-old male presents emergency department for pain to bilateral feet at area bony protuberance.  No signs of infection.  DC home with podiatry follow-up.

## 2023-04-20 NOTE — ED ADULT NURSE NOTE - OBJECTIVE STATEMENT
Patient ELVIRA from [facility] with pain to bilateral feet since Tuesday. Patient denies any fall or trauma. Unable to ambulate due to pain.

## 2023-04-21 ENCOUNTER — APPOINTMENT (OUTPATIENT)
Dept: MEDICATION MANAGEMENT | Facility: CLINIC | Age: 63
End: 2023-04-21

## 2023-04-21 ENCOUNTER — OUTPATIENT (OUTPATIENT)
Dept: OUTPATIENT SERVICES | Facility: HOSPITAL | Age: 63
LOS: 1 days | End: 2023-04-21
Payer: MEDICAID

## 2023-04-21 DIAGNOSIS — Z79.01 LONG TERM (CURRENT) USE OF ANTICOAGULANTS: ICD-10-CM

## 2023-04-21 DIAGNOSIS — I48.91 UNSPECIFIED ATRIAL FIBRILLATION: ICD-10-CM

## 2023-04-21 LAB
INR PPP: 1.1 RATIO
POCT-PROTHROMBIN TIME: 12.9 SECS
QUALITY CONTROL: YES

## 2023-04-21 PROCEDURE — 36416 COLLJ CAPILLARY BLOOD SPEC: CPT

## 2023-04-21 PROCEDURE — 99211 OFF/OP EST MAY X REQ PHY/QHP: CPT

## 2023-04-21 PROCEDURE — 85610 PROTHROMBIN TIME: CPT

## 2023-04-22 DIAGNOSIS — I48.91 UNSPECIFIED ATRIAL FIBRILLATION: ICD-10-CM

## 2023-04-22 DIAGNOSIS — Z79.01 LONG TERM (CURRENT) USE OF ANTICOAGULANTS: ICD-10-CM

## 2023-04-24 ENCOUNTER — OUTPATIENT (OUTPATIENT)
Dept: OUTPATIENT SERVICES | Facility: HOSPITAL | Age: 63
LOS: 1 days | End: 2023-04-24
Payer: MEDICAID

## 2023-04-24 ENCOUNTER — APPOINTMENT (OUTPATIENT)
Dept: MEDICATION MANAGEMENT | Facility: CLINIC | Age: 63
End: 2023-04-24

## 2023-04-24 DIAGNOSIS — I48.91 UNSPECIFIED ATRIAL FIBRILLATION: ICD-10-CM

## 2023-04-24 DIAGNOSIS — Z79.01 LONG TERM (CURRENT) USE OF ANTICOAGULANTS: ICD-10-CM

## 2023-04-24 LAB
INR PPP: 1.3 RATIO
POCT-PROTHROMBIN TIME: 15 SECS
QUALITY CONTROL: YES

## 2023-04-24 PROCEDURE — 99211 OFF/OP EST MAY X REQ PHY/QHP: CPT

## 2023-04-24 PROCEDURE — 85610 PROTHROMBIN TIME: CPT

## 2023-04-24 PROCEDURE — 36416 COLLJ CAPILLARY BLOOD SPEC: CPT

## 2023-04-25 DIAGNOSIS — Z79.01 LONG TERM (CURRENT) USE OF ANTICOAGULANTS: ICD-10-CM

## 2023-04-25 DIAGNOSIS — I48.91 UNSPECIFIED ATRIAL FIBRILLATION: ICD-10-CM

## 2023-04-26 ENCOUNTER — APPOINTMENT (OUTPATIENT)
Dept: GASTROENTEROLOGY | Facility: CLINIC | Age: 63
End: 2023-04-26
Payer: MEDICAID

## 2023-04-26 VITALS
HEIGHT: 70 IN | HEART RATE: 76 BPM | SYSTOLIC BLOOD PRESSURE: 145 MMHG | DIASTOLIC BLOOD PRESSURE: 81 MMHG | WEIGHT: 243 LBS | BODY MASS INDEX: 34.79 KG/M2

## 2023-04-26 DIAGNOSIS — Z80.0 FAMILY HISTORY OF MALIGNANT NEOPLASM OF DIGESTIVE ORGANS: ICD-10-CM

## 2023-04-26 DIAGNOSIS — Z86.79 PERSONAL HISTORY OF OTHER DISEASES OF THE CIRCULATORY SYSTEM: ICD-10-CM

## 2023-04-26 DIAGNOSIS — Z78.9 OTHER SPECIFIED HEALTH STATUS: ICD-10-CM

## 2023-04-26 DIAGNOSIS — Z87.891 PERSONAL HISTORY OF NICOTINE DEPENDENCE: ICD-10-CM

## 2023-04-26 DIAGNOSIS — Z80.1 FAMILY HISTORY OF MALIGNANT NEOPLASM OF TRACHEA, BRONCHUS AND LUNG: ICD-10-CM

## 2023-04-26 DIAGNOSIS — Z87.39 PERSONAL HISTORY OF OTHER DISEASES OF THE MUSCULOSKELETAL SYSTEM AND CONNECTIVE TISSUE: ICD-10-CM

## 2023-04-26 DIAGNOSIS — F10.21 ALCOHOL DEPENDENCE, IN REMISSION: ICD-10-CM

## 2023-04-26 PROCEDURE — 91200 LIVER ELASTOGRAPHY: CPT

## 2023-04-26 PROCEDURE — 99215 OFFICE O/P EST HI 40 MIN: CPT

## 2023-04-26 PROCEDURE — 99417 PROLNG OP E/M EACH 15 MIN: CPT

## 2023-04-26 RX ORDER — HEPATITIS B VACCINE (RECOMBINANT) ADJUVANTED 20 UG/.5ML
20 INJECTION, SOLUTION INTRAMUSCULAR
Qty: 1 | Refills: 1 | Status: ACTIVE | COMMUNITY
Start: 2023-04-26 | End: 1900-01-01

## 2023-04-26 RX ORDER — PREDNISONE 10 MG/1
10 TABLET ORAL
Qty: 20 | Refills: 0 | Status: DISCONTINUED | COMMUNITY
Start: 2022-09-20 | End: 2023-04-26

## 2023-04-26 RX ORDER — WARFARIN 2.5 MG/1
2.5 TABLET ORAL DAILY
Qty: 30 | Refills: 4 | Status: DISCONTINUED | COMMUNITY
Start: 2022-12-30 | End: 2023-04-26

## 2023-04-26 RX ORDER — WARFARIN 2.5 MG/1
2.5 TABLET ORAL
Qty: 90 | Refills: 2 | Status: DISCONTINUED | COMMUNITY
Start: 2022-12-29 | End: 2023-04-26

## 2023-04-27 PROBLEM — Z80.0 FAMILY HISTORY OF MALIGNANT NEOPLASM OF STOMACH: Status: ACTIVE | Noted: 2023-04-27

## 2023-04-27 PROBLEM — F10.21 HISTORY OF ALCOHOLISM: Status: RESOLVED | Noted: 2023-04-27 | Resolved: 2023-04-27

## 2023-04-27 PROBLEM — Z87.891 FORMER SMOKER: Status: ACTIVE | Noted: 2023-04-27

## 2023-04-27 PROBLEM — Z87.39 HISTORY OF PSEUDOGOUT: Status: RESOLVED | Noted: 2023-04-27 | Resolved: 2023-04-27

## 2023-04-27 PROBLEM — Z78.9 ALCOHOL USE: Status: ACTIVE | Noted: 2023-04-27

## 2023-04-27 PROBLEM — Z86.79 HISTORY OF ESSENTIAL HYPERTENSION: Status: RESOLVED | Noted: 2023-04-27 | Resolved: 2023-04-27

## 2023-04-27 PROBLEM — Z80.1 FAMILY HISTORY OF LUNG CANCER: Status: ACTIVE | Noted: 2023-04-27

## 2023-04-27 RX ORDER — PREDNISONE 20 MG/1
20 TABLET ORAL
Refills: 0 | Status: ACTIVE | COMMUNITY

## 2023-04-27 RX ORDER — GABAPENTIN 100 MG/1
100 CAPSULE ORAL
Refills: 0 | Status: ACTIVE | COMMUNITY

## 2023-04-28 ENCOUNTER — OUTPATIENT (OUTPATIENT)
Dept: OUTPATIENT SERVICES | Facility: HOSPITAL | Age: 63
LOS: 1 days | End: 2023-04-28
Payer: MEDICAID

## 2023-04-28 ENCOUNTER — APPOINTMENT (OUTPATIENT)
Dept: MEDICATION MANAGEMENT | Facility: CLINIC | Age: 63
End: 2023-04-28

## 2023-04-28 DIAGNOSIS — Z79.01 LONG TERM (CURRENT) USE OF ANTICOAGULANTS: ICD-10-CM

## 2023-04-28 DIAGNOSIS — I82.409 ACUTE EMBOLISM AND THROMBOSIS OF UNSPECIFIED DEEP VEINS OF UNSPECIFIED LOWER EXTREMITY: ICD-10-CM

## 2023-04-28 LAB
INR PPP: 1.5 RATIO
POCT-PROTHROMBIN TIME: 18 SECS
QUALITY CONTROL: YES

## 2023-04-28 PROCEDURE — 36416 COLLJ CAPILLARY BLOOD SPEC: CPT

## 2023-04-28 PROCEDURE — 85610 PROTHROMBIN TIME: CPT

## 2023-04-28 PROCEDURE — 99211 OFF/OP EST MAY X REQ PHY/QHP: CPT

## 2023-04-28 RX ORDER — NAPROXEN 500 MG/1
500 TABLET, DELAYED RELEASE ORAL
Refills: 0 | Status: DISCONTINUED | COMMUNITY
End: 2023-04-28

## 2023-04-28 RX ORDER — IBUPROFEN 600 MG/1
600 TABLET, FILM COATED ORAL
Refills: 0 | Status: DISCONTINUED | COMMUNITY
End: 2023-04-28

## 2023-04-28 NOTE — REVIEW OF SYSTEMS
[Chest Pain] : chest pain [Arthralgias] : arthralgias [Negative] : Endocrine [Fever] : no fever [Chills] : no chills [Eye Pain] : no eye pain [Red Eyes] : eyes not red [Earache] : no earache [Loss Of Hearing] : no hearing loss [Nosebleeds] : no nosebleeds [Sore Throat] : no sore throat [Cough] : no cough [SOB on Exertion] : no shortness of breath during exertion [Constipation] : no constipation [Diarrhea] : no diarrhea [Joint Swelling] : no joint swelling [Skin Lesions] : no skin lesions [Confused] : no confusion [Convulsions] : no convulsions [Anxiety] : no anxiety [Depression] : no depression [Easy Bleeding] : no tendency for easy bleeding [Easy Bruising] : no tendency for easy bruising [FreeTextEntry5] : Seeing cardiology [FreeTextEntry7] : indigestion at times

## 2023-04-28 NOTE — ASSESSMENT
[FreeTextEntry1] : 63 yo  male with hx DVT  and PE on coumadin, AUD  pseudogout, GERD, and BPH recent admission for epistaxis with supra-therapeutic INR. seen for finding of cirrhosis on imaging and hyperbilirubinemia. 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 Advised vaccination. \par Coagulopathy - on warfarin. platelet count normal.\par LT referral - lives in assisted liver and poor insight. Not a LT candidate.\par ABO status - O positive \par \par Nutriton\par Low Na diet Protein 1.5 g/kg\par Early breakfast late night snack small multiple meals\par Avoid raw foods especially shellfish\par \par No NSAID \par Limit tylenol intake to 2 g per day\par Alcohol abstinence and rehab.\par \par CBC CMP INR and rest of CLD work up ordered. \par \par Health maintenance\par Colonoscopy declined as well as stool testing. \par Vaccination advised\par Covid  -done\par Zoster \par Pneumococcal \par \par Follow up in 3 months

## 2023-04-28 NOTE — PHYSICAL EXAM
[Liver Size (___ Cm)] : Liver size [unfilled] cm [General Appearance - Alert] : alert [General Appearance - Well Nourished] : well nourished [General Appearance - Well Developed] : well developed [Sclera] : the sclera and conjunctiva were normal [Neck Appearance] : the appearance of the neck was normal [Neck Cervical Mass (___cm)] : no neck mass was observed [Jugular Venous Distention Increased] : there was no jugular-venous distention [Thyroid Diffuse Enlargement] : the thyroid was not enlarged [Heart Sounds] : normal S1 and S2 [Murmurs] : no murmurs [Edema] : there was no peripheral edema [Nail Clubbing] : no clubbing  or cyanosis of the fingernails [Involuntary Movements] : no involuntary movements were seen [No Focal Deficits] : no focal deficits [Oriented To Time, Place, And Person] : oriented to person, place, and time [Scleral Icterus] : No Scleral Icterus [Spider Angioma] : No spider angioma(s) were observed [Abdominal  Ascites] : no ascites [Splenomegaly] : no splenomegaly [Liver Palpable ___ Finger Breadths Below Costal Margin] : was not palpable below costal margin [Asterixis] : no asterixis observed [Jaundice] : No jaundice

## 2023-04-28 NOTE — HISTORY OF PRESENT ILLNESS
[___] : Performed [unfilled] [de-identified] : 61 YO  M  hx DVT and PE on Coumadin, severe alcohol use disorder, HTN pseudogout, GERD, and BPH was admitted in March 2023 with epistaxis and supra therapeutic INR. \par Reported L sided chest pain in ED\par Developed fever and joint swelling during hospital stay treated with prednisone.\par Was referred to hepatology for nodular contour of liver seen on imaging.\par Doing well. No abdominal pain distension jaundice confusion hematemesis or melena. \par Started drinking at 17 years. Drinks 1/2 - 1 pint 1 6 beer daily except for a brief interruption in between.\par Last drink was prior to hospital stay in March 2023. \par No hx of hepatitis in the past. Evaluated by Dr Albrecht cardiology for chest pain. \par Has indigestion at times due to food. No hx of EGD colonoscopy. \par No DUI. Alcohol rehab few years ago.\par \par Doing well since discharge. Walking around. No alcohol.\par No abdominal pain distension confusion hematemesis or melena.  [de-identified] : 1. No evidence of acute intra-abdominal pathology.\par 2. Hepatic steatosis.\par 3. Cholelithiasis. [de-identified] : Liver: Severe hepatic steatosis.\par Bile ducts: Normal caliber. Common bile duct measures 0.6 cm.\par Gallbladder: No gallstones. Sludge. No wall thickening or pericholecystic \par fluid. Negative sonographic Rudolph exam.\par Pancreas: Poorly visualized.\par Right kidney: 11.1 cm. No hydronephrosis.\par Ascites: None.\par \par  [FreeTextEntry1] : Retired\par Worked as \par Never . No kids\par In assisted living\par Alcohol stopped 3 weeks ago.\par Ex smoker  Stopped 15 years ago. 30 pack year hx.

## 2023-04-29 DIAGNOSIS — Z79.01 LONG TERM (CURRENT) USE OF ANTICOAGULANTS: ICD-10-CM

## 2023-04-29 DIAGNOSIS — I82.409 ACUTE EMBOLISM AND THROMBOSIS OF UNSPECIFIED DEEP VEINS OF UNSPECIFIED LOWER EXTREMITY: ICD-10-CM

## 2023-05-01 ENCOUNTER — APPOINTMENT (OUTPATIENT)
Dept: MEDICATION MANAGEMENT | Facility: CLINIC | Age: 63
End: 2023-05-01

## 2023-05-01 ENCOUNTER — OUTPATIENT (OUTPATIENT)
Dept: OUTPATIENT SERVICES | Facility: HOSPITAL | Age: 63
LOS: 1 days | End: 2023-05-01
Payer: MEDICAID

## 2023-05-01 DIAGNOSIS — I82.409 ACUTE EMBOLISM AND THROMBOSIS OF UNSPECIFIED DEEP VEINS OF UNSPECIFIED LOWER EXTREMITY: ICD-10-CM

## 2023-05-01 DIAGNOSIS — Z79.01 LONG TERM (CURRENT) USE OF ANTICOAGULANTS: ICD-10-CM

## 2023-05-01 PROCEDURE — 36416 COLLJ CAPILLARY BLOOD SPEC: CPT

## 2023-05-01 PROCEDURE — 99211 OFF/OP EST MAY X REQ PHY/QHP: CPT

## 2023-05-01 PROCEDURE — 85610 PROTHROMBIN TIME: CPT

## 2023-05-02 ENCOUNTER — OUTPATIENT (OUTPATIENT)
Dept: OUTPATIENT SERVICES | Facility: HOSPITAL | Age: 63
LOS: 1 days | End: 2023-05-02
Payer: MEDICAID

## 2023-05-02 ENCOUNTER — APPOINTMENT (OUTPATIENT)
Dept: PODIATRY | Facility: CLINIC | Age: 63
End: 2023-05-02
Payer: MEDICAID

## 2023-05-02 ENCOUNTER — APPOINTMENT (OUTPATIENT)
Dept: PODIATRY | Facility: CLINIC | Age: 63
End: 2023-05-02

## 2023-05-02 DIAGNOSIS — Z79.01 LONG TERM (CURRENT) USE OF ANTICOAGULANTS: ICD-10-CM
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
## 2023-05-02 DIAGNOSIS — M79.672 PAIN IN LEFT FOOT: ICD-10-CM

## 2023-05-02 DIAGNOSIS — Z00.00 ENCOUNTER FOR GENERAL ADULT MEDICAL EXAMINATION WITHOUT ABNORMAL FINDINGS: ICD-10-CM

## 2023-05-02 DIAGNOSIS — M19.071 PRIMARY OSTEOARTHRITIS, RIGHT ANKLE AND FOOT: ICD-10-CM

## 2023-05-02 DIAGNOSIS — I82.409 ACUTE EMBOLISM AND THROMBOSIS OF UNSPECIFIED DEEP VEINS OF UNSPECIFIED LOWER EXTREMITY: ICD-10-CM
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
## 2023-05-02 DIAGNOSIS — M19.072 PRIMARY OSTEOARTHRITIS, RIGHT ANKLE AND FOOT: ICD-10-CM

## 2023-05-02 PROCEDURE — 99213 OFFICE O/P EST LOW 20 MIN: CPT

## 2023-05-02 PROCEDURE — 99213 OFFICE O/P EST LOW 20 MIN: CPT | Mod: 25

## 2023-05-02 RX ORDER — DICLOFENAC SODIUM 1% 10 MG/G
1 GEL TOPICAL
Qty: 1 | Refills: 2 | Status: ACTIVE | COMMUNITY
Start: 2023-05-02 | End: 1900-01-01

## 2023-05-03 PROBLEM — M79.672 ACUTE FOOT PAIN, LEFT: Status: ACTIVE | Noted: 2023-05-03

## 2023-05-03 NOTE — HISTORY OF PRESENT ILLNESS
[FreeTextEntry1] : 61 y/o male presents for evaluation for left foot pain. Patient experiences acute left foot pain last week. Patient notes pain has resolved over the week. He is asymptomatic on today visit. Denies h/o of trauma.

## 2023-05-03 NOTE — PHYSICAL EXAM
[General Appearance - Alert] : alert [General Appearance - In No Acute Distress] : in no acute distress [de-identified] : no pain on palpation of left ankle, tarsal or metatarsal bones. no pain on palpation of the ankle medial/lateral collateral ligaments

## 2023-05-03 NOTE — ASSESSMENT
[FreeTextEntry1] : xrays reviewed. no fractures. sever OA\par pt has h/o of gout, like suffered an acute gout attack vs osteoarthritic pain\par Rx diclofenac if pt notes episodes of pain\par return as needed

## 2023-05-05 ENCOUNTER — APPOINTMENT (OUTPATIENT)
Dept: MEDICATION MANAGEMENT | Facility: CLINIC | Age: 63
End: 2023-05-05

## 2023-05-05 ENCOUNTER — OUTPATIENT (OUTPATIENT)
Dept: OUTPATIENT SERVICES | Facility: HOSPITAL | Age: 63
LOS: 1 days | End: 2023-05-05
Payer: MEDICAID

## 2023-05-05 DIAGNOSIS — I82.409 ACUTE EMBOLISM AND THROMBOSIS OF UNSPECIFIED DEEP VEINS OF UNSPECIFIED LOWER EXTREMITY: ICD-10-CM

## 2023-05-05 DIAGNOSIS — Z79.01 LONG TERM (CURRENT) USE OF ANTICOAGULANTS: ICD-10-CM

## 2023-05-05 LAB
INR PPP: 2 RATIO
POCT-PROTHROMBIN TIME: 23.7 SECS
QUALITY CONTROL: YES

## 2023-05-05 PROCEDURE — 36416 COLLJ CAPILLARY BLOOD SPEC: CPT

## 2023-05-05 PROCEDURE — 85610 PROTHROMBIN TIME: CPT

## 2023-05-05 PROCEDURE — 99211 OFF/OP EST MAY X REQ PHY/QHP: CPT

## 2023-05-06 DIAGNOSIS — I82.409 ACUTE EMBOLISM AND THROMBOSIS OF UNSPECIFIED DEEP VEINS OF UNSPECIFIED LOWER EXTREMITY: ICD-10-CM

## 2023-05-06 DIAGNOSIS — Z79.01 LONG TERM (CURRENT) USE OF ANTICOAGULANTS: ICD-10-CM

## 2023-05-08 LAB
INR PPP: 1.7 RATIO
POCT-PROTHROMBIN TIME: 19.7 SECS
QUALITY CONTROL: YES

## 2023-05-10 ENCOUNTER — OUTPATIENT (OUTPATIENT)
Dept: OUTPATIENT SERVICES | Facility: HOSPITAL | Age: 63
LOS: 1 days | End: 2023-05-10
Payer: MEDICAID

## 2023-05-10 ENCOUNTER — APPOINTMENT (OUTPATIENT)
Dept: MEDICATION MANAGEMENT | Facility: CLINIC | Age: 63
End: 2023-05-10

## 2023-05-10 DIAGNOSIS — Z79.01 LONG TERM (CURRENT) USE OF ANTICOAGULANTS: ICD-10-CM

## 2023-05-10 DIAGNOSIS — I82.409 ACUTE EMBOLISM AND THROMBOSIS OF UNSPECIFIED DEEP VEINS OF UNSPECIFIED LOWER EXTREMITY: ICD-10-CM

## 2023-05-10 LAB
INR PPP: 3.8 RATIO
POCT-PROTHROMBIN TIME: 45.7 SECS
QUALITY CONTROL: YES

## 2023-05-10 PROCEDURE — 99211 OFF/OP EST MAY X REQ PHY/QHP: CPT

## 2023-05-10 PROCEDURE — 85610 PROTHROMBIN TIME: CPT

## 2023-05-10 PROCEDURE — 36416 COLLJ CAPILLARY BLOOD SPEC: CPT

## 2023-05-11 DIAGNOSIS — M79.672 PAIN IN LEFT FOOT: ICD-10-CM

## 2023-05-11 DIAGNOSIS — I82.409 ACUTE EMBOLISM AND THROMBOSIS OF UNSPECIFIED DEEP VEINS OF UNSPECIFIED LOWER EXTREMITY: ICD-10-CM

## 2023-05-11 DIAGNOSIS — Z79.01 LONG TERM (CURRENT) USE OF ANTICOAGULANTS: ICD-10-CM

## 2023-05-11 DIAGNOSIS — M19.071 PRIMARY OSTEOARTHRITIS, RIGHT ANKLE AND FOOT: ICD-10-CM

## 2023-05-15 ENCOUNTER — APPOINTMENT (OUTPATIENT)
Dept: MEDICATION MANAGEMENT | Facility: CLINIC | Age: 63
End: 2023-05-15

## 2023-05-15 ENCOUNTER — OUTPATIENT (OUTPATIENT)
Dept: OUTPATIENT SERVICES | Facility: HOSPITAL | Age: 63
LOS: 1 days | End: 2023-05-15
Payer: MEDICAID

## 2023-05-15 DIAGNOSIS — Z79.01 LONG TERM (CURRENT) USE OF ANTICOAGULANTS: ICD-10-CM

## 2023-05-15 DIAGNOSIS — I82.409 ACUTE EMBOLISM AND THROMBOSIS OF UNSPECIFIED DEEP VEINS OF UNSPECIFIED LOWER EXTREMITY: ICD-10-CM

## 2023-05-15 LAB
INR PPP: 2.9 RATIO
POCT-PROTHROMBIN TIME: 34.6 SECS
QUALITY CONTROL: YES

## 2023-05-15 PROCEDURE — 36416 COLLJ CAPILLARY BLOOD SPEC: CPT

## 2023-05-15 PROCEDURE — 99211 OFF/OP EST MAY X REQ PHY/QHP: CPT

## 2023-05-15 PROCEDURE — 85610 PROTHROMBIN TIME: CPT

## 2023-05-16 DIAGNOSIS — I82.409 ACUTE EMBOLISM AND THROMBOSIS OF UNSPECIFIED DEEP VEINS OF UNSPECIFIED LOWER EXTREMITY: ICD-10-CM

## 2023-05-16 DIAGNOSIS — Z79.01 LONG TERM (CURRENT) USE OF ANTICOAGULANTS: ICD-10-CM

## 2023-05-17 NOTE — ED PROVIDER NOTE - CHIEF COMPLAINT
The patient is a 59y Male complaining of nausea, vomiting, diarrhea. 47y Female s/p Stage 1 L4-S1 ALIF (5/10), Stage 2 L4-S1 unilateral PSF w/ Mazor robotic system (5/16),    U. Incontinence:  [place]  Bladder scan  PT  Pain control with Oxy IR

## 2023-05-18 ENCOUNTER — OUTPATIENT (OUTPATIENT)
Dept: OUTPATIENT SERVICES | Facility: HOSPITAL | Age: 63
LOS: 1 days | End: 2023-05-18
Payer: MEDICAID

## 2023-05-18 ENCOUNTER — APPOINTMENT (OUTPATIENT)
Dept: MEDICATION MANAGEMENT | Facility: CLINIC | Age: 63
End: 2023-05-18

## 2023-05-18 DIAGNOSIS — Z79.01 LONG TERM (CURRENT) USE OF ANTICOAGULANTS: ICD-10-CM

## 2023-05-18 DIAGNOSIS — I82.409 ACUTE EMBOLISM AND THROMBOSIS OF UNSPECIFIED DEEP VEINS OF UNSPECIFIED LOWER EXTREMITY: ICD-10-CM

## 2023-05-18 LAB
INR PPP: 2.3 RATIO
POCT-PROTHROMBIN TIME: 27.2 SECS
QUALITY CONTROL: YES

## 2023-05-18 PROCEDURE — 36416 COLLJ CAPILLARY BLOOD SPEC: CPT

## 2023-05-18 PROCEDURE — 99211 OFF/OP EST MAY X REQ PHY/QHP: CPT

## 2023-05-18 PROCEDURE — 85610 PROTHROMBIN TIME: CPT

## 2023-05-19 DIAGNOSIS — Z79.01 LONG TERM (CURRENT) USE OF ANTICOAGULANTS: ICD-10-CM

## 2023-05-19 DIAGNOSIS — I82.409 ACUTE EMBOLISM AND THROMBOSIS OF UNSPECIFIED DEEP VEINS OF UNSPECIFIED LOWER EXTREMITY: ICD-10-CM

## 2023-05-24 ENCOUNTER — APPOINTMENT (OUTPATIENT)
Dept: MEDICATION MANAGEMENT | Facility: CLINIC | Age: 63
End: 2023-05-24

## 2023-05-24 ENCOUNTER — OUTPATIENT (OUTPATIENT)
Dept: OUTPATIENT SERVICES | Facility: HOSPITAL | Age: 63
LOS: 1 days | End: 2023-05-24
Payer: MEDICAID

## 2023-05-24 DIAGNOSIS — I82.409 ACUTE EMBOLISM AND THROMBOSIS OF UNSPECIFIED DEEP VEINS OF UNSPECIFIED LOWER EXTREMITY: ICD-10-CM

## 2023-05-24 DIAGNOSIS — Z79.01 LONG TERM (CURRENT) USE OF ANTICOAGULANTS: ICD-10-CM

## 2023-05-24 LAB
INR PPP: 4.4 RATIO
POCT-PROTHROMBIN TIME: 52.2 SECS
QUALITY CONTROL: YES

## 2023-05-24 PROCEDURE — 36416 COLLJ CAPILLARY BLOOD SPEC: CPT

## 2023-05-24 PROCEDURE — 99211 OFF/OP EST MAY X REQ PHY/QHP: CPT

## 2023-05-24 PROCEDURE — 85610 PROTHROMBIN TIME: CPT

## 2023-05-25 DIAGNOSIS — Z79.01 LONG TERM (CURRENT) USE OF ANTICOAGULANTS: ICD-10-CM

## 2023-05-25 DIAGNOSIS — I82.409 ACUTE EMBOLISM AND THROMBOSIS OF UNSPECIFIED DEEP VEINS OF UNSPECIFIED LOWER EXTREMITY: ICD-10-CM

## 2023-05-30 ENCOUNTER — APPOINTMENT (OUTPATIENT)
Dept: MEDICATION MANAGEMENT | Facility: CLINIC | Age: 63
End: 2023-05-30

## 2023-05-30 ENCOUNTER — OUTPATIENT (OUTPATIENT)
Dept: OUTPATIENT SERVICES | Facility: HOSPITAL | Age: 63
LOS: 1 days | End: 2023-05-30
Payer: MEDICAID

## 2023-05-30 DIAGNOSIS — I82.409 ACUTE EMBOLISM AND THROMBOSIS OF UNSPECIFIED DEEP VEINS OF UNSPECIFIED LOWER EXTREMITY: ICD-10-CM

## 2023-05-30 DIAGNOSIS — Z79.01 LONG TERM (CURRENT) USE OF ANTICOAGULANTS: ICD-10-CM

## 2023-05-30 LAB
INR PPP: 2.4 RATIO
POCT-PROTHROMBIN TIME: 29.3 SECS
QUALITY CONTROL: YES

## 2023-05-30 PROCEDURE — 36416 COLLJ CAPILLARY BLOOD SPEC: CPT

## 2023-05-30 PROCEDURE — 99211 OFF/OP EST MAY X REQ PHY/QHP: CPT

## 2023-05-30 PROCEDURE — 85610 PROTHROMBIN TIME: CPT

## 2023-05-31 DIAGNOSIS — Z79.01 LONG TERM (CURRENT) USE OF ANTICOAGULANTS: ICD-10-CM

## 2023-05-31 DIAGNOSIS — I82.409 ACUTE EMBOLISM AND THROMBOSIS OF UNSPECIFIED DEEP VEINS OF UNSPECIFIED LOWER EXTREMITY: ICD-10-CM

## 2023-06-06 ENCOUNTER — APPOINTMENT (OUTPATIENT)
Dept: MEDICATION MANAGEMENT | Facility: CLINIC | Age: 63
End: 2023-06-06

## 2023-06-06 ENCOUNTER — OUTPATIENT (OUTPATIENT)
Dept: OUTPATIENT SERVICES | Facility: HOSPITAL | Age: 63
LOS: 1 days | End: 2023-06-06
Payer: MEDICAID

## 2023-06-06 DIAGNOSIS — I82.409 ACUTE EMBOLISM AND THROMBOSIS OF UNSPECIFIED DEEP VEINS OF UNSPECIFIED LOWER EXTREMITY: ICD-10-CM

## 2023-06-06 DIAGNOSIS — Z79.01 LONG TERM (CURRENT) USE OF ANTICOAGULANTS: ICD-10-CM

## 2023-06-06 LAB
INR PPP: 1.6 RATIO
POCT-PROTHROMBIN TIME: 19.5 SECS
QUALITY CONTROL: YES

## 2023-06-06 PROCEDURE — 36416 COLLJ CAPILLARY BLOOD SPEC: CPT

## 2023-06-06 PROCEDURE — 99211 OFF/OP EST MAY X REQ PHY/QHP: CPT

## 2023-06-06 PROCEDURE — 85610 PROTHROMBIN TIME: CPT

## 2023-06-07 DIAGNOSIS — I82.409 ACUTE EMBOLISM AND THROMBOSIS OF UNSPECIFIED DEEP VEINS OF UNSPECIFIED LOWER EXTREMITY: ICD-10-CM

## 2023-06-07 DIAGNOSIS — Z79.01 LONG TERM (CURRENT) USE OF ANTICOAGULANTS: ICD-10-CM

## 2023-06-12 ENCOUNTER — APPOINTMENT (OUTPATIENT)
Dept: MEDICATION MANAGEMENT | Facility: CLINIC | Age: 63
End: 2023-06-12

## 2023-06-12 ENCOUNTER — OUTPATIENT (OUTPATIENT)
Dept: OUTPATIENT SERVICES | Facility: HOSPITAL | Age: 63
LOS: 1 days | End: 2023-06-12
Payer: MEDICAID

## 2023-06-12 DIAGNOSIS — Z79.01 LONG TERM (CURRENT) USE OF ANTICOAGULANTS: ICD-10-CM

## 2023-06-12 DIAGNOSIS — I82.409 ACUTE EMBOLISM AND THROMBOSIS OF UNSPECIFIED DEEP VEINS OF UNSPECIFIED LOWER EXTREMITY: ICD-10-CM

## 2023-06-12 PROCEDURE — 36416 COLLJ CAPILLARY BLOOD SPEC: CPT

## 2023-06-12 PROCEDURE — 99211 OFF/OP EST MAY X REQ PHY/QHP: CPT

## 2023-06-12 PROCEDURE — 85610 PROTHROMBIN TIME: CPT

## 2023-06-13 ENCOUNTER — APPOINTMENT (OUTPATIENT)
Dept: HEMATOLOGY ONCOLOGY | Facility: CLINIC | Age: 63
End: 2023-06-13

## 2023-06-13 DIAGNOSIS — I82.409 ACUTE EMBOLISM AND THROMBOSIS OF UNSPECIFIED DEEP VEINS OF UNSPECIFIED LOWER EXTREMITY: ICD-10-CM

## 2023-06-13 DIAGNOSIS — Z79.01 LONG TERM (CURRENT) USE OF ANTICOAGULANTS: ICD-10-CM

## 2023-06-16 LAB
INR PPP: 1.3 RATIO
POCT-PROTHROMBIN TIME: 15.2 SECS
QUALITY CONTROL: YES

## 2023-06-23 ENCOUNTER — EMERGENCY (EMERGENCY)
Facility: HOSPITAL | Age: 63
LOS: 0 days | Discharge: HOME | End: 2023-06-23
Attending: EMERGENCY MEDICINE
Payer: MEDICAID

## 2023-06-23 ENCOUNTER — INPATIENT (INPATIENT)
Facility: HOSPITAL | Age: 63
LOS: 4 days | Discharge: NURSING, REHAB OR LONG-TERM CARE | End: 2023-06-28
Attending: INTERNAL MEDICINE | Admitting: INTERNAL MEDICINE
Payer: MEDICAID

## 2023-06-23 VITALS
HEIGHT: 71 IN | OXYGEN SATURATION: 99 % | DIASTOLIC BLOOD PRESSURE: 89 MMHG | SYSTOLIC BLOOD PRESSURE: 164 MMHG | RESPIRATION RATE: 18 BRPM | HEART RATE: 102 BPM | TEMPERATURE: 96 F

## 2023-06-23 VITALS
SYSTOLIC BLOOD PRESSURE: 145 MMHG | OXYGEN SATURATION: 97 % | RESPIRATION RATE: 18 BRPM | HEART RATE: 107 BPM | DIASTOLIC BLOOD PRESSURE: 76 MMHG | TEMPERATURE: 97 F | HEIGHT: 71 IN | WEIGHT: 251.99 LBS

## 2023-06-23 VITALS
OXYGEN SATURATION: 99 % | SYSTOLIC BLOOD PRESSURE: 132 MMHG | TEMPERATURE: 97 F | DIASTOLIC BLOOD PRESSURE: 70 MMHG | RESPIRATION RATE: 16 BRPM | HEART RATE: 87 BPM

## 2023-06-23 DIAGNOSIS — Z79.01 LONG TERM (CURRENT) USE OF ANTICOAGULANTS: ICD-10-CM

## 2023-06-23 DIAGNOSIS — I10 ESSENTIAL (PRIMARY) HYPERTENSION: ICD-10-CM

## 2023-06-23 DIAGNOSIS — Z91.011 ALLERGY TO MILK PRODUCTS: ICD-10-CM

## 2023-06-23 DIAGNOSIS — R05.9 COUGH, UNSPECIFIED: ICD-10-CM

## 2023-06-23 DIAGNOSIS — Z91.018 ALLERGY TO OTHER FOODS: ICD-10-CM

## 2023-06-23 DIAGNOSIS — Z91.013 ALLERGY TO SEAFOOD: ICD-10-CM

## 2023-06-23 DIAGNOSIS — Z87.891 PERSONAL HISTORY OF NICOTINE DEPENDENCE: ICD-10-CM

## 2023-06-23 DIAGNOSIS — N40.0 BENIGN PROSTATIC HYPERPLASIA WITHOUT LOWER URINARY TRACT SYMPTOMS: ICD-10-CM

## 2023-06-23 DIAGNOSIS — R04.0 EPISTAXIS: ICD-10-CM

## 2023-06-23 DIAGNOSIS — K21.9 GASTRO-ESOPHAGEAL REFLUX DISEASE WITHOUT ESOPHAGITIS: ICD-10-CM

## 2023-06-23 DIAGNOSIS — E87.29 OTHER ACIDOSIS: ICD-10-CM

## 2023-06-23 DIAGNOSIS — J30.2 OTHER SEASONAL ALLERGIC RHINITIS: ICD-10-CM

## 2023-06-23 LAB
ALBUMIN SERPL ELPH-MCNC: 4.6 G/DL — SIGNIFICANT CHANGE UP (ref 3.5–5.2)
ALBUMIN SERPL ELPH-MCNC: 4.7 G/DL — SIGNIFICANT CHANGE UP (ref 3.5–5.2)
ALP SERPL-CCNC: 107 U/L — SIGNIFICANT CHANGE UP (ref 30–115)
ALP SERPL-CCNC: 96 U/L — SIGNIFICANT CHANGE UP (ref 30–115)
ALT FLD-CCNC: 12 U/L — SIGNIFICANT CHANGE UP (ref 0–41)
ALT FLD-CCNC: 14 U/L — SIGNIFICANT CHANGE UP (ref 0–41)
ANION GAP SERPL CALC-SCNC: 19 MMOL/L — HIGH (ref 7–14)
ANION GAP SERPL CALC-SCNC: 28 MMOL/L — HIGH (ref 7–14)
AST SERPL-CCNC: 28 U/L — SIGNIFICANT CHANGE UP (ref 0–41)
AST SERPL-CCNC: 34 U/L — SIGNIFICANT CHANGE UP (ref 0–41)
B-OH-BUTYR SERPL-SCNC: 6 MMOL/L — HIGH
BASE EXCESS BLDV CALC-SCNC: -9.6 MMOL/L — LOW (ref -2–3)
BILIRUB DIRECT SERPL-MCNC: 0.3 MG/DL — SIGNIFICANT CHANGE UP (ref 0–0.3)
BILIRUB INDIRECT FLD-MCNC: 1 MG/DL — SIGNIFICANT CHANGE UP (ref 0.2–1.2)
BILIRUB SERPL-MCNC: 1.3 MG/DL — HIGH (ref 0.2–1.2)
BILIRUB SERPL-MCNC: 1.3 MG/DL — HIGH (ref 0.2–1.2)
BUN SERPL-MCNC: 15 MG/DL — SIGNIFICANT CHANGE UP (ref 10–20)
BUN SERPL-MCNC: 17 MG/DL — SIGNIFICANT CHANGE UP (ref 10–20)
CA-I SERPL-SCNC: 1.09 MMOL/L — LOW (ref 1.15–1.33)
CALCIUM SERPL-MCNC: 8.8 MG/DL — SIGNIFICANT CHANGE UP (ref 8.4–10.5)
CALCIUM SERPL-MCNC: 9.4 MG/DL — SIGNIFICANT CHANGE UP (ref 8.4–10.5)
CHLORIDE SERPL-SCNC: 100 MMOL/L — SIGNIFICANT CHANGE UP (ref 98–110)
CHLORIDE SERPL-SCNC: 96 MMOL/L — LOW (ref 98–110)
CK MB CFR SERPL CALC: 4.4 NG/ML — SIGNIFICANT CHANGE UP (ref 0.6–6.3)
CK SERPL-CCNC: 217 U/L — SIGNIFICANT CHANGE UP (ref 0–225)
CO2 SERPL-SCNC: 13 MMOL/L — LOW (ref 17–32)
CO2 SERPL-SCNC: 19 MMOL/L — SIGNIFICANT CHANGE UP (ref 17–32)
CREAT SERPL-MCNC: 1.5 MG/DL — SIGNIFICANT CHANGE UP (ref 0.7–1.5)
CREAT SERPL-MCNC: 1.6 MG/DL — HIGH (ref 0.7–1.5)
EGFR: 48 ML/MIN/1.73M2 — LOW
EGFR: 52 ML/MIN/1.73M2 — LOW
ETHANOL SERPL-MCNC: <10 MG/DL — SIGNIFICANT CHANGE UP
GAS PNL BLDV: 133 MMOL/L — LOW (ref 136–145)
GAS PNL BLDV: SIGNIFICANT CHANGE UP
GAS PNL BLDV: SIGNIFICANT CHANGE UP
GLUCOSE SERPL-MCNC: 114 MG/DL — HIGH (ref 70–99)
GLUCOSE SERPL-MCNC: 124 MG/DL — HIGH (ref 70–99)
HCO3 BLDV-SCNC: 16 MMOL/L — LOW (ref 22–29)
HCT VFR BLD CALC: 41.2 % — LOW (ref 42–52)
HCT VFR BLDA CALC: 45 % — SIGNIFICANT CHANGE UP (ref 39–51)
HGB BLD CALC-MCNC: 14.9 G/DL — SIGNIFICANT CHANGE UP (ref 12.6–17.4)
HGB BLD-MCNC: 14.7 G/DL — SIGNIFICANT CHANGE UP (ref 14–18)
INR BLD: 3.31 RATIO — HIGH (ref 0.65–1.3)
LACTATE BLDV-MCNC: 3.5 MMOL/L — HIGH (ref 0.5–2)
LACTATE SERPL-SCNC: 1.3 MMOL/L — SIGNIFICANT CHANGE UP (ref 0.7–2)
LACTATE SERPL-SCNC: 3.7 MMOL/L — HIGH (ref 0.7–2)
LIDOCAIN IGE QN: 20 U/L — SIGNIFICANT CHANGE UP (ref 7–60)
MAGNESIUM SERPL-MCNC: 1.6 MG/DL — LOW (ref 1.8–2.4)
MAGNESIUM SERPL-MCNC: 2.3 MG/DL — SIGNIFICANT CHANGE UP (ref 1.8–2.4)
MCHC RBC-ENTMCNC: 33.3 PG — HIGH (ref 27–31)
MCHC RBC-ENTMCNC: 35.7 G/DL — SIGNIFICANT CHANGE UP (ref 32–37)
MCV RBC AUTO: 93.2 FL — SIGNIFICANT CHANGE UP (ref 80–94)
NRBC # BLD: 0 /100 WBCS — SIGNIFICANT CHANGE UP (ref 0–0)
PCO2 BLDV: 36 MMHG — LOW (ref 42–55)
PH BLDV: 7.27 — LOW (ref 7.32–7.43)
PHOSPHATE SERPL-MCNC: 2.5 MG/DL — SIGNIFICANT CHANGE UP (ref 2.1–4.9)
PHOSPHATE SERPL-MCNC: 4.1 MG/DL — SIGNIFICANT CHANGE UP (ref 2.1–4.9)
PLATELET # BLD AUTO: 166 K/UL — SIGNIFICANT CHANGE UP (ref 130–400)
PMV BLD: 9.9 FL — SIGNIFICANT CHANGE UP (ref 7.4–10.4)
PO2 BLDV: 40 MMHG — SIGNIFICANT CHANGE UP
POTASSIUM BLDV-SCNC: 5.2 MMOL/L — HIGH (ref 3.5–5.1)
POTASSIUM SERPL-MCNC: 4.5 MMOL/L — SIGNIFICANT CHANGE UP (ref 3.5–5)
POTASSIUM SERPL-MCNC: 5 MMOL/L — SIGNIFICANT CHANGE UP (ref 3.5–5)
POTASSIUM SERPL-SCNC: 4.5 MMOL/L — SIGNIFICANT CHANGE UP (ref 3.5–5)
POTASSIUM SERPL-SCNC: 5 MMOL/L — SIGNIFICANT CHANGE UP (ref 3.5–5)
PROT SERPL-MCNC: 7 G/DL — SIGNIFICANT CHANGE UP (ref 6–8)
PROT SERPL-MCNC: 7.4 G/DL — SIGNIFICANT CHANGE UP (ref 6–8)
PROTHROM AB SERPL-ACNC: 39.1 SEC — HIGH (ref 9.95–12.87)
RBC # BLD: 4.42 M/UL — LOW (ref 4.7–6.1)
RBC # FLD: 16 % — HIGH (ref 11.5–14.5)
SAO2 % BLDV: 60.6 % — SIGNIFICANT CHANGE UP
SODIUM SERPL-SCNC: 137 MMOL/L — SIGNIFICANT CHANGE UP (ref 135–146)
SODIUM SERPL-SCNC: 138 MMOL/L — SIGNIFICANT CHANGE UP (ref 135–146)
TROPONIN T SERPL-MCNC: <0.01 NG/ML — SIGNIFICANT CHANGE UP
TROPONIN T SERPL-MCNC: <0.01 NG/ML — SIGNIFICANT CHANGE UP
WBC # BLD: 9.5 K/UL — SIGNIFICANT CHANGE UP (ref 4.8–10.8)
WBC # FLD AUTO: 9.5 K/UL — SIGNIFICANT CHANGE UP (ref 4.8–10.8)

## 2023-06-23 PROCEDURE — 87635 SARS-COV-2 COVID-19 AMP PRB: CPT

## 2023-06-23 PROCEDURE — 99291 CRITICAL CARE FIRST HOUR: CPT

## 2023-06-23 PROCEDURE — 73562 X-RAY EXAM OF KNEE 3: CPT | Mod: RT

## 2023-06-23 PROCEDURE — 85025 COMPLETE CBC W/AUTO DIFF WBC: CPT

## 2023-06-23 PROCEDURE — 97162 PT EVAL MOD COMPLEX 30 MIN: CPT | Mod: GP

## 2023-06-23 PROCEDURE — 84550 ASSAY OF BLOOD/URIC ACID: CPT

## 2023-06-23 PROCEDURE — 84100 ASSAY OF PHOSPHORUS: CPT

## 2023-06-23 PROCEDURE — 74177 CT ABD & PELVIS W/CONTRAST: CPT | Mod: 26,MA

## 2023-06-23 PROCEDURE — 36415 COLL VENOUS BLD VENIPUNCTURE: CPT

## 2023-06-23 PROCEDURE — L9997: CPT

## 2023-06-23 PROCEDURE — 85610 PROTHROMBIN TIME: CPT

## 2023-06-23 PROCEDURE — 71045 X-RAY EXAM CHEST 1 VIEW: CPT | Mod: 26

## 2023-06-23 PROCEDURE — 80076 HEPATIC FUNCTION PANEL: CPT

## 2023-06-23 PROCEDURE — 71045 X-RAY EXAM CHEST 1 VIEW: CPT

## 2023-06-23 PROCEDURE — 82550 ASSAY OF CK (CPK): CPT

## 2023-06-23 PROCEDURE — 83735 ASSAY OF MAGNESIUM: CPT

## 2023-06-23 PROCEDURE — 82962 GLUCOSE BLOOD TEST: CPT

## 2023-06-23 PROCEDURE — 80053 COMPREHEN METABOLIC PANEL: CPT

## 2023-06-23 PROCEDURE — 80048 BASIC METABOLIC PNL TOTAL CA: CPT

## 2023-06-23 PROCEDURE — 99223 1ST HOSP IP/OBS HIGH 75: CPT

## 2023-06-23 PROCEDURE — 82553 CREATINE MB FRACTION: CPT

## 2023-06-23 PROCEDURE — 93970 EXTREMITY STUDY: CPT

## 2023-06-23 PROCEDURE — 99283 EMERGENCY DEPT VISIT LOW MDM: CPT | Mod: 25

## 2023-06-23 PROCEDURE — 83605 ASSAY OF LACTIC ACID: CPT

## 2023-06-23 PROCEDURE — 93005 ELECTROCARDIOGRAM TRACING: CPT

## 2023-06-23 PROCEDURE — 84484 ASSAY OF TROPONIN QUANT: CPT

## 2023-06-23 RX ORDER — METHOCARBAMOL 500 MG/1
1 TABLET, FILM COATED ORAL
Refills: 0 | DISCHARGE

## 2023-06-23 RX ORDER — ONDANSETRON 8 MG/1
4 TABLET, FILM COATED ORAL EVERY 8 HOURS
Refills: 0 | Status: DISCONTINUED | OUTPATIENT
Start: 2023-06-23 | End: 2023-06-28

## 2023-06-23 RX ORDER — NALTREXONE HYDROCHLORIDE 50 MG/1
50 TABLET, FILM COATED ORAL DAILY
Refills: 0 | Status: DISCONTINUED | OUTPATIENT
Start: 2023-06-23 | End: 2023-06-28

## 2023-06-23 RX ORDER — THIAMINE MONONITRATE (VIT B1) 100 MG
100 TABLET ORAL DAILY
Refills: 0 | Status: DISCONTINUED | OUTPATIENT
Start: 2023-06-23 | End: 2023-06-28

## 2023-06-23 RX ORDER — FOLIC ACID 0.8 MG
1 TABLET ORAL DAILY
Refills: 0 | Status: DISCONTINUED | OUTPATIENT
Start: 2023-06-23 | End: 2023-06-28

## 2023-06-23 RX ORDER — PREGABALIN 225 MG/1
1 CAPSULE ORAL
Qty: 0 | Refills: 0 | DISCHARGE

## 2023-06-23 RX ORDER — SODIUM CHLORIDE 9 MG/ML
1000 INJECTION, SOLUTION INTRAVENOUS
Refills: 0 | Status: DISCONTINUED | OUTPATIENT
Start: 2023-06-23 | End: 2023-06-23

## 2023-06-23 RX ORDER — SODIUM CHLORIDE 9 MG/ML
1000 INJECTION, SOLUTION INTRAVENOUS ONCE
Refills: 0 | Status: COMPLETED | OUTPATIENT
Start: 2023-06-23 | End: 2023-06-23

## 2023-06-23 RX ORDER — WARFARIN SODIUM 2.5 MG/1
1 TABLET ORAL
Refills: 0 | DISCHARGE

## 2023-06-23 RX ORDER — GABAPENTIN 400 MG/1
200 CAPSULE ORAL
Refills: 0 | Status: DISCONTINUED | OUTPATIENT
Start: 2023-06-23 | End: 2023-06-28

## 2023-06-23 RX ORDER — METOPROLOL TARTRATE 50 MG
25 TABLET ORAL DAILY
Refills: 0 | Status: DISCONTINUED | OUTPATIENT
Start: 2023-06-23 | End: 2023-06-28

## 2023-06-23 RX ORDER — ONDANSETRON 8 MG/1
4 TABLET, FILM COATED ORAL ONCE
Refills: 0 | Status: COMPLETED | OUTPATIENT
Start: 2023-06-23 | End: 2023-06-23

## 2023-06-23 RX ORDER — LORATADINE 10 MG/1
10 TABLET ORAL DAILY
Refills: 0 | Status: DISCONTINUED | OUTPATIENT
Start: 2023-06-23 | End: 2023-06-28

## 2023-06-23 RX ORDER — FOLIC ACID 0.8 MG
1 TABLET ORAL
Refills: 0 | DISCHARGE

## 2023-06-23 RX ORDER — COLCHICINE 0.6 MG
1 TABLET ORAL
Qty: 0 | Refills: 0 | DISCHARGE

## 2023-06-23 RX ORDER — PANTOPRAZOLE SODIUM 20 MG/1
40 TABLET, DELAYED RELEASE ORAL
Refills: 0 | Status: DISCONTINUED | OUTPATIENT
Start: 2023-06-23 | End: 2023-06-28

## 2023-06-23 RX ORDER — METOPROLOL TARTRATE 50 MG
1 TABLET ORAL
Qty: 0 | Refills: 0 | DISCHARGE

## 2023-06-23 RX ORDER — MAGNESIUM SULFATE 500 MG/ML
2 VIAL (ML) INJECTION ONCE
Refills: 0 | Status: COMPLETED | OUTPATIENT
Start: 2023-06-23 | End: 2023-06-23

## 2023-06-23 RX ORDER — TAMSULOSIN HYDROCHLORIDE 0.4 MG/1
0.4 CAPSULE ORAL AT BEDTIME
Refills: 0 | Status: DISCONTINUED | OUTPATIENT
Start: 2023-06-23 | End: 2023-06-28

## 2023-06-23 RX ORDER — ACETAMINOPHEN 500 MG
650 TABLET ORAL EVERY 6 HOURS
Refills: 0 | Status: DISCONTINUED | OUTPATIENT
Start: 2023-06-23 | End: 2023-06-28

## 2023-06-23 RX ORDER — METHOCARBAMOL 500 MG/1
500 TABLET, FILM COATED ORAL
Refills: 0 | Status: DISCONTINUED | OUTPATIENT
Start: 2023-06-23 | End: 2023-06-28

## 2023-06-23 RX ORDER — LANOLIN ALCOHOL/MO/W.PET/CERES
3 CREAM (GRAM) TOPICAL AT BEDTIME
Refills: 0 | Status: DISCONTINUED | OUTPATIENT
Start: 2023-06-23 | End: 2023-06-28

## 2023-06-23 RX ORDER — SODIUM CHLORIDE 9 MG/ML
1000 INJECTION, SOLUTION INTRAVENOUS
Refills: 0 | Status: DISCONTINUED | OUTPATIENT
Start: 2023-06-23 | End: 2023-06-26

## 2023-06-23 RX ORDER — PREGABALIN 225 MG/1
1000 CAPSULE ORAL DAILY
Refills: 0 | Status: DISCONTINUED | OUTPATIENT
Start: 2023-06-23 | End: 2023-06-28

## 2023-06-23 RX ADMIN — SODIUM CHLORIDE 500 MILLILITER(S): 9 INJECTION, SOLUTION INTRAVENOUS at 13:48

## 2023-06-23 RX ADMIN — Medication 25 GRAM(S): at 16:43

## 2023-06-23 RX ADMIN — Medication 1 MILLIGRAM(S): at 14:57

## 2023-06-23 RX ADMIN — TAMSULOSIN HYDROCHLORIDE 0.4 MILLIGRAM(S): 0.4 CAPSULE ORAL at 21:23

## 2023-06-23 RX ADMIN — Medication 25 MILLIGRAM(S): at 19:13

## 2023-06-23 RX ADMIN — SODIUM CHLORIDE 100 MILLILITER(S): 9 INJECTION, SOLUTION INTRAVENOUS at 14:57

## 2023-06-23 RX ADMIN — ONDANSETRON 4 MILLIGRAM(S): 8 TABLET, FILM COATED ORAL at 13:48

## 2023-06-23 RX ADMIN — Medication 25 MILLIGRAM(S): at 21:23

## 2023-06-23 NOTE — ED PROVIDER NOTE - OBJECTIVE STATEMENT
62 yo man with PMHx of DVT (on Lovenox), Hx alcohol use disorder and pseudogout presenting for vomiting/diarrhea. Patient presenting today after a trip to [place] ED. Patient complaining of 2 days of watery diarrhea and 1 day of nausea with vomiting. Patient mentions NB diarrhea is intermittent with associated chills but no abdominal pain is present. He recently started drinking again, around 1 pint of vodka and his last drink was yesterday. Since today morning she has been feeling shaky and vomiting, no appetite and mentions being intolerant to food despite being able to drink liquids. Also complaints of left chest pain, non radiating, close in location to left shoulder but no diaphoresis or respiratory symptoms. Denies any cough, recent travel, sick contact or urinary symptoms.

## 2023-06-23 NOTE — ED PROVIDER NOTE - PHYSICAL EXAMINATION
CONSTITUTIONAL: Well-appearing; well-nourished; in no apparent distress.   NECK: Supple; non-tender; no cervical lymphadenopathy.   CARDIOVASCULAR: Normal S1, S2; no murmurs, rubs, or gallops.   RESPIRATORY: Normal chest excursion with respiration; breath sounds clear and equal bilaterally; no wheezes, rhonchi, or rales.  GI/: Non-distended; non-tender  MS: No evidence of trauma or deformity. Normal ROM in all four extremities; non-tender to palpation; distal pulses are normal.   SKIN: Normal for age and race; warm; dry; good turgor; no apparent lesions or exudate.   NEURO/PSYCH: A & O x 4; grossly unremarkable. mood and manner are appropriate.

## 2023-06-23 NOTE — ED PROVIDER NOTE - PATIENT PORTAL LINK FT
pmd You can access the FollowMyHealth Patient Portal offered by [facility] by registering at the following website: [email or web address]. By joining [employer]’s FollowMyHealth portal, you will also be able to view your health information using other applications (apps) compatible with our system.

## 2023-06-23 NOTE — ED ADULT NURSE NOTE - NSFALLHARMRISKINTERV_ED_ALL_ED

## 2023-06-23 NOTE — ED PROVIDER NOTE - PROGRESS NOTE DETAILS
Pt aware that we will have official radiology read pending, and may result in change of xray read.  Pt voices understanding of use of medications, instructions for care, and reasons to return or to go to ED  Pt counseled - warning si/sxs d/w pt. Pt instructed to return to ed or f/u with PCP should sxs persist/worsen. Pt verbalizes an understanding & agreement with the plan as discussed

## 2023-06-23 NOTE — ED PROVIDER NOTE - OBJECTIVE STATEMENT
pt from [facility] with pmhx etoh abuse, BPH, GERD, HTN, Pseudogout presents to ED with various complaints. reports he had mild nosebleed last night that resolved; drank etoh last night and felt like he "had the shakes and could not catch his breath", now resolved; hungry, asking RN for food. Denies fever/chill/HA/dizziness/chest pain/palpitation/sob/abd pain/n/v/d/ black stool/bloody stool/urinary sxs

## 2023-06-23 NOTE — ED PROVIDER NOTE - NSFOLLOWUPINSTRUCTIONS_ED_ALL_ED_FT
Acute Cough    WHAT YOU NEED TO KNOW:    An acute cough can last up to 3 weeks. Common causes of an acute cough include a cold, allergies, or a lung infection.     DISCHARGE INSTRUCTIONS:    Return to the emergency department if:     You have trouble breathing or feel short of breath.      You cough up blood, or you see blood in your mucus.       You faint or feel weak or dizzy.       You have chest pain when you cough or take a deep breath.       You have new wheezing.     Contact your healthcare provider if:     You have a fever.       Your cough lasts longer than 4 weeks.       Your symptoms do not improve with treatment.       You have questions or concerns about your condition or care.     Medicines:     Medicines may be needed to stop the cough, decrease swelling in your airways, or help open your airways. Medicine may also be given to help you cough up mucus. Ask your healthcare provider what over-the-counter medicines you can take. If you have an infection caused by bacteria, you may need antibiotics.       Take your medicine as directed. Contact your healthcare provider if you think your medicine is not helping or if you have side effects. Tell him or her if you are allergic to any medicine. Keep a list of the medicines, vitamins, and herbs you take. Include the amounts, and when and why you take them. Bring the list or the pill bottles to follow-up visits. Carry your medicine list with you in case of an emergency.    Manage your symptoms:     Do not smoke and stay away from others who smoke. Nicotine and other chemicals in cigarettes and cigars can cause lung damage and make your cough worse. Ask your healthcare provider for information if you currently smoke and need help to quit. E-cigarettes or smokeless tobacco still contain nicotine. Talk to your healthcare provider before you use these products.       Drink extra liquids as directed. Liquids will help thin and loosen mucus so you can cough it up. Liquids will also help prevent dehydration. Examples of good liquids to drink include water, fruit juice, and broth. Do not drink liquids that contain caffeine. Caffeine can increase your risk for dehydration. Ask your healthcare provider how much liquid to drink each day.       Rest as directed. Do not do activities that make your cough worse, such as exercise.       Use a humidifier or vaporizer. Use a cool mist humidifier or a vaporizer to increase air moisture in your home. This may make it easier for you to breathe and help decrease your cough.       Eat 2 to 5 mL of honey 2 times each day. Honey can help thin mucus and decrease your cough.       Use cough drops or lozenges. These can help decrease throat irritation and your cough.     Follow up with your healthcare provider as directed: Write down your questions so you remember to ask them during your visits.        © Copyright [employer] 2019 All illustrations and images included in CareNotes are the copyrighted property of [employer] or [employer]

## 2023-06-23 NOTE — ED PROVIDER NOTE - ATTENDING CONTRIBUTION TO CARE
Patient complains of nausea and vomiting.  He has a history of alcohol abuse.  Last alcohol intake approximate 36 hours ago.  He denies diarrhea.  He admits to abdominal cramping.  He denies tremors.  Vital signs noted.  No apparent distress.  Chest is clear.  Heart regular rate no murmur.  Abdomen mild epigastric tenderness to palpation.  No rebound or guarding.  Skin shows no jaundice.  There are no tremors. Labs reviewed.  In view of acidosis, and high anion gap, high hydroxybutyrate, elevated lactate patient is likely an alcoholic ketoacidosis.  D5 normal saline ordered for hydration.  Ativan given to prevent withdrawal.  Zofran has resulted in reduction in vomiting and nausea.  Magnesium replaced intravenously.  Case discussed with intensivist.  Patient will be admitted to a monitored setting.

## 2023-06-23 NOTE — ED ADULT TRIAGE NOTE - CHIEF COMPLAINT QUOTE
BIBA from [facility]. Patient treated @ [facility] ED yesterday. Patient states worsening of nausea and vomiting. Decreased PO intake

## 2023-06-23 NOTE — H&P ADULT - NSHPLABSRESULTS_GEN_ALL_CORE
14.7   9.50  )-----------( 166      ( 23 Jun 2023 14:00 )             41.2       06-23    137  |  96<L>  |  17  ----------------------------<  114<H>  5.0   |  13<L>  |  1.6<H>    Ca    9.4      23 Jun 2023 14:00  Phos  4.1     06-23  Mg     1.6     06-23    TPro  7.4  /  Alb  4.7  /  TBili  1.3<H>  /  DBili  x   /  AST  34  /  ALT  14  /  AlkPhos  107  06-23          Phosphorus: 4.1 mg/dL (06-23-23 @ 14:00)  Magnesium: 1.6 mg/dL (06-23-23 @ 14:00)          Urinalysis Basic - ( 23 Jun 2023 14:00 )    Color: x / Appearance: x / SG: x / pH: x  Gluc: 114 mg/dL / Ketone: x  / Bili: x / Urobili: x   Blood: x / Protein: x / Nitrite: x   Leuk Esterase: x / RBC: x / WBC x   Sq Epi: x / Non Sq Epi: x / Bacteria: x      Lactate Trend  06-23 @ 14:00 Lactate:3.7       CARDIAC MARKERS ( 23 Jun 2023 14:00 )  x     / <0.01 ng/mL / x     / x     / x        CT A/P w/IV    IMPRESSION:    Large fatty liver. Cholelithiasis.    Urinary bladder distention.    CXR    Impression:    No radiographic evidence of acute cardiopulmonary disease.

## 2023-06-23 NOTE — H&P ADULT - NSICDXPASTMEDICALHX_GEN_ALL_CORE_FT
PAST MEDICAL HISTORY:  Alcohol dependence     BPH (benign prostatic hyperplasia)     GERD (gastroesophageal reflux disease)     Hard of hearing     HTN (hypertension)     Pseudogout     Seasonal allergies      PAST MEDICAL HISTORY:  Alcohol dependence     BPH (benign prostatic hyperplasia)     GERD (gastroesophageal reflux disease)     Hard of hearing     History of deep vein thrombosis (DVT) of lower extremity     HTN (hypertension)     Pseudogout     Seasonal allergies

## 2023-06-23 NOTE — H&P ADULT - NSHPPHYSICALEXAM_GEN_ALL_CORE
Vital Signs (24 Hrs):  T(C): 36.8 (06-23-23 @ 15:55), Max: 36.8 (06-23-23 @ 15:55)  HR: 91 (06-23-23 @ 15:55) (87 - 107)  BP: 157/67 (06-23-23 @ 15:55) (132/70 - 164/89)  RR: 17 (06-23-23 @ 15:55) (16 - 18)  SpO2: 97% (06-23-23 @ 15:55) (97% - 99%)    PHYSICAL EXAM:  GENERAL: NAD, well-developed  SKIN: No rashes or lesions  HEAD:  Atraumatic, Normocephalic  EYES: EOMI, PERRLA, conjunctiva and sclera clear  NECK: Supple, No JVD  CHEST/LUNG: Clear to auscultation bilaterally; No wheeze  HEART: Regular rate and rhythm; No murmurs, rubs, or gallops  ABDOMEN: Soft, Nontender, Nondistended; Bowel sounds present  EXTREMITIES:  No clubbing, cyanosis, or edema  CNS: AAOx3

## 2023-06-23 NOTE — ED ADULT NURSE NOTE - CHIEF COMPLAINT QUOTE
ELVIRA from [facility] residence for nose bleed last night, +diarrhea x few days, left shoulder pain when sitting and resting, patient on coumadin, no bleeding noted at this time Hx [place]

## 2023-06-23 NOTE — H&P ADULT - HISTORY OF PRESENT ILLNESS
63M w/M w/PMHx EtOH dependence, DVT on Coumadin, BPH, HTN presents to ED with complaints of nausea and diarrhea.  Patient presented earlier to ED [facility], discharged home but instead came to ED south.  patient reporting symptoms of nausea starting 3 days ago, no vomiting.  Also reporting diarrhea x 2 days.  He reports waking up feeling ill today with what he describes as chest discomfort starting this am to anterior chest.  He can not describe it.  No SOB.  No abdominal pain.  No urinary complaints.  Patient admits to daily EtOH use, 0.5-1pint of vodka daily.  Last drink last night at 8p.  Denies any previous Hx of DT's or w/d Seizures.   63M w/M w/PMHx EtOH dependence/withdrawal,  DVT/PE  on Coumadin, BPH, HTN presents to ED with complaints of nausea, vomiting and diarrhea.  Patient presented earlier to ED [facility], discharged home but instead came to ED south.  patient reporting symptoms of nausea starting 3 days ago, with vomiting.  Also reporting spontaneously resolving diarrhea x 2 days.  He reports waking up feeling ill today with what he describes as chest discomfort starting this am to anterior chest.  He can not describe it.  No SOB.  No abdominal pain.  No urinary complaints.  Patient admits to daily EtOH use, 0.5-1pint of vodka daily.  Last drink last night at 8p.  Denies any previous Hx of DT's or w/d Seizures.  Reports passing out while drinking.  Pt was evaluated by ICU.

## 2023-06-23 NOTE — PATIENT PROFILE ADULT - FALL HARM RISK - HARM RISK INTERVENTIONS
Assistance with ambulation/Assistance OOB with selected safe patient handling equipment/Communicate Risk of Fall with Harm to all staff/Discuss with provider need for PT consult/Monitor for mental status changes/Monitor gait and stability/Provide patient with walking aids - walker, cane, crutches/Reinforce activity limits and safety measures with patient and family/Review medications for side effects contributing to fall risk/Sit up slowly, dangle for a short time, stand at bedside before walking/Tailored Fall Risk Interventions/Toileting schedule using arm’s reach rule for commode and bathroom/Use of alarms - bed, chair and/or voice tab/Visual Cue: Yellow wristband and red socks/Bed in lowest position, wheels locked, appropriate side rails in place/Call bell, personal items and telephone in reach/Instruct patient to call for assistance before getting out of bed or chair/Non-slip footwear when patient is out of bed/[place] to call system/Physically safe environment - no spills, clutter or unnecessary equipment/Purposeful Proactive Rounding/Room/bathroom lighting operational, light cord in reach

## 2023-06-23 NOTE — ED ADULT NURSE NOTE - OBJECTIVE STATEMENT
ELVIRA from [facility] residence for nose bleed last night, +diarrhea x few days, left shoulder pain when sitting and resting, patient on coumadin, no bleeding noted at this time Hx [place]

## 2023-06-23 NOTE — ED PROVIDER NOTE - PHYSICAL EXAMINATION
Vital signs noted.  No apparent distress.  Chest is clear.  Heart regular rate no murmur.  Abdomen mild epigastric tenderness to palpation.  No rebound or guarding.  Skin shows no jaundice.  There are no tremors.

## 2023-06-23 NOTE — ED PROVIDER NOTE - DIFFERENTIAL DIAGNOSIS
Pancreatitis, alcoholic gastritis, gastroenteritis, bowel obstruction, sepsis Differential Diagnosis

## 2023-06-23 NOTE — ED ADULT NURSE NOTE - CHIEF COMPLAINT QUOTE
BIBA from [facility]. Patient treated @ [place] ED yesterday. Patient states worsening of nausea and vomiting. Decreased PO intake

## 2023-06-23 NOTE — CONSULT NOTE ADULT - ASSESSMENT
IMPRESSION:  alc ketoacidosis  high anion gap metabolic acidosis  lactic acidosis  Acute kidney injury likely prerenal  nausea/vomiting  alc misuse disorder  hypomagnesemia  hx of dvt on coumadin  PLAN:    CNS: libirium protocol symptom triggered    HEENT:  Oral care    PULMONARY:  HOB @ 45 degrees, aspiration precautions    CARDIOVASCULAR: d5NS at 100cc/hr, recheck lactate  tele(echo in deceber 22 noted) normal EF  GI: GI prophylaxis                                          Feeding po diet    RENAL:  F/u  lytes.  Correct as needed. accurate I/O  IV fluids as above. keep k>4 mg>2  INFECTIOUS DISEASE: monitor off abx    HEMATOLOGICAL:  DVT . monitor INR    ENDOCRINE:  Follow up FS.     MUSCULOSKELETAL: bed rest     CODE STATUS: FULL CODE    DISPOSITION: no need for micu at this point, recall if any change in status

## 2023-06-23 NOTE — ED PROVIDER NOTE - NS ED ROS FT
Constitutional: no fever, chills, no recent weight loss, change in appetite or malaise  Eyes: no redness/discharge/pain/vision changes  ENT: no rhinorrhea/ear pain/sore throat  Cardiac: No chest pain, edema.  Respiratory: No respiratory distress  GI: No nausea, vomiting, diarrhea or abdominal pain.  : No dysuria, frequency, urgency or hematuria  MS: no pain to back or extremities, no loss of ROM, no weakness  Neuro: No headache or weakness. No LOC.  Skin: No skin rash.  Except as documented in the HPI, all other systems are negative.

## 2023-06-23 NOTE — H&P ADULT - ASSESSMENT
63M w/M w/PMHx EtOH dependence, DVT on Coumadin, BPH, HTN presents to ED with complaints of nausea and diarrhea, admitted to medicine service for further management.    #Alcoholic Ketoacidosis  - admit to medicine service  - D5,0.9%NS at 100/hr  - Librium taper protocol  - Addiction Medicine c/s  - MVI/Folic Acid/Thiamine  - c/w Naltrexone 50mg QD  - trend Lactic Acid and Anion Gap, 2200 and AM    #Chest Discomfort  - initial Tt negative  - CE x 2 more sets 2200 and AM  - recheck EKG in AM    #ANDRZEJ  - baseline sCr approx 1.1  - IVF as above  - trend sCr in AM    #Hx of DVT on Coumadin  - check INR daily  - Coumadin dose 5mg MWF and 2.5mg other days, verified by medication tech at [facility]    #BPH  - no s/s UR  - c/w Flomax    #HTN  - c/w Toprol XL  - DASH Diet 63M w/M w/PMHx EtOH dependence, DVT on Coumadin, BPH, HTN presents to ED with complaints of nausea and diarrhea, admitted to medicine service for further management.    #Alcoholic Ketoacidosis  - admit to medicine service  - D5,0.9%NS at 100/hr  - Librium taper protocol  - Addiction Medicine c/s  - MVI/Folic Acid/Thiamine  - c/w Naltrexone 50mg QD  - trend Lactic Acid and Anion Gap, 2200 and AM    #Chest Discomfort  - initial Tt negative  - CE x 2 more sets 2200 and AM  - recheck EKG in AM    #ANDRZEJ  - baseline sCr approx 1.1  - IVF as above  - trend sCr in AM    #Hx of DVT on Coumadin  - check INR daily  - Coumadin dose 5mg MWF and 2.5mg other days, verified by medication tech at [facility]    #BPH  - no s/s UR  - c/w Flomax    #HTN  - c/w Toprol XL  - DASH Diet    Diet: DASH  Activity: OOB  DVT PPX: Coumadin  GI PPX: PPI  Code status: Full  Dispo: Adult Home  CHG 2% Wipes QD  63M w/M w/PMHx EtOH dependence, DVT on Coumadin, BPH, HTN presents to ED with complaints of nausea and diarrhea, admitted to medicine service for further management.    #Alcoholic Ketoacidosis  - admit to medicine service  - D5,0.9%NS at 100/hr, evaluated and declined by ICU.   - Librium taper protocol  - Addiction Medicine c/s  - MVI/Folic Acid/Thiamine  - c/w Naltrexone 50mg QD  - trend Lactic Acid and Anion Gap, 2200 and AM    ANDRZEJ on CKD 2  - iv fluids     Suspected thiamine deficiency   - cont THIAMINE       #Chest Discomfort likely GERD  - ppi po AC   - initial Tt negative  - CE x 2 more sets 2200 and AM  - recheck EKG in AM    #ANDRZEJ  - baseline sCr approx 1.1  - IVF as above  - trend sCr in AM    #Hx of DVT on Coumadin  - check INR daily  - Coumadin dose 5mg MWF and 2.5mg other days, verified by medication tech at [facility]  - hold coumadin given suprtherpeutic inr    #BPH  - no s/s UR  - c/w Flomax    #HTN  - c/w Toprol XL  - DASH Diet    Diet: DASH  Activity: OOB  DVT PPX: INR elevated monitor   GI PPX: PPI  Code status: Full  Dispo: Adult Home  CHG 2% Wipes QD  63M w/M w/PMHx EtOH dependence, DVT on Coumadin, BPH, HTN presents to ED with complaints of nausea and diarrhea, admitted to medicine service for further management.    Alcoholic Ketoacidosis  - admit to medicine service  - D5,0.9%NS at 100/hr, evaluated and declined by ICU.   - Librium taper protocol  - Addiction Medicine c/s  - MVI/Folic Acid/Thiamine  - c/w Naltrexone 50mg QD  - trend Lactic Acid and Anion Gap, 2200 and AM    ANDRZEJ on CKD 2  - iv fluids     Suspected thiamine deficiency   - cont THIAMINE     Chest Discomfort likely GERD  - ppi po AC   - initial Tt negative  - CE x 2 more sets 2200 and AM  - recheck EKG in AM    Hx of DVT on Coumadin  - check INR daily  - Coumadin dose 5mg MWF and 2.5mg other days, verified by medication tech at [facility]  - hold coumadin given suprtherpeutic inr    Supratherapeutic INR  - hold coumadin for now, monitor INR     BPH  - no s/s UR  - c/w Flomax    HTN  - c/w Toprol XL  - DASH Diet    Diet: DASH  Activity: OOB  DVT PPX: INR elevated monitor   GI PPX: PPI  Code status: Full  Dispo: Adult Home  CHG 2% Wipes QD

## 2023-06-23 NOTE — ED ADULT NURSE NOTE - CAS EDP DISCH TYPE
Patient Information     Patient Name MRN Sex Alvaro Prasad Jr [id number] Male 1936      Progress Notes by Jose Raul Morris MD at 2018 12:40 PM     Author:  Jose Raul Morris MD Service:  (none) Author Type:  Physician     Filed:  2018  1:03 PM Encounter Date:  2018 Status:  Signed     :  Jose Raul Morris MD (Physician)            SUBJECTIVE:    Alvaro Bass Jr is a 81 y.o. male who presents for recheck on BP.    HPI Comments: This patient comes in today for a follow-up. See previous note. We have been trying to adjust his blood pressure medications to gain better control of his blood pressure. We doubled the dose of his Hyzaar. With that, his blood pressure was doing excellent up until today. He openly asks whether anxiety about coming to the doctor could cause his blood pressure to elevate and I informed him that was certainly possible. He was seeing blood pressures in the 120 and 1:30 systolic range at home. He feels fine with the new medication. He has found that he does eat a fair amount of high sodium foods so he is trying to make changes with that. No other complaints or concerns.      No Known Allergies,   Current Outpatient Prescriptions     Medication  Sig     aspirin 325 mg tablet Take 325 mg by mouth once daily with a meal.     atenolol (TENORMIN) 50 mg tablet Take 1 tablet by mouth once daily.     FIBER-CAPS, PSYLLIUM HUSK, ORAL Take 1 capsule by mouth at bedtime.     losartan-hydrochlorothiazide (HYZAAR) 100-25 mg tablet Take 1 tablet by mouth once daily.     simvastatin (ZOCOR) 80 mg tablet Take 0.5 tablets by mouth once daily.     tamsulosin (FLOMAX) 0.4 mg capsule Take 1 capsule by mouth once daily after a meal.     triamcinolone (ARISTOCORT) 0.1 % ointment Apply  topically to affected area(s) 3 times daily.     No current facility-administered medications for this visit.      Medications have been reviewed by me and are current to the best of my knowledge and ability. ,    Past Medical History:     Diagnosis  Date     Atrioventricular block, complete (HC)      Bladder neck obstruction      Carotid stenosis 2/2012    Moderate <60% bilateral on US      Chronic kidney disease (CKD), stage III (moderate) 8/21/2014     Coronary artery disease      Diverticulosis of sigmoid colon 3/9/2015     Hyperlipidemia      Hypertension      Inguinal hernia of right side without obstruction or gangrene 08/21/2014    laparoscopic repair, extensive lysis of adhension 8/29/2016, Dr. Mao      Inguinal hernia of right side without obstruction or gangrene 8/21/2014    Pantaloon hernia with larger indirect component with densely adherent terminal ileum and cecum; repaired transabdominally with mesh 8/29/2016       Lower urinary tract symptoms (LUTS) 8/21/2014     Non Q wave myocardial infarction (HC) 2/28/1999    with favorable post myocardial infarction thallium studies.      Personal history of colonic polyps 2000    not adenomatous     and   Patient Active Problem List       Diagnosis  Date Noted     Hematoma of groin  08/29/2016     Post-procedurally after difficult BERNARDO laparoscopic right pantaloon inguinal hernia repair        Hyperlipidemia  09/21/2015     Health care maintenance  03/09/2015     History of colonic polyps  03/09/2015     Diverticulosis of sigmoid colon  03/09/2015     Lower urinary tract symptoms (LUTS)  08/21/2014     Chronic kidney disease (CKD), stage III (moderate)  08/21/2014     C A D  02/14/2012     Occlusion and stenosis of carotid artery--moderate                 BLADDER OUTLET OBSTRUCTION       Prostatism          HYPERTENSION       Atrioventricular block, complete (HC)  01/12/2005     Personal history of MI (myocardial infarction)  02/28/1999     subendocardial            REVIEW OF SYSTEMS:  Review of Systems   All other systems reviewed and are negative.      OBJECTIVE:  /80  Pulse 68  Wt 69.4 kg (153 lb)  BMI 23.96 kg/m2    EXAM:   Physical Exam    Constitutional: He is well-developed, well-nourished, and in no distress. No distress.   Skin: He is not diaphoretic.   Nursing note and vitals reviewed.      ASSESSMENT/PLAN:    ICD-10-CM    1. HYPERTENSION I10 losartan-hydrochlorothiazide (HYZAAR) 100-25 mg tablet      BASIC METABOLIC PANEL      BASIC METABOLIC PANEL        Plan:  He will continue with the higher dose of Hyzaar, prescription given today. Basic metabolic panel pending, I will let him know the results. He will send me a my chart message in a couple of weeks with the average of his blood pressures and I will let him know whether anything further needs to be done. Continue to avoid sodium.         Home

## 2023-06-24 LAB
ALBUMIN SERPL ELPH-MCNC: 4.3 G/DL — SIGNIFICANT CHANGE UP (ref 3.5–5.2)
ALP SERPL-CCNC: 92 U/L — SIGNIFICANT CHANGE UP (ref 30–115)
ALT FLD-CCNC: 11 U/L — SIGNIFICANT CHANGE UP (ref 0–41)
ANION GAP SERPL CALC-SCNC: 13 MMOL/L — SIGNIFICANT CHANGE UP (ref 7–14)
AST SERPL-CCNC: 28 U/L — SIGNIFICANT CHANGE UP (ref 0–41)
BASOPHILS # BLD AUTO: 0.06 K/UL — SIGNIFICANT CHANGE UP (ref 0–0.2)
BASOPHILS NFR BLD AUTO: 1.1 % — HIGH (ref 0–1)
BILIRUB SERPL-MCNC: 1.8 MG/DL — HIGH (ref 0.2–1.2)
BUN SERPL-MCNC: 14 MG/DL — SIGNIFICANT CHANGE UP (ref 10–20)
CALCIUM SERPL-MCNC: 8.8 MG/DL — SIGNIFICANT CHANGE UP (ref 8.4–10.5)
CHLORIDE SERPL-SCNC: 101 MMOL/L — SIGNIFICANT CHANGE UP (ref 98–110)
CK MB CFR SERPL CALC: 3.4 NG/ML — SIGNIFICANT CHANGE UP (ref 0.6–6.3)
CK SERPL-CCNC: 187 U/L — SIGNIFICANT CHANGE UP (ref 0–225)
CO2 SERPL-SCNC: 22 MMOL/L — SIGNIFICANT CHANGE UP (ref 17–32)
CREAT SERPL-MCNC: 1.3 MG/DL — SIGNIFICANT CHANGE UP (ref 0.7–1.5)
EGFR: 61 ML/MIN/1.73M2 — SIGNIFICANT CHANGE UP
EOSINOPHIL # BLD AUTO: 0.05 K/UL — SIGNIFICANT CHANGE UP (ref 0–0.7)
EOSINOPHIL NFR BLD AUTO: 0.9 % — SIGNIFICANT CHANGE UP (ref 0–8)
GLUCOSE BLDC GLUCOMTR-MCNC: 156 MG/DL — HIGH (ref 70–99)
GLUCOSE SERPL-MCNC: 125 MG/DL — HIGH (ref 70–99)
HCT VFR BLD CALC: 37.1 % — LOW (ref 42–52)
HGB BLD-MCNC: 13.1 G/DL — LOW (ref 14–18)
IMM GRANULOCYTES NFR BLD AUTO: 0.2 % — SIGNIFICANT CHANGE UP (ref 0.1–0.3)
INR BLD: 4.01 RATIO — HIGH (ref 0.65–1.3)
LACTATE SERPL-SCNC: 0.8 MMOL/L — SIGNIFICANT CHANGE UP (ref 0.7–2)
LYMPHOCYTES # BLD AUTO: 0.92 K/UL — LOW (ref 1.2–3.4)
LYMPHOCYTES # BLD AUTO: 16.5 % — LOW (ref 20.5–51.1)
MAGNESIUM SERPL-MCNC: 2.2 MG/DL — SIGNIFICANT CHANGE UP (ref 1.8–2.4)
MCHC RBC-ENTMCNC: 33.3 PG — HIGH (ref 27–31)
MCHC RBC-ENTMCNC: 35.3 G/DL — SIGNIFICANT CHANGE UP (ref 32–37)
MCV RBC AUTO: 94.4 FL — HIGH (ref 80–94)
MONOCYTES # BLD AUTO: 0.31 K/UL — SIGNIFICANT CHANGE UP (ref 0.1–0.6)
MONOCYTES NFR BLD AUTO: 5.5 % — SIGNIFICANT CHANGE UP (ref 1.7–9.3)
NEUTROPHILS # BLD AUTO: 4.24 K/UL — SIGNIFICANT CHANGE UP (ref 1.4–6.5)
NEUTROPHILS NFR BLD AUTO: 75.8 % — HIGH (ref 42.2–75.2)
NRBC # BLD: 0 /100 WBCS — SIGNIFICANT CHANGE UP (ref 0–0)
PHOSPHATE SERPL-MCNC: 1.6 MG/DL — LOW (ref 2.1–4.9)
PLATELET # BLD AUTO: 114 K/UL — LOW (ref 130–400)
PMV BLD: 10.2 FL — SIGNIFICANT CHANGE UP (ref 7.4–10.4)
POTASSIUM SERPL-MCNC: 3.8 MMOL/L — SIGNIFICANT CHANGE UP (ref 3.5–5)
POTASSIUM SERPL-SCNC: 3.8 MMOL/L — SIGNIFICANT CHANGE UP (ref 3.5–5)
PROT SERPL-MCNC: 6.6 G/DL — SIGNIFICANT CHANGE UP (ref 6–8)
PROTHROM AB SERPL-ACNC: >40 SEC — HIGH (ref 9.95–12.87)
RBC # BLD: 3.93 M/UL — LOW (ref 4.7–6.1)
RBC # FLD: 16.2 % — HIGH (ref 11.5–14.5)
SODIUM SERPL-SCNC: 136 MMOL/L — SIGNIFICANT CHANGE UP (ref 135–146)
TROPONIN T SERPL-MCNC: <0.01 NG/ML — SIGNIFICANT CHANGE UP
WBC # BLD: 5.59 K/UL — SIGNIFICANT CHANGE UP (ref 4.8–10.8)
WBC # FLD AUTO: 5.59 K/UL — SIGNIFICANT CHANGE UP (ref 4.8–10.8)

## 2023-06-24 PROCEDURE — 99223 1ST HOSP IP/OBS HIGH 75: CPT

## 2023-06-24 PROCEDURE — 93010 ELECTROCARDIOGRAM REPORT: CPT

## 2023-06-24 RX ADMIN — TAMSULOSIN HYDROCHLORIDE 0.4 MILLIGRAM(S): 0.4 CAPSULE ORAL at 22:48

## 2023-06-24 RX ADMIN — PANTOPRAZOLE SODIUM 40 MILLIGRAM(S): 20 TABLET, DELAYED RELEASE ORAL at 05:38

## 2023-06-24 RX ADMIN — Medication 25 MILLIGRAM(S): at 05:39

## 2023-06-24 RX ADMIN — GABAPENTIN 200 MILLIGRAM(S): 400 CAPSULE ORAL at 05:39

## 2023-06-24 RX ADMIN — Medication 25 MILLIGRAM(S): at 14:57

## 2023-06-24 RX ADMIN — Medication 1 TABLET(S): at 12:53

## 2023-06-24 RX ADMIN — Medication 25 MILLIGRAM(S): at 17:46

## 2023-06-24 RX ADMIN — METHOCARBAMOL 500 MILLIGRAM(S): 500 TABLET, FILM COATED ORAL at 17:47

## 2023-06-24 RX ADMIN — PREGABALIN 1000 MICROGRAM(S): 225 CAPSULE ORAL at 12:53

## 2023-06-24 RX ADMIN — NALTREXONE HYDROCHLORIDE 50 MILLIGRAM(S): 50 TABLET, FILM COATED ORAL at 12:52

## 2023-06-24 RX ADMIN — Medication 25 MILLIGRAM(S): at 10:23

## 2023-06-24 RX ADMIN — Medication 1 MILLIGRAM(S): at 12:53

## 2023-06-24 RX ADMIN — GABAPENTIN 200 MILLIGRAM(S): 400 CAPSULE ORAL at 17:47

## 2023-06-24 RX ADMIN — Medication 20 MILLIGRAM(S): at 22:48

## 2023-06-24 RX ADMIN — Medication 100 MILLIGRAM(S): at 12:53

## 2023-06-24 RX ADMIN — LORATADINE 10 MILLIGRAM(S): 10 TABLET ORAL at 12:52

## 2023-06-24 NOTE — PROGRESS NOTE ADULT - ASSESSMENT
MEDICATIONS  (STANDING):  chlordiazePOXIDE   Oral   chlordiazePOXIDE 20 milliGRAM(s) Oral every 4 hours  cyanocobalamin 1000 MICROGram(s) Oral daily  dextrose 5% + sodium chloride 0.9%. 1000 milliLiter(s) (100 mL/Hr) IV Continuous <Continuous>  folic acid 1 milliGRAM(s) Oral daily  gabapentin 200 milliGRAM(s) Oral two times a day  loratadine 10 milliGRAM(s) Oral daily  methocarbamol 500 milliGRAM(s) Oral <User Schedule>  metoprolol succinate ER 25 milliGRAM(s) Oral daily  multivitamin 1 Tablet(s) Oral daily  naltrexone 50 milliGRAM(s) Oral daily  pantoprazole    Tablet 40 milliGRAM(s) Oral before breakfast  tamsulosin 0.4 milliGRAM(s) Oral at bedtime  thiamine 100 milliGRAM(s) Oral daily    MEDICATIONS  (PRN):  acetaminophen     Tablet .. 650 milliGRAM(s) Oral every 6 hours PRN Temp greater or equal to 38C (100.4F), Mild Pain (1 - 3)  chlordiazePOXIDE 25 milliGRAM(s) Oral every 4 hours PRN Withdrawal  LORazepam   Injectable 1 milliGRAM(s) IV Push every 6 hours PRN agitation withdrawal 30 minutes after librium or patient can swallow librium  melatonin 3 milliGRAM(s) Oral at bedtime PRN Insomnia  ondansetron Injectable 4 milliGRAM(s) IV Push every 8 hours PRN Nausea and/or Vomiting    63M w/M w/PMHx EtOH dependence, DVT on Coumadin, BPH, HTN presents to ED with complaints of nausea and diarrhea, admitted to medicine service for further management.    Alcoholic Ketoacidosis  - D5,0.9%NS at 100/hr, evaluated and declined by ICU.   - MVI/Folic Acid/Thiamine  - lactate and AG normalized.     Etoh dependence/withdrawal  - Librium taper protocol   - counseled pt to quit drinking   -  c/w Naltrexone 50mg QD    ANDRZEJ on CKD 2  - iv fluids   -BLCr 1.     Suspected thiamine deficiency   - cont THIAMINE     Chest Discomfort likely GERD  - ppi po AC   - serial troponin negative.     Supratherapeutic INR  - hold coumadin for now, monitor INR   Obesity BMI 33  - wt loss counseling per PCP    Hx of DVT on Coumadin  - check INR daily  - Coumadin dose 5mg MWF and 2.5mg other days, verified by medication tech at [facility]  - hold coumadin given suprtherpeutic inr    BPH  - no s/s UR  - c/w Flomax    HTN  - c/w Toprol XL  - DASH Diet    DVT/GI px  - elevated INR/ PPI  Full code    From [facility], anticipate DC in am, if INR within normal limits.      MEDICATIONS  (STANDING):  chlordiazePOXIDE   Oral   chlordiazePOXIDE 20 milliGRAM(s) Oral every 4 hours  cyanocobalamin 1000 MICROGram(s) Oral daily  dextrose 5% + sodium chloride 0.9%. 1000 milliLiter(s) (100 mL/Hr) IV Continuous <Continuous>  folic acid 1 milliGRAM(s) Oral daily  gabapentin 200 milliGRAM(s) Oral two times a day  loratadine 10 milliGRAM(s) Oral daily  methocarbamol 500 milliGRAM(s) Oral <User Schedule>  metoprolol succinate ER 25 milliGRAM(s) Oral daily  multivitamin 1 Tablet(s) Oral daily  naltrexone 50 milliGRAM(s) Oral daily  pantoprazole    Tablet 40 milliGRAM(s) Oral before breakfast  tamsulosin 0.4 milliGRAM(s) Oral at bedtime  thiamine 100 milliGRAM(s) Oral daily    MEDICATIONS  (PRN):  acetaminophen     Tablet .. 650 milliGRAM(s) Oral every 6 hours PRN Temp greater or equal to 38C (100.4F), Mild Pain (1 - 3)  chlordiazePOXIDE 25 milliGRAM(s) Oral every 4 hours PRN Withdrawal  LORazepam   Injectable 1 milliGRAM(s) IV Push every 6 hours PRN agitation withdrawal 30 minutes after librium or patient can swallow librium  melatonin 3 milliGRAM(s) Oral at bedtime PRN Insomnia  ondansetron Injectable 4 milliGRAM(s) IV Push every 8 hours PRN Nausea and/or Vomiting    63M w/M w/PMHx EtOH dependence, DVT on Coumadin, BPH, HTN presents to ED with complaints of nausea and diarrhea, admitted to medicine service for further management.    Alcoholic Ketoacidosis  - D5,0.9%NS at 100/hr, evaluated and declined by ICU.   - MVI/Folic Acid/Thiamine  - lactate and AG normalized.     Etoh dependence/withdrawal  - Librium taper protocol   - counseled pt to quit drinking   -  c/w Naltrexone 50mg QD    ANDRZEJ on CKD 2  - iv fluids   -BLCr 1.     Suspected thiamine deficiency   - cont THIAMINE     Asymptomatic gallstone   - stable     Chest Discomfort likely GERD  - ppi po AC   - serial troponin negative.     Supratherapeutic INR  - hold coumadin for now, monitor INR   Obesity BMI 33  - wt loss counseling per PCP    Hx of DVT on Coumadin  - check INR daily  - Coumadin dose 5mg MWF and 2.5mg other days, verified by medication tech at [facility]  - hold coumadin given suprtherpeutic inr    BPH  - no s/s UR  - c/w Flomax    HTN  - c/w Toprol XL  - DASH Diet    DVT/GI px  - elevated INR/ PPI  Full code    From [facility], anticipate DC in am, if INR within normal limits.

## 2023-06-24 NOTE — PROGRESS NOTE ADULT - SUBJECTIVE AND OBJECTIVE BOX
CAT [id number]    Subjective/Interval History   - feeling better   - slight diarrhea   - no N/V today.     ROS  - no abdominal pain     PHYSICAL EXAM  Vital Signs Last 24 Hrs  T(C): 35.6 (24 Jun 2023 13:56), Max: 37.1 (23 Jun 2023 19:14)  T(F): 96 (24 Jun 2023 13:56), Max: 98.7 (23 Jun 2023 19:14)  HR: 84 (24 Jun 2023 13:56) (78 - 94)  BP: 139/70 (24 Jun 2023 13:56) (139/70 - 175/80)  BP(mean): --  RR: 18 (24 Jun 2023 05:02) (18 - 18)  SpO2: 95% (24 Jun 2023 05:02) (95% - 97%)    Parameters below as of 24 Jun 2023 05:02  Patient On (Oxygen Delivery Method): room air      GA : AAOX3, NAD   HEENT: PERRLA, EOMI  NECK: no JVD, no thyromegaly   CVS: S1 S2 no murmur no rubs no gallop  RESP: CTAB no wheeze, no rhonchi no rales  ABD: Soft, NT, ND, tympanic, no rebound or guarding   : No Will, No CVA tenderness   EXT; no pedal edema, no cyanosis  MSK: No ML spinal tenderness, normal ROM   NEURO: AAOX3, no new focal deficits, mild tremors.     LABS/ IMAGING                        13.1   5.59  )-----------( 114      ( 24 Jun 2023 07:01 )             37.1     CARDIAC MARKERS ( 24 Jun 2023 07:01 )  x     / <0.01 ng/mL / 187 U/L / x     / 3.4 ng/mL  CARDIAC MARKERS ( 23 Jun 2023 21:57 )  x     / <0.01 ng/mL / 217 U/L / x     / 4.4 ng/mL  CARDIAC MARKERS ( 23 Jun 2023 14:00 )  x     / <0.01 ng/mL / x     / x     / x          06-24    136  |  101  |  14  ----------------------------<  125<H>  3.8   |  22  |  1.3    Ca    8.8      24 Jun 2023 07:01  Phos  1.6     06-24  Mg     2.2     06-24    TPro  6.6  /  Alb  4.3  /  TBili  1.8<H>  /  DBili  x   /  AST  28  /  ALT  11  /  AlkPhos  92  06-24    CAPILLARY BLOOD GLUCOSE      POCT Blood Glucose.: 156 mg/dL (24 Jun 2023 16:38)    Urinalysis Basic - ( 24 Jun 2023 07:01 )    Color: x / Appearance: x / SG: x / pH: x  Gluc: 125 mg/dL / Ketone: x  / Bili: x / Urobili: x   Blood: x / Protein: x / Nitrite: x   Leuk Esterase: x / RBC: x / WBC x   Sq Epi: x / Non Sq Epi: x / Bacteria: x      LIVER FUNCTIONS - ( 24 Jun 2023 07:01 )  Alb: 4.3 g/dL / Pro: 6.6 g/dL / ALK PHOS: 92 U/L / ALT: 11 U/L / AST: 28 U/L / GGT: x

## 2023-06-25 LAB
ANION GAP SERPL CALC-SCNC: 12 MMOL/L — SIGNIFICANT CHANGE UP (ref 7–14)
BUN SERPL-MCNC: 11 MG/DL — SIGNIFICANT CHANGE UP (ref 10–20)
CALCIUM SERPL-MCNC: 8.6 MG/DL — SIGNIFICANT CHANGE UP (ref 8.4–10.5)
CHLORIDE SERPL-SCNC: 103 MMOL/L — SIGNIFICANT CHANGE UP (ref 98–110)
CO2 SERPL-SCNC: 21 MMOL/L — SIGNIFICANT CHANGE UP (ref 17–32)
CREAT SERPL-MCNC: 1.3 MG/DL — SIGNIFICANT CHANGE UP (ref 0.7–1.5)
EGFR: 61 ML/MIN/1.73M2 — SIGNIFICANT CHANGE UP
GLUCOSE SERPL-MCNC: 158 MG/DL — HIGH (ref 70–99)
INR BLD: 2.94 RATIO — HIGH (ref 0.65–1.3)
POTASSIUM SERPL-MCNC: 3.8 MMOL/L — SIGNIFICANT CHANGE UP (ref 3.5–5)
POTASSIUM SERPL-SCNC: 3.8 MMOL/L — SIGNIFICANT CHANGE UP (ref 3.5–5)
PROTHROM AB SERPL-ACNC: 34.6 SEC — HIGH (ref 9.95–12.87)
SODIUM SERPL-SCNC: 136 MMOL/L — SIGNIFICANT CHANGE UP (ref 135–146)

## 2023-06-25 PROCEDURE — 99232 SBSQ HOSP IP/OBS MODERATE 35: CPT

## 2023-06-25 RX ORDER — WARFARIN SODIUM 2.5 MG/1
1 TABLET ORAL ONCE
Refills: 0 | Status: COMPLETED | OUTPATIENT
Start: 2023-06-25 | End: 2023-06-25

## 2023-06-25 RX ADMIN — WARFARIN SODIUM 1 MILLIGRAM(S): 2.5 TABLET ORAL at 22:16

## 2023-06-25 RX ADMIN — Medication 20 MILLIGRAM(S): at 02:33

## 2023-06-25 RX ADMIN — Medication 20 MILLIGRAM(S): at 10:03

## 2023-06-25 RX ADMIN — Medication 15 MILLIGRAM(S): at 22:16

## 2023-06-25 RX ADMIN — NALTREXONE HYDROCHLORIDE 50 MILLIGRAM(S): 50 TABLET, FILM COATED ORAL at 12:02

## 2023-06-25 RX ADMIN — Medication 20 MILLIGRAM(S): at 05:39

## 2023-06-25 RX ADMIN — PANTOPRAZOLE SODIUM 40 MILLIGRAM(S): 20 TABLET, DELAYED RELEASE ORAL at 05:43

## 2023-06-25 RX ADMIN — SODIUM CHLORIDE 100 MILLILITER(S): 9 INJECTION, SOLUTION INTRAVENOUS at 02:34

## 2023-06-25 RX ADMIN — Medication 20 MILLIGRAM(S): at 18:19

## 2023-06-25 RX ADMIN — GABAPENTIN 200 MILLIGRAM(S): 400 CAPSULE ORAL at 18:20

## 2023-06-25 RX ADMIN — TAMSULOSIN HYDROCHLORIDE 0.4 MILLIGRAM(S): 0.4 CAPSULE ORAL at 22:14

## 2023-06-25 RX ADMIN — Medication 1 TABLET(S): at 12:02

## 2023-06-25 RX ADMIN — Medication 100 MILLIGRAM(S): at 12:03

## 2023-06-25 RX ADMIN — Medication 20 MILLIGRAM(S): at 14:38

## 2023-06-25 RX ADMIN — METHOCARBAMOL 500 MILLIGRAM(S): 500 TABLET, FILM COATED ORAL at 18:19

## 2023-06-25 RX ADMIN — GABAPENTIN 200 MILLIGRAM(S): 400 CAPSULE ORAL at 05:40

## 2023-06-25 RX ADMIN — Medication 1 MILLIGRAM(S): at 12:02

## 2023-06-25 RX ADMIN — Medication 25 MILLIGRAM(S): at 05:39

## 2023-06-25 RX ADMIN — LORATADINE 10 MILLIGRAM(S): 10 TABLET ORAL at 12:02

## 2023-06-25 RX ADMIN — PREGABALIN 1000 MICROGRAM(S): 225 CAPSULE ORAL at 12:02

## 2023-06-25 NOTE — PROGRESS NOTE ADULT - SUBJECTIVE AND OBJECTIVE BOX
CAT [id number]    Subjective/Interval History       ROS    PHYSICAL EXAM  Vital Signs Last 24 Hrs  T(C): 36.4 (25 Jun 2023 14:17), Max: 36.4 (25 Jun 2023 14:17)  T(F): 97.6 (25 Jun 2023 14:17), Max: 97.6 (25 Jun 2023 14:17)  HR: 82 (25 Jun 2023 14:17) (82 - 97)  BP: 158/80 (25 Jun 2023 14:17) (103/55 - 158/80)  BP(mean): --  RR: 18 (25 Jun 2023 14:17) (18 - 18)  SpO2: 96% (25 Jun 2023 05:00) (95% - 96%)      GA : AAOX3, NAD   HEENT: PERRLA, EOMI  NECK: no JVD, no thyromegaly   CVS: S1 S2 no murmur no rubs no gallop  RESP: CTAB no wheeze, no rhonchi no rales  ABD: Soft, NT, ND, tympanic, no rebound or guarding   : No Will, No CVA tenderness   EXT; no pedal edema, no cyanosis  MSK: No ML spinal tenderness, normal ROM   NEURO: AAOX3, no new focal deficits     LABS/ IMAGING                        13.1   5.59  )-----------( 114      ( 24 Jun 2023 07:01 )             37.1     CARDIAC MARKERS ( 24 Jun 2023 07:01 )  x     / <0.01 ng/mL / 187 U/L / x     / 3.4 ng/mL  CARDIAC MARKERS ( 23 Jun 2023 21:57 )  x     / <0.01 ng/mL / 217 U/L / x     / 4.4 ng/mL      06-25    136  |  103  |  11  ----------------------------<  158<H>  3.8   |  21  |  1.3    Ca    8.6      25 Jun 2023 07:07  Phos  1.6     06-24  Mg     2.2     06-24    TPro  6.6  /  Alb  4.3  /  TBili  1.8<H>  /  DBili  x   /  AST  28  /  ALT  11  /  AlkPhos  92  06-24    CAPILLARY BLOOD GLUCOSE      POCT Blood Glucose.: 156 mg/dL (24 Jun 2023 16:38)    Urinalysis Basic - ( 25 Jun 2023 07:07 )    Color: x / Appearance: x / SG: x / pH: x  Gluc: 158 mg/dL / Ketone: x  / Bili: x / Urobili: x   Blood: x / Protein: x / Nitrite: x   Leuk Esterase: x / RBC: x / WBC x   Sq Epi: x / Non Sq Epi: x / Bacteria: x      LIVER FUNCTIONS - ( 24 Jun 2023 07:01 )  Alb: 4.3 g/dL / Pro: 6.6 g/dL / ALK PHOS: 92 U/L / ALT: 11 U/L / AST: 28 U/L / GGT: x                  CAT [id number]    Subjective/Interval History   - no hallucinations  - mild tremor   - intermittent lightheadedness, no N/V.     ROS  - no chest pain     PHYSICAL EXAM  Vital Signs Last 24 Hrs  T(C): 36.4 (25 Jun 2023 14:17), Max: 36.4 (25 Jun 2023 14:17)  T(F): 97.6 (25 Jun 2023 14:17), Max: 97.6 (25 Jun 2023 14:17)  HR: 82 (25 Jun 2023 14:17) (82 - 97)  BP: 158/80 (25 Jun 2023 14:17) (103/55 - 158/80)  BP(mean): --  RR: 18 (25 Jun 2023 14:17) (18 - 18)  SpO2: 96% (25 Jun 2023 05:00) (95% - 96%)      GA : AAOX3, NAD   HEENT: PERRLA, EOMI  NECK: no JVD, no thyromegaly   CVS: S1 S2 no murmur no rubs no gallop  RESP: CTAB no wheeze, no rhonchi no rales  ABD: Soft, NT, ND, tympanic, no rebound or guarding    EXT; no pedal edema, no cyanosis  NEURO: AAOX3, no new focal deficits, mild fine hand tremors.     LABS/ IMAGING                        13.1   5.59  )-----------( 114      ( 24 Jun 2023 07:01 )             37.1     CARDIAC MARKERS ( 24 Jun 2023 07:01 )  x     / <0.01 ng/mL / 187 U/L / x     / 3.4 ng/mL  CARDIAC MARKERS ( 23 Jun 2023 21:57 )  x     / <0.01 ng/mL / 217 U/L / x     / 4.4 ng/mL      06-25    136  |  103  |  11  ----------------------------<  158<H>  3.8   |  21  |  1.3    Ca    8.6      25 Jun 2023 07:07  Phos  1.6     06-24  Mg     2.2     06-24    TPro  6.6  /  Alb  4.3  /  TBili  1.8<H>  /  DBili  x   /  AST  28  /  ALT  11  /  AlkPhos  92  06-24    CAPILLARY BLOOD GLUCOSE      POCT Blood Glucose.: 156 mg/dL (24 Jun 2023 16:38)    Urinalysis Basic - ( 25 Jun 2023 07:07 )    Color: x / Appearance: x / SG: x / pH: x  Gluc: 158 mg/dL / Ketone: x  / Bili: x / Urobili: x   Blood: x / Protein: x / Nitrite: x   Leuk Esterase: x / RBC: x / WBC x   Sq Epi: x / Non Sq Epi: x / Bacteria: x      LIVER FUNCTIONS - ( 24 Jun 2023 07:01 )  Alb: 4.3 g/dL / Pro: 6.6 g/dL / ALK PHOS: 92 U/L / ALT: 11 U/L / AST: 28 U/L / GGT: x

## 2023-06-25 NOTE — PROGRESS NOTE ADULT - ASSESSMENT
MEDICATIONS  (STANDING):  chlordiazePOXIDE   Oral   chlordiazePOXIDE 20 milliGRAM(s) Oral every 4 hours  cyanocobalamin 1000 MICROGram(s) Oral daily  dextrose 5% + sodium chloride 0.9%. 1000 milliLiter(s) (100 mL/Hr) IV Continuous <Continuous>  folic acid 1 milliGRAM(s) Oral daily  gabapentin 200 milliGRAM(s) Oral two times a day  loratadine 10 milliGRAM(s) Oral daily  methocarbamol 500 milliGRAM(s) Oral <User Schedule>  metoprolol succinate ER 25 milliGRAM(s) Oral daily  multivitamin 1 Tablet(s) Oral daily  naltrexone 50 milliGRAM(s) Oral daily  pantoprazole    Tablet 40 milliGRAM(s) Oral before breakfast  tamsulosin 0.4 milliGRAM(s) Oral at bedtime  thiamine 100 milliGRAM(s) Oral daily    MEDICATIONS  (PRN):  acetaminophen     Tablet .. 650 milliGRAM(s) Oral every 6 hours PRN Temp greater or equal to 38C (100.4F), Mild Pain (1 - 3)  chlordiazePOXIDE 25 milliGRAM(s) Oral every 4 hours PRN Withdrawal  LORazepam   Injectable 1 milliGRAM(s) IV Push every 6 hours PRN agitation withdrawal 30 minutes after librium or patient can swallow librium  melatonin 3 milliGRAM(s) Oral at bedtime PRN Insomnia  ondansetron Injectable 4 milliGRAM(s) IV Push every 8 hours PRN Nausea and/or Vomiting    63M w/M w/PMHx EtOH dependence, DVT on Coumadin, BPH, HTN presents to ED with complaints of nausea and diarrhea, admitted to medicine service for further management.    Alcoholic Ketoacidosis  - D5,0.9%NS at 100/hr, evaluated and declined by ICU.   - MVI/Folic Acid/Thiamine  - lactate and AG normalized.     Etoh dependence/withdrawal  - Librium taper protocol   - counseled pt to quit drinking   -  c/w Naltrexone 50mg QD    ANDRZEJ on CKD 2  - iv fluids   -BLCr 1.     Suspected thiamine deficiency   - cont THIAMINE     Asymptomatic gallstone   - stable     Chest Discomfort likely GERD  - ppi po AC   - serial troponin negative.     Supratherapeutic INR  - hold coumadin for now, monitor INR   Obesity BMI 33  - wt loss counseling per PCP    Hx of DVT on Coumadin  - check INR daily  - Coumadin dose 5mg MWF and 2.5mg other days, verified by medication tech at [facility]  - hold coumadin given suprtherpeutic inr    BPH  - no s/s UR  - c/w Flomax    HTN  - c/w Toprol XL  - DASH Diet    DVT/GI px  - elevated INR/ PPI  Full code    From [facility], anticipate DC in am, if INR within normal limits.      MEDICATIONS  (STANDING):  chlordiazePOXIDE   Oral   chlordiazePOXIDE 20 milliGRAM(s) Oral every 4 hours  cyanocobalamin 1000 MICROGram(s) Oral daily  dextrose 5% + sodium chloride 0.9%. 1000 milliLiter(s) (100 mL/Hr) IV Continuous <Continuous>  folic acid 1 milliGRAM(s) Oral daily  gabapentin 200 milliGRAM(s) Oral two times a day  loratadine 10 milliGRAM(s) Oral daily  methocarbamol 500 milliGRAM(s) Oral <User Schedule>  metoprolol succinate ER 25 milliGRAM(s) Oral daily  multivitamin 1 Tablet(s) Oral daily  naltrexone 50 milliGRAM(s) Oral daily  pantoprazole    Tablet 40 milliGRAM(s) Oral before breakfast  tamsulosin 0.4 milliGRAM(s) Oral at bedtime  thiamine 100 milliGRAM(s) Oral daily    MEDICATIONS  (PRN):  acetaminophen     Tablet .. 650 milliGRAM(s) Oral every 6 hours PRN Temp greater or equal to 38C (100.4F), Mild Pain (1 - 3)  chlordiazePOXIDE 25 milliGRAM(s) Oral every 4 hours PRN Withdrawal  LORazepam   Injectable 1 milliGRAM(s) IV Push every 6 hours PRN agitation withdrawal 30 minutes after librium or patient can swallow librium  melatonin 3 milliGRAM(s) Oral at bedtime PRN Insomnia  ondansetron Injectable 4 milliGRAM(s) IV Push every 8 hours PRN Nausea and/or Vomiting    63M w/M w/PMHx EtOH dependence, DVT on Coumadin, BPH, HTN presents to ED with complaints of nausea and diarrhea, admitted to medicine service for further management.    Alcoholic Ketoacidosis  - D5,0.9%NS at 100/hr, evaluated and declined by ICU.   - MVI/Folic Acid/Thiamine  - lactate and AG normalized.     Etoh dependence/withdrawal  - Librium taper protocol   - counseled pt to quit drinking   -  c/w Naltrexone 50mg QD    Suspected thiamine deficiency   - cont THIAMINE     ANDRZEJ on CKD 2  - iv fluids   -BLCr 1- 1.2      Asymptomatic gallstone   - stable     Chest Discomfort likely GERD  - ppi po AC   - serial troponin negative.     Supratherapeutic INR, resolved.   - monitor INR   Obesity BMI 33  - wt loss counseling per PCP    Hx of DVT on Coumadin  - check INR daily  - Coumadin dose 5mg MWF and 2.5mg other days, verified by medication tech at [facility]  -give coumadin 1 mg today.     BPH  - no s/s UR  - c/w Flomax    HTN  - c/w Toprol XL  - DASH Diet    DVT/GI px  - on coumadin / PPI    Full code    From [facility], anticipate DC in am, if INR within normal limits.

## 2023-06-26 DIAGNOSIS — F10.20 ALCOHOL DEPENDENCE, UNCOMPLICATED: ICD-10-CM

## 2023-06-26 LAB
INR BLD: 1.73 RATIO — HIGH (ref 0.65–1.3)
PROTHROM AB SERPL-ACNC: 20 SEC — HIGH (ref 9.95–12.87)
URATE SERPL-MCNC: 8 MG/DL — SIGNIFICANT CHANGE UP (ref 3.4–8.8)

## 2023-06-26 PROCEDURE — 99252 IP/OBS CONSLTJ NEW/EST SF 35: CPT

## 2023-06-26 PROCEDURE — 73562 X-RAY EXAM OF KNEE 3: CPT | Mod: 26,RT

## 2023-06-26 PROCEDURE — 93970 EXTREMITY STUDY: CPT | Mod: 26

## 2023-06-26 PROCEDURE — 99232 SBSQ HOSP IP/OBS MODERATE 35: CPT

## 2023-06-26 RX ORDER — COLCHICINE 0.6 MG
1.2 TABLET ORAL ONCE
Refills: 0 | Status: COMPLETED | OUTPATIENT
Start: 2023-06-26 | End: 2023-06-26

## 2023-06-26 RX ORDER — WARFARIN SODIUM 2.5 MG/1
2.5 TABLET ORAL ONCE
Refills: 0 | Status: COMPLETED | OUTPATIENT
Start: 2023-06-26 | End: 2023-06-26

## 2023-06-26 RX ORDER — COLCHICINE 0.6 MG
0.6 TABLET ORAL
Refills: 0 | Status: DISCONTINUED | OUTPATIENT
Start: 2023-06-27 | End: 2023-06-28

## 2023-06-26 RX ORDER — OXYCODONE HYDROCHLORIDE 5 MG/1
5 TABLET ORAL ONCE
Refills: 0 | Status: DISCONTINUED | OUTPATIENT
Start: 2023-06-26 | End: 2023-06-26

## 2023-06-26 RX ORDER — KETOROLAC TROMETHAMINE 30 MG/ML
15 SYRINGE (ML) INJECTION ONCE
Refills: 0 | Status: DISCONTINUED | OUTPATIENT
Start: 2023-06-26 | End: 2023-06-26

## 2023-06-26 RX ADMIN — PREGABALIN 1000 MICROGRAM(S): 225 CAPSULE ORAL at 12:48

## 2023-06-26 RX ADMIN — LORATADINE 10 MILLIGRAM(S): 10 TABLET ORAL at 12:48

## 2023-06-26 RX ADMIN — Medication 1.2 MILLIGRAM(S): at 13:29

## 2023-06-26 RX ADMIN — Medication 1 TABLET(S): at 12:48

## 2023-06-26 RX ADMIN — Medication 100 MILLIGRAM(S): at 12:48

## 2023-06-26 RX ADMIN — Medication 15 MILLIGRAM(S): at 17:21

## 2023-06-26 RX ADMIN — Medication 650 MILLIGRAM(S): at 07:24

## 2023-06-26 RX ADMIN — Medication 25 MILLIGRAM(S): at 05:37

## 2023-06-26 RX ADMIN — Medication 10 MILLIGRAM(S): at 21:30

## 2023-06-26 RX ADMIN — Medication 15 MILLIGRAM(S): at 05:36

## 2023-06-26 RX ADMIN — Medication 1 MILLIGRAM(S): at 12:48

## 2023-06-26 RX ADMIN — TAMSULOSIN HYDROCHLORIDE 0.4 MILLIGRAM(S): 0.4 CAPSULE ORAL at 21:30

## 2023-06-26 RX ADMIN — GABAPENTIN 200 MILLIGRAM(S): 400 CAPSULE ORAL at 17:21

## 2023-06-26 RX ADMIN — Medication 40 MILLIGRAM(S): at 12:51

## 2023-06-26 RX ADMIN — GABAPENTIN 200 MILLIGRAM(S): 400 CAPSULE ORAL at 05:36

## 2023-06-26 RX ADMIN — Medication 15 MILLIGRAM(S): at 13:18

## 2023-06-26 RX ADMIN — OXYCODONE HYDROCHLORIDE 5 MILLIGRAM(S): 5 TABLET ORAL at 08:10

## 2023-06-26 RX ADMIN — PANTOPRAZOLE SODIUM 40 MILLIGRAM(S): 20 TABLET, DELAYED RELEASE ORAL at 05:36

## 2023-06-26 RX ADMIN — Medication 15 MILLIGRAM(S): at 17:59

## 2023-06-26 RX ADMIN — WARFARIN SODIUM 2.5 MILLIGRAM(S): 2.5 TABLET ORAL at 21:29

## 2023-06-26 RX ADMIN — NALTREXONE HYDROCHLORIDE 50 MILLIGRAM(S): 50 TABLET, FILM COATED ORAL at 12:48

## 2023-06-26 RX ADMIN — Medication 15 MILLIGRAM(S): at 01:50

## 2023-06-26 RX ADMIN — Medication 650 MILLIGRAM(S): at 12:13

## 2023-06-26 RX ADMIN — METHOCARBAMOL 500 MILLIGRAM(S): 500 TABLET, FILM COATED ORAL at 17:21

## 2023-06-26 NOTE — PROGRESS NOTE ADULT - SUBJECTIVE AND OBJECTIVE BOX
CAT [id number]    Subjective/Interval History       ROS    PHYSICAL EXAM  Vital Signs Last 24 Hrs  T(C): 35.6 (26 Jun 2023 05:00), Max: 36.4 (25 Jun 2023 14:17)  T(F): 96 (26 Jun 2023 05:00), Max: 97.6 (25 Jun 2023 14:17)  HR: 87 (26 Jun 2023 05:00) (77 - 97)  BP: 155/75 (26 Jun 2023 05:00) (103/55 - 158/80)  BP(mean): --  RR: 18 (26 Jun 2023 05:00) (18 - 18)  SpO2: 96% (26 Jun 2023 05:00) (96% - 96%)    Parameters below as of 26 Jun 2023 05:00  Patient On (Oxygen Delivery Method): room air      GA : AAOX3, NAD   HEENT: PERRLA, EOMI  NECK: no JVD, no thyromegaly   CVS: S1 S2 no murmur no rubs no gallop  RESP: CTAB no wheeze, no rhonchi no rales  ABD: Soft, NT, ND, tympanic, no rebound or guarding   : No Will, No CVA tenderness   EXT; no pedal edema, no cyanosis  MSK: No ML spinal tenderness, normal ROM   NEURO: AAOX3, no new focal deficits     LABS/ IMAGING        06-25    136  |  103  |  11  ----------------------------<  158<H>  3.8   |  21  |  1.3    Ca    8.6      25 Jun 2023 07:07      CAPILLARY BLOOD GLUCOSE        Urinalysis Basic - ( 25 Jun 2023 07:07 )    Color: x / Appearance: x / SG: x / pH: x  Gluc: 158 mg/dL / Ketone: x  / Bili: x / Urobili: x   Blood: x / Protein: x / Nitrite: x   Leuk Esterase: x / RBC: x / WBC x   Sq Epi: x / Non Sq Epi: x / Bacteria: x               CAT [id number]    Subjective/Interval History   - acute R knee pain, slight edema.   - no withdrawal sx.     ROS  - no chest pain.     PHYSICAL EXAM  Vital Signs Last 24 Hrs  T(C): 35.6 (26 Jun 2023 05:00), Max: 36.4 (25 Jun 2023 14:17)  T(F): 96 (26 Jun 2023 05:00), Max: 97.6 (25 Jun 2023 14:17)  HR: 87 (26 Jun 2023 05:00) (77 - 97)  BP: 155/75 (26 Jun 2023 05:00) (103/55 - 158/80)  BP(mean): --  RR: 18 (26 Jun 2023 05:00) (18 - 18)  SpO2: 96% (26 Jun 2023 05:00) (96% - 96%)    Parameters below as of 26 Jun 2023 05:00  Patient On (Oxygen Delivery Method): room air    GA : AAOX3, NAD   HEENT: PERRLA, EOMI  NECK: no JVD, no thyromegaly   CVS: S1 S2 no murmur no rubs no gallop  RESP: CTAB no wheeze, no rhonchi no rales  ABD: Soft, NT, ND, tympanic, no rebound or guarding   EXT; no pedal edema, no cyanosis  MSK: R knee tender and edema.   NEURO: AAOX3, no new focal deficits, no tremors.      LABS/ IMAGING    06-25    136  |  103  |  11  ----------------------------<  158<H>  3.8   |  21  |  1.3    Ca    8.6      25 Jun 2023 07:07      CAPILLARY BLOOD GLUCOSE        Urinalysis Basic - ( 25 Jun 2023 07:07 )    Color: x / Appearance: x / SG: x / pH: x  Gluc: 158 mg/dL / Ketone: x  / Bili: x / Urobili: x   Blood: x / Protein: x / Nitrite: x   Leuk Esterase: x / RBC: x / WBC x   Sq Epi: x / Non Sq Epi: x / Bacteria: x

## 2023-06-26 NOTE — PROGRESS NOTE ADULT - ASSESSMENT
MEDICATIONS  (STANDING):  chlordiazePOXIDE   Oral   chlordiazePOXIDE 20 milliGRAM(s) Oral every 4 hours  cyanocobalamin 1000 MICROGram(s) Oral daily  dextrose 5% + sodium chloride 0.9%. 1000 milliLiter(s) (100 mL/Hr) IV Continuous <Continuous>  folic acid 1 milliGRAM(s) Oral daily  gabapentin 200 milliGRAM(s) Oral two times a day  loratadine 10 milliGRAM(s) Oral daily  methocarbamol 500 milliGRAM(s) Oral <User Schedule>  metoprolol succinate ER 25 milliGRAM(s) Oral daily  multivitamin 1 Tablet(s) Oral daily  naltrexone 50 milliGRAM(s) Oral daily  pantoprazole    Tablet 40 milliGRAM(s) Oral before breakfast  tamsulosin 0.4 milliGRAM(s) Oral at bedtime  thiamine 100 milliGRAM(s) Oral daily    MEDICATIONS  (PRN):  acetaminophen     Tablet .. 650 milliGRAM(s) Oral every 6 hours PRN Temp greater or equal to 38C (100.4F), Mild Pain (1 - 3)  chlordiazePOXIDE 25 milliGRAM(s) Oral every 4 hours PRN Withdrawal  LORazepam   Injectable 1 milliGRAM(s) IV Push every 6 hours PRN agitation withdrawal 30 minutes after librium or patient can swallow librium  melatonin 3 milliGRAM(s) Oral at bedtime PRN Insomnia  ondansetron Injectable 4 milliGRAM(s) IV Push every 8 hours PRN Nausea and/or Vomiting    63M w/M w/PMHx EtOH dependence, DVT on Coumadin, BPH, HTN presents to ED with complaints of nausea and diarrhea, admitted to medicine service for further management.    Alcoholic Ketoacidosis  - D5,0.9%NS at 100/hr, evaluated and declined by ICU.   - MVI/Folic Acid/Thiamine  - lactate and AG normalized.     Etoh dependence/withdrawal  - Librium taper protocol   - counseled pt to quit drinking   -  c/w Naltrexone 50mg QD    Suspected thiamine deficiency   - cont THIAMINE     ANDRZEJ on CKD 2  - iv fluids   -BLCr 1- 1.2      Asymptomatic gallstone   - stable     Chest Discomfort likely GERD  - ppi po AC   - serial troponin negative.     Supratherapeutic INR, resolved.   - monitor INR   Obesity BMI 33  - wt loss counseling per PCP    Hx of DVT on Coumadin  - check INR daily  - Coumadin dose 5mg MWF and 2.5mg other days, verified by medication tech at [facility]  -give coumadin 1 mg today.     BPH  - no s/s UR  - c/w Flomax    HTN  - c/w Toprol XL  - DASH Diet    DVT/GI px  - on coumadin / PPI    Full code    From [facility], anticipate DC in am, if INR within normal limits.      MEDICATIONS  (STANDING):  chlordiazePOXIDE   Oral   chlordiazePOXIDE 10 milliGRAM(s) Oral every 4 hours  cyanocobalamin 1000 MICROGram(s) Oral daily  dextrose 5% + sodium chloride 0.9%. 1000 milliLiter(s) (100 mL/Hr) IV Continuous <Continuous>  folic acid 1 milliGRAM(s) Oral daily  gabapentin 200 milliGRAM(s) Oral two times a day  loratadine 10 milliGRAM(s) Oral daily  methocarbamol 500 milliGRAM(s) Oral <User Schedule>  metoprolol succinate ER 25 milliGRAM(s) Oral daily  multivitamin 1 Tablet(s) Oral daily  naltrexone 50 milliGRAM(s) Oral daily  pantoprazole    Tablet 40 milliGRAM(s) Oral before breakfast  tamsulosin 0.4 milliGRAM(s) Oral at bedtime  thiamine 100 milliGRAM(s) Oral daily  warfarin 2.5 milliGRAM(s) Oral once    MEDICATIONS  (PRN):  acetaminophen     Tablet .. 650 milliGRAM(s) Oral every 6 hours PRN Temp greater or equal to 38C (100.4F), Mild Pain (1 - 3)  chlordiazePOXIDE 25 milliGRAM(s) Oral every 4 hours PRN Withdrawal  LORazepam   Injectable 1 milliGRAM(s) IV Push every 6 hours PRN agitation withdrawal 30 minutes after librium or patient can swallow librium  melatonin 3 milliGRAM(s) Oral at bedtime PRN Insomnia  ondansetron Injectable 4 milliGRAM(s) IV Push every 8 hours PRN Nausea and/or Vomiting      63M w/M w/PMHx EtOH dependence, DVT on Coumadin, BPH, HTN presents to ED with complaints of nausea and diarrhea, admitted to medicine service for further management.    Alcoholic Ketoacidosis, resolved.   - stop D5,0.9%NS at 100/hr, evaluated and declined by ICU.   - MVI/Folic Acid/Thiamine  - lactate and AG normalized.     Etoh dependence/withdrawal  - Librium taper protocol   - counseled pt to quit drinking   -  c/w Naltrexone 50mg QD    Suspected thiamine deficiency   - cont THIAMINE     ANDRZEJ on CKD 2  - iv fluids   -BLCr 1- 1.2      Acute right knee gouty arthritis  - uric acid normal  - solumedrol 40 mg x 1, prednisone daily, colchicine 1.2 mg once, colchicine 0.6 bid.   - ketorolac IV push  X 1     Asymptomatic gallstone   - stable     Chest Discomfort likely GERD  - ppi po AC   - serial troponin negative.     Supratherapeutic INR, resolved.   - monitor INR   Obesity BMI 33  - wt loss counseling per PCP    Hx of DVT on Coumadin  - check INR daily  - Coumadin dose 5mg MWF and 2.5mg other days, verified by medication tech at [facility]  -give coumadin 2.5 mg today.     BPH  - no s/s UR  - c/w Flomax    HTN  - c/w Toprol XL  - DASH Diet    DVT/GI px  - on coumadin / PPI    Full code    From [facility], anticipate DC in am, if INR within normal limits.      MEDICATIONS  (STANDING):  chlordiazePOXIDE   Oral   chlordiazePOXIDE 10 milliGRAM(s) Oral every 4 hours  cyanocobalamin 1000 MICROGram(s) Oral daily  dextrose 5% + sodium chloride 0.9%. 1000 milliLiter(s) (100 mL/Hr) IV Continuous <Continuous>  folic acid 1 milliGRAM(s) Oral daily  gabapentin 200 milliGRAM(s) Oral two times a day  loratadine 10 milliGRAM(s) Oral daily  methocarbamol 500 milliGRAM(s) Oral <User Schedule>  metoprolol succinate ER 25 milliGRAM(s) Oral daily  multivitamin 1 Tablet(s) Oral daily  naltrexone 50 milliGRAM(s) Oral daily  pantoprazole    Tablet 40 milliGRAM(s) Oral before breakfast  tamsulosin 0.4 milliGRAM(s) Oral at bedtime  thiamine 100 milliGRAM(s) Oral daily  warfarin 2.5 milliGRAM(s) Oral once    MEDICATIONS  (PRN):  acetaminophen     Tablet .. 650 milliGRAM(s) Oral every 6 hours PRN Temp greater or equal to 38C (100.4F), Mild Pain (1 - 3)  chlordiazePOXIDE 25 milliGRAM(s) Oral every 4 hours PRN Withdrawal  LORazepam   Injectable 1 milliGRAM(s) IV Push every 6 hours PRN agitation withdrawal 30 minutes after librium or patient can swallow librium  melatonin 3 milliGRAM(s) Oral at bedtime PRN Insomnia  ondansetron Injectable 4 milliGRAM(s) IV Push every 8 hours PRN Nausea and/or Vomiting      63M w/M w/PMHx EtOH dependence, DVT on Coumadin, BPH, HTN presents to ED with complaints of nausea and diarrhea, admitted to medicine service for further management.    Alcoholic Ketoacidosis, resolved.   - stop D5,0.9%NS at 100/hr, evaluated and declined by ICU.   - MVI/Folic Acid/Thiamine  - lactate and AG normalized.     Etoh dependence/withdrawal  - Librium taper protocol   - counseled pt to quit drinking   -  c/w Naltrexone 50mg QD    Suspected thiamine deficiency   - cont THIAMINE     ANDRZEJ on CKD 2  - iv fluids   -BLCr 1- 1.2      Acute right knee gouty arthritis  - uric acid normal  - solumedrol 40 mg x 1, prednisone daily, colchicine 1.2 mg once, colchicine 0.6 bid.   - ketorolac IV push  X 1     Asymptomatic gallstone   - stable     Chest Discomfort likely GERD  - ppi po AC   - serial troponin negative.     Supratherapeutic INR, resolved.   - monitor INR   Obesity BMI 33  - wt loss counseling per PCP    chronic RLE  DVT on Coumadin  - check INR daily  - Coumadin dose 5mg MWF and 2.5mg other days, verified by medication tech at [facility]  -give coumadin 2.5 mg today.   - US RLE reviewed    BPH  - no s/s UR  - c/w Flomax    HTN  - c/w Toprol XL  - DASH Diet    DVT/GI px  - on coumadin / PPI    Full code    From [facility]term, anticipate DC in am, if INR within normal limits.

## 2023-06-26 NOTE — PHYSICAL THERAPY INITIAL EVALUATION ADULT - ADDITIONAL COMMENTS
Pt reports he lives at [facility]. Pt says he get around with a RW independently prior to hospital admission. Pt says there are no steps to negotiate.

## 2023-06-26 NOTE — PHYSICAL THERAPY INITIAL EVALUATION ADULT - GENERAL OBSERVATIONS, REHAB EVAL
14:34-14:59 Chart reviewed. Patient available to be seen for physical therapy, denies pain, confirmed with RN.  Pt rec'd in bed in NAD.

## 2023-06-26 NOTE — CONSULT NOTE ADULT - PROBLEM SELECTOR RECOMMENDATION 9
After evaluation at this time would continue on current librium protocol with use of PRNs for withdrawal. Pt questionable historian.  .Pt should be on Thiamine and Folic acid. Pt will be monitored and supportive care provided.  Obtain UDS if not done already.  Use of Vistaril for anxiety.  Consider adding gabapentin for anxiety standing order and follow up with PMD/Program for continuation of treatment.    Abdominal ultrasound AFP every 6 months for HCC screening  -EGD outpatient for esophageal varices screening   -Follow up with Private GI or our [facility] located at [address] [place] [address]. Phone Number: [phone]  -Alcohol counseling provided   CATCH team involved for aftercare and pt will follow up with aftercare for outpatient and back to adult home.

## 2023-06-26 NOTE — PHYSICAL THERAPY INITIAL EVALUATION ADULT - PERSONAL SAFETY AND JUDGMENT, REHAB EVAL
No showering / do not get the incision wet    Bactrim BID for 14 days    Mupirocin ointment BID application to wound for 14 days    Ambulate as tolerated
at risk behaviors demonstrated

## 2023-06-26 NOTE — CONSULT NOTE ADULT - SUBJECTIVE AND OBJECTIVE BOX
Patient is a 63y old  Male who presents with a chief complaint of Nausea and diarrhea x 3 days (23 Jun 2023 18:05)      HPI:  63M w/M w/PMHx EtOH dependence, DVT on Coumadin, BPH, HTN presents to ED with complaints of nausea and diarrhea.  Patient presented earlier to ED [facility], discharged home but instead came to ED south.  patient reporting symptoms of nausea starting 3 days ago, no vomiting.  Also reporting diarrhea x 2 days(resolved).  He reports waking up feeling ill today with what he describes as chest discomfort starting this am to anterior chest.  He can not describe it.  No SOB.  No abdominal pain.  No urinary complaints, no CP currently.  Patient admits to daily EtOH use, 0.5-1pint of vodka daily.  Last drink last night at 8p.  Denies any previous Hx of DT's or w/d Seizures.   (23 Jun 2023 18:05)      PAST MEDICAL & SURGICAL HISTORY:  Alcohol dependence      HTN (hypertension)      Hard of hearing      Seasonal allergies      BPH (benign prostatic hyperplasia)      GERD (gastroesophageal reflux disease)      Pseudogout      No significant past surgical history          SOCIAL HX:   Smoking former                        ETOH       0.5-1 pint of vodka(?proof)                     Other denies    FAMILY HISTORY:  Family history of gastric cancer  Mom    Family hx of lung cancer  Smoker      :  No known cardiovacular family hisotry     ROS:  See HPI     Allergies    No Known Drug Allergies  dairy products (Hives)  shellfish (Hives)  Beef (Hives)    Intolerances          PHYSICAL EXAM    ICU Vital Signs Last 24 Hrs  T(C): 36.3 (23 Jun 2023 20:41), Max: 37.1 (23 Jun 2023 19:14)  T(F): 97.4 (23 Jun 2023 20:41), Max: 98.7 (23 Jun 2023 19:14)  HR: 78 (23 Jun 2023 20:41) (78 - 107)  BP: 142/65 (23 Jun 2023 20:41) (132/70 - 175/80)  RR: 18 (23 Jun 2023 20:41) (16 - 18)  SpO2: 97% (23 Jun 2023 20:41) (96% - 99%)    O2 Parameters below as of 23 Jun 2023 19:14  Patient On (Oxygen Delivery Method): room air            General: In NAD   HEENT:  SUNNY , poor dentition             Lymphatic system: No cervical LN   Lungs: Bilateral BS  Cardiovascular: Regular  Gastrointestinal: Soft, Positive BS  Musculoskeletal: No clubbing.  Moves all extremities.  Full range of motion   Skin: Warm.  Intact  Neurological: No motor or sensory deficit       06-23-23 @ 07:01  -  06-23-23 @ 21:59  --------------------------------------------------------  IN:  Total IN: 0 mL    OUT:    Voided (mL): 500 mL  Total OUT: 500 mL    Total NET: -500 mL          LABS:                          14.7   9.50  )-----------( 166      ( 23 Jun 2023 14:00 )             41.2                                               06-23    137  |  96<L>  |  17  ----------------------------<  114<H>  5.0   |  13<L>  |  1.6<H>    Ca    9.4      23 Jun 2023 14:00  Phos  4.1     06-23  Mg     1.6     06-23    TPro  7.4  /  Alb  4.7  /  TBili  1.3<H>  /  DBili  x   /  AST  34  /  ALT  14  /  AlkPhos  107  06-23      PT/INR - ( 23 Jun 2023 20:19 )   PT: 39.10 sec;   INR: 3.31 ratio                                                Urinalysis Basic - ( 23 Jun 2023 14:00 )    Color: x / Appearance: x / SG: x / pH: x  Gluc: 114 mg/dL / Ketone: x  / Bili: x / Urobili: x   Blood: x / Protein: x / Nitrite: x   Leuk Esterase: x / RBC: x / WBC x   Sq Epi: x / Non Sq Epi: x / Bacteria: x        CARDIAC MARKERS ( 23 Jun 2023 14:00 )  x     / <0.01 ng/mL / x     / x     / x                                                LIVER FUNCTIONS - ( 23 Jun 2023 14:00 )  Alb: 4.7 g/dL / Pro: 7.4 g/dL / ALK PHOS: 107 U/L / ALT: 14 U/L / AST: 34 U/L / GGT: x                                                                                                                                       X-Rays     no focal opacity  < from: CT Abdomen and Pelvis w/ IV Cont (06.23.23 @ 15:36) >    ACC: [id number] EXAM:  CT ABDOMEN AND PELVIS IC   ORDERED BY: MARYCARMEN   WALUCILAYOJANA CUNHADEBI     PROCEDURE DATE:  06/23/2023          INTERPRETATION:  CLINICAL STATEMENT: Metabolic acidosis in the setting of   persistent nausea, vomiting, diarrhea.    TECHNIQUE: Contiguous axial CT images were obtained from the lower chest   to the pubic symphysis following administration of 95 cc Omnipaque 350   intravenous contrast. Oral contrast was not administered. Reformatted   images in the coronal and sagittalplanes were acquired.    COMPARISON: CT abdomen and pelvis 10/16/2022.    FINDINGS:    LOWER CHEST: Unremarkable.    HEPATOBILIARY: Hepatic steatosis. The liver is enlarged measuring 19 cm   in length. Cholelithiasis.    SPLEEN: Unremarkable.    PANCREAS: Unremarkable.    ADRENAL GLANDS: Unremarkable.    KIDNEYS: Symmetric renal enhancement. Left subcentimeter hypodensity too   small to characterize (303/168). Bilateral mild perinephric stranding,   nonspecific. No hydronephrosis.    ABDOMINOPELVIC NODES: No enlarged abdominal or pelvic lymph nodes.    PELVIC ORGANS: Significant distention of the urinary bladder. No bladder   calculi identified.    PERITONEUM/MESENTERY/BOWEL: No bowel obstruction, ascites, or free air.   Normal caliber appendix. Colonic diverticulosis with no radiographic   evidence diverticulitis.    BONES/SOFT TISSUES: Degenerative changes of the spine.    VASCULAR: Atherosclerotic calcifications.        IMPRESSION:    Large fatty liver. Cholelithiasis.    Urinary bladder distention.    --- End of Report ---          SARAHY LIZARRAGA MD; Resident Radiologist  This document has been electronically signed.  KAVYA BOWER MD; Attending Radiologist  This document has been electronically signed. Jun 23 2023  4:28PM    < end of copied text >                                                                                   ECHO    MEDICATIONS  (STANDING):  chlordiazePOXIDE   Oral   chlordiazePOXIDE 25 milliGRAM(s) Oral every 4 hours  cyanocobalamin 1000 MICROGram(s) Oral daily  dextrose 5% + sodium chloride 0.9%. 1000 milliLiter(s) (100 mL/Hr) IV Continuous <Continuous>  folic acid 1 milliGRAM(s) Oral daily  gabapentin 200 milliGRAM(s) Oral two times a day  loratadine 10 milliGRAM(s) Oral daily  methocarbamol 500 milliGRAM(s) Oral <User Schedule>  metoprolol succinate ER 25 milliGRAM(s) Oral daily  multivitamin 1 Tablet(s) Oral daily  naltrexone 50 milliGRAM(s) Oral daily  pantoprazole    Tablet 40 milliGRAM(s) Oral before breakfast  tamsulosin 0.4 milliGRAM(s) Oral at bedtime  thiamine 100 milliGRAM(s) Oral daily    MEDICATIONS  (PRN):  acetaminophen     Tablet .. 650 milliGRAM(s) Oral every 6 hours PRN Temp greater or equal to 38C (100.4F), Mild Pain (1 - 3)  chlordiazePOXIDE 25 milliGRAM(s) Oral every 4 hours PRN Withdrawal  melatonin 3 milliGRAM(s) Oral at bedtime PRN Insomnia  ondansetron Injectable 4 milliGRAM(s) IV Push every 8 hours PRN Nausea and/or Vomiting        
From ER:  63M w/M w/PMHx EtOH dependence/withdrawal,  DVT/PE  on Coumadin, BPH, HTN presents to ED with complaints of nausea, vomiting and diarrhea.  Patient presented earlier to [facility], discharged home but instead came to ED [facility].  patient reporting symptoms of nausea starting 3 days ago, with vomiting.  Also reporting spontaneously resolving diarrhea x 2 days.  He reports waking up feeling ill today with what he describes as chest discomfort starting this am to anterior chest.  He can not describe it.  No SOB.  No abdominal pain.  No urinary complaints.  Patient admits to daily EtOH use, 0.5-1pint of vodka daily.  Last drink last night at 8p.  Denies any previous Hx of DT's or w/d Seizures.  Reports passing out while drinking.  Pt was evaluated by ICU.       Pt interviewed, examined and EMR chart reviewed.  Pt admits to drinking 1/2 - 1 pt of vodka per day for a long time. Pt can not give a time frame.  Last Drink 1 day prior to admission   Hx of withdrawal tremors denies any seizures.  variable periods of sobriety in the past.  Has been in detox before __X___yes,   _____No    Pt denies any other substance abuse. Pt not sure if he wants to stop drinking  SOCIAL HISTORY:    REVIEW OF SYSTEMS:    Constitutional: No fever, weight loss or fatigue  ENT:  No difficulty hearing, tinnitus, vertigo; No sinus or throat pain  Neck: No pain or stiffness  Respiratory: No cough, wheezing, chills or hemoptysis  Cardiovascular: No chest pain, palpitations, shortness of breath, dizziness or leg swelling  Gastrointestinal: SEE HPI  Neurological: No headaches, memory loss, loss of strength, numbness or tremors  Musculoskeletal: No joint pain or swelling; No muscle, back or extremity pain  Psychiatric: No depression, anxiety, mood swings or difficulty sleeping    MEDICATIONS  (STANDING):  chlordiazePOXIDE   Oral   chlordiazePOXIDE 15 milliGRAM(s) Oral every 4 hours  chlordiazePOXIDE 10 milliGRAM(s) Oral every 4 hours  colchicine 1.2 milliGRAM(s) Oral once  cyanocobalamin 1000 MICROGram(s) Oral daily  dextrose 5% + sodium chloride 0.9%. 1000 milliLiter(s) (100 mL/Hr) IV Continuous <Continuous>  folic acid 1 milliGRAM(s) Oral daily  gabapentin 200 milliGRAM(s) Oral two times a day  loratadine 10 milliGRAM(s) Oral daily  methocarbamol 500 milliGRAM(s) Oral <User Schedule>  methylPREDNISolone sodium succinate Injectable 40 milliGRAM(s) IV Push once  metoprolol succinate ER 25 milliGRAM(s) Oral daily  multivitamin 1 Tablet(s) Oral daily  naltrexone 50 milliGRAM(s) Oral daily  pantoprazole    Tablet 40 milliGRAM(s) Oral before breakfast  tamsulosin 0.4 milliGRAM(s) Oral at bedtime  thiamine 100 milliGRAM(s) Oral daily  warfarin 2.5 milliGRAM(s) Oral once    MEDICATIONS  (PRN):  acetaminophen     Tablet .. 650 milliGRAM(s) Oral every 6 hours PRN Temp greater or equal to 38C (100.4F), Mild Pain (1 - 3)  chlordiazePOXIDE 25 milliGRAM(s) Oral every 4 hours PRN Withdrawal  LORazepam   Injectable 1 milliGRAM(s) IV Push every 6 hours PRN agitation withdrawal 30 minutes after librium or patient can swallow librium  melatonin 3 milliGRAM(s) Oral at bedtime PRN Insomnia  ondansetron Injectable 4 milliGRAM(s) IV Push every 8 hours PRN Nausea and/or Vomiting      Vital Signs Last 24 Hrs  T(C): 35.6 (26 Jun 2023 05:00), Max: 36.4 (25 Jun 2023 14:17)  T(F): 96 (26 Jun 2023 05:00), Max: 97.6 (25 Jun 2023 14:17)  HR: 87 (26 Jun 2023 05:00) (77 - 97)  BP: 155/75 (26 Jun 2023 05:00) (103/55 - 158/80)  BP(mean): --  RR: 18 (26 Jun 2023 05:00) (18 - 18)  SpO2: 96% (26 Jun 2023 05:00) (96% - 96%)    Parameters below as of 26 Jun 2023 05:00  Patient On (Oxygen Delivery Method): room air        PHYSICAL EXAM:    Constitutional: NAD, well-groomed, well-developed  HEENT: PERRLA, EOMI, decreased  Hearing, MMM  Neck: No LAD, No JVD  Back: Normal spine flexure, No CVA tenderness  Respiratory: CTAB/L  Cardiovascular: S1 and S2, RRR, no M/G/R  Gastrointestinal: BS+, soft, Obese NT/ND  Extremities: No peripheral edema  Vascular: 2+ peripheral pulses  Neurological: A/O x 3, no focal deficits    LABS:    06-25    136  |  103  |  11  ----------------------------<  158<H>  3.8   |  21  |  1.3    Ca    8.6      25 Jun 2023 07:07      PT/INR - ( 26 Jun 2023 08:32 )   PT: 20.00 sec;   INR: 1.73 ratio           Urinalysis Basic - ( 25 Jun 2023 07:07 )    Color: x / Appearance: x / SG: x / pH: x  Gluc: 158 mg/dL / Ketone: x  / Bili: x / Urobili: x   Blood: x / Protein: x / Nitrite: x   Leuk Esterase: x / RBC: x / WBC x   Sq Epi: x / Non Sq Epi: x / Bacteria: x      Drug Screen Urine:  Alcohol Level  Alcohol, Blood: <10 mg/dL (06-23-23 @ 16:52)        RADIOLOGY & ADDITIONAL STUDIES:

## 2023-06-26 NOTE — SBIRT NOTE ADULT - NSSBIRTSIXOCC_GEN_A_CORE
Daily Area M Indication Text: Tumors in this location are included in Area M (cheek, forehead, scalp, neck, jawline and pretibial skin).  Mohs surgery is indicated for tumors in these anatomic locations.

## 2023-06-26 NOTE — PHYSICAL THERAPY INITIAL EVALUATION ADULT - PERTINENT HX OF CURRENT PROBLEM, REHAB EVAL
History of Present Illness:  Reason for Admission: Nausea and diarrhea x 3 days  History of Present Illness:   63M w/M w/PMHx EtOH dependence/withdrawal,  DVT/PE  on Coumadin, BPH, HTN presents to ED with complaints of nausea, vomiting and diarrhea.  Patient presented earlier to [facility], discharged home but instead came to ED [facility].  patient reporting symptoms of nausea starting 3 days ago, with vomiting.  Also reporting spontaneously resolving diarrhea x 2 days.  He reports waking up feeling ill today with what he describes as chest discomfort starting this am to anterior chest.  He can not describe it.  No SOB.  No abdominal pain.  No urinary complaints.  Patient admits to daily EtOH use, 0.5-1pint of vodka daily.  Last drink last night at 8p.  Denies any previous Hx of DT's or w/d Seizures.  Reports passing out while drinking.  Pt was evaluated by ICU.

## 2023-06-26 NOTE — CHART NOTE - NSCHARTNOTEFT_GEN_A_CORE
received call from RN relaying pt adamantly requesting xray and other imaging for right knee pain. During assessment, pt reports he got " bad knee pain all of a sudden" early this morning, with pt being unable to stand up 2/2 R knee pain. Pt denies trauma to area, numbness/tingling of RLE swelling. TTP to anterior and posterior aspect of R knee w/ no swelling, no redness, no ecchymosis noted. No TTP of RLE calf. Sensation intact.   chart reviewed, w/ noted to have h/o DVT with last INR 2.94 on 6/25      Pt d/w Dr. Lugo w/ R knee x-ray and RLE US ordered accordingly.

## 2023-06-27 ENCOUNTER — TRANSCRIPTION ENCOUNTER (OUTPATIENT)
Age: 63
End: 2023-06-27

## 2023-06-27 LAB
ANION GAP SERPL CALC-SCNC: 14 MMOL/L — SIGNIFICANT CHANGE UP (ref 7–14)
BASOPHILS # BLD AUTO: 0.02 K/UL — SIGNIFICANT CHANGE UP (ref 0–0.2)
BASOPHILS NFR BLD AUTO: 0.2 % — SIGNIFICANT CHANGE UP (ref 0–1)
BUN SERPL-MCNC: 12 MG/DL — SIGNIFICANT CHANGE UP (ref 10–20)
CALCIUM SERPL-MCNC: 9.3 MG/DL — SIGNIFICANT CHANGE UP (ref 8.4–10.5)
CHLORIDE SERPL-SCNC: 102 MMOL/L — SIGNIFICANT CHANGE UP (ref 98–110)
CO2 SERPL-SCNC: 22 MMOL/L — SIGNIFICANT CHANGE UP (ref 17–32)
CREAT SERPL-MCNC: 1.1 MG/DL — SIGNIFICANT CHANGE UP (ref 0.7–1.5)
EGFR: 75 ML/MIN/1.73M2 — SIGNIFICANT CHANGE UP
EOSINOPHIL # BLD AUTO: 0 K/UL — SIGNIFICANT CHANGE UP (ref 0–0.7)
EOSINOPHIL NFR BLD AUTO: 0 % — SIGNIFICANT CHANGE UP (ref 0–8)
GLUCOSE SERPL-MCNC: 232 MG/DL — HIGH (ref 70–99)
HCT VFR BLD CALC: 36.8 % — LOW (ref 42–52)
HGB BLD-MCNC: 13 G/DL — LOW (ref 14–18)
IMM GRANULOCYTES NFR BLD AUTO: 0.5 % — HIGH (ref 0.1–0.3)
INR BLD: 1.19 RATIO — SIGNIFICANT CHANGE UP (ref 0.65–1.3)
LYMPHOCYTES # BLD AUTO: 0.7 K/UL — LOW (ref 1.2–3.4)
LYMPHOCYTES # BLD AUTO: 5.3 % — LOW (ref 20.5–51.1)
MCHC RBC-ENTMCNC: 33.5 PG — HIGH (ref 27–31)
MCHC RBC-ENTMCNC: 35.3 G/DL — SIGNIFICANT CHANGE UP (ref 32–37)
MCV RBC AUTO: 94.8 FL — HIGH (ref 80–94)
MONOCYTES # BLD AUTO: 0.35 K/UL — SIGNIFICANT CHANGE UP (ref 0.1–0.6)
MONOCYTES NFR BLD AUTO: 2.6 % — SIGNIFICANT CHANGE UP (ref 1.7–9.3)
NEUTROPHILS # BLD AUTO: 12.18 K/UL — HIGH (ref 1.4–6.5)
NEUTROPHILS NFR BLD AUTO: 91.4 % — HIGH (ref 42.2–75.2)
NRBC # BLD: 0 /100 WBCS — SIGNIFICANT CHANGE UP (ref 0–0)
PLATELET # BLD AUTO: 101 K/UL — LOW (ref 130–400)
PMV BLD: 11.4 FL — HIGH (ref 7.4–10.4)
POTASSIUM SERPL-MCNC: 3.9 MMOL/L — SIGNIFICANT CHANGE UP (ref 3.5–5)
POTASSIUM SERPL-SCNC: 3.9 MMOL/L — SIGNIFICANT CHANGE UP (ref 3.5–5)
PROTHROM AB SERPL-ACNC: 13.6 SEC — HIGH (ref 9.95–12.87)
RBC # BLD: 3.88 M/UL — LOW (ref 4.7–6.1)
RBC # FLD: 15.4 % — HIGH (ref 11.5–14.5)
SARS-COV-2 RNA SPEC QL NAA+PROBE: SIGNIFICANT CHANGE UP
SODIUM SERPL-SCNC: 138 MMOL/L — SIGNIFICANT CHANGE UP (ref 135–146)
WBC # BLD: 13.32 K/UL — HIGH (ref 4.8–10.8)
WBC # FLD AUTO: 13.32 K/UL — HIGH (ref 4.8–10.8)

## 2023-06-27 PROCEDURE — 99232 SBSQ HOSP IP/OBS MODERATE 35: CPT

## 2023-06-27 PROCEDURE — 99231 SBSQ HOSP IP/OBS SF/LOW 25: CPT

## 2023-06-27 RX ORDER — COLCHICINE 0.6 MG
1 TABLET ORAL
Qty: 12 | Refills: 0
Start: 2023-06-27 | End: 2023-07-02

## 2023-06-27 RX ORDER — WARFARIN SODIUM 2.5 MG/1
5 TABLET ORAL ONCE
Refills: 0 | Status: COMPLETED | OUTPATIENT
Start: 2023-06-27 | End: 2023-06-27

## 2023-06-27 RX ADMIN — Medication 1 TABLET(S): at 12:23

## 2023-06-27 RX ADMIN — TAMSULOSIN HYDROCHLORIDE 0.4 MILLIGRAM(S): 0.4 CAPSULE ORAL at 21:56

## 2023-06-27 RX ADMIN — Medication 40 MILLIGRAM(S): at 06:00

## 2023-06-27 RX ADMIN — Medication 1 MILLIGRAM(S): at 12:19

## 2023-06-27 RX ADMIN — WARFARIN SODIUM 5 MILLIGRAM(S): 2.5 TABLET ORAL at 10:01

## 2023-06-27 RX ADMIN — LORATADINE 10 MILLIGRAM(S): 10 TABLET ORAL at 12:19

## 2023-06-27 RX ADMIN — METHOCARBAMOL 500 MILLIGRAM(S): 500 TABLET, FILM COATED ORAL at 17:37

## 2023-06-27 RX ADMIN — Medication 10 MILLIGRAM(S): at 05:59

## 2023-06-27 RX ADMIN — Medication 3 MILLIGRAM(S): at 21:56

## 2023-06-27 RX ADMIN — Medication 25 MILLIGRAM(S): at 06:00

## 2023-06-27 RX ADMIN — GABAPENTIN 200 MILLIGRAM(S): 400 CAPSULE ORAL at 17:36

## 2023-06-27 RX ADMIN — Medication 0.6 MILLIGRAM(S): at 17:36

## 2023-06-27 RX ADMIN — PREGABALIN 1000 MICROGRAM(S): 225 CAPSULE ORAL at 12:19

## 2023-06-27 RX ADMIN — Medication 10 MILLIGRAM(S): at 09:49

## 2023-06-27 RX ADMIN — Medication 100 MILLIGRAM(S): at 12:20

## 2023-06-27 RX ADMIN — PANTOPRAZOLE SODIUM 40 MILLIGRAM(S): 20 TABLET, DELAYED RELEASE ORAL at 06:00

## 2023-06-27 RX ADMIN — Medication 10 MILLIGRAM(S): at 17:35

## 2023-06-27 RX ADMIN — GABAPENTIN 200 MILLIGRAM(S): 400 CAPSULE ORAL at 06:00

## 2023-06-27 RX ADMIN — Medication 25 MILLIGRAM(S): at 21:56

## 2023-06-27 RX ADMIN — Medication 0.6 MILLIGRAM(S): at 06:00

## 2023-06-27 RX ADMIN — Medication 10 MILLIGRAM(S): at 14:25

## 2023-06-27 RX ADMIN — Medication 10 MILLIGRAM(S): at 02:46

## 2023-06-27 RX ADMIN — NALTREXONE HYDROCHLORIDE 50 MILLIGRAM(S): 50 TABLET, FILM COATED ORAL at 12:19

## 2023-06-27 NOTE — PROGRESS NOTE ADULT - ASSESSMENT
MEDICATIONS  (STANDING):  chlordiazePOXIDE   Oral   chlordiazePOXIDE 10 milliGRAM(s) Oral every 4 hours  cyanocobalamin 1000 MICROGram(s) Oral daily  dextrose 5% + sodium chloride 0.9%. 1000 milliLiter(s) (100 mL/Hr) IV Continuous <Continuous>  folic acid 1 milliGRAM(s) Oral daily  gabapentin 200 milliGRAM(s) Oral two times a day  loratadine 10 milliGRAM(s) Oral daily  methocarbamol 500 milliGRAM(s) Oral <User Schedule>  metoprolol succinate ER 25 milliGRAM(s) Oral daily  multivitamin 1 Tablet(s) Oral daily  naltrexone 50 milliGRAM(s) Oral daily  pantoprazole    Tablet 40 milliGRAM(s) Oral before breakfast  tamsulosin 0.4 milliGRAM(s) Oral at bedtime  thiamine 100 milliGRAM(s) Oral daily  warfarin 2.5 milliGRAM(s) Oral once    MEDICATIONS  (PRN):  acetaminophen     Tablet .. 650 milliGRAM(s) Oral every 6 hours PRN Temp greater or equal to 38C (100.4F), Mild Pain (1 - 3)  chlordiazePOXIDE 25 milliGRAM(s) Oral every 4 hours PRN Withdrawal  LORazepam   Injectable 1 milliGRAM(s) IV Push every 6 hours PRN agitation withdrawal 30 minutes after librium or patient can swallow librium  melatonin 3 milliGRAM(s) Oral at bedtime PRN Insomnia  ondansetron Injectable 4 milliGRAM(s) IV Push every 8 hours PRN Nausea and/or Vomiting      63M w/M w/PMHx EtOH dependence, DVT on Coumadin, BPH, HTN presents to ED with complaints of nausea and diarrhea, admitted to medicine service for further management.    Alcoholic Ketoacidosis, resolved.   - stop D5,0.9%NS at 100/hr, evaluated and declined by ICU.   - MVI/Folic Acid/Thiamine  - lactate and AG normalized.     Etoh dependence/withdrawal  - Librium taper protocol   - counseled pt to quit drinking   -  c/w Naltrexone 50mg QD    Suspected thiamine deficiency   - cont THIAMINE     ANDRZEJ on CKD 2  - iv fluids   -BLCr 1- 1.2      Acute right knee gouty arthritis  - uric acid normal  - solumedrol 40 mg x 1, prednisone daily, colchicine 1.2 mg once, colchicine 0.6 bid.   - ketorolac IV push  X 1     Asymptomatic gallstone   - stable     Chest Discomfort likely GERD  - ppi po AC   - serial troponin negative.     Supratherapeutic INR, resolved.   - monitor INR   Obesity BMI 33  - wt loss counseling per PCP    chronic RLE  DVT on Coumadin  - check INR daily  - Coumadin dose 5mg MWF and 2.5mg other days, verified by medication tech at [facility]  -give coumadin 2.5 mg today.   - US RLE reviewed    BPH  - no s/s UR  - c/w Flomax    HTN  - c/w Toprol XL  - DASH Diet    DVT/GI px  - on coumadin / PPI    Full code    From [facility], anticipate DC in am, if INR within normal limits.      MEDICATIONS  (STANDING):  colchicine 0.6 milliGRAM(s) Oral two times a day  cyanocobalamin 1000 MICROGram(s) Oral daily  folic acid 1 milliGRAM(s) Oral daily  gabapentin 200 milliGRAM(s) Oral two times a day  loratadine 10 milliGRAM(s) Oral daily  methocarbamol 500 milliGRAM(s) Oral <User Schedule>  metoprolol succinate ER 25 milliGRAM(s) Oral daily  multivitamin 1 Tablet(s) Oral daily  naltrexone 50 milliGRAM(s) Oral daily  pantoprazole    Tablet 40 milliGRAM(s) Oral before breakfast  predniSONE   Tablet 40 milliGRAM(s) Oral daily  tamsulosin 0.4 milliGRAM(s) Oral at bedtime  thiamine 100 milliGRAM(s) Oral daily    MEDICATIONS  (PRN):  acetaminophen     Tablet .. 650 milliGRAM(s) Oral every 6 hours PRN Temp greater or equal to 38C (100.4F), Mild Pain (1 - 3)  chlordiazePOXIDE 25 milliGRAM(s) Oral every 4 hours PRN Withdrawal  LORazepam   Injectable 1 milliGRAM(s) IV Push every 6 hours PRN agitation withdrawal 30 minutes after librium or patient can swallow librium  melatonin 3 milliGRAM(s) Oral at bedtime PRN Insomnia  ondansetron Injectable 4 milliGRAM(s) IV Push every 8 hours PRN Nausea and/or Vomiting      63M w/M w/PMHx EtOH dependence, DVT on Coumadin, BPH, HTN presents to ED with complaints of nausea and diarrhea, admitted to medicine service for further management.    Alcoholic Ketoacidosis, resolved.   - stop D5,0.9%NS at 100/hr, evaluated and declined by ICU.   - MVI/Folic Acid/Thiamine  - lactate and AG normalized.     Etoh dependence/withdrawal  - Librium taper protocol, completed today.   - counseled pt to quit drinking   -  c/w Naltrexone 50mg QD    Suspected thiamine deficiency   - cont THIAMINE     ANDRZEJ on CKD 2  - iv fluids   -BLCr 1- 1.2      Acute right knee gouty arthritis  - uric acid normal  -  prednisone daily,  colchicine 0.6 bid. for 6 more days.     Asymptomatic gallstone   - stable     Chest Discomfort likely GERD  - ppi po AC   - serial troponin negative.     Supratherapeutic INR, resolved.   - monitor INR   Obesity BMI 33  - wt loss counseling per PCP    chronic RLE  DVT on Coumadin  - check INR daily  - Coumadin dose 5mg MWF and 2.5mg other days, verified by medication tech at [facility]  -give coumadin 5 mg today, INR low today.   - US RLE reviewed    BPH  - no s/s UR  - c/w Flomax    HTN  - c/w Toprol XL  - DASH Diet    DVT/GI px  - on coumadin / PPI    Full code    DISPO: From [facility], anticipate DC in am, if INR within normal limits. Med rec and discharge completed. e-prescribed Rx's  Barriers: if INR < 1.5 give Lovenox 1 mg/kg x 1, continue current coumadin regimen, follow up with PCP in 1 week, with INR check.

## 2023-06-27 NOTE — PROGRESS NOTE ADULT - SUBJECTIVE AND OBJECTIVE BOX
CAT [id number]    Subjective/Interval History       ROS    PHYSICAL EXAM  Vital Signs Last 24 Hrs  T(C): 35.9 (27 Jun 2023 04:34), Max: 37 (26 Jun 2023 13:54)  T(F): 96.7 (27 Jun 2023 04:34), Max: 98.6 (26 Jun 2023 13:54)  HR: 66 (27 Jun 2023 04:34) (66 - 86)  BP: 123/67 (27 Jun 2023 04:34) (123/67 - 137/67)  BP(mean): --  RR: 16 (27 Jun 2023 04:34) (16 - 18)  SpO2: 100% (27 Jun 2023 04:34) (94% - 100%)    Parameters below as of 27 Jun 2023 04:34  Patient On (Oxygen Delivery Method): room air      GA : AAOX3, NAD   HEENT: PERRLA, EOMI  NECK: no JVD, no thyromegaly   CVS: S1 S2 no murmur no rubs no gallop  RESP: CTAB no wheeze, no rhonchi no rales  ABD: Soft, NT, ND, tympanic, no rebound or guarding   EXT; no pedal edema, no cyanosis  MSK: R knee tender and edema.   NEURO: AAOX3, no new focal deficits, no tremors    LABS/ IMAGING                        13.0   13.32 )-----------( 101      ( 27 Jun 2023 07:04 )             36.8         06-27    138  |  102  |  12  ----------------------------<  232<H>  3.9   |  22  |  1.1    Ca    9.3      27 Jun 2023 07:04      CAPILLARY BLOOD GLUCOSE        Urinalysis Basic - ( 27 Jun 2023 07:04 )    Color: x / Appearance: x / SG: x / pH: x  Gluc: 232 mg/dL / Ketone: x  / Bili: x / Urobili: x   Blood: x / Protein: x / Nitrite: x   Leuk Esterase: x / RBC: x / WBC x   Sq Epi: x / Non Sq Epi: x / Bacteria: x               CAT [id number]    Subjective/Interval History   -R knee pain better    ROS  - no agitation     PHYSICAL EXAM  Vital Signs Last 24 Hrs  T(C): 35.9 (27 Jun 2023 04:34), Max: 37 (26 Jun 2023 13:54)  T(F): 96.7 (27 Jun 2023 04:34), Max: 98.6 (26 Jun 2023 13:54)  HR: 66 (27 Jun 2023 04:34) (66 - 86)  BP: 123/67 (27 Jun 2023 04:34) (123/67 - 137/67)  BP(mean): --  RR: 16 (27 Jun 2023 04:34) (16 - 18)  SpO2: 100% (27 Jun 2023 04:34) (94% - 100%)    Parameters below as of 27 Jun 2023 04:34  Patient On (Oxygen Delivery Method): room air      GA : AAOX3, NAD   HEENT: PERRLA, EOMI  NECK: no JVD, no thyromegaly   CVS: S1 S2 no murmur no rubs no gallop  RESP: CTAB no wheeze, no rhonchi no rales  ABD: Soft, NT, ND, tympanic, no rebound or guarding   EXT; no pedal edema, no cyanosis  MSK: R knee tender and edema.   NEURO: AAOX3, no new focal deficits, no tremors    LABS/ IMAGING                        13.0   13.32 )-----------( 101      ( 27 Jun 2023 07:04 )             36.8         06-27    138  |  102  |  12  ----------------------------<  232<H>  3.9   |  22  |  1.1    Ca    9.3      27 Jun 2023 07:04      CAPILLARY BLOOD GLUCOSE        Urinalysis Basic - ( 27 Jun 2023 07:04 )    Color: x / Appearance: x / SG: x / pH: x  Gluc: 232 mg/dL / Ketone: x  / Bili: x / Urobili: x   Blood: x / Protein: x / Nitrite: x   Leuk Esterase: x / RBC: x / WBC x   Sq Epi: x / Non Sq Epi: x / Bacteria: x

## 2023-06-27 NOTE — PROGRESS NOTE ADULT - PROBLEM SELECTOR PLAN 1
After evaluation at this time continue current protocol. Pts concerns addressed  Pt will be monitored and supportive care provided.  No other changes to medical care plan for withdrawals.  Monitor labs/electrolytes as needed.    -Counseling provided   CATCH team involved for aftercare and pt will follow up with aftercare.

## 2023-06-27 NOTE — DISCHARGE NOTE PROVIDER - HOSPITAL COURSE
63M w/M w/PMHx EtOH dependence, DVT on Coumadin, BPH, HTN presents to ED with complaints of nausea and diarrhea, admitted to medicine service for further management.    Alcoholic Ketoacidosis, resolved.   - stop D5,0.9%NS at 100/hr, evaluated and declined by ICU.   - MVI/Folic Acid/Thiamine  - lactate and AG normalized.     Etoh dependence/withdrawal  - Librium taper protocol   - counseled pt to quit drinking   -  c/w Naltrexone 50mg QD    Suspected thiamine deficiency   - cont THIAMINE     ANDRZEJ on CKD 2  - iv fluids   -BLCr 1- 1.2      Acute right knee gouty arthritis  - uric acid normal  - solumedrol 40 mg x 1, prednisone daily, colchicine 1.2 mg once, colchicine 0.6 bid.   - ketorolac IV push  X 1   - ppi po AC   - serial troponin negative.     Supratherapeutic INR, resolved.   - monitor INR   Obesity BMI 33  - wt loss counseling per PCP    chronic RLE  DVT on Coumadin  - check INR daily  - Coumadin dose 5mg MWF and 2.5mg other days, verified by medication tech at [facility]  -give coumadin 2.5 mg today.   - US RLE reviewed    BPH  - no s/s UR  - c/w Flomax    HTN  - c/w Toprol XL  - DASH Diet    DVT/GI px  - on coumadin / PPI    Asymptomatic gallstone   - stable     Chest Discomfort likely GERD 63M w/M w/PMHx EtOH dependence, chronic RLE DVT on Coumadin, BPH, HTN,  presents to ED with complaints of nausea and diarrhea, admitted to medicine service for further management.    Alcoholic Ketoacidosis, resolved.   - given D5,0.9%NS   - MVI/Folic Acid/Thiamine  - lactate and AG normalized.     Etoh dependence/withdrawal, resolved.   - Librium taper protocol started.   - counseled pt to quit drinking   -  c/w Naltrexone 50mg QD cont at discharge.     Suspected thiamine deficiency   - cont THIAMINE at discharge.     ANDRZEJ on CKD 2  - iv fluids   -BLCr 1- 1.2      Acute right knee gouty arthritis  - uric acid normal  - cont  prednisone daily for 6 days and colchicine 0.6 bid for 6 days.   - ppi po AC   - serial troponin negative.     Supratherapeutic INR, resolved.      Asymptomatic gallstone   - stable     Obesity BMI 33  - wt loss counseling per PCP    Chronic RLE  DVT on Coumadin  - check INR daily  - Coumadin dose 5mg MWF and 2.5mg other days, verified by medication tech at [facility]  -give coumadin 2.5 mg today.   - US RLE reviewed    BPH  - c/w Flomax    HTN  - c/w Toprol XL  - DASH Diet    Patient's condition improved and he is being discharged in stable condition to [facility]. 63M w/M w/PMHx EtOH dependence, chronic RLE DVT on Coumadin, BPH, HTN,  presents to ED with complaints of nausea and diarrhea, admitted to medicine service for further management.    Alcoholic Ketoacidosis, resolved.   - given D5,0.9%NS   - MVI/Folic Acid/Thiamine  - lactate and AG normalized.     Etoh dependence/withdrawal, resolved.   - Librium taper protocol started.   - counseled pt to quit drinking   -  c/w Naltrexone 50mg QD cont at discharge.     Suspected thiamine deficiency   - cont THIAMINE at discharge.     ANDRZEJ on CKD 2  - iv fluids   -BLCr 1- 1.2      Acute right knee gouty arthritis  - uric acid normal  - cont  prednisone daily for 6 days and colchicine 0.6 bid for 6 days.   - ppi po AC   - serial troponin negative.     Supratherapeutic INR, resolved.      Asymptomatic gallstone   - stable     Obesity BMI 33  - wt loss counseling per PCP    Chronic RLE  DVT on Coumadin  - check INR daily  - Coumadin dose 5mg MWF and 2.5mg other days, verified by medication tech at [facility]  -  RLE reviewed  - lovenox 100mg BID until INR 2-3     BPH  - c/w Flomax    HTN  - c/w Toprol XL  - DASH Diet    Patient's condition improved and he is being discharged in stable condition to [facility]. 63M w/M w/PMHx EtOH dependence, chronic RLE DVT on Coumadin, BPH, HTN,  presents to ED with complaints of nausea and diarrhea, admitted to medicine service for further management.    Alcoholic Ketoacidosis, resolved  - given D5,0.9%NS   - MVI/Folic Acid/Thiamine  - lactate and AG normalized.     Etoh dependence/withdrawal, resolved.   - Librium taper protocol started.   - counseled pt to quit drinking   -  c/w Naltrexone 50mg QD cont at discharge.     Suspected thiamine deficiency   - cont THIAMINE at discharge.     ANDRZEJ on CKD 2  - iv fluids   -BLCr 1- 1.2      Acute right knee gouty arthritis  - uric acid normal  - cont  prednisone daily for 6 days and colchicine 0.6 bid for 6 days.   - ppi po AC   - serial troponin negative.     Supratherapeutic INR, resolved.      Asymptomatic gallstone   - stable     Obesity BMI 33  - wt loss counseling per PCP    Chronic RLE  DVT on Coumadin  - check INR daily  - Coumadin dose 5mg MWF and 2.5mg other days, verified by medication tech at [facility]  -  RLE reviewed  - lovenox 100mg BID until INR 2-3     BPH  - c/w Flomax    HTN  - c/w Toprol XL  - DASH Diet    Patient's condition improved and he is being discharged in stable condition to [facility].

## 2023-06-27 NOTE — DISCHARGE NOTE PROVIDER - NSDCMRMEDTOKEN_GEN_ALL_CORE_FT
Coumadin 2.5 mg oral tablet: 1 orally once a day Tu, Th, Sa, Sun  Coumadin 5 mg oral tablet: 1 orally once a day M, W, F  Flomax 0.4 mg oral capsule: 1 cap(s) orally once a day (at bedtime)  folic acid 1 mg oral tablet: 1 tab(s) orally once a day  gabapentin 100 mg oral capsule: 2 cap(s) orally 2 times a day  loratadine 10 mg oral tablet: 1 tab(s) orally once a day  methocarbamol 500 mg oral tablet: 1 orally once a day  Multiple Vitamins oral tablet: 1 tab(s) orally once a day  naltrexone 50 mg oral tablet: 1 tab(s) orally once a day  pantoprazole 40 mg oral delayed release tablet: 1 tab(s) orally once a day (before a meal)  thiamine 100 mg oral tablet: 1 tab(s) orally once a day  Toprol-XL 25 mg oral tablet, extended release: 1 tab(s) orally once a day  Vitamin B12 100 mcg oral tablet: 1 tab(s) orally once a day   colchicine 0.6 mg oral tablet: 1 tab(s) orally 2 times a day  colchicine 0.6 mg oral tablet: 1 tab(s) orally 2 times a day  Coumadin 2.5 mg oral tablet: 1 orally once a day Tu, Th, Sa, Sun  Coumadin 5 mg oral tablet: 1 orally once a day M, W, F  Flomax 0.4 mg oral capsule: 1 cap(s) orally once a day (at bedtime)  folic acid 1 mg oral tablet: 1 tab(s) orally once a day  gabapentin 100 mg oral capsule: 2 cap(s) orally 2 times a day  loratadine 10 mg oral tablet: 1 tab(s) orally once a day  methocarbamol 500 mg oral tablet: 1 orally once a day  Multiple Vitamins oral tablet: 1 tab(s) orally once a day  naltrexone 50 mg oral tablet: 1 tab(s) orally once a day  pantoprazole 40 mg oral delayed release tablet: 1 tab(s) orally once a day (before a meal)  predniSONE 20 mg oral tablet: 2 tab(s) orally once a day  thiamine 100 mg oral tablet: 1 tab(s) orally once a day  Toprol-XL 25 mg oral tablet, extended release: 1 tab(s) orally once a day  Vitamin B12 100 mcg oral tablet: 1 tab(s) orally once a day   colchicine 0.6 mg oral tablet: 1 tab(s) orally 2 times a day  colchicine 0.6 mg oral tablet: 1 tab(s) orally 2 times a day  Coumadin 2.5 mg oral tablet: 1 orally once a day Tu, Th, Sa, Sun  Coumadin 5 mg oral tablet: 1 orally once a day M, W, F  enoxaparin: 100 milligram(s) subcutaneous 2 times a day STOP WHEN  INR 2-3.  Flomax 0.4 mg oral capsule: 1 cap(s) orally once a day (at bedtime)  folic acid 1 mg oral tablet: 1 tab(s) orally once a day  gabapentin 100 mg oral capsule: 2 cap(s) orally 2 times a day  loratadine 10 mg oral tablet: 1 tab(s) orally once a day  methocarbamol 500 mg oral tablet: 1 orally once a day  Multiple Vitamins oral tablet: 1 tab(s) orally once a day  naltrexone 50 mg oral tablet: 1 tab(s) orally once a day  pantoprazole 40 mg oral delayed release tablet: 1 tab(s) orally once a day (before a meal)  predniSONE 20 mg oral tablet: 2 tab(s) orally once a day  thiamine 100 mg oral tablet: 1 tab(s) orally once a day  Toprol-XL 25 mg oral tablet, extended release: 1 tab(s) orally once a day  Vitamin B12 100 mcg oral tablet: 1 tab(s) orally once a day   colchicine 0.6 mg oral tablet: 1 tab(s) orally 2 times a day  Coumadin 2.5 mg oral tablet: 1 orally once a day Tu, Th, Sa, Sun  Coumadin 5 mg oral tablet: 1 orally once a day M, W, F  enoxaparin: 100 milligram(s) subcutaneous 2 times a day STOP WHEN  INR 2-3.  Flomax 0.4 mg oral capsule: 1 cap(s) orally once a day (at bedtime)  folic acid 1 mg oral tablet: 1 tab(s) orally once a day  gabapentin 100 mg oral capsule: 2 cap(s) orally 2 times a day  loratadine 10 mg oral tablet: 1 tab(s) orally once a day  methocarbamol 500 mg oral tablet: 1 orally once a day  Multiple Vitamins oral tablet: 1 tab(s) orally once a day  naltrexone 50 mg oral tablet: 1 tab(s) orally once a day  pantoprazole 40 mg oral delayed release tablet: 1 tab(s) orally once a day (before a meal)  predniSONE 20 mg oral tablet: 2 tab(s) orally once a day  thiamine 100 mg oral tablet: 1 tab(s) orally once a day  Toprol-XL 25 mg oral tablet, extended release: 1 tab(s) orally once a day  Vitamin B12 100 mcg oral tablet: 1 tab(s) orally once a day   colchicine 0.6 mg oral tablet: 1 tab(s) orally 2 times a day  Coumadin 2.5 mg oral tablet: 1 orally once a day Tu, Th, Sa, Sun  Coumadin 5 mg oral tablet: 1 orally once a day M, W, F  enoxaparin 100 mg/mL injectable solution: 1 milliliter(s) subcutaneously 2 times a day STOP WHEN INR REACHES 2-3  Flomax 0.4 mg oral capsule: 1 cap(s) orally once a day (at bedtime)  folic acid 1 mg oral tablet: 1 tab(s) orally once a day  gabapentin 100 mg oral capsule: 2 cap(s) orally 2 times a day  loratadine 10 mg oral tablet: 1 tab(s) orally once a day  methocarbamol 500 mg oral tablet: 1 orally once a day  Multiple Vitamins oral tablet: 1 tab(s) orally once a day  naltrexone 50 mg oral tablet: 1 tab(s) orally once a day  pantoprazole 40 mg oral delayed release tablet: 1 tab(s) orally once a day (before a meal)  predniSONE 20 mg oral tablet: 2 tab(s) orally once a day  thiamine 100 mg oral tablet: 1 tab(s) orally once a day  Toprol-XL 25 mg oral tablet, extended release: 1 tab(s) orally once a day  Vitamin B12 100 mcg oral tablet: 1 tab(s) orally once a day

## 2023-06-27 NOTE — DISCHARGE NOTE PROVIDER - CARE PROVIDER_API CALL
Eugene Tellez  Internal Medicine  [address] [place] [address]  Phone: [phone]  Fax: [phone]  Follow Up Time: Routine   Eugene Tellez  Internal Medicine  [address] [place] [address]  Phone: [phone]  Fax: [phone]  Follow Up Time: 1 week

## 2023-06-27 NOTE — PROGRESS NOTE ADULT - SUBJECTIVE AND OBJECTIVE BOX
Pt interviewed, examined and EMR chart reviewed.    Follow up of Alcohol Dependency. Pt is on librium protocol. Pt is doing better . Pt with no complaint of withdrawal.      REVIEW OF SYSTEMS:    Constitutional: No fever, weight loss or fatigue  ENT:  No difficulty hearing, tinnitus, vertigo; No sinus or throat pain  Neck: No pain or stiffness  Respiratory: No cough, wheezing, chills or hemoptysis  Cardiovascular: No chest pain, palpitations, shortness of breath, dizziness or leg swelling  Gastrointestinal: No abdominal or epigastric pain. No nausea, vomiting or hematemesis; No diarrhea or constipation. No melena or hematochezia.  Neurological: No headaches, memory loss, loss of strength, numbness or tremors  Musculoskeletal: No joint pain or swelling; No muscle, back or extremity pain      MEDICATIONS  (STANDING):  chlordiazePOXIDE   Oral   chlordiazePOXIDE 10 milliGRAM(s) Oral every 4 hours  colchicine 0.6 milliGRAM(s) Oral two times a day  cyanocobalamin 1000 MICROGram(s) Oral daily  folic acid 1 milliGRAM(s) Oral daily  gabapentin 200 milliGRAM(s) Oral two times a day  loratadine 10 milliGRAM(s) Oral daily  methocarbamol 500 milliGRAM(s) Oral <User Schedule>  metoprolol succinate ER 25 milliGRAM(s) Oral daily  multivitamin 1 Tablet(s) Oral daily  naltrexone 50 milliGRAM(s) Oral daily  pantoprazole    Tablet 40 milliGRAM(s) Oral before breakfast  predniSONE   Tablet 40 milliGRAM(s) Oral daily  tamsulosin 0.4 milliGRAM(s) Oral at bedtime  thiamine 100 milliGRAM(s) Oral daily    MEDICATIONS  (PRN):  acetaminophen     Tablet .. 650 milliGRAM(s) Oral every 6 hours PRN Temp greater or equal to 38C (100.4F), Mild Pain (1 - 3)  chlordiazePOXIDE 25 milliGRAM(s) Oral every 4 hours PRN Withdrawal  LORazepam   Injectable 1 milliGRAM(s) IV Push every 6 hours PRN agitation withdrawal 30 minutes after librium or patient can swallow librium  melatonin 3 milliGRAM(s) Oral at bedtime PRN Insomnia  ondansetron Injectable 4 milliGRAM(s) IV Push every 8 hours PRN Nausea and/or Vomiting      Vital Signs Last 24 Hrs  T(C): 35.9 (27 Jun 2023 04:34), Max: 37 (26 Jun 2023 13:54)  T(F): 96.7 (27 Jun 2023 04:34), Max: 98.6 (26 Jun 2023 13:54)  HR: 66 (27 Jun 2023 04:34) (66 - 86)  BP: 123/67 (27 Jun 2023 04:34) (123/67 - 137/67)  BP(mean): --  RR: 16 (27 Jun 2023 04:34) (16 - 18)  SpO2: 100% (27 Jun 2023 04:34) (94% - 100%)    Parameters below as of 27 Jun 2023 04:34  Patient On (Oxygen Delivery Method): room air        PHYSICAL EXAM:    Constitutional: NAD, well-groomed, well-developed  HEENT: PERRLA, EOMI, Normal Hearing, MMM  Neck: No LAD, No JVD  Back: Normal spine flexure, No CVA tenderness  Respiratory: CTAB/L  Cardiovascular: S1 and S2, RRR, no M/G/R  Gastrointestinal: BS+, soft, NT/ND  Extremities: No peripheral edema  Vascular: 2+ peripheral pulses  Neurological: A/O x 3, no focal deficits    LABS:                        13.0   13.32 )-----------( 101      ( 27 Jun 2023 07:04 )             36.8     06-27    138  |  102  |  12  ----------------------------<  232<H>  3.9   |  22  |  1.1    Ca    9.3      27 Jun 2023 07:04      PT/INR - ( 27 Jun 2023 07:04 )   PT: 13.60 sec;   INR: 1.19 ratio           Urinalysis Basic - ( 27 Jun 2023 07:04 )    Color: x / Appearance: x / SG: x / pH: x  Gluc: 232 mg/dL / Ketone: x  / Bili: x / Urobili: x   Blood: x / Protein: x / Nitrite: x   Leuk Esterase: x / RBC: x / WBC x   Sq Epi: x / Non Sq Epi: x / Bacteria: x      Drug Screen Urine:  Alcohol Level        RADIOLOGY & ADDITIONAL STUDIES:

## 2023-06-27 NOTE — DISCHARGE NOTE PROVIDER - NSDCFUSCHEDAPPT_GEN_ALL_CORE_FT
Katharina Kurtz  [facility] PreAdmits  Scheduled Appointment: 06/28/2023    Kate Carias  [facility]  MEDMGMT [id number]  Scheduled Appointment: 06/28/2023    [facility]  [facility] Doc Off [id number]  Scheduled Appointment: 08/23/2023     [facility]  [facility] Doc Off [id number]  Scheduled Appointment: 08/23/2023

## 2023-06-27 NOTE — DISCHARGE NOTE PROVIDER - NSDCCPCAREPLAN_GEN_ALL_CORE_FT
PRINCIPAL DISCHARGE DIAGNOSIS  Diagnosis: Alcoholic ketoacidosis  Assessment and Plan of Treatment: resolved with hydration      SECONDARY DISCHARGE DIAGNOSES  Diagnosis: Alcohol dependence  Assessment and Plan of Treatment: completed Librium protocol    Diagnosis: Gout flare  Assessment and Plan of Treatment: treated with IV steroids, then Prednisone and Colchicine    Diagnosis: Chronic deep vein thrombosis (DVT)  Assessment and Plan of Treatment: Coumadin treatment: follow INR till therapeutic:  2-2.5 range     PRINCIPAL DISCHARGE DIAGNOSIS  Diagnosis: Alcoholic ketoacidosis  Assessment and Plan of Treatment: - given IV fluids, acidosis resolved  - counseled to quit drinking         SECONDARY DISCHARGE DIAGNOSES  Diagnosis: Alcohol dependence with withdrawal  Assessment and Plan of Treatment: Etoh dependence/withdrawal, resolved.   - Librium taper protocol started.   - counseled pt to quit drinking   -  c/w Naltrexone 50mg QD cont at discharge.    Diagnosis: Chronic deep vein thrombosis (DVT)  Assessment and Plan of Treatment: Coumadin treatment: follow INR till therapeutic:  2-2.5 range  -cont coumadin as directed, check INR in 1 week.    Diagnosis: Acute gouty arthritis  Assessment and Plan of Treatment: Acute right knee gouty arthritis  - uric acid normal  - cont  prednisone daily for 6 days and colchicine 0.6 bid for 6 days.   - ppi po AC   - serial troponin negative.    Diagnosis: Thiamine deficiency  Assessment and Plan of Treatment: Suspected thiamine deficiency   - cont THIAMINE at discharge. protocol    Diagnosis: Asymptomatic gallstones  Assessment and Plan of Treatment: Asymptomatic gallstone   - stable    Diagnosis: Acute renal failure superimposed on stage 2 chronic kidney disease  Assessment and Plan of Treatment:   ANDRZEJ on CKD 2  - iv fluids   -BLCr 1- 1.2     PRINCIPAL DISCHARGE DIAGNOSIS  Diagnosis: Alcoholic ketoacidosis  Assessment and Plan of Treatment: - given IV fluids, acidosis resolved  - counseled to quit drinking         SECONDARY DISCHARGE DIAGNOSES  Diagnosis: Chronic deep vein thrombosis (DVT)  Assessment and Plan of Treatment: Coumadin treatment: follow INR till therapeutic:  2-2.5 range  -cont coumadin as directed. Continue lovenox until INR reaches the therapeutic range, then discontinue lovenos and continue coumadin treatment.    Diagnosis: Alcohol dependence with withdrawal  Assessment and Plan of Treatment: Etoh dependence/withdrawal, resolved.   - Librium taper protocol started.   - counseled pt to quit drinking   -  c/w Naltrexone 50mg QD cont at discharge.    Diagnosis: Thiamine deficiency  Assessment and Plan of Treatment: Suspected thiamine deficiency   - cont THIAMINE at discharge. protocol    Diagnosis: Acute gouty arthritis  Assessment and Plan of Treatment: Acute right knee gouty arthritis  - uric acid normal  - cont  prednisone daily for 6 days and colchicine 0.6 bid for 6 days.   - ppi po AC   - serial troponin negative.    Diagnosis: Asymptomatic gallstones  Assessment and Plan of Treatment: Asymptomatic gallstone   - stable    Diagnosis: Acute renal failure superimposed on stage 2 chronic kidney disease  Assessment and Plan of Treatment:   ANDRZEJ on CKD 2  - iv fluids   -BLCr 1- 1.2     PRINCIPAL DISCHARGE DIAGNOSIS  Diagnosis: Alcoholic ketoacidosis  Assessment and Plan of Treatment: - given IV fluids, acidosis resolved  - counseled to quit drinking         SECONDARY DISCHARGE DIAGNOSES  Diagnosis: Chronic deep vein thrombosis (DVT)  Assessment and Plan of Treatment: Coumadin treatment: follow INR till therapeutic:  2-2.5 range  -cont coumadin as directed. Continue lovenox 100mg BID with coumadin until INR reaches the therapeutic range above. When INR is within therapeutic range, discontinue lovenox and continue only coumadin.    Diagnosis: Alcohol dependence with withdrawal  Assessment and Plan of Treatment: Etoh dependence/withdrawal, resolved.   - Librium taper protocol started.   - counseled pt to quit drinking   -  c/w Naltrexone 50mg QD cont at discharge.    Diagnosis: Thiamine deficiency  Assessment and Plan of Treatment: Suspected thiamine deficiency   - cont THIAMINE at discharge. protocol    Diagnosis: Acute gouty arthritis  Assessment and Plan of Treatment: Acute right knee gouty arthritis  - uric acid normal  - cont  prednisone daily for 6 days and colchicine 0.6 bid for 6 days.   - ppi po AC   - serial troponin negative.    Diagnosis: Asymptomatic gallstones  Assessment and Plan of Treatment: Asymptomatic gallstone   - stable    Diagnosis: Acute renal failure superimposed on stage 2 chronic kidney disease  Assessment and Plan of Treatment:   ANDRZEJ on CKD 2  - iv fluids   -BLCr 1- 1.2

## 2023-06-27 NOTE — DISCHARGE NOTE PROVIDER - PROVIDER TOKENS
PROVIDER:[TOKEN:[[id number]],FOLLOWUP:[Routine]] PROVIDER:[TOKEN:[[id number]],FOLLOWUP:[1 week]]

## 2023-06-27 NOTE — DISCHARGE NOTE PROVIDER - ATTENDING DISCHARGE PHYSICAL EXAMINATION:
VITALS:   T(C): 36.4 (06-27-23 @ 13:55), Max: 36.4 (06-27-23 @ 13:55)  HR: 74 (06-27-23 @ 13:55) (66 - 75)  BP: 109/58 (06-27-23 @ 13:55) (109/58 - 131/63)  RR: 16 (06-27-23 @ 13:55) (16 - 18)  SpO2: 96% (06-27-23 @ 13:55) (94% - 100%)    GENERAL: AAOx3   HEAD:  Atraumatic, Normocephalic  EYES: EOMI, PERRLA, no pallor   ENT:  normal oropharynx , no thyromegaly   NECK: Supple, No JVD  CHEST/LUNG: Clear to auscultation bilaterally; No rales, rhonchi, wheezing, or rubs.  Symmetric expansion   HEART:  S1 S2 RRR ; No murmurs or rubs   ABDOMEN: Soft, nontender, nondistended, BS wnl   EXTREMITIES:  No  cyanosis, or edema  NERVOUS SYSTEM:  A&Ox3, no focal deficits

## 2023-06-28 ENCOUNTER — TRANSCRIPTION ENCOUNTER (OUTPATIENT)
Age: 63
End: 2023-06-28

## 2023-06-28 ENCOUNTER — APPOINTMENT (OUTPATIENT)
Dept: MEDICATION MANAGEMENT | Facility: CLINIC | Age: 63
End: 2023-06-28

## 2023-06-28 VITALS
DIASTOLIC BLOOD PRESSURE: 61 MMHG | TEMPERATURE: 97 F | RESPIRATION RATE: 16 BRPM | SYSTOLIC BLOOD PRESSURE: 134 MMHG | HEART RATE: 69 BPM

## 2023-06-28 LAB
ANION GAP SERPL CALC-SCNC: 10 MMOL/L — SIGNIFICANT CHANGE UP (ref 7–14)
BASOPHILS # BLD AUTO: 0.03 K/UL — SIGNIFICANT CHANGE UP (ref 0–0.2)
BASOPHILS NFR BLD AUTO: 0.2 % — SIGNIFICANT CHANGE UP (ref 0–1)
BUN SERPL-MCNC: 18 MG/DL — SIGNIFICANT CHANGE UP (ref 10–20)
CALCIUM SERPL-MCNC: 9.1 MG/DL — SIGNIFICANT CHANGE UP (ref 8.4–10.5)
CHLORIDE SERPL-SCNC: 106 MMOL/L — SIGNIFICANT CHANGE UP (ref 98–110)
CO2 SERPL-SCNC: 24 MMOL/L — SIGNIFICANT CHANGE UP (ref 17–32)
CREAT SERPL-MCNC: 1.1 MG/DL — SIGNIFICANT CHANGE UP (ref 0.7–1.5)
EGFR: 75 ML/MIN/1.73M2 — SIGNIFICANT CHANGE UP
EOSINOPHIL # BLD AUTO: 0.02 K/UL — SIGNIFICANT CHANGE UP (ref 0–0.7)
EOSINOPHIL NFR BLD AUTO: 0.2 % — SIGNIFICANT CHANGE UP (ref 0–8)
GLUCOSE SERPL-MCNC: 136 MG/DL — HIGH (ref 70–99)
HCT VFR BLD CALC: 37.3 % — LOW (ref 42–52)
HGB BLD-MCNC: 13.1 G/DL — LOW (ref 14–18)
IMM GRANULOCYTES NFR BLD AUTO: 0.5 % — HIGH (ref 0.1–0.3)
INR BLD: 1.24 RATIO — SIGNIFICANT CHANGE UP (ref 0.65–1.3)
LYMPHOCYTES # BLD AUTO: 1.56 K/UL — SIGNIFICANT CHANGE UP (ref 1.2–3.4)
LYMPHOCYTES # BLD AUTO: 12.5 % — LOW (ref 20.5–51.1)
MCHC RBC-ENTMCNC: 33.3 PG — HIGH (ref 27–31)
MCHC RBC-ENTMCNC: 35.1 G/DL — SIGNIFICANT CHANGE UP (ref 32–37)
MCV RBC AUTO: 94.9 FL — HIGH (ref 80–94)
MONOCYTES # BLD AUTO: 0.52 K/UL — SIGNIFICANT CHANGE UP (ref 0.1–0.6)
MONOCYTES NFR BLD AUTO: 4.2 % — SIGNIFICANT CHANGE UP (ref 1.7–9.3)
NEUTROPHILS # BLD AUTO: 10.27 K/UL — HIGH (ref 1.4–6.5)
NEUTROPHILS NFR BLD AUTO: 82.4 % — HIGH (ref 42.2–75.2)
NRBC # BLD: 0 /100 WBCS — SIGNIFICANT CHANGE UP (ref 0–0)
PLATELET # BLD AUTO: 109 K/UL — LOW (ref 130–400)
PMV BLD: 10.9 FL — HIGH (ref 7.4–10.4)
POTASSIUM SERPL-MCNC: 3.8 MMOL/L — SIGNIFICANT CHANGE UP (ref 3.5–5)
POTASSIUM SERPL-SCNC: 3.8 MMOL/L — SIGNIFICANT CHANGE UP (ref 3.5–5)
PROTHROM AB SERPL-ACNC: 14.2 SEC — HIGH (ref 9.95–12.87)
RBC # BLD: 3.93 M/UL — LOW (ref 4.7–6.1)
RBC # FLD: 15.7 % — HIGH (ref 11.5–14.5)
SODIUM SERPL-SCNC: 140 MMOL/L — SIGNIFICANT CHANGE UP (ref 135–146)
WBC # BLD: 12.46 K/UL — HIGH (ref 4.8–10.8)
WBC # FLD AUTO: 12.46 K/UL — HIGH (ref 4.8–10.8)

## 2023-06-28 PROCEDURE — 99239 HOSP IP/OBS DSCHRG MGMT >30: CPT

## 2023-06-28 RX ORDER — ENOXAPARIN SODIUM 100 MG/ML
1 INJECTION SUBCUTANEOUS
Qty: 10 | Refills: 0
Start: 2023-06-28 | End: 2023-07-02

## 2023-06-28 RX ORDER — ENOXAPARIN SODIUM 100 MG/ML
1 INJECTION SUBCUTANEOUS
Refills: 0 | DISCHARGE

## 2023-06-28 RX ORDER — ENOXAPARIN SODIUM 100 MG/ML
100 INJECTION SUBCUTANEOUS EVERY 12 HOURS
Refills: 0 | Status: DISCONTINUED | OUTPATIENT
Start: 2023-06-28 | End: 2023-06-28

## 2023-06-28 RX ORDER — ENOXAPARIN SODIUM 100 MG/ML
100 INJECTION SUBCUTANEOUS
Qty: 0 | Refills: 0 | DISCHARGE
Start: 2023-06-28

## 2023-06-28 RX ADMIN — PREGABALIN 1000 MICROGRAM(S): 225 CAPSULE ORAL at 11:41

## 2023-06-28 RX ADMIN — GABAPENTIN 200 MILLIGRAM(S): 400 CAPSULE ORAL at 06:00

## 2023-06-28 RX ADMIN — Medication 25 MILLIGRAM(S): at 06:01

## 2023-06-28 RX ADMIN — Medication 1 TABLET(S): at 11:40

## 2023-06-28 RX ADMIN — Medication 40 MILLIGRAM(S): at 06:01

## 2023-06-28 RX ADMIN — NALTREXONE HYDROCHLORIDE 50 MILLIGRAM(S): 50 TABLET, FILM COATED ORAL at 11:41

## 2023-06-28 RX ADMIN — Medication 1 MILLIGRAM(S): at 11:40

## 2023-06-28 RX ADMIN — METHOCARBAMOL 500 MILLIGRAM(S): 500 TABLET, FILM COATED ORAL at 17:10

## 2023-06-28 RX ADMIN — GABAPENTIN 200 MILLIGRAM(S): 400 CAPSULE ORAL at 17:10

## 2023-06-28 RX ADMIN — LORATADINE 10 MILLIGRAM(S): 10 TABLET ORAL at 11:40

## 2023-06-28 RX ADMIN — PANTOPRAZOLE SODIUM 40 MILLIGRAM(S): 20 TABLET, DELAYED RELEASE ORAL at 06:01

## 2023-06-28 RX ADMIN — Medication 0.6 MILLIGRAM(S): at 06:01

## 2023-06-28 RX ADMIN — Medication 100 MILLIGRAM(S): at 11:40

## 2023-06-28 RX ADMIN — Medication 0.6 MILLIGRAM(S): at 17:10

## 2023-06-28 RX ADMIN — ENOXAPARIN SODIUM 100 MILLIGRAM(S): 100 INJECTION SUBCUTANEOUS at 11:45

## 2023-06-28 NOTE — PROGRESS NOTE ADULT - SUBJECTIVE AND OBJECTIVE BOX
Progress note    INTERVAL HPI/OVERNIGHT EVENTS:    Patient seen and examined today. He is sitting comfortably in chair in no acute distress.      REVIEW OF SYSTEMS:  All other 13 Review of systems were reviewed and are negative    FAMILY HISTORY:  Family history of gastric cancer  Mom    Family hx of lung cancer  Smoker        T(C): 36.1 (06-28-23 @ 05:55), Max: 36.4 (06-27-23 @ 13:55)  HR: 69 (06-28-23 @ 05:55) (64 - 74)  BP: 134/61 (06-28-23 @ 05:55) (109/58 - 134/61)  RR: 16 (06-28-23 @ 05:55) (16 - 16)  SpO2: 95% (06-27-23 @ 20:40) (95% - 96%)  Wt(kg): --Vital Signs Last 24 Hrs  T(C): 36.1 (28 Jun 2023 05:55), Max: 36.4 (27 Jun 2023 13:55)  T(F): 96.9 (28 Jun 2023 05:55), Max: 97.6 (27 Jun 2023 13:55)  HR: 69 (28 Jun 2023 05:55) (64 - 74)  BP: 134/61 (28 Jun 2023 05:55) (109/58 - 134/61)  BP(mean): --  RR: 16 (28 Jun 2023 05:55) (16 - 16)  SpO2: 95% (27 Jun 2023 20:40) (95% - 96%)    Parameters below as of 27 Jun 2023 13:55  Patient On (Oxygen Delivery Method): room air      No Known Drug Allergies  dairy products (Hives)  shellfish (Hives)  Beef (Hives)      PHYSICAL EXAM:  GENERAL: NAD, well-groomed, well-developed  HEAD:  Atraumatic, Normocephalic  EYES: EOMI, PERRLA, conjunctiva and sclera clear  ENMT: No tonsillar erythema, exudates, or enlargement; Moist mucous membranes, Good dentition, No lesions  NECK: Supple, No JVD, Normal thyroid  NERVOUS SYSTEM:  Alert & Oriented X3, Good concentration; Motor Strength 5/5 B/L upper and lower extremities; DTRs 2+ intact and symmetric  CHEST/LUNG: Clear to percussion bilaterally; No rales, rhonchi, wheezing, or rubs  HEART: Regular rate and rhythm; No murmurs, rubs, or gallops  ABDOMEN: Soft, Nontender, Nondistended; Bowel sounds present  EXTREMITIES:  2+ Peripheral Pulses, No clubbing, cyanosis, or edema  LYMPH: No lymphadenopathy noted  SKIN: No rashes or lesions    Consultant(s) Notes Reviewed:  [x ] YES  [ ] NO  Care Discussed with Consultants/Other Providers [ x] YES  [ ] NO    LABS:      RADIOLOGY & ADDITIONAL TESTS:    Imaging Personally Reviewed:  [ ] YES  [ ] NO  acetaminophen     Tablet .. 650 milliGRAM(s) Oral every 6 hours PRN  chlordiazePOXIDE 25 milliGRAM(s) Oral every 4 hours PRN  colchicine 0.6 milliGRAM(s) Oral two times a day  cyanocobalamin 1000 MICROGram(s) Oral daily  enoxaparin Injectable 100 milliGRAM(s) SubCutaneous every 12 hours  folic acid 1 milliGRAM(s) Oral daily  gabapentin 200 milliGRAM(s) Oral two times a day  loratadine 10 milliGRAM(s) Oral daily  LORazepam   Injectable 1 milliGRAM(s) IV Push every 6 hours PRN  melatonin 3 milliGRAM(s) Oral at bedtime PRN  methocarbamol 500 milliGRAM(s) Oral <User Schedule>  metoprolol succinate ER 25 milliGRAM(s) Oral daily  multivitamin 1 Tablet(s) Oral daily  naltrexone 50 milliGRAM(s) Oral daily  ondansetron Injectable 4 milliGRAM(s) IV Push every 8 hours PRN  pantoprazole    Tablet 40 milliGRAM(s) Oral before breakfast  predniSONE   Tablet 40 milliGRAM(s) Oral daily  tamsulosin 0.4 milliGRAM(s) Oral at bedtime  thiamine 100 milliGRAM(s) Oral daily      HEALTH ISSUES - PROBLEM Dx:    63M w/M w/PMHx EtOH dependence, DVT on Coumadin, BPH, HTN presents to ED with complaints of nausea and diarrhea, admitted to medicine service for further management.    Alcoholic Ketoacidosis, resolved.   -evaluated and declined by ICU  - S/p IVF   - MVI/Folic Acid/Thiamine  - lactate and AG normalized.     Etoh dependence/withdrawal  - Librium taper protocol, completed   - counseled pt to quit drinking   -  c/w Naltrexone 50mg QD    Suspected thiamine deficiency   - cont THIAMINE     ANDRZEJ on CKD 2  - iv fluids   -BLCr 1- 1.2      Acute right knee gouty arthritis  - uric acid normal  -  prednisone daily,  colchicine 0.6 bid. for 6 more days.     Asymptomatic gallstone   - stable     Chest Discomfort likely GERD  - ppi po AC   - serial troponin negative.     Supratherapeutic INR, resolved.   - monitor INR   Obesity BMI 33  - wt loss counseling per PCP    chronic RLE  DVT on Coumadin  - check INR daily  - Coumadin dose 5mg MWF and 2.5mg other days, verified by medication tech at [facility]  -give coumadin 5 mg today, INR low today.   - US RLE reviewed    BPH  - no s/s UR  - c/w Flomax    HTN  - c/w Toprol XL  - DASH Diet    DVT/GI px  - on coumadin / PPI    Full code    DISPO: for discharge today. Send home with lovenox 100MG bid until therapeutic INR 2-2.5

## 2023-06-28 NOTE — DISCHARGE NOTE NURSING/CASE MANAGEMENT/SOCIAL WORK - PATIENT PORTAL LINK FT
You can access the FollowMyHealth Patient Portal offered by [facility] by registering at the following website: [email or web address]. By joining [employer]’s FollowMyHealth portal, you will also be able to view your health information using other applications (apps) compatible with our system.

## 2023-06-28 NOTE — PROGRESS NOTE ADULT - REASON FOR ADMISSION
Nausea and diarrhea x 3 days
Nausea, vomiting and diarrhea x 3 days
Nausea and diarrhea x 3 days
Nausea and diarrhea x 3 days

## 2023-06-30 PROBLEM — Z86.718 PERSONAL HISTORY OF OTHER VENOUS THROMBOSIS AND EMBOLISM: Chronic | Status: ACTIVE | Noted: 2023-06-24

## 2023-07-03 ENCOUNTER — APPOINTMENT (OUTPATIENT)
Dept: MEDICATION MANAGEMENT | Facility: CLINIC | Age: 63
End: 2023-07-03

## 2023-07-03 ENCOUNTER — OUTPATIENT (OUTPATIENT)
Dept: OUTPATIENT SERVICES | Facility: HOSPITAL | Age: 63
LOS: 1 days | End: 2023-07-03
Payer: MEDICAID

## 2023-07-03 DIAGNOSIS — N17.9 ACUTE KIDNEY FAILURE, UNSPECIFIED: ICD-10-CM

## 2023-07-03 DIAGNOSIS — Z79.01 LONG TERM (CURRENT) USE OF ANTICOAGULANTS: ICD-10-CM

## 2023-07-03 DIAGNOSIS — E51.9 THIAMINE DEFICIENCY, UNSPECIFIED: ICD-10-CM

## 2023-07-03 DIAGNOSIS — N40.0 BENIGN PROSTATIC HYPERPLASIA WITHOUT LOWER URINARY TRACT SYMPTOMS: ICD-10-CM

## 2023-07-03 DIAGNOSIS — N18.2 CHRONIC KIDNEY DISEASE, STAGE 2 (MILD): ICD-10-CM

## 2023-07-03 DIAGNOSIS — Z87.891 PERSONAL HISTORY OF NICOTINE DEPENDENCE: ICD-10-CM

## 2023-07-03 DIAGNOSIS — Z91.013 ALLERGY TO SEAFOOD: ICD-10-CM

## 2023-07-03 DIAGNOSIS — E66.9 OBESITY, UNSPECIFIED: ICD-10-CM

## 2023-07-03 DIAGNOSIS — I82.502 CHRONIC EMBOLISM AND THROMBOSIS OF UNSPECIFIED DEEP VEINS OF LEFT LOWER EXTREMITY: ICD-10-CM

## 2023-07-03 DIAGNOSIS — I12.9 HYPERTENSIVE CHRONIC KIDNEY DISEASE WITH STAGE 1 THROUGH STAGE 4 CHRONIC KIDNEY DISEASE, OR UNSPECIFIED CHRONIC KIDNEY DISEASE: ICD-10-CM

## 2023-07-03 DIAGNOSIS — M10.9 GOUT, UNSPECIFIED: ICD-10-CM

## 2023-07-03 DIAGNOSIS — R79.1 ABNORMAL COAGULATION PROFILE: ICD-10-CM

## 2023-07-03 DIAGNOSIS — F10.239 ALCOHOL DEPENDENCE WITH WITHDRAWAL, UNSPECIFIED: ICD-10-CM

## 2023-07-03 DIAGNOSIS — E83.42 HYPOMAGNESEMIA: ICD-10-CM

## 2023-07-03 DIAGNOSIS — K21.9 GASTRO-ESOPHAGEAL REFLUX DISEASE WITHOUT ESOPHAGITIS: ICD-10-CM

## 2023-07-03 DIAGNOSIS — I82.409 ACUTE EMBOLISM AND THROMBOSIS OF UNSPECIFIED DEEP VEINS OF UNSPECIFIED LOWER EXTREMITY: ICD-10-CM

## 2023-07-03 DIAGNOSIS — E87.29 OTHER ACIDOSIS: ICD-10-CM

## 2023-07-03 PROCEDURE — 36416 COLLJ CAPILLARY BLOOD SPEC: CPT

## 2023-07-03 PROCEDURE — 85610 PROTHROMBIN TIME: CPT

## 2023-07-03 PROCEDURE — 99211 OFF/OP EST MAY X REQ PHY/QHP: CPT

## 2023-07-04 DIAGNOSIS — I82.409 ACUTE EMBOLISM AND THROMBOSIS OF UNSPECIFIED DEEP VEINS OF UNSPECIFIED LOWER EXTREMITY: ICD-10-CM

## 2023-07-04 DIAGNOSIS — Z79.01 LONG TERM (CURRENT) USE OF ANTICOAGULANTS: ICD-10-CM

## 2023-07-10 ENCOUNTER — APPOINTMENT (OUTPATIENT)
Dept: MEDICATION MANAGEMENT | Facility: CLINIC | Age: 63
End: 2023-07-10

## 2023-07-10 ENCOUNTER — OUTPATIENT (OUTPATIENT)
Dept: OUTPATIENT SERVICES | Facility: HOSPITAL | Age: 63
LOS: 1 days | End: 2023-07-10
Payer: MEDICAID

## 2023-07-10 DIAGNOSIS — I82.409 ACUTE EMBOLISM AND THROMBOSIS OF UNSPECIFIED DEEP VEINS OF UNSPECIFIED LOWER EXTREMITY: ICD-10-CM

## 2023-07-10 DIAGNOSIS — Z79.01 LONG TERM (CURRENT) USE OF ANTICOAGULANTS: ICD-10-CM

## 2023-07-10 LAB
INR PPP: 2 RATIO
INR PPP: 6.6 RATIO
POCT-PROTHROMBIN TIME: 78.8 SECS
QUALITY CONTROL: YES
QUALITY CONTROL: YES

## 2023-07-10 PROCEDURE — 99211 OFF/OP EST MAY X REQ PHY/QHP: CPT

## 2023-07-10 PROCEDURE — 85610 PROTHROMBIN TIME: CPT

## 2023-07-10 PROCEDURE — 36416 COLLJ CAPILLARY BLOOD SPEC: CPT

## 2023-07-11 DIAGNOSIS — Z79.01 LONG TERM (CURRENT) USE OF ANTICOAGULANTS: ICD-10-CM

## 2023-07-11 DIAGNOSIS — I82.409 ACUTE EMBOLISM AND THROMBOSIS OF UNSPECIFIED DEEP VEINS OF UNSPECIFIED LOWER EXTREMITY: ICD-10-CM

## 2023-07-12 ENCOUNTER — APPOINTMENT (OUTPATIENT)
Dept: MEDICATION MANAGEMENT | Facility: CLINIC | Age: 63
End: 2023-07-12

## 2023-07-12 ENCOUNTER — OUTPATIENT (OUTPATIENT)
Dept: OUTPATIENT SERVICES | Facility: HOSPITAL | Age: 63
LOS: 1 days | End: 2023-07-12
Payer: MEDICAID

## 2023-07-12 DIAGNOSIS — I82.403 ACUTE EMBOLISM AND THROMBOSIS OF UNSPECIFIED DEEP VEINS OF LOWER EXTREMITY, BILATERAL: ICD-10-CM

## 2023-07-12 DIAGNOSIS — Z79.01 LONG TERM (CURRENT) USE OF ANTICOAGULANTS: ICD-10-CM

## 2023-07-12 LAB
INR PPP: 5.9 RATIO
POCT-PROTHROMBIN TIME: 70.7 SECS
QUALITY CONTROL: YES

## 2023-07-12 PROCEDURE — 36416 COLLJ CAPILLARY BLOOD SPEC: CPT

## 2023-07-12 PROCEDURE — 85610 PROTHROMBIN TIME: CPT

## 2023-07-12 PROCEDURE — 99211 OFF/OP EST MAY X REQ PHY/QHP: CPT

## 2023-07-13 DIAGNOSIS — I82.403 ACUTE EMBOLISM AND THROMBOSIS OF UNSPECIFIED DEEP VEINS OF LOWER EXTREMITY, BILATERAL: ICD-10-CM

## 2023-07-13 DIAGNOSIS — Z79.01 LONG TERM (CURRENT) USE OF ANTICOAGULANTS: ICD-10-CM

## 2023-07-14 ENCOUNTER — OUTPATIENT (OUTPATIENT)
Dept: OUTPATIENT SERVICES | Facility: HOSPITAL | Age: 63
LOS: 1 days | End: 2023-07-14
Payer: MEDICAID

## 2023-07-14 ENCOUNTER — APPOINTMENT (OUTPATIENT)
Dept: MEDICATION MANAGEMENT | Facility: CLINIC | Age: 63
End: 2023-07-14

## 2023-07-14 DIAGNOSIS — I48.91 UNSPECIFIED ATRIAL FIBRILLATION: ICD-10-CM

## 2023-07-14 DIAGNOSIS — Z79.01 LONG TERM (CURRENT) USE OF ANTICOAGULANTS: ICD-10-CM

## 2023-07-14 LAB
INR PPP: 2.7 RATIO
POCT-PROTHROMBIN TIME: 31.9 SECS
QUALITY CONTROL: YES

## 2023-07-14 PROCEDURE — 36416 COLLJ CAPILLARY BLOOD SPEC: CPT

## 2023-07-14 PROCEDURE — 85610 PROTHROMBIN TIME: CPT

## 2023-07-14 PROCEDURE — 99211 OFF/OP EST MAY X REQ PHY/QHP: CPT

## 2023-07-15 DIAGNOSIS — Z79.01 LONG TERM (CURRENT) USE OF ANTICOAGULANTS: ICD-10-CM

## 2023-07-15 DIAGNOSIS — I48.91 UNSPECIFIED ATRIAL FIBRILLATION: ICD-10-CM

## 2023-07-18 ENCOUNTER — APPOINTMENT (OUTPATIENT)
Dept: MEDICATION MANAGEMENT | Facility: CLINIC | Age: 63
End: 2023-07-18

## 2023-07-18 ENCOUNTER — OUTPATIENT (OUTPATIENT)
Dept: OUTPATIENT SERVICES | Facility: HOSPITAL | Age: 63
LOS: 1 days | End: 2023-07-18
Payer: MEDICAID

## 2023-07-18 DIAGNOSIS — Z79.01 LONG TERM (CURRENT) USE OF ANTICOAGULANTS: ICD-10-CM

## 2023-07-18 DIAGNOSIS — I82.409 ACUTE EMBOLISM AND THROMBOSIS OF UNSPECIFIED DEEP VEINS OF UNSPECIFIED LOWER EXTREMITY: ICD-10-CM

## 2023-07-18 LAB
INR PPP: 4 RATIO
POCT-PROTHROMBIN TIME: 48 SECS
QUALITY CONTROL: YES

## 2023-07-18 PROCEDURE — 85610 PROTHROMBIN TIME: CPT

## 2023-07-18 PROCEDURE — 36416 COLLJ CAPILLARY BLOOD SPEC: CPT

## 2023-07-18 PROCEDURE — 99211 OFF/OP EST MAY X REQ PHY/QHP: CPT

## 2023-07-19 DIAGNOSIS — I82.409 ACUTE EMBOLISM AND THROMBOSIS OF UNSPECIFIED DEEP VEINS OF UNSPECIFIED LOWER EXTREMITY: ICD-10-CM

## 2023-07-19 DIAGNOSIS — Z79.01 LONG TERM (CURRENT) USE OF ANTICOAGULANTS: ICD-10-CM

## 2023-07-19 NOTE — DISCHARGE NOTE NURSING/CASE MANAGEMENT/SOCIAL WORK - NSDCPETBCESMAN_GEN_ALL_CORE
Detail Level: Simple
Add 00118 Cpt? (Important Note: In 2017 The Use Of 54846 Is Being Tracked By Cms To Determine Future Global Period Reimbursement For Global Periods): yes
If you are a smoker, it is important for your health to stop smoking. Please be aware that second hand smoke is also harmful.

## 2023-07-19 NOTE — ED ADULT NURSE NOTE - NS PRO AD NO ADVANCE DIRECTIVE
No
Azathioprine Pregnancy And Lactation Text: This medication is Pregnancy Category D and isn't considered safe during pregnancy. It is unknown if this medication is excreted in breast milk.
No

## 2023-07-21 ENCOUNTER — APPOINTMENT (OUTPATIENT)
Dept: MEDICATION MANAGEMENT | Facility: CLINIC | Age: 63
End: 2023-07-21

## 2023-07-21 ENCOUNTER — OUTPATIENT (OUTPATIENT)
Dept: OUTPATIENT SERVICES | Facility: HOSPITAL | Age: 63
LOS: 1 days | End: 2023-07-21
Payer: MEDICAID

## 2023-07-21 DIAGNOSIS — Z79.01 LONG TERM (CURRENT) USE OF ANTICOAGULANTS: ICD-10-CM

## 2023-07-21 DIAGNOSIS — I82.409 ACUTE EMBOLISM AND THROMBOSIS OF UNSPECIFIED DEEP VEINS OF UNSPECIFIED LOWER EXTREMITY: ICD-10-CM

## 2023-07-21 LAB
INR PPP: 3 RATIO
POCT-PROTHROMBIN TIME: 35.7 SECS
QUALITY CONTROL: YES

## 2023-07-21 PROCEDURE — 36416 COLLJ CAPILLARY BLOOD SPEC: CPT

## 2023-07-21 PROCEDURE — 99211 OFF/OP EST MAY X REQ PHY/QHP: CPT

## 2023-07-21 PROCEDURE — 85610 PROTHROMBIN TIME: CPT

## 2023-07-21 NOTE — PHYSICAL THERAPY INITIAL EVALUATION ADULT - GAIT PATTERN USED, PT EVAL
Chief Complaint   Patient presents with    Mass     Left eye started about 6 month ago - has not gone away since FEB        HPI:  Abdelrahman Bradford is a 61 y.o. male     Boil/stye left lower eyelid for nearly 6 months now. Had previously seen Tyrel Underwood about it, told warm compresses, etc.   Was not very big at that time  Has gotten progressively larger over time  Decided was finally time to do something about it     Got worse over the past 3 weeks   Has been keeping warm compresses on it     Patient Active Problem List   Diagnosis    Hypertension    Hypercholesterolemia    Breast lump on right side at 6 o'clock position    Celiac disease    Left inguinal hernia    Umbilical hernia    Tachycardia    Seasonal allergies    Back abscess    Primary osteoarthritis involving multiple joints       Current Outpatient Medications   Medication Sig Dispense Refill    clindamycin (CLEOCIN) 300 MG capsule Take 1 capsule by mouth 3 times daily for 10 days 30 capsule 0    bacitracin-polymyxin b (POLYSPORIN) [id number] UNIT/GM ophthalmic ointment Place into the left eye 2 times daily for 10 days Every 12 hours. 3.5 g 1    atorvastatin (LIPITOR) 20 MG tablet Take 1 tablet by mouth daily 90 tablet 1    olmesartan-hydroCHLOROthiazide (BENICAR HCT) 40-12.5 MG per tablet Take 1 tablet by mouth every day 90 tablet 1    amLODIPine (NORVASC) 5 MG tablet TAKE 1 TABLET daily 90 tablet 1    naproxen (NAPROSYN) 500 MG tablet Take 1 tablet by mouth 2 times daily (with meals) 60 tablet 0     No current facility-administered medications for this visit. Patient's medications, allergies, past medical, surgical, social and family histories were reviewed and updated as appropriate.     Review of Systems:   General ROS: negative for - chills, fatigue, fever, malaise, weight gain or weight loss  Respiratory ROS: no cough, shortness of breath, or wheezing  Cardiovascular ROS: no chest pain or dyspnea on exertion  Gastrointestinal ROS: no abdominal
3-point gait

## 2023-07-22 DIAGNOSIS — I82.409 ACUTE EMBOLISM AND THROMBOSIS OF UNSPECIFIED DEEP VEINS OF UNSPECIFIED LOWER EXTREMITY: ICD-10-CM

## 2023-07-22 DIAGNOSIS — Z79.01 LONG TERM (CURRENT) USE OF ANTICOAGULANTS: ICD-10-CM

## 2023-07-27 ENCOUNTER — OUTPATIENT (OUTPATIENT)
Dept: OUTPATIENT SERVICES | Facility: HOSPITAL | Age: 63
LOS: 1 days | End: 2023-07-27
Payer: MEDICAID

## 2023-07-27 ENCOUNTER — APPOINTMENT (OUTPATIENT)
Dept: MEDICATION MANAGEMENT | Facility: CLINIC | Age: 63
End: 2023-07-27

## 2023-07-27 DIAGNOSIS — Z79.01 LONG TERM (CURRENT) USE OF ANTICOAGULANTS: ICD-10-CM

## 2023-07-27 DIAGNOSIS — I82.409 ACUTE EMBOLISM AND THROMBOSIS OF UNSPECIFIED DEEP VEINS OF UNSPECIFIED LOWER EXTREMITY: ICD-10-CM

## 2023-07-27 LAB
INR PPP: 2.2 RATIO
POCT-PROTHROMBIN TIME: 26.9 SECS
QUALITY CONTROL: YES

## 2023-07-27 PROCEDURE — 99211 OFF/OP EST MAY X REQ PHY/QHP: CPT

## 2023-07-27 PROCEDURE — 36416 COLLJ CAPILLARY BLOOD SPEC: CPT

## 2023-07-27 PROCEDURE — 85610 PROTHROMBIN TIME: CPT

## 2023-07-28 DIAGNOSIS — I82.409 ACUTE EMBOLISM AND THROMBOSIS OF UNSPECIFIED DEEP VEINS OF UNSPECIFIED LOWER EXTREMITY: ICD-10-CM

## 2023-07-28 DIAGNOSIS — Z79.01 LONG TERM (CURRENT) USE OF ANTICOAGULANTS: ICD-10-CM

## 2023-08-03 ENCOUNTER — APPOINTMENT (OUTPATIENT)
Dept: MEDICATION MANAGEMENT | Facility: CLINIC | Age: 63
End: 2023-08-03

## 2023-08-03 ENCOUNTER — OUTPATIENT (OUTPATIENT)
Dept: OUTPATIENT SERVICES | Facility: HOSPITAL | Age: 63
LOS: 1 days | End: 2023-08-03
Payer: MEDICAID

## 2023-08-03 DIAGNOSIS — I82.409 ACUTE EMBOLISM AND THROMBOSIS OF UNSPECIFIED DEEP VEINS OF UNSPECIFIED LOWER EXTREMITY: ICD-10-CM

## 2023-08-03 DIAGNOSIS — Z79.01 LONG TERM (CURRENT) USE OF ANTICOAGULANTS: ICD-10-CM

## 2023-08-03 LAB
INR PPP: 1.4 RATIO
POCT-PROTHROMBIN TIME: 17.3 SECS
QUALITY CONTROL: YES

## 2023-08-03 PROCEDURE — 36416 COLLJ CAPILLARY BLOOD SPEC: CPT

## 2023-08-03 PROCEDURE — 99211 OFF/OP EST MAY X REQ PHY/QHP: CPT

## 2023-08-03 PROCEDURE — 85610 PROTHROMBIN TIME: CPT

## 2023-08-04 DIAGNOSIS — I82.409 ACUTE EMBOLISM AND THROMBOSIS OF UNSPECIFIED DEEP VEINS OF UNSPECIFIED LOWER EXTREMITY: ICD-10-CM

## 2023-08-04 DIAGNOSIS — Z79.01 LONG TERM (CURRENT) USE OF ANTICOAGULANTS: ICD-10-CM

## 2023-08-10 ENCOUNTER — APPOINTMENT (OUTPATIENT)
Dept: MEDICATION MANAGEMENT | Facility: CLINIC | Age: 63
End: 2023-08-10

## 2023-08-10 ENCOUNTER — OUTPATIENT (OUTPATIENT)
Dept: OUTPATIENT SERVICES | Facility: HOSPITAL | Age: 63
LOS: 1 days | End: 2023-08-10
Payer: MEDICAID

## 2023-08-10 DIAGNOSIS — Z79.01 LONG TERM (CURRENT) USE OF ANTICOAGULANTS: ICD-10-CM

## 2023-08-10 DIAGNOSIS — I82.409 ACUTE EMBOLISM AND THROMBOSIS OF UNSPECIFIED DEEP VEINS OF UNSPECIFIED LOWER EXTREMITY: ICD-10-CM

## 2023-08-10 LAB
INR PPP: 1.9 RATIO
POCT-PROTHROMBIN TIME: 22.5 SECS
QUALITY CONTROL: YES

## 2023-08-10 PROCEDURE — 85610 PROTHROMBIN TIME: CPT

## 2023-08-10 PROCEDURE — 99211 OFF/OP EST MAY X REQ PHY/QHP: CPT

## 2023-08-10 PROCEDURE — 36416 COLLJ CAPILLARY BLOOD SPEC: CPT

## 2023-08-11 DIAGNOSIS — I82.409 ACUTE EMBOLISM AND THROMBOSIS OF UNSPECIFIED DEEP VEINS OF UNSPECIFIED LOWER EXTREMITY: ICD-10-CM

## 2023-08-11 DIAGNOSIS — Z79.01 LONG TERM (CURRENT) USE OF ANTICOAGULANTS: ICD-10-CM

## 2023-08-17 ENCOUNTER — OUTPATIENT (OUTPATIENT)
Dept: OUTPATIENT SERVICES | Facility: HOSPITAL | Age: 63
LOS: 1 days | End: 2023-08-17
Payer: MEDICAID

## 2023-08-17 ENCOUNTER — APPOINTMENT (OUTPATIENT)
Dept: MEDICATION MANAGEMENT | Facility: CLINIC | Age: 63
End: 2023-08-17

## 2023-08-17 DIAGNOSIS — Z79.01 LONG TERM (CURRENT) USE OF ANTICOAGULANTS: ICD-10-CM

## 2023-08-17 DIAGNOSIS — I82.409 ACUTE EMBOLISM AND THROMBOSIS OF UNSPECIFIED DEEP VEINS OF UNSPECIFIED LOWER EXTREMITY: ICD-10-CM

## 2023-08-17 LAB
INR PPP: 1.9 RATIO
POCT-PROTHROMBIN TIME: 23 SECS
QUALITY CONTROL: YES

## 2023-08-17 PROCEDURE — 36416 COLLJ CAPILLARY BLOOD SPEC: CPT

## 2023-08-17 PROCEDURE — 99211 OFF/OP EST MAY X REQ PHY/QHP: CPT

## 2023-08-17 PROCEDURE — 85610 PROTHROMBIN TIME: CPT

## 2023-08-18 DIAGNOSIS — Z79.01 LONG TERM (CURRENT) USE OF ANTICOAGULANTS: ICD-10-CM

## 2023-08-18 DIAGNOSIS — I82.409 ACUTE EMBOLISM AND THROMBOSIS OF UNSPECIFIED DEEP VEINS OF UNSPECIFIED LOWER EXTREMITY: ICD-10-CM

## 2023-08-23 ENCOUNTER — APPOINTMENT (OUTPATIENT)
Dept: GASTROENTEROLOGY | Facility: CLINIC | Age: 63
End: 2023-08-23

## 2023-08-24 ENCOUNTER — APPOINTMENT (OUTPATIENT)
Dept: MEDICATION MANAGEMENT | Facility: CLINIC | Age: 63
End: 2023-08-24

## 2023-08-24 ENCOUNTER — OUTPATIENT (OUTPATIENT)
Dept: OUTPATIENT SERVICES | Facility: HOSPITAL | Age: 63
LOS: 1 days | End: 2023-08-24
Payer: MEDICAID

## 2023-08-24 DIAGNOSIS — I82.409 ACUTE EMBOLISM AND THROMBOSIS OF UNSPECIFIED DEEP VEINS OF UNSPECIFIED LOWER EXTREMITY: ICD-10-CM

## 2023-08-24 DIAGNOSIS — Z79.01 LONG TERM (CURRENT) USE OF ANTICOAGULANTS: ICD-10-CM

## 2023-08-24 LAB
INR PPP: 1.9 RATIO
POCT-PROTHROMBIN TIME: 23.1 SECS
QUALITY CONTROL: YES

## 2023-08-24 PROCEDURE — 36416 COLLJ CAPILLARY BLOOD SPEC: CPT

## 2023-08-24 PROCEDURE — 99211 OFF/OP EST MAY X REQ PHY/QHP: CPT

## 2023-08-24 PROCEDURE — 85610 PROTHROMBIN TIME: CPT

## 2023-08-25 DIAGNOSIS — I82.409 ACUTE EMBOLISM AND THROMBOSIS OF UNSPECIFIED DEEP VEINS OF UNSPECIFIED LOWER EXTREMITY: ICD-10-CM

## 2023-08-25 DIAGNOSIS — Z79.01 LONG TERM (CURRENT) USE OF ANTICOAGULANTS: ICD-10-CM

## 2023-08-31 ENCOUNTER — OUTPATIENT (OUTPATIENT)
Dept: OUTPATIENT SERVICES | Facility: HOSPITAL | Age: 63
LOS: 1 days | End: 2023-08-31
Payer: MEDICAID

## 2023-08-31 ENCOUNTER — APPOINTMENT (OUTPATIENT)
Dept: MEDICATION MANAGEMENT | Facility: CLINIC | Age: 63
End: 2023-08-31

## 2023-08-31 DIAGNOSIS — I82.409 ACUTE EMBOLISM AND THROMBOSIS OF UNSPECIFIED DEEP VEINS OF UNSPECIFIED LOWER EXTREMITY: ICD-10-CM

## 2023-08-31 DIAGNOSIS — Z79.01 LONG TERM (CURRENT) USE OF ANTICOAGULANTS: ICD-10-CM

## 2023-08-31 LAB
INR PPP: 2 RATIO
POCT-PROTHROMBIN TIME: 24.5 SECS
QUALITY CONTROL: YES

## 2023-08-31 PROCEDURE — 99211 OFF/OP EST MAY X REQ PHY/QHP: CPT

## 2023-08-31 PROCEDURE — 85610 PROTHROMBIN TIME: CPT

## 2023-08-31 PROCEDURE — 36416 COLLJ CAPILLARY BLOOD SPEC: CPT

## 2023-09-01 DIAGNOSIS — I82.409 ACUTE EMBOLISM AND THROMBOSIS OF UNSPECIFIED DEEP VEINS OF UNSPECIFIED LOWER EXTREMITY: ICD-10-CM

## 2023-09-01 DIAGNOSIS — Z79.01 LONG TERM (CURRENT) USE OF ANTICOAGULANTS: ICD-10-CM

## 2023-09-08 ENCOUNTER — APPOINTMENT (OUTPATIENT)
Dept: MEDICATION MANAGEMENT | Facility: CLINIC | Age: 63
End: 2023-09-08

## 2023-09-08 ENCOUNTER — OUTPATIENT (OUTPATIENT)
Dept: OUTPATIENT SERVICES | Facility: HOSPITAL | Age: 63
LOS: 1 days | End: 2023-09-08
Payer: MEDICAID

## 2023-09-08 DIAGNOSIS — Z79.01 LONG TERM (CURRENT) USE OF ANTICOAGULANTS: ICD-10-CM

## 2023-09-08 DIAGNOSIS — I82.409 ACUTE EMBOLISM AND THROMBOSIS OF UNSPECIFIED DEEP VEINS OF UNSPECIFIED LOWER EXTREMITY: ICD-10-CM

## 2023-09-08 LAB
INR PPP: 8 RATIO
POCT-PROTHROMBIN TIME: 98 SECS
QUALITY CONTROL: YES

## 2023-09-08 PROCEDURE — 99211 OFF/OP EST MAY X REQ PHY/QHP: CPT

## 2023-09-08 PROCEDURE — 85610 PROTHROMBIN TIME: CPT

## 2023-09-08 PROCEDURE — 36416 COLLJ CAPILLARY BLOOD SPEC: CPT

## 2023-09-09 DIAGNOSIS — Z79.01 LONG TERM (CURRENT) USE OF ANTICOAGULANTS: ICD-10-CM

## 2023-09-09 DIAGNOSIS — I82.409 ACUTE EMBOLISM AND THROMBOSIS OF UNSPECIFIED DEEP VEINS OF UNSPECIFIED LOWER EXTREMITY: ICD-10-CM

## 2023-09-11 ENCOUNTER — OUTPATIENT (OUTPATIENT)
Dept: OUTPATIENT SERVICES | Facility: HOSPITAL | Age: 63
LOS: 1 days | End: 2023-09-11
Payer: MEDICAID

## 2023-09-11 ENCOUNTER — APPOINTMENT (OUTPATIENT)
Dept: MEDICATION MANAGEMENT | Facility: CLINIC | Age: 63
End: 2023-09-11

## 2023-09-11 DIAGNOSIS — I82.409 ACUTE EMBOLISM AND THROMBOSIS OF UNSPECIFIED DEEP VEINS OF UNSPECIFIED LOWER EXTREMITY: ICD-10-CM

## 2023-09-11 DIAGNOSIS — Z79.01 LONG TERM (CURRENT) USE OF ANTICOAGULANTS: ICD-10-CM

## 2023-09-11 LAB
INR PPP: 11.56 RATIO
INR PPP: 2.9 RATIO
POCT-PROTHROMBIN TIME: 35.3 SECS
PT BLD: >40 SEC
QUALITY CONTROL: YES

## 2023-09-11 PROCEDURE — 85610 PROTHROMBIN TIME: CPT

## 2023-09-11 PROCEDURE — 99211 OFF/OP EST MAY X REQ PHY/QHP: CPT

## 2023-09-11 PROCEDURE — 36416 COLLJ CAPILLARY BLOOD SPEC: CPT

## 2023-09-12 DIAGNOSIS — I82.409 ACUTE EMBOLISM AND THROMBOSIS OF UNSPECIFIED DEEP VEINS OF UNSPECIFIED LOWER EXTREMITY: ICD-10-CM

## 2023-09-12 DIAGNOSIS — Z79.01 LONG TERM (CURRENT) USE OF ANTICOAGULANTS: ICD-10-CM

## 2023-09-15 ENCOUNTER — OUTPATIENT (OUTPATIENT)
Dept: OUTPATIENT SERVICES | Facility: HOSPITAL | Age: 63
LOS: 1 days | End: 2023-09-15
Payer: MEDICAID

## 2023-09-15 ENCOUNTER — APPOINTMENT (OUTPATIENT)
Dept: MEDICATION MANAGEMENT | Facility: CLINIC | Age: 63
End: 2023-09-15

## 2023-09-15 DIAGNOSIS — Z79.01 LONG TERM (CURRENT) USE OF ANTICOAGULANTS: ICD-10-CM

## 2023-09-15 DIAGNOSIS — I82.409 ACUTE EMBOLISM AND THROMBOSIS OF UNSPECIFIED DEEP VEINS OF UNSPECIFIED LOWER EXTREMITY: ICD-10-CM

## 2023-09-15 LAB
INR PPP: 1.8 RATIO
POCT-PROTHROMBIN TIME: 21.6 SECS
QUALITY CONTROL: YES

## 2023-09-15 PROCEDURE — 85610 PROTHROMBIN TIME: CPT

## 2023-09-15 PROCEDURE — 99211 OFF/OP EST MAY X REQ PHY/QHP: CPT

## 2023-09-15 PROCEDURE — 36416 COLLJ CAPILLARY BLOOD SPEC: CPT

## 2023-09-16 DIAGNOSIS — Z79.01 LONG TERM (CURRENT) USE OF ANTICOAGULANTS: ICD-10-CM

## 2023-09-16 DIAGNOSIS — I82.409 ACUTE EMBOLISM AND THROMBOSIS OF UNSPECIFIED DEEP VEINS OF UNSPECIFIED LOWER EXTREMITY: ICD-10-CM

## 2023-09-22 ENCOUNTER — OUTPATIENT (OUTPATIENT)
Dept: OUTPATIENT SERVICES | Facility: HOSPITAL | Age: 63
LOS: 1 days | End: 2023-09-22
Payer: MEDICAID

## 2023-09-22 ENCOUNTER — APPOINTMENT (OUTPATIENT)
Dept: MEDICATION MANAGEMENT | Facility: CLINIC | Age: 63
End: 2023-09-22

## 2023-09-22 DIAGNOSIS — I82.409 ACUTE EMBOLISM AND THROMBOSIS OF UNSPECIFIED DEEP VEINS OF UNSPECIFIED LOWER EXTREMITY: ICD-10-CM

## 2023-09-22 DIAGNOSIS — Z79.01 LONG TERM (CURRENT) USE OF ANTICOAGULANTS: ICD-10-CM

## 2023-09-22 LAB
INR PPP: 2.2 RATIO
POCT-PROTHROMBIN TIME: 26.6 SECS
QUALITY CONTROL: YES

## 2023-09-22 PROCEDURE — 99211 OFF/OP EST MAY X REQ PHY/QHP: CPT

## 2023-09-22 PROCEDURE — 85610 PROTHROMBIN TIME: CPT

## 2023-09-22 PROCEDURE — 36416 COLLJ CAPILLARY BLOOD SPEC: CPT

## 2023-09-23 DIAGNOSIS — I82.409 ACUTE EMBOLISM AND THROMBOSIS OF UNSPECIFIED DEEP VEINS OF UNSPECIFIED LOWER EXTREMITY: ICD-10-CM

## 2023-09-23 DIAGNOSIS — Z79.01 LONG TERM (CURRENT) USE OF ANTICOAGULANTS: ICD-10-CM

## 2023-09-29 ENCOUNTER — OUTPATIENT (OUTPATIENT)
Dept: OUTPATIENT SERVICES | Facility: HOSPITAL | Age: 63
LOS: 1 days | End: 2023-09-29
Payer: MEDICAID

## 2023-09-29 ENCOUNTER — APPOINTMENT (OUTPATIENT)
Dept: MEDICATION MANAGEMENT | Facility: CLINIC | Age: 63
End: 2023-09-29

## 2023-09-29 DIAGNOSIS — Z79.01 LONG TERM (CURRENT) USE OF ANTICOAGULANTS: ICD-10-CM

## 2023-09-29 DIAGNOSIS — I82.409 ACUTE EMBOLISM AND THROMBOSIS OF UNSPECIFIED DEEP VEINS OF UNSPECIFIED LOWER EXTREMITY: ICD-10-CM

## 2023-09-29 LAB
INR PPP: 2.2 RATIO
POCT-PROTHROMBIN TIME: 26.1 SECS
QUALITY CONTROL: YES

## 2023-09-29 PROCEDURE — 85610 PROTHROMBIN TIME: CPT

## 2023-09-29 PROCEDURE — 36416 COLLJ CAPILLARY BLOOD SPEC: CPT

## 2023-09-29 PROCEDURE — 99211 OFF/OP EST MAY X REQ PHY/QHP: CPT

## 2023-09-30 DIAGNOSIS — Z79.01 LONG TERM (CURRENT) USE OF ANTICOAGULANTS: ICD-10-CM

## 2023-09-30 DIAGNOSIS — I82.409 ACUTE EMBOLISM AND THROMBOSIS OF UNSPECIFIED DEEP VEINS OF UNSPECIFIED LOWER EXTREMITY: ICD-10-CM

## 2023-10-04 ENCOUNTER — APPOINTMENT (OUTPATIENT)
Dept: GASTROENTEROLOGY | Facility: CLINIC | Age: 63
End: 2023-10-04

## 2023-10-04 ENCOUNTER — APPOINTMENT (OUTPATIENT)
Dept: MEDICATION MANAGEMENT | Facility: CLINIC | Age: 63
End: 2023-10-04

## 2023-10-04 ENCOUNTER — EMERGENCY (EMERGENCY)
Facility: HOSPITAL | Age: 63
LOS: 0 days | Discharge: HOME | End: 2023-10-04
Attending: EMERGENCY MEDICINE
Payer: MEDICAID

## 2023-10-04 ENCOUNTER — OUTPATIENT (OUTPATIENT)
Dept: OUTPATIENT SERVICES | Facility: HOSPITAL | Age: 63
LOS: 1 days | End: 2023-10-04
Payer: MEDICAID

## 2023-10-04 VITALS
HEART RATE: 80 BPM | OXYGEN SATURATION: 100 % | RESPIRATION RATE: 18 BRPM | DIASTOLIC BLOOD PRESSURE: 70 MMHG | SYSTOLIC BLOOD PRESSURE: 130 MMHG

## 2023-10-04 VITALS
WEIGHT: 179.9 LBS | SYSTOLIC BLOOD PRESSURE: 134 MMHG | OXYGEN SATURATION: 100 % | TEMPERATURE: 98 F | DIASTOLIC BLOOD PRESSURE: 78 MMHG | RESPIRATION RATE: 18 BRPM | HEART RATE: 85 BPM

## 2023-10-04 DIAGNOSIS — Z79.01 LONG TERM (CURRENT) USE OF ANTICOAGULANTS: ICD-10-CM

## 2023-10-04 DIAGNOSIS — Z91.011 ALLERGY TO MILK PRODUCTS: ICD-10-CM

## 2023-10-04 DIAGNOSIS — Z87.438 PERSONAL HISTORY OF OTHER DISEASES OF MALE GENITAL ORGANS: ICD-10-CM

## 2023-10-04 DIAGNOSIS — J30.2 OTHER SEASONAL ALLERGIC RHINITIS: ICD-10-CM

## 2023-10-04 DIAGNOSIS — Z91.018 ALLERGY TO OTHER FOODS: ICD-10-CM

## 2023-10-04 DIAGNOSIS — R21 RASH AND OTHER NONSPECIFIC SKIN ERUPTION: ICD-10-CM

## 2023-10-04 DIAGNOSIS — Z91.013 ALLERGY TO SEAFOOD: ICD-10-CM

## 2023-10-04 DIAGNOSIS — K21.9 GASTRO-ESOPHAGEAL REFLUX DISEASE WITHOUT ESOPHAGITIS: ICD-10-CM

## 2023-10-04 DIAGNOSIS — Z87.891 PERSONAL HISTORY OF NICOTINE DEPENDENCE: ICD-10-CM

## 2023-10-04 DIAGNOSIS — Z86.718 PERSONAL HISTORY OF OTHER VENOUS THROMBOSIS AND EMBOLISM: ICD-10-CM

## 2023-10-04 DIAGNOSIS — I10 ESSENTIAL (PRIMARY) HYPERTENSION: ICD-10-CM

## 2023-10-04 DIAGNOSIS — I82.409 ACUTE EMBOLISM AND THROMBOSIS OF UNSPECIFIED DEEP VEINS OF UNSPECIFIED LOWER EXTREMITY: ICD-10-CM

## 2023-10-04 LAB
INR PPP: 2.7 RATIO
POCT-PROTHROMBIN TIME: 32.3 SECS
QUALITY CONTROL: YES

## 2023-10-04 PROCEDURE — 99284 EMERGENCY DEPT VISIT MOD MDM: CPT

## 2023-10-04 PROCEDURE — 36416 COLLJ CAPILLARY BLOOD SPEC: CPT

## 2023-10-04 PROCEDURE — 96372 THER/PROPH/DIAG INJ SC/IM: CPT

## 2023-10-04 PROCEDURE — 99283 EMERGENCY DEPT VISIT LOW MDM: CPT | Mod: 25

## 2023-10-04 PROCEDURE — 99211 OFF/OP EST MAY X REQ PHY/QHP: CPT

## 2023-10-04 PROCEDURE — 85610 PROTHROMBIN TIME: CPT

## 2023-10-04 RX ORDER — DEXAMETHASONE 0.5 MG/5ML
10 ELIXIR ORAL ONCE
Refills: 0 | Status: COMPLETED | OUTPATIENT
Start: 2023-10-04 | End: 2023-10-04

## 2023-10-04 RX ADMIN — Medication 10 MILLIGRAM(S): at 12:14

## 2023-10-04 NOTE — ED PROVIDER NOTE - OBJECTIVE STATEMENT
Patient c/o rash to body past several days, Started with small rash to lower abdomen, Place and bactrim, and kenalog cream,. Now rash covering trunk , back , arms  legs, itchy, no fever, no SOB

## 2023-10-04 NOTE — ED PROVIDER NOTE - NSICDXPASTMEDICALHX_GEN_ALL_CORE_FT
PAST MEDICAL HISTORY:  Alcohol dependence     BPH (benign prostatic hyperplasia)     GERD (gastroesophageal reflux disease)     Hard of hearing     History of deep vein thrombosis (DVT) of lower extremity     HTN (hypertension)     Pseudogout     Seasonal allergies

## 2023-10-04 NOTE — ED PROVIDER NOTE - PATIENT PORTAL LINK FT
You can access the FollowMyHealth Patient Portal offered by [facility] by registering at the following website: [email or web address]. By joining [employer]’s FollowMyHealth portal, you will also be able to view your health information using other applications (apps) compatible with our system.

## 2023-10-04 NOTE — ED ADULT TRIAGE NOTE - PATIENT ON (OXYGEN DELIVERY METHOD)
I'm pleased his wt continues to drop.  Let's adjust his range to 258-268.  No med changes.  I suspect he'll continue to lose wt as he's working hard on it.    Rachna Holt PA-C 7/18/2019 8:53 AM         room air

## 2023-10-04 NOTE — ED ADULT NURSE NOTE - NSFALLRISKINTERV_ED_ALL_ED

## 2023-10-04 NOTE — ED PROVIDER NOTE - NSFOLLOWUPINSTRUCTIONS_ED_ALL_ED_FT
take tropical creams as prescribed, Take benadryl 25- 50 mg every 6 hours for itching as needed, See dermatology this week     Our Emergency Department Referral Coordinators will be reaching out to you in the next 24-48 hours from 9:00am to 5:00pm with a follow up appointment. Please expect a phone call from the hospital in that time frame. If you do not receive a call or if you have any questions or concerns, you can reach them at [phone] or [phone].

## 2023-10-04 NOTE — ED PROVIDER NOTE - ATTENDING APP SHARED VISIT CONTRIBUTION OF CARE
rash diffusely that is papular not petechial wtihotu fever. worse over right lower abd for which he has been gettign traimincinolone cream and abx, no fvomiting. exam shows a diffuse papular rash that is blanching, plan is to treat with steroids.

## 2023-10-04 NOTE — ED ADULT TRIAGE NOTE - CHIEF COMPLAINT QUOTE
[facility] states " Pt had a rash that started last wed. Pt was seen and placed on ABX friday. Pt states rash has increasingly worsen and is more generalized"

## 2023-10-04 NOTE — ED PROVIDER NOTE - CARDIAC RATE
Anesthesia Pre Eval Note    Anesthesia ROS/Med Hx        Anesthetic Complication History:  Patient does not have a history of anesthetic complications      Pulmonary Review:  Patient does not have a pulmonary history      Neuro/Psych Review:    Positive for headaches    Cardiovascular Review:  Exercise tolerance: good (>4 METS)    GI/HEPATIC/RENAL Review:  Patient does not have a GI/hepatic/renalhistory       End/Other Review:  Patient does not have an endo/other history        Relevant Problems   No relevant active problems       Physical Exam     Airway   Mallampati: I  TM Distance: >3 FB  Neck ROM: Full    Cardiovascular  Cardiovascular exam normal    Pulmonary Exam  Pulmonary exam normal      Anesthesia Plan  No phone call attempted due to:  Case Added After 1800    ASA Status: 2    Anesthesia Type: MAC        Risks Discussed: Intra-operative Awareness    Post-op Pain Management: Per Surgeon  Postoperative analgesia plan does NOT include opiods    Checklist  Reviewed: Allergies, Medications, Problem list, Past Med History, NPO Status and Patient Summary    Informed Consent  The proposed anesthetic plan, including its risks and benefits, have been discussed with the Patient  - along with the risks and benefits of alternatives.  Questions were encouraged and answered and the patient and/or representative understands and agrees to proceed.     Blood Products: Not Anticipated    Comments  Plan Comments: Discussed MAC anesthesia with patient.    Questions welcomed and answered.        normal

## 2023-10-04 NOTE — ED ADULT TRIAGE NOTE - NS ED NURSE DIRECT TO ROOM YN
Ketorolac 10 mg one pill every 6-8 hours as needed for pain  Soak in warm bath with 1/2 bottle green alcohol and 2 cups Epsom Salt for 20 minutes as needed for back pain and muscle soreness  Purchase a TENS unit to apply to back for relief of muscle pain  Maybe purchased from [employer]  Use Tylenol 325 mg two pills every 4 hours as needed for pain  May also alternate warm moist heat for 5 minutes then cold moist compress to back for 5 minutes and repeat three cycles three to four times daily  Expect 24-48 hours til improvement  If not improved within 5 days, return for possible need of xray  No xray available in clinic today  
Tizanidine 4 mg 1/2 to 1 pill by mouth every 6-8 hours as needed for muscle spasm  Warm moist heat/compress then stretches as demonstrated then ice    
Yes

## 2023-10-05 DIAGNOSIS — Z79.01 LONG TERM (CURRENT) USE OF ANTICOAGULANTS: ICD-10-CM

## 2023-10-05 DIAGNOSIS — I82.409 ACUTE EMBOLISM AND THROMBOSIS OF UNSPECIFIED DEEP VEINS OF UNSPECIFIED LOWER EXTREMITY: ICD-10-CM

## 2023-10-06 ENCOUNTER — APPOINTMENT (OUTPATIENT)
Dept: MEDICATION MANAGEMENT | Facility: CLINIC | Age: 63
End: 2023-10-06

## 2023-10-06 ENCOUNTER — OUTPATIENT (OUTPATIENT)
Dept: OUTPATIENT SERVICES | Facility: HOSPITAL | Age: 63
LOS: 1 days | End: 2023-10-06
Payer: MEDICAID

## 2023-10-06 DIAGNOSIS — Z79.01 LONG TERM (CURRENT) USE OF ANTICOAGULANTS: ICD-10-CM

## 2023-10-06 DIAGNOSIS — I82.409 ACUTE EMBOLISM AND THROMBOSIS OF UNSPECIFIED DEEP VEINS OF UNSPECIFIED LOWER EXTREMITY: ICD-10-CM

## 2023-10-06 LAB
INR PPP: 3 RATIO
POCT-PROTHROMBIN TIME: 36.5 SECS
QUALITY CONTROL: YES

## 2023-10-06 PROCEDURE — 85610 PROTHROMBIN TIME: CPT

## 2023-10-06 PROCEDURE — 36416 COLLJ CAPILLARY BLOOD SPEC: CPT

## 2023-10-06 PROCEDURE — 99211 OFF/OP EST MAY X REQ PHY/QHP: CPT

## 2023-10-07 DIAGNOSIS — I82.409 ACUTE EMBOLISM AND THROMBOSIS OF UNSPECIFIED DEEP VEINS OF UNSPECIFIED LOWER EXTREMITY: ICD-10-CM

## 2023-10-07 DIAGNOSIS — Z79.01 LONG TERM (CURRENT) USE OF ANTICOAGULANTS: ICD-10-CM

## 2023-10-11 ENCOUNTER — OUTPATIENT (OUTPATIENT)
Dept: OUTPATIENT SERVICES | Facility: HOSPITAL | Age: 63
LOS: 1 days | End: 2023-10-11
Payer: MEDICAID

## 2023-10-11 ENCOUNTER — APPOINTMENT (OUTPATIENT)
Dept: MEDICATION MANAGEMENT | Facility: CLINIC | Age: 63
End: 2023-10-11

## 2023-10-11 DIAGNOSIS — Z79.01 LONG TERM (CURRENT) USE OF ANTICOAGULANTS: ICD-10-CM

## 2023-10-11 DIAGNOSIS — I82.409 ACUTE EMBOLISM AND THROMBOSIS OF UNSPECIFIED DEEP VEINS OF UNSPECIFIED LOWER EXTREMITY: ICD-10-CM

## 2023-10-11 LAB
INR PPP: 2.3 RATIO
POCT-PROTHROMBIN TIME: 27.2 SECS
QUALITY CONTROL: YES

## 2023-10-11 PROCEDURE — 36416 COLLJ CAPILLARY BLOOD SPEC: CPT

## 2023-10-11 PROCEDURE — 85610 PROTHROMBIN TIME: CPT

## 2023-10-11 PROCEDURE — 99211 OFF/OP EST MAY X REQ PHY/QHP: CPT

## 2023-10-12 DIAGNOSIS — Z79.01 LONG TERM (CURRENT) USE OF ANTICOAGULANTS: ICD-10-CM

## 2023-10-12 DIAGNOSIS — I82.409 ACUTE EMBOLISM AND THROMBOSIS OF UNSPECIFIED DEEP VEINS OF UNSPECIFIED LOWER EXTREMITY: ICD-10-CM

## 2023-10-18 ENCOUNTER — OUTPATIENT (OUTPATIENT)
Dept: OUTPATIENT SERVICES | Facility: HOSPITAL | Age: 63
LOS: 1 days | End: 2023-10-18
Payer: MEDICAID

## 2023-10-18 ENCOUNTER — APPOINTMENT (OUTPATIENT)
Dept: MEDICATION MANAGEMENT | Facility: CLINIC | Age: 63
End: 2023-10-18

## 2023-10-18 DIAGNOSIS — Z79.01 LONG TERM (CURRENT) USE OF ANTICOAGULANTS: ICD-10-CM

## 2023-10-18 DIAGNOSIS — I82.409 ACUTE EMBOLISM AND THROMBOSIS OF UNSPECIFIED DEEP VEINS OF UNSPECIFIED LOWER EXTREMITY: ICD-10-CM

## 2023-10-18 LAB
INR PPP: 2.5 RATIO
POCT-PROTHROMBIN TIME: 29.9 SECS
QUALITY CONTROL: YES

## 2023-10-18 PROCEDURE — 85610 PROTHROMBIN TIME: CPT

## 2023-10-18 PROCEDURE — 99211 OFF/OP EST MAY X REQ PHY/QHP: CPT

## 2023-10-18 PROCEDURE — 36416 COLLJ CAPILLARY BLOOD SPEC: CPT

## 2023-10-19 DIAGNOSIS — Z79.01 LONG TERM (CURRENT) USE OF ANTICOAGULANTS: ICD-10-CM

## 2023-10-19 DIAGNOSIS — I82.409 ACUTE EMBOLISM AND THROMBOSIS OF UNSPECIFIED DEEP VEINS OF UNSPECIFIED LOWER EXTREMITY: ICD-10-CM

## 2023-10-25 ENCOUNTER — OUTPATIENT (OUTPATIENT)
Dept: OUTPATIENT SERVICES | Facility: HOSPITAL | Age: 63
LOS: 1 days | End: 2023-10-25
Payer: MEDICAID

## 2023-10-25 ENCOUNTER — APPOINTMENT (OUTPATIENT)
Dept: MEDICATION MANAGEMENT | Facility: CLINIC | Age: 63
End: 2023-10-25

## 2023-10-25 DIAGNOSIS — I82.409 ACUTE EMBOLISM AND THROMBOSIS OF UNSPECIFIED DEEP VEINS OF UNSPECIFIED LOWER EXTREMITY: ICD-10-CM

## 2023-10-25 DIAGNOSIS — Z79.01 LONG TERM (CURRENT) USE OF ANTICOAGULANTS: ICD-10-CM

## 2023-10-25 LAB
INR PPP: 2.2 RATIO
POCT-PROTHROMBIN TIME: 27 SECS
QUALITY CONTROL: YES

## 2023-10-25 PROCEDURE — 85610 PROTHROMBIN TIME: CPT

## 2023-10-25 PROCEDURE — 36416 COLLJ CAPILLARY BLOOD SPEC: CPT

## 2023-10-25 PROCEDURE — 99211 OFF/OP EST MAY X REQ PHY/QHP: CPT

## 2023-10-26 DIAGNOSIS — I82.409 ACUTE EMBOLISM AND THROMBOSIS OF UNSPECIFIED DEEP VEINS OF UNSPECIFIED LOWER EXTREMITY: ICD-10-CM

## 2023-10-26 DIAGNOSIS — Z79.01 LONG TERM (CURRENT) USE OF ANTICOAGULANTS: ICD-10-CM

## 2023-11-01 ENCOUNTER — APPOINTMENT (OUTPATIENT)
Dept: GASTROENTEROLOGY | Facility: CLINIC | Age: 63
End: 2023-11-01
Payer: MEDICAID

## 2023-11-01 ENCOUNTER — APPOINTMENT (OUTPATIENT)
Dept: MEDICATION MANAGEMENT | Facility: CLINIC | Age: 63
End: 2023-11-01

## 2023-11-01 ENCOUNTER — OUTPATIENT (OUTPATIENT)
Dept: OUTPATIENT SERVICES | Facility: HOSPITAL | Age: 63
LOS: 1 days | End: 2023-11-01
Payer: MEDICAID

## 2023-11-01 VITALS
DIASTOLIC BLOOD PRESSURE: 79 MMHG | WEIGHT: 240 LBS | HEART RATE: 96 BPM | OXYGEN SATURATION: 98 % | HEIGHT: 70 IN | SYSTOLIC BLOOD PRESSURE: 147 MMHG | BODY MASS INDEX: 34.36 KG/M2

## 2023-11-01 DIAGNOSIS — K70.30 ALCOHOLIC CIRRHOSIS OF LIVER W/OUT ASCITES: ICD-10-CM

## 2023-11-01 DIAGNOSIS — I82.409 ACUTE EMBOLISM AND THROMBOSIS OF UNSPECIFIED DEEP VEINS OF UNSPECIFIED LOWER EXTREMITY: ICD-10-CM

## 2023-11-01 DIAGNOSIS — Z79.01 LONG TERM (CURRENT) USE OF ANTICOAGULANTS: ICD-10-CM

## 2023-11-01 LAB
INR PPP: 1.9 RATIO
POCT-PROTHROMBIN TIME: 23.3 SECS
QUALITY CONTROL: YES

## 2023-11-01 PROCEDURE — 36416 COLLJ CAPILLARY BLOOD SPEC: CPT

## 2023-11-01 PROCEDURE — 99214 OFFICE O/P EST MOD 30 MIN: CPT

## 2023-11-01 PROCEDURE — 99211 OFF/OP EST MAY X REQ PHY/QHP: CPT

## 2023-11-01 PROCEDURE — 85610 PROTHROMBIN TIME: CPT

## 2023-11-02 DIAGNOSIS — I82.409 ACUTE EMBOLISM AND THROMBOSIS OF UNSPECIFIED DEEP VEINS OF UNSPECIFIED LOWER EXTREMITY: ICD-10-CM

## 2023-11-02 DIAGNOSIS — Z79.01 LONG TERM (CURRENT) USE OF ANTICOAGULANTS: ICD-10-CM

## 2023-11-03 NOTE — DISCHARGE NOTE NURSING/CASE MANAGEMENT/SOCIAL WORK - HISTORY OF COVID-19 VACCINATION
[facility]   Pulmonary Progress Note  Mary Polanco MRN: [id number]  4/24/1931  Date of Admission:10/18/2023  Date of Service: 11/03/2023  ___________________________________    Assessment & Plan  Mary Polanco is a 89 year old female, w/ PMHx most significant for hypertension, CAD/non-STEMI SP stent 7 years ago, mitral regurgitation, and chronic lymphedema, admitted w/ acute hypoxemic respiratory failure.  She had chest CT scan done on 10/27/2023 which showed worsening multifocal pneumonia with infiltrate worsening especially in the right upper lobe and bilateral lower lobes, bilateral pleural effusion which were small, this was compared to CT PE study 10/18/2023.  Patient did not improve with diuresis over the weekend.  However currently pathology is likely consistent with inflammatory pneumonitis versus infectious pneumonitis.  So far the infectious work-up has been negative including fungal antigens and serology, ID recommended to rule out TB unfortunately with her high oxygen requirement we are not able to do bronchoscopy with BAL which would be beneficial in this case.  Other possibilities include inhalational injury especially with the upper lobe predominance, nothing consistent with that in the history obtained from the relative.  Aspiration pneumonitis is also a possibility with right predominance.  Other inflammatory pneumonitis etiologies are also possible, organizing pneumonia that is precipitated by aspiration or bacterial pneumonia is a possibility.    Given the worsening respiratory status and low likelihood of infectious etiologies, I think it is reasonable to start steroids for possible organizing pneumonia. On 11/1/2023 I discussed with the family and explained to them that there is a slight risk of worsening if this is unidentified infection and they accept the risk, the daughter at the bedside shared with me that the patient does not want to be intubated and would like to change her code  status to DO NOT INTUBATE, I recommended they also change to DO NOT RESUSCITATE with no chest compression if cardiac arrest, her code status was changed today to DNR/DNI.    Acute hypoxemic respiratory failure  Bilateral right more than left infiltrate  Bilateral small pleural effusions   -Increase the steroid dose to 250 mg BID    -Monitor inflammatory markers    -I talked to the hospitalist and asked to adjust insulin treatment given the high dose steroids   -Monitor and follow the sputum cultures and AFB staines and cultures   -Defer antimicrobial to primary and ID      Chart documentation was completed, in part, with [employer] voice-recognition software. Even though reviewed, some grammatical, spelling, and word errors may remain.    Dianna Rosas MD  Pulmonary & Critical Care       Interval History    -Nursing notes reviewed.   -No significant response to steroids so far, still requiring high O2 support    -Now aspirates with eating given the high flow     Physical Exam Temp:  [97.7  F (36.5  C)-98.7  F (37.1  C)] 97.9  F (36.6  C)  Pulse:  [72-80] 78  Resp:  [6-33] 16  BP: ()/(47-72) 124/66  FiO2 (%):  [77 %-100 %] 80 %  SpO2:  [88 %-96 %] 89 %  I/O last 3 completed shifts:  In: 483.09 [P.O.:240; I.V.:243.09]  Out: -   Wt Readings from Last 1 Encounters:   11/03/23 46.1 kg (101 lb 10.1 oz)    Body mass index is 19.85 kg/m . FiO2 (%): 80 %  Resp: 16      Exam:  General: NAD  HEENT: MMM  CV: RRR, no murmur, click, rub  Resp: Equal b/l air entry, bilateral crackles  Abd: Soft, ND  Extremities: WWP, no LE edema  Neuro: AAOx3, no focal deficits    Data   Labs: reviewed in EMR and notable labs listed below.  CMP:   Recent Labs   Lab 11/03/23  0822 11/03/23  0154 11/02/23  2100 11/02/23  1707 11/02/23  0617 11/02/23  0521 11/01/23  0922 11/01/23  0550 10/31/23  2034 10/31/23  0553 10/30/23  0551 10/28/23  1839 10/28/23  1333 10/28/23  0539   NA  --   --   --   --   --  138  --  137  --  136 132*   < >  --  136    POTASSIUM  --   --   --   --   --  4.0  --  3.8  --  4.0 3.8   < > 3.9 3.3*   CHLORIDE  --   --   --   --   --  106  --  104  --  100 97*   < >  --  95*   CO2  --   --   --   --   --  22  --  25  --  25 27   < >  --  27   ANIONGAP  --   --   --   --   --  10  --  8  --  11 8   < >  --  14   * 211* 189* 155*   < > 138*   < > 124*   < > 118* 112*   < >  --  142*   BUN  --   --   --   --   --  22.5  --  21.7  --  20.6 28.1*   < >  --  26.8*   CR  --   --   --   --   --  0.65  --  0.74  --  0.76 0.90   < >  --  0.81   GFRESTIMATED  --   --   --   --   --  82  --  75  --  73 59*   < >  --  67   CLAIRE  --   --   --   --   --  8.8  --  8.7  --  8.8 8.9   < >  --  9.2   MAG  --   --   --   --   --   --   --   --   --   --   --   --  2.1  --    PHOS  --   --   --   --   --  2.3*  --  2.5  --  2.5 2.3*  --   --  3.6   ALBUMIN  --   --   --   --   --   --   --  2.4*  --  2.4* 2.6*  --   --  3.0*    < > = values in this interval not displayed.     CBC:   Recent Labs   Lab 11/02/23  0521 11/01/23  0550 10/31/23  0553 10/30/23  0551 10/29/23  0549   WBC 14.6* 13.4* 12.8* 12.9* 13.5*   RBC  --  3.49* 3.61* 3.47* 3.76*   HGB  --  10.3* 10.6* 10.0* 11.1*   HCT  --  31.7* 32.1* 30.8* 33.1*   MCV  --  91 89 89 88   MCH  --  29.5 29.4 28.8 29.5   MCHC  --  32.5 33.0 32.5 33.5   RDW  --  12.6 12.6 12.6 12.4   PLT  --  328 339 338 371       Culture Data   7-Day Micro Results       Collected Updated Procedure Result Status       11/02/2023 1911 Acid-Fast Bacilli Culture and Stain   Sputum from Expectorate     Component Value   No component results            11/01/2023 0928 11/01/2023 1553 Respiratory Aerobic Bacterial Culture with Gram Stain [[id number]]   Sputum from Expectorate    Final result Component Value   Culture >10 Squamous epithelial cells/low power field indicates oral contamination. Please recollect.   Gram Stain Result >10 Squamous epithelial cells/low power field    >25 PMNs/low power field    3+ Mixed jeremy                11/01/2023 0545 11/01/2023 0555 Acid-Fast Bacilli Culture and Stain [[id number]]    Sputum from Mouth    In process Component Value   No component results            11/01/2023 0545 11/03/2023 0208 Acid-Fast Bacilli Culture and Stain [[id number]]    Sputum from Mouth    Preliminary result Component Value   Acid Fast Stain No acid fast bacilli seen  [P]    Less than 5 mL of specimen received. A minimum of 5 mL of sputum or fluid is recommended for recovery of acid fast bacilli (AFB). Volumes less than 5 mL are suboptimal and may compromise recovery of AFB from culture.            10/31/2023 1620 10/31/2023 1907 Respiratory Aerobic Bacterial Culture with Gram Stain [[id number]]   Sputum from Expectorate    Final result Component Value   Culture >10 Squamous epithelial cells/low power field indicates oral contamination. Please recollect.   Gram Stain Result >10 Squamous epithelial cells/low power field    >25 PMNs/low power field    3+ Mixed jeremy               10/31/2023 1620 10/31/2023 1638 Acid-Fast Bacilli Culture and Stain [[id number]]    Sputum from Expectorate    In process Component Value   No component results            10/31/2023 1620 11/03/2023 0255 Acid-Fast Bacilli Culture and Stain [[id number]]    Sputum from Expectorate    Preliminary result Component Value   Acid Fast Stain No acid fast bacilli seen  [P]    Less than 5 mL of specimen received. A minimum of 5 mL of sputum or fluid is recommended for recovery of acid fast bacilli (AFB). Volumes less than 5 mL are suboptimal and may compromise recovery of AFB from culture.   Acid Fast Stain No acid fast bacilli seen  [P]    Less than 5 mL of specimen received. A minimum of 5 mL of sputum or fluid is recommended for recovery of acid fast bacilli (AFB). Volumes less than 5 mL are suboptimal and may compromise recovery of AFB from culture.  This is an appended report. These results have been appended to a previously preliminary verified report.             10/30/2023 1256 10/31/2023 1759 Quantiferon TB Gold Plus Grey Tube [[id number]]   Blood from Hand, Left    Final result Component Value Units   Quantiferon Nil Tube 0.01 IU/mL            10/30/2023 1256 10/31/2023 1758 Quantiferon TB Gold Plus Green Tube [[id number]]   Blood from Hand, Left    Final result Component Value Units   Quantiferon TB1 Tube 0.01 IU/mL            10/30/2023 1256 10/31/2023 1758 Quantiferon TB Gold Plus Yellow Tube [[id number]]   Blood from Hand, Left    Final result Component Value   Quantiferon TB2 Tube 0.02            10/30/2023 1256 10/31/2023 1758 Quantiferon TB Gold Plus Purple Tube [[id number]]   Blood from Hand, Left    Final result Component Value Units   Quantiferon Mitogen 3.53 IU/mL            10/30/2023 1256 10/31/2023 1909 Quantiferon TB Gold Plus [[id number]]   Blood from Hand, Left    Final result Component Value Units   Quantiferon-TB Gold Plus Negative    No interferon gamma response to M.tuberculosis antigens was detected. Infection with M.tuberculosis is unlikely, however a single negative result does not exclude infection. In patients at high risk for infection, a second test should be considered in accordance with the 2017 ATS/IDSA/CDC Clinical Pract  ice Guidelines for Diagnosis of Tuberculosis in Adults and Children    TB1 Ag minus Nil Value 0.00 IU/mL   TB2 Ag minus Nil Value 0.01 IU/mL   Mitogen minus Nil Result 3.52 IU/mL   Nil Result 0.01 IU/mL            10/29/2023 0612 10/29/2023 1352 Respiratory Panel PCR [[id number]]    Swab from Nasopharyngeal    Final result Component Value   Adenovirus Not Detected   Coronavirus Not Detected   This test detects Coronavirus 229E, HKU1, NL63 and OC43 but does not distinguish between them. It does not detect MERS ( Respiratory Syndrome), SARS (Severe Acute Respiratory Syndrome) or 2019-nCoV (Novel 2019) Coronavirus.   Human Metapneumovirus Not Detected   Human Rhin/Enterovirus Not Detected  Vaccine status unknown   Influenza A Not Detected   Influenza A, H1 Not Detected   Influenza A 2009 H1N1 Not Detected   Influenza A, H3 Not Detected   Influenza B Not Detected   Parainfluenza Virus 1 Not Detected   Parainfluenza Virus 2 Not Detected   Parainfluenza Virus 3 Not Detected   Parainfluenza Virus 4 Not Detected   Respiratory Syncytial Virus A Not Detected   Respiratory Syncytial Virus B Not Detected   Chlamydia Pneumoniae Not Detected   Mycoplasma Pneumoniae Not Detected            10/29/2023 0609 10/29/2023 1435 Respiratory Aerobic Bacterial Culture with Gram Stain [[id number]]   Sputum from Expectorate    Final result Component Value   Culture >10 Squamous epithelial cells/low power field indicates oral contamination. Please recollect.   Gram Stain Result >10 Squamous epithelial cells/low power field    <25 PMNs/low power field    2+ Mixed jeremy               10/29/2023 0418 10/29/2023 1325 MRSA MSSA PCR, Nasal Swab [[id number]]    Swab from Nare, Right    Final result Component Value   MRSA Target DNA Negative   SA Target DNA Negative            10/29/2023 0414 11/01/2023 0943 Blastomyces Agn Quant EIA Non Blood [[id number]]   Urine, Voided    Final result Component Value   See Scanned Result BLASTOMYCES AGN QUANT EIA NON BLOOD-Scanned            10/29/2023 0413 10/30/2023 2104 Histoplasma Galactomannan Antigen Quant by EIA, Urine [[id number]]   Urine, Clean Catch    Final result Component Value Units   Histoplasma Galactomannan Ag Quant, Urn Not Detected ng/mL   Histoplasma Galactomannan Ag Interp, Urn Not Detected    INTERPRETIVE DATA: Histoplasma Galactomannan Antigen                     Quantitative by EIA, Urine  Less than 0.4 ng/ml = Not Detected  0.4-0.7 ng/mL = Detected (below the limit of quantification)  0.8-24.0 ng/mL = Detected  Greater than 24.0 ng/mL = Detected (above the limit of   quantification)    The quantitative range of this assay is 0.8-24.0 ng/mL.   Antigen concentrations between 0.4-.07 or  >24.0 ng/mL fall   outside the linear range of the assay and cannot be   accurately quantified.    This EIA test should be used in conjunction with other   diagnostic procedures, including microbiological culture,   histological examination of biopsy samples, and/or   radiographic evidence, to aid in the diagnosis of   histoplasmosis.    This test was developed and its performance characteristics   determined by [facility]. It has not been cleared or   approved by the U.S. Food and Drug Administration. This   test was performed in a CLIA-certified laboratory and is   intended for clinical purposes.  Performed By: [facility]  [address] [place] [id number]  : Fabrice Serna MD, PhD  CLIA Number: [id number]            10/28/2023 1931 10/28/2023 2342 Cryptococcus antigen [[id number]]   Blood from Arm, Right    Final result Component Value   Cryptococcal Antigen Negative            10/28/2023 1839 11/01/2023 1055 Blastomyces Agn Quant EIA Blood [[id number]]   Blood from Hand, Right    Final result Component Value   See Scanned Result BLASTOMYCES AGN QUANT EIA BLOOD-Scanned            10/28/2023 1839 10/30/2023 1400 Aspergillus Galactomannan Antigen [[id number]]   Blood from Hand, Right    Final result Component Value   Aspergillus Galactomannan Index 0.12   Aspergillus Galact AG Negative   INTERPRETIVE INFORMATION: Aspergillus Galactomannan Antigen   by EIA  Negative results do not exclude the diagnosis of invasive  aspergillosis. A single positive test result (index equal  to or greater than 0.5) should be clinically correlated  by testing a separate serum specimen because many agents  (e.g. foods, antibiotics) may cross-react with the test.  If invasive aspergillosis is suspected in high-risk  patients, serial sampling is recommended.  Performed By: [facility]  [address] [place] [id number]  : Fabrice Serna MD, PhD  CLIA  Number: [id number]            10/28/2023 0539 11/01/2023 1021 Histoplasma Capsulatum Antigen [[id number]]   Blood from Hand, Right    Final result Component Value   See Scanned Result HISTOPLASMA CAPSULATUM ANTIGEN-Scanned            10/27/2023 1639 10/27/2023 1908 Legionella pneumophila antigen urine [[id number]]   Urine, NOS    Final result Component Value   Legionella pneumophila serogroup 1 urinary antigen Negative   Suggests no recent or current infection. Infection due to Legionella cannot be ruled out, since other serogroups and species may cause disease, antigen may not be present in urine in early infection, and the level of antigen present in the urine may be below detectable limits of the test.                      Virology Data:   Lab Results   Component Value Date    FLUAH1 Not Detected 10/29/2023    FLUAH3 Not Detected 10/29/2023    [id number] Not Detected 10/29/2023    IFLUB Not Detected 10/29/2023    RSVA Not Detected 10/29/2023    RSVB Not Detected 10/29/2023    PIV1 Not Detected 10/29/2023    PIV2 Not Detected 10/29/2023    PIV3 Not Detected 10/29/2023    HMPV Not Detected 10/29/2023       Most Recent [facility] Pulmonary Function Testing (FVC/FEV1 only)  No PFT in the past    Imaging: reviewed in EMR and notable imaging listed below.  No lung imaging studies in the last 24 hours   Medications    acetylcysteine  2 mL Nebulization 4x Daily    allopurinol  100 mg Oral Daily    [Held by provider] amLODIPine  2.5 mg Oral Daily    aspirin  81 mg Oral Daily    budesonide  0.5 mg Nebulization BID    carvedilol  6.25 mg Oral BID w/meals    enoxaparin ANTICOAGULANT  30 mg Subcutaneous Q24H    insulin aspart  1-3 Units Subcutaneous TID AC    insulin aspart  1-3 Units Subcutaneous At Bedtime    ipratropium - albuterol 0.5 mg/2.5 mg/3 mL  3 mL Nebulization 4x daily    [Held by provider] isosorbide mononitrate  30 mg Oral Daily    methylPREDNISolone  250 mg Intravenous Q12H    pantoprazole  40 mg Oral QAM AC     polyethylene glycol  17 g Oral Daily    sennosides  1 tablet Oral BID    sodium chloride (PF)  10 mL Intracatheter Q8H

## 2023-11-05 PROBLEM — K70.30 ALCOHOLIC CIRRHOSIS, UNSPECIFIED WHETHER ASCITES PRESENT: Status: ACTIVE | Noted: 2023-04-26

## 2023-11-09 ENCOUNTER — APPOINTMENT (OUTPATIENT)
Dept: MEDICATION MANAGEMENT | Facility: CLINIC | Age: 63
End: 2023-11-09

## 2023-11-09 ENCOUNTER — OUTPATIENT (OUTPATIENT)
Dept: OUTPATIENT SERVICES | Facility: HOSPITAL | Age: 63
LOS: 1 days | End: 2023-11-09
Payer: MEDICAID

## 2023-11-09 DIAGNOSIS — Z79.01 LONG TERM (CURRENT) USE OF ANTICOAGULANTS: ICD-10-CM

## 2023-11-09 DIAGNOSIS — I82.409 ACUTE EMBOLISM AND THROMBOSIS OF UNSPECIFIED DEEP VEINS OF UNSPECIFIED LOWER EXTREMITY: ICD-10-CM

## 2023-11-09 LAB
INR PPP: 2.5 RATIO
POCT-PROTHROMBIN TIME: 30.3 SECS
QUALITY CONTROL: YES

## 2023-11-09 PROCEDURE — 85610 PROTHROMBIN TIME: CPT

## 2023-11-09 PROCEDURE — 36416 COLLJ CAPILLARY BLOOD SPEC: CPT

## 2023-11-09 PROCEDURE — 99211 OFF/OP EST MAY X REQ PHY/QHP: CPT

## 2023-11-10 DIAGNOSIS — Z79.01 LONG TERM (CURRENT) USE OF ANTICOAGULANTS: ICD-10-CM

## 2023-11-10 DIAGNOSIS — I82.409 ACUTE EMBOLISM AND THROMBOSIS OF UNSPECIFIED DEEP VEINS OF UNSPECIFIED LOWER EXTREMITY: ICD-10-CM

## 2023-11-22 ENCOUNTER — APPOINTMENT (OUTPATIENT)
Dept: MEDICATION MANAGEMENT | Facility: CLINIC | Age: 63
End: 2023-11-22

## 2023-11-24 ENCOUNTER — APPOINTMENT (OUTPATIENT)
Dept: MEDICATION MANAGEMENT | Facility: CLINIC | Age: 63
End: 2023-11-24

## 2023-11-24 ENCOUNTER — OUTPATIENT (OUTPATIENT)
Dept: OUTPATIENT SERVICES | Facility: HOSPITAL | Age: 63
LOS: 1 days | End: 2023-11-24
Payer: MEDICAID

## 2023-11-24 DIAGNOSIS — I82.409 ACUTE EMBOLISM AND THROMBOSIS OF UNSPECIFIED DEEP VEINS OF UNSPECIFIED LOWER EXTREMITY: ICD-10-CM

## 2023-11-24 DIAGNOSIS — Z79.01 LONG TERM (CURRENT) USE OF ANTICOAGULANTS: ICD-10-CM

## 2023-11-24 LAB
INR PPP: 2.5 RATIO
POCT-PROTHROMBIN TIME: 29.4 SECS
QUALITY CONTROL: YES

## 2023-11-24 PROCEDURE — 85610 PROTHROMBIN TIME: CPT

## 2023-11-24 PROCEDURE — 36416 COLLJ CAPILLARY BLOOD SPEC: CPT

## 2023-11-24 PROCEDURE — 99211 OFF/OP EST MAY X REQ PHY/QHP: CPT

## 2023-11-25 DIAGNOSIS — Z79.01 LONG TERM (CURRENT) USE OF ANTICOAGULANTS: ICD-10-CM

## 2023-11-25 DIAGNOSIS — I82.409 ACUTE EMBOLISM AND THROMBOSIS OF UNSPECIFIED DEEP VEINS OF UNSPECIFIED LOWER EXTREMITY: ICD-10-CM

## 2023-12-08 ENCOUNTER — APPOINTMENT (OUTPATIENT)
Dept: MEDICATION MANAGEMENT | Facility: CLINIC | Age: 63
End: 2023-12-08

## 2023-12-08 ENCOUNTER — OUTPATIENT (OUTPATIENT)
Dept: OUTPATIENT SERVICES | Facility: HOSPITAL | Age: 63
LOS: 1 days | End: 2023-12-08
Payer: MEDICAID

## 2023-12-08 DIAGNOSIS — Z79.01 LONG TERM (CURRENT) USE OF ANTICOAGULANTS: ICD-10-CM

## 2023-12-08 DIAGNOSIS — I82.409 ACUTE EMBOLISM AND THROMBOSIS OF UNSPECIFIED DEEP VEINS OF UNSPECIFIED LOWER EXTREMITY: ICD-10-CM

## 2023-12-08 LAB
INR PPP: 3.2 RATIO
POCT-PROTHROMBIN TIME: 37.9 SECS
QUALITY CONTROL: YES

## 2023-12-08 PROCEDURE — 85610 PROTHROMBIN TIME: CPT

## 2023-12-08 PROCEDURE — 36416 COLLJ CAPILLARY BLOOD SPEC: CPT

## 2023-12-08 PROCEDURE — 99211 OFF/OP EST MAY X REQ PHY/QHP: CPT

## 2023-12-09 DIAGNOSIS — I82.409 ACUTE EMBOLISM AND THROMBOSIS OF UNSPECIFIED DEEP VEINS OF UNSPECIFIED LOWER EXTREMITY: ICD-10-CM

## 2023-12-09 DIAGNOSIS — Z79.01 LONG TERM (CURRENT) USE OF ANTICOAGULANTS: ICD-10-CM

## 2023-12-19 ENCOUNTER — APPOINTMENT (OUTPATIENT)
Dept: MEDICATION MANAGEMENT | Facility: CLINIC | Age: 63
End: 2023-12-19

## 2023-12-19 ENCOUNTER — OUTPATIENT (OUTPATIENT)
Dept: OUTPATIENT SERVICES | Facility: HOSPITAL | Age: 63
LOS: 1 days | End: 2023-12-19
Payer: MEDICAID

## 2023-12-19 DIAGNOSIS — I82.409 ACUTE EMBOLISM AND THROMBOSIS OF UNSPECIFIED DEEP VEINS OF UNSPECIFIED LOWER EXTREMITY: ICD-10-CM

## 2023-12-19 DIAGNOSIS — Z79.01 LONG TERM (CURRENT) USE OF ANTICOAGULANTS: ICD-10-CM

## 2023-12-19 LAB
INR PPP: 4.3 RATIO
POCT-PROTHROMBIN TIME: 51.8 SECS
QUALITY CONTROL: YES

## 2023-12-19 PROCEDURE — 36416 COLLJ CAPILLARY BLOOD SPEC: CPT

## 2023-12-19 PROCEDURE — 99211 OFF/OP EST MAY X REQ PHY/QHP: CPT

## 2023-12-19 PROCEDURE — 85610 PROTHROMBIN TIME: CPT

## 2023-12-20 DIAGNOSIS — Z79.01 LONG TERM (CURRENT) USE OF ANTICOAGULANTS: ICD-10-CM

## 2023-12-20 DIAGNOSIS — I82.409 ACUTE EMBOLISM AND THROMBOSIS OF UNSPECIFIED DEEP VEINS OF UNSPECIFIED LOWER EXTREMITY: ICD-10-CM

## 2023-12-26 ENCOUNTER — APPOINTMENT (OUTPATIENT)
Dept: MEDICATION MANAGEMENT | Facility: CLINIC | Age: 63
End: 2023-12-26

## 2023-12-26 ENCOUNTER — OUTPATIENT (OUTPATIENT)
Dept: OUTPATIENT SERVICES | Facility: HOSPITAL | Age: 63
LOS: 1 days | End: 2023-12-26
Payer: MEDICAID

## 2023-12-26 DIAGNOSIS — Z79.01 LONG TERM (CURRENT) USE OF ANTICOAGULANTS: ICD-10-CM

## 2023-12-26 DIAGNOSIS — I82.409 ACUTE EMBOLISM AND THROMBOSIS OF UNSPECIFIED DEEP VEINS OF UNSPECIFIED LOWER EXTREMITY: ICD-10-CM

## 2023-12-26 LAB
INR PPP: 3 RATIO
POCT-PROTHROMBIN TIME: 35.7 SECS
QUALITY CONTROL: YES

## 2023-12-26 PROCEDURE — 85610 PROTHROMBIN TIME: CPT

## 2023-12-26 PROCEDURE — 36416 COLLJ CAPILLARY BLOOD SPEC: CPT

## 2023-12-26 PROCEDURE — 99211 OFF/OP EST MAY X REQ PHY/QHP: CPT

## 2023-12-27 DIAGNOSIS — Z79.01 LONG TERM (CURRENT) USE OF ANTICOAGULANTS: ICD-10-CM

## 2023-12-27 DIAGNOSIS — I82.409 ACUTE EMBOLISM AND THROMBOSIS OF UNSPECIFIED DEEP VEINS OF UNSPECIFIED LOWER EXTREMITY: ICD-10-CM

## 2024-01-05 ENCOUNTER — APPOINTMENT (OUTPATIENT)
Dept: MEDICATION MANAGEMENT | Facility: CLINIC | Age: 64
End: 2024-01-05

## 2024-01-05 ENCOUNTER — OUTPATIENT (OUTPATIENT)
Dept: OUTPATIENT SERVICES | Facility: HOSPITAL | Age: 64
LOS: 1 days | End: 2024-01-05
Payer: MEDICAID

## 2024-01-05 DIAGNOSIS — I82.409 ACUTE EMBOLISM AND THROMBOSIS OF UNSPECIFIED DEEP VEINS OF UNSPECIFIED LOWER EXTREMITY: ICD-10-CM

## 2024-01-05 DIAGNOSIS — Z79.01 LONG TERM (CURRENT) USE OF ANTICOAGULANTS: ICD-10-CM

## 2024-01-05 LAB
INR PPP: 2.2 RATIO
POCT-PROTHROMBIN TIME: 26.7 SECS
QUALITY CONTROL: YES

## 2024-01-05 PROCEDURE — 99211 OFF/OP EST MAY X REQ PHY/QHP: CPT

## 2024-01-05 PROCEDURE — 85610 PROTHROMBIN TIME: CPT

## 2024-01-05 PROCEDURE — 36416 COLLJ CAPILLARY BLOOD SPEC: CPT

## 2024-01-06 DIAGNOSIS — I82.409 ACUTE EMBOLISM AND THROMBOSIS OF UNSPECIFIED DEEP VEINS OF UNSPECIFIED LOWER EXTREMITY: ICD-10-CM

## 2024-01-06 DIAGNOSIS — Z79.01 LONG TERM (CURRENT) USE OF ANTICOAGULANTS: ICD-10-CM

## 2024-01-26 ENCOUNTER — OUTPATIENT (OUTPATIENT)
Dept: OUTPATIENT SERVICES | Facility: HOSPITAL | Age: 64
LOS: 1 days | End: 2024-01-26
Payer: MEDICAID

## 2024-01-26 ENCOUNTER — APPOINTMENT (OUTPATIENT)
Dept: MEDICATION MANAGEMENT | Facility: CLINIC | Age: 64
End: 2024-01-26

## 2024-01-26 DIAGNOSIS — I82.409 ACUTE EMBOLISM AND THROMBOSIS OF UNSPECIFIED DEEP VEINS OF UNSPECIFIED LOWER EXTREMITY: ICD-10-CM

## 2024-01-26 DIAGNOSIS — Z79.01 LONG TERM (CURRENT) USE OF ANTICOAGULANTS: ICD-10-CM

## 2024-01-26 LAB
INR PPP: 3.8 RATIO
POCT-PROTHROMBIN TIME: 46.1 SECS
QUALITY CONTROL: YES

## 2024-01-26 PROCEDURE — 36416 COLLJ CAPILLARY BLOOD SPEC: CPT

## 2024-01-26 PROCEDURE — 99211 OFF/OP EST MAY X REQ PHY/QHP: CPT

## 2024-01-26 PROCEDURE — 85610 PROTHROMBIN TIME: CPT

## 2024-01-27 DIAGNOSIS — Z79.01 LONG TERM (CURRENT) USE OF ANTICOAGULANTS: ICD-10-CM

## 2024-01-27 DIAGNOSIS — I82.409 ACUTE EMBOLISM AND THROMBOSIS OF UNSPECIFIED DEEP VEINS OF UNSPECIFIED LOWER EXTREMITY: ICD-10-CM

## 2024-02-02 ENCOUNTER — APPOINTMENT (OUTPATIENT)
Dept: MEDICATION MANAGEMENT | Facility: CLINIC | Age: 64
End: 2024-02-02

## 2024-02-02 ENCOUNTER — OUTPATIENT (OUTPATIENT)
Dept: OUTPATIENT SERVICES | Facility: HOSPITAL | Age: 64
LOS: 1 days | End: 2024-02-02
Payer: MEDICAID

## 2024-02-02 DIAGNOSIS — Z79.01 LONG TERM (CURRENT) USE OF ANTICOAGULANTS: ICD-10-CM

## 2024-02-02 DIAGNOSIS — I82.409 ACUTE EMBOLISM AND THROMBOSIS OF UNSPECIFIED DEEP VEINS OF UNSPECIFIED LOWER EXTREMITY: ICD-10-CM

## 2024-02-02 LAB
INR PPP: 3.1 RATIO
POCT-PROTHROMBIN TIME: 36.8 SECS
QUALITY CONTROL: YES

## 2024-02-02 PROCEDURE — 99211 OFF/OP EST MAY X REQ PHY/QHP: CPT

## 2024-02-02 PROCEDURE — 85610 PROTHROMBIN TIME: CPT

## 2024-02-02 PROCEDURE — 36416 COLLJ CAPILLARY BLOOD SPEC: CPT

## 2024-02-03 DIAGNOSIS — Z79.01 LONG TERM (CURRENT) USE OF ANTICOAGULANTS: ICD-10-CM

## 2024-02-03 DIAGNOSIS — I82.409 ACUTE EMBOLISM AND THROMBOSIS OF UNSPECIFIED DEEP VEINS OF UNSPECIFIED LOWER EXTREMITY: ICD-10-CM

## 2024-02-03 NOTE — ED ADULT NURSE NOTE - NS ED NURSE RECORD ANOTHER VITAL SIGN
78 yo F PMH IDDM, HTN, HLD, OP, Parkinson's disease, GCA (recently diagnosed) BIBEMS from [facility] s/p Fall and AMS. found to have toxic/metabolic derangement with hypernatremia and acute cystitis, and additionally with cf GCA flare with worsening vision.    Yes

## 2024-02-09 ENCOUNTER — APPOINTMENT (OUTPATIENT)
Dept: MEDICATION MANAGEMENT | Facility: CLINIC | Age: 64
End: 2024-02-09

## 2024-02-09 ENCOUNTER — OUTPATIENT (OUTPATIENT)
Dept: OUTPATIENT SERVICES | Facility: HOSPITAL | Age: 64
LOS: 1 days | End: 2024-02-09
Payer: MEDICAID

## 2024-02-09 DIAGNOSIS — I82.409 ACUTE EMBOLISM AND THROMBOSIS OF UNSPECIFIED DEEP VEINS OF UNSPECIFIED LOWER EXTREMITY: ICD-10-CM

## 2024-02-09 DIAGNOSIS — Z79.01 LONG TERM (CURRENT) USE OF ANTICOAGULANTS: ICD-10-CM

## 2024-02-09 LAB
INR PPP: 3 RATIO
POCT-PROTHROMBIN TIME: 36.3 SECS
QUALITY CONTROL: YES

## 2024-02-09 PROCEDURE — 85610 PROTHROMBIN TIME: CPT

## 2024-02-09 PROCEDURE — 99211 OFF/OP EST MAY X REQ PHY/QHP: CPT

## 2024-02-09 PROCEDURE — 36416 COLLJ CAPILLARY BLOOD SPEC: CPT

## 2024-02-10 DIAGNOSIS — Z79.01 LONG TERM (CURRENT) USE OF ANTICOAGULANTS: ICD-10-CM

## 2024-02-10 DIAGNOSIS — I82.409 ACUTE EMBOLISM AND THROMBOSIS OF UNSPECIFIED DEEP VEINS OF UNSPECIFIED LOWER EXTREMITY: ICD-10-CM

## 2024-02-12 NOTE — ED ADULT TRIAGE NOTE - ESI TRIAGE ACUITY LEVEL, MLM
Labs today  Salivary cortisol collection:     Do not brush teeth before collecting specimen.   Do not eat or drink for 15 minutes prior to specimen collection.   Collect specimen between 11 pm and midnight.*   Document Patient ID or Birth Date on the container label of the swab tube.  To Use the Salivette  1. Remove top cap of the tube to expose the swab.  2. Place swab directly into the mouth by tipping the tube so the swab falls into the mouth. Do not let fingers touch the swab.  3. Keep the swab in the mouth for approximately 2 minutes. Roll the swab in the mouth. Do not chew the swab.  4. Spit the swab back into the tube. Do not let fingers touch the swab.  5. Replace the cap. Make sure cap is pushed on tightly.  6.  Return the tube (with swab)  to your healthcare provider within 24 to 48 hours.   Follow up in 3 months  We will work with your insurance on approval of Zepbound  Please see the nutritionist    3

## 2024-02-20 RX ORDER — WARFARIN 2.5 MG/1
2.5 TABLET ORAL DAILY
Qty: 30 | Refills: 4 | Status: ACTIVE | COMMUNITY
Start: 2023-04-12 | End: 1900-01-01

## 2024-02-23 ENCOUNTER — APPOINTMENT (OUTPATIENT)
Dept: MEDICATION MANAGEMENT | Facility: CLINIC | Age: 64
End: 2024-02-23

## 2024-02-23 ENCOUNTER — OUTPATIENT (OUTPATIENT)
Dept: OUTPATIENT SERVICES | Facility: HOSPITAL | Age: 64
LOS: 1 days | End: 2024-02-23
Payer: MEDICAID

## 2024-02-23 DIAGNOSIS — I82.409 ACUTE EMBOLISM AND THROMBOSIS OF UNSPECIFIED DEEP VEINS OF UNSPECIFIED LOWER EXTREMITY: ICD-10-CM

## 2024-02-23 DIAGNOSIS — Z79.01 LONG TERM (CURRENT) USE OF ANTICOAGULANTS: ICD-10-CM

## 2024-02-23 LAB
INR PPP: 3.7 RATIO
POCT-PROTHROMBIN TIME: 44.3 SECS
QUALITY CONTROL: YES

## 2024-02-23 PROCEDURE — 99211 OFF/OP EST MAY X REQ PHY/QHP: CPT

## 2024-02-23 PROCEDURE — 85610 PROTHROMBIN TIME: CPT

## 2024-02-23 PROCEDURE — 36416 COLLJ CAPILLARY BLOOD SPEC: CPT

## 2024-02-24 DIAGNOSIS — Z79.01 LONG TERM (CURRENT) USE OF ANTICOAGULANTS: ICD-10-CM

## 2024-02-24 DIAGNOSIS — I82.409 ACUTE EMBOLISM AND THROMBOSIS OF UNSPECIFIED DEEP VEINS OF UNSPECIFIED LOWER EXTREMITY: ICD-10-CM

## 2024-03-01 ENCOUNTER — OUTPATIENT (OUTPATIENT)
Dept: OUTPATIENT SERVICES | Facility: HOSPITAL | Age: 64
LOS: 1 days | End: 2024-03-01
Payer: MEDICAID

## 2024-03-01 ENCOUNTER — APPOINTMENT (OUTPATIENT)
Dept: MEDICATION MANAGEMENT | Facility: CLINIC | Age: 64
End: 2024-03-01

## 2024-03-01 DIAGNOSIS — Z79.01 LONG TERM (CURRENT) USE OF ANTICOAGULANTS: ICD-10-CM

## 2024-03-01 DIAGNOSIS — I82.409 ACUTE EMBOLISM AND THROMBOSIS OF UNSPECIFIED DEEP VEINS OF UNSPECIFIED LOWER EXTREMITY: ICD-10-CM

## 2024-03-01 LAB
INR PPP: 4.6 RATIO
POCT-PROTHROMBIN TIME: 54.6 SECS
QUALITY CONTROL: YES

## 2024-03-01 PROCEDURE — 85610 PROTHROMBIN TIME: CPT

## 2024-03-01 PROCEDURE — 99211 OFF/OP EST MAY X REQ PHY/QHP: CPT

## 2024-03-01 PROCEDURE — 36416 COLLJ CAPILLARY BLOOD SPEC: CPT

## 2024-03-02 DIAGNOSIS — I82.409 ACUTE EMBOLISM AND THROMBOSIS OF UNSPECIFIED DEEP VEINS OF UNSPECIFIED LOWER EXTREMITY: ICD-10-CM

## 2024-03-02 DIAGNOSIS — Z79.01 LONG TERM (CURRENT) USE OF ANTICOAGULANTS: ICD-10-CM

## 2024-03-14 ENCOUNTER — OUTPATIENT (OUTPATIENT)
Dept: OUTPATIENT SERVICES | Facility: HOSPITAL | Age: 64
LOS: 1 days | End: 2024-03-14
Payer: MEDICAID

## 2024-03-14 ENCOUNTER — APPOINTMENT (OUTPATIENT)
Dept: MEDICATION MANAGEMENT | Facility: CLINIC | Age: 64
End: 2024-03-14

## 2024-03-14 DIAGNOSIS — I82.409 ACUTE EMBOLISM AND THROMBOSIS OF UNSPECIFIED DEEP VEINS OF UNSPECIFIED LOWER EXTREMITY: ICD-10-CM

## 2024-03-14 DIAGNOSIS — Z79.01 LONG TERM (CURRENT) USE OF ANTICOAGULANTS: ICD-10-CM

## 2024-03-14 LAB
INR PPP: 2.8 RATIO
POCT-PROTHROMBIN TIME: 33.5 SECS
QUALITY CONTROL: YES

## 2024-03-14 PROCEDURE — 85610 PROTHROMBIN TIME: CPT

## 2024-03-14 PROCEDURE — 36416 COLLJ CAPILLARY BLOOD SPEC: CPT

## 2024-03-14 PROCEDURE — 99211 OFF/OP EST MAY X REQ PHY/QHP: CPT

## 2024-03-15 DIAGNOSIS — Z79.01 LONG TERM (CURRENT) USE OF ANTICOAGULANTS: ICD-10-CM

## 2024-03-15 DIAGNOSIS — I82.409 ACUTE EMBOLISM AND THROMBOSIS OF UNSPECIFIED DEEP VEINS OF UNSPECIFIED LOWER EXTREMITY: ICD-10-CM

## 2024-03-22 ENCOUNTER — OUTPATIENT (OUTPATIENT)
Dept: OUTPATIENT SERVICES | Facility: HOSPITAL | Age: 64
LOS: 1 days | End: 2024-03-22
Payer: MEDICAID

## 2024-03-22 ENCOUNTER — APPOINTMENT (OUTPATIENT)
Dept: MEDICATION MANAGEMENT | Facility: CLINIC | Age: 64
End: 2024-03-22

## 2024-03-22 DIAGNOSIS — Z79.01 LONG TERM (CURRENT) USE OF ANTICOAGULANTS: ICD-10-CM

## 2024-03-22 DIAGNOSIS — I82.409 ACUTE EMBOLISM AND THROMBOSIS OF UNSPECIFIED DEEP VEINS OF UNSPECIFIED LOWER EXTREMITY: ICD-10-CM

## 2024-03-22 LAB
INR PPP: 2.3 RATIO
POCT-PROTHROMBIN TIME: 28.1 SECS
QUALITY CONTROL: YES

## 2024-03-22 PROCEDURE — 36416 COLLJ CAPILLARY BLOOD SPEC: CPT

## 2024-03-22 PROCEDURE — 85610 PROTHROMBIN TIME: CPT

## 2024-03-22 PROCEDURE — 99211 OFF/OP EST MAY X REQ PHY/QHP: CPT

## 2024-03-23 DIAGNOSIS — I82.409 ACUTE EMBOLISM AND THROMBOSIS OF UNSPECIFIED DEEP VEINS OF UNSPECIFIED LOWER EXTREMITY: ICD-10-CM

## 2024-03-23 DIAGNOSIS — Z79.01 LONG TERM (CURRENT) USE OF ANTICOAGULANTS: ICD-10-CM

## 2024-03-28 ENCOUNTER — OUTPATIENT (OUTPATIENT)
Dept: OUTPATIENT SERVICES | Facility: HOSPITAL | Age: 64
LOS: 1 days | End: 2024-03-28
Payer: MEDICAID

## 2024-03-28 ENCOUNTER — APPOINTMENT (OUTPATIENT)
Dept: MEDICATION MANAGEMENT | Facility: CLINIC | Age: 64
End: 2024-03-28

## 2024-03-28 DIAGNOSIS — Z79.01 LONG TERM (CURRENT) USE OF ANTICOAGULANTS: ICD-10-CM

## 2024-03-28 DIAGNOSIS — I82.409 ACUTE EMBOLISM AND THROMBOSIS OF UNSPECIFIED DEEP VEINS OF UNSPECIFIED LOWER EXTREMITY: ICD-10-CM

## 2024-03-28 LAB — INR PPP: 2.4 RATIO

## 2024-03-28 PROCEDURE — 99211 OFF/OP EST MAY X REQ PHY/QHP: CPT

## 2024-03-28 PROCEDURE — 85610 PROTHROMBIN TIME: CPT

## 2024-03-28 PROCEDURE — 36416 COLLJ CAPILLARY BLOOD SPEC: CPT

## 2024-03-29 DIAGNOSIS — I82.409 ACUTE EMBOLISM AND THROMBOSIS OF UNSPECIFIED DEEP VEINS OF UNSPECIFIED LOWER EXTREMITY: ICD-10-CM

## 2024-03-29 DIAGNOSIS — Z79.01 LONG TERM (CURRENT) USE OF ANTICOAGULANTS: ICD-10-CM

## 2024-04-03 NOTE — ED ADULT NURSE NOTE - NS ED NURSE AMBULANCES2
Discharge instructions were given to the patient's guardian with 1 prescription. Patient's guardian verbalizes understanding of discharge instructions and opportunities for clarification were provided. Patient and guardian have no questions or concerns at this time and were encouraged to follow-up with primary provider or return to emergency room if concerned. Patient left Emergency Department with guardian in no acute distress.       [facility].

## 2024-04-04 ENCOUNTER — OUTPATIENT (OUTPATIENT)
Dept: OUTPATIENT SERVICES | Facility: HOSPITAL | Age: 64
LOS: 1 days | End: 2024-04-04
Payer: MEDICAID

## 2024-04-04 ENCOUNTER — APPOINTMENT (OUTPATIENT)
Dept: MEDICATION MANAGEMENT | Facility: CLINIC | Age: 64
End: 2024-04-04

## 2024-04-04 DIAGNOSIS — I82.409 ACUTE EMBOLISM AND THROMBOSIS OF UNSPECIFIED DEEP VEINS OF UNSPECIFIED LOWER EXTREMITY: ICD-10-CM

## 2024-04-04 DIAGNOSIS — Z79.01 LONG TERM (CURRENT) USE OF ANTICOAGULANTS: ICD-10-CM

## 2024-04-04 LAB
INR PPP: 4.1 RATIO
POCT-PROTHROMBIN TIME: 49.7 SECS
QUALITY CONTROL: YES

## 2024-04-04 PROCEDURE — 85610 PROTHROMBIN TIME: CPT

## 2024-04-04 PROCEDURE — 99211 OFF/OP EST MAY X REQ PHY/QHP: CPT

## 2024-04-04 PROCEDURE — 36416 COLLJ CAPILLARY BLOOD SPEC: CPT

## 2024-04-04 NOTE — PATIENT PROFILE ADULT - EQUIPMENT CURRENTLY USED AT HOME
Group Topic: BH Process Group     Date: 4/4/2024  Start Time: 11:00 AM  End Time: 12:00 PM  Facilitators: Mahnaz Fitzpatrick LPC    Focus: Process  Number in attendance: 9    Patients were given the opportunity to share individually about goals, current stressors and therapeutic assignments. Group and therapist feedback is welcomed and maladaptive skills are challenged with coping options.     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 Pt shared she realized she is on call tomorrow. Pt wanted feedback on how to set boundaries and tell them that she will not be able to be available tomorrow and next week. Writer encouraged her to tell them minimal information so that they know she will not be present but they do not need to know specifics. Pt shared she is going to work out, pack and go on a date with her .     Mahnaz Fitzpatrick LPC-IT   yes

## 2024-04-05 DIAGNOSIS — Z79.01 LONG TERM (CURRENT) USE OF ANTICOAGULANTS: ICD-10-CM

## 2024-04-05 DIAGNOSIS — I82.409 ACUTE EMBOLISM AND THROMBOSIS OF UNSPECIFIED DEEP VEINS OF UNSPECIFIED LOWER EXTREMITY: ICD-10-CM

## 2024-04-18 ENCOUNTER — OUTPATIENT (OUTPATIENT)
Dept: OUTPATIENT SERVICES | Facility: HOSPITAL | Age: 64
LOS: 1 days | End: 2024-04-18
Payer: MEDICAID

## 2024-04-18 ENCOUNTER — APPOINTMENT (OUTPATIENT)
Dept: MEDICATION MANAGEMENT | Facility: CLINIC | Age: 64
End: 2024-04-18

## 2024-04-18 DIAGNOSIS — Z79.01 LONG TERM (CURRENT) USE OF ANTICOAGULANTS: ICD-10-CM

## 2024-04-18 DIAGNOSIS — I82.409 ACUTE EMBOLISM AND THROMBOSIS OF UNSPECIFIED DEEP VEINS OF UNSPECIFIED LOWER EXTREMITY: ICD-10-CM

## 2024-04-18 LAB
INR PPP: 7.2 RATIO
POCT-PROTHROMBIN TIME: 85.9 SECS
QUALITY CONTROL: YES

## 2024-04-18 PROCEDURE — 85610 PROTHROMBIN TIME: CPT

## 2024-04-18 PROCEDURE — 36416 COLLJ CAPILLARY BLOOD SPEC: CPT

## 2024-04-18 PROCEDURE — 99211 OFF/OP EST MAY X REQ PHY/QHP: CPT

## 2024-04-19 ENCOUNTER — OUTPATIENT (OUTPATIENT)
Dept: OUTPATIENT SERVICES | Facility: HOSPITAL | Age: 64
LOS: 1 days | End: 2024-04-19
Payer: MEDICAID

## 2024-04-19 ENCOUNTER — APPOINTMENT (OUTPATIENT)
Dept: MEDICATION MANAGEMENT | Facility: CLINIC | Age: 64
End: 2024-04-19

## 2024-04-19 DIAGNOSIS — Z79.01 LONG TERM (CURRENT) USE OF ANTICOAGULANTS: ICD-10-CM

## 2024-04-19 DIAGNOSIS — I82.409 ACUTE EMBOLISM AND THROMBOSIS OF UNSPECIFIED DEEP VEINS OF UNSPECIFIED LOWER EXTREMITY: ICD-10-CM

## 2024-04-19 LAB
INR PPP: 5.4 RATIO
POCT-PROTHROMBIN TIME: 64.3 SECS
QUALITY CONTROL: YES

## 2024-04-19 PROCEDURE — 36416 COLLJ CAPILLARY BLOOD SPEC: CPT

## 2024-04-19 PROCEDURE — 99211 OFF/OP EST MAY X REQ PHY/QHP: CPT

## 2024-04-19 PROCEDURE — 85610 PROTHROMBIN TIME: CPT

## 2024-04-20 DIAGNOSIS — I82.409 ACUTE EMBOLISM AND THROMBOSIS OF UNSPECIFIED DEEP VEINS OF UNSPECIFIED LOWER EXTREMITY: ICD-10-CM

## 2024-04-20 DIAGNOSIS — Z79.01 LONG TERM (CURRENT) USE OF ANTICOAGULANTS: ICD-10-CM

## 2024-04-22 ENCOUNTER — OUTPATIENT (OUTPATIENT)
Dept: OUTPATIENT SERVICES | Facility: HOSPITAL | Age: 64
LOS: 1 days | End: 2024-04-22
Payer: MEDICAID

## 2024-04-22 ENCOUNTER — APPOINTMENT (OUTPATIENT)
Dept: MEDICATION MANAGEMENT | Facility: CLINIC | Age: 64
End: 2024-04-22

## 2024-04-22 DIAGNOSIS — I82.409 ACUTE EMBOLISM AND THROMBOSIS OF UNSPECIFIED DEEP VEINS OF UNSPECIFIED LOWER EXTREMITY: ICD-10-CM

## 2024-04-22 DIAGNOSIS — Z79.01 LONG TERM (CURRENT) USE OF ANTICOAGULANTS: ICD-10-CM

## 2024-04-22 LAB
INR PPP: 2.5 RATIO
POCT-PROTHROMBIN TIME: 29.8 SECS
QUALITY CONTROL: YES

## 2024-04-22 PROCEDURE — 36416 COLLJ CAPILLARY BLOOD SPEC: CPT

## 2024-04-22 PROCEDURE — 99211 OFF/OP EST MAY X REQ PHY/QHP: CPT

## 2024-04-22 PROCEDURE — 85610 PROTHROMBIN TIME: CPT

## 2024-04-22 RX ORDER — WARFARIN 2.5 MG/1
2.5 TABLET ORAL
Qty: 30 | Refills: 2 | Status: ACTIVE | COMMUNITY
Start: 2024-04-22 | End: 1900-01-01

## 2024-04-23 DIAGNOSIS — I82.409 ACUTE EMBOLISM AND THROMBOSIS OF UNSPECIFIED DEEP VEINS OF UNSPECIFIED LOWER EXTREMITY: ICD-10-CM

## 2024-04-23 DIAGNOSIS — Z79.01 LONG TERM (CURRENT) USE OF ANTICOAGULANTS: ICD-10-CM

## 2024-04-25 NOTE — ED ADULT TRIAGE NOTE - GLAS[place]OW COMA SCALE: BEST VERBAL RESPONSE, MLM
"ASSESSMENT & PLAN:   Diagnoses and all orders for this visit:    Well woman exam with routine gynecological exam    Encounter for screening mammogram for malignant neoplasm of breast  -     Mammo screening bilateral w 3d & cad; Future    Intramural leiomyoma of uterus  -     US pelvis complete w transvaginal; Future      We discussed that her MRI does not provide measurements regarding her enlarged uterus. Offered US to evaluate uterine size and fibroid location. Discussed that commonly fibroids decrease in size in menopause and unless there are significant bulk symptoms or bleeding issues, surgery does not have to be performed. Will obtain US to get a better visualization of the uterus. Last US in 2017 for PMB noted uterus measuring 9.7 x 4.9 x 5.1 cm. Several intramural myomas were seen largest measuring about 3.7cm in diameter.     The following were reviewed in today's visit: ASCCP guidelines (Pap screen not indicated after age 65), STD testing breast self exam, menopause, exercise, healthy diet, and enlarged.    Patient to return to office in yearly for annual exam.     All questions have been answered to her satisfaction.        CC:  Annual Gynecologic Examination  Chief Complaint   Patient presents with    Gynecologic Exam     Annual exam, pap not indicated. Pt had an MRI on 2022 and based off of results (enlarged uterus and fibroids) she would like to discuss possible hysterectomy.   neg pap/neg HPV 3/18/19  mammo 11/3/23  DXA 22  colonoscopy 3/2/21  pap no longer indicated - age       HPI: Rhianna Gerardo is a 69 y.o.  who presents for annual gynecologic examination.  She has the following concerns:  Would like to discuss hysterectomy. She had an MRI of the spine in  and was told her uterus appeared enlarged with multiple fibroids. Rhianna also reports that she has some lower pelvic pain and also feels as though she has \"fullness\" in her upper abdomen near her R ribs when sitting. " She has concerns that this is due to her enlarged uterus.       Health Maintenance:    Exercise: infrequently  Breast exams/breast awareness: yes  Last mammogram:  - dx mammogram - focal asymmetry stable. Return to routine screening  Colorectal cancer screenin  DEXA:     Past Medical History:   Diagnosis Date    Anemia A child on    Arthritis     Disease of thyroid gland 2 years ago    medication    Environmental allergies     Fibroid     HL (hearing loss)     Hyperlipidemia     Hypertension     Hyperthyroidism 4years    Hypothyroidism     Obesity 10 years    In my fifties    Plantar fasciitis     Scoliosis     Varicella     Visual impairment        Past Surgical History:   Procedure Laterality Date    KNEE SURGERY      TUBAL LIGATION         Past OB/Gyn History:   No LMP recorded. Patient is postmenopausal.    Patient is menopausal.   Menopausal symptoms: None  Last Pap: 2019 : no abnormalities; further pap screening not indicated  History of abnormal Pap smear: no    Patient is not currently sexually active.     Family History  Family History   Problem Relation Age of Onset    Hypertension Mother     Dementia Mother             Arthritis Mother             Thyroid disease Mother             Hearing loss Mother             Coronary artery disease Father     Heart failure Father             Throat cancer Brother 60    Endometrial cancer Maternal Aunt 50    Lung cancer Maternal Aunt 60    No Known Problems Maternal Aunt     Bone cancer Maternal Uncle 69    Coronary artery disease Maternal Uncle     No Known Problems Paternal Aunt     No Known Problems Maternal Grandmother     No Known Problems Maternal Grandfather     No Known Problems Paternal Grandmother     No Known Problems Paternal Grandfather     No Known Problems Daughter     No Known Problems Son     Alcohol abuse Brother                Family history of uterine or ovarian cancer:  (V5) oriented no  Family history of breast cancer: no  Family history of colon cancer: no    Social History:  Social History     Socioeconomic History    Marital status: /Civil Union     Spouse name: Not on file    Number of children: Not on file    Years of education: Not on file    Highest education level: Not on file   Occupational History    Not on file   Tobacco Use    Smoking status: Former     Current packs/day: 0.00     Average packs/day: 0.3 packs/day for 34.0 years (8.5 ttl pk-yrs)     Types: Cigarettes     Start date: 1969     Quit date: 2003     Years since quittin.8    Smokeless tobacco: Never    Tobacco comments:     quit 25 yrs ago   Vaping Use    Vaping status: Never Used   Substance and Sexual Activity    Alcohol use: Yes     Comment: social    Drug use: No    Sexual activity: Not Currently     Partners: Male     Birth control/protection: Post-menopausal   Other Topics Concern    Not on file   Social History Narrative    Not on file     Social Determinants of Health     Financial Resource Strain: Low Risk  (10/11/2023)    Overall Financial Resource Strain (CARDIA)     Difficulty of Paying Living Expenses: Not very hard   Food Insecurity: Not on file   Transportation Needs: No Transportation Needs (10/11/2023)    PRAPARE - Transportation     Lack of Transportation (Medical): No     Lack of Transportation (Non-Medical): No   Physical Activity: Not on file   Stress: Not on file   Social Connections: Not on file   Intimate Partner Violence: Not on file   Housing Stability: Not on file     Domestic violence screen: negative    Allergies:  Allergies   Allergen Reactions    Diclofenac Swelling    Atorvastatin Other (See Comments)     Muscle ache       Medications:    Current Outpatient Medications:     amLODIPine (NORVASC) 5 mg tablet, Take 1 tablet (5 mg total) by mouth daily, Disp: 90 tablet, Rfl: 3    BEE POLLEN PO, Take by mouth, Disp: , Rfl:     Calcium Carb-Cholecalciferol (CALCIUM + D3 PO),  "Take by mouth, Disp: , Rfl:     co-enzyme Q-10 30 MG capsule, Take 100 mg by mouth 2 (two) times a day , Disp: , Rfl:     diphenhydrAMINE (BENADRYL) 25 mg capsule, Take 25 mg by mouth as needed Takes as needed, Disp: , Rfl:     ibuprofen (MOTRIN) 800 mg tablet, as needed Takes as needed., Disp: , Rfl:     levothyroxine (Synthroid) 50 mcg tablet, Take 1 tablet (50 mcg total) by mouth daily, Disp: 90 tablet, Rfl: 3    losartan (COZAAR) 100 MG tablet, Take 1 tablet (100 mg total) by mouth daily, Disp: 90 tablet, Rfl: 3    metoprolol succinate (TOPROL-XL) 100 mg 24 hr tablet, Take 1 tablet (100 mg total) by mouth daily, Disp: 90 tablet, Rfl: 3    Semaglutide-Weight Management (WEGOVY) 0.25 MG/0.5ML, Inject 0.5 mL (0.25 mg total) under the skin once a week, Disp: 2 mL, Rfl: 0    Multiple Vitamins-Minerals (PRESERVISION AREDS 2+MULTI VIT PO), 2 tablets in the morning (Patient not taking: Reported on 4/24/2024), Disp: , Rfl:     Review of Systems:  Review of Systems   Constitutional:  Negative for activity change, appetite change and unexpected weight change.   Respiratory:  Negative for cough and shortness of breath.    Cardiovascular:  Negative for chest pain.   Gastrointestinal:  Positive for abdominal pain. Negative for constipation, diarrhea, nausea and vomiting.   Genitourinary:  Positive for pelvic pain. Negative for difficulty urinating, dyspareunia, frequency, menstrual problem, urgency, vaginal bleeding, vaginal discharge and vaginal pain.   Musculoskeletal:  Negative for back pain.   Skin: Negative.    Neurological:  Negative for dizziness, weakness, light-headedness and headaches.   Psychiatric/Behavioral: Negative.           Physical Exam:  /80 (BP Location: Left arm, Patient Position: Sitting, Cuff Size: Standard)   Ht 5' 5\" (1.651 m)   Wt 119 kg (262 lb)   BMI 43.60 kg/m²    Physical Exam  Constitutional:       General: She is not in acute distress.     Appearance: Normal appearance. She is " well-developed. She is obese. She is not diaphoretic.   Genitourinary:      Vulva and bladder normal.      No lesions in the vagina.      Genitourinary Comments: Perineum normal in appearance, no lacerations, no ulcerations, no lesions visualized.      Right Labia: No rash, tenderness or lesions.     Left Labia: No tenderness, lesions or rash.     No inguinal adenopathy present in the right or left side.     No vaginal discharge, erythema, tenderness or bleeding.      No vaginal prolapse present.     No vaginal atrophy present.       Right Adnexa: not tender, not full and no mass present.     Left Adnexa: not tender, not full and no mass present.     Cervix is nulliparous.      No cervical motion tenderness, discharge, friability, lesion or polyp.      No parametrium nodularity or thickening present.     Uterus is not enlarged (difficult to assess size definitively due to body habitus, not palpating near umbilicus), tender or prolapsed.      No uterine mass detected.     No urethral prolapse or mass present.      Bladder is not tender.       Pelvic exam was performed with patient in the lithotomy position.   Rectum:      No tenderness or external hemorrhoid.   Breasts:     Breasts are symmetrical.      Right: No swelling, bleeding, mass, skin change or tenderness.      Left: No swelling, bleeding, mass, skin change or tenderness.   HENT:      Head: Normocephalic and atraumatic.   Neck:      Thyroid: No thyromegaly or thyroid tenderness.   Cardiovascular:      Rate and Rhythm: Normal rate and regular rhythm.      Heart sounds: Normal heart sounds. No murmur heard.     No friction rub.   Pulmonary:      Effort: Pulmonary effort is normal. No respiratory distress.      Breath sounds: Normal breath sounds. No wheezing or rales.   Abdominal:      Palpations: Abdomen is soft. There is no mass.      Tenderness: There is no abdominal tenderness. There is no guarding.   Musculoskeletal:         General: No tenderness. Normal  range of motion.      Right lower leg: No edema.      Left lower leg: No edema.   Lymphadenopathy:      Lower Body: No right inguinal adenopathy. No left inguinal adenopathy.   Neurological:      Mental Status: She is alert and oriented to person, place, and time.   Skin:     General: Skin is warm and dry.      Coloration: Skin is not pale.      Findings: No erythema.   Psychiatric:         Mood and Affect: Mood normal.         Behavior: Behavior normal.         Thought Content: Thought content normal.         Judgment: Judgment normal.   Vitals and nursing note reviewed.

## 2024-04-26 ENCOUNTER — APPOINTMENT (OUTPATIENT)
Dept: MEDICATION MANAGEMENT | Facility: CLINIC | Age: 64
End: 2024-04-26

## 2024-04-26 ENCOUNTER — OUTPATIENT (OUTPATIENT)
Dept: OUTPATIENT SERVICES | Facility: HOSPITAL | Age: 64
LOS: 1 days | End: 2024-04-26
Payer: MEDICAID

## 2024-04-26 DIAGNOSIS — Z79.01 LONG TERM (CURRENT) USE OF ANTICOAGULANTS: ICD-10-CM

## 2024-04-26 LAB
INR PPP: 2 RATIO
POCT-PROTHROMBIN TIME: 23.7 SECS
QUALITY CONTROL: YES

## 2024-04-26 PROCEDURE — 85610 PROTHROMBIN TIME: CPT

## 2024-04-26 PROCEDURE — 99211 OFF/OP EST MAY X REQ PHY/QHP: CPT

## 2024-04-26 PROCEDURE — 36416 COLLJ CAPILLARY BLOOD SPEC: CPT

## 2024-04-27 DIAGNOSIS — Z79.01 LONG TERM (CURRENT) USE OF ANTICOAGULANTS: ICD-10-CM

## 2024-05-03 ENCOUNTER — OUTPATIENT (OUTPATIENT)
Dept: OUTPATIENT SERVICES | Facility: HOSPITAL | Age: 64
LOS: 1 days | End: 2024-05-03
Payer: MEDICAID

## 2024-05-03 ENCOUNTER — APPOINTMENT (OUTPATIENT)
Dept: MEDICATION MANAGEMENT | Facility: CLINIC | Age: 64
End: 2024-05-03

## 2024-05-03 DIAGNOSIS — I82.409 ACUTE EMBOLISM AND THROMBOSIS OF UNSPECIFIED DEEP VEINS OF UNSPECIFIED LOWER EXTREMITY: ICD-10-CM

## 2024-05-03 DIAGNOSIS — Z79.01 LONG TERM (CURRENT) USE OF ANTICOAGULANTS: ICD-10-CM

## 2024-05-03 LAB
INR PPP: 1.7 RATIO
POCT-PROTHROMBIN TIME: 20.4 SECS
QUALITY CONTROL: YES

## 2024-05-03 PROCEDURE — 36416 COLLJ CAPILLARY BLOOD SPEC: CPT

## 2024-05-03 PROCEDURE — 99211 OFF/OP EST MAY X REQ PHY/QHP: CPT

## 2024-05-03 PROCEDURE — 85610 PROTHROMBIN TIME: CPT

## 2024-05-04 DIAGNOSIS — I82.409 ACUTE EMBOLISM AND THROMBOSIS OF UNSPECIFIED DEEP VEINS OF UNSPECIFIED LOWER EXTREMITY: ICD-10-CM

## 2024-05-04 DIAGNOSIS — Z79.01 LONG TERM (CURRENT) USE OF ANTICOAGULANTS: ICD-10-CM

## 2024-05-17 ENCOUNTER — OUTPATIENT (OUTPATIENT)
Dept: OUTPATIENT SERVICES | Facility: HOSPITAL | Age: 64
LOS: 1 days | End: 2024-05-17
Payer: MEDICAID

## 2024-05-17 ENCOUNTER — APPOINTMENT (OUTPATIENT)
Dept: MEDICATION MANAGEMENT | Facility: CLINIC | Age: 64
End: 2024-05-17

## 2024-05-17 DIAGNOSIS — I82.409 ACUTE EMBOLISM AND THROMBOSIS OF UNSPECIFIED DEEP VEINS OF UNSPECIFIED LOWER EXTREMITY: ICD-10-CM

## 2024-05-17 DIAGNOSIS — Z79.01 LONG TERM (CURRENT) USE OF ANTICOAGULANTS: ICD-10-CM

## 2024-05-17 LAB
INR PPP: 2.6 RATIO
POCT-PROTHROMBIN TIME: 31.5 SECS
QUALITY CONTROL: YES

## 2024-05-17 PROCEDURE — 36416 COLLJ CAPILLARY BLOOD SPEC: CPT

## 2024-05-17 PROCEDURE — 85610 PROTHROMBIN TIME: CPT

## 2024-05-17 PROCEDURE — 99211 OFF/OP EST MAY X REQ PHY/QHP: CPT

## 2024-05-18 DIAGNOSIS — I82.409 ACUTE EMBOLISM AND THROMBOSIS OF UNSPECIFIED DEEP VEINS OF UNSPECIFIED LOWER EXTREMITY: ICD-10-CM

## 2024-05-18 DIAGNOSIS — Z79.01 LONG TERM (CURRENT) USE OF ANTICOAGULANTS: ICD-10-CM

## 2024-05-30 NOTE — ED ADULT NURSE NOTE - NSIMPLEMENTINTERV_GEN_ALL_ED
Implemented All Universal Safety Interventions:  [place] to call system. Call bell, personal items and telephone within reach. Instruct patient to call for assistance. Room bathroom lighting operational. Non-slip footwear when patient is off stretcher. Physically safe environment: no spills, clutter or unnecessary equipment. Stretcher in lowest position, wheels locked, appropriate side rails in place.
98.1

## 2024-05-31 ENCOUNTER — APPOINTMENT (OUTPATIENT)
Dept: MEDICATION MANAGEMENT | Facility: CLINIC | Age: 64
End: 2024-05-31

## 2024-05-31 ENCOUNTER — OUTPATIENT (OUTPATIENT)
Dept: OUTPATIENT SERVICES | Facility: HOSPITAL | Age: 64
LOS: 1 days | End: 2024-05-31
Payer: MEDICAID

## 2024-05-31 DIAGNOSIS — Z79.01 LONG TERM (CURRENT) USE OF ANTICOAGULANTS: ICD-10-CM

## 2024-05-31 DIAGNOSIS — I82.409 ACUTE EMBOLISM AND THROMBOSIS OF UNSPECIFIED DEEP VEINS OF UNSPECIFIED LOWER EXTREMITY: ICD-10-CM

## 2024-05-31 LAB
INR PPP: 2.6 RATIO
POCT-PROTHROMBIN TIME: 31.6 SECS
QUALITY CONTROL: YES

## 2024-05-31 PROCEDURE — 85610 PROTHROMBIN TIME: CPT

## 2024-05-31 PROCEDURE — 36416 COLLJ CAPILLARY BLOOD SPEC: CPT

## 2024-05-31 PROCEDURE — 99211 OFF/OP EST MAY X REQ PHY/QHP: CPT

## 2024-06-01 DIAGNOSIS — I82.409 ACUTE EMBOLISM AND THROMBOSIS OF UNSPECIFIED DEEP VEINS OF UNSPECIFIED LOWER EXTREMITY: ICD-10-CM

## 2024-06-01 DIAGNOSIS — Z79.01 LONG TERM (CURRENT) USE OF ANTICOAGULANTS: ICD-10-CM

## 2024-06-12 ENCOUNTER — APPOINTMENT (OUTPATIENT)
Dept: GASTROENTEROLOGY | Facility: CLINIC | Age: 64
End: 2024-06-12

## 2024-06-14 ENCOUNTER — EMERGENCY (EMERGENCY)
Facility: HOSPITAL | Age: 64
LOS: 0 days | Discharge: HOME | End: 2024-06-14
Attending: EMERGENCY MEDICINE
Payer: MEDICARE

## 2024-06-14 VITALS
DIASTOLIC BLOOD PRESSURE: 95 MMHG | HEART RATE: 80 BPM | RESPIRATION RATE: 20 BRPM | OXYGEN SATURATION: 99 % | SYSTOLIC BLOOD PRESSURE: 133 MMHG

## 2024-06-14 VITALS
WEIGHT: 244.93 LBS | HEIGHT: 70 IN | HEART RATE: 104 BPM | SYSTOLIC BLOOD PRESSURE: 130 MMHG | DIASTOLIC BLOOD PRESSURE: 90 MMHG | TEMPERATURE: 98 F | RESPIRATION RATE: 18 BRPM | OXYGEN SATURATION: 96 %

## 2024-06-14 VITALS
WEIGHT: 175.05 LBS | TEMPERATURE: 99 F | OXYGEN SATURATION: 99 % | SYSTOLIC BLOOD PRESSURE: 134 MMHG | RESPIRATION RATE: 20 BRPM | HEART RATE: 82 BPM | DIASTOLIC BLOOD PRESSURE: 98 MMHG

## 2024-06-14 VITALS
OXYGEN SATURATION: 98 % | RESPIRATION RATE: 18 BRPM | DIASTOLIC BLOOD PRESSURE: 88 MMHG | SYSTOLIC BLOOD PRESSURE: 128 MMHG | HEART RATE: 80 BPM

## 2024-06-14 DIAGNOSIS — Z91.014 ALLERGY TO MAMMALIAN MEATS: ICD-10-CM

## 2024-06-14 DIAGNOSIS — K52.9 NONINFECTIVE GASTROENTERITIS AND COLITIS, UNSPECIFIED: ICD-10-CM

## 2024-06-14 DIAGNOSIS — I10 ESSENTIAL (PRIMARY) HYPERTENSION: ICD-10-CM

## 2024-06-14 DIAGNOSIS — Z91.011 ALLERGY TO MILK PRODUCTS: ICD-10-CM

## 2024-06-14 DIAGNOSIS — K21.9 GASTRO-ESOPHAGEAL REFLUX DISEASE WITHOUT ESOPHAGITIS: ICD-10-CM

## 2024-06-14 DIAGNOSIS — F10.239 ALCOHOL DEPENDENCE WITH WITHDRAWAL, UNSPECIFIED: ICD-10-CM

## 2024-06-14 DIAGNOSIS — I49.9 CARDIAC ARRHYTHMIA, UNSPECIFIED: ICD-10-CM

## 2024-06-14 DIAGNOSIS — Z86.718 PERSONAL HISTORY OF OTHER VENOUS THROMBOSIS AND EMBOLISM: ICD-10-CM

## 2024-06-14 DIAGNOSIS — R10.9 UNSPECIFIED ABDOMINAL PAIN: ICD-10-CM

## 2024-06-14 DIAGNOSIS — Z91.013 ALLERGY TO SEAFOOD: ICD-10-CM

## 2024-06-14 DIAGNOSIS — Z79.01 LONG TERM (CURRENT) USE OF ANTICOAGULANTS: ICD-10-CM

## 2024-06-14 DIAGNOSIS — K51.30 ULCERATIVE (CHRONIC) RECTOSIGMOIDITIS WITHOUT COMPLICATIONS: ICD-10-CM

## 2024-06-14 LAB
ALBUMIN SERPL ELPH-MCNC: 3.8 G/DL — SIGNIFICANT CHANGE UP (ref 3.5–5.2)
ALP SERPL-CCNC: 180 U/L — HIGH (ref 30–115)
ALT FLD-CCNC: 33 U/L — SIGNIFICANT CHANGE UP (ref 0–41)
ANION GAP SERPL CALC-SCNC: 19 MMOL/L — HIGH (ref 7–14)
APTT BLD: 47.8 SEC — HIGH (ref 27–39.2)
AST SERPL-CCNC: 162 U/L — HIGH (ref 0–41)
BASOPHILS # BLD AUTO: 0.07 K/UL — SIGNIFICANT CHANGE UP (ref 0–0.2)
BASOPHILS NFR BLD AUTO: 1.2 % — HIGH (ref 0–1)
BILIRUB DIRECT SERPL-MCNC: 2.5 MG/DL — HIGH (ref 0–0.3)
BILIRUB INDIRECT FLD-MCNC: 2.8 MG/DL — HIGH (ref 0.2–1.2)
BILIRUB SERPL-MCNC: 5.3 MG/DL — HIGH (ref 0.2–1.2)
BUN SERPL-MCNC: 8 MG/DL — LOW (ref 10–20)
CALCIUM SERPL-MCNC: 8.5 MG/DL — SIGNIFICANT CHANGE UP (ref 8.4–10.5)
CHLORIDE SERPL-SCNC: 97 MMOL/L — LOW (ref 98–110)
CO2 SERPL-SCNC: 22 MMOL/L — SIGNIFICANT CHANGE UP (ref 17–32)
CREAT SERPL-MCNC: 1.4 MG/DL — SIGNIFICANT CHANGE UP (ref 0.7–1.5)
EGFR: 56 ML/MIN/1.73M2 — LOW
EOSINOPHIL # BLD AUTO: 0.01 K/UL — SIGNIFICANT CHANGE UP (ref 0–0.7)
EOSINOPHIL NFR BLD AUTO: 0.2 % — SIGNIFICANT CHANGE UP (ref 0–8)
GLUCOSE SERPL-MCNC: 99 MG/DL — SIGNIFICANT CHANGE UP (ref 70–99)
HCT VFR BLD CALC: 34.5 % — LOW (ref 42–52)
HGB BLD-MCNC: 12.6 G/DL — LOW (ref 14–18)
IMM GRANULOCYTES NFR BLD AUTO: 0.2 % — SIGNIFICANT CHANGE UP (ref 0.1–0.3)
INR BLD: 1.75 RATIO — HIGH (ref 0.65–1.3)
LIDOCAIN IGE QN: 48 U/L — SIGNIFICANT CHANGE UP (ref 7–60)
LYMPHOCYTES # BLD AUTO: 1.21 K/UL — SIGNIFICANT CHANGE UP (ref 1.2–3.4)
LYMPHOCYTES # BLD AUTO: 21.2 % — SIGNIFICANT CHANGE UP (ref 20.5–51.1)
MCHC RBC-ENTMCNC: 36.5 G/DL — SIGNIFICANT CHANGE UP (ref 32–37)
MCHC RBC-ENTMCNC: 37.6 PG — HIGH (ref 27–31)
MCV RBC AUTO: 103 FL — HIGH (ref 80–94)
MONOCYTES # BLD AUTO: 0.27 K/UL — SIGNIFICANT CHANGE UP (ref 0.1–0.6)
MONOCYTES NFR BLD AUTO: 4.7 % — SIGNIFICANT CHANGE UP (ref 1.7–9.3)
NEUTROPHILS # BLD AUTO: 4.15 K/UL — SIGNIFICANT CHANGE UP (ref 1.4–6.5)
NEUTROPHILS NFR BLD AUTO: 72.5 % — SIGNIFICANT CHANGE UP (ref 42.2–75.2)
NRBC # BLD: 0 /100 WBCS — SIGNIFICANT CHANGE UP (ref 0–0)
PLATELET # BLD AUTO: 79 K/UL — LOW (ref 130–400)
PMV BLD: 11.2 FL — HIGH (ref 7.4–10.4)
POTASSIUM SERPL-MCNC: 4.6 MMOL/L — SIGNIFICANT CHANGE UP (ref 3.5–5)
POTASSIUM SERPL-SCNC: 4.6 MMOL/L — SIGNIFICANT CHANGE UP (ref 3.5–5)
PROT SERPL-MCNC: 6.9 G/DL — SIGNIFICANT CHANGE UP (ref 6–8)
PROTHROM AB SERPL-ACNC: 20.1 SEC — HIGH (ref 9.95–12.87)
RBC # BLD: 3.35 M/UL — LOW (ref 4.7–6.1)
RBC # FLD: 13.9 % — SIGNIFICANT CHANGE UP (ref 11.5–14.5)
SODIUM SERPL-SCNC: 138 MMOL/L — SIGNIFICANT CHANGE UP (ref 135–146)
WBC # BLD: 5.72 K/UL — SIGNIFICANT CHANGE UP (ref 4.8–10.8)
WBC # FLD AUTO: 5.72 K/UL — SIGNIFICANT CHANGE UP (ref 4.8–10.8)

## 2024-06-14 PROCEDURE — 93005 ELECTROCARDIOGRAM TRACING: CPT

## 2024-06-14 PROCEDURE — 85730 THROMBOPLASTIN TIME PARTIAL: CPT

## 2024-06-14 PROCEDURE — 96374 THER/PROPH/DIAG INJ IV PUSH: CPT | Mod: XU

## 2024-06-14 PROCEDURE — 36415 COLL VENOUS BLD VENIPUNCTURE: CPT

## 2024-06-14 PROCEDURE — 99285 EMERGENCY DEPT VISIT HI MDM: CPT | Mod: FS

## 2024-06-14 PROCEDURE — 74177 CT ABD & PELVIS W/CONTRAST: CPT | Mod: 26,MC

## 2024-06-14 PROCEDURE — 74177 CT ABD & PELVIS W/CONTRAST: CPT | Mod: MC

## 2024-06-14 PROCEDURE — 83690 ASSAY OF LIPASE: CPT

## 2024-06-14 PROCEDURE — 93010 ELECTROCARDIOGRAM REPORT: CPT | Mod: 76

## 2024-06-14 PROCEDURE — 85025 COMPLETE CBC W/AUTO DIFF WBC: CPT

## 2024-06-14 PROCEDURE — 80076 HEPATIC FUNCTION PANEL: CPT

## 2024-06-14 PROCEDURE — 99285 EMERGENCY DEPT VISIT HI MDM: CPT | Mod: 25

## 2024-06-14 PROCEDURE — 93010 ELECTROCARDIOGRAM REPORT: CPT | Mod: 77

## 2024-06-14 PROCEDURE — 80048 BASIC METABOLIC PNL TOTAL CA: CPT

## 2024-06-14 PROCEDURE — 85610 PROTHROMBIN TIME: CPT

## 2024-06-14 PROCEDURE — 99284 EMERGENCY DEPT VISIT MOD MDM: CPT | Mod: FS

## 2024-06-14 RX ORDER — SODIUM CHLORIDE 9 MG/ML
1000 INJECTION INTRAMUSCULAR; INTRAVENOUS; SUBCUTANEOUS ONCE
Refills: 0 | Status: COMPLETED | OUTPATIENT
Start: 2024-06-14 | End: 2024-06-14

## 2024-06-14 RX ORDER — KETOROLAC TROMETHAMINE 30 MG/ML
15 SYRINGE (ML) INJECTION ONCE
Refills: 0 | Status: DISCONTINUED | OUTPATIENT
Start: 2024-06-14 | End: 2024-06-14

## 2024-06-14 RX ORDER — METRONIDAZOLE 500 MG
1 TABLET ORAL
Qty: 20 | Refills: 0
Start: 2024-06-14

## 2024-06-14 RX ORDER — CIPROFLOXACIN LACTATE 400MG/40ML
1 VIAL (ML) INTRAVENOUS
Qty: 20 | Refills: 0
Start: 2024-06-14 | End: 2024-06-23

## 2024-06-14 RX ADMIN — Medication 25 MILLIGRAM(S): at 12:53

## 2024-06-14 RX ADMIN — Medication 15 MILLIGRAM(S): at 06:20

## 2024-06-14 RX ADMIN — Medication 50 MILLIGRAM(S): at 09:54

## 2024-06-14 RX ADMIN — SODIUM CHLORIDE 1000 MILLILITER(S): 9 INJECTION INTRAMUSCULAR; INTRAVENOUS; SUBCUTANEOUS at 04:27

## 2024-06-14 NOTE — ED PROVIDER NOTE - CLINICAL SUMMARY MEDICAL DECISION MAKING FREE TEXT BOX
Abdominal pain largely resolved in the ED.  Liver function tests abnormalities discussed with patient.  They may be due to a combination of fatty liver as well as chronic alcohol use.  I instructed him to follow this nonurgent lab abnormality with his PMD.  In my opinion, outpatient treatment and follow up are appropriate.

## 2024-06-14 NOTE — ED ADULT TRIAGE NOTE - CHIEF COMPLAINT QUOTE
pt states he feels shaky from alcohol withdrawal. says he drinks 1/2 -1 pint of alcohol a day x 2 weeks. last drink last night. denies hx of withdrawal seizures or DT's

## 2024-06-14 NOTE — ED PROVIDER NOTE - PATIENT PORTAL LINK FT
You can access the FollowMyHealth Patient Portal offered by [facility] by registering at the following website: [email or web address]. By joining [employer]’s FollowMyHealth portal, you will also be able to view your health information using other applications (apps) compatible with our system.

## 2024-06-14 NOTE — ED ADULT NURSE NOTE - TEMPLATE
Assisted Dr. Robby Morse with Recommendation for an Ultrasound guided breast biopsy. Procedure explained and written information given to [employer]. Emotional support provided and all questions answered.   Our breast center schedulers will call pt withi General

## 2024-06-14 NOTE — ED PROVIDER NOTE - OBJECTIVE STATEMENT
Patient is a 64-year-old male here for evaluation of feeling shaky after being discharged in the ED chest for proctocolitis.  Patient drinks alcohol every day and states that last drink was 9:00 last night.  Patient denies chest pain, shortness of breath

## 2024-06-14 NOTE — ED PROVIDER NOTE - NSFOLLOWUPCLINICS_GEN_ALL_ED_FT
[facility]  [facility]  [address] [place] [address]  Phone: [phone]  Fax:

## 2024-06-14 NOTE — ED PROVIDER NOTE - EKG/XRAY ADDITIONAL INFORMATION
EKG shows normal sinus rhythm rate of 75.  Axis is normal.  ST segments are normal and show no ischemia.

## 2024-06-14 NOTE — ED PROVIDER NOTE - ATTENDING APP SHARED VISIT CONTRIBUTION OF CARE
I have personally performed a history and physical exam on this patient and personally directed the management of the patient. Patient is a 64-year-old male presents for evaluation of abdominal pain described as moderate cramping in nature denies any dysuria hesitancy or frequency denies any diarrhea vomiting    On physical exam patient is normocephalic atraumatic pupils equal round reactive light accommodation extraocular muscles intact oropharynx clear chest clear to auscultation bilaterally abdomen soft nontender nondistended bowel sounds positive no guarding rebound extremities full range of motion no focal deficits noted    Assessment plan patient given IV fluids we obtained EKG per my depend evaluation axis with STEMI neck system with QT prolongation on consistent with arrhythmia we obtained CT scan which reveals evidence of colitis signed out to oncoming physician for further evaluation I will continue to monitor at this time

## 2024-06-14 NOTE — ED PROVIDER NOTE - PATIENT PORTAL LINK FT
You can access the FollowMyHealth Patient Portal offered by [facility] by registering at the following website: [email or web address]. By joining [employer]’s FollowMyHealth portal, you will also be able to view your health information using other applications (apps) compatible with our system.

## 2024-06-14 NOTE — ED PROVIDER NOTE - NSFOLLOWUPINSTRUCTIONS_ED_ALL_ED_FT
Take antibiotics as prescribed.  Follow-up your abnormal liver function tests with your private doctor.  They may be due to fatty liver.  Return to the ED for pain that does not respond to your medication.  Clear liquid diet for 2 days.    For pain, take 2 Tylenol and 2 Advil at the same time every 4 hours as needed.

## 2024-06-14 NOTE — ED PROVIDER NOTE - OBJECTIVE STATEMENT
64 year old male past medical history of Hypertension, GERD, DVT on comuadin, alcohol abuse comes to emergency room for left flank pain. no nausea, vomiting, diarrhea.

## 2024-06-14 NOTE — ED ADULT NURSE NOTE - NSFALLUNIVINTERV_ED_ALL_ED
Bed/Stretcher in lowest position, wheels locked, appropriate side rails in place/Call bell, personal items and telephone in reach/Instruct patient to call for assistance before getting out of bed/chair/stretcher/Non-slip footwear applied when patient is off stretcher/[place] to call system/Physically safe environment - no spills, clutter or unnecessary equipment/Purposeful proactive rounding/Room/bathroom lighting operational, light cord in reach

## 2024-06-14 NOTE — ED PROVIDER NOTE - ATTENDING APP SHARED VISIT CONTRIBUTION OF CARE
Patient with mild alcohol withdrawal symptoms after abstaining from alcohol for short period of time.  Symptoms greatly reduced with oral Librium.  In my opinion, outpatient follow-up and treatment are medically appropriate.

## 2024-06-14 NOTE — ED PROVIDER NOTE - CHIEF COMPLAINT
The patient is a 64y Male complaining of abdominal pain. Chart review - pt reported that she had a seizure in the past due to a head injury sustained while she was in half-way.

## 2024-06-28 ENCOUNTER — OUTPATIENT (OUTPATIENT)
Dept: OUTPATIENT SERVICES | Facility: HOSPITAL | Age: 64
LOS: 1 days | End: 2024-06-28
Payer: MEDICAID

## 2024-06-28 ENCOUNTER — APPOINTMENT (OUTPATIENT)
Dept: MEDICATION MANAGEMENT | Facility: CLINIC | Age: 64
End: 2024-06-28

## 2024-06-28 DIAGNOSIS — Z79.01 LONG TERM (CURRENT) USE OF ANTICOAGULANTS: ICD-10-CM

## 2024-06-28 DIAGNOSIS — I82.409 ACUTE EMBOLISM AND THROMBOSIS OF UNSPECIFIED DEEP VEINS OF UNSPECIFIED LOWER EXTREMITY: ICD-10-CM

## 2024-06-28 LAB
INR PPP: 1.4 RATIO
POCT-PROTHROMBIN TIME: 16.5 SECS
QUALITY CONTROL: YES

## 2024-06-28 PROCEDURE — 85610 PROTHROMBIN TIME: CPT

## 2024-06-28 PROCEDURE — 36416 COLLJ CAPILLARY BLOOD SPEC: CPT

## 2024-06-28 PROCEDURE — 99211 OFF/OP EST MAY X REQ PHY/QHP: CPT

## 2024-06-29 DIAGNOSIS — Z79.01 LONG TERM (CURRENT) USE OF ANTICOAGULANTS: ICD-10-CM

## 2024-06-29 DIAGNOSIS — I82.409 ACUTE EMBOLISM AND THROMBOSIS OF UNSPECIFIED DEEP VEINS OF UNSPECIFIED LOWER EXTREMITY: ICD-10-CM

## 2024-07-01 ENCOUNTER — OUTPATIENT (OUTPATIENT)
Dept: OUTPATIENT SERVICES | Facility: HOSPITAL | Age: 64
LOS: 1 days | End: 2024-07-01
Payer: MEDICARE

## 2024-07-01 ENCOUNTER — APPOINTMENT (OUTPATIENT)
Dept: MEDICATION MANAGEMENT | Facility: CLINIC | Age: 64
End: 2024-07-01

## 2024-07-01 DIAGNOSIS — I82.409 ACUTE EMBOLISM AND THROMBOSIS OF UNSPECIFIED DEEP VEINS OF UNSPECIFIED LOWER EXTREMITY: ICD-10-CM

## 2024-07-01 DIAGNOSIS — Z79.01 LONG TERM (CURRENT) USE OF ANTICOAGULANTS: ICD-10-CM

## 2024-07-01 LAB
INR PPP: 1.5 RATIO
POCT-PROTHROMBIN TIME: 18.3 SECS
QUALITY CONTROL: YES

## 2024-07-01 PROCEDURE — 85610 PROTHROMBIN TIME: CPT

## 2024-07-01 PROCEDURE — 36416 COLLJ CAPILLARY BLOOD SPEC: CPT

## 2024-07-01 PROCEDURE — 99211 OFF/OP EST MAY X REQ PHY/QHP: CPT

## 2024-07-02 DIAGNOSIS — I82.409 ACUTE EMBOLISM AND THROMBOSIS OF UNSPECIFIED DEEP VEINS OF UNSPECIFIED LOWER EXTREMITY: ICD-10-CM

## 2024-07-02 DIAGNOSIS — Z79.01 LONG TERM (CURRENT) USE OF ANTICOAGULANTS: ICD-10-CM

## 2024-07-08 ENCOUNTER — APPOINTMENT (OUTPATIENT)
Dept: MEDICATION MANAGEMENT | Facility: CLINIC | Age: 64
End: 2024-07-08

## 2024-07-08 ENCOUNTER — OUTPATIENT (OUTPATIENT)
Dept: OUTPATIENT SERVICES | Facility: HOSPITAL | Age: 64
LOS: 1 days | End: 2024-07-08
Payer: MEDICARE

## 2024-07-08 DIAGNOSIS — Z79.01 LONG TERM (CURRENT) USE OF ANTICOAGULANTS: ICD-10-CM

## 2024-07-08 DIAGNOSIS — I82.409 ACUTE EMBOLISM AND THROMBOSIS OF UNSPECIFIED DEEP VEINS OF UNSPECIFIED LOWER EXTREMITY: ICD-10-CM

## 2024-07-08 LAB
INR PPP: 2.2 RATIO
POCT-PROTHROMBIN TIME: 26.1 SECS
QUALITY CONTROL: YES

## 2024-07-08 PROCEDURE — 36416 COLLJ CAPILLARY BLOOD SPEC: CPT

## 2024-07-08 PROCEDURE — 99211 OFF/OP EST MAY X REQ PHY/QHP: CPT

## 2024-07-08 PROCEDURE — 85610 PROTHROMBIN TIME: CPT

## 2024-07-09 DIAGNOSIS — I82.409 ACUTE EMBOLISM AND THROMBOSIS OF UNSPECIFIED DEEP VEINS OF UNSPECIFIED LOWER EXTREMITY: ICD-10-CM

## 2024-07-09 DIAGNOSIS — Z79.01 LONG TERM (CURRENT) USE OF ANTICOAGULANTS: ICD-10-CM

## 2024-07-19 ENCOUNTER — APPOINTMENT (OUTPATIENT)
Dept: MEDICATION MANAGEMENT | Facility: CLINIC | Age: 64
End: 2024-07-19

## 2024-07-19 ENCOUNTER — OUTPATIENT (OUTPATIENT)
Dept: OUTPATIENT SERVICES | Facility: HOSPITAL | Age: 64
LOS: 1 days | End: 2024-07-19
Payer: MEDICARE

## 2024-07-19 DIAGNOSIS — I82.409 ACUTE EMBOLISM AND THROMBOSIS OF UNSPECIFIED DEEP VEINS OF UNSPECIFIED LOWER EXTREMITY: ICD-10-CM

## 2024-07-19 DIAGNOSIS — Z79.01 LONG TERM (CURRENT) USE OF ANTICOAGULANTS: ICD-10-CM

## 2024-07-19 PROCEDURE — 99211 OFF/OP EST MAY X REQ PHY/QHP: CPT

## 2024-07-19 PROCEDURE — 36416 COLLJ CAPILLARY BLOOD SPEC: CPT

## 2024-07-19 PROCEDURE — 85610 PROTHROMBIN TIME: CPT

## 2024-07-20 DIAGNOSIS — I82.409 ACUTE EMBOLISM AND THROMBOSIS OF UNSPECIFIED DEEP VEINS OF UNSPECIFIED LOWER EXTREMITY: ICD-10-CM

## 2024-07-20 DIAGNOSIS — Z79.01 LONG TERM (CURRENT) USE OF ANTICOAGULANTS: ICD-10-CM

## 2024-07-22 LAB
INR PPP: 1.9 RATIO
POCT-PROTHROMBIN TIME: 23.2 SECS
QUALITY CONTROL: YES

## 2024-07-24 ENCOUNTER — APPOINTMENT (OUTPATIENT)
Dept: MEDICATION MANAGEMENT | Facility: CLINIC | Age: 64
End: 2024-07-24

## 2024-07-24 LAB
INR PPP: 2.2 RATIO
POCT-PROTHROMBIN TIME: 26.1 SECS
QUALITY CONTROL: YES

## 2024-07-31 ENCOUNTER — APPOINTMENT (OUTPATIENT)
Dept: MEDICATION MANAGEMENT | Facility: CLINIC | Age: 64
End: 2024-07-31

## 2024-07-31 ENCOUNTER — OUTPATIENT (OUTPATIENT)
Dept: OUTPATIENT SERVICES | Facility: HOSPITAL | Age: 64
LOS: 1 days | End: 2024-07-31
Payer: MEDICARE

## 2024-07-31 DIAGNOSIS — Z79.01 LONG TERM (CURRENT) USE OF ANTICOAGULANTS: ICD-10-CM

## 2024-07-31 DIAGNOSIS — I82.409 ACUTE EMBOLISM AND THROMBOSIS OF UNSPECIFIED DEEP VEINS OF UNSPECIFIED LOWER EXTREMITY: ICD-10-CM

## 2024-07-31 LAB
INR PPP: 2.3 RATIO
POCT-PROTHROMBIN TIME: 28.2 SECS
QUALITY CONTROL: YES

## 2024-07-31 PROCEDURE — 99211 OFF/OP EST MAY X REQ PHY/QHP: CPT

## 2024-07-31 PROCEDURE — 85610 PROTHROMBIN TIME: CPT

## 2024-07-31 PROCEDURE — 36416 COLLJ CAPILLARY BLOOD SPEC: CPT

## 2024-08-01 DIAGNOSIS — I82.409 ACUTE EMBOLISM AND THROMBOSIS OF UNSPECIFIED DEEP VEINS OF UNSPECIFIED LOWER EXTREMITY: ICD-10-CM

## 2024-08-01 DIAGNOSIS — Z79.01 LONG TERM (CURRENT) USE OF ANTICOAGULANTS: ICD-10-CM

## 2024-08-08 ENCOUNTER — APPOINTMENT (OUTPATIENT)
Dept: MEDICATION MANAGEMENT | Facility: CLINIC | Age: 64
End: 2024-08-08

## 2024-08-08 ENCOUNTER — OUTPATIENT (OUTPATIENT)
Dept: OUTPATIENT SERVICES | Facility: HOSPITAL | Age: 64
LOS: 1 days | End: 2024-08-08
Payer: MEDICARE

## 2024-08-08 DIAGNOSIS — Z79.01 LONG TERM (CURRENT) USE OF ANTICOAGULANTS: ICD-10-CM

## 2024-08-08 DIAGNOSIS — I82.409 ACUTE EMBOLISM AND THROMBOSIS OF UNSPECIFIED DEEP VEINS OF UNSPECIFIED LOWER EXTREMITY: ICD-10-CM

## 2024-08-08 PROCEDURE — 99211 OFF/OP EST MAY X REQ PHY/QHP: CPT

## 2024-08-08 PROCEDURE — 36416 COLLJ CAPILLARY BLOOD SPEC: CPT

## 2024-08-08 PROCEDURE — 85610 PROTHROMBIN TIME: CPT

## 2024-08-09 DIAGNOSIS — Z79.01 LONG TERM (CURRENT) USE OF ANTICOAGULANTS: ICD-10-CM

## 2024-08-09 DIAGNOSIS — I82.409 ACUTE EMBOLISM AND THROMBOSIS OF UNSPECIFIED DEEP VEINS OF UNSPECIFIED LOWER EXTREMITY: ICD-10-CM

## 2024-08-14 ENCOUNTER — APPOINTMENT (OUTPATIENT)
Dept: MEDICATION MANAGEMENT | Facility: CLINIC | Age: 64
End: 2024-08-14

## 2024-08-14 LAB
INR PPP: 2 RATIO
POCT-PROTHROMBIN TIME: 24.1 SECS
QUALITY CONTROL: YES

## 2024-08-21 ENCOUNTER — OUTPATIENT (OUTPATIENT)
Dept: OUTPATIENT SERVICES | Facility: HOSPITAL | Age: 64
LOS: 1 days | End: 2024-08-21
Payer: MEDICARE

## 2024-08-21 ENCOUNTER — APPOINTMENT (OUTPATIENT)
Dept: MEDICATION MANAGEMENT | Facility: CLINIC | Age: 64
End: 2024-08-21

## 2024-08-21 DIAGNOSIS — Z79.01 LONG TERM (CURRENT) USE OF ANTICOAGULANTS: ICD-10-CM

## 2024-08-21 DIAGNOSIS — I82.409 ACUTE EMBOLISM AND THROMBOSIS OF UNSPECIFIED DEEP VEINS OF UNSPECIFIED LOWER EXTREMITY: ICD-10-CM

## 2024-08-21 LAB
INR PPP: 3.7 RATIO
POCT-PROTHROMBIN TIME: 44.4 SECS
QUALITY CONTROL: YES

## 2024-08-21 PROCEDURE — 99211 OFF/OP EST MAY X REQ PHY/QHP: CPT

## 2024-08-21 PROCEDURE — 85610 PROTHROMBIN TIME: CPT

## 2024-08-21 PROCEDURE — 36416 COLLJ CAPILLARY BLOOD SPEC: CPT

## 2024-08-22 DIAGNOSIS — Z79.01 LONG TERM (CURRENT) USE OF ANTICOAGULANTS: ICD-10-CM

## 2024-08-22 DIAGNOSIS — I82.409 ACUTE EMBOLISM AND THROMBOSIS OF UNSPECIFIED DEEP VEINS OF UNSPECIFIED LOWER EXTREMITY: ICD-10-CM

## 2024-08-28 ENCOUNTER — APPOINTMENT (OUTPATIENT)
Dept: MEDICATION MANAGEMENT | Facility: CLINIC | Age: 64
End: 2024-08-28

## 2024-08-28 ENCOUNTER — OUTPATIENT (OUTPATIENT)
Dept: OUTPATIENT SERVICES | Facility: HOSPITAL | Age: 64
LOS: 1 days | End: 2024-08-28
Payer: MEDICARE

## 2024-08-28 DIAGNOSIS — Z79.01 LONG TERM (CURRENT) USE OF ANTICOAGULANTS: ICD-10-CM

## 2024-08-28 DIAGNOSIS — I82.409 ACUTE EMBOLISM AND THROMBOSIS OF UNSPECIFIED DEEP VEINS OF UNSPECIFIED LOWER EXTREMITY: ICD-10-CM

## 2024-08-28 LAB
INR PPP: 3.7 RATIO
POCT-PROTHROMBIN TIME: 44.8 SECS
QUALITY CONTROL: YES

## 2024-08-28 PROCEDURE — 99211 OFF/OP EST MAY X REQ PHY/QHP: CPT

## 2024-08-28 PROCEDURE — 36416 COLLJ CAPILLARY BLOOD SPEC: CPT

## 2024-08-28 PROCEDURE — 85610 PROTHROMBIN TIME: CPT

## 2024-08-29 DIAGNOSIS — Z79.01 LONG TERM (CURRENT) USE OF ANTICOAGULANTS: ICD-10-CM

## 2024-08-29 DIAGNOSIS — I82.409 ACUTE EMBOLISM AND THROMBOSIS OF UNSPECIFIED DEEP VEINS OF UNSPECIFIED LOWER EXTREMITY: ICD-10-CM

## 2024-09-05 ENCOUNTER — OUTPATIENT (OUTPATIENT)
Dept: OUTPATIENT SERVICES | Facility: HOSPITAL | Age: 64
LOS: 1 days | End: 2024-09-05
Payer: MEDICARE

## 2024-09-05 ENCOUNTER — APPOINTMENT (OUTPATIENT)
Dept: MEDICATION MANAGEMENT | Facility: CLINIC | Age: 64
End: 2024-09-05

## 2024-09-05 DIAGNOSIS — I82.409 ACUTE EMBOLISM AND THROMBOSIS OF UNSPECIFIED DEEP VEINS OF UNSPECIFIED LOWER EXTREMITY: ICD-10-CM

## 2024-09-05 DIAGNOSIS — Z79.01 LONG TERM (CURRENT) USE OF ANTICOAGULANTS: ICD-10-CM

## 2024-09-05 LAB
INR PPP: 2.9 RATIO
POCT-PROTHROMBIN TIME: 34.5 SECS
QUALITY CONTROL: YES

## 2024-09-05 PROCEDURE — 85610 PROTHROMBIN TIME: CPT

## 2024-09-05 PROCEDURE — 99211 OFF/OP EST MAY X REQ PHY/QHP: CPT

## 2024-09-05 PROCEDURE — 36416 COLLJ CAPILLARY BLOOD SPEC: CPT

## 2024-09-06 DIAGNOSIS — I82.409 ACUTE EMBOLISM AND THROMBOSIS OF UNSPECIFIED DEEP VEINS OF UNSPECIFIED LOWER EXTREMITY: ICD-10-CM

## 2024-09-06 DIAGNOSIS — Z79.01 LONG TERM (CURRENT) USE OF ANTICOAGULANTS: ICD-10-CM

## 2024-09-12 ENCOUNTER — OUTPATIENT (OUTPATIENT)
Dept: OUTPATIENT SERVICES | Facility: HOSPITAL | Age: 64
LOS: 1 days | End: 2024-09-12
Payer: MEDICARE

## 2024-09-12 ENCOUNTER — APPOINTMENT (OUTPATIENT)
Dept: MEDICATION MANAGEMENT | Facility: CLINIC | Age: 64
End: 2024-09-12

## 2024-09-12 DIAGNOSIS — Z79.01 LONG TERM (CURRENT) USE OF ANTICOAGULANTS: ICD-10-CM

## 2024-09-12 DIAGNOSIS — I82.409 ACUTE EMBOLISM AND THROMBOSIS OF UNSPECIFIED DEEP VEINS OF UNSPECIFIED LOWER EXTREMITY: ICD-10-CM

## 2024-09-12 LAB
INR PPP: 1.7 RATIO
POCT-PROTHROMBIN TIME: 20.8 SECS
QUALITY CONTROL: YES

## 2024-09-12 PROCEDURE — 99211 OFF/OP EST MAY X REQ PHY/QHP: CPT

## 2024-09-12 PROCEDURE — 36416 COLLJ CAPILLARY BLOOD SPEC: CPT

## 2024-09-12 PROCEDURE — 85610 PROTHROMBIN TIME: CPT

## 2024-09-13 DIAGNOSIS — I82.409 ACUTE EMBOLISM AND THROMBOSIS OF UNSPECIFIED DEEP VEINS OF UNSPECIFIED LOWER EXTREMITY: ICD-10-CM

## 2024-09-13 DIAGNOSIS — Z79.01 LONG TERM (CURRENT) USE OF ANTICOAGULANTS: ICD-10-CM

## 2024-09-19 ENCOUNTER — OUTPATIENT (OUTPATIENT)
Dept: OUTPATIENT SERVICES | Facility: HOSPITAL | Age: 64
LOS: 1 days | End: 2024-09-19
Payer: MEDICARE

## 2024-09-19 ENCOUNTER — APPOINTMENT (OUTPATIENT)
Dept: MEDICATION MANAGEMENT | Facility: CLINIC | Age: 64
End: 2024-09-19

## 2024-09-19 DIAGNOSIS — I82.409 ACUTE EMBOLISM AND THROMBOSIS OF UNSPECIFIED DEEP VEINS OF UNSPECIFIED LOWER EXTREMITY: ICD-10-CM

## 2024-09-19 DIAGNOSIS — Z79.01 LONG TERM (CURRENT) USE OF ANTICOAGULANTS: ICD-10-CM

## 2024-09-19 LAB
INR PPP: 2.2 RATIO
POCT-PROTHROMBIN TIME: 26.5 SECS
QUALITY CONTROL: YES

## 2024-09-19 PROCEDURE — 99211 OFF/OP EST MAY X REQ PHY/QHP: CPT

## 2024-09-19 PROCEDURE — 85610 PROTHROMBIN TIME: CPT

## 2024-09-19 PROCEDURE — 36416 COLLJ CAPILLARY BLOOD SPEC: CPT

## 2024-09-20 ENCOUNTER — APPOINTMENT (OUTPATIENT)
Dept: GASTROENTEROLOGY | Facility: CLINIC | Age: 64
End: 2024-09-20

## 2024-09-20 DIAGNOSIS — Z79.01 LONG TERM (CURRENT) USE OF ANTICOAGULANTS: ICD-10-CM

## 2024-09-20 DIAGNOSIS — I82.409 ACUTE EMBOLISM AND THROMBOSIS OF UNSPECIFIED DEEP VEINS OF UNSPECIFIED LOWER EXTREMITY: ICD-10-CM

## 2024-09-26 ENCOUNTER — APPOINTMENT (OUTPATIENT)
Dept: MEDICATION MANAGEMENT | Facility: CLINIC | Age: 64
End: 2024-09-26

## 2024-09-26 ENCOUNTER — OUTPATIENT (OUTPATIENT)
Dept: OUTPATIENT SERVICES | Facility: HOSPITAL | Age: 64
LOS: 1 days | End: 2024-09-26
Payer: MEDICARE

## 2024-09-26 DIAGNOSIS — I82.409 ACUTE EMBOLISM AND THROMBOSIS OF UNSPECIFIED DEEP VEINS OF UNSPECIFIED LOWER EXTREMITY: ICD-10-CM

## 2024-09-26 DIAGNOSIS — Z79.01 LONG TERM (CURRENT) USE OF ANTICOAGULANTS: ICD-10-CM

## 2024-09-26 LAB
INR PPP: 1.3 RATIO
POCT-PROTHROMBIN TIME: 15.3 SECS
QUALITY CONTROL: YES

## 2024-09-26 PROCEDURE — 36416 COLLJ CAPILLARY BLOOD SPEC: CPT

## 2024-09-26 PROCEDURE — 99211 OFF/OP EST MAY X REQ PHY/QHP: CPT

## 2024-09-26 PROCEDURE — 85610 PROTHROMBIN TIME: CPT

## 2024-09-27 DIAGNOSIS — I82.409 ACUTE EMBOLISM AND THROMBOSIS OF UNSPECIFIED DEEP VEINS OF UNSPECIFIED LOWER EXTREMITY: ICD-10-CM

## 2024-09-27 DIAGNOSIS — Z79.01 LONG TERM (CURRENT) USE OF ANTICOAGULANTS: ICD-10-CM

## 2024-09-30 ENCOUNTER — APPOINTMENT (OUTPATIENT)
Dept: MEDICATION MANAGEMENT | Facility: CLINIC | Age: 64
End: 2024-09-30

## 2024-09-30 ENCOUNTER — OUTPATIENT (OUTPATIENT)
Dept: OUTPATIENT SERVICES | Facility: HOSPITAL | Age: 64
LOS: 1 days | End: 2024-09-30
Payer: MEDICARE

## 2024-09-30 DIAGNOSIS — Z79.01 LONG TERM (CURRENT) USE OF ANTICOAGULANTS: ICD-10-CM

## 2024-09-30 DIAGNOSIS — I82.409 ACUTE EMBOLISM AND THROMBOSIS OF UNSPECIFIED DEEP VEINS OF UNSPECIFIED LOWER EXTREMITY: ICD-10-CM

## 2024-09-30 LAB
INR PPP: 1.7 RATIO
POCT-PROTHROMBIN TIME: 20.5 SECS
QUALITY CONTROL: YES

## 2024-09-30 PROCEDURE — 85610 PROTHROMBIN TIME: CPT

## 2024-09-30 PROCEDURE — 36416 COLLJ CAPILLARY BLOOD SPEC: CPT

## 2024-09-30 PROCEDURE — 99211 OFF/OP EST MAY X REQ PHY/QHP: CPT

## 2024-10-01 DIAGNOSIS — I82.409 ACUTE EMBOLISM AND THROMBOSIS OF UNSPECIFIED DEEP VEINS OF UNSPECIFIED LOWER EXTREMITY: ICD-10-CM

## 2024-10-01 DIAGNOSIS — Z79.01 LONG TERM (CURRENT) USE OF ANTICOAGULANTS: ICD-10-CM

## 2024-10-02 NOTE — PATIENT PROFILE ADULT - NSPROPOAPRESSUREINJURY_GEN_A_NUR
[email or web address]  URL: [email or web address]  no

## 2024-10-04 ENCOUNTER — OUTPATIENT (OUTPATIENT)
Dept: OUTPATIENT SERVICES | Facility: HOSPITAL | Age: 64
LOS: 1 days | End: 2024-10-04
Payer: MEDICARE

## 2024-10-04 ENCOUNTER — APPOINTMENT (OUTPATIENT)
Dept: MEDICATION MANAGEMENT | Facility: CLINIC | Age: 64
End: 2024-10-04

## 2024-10-04 DIAGNOSIS — Z79.01 LONG TERM (CURRENT) USE OF ANTICOAGULANTS: ICD-10-CM

## 2024-10-04 DIAGNOSIS — I82.409 ACUTE EMBOLISM AND THROMBOSIS OF UNSPECIFIED DEEP VEINS OF UNSPECIFIED LOWER EXTREMITY: ICD-10-CM

## 2024-10-04 LAB
INR PPP: 2.2 RATIO
POCT-PROTHROMBIN TIME: 25.9 SECS
QUALITY CONTROL: YES

## 2024-10-04 PROCEDURE — 36416 COLLJ CAPILLARY BLOOD SPEC: CPT

## 2024-10-04 PROCEDURE — 99211 OFF/OP EST MAY X REQ PHY/QHP: CPT

## 2024-10-04 PROCEDURE — 85610 PROTHROMBIN TIME: CPT

## 2024-10-05 DIAGNOSIS — Z79.01 LONG TERM (CURRENT) USE OF ANTICOAGULANTS: ICD-10-CM

## 2024-10-05 DIAGNOSIS — I82.409 ACUTE EMBOLISM AND THROMBOSIS OF UNSPECIFIED DEEP VEINS OF UNSPECIFIED LOWER EXTREMITY: ICD-10-CM

## 2024-10-11 ENCOUNTER — APPOINTMENT (OUTPATIENT)
Dept: MEDICATION MANAGEMENT | Facility: CLINIC | Age: 64
End: 2024-10-11

## 2024-10-11 ENCOUNTER — OUTPATIENT (OUTPATIENT)
Dept: OUTPATIENT SERVICES | Facility: HOSPITAL | Age: 64
LOS: 1 days | End: 2024-10-11
Payer: MEDICARE

## 2024-10-11 DIAGNOSIS — Z79.01 LONG TERM (CURRENT) USE OF ANTICOAGULANTS: ICD-10-CM

## 2024-10-11 DIAGNOSIS — I82.409 ACUTE EMBOLISM AND THROMBOSIS OF UNSPECIFIED DEEP VEINS OF UNSPECIFIED LOWER EXTREMITY: ICD-10-CM

## 2024-10-11 LAB
INR PPP: 2.8 RATIO
POCT-PROTHROMBIN TIME: 33.5 SECS
QUALITY CONTROL: YES

## 2024-10-11 PROCEDURE — 85610 PROTHROMBIN TIME: CPT

## 2024-10-11 PROCEDURE — 36416 COLLJ CAPILLARY BLOOD SPEC: CPT

## 2024-10-11 PROCEDURE — 99211 OFF/OP EST MAY X REQ PHY/QHP: CPT

## 2024-10-12 DIAGNOSIS — I82.409 ACUTE EMBOLISM AND THROMBOSIS OF UNSPECIFIED DEEP VEINS OF UNSPECIFIED LOWER EXTREMITY: ICD-10-CM

## 2024-10-12 DIAGNOSIS — Z79.01 LONG TERM (CURRENT) USE OF ANTICOAGULANTS: ICD-10-CM

## 2024-10-14 NOTE — PROGRESS NOTE ADULT - REASON FOR ADMISSION
etho intoxication
98.2

## 2024-10-25 ENCOUNTER — OUTPATIENT (OUTPATIENT)
Dept: OUTPATIENT SERVICES | Facility: HOSPITAL | Age: 64
LOS: 1 days | End: 2024-10-25
Payer: MEDICARE

## 2024-10-25 ENCOUNTER — APPOINTMENT (OUTPATIENT)
Dept: MEDICATION MANAGEMENT | Facility: CLINIC | Age: 64
End: 2024-10-25

## 2024-10-25 DIAGNOSIS — I82.409 ACUTE EMBOLISM AND THROMBOSIS OF UNSPECIFIED DEEP VEINS OF UNSPECIFIED LOWER EXTREMITY: ICD-10-CM

## 2024-10-25 DIAGNOSIS — Z79.01 LONG TERM (CURRENT) USE OF ANTICOAGULANTS: ICD-10-CM

## 2024-10-25 LAB
INR PPP: 3.1 RATIO
POCT-PROTHROMBIN TIME: 37.1 SECS
QUALITY CONTROL: YES

## 2024-10-25 PROCEDURE — 85610 PROTHROMBIN TIME: CPT

## 2024-10-25 PROCEDURE — 36416 COLLJ CAPILLARY BLOOD SPEC: CPT

## 2024-10-25 PROCEDURE — 99211 OFF/OP EST MAY X REQ PHY/QHP: CPT

## 2024-10-31 ENCOUNTER — APPOINTMENT (OUTPATIENT)
Dept: MEDICATION MANAGEMENT | Facility: CLINIC | Age: 64
End: 2024-10-31

## 2024-10-31 ENCOUNTER — OUTPATIENT (OUTPATIENT)
Dept: OUTPATIENT SERVICES | Facility: HOSPITAL | Age: 64
LOS: 1 days | End: 2024-10-31
Payer: MEDICARE

## 2024-10-31 DIAGNOSIS — I82.409 ACUTE EMBOLISM AND THROMBOSIS OF UNSPECIFIED DEEP VEINS OF UNSPECIFIED LOWER EXTREMITY: ICD-10-CM

## 2024-10-31 DIAGNOSIS — Z79.01 LONG TERM (CURRENT) USE OF ANTICOAGULANTS: ICD-10-CM

## 2024-10-31 LAB
INR PPP: 4.4 RATIO
POCT-PROTHROMBIN TIME: 53.2 SECS
QUALITY CONTROL: YES

## 2024-10-31 PROCEDURE — 36416 COLLJ CAPILLARY BLOOD SPEC: CPT

## 2024-10-31 PROCEDURE — 85610 PROTHROMBIN TIME: CPT

## 2024-10-31 PROCEDURE — 99211 OFF/OP EST MAY X REQ PHY/QHP: CPT

## 2024-11-04 ENCOUNTER — APPOINTMENT (OUTPATIENT)
Dept: MEDICATION MANAGEMENT | Facility: CLINIC | Age: 64
End: 2024-11-04

## 2024-11-04 ENCOUNTER — OUTPATIENT (OUTPATIENT)
Dept: OUTPATIENT SERVICES | Facility: HOSPITAL | Age: 64
LOS: 1 days | End: 2024-11-04
Payer: MEDICARE

## 2024-11-04 DIAGNOSIS — I82.409 ACUTE EMBOLISM AND THROMBOSIS OF UNSPECIFIED DEEP VEINS OF UNSPECIFIED LOWER EXTREMITY: ICD-10-CM

## 2024-11-04 DIAGNOSIS — Z79.01 LONG TERM (CURRENT) USE OF ANTICOAGULANTS: ICD-10-CM

## 2024-11-04 LAB
INR PPP: 2.5 RATIO
POCT-PROTHROMBIN TIME: 29.8 SECS
QUALITY CONTROL: YES

## 2024-11-04 PROCEDURE — 36416 COLLJ CAPILLARY BLOOD SPEC: CPT

## 2024-11-04 PROCEDURE — 99211 OFF/OP EST MAY X REQ PHY/QHP: CPT

## 2024-11-04 PROCEDURE — 85610 PROTHROMBIN TIME: CPT

## 2024-11-09 ENCOUNTER — INPATIENT (INPATIENT)
Facility: HOSPITAL | Age: 64
LOS: 1 days | Discharge: HOME | DRG: 641 | End: 2024-11-11
Attending: INTERNAL MEDICINE | Admitting: INTERNAL MEDICINE
Payer: MEDICARE

## 2024-11-09 VITALS
RESPIRATION RATE: 17 BRPM | TEMPERATURE: 98 F | SYSTOLIC BLOOD PRESSURE: 146 MMHG | OXYGEN SATURATION: 95 % | HEART RATE: 120 BPM | DIASTOLIC BLOOD PRESSURE: 73 MMHG | WEIGHT: 199.96 LBS

## 2024-11-09 DIAGNOSIS — E87.29 OTHER ACIDOSIS: ICD-10-CM

## 2024-11-09 LAB
ALBUMIN SERPL ELPH-MCNC: 4.1 G/DL — SIGNIFICANT CHANGE UP (ref 3.5–5.2)
ALBUMIN SERPL ELPH-MCNC: 4.2 G/DL — SIGNIFICANT CHANGE UP (ref 3.5–5.2)
ALP SERPL-CCNC: 121 U/L — HIGH (ref 30–115)
ALP SERPL-CCNC: 128 U/L — HIGH (ref 30–115)
ALT FLD-CCNC: 16 U/L — SIGNIFICANT CHANGE UP (ref 0–41)
ALT FLD-CCNC: 21 U/L — SIGNIFICANT CHANGE UP (ref 0–41)
ANION GAP SERPL CALC-SCNC: 27 MMOL/L — HIGH (ref 7–14)
ANION GAP SERPL CALC-SCNC: 28 MMOL/L — HIGH (ref 7–14)
ANION GAP SERPL CALC-SCNC: 30 MMOL/L — HIGH (ref 7–14)
APTT BLD: 34.7 SEC — SIGNIFICANT CHANGE UP (ref 27–39.2)
AST SERPL-CCNC: 51 U/L — HIGH (ref 0–41)
AST SERPL-CCNC: 72 U/L — HIGH (ref 0–41)
B-OH-BUTYR SERPL-SCNC: 1.1 MMOL/L — HIGH
BASE EXCESS BLDV CALC-SCNC: -14.6 MMOL/L — LOW (ref -2–3)
BASOPHILS # BLD AUTO: 0.12 K/UL — SIGNIFICANT CHANGE UP (ref 0–0.2)
BASOPHILS NFR BLD AUTO: 1.2 % — HIGH (ref 0–1)
BILIRUB SERPL-MCNC: 1.5 MG/DL — HIGH (ref 0.2–1.2)
BILIRUB SERPL-MCNC: 1.6 MG/DL — HIGH (ref 0.2–1.2)
BUN SERPL-MCNC: 9 MG/DL — LOW (ref 10–20)
CA-I SERPL-SCNC: 1.07 MMOL/L — LOW (ref 1.15–1.33)
CALCIUM SERPL-MCNC: 8.8 MG/DL — SIGNIFICANT CHANGE UP (ref 8.4–10.5)
CALCIUM SERPL-MCNC: 9 MG/DL — SIGNIFICANT CHANGE UP (ref 8.4–10.5)
CALCIUM SERPL-MCNC: 9.4 MG/DL — SIGNIFICANT CHANGE UP (ref 8.4–10.5)
CHLORIDE SERPL-SCNC: 100 MMOL/L — SIGNIFICANT CHANGE UP (ref 98–110)
CHLORIDE SERPL-SCNC: 97 MMOL/L — LOW (ref 98–110)
CHLORIDE SERPL-SCNC: 97 MMOL/L — LOW (ref 98–110)
CO2 SERPL-SCNC: 13 MMOL/L — LOW (ref 17–32)
CO2 SERPL-SCNC: 14 MMOL/L — LOW (ref 17–32)
CO2 SERPL-SCNC: 14 MMOL/L — LOW (ref 17–32)
CREAT SERPL-MCNC: 1.3 MG/DL — SIGNIFICANT CHANGE UP (ref 0.7–1.5)
CREAT SERPL-MCNC: 1.3 MG/DL — SIGNIFICANT CHANGE UP (ref 0.7–1.5)
CREAT SERPL-MCNC: 1.4 MG/DL — SIGNIFICANT CHANGE UP (ref 0.7–1.5)
EGFR: 56 ML/MIN/1.73M2 — LOW
EGFR: 61 ML/MIN/1.73M2 — SIGNIFICANT CHANGE UP
EGFR: 61 ML/MIN/1.73M2 — SIGNIFICANT CHANGE UP
EOSINOPHIL # BLD AUTO: 0 K/UL — SIGNIFICANT CHANGE UP (ref 0–0.7)
EOSINOPHIL NFR BLD AUTO: 0 % — SIGNIFICANT CHANGE UP (ref 0–8)
ETHANOL SERPL-MCNC: 264 MG/DL — HIGH
GAS PNL BLDA: SIGNIFICANT CHANGE UP
GAS PNL BLDV: 141 MMOL/L — SIGNIFICANT CHANGE UP (ref 136–145)
GAS PNL BLDV: SIGNIFICANT CHANGE UP
GAS PNL BLDV: SIGNIFICANT CHANGE UP
GLUCOSE SERPL-MCNC: 233 MG/DL — HIGH (ref 70–99)
GLUCOSE SERPL-MCNC: 234 MG/DL — HIGH (ref 70–99)
GLUCOSE SERPL-MCNC: 75 MG/DL — SIGNIFICANT CHANGE UP (ref 70–99)
HCO3 BLDV-SCNC: 13 MMOL/L — LOW (ref 22–29)
HCT VFR BLD CALC: 39.6 % — LOW (ref 42–52)
HCT VFR BLDA CALC: 40 % — SIGNIFICANT CHANGE UP (ref 39–51)
HGB BLD CALC-MCNC: 13.4 G/DL — SIGNIFICANT CHANGE UP (ref 12.6–17.4)
HGB BLD-MCNC: 13.5 G/DL — LOW (ref 14–18)
IMM GRANULOCYTES NFR BLD AUTO: 0.4 % — HIGH (ref 0.1–0.3)
INR BLD: 0.91 RATIO — SIGNIFICANT CHANGE UP (ref 0.65–1.3)
LACTATE BLDV-MCNC: 14.4 MMOL/L — CRITICAL HIGH (ref 0.5–2)
LACTATE SERPL-SCNC: 10.3 MMOL/L — CRITICAL HIGH (ref 0.7–2)
LIDOCAIN IGE QN: 32 U/L — SIGNIFICANT CHANGE UP (ref 7–60)
LYMPHOCYTES # BLD AUTO: 1.94 K/UL — SIGNIFICANT CHANGE UP (ref 1.2–3.4)
LYMPHOCYTES # BLD AUTO: 19.5 % — LOW (ref 20.5–51.1)
MAGNESIUM SERPL-MCNC: 1.6 MG/DL — LOW (ref 1.8–2.4)
MCHC RBC-ENTMCNC: 34.1 G/DL — SIGNIFICANT CHANGE UP (ref 32–37)
MCHC RBC-ENTMCNC: 34.4 PG — HIGH (ref 27–31)
MCV RBC AUTO: 100.8 FL — HIGH (ref 80–94)
MONOCYTES # BLD AUTO: 0.48 K/UL — SIGNIFICANT CHANGE UP (ref 0.1–0.6)
MONOCYTES NFR BLD AUTO: 4.8 % — SIGNIFICANT CHANGE UP (ref 1.7–9.3)
NEUTROPHILS # BLD AUTO: 7.39 K/UL — HIGH (ref 1.4–6.5)
NEUTROPHILS NFR BLD AUTO: 74.1 % — SIGNIFICANT CHANGE UP (ref 42.2–75.2)
NRBC # BLD: 0 /100 WBCS — SIGNIFICANT CHANGE UP (ref 0–0)
NT-PROBNP SERPL-SCNC: 381 PG/ML — HIGH (ref 0–300)
PCO2 BLDV: 34 MMHG — LOW (ref 42–55)
PH BLDV: 7.18 — CRITICAL LOW (ref 7.32–7.43)
PHOSPHATE SERPL-MCNC: 3.1 MG/DL — SIGNIFICANT CHANGE UP (ref 2.1–4.9)
PLATELET # BLD AUTO: 128 K/UL — LOW (ref 130–400)
PMV BLD: 10.8 FL — HIGH (ref 7.4–10.4)
PO2 BLDV: 52 MMHG — HIGH (ref 25–45)
POTASSIUM BLDV-SCNC: 5.1 MMOL/L — SIGNIFICANT CHANGE UP (ref 3.5–5.1)
POTASSIUM SERPL-MCNC: 4.2 MMOL/L — SIGNIFICANT CHANGE UP (ref 3.5–5)
POTASSIUM SERPL-MCNC: 4.4 MMOL/L — SIGNIFICANT CHANGE UP (ref 3.5–5)
POTASSIUM SERPL-MCNC: 4.5 MMOL/L — SIGNIFICANT CHANGE UP (ref 3.5–5)
POTASSIUM SERPL-SCNC: 4.2 MMOL/L — SIGNIFICANT CHANGE UP (ref 3.5–5)
POTASSIUM SERPL-SCNC: 4.4 MMOL/L — SIGNIFICANT CHANGE UP (ref 3.5–5)
POTASSIUM SERPL-SCNC: 4.5 MMOL/L — SIGNIFICANT CHANGE UP (ref 3.5–5)
PROT SERPL-MCNC: 6.9 G/DL — SIGNIFICANT CHANGE UP (ref 6–8)
PROT SERPL-MCNC: 7.2 G/DL — SIGNIFICANT CHANGE UP (ref 6–8)
PROTHROM AB SERPL-ACNC: 10.7 SEC — SIGNIFICANT CHANGE UP (ref 9.95–12.87)
RBC # BLD: 3.93 M/UL — LOW (ref 4.7–6.1)
RBC # FLD: 15.1 % — HIGH (ref 11.5–14.5)
SAO2 % BLDV: 77.1 % — SIGNIFICANT CHANGE UP (ref 67–88)
SODIUM SERPL-SCNC: 138 MMOL/L — SIGNIFICANT CHANGE UP (ref 135–146)
SODIUM SERPL-SCNC: 138 MMOL/L — SIGNIFICANT CHANGE UP (ref 135–146)
SODIUM SERPL-SCNC: 144 MMOL/L — SIGNIFICANT CHANGE UP (ref 135–146)
TROPONIN T, HIGH SENSITIVITY RESULT: 18 NG/L — SIGNIFICANT CHANGE UP (ref 6–21)
TROPONIN T, HIGH SENSITIVITY RESULT: 27 NG/L — HIGH (ref 6–21)
TROPONIN T, HIGH SENSITIVITY RESULT: 30 NG/L — HIGH (ref 6–21)
WBC # BLD: 9.97 K/UL — SIGNIFICANT CHANGE UP (ref 4.8–10.8)
WBC # FLD AUTO: 9.97 K/UL — SIGNIFICANT CHANGE UP (ref 4.8–10.8)

## 2024-11-09 PROCEDURE — 74174 CTA ABD&PLVS W/CONTRAST: CPT | Mod: MC

## 2024-11-09 PROCEDURE — 70450 CT HEAD/BRAIN W/O DYE: CPT | Mod: 26,MC

## 2024-11-09 PROCEDURE — 85014 HEMATOCRIT: CPT

## 2024-11-09 PROCEDURE — 83735 ASSAY OF MAGNESIUM: CPT

## 2024-11-09 PROCEDURE — 74177 CT ABD & PELVIS W/CONTRAST: CPT | Mod: 26,MC

## 2024-11-09 PROCEDURE — 71045 X-RAY EXAM CHEST 1 VIEW: CPT | Mod: 26

## 2024-11-09 PROCEDURE — 85025 COMPLETE CBC W/AUTO DIFF WBC: CPT

## 2024-11-09 PROCEDURE — 84295 ASSAY OF SERUM SODIUM: CPT

## 2024-11-09 PROCEDURE — 99223 1ST HOSP IP/OBS HIGH 75: CPT

## 2024-11-09 PROCEDURE — 85610 PROTHROMBIN TIME: CPT

## 2024-11-09 PROCEDURE — 83036 HEMOGLOBIN GLYCOSYLATED A1C: CPT

## 2024-11-09 PROCEDURE — 71275 CT ANGIOGRAPHY CHEST: CPT | Mod: 26,MC

## 2024-11-09 PROCEDURE — 82803 BLOOD GASES ANY COMBINATION: CPT

## 2024-11-09 PROCEDURE — 84132 ASSAY OF SERUM POTASSIUM: CPT

## 2024-11-09 PROCEDURE — 82330 ASSAY OF CALCIUM: CPT

## 2024-11-09 PROCEDURE — 80053 COMPREHEN METABOLIC PANEL: CPT

## 2024-11-09 PROCEDURE — 93010 ELECTROCARDIOGRAM REPORT: CPT

## 2024-11-09 PROCEDURE — 82962 GLUCOSE BLOOD TEST: CPT

## 2024-11-09 PROCEDURE — 85018 HEMOGLOBIN: CPT

## 2024-11-09 PROCEDURE — 99285 EMERGENCY DEPT VISIT HI MDM: CPT | Mod: FS

## 2024-11-09 PROCEDURE — 93005 ELECTROCARDIOGRAM TRACING: CPT

## 2024-11-09 PROCEDURE — 84100 ASSAY OF PHOSPHORUS: CPT

## 2024-11-09 PROCEDURE — 83605 ASSAY OF LACTIC ACID: CPT

## 2024-11-09 PROCEDURE — 84484 ASSAY OF TROPONIN QUANT: CPT

## 2024-11-09 PROCEDURE — 36415 COLL VENOUS BLD VENIPUNCTURE: CPT

## 2024-11-09 PROCEDURE — 80048 BASIC METABOLIC PNL TOTAL CA: CPT

## 2024-11-09 PROCEDURE — 0241U: CPT

## 2024-11-09 RX ORDER — WARFARIN SODIUM 4 MG
3.75 TABLET ORAL ONCE
Refills: 0 | Status: COMPLETED | OUTPATIENT
Start: 2024-11-09 | End: 2024-11-09

## 2024-11-09 RX ORDER — FOLIC ACID 1 MG/1
1 TABLET ORAL DAILY
Refills: 0 | Status: DISCONTINUED | OUTPATIENT
Start: 2024-11-09 | End: 2024-11-11

## 2024-11-09 RX ORDER — MAGNESIUM SULFATE IN 0.9% NACL 2 G/50 ML
2 INTRAVENOUS SOLUTION, PIGGYBACK (ML) INTRAVENOUS ONCE
Refills: 0 | Status: COMPLETED | OUTPATIENT
Start: 2024-11-09 | End: 2024-11-09

## 2024-11-09 RX ORDER — B-COMPLEX WITH VITAMIN C
1 VIAL (ML) INJECTION DAILY
Refills: 0 | Status: DISCONTINUED | OUTPATIENT
Start: 2024-11-09 | End: 2024-11-11

## 2024-11-09 RX ORDER — DIAZEPAM 10 MG/1
5 FILM BUCCAL ONCE
Refills: 0 | Status: DISCONTINUED | OUTPATIENT
Start: 2024-11-09 | End: 2024-11-09

## 2024-11-09 RX ORDER — NALTREXONE HYDROCHLORIDE 50 MG/1
50 TABLET, FILM COATED ORAL DAILY
Refills: 0 | Status: DISCONTINUED | OUTPATIENT
Start: 2024-11-09 | End: 2024-11-11

## 2024-11-09 RX ORDER — PANTOPRAZOLE SODIUM 40 MG/1
40 TABLET, DELAYED RELEASE ORAL
Refills: 0 | Status: DISCONTINUED | OUTPATIENT
Start: 2024-11-09 | End: 2024-11-11

## 2024-11-09 RX ORDER — CYANOCOBALAMIN (VITAMIN B-12) 1000MCG/15
1 LIQUID (ML) ORAL
Refills: 0 | DISCHARGE

## 2024-11-09 RX ORDER — ENOXAPARIN SODIUM 40MG/0.4ML
90 SYRINGE (ML) SUBCUTANEOUS EVERY 12 HOURS
Refills: 0 | Status: DISCONTINUED | OUTPATIENT
Start: 2024-11-09 | End: 2024-11-11

## 2024-11-09 RX ORDER — METFORMIN HYDROCHLORIDE 500 MG/1
1 TABLET, EXTENDED RELEASE ORAL
Refills: 0 | DISCHARGE

## 2024-11-09 RX ORDER — THIAMINE HCL 100 MG
100 TABLET ORAL DAILY
Refills: 0 | Status: DISCONTINUED | OUTPATIENT
Start: 2024-11-09 | End: 2024-11-11

## 2024-11-09 RX ORDER — METOPROLOL TARTRATE 50 MG
25 TABLET ORAL DAILY
Refills: 0 | Status: DISCONTINUED | OUTPATIENT
Start: 2024-11-09 | End: 2024-11-11

## 2024-11-09 RX ORDER — GLUCAGON INJECTION, SOLUTION 1 MG/.2ML
1 INJECTION, SOLUTION SUBCUTANEOUS ONCE
Refills: 0 | Status: DISCONTINUED | OUTPATIENT
Start: 2024-11-09 | End: 2024-11-11

## 2024-11-09 RX ORDER — INSULIN LISPRO 100/ML
VIAL (ML) SUBCUTANEOUS
Refills: 0 | Status: DISCONTINUED | OUTPATIENT
Start: 2024-11-09 | End: 2024-11-11

## 2024-11-09 RX ORDER — METHOCARBAMOL 500 MG/1
500 TABLET ORAL DAILY
Refills: 0 | Status: DISCONTINUED | OUTPATIENT
Start: 2024-11-09 | End: 2024-11-11

## 2024-11-09 RX ORDER — WARFARIN SODIUM 4 MG
1.5 TABLET ORAL
Refills: 0 | DISCHARGE

## 2024-11-09 RX ORDER — LORAZEPAM 2 MG
2 TABLET ORAL
Refills: 0 | Status: DISCONTINUED | OUTPATIENT
Start: 2024-11-09 | End: 2024-11-11

## 2024-11-09 RX ORDER — FAMOTIDINE 10 MG/ML
20 INJECTION INTRAVENOUS ONCE
Refills: 0 | Status: COMPLETED | OUTPATIENT
Start: 2024-11-09 | End: 2024-11-09

## 2024-11-09 RX ORDER — THIAMINE HCL 100 MG
100 TABLET ORAL ONCE
Refills: 0 | Status: COMPLETED | OUTPATIENT
Start: 2024-11-09 | End: 2024-11-09

## 2024-11-09 RX ORDER — CHLORDIAZEPOXIDE HCL 10 MG
50 CAPSULE ORAL ONCE
Refills: 0 | Status: DISCONTINUED | OUTPATIENT
Start: 2024-11-09 | End: 2024-11-09

## 2024-11-09 RX ORDER — ONDANSETRON HYDROCHLORIDE 2 MG/ML
4 INJECTION, SOLUTION INTRAMUSCULAR; INTRAVENOUS ONCE
Refills: 0 | Status: COMPLETED | OUTPATIENT
Start: 2024-11-09 | End: 2024-11-09

## 2024-11-09 RX ORDER — ONDANSETRON HYDROCHLORIDE 2 MG/ML
4 INJECTION, SOLUTION INTRAMUSCULAR; INTRAVENOUS EVERY 8 HOURS
Refills: 0 | Status: DISCONTINUED | OUTPATIENT
Start: 2024-11-09 | End: 2024-11-11

## 2024-11-09 RX ORDER — TAMSULOSIN HCL 0.4 MG
0.4 CAPSULE ORAL AT BEDTIME
Refills: 0 | Status: DISCONTINUED | OUTPATIENT
Start: 2024-11-09 | End: 2024-11-11

## 2024-11-09 RX ORDER — ACETAMINOPHEN 500 MG
2 TABLET ORAL
Refills: 0 | DISCHARGE

## 2024-11-09 RX ORDER — GABAPENTIN 300 MG/1
200 CAPSULE ORAL
Refills: 0 | Status: DISCONTINUED | OUTPATIENT
Start: 2024-11-09 | End: 2024-11-11

## 2024-11-09 RX ORDER — ACETAMINOPHEN 500 MG
650 TABLET ORAL EVERY 6 HOURS
Refills: 0 | Status: DISCONTINUED | OUTPATIENT
Start: 2024-11-09 | End: 2024-11-11

## 2024-11-09 RX ORDER — NYSTATIN 10B UNIT
500000 POWDER (EA) MISCELLANEOUS
Refills: 0 | Status: DISCONTINUED | OUTPATIENT
Start: 2024-11-09 | End: 2024-11-11

## 2024-11-09 RX ORDER — CYANOCOBALAMIN (VITAMIN B-12) 1000MCG/15
1000 LIQUID (ML) ORAL DAILY
Refills: 0 | Status: DISCONTINUED | OUTPATIENT
Start: 2024-11-09 | End: 2024-11-11

## 2024-11-09 RX ORDER — EMPAGLIFLOZIN 25 MG/1
1 TABLET, FILM COATED ORAL
Refills: 0 | DISCHARGE

## 2024-11-09 RX ADMIN — Medication 1000 MILLILITER(S): at 13:44

## 2024-11-09 RX ADMIN — DIAZEPAM 5 MILLIGRAM(S): 10 FILM BUCCAL at 08:36

## 2024-11-09 RX ADMIN — FAMOTIDINE 100 MILLIGRAM(S): 10 INJECTION INTRAVENOUS at 08:36

## 2024-11-09 RX ADMIN — Medication 3.75 MILLIGRAM(S): at 21:01

## 2024-11-09 RX ADMIN — Medication 90 MILLIGRAM(S): at 17:29

## 2024-11-09 RX ADMIN — Medication 1000 MILLILITER(S): at 14:55

## 2024-11-09 RX ADMIN — Medication 1000 MILLILITER(S): at 13:11

## 2024-11-09 RX ADMIN — GABAPENTIN 200 MILLIGRAM(S): 300 CAPSULE ORAL at 17:25

## 2024-11-09 RX ADMIN — Medication 25 GRAM(S): at 10:48

## 2024-11-09 RX ADMIN — Medication 0.4 MILLIGRAM(S): at 21:01

## 2024-11-09 RX ADMIN — Medication 650 MILLIGRAM(S): at 21:47

## 2024-11-09 RX ADMIN — Medication 100 MILLIGRAM(S): at 08:15

## 2024-11-09 RX ADMIN — Medication 650 MILLIGRAM(S): at 21:00

## 2024-11-09 RX ADMIN — ONDANSETRON HYDROCHLORIDE 4 MILLIGRAM(S): 2 INJECTION, SOLUTION INTRAMUSCULAR; INTRAVENOUS at 08:36

## 2024-11-09 RX ADMIN — Medication 500000 UNIT(S): at 17:28

## 2024-11-09 RX ADMIN — Medication 50 MILLIGRAM(S): at 08:14

## 2024-11-09 RX ADMIN — Medication 100 MILLILITER(S): at 17:24

## 2024-11-09 NOTE — ED ADULT NURSE NOTE - CCCP TRG CHIEF CMPLNT
-- DO NOT REPLY / DO NOT REPLY ALL --  -- Message is from the [facility]--    Provider paged via [employer] Documentation - The below message was copied and pasted from a [email or web address] page:    Initiated Date/Time 7/30/2021 6:49 pm   Message Sent Date/Time 7/30/2021 6:50 pm   Source [facility] Contact Center   Department ACC   Phone Number [phone]   Method Secure Text   Contacted Ivan Marinelli MD   Requested Mirta Adams MD   Details Patient Down's Syndrome   Message   [phone] [facility] DOWN SYNDROME PATIENT CALLER NAME: EDD - FATHER REQUESTED PHYSICIAN: MIRTA ADAMS RE: FINN RAICA PATIENT 2000 PATIENT PCP: VENESSA FATHER IS CONCERNED ABOUT SON'S BREATHING. PLEASE CALL *PVIZ        chest pain

## 2024-11-09 NOTE — ED PROVIDER NOTE - CLINICAL SUMMARY MEDICAL DECISION MAKING FREE TEXT BOX
.    63-year-old male, PMH EtOH dependence, HTN, DVT/PE on Coumadin as per chart review, poor story and, presents with chest pain and vomiting.  + Abdominal comfort on exam.    Additional admission taken from chart review, confirming HPI.    Exam as noted above. .    63-year-old male, PMH EtOH dependence, HTN, DVT/PE on Coumadin as per chart review, poor story and, presents with chest pain and vomiting.  + Abdominal comfort on exam.    Additional admission taken from chart review, confirming HPI.    Exam as noted above.    All available lab tests, imaging tests, and EKGs independently reviewed and interpreted by me.  Laboratory work shows HAGMA, with some respiratory compensation.  + Elevated lactate  Glucose 75.  CT head, chest, abdomen and pelvis, showed no acute severe medical pathology.  EKG sinus tachycardia, rate 118, no STEMI.    Patient received IV fluid and benzodiazepines in the emergency department.  Patient felt better.    Clinical picture favors AKA type picture.  I do not suspect DKA.  Case discussed with critical care.    Patient admitted to stepdown for further workup and management.      .

## 2024-11-09 NOTE — ED PROVIDER NOTE - PHYSICAL EXAMINATION
CONST: Well appearing in NAD  EYES: PERRL, EOMI, Sclera and conjunctiva clear.   ENT: Oropharynx normal appearing, no erythema or exudates. Uvula midline.  CARD: Normal S1 S2; Normal rate and rhythm  RESP: Equal BS B/L, No wheezes, rhonchi or rales. No distress  GI: Mild diffuse abd pain no rebound or guarding. ABd soft.   MS: Normal ROM in all extremities. No midline spinal tenderness.  SKIN: Warm, dry, no acute rashes.   NEURO: A&Ox2, No focal deficits. Strength 5/5 with no sensory deficits.

## 2024-11-09 NOTE — ED PROVIDER NOTE - OBJECTIVE STATEMENT
64-year-old male past medical history of EtOH abuse, DVT/PE on Coumadin, BPH, hypertension presents to the ED for evaluation of chest pain and abdominal pain.  Patient per history and, admits to drinking last night.  Denies any head trauma.  No other complaints.

## 2024-11-09 NOTE — ED PROVIDER NOTE - PROGRESS NOTE DETAILS
JS:Patient noted with elevated anion gap metabolic acidosis alcoholic ketoacidosis.  Given thiamine, D5 half-normal saline, diazepam for withdrawal.  Will admit to cardiac stepdown for further management.

## 2024-11-09 NOTE — ED ADULT NURSE REASSESSMENT NOTE - NS ED NURSE REASSESS COMMENT FT1
Pt is AAOX3, resp even and unlabored, pt denies chest pain, pt has not been seen by a privider, however pt insist on leaving, pt states that he does not want ot be here. Charge nurse Casey was made aware, pt is from a adult home and needs a ride to go back, Casey was able to convince pt that he has to wait in other to get an ambulance ride to go home. Pt is in agreement at the moment.

## 2024-11-09 NOTE — H&P ADULT - HISTORY OF PRESENT ILLNESS
64-year-old male past medical history of EtOH abuse, DVT/PE on Coumadin, BPH, hypertension presents to the ED for evaluation of chest pain and abdominal pain.  Patient per history and, admits to drinking last night.  Denies any head trauma.  No other complaints.    Vital Signs Last 24 Hrs  T(C): 36.9 (09 Nov 2024 07:40), Max: 36.9 (09 Nov 2024 07:40)  T(F): 98.5 (09 Nov 2024 07:40), Max: 98.5 (09 Nov 2024 07:40)  HR: 116 (09 Nov 2024 12:57) (116 - 120)  BP: 127/61 (09 Nov 2024 12:57) (127/61 - 146/73)  RR: 18 (09 Nov 2024 12:57) (17 - 18)  SpO2: 95% (09 Nov 2024 12:57) (95% - 95%) on RA    Labs:WBC 9.97K, N 74.1%, Hb 13.5, .8, plt 128K, Na/K Nl, HCO3 14, AG 30, Crea 1.3 (baseline),  (180), AST/ALT 51/16 (162/33), Bili T 1.5 (5.3), Mg 1.6, lipase Nl, proBNP 381, BHB 1.1, Trop 27 --> 18,   VBG: pH 7.18, lactate 14.4, pCO2 52  Alcohol 264  CTH: no acute pathology. Chronic microvascular changes.  CT CAP: Artifact secondary to upper upper extremities limits assessment  of chest   and upper abdomen. No gross pulmonary emboli. Cholelithiasis.    Admitted for HAGMA 2/2 alcoholic ketoacidosis and lactic acidosis 2/2 heavy alcohol intoxication.   64-year-old man kth chornic EtOH use disorder, DVT/PE on Coumadin, BPH, hypertension, DL, DM, gout, presents to the ED for severe intermittent midsternal chest pain that radiates to the flanks associated to diffuse non specific abdominal pain, both started this morning. He admits to heavy drinking last night (admits to half a pint of vodka, likely more). He also drinks daily around 3 glasses/day up to a pint of vodka. He had multiple episodes of vomiting last night without hematemesis. He denies any head trauma.  No other complaints.    Vital Signs Last 24 Hrs  T(C): 36.9 (09 Nov 2024 07:40), Max: 36.9 (09 Nov 2024 07:40)  T(F): 98.5 (09 Nov 2024 07:40), Max: 98.5 (09 Nov 2024 07:40)  HR: 116 (09 Nov 2024 12:57) (116 - 120)  BP: 127/61 (09 Nov 2024 12:57) (127/61 - 146/73)  RR: 18 (09 Nov 2024 12:57) (17 - 18)  SpO2: 95% (09 Nov 2024 12:57) (95% - 95%) on RA    Labs:WBC 9.97K, N 74.1%, Hb 13.5, .8, plt 128K, Na/K Nl, HCO3 14, AG 30, Crea 1.3 (baseline),  (180), AST/ALT 51/16 (162/33), Bili T 1.5 (5.3), Mg 1.6, lipase Nl, proBNP 381, BHB 1.1, Trop 27 --> 18,   VBG: pH 7.18, lactate 14.4, pCO2 52  Alcohol 264  CTH: no acute pathology. Chronic microvascular changes.  CT CAP: Artifact secondary to upper upper extremities limits assessment  of chest   and upper abdomen. No gross pulmonary emboli. Cholelithiasis.    Admitted for HAGMA 2/2 alcoholic ketoacidosis and lactic acidosis 2/2 heavy alcohol intoxication.

## 2024-11-09 NOTE — H&P ADULT - NSHPPHYSICALEXAM_GEN_ALL_CORE
GENERAL: NAD, lying in bed comfortably  HEAD:  Atraumatic, normocephalic  EYES: EOMI, PERRLA, conjunctiva and sclera clear  NECK: Supple, trachea midline, no JVD  HEART: Regular rate and rhythm, no murmurs, rubs, or gallops  LUNGS: Unlabored respirations.  Clear to auscultation bilaterally, no crackles, wheezing, or rhonchi  ABDOMEN: Soft, nontender, nondistended, +BS  EXTREMITIES: 2+ peripheral pulses bilaterally. No clubbing, cyanosis, or edema  NERVOUS SYSTEM:  A&Ox3, moving all extremities, no focal deficits   SKIN: No rashes or lesions GENERAL: NAD, lying in bed comfortably  HEAD:  Atraumatic, normocephalic, oral thrush  EYES: EOMI, PERRLA, conjunctiva and sclera clear  NECK: Supple, trachea midline, no JVD  HEART: Regular rate and rhythm, no murmurs, rubs, or gallops  LUNGS: Unlabored respirations.  Clear to auscultation bilaterally, no crackles, wheezing, or rhonchi  ABDOMEN: Soft, nontender, mild diffuse distended, +BS  EXTREMITIES: 2+ peripheral pulses bilaterally. No clubbing, cyanosis, or edema  NERVOUS SYSTEM:  A&Ox3, moving all extremities, no focal deficits, tremor   SKIN: No rashes or lesions

## 2024-11-09 NOTE — H&P ADULT - ASSESSMENT
64-year-old man kth chornic EtOH use disorder, DVT/PE on Coumadin, BPH, hypertension, DL, DM, gout, presents to the ED for severe intermittent midsternal chest pain that radiates to the flanks associated to diffuse non specific abdominal pain, both started this morning. He admits to heavy drinking last night (admits to half a pint of vodka, likely more). He also drinks daily around 3 glasses/day up to a pint of vodka. He had multiple episodes of vomiting last night without hematemesis. He denies any head trauma.  No other complaints.    #HAGMA 2/2 alcoholic ketoacidosis and lactic acidosis post-heavy alcohol intoxication  #Chronic alcohol use disorder  - BP, tachycardia (116), afebrile, Sat 95%  - WBC 9.97K, N 74.1%, Hb 13.5, .8, plt 128K, Na/K Nl, HCO3 14, AG 30, Crea 1.3 (baseline),  (180), AST/ALT 51/16 (162/33), Bili T 1.5 (5.3), Mg 1.6, lipase Nl, proBNP 381, BHB 1.1, Trop 27 --> 18,   - VBG: pH 7.18, lactate 14.4, pCO2 52  - Alcohol 264  - CTH: no acute pathology. Chronic microvascular changes.  - CT CAP: Artifact secondary to upper upper extremities limits assessment  of chest and upper abdomen. No gross pulmonary emboli. Cholelithiasis.  - Admits to heavy alcohol intake yesterday night with episodes of vomiting  - Delta/Delta>2 --> mixed HAGMA + metabolic alkalosis. PvCO2 = 52 --> likely respiratory acidosis as well  - S/p IV 2L bolus  - No signs of sepsis or infection on physical exam. Patient had previous similar episodes.   - C/w IVF 100cc/h LR  - Repeat ABG at 6pm, BMP/lactate   - CIWA protocol + lorazepam PRN   - C/w MVI, folic acid od, thiamine 100mg PO od  - Cs CATCH team. Addiction medicine if needed. C/w naltrexone for now.  - Hold metformin/colchicine for now  - Keep Mg>2, K>4     #Oral thrush  - Nystatin mouth wash    #Chest pain  - Non specific, associated with diffuse abdominal pain  - Trop 27 --> 18,   - CTA chest: artifact secondary to upper upper extremities limits assessment  of chest and upper abdomen. No gross pulmonary emboli.  - INR subtherapeutic due to patient non compliance to medication intake  - EKG: Sinus tachycardia  - Repeat Trop  - Pain management for now  - If worsening, becomes unstable, desat --> consider repeating CTA to rule out PE (due to artifact on first CT)    #DVT/PE   - Diagnosed in 2022  - On Coumadin 2.5mg 1tab od (MWFSat), 1.5 tab od (TTSund)  - C/w coumadin per INR  - INR normal due to him skipping his medication in group home    #HTN  - C/w Toprol XR 25mg od     #DLD  #DM  - Not on statin but as a diabetic, he should be   - Check lipid panel as OP (not accurate in acute alcohol intake)  - Hold metformin and jardiance (risk of euglycemic ketoacidosis)  - Montior FS, ISS    #BPH  - C/w flomax 0.4mg od     #Gout  - Not on  allopurinol  - On colchicine 0.6mg BID but shouldn't be, not indicated)    DVT ppx: coumadin with lovenox bridging  GI ppx: PPI  AAT  Diet: DASH, diabetic  Dispo: SDU

## 2024-11-09 NOTE — H&P ADULT - ATTENDING COMMENTS
Patient seen and examined at bedside independently of the residents. I read the resident's note and agree with the plan with the additions and corrections as noted below. My note supersedes the resident's note.     REVIEW OF SYSTEMS:  Negative except in HPI.     PMH: Alcohol use disorder, DVT/PE (coumadin), HTN, HLD, DM II, BPH    FHx: Reviewed. No fhx of asthma/copd, No fhx of liver and pulmonary disease. No fhx of hematological disorder.     Physical Exam:  GEN: No acute distress. Awake, Alert and oriented x 3.   Head: Atraumatic, Normocephalic.   Eye: PEERLA. No sclera icterus. EOMI.   ENT: Normal oropharynx, no thyromegaly, no mass, no lymphadenopathy.   LUNGS: Clear to auscultation bilaterally. No wheeze/rales/crackles.   HEART: Normal. S1/S2 present. RRR. No murmur/gallops.   ABD: Soft, + distended. Bowel sounds present. + Left Lower quadrant and suprapubic tenderness.   EXT: No pitting edema. No erythema. No tenderness.  Integumentary: No rash, No sore, No petechia.   NEURO: CN III-XII intact. Strength: 5/5 b/l ULE. Sensory intact b/l ULE.     Vital Signs Last 24 Hrs  T(C): 36.7 (10 Nov 2024 01:), Max: 37.2 (2024 17:00)  T(F): 98.1 (10 Nov 2024 01:), Max: 98.9 (2024 17:00)  HR: 96 (10 Nov 2024 01:28) (96 - 120)  BP: 131/61 (10 Nov 2024 01:28) (127/61 - 152/67)  BP(mean): --  RR: 18 (10 Nov 2024 01:28) (17 - 18)  SpO2: 96% (10 Nov 2024 01:28) (95% - 96%)    Parameters below as of 10 Nov 2024 01:28  Patient On (Oxygen Delivery Method): room air      Please see the above notes for Labs and radiology.     Assessment and Plan:     65 yo M with hx of Alcohol use disorder, DVT/PE (coumadin), HTN, HLD, DM II, BPH presents to ED for chest pain and generalized abdominal pain a/w nausea and vomiting.     Acute metabolic acidosis   - Ddx: alcoholic ketoacidosis vs. lactic acidosis 2/2 alcoholism vs. metformin  (r/o ischemia)   - CTA chest: Artifact secondary to upper upper extremities limits assessment  of chest and upper abdomen. No gross pulmonary emboli.  - serum LA 14.4 --> 6.6 --> 10.3   - c/w NS @ 100cc/hr for now.   - patient is supposed to be on coumadin for DVT/PE, however INR subtherapeutic for non compliance --> patient is high risk for embolic event. Patient also complains of abdominal pain --> will get CTA abdomen/pelvic to r/o ischemic bowel. Surgery evaluation for possible ischemic bowel.   - c/w Lovenox BID for now.   - repeat serum lactic acid  - monitor BP closely.     Alcohol abuse   - CIWA protocol prn  - thiamine, folic acid and multivitamin    HTN/HLD - c/w home med.   DM II - monitor FS AC HS. Insulin regimen. Hold metformin and Jardiance for now.   BPH - c/w home med.   Gout - allopurinol    DVT ppx: Lovenox SC   GI ppx: PPI  Diet: NPO for now.   Activity: as tolerated.     Date seen by the attendin2024  I have spent 75 minutes of time on the encounter which excludes teaching and/or separately reported services.

## 2024-11-09 NOTE — H&P ADULT - NSHPLABSRESULTS_GEN_ALL_CORE
LABS:                          13.5   9.97  )-----------( 128      ( 09 Nov 2024 09:01 )             39.6     11-09    144  |  100  |  9[L]  ----------------------------<  75  4.2   |  14[L]  |  1.3    Ca    9.4      09 Nov 2024 09:01  Mg     1.6     11-09    TPro  7.2  /  Alb  4.2  /  TBili  1.5[H]  /  DBili  x   /  AST  51[H]  /  ALT  16  /  AlkPhos  128[H]  11-09    LIVER FUNCTIONS - ( 09 Nov 2024 09:01 )  Alb: 4.2 g/dL / Pro: 7.2 g/dL / ALK PHOS: 128 U/L / ALT: 16 U/L / AST: 51 U/L / GGT: x           PT/INR - ( 09 Nov 2024 09:20 )   PT: 10.70 sec;   INR: 0.91 ratio         PTT - ( 09 Nov 2024 09:20 )  PTT:34.7 sec  Urinalysis Basic - ( 09 Nov 2024 09:01 )    Color: x / Appearance: x / SG: x / pH: x  Gluc: 75 mg/dL / Ketone: x  / Bili: x / Urobili: x   Blood: x / Protein: x / Nitrite: x   Leuk Esterase: x / RBC: x / WBC x   Sq Epi: x / Non Sq Epi: x / Bacteria: x

## 2024-11-09 NOTE — ED ADULT NURSE NOTE - NSFALLRISKINTERV_ED_ALL_ED
Assistance OOB with selected safe patient handling equipment if applicable/Assistance with ambulation/Communicate fall risk and risk factors to all staff, patient, and family/Monitor gait and stability/Monitor for mental status changes and reorient to person, place, and time, as needed/Provide visual cue: yellow wristband, yellow gown, etc/Reinforce activity limits and safety measures with patient and family/Toileting schedule using arm’s reach rule for commode and bathroom/Use of alarms - bed, stretcher, chair and/or video monitoring/Call bell, personal items and telephone in reach/Instruct patient to call for assistance before getting out of bed/chair/stretcher/Non-slip footwear applied when patient is off stretcher/[place] to call system/Physically safe environment - no spills, clutter or unnecessary equipment/Purposeful Proactive Rounding/Room/bathroom lighting operational, light cord in reach

## 2024-11-10 LAB
ALBUMIN SERPL ELPH-MCNC: 3.8 G/DL — SIGNIFICANT CHANGE UP (ref 3.5–5.2)
ALP SERPL-CCNC: 116 U/L — HIGH (ref 30–115)
ALT FLD-CCNC: 28 U/L — SIGNIFICANT CHANGE UP (ref 0–41)
ANION GAP SERPL CALC-SCNC: 11 MMOL/L — SIGNIFICANT CHANGE UP (ref 7–14)
AST SERPL-CCNC: 102 U/L — HIGH (ref 0–41)
BASOPHILS # BLD AUTO: 0.04 K/UL — SIGNIFICANT CHANGE UP (ref 0–0.2)
BASOPHILS NFR BLD AUTO: 0.7 % — SIGNIFICANT CHANGE UP (ref 0–1)
BILIRUB SERPL-MCNC: 2.2 MG/DL — HIGH (ref 0.2–1.2)
BUN SERPL-MCNC: 10 MG/DL — SIGNIFICANT CHANGE UP (ref 10–20)
CALCIUM SERPL-MCNC: 9.1 MG/DL — SIGNIFICANT CHANGE UP (ref 8.4–10.5)
CHLORIDE SERPL-SCNC: 101 MMOL/L — SIGNIFICANT CHANGE UP (ref 98–110)
CO2 SERPL-SCNC: 25 MMOL/L — SIGNIFICANT CHANGE UP (ref 17–32)
CREAT SERPL-MCNC: 1.2 MG/DL — SIGNIFICANT CHANGE UP (ref 0.7–1.5)
EGFR: 67 ML/MIN/1.73M2 — SIGNIFICANT CHANGE UP
EOSINOPHIL # BLD AUTO: 0.01 K/UL — SIGNIFICANT CHANGE UP (ref 0–0.7)
EOSINOPHIL NFR BLD AUTO: 0.2 % — SIGNIFICANT CHANGE UP (ref 0–8)
FLUAV AG NPH QL: SIGNIFICANT CHANGE UP
FLUBV AG NPH QL: SIGNIFICANT CHANGE UP
GLUCOSE BLDC GLUCOMTR-MCNC: 113 MG/DL — HIGH (ref 70–99)
GLUCOSE BLDC GLUCOMTR-MCNC: 127 MG/DL — HIGH (ref 70–99)
GLUCOSE BLDC GLUCOMTR-MCNC: 156 MG/DL — HIGH (ref 70–99)
GLUCOSE BLDC GLUCOMTR-MCNC: 95 MG/DL — SIGNIFICANT CHANGE UP (ref 70–99)
GLUCOSE SERPL-MCNC: 124 MG/DL — HIGH (ref 70–99)
HCT VFR BLD CALC: 33.7 % — LOW (ref 42–52)
HGB BLD-MCNC: 12 G/DL — LOW (ref 14–18)
IMM GRANULOCYTES NFR BLD AUTO: 0.2 % — SIGNIFICANT CHANGE UP (ref 0.1–0.3)
INR BLD: 5.22 RATIO — CRITICAL HIGH (ref 0.65–1.3)
LACTATE SERPL-SCNC: 1.4 MMOL/L — SIGNIFICANT CHANGE UP (ref 0.7–2)
LACTATE SERPL-SCNC: 2.2 MMOL/L — HIGH (ref 0.7–2)
LYMPHOCYTES # BLD AUTO: 1.29 K/UL — SIGNIFICANT CHANGE UP (ref 1.2–3.4)
LYMPHOCYTES # BLD AUTO: 22.7 % — SIGNIFICANT CHANGE UP (ref 20.5–51.1)
MAGNESIUM SERPL-MCNC: 1.7 MG/DL — LOW (ref 1.8–2.4)
MCHC RBC-ENTMCNC: 34.9 PG — HIGH (ref 27–31)
MCHC RBC-ENTMCNC: 35.6 G/DL — SIGNIFICANT CHANGE UP (ref 32–37)
MCV RBC AUTO: 98 FL — HIGH (ref 80–94)
MONOCYTES # BLD AUTO: 0.3 K/UL — SIGNIFICANT CHANGE UP (ref 0.1–0.6)
MONOCYTES NFR BLD AUTO: 5.3 % — SIGNIFICANT CHANGE UP (ref 1.7–9.3)
NEUTROPHILS # BLD AUTO: 4.04 K/UL — SIGNIFICANT CHANGE UP (ref 1.4–6.5)
NEUTROPHILS NFR BLD AUTO: 70.9 % — SIGNIFICANT CHANGE UP (ref 42.2–75.2)
NRBC # BLD: 0 /100 WBCS — SIGNIFICANT CHANGE UP (ref 0–0)
PHOSPHATE SERPL-MCNC: 1.6 MG/DL — LOW (ref 2.1–4.9)
PLATELET # BLD AUTO: 56 K/UL — LOW (ref 130–400)
PMV BLD: 10.9 FL — HIGH (ref 7.4–10.4)
POTASSIUM SERPL-MCNC: 4.3 MMOL/L — SIGNIFICANT CHANGE UP (ref 3.5–5)
POTASSIUM SERPL-SCNC: 4.3 MMOL/L — SIGNIFICANT CHANGE UP (ref 3.5–5)
PROT SERPL-MCNC: 6.5 G/DL — SIGNIFICANT CHANGE UP (ref 6–8)
PROTHROM AB SERPL-ACNC: >40 SEC — HIGH (ref 9.95–12.87)
RBC # BLD: 3.44 M/UL — LOW (ref 4.7–6.1)
RBC # FLD: 15.2 % — HIGH (ref 11.5–14.5)
RSV RNA NPH QL NAA+NON-PROBE: SIGNIFICANT CHANGE UP
SARS-COV-2 RNA SPEC QL NAA+PROBE: SIGNIFICANT CHANGE UP
SODIUM SERPL-SCNC: 137 MMOL/L — SIGNIFICANT CHANGE UP (ref 135–146)
TROPONIN T, HIGH SENSITIVITY RESULT: 35 NG/L — HIGH (ref 6–21)
TROPONIN T, HIGH SENSITIVITY RESULT: 41 NG/L — HIGH (ref 6–21)
WBC # BLD: 5.69 K/UL — SIGNIFICANT CHANGE UP (ref 4.8–10.8)
WBC # FLD AUTO: 5.69 K/UL — SIGNIFICANT CHANGE UP (ref 4.8–10.8)

## 2024-11-10 PROCEDURE — 74174 CTA ABD&PLVS W/CONTRAST: CPT | Mod: 26

## 2024-11-10 PROCEDURE — 99233 SBSQ HOSP IP/OBS HIGH 50: CPT

## 2024-11-10 PROCEDURE — 93010 ELECTROCARDIOGRAM REPORT: CPT

## 2024-11-10 PROCEDURE — 99291 CRITICAL CARE FIRST HOUR: CPT

## 2024-11-10 RX ORDER — MAGNESIUM SULFATE IN 0.9% NACL 2 G/50 ML
2 INTRAVENOUS SOLUTION, PIGGYBACK (ML) INTRAVENOUS ONCE
Refills: 0 | Status: COMPLETED | OUTPATIENT
Start: 2024-11-10 | End: 2024-11-10

## 2024-11-10 RX ORDER — SODIUM CHLORIDE 9 MG/ML
1000 INJECTION, SOLUTION INTRAMUSCULAR; INTRAVENOUS; SUBCUTANEOUS
Refills: 0 | Status: DISCONTINUED | OUTPATIENT
Start: 2024-11-10 | End: 2024-11-11

## 2024-11-10 RX ADMIN — Medication 90 MILLIGRAM(S): at 17:49

## 2024-11-10 RX ADMIN — Medication 500000 UNIT(S): at 12:10

## 2024-11-10 RX ADMIN — Medication 1 LOZENGE: at 22:19

## 2024-11-10 RX ADMIN — METHOCARBAMOL 500 MILLIGRAM(S): 500 TABLET ORAL at 12:05

## 2024-11-10 RX ADMIN — Medication 25 MILLIGRAM(S): at 05:39

## 2024-11-10 RX ADMIN — SODIUM CHLORIDE 100 MILLILITER(S): 9 INJECTION, SOLUTION INTRAMUSCULAR; INTRAVENOUS; SUBCUTANEOUS at 07:39

## 2024-11-10 RX ADMIN — PANTOPRAZOLE SODIUM 40 MILLIGRAM(S): 40 TABLET, DELAYED RELEASE ORAL at 05:40

## 2024-11-10 RX ADMIN — Medication 90 MILLIGRAM(S): at 05:39

## 2024-11-10 RX ADMIN — GABAPENTIN 200 MILLIGRAM(S): 300 CAPSULE ORAL at 17:50

## 2024-11-10 RX ADMIN — Medication 0.4 MILLIGRAM(S): at 22:11

## 2024-11-10 RX ADMIN — GABAPENTIN 200 MILLIGRAM(S): 300 CAPSULE ORAL at 05:39

## 2024-11-10 RX ADMIN — Medication 500000 UNIT(S): at 01:24

## 2024-11-10 RX ADMIN — Medication 500000 UNIT(S): at 17:49

## 2024-11-10 RX ADMIN — Medication 1 TABLET(S): at 12:06

## 2024-11-10 RX ADMIN — SODIUM CHLORIDE 100 MILLILITER(S): 9 INJECTION, SOLUTION INTRAMUSCULAR; INTRAVENOUS; SUBCUTANEOUS at 02:54

## 2024-11-10 RX ADMIN — FOLIC ACID 1 MILLIGRAM(S): 1 TABLET ORAL at 12:05

## 2024-11-10 RX ADMIN — Medication 1 LOZENGE: at 02:54

## 2024-11-10 RX ADMIN — Medication 1 LOZENGE: at 18:00

## 2024-11-10 RX ADMIN — Medication 500000 UNIT(S): at 05:39

## 2024-11-10 RX ADMIN — Medication 1000 MICROGRAM(S): at 12:05

## 2024-11-10 RX ADMIN — Medication 100 MILLIGRAM(S): at 12:05

## 2024-11-10 RX ADMIN — Medication 25 GRAM(S): at 09:42

## 2024-11-10 RX ADMIN — NALTREXONE HYDROCHLORIDE 50 MILLIGRAM(S): 50 TABLET, FILM COATED ORAL at 12:09

## 2024-11-10 NOTE — CONSULT NOTE ADULT - SUBJECTIVE AND OBJECTIVE BOX
GENERAL SURGERY CONSULT NOTE    Patient: CAT ALSTON , 64y ([birth year])Male   MRN: [id number]  Location: [facility] ED Hold 034 A  Visit: 11-09-24 Inpatient  Date: 11-10-24 @ 09:31    HPI:  64-year-old man with chornic EtOH use disorder, DVT/PE on Coumadin, BPH, hypertension, DL, DM, gout, presents to the ED for severe intermittent midsternal chest pain that radiates to the flanks associated to diffuse non specific abdominal pain, both started this morning. He admits to heavy drinking last night (admits to half a pint of vodka, likely more). He also drinks daily around 3 glasses/day up to a pint of vodka. He had multiple episodes of vomiting last night without hematemesis. He denies any head trauma.  No other complaints. Surgery consulted to r/o bowel ischemia. Patient evaluated at bedside AAOx3, NAD reporting feeling ok w minimal abdominal pain.       PAST MEDICAL & SURGICAL HISTORY:  Alcohol dependence      HTN (hypertension)      Hard of hearing      Seasonal allergies      BPH (benign prostatic hyperplasia)      GERD (gastroesophageal reflux disease)      Pseudogout      History of deep vein thrombosis (DVT) of lower extremity      No significant past surgical history          Home Medications:  Coumadin 2.5 mg oral tablet: 1 tab(s) orally once a day M, W, F, Sat (09 Nov 2024 16:24)  Coumadin 2.5 mg oral tablet: 1.5 tab(s) orally once a day Tuesday, Thursday, Sunday (09 Nov 2024 16:29)  cyanocobalamin 1000 mcg oral tablet: 1 tab(s) orally once a day (09 Nov 2024 16:29)  Jardiance 10 mg oral tablet: 1 tab(s) orally once a day (09 Nov 2024 16:29)  metFORMIN 500 mg oral tablet: 1 tab(s) orally 2 times a day (09 Nov 2024 16:29)  methocarbamol 500 mg oral tablet: 1 orally once a day (09 Nov 2024 16:30)  Toprol-XL 25 mg oral tablet, extended release: 1 tab(s) orally once a day (09 Nov 2024 16:30)  Tylenol 325 mg oral tablet: 2 tab(s) orally 2 times a day as needed for  moderate pain (09 Nov 2024 16:29)        VITALS:  T(F): 98.5 (11-10-24 @ 07:25), Max: 98.9 (11-09-24 @ 17:00)  HR: 82 (11-10-24 @ 07:25) (82 - 116)  BP: 132/59 (11-10-24 @ 07:25) (123/63 - 152/67)  RR: 18 (11-10-24 @ 07:25) (17 - 18)  SpO2: 98% (11-10-24 @ 07:25) (95% - 98%)    PHYSICAL EXAM:  General: NAD, AAOx3, calm and cooperative  Cardiac: RRR   Respiratory: CTAB, normal respiratory effort  Abdomen: Soft, non-distended, mild LLE tenderness to palpation, no rebound, no guarding.  Musculoskeletal: Strength 5/5 BL UE/LE, ROM intact, compartments soft    MEDICATIONS  (STANDING):  cyanocobalamin 1000 MICROGram(s) Oral daily  dextrose 5%. 1000 milliLiter(s) (100 mL/Hr) IV Continuous <Continuous>  dextrose 5%. 1000 milliLiter(s) (50 mL/Hr) IV Continuous <Continuous>  dextrose 50% Injectable 25 Gram(s) IV Push once  dextrose 50% Injectable 12.5 Gram(s) IV Push once  dextrose 50% Injectable 25 Gram(s) IV Push once  enoxaparin Injectable 90 milliGRAM(s) SubCutaneous every 12 hours  folic acid 1 milliGRAM(s) Oral daily  gabapentin 200 milliGRAM(s) Oral two times a day  glucagon  Injectable 1 milliGRAM(s) IntraMuscular once  insulin lispro (ADMELOG) corrective regimen sliding scale   SubCutaneous three times a day before meals  magnesium sulfate  IVPB 2 Gram(s) IV Intermittent Once  methocarbamol 500 milliGRAM(s) Oral daily  metoprolol succinate ER 25 milliGRAM(s) Oral daily  multivitamin 1 Tablet(s) Oral daily  naltrexone 50 milliGRAM(s) Oral daily  nystatin    Suspension 398247 Unit(s) Oral four times a day  pantoprazole    Tablet 40 milliGRAM(s) Oral before breakfast  sodium chloride 0.9%. 1000 milliLiter(s) (100 mL/Hr) IV Continuous <Continuous>  tamsulosin 0.4 milliGRAM(s) Oral at bedtime  thiamine 100 milliGRAM(s) Oral daily    MEDICATIONS  (PRN):  acetaminophen     Tablet .. 650 milliGRAM(s) Oral every 6 hours PRN Moderate Pain (4 - 6)  benzocaine/menthol Lozenge 1 Lozenge Oral every 4 hours PRN Sore Throat  dextrose Oral Gel 15 Gram(s) Oral once PRN Blood Glucose LESS THAN 70 milliGRAM(s)/deciliter  LORazepam   Injectable 2 milliGRAM(s) IV Push every 2 hours PRN Symptom-triggered: 2 point increase in CIWA -Ar score and a total score of 7 or LESS  LORazepam   Injectable 2 milliGRAM(s) IV Push every 1 hour PRN Symptom-triggered: each CIWA -Ar score 8 or GREATER  ondansetron Injectable 4 milliGRAM(s) IV Push every 8 hours PRN Nausea and/or Vomiting      LAB/STUDIES:                        12.0   5.69  )-----------( 56       ( 10 Nov 2024 06:45 )             33.7     11-10    137  |  101  |  10  ----------------------------<  124[H]  4.3   |  25  |  1.2    Ca    9.1      10 Nov 2024 06:45  Phos  1.6     11-10  Mg     1.7     11-10    TPro  6.5  /  Alb  3.8  /  TBili  2.2[H]  /  DBili  x   /  AST  102[H]  /  ALT  28  /  AlkPhos  116[H]  11-10    PT/INR - ( 10 Nov 2024 06:45 )   PT: >40.00 sec;   INR: 5.22 ratio         PTT - ( 09 Nov 2024 09:20 )  PTT:34.7 sec  LIVER FUNCTIONS - ( 10 Nov 2024 06:45 )  Alb: 3.8 g/dL / Pro: 6.5 g/dL / ALK PHOS: 116 U/L / ALT: 28 U/L / AST: 102 U/L / GGT: x           Urinalysis Basic - ( 10 Nov 2024 06:45 )    Color: x / Appearance: x / SG: x / pH: x  Gluc: 124 mg/dL / Ketone: x  / Bili: x / Urobili: x   Blood: x / Protein: x / Nitrite: x   Leuk Esterase: x / RBC: x / WBC x   Sq Epi: x / Non Sq Epi: x / Bacteria: x              ABG - ( 09 Nov 2024 20:02 )  pH, Arterial: 7.38  pH, Blood: x     /  pCO2: 30    /  pO2: 73    / HCO3: 18    / Base Excess: -6.2  /  SaO2: 96.8                IMAGING:  < from: CT Abdomen and Pelvis w/ IV Cont (11.09.24 @ 09:18) >    IMPRESSION:    Artifact secondary to upper upper extremities limits assessment  of chest   and upper abdomen.    No gross pulmonary emboli.    Cholelithiasis.    < end of copied text >  < from: CT Angio Abdomen and Pelvis w/ IV Cont (11.10.24 @ 02:26) >  IMPRESSION:  Questionable wall thickening of the cecum without significant surrounding   fat stranding (19/39). No pneumatosis.    Mild caudate lobe hypertrophy. No focal liver lesions.    Prominent portacaval lymph nodes measuring up to 1.8 cm. Gastrohepatic   lymph node measures 1.0 cm.    Recommend further workup.      < end of copied text >      ACCESS DEVICES:  [ x] Peripheral IV  [ ] Central Venous Line	[ ] R	[ ] L	[ ] IJ	[ ] Fem	[ ] SC	Placed:   [ ] Arterial Line		[ ] R	[ ] L	[ ] Fem	[ ] Rad	[ ] Ax	Placed:   [ ] PICC:					[ ] Mediport  [ ] Urinary Catheter, Date Placed:       
Patient is a 64y old  Male who presents with a chief complaint of CP/ abd pain    64-year-old man kth chornic EtOH use disorder, DVT/PE on Coumadin, BPH, hypertension, DL, DM, gout, presents to the ED for severe intermittent midsternal chest pain that radiates to the flanks associated to diffuse non specific abdominal pain, both started this morning. He admits to heavy drinking last night (admits to half a pint of vodka, likely more). He also drinks daily around 3 glasses/day up to a pint of vodka. He had multiple episodes of vomiting last night without hematemesis. He denies any head trauma.  No other complaints.    Vital Signs Last 24 Hrs  T(C): 36.9 (09 Nov 2024 07:40), Max: 36.9 (09 Nov 2024 07:40)  T(F): 98.5 (09 Nov 2024 07:40), Max: 98.5 (09 Nov 2024 07:40)  HR: 116 (09 Nov 2024 12:57) (116 - 120)  BP: 127/61 (09 Nov 2024 12:57) (127/61 - 146/73)  RR: 18 (09 Nov 2024 12:57) (17 - 18)  SpO2: 95% (09 Nov 2024 12:57) (95% - 95%) on RA    Labs:WBC 9.97K, N 74.1%, Hb 13.5, .8, plt 128K, Na/K Nl, HCO3 14, AG 30, Crea 1.3 (baseline),  (180), AST/ALT 51/16 (162/33), Bili T 1.5 (5.3), Mg 1.6, lipase Nl, proBNP 381, BHB 1.1, Trop 27 --> 18,   VBG: pH 7.18, lactate 14.4, pCO2 52  Alcohol 264  CTH: no acute pathology. Chronic microvascular changes.  CT CAP: Artifact secondary to upper upper extremities limits assessment  of chest   and upper abdomen. No gross pulmonary emboli. Cholelithiasis.    Admitted for HAGMA 2/2 alcoholic ketoacidosis and lactic acidosis 2/2 heavy alcohol intoxication.      PAST MEDICAL & SURGICAL HISTORY:  Alcohol dependence      HTN (hypertension)      Hard of hearing      Seasonal allergies      BPH (benign prostatic hyperplasia)      GERD (gastroesophageal reflux disease)      Pseudogout      History of deep vein thrombosis (DVT) of lower extremity      No significant past surgical history          SOCIAL HX: ETOH       +                      FAMILY HISTORY:  Family history of gastric cancer  Mom    Family hx of lung cancer  Smoker          REVIEW OF SYSTEMS see hpi  Allergies    No Known Drug Allergies  Beef (Hives)  dairy products (Hives)  shellfish (Hives)    Intolerances        acetaminophen     Tablet .. 650 milliGRAM(s) Oral every 6 hours PRN  benzocaine/menthol Lozenge 1 Lozenge Oral every 4 hours PRN  cyanocobalamin 1000 MICROGram(s) Oral daily  dextrose 5%. 1000 milliLiter(s) IV Continuous <Continuous>  dextrose 5%. 1000 milliLiter(s) IV Continuous <Continuous>  dextrose 50% Injectable 25 Gram(s) IV Push once  dextrose 50% Injectable 25 Gram(s) IV Push once  dextrose 50% Injectable 12.5 Gram(s) IV Push once  dextrose Oral Gel 15 Gram(s) Oral once PRN  enoxaparin Injectable 90 milliGRAM(s) SubCutaneous every 12 hours  folic acid 1 milliGRAM(s) Oral daily  gabapentin 200 milliGRAM(s) Oral two times a day  glucagon  Injectable 1 milliGRAM(s) IntraMuscular once  insulin lispro (ADMELOG) corrective regimen sliding scale   SubCutaneous three times a day before meals  LORazepam   Injectable 2 milliGRAM(s) IV Push every 2 hours PRN  LORazepam   Injectable 2 milliGRAM(s) IV Push every 1 hour PRN  methocarbamol 500 milliGRAM(s) Oral daily  metoprolol succinate ER 25 milliGRAM(s) Oral daily  multivitamin 1 Tablet(s) Oral daily  naltrexone 50 milliGRAM(s) Oral daily  nystatin    Suspension [id number] Unit(s) Oral four times a day  ondansetron Injectable 4 milliGRAM(s) IV Push every 8 hours PRN  pantoprazole    Tablet 40 milliGRAM(s) Oral before breakfast  sodium chloride 0.9%. 1000 milliLiter(s) IV Continuous <Continuous>  tamsulosin 0.4 milliGRAM(s) Oral at bedtime  thiamine 100 milliGRAM(s) Oral daily  : Home Meds:      PHYSICAL EXAM    ICU Vital Signs Last 24 Hrs  T(C): 36.7 (10 Nov 2024 05:26), Max: 37.2 (09 Nov 2024 17:00)  T(F): 98.1 (10 Nov 2024 05:26), Max: 98.9 (09 Nov 2024 17:00)  HR: 84 (10 Nov 2024 05:26) (84 - 120)  BP: 123/63 (10 Nov 2024 05:26) (123/63 - 152/67)  RR: 18 (10 Nov 2024 05:26) (17 - 18)  SpO2: 96% (10 Nov 2024 05:26) (95% - 96%)    O2 Parameters below as of 10 Nov 2024 05:26  Patient On (Oxygen Delivery Method): room air            General: comfortable on RA  Lungs: dec bs both bases  Cardiovascular: Regular  Abdomen: Soft, Positive BS  Extremities: No clubbing  Skin: Warm  Neurological: Non focal         LABS:                          13.5   9.97  )-----------( 128      ( 09 Nov 2024 09:01 )             39.6                                               11-09    138  |  97[L]  |  9[L]  ----------------------------<  233[H]  4.5   |  14[L]  |  1.4    Ca    9.0      09 Nov 2024 20:51  Phos  3.1     11-09  Mg     1.6     11-09    TPro  6.9  /  Alb  4.1  /  TBili  1.6[H]  /  DBili  x   /  AST  72[H]  /  ALT  21  /  AlkPhos  121[H]  11-09      PT/INR - ( 09 Nov 2024 09:20 )   PT: 10.70 sec;   INR: 0.91 ratio         PTT - ( 09 Nov 2024 09:20 )  PTT:34.7 sec                                       Urinalysis Basic - ( 09 Nov 2024 20:51 )    Color: x / Appearance: x / SG: x / pH: x  Gluc: 233 mg/dL / Ketone: x  / Bili: x / Urobili: x   Blood: x / Protein: x / Nitrite: x   Leuk Esterase: x / RBC: x / WBC x   Sq Epi: x / Non Sq Epi: x / Bacteria: x                                                  LIVER FUNCTIONS - ( 09 Nov 2024 20:51 )  Alb: 4.1 g/dL / Pro: 6.9 g/dL / ALK PHOS: 121 U/L / ALT: 21 U/L / AST: 72 U/L / GGT: x                                                                                                                                   ABG - ( 09 Nov 2024 20:02 )  pH, Arterial: 7.38  pH, Blood: x     /  pCO2: 30    /  pO2: 73    / HCO3: 18    / Base Excess: -6.2  /  SaO2: 96.8          MEDICATIONS  (STANDING):  cyanocobalamin 1000 MICROGram(s) Oral daily  dextrose 5%. 1000 milliLiter(s) (100 mL/Hr) IV Continuous <Continuous>  dextrose 5%. 1000 milliLiter(s) (50 mL/Hr) IV Continuous <Continuous>  dextrose 50% Injectable 25 Gram(s) IV Push once  dextrose 50% Injectable 12.5 Gram(s) IV Push once  dextrose 50% Injectable 25 Gram(s) IV Push once  enoxaparin Injectable 90 milliGRAM(s) SubCutaneous every 12 hours  folic acid 1 milliGRAM(s) Oral daily  gabapentin 200 milliGRAM(s) Oral two times a day  glucagon  Injectable 1 milliGRAM(s) IntraMuscular once  insulin lispro (ADMELOG) corrective regimen sliding scale   SubCutaneous three times a day before meals  methocarbamol 500 milliGRAM(s) Oral daily  metoprolol succinate ER 25 milliGRAM(s) Oral daily  multivitamin 1 Tablet(s) Oral daily  naltrexone 50 milliGRAM(s) Oral daily  nystatin    Suspension [id number] Unit(s) Oral four times a day  pantoprazole    Tablet 40 milliGRAM(s) Oral before breakfast  sodium chloride 0.9%. 1000 milliLiter(s) (100 mL/Hr) IV Continuous <Continuous>  tamsulosin 0.4 milliGRAM(s) Oral at bedtime  thiamine 100 milliGRAM(s) Oral daily    MEDICATIONS  (PRN):  acetaminophen     Tablet .. 650 milliGRAM(s) Oral every 6 hours PRN Moderate Pain (4 - 6)  benzocaine/menthol Lozenge 1 Lozenge Oral every 4 hours PRN Sore Throat  dextrose Oral Gel 15 Gram(s) Oral once PRN Blood Glucose LESS THAN 70 milliGRAM(s)/deciliter  LORazepam   Injectable 2 milliGRAM(s) IV Push every 2 hours PRN Symptom-triggered: 2 point increase in CIWA -Ar score and a total score of 7 or LESS  LORazepam   Injectable 2 milliGRAM(s) IV Push every 1 hour PRN Symptom-triggered: each CIWA -Ar score 8 or GREATER  ondansetron Injectable 4 milliGRAM(s) IV Push every 8 hours PRN Nausea and/or Vomiting      CT AP noted  CXR reviewed

## 2024-11-10 NOTE — CONSULT NOTE ADULT - ASSESSMENT
ASSESSMENT:  64-year-old man kth chornic EtOH use disorder, DVT/PE on Coumadin, BPH, hypertension, DL, DM, gout, presents to the ED for severe intermittent midsternal chest pain that radiates to the flanks associated to diffuse non specific abdominal pain. Surgery consulted to r/o bowel ischemia. Patient evaluated at bedside AAOx3, NAD reporting feeling ok w minimal abdominal pain. Clinical presentation with low probability of ischemic bowel.  Physical exam findings, imaging, and labs as documented above.     PLAN:  -care as per primary team  -npo  -ivfs  -trend lactate  -serial abdominal exams  -CTA evaluated, no acute intraabdominal path requiring acute surgical intervention      Above plan discussed with Attending Surgeon Dr. Martin , patient, patient family, and Primary team  11-10-24 @ 09:31    CONSULT SPECTRA: [id number]
IMPRESSION:    Abd pain/ CT AP noted  high anion gap metabolic acidosis prior same admission  lactic acidosis  Acute kidney injury likely prerenal  nausea/vomiting  alcohol abuse disorder  hypomagnesemia  hx of dvt on coumadin, INR noted    PLAN:    CNS: CIWA protocol  thiamine, folic acid    HEENT:  Oral care    PULMONARY:  HOB @ 45 degrees, aspiration precautions, on RA    CARDIOVASCULAR: d5 LR at 100cc/hr, Trend LA    GI: GI prophylaxis                                          Feeding po diet    RENAL:  F/u  lytes.  Correct as needed. accurate I/O    INFECTIOUS DISEASE: pancx, procal    HEMATOLOGICAL:  LE doppler, CT noted, INR nl    ENDOCRINE:  Follow up FS.     MUSCULOSKELETAL: AAT    CODE STATUS: FULL CODE    SDU

## 2024-11-10 NOTE — CHART NOTE - NSCHARTNOTEFT_GEN_A_CORE
Pt w/ hx of DVT and PE, INR not therapeutic, has elevated lactate (14.4 on presentation, most recent blood lactate 10.3) and abdominal pain. Patient made a soft stool, no adebayo blood, earlier this evening and is urinating well. The patient is currently endorsing suprapubic pain. On physical exam, the abdomen is soft but tender to palpation in the LLQ, especially with deep palpation. No rigidity. Patient is hemodynamically stable. Stat . Repeat vitals: HR 96. /61. Saturating 96% on room air.    Medicine team has concern for ischemic bowel. CTA abdomen pelvis ordered urgent. CT team made aware and approved (will not exceed daily contrast limit despite earlier contrast studies on 11/9). Urgent surgical evaluation requested. Spoke with surgery resident over the phone---they will evaluate the patient. Repeat lactate ordered for 04:30 AM.    Patient also endorsing some R sided chest pain but stat EKG is not concerning and the pain is worsened with palpation of the R chest wall so possibly musculoskeletal. Repeat troponin ordered for 04:30 AM.    Will continue to monitor the patient. Pt w/ hx of DVT and PE, INR not therapeutic, has elevated lactate (14.4 on presentation, most recent blood lactate 10.3) and abdominal pain. Patient made a soft stool, no adebayo blood, earlier this evening and is urinating well. The patient is currently endorsing suprapubic pain. On physical exam, the abdomen is soft but tender to palpation in the LLQ, especially with deep palpation. No rigidity. Patient is hemodynamically stable. Stat . Repeat vitals: HR 96. /61. Saturating 96% on room air.    Medicine team has concern for ischemic bowel. CTA abdomen pelvis ordered urgent. CT team made aware and approved (will not exceed daily contrast limit despite earlier contrast studies on 11/9). Urgent surgical evaluation requested. Spoke with surgery resident over the phone---they will evaluate the patient. Repeat lactate ordered for 04:30 AM. Patient will continue to receive IV fluids (now switched to NS) at 100 cc per hour.    Patient also endorsing some R sided chest pain but stat EKG is not concerning and the pain is worsened with palpation of the R chest wall so possibly musculoskeletal. Repeat troponin ordered for 04:30 AM.    Will continue to monitor the patient.

## 2024-11-10 NOTE — ED ADULT NURSE REASSESSMENT NOTE - NS ED NURSE REASSESS COMMENT FT1
This RN assess pt, pt is ax4, on room air, pt reports not needing anything, bed is in low position, side rails are raised, call light within reach, ciwa score 1, no other concerns are noted.

## 2024-11-10 NOTE — PROGRESS NOTE ADULT - ATTENDING COMMENTS
abd pain  - currently resolved  - intermittent today  - seen by surgery  -npo  - start clears if hungry    d/c planning if tolerates diet in AM

## 2024-11-11 ENCOUNTER — EMERGENCY (EMERGENCY)
Facility: HOSPITAL | Age: 64
LOS: 0 days | Discharge: HOME | End: 2024-11-12
Attending: EMERGENCY MEDICINE
Payer: MEDICARE

## 2024-11-11 ENCOUNTER — TRANSCRIPTION ENCOUNTER (OUTPATIENT)
Age: 64
End: 2024-11-11

## 2024-11-11 VITALS — DIASTOLIC BLOOD PRESSURE: 78 MMHG | HEART RATE: 86 BPM | OXYGEN SATURATION: 100 % | SYSTOLIC BLOOD PRESSURE: 153 MMHG

## 2024-11-11 VITALS
DIASTOLIC BLOOD PRESSURE: 70 MMHG | OXYGEN SATURATION: 98 % | RESPIRATION RATE: 18 BRPM | TEMPERATURE: 98 F | HEART RATE: 98 BPM | SYSTOLIC BLOOD PRESSURE: 151 MMHG | WEIGHT: 220.02 LBS

## 2024-11-11 DIAGNOSIS — I10 ESSENTIAL (PRIMARY) HYPERTENSION: ICD-10-CM

## 2024-11-11 DIAGNOSIS — Z86.718 PERSONAL HISTORY OF OTHER VENOUS THROMBOSIS AND EMBOLISM: ICD-10-CM

## 2024-11-11 DIAGNOSIS — F17.200 NICOTINE DEPENDENCE, UNSPECIFIED, UNCOMPLICATED: ICD-10-CM
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
## 2024-11-11 DIAGNOSIS — Z91.018 ALLERGY TO OTHER FOODS: ICD-10-CM

## 2024-11-11 DIAGNOSIS — N40.0 BENIGN PROSTATIC HYPERPLASIA WITHOUT LOWER URINARY TRACT SYMPTOMS: ICD-10-CM

## 2024-11-11 DIAGNOSIS — S40.022A CONTUSION OF LEFT UPPER ARM, INITIAL ENCOUNTER: ICD-10-CM

## 2024-11-11 DIAGNOSIS — Z91.011 ALLERGY TO MILK PRODUCTS: ICD-10-CM

## 2024-11-11 DIAGNOSIS — E78.5 HYPERLIPIDEMIA, UNSPECIFIED: ICD-10-CM

## 2024-11-11 DIAGNOSIS — Z79.01 LONG TERM (CURRENT) USE OF ANTICOAGULANTS: ICD-10-CM

## 2024-11-11 DIAGNOSIS — E11.9 TYPE 2 DIABETES MELLITUS WITHOUT COMPLICATIONS: ICD-10-CM

## 2024-11-11 LAB
A1C WITH ESTIMATED AVERAGE GLUCOSE RESULT: 5.5 % — SIGNIFICANT CHANGE UP (ref 4–5.6)
ALBUMIN SERPL ELPH-MCNC: 3.7 G/DL — SIGNIFICANT CHANGE UP (ref 3.5–5.2)
ALBUMIN SERPL ELPH-MCNC: 4.1 G/DL — SIGNIFICANT CHANGE UP (ref 3.5–5.2)
ALP SERPL-CCNC: 109 U/L — SIGNIFICANT CHANGE UP (ref 30–115)
ALP SERPL-CCNC: 174 U/L — HIGH (ref 30–115)
ALT FLD-CCNC: 28 U/L — SIGNIFICANT CHANGE UP (ref 0–41)
ALT FLD-CCNC: 30 U/L — SIGNIFICANT CHANGE UP (ref 0–41)
ANION GAP SERPL CALC-SCNC: 12 MMOL/L — SIGNIFICANT CHANGE UP (ref 7–14)
ANION GAP SERPL CALC-SCNC: 9 MMOL/L — SIGNIFICANT CHANGE UP (ref 7–14)
APTT BLD: 47.5 SEC — HIGH (ref 27–39.2)
AST SERPL-CCNC: 80 U/L — HIGH (ref 0–41)
AST SERPL-CCNC: 85 U/L — HIGH (ref 0–41)
BASOPHILS # BLD AUTO: 0.04 K/UL — SIGNIFICANT CHANGE UP (ref 0–0.2)
BASOPHILS # BLD AUTO: 0.04 K/UL — SIGNIFICANT CHANGE UP (ref 0–0.2)
BASOPHILS NFR BLD AUTO: 0.8 % — SIGNIFICANT CHANGE UP (ref 0–1)
BASOPHILS NFR BLD AUTO: 1.4 % — HIGH (ref 0–1)
BILIRUB SERPL-MCNC: 2.7 MG/DL — HIGH (ref 0.2–1.2)
BILIRUB SERPL-MCNC: 3.6 MG/DL — HIGH (ref 0.2–1.2)
BUN SERPL-MCNC: 10 MG/DL — SIGNIFICANT CHANGE UP (ref 10–20)
BUN SERPL-MCNC: 9 MG/DL — LOW (ref 10–20)
CALCIUM SERPL-MCNC: 9.3 MG/DL — SIGNIFICANT CHANGE UP (ref 8.4–10.5)
CALCIUM SERPL-MCNC: 9.9 MG/DL — SIGNIFICANT CHANGE UP (ref 8.4–10.5)
CHLORIDE SERPL-SCNC: 103 MMOL/L — SIGNIFICANT CHANGE UP (ref 98–110)
CHLORIDE SERPL-SCNC: 103 MMOL/L — SIGNIFICANT CHANGE UP (ref 98–110)
CO2 SERPL-SCNC: 24 MMOL/L — SIGNIFICANT CHANGE UP (ref 17–32)
CO2 SERPL-SCNC: 27 MMOL/L — SIGNIFICANT CHANGE UP (ref 17–32)
CREAT SERPL-MCNC: 1 MG/DL — SIGNIFICANT CHANGE UP (ref 0.7–1.5)
CREAT SERPL-MCNC: 1.1 MG/DL — SIGNIFICANT CHANGE UP (ref 0.7–1.5)
EGFR: 74 ML/MIN/1.73M2 — SIGNIFICANT CHANGE UP
EGFR: 84 ML/MIN/1.73M2 — SIGNIFICANT CHANGE UP
EOSINOPHIL # BLD AUTO: 0.05 K/UL — SIGNIFICANT CHANGE UP (ref 0–0.7)
EOSINOPHIL # BLD AUTO: 0.06 K/UL — SIGNIFICANT CHANGE UP (ref 0–0.7)
EOSINOPHIL NFR BLD AUTO: 1.2 % — SIGNIFICANT CHANGE UP (ref 0–8)
EOSINOPHIL NFR BLD AUTO: 1.7 % — SIGNIFICANT CHANGE UP (ref 0–8)
ESTIMATED AVERAGE GLUCOSE: 111 MG/DL — SIGNIFICANT CHANGE UP (ref 68–114)
GLUCOSE BLDC GLUCOMTR-MCNC: 107 MG/DL — HIGH (ref 70–99)
GLUCOSE BLDC GLUCOMTR-MCNC: 184 MG/DL — HIGH (ref 70–99)
GLUCOSE SERPL-MCNC: 104 MG/DL — HIGH (ref 70–99)
GLUCOSE SERPL-MCNC: 96 MG/DL — SIGNIFICANT CHANGE UP (ref 70–99)
HCT VFR BLD CALC: 35.2 % — LOW (ref 42–52)
HCT VFR BLD CALC: 36.7 % — LOW (ref 42–52)
HGB BLD-MCNC: 12.4 G/DL — LOW (ref 14–18)
HGB BLD-MCNC: 12.6 G/DL — LOW (ref 14–18)
IMM GRANULOCYTES NFR BLD AUTO: 0 % — LOW (ref 0.1–0.3)
IMM GRANULOCYTES NFR BLD AUTO: 0.4 % — HIGH (ref 0.1–0.3)
INR BLD: 2.78 RATIO — HIGH (ref 0.65–1.3)
LACTATE SERPL-SCNC: 1.1 MMOL/L — SIGNIFICANT CHANGE UP (ref 0.7–2)
LYMPHOCYTES # BLD AUTO: 1.07 K/UL — LOW (ref 1.2–3.4)
LYMPHOCYTES # BLD AUTO: 1.24 K/UL — SIGNIFICANT CHANGE UP (ref 1.2–3.4)
LYMPHOCYTES # BLD AUTO: 24.7 % — SIGNIFICANT CHANGE UP (ref 20.5–51.1)
LYMPHOCYTES # BLD AUTO: 36.5 % — SIGNIFICANT CHANGE UP (ref 20.5–51.1)
MAGNESIUM SERPL-MCNC: 1.9 MG/DL — SIGNIFICANT CHANGE UP (ref 1.8–2.4)
MCHC RBC-ENTMCNC: 33.9 PG — HIGH (ref 27–31)
MCHC RBC-ENTMCNC: 34.3 G/DL — SIGNIFICANT CHANGE UP (ref 32–37)
MCHC RBC-ENTMCNC: 34.6 PG — HIGH (ref 27–31)
MCHC RBC-ENTMCNC: 35.2 G/DL — SIGNIFICANT CHANGE UP (ref 32–37)
MCV RBC AUTO: 98.3 FL — HIGH (ref 80–94)
MCV RBC AUTO: 98.7 FL — HIGH (ref 80–94)
MONOCYTES # BLD AUTO: 0.19 K/UL — SIGNIFICANT CHANGE UP (ref 0.1–0.6)
MONOCYTES # BLD AUTO: 0.28 K/UL — SIGNIFICANT CHANGE UP (ref 0.1–0.6)
MONOCYTES NFR BLD AUTO: 5.6 % — SIGNIFICANT CHANGE UP (ref 1.7–9.3)
MONOCYTES NFR BLD AUTO: 6.5 % — SIGNIFICANT CHANGE UP (ref 1.7–9.3)
NEUTROPHILS # BLD AUTO: 1.58 K/UL — SIGNIFICANT CHANGE UP (ref 1.4–6.5)
NEUTROPHILS # BLD AUTO: 3.39 K/UL — SIGNIFICANT CHANGE UP (ref 1.4–6.5)
NEUTROPHILS NFR BLD AUTO: 53.9 % — SIGNIFICANT CHANGE UP (ref 42.2–75.2)
NEUTROPHILS NFR BLD AUTO: 67.3 % — SIGNIFICANT CHANGE UP (ref 42.2–75.2)
NRBC # BLD: 0 /100 WBCS — SIGNIFICANT CHANGE UP (ref 0–0)
NRBC # BLD: 0 /100 WBCS — SIGNIFICANT CHANGE UP (ref 0–0)
PHOSPHATE SERPL-MCNC: 1.9 MG/DL — LOW (ref 2.1–4.9)
PLATELET # BLD AUTO: 56 K/UL — LOW (ref 130–400)
PLATELET # BLD AUTO: 65 K/UL — LOW (ref 130–400)
PMV BLD: 11 FL — HIGH (ref 7.4–10.4)
PMV BLD: 11.1 FL — HIGH (ref 7.4–10.4)
POTASSIUM SERPL-MCNC: 4.1 MMOL/L — SIGNIFICANT CHANGE UP (ref 3.5–5)
POTASSIUM SERPL-MCNC: 4.4 MMOL/L — SIGNIFICANT CHANGE UP (ref 3.5–5)
POTASSIUM SERPL-SCNC: 4.1 MMOL/L — SIGNIFICANT CHANGE UP (ref 3.5–5)
POTASSIUM SERPL-SCNC: 4.4 MMOL/L — SIGNIFICANT CHANGE UP (ref 3.5–5)
PROT SERPL-MCNC: 6.1 G/DL — SIGNIFICANT CHANGE UP (ref 6–8)
PROT SERPL-MCNC: 6.7 G/DL — SIGNIFICANT CHANGE UP (ref 6–8)
PROTHROM AB SERPL-ACNC: 33.5 SEC — HIGH (ref 9.95–12.87)
RBC # BLD: 3.58 M/UL — LOW (ref 4.7–6.1)
RBC # BLD: 3.72 M/UL — LOW (ref 4.7–6.1)
RBC # FLD: 14.8 % — HIGH (ref 11.5–14.5)
RBC # FLD: 14.9 % — HIGH (ref 11.5–14.5)
SODIUM SERPL-SCNC: 139 MMOL/L — SIGNIFICANT CHANGE UP (ref 135–146)
SODIUM SERPL-SCNC: 139 MMOL/L — SIGNIFICANT CHANGE UP (ref 135–146)
TROPONIN T, HIGH SENSITIVITY RESULT: 39 NG/L — HIGH (ref 6–21)
WBC # BLD: 2.93 K/UL — LOW (ref 4.8–10.8)
WBC # BLD: 5.03 K/UL — SIGNIFICANT CHANGE UP (ref 4.8–10.8)
WBC # FLD AUTO: 2.93 K/UL — LOW (ref 4.8–10.8)
WBC # FLD AUTO: 5.03 K/UL — SIGNIFICANT CHANGE UP (ref 4.8–10.8)

## 2024-11-11 PROCEDURE — 85730 THROMBOPLASTIN TIME PARTIAL: CPT

## 2024-11-11 PROCEDURE — 80053 COMPREHEN METABOLIC PANEL: CPT

## 2024-11-11 PROCEDURE — 99233 SBSQ HOSP IP/OBS HIGH 50: CPT

## 2024-11-11 PROCEDURE — 85610 PROTHROMBIN TIME: CPT

## 2024-11-11 PROCEDURE — 36000 PLACE NEEDLE IN VEIN: CPT

## 2024-11-11 PROCEDURE — 99283 EMERGENCY DEPT VISIT LOW MDM: CPT | Mod: 25

## 2024-11-11 PROCEDURE — 99284 EMERGENCY DEPT VISIT MOD MDM: CPT | Mod: FS

## 2024-11-11 PROCEDURE — 99239 HOSP IP/OBS DSCHRG MGMT >30: CPT

## 2024-11-11 PROCEDURE — 36415 COLL VENOUS BLD VENIPUNCTURE: CPT

## 2024-11-11 PROCEDURE — 85025 COMPLETE CBC W/AUTO DIFF WBC: CPT

## 2024-11-11 RX ADMIN — FOLIC ACID 1 MILLIGRAM(S): 1 TABLET ORAL at 12:30

## 2024-11-11 RX ADMIN — Medication 25 MILLIGRAM(S): at 05:43

## 2024-11-11 RX ADMIN — Medication 500000 UNIT(S): at 05:44

## 2024-11-11 RX ADMIN — GABAPENTIN 200 MILLIGRAM(S): 300 CAPSULE ORAL at 05:44

## 2024-11-11 RX ADMIN — METHOCARBAMOL 500 MILLIGRAM(S): 500 TABLET ORAL at 12:30

## 2024-11-11 RX ADMIN — Medication 100 MILLIGRAM(S): at 12:31

## 2024-11-11 RX ADMIN — Medication 1000 MICROGRAM(S): at 12:31

## 2024-11-11 RX ADMIN — Medication 90 MILLIGRAM(S): at 05:44

## 2024-11-11 RX ADMIN — NALTREXONE HYDROCHLORIDE 50 MILLIGRAM(S): 50 TABLET, FILM COATED ORAL at 15:57

## 2024-11-11 RX ADMIN — Medication 500000 UNIT(S): at 00:09

## 2024-11-11 RX ADMIN — Medication 1 TABLET(S): at 12:30

## 2024-11-11 RX ADMIN — Medication 500000 UNIT(S): at 15:57

## 2024-11-11 NOTE — ED PROVIDER NOTE - ATTENDING SHARED VISIT SELECTOR YES
Spoke with mom, currently in [place]. Patient injured shoulder while mountain biking. Went to [facility] and Dx with \"grade 3 AC separation\". Mom wondering who patient should follow up with. Will be home Sunday.    Mom says she was planning to f/u with sports medicine Dr. Son at [facility] ([facility]  Would you like patient to see Mimi/David as well/instead?         Yes

## 2024-11-11 NOTE — DISCHARGE NOTE PROVIDER - NSDCCPCAREPLAN_GEN_ALL_CORE_FT
PRINCIPAL DISCHARGE DIAGNOSIS  Diagnosis: Alcoholic ketoacidosis  Assessment and Plan of Treatment: Alcoholic ketoacidosis (AKA) is a condition that can occur in individuals who consume large amounts of alcohol, especially when they haven't been eating well. It happens when the body produces too many acids called ketones. This can lead to an imbalance in your blood chemistry, causing symptoms like nausea, vomiting, stomach pain, and confusion.  Causes:  Excessive alcohol consumption  Poor nutritional intake  Dehydration  Symptoms:  Nausea and vomiting  Abdominal pain  Rapid breathing  Fatigue  Confusion or altered mental status  Treatment: The primary treatment for AKA involves:  Rehydration: Receiving fluids through an IV to restore hydration and balance electrolytes.  Nutritional Support: Administering glucose (sugar) to help reduce ketone production.  Thiamine Supplementation: Providing vitamin B1 (thiamine) to prevent complications like Wernicke's encephalopathy, which affects the brain.  Prevention and Management:  Avoid alcohol consumption.  Maintain a balanced diet with regular meals.  Stay hydrated by drinking plenty of fluids.  Seek support for alcohol use, such as counseling or support groups.  When to Seek Medical Help: It's important to return to the hospital or contact a healthcare provider if you experience:  Persistent vomiting or inability to eat or drink  Severe abdominal pain  Confusion or changes in mental alertness  Rapid heartbeat or feeling faint  Signs of dehydration, like dizziness or dry mouth  By understanding these key points, you can better manage your health and prevent future episodes. Always follow up with your healthcare provider for any concerns or questions about your condition.

## 2024-11-11 NOTE — SBIRT NOTE ADULT - NSSBIRTBRIEFINTDET_GEN_A_CORE
Screening results were reviewed with the patient. Patient was provided educational materials on low-risk guidelines and substance use and health. Motivation and goals were discussed.  Patient was not provided with a referral to substance use treatment because pt declined. Patient's substance use will be addressed during their inpatient admission.

## 2024-11-11 NOTE — DISCHARGE NOTE PROVIDER - NSDCMRMEDTOKEN_GEN_ALL_CORE_FT
colchicine 0.6 mg oral tablet: 1 tab(s) orally 2 times a day  Coumadin 2.5 mg oral tablet: 1 tab(s) orally once a day M, W, F, Sat  Coumadin 2.5 mg oral tablet: 1.5 tab(s) orally once a day Tuesday, Thursday, Sunday  cyanocobalamin 1000 mcg oral tablet: 1 tab(s) orally once a day  Flomax 0.4 mg oral capsule: 1 cap(s) orally once a day (at bedtime)  folic acid 1 mg oral tablet: 1 tab(s) orally once a day  gabapentin 100 mg oral capsule: 2 cap(s) orally 2 times a day  Jardiance 10 mg oral tablet: 1 tab(s) orally once a day  loratadine 10 mg oral tablet: 1 tab(s) orally once a day  metFORMIN 500 mg oral tablet: 1 tab(s) orally 2 times a day  methocarbamol 500 mg oral tablet: 1 orally once a day  Multiple Vitamins oral tablet: 1 tab(s) orally once a day  naltrexone 50 mg oral tablet: 1 tab(s) orally once a day  pantoprazole 40 mg oral delayed release tablet: 1 tab(s) orally once a day (before a meal)  thiamine 100 mg oral tablet: 1 tab(s) orally once a day  Toprol-XL 25 mg oral tablet, extended release: 1 tab(s) orally once a day  Tylenol 325 mg oral tablet: 2 tab(s) orally 2 times a day as needed for  moderate pain

## 2024-11-11 NOTE — SBIRT NOTE ADULT - NSSBIRTFAILEDEXP_GEN_A_CORE
Never High Dose Vitamin A Pregnancy And Lactation Text: High dose vitamin A therapy is contraindicated during pregnancy and breast feeding.

## 2024-11-11 NOTE — ED PROVIDER NOTE - CLINICAL SUMMARY MEDICAL DECISION MAKING FREE TEXT BOX
64-year-old male with with history of DVT/PE on warfarin, HTN, HLD, DM, BPH, alcohol use disorder, discharged earlier today, presents with bruise noted to his left upper arm and left upper chest today. States has no change from prior symptoms. Denies all other source of bleeding including black or bloody stool or mucosal bleeding, chest pain, shortness of breath, and all other symptoms. Denies trauma or fall. On exam, afebrile, hemodynamically stable, saturating well on room air, NAD, nontoxic appearing, sitting comfortably in chair, no WOB, speaking full sentences, head NCAT, EOMI grossly, anicteric, MMM, RRR, breathing comfortably on room air, AAO, CN's 3-12 grossly intact, FELDER spontaneously, full ROM, noted not expansile only slightly protruding hematoma to left medial upper arm, smaller not expansile hematoma to left upper chest, normal distal warmth discoloration sensation, no extremity cyanosis or edema, 2+ radial pulse, <2 sec cap refil, skin warm, dry. INR is therapeutic. Patient with some thrombocytopenia however improved from prior, and no other evidence of acute bleed to warrant transfusion or admission or hematology consult. Not expansile hematoma and does not require surgical consult or monitoring. No underlying tenderness or evidence of fracture to warrant x-ray imaging. No evidence of DVT to warrant ultrasound imaging. No evidence of infection/abscess to warrant antibiotics. Patient is well appearing, NAD, afebrile, hemodynamically stable. Any available tests and studies were discussed with patient. Discharged back to [facility] with instructions in further symptomatic care, return precautions, and need for PMD f/u.

## 2024-11-11 NOTE — PROGRESS NOTE ADULT - SUBJECTIVE AND OBJECTIVE BOX
SUBJECTIVE/OVERNIGHT EVENTS  Today is hospital day 1d. This morning patient was seen and examined at bedside, resting comfortably in bed. Patient with elevated lactate and LLQ, concerns for ischemic colitis, CT angio done urgently and surgery consulted, f/u scan results and surgery recs. Also endorsed right sided chest pain but ekg unremarkable, pain reproducible       MEDICATIONS  STANDING MEDICATIONS  cyanocobalamin 1000 MICROGram(s) Oral daily  dextrose 5%. 1000 milliLiter(s) IV Continuous <Continuous>  dextrose 5%. 1000 milliLiter(s) IV Continuous <Continuous>  dextrose 50% Injectable 25 Gram(s) IV Push once  dextrose 50% Injectable 12.5 Gram(s) IV Push once  dextrose 50% Injectable 25 Gram(s) IV Push once  enoxaparin Injectable 90 milliGRAM(s) SubCutaneous every 12 hours  folic acid 1 milliGRAM(s) Oral daily  gabapentin 200 milliGRAM(s) Oral two times a day  glucagon  Injectable 1 milliGRAM(s) IntraMuscular once  insulin lispro (ADMELOG) corrective regimen sliding scale   SubCutaneous three times a day before meals  methocarbamol 500 milliGRAM(s) Oral daily  metoprolol succinate ER 25 milliGRAM(s) Oral daily  multivitamin 1 Tablet(s) Oral daily  naltrexone 50 milliGRAM(s) Oral daily  nystatin    Suspension 524438 Unit(s) Oral four times a day  pantoprazole    Tablet 40 milliGRAM(s) Oral before breakfast  sodium chloride 0.9%. 1000 milliLiter(s) IV Continuous <Continuous>  tamsulosin 0.4 milliGRAM(s) Oral at bedtime  thiamine 100 milliGRAM(s) Oral daily    PRN MEDICATIONS  acetaminophen     Tablet .. 650 milliGRAM(s) Oral every 6 hours PRN  benzocaine/menthol Lozenge 1 Lozenge Oral every 4 hours PRN  dextrose Oral Gel 15 Gram(s) Oral once PRN  LORazepam   Injectable 2 milliGRAM(s) IV Push every 2 hours PRN  LORazepam   Injectable 2 milliGRAM(s) IV Push every 1 hour PRN  ondansetron Injectable 4 milliGRAM(s) IV Push every 8 hours PRN    VITALS  T(F): 98.1 (11-10-24 @ 05:26), Max: 98.9 (11-09-24 @ 17:00)  HR: 84 (11-10-24 @ 05:26) (84 - 120)  BP: 123/63 (11-10-24 @ 05:26) (123/63 - 152/67)  RR: 18 (11-10-24 @ 05:26) (17 - 18)  SpO2: 96% (11-10-24 @ 05:26) (95% - 96%)  POCT Blood Glucose.: 156 mg/dL (11-10-24 @ 01:38)    LABS             13.5   9.97  )-----------( 128      ( 11-09-24 @ 09:01 )             39.6     138  |  97  |  9   -------------------------<  233   11-09-24 @ 20:51  4.5  |  14  |  1.4    Ca      9.0     11-09-24 @ 20:51  Phos   3.1     11-09-24 @ 20:51  Mg     1.6     11-09-24 @ 09:01    TPro  6.9  /  Alb  4.1  /  TBili  1.6  /  DBili  x   /  AST  72  /  ALT  21  /  AlkPhos  121  /  GGT  x     11-09-24 @ 20:51    PT/INR - ( 11-09-24 @ 09:20 )   PT: 10.70 sec;   INR: 0.91 ratio  PTT - ( 11-09-24 @ 09:20 )  PTT:34.7 sec    Troponin T, High Sensitivity Result: 30 ng/L (11-09-24 @ 20:51)  Troponin T, High Sensitivity Result: 18 ng/L (11-09-24 @ 11:30)  Troponin T, High Sensitivity Result: 27 ng/L (11-09-24 @ 09:01)    Urinalysis Basic - ( 09 Nov 2024 20:51 )    Color: x / Appearance: x / SG: x / pH: x  Gluc: 233 mg/dL / Ketone: x  / Bili: x / Urobili: x   Blood: x / Protein: x / Nitrite: x   Leuk Esterase: x / RBC: x / WBC x   Sq Epi: x / Non Sq Epi: x / Bacteria: x      ABG - ( 09 Nov 2024 20:02 )  pH, Arterial: 7.38  pH, Blood: x     /  pCO2: 30    /  pO2: 73    / HCO3: 18    / Base Excess: -6.2  /  SaO2: 96.8                IMAGING
 GENERAL SURGERY PROGRESS NOTE     Patient: CAT ALSTON , 64y ([birth year])Male   MRN: [id number]  Location: [facility] ED Hold 034 A  Visit: 11-09-24 Inpatient  Date: 11-11-24 @ 09:54        Admitted :11-09-24 (2d)  LOS: 2d    Procedure/Dx/Injuries: Alcoholic ketoacidosis         Events of past 24 hours: Patient seen and examined at bedside. Patient denies nausea and vomiting. He is passing gas and last bowel movement was 2 days ago. His abdominal pain is improving and lactate is downtrending to 1.1 from 1.4.   >>> <<<>>> <<<>>> <<<>>> <<<>>> <<<>>> <<<>>> <<<>>> <<<>>> <<<>>> <<<    Vitals:   T(F): 97.5 (11-11-24 @ 07:50), Max: 98 (11-11-24 @ 05:40)  HR: 66 (11-11-24 @ 07:50)  BP: 127/71 (11-11-24 @ 07:50)  RR: 16 (11-11-24 @ 07:50)  SpO2: 96% (11-11-24 @ 07:50)      PHYSICAL EXAM:  General: NAD, AAOx3, calm and cooperative  Cardiac: RRR   Respiratory: CTAB, normal respiratory effort  Abdomen: Soft, non-distended, mild LLE tenderness to palpation, no rebound, no guarding.  Musculoskeletal: Strength 5/5 BL UE/LE, ROM intact, compartments soft  >>> <<<>>> <<<>>> <<<>>> <<<>>> <<<>>> <<<>>> <<<>>> <<<>>> <<<>>> <<<   Is & Os:   Diet, DASH/TLC:   Sodium & Cholesterol Restricted  Consistent Carbohydrate No Snacks (CSTCHO)  No Dairy  No Egg  No Fish  No Shellfish      >>> <<<>>> <<<>>> <<<>>> <<<>>> <<<>>> <<<>>> <<<>>> <<<>>> <<<>>> <<<    MEDICATIONS  (STANDING):  cyanocobalamin 1000 MICROGram(s) Oral daily  dextrose 5%. 1000 milliLiter(s) (100 mL/Hr) IV Continuous <Continuous>  dextrose 5%. 1000 milliLiter(s) (50 mL/Hr) IV Continuous <Continuous>  dextrose 50% Injectable 25 Gram(s) IV Push once  dextrose 50% Injectable 12.5 Gram(s) IV Push once  dextrose 50% Injectable 25 Gram(s) IV Push once  folic acid 1 milliGRAM(s) Oral daily  gabapentin 200 milliGRAM(s) Oral two times a day  glucagon  Injectable 1 milliGRAM(s) IntraMuscular once  insulin lispro (ADMELOG) corrective regimen sliding scale   SubCutaneous three times a day before meals  methocarbamol 500 milliGRAM(s) Oral daily  metoprolol succinate ER 25 milliGRAM(s) Oral daily  multivitamin 1 Tablet(s) Oral daily  naltrexone 50 milliGRAM(s) Oral daily  nystatin    Suspension 134007 Unit(s) Oral four times a day  pantoprazole    Tablet 40 milliGRAM(s) Oral before breakfast  sodium chloride 0.9%. 1000 milliLiter(s) (100 mL/Hr) IV Continuous <Continuous>  tamsulosin 0.4 milliGRAM(s) Oral at bedtime  thiamine 100 milliGRAM(s) Oral daily    MEDICATIONS  (PRN):  acetaminophen     Tablet .. 650 milliGRAM(s) Oral every 6 hours PRN Moderate Pain (4 - 6)  benzocaine/menthol Lozenge 1 Lozenge Oral every 4 hours PRN Sore Throat  dextrose Oral Gel 15 Gram(s) Oral once PRN Blood Glucose LESS THAN 70 milliGRAM(s)/deciliter  LORazepam   Injectable 2 milliGRAM(s) IV Push every 1 hour PRN Symptom-triggered: each CIWA -Ar score 8 or GREATER  LORazepam   Injectable 2 milliGRAM(s) IV Push every 2 hours PRN Symptom-triggered: 2 point increase in CIWA -Ar score and a total score of 7 or LESS  ondansetron Injectable 4 milliGRAM(s) IV Push every 8 hours PRN Nausea and/or Vomiting      DVT PROPHYLAXIS:   GI PROPHYLAXIS: pantoprazole    Tablet 40 milliGRAM(s) Oral before breakfast    ANTICOAGULATION:   ANTIBIOTICS:  nystatin    Suspension 132301 Unit(s)      >>> <<<>>> <<<>>> <<<>>> <<<>>> <<<>>> <<<>>> <<<>>> <<<>>> <<<>>> <<<        LAB/STUDIES:  Labs:  CAPILLARY BLOOD GLUCOSE      POCT Blood Glucose.: 107 mg/dL (11 Nov 2024 08:05)  POCT Blood Glucose.: 113 mg/dL (10 Nov 2024 16:30)  POCT Blood Glucose.: 95 mg/dL (10 Nov 2024 11:51)                          12.4   2.93  )-----------( 56       ( 11 Nov 2024 05:13 )             35.2       Auto Neutrophil %: 53.9 % (11-11-24 @ 05:13)  Auto Immature Granulocyte %: 0.0 % (11-11-24 @ 05:13)    11-11    139  |  103  |  9[L]  ----------------------------<  96  4.1   |  24  |  1.0      Calcium: 9.3 mg/dL (11-11-24 @ 05:13)      LFTs:             6.1  | 3.6  | 85       ------------------[109     ( 11 Nov 2024 05:13 )  3.7  | x    | 28          Lipase:x      Amylase:x         Lactate, Blood: 1.1 mmol/L (11-11-24 @ 05:13)  Lactate, Blood: 1.4 mmol/L (11-10-24 @ 17:20)  Lactate, Blood: 2.2 mmol/L (11-10-24 @ 06:45)  Lactate, Blood: 10.3 mmol/L (11-09-24 @ 20:51)  Blood Gas Arterial, Lactate: 6.6 mmol/L (11-09-24 @ 20:02)  Blood Gas Venous - Lactate: 14.4 mmol/L (11-09-24 @ 13:24)    ABG - ( 09 Nov 2024 20:02 )  pH: 7.38  /  pCO2: 30    /  pO2: 73    / HCO3: 18    / Base Excess: -6.2  /  SaO2: 96.8              Coags:     >40.00  ----< 5.22    ( 10 Nov 2024 06:45 )     x                   Urinalysis Basic - ( 11 Nov 2024 05:13 )    Color: x / Appearance: x / SG: x / pH: x  Gluc: 96 mg/dL / Ketone: x  / Bili: x / Urobili: x   Blood: x / Protein: x / Nitrite: x   Leuk Esterase: x / RBC: x / WBC x   Sq Epi: x / Non Sq Epi: x / Bacteria: x      
Patient is a 64y old  Male who presents with a chief complaint of       Over Night Events:  On RA.  off pressors.          ROS:     All ROS are negative except HPI         PHYSICAL EXAM    ICU Vital Signs Last 24 Hrs  T(C): 36.7 (11 Nov 2024 05:40), Max: 36.9 (10 Nov 2024 07:25)  T(F): 98 (11 Nov 2024 05:40), Max: 98.5 (10 Nov 2024 07:25)  HR: 70 (11 Nov 2024 05:40) (57 - 82)  BP: 128/77 (11 Nov 2024 05:40) (109/55 - 132/59)  BP(mean): 85 (10 Nov 2024 07:25) (85 - 85)  ABP: --  ABP(mean): --  RR: 18 (11 Nov 2024 05:40) (17 - 18)  SpO2: 98% (11 Nov 2024 05:40) (95% - 98%)    O2 Parameters below as of 11 Nov 2024 05:40  Patient On (Oxygen Delivery Method): room air            CONSTITUTIONAL:  Well nourished.  NAD    ENT:   Airway patent,   Mouth with normal mucosa.     EYES:   Pupils equal,   Round and reactive to light.    CARDIAC:   Normal rate,   Regular rhythm.      RESPIRATORY:   No wheezing  Bilateral BS  Normal chest expansion  Not tachypneic,  No use of accessory muscles    GASTROINTESTINAL:  Abdomen soft,   Non-tender,   No guarding,   + BS    MUSCULOSKELETAL:   Range of motion is not limited,  No clubbing, cyanosis    NEUROLOGICAL:   Alert   No motor  deficits.    SKIN:   Skin normal color for race,   Warm and dry  No evidence of rash.        LABS:                            12.4   2.93  )-----------( 56       ( 11 Nov 2024 05:13 )             35.2                                               11-11    139  |  103  |  9[L]  ----------------------------<  96  4.1   |  24  |  1.0    Ca    9.3      11 Nov 2024 05:13  Phos  1.6     11-10  Mg     1.9     11-11    TPro  6.1  /  Alb  3.7  /  TBili  3.6[H]  /  DBili  x   /  AST  85[H]  /  ALT  28  /  AlkPhos  109  11-11      PT/INR - ( 10 Nov 2024 06:45 )   PT: >40.00 sec;   INR: 5.22 ratio         PTT - ( 09 Nov 2024 09:20 )  PTT:34.7 sec                                       Urinalysis Basic - ( 11 Nov 2024 05:13 )    Color: x / Appearance: x / SG: x / pH: x  Gluc: 96 mg/dL / Ketone: x  / Bili: x / Urobili: x   Blood: x / Protein: x / Nitrite: x   Leuk Esterase: x / RBC: x / WBC x   Sq Epi: x / Non Sq Epi: x / Bacteria: x                                                  LIVER FUNCTIONS - ( 11 Nov 2024 05:13 )  Alb: 3.7 g/dL / Pro: 6.1 g/dL / ALK PHOS: 109 U/L / ALT: 28 U/L / AST: 85 U/L / GGT: x                                                                                                                                   ABG - ( 09 Nov 2024 20:02 )  pH, Arterial: 7.38  pH, Blood: x     /  pCO2: 30    /  pO2: 73    / HCO3: 18    / Base Excess: -6.2  /  SaO2: 96.8                MEDICATIONS  (STANDING):  cyanocobalamin 1000 MICROGram(s) Oral daily  dextrose 5%. 1000 milliLiter(s) (50 mL/Hr) IV Continuous <Continuous>  dextrose 5%. 1000 milliLiter(s) (100 mL/Hr) IV Continuous <Continuous>  dextrose 50% Injectable 25 Gram(s) IV Push once  dextrose 50% Injectable 12.5 Gram(s) IV Push once  dextrose 50% Injectable 25 Gram(s) IV Push once  enoxaparin Injectable 90 milliGRAM(s) SubCutaneous every 12 hours  folic acid 1 milliGRAM(s) Oral daily  gabapentin 200 milliGRAM(s) Oral two times a day  glucagon  Injectable 1 milliGRAM(s) IntraMuscular once  insulin lispro (ADMELOG) corrective regimen sliding scale   SubCutaneous three times a day before meals  methocarbamol 500 milliGRAM(s) Oral daily  metoprolol succinate ER 25 milliGRAM(s) Oral daily  multivitamin 1 Tablet(s) Oral daily  naltrexone 50 milliGRAM(s) Oral daily  nystatin    Suspension 121644 Unit(s) Oral four times a day  pantoprazole    Tablet 40 milliGRAM(s) Oral before breakfast  sodium chloride 0.9%. 1000 milliLiter(s) (100 mL/Hr) IV Continuous <Continuous>  tamsulosin 0.4 milliGRAM(s) Oral at bedtime  thiamine 100 milliGRAM(s) Oral daily    MEDICATIONS  (PRN):  acetaminophen     Tablet .. 650 milliGRAM(s) Oral every 6 hours PRN Moderate Pain (4 - 6)  benzocaine/menthol Lozenge 1 Lozenge Oral every 4 hours PRN Sore Throat  dextrose Oral Gel 15 Gram(s) Oral once PRN Blood Glucose LESS THAN 70 milliGRAM(s)/deciliter  LORazepam   Injectable 2 milliGRAM(s) IV Push every 2 hours PRN Symptom-triggered: 2 point increase in CIWA -Ar score and a total score of 7 or LESS  LORazepam   Injectable 2 milliGRAM(s) IV Push every 1 hour PRN Symptom-triggered: each CIWA -Ar score 8 or GREATER  ondansetron Injectable 4 milliGRAM(s) IV Push every 8 hours PRN Nausea and/or Vomiting      New X-rays reviewed:                                                                                  ECHO

## 2024-11-11 NOTE — DISCHARGE NOTE NURSING/CASE MANAGEMENT/SOCIAL WORK - FINANCIAL ASSISTANCE
[facility] provides services at a reduced cost to those who are determined to be eligible through [facility]’s financial assistance program. Information regarding [facility]’s financial assistance program can be found by going to [email or web address] or by calling [phone].

## 2024-11-11 NOTE — DISCHARGE NOTE PROVIDER - HOSPITAL COURSE
64-year-old man kth chornic EtOH use disorder, DVT/PE on Coumadin, BPH, hypertension, DL, DM, gout, presents to the ED for severe intermittent midsternal chest pain that radiates to the flanks associated to diffuse non specific abdominal pain, both started this morning. He admits to heavy drinking last night (admits to half a pint of vodka, likely more). He also drinks daily around 3 glasses/day up to a pint of vodka. He had multiple episodes of vomiting last night without hematemesis. He denies any head trauma.  No other complaints.    Vital Signs Last 24 Hrs  T(C): 36.9 (09 Nov 2024 07:40), Max: 36.9 (09 Nov 2024 07:40)  T(F): 98.5 (09 Nov 2024 07:40), Max: 98.5 (09 Nov 2024 07:40)  HR: 116 (09 Nov 2024 12:57) (116 - 120)  BP: 127/61 (09 Nov 2024 12:57) (127/61 - 146/73)  RR: 18 (09 Nov 2024 12:57) (17 - 18)  SpO2: 95% (09 Nov 2024 12:57) (95% - 95%) on RA    Labs:WBC 9.97K, N 74.1%, Hb 13.5, .8, plt 128K, Na/K Nl, HCO3 14, AG 30, Crea 1.3 (baseline),  (180), AST/ALT 51/16 (162/33), Bili T 1.5 (5.3), Mg 1.6, lipase Nl, proBNP 381, BHB 1.1, Trop 27 --> 18,   VBG: pH 7.18, lactate 14.4, pCO2 52  Alcohol 264  CTH: no acute pathology. Chronic microvascular changes.  CT CAP: Artifact secondary to upper upper extremities limits assessment  of chest   and upper abdomen. No gross pulmonary emboli. Cholelithiasis.    Admitted for HAGMA 2/2 alcoholic ketoacidosis and lactic acidosis 2/2 heavy alcohol intoxication.    Medicine course: Given fluids, concern for ischemic bowel. Surgery consulted, CTA was neg., no acute intervention. Lactate downtrending and pt reported drastic improvement. Catch team consulted for alcohol use disorder. Diet advanced, pt safe and stable for discharge.     # Acute metabolic acidosis   # alcoholic ketoacidosis vs. lactic acidosis 2/2 alcoholism vs. metformin  (r/o ischemia)   - CTA chest: Artifact secondary to upper upper extremities limits assessment  of chest and upper abdomen. No gross pulmonary emboli.  - lactate resolved  -pt tolerating po diet  - patient is supposed to be on coumadin for DVT/PE, however INR supratherapeutic, dc ac for now  - Surgery evaluation for possible ischemic bowel, CT neg., no acute intervention    #Alcohol abuse   - CIWA protocol prn  - thiamine, folic acid and multivitamin    #HTN/HLD   - c/w home med.     #DM II   - monitor FS AC HS. Insulin regimen. Hold metformin and Jardiance for now.     #BPH   - c/w home med.     #Gout   - allopurinol      In summary: 64-year-old man kth chornic EtOH use disorder, DVT/PE on Coumadin, BPH, hypertension, DL, DM, gout, presents to the ED for severe intermittent midsternal chest pain that radiates to the flanks associated to diffuse non specific abdominal pain, both started this morning. Admitting for HAGMA 2/2 alcohol, now resolved. CATCH consulted, fu with PCP. Discussion of discharge plan of care, including discharge diagnoses, medication reconciliation, and follow-ups was conducted with Dr. Iverson on 11/11/24 at 9Am and discharge was approved.

## 2024-11-11 NOTE — ED PROVIDER NOTE - OBJECTIVE STATEMENT
64 years old male history of hypertension, alcohol abuse, DVT on Coumadin presents from adult home secondary to bruising to his left upper arm.  Patient was recently admitted to hospital for alcoholic ketoacidosis.  Patient was discharged home yesterday and was told if this bruising or bleeding, he should come back to the hospital.  Noted bruising to the left upper arm so he comes to ED for evaluation.  Denies injury to the area.  Denies left arm pain, numbness and weakness.  Further denies headache, chest pain, abdominal pain, vomiting or diarrhea.

## 2024-11-11 NOTE — ED PROVIDER NOTE - PROGRESS NOTE DETAILS
INR improved to 2.6.  Recommend resume Coumadin tomorrow.  Platelets 65,000.  No further workup needed in ED.. Old chart review, patient INR was 5.5 yesterday with platelet count of 56,000.  Will repeat lab work in ED this evening.

## 2024-11-11 NOTE — ED PROVIDER NOTE - NSFOLLOWUPCLINICS_GEN_ALL_ED_FT
[facility]  Coumadin  [address] [place] [address]  Phone: [phone]  Fax:   Scheduled Appointment: 11/15/2024      0

## 2024-11-11 NOTE — ED PROVIDER NOTE - PATIENT PORTAL LINK FT
You can access the FollowMyHealth Patient Portal offered by [facility] by registering at the following website: [email or web address]. By joining [employer]’s FollowMyHealth portal, you will also be able to view your health information using other applications (apps) compatible with our system.

## 2024-11-11 NOTE — ED PROVIDER NOTE - PHYSICAL EXAMINATION
CONSTITUTIONAL: Well-appearing; well-nourished; in no apparent distress.   EYES: PERRL; EOM intact.   CARDIOVASCULAR: Normal S1, S2; no murmurs, rubs, or gallops.   RESPIRATORY: Normal chest excursion with respiration; breath sounds clear and equal bilaterally; no wheezes, rhonchi, or rales.  GI/: Normal bowel sounds; non-distended; non-tender; no palpable organomegaly.   MS: Left shoulder, elbow, wrist and hand with full range of motion without pain.  Left hand neurovascular intact.  SKIN: Ecchymosis/flat hematoma noted to medial aspect of left upper arm.  Mild TTP.  No bleeding.  NEURO/PSYCH: A & O x 3; grossly unremarkable.

## 2024-11-11 NOTE — ED PROVIDER NOTE - NSFOLLOWUPINSTRUCTIONS_ED_ALL_ED_FT
Contusion    ***************  The Ecchymosis to his left upper arm is superficial due to recent elevation of INR (5.5) level while he was in hospital yesterday.  INR level is down to 2.7 this evening. Patient may resume his coumadin tomorrow and follow up at coumadin clinic of Friday this week to recheck INR level.   ***************    Continues R.I.C.E (Rest, Ice, Compression and Elevation) in the next few days. Apply ice 3-4 times a day and 15 minutes a time.    A contusion is a deep bruise. Contusions are the result of a blunt injury to tissues and muscle fibers under the skin. The skin overlying the contusion may turn blue, purple, or yellow. Symptoms also include pain and swelling in the injured area.    SEEK IMMEDIATE MEDICAL CARE IF YOU HAVE THE FOLLOWING SYMPTOMS: severe pain, numbness, tingling, pain, weakness, or skin color/temperature change in any part of your body distal to the fracture.

## 2024-11-11 NOTE — DISCHARGE NOTE PROVIDER - CARE PROVIDER_API CALL
Eugene Tellez  Internal Medicine  [address] [place] [address]  Phone: [phone]  Fax: [phone]  Established Patient  Follow Up Time: 2 weeks

## 2024-11-11 NOTE — ED PROVIDER NOTE - ATTENDING APP SHARED VISIT CONTRIBUTION OF CARE
64-year-old male with with history of DVT/PE on warfarin, HTN, HLD, DM, BPH, alcohol use disorder, discharged earlier today, presents with bruise noted to his left upper arm and left upper chest today. States has no change from prior symptoms. Denies all other source of bleeding including black or bloody stool or mucosal bleeding, chest pain, shortness of breath, and all other symptoms. Denies trauma or fall. On exam, afebrile, hemodynamically stable, saturating well on room air, NAD, nontoxic appearing, sitting comfortably in chair, no WOB, speaking full sentences, head NCAT, EOMI grossly, anicteric, MMM, RRR, breathing comfortably on room air, AAO, CN's 3-12 grossly intact, FELDER spontaneously, full ROM, noted not expansile only slightly protruding hematoma to left medial upper arm, smaller not expansile hematoma to left upper chest, normal distal warmth discoloration sensation, no extremity cyanosis or edema, 2+ radial pulse, <2 sec cap refil, skin warm, dry. INR is therapeutic. Patient with some thrombocytopenia however improved from prior, and no other evidence of acute bleed to warrant transfusion or admission or hematology consult. Not expansile hematoma and does not require surgical consult or monitoring. No underlying tenderness or evidence of fracture to warrant x-ray imaging. No evidence of DVT to warrant ultrasound imaging. No evidence of infection/abscess to warrant antibiotics. Patient is well appearing, NAD, afebrile, hemodynamically stable. Any available tests and studies were discussed with patient. Discharged back to [facility] with instructions in further symptomatic care, return precautions, and need for PMD f/u.

## 2024-11-11 NOTE — PROGRESS NOTE ADULT - ASSESSMENT
ASSESSMENT:  64-year-old man with chronic EtOH use disorder, DVT/PE on Coumadin presents to the ED for severe intermittent midsternal chest pain that radiates to the flanks associated to diffuse non specific abdominal pain. Surgery consulted to r/o bowel ischemia.     PLAN:  - Diet advanced to DASH/TLC   - No acute surgical intervention. Recall as needed  
IMPRESSION:    High anion gap metabolic acidosis resolved .    Lactic acidosis resolved   Acute kidney injury likely prerenal improving   Alcohol use disorder  HO of DVT on coumadin.      PLAN:    CNS: CIWA protocol  thiamine, folic acid.  CATCH team     HEENT:  Oral care    PULMONARY:  HOB @ 45 degrees, aspiration precautions, on RA    CARDIOVASCULAR: Lactate noted.  Gentle hydration.  ECHO     GI: GI prophylaxis                                          Feeding po diet.  trend LFT.  hep apnel     RENAL:  F/u  lytes.  Correct as needed. Monitor UO     INFECTIOUS DISEASE: Monitor VS     HEMATOLOGICAL:  LE doppler, CT noted.  MOnitor CBC and Coags. Restart Coumadin once INR < 3      ENDOCRINE:  Follow up FS.     MUSCULOSKELETAL: AAT    CODE STATUS: FULL CODE    DGTF     
65 yo M with hx of Alcohol use disorder, DVT/PE (coumadin), HTN, HLD, DM II, BPH presents to ED for chest pain and generalized abdominal pain a/w nausea and vomiting.     # Acute metabolic acidosis   # alcoholic ketoacidosis vs. lactic acidosis 2/2 alcoholism vs. metformin  (r/o ischemia)   - CTA chest: Artifact secondary to upper upper extremities limits assessment  of chest and upper abdomen. No gross pulmonary emboli.  - serum LA 14.4 --> 6.6 --> 10.3   - c/w NS @ 100cc/hr for now.   - patient is supposed to be on coumadin for DVT/PE, however INR subtherapeutic for non compliance --> patient is high risk for embolic event. Patient also complains of abdominal pain --> f/u CTA abdomen/pelvic to r/o ischemic bowel.   - Surgery evaluation for possible ischemic bowel.   - c/w Lovenox BID for now.   - f/u AM lactic acid  - monitor BP closely.     #Alcohol abuse   - CIWA protocol prn  - thiamine, folic acid and multivitamin    #HTN/HLD   - c/w home med.     #DM II   - monitor FS AC HS. Insulin regimen. Hold metformin and Jardiance for now.     #BPH   - c/w home med.     #Gout   - allopurinol    #DVT ppx: Lovenox SC   #GI ppx: PPI  #Diet: NPO for now.   #Activity: as tolerated.

## 2024-11-11 NOTE — ED ADULT TRIAGE NOTE - CHIEF COMPLAINT QUOTE
Pt BIBA from [facility] c/o of bruise on L upper arm. Pt on a/c. Pt denies any trauma noticed bruise today.

## 2024-11-12 NOTE — ED ADULT NURSE NOTE - CHIEF COMPLAINT QUOTE
Pt BIBA from [facility] c/o of bruise on L upper arm. Pt on a/c. Pt denies any trauma noticed bruise today.

## 2024-11-12 NOTE — ED ADULT NURSE NOTE - COVID-19 RESULT
[FreeTextEntry1] : Mr. Barksdale is an 79-year-old man with history of CAD s/p CABG 2018, recurrent CVA, HTN, HLD, and pulmonary nodules presenting for evaluation of dyspnea on exertion.\par \par Patient underwent 2 vessel bypass grafting in 2018 with Dr. Wren after initially presenting with intermittent left sided chest pain and undergoing elective C demonstrating severe 3-vessel disease. Post-operatively was noted to have AF. He converted to sinus (unclear from records if spontaneous) and he was briefly on amiodarone after discharge.\par \par Since that time has not followed with a cardiologist and notes he has been admitted to [facility] several times for difficulty breathing, which has been attributed to chronic bronchitis. He was discharged after his recent admission with nebulizers and pumps. Though he has had improvement he still feels short of breath with minimal exertion. No overt chest discomfort.\par \par ECG: NSR, normal axis, normal intervals, septal infarct\par \par Labs:\par - Hgb 12.5, Plt 205\par - 1/2023: Cr 1.0, K 3.4, normal liver tests\par - 2022: , TG 47, HDL 71, LDL 69\par - 2021: A1c 6.1%
NEGATIVE

## 2024-11-12 NOTE — ED ADULT NURSE NOTE - NSFALLUNIVINTERV_ED_ALL_ED
Bed/Stretcher in lowest position, wheels locked, appropriate side rails in place/Call bell, personal items and telephone in reach/Instruct patient to call for assistance before getting out of bed/chair/stretcher/Non-slip footwear applied when patient is off stretcher/[place] to call system/Physically safe environment - no spills, clutter or unnecessary equipment/Purposeful proactive rounding/Room/bathroom lighting operational, light cord in reach

## 2024-11-13 ENCOUNTER — APPOINTMENT (OUTPATIENT)
Dept: MEDICATION MANAGEMENT | Facility: CLINIC | Age: 64
End: 2024-11-13

## 2024-11-13 ENCOUNTER — OUTPATIENT (OUTPATIENT)
Dept: OUTPATIENT SERVICES | Facility: HOSPITAL | Age: 64
LOS: 1 days | End: 2024-11-13
Payer: MEDICARE

## 2024-11-13 DIAGNOSIS — Z79.01 LONG TERM (CURRENT) USE OF ANTICOAGULANTS: ICD-10-CM

## 2024-11-13 DIAGNOSIS — I82.409 ACUTE EMBOLISM AND THROMBOSIS OF UNSPECIFIED DEEP VEINS OF UNSPECIFIED LOWER EXTREMITY: ICD-10-CM

## 2024-11-13 LAB
BASOPHILS # BLD AUTO: 0.04 K/UL
BASOPHILS NFR BLD AUTO: 0.9 %
EOSINOPHIL # BLD AUTO: 0.17 K/UL
EOSINOPHIL NFR BLD AUTO: 3.7 %
HCT VFR BLD CALC: 36.2 %
HGB BLD-MCNC: 12.3 G/DL
IMM GRANULOCYTES NFR BLD AUTO: 0.2 %
INR PPP: 1.8 RATIO
LYMPHOCYTES # BLD AUTO: 1.43 K/UL
LYMPHOCYTES NFR BLD AUTO: 31.2 %
MAN DIFF?: NORMAL
MCHC RBC-ENTMCNC: 33.4 PG
MCHC RBC-ENTMCNC: 34 G/DL
MCV RBC AUTO: 98.4 FL
MONOCYTES # BLD AUTO: 0.43 K/UL
MONOCYTES NFR BLD AUTO: 9.4 %
NEUTROPHILS # BLD AUTO: 2.5 K/UL
NEUTROPHILS NFR BLD AUTO: 54.6 %
PLATELET # BLD AUTO: 66 K/UL
PMV BLD AUTO: 0 /100 WBCS
POCT-PROTHROMBIN TIME: 21.5 SECS
QUALITY CONTROL: YES
RBC # BLD: 3.68 M/UL
RBC # FLD: 14.7 %
WBC # FLD AUTO: 4.58 K/UL

## 2024-11-13 PROCEDURE — 36416 COLLJ CAPILLARY BLOOD SPEC: CPT

## 2024-11-13 PROCEDURE — 85610 PROTHROMBIN TIME: CPT

## 2024-11-13 PROCEDURE — 99211 OFF/OP EST MAY X REQ PHY/QHP: CPT

## 2024-11-18 ENCOUNTER — APPOINTMENT (OUTPATIENT)
Dept: MEDICATION MANAGEMENT | Facility: CLINIC | Age: 64
End: 2024-11-18

## 2024-11-18 DIAGNOSIS — F10.129 ALCOHOL ABUSE WITH INTOXICATION, UNSPECIFIED: ICD-10-CM

## 2024-11-18 DIAGNOSIS — I10 ESSENTIAL (PRIMARY) HYPERTENSION: ICD-10-CM

## 2024-11-18 DIAGNOSIS — Z86.718 PERSONAL HISTORY OF OTHER VENOUS THROMBOSIS AND EMBOLISM: ICD-10-CM

## 2024-11-18 DIAGNOSIS — E11.9 TYPE 2 DIABETES MELLITUS WITHOUT COMPLICATIONS: ICD-10-CM

## 2024-11-18 DIAGNOSIS — R07.9 CHEST PAIN, UNSPECIFIED: ICD-10-CM

## 2024-11-18 DIAGNOSIS — N40.0 BENIGN PROSTATIC HYPERPLASIA WITHOUT LOWER URINARY TRACT SYMPTOMS: ICD-10-CM

## 2024-11-18 DIAGNOSIS — M10.9 GOUT, UNSPECIFIED: ICD-10-CM

## 2024-11-18 DIAGNOSIS — Z79.01 LONG TERM (CURRENT) USE OF ANTICOAGULANTS: ICD-10-CM

## 2024-11-18 DIAGNOSIS — B37.0 CANDIDAL STOMATITIS: ICD-10-CM

## 2024-11-18 DIAGNOSIS — E87.29 OTHER ACIDOSIS: ICD-10-CM

## 2024-11-18 DIAGNOSIS — Z91.014 ALLERGY TO MAMMALIAN MEATS: ICD-10-CM

## 2024-11-18 DIAGNOSIS — E78.5 HYPERLIPIDEMIA, UNSPECIFIED: ICD-10-CM

## 2024-11-18 DIAGNOSIS — Z79.84 LONG TERM (CURRENT) USE OF ORAL HYPOGLYCEMIC DRUGS: ICD-10-CM

## 2024-11-18 DIAGNOSIS — E83.42 HYPOMAGNESEMIA: ICD-10-CM
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
## 2024-11-18 DIAGNOSIS — Z91.011 ALLERGY TO MILK PRODUCTS: ICD-10-CM

## 2024-11-22 NOTE — PHYSICAL THERAPY INITIAL EVALUATION ADULT - LEVEL OF INDEPENDENCE: GAIT, REHAB EVAL
Subjective: Patient presents today for follow-up regarding his right ankle.  He is doing well he is weightbearing as tolerated    Objective: Physical examination of the right lower extremity neurovasc intact cap refill is not 3 seconds upgoing EHL calf and thigh are soft.  The right ankle is stable in flexion extension inversion eversion and internal/external rotation.  No gross instability noted.  Patient has mild soft tissue discomfort.    Impression: Right ankle sprain improving    Plan: Patient instructed to do some physical therapy and follow-up with me in 4 weeks.   moderate assist (50% patients effort)

## 2024-12-06 NOTE — ED ADULT TRIAGE NOTE - TEMPERATURE IN FAHRENHEIT (DEGREES F)
[Initial] : an initial visit 98.3 [TextBox_44] : SOB, mild intermittent asthma, seasonal/environmental allergies and urticaria, GERD (controlled), snoring ?SU

## 2024-12-25 PROBLEM — F10.90 ALCOHOL USE: Status: ACTIVE | Noted: 2023-04-27

## 2025-01-14 NOTE — ED ADULT NURSE NOTE - NSICDXPASTMEDICALHX_GEN_ALL_CORE_FT
4 PAST MEDICAL HISTORY:  Alcohol dependence     CKD (chronic kidney disease)     Gout     Hard of hearing     HTN (hypertension)     Seasonal allergies     Vertigo

## 2025-01-19 ENCOUNTER — INPATIENT (INPATIENT)
Facility: HOSPITAL | Age: 65
LOS: 7 days | Discharge: HOME | DRG: 897 | End: 2025-01-27
Attending: INTERNAL MEDICINE | Admitting: HOSPITALIST
Payer: MEDICARE

## 2025-01-19 VITALS
HEART RATE: 112 BPM | SYSTOLIC BLOOD PRESSURE: 149 MMHG | OXYGEN SATURATION: 97 % | RESPIRATION RATE: 18 BRPM | WEIGHT: 240.08 LBS | TEMPERATURE: 98 F | DIASTOLIC BLOOD PRESSURE: 76 MMHG

## 2025-01-19 DIAGNOSIS — F10.939 ALCOHOL USE, UNSPECIFIED WITH WITHDRAWAL, UNSPECIFIED: ICD-10-CM

## 2025-01-19 LAB
ALBUMIN SERPL ELPH-MCNC: 4.3 G/DL — SIGNIFICANT CHANGE UP (ref 3.5–5.2)
ALP SERPL-CCNC: 154 U/L — HIGH (ref 30–115)
ALT FLD-CCNC: 23 U/L — SIGNIFICANT CHANGE UP (ref 0–41)
ANION GAP SERPL CALC-SCNC: 31 MMOL/L — HIGH (ref 7–14)
ANION GAP SERPL CALC-SCNC: 33 MMOL/L — HIGH (ref 7–14)
AST SERPL-CCNC: 78 U/L — HIGH (ref 0–41)
B-OH-BUTYR SERPL-SCNC: 1.3 MMOL/L — HIGH
BASE EXCESS BLDV CALC-SCNC: -13.7 MMOL/L — LOW (ref -2–3)
BASE EXCESS BLDV CALC-SCNC: -15.4 MMOL/L — LOW (ref -2–3)
BASOPHILS # BLD AUTO: 0.1 K/UL — SIGNIFICANT CHANGE UP (ref 0–0.2)
BASOPHILS NFR BLD AUTO: 1.8 % — HIGH (ref 0–1)
BILIRUB SERPL-MCNC: 2.9 MG/DL — HIGH (ref 0.2–1.2)
BUN SERPL-MCNC: 6 MG/DL — LOW (ref 10–20)
BUN SERPL-MCNC: 6 MG/DL — LOW (ref 10–20)
CA-I SERPL-SCNC: 1.08 MMOL/L — LOW (ref 1.15–1.33)
CA-I SERPL-SCNC: 1.09 MMOL/L — LOW (ref 1.15–1.33)
CALCIUM SERPL-MCNC: 8.7 MG/DL — SIGNIFICANT CHANGE UP (ref 8.4–10.5)
CALCIUM SERPL-MCNC: 9.2 MG/DL — SIGNIFICANT CHANGE UP (ref 8.4–10.5)
CHLORIDE SERPL-SCNC: 96 MMOL/L — LOW (ref 98–110)
CHLORIDE SERPL-SCNC: 97 MMOL/L — LOW (ref 98–110)
CO2 SERPL-SCNC: 10 MMOL/L — LOW (ref 17–32)
CO2 SERPL-SCNC: 15 MMOL/L — LOW (ref 17–32)
CREAT SERPL-MCNC: 1.2 MG/DL — SIGNIFICANT CHANGE UP (ref 0.7–1.5)
CREAT SERPL-MCNC: 1.3 MG/DL — SIGNIFICANT CHANGE UP (ref 0.7–1.5)
EGFR: 61 ML/MIN/1.73M2 — SIGNIFICANT CHANGE UP
EGFR: 67 ML/MIN/1.73M2 — SIGNIFICANT CHANGE UP
EOSINOPHIL # BLD AUTO: 0 K/UL — SIGNIFICANT CHANGE UP (ref 0–0.7)
EOSINOPHIL NFR BLD AUTO: 0 % — SIGNIFICANT CHANGE UP (ref 0–8)
ETHANOL SERPL-MCNC: 195 MG/DL — HIGH
GAS PNL BLDV: 138 MMOL/L — SIGNIFICANT CHANGE UP (ref 136–145)
GAS PNL BLDV: 139 MMOL/L — SIGNIFICANT CHANGE UP (ref 136–145)
GAS PNL BLDV: SIGNIFICANT CHANGE UP
GLUCOSE SERPL-MCNC: 158 MG/DL — HIGH (ref 70–99)
GLUCOSE SERPL-MCNC: 91 MG/DL — SIGNIFICANT CHANGE UP (ref 70–99)
HCO3 BLDV-SCNC: 13 MMOL/L — LOW (ref 22–29)
HCO3 BLDV-SCNC: 14 MMOL/L — LOW (ref 22–29)
HCT VFR BLD CALC: 40.5 % — LOW (ref 42–52)
HCT VFR BLDA CALC: 37 % — LOW (ref 39–51)
HCT VFR BLDA CALC: 43 % — SIGNIFICANT CHANGE UP (ref 39–51)
HGB BLD CALC-MCNC: 12.4 G/DL — LOW (ref 12.6–17.4)
HGB BLD CALC-MCNC: 14.2 G/DL — SIGNIFICANT CHANGE UP (ref 12.6–17.4)
HGB BLD-MCNC: 13.9 G/DL — LOW (ref 14–18)
LACTATE BLDV-MCNC: 13.7 MMOL/L — CRITICAL HIGH (ref 0.5–2)
LACTATE BLDV-MCNC: >16 MMOL/L — CRITICAL HIGH (ref 0.5–2)
LIDOCAIN IGE QN: 36 U/L — SIGNIFICANT CHANGE UP (ref 7–60)
LYMPHOCYTES # BLD AUTO: 1.64 K/UL — SIGNIFICANT CHANGE UP (ref 1.2–3.4)
LYMPHOCYTES # BLD AUTO: 30.3 % — SIGNIFICANT CHANGE UP (ref 20.5–51.1)
MCHC RBC-ENTMCNC: 34.3 G/DL — SIGNIFICANT CHANGE UP (ref 32–37)
MCHC RBC-ENTMCNC: 37.3 PG — HIGH (ref 27–31)
MCV RBC AUTO: 108.6 FL — HIGH (ref 80–94)
MONOCYTES # BLD AUTO: 0.19 K/UL — SIGNIFICANT CHANGE UP (ref 0.1–0.6)
MONOCYTES NFR BLD AUTO: 3.6 % — SIGNIFICANT CHANGE UP (ref 1.7–9.3)
NEUTROPHILS # BLD AUTO: 3.33 K/UL — SIGNIFICANT CHANGE UP (ref 1.4–6.5)
NEUTROPHILS NFR BLD AUTO: 61.6 % — SIGNIFICANT CHANGE UP (ref 42.2–75.2)
OSMOLALITY SERPL: 359 MOS/KG — HIGH (ref 280–301)
PCO2 BLDV: 37 MMHG — LOW (ref 42–55)
PCO2 BLDV: 38 MMHG — LOW (ref 42–55)
PH BLDV: 7.14 — CRITICAL LOW (ref 7.32–7.43)
PH BLDV: 7.18 — CRITICAL LOW (ref 7.32–7.43)
PLATELET # BLD AUTO: 109 K/UL — LOW (ref 130–400)
PMV BLD: 11.1 FL — HIGH (ref 7.4–10.4)
PO2 BLDV: 42 MMHG — SIGNIFICANT CHANGE UP (ref 25–45)
PO2 BLDV: 47 MMHG — HIGH (ref 25–45)
POTASSIUM BLDV-SCNC: 4.6 MMOL/L — SIGNIFICANT CHANGE UP (ref 3.5–5.1)
POTASSIUM BLDV-SCNC: 4.9 MMOL/L — SIGNIFICANT CHANGE UP (ref 3.5–5.1)
POTASSIUM SERPL-MCNC: 4.1 MMOL/L — SIGNIFICANT CHANGE UP (ref 3.5–5)
POTASSIUM SERPL-MCNC: 4.4 MMOL/L — SIGNIFICANT CHANGE UP (ref 3.5–5)
POTASSIUM SERPL-SCNC: 4.1 MMOL/L — SIGNIFICANT CHANGE UP (ref 3.5–5)
POTASSIUM SERPL-SCNC: 4.4 MMOL/L — SIGNIFICANT CHANGE UP (ref 3.5–5)
PROT SERPL-MCNC: 7.1 G/DL — SIGNIFICANT CHANGE UP (ref 6–8)
RBC # BLD: 3.73 M/UL — LOW (ref 4.7–6.1)
RBC # FLD: 16.4 % — HIGH (ref 11.5–14.5)
SAO2 % BLDV: 56.9 % — LOW (ref 67–88)
SAO2 % BLDV: 62.4 % — LOW (ref 67–88)
SODIUM SERPL-SCNC: 139 MMOL/L — SIGNIFICANT CHANGE UP (ref 135–146)
SODIUM SERPL-SCNC: 143 MMOL/L — SIGNIFICANT CHANGE UP (ref 135–146)
WBC # BLD: 5.41 K/UL — SIGNIFICANT CHANGE UP (ref 4.8–10.8)
WBC # FLD AUTO: 5.41 K/UL — SIGNIFICANT CHANGE UP (ref 4.8–10.8)

## 2025-01-19 PROCEDURE — 36415 COLL VENOUS BLD VENIPUNCTURE: CPT

## 2025-01-19 PROCEDURE — 84133 ASSAY OF URINE POTASSIUM: CPT

## 2025-01-19 PROCEDURE — 85610 PROTHROMBIN TIME: CPT

## 2025-01-19 PROCEDURE — 86022 PLATELET ANTIBODIES: CPT

## 2025-01-19 PROCEDURE — 80048 BASIC METABOLIC PNL TOTAL CA: CPT

## 2025-01-19 PROCEDURE — 80354 DRUG SCREENING FENTANYL: CPT

## 2025-01-19 PROCEDURE — 82570 ASSAY OF URINE CREATININE: CPT

## 2025-01-19 PROCEDURE — 71045 X-RAY EXAM CHEST 1 VIEW: CPT | Mod: 26

## 2025-01-19 PROCEDURE — 76705 ECHO EXAM OF ABDOMEN: CPT

## 2025-01-19 PROCEDURE — 93010 ELECTROCARDIOGRAM REPORT: CPT

## 2025-01-19 PROCEDURE — 80307 DRUG TEST PRSMV CHEM ANLYZR: CPT

## 2025-01-19 PROCEDURE — 93970 EXTREMITY STUDY: CPT

## 2025-01-19 PROCEDURE — 97162 PT EVAL MOD COMPLEX 30 MIN: CPT | Mod: GP

## 2025-01-19 PROCEDURE — 83690 ASSAY OF LIPASE: CPT

## 2025-01-19 PROCEDURE — 93005 ELECTROCARDIOGRAM TRACING: CPT

## 2025-01-19 PROCEDURE — 84100 ASSAY OF PHOSPHORUS: CPT

## 2025-01-19 PROCEDURE — 85384 FIBRINOGEN ACTIVITY: CPT

## 2025-01-19 PROCEDURE — 83605 ASSAY OF LACTIC ACID: CPT

## 2025-01-19 PROCEDURE — 85730 THROMBOPLASTIN TIME PARTIAL: CPT

## 2025-01-19 PROCEDURE — 82607 VITAMIN B-12: CPT

## 2025-01-19 PROCEDURE — 84156 ASSAY OF PROTEIN URINE: CPT

## 2025-01-19 PROCEDURE — 74177 CT ABD & PELVIS W/CONTRAST: CPT | Mod: 26

## 2025-01-19 PROCEDURE — 84540 ASSAY OF URINE/UREA-N: CPT

## 2025-01-19 PROCEDURE — 99222 1ST HOSP IP/OBS MODERATE 55: CPT

## 2025-01-19 PROCEDURE — 99291 CRITICAL CARE FIRST HOUR: CPT

## 2025-01-19 PROCEDURE — 76700 US EXAM ABDOM COMPLETE: CPT

## 2025-01-19 PROCEDURE — 83540 ASSAY OF IRON: CPT

## 2025-01-19 PROCEDURE — 80076 HEPATIC FUNCTION PANEL: CPT

## 2025-01-19 PROCEDURE — 93979 VASCULAR STUDY: CPT

## 2025-01-19 PROCEDURE — 83550 IRON BINDING TEST: CPT

## 2025-01-19 PROCEDURE — 82746 ASSAY OF FOLIC ACID SERUM: CPT

## 2025-01-19 PROCEDURE — 82962 GLUCOSE BLOOD TEST: CPT

## 2025-01-19 PROCEDURE — 83735 ASSAY OF MAGNESIUM: CPT

## 2025-01-19 PROCEDURE — 83010 ASSAY OF HAPTOGLOBIN QUANT: CPT

## 2025-01-19 PROCEDURE — 82248 BILIRUBIN DIRECT: CPT

## 2025-01-19 PROCEDURE — 85025 COMPLETE CBC W/AUTO DIFF WBC: CPT

## 2025-01-19 PROCEDURE — 84300 ASSAY OF URINE SODIUM: CPT

## 2025-01-19 PROCEDURE — 85045 AUTOMATED RETICULOCYTE COUNT: CPT

## 2025-01-19 PROCEDURE — 80053 COMPREHEN METABOLIC PANEL: CPT

## 2025-01-19 PROCEDURE — 85379 FIBRIN DEGRADATION QUANT: CPT

## 2025-01-19 PROCEDURE — 82728 ASSAY OF FERRITIN: CPT

## 2025-01-19 PROCEDURE — 83935 ASSAY OF URINE OSMOLALITY: CPT

## 2025-01-19 RX ORDER — THIAMINE HCL 100 MG
100 TABLET ORAL DAILY
Refills: 0 | Status: DISCONTINUED | OUTPATIENT
Start: 2025-01-19 | End: 2025-01-27

## 2025-01-19 RX ORDER — PANTOPRAZOLE 20 MG/1
40 TABLET, DELAYED RELEASE ORAL
Refills: 0 | Status: DISCONTINUED | OUTPATIENT
Start: 2025-01-19 | End: 2025-01-27

## 2025-01-19 RX ORDER — METHOCARBAMOL 500 MG
500 TABLET ORAL DAILY
Refills: 0 | Status: DISCONTINUED | OUTPATIENT
Start: 2025-01-19 | End: 2025-01-27

## 2025-01-19 RX ORDER — NALTREXONE HCL 50 MG
50 TABLET ORAL DAILY
Refills: 0 | Status: DISCONTINUED | OUTPATIENT
Start: 2025-01-19 | End: 2025-01-27

## 2025-01-19 RX ORDER — SODIUM CHLORIDE 9 G/ML
1000 INJECTION, SOLUTION INTRAVENOUS
Refills: 0 | Status: DISCONTINUED | OUTPATIENT
Start: 2025-01-19 | End: 2025-01-19

## 2025-01-19 RX ORDER — BACTERIOSTATIC SODIUM CHLORIDE 0.9 %
1000 VIAL (ML) INJECTION ONCE
Refills: 0 | Status: COMPLETED | OUTPATIENT
Start: 2025-01-19 | End: 2025-01-19

## 2025-01-19 RX ORDER — BACTERIOSTATIC SODIUM CHLORIDE 0.9 %
1000 VIAL (ML) INJECTION ONCE
Refills: 0 | Status: DISCONTINUED | OUTPATIENT
Start: 2025-01-19 | End: 2025-01-19

## 2025-01-19 RX ORDER — TAMSULOSIN HYDROCHLORIDE 0.4 MG/1
0.4 CAPSULE ORAL AT BEDTIME
Refills: 0 | Status: DISCONTINUED | OUTPATIENT
Start: 2025-01-19 | End: 2025-01-27

## 2025-01-19 RX ORDER — ANTISEPTIC SURGICAL SCRUB 0.04 MG/ML
1 SOLUTION TOPICAL
Refills: 0 | Status: DISCONTINUED | OUTPATIENT
Start: 2025-01-19 | End: 2025-01-27

## 2025-01-19 RX ORDER — THIAMINE HCL 100 MG
200 TABLET ORAL ONCE
Refills: 0 | Status: COMPLETED | OUTPATIENT
Start: 2025-01-19 | End: 2025-01-19

## 2025-01-19 RX ORDER — DIAZEPAM 5 MG
10 TABLET ORAL ONCE
Refills: 0 | Status: DISCONTINUED | OUTPATIENT
Start: 2025-01-19 | End: 2025-01-19

## 2025-01-19 RX ORDER — FOLIC ACID 1 MG
1 TABLET ORAL ONCE
Refills: 0 | Status: COMPLETED | OUTPATIENT
Start: 2025-01-19 | End: 2025-01-19

## 2025-01-19 RX ORDER — GABAPENTIN 800 MG/1
200 TABLET ORAL
Refills: 0 | Status: DISCONTINUED | OUTPATIENT
Start: 2025-01-19 | End: 2025-01-27

## 2025-01-19 RX ORDER — SODIUM CHLORIDE 9 G/ML
1000 INJECTION, SOLUTION INTRAVENOUS
Refills: 0 | Status: DISCONTINUED | OUTPATIENT
Start: 2025-01-19 | End: 2025-01-20

## 2025-01-19 RX ORDER — COLCHICINE 0.6 MG/1
0.6 TABLET ORAL
Refills: 0 | Status: DISCONTINUED | OUTPATIENT
Start: 2025-01-19 | End: 2025-01-27

## 2025-01-19 RX ORDER — MECOBAL/LEVOMEFOLAT CA/B6 PHOS 2-3-35 MG
1 TABLET ORAL DAILY
Refills: 0 | Status: DISCONTINUED | OUTPATIENT
Start: 2025-01-19 | End: 2025-01-27

## 2025-01-19 RX ORDER — FOLIC ACID 1 MG
1 TABLET ORAL DAILY
Refills: 0 | Status: DISCONTINUED | OUTPATIENT
Start: 2025-01-19 | End: 2025-01-27

## 2025-01-19 RX ORDER — SODIUM CHLORIDE 9 G/ML
1000 INJECTION, SOLUTION INTRAVENOUS ONCE
Refills: 0 | Status: COMPLETED | OUTPATIENT
Start: 2025-01-19 | End: 2025-01-19

## 2025-01-19 RX ORDER — ONDANSETRON 4 MG/1
4 TABLET, ORALLY DISINTEGRATING ORAL ONCE
Refills: 0 | Status: COMPLETED | OUTPATIENT
Start: 2025-01-19 | End: 2025-01-19

## 2025-01-19 RX ORDER — DIAZEPAM 5 MG
20 TABLET ORAL ONCE
Refills: 0 | Status: DISCONTINUED | OUTPATIENT
Start: 2025-01-19 | End: 2025-01-19

## 2025-01-19 RX ORDER — CYANOCOBALAMIN (VITAMIN B-12) 1000MCG/ML
1000 VIAL (ML) INJECTION DAILY
Refills: 0 | Status: DISCONTINUED | OUTPATIENT
Start: 2025-01-19 | End: 2025-01-27

## 2025-01-19 RX ORDER — ENOXAPARIN SODIUM 100 MG/ML
40 INJECTION SUBCUTANEOUS EVERY 24 HOURS
Refills: 0 | Status: DISCONTINUED | OUTPATIENT
Start: 2025-01-19 | End: 2025-01-21

## 2025-01-19 RX ORDER — THIAMINE HCL 100 MG
100 TABLET ORAL ONCE
Refills: 0 | Status: COMPLETED | OUTPATIENT
Start: 2025-01-19 | End: 2025-01-19

## 2025-01-19 RX ORDER — CETIRIZINE HCL 10 MG
10 TABLET ORAL DAILY
Refills: 0 | Status: DISCONTINUED | OUTPATIENT
Start: 2025-01-19 | End: 2025-01-27

## 2025-01-19 RX ORDER — SENNOSIDES 8.6 MG
2 TABLET ORAL AT BEDTIME
Refills: 0 | Status: DISCONTINUED | OUTPATIENT
Start: 2025-01-19 | End: 2025-01-27

## 2025-01-19 RX ORDER — METOPROLOL SUCCINATE 25 MG
25 TABLET, EXTENDED RELEASE 24 HR ORAL DAILY
Refills: 0 | Status: DISCONTINUED | OUTPATIENT
Start: 2025-01-19 | End: 2025-01-27

## 2025-01-19 RX ADMIN — SODIUM CHLORIDE 1000 MILLILITER(S): 9 INJECTION, SOLUTION INTRAVENOUS at 13:59

## 2025-01-19 RX ADMIN — Medication 1000 MILLILITER(S): at 09:11

## 2025-01-19 RX ADMIN — SODIUM CHLORIDE 125 MILLILITER(S): 9 INJECTION, SOLUTION INTRAVENOUS at 13:59

## 2025-01-19 RX ADMIN — ONDANSETRON 4 MILLIGRAM(S): 4 TABLET, ORALLY DISINTEGRATING ORAL at 09:09

## 2025-01-19 RX ADMIN — Medication 10 MILLIGRAM(S): at 11:25

## 2025-01-19 RX ADMIN — Medication 100 MILLIGRAM(S): at 09:10

## 2025-01-19 RX ADMIN — Medication 200 MILLIGRAM(S): at 14:16

## 2025-01-19 RX ADMIN — Medication 10 MILLIGRAM(S): at 09:10

## 2025-01-19 RX ADMIN — Medication 20 MILLIGRAM(S): at 15:51

## 2025-01-19 RX ADMIN — Medication 1 MILLIGRAM(S): at 09:10

## 2025-01-19 NOTE — ED PROVIDER NOTE - PROGRESS NOTE DETAILS
bicarb beta hdroxy noted, awaiting on vbg results vbg resulted, [facility] pt s/o to Dr. Oneal

## 2025-01-19 NOTE — H&P ADULT - HISTORY OF PRESENT ILLNESS
HISTORY OF PRESENT ILLNESS & PAST MEDICAL HISTORY  64-year-old man PMH EtOH use disorder, DVT/PE on Coumadin, BPH, hypertension, DL, DM, gout presented to the ED with abdominal pain of 1 day duration.     Patient states that he woke up this morning and felt queasy in his stomach.  Patient also reports palpitations.  Reports associated numbness and tingling in his fingertips.  No chest pain or difficulty breathing.  No leg pain or swelling.  No vomiting.  No recent fevers.  Patient does admit to drinking half a pint to 1 pint of vodka every day, last drink was yesterday afternoon.  Patient does admit to previous alcohol withdrawals, states that this feels similar.  No other concerns.    It is worth noting the patient presented with a similar picture 2/2 alcoholic ketoacidosis in November.      VITALS & PHYSICAL EXAMINATION  Vitals in the ED  T(F): 98.1, Max: 98.1 (07:34)  HR: 92 (92 - 113)  BP: 130/70 (125/68 - 149/76)  RR: 18 (18 - 18)  SpO2: 98% (93% - 98%)    GEN: NAD  HEENT: NAD  RESP: CTAB  CARD: S1 S2 RRR tachycardic   ABD: soft nondistended. TTP mild lower quadrants   EXT: normal ROM no NANCI  NEURO: Aox4  PSYCH: cooperative appropriate     LABS                           13.9   5.41  )-----------( 109    .6                40.5       139  |  96  |  6   ----------------------------( 158      A  4.4   |  10  |  1.2    Ca: 8.7   Phos: x    Mg: x     Protein, Total: x  / Albumin: x  /  T. Bili x  / D. Bili x  /  AST 78 /  ALT 23  /       VBG pH: 7.18  /  pCO2: 37    /  pO2: 42    / HCO3: 14    /  Lactate: 13.7     VBG pH: 7.14  /  pCO2: 38    /  pO2: 47    / HCO3: 13    /  Lactate: >16.    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     Patient states that he woke up this morning and felt queasy in his stomach.  Patient also reports palpitations as well as two episodes of vomiting.  Reports associated numbness and tingling in his fingertips.  No chest pain or difficulty breathing.  No leg pain or swelling.  No recent fevers.  Patient does admit to drinking half a pint to 1 pint of vodka every day, last drink was yesterday afternoon.  Patient does admit to previous alcohol withdrawals, states that this feels similar.  No other concerns.    It is worth noting the patient presented with a similar picture 2/2 alcoholic ketoacidosis in November.      VITALS & PHYSICAL EXAMINATION  Vitals in the ED  T(F): 98.1, Max: 98.1 (07:34)  HR: 92 (92 - 113)  BP: 130/70 (125/68 - 149/76)  RR: 18 (18 - 18)  SpO2: 98% (93% - 98%)    GEN: NAD  HEENT: NAD  RESP: CTAB  CARD: S1 S2 RRR tachycardic   ABD: soft nondistended. TTP mild lower quadrants   EXT: normal ROM no NANCI  NEURO: Aox4  PSYCH: cooperative appropriate     LABS                           13.9   5.41  )-----------( 109    .6                40.5       139  |  96  |  6   ----------------------------( 158      A  4.4   |  10  |  1.2    Ca: 8.7   Phos: x    Mg: x     Protein, Total: x  / Albumin: x  /  T. Bili x  / D. Bili x  /  AST 78 /  ALT 23  /       VBG pH: 7.18  /  pCO2: 37    /  pO2: 42    / HCO3: 14    /  Lactate: 13.7     VBG pH: 7.14  /  pCO2: 38    /  pO2: 47    / HCO3: 13    /  Lactate: >16.    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     Patient states that he woke up this morning and felt queasy in his stomach.  Patient also reports palpitations as well as two episodes of vomiting.  Reports associated numbness and tingling in his fingertips.  No chest pain or difficulty breathing.  No leg pain or swelling.  No recent fevers.  Patient does admit to drinking half a pint to 1 pint of vodka every day, last drink was yesterday afternoon.  Patient does admit to previous alcohol withdrawals, states that this feels similar.  No other concerns.    It is worth noting the patient presented with a similar picture 2/2 alcoholic ketoacidosis in November.      VITALS & PHYSICAL EXAMINATION  Vitals in the ED  T(F): 98.1, Max: 98.1 (07:34)  HR: 92 (92 - 113)  BP: 130/70 (125/68 - 149/76)  RR: 18 (18 - 18)  SpO2: 98% (93% - 98%)    GEN: NAD  HEENT: NAD  RESP: CTAB  CARD: S1 S2 RRR tachycardic   ABD: soft nondistended. TTP mild lower quadrants   EXT: normal ROM no NANCI  NEURO: AOx4  PSYCH: cooperative appropriate     LABS                           13.9   5.41  )-----------( 109    .6                40.5       139  |  96  |  6   ----------------------------( 158      A  4.4   |  10  |  1.2    Ca: 8.7   Phos: x    Mg: x     Protein, Total: x  / Albumin: x  /  T. Bili x  / D. Bili x  /  AST 78 /  ALT 23  /       VBG pH: 7.18  /  pCO2: 37    /  pO2: 42    / HCO3: 14    /  Lactate: 13.7     VBG pH: 7.14  /  pCO2: 38    /  pO2: 47    / HCO3: 13    /  Lactate: >16.    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     Patient states that he woke up this morning and felt queasy in his stomach.  Patient also reports palpitations as well as two episodes of vomiting.  Reports associated numbness and tingling in his fingertips.  No chest pain or difficulty breathing.  No leg pain or swelling.  No recent fevers.  Patient does admit to drinking half a pint to 1 pint of vodka every day, last drink was yesterday afternoon.  Patient does admit to previous alcohol withdrawals, states that this feels similar.  No other concerns.    It is worth noting the patient presented with a similar picture 2/2 alcoholic ketoacidosis in November.      VITALS & PHYSICAL EXAMINATION  Vitals in the ED  T(F): 98.1, Max: 98.1 (07:34)  HR: 92 (92 - 113)  BP: 130/70 (125/68 - 149/76)  RR: 18 (18 - 18)  SpO2: 98% (93% - 98%)    GEN: NAD  HEENT: NAD  RESP: CTAB  CARD: S1 S2 RRR tachycardic   ABD: soft nondistended. TTP mild lower quadrants   EXT: normal ROM no NANCI  NEURO: AOx4  PSYCH: cooperative appropriate     LABS                           13.9   5.41  )-----------( 109    .6                40.5       139  |  96  |  6   ----------------------------( 158      A  4.4   |  10  |  1.2    Ca: 8.7   Phos: x    Mg: x     Protein, Total: x  / Albumin: x  /  T. Bili x  / D. Bili x  /  AST 78 /  ALT 23  /       VBG pH: 7.18  /  pCO2: 37    /  pO2: 42    / HCO3: 14    /  Lactate: 13.7     VBG pH: 7.14  /  pCO2: 38    /  pO2: 47    / HCO3: 13    /  Lactate: >16.    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     Patient states that he woke up this morning and felt queasy in his stomach.  Patient also reports palpitations as well as two episodes of vomiting.  Reports associated numbness and tingling in his fingertips.  No chest pain or difficulty breathing.  No leg pain or swelling.  No recent fevers.  Patient does admit to drinking half a pint to 1 pint of vodka every day, last drink was yesterday afternoon.  Patient does admit to previous alcohol withdrawals, states that this feels similar.  No other concerns.    It is worth noting the patient presented with a similar picture 2/2 alcoholic ketoacidosis in November.    VITALS & PHYSICAL EXAMINATION  Vitals in the ED  T(F): 98.1, Max: 98.1 (07:34)  HR: 92 (92 - 113)  BP: 130/70 (125/68 - 149/76)  RR: 18 (18 - 18)  SpO2: 98% (93% - 98%)    GEN: NAD  HEENT: NAD  RESP: CTAB  CARD: S1 S2 RRR tachycardic   ABD: soft nondistended. TTP mild lower quadrants   EXT: normal ROM no NANCI  NEURO: AOx4  PSYCH: cooperative appropriate     LABS                           13.9   5.41  )-----------( 109    .6                40.5       139  |  96  |  6   ----------------------------( 158      A  4.4   |  10  |  1.2    Ca: 8.7   Phos: x    Mg: x     Protein, Total: x  / Albumin: x  /  T. Bili x  / D. Bili x  /  AST 78 /  ALT 23  /       VBG pH: 7.18  /  pCO2: 37    /  pO2: 42    / HCO3: 14    /  Lactate: 13.7     VBG pH: 7.14  /  pCO2: 38    /  pO2: 47    / HCO3: 13    /  Lactate: >16.    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

## 2025-01-19 NOTE — ED ADULT NURSE NOTE - OBJECTIVE STATEMENT
pt  coming from adul home noted C/O N/V shacking  from today in AM ,pt withdrawing from alcohol report is drinking  every days ,C/O N/V dizziness tremors checking . Pt denies fever chills  no diarrhea or constipation .

## 2025-01-19 NOTE — ED ADULT NURSE NOTE - NSFALLHARMRISKINTERV_ED_ALL_ED

## 2025-01-19 NOTE — ED PROVIDER NOTE - ATTENDING CONTRIBUTION TO CARE
64-year-old male with history of EtOH use disorder, high blood pressure, high cholesterol presents to ED for evaluation.   Patient does admit to drinking half a pint to 1 pint of vodka every day, last drink was yesterday afternoon.  Patient does admit to previous alcohol withdrawals, states that this feels similar. CON: ao x 3, HENMT: neck supple,  CV: s1s2 ctab, RESP: cta b/l, GI:  soft, nontender, no rebound, no guarding, SKIN: no rash, MSK: no deformities, NEURO: no gross motor or sensory deficit Psychiatric: appropriate mood, appropriate affect  Impression  Patient here with abd pain, feeling of withdrawal.  Patient treated with fluids volume per presumed withdrawal..  beta Hydroxy is 1.3, mildly elevated but with significant lactate consideration includes toxic alcohols, frank, dka, aka, mesenteric ischemia.

## 2025-01-19 NOTE — ED PROVIDER NOTE - PHYSICAL EXAMINATION
VITAL SIGNS: I have reviewed nursing notes and confirm.  CONSTITUTIONAL: Well-developed; well-nourished; in no acute distress.  SKIN: Skin exam is warm and dry, no acute rash.  HEAD: Normocephalic; atraumatic.  EYES: PERRL, EOM intact; conjunctiva and sclera clear.  ENT: airway clear. TMs clear.  NECK: Supple  CARD: S1, S2 normal; no murmurs, gallops, or rubs. Tachycardic, but regular rhythm.  RESP: No wheezes, rales or rhonchi.  ABD: Normal bowel sounds; soft; non-distended; tenderness noted to left mid and llq  EXT: Normal ROM.   NEURO: Alert, oriented.   PSYCH: Cooperative, appropriate.  CIWA: 10-12

## 2025-01-19 NOTE — CONSULT NOTE ADULT - ASSESSMENT
ASSESSMENT:  64-year-old man with chornic EtOH use disorder, DVT/PE on Coumadin, BPH, hypertension, DL, DM, gout, who presents to the ED with complaints of abdominal pain. Patient is a poor historian, admits to drinking heavily most recently last night. No other complaints. Surgery consulted for lactic acidosis in the setting of acute abdominal pain. Patient evaluated at bedside AAOx3, NAD reporting "not feeling well". Physical exam findings, imaging, and labs as documented above.     PLAN:  -No acute intraabdominal pathology, physical exam inconsistent  -No acute surgical intervention   -Recommend fluid resuscitation/repeat labs  -Dispo per ED    Above plan discussed with Attending Surgeon Dr. Shi, ED  01-19-25 @ 12:46

## 2025-01-19 NOTE — ED ADULT TRIAGE NOTE - CHIEF COMPLAINT QUOTE
patient c/o headache, abdominal pain , and states he felt like his heart was beating out of his chest

## 2025-01-19 NOTE — ED PROVIDER NOTE - CLINICAL SUMMARY MEDICAL DECISION MAKING FREE TEXT BOX
Patient signed out to me due to elevated lactic acid.  Repeat lactic acid noted to be uptrending however patient did not receive adequate fluids.  After fluids next lactic acid noted to be 13.7 with improved pH.  Suspect likely alcohol as cause of current lactic acidosis.  Will admit at this time for further evaluation and care.  Patient given Valium for alcohol withdrawal.    Attending Statement: I have personally provided the amount of critical care time documented below excluding time spent on separate procedures.     Critical Care Time Spent (min) Must be 30 or more minutes to qualify: 35.

## 2025-01-19 NOTE — ED PROVIDER NOTE - OBJECTIVE STATEMENT
64-year-old male with history of EtOH use disorder, high blood pressure, high cholesterol presents to ED for evaluation.  Patient states that he woke up this morning and felt queasy in his stomach.  Patient also reports palpitations.  Reports associated numbness and tingling in his fingertips.  No chest pain or difficulty breathing.  No leg pain or swelling.  No vomiting.  No recent fevers.  Patient does admit to drinking half a pint to 1 pint of vodka every day, last drink was yesterday afternoon.  Patient does admit to previous alcohol withdrawals, states that this feels similar.  No other concerns.

## 2025-01-19 NOTE — H&P ADULT - ATTENDING COMMENTS
Patient examined in the ED. Pt comfortable; no current abd pain. Just urinated freely prior to exam. Drinks 1 bottle of vodka daily with last drink on 1/18 afternoon.     64-year-old man PMH EtOH use disorder, DVT/PE no longer on Coumadin, BPH, hypertension, DL, DM, gout,  presented to the ED with abdominal pain of 1 day duration.    Imaging CTAP:  Steatosis. Mild caudate lobe hypertrophy. Cirrhosis is not   excluded. Atherosclerotic changes.Severe stenosis at the ostium of the   celiac artery which remains patent. The superior mesenteric artery,   bilateral renal arteries and inferior mesenteric artery are widely patent.  Unchanged prominent portacaval and gastrohepatic lymph nodes.    #alcohol withdrawal  - CIWA protocol with ativan  - zofran prn  - tylenol prn  - check ecg and correct qtc if needed  - fall and aspiration precautions  - CATCH    #cirrhosis  - check AFP   - low Na diet  - can diurese once out of withdrawal  - needs op liver clinic  - will need an EGD prior to discharge or fibroscan to assess if patient should be placed on a beta blocker  - GI consult    #metamyelocytes present in differential as well as prominent portocaval and gastrohepatic LN  - would work up for cancer  - egd/colonoscopy if not had one     #DVT/PE   - diagnosed in 2022  - unclear why he is supposed to still be on coumadin? provoked?  - INR is 2.73 likely secondary to liver dysfunction vs poor nutrition  - would obtain further collateral info on etiology of pe and if pt needs to be on coumadin  - if not indicated, would give vit K to correct INR >2.5 only if no concern for hypercoaguable state

## 2025-01-19 NOTE — ED ADULT NURSE REASSESSMENT NOTE - NS ED NURSE REASSESS COMMENT FT1
Received pt from previous RN, Pt AxOx3, CIWA 1. Denies pain/discomfort. In no acute respiratory distress. Call bell within reach. Bed alarm in place. Safety measures maintained. Care to continue.

## 2025-01-19 NOTE — ED ADULT NURSE NOTE - CAS EDP DISCH DISPOSITION ADMI
brayan 1a/[facility] 4b 4a/[facility]

## 2025-01-19 NOTE — ED PROVIDER NOTE - WR ORDER ID 1
[FreeTextEntry1] : 8 years old male child referred for cardiac evaluation for follow up for Trivial Aortic valve regurgitation. He has no complaints related to the heart. Plays sports with out any complaints or limitations in physical activity. [id number]

## 2025-01-19 NOTE — H&P ADULT - ASSESSMENT
IMPRESSION:  HAGMA - Lactic Acidosis and Alcoholic Ketoacidosis  Concurrent Metabolic Alkalosis  EtOH Use Disorder  Hepatic Steatosis    DVT/PE on Warfarin   BPH  HTN  DLD  DM  Gout      PLAN:    CNS: Ativan JEAN-PAULWA protocol. Thiamine & folic acid supplementation.  Utox.     HEENT: Oral care     PULMONARY:    CARDIOVASCULAR    GI    RENAL    INFECTIOUS DISEASE    HEMATOLOGICAL    ENDOCRINE    MUSCULOSKELETAL    Lines: IMPRESSION:  HAGMA - Lactic Acidosis and Alcoholic Ketoacidosis  Concurrent Metabolic Alkalosis 2/2 Vomiting    EtOH Use Disorder  Hepatic Steatosis    DVT/PE on Warfarin   BPH  HTN  DLD  DM  Gout      PLAN:    CNS: Ativan JEAN-PAULWA protocol. Thiamine & folic acid supplementation.  Utox.     HEENT: Oral care     PULMONARY: Aspiration precautions. On RA.    CARDIOVASCULAR: LR @ 150 cc/hr. Avoid overload. Trend LA.     GI: PO diet. C/w PPI.    RENAL    INFECTIOUS DISEASE    HEMATOLOGICAL    ENDOCRINE    MUSCULOSKELETAL    Lines: IMPRESSION:  HAGMA - Lactic Acidosis and Alcoholic Ketoacidosis  Concurrent Metabolic Alkalosis 2/2 Vomiting    EtOH Use Disorder  Hepatic Steatosis    DVT/PE on Warfarin   BPH  HTN  DLD  DM  Gout      PLAN:    CNS: Ativan CIWA protocol. Thiamine & folic acid supplementation. C/w home gabapentin, naltrexone, and methocarbamol.  Utox.     HEENT: Oral care     PULMONARY: Aspiration precautions. On RA.    CARDIOVASCULAR: LR @ 150 cc/hr. Avoid overload. Trend LA. C/w home metoprolol ER.    GI: PO diet. C/w PPI.    RENAL: Monitor Cr and electrolytes. Repeat ABG in AM. C/w home tamsulosin.    INFECTIOUS DISEASE: Afebrile. No leukocytosis. Monitor off abx.    HEMATOLOGICAL: hold home warfarin. Check INR. enoxaparin bid. Monitor CBC. C/w colchicine.    ENDOCRINE: Insulin sliding scale target -180 add basal-bolus insulin as needed. Hold home metformin and Jardiance.      MUSCULOSKELETAL: AAT     Lines: PIV   IMPRESSION:  HAGMA - Lactic Acidosis and Alcoholic Ketoacidosis on Metformin  Concurrent Metabolic Alkalosis 2/2 Vomiting    EtOH Use Disorder  Hepatic Steatosis    DVT/PE on Warfarin   BPH  HTN  DLD  DM  Gout      PLAN:    CNS: Ativan CIWA protocol. Thiamine & folic acid supplementation. C/w home gabapentin, naltrexone, and methocarbamol.  Utox.     HEENT: Oral care     PULMONARY: Aspiration precautions. On RA.    CARDIOVASCULAR: LR @ 150 cc/hr. Avoid overload. Trend LA. C/w home metoprolol ER.    GI: PO diet. C/w PPI. Bowel regimen.     RENAL: Monitor Cr and electrolytes. Repeat ABG in AM. C/w home tamsulosin.    INFECTIOUS DISEASE: Afebrile. No leukocytosis. Monitor off abx.    HEMATOLOGICAL: No longer on warfarin. LE duplex. DVT ppx enoxaparin. Monitor CBC. C/w colchicine.    ENDOCRINE: Insulin sliding scale target -180 add basal-bolus insulin as needed. Hold home metformin and Jardiance.      MUSCULOSKELETAL: AAT     Lines: PIV   IMPRESSION:  HAGMA - Lactic Acidosis and Alcoholic Ketoacidosis on Metformin  Concurrent Metabolic Alkalosis 2/2 Vomiting    EtOH Use Disorder  Hepatic Steatosis    DVT/PE no longer on Warfarin   BPH  HTN  DLD  DM  Gout      PLAN:    CNS: Ativan CIWA protocol. Thiamine & folic acid supplementation. C/w home gabapentin, naltrexone, and methocarbamol.  Utox.     HEENT: Oral care.    PULMONARY: Aspiration precautions. On RA.    CARDIOVASCULAR: LR @ 150 cc/hr. Avoid overload. Trend LA. C/w home metoprolol ER.    GI: PO diet. C/w PPI. Bowel regimen.     RENAL: Monitor Cr and electrolytes. Repeat ABG in AM. C/w home tamsulosin. Bladder scan. U lytes.     INFECTIOUS DISEASE: Afebrile. No leukocytosis. Monitor off abx.    HEMATOLOGICAL: No longer on warfarin. LE duplex. DVT ppx enoxaparin. Monitor CBC. C/w colchicine.    ENDOCRINE: Insulin sliding scale target -180 add basal-bolus insulin as needed. Hold home metformin and Jardiance.      MUSCULOSKELETAL: AAT     Lines: PIV

## 2025-01-19 NOTE — CONSULT NOTE ADULT - SUBJECTIVE AND OBJECTIVE BOX
GENERAL SURGERY CONSULT NOTE    Patient: CAT ALSTON , 64y ([birth year])Male   MRN: [id number]  Location: [facility] ED  Visit: 01-19-25 Emergency  Date: 01-19-25 @ 12:46    HPI: 64-year-old man with chornic EtOH use disorder, DVT/PE on Coumadin, BPH, hypertension, DL, DM, gout, who presents to the ED with complaints of abdominal pain. Patient is a poor historian, admits to drinking heavily most recently last night. No other complaints. Surgery consulted for lactic acidosis in the setting of acute abdominal pain. Patient evaluated at bedside AAOx3, NAD reporting "not feeling well".    PAST MEDICAL & SURGICAL HISTORY:  Alcohol dependenc  HTN (hypertension)  Hard of hearing  Seasonal allergies  BPH (benign prostatic hyperplasia)  GERD (gastroesophageal reflux disease)  Pseudogout  History of deep vein thrombosis (DVT) of lower extremity  No significant past surgical history    Home Medications:  Coumadin 2.5 mg oral tablet: 1 tab(s) orally once a day M, W, F, Sat (09 Nov 2024 16:24)  Coumadin 2.5 mg oral tablet: 1.5 tab(s) orally once a day Tuesday, Thursday, Sunday (09 Nov 2024 16:29)  cyanocobalamin 1000 mcg oral tablet: 1 tab(s) orally once a day (09 Nov 2024 16:29)  Jardiance 10 mg oral tablet: 1 tab(s) orally once a day (09 Nov 2024 16:29)  metFORMIN 500 mg oral tablet: 1 tab(s) orally 2 times a day (09 Nov 2024 16:29)  methocarbamol 500 mg oral tablet: 1 orally once a day (09 Nov 2024 16:30)  Toprol-XL 25 mg oral tablet, extended release: 1 tab(s) orally once a day (09 Nov 2024 16:30)  Tylenol 325 mg oral tablet: 2 tab(s) orally 2 times a day as needed for  moderate pain (09 Nov 2024 16:29)    VITALS:  T(F): 98.1 (01-19-25 @ 07:34), Max: 98.1 (01-19-25 @ 07:34)  HR: 113 (01-19-25 @ 09:41) (112 - 113)  BP: 125/68 (01-19-25 @ 09:41) (125/68 - 149/76)  RR: 18 (01-19-25 @ 07:34) (18 - 18)  SpO2: 93% (01-19-25 @ 09:41) (93% - 97%)    PHYSICAL EXAM:  General: NAD, AAOx3, calm and cooperative  Cardiac: RRR   Respiratory: normal respiratory effort  Abdomen: Soft, non-distended, mild LLQ tenderness to palpation however exam inconsistent, no rebound, no guarding.  Musculoskeletal: Strength 5/5 BL UE/LE, ROM intact, compartments soft    MEDICATIONS  (STANDING):  dextrose 5% + sodium chloride 0.9%. 1000 milliLiter(s) (125 mL/Hr) IV Continuous <Continuous>    MEDICATIONS  (PRN):    LAB/STUDIES:                        13.9   5.41  )-----------( 109      ( 19 Jan 2025 08:53 )             40.5     01-19    143  |  97[L]  |  6[L]  ----------------------------<  91  4.1   |  15[L]  |  1.3    Ca    9.2      19 Jan 2025 08:53    TPro  7.1  /  Alb  4.3  /  TBili  2.9[H]  /  DBili  x   /  AST  78[H]  /  ALT  23  /  AlkPhos  154[H]  01-19    LIVER FUNCTIONS - ( 19 Jan 2025 08:53 )  Alb: 4.3 g/dL / Pro: 7.1 g/dL / ALK PHOS: 154 U/L / ALT: 23 U/L / AST: 78 U/L / GGT: x           Urinalysis Basic - ( 19 Jan 2025 08:53 )    Color: x / Appearance: x / SG: x / pH: x  Gluc: 91 mg/dL / Ketone: x  / Bili: x / Urobili: x   Blood: x / Protein: x / Nitrite: x   Leuk Esterase: x / RBC: x / WBC x   Sq Epi: x / Non Sq Epi: x / Bacteria: x    IMAGING:  < from: CT Abdomen and Pelvis w/ IV Cont (01.19.25 @ 09:54) >  IMPRESSION:  No acute abnormality in the abdomen and pelvis.    Stable chronic findings as above.    < end of copied text >

## 2025-01-20 LAB
ALBUMIN SERPL ELPH-MCNC: 3.8 G/DL — SIGNIFICANT CHANGE UP (ref 3.5–5.2)
ALBUMIN SERPL ELPH-MCNC: 4 G/DL — SIGNIFICANT CHANGE UP (ref 3.5–5.2)
ALP SERPL-CCNC: 127 U/L — HIGH (ref 30–115)
ALP SERPL-CCNC: 137 U/L — HIGH (ref 30–115)
ALT FLD-CCNC: 25 U/L — SIGNIFICANT CHANGE UP (ref 0–41)
ALT FLD-CCNC: 25 U/L — SIGNIFICANT CHANGE UP (ref 0–41)
ANION GAP SERPL CALC-SCNC: 17 MMOL/L — HIGH (ref 7–14)
ANION GAP SERPL CALC-SCNC: 20 MMOL/L — HIGH (ref 7–14)
AST SERPL-CCNC: 80 U/L — HIGH (ref 0–41)
AST SERPL-CCNC: 85 U/L — HIGH (ref 0–41)
BASOPHILS # BLD AUTO: 0.03 K/UL — SIGNIFICANT CHANGE UP (ref 0–0.2)
BASOPHILS NFR BLD AUTO: 0.9 % — SIGNIFICANT CHANGE UP (ref 0–1)
BILIRUB DIRECT SERPL-MCNC: 1.5 MG/DL — HIGH (ref 0–0.3)
BILIRUB INDIRECT FLD-MCNC: 1.8 MG/DL — HIGH (ref 0.2–1.2)
BILIRUB SERPL-MCNC: 3.3 MG/DL — HIGH (ref 0.2–1.2)
BILIRUB SERPL-MCNC: 4.1 MG/DL — HIGH (ref 0.2–1.2)
BUN SERPL-MCNC: 6 MG/DL — LOW (ref 10–20)
BUN SERPL-MCNC: 8 MG/DL — LOW (ref 10–20)
CALCIUM SERPL-MCNC: 8.4 MG/DL — SIGNIFICANT CHANGE UP (ref 8.4–10.5)
CALCIUM SERPL-MCNC: 9.1 MG/DL — SIGNIFICANT CHANGE UP (ref 8.4–10.5)
CHLORIDE SERPL-SCNC: 102 MMOL/L — SIGNIFICANT CHANGE UP (ref 98–110)
CHLORIDE SERPL-SCNC: 103 MMOL/L — SIGNIFICANT CHANGE UP (ref 98–110)
CO2 SERPL-SCNC: 18 MMOL/L — SIGNIFICANT CHANGE UP (ref 17–32)
CO2 SERPL-SCNC: 21 MMOL/L — SIGNIFICANT CHANGE UP (ref 17–32)
CREAT ?TM UR-MCNC: 74 MG/DL — SIGNIFICANT CHANGE UP
CREAT SERPL-MCNC: 1 MG/DL — SIGNIFICANT CHANGE UP (ref 0.7–1.5)
CREAT SERPL-MCNC: 1.1 MG/DL — SIGNIFICANT CHANGE UP (ref 0.7–1.5)
EGFR: 74 ML/MIN/1.73M2 — SIGNIFICANT CHANGE UP
EGFR: 84 ML/MIN/1.73M2 — SIGNIFICANT CHANGE UP
EOSINOPHIL # BLD AUTO: 0.02 K/UL — SIGNIFICANT CHANGE UP (ref 0–0.7)
EOSINOPHIL NFR BLD AUTO: 0.6 % — SIGNIFICANT CHANGE UP (ref 0–8)
GLUCOSE SERPL-MCNC: 110 MG/DL — HIGH (ref 70–99)
GLUCOSE SERPL-MCNC: 98 MG/DL — SIGNIFICANT CHANGE UP (ref 70–99)
HCT VFR BLD CALC: 35.5 % — LOW (ref 42–52)
HGB BLD-MCNC: 12.4 G/DL — LOW (ref 14–18)
IMM GRANULOCYTES NFR BLD AUTO: 0 % — LOW (ref 0.1–0.3)
INR BLD: 1.65 RATIO — HIGH (ref 0.65–1.3)
LACTATE SERPL-SCNC: 2.5 MMOL/L — HIGH (ref 0.7–2)
LACTATE SERPL-SCNC: 4.4 MMOL/L — CRITICAL HIGH (ref 0.7–2)
LYMPHOCYTES # BLD AUTO: 0.95 K/UL — LOW (ref 1.2–3.4)
LYMPHOCYTES # BLD AUTO: 29.8 % — SIGNIFICANT CHANGE UP (ref 20.5–51.1)
MAGNESIUM SERPL-MCNC: 1.2 MG/DL — LOW (ref 1.8–2.4)
MAGNESIUM SERPL-MCNC: 1.9 MG/DL — SIGNIFICANT CHANGE UP (ref 1.8–2.4)
MCHC RBC-ENTMCNC: 34.9 G/DL — SIGNIFICANT CHANGE UP (ref 32–37)
MCHC RBC-ENTMCNC: 37.5 PG — HIGH (ref 27–31)
MCV RBC AUTO: 107.3 FL — HIGH (ref 80–94)
MONOCYTES # BLD AUTO: 0.21 K/UL — SIGNIFICANT CHANGE UP (ref 0.1–0.6)
MONOCYTES NFR BLD AUTO: 6.6 % — SIGNIFICANT CHANGE UP (ref 1.7–9.3)
NEUTROPHILS # BLD AUTO: 1.98 K/UL — SIGNIFICANT CHANGE UP (ref 1.4–6.5)
NEUTROPHILS NFR BLD AUTO: 62.1 % — SIGNIFICANT CHANGE UP (ref 42.2–75.2)
NRBC # BLD: 0 /100 WBCS — SIGNIFICANT CHANGE UP (ref 0–0)
NRBC BLD-RTO: 0 /100 WBCS — SIGNIFICANT CHANGE UP (ref 0–0)
OSMOLALITY UR: 672 MOS/KG — SIGNIFICANT CHANGE UP (ref 50–1200)
PCP SPEC-MCNC: SIGNIFICANT CHANGE UP
PHOSPHATE SERPL-MCNC: 2 MG/DL — LOW (ref 2.1–4.9)
PHOSPHATE SERPL-MCNC: 2.1 MG/DL — SIGNIFICANT CHANGE UP (ref 2.1–4.9)
PLATELET # BLD AUTO: 48 K/UL — LOW (ref 130–400)
PMV BLD: 10.9 FL — HIGH (ref 7.4–10.4)
POTASSIUM SERPL-MCNC: 4.3 MMOL/L — SIGNIFICANT CHANGE UP (ref 3.5–5)
POTASSIUM SERPL-MCNC: 4.4 MMOL/L — SIGNIFICANT CHANGE UP (ref 3.5–5)
POTASSIUM SERPL-SCNC: 4.3 MMOL/L — SIGNIFICANT CHANGE UP (ref 3.5–5)
POTASSIUM SERPL-SCNC: 4.4 MMOL/L — SIGNIFICANT CHANGE UP (ref 3.5–5)
POTASSIUM UR-SCNC: 57 MMOL/L — SIGNIFICANT CHANGE UP
PROT ?TM UR-MCNC: 12 MG/DLG/24H — SIGNIFICANT CHANGE UP
PROT SERPL-MCNC: 6.1 G/DL — SIGNIFICANT CHANGE UP (ref 6–8)
PROT SERPL-MCNC: 6.8 G/DL — SIGNIFICANT CHANGE UP (ref 6–8)
PROT/CREAT UR-RTO: 0.2 RATIO — SIGNIFICANT CHANGE UP (ref 0–0.2)
PROTHROM AB SERPL-ACNC: 19.7 SEC — HIGH (ref 9.95–12.87)
RBC # BLD: 3.31 M/UL — LOW (ref 4.7–6.1)
RBC # FLD: 16.6 % — HIGH (ref 11.5–14.5)
SODIUM SERPL-SCNC: 140 MMOL/L — SIGNIFICANT CHANGE UP (ref 135–146)
SODIUM SERPL-SCNC: 141 MMOL/L — SIGNIFICANT CHANGE UP (ref 135–146)
SODIUM UR-SCNC: 107 MMOL/L — SIGNIFICANT CHANGE UP
UUN UR-MCNC: 232 MG/DL — SIGNIFICANT CHANGE UP
WBC # BLD: 3.19 K/UL — LOW (ref 4.8–10.8)
WBC # FLD AUTO: 3.19 K/UL — LOW (ref 4.8–10.8)

## 2025-01-20 PROCEDURE — 93010 ELECTROCARDIOGRAM REPORT: CPT

## 2025-01-20 PROCEDURE — 99223 1ST HOSP IP/OBS HIGH 75: CPT

## 2025-01-20 PROCEDURE — 93970 EXTREMITY STUDY: CPT | Mod: 26

## 2025-01-20 PROCEDURE — 99233 SBSQ HOSP IP/OBS HIGH 50: CPT

## 2025-01-20 RX ORDER — MAGNESIUM OXIDE 400 MG
400 TABLET ORAL
Refills: 0 | Status: DISCONTINUED | OUTPATIENT
Start: 2025-01-20 | End: 2025-01-27

## 2025-01-20 RX ORDER — MAGNESIUM SULFATE 0.8 MEQ/ML
2 AMPUL (ML) INJECTION
Refills: 0 | Status: COMPLETED | OUTPATIENT
Start: 2025-01-20 | End: 2025-01-20

## 2025-01-20 RX ORDER — SODIUM PHOSPHATE, DIBASIC, ANHYDROUS, POTASSIUM PHOSPHATE, MONOBASIC, AND SODIUM PHOSPHATE, MONOBASIC, MONOHYDRATE 852; 155; 130 MG/1; MG/1; MG/1
1 TABLET, COATED ORAL EVERY 6 HOURS
Refills: 0 | Status: COMPLETED | OUTPATIENT
Start: 2025-01-20 | End: 2025-01-20

## 2025-01-20 RX ORDER — SODIUM CHLORIDE 9 G/ML
1000 INJECTION, SOLUTION INTRAVENOUS
Refills: 0 | Status: DISCONTINUED | OUTPATIENT
Start: 2025-01-20 | End: 2025-01-21

## 2025-01-20 RX ORDER — WARFARIN SODIUM 2 MG/1
5 TABLET ORAL AT BEDTIME
Refills: 0 | Status: DISCONTINUED | OUTPATIENT
Start: 2025-01-20 | End: 2025-01-20

## 2025-01-20 RX ADMIN — Medication 400 MILLIGRAM(S): at 07:47

## 2025-01-20 RX ADMIN — PANTOPRAZOLE 40 MILLIGRAM(S): 20 TABLET, DELAYED RELEASE ORAL at 07:47

## 2025-01-20 RX ADMIN — Medication 50 MILLIGRAM(S): at 13:39

## 2025-01-20 RX ADMIN — Medication 500 MILLIGRAM(S): at 13:39

## 2025-01-20 RX ADMIN — TAMSULOSIN HYDROCHLORIDE 0.4 MILLIGRAM(S): 0.4 CAPSULE ORAL at 22:09

## 2025-01-20 RX ADMIN — Medication 400 MILLIGRAM(S): at 13:40

## 2025-01-20 RX ADMIN — Medication 1 MILLIGRAM(S): at 13:39

## 2025-01-20 RX ADMIN — SODIUM CHLORIDE 150 MILLILITER(S): 9 INJECTION, SOLUTION INTRAVENOUS at 20:10

## 2025-01-20 RX ADMIN — Medication 1 TABLET(S): at 13:40

## 2025-01-20 RX ADMIN — COLCHICINE 0.6 MILLIGRAM(S): 0.6 TABLET ORAL at 05:47

## 2025-01-20 RX ADMIN — COLCHICINE 0.6 MILLIGRAM(S): 0.6 TABLET ORAL at 17:42

## 2025-01-20 RX ADMIN — GABAPENTIN 200 MILLIGRAM(S): 800 TABLET ORAL at 05:47

## 2025-01-20 RX ADMIN — Medication 25 GRAM(S): at 05:45

## 2025-01-20 RX ADMIN — Medication 400 MILLIGRAM(S): at 17:42

## 2025-01-20 RX ADMIN — Medication 1000 MICROGRAM(S): at 13:39

## 2025-01-20 RX ADMIN — SODIUM PHOSPHATE, DIBASIC, ANHYDROUS, POTASSIUM PHOSPHATE, MONOBASIC, AND SODIUM PHOSPHATE, MONOBASIC, MONOHYDRATE 1 PACKET(S): 852; 155; 130 TABLET, COATED ORAL at 03:18

## 2025-01-20 RX ADMIN — Medication 25 MILLIGRAM(S): at 05:47

## 2025-01-20 RX ADMIN — GABAPENTIN 200 MILLIGRAM(S): 800 TABLET ORAL at 17:42

## 2025-01-20 RX ADMIN — ENOXAPARIN SODIUM 40 MILLIGRAM(S): 100 INJECTION SUBCUTANEOUS at 05:47

## 2025-01-20 RX ADMIN — SODIUM PHOSPHATE, DIBASIC, ANHYDROUS, POTASSIUM PHOSPHATE, MONOBASIC, AND SODIUM PHOSPHATE, MONOBASIC, MONOHYDRATE 1 PACKET(S): 852; 155; 130 TABLET, COATED ORAL at 05:46

## 2025-01-20 RX ADMIN — SODIUM PHOSPHATE, DIBASIC, ANHYDROUS, POTASSIUM PHOSPHATE, MONOBASIC, AND SODIUM PHOSPHATE, MONOBASIC, MONOHYDRATE 1 PACKET(S): 852; 155; 130 TABLET, COATED ORAL at 13:39

## 2025-01-20 RX ADMIN — Medication 10 MILLIGRAM(S): at 13:40

## 2025-01-20 RX ADMIN — Medication 2 TABLET(S): at 22:08

## 2025-01-20 RX ADMIN — TAMSULOSIN HYDROCHLORIDE 0.4 MILLIGRAM(S): 0.4 CAPSULE ORAL at 02:11

## 2025-01-20 RX ADMIN — Medication 25 GRAM(S): at 07:45

## 2025-01-20 RX ADMIN — Medication 25 GRAM(S): at 03:19

## 2025-01-20 RX ADMIN — Medication 100 MILLIGRAM(S): at 13:39

## 2025-01-20 NOTE — CONSULT NOTE ADULT - ASSESSMENT
IMPRESSION:    Abd pain  high anion gap metabolic acidosis prior same admission  lactic acidosis  Acute kidney injury likely prerenal  nausea/vomiting  alcohol abuse disorder  hypomagnesemia  hx of dvt on coumadin, INR noted    PLAN:    CNS: CIWA protocol  thiamine, folic acid  CATCH team eval    HEENT:  Oral care    PULMONARY:  HOB @ 45 degrees, aspiration precautions, on RA    CARDIOVASCULAR: d5 LR at 100cc/hr, Trend LA.  Continue home BB    GI: GI prophylaxis.  Feeding as tolerated.  Bowel regimen PRN.  Hepatology follow-up.    RENAL:  F/u  lytes.  Correct as needed. accurate I/O    INFECTIOUS DISEASE: pancx, procal    HEMATOLOGICAL:  DVT ppx, trend plt    ENDOCRINE:  Follow up FS.     MUSCULOSKELETAL: AAT          SDU

## 2025-01-20 NOTE — PROGRESS NOTE ADULT - ASSESSMENT
64-year-old man PMH EtOH use disorder, DVT/PE no longer on Coumadin, BPH, hypertension, DL, DM, gout,  presented to the ED with abdominal pain of 1 day duration.    #Alcohol withdrawal  #Abdominal Pain, resolved, likely related to gastritis d/t alcohol abuse  Imaging CTAP:  Steatosis. Mild caudate lobe hypertrophy. Cirrhosis is not   excluded. Atherosclerotic changes.Severe stenosis at the ostium of the   celiac artery which remains patent. The superior mesenteric artery,   bilateral renal arteries and inferior mesenteric artery are widely patent.  Unchanged prominent portacaval and gastrohepatic lymph nodes.  Last drink 1d prior to admission  dw critcare  - CIWA protocol with ativan  - zofran prn  - tylenol prn  - check ecg and correct qtc if needed  - fall and aspiration precautions  - CATCH    #Hx of Cirrhosis  - low Na diet  - needs op liver clinic    #metamyelocytes present in differential as well as prominent portocaval and gastrohepatic LN  - would work up for cancer  - egd/colonoscopy if not had one     #hx DVT/PE   diagnosed in 2022  No longer on coumadin    DVT PPX, Lovenox 64-year-old man PMH EtOH use disorder, DVT/PE no longer on Coumadin, BPH, hypertension, DL, DM, gout,  presented to the ED with abdominal pain of 1 day duration.    #Alcohol withdrawal  #Abdominal Pain, resolved, likely related to gastritis d/t alcohol abuse  #Lactic acidosis, likely starvation ketosis  Imaging CTAP:  Steatosis. Mild caudate lobe hypertrophy. Cirrhosis is not   excluded. Atherosclerotic changes.Severe stenosis at the ostium of the   celiac artery which remains patent. The superior mesenteric artery,   bilateral renal arteries and inferior mesenteric artery are widely patent.  Unchanged prominent portacaval and gastrohepatic lymph nodes.  Last drink 1d prior to admission  Lactate trended down to 2.5  dw critcare  - CIWA protocol with ativan  - zofran prn  - tylenol prn  - check ecg and correct qtc if needed  - fall and aspiration precautions  - CATCH    #Hx of Cirrhosis  - low Na diet  - hepatology fu    #metamyelocytes present in differential as well as prominent portocaval and gastrohepatic LN  - o/p oncology fu    #hx DVT/PE   diagnosed in 2022  No longer on coumadin    DVT PPX, Lovenox    #Progress Note Handoff  Pending (specify): CIWA monitoring  Family discussion: khadar pt regarding plan of care  Disposition: SDU, from home

## 2025-01-20 NOTE — PROGRESS NOTE ADULT - SUBJECTIVE AND OBJECTIVE BOX
CAT ALSTON  64y, Male  Allergy: shellfish (Hives)  Beef (Hives)  dairy products (Hives)  No Known Drug Allergies    Hospital Day: 1d    Patient seen and examined. No acute events overnight    PMH/PSH:  PAST MEDICAL & SURGICAL HISTORY:  Alcohol dependence      HTN (hypertension)      Hard of hearing      Seasonal allergies      BPH (benign prostatic hyperplasia)      GERD (gastroesophageal reflux disease)      Pseudogout      History of deep vein thrombosis (DVT) of lower extremity      No significant past surgical history          VITALS:  T(F): 98.3 (01-20-25 @ 08:31), Max: 98.3 (01-20-25 @ 08:31)  HR: 92 (01-20-25 @ 08:31)  BP: 126/63 (01-20-25 @ 08:31) (126/63 - 144/73)  RR: 18 (01-20-25 @ 08:31)  SpO2: 97% (01-20-25 @ 08:31)    TESTS & MEASUREMENTS:  Weight (Kg): 108.9 (01-19-25 @ 07:34)      01-19-25 @ 07:01  -  01-20-25 @ 07:00  --------------------------------------------------------  IN: 0 mL / OUT: 600 mL / NET: -600 mL                            12.4   3.19  )-----------( 48       ( 20 Jan 2025 06:18 )             35.5     PT/INR - ( 20 Jan 2025 00:32 )   PT: 19.70 sec;   INR: 1.65 ratio           01-20    140  |  102  |  8[L]  ----------------------------<  110[H]  4.3   |  21  |  1.0    Ca    9.1      20 Jan 2025 06:18  Phos  2.1     01-20  Mg     1.9     01-20    TPro  6.8  /  Alb  4.0  /  TBili  4.1[H]  /  DBili  x   /  AST  80[H]  /  ALT  25  /  AlkPhos  137[H]  01-20    LIVER FUNCTIONS - ( 20 Jan 2025 06:18 )  Alb: 4.0 g/dL / Pro: 6.8 g/dL / ALK PHOS: 137 U/L / ALT: 25 U/L / AST: 80 U/L / GGT: x                 Urinalysis Basic - ( 20 Jan 2025 06:18 )    Color: x / Appearance: x / SG: x / pH: x  Gluc: 110 mg/dL / Ketone: x  / Bili: x / Urobili: x   Blood: x / Protein: x / Nitrite: x   Leuk Esterase: x / RBC: x / WBC x   Sq Epi: x / Non Sq Epi: x / Bacteria: x        RADIOLOGY & ADDITIONAL TESTS:    RECENT DIAGNOSTIC ORDERS:  12 Lead ECG (01-19-25 @ 23:21)  Diet, DASH/TLC:   Sodium & Cholesterol Restricted  Consistent Carbohydrate No Snacks (CSTCHO) (01-19-25 @ 21:00)  VA Duplex Lower Ext Vein Scan, Bilat: Routine   Indication: r/o DVT  Transport: Stretcher-Crib (01-19-25 @ 21:00)  Blood Gas Arterial - Lytes,iCa,Lact: AM Sched. Collection:20-Jan-2025 04:30 (01-19-25 @ 21:00)  Drug Screen W/PCP, Urine: STAT (01-19-25 @ 16:00)      MEDICATIONS:  MEDICATIONS  (STANDING):  cetirizine 10 milliGRAM(s) Oral daily  chlorhexidine 2% Cloths 1 Application(s) Topical <User Schedule>  colchicine 0.6 milliGRAM(s) Oral two times a day  cyanocobalamin 1000 MICROGram(s) Oral daily  dextrose 5% + lactated ringers. 1000 milliLiter(s) (150 mL/Hr) IV Continuous <Continuous>  enoxaparin Injectable 40 milliGRAM(s) SubCutaneous every 24 hours  folic acid 1 milliGRAM(s) Oral daily  gabapentin 200 milliGRAM(s) Oral two times a day  magnesium oxide 400 milliGRAM(s) Oral three times a day with meals  methocarbamol 500 milliGRAM(s) Oral daily  metoprolol succinate ER 25 milliGRAM(s) Oral daily  multivitamin 1 Tablet(s) Oral daily  naltrexone 50 milliGRAM(s) Oral daily  pantoprazole    Tablet 40 milliGRAM(s) Oral before breakfast  senna 2 Tablet(s) Oral at bedtime  tamsulosin 0.4 milliGRAM(s) Oral at bedtime  thiamine 100 milliGRAM(s) Oral daily    MEDICATIONS  (PRN):  LORazepam   Injectable 2 milliGRAM(s) IV Push every 2 hours PRN Symptom-triggered: 2 point increase in CIWA -Ar score and a total score of 7 or LESS      HOME MEDICATIONS:  cyanocobalamin 1000 mcg oral tablet (11-09)  Jardiance 10 mg oral tablet (11-09)  metFORMIN 500 mg oral tablet (11-09)  methocarbamol 500 mg oral tablet (11-09)  Toprol-XL 25 mg oral tablet, extended release (11-09)  Tylenol 325 mg oral tablet (11-09)      REVIEW OF SYSTEMS:  All other review of systems is negative unless indicated above.     PHYSICAL EXAM:  PHYSICAL EXAM:  GENERAL: NAD  HEAD:  Atraumatic, Normocephalic  NECK: Supple, No JVD  CHEST/LUNG: Clear to auscultation bilaterally; No wheeze  HEART: Regular rate and rhythm; No murmurs, rubs, or gallops  ABDOMEN: Soft, Nontender, Nondistended; Bowel sounds present  EXTREMITIES:  2+ Peripheral Pulses, No clubbing, cyanosis, or edema  SKIN: No rashes or lesions

## 2025-01-20 NOTE — CONSULT NOTE ADULT - SUBJECTIVE AND OBJECTIVE BOX
Patient is a 64y old  Male who presents with a chief complaint of     HPI:  HISTORY OF PRESENT ILLNESS & PAST MEDICAL HISTORY  64-year-old man PMH EtOH use disorder, DVT/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     Patient states that he woke up this morning and felt queasy in his stomach.  Patient also reports palpitations as well as two episodes of vomiting.  Reports associated numbness and tingling in his fingertips.  No chest pain or difficulty breathing.  No leg pain or swelling.  No recent fevers.  Patient does admit to drinking half a pint to 1 pint of vodka every day, last drink was yesterday afternoon.  Patient does admit to previous alcohol withdrawals, states that this feels similar.  No other concerns.    It is worth noting the patient presented with a similar picture 2/2 alcoholic ketoacidosis in November.    VITALS & PHYSICAL EXAMINATION  Vitals in the ED  T(F): 98.1, Max: 98.1 (07:34)  HR: 92 (92 - 113)  BP: 130/70 (125/68 - 149/76)  RR: 18 (18 - 18)  SpO2: 98% (93% - 98%)    GEN: NAD  HEENT: NAD  RESP: CTAB  CARD: S1 S2 RRR tachycardic   ABD: soft nondistended. TTP mild lower quadrants   EXT: normal ROM no NANCI  NEURO: AOx4  PSYCH: cooperative appropriate     LABS                           13.9   5.41  )-----------( 109    .6                40.5       139  |  96  |  6   ----------------------------( 158      A  4.4   |  10  |  1.2    Ca: 8.7   Phos: x    Mg: x     Protein, Total: x  / Albumin: x  /  T. Bili x  / D. Bili x  /  AST 78 /  ALT 23  /       VBG pH: 7.18  /  pCO2: 37    /  pO2: 42    / HCO3: 14    /  Lactate: 13.7     VBG pH: 7.14  /  pCO2: 38    /  pO2: 47    / HCO3: 13    /  Lactate: >16.    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 but patent, stable finding from November    (2025 19:12)      PAST MEDICAL & SURGICAL HISTORY:  Alcohol dependence      HTN (hypertension)      Hard of hearing      Seasonal allergies      BPH (benign prostatic hyperplasia)      GERD (gastroesophageal reflux disease)      Pseudogout      History of deep vein thrombosis (DVT) of lower extremity      No significant past surgical history          SOCIAL HX:   Smoking                         ETOH                            Other    FAMILY HISTORY:  Family history of gastric cancer  Mom    Family hx of lung cancer  Smoker      :  No known cardiovacular family hisotry     Review Of Systems:     All ROS are negative except per HPI       Allergies    shellfish (Hives)  Beef (Hives)  dairy products (Hives)  No Known Drug Allergies    Intolerances          PHYSICAL EXAM    ICU Vital Signs Last 24 Hrs  T(C): 36.7 (2025 07:34), Max: 36.7 (2025 07:34)  T(F): 98.1 (2025 07:34), Max: 98.1 (2025 07:34)  HR: 97 (2025 05:53) (92 - 113)  BP: 144/73 (2025 05:53) (125/68 - 149/76)  BP(mean): --  ABP: --  ABP(mean): --  RR: 18 (2025 05:53) (18 - 18)  SpO2: 99% (2025 05:53) (93% - 99%)    O2 Parameters below as of 2025 05:53  Patient On (Oxygen Delivery Method): room air            CONSTITUTIONAL:  Well nourished.   NAD    ENT:   Airway patent,   Mouth with normal mucosa.   No thrush      CARDIAC:   Normal rate,   Regular rhythm.    No edema      Vascular:   normal systolic impulse  no bruits    RESPIRATORY:   No wheezing  Bilateral BS   Not tachypneic,  No use of accessory muscles    GASTROINTESTINAL:  Abdomen soft,   Non-tender,   No guarding,   + BS      NEUROLOGICAL:   Alert and oriented   No motor deficits.    SKIN:   Skin normal color for race,   No evidence of rash.      HEME LYMPH: .  No cervical  lymphadenopathy.  No inguinal lymphadenopathy            25 @ 07:01  -  25 @ 07:00  --------------------------------------------------------  IN:  Total IN: 0 mL    OUT:    Voided (mL): 600 mL  Total OUT: 600 mL    Total NET: -600 mL          LABS:                          13.9   5.41  )-----------( 109      ( 2025 08:53 )             40.5                                               -    141  |  103  |  6[L]  ----------------------------<  98  4.4   |  18  |  1.1    Ca    8.4      2025 00:32  Phos  2.0     -  Mg     1.2         TPro  6.1  /  Alb  3.8  /  TBili  3.3[H]  /  DBili  1.5[H]  /  AST  85[H]  /  ALT  25  /  AlkPhos  127[H]  -20      PT/INR - ( 2025 00:32 )   PT: 19.70 sec;   INR: 1.65 ratio                                                Urinalysis Basic - ( 2025 00:32 )    Color: x / Appearance: x / SG: x / pH: x  Gluc: 98 mg/dL / Ketone: x  / Bili: x / Urobili: x   Blood: x / Protein: x / Nitrite: x   Leuk Esterase: x / RBC: x / WBC x   Sq Epi: x / Non Sq Epi: x / Bacteria: x                                                  LIVER FUNCTIONS - ( 2025 00:32 )  Alb: 3.8 g/dL / Pro: 6.1 g/dL / ALK PHOS: 127 U/L / ALT: 25 U/L / AST: 85 U/L / GGT: x                                                                                                                                       X-Rays reviewed                                                                                     ECHO    CXR interpreted by me     MEDICATIONS  (STANDING):  cetirizine 10 milliGRAM(s) Oral daily  chlorhexidine 2% Cloths 1 Application(s) Topical <User Schedule>  colchicine 0.6 milliGRAM(s) Oral two times a day  cyanocobalamin 1000 MICROGram(s) Oral daily  dextrose 5% + lactated ringers. 1000 milliLiter(s) (150 mL/Hr) IV Continuous <Continuous>  enoxaparin Injectable 40 milliGRAM(s) SubCutaneous every 24 hours  folic acid 1 milliGRAM(s) Oral daily  gabapentin 200 milliGRAM(s) Oral two times a day  magnesium oxide 400 milliGRAM(s) Oral three times a day with meals  magnesium sulfate  IVPB 2 Gram(s) IV Intermittent every 2 hours  methocarbamol 500 milliGRAM(s) Oral daily  metoprolol succinate ER 25 milliGRAM(s) Oral daily  multivitamin 1 Tablet(s) Oral daily  naltrexone 50 milliGRAM(s) Oral daily  pantoprazole    Tablet 40 milliGRAM(s) Oral before breakfast  potassium phosphate / sodium phosphate Powder (PHOS-NaK) 1 Packet(s) Oral every 6 hours  senna 2 Tablet(s) Oral at bedtime  tamsulosin 0.4 milliGRAM(s) Oral at bedtime  thiamine 100 milliGRAM(s) Oral daily    MEDICATIONS  (PRN):  LORazepam   Injectable 2 milliGRAM(s) IV Push every 2 hours PRN Symptom-triggered: 2 point increase in CIWA -Ar score and a total score of 7 or LESS

## 2025-01-20 NOTE — ED ADULT NURSE REASSESSMENT NOTE - NS ED NURSE REASSESS COMMENT FT1
received report from overnight RN. pt currently resting comfortably. no s/s of distress. cardiac monitor in place. will continue pt care and safety

## 2025-01-21 LAB
ALBUMIN SERPL ELPH-MCNC: 3.8 G/DL — SIGNIFICANT CHANGE UP (ref 3.5–5.2)
ALP SERPL-CCNC: 171 U/L — HIGH (ref 30–115)
ALT FLD-CCNC: 24 U/L — SIGNIFICANT CHANGE UP (ref 0–41)
ANION GAP SERPL CALC-SCNC: 16 MMOL/L — HIGH (ref 7–14)
AST SERPL-CCNC: 83 U/L — HIGH (ref 0–41)
BASOPHILS # BLD AUTO: 0.03 K/UL — SIGNIFICANT CHANGE UP (ref 0–0.2)
BASOPHILS NFR BLD AUTO: 1 % — SIGNIFICANT CHANGE UP (ref 0–1)
BILIRUB SERPL-MCNC: 4.2 MG/DL — HIGH (ref 0.2–1.2)
BUN SERPL-MCNC: 6 MG/DL — LOW (ref 10–20)
CALCIUM SERPL-MCNC: 8.7 MG/DL — SIGNIFICANT CHANGE UP (ref 8.4–10.5)
CHLORIDE SERPL-SCNC: 103 MMOL/L — SIGNIFICANT CHANGE UP (ref 98–110)
CO2 SERPL-SCNC: 21 MMOL/L — SIGNIFICANT CHANGE UP (ref 17–32)
CREAT SERPL-MCNC: 0.9 MG/DL — SIGNIFICANT CHANGE UP (ref 0.7–1.5)
EGFR: 95 ML/MIN/1.73M2 — SIGNIFICANT CHANGE UP
EOSINOPHIL # BLD AUTO: 0.08 K/UL — SIGNIFICANT CHANGE UP (ref 0–0.7)
EOSINOPHIL NFR BLD AUTO: 2.6 % — SIGNIFICANT CHANGE UP (ref 0–8)
GLUCOSE SERPL-MCNC: 127 MG/DL — HIGH (ref 70–99)
HCT VFR BLD CALC: 35.4 % — LOW (ref 42–52)
HGB BLD-MCNC: 12.4 G/DL — LOW (ref 14–18)
IMM GRANULOCYTES NFR BLD AUTO: 0.3 % — SIGNIFICANT CHANGE UP (ref 0.1–0.3)
LYMPHOCYTES # BLD AUTO: 1.05 K/UL — LOW (ref 1.2–3.4)
LYMPHOCYTES # BLD AUTO: 33.7 % — SIGNIFICANT CHANGE UP (ref 20.5–51.1)
MAGNESIUM SERPL-MCNC: 1.8 MG/DL — SIGNIFICANT CHANGE UP (ref 1.8–2.4)
MCHC RBC-ENTMCNC: 35 G/DL — SIGNIFICANT CHANGE UP (ref 32–37)
MCHC RBC-ENTMCNC: 36.9 PG — HIGH (ref 27–31)
MCV RBC AUTO: 105.4 FL — HIGH (ref 80–94)
MONOCYTES # BLD AUTO: 0.17 K/UL — SIGNIFICANT CHANGE UP (ref 0.1–0.6)
MONOCYTES NFR BLD AUTO: 5.4 % — SIGNIFICANT CHANGE UP (ref 1.7–9.3)
NEUTROPHILS # BLD AUTO: 1.78 K/UL — SIGNIFICANT CHANGE UP (ref 1.4–6.5)
NEUTROPHILS NFR BLD AUTO: 57 % — SIGNIFICANT CHANGE UP (ref 42.2–75.2)
NRBC # BLD: 0 /100 WBCS — SIGNIFICANT CHANGE UP (ref 0–0)
NRBC BLD-RTO: 0 /100 WBCS — SIGNIFICANT CHANGE UP (ref 0–0)
PLATELET # BLD AUTO: 34 K/UL — LOW (ref 130–400)
PMV BLD: 11.8 FL — HIGH (ref 7.4–10.4)
POTASSIUM SERPL-MCNC: 4.5 MMOL/L — SIGNIFICANT CHANGE UP (ref 3.5–5)
POTASSIUM SERPL-SCNC: 4.5 MMOL/L — SIGNIFICANT CHANGE UP (ref 3.5–5)
PROT SERPL-MCNC: 6.2 G/DL — SIGNIFICANT CHANGE UP (ref 6–8)
RBC # BLD: 3.36 M/UL — LOW (ref 4.7–6.1)
RBC # FLD: 15.6 % — HIGH (ref 11.5–14.5)
SODIUM SERPL-SCNC: 140 MMOL/L — SIGNIFICANT CHANGE UP (ref 135–146)
WBC # BLD: 3.12 K/UL — LOW (ref 4.8–10.8)
WBC # FLD AUTO: 3.12 K/UL — LOW (ref 4.8–10.8)

## 2025-01-21 PROCEDURE — 99233 SBSQ HOSP IP/OBS HIGH 50: CPT

## 2025-01-21 PROCEDURE — 99232 SBSQ HOSP IP/OBS MODERATE 35: CPT

## 2025-01-21 PROCEDURE — 99223 1ST HOSP IP/OBS HIGH 75: CPT

## 2025-01-21 RX ORDER — DICYCLOMINE HCL 20 MG
20 TABLET ORAL
Refills: 0 | Status: DISCONTINUED | OUTPATIENT
Start: 2025-01-21 | End: 2025-01-27

## 2025-01-21 RX ADMIN — Medication 2 TABLET(S): at 21:27

## 2025-01-21 RX ADMIN — Medication 10 MILLIGRAM(S): at 11:47

## 2025-01-21 RX ADMIN — TAMSULOSIN HYDROCHLORIDE 0.4 MILLIGRAM(S): 0.4 CAPSULE ORAL at 21:27

## 2025-01-21 RX ADMIN — Medication 20 MILLIGRAM(S): at 17:16

## 2025-01-21 RX ADMIN — Medication 2 MILLIGRAM(S): at 20:00

## 2025-01-21 RX ADMIN — Medication 400 MILLIGRAM(S): at 08:16

## 2025-01-21 RX ADMIN — Medication 25 MILLIGRAM(S): at 06:11

## 2025-01-21 RX ADMIN — Medication 400 MILLIGRAM(S): at 11:48

## 2025-01-21 RX ADMIN — GABAPENTIN 200 MILLIGRAM(S): 800 TABLET ORAL at 17:15

## 2025-01-21 RX ADMIN — COLCHICINE 0.6 MILLIGRAM(S): 0.6 TABLET ORAL at 17:16

## 2025-01-21 RX ADMIN — Medication 100 MILLIGRAM(S): at 11:48

## 2025-01-21 RX ADMIN — COLCHICINE 0.6 MILLIGRAM(S): 0.6 TABLET ORAL at 06:10

## 2025-01-21 RX ADMIN — Medication 1 TABLET(S): at 11:48

## 2025-01-21 RX ADMIN — Medication 1 MILLIGRAM(S): at 11:48

## 2025-01-21 RX ADMIN — ENOXAPARIN SODIUM 40 MILLIGRAM(S): 100 INJECTION SUBCUTANEOUS at 06:11

## 2025-01-21 RX ADMIN — Medication 500 MILLIGRAM(S): at 11:48

## 2025-01-21 RX ADMIN — PANTOPRAZOLE 40 MILLIGRAM(S): 20 TABLET, DELAYED RELEASE ORAL at 08:16

## 2025-01-21 RX ADMIN — GABAPENTIN 200 MILLIGRAM(S): 800 TABLET ORAL at 06:10

## 2025-01-21 RX ADMIN — Medication 50 MILLIGRAM(S): at 11:50

## 2025-01-21 RX ADMIN — Medication 400 MILLIGRAM(S): at 17:16

## 2025-01-21 RX ADMIN — Medication 1000 MICROGRAM(S): at 11:48

## 2025-01-21 NOTE — PROGRESS NOTE ADULT - ASSESSMENT
IMPRESSION:    Thrombocytopenia   Abd pain - improved  High anion gap metabolic acidosis secondary to lactic acidosis  Similar presentation on prior admission   Acute kidney injury likely prerenal - resolved.   nausea/vomiting - resolved.   Alcohol abuse disorder  hx of dvt on coumadin, INR noted    PLAN:    CNS: CIWA protocol  thiamine, folic acid. CATCH team.     HEENT:  Oral care    PULMONARY:  HOB @ 45 degrees, aspiration precautions, on RA    CARDIOVASCULAR: Avoid overload. Trend LA.  Continue home BB    GI: GI prophylaxis.  Feeding as tolerated.  Bowel regimen PRN.  Hepatology follow-up.    RENAL:  F/u  lytes.  Correct as needed.     INFECTIOUS DISEASE: pancx, procal    HEMATOLOGICAL:  LE duplex noted with bilateral chronic DVTs. Monitor PLTs. Send HIT and DIC panels.     ENDOCRINE:  Follow up FS. Insulin protocol if needed.     MUSCULOSKELETAL: AAT    Downgrade to general medical floor.  IMPRESSION:    Thrombocytopenia   Abd pain - improved  High anion gap metabolic acidosis secondary to lactic acidosis  Similar presentation on prior admission   Acute kidney injury likely prerenal - resolved.   nausea/vomiting - resolved.   Alcohol abuse disorder  hx of dvt on coumadin, INR noted    PLAN:    CNS: CIWA protocol  thiamine, folic acid. CATCH team.     HEENT:  Oral care    PULMONARY:  HOB @ 45 degrees, aspiration precautions, on RA    CARDIOVASCULAR: Avoid overload. Trend LA.  Continue home BB    GI: GI prophylaxis.  Feeding as tolerated.  Bowel regimen PRN.  Hepatology follow-up.    RENAL:  F/u  lytes.  Correct as needed.     INFECTIOUS DISEASE: Pancx, procal    HEMATOLOGICAL:  LE duplex noted with bilateral chronic DVTs. Monitor PLTs. Send HIT and DIC panels. Monitor CBC and Coags.  Hem onc eval     ENDOCRINE:  Follow up FS. Insulin protocol if needed.     MUSCULOSKELETAL: AAT    Downgrade to general medical floor.

## 2025-01-21 NOTE — CONSULT NOTE ADULT - ASSESSMENT
#Cirrhosis ALD  # Alcoholic hepatitis   MELD  MDF score   Alcohol lvl 195 on admission   CLD work up negative for hepatitis B C IRA AMA SMA LKM SLA negative, normal Ig levels  A1AT MM ACE normal. HIV RPR negative   HE - none  Ascites - none  Coagulopathy - on warfarin. PLT count 34   HCC screening - no mass on US abdomen AFP normal. Not done MRI abdomen      Plan   US abdomen to check for ascites.   Variceal screening - will need EGD as OP   Vaccination status - not immune to hepatitis A and B. Advised vaccination.  LT referral - lives in assisted liver and poor insight. Not a LT candidate as still drinking.  ABO status - O positive            Nutrition  Low Na diet Protein 1.5 g/kg  Early breakfast late night snack small multiple meals  Avoid raw foods especially shellfish  No NSAID  Limit tylenol intake to 2 g per day  Alcohol abstinence and rehab.  CBC CMP INR   Health maintenance  Colonoscopy declined as well as stool testing.  Vaccination advised  Covid -done  Zoster  Pneumococcal  Follow up in 6 months.        #Cirrhosis ALD  # Alcoholic hepatitis   MELD  MDF score   Alcohol lvl 195 on admission   CLD work up negative for hepatitis B C IRA AMA SMA LKM SLA negative, normal Ig levels  A1AT MM ACE normal. HIV RPR negative   HE - none  Ascites - none  Coagulopathy - INR 1.65  PLT count 34   HCC screening - no mass on US abdomen AFP normal. Not done MRI abdomen      Plan   US abdomen to check for ascites.   Variceal screening - will need EGD as OP   Vaccination status - not immune to hepatitis A and B. Advised vaccination.  LT referral - lives in assisted liver and poor insight. Not a LT candidate as still drinking.  ABO status - O positive            Nutrition  Low Na diet Protein 1.5 g/kg  Early breakfast late night snack small multiple meals  Avoid raw foods especially shellfish  No NSAID  Limit tylenol intake to 2 g per day  Alcohol abstinence and rehab.  CBC CMP INR   Health maintenance  Colonoscopy declined as well as stool testing.  Vaccination advised  Covid -done  Zoster  Pneumococcal  Follow up in 6 months.        #Cirrhosis ALD  # Alcoholic hepatitis   MELD 3 score 17   MDF score 35   Alcohol lvl 195 on admission   CLD work up negative for hepatitis B C IRA AMA SMA LKM SLA negative, normal Ig levels  A1AT MM ACE normal. HIV RPR negative   HE - none  Ascites - none  Coagulopathy - INR 1.65  PLT count 34   HCC screening - no mass on US abdomen AFP normal. Not done MRI abdomen      Plan   US abdomen to check for ascites  Daily MELD labs   Avoid opioids and NSAIDs  Limit Tylenol to 53231 mg per day   2g Na diet   Variceal screening - will need EGD as OP   Vaccination status - not immune to hepatitis A and B. Advised vaccination.  HCC screening -Needs MRI abdomen as OP / AFP and US every 6 months   LT referral - lives in assisted liver and poor insight. Not a LT candidate as still drinking.  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 Admitted for alcoholic ketoacidosis. Patient does admit to drinking half a pint to 1 pint of vodka every day. Patient does admit to previous alcohol withdrawals. Hepatology consulted for cirrhosis.       # Cirrhosis ALD  # Alcoholic hepatitis  # Thrombocytopenia  MELD 3 score 17   MDF score 35   Alcohol lvl 195 on admission   CLD work up negative for hepatitis B C IRA AMA SMA LKM SLA negative, normal Ig levels  A1AT MM ACE normal. HIV RPR negative   Coagulopathy - INR 1.65  PLT count 34   HCC screening - no mass on US abdomen AFP normal. Not done MRI abdomen      Plan   US abdomen to check for ascites  Daily MELD labs   Avoid opioids and NSAIDs  Limit Tylenol to 11316 mg per day   2g Na diet   Variceal screening - will need EGD as OP   Vaccination status - not immune to hepatitis A and B. Advised vaccination.  HCC screening -Needs MRI abdomen as OP / AFP and US every 6 months   LT referral - lives in assisted liver and poor insight. Not a LT candidate as still drinking.  ABO status - O positive

## 2025-01-21 NOTE — PROGRESS NOTE ADULT - SUBJECTIVE AND OBJECTIVE BOX
CAT ALSTON 64y Male  MRN#: [id number]     Hospital Day: 2d    SUBJECTIVE     No overnight events. Patient seen and evaluated at bedside reports feeling well, and has no acute complaints.                                             ----------------------------------------------------------  OBJECTIVE  PAST MEDICAL & SURGICAL HISTORY  Alcohol dependence    HTN (hypertension)    Hard of hearing    Seasonal allergies    BPH (benign prostatic hyperplasia)    GERD (gastroesophageal reflux disease)    Pseudogout    History of deep vein thrombosis (DVT) of lower extremity    No significant past surgical history                                              -----------------------------------------------------------  ALLERGIES:  shellfish (Hives)  Beef (Hives)  dairy products (Hives)  No Known Drug Allergies                                            ------------------------------------------------------------    HOME MEDICATIONS  Home Medications:  cyanocobalamin 1000 mcg oral tablet: 1 tab(s) orally once a day (09 Nov 2024 16:29)  Jardiance 10 mg oral tablet: 1 tab(s) orally once a day (09 Nov 2024 16:29)  metFORMIN 500 mg oral tablet: 1 tab(s) orally 2 times a day (09 Nov 2024 16:29)  methocarbamol 500 mg oral tablet: 1 orally once a day (09 Nov 2024 16:30)  Toprol-XL 25 mg oral tablet, extended release: 1 tab(s) orally once a day (09 Nov 2024 16:30)  Tylenol 325 mg oral tablet: 2 tab(s) orally 2 times a day as needed for  moderate pain (09 Nov 2024 16:29)                           MEDICATIONS:  STANDING MEDICATIONS  cetirizine 10 milliGRAM(s) Oral daily  chlorhexidine 2% Cloths 1 Application(s) Topical <User Schedule>  colchicine 0.6 milliGRAM(s) Oral two times a day  cyanocobalamin 1000 MICROGram(s) Oral daily  dicyclomine 20 milliGRAM(s) Oral three times a day before meals  folic acid 1 milliGRAM(s) Oral daily  gabapentin 200 milliGRAM(s) Oral two times a day  magnesium oxide 400 milliGRAM(s) Oral three times a day with meals  methocarbamol 500 milliGRAM(s) Oral daily  metoprolol succinate ER 25 milliGRAM(s) Oral daily  multivitamin 1 Tablet(s) Oral daily  naltrexone 50 milliGRAM(s) Oral daily  pantoprazole    Tablet 40 milliGRAM(s) Oral before breakfast  senna 2 Tablet(s) Oral at bedtime  tamsulosin 0.4 milliGRAM(s) Oral at bedtime  thiamine 100 milliGRAM(s) Oral daily    PRN MEDICATIONS  LORazepam   Injectable 2 milliGRAM(s) IV Push every 2 hours PRN                                            ------------------------------------------------------------  VITAL SIGNS: Last 24 Hours  T(C): 37 (21 Jan 2025 12:30), Max: 37 (21 Jan 2025 12:30)  T(F): 98.6 (21 Jan 2025 12:30), Max: 98.6 (21 Jan 2025 12:30)  HR: 84 (21 Jan 2025 12:30) (74 - 94)  BP: 157/78 (21 Jan 2025 12:30) (113/69 - 157/78)  BP(mean): --  RR: 18 (21 Jan 2025 12:30) (18 - 18)  SpO2: 96% (21 Jan 2025 12:30) (95% - 98%)                                             --------------------------------------------------------------  LABS:                        12.4   3.12  )-----------( 34       ( 21 Jan 2025 06:51 )             35.4     01-21    140  |  103  |  6[L]  ----------------------------<  127[H]  4.5   |  21  |  0.9    Ca    8.7      21 Jan 2025 06:51  Phos  2.1     01-20  Mg     1.8     01-21    TPro  6.2  /  Alb  3.8  /  TBili  4.2[H]  /  DBili  x   /  AST  83[H]  /  ALT  24  /  AlkPhos  171[H]  01-21    PT/INR - ( 20 Jan 2025 00:32 )   PT: 19.70 sec;   INR: 1.65 ratio                                     PHYSICAL EXAM:  GENERAL: NAD  NECK:  No JVD  CHEST/LUNG: Clear to auscultation bilaterally. Unlabored respirations  HEART: regular rate and rhythm; No murmurs  ABDOMEN:  Soft, Nontender, Nondistended.    EXTREMITIES: Warm. No edema  NERVOUS SYSTEM:  Alert & Oriented X3. No focal deficits   SKIN: No rashes or lesions                                           --------------------------------------------------------------

## 2025-01-21 NOTE — CONSULT NOTE ADULT - SUBJECTIVE AND OBJECTIVE BOX
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     Patient states that he woke up this morning and felt queasy in his stomach.  Patient also reports palpitations as well as two episodes of vomiting.  Reports associated numbness and tingling in his fingertips.  No chest pain or difficulty breathing.  No leg pain or swelling.  No recent fevers.  Patient does admit to drinking half a pint to 1 pint of vodka every day, last drink was yesterday afternoon.  Patient does admit to previous alcohol withdrawals, states that this feels similar.  No other concerns.    It is worth noting the patient presented with a similar picture 2/2 alcoholic ketoacidosis in November.    VITALS & PHYSICAL EXAMINATION  Vitals in the ED  T(F): 98.1, Max: 98.1 (07:34)  HR: 92 (92 - 113)  BP: 130/70 (125/68 - 149/76)  RR: 18 (18 - 18)  SpO2: 98% (93% - 98%)    GEN: NAD  HEENT: NAD  RESP: CTAB  CARD: S1 S2 RRR tachycardic   ABD: soft nondistended. TTP mild lower quadrants   EXT: normal ROM no NANCI  NEURO: AOx4  PSYCH: cooperative appropriate     LABS                           13.9   5.41  )-----------( 109    .6                40.5       139  |  96  |  6   ----------------------------( 158      A  4.4   |  10  |  1.2    Ca: 8.7   Phos: x    Mg: x     Protein, Total: x  / Albumin: x  /  T. Bili x  / D. Bili x  /  AST 78 /  ALT 23  /       VBG pH: 7.18  /  pCO2: 37    /  pO2: 42    / HCO3: 14    /  Lactate: 13.7     VBG pH: 7.14  /  pCO2: 38    /  pO2: 47    / HCO3: 13    /  Lactate: >16.    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 but patent, stable finding from November    (2025 19:12)        Prior EGD:    Prior Colonoscopy:      PAST MEDICAL & SURGICAL HISTORY:  Alcohol dependence      HTN (hypertension)      Hard of hearing      Seasonal allergies      BPH (benign prostatic hyperplasia)      GERD (gastroesophageal reflux disease)      Pseudogout      History of deep vein thrombosis (DVT) of lower extremity      No significant past surgical history            FAMILY HISTORY:  Family history of gastric cancer  Mom    Family hx of lung cancer  Smoker          Social History:  Tobacco:  Alcohol:  Drugs:    Home Medications:  cyanocobalamin 1000 mcg oral tablet: 1 tab(s) orally once a day (2024 16:29)  Jardiance 10 mg oral tablet: 1 tab(s) orally once a day (2024 16:29)  metFORMIN 500 mg oral tablet: 1 tab(s) orally 2 times a day (2024 16:29)  methocarbamol 500 mg oral tablet: 1 orally once a day (2024 16:30)  Toprol-XL 25 mg oral tablet, extended release: 1 tab(s) orally once a day (2024 16:30)  Tylenol 325 mg oral tablet: 2 tab(s) orally 2 times a day as needed for  moderate pain (2024 16:29)        MEDICATIONS  (STANDING):  cetirizine 10 milliGRAM(s) Oral daily  chlorhexidine 2% Cloths 1 Application(s) Topical <User Schedule>  colchicine 0.6 milliGRAM(s) Oral two times a day  cyanocobalamin 1000 MICROGram(s) Oral daily  dextrose 5% + lactated ringers. 1000 milliLiter(s) (150 mL/Hr) IV Continuous <Continuous>  enoxaparin Injectable 40 milliGRAM(s) SubCutaneous every 24 hours  folic acid 1 milliGRAM(s) Oral daily  gabapentin 200 milliGRAM(s) Oral two times a day  magnesium oxide 400 milliGRAM(s) Oral three times a day with meals  methocarbamol 500 milliGRAM(s) Oral daily  metoprolol succinate ER 25 milliGRAM(s) Oral daily  multivitamin 1 Tablet(s) Oral daily  naltrexone 50 milliGRAM(s) Oral daily  pantoprazole    Tablet 40 milliGRAM(s) Oral before breakfast  senna 2 Tablet(s) Oral at bedtime  tamsulosin 0.4 milliGRAM(s) Oral at bedtime  thiamine 100 milliGRAM(s) Oral daily    MEDICATIONS  (PRN):  LORazepam   Injectable 2 milliGRAM(s) IV Push every 2 hours PRN Symptom-triggered: 2 point increase in CIWA -Ar score and a total score of 7 or LESS      Allergies  shellfish (Hives)  Beef (Hives)  dairy products (Hives)  No Known Drug Allergies      Review of Systems:   Constitutional:  No Fever, No Chills  ENT/Mouth:  No Hearing Changes,  No Difficulty Swallowing  Eyes:  No Eye Pain, No Vision Changes  Cardiovascular:  No Chest Pain, No Palpitations  Respiratory:  No Cough, No Dyspnea  Gastrointestinal:  As described in HPI  Musculoskeletal:  No Joint Swelling, No Back Pain  Skin:  No Skin Lesions, No Jaundice  Neuro:  No Syncope, No Dizziness  Heme/Lymph:  No Bruising, No Bleeding.          Physical Examination:  T(C): 36.8 (25 @ 07:55), Max: 36.8 (25 @ 08:31)  HR: 93 (25 @ 07:55) (74 - 94)  BP: 120/67 (25 @ 07:55) (113/69 - 127/66)  RR: 18 (25 @ 07:55) (18 - 18)  SpO2: 95% (25 @ 07:55) (95% - 98%)      25 @ 07:01  -  25 @ 07:00  --------------------------------------------------------  IN: 0 mL / OUT: 600 mL / NET: -600 mL          GENERAL: AAOx3, no acute distress.  HEAD:  Atraumatic, Normocephalic  EYES: conjunctiva and sclera clear  NECK: Supple, no JVD or thyromegaly  CHEST/LUNG: Clear to auscultation bilaterally; No wheeze, rhonchi, or rales  HEART: Regular rate and rhythm; normal S1, S2, No murmurs.  ABDOMEN: Soft, nontender, nondistended; Bowel sounds present  NEUROLOGY: No asterixis or tremor.   SKIN: Intact, no jaundice        Data:                        12.4   3.12  )-----------( 34       ( 2025 06:51 )             35.4     Hgb Trend:  12.4  25 @ 06:51  12.4  25 @ 06:18  13.9  25 @ 08:53            140  |  103  |  6[L]  ----------------------------<  127[H]  4.5   |  21  |  0.9    Ca    8.7      2025 06:51  Phos  2.1       Mg     1.8         TPro  6.2  /  Alb  3.8  /  TBili  4.2[H]  /  DBili  x   /  AST  83[H]  /  ALT  24  /  AlkPhos  171[H]      Liver panel trend:  TBili 4.2   /   AST 83   /   ALT 24   /   AlkP 171   /   Tptn 6.2   /   Alb 3.8    /   DBili --        TBili 4.1   /   AST 80   /   ALT 25   /   AlkP 137   /   Tptn 6.8   /   Alb 4.0    /   DBili --        TBili 3.3   /   AST 85   /   ALT 25   /   AlkP 127   /   Tptn 6.1   /   Alb 3.8    /   DBili 1.5        TBili 2.9   /   AST 78   /   ALT 23   /   AlkP 154   /   Tptn 7.1   /   Alb 4.3    /   DBili --            PT/INR - ( 2025 00:32 )   PT: 19.70 sec;   INR: 1.65 ratio                 Radiology:       Gastroenterology Consultation:      Admitted on: 01-19-25      HPI:  64-year-old man PMH EtOH use disorder, DVT/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 Admitted for alcoholic ketoacidosis. Patient does admit to drinking half a pint to 1 pint of vodka every day. Patient does admit to previous alcohol withdrawals. Hepatology consulted for cirrhosis.             PAST MEDICAL & SURGICAL HISTORY:  Alcohol dependence  HTN (hypertension)  Hard of hearing  Seasonal allergies  BPH (benign prostatic hyperplasia)  GERD (gastroesophageal reflux disease)  Pseudogout  History of deep vein thrombosis (DVT) of lower extremity  No significant past surgical history            FAMILY HISTORY:  Family history of gastric cancer  Mom    Family hx of lung cancer  Smoker          Social History:  alcohol +ve     Home Medications:  cyanocobalamin 1000 mcg oral tablet: 1 tab(s) orally once a day (09 Nov 2024 16:29)  Jardiance 10 mg oral tablet: 1 tab(s) orally once a day (09 Nov 2024 16:29)  metFORMIN 500 mg oral tablet: 1 tab(s) orally 2 times a day (09 Nov 2024 16:29)  methocarbamol 500 mg oral tablet: 1 orally once a day (09 Nov 2024 16:30)  Toprol-XL 25 mg oral tablet, extended release: 1 tab(s) orally once a day (09 Nov 2024 16:30)  Tylenol 325 mg oral tablet: 2 tab(s) orally 2 times a day as needed for  moderate pain (09 Nov 2024 16:29)        MEDICATIONS  (STANDING):  cetirizine 10 milliGRAM(s) Oral daily  chlorhexidine 2% Cloths 1 Application(s) Topical <User Schedule>  colchicine 0.6 milliGRAM(s) Oral two times a day  cyanocobalamin 1000 MICROGram(s) Oral daily  dextrose 5% + lactated ringers. 1000 milliLiter(s) (150 mL/Hr) IV Continuous <Continuous>  enoxaparin Injectable 40 milliGRAM(s) SubCutaneous every 24 hours  folic acid 1 milliGRAM(s) Oral daily  gabapentin 200 milliGRAM(s) Oral two times a day  magnesium oxide 400 milliGRAM(s) Oral three times a day with meals  methocarbamol 500 milliGRAM(s) Oral daily  metoprolol succinate ER 25 milliGRAM(s) Oral daily  multivitamin 1 Tablet(s) Oral daily  naltrexone 50 milliGRAM(s) Oral daily  pantoprazole    Tablet 40 milliGRAM(s) Oral before breakfast  senna 2 Tablet(s) Oral at bedtime  tamsulosin 0.4 milliGRAM(s) Oral at bedtime  thiamine 100 milliGRAM(s) Oral daily    MEDICATIONS  (PRN):  LORazepam   Injectable 2 milliGRAM(s) IV Push every 2 hours PRN Symptom-triggered: 2 point increase in CIWA -Ar score and a total score of 7 or LESS      Allergies  shellfish (Hives)  Beef (Hives)  dairy products (Hives)  No Known Drug Allergies      Review of Systems:   Constitutional:  No Fever, No Chills  ENT/Mouth:  No Hearing Changes,  No Difficulty Swallowing  Eyes:  No Eye Pain, No Vision Changes  Cardiovascular:  No Chest Pain, No Palpitations  Respiratory:  No Cough, No Dyspnea  Gastrointestinal:  As described in HPI  Heme/Lymph:  No Bruising, No Bleeding.          Physical Examination:  T(C): 36.8 (01-21-25 @ 07:55), Max: 36.8 (01-20-25 @ 08:31)  HR: 93 (01-21-25 @ 07:55) (74 - 94)  BP: 120/67 (01-21-25 @ 07:55) (113/69 - 127/66)  RR: 18 (01-21-25 @ 07:55) (18 - 18)  SpO2: 95% (01-21-25 @ 07:55) (95% - 98%)      01-19-25 @ 07:01  -  01-20-25 @ 07:00  --------------------------------------------------------  IN: 0 mL / OUT: 600 mL / NET: -600 mL                Data:                        12.4   3.12  )-----------( 34       ( 21 Jan 2025 06:51 )             35.4     Hgb Trend:  12.4  01-21-25 @ 06:51  12.4  01-20-25 @ 06:18  13.9  01-19-25 @ 08:53        01-21    140  |  103  |  6[L]  ----------------------------<  127[H]  4.5   |  21  |  0.9    Ca    8.7      21 Jan 2025 06:51  Phos  2.1     01-20  Mg     1.8     01-21    TPro  6.2  /  Alb  3.8  /  TBili  4.2[H]  /  DBili  x   /  AST  83[H]  /  ALT  24  /  AlkPhos  171[H]  01-21    Liver panel trend:  TBili 4.2   /   AST 83   /   ALT 24   /   AlkP 171   /   Tptn 6.2   /   Alb 3.8    /   DBili --      01-21  TBili 4.1   /   AST 80   /   ALT 25   /   AlkP 137   /   Tptn 6.8   /   Alb 4.0    /   DBili --      01-20  TBili 3.3   /   AST 85   /   ALT 25   /   AlkP 127   /   Tptn 6.1   /   Alb 3.8    /   DBili 1.5      01-20  TBili 2.9   /   AST 78   /   ALT 23   /   AlkP 154   /   Tptn 7.1   /   Alb 4.3    /   DBili --      01-19      PT/INR - ( 20 Jan 2025 00:32 )   PT: 19.70 sec;   INR: 1.65 ratio                 Radiology:       Gastroenterology Consultation:      Admitted on: 01-19-25      HPI:  64-year-old man PMH EtOH use disorder, DVT/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 Admitted for alcoholic ketoacidosis. Patient does admit to drinking half a pint to 1 pint of vodka every day. Patient does admit to previous alcohol withdrawals. Hepatology consulted for cirrhosis.             PAST MEDICAL & SURGICAL HISTORY:  Alcohol dependence  HTN (hypertension)  Hard of hearing  Seasonal allergies  BPH (benign prostatic hyperplasia)  GERD (gastroesophageal reflux disease)  Pseudogout  History of deep vein thrombosis (DVT) of lower extremity  No significant past surgical history            FAMILY HISTORY:  Family history of gastric cancer  Mom    Family hx of lung cancer  Smoker          Social History:  alcohol +ve     Home Medications:  cyanocobalamin 1000 mcg oral tablet: 1 tab(s) orally once a day (09 Nov 2024 16:29)  Jardiance 10 mg oral tablet: 1 tab(s) orally once a day (09 Nov 2024 16:29)  metFORMIN 500 mg oral tablet: 1 tab(s) orally 2 times a day (09 Nov 2024 16:29)  methocarbamol 500 mg oral tablet: 1 orally once a day (09 Nov 2024 16:30)  Toprol-XL 25 mg oral tablet, extended release: 1 tab(s) orally once a day (09 Nov 2024 16:30)  Tylenol 325 mg oral tablet: 2 tab(s) orally 2 times a day as needed for  moderate pain (09 Nov 2024 16:29)        MEDICATIONS  (STANDING):  cetirizine 10 milliGRAM(s) Oral daily  chlorhexidine 2% Cloths 1 Application(s) Topical <User Schedule>  colchicine 0.6 milliGRAM(s) Oral two times a day  cyanocobalamin 1000 MICROGram(s) Oral daily  dextrose 5% + lactated ringers. 1000 milliLiter(s) (150 mL/Hr) IV Continuous <Continuous>  enoxaparin Injectable 40 milliGRAM(s) SubCutaneous every 24 hours  folic acid 1 milliGRAM(s) Oral daily  gabapentin 200 milliGRAM(s) Oral two times a day  magnesium oxide 400 milliGRAM(s) Oral three times a day with meals  methocarbamol 500 milliGRAM(s) Oral daily  metoprolol succinate ER 25 milliGRAM(s) Oral daily  multivitamin 1 Tablet(s) Oral daily  naltrexone 50 milliGRAM(s) Oral daily  pantoprazole    Tablet 40 milliGRAM(s) Oral before breakfast  senna 2 Tablet(s) Oral at bedtime  tamsulosin 0.4 milliGRAM(s) Oral at bedtime  thiamine 100 milliGRAM(s) Oral daily    MEDICATIONS  (PRN):  LORazepam   Injectable 2 milliGRAM(s) IV Push every 2 hours PRN Symptom-triggered: 2 point increase in CIWA -Ar score and a total score of 7 or LESS      Allergies  shellfish (Hives)  Beef (Hives)  dairy products (Hives)  No Known Drug Allergies      Review of Systems:   Constitutional:  No Fever, No Chills  ENT/Mouth:  No Hearing Changes,  No Difficulty Swallowing  Cardiovascular:  No Chest Pain, No Palpitations  Respiratory:  No Cough, No Dyspnea  Gastrointestinal:  As described in HPI  Heme/Lymph:  No Bruising, No Bleeding.          Physical Examination:  T(C): 36.8 (01-21-25 @ 07:55), Max: 36.8 (01-20-25 @ 08:31)  HR: 93 (01-21-25 @ 07:55) (74 - 94)  BP: 120/67 (01-21-25 @ 07:55) (113/69 - 127/66)  RR: 18 (01-21-25 @ 07:55) (18 - 18)  SpO2: 95% (01-21-25 @ 07:55) (95% - 98%)      01-19-25 @ 07:01  -  01-20-25 @ 07:00  --------------------------------------------------------  IN: 0 mL / OUT: 600 mL / NET: -600 mL        AOX3  Icteric  No asterixis   Abdomen mildly distended, soft         Data:                        12.4   3.12  )-----------( 34       ( 21 Jan 2025 06:51 )             35.4     Hgb Trend:  12.4  01-21-25 @ 06:51  12.4  01-20-25 @ 06:18  13.9  01-19-25 @ 08:53        01-21    140  |  103  |  6[L]  ----------------------------<  127[H]  4.5   |  21  |  0.9    Ca    8.7      21 Jan 2025 06:51  Phos  2.1     01-20  Mg     1.8     01-21    TPro  6.2  /  Alb  3.8  /  TBili  4.2[H]  /  DBili  x   /  AST  83[H]  /  ALT  24  /  AlkPhos  171[H]  01-21    Liver panel trend:  TBili 4.2   /   AST 83   /   ALT 24   /   AlkP 171   /   Tptn 6.2   /   Alb 3.8    /   DBili --      01-21  TBili 4.1   /   AST 80   /   ALT 25   /   AlkP 137   /   Tptn 6.8   /   Alb 4.0    /   DBili --      01-20  TBili 3.3   /   AST 85   /   ALT 25   /   AlkP 127   /   Tptn 6.1   /   Alb 3.8    /   DBili 1.5      01-20  TBili 2.9   /   AST 78   /   ALT 23   /   AlkP 154   /   Tptn 7.1   /   Alb 4.3    /   DBili --      01-19      PT/INR - ( 20 Jan 2025 00:32 )   PT: 19.70 sec;   INR: 1.65 ratio                 Radiology:

## 2025-01-21 NOTE — PROGRESS NOTE ADULT - ASSESSMENT
64-year-old man PMH EtOH use disorder, DVT/PE no longer on Coumadin, BPH, hypertension, DL, DM, gout,  presented to the ED with abdominal pain of 1 day duration.    #Alcohol withdrawal  #Lactic acidosis, likely starvation ketosis  Imaging CTAP:  Steatosis. Mild caudate lobe hypertrophy. Cirrhosis is not   excluded. Atherosclerotic changes. Severe stenosis at the ostium of the   celiac artery which remains patent. The superior mesenteric artery,   bilateral renal arteries and inferior mesenteric artery are widely patent.  Unchanged prominent portacaval and gastrohepatic lymph nodes.  Last drink 1d prior to admission  Lactate trended down to 2.5 from 16  dw critcare  - CIWA protocol with ativan  - zofran prn  - check ecg and correct qtc if needed  - fall and aspiration precautions  - ADP 24-48, endorses having tremors today making it difficulty to walk  - PT Eval requested    #Chronic b/l lower abdominal pain  Possibly related to chronic alcohol use  - GI fu outpatient  - Trial bentyl 20 TID with meals  - PPI BID    #Transaminitis  #Hx of Cirrhosis  - low Na diet  - hepatology fu  - Trend LFTs    #metamyelocytes present in differential as well as prominent portocaval and gastrohepatic LN  - o/p oncology fu    #hx DVT/PE   diagnosed in 2022  No longer on coumadin    DVT PPX, Lovenox    #Progress Note Handoff  Pending (specify): CIWA monitoring  Family discussion: khadar pt regarding plan of care  Disposition: SDU, from home

## 2025-01-21 NOTE — PATIENT PROFILE ADULT - FUNCTIONAL SCREEN CURRENT LEVEL: SWALLOWING (IF SCORE 2 OR MORE FOR ANY ITEM, CONSULT REHAB SERVICES), MLM)
If you are a smoker, it is important for your health to stop smoking. Please be aware that second hand smoke is also harmful.
0 = swallows foods/liquids without difficulty

## 2025-01-21 NOTE — PROGRESS NOTE ADULT - ASSESSMENT
Assessment:     64-year-old man PMH EtOH use disorder, DVT/PE no longer on Coumadin, BPH, hypertension, DL, DM, gout,  presented to the ED with abdominal pain of 1 day duration.    Plan:     #Alcohol withdrawal  #Lactic acidosis (likely starvation ketosis)  - Imaging CTAP:  Steatosis. Mild caudate lobe hypertrophy. Cirrhosis is not excluded. Atherosclerotic changes. Severe stenosis at the ostium of the celiac artery which remains patent. The superior mesenteric artery, bilateral renal arteries and inferior mesenteric artery are widely patent. Unchanged prominent portacaval and gastrohepatic lymph nodes.  - Last drink 1d prior to admission -> endorses drinking 1 pint of vodka/day   - Lactate trended down to 2.5 from 16  - CIWA protocol with ativan  - Zofran prn  - F/U PT Eval     #LLQ Abdominal pain  - GI fu outpatient  - Trial bentyl 20 TID with meals  - PPI BID    #Transaminitis  #Hx of Cirrhosis  - CT A/P: Steatosis. Mild caudate lobe hypertrophy. Cirrhosis is not excluded.  - Hepatology consulted:  ·	US abdomen to check for ascites  ·	Daily MELD labs   ·	Avoid opioids and NSAIDs  ·	Limit Tylenol to 86859 mg per day   ·	2g Na diet       #Hx DVT/PE   - Diagnosed in 2022  - No longer on coumadin    #DVT PPx: Lovenox  #GI PPx: PPI   #Activity: AAT    #Progress Note Handoff: CIWA monitoring.

## 2025-01-21 NOTE — PROGRESS NOTE ADULT - SUBJECTIVE AND OBJECTIVE BOX
Patient is a 64y old  Male who presents with a chief complaint of       Over Night Events:  Abdominal pain improved. Tolerating diet. On room air. Afebrile.       ROS:     All ROS are negative except HPI         PHYSICAL EXAM    ICU Vital Signs Last 24 Hrs  T(C): 37 (21 Jan 2025 12:30), Max: 37 (21 Jan 2025 12:30)  T(F): 98.6 (21 Jan 2025 12:30), Max: 98.6 (21 Jan 2025 12:30)  HR: 84 (21 Jan 2025 12:30) (74 - 94)  BP: 157/78 (21 Jan 2025 12:30) (113/69 - 157/78)  BP(mean): --  ABP: --  ABP(mean): --  RR: 18 (21 Jan 2025 12:30) (18 - 18)  SpO2: 96% (21 Jan 2025 12:30) (95% - 98%)    O2 Parameters below as of 21 Jan 2025 12:30  Patient On (Oxygen Delivery Method): room air            CONSTITUTIONAL:  Well nourished in NAD    ENT:   Airway patent,   Mouth with normal mucosa.     EYES:   Pupils equal,   Round and reactive to light.    CARDIAC:   tachycardic   no edema     RESPIRATORY:   No wheezing  Bilateral BS  Normal chest expansion  Not tachypneic,  No use of accessory muscles    GASTROINTESTINAL:  Abdomen soft, distended   Non-tender,   No guarding,   + BS    MUSCULOSKELETAL:   Range of motion is limited,  No clubbing, cyanosis    NEUROLOGICAL:   Alert and oriented   No motor  deficits.    SKIN:   Skin normal color for race,   Warm and dry       LABS:                            12.4   3.12  )-----------( 34       ( 21 Jan 2025 06:51 )             35.4                                               01-21    140  |  103  |  6[L]  ----------------------------<  127[H]  4.5   |  21  |  0.9    Ca    8.7      21 Jan 2025 06:51  Phos  2.1     01-20  Mg     1.8     01-21    TPro  6.2  /  Alb  3.8  /  TBili  4.2[H]  /  DBili  x   /  AST  83[H]  /  ALT  24  /  AlkPhos  171[H]  01-21      PT/INR - ( 20 Jan 2025 00:32 )   PT: 19.70 sec;   INR: 1.65 ratio                                                Urinalysis Basic - ( 21 Jan 2025 06:51 )    Color: x / Appearance: x / SG: x / pH: x  Gluc: 127 mg/dL / Ketone: x  / Bili: x / Urobili: x   Blood: x / Protein: x / Nitrite: x   Leuk Esterase: x / RBC: x / WBC x   Sq Epi: x / Non Sq Epi: x / Bacteria: x                                                  LIVER FUNCTIONS - ( 21 Jan 2025 06:51 )  Alb: 3.8 g/dL / Pro: 6.2 g/dL / ALK PHOS: 171 U/L / ALT: 24 U/L / AST: 83 U/L / GGT: x                                                                                                                                       MEDICATIONS  (STANDING):  cetirizine 10 milliGRAM(s) Oral daily  chlorhexidine 2% Cloths 1 Application(s) Topical <User Schedule>  colchicine 0.6 milliGRAM(s) Oral two times a day  cyanocobalamin 1000 MICROGram(s) Oral daily  dicyclomine 20 milliGRAM(s) Oral three times a day before meals  folic acid 1 milliGRAM(s) Oral daily  gabapentin 200 milliGRAM(s) Oral two times a day  magnesium oxide 400 milliGRAM(s) Oral three times a day with meals  methocarbamol 500 milliGRAM(s) Oral daily  metoprolol succinate ER 25 milliGRAM(s) Oral daily  multivitamin 1 Tablet(s) Oral daily  naltrexone 50 milliGRAM(s) Oral daily  pantoprazole    Tablet 40 milliGRAM(s) Oral before breakfast  senna 2 Tablet(s) Oral at bedtime  tamsulosin 0.4 milliGRAM(s) Oral at bedtime  thiamine 100 milliGRAM(s) Oral daily    MEDICATIONS  (PRN):  LORazepam   Injectable 2 milliGRAM(s) IV Push every 2 hours PRN Symptom-triggered: 2 point increase in CIWA -Ar score and a total score of 7 or LESS      New X-rays reviewed:                                                                                  ECHO     Patient is a 64y old  Male who presents with a chief complaint of       Over Night Events:  Abdominal pain improved. Tolerating diet. On room air. Afebrile.       ROS:     All ROS are negative except HPI         PHYSICAL EXAM    ICU Vital Signs Last 24 Hrs  T(C): 37 (21 Jan 2025 12:30), Max: 37 (21 Jan 2025 12:30)  T(F): 98.6 (21 Jan 2025 12:30), Max: 98.6 (21 Jan 2025 12:30)  HR: 84 (21 Jan 2025 12:30) (74 - 94)  BP: 157/78 (21 Jan 2025 12:30) (113/69 - 157/78)  BP(mean): --  ABP: --  ABP(mean): --  RR: 18 (21 Jan 2025 12:30) (18 - 18)  SpO2: 96% (21 Jan 2025 12:30) (95% - 98%)    O2 Parameters below as of 21 Jan 2025 12:30  Patient On (Oxygen Delivery Method): room air            CONSTITUTIONAL:  Well nourished in NAD    ENT:   Airway patent,   Mouth with normal mucosa.     EYES:   Pupils equal,   Round and reactive to light.    CARDIAC:   tachycardic   no edema     RESPIRATORY:   No wheezing  Bilateral BS  Normal chest expansion  Not tachypneic,  No use of accessory muscles    GASTROINTESTINAL:  Abdomen soft, distended   Non-tender,   No guarding,   + BS    MUSCULOSKELETAL:   Range of motion is limited,  No clubbing, cyanosis    NEUROLOGICAL:   Alert and oriented   No motor  deficits.    SKIN:   Skin normal color for race,   Warm and dry       LABS:                            12.4   3.12  )-----------( 34       ( 21 Jan 2025 06:51 )             35.4                                                 01-21    140  |  103  |  6[L]  ----------------------------<  127[H]  4.5   |  21  |  0.9    Ca    8.7      21 Jan 2025 06:51  Phos  2.1     01-20  Mg     1.8     01-21    TPro  6.2  /  Alb  3.8  /  TBili  4.2[H]  /  DBili  x   /  AST  83[H]  /  ALT  24  /  AlkPhos  171[H]  01-21      PT/INR - ( 20 Jan 2025 00:32 )   PT: 19.70 sec;   INR: 1.65 ratio                                                Urinalysis Basic - ( 21 Jan 2025 06:51 )    Color: x / Appearance: x / SG: x / pH: x  Gluc: 127 mg/dL / Ketone: x  / Bili: x / Urobili: x   Blood: x / Protein: x / Nitrite: x   Leuk Esterase: x / RBC: x / WBC x   Sq Epi: x / Non Sq Epi: x / Bacteria: x                                                  LIVER FUNCTIONS - ( 21 Jan 2025 06:51 )  Alb: 3.8 g/dL / Pro: 6.2 g/dL / ALK PHOS: 171 U/L / ALT: 24 U/L / AST: 83 U/L / GGT: x                                                                                                                                       MEDICATIONS  (STANDING):  cetirizine 10 milliGRAM(s) Oral daily  chlorhexidine 2% Cloths 1 Application(s) Topical <User Schedule>  colchicine 0.6 milliGRAM(s) Oral two times a day  cyanocobalamin 1000 MICROGram(s) Oral daily  dicyclomine 20 milliGRAM(s) Oral three times a day before meals  folic acid 1 milliGRAM(s) Oral daily  gabapentin 200 milliGRAM(s) Oral two times a day  magnesium oxide 400 milliGRAM(s) Oral three times a day with meals  methocarbamol 500 milliGRAM(s) Oral daily  metoprolol succinate ER 25 milliGRAM(s) Oral daily  multivitamin 1 Tablet(s) Oral daily  naltrexone 50 milliGRAM(s) Oral daily  pantoprazole    Tablet 40 milliGRAM(s) Oral before breakfast  senna 2 Tablet(s) Oral at bedtime  tamsulosin 0.4 milliGRAM(s) Oral at bedtime  thiamine 100 milliGRAM(s) Oral daily    MEDICATIONS  (PRN):  LORazepam   Injectable 2 milliGRAM(s) IV Push every 2 hours PRN Symptom-triggered: 2 point increase in CIWA -Ar score and a total score of 7 or LESS      New X-rays reviewed:                                                                                  ECHO

## 2025-01-21 NOTE — PROGRESS NOTE ADULT - SUBJECTIVE AND OBJECTIVE BOX
CAT ALSTON  64y, Male  Allergy: shellfish (Hives)  Beef (Hives)  dairy products (Hives)  No Known Drug Allergies    Hospital Day: 2d    Patient seen and examined. No acute events overnight    PMH/PSH:  PAST MEDICAL & SURGICAL HISTORY:  Alcohol dependence      HTN (hypertension)      Hard of hearing      Seasonal allergies      BPH (benign prostatic hyperplasia)      GERD (gastroesophageal reflux disease)      Pseudogout      History of deep vein thrombosis (DVT) of lower extremity      No significant past surgical history          VITALS:  T(F): 98.6 (01-21-25 @ 12:30), Max: 98.6 (01-21-25 @ 12:30)  HR: 84 (01-21-25 @ 12:30)  BP: 157/78 (01-21-25 @ 12:30) (113/69 - 157/78)  RR: 18 (01-21-25 @ 12:30)  SpO2: 96% (01-21-25 @ 12:30)    TESTS & MEASUREMENTS:  Weight (Kg):       01-19-25 @ 07:01  -  01-20-25 @ 07:00  --------------------------------------------------------  IN: 0 mL / OUT: 600 mL / NET: -600 mL                            12.4   3.12  )-----------( 34       ( 21 Jan 2025 06:51 )             35.4     PT/INR - ( 20 Jan 2025 00:32 )   PT: 19.70 sec;   INR: 1.65 ratio           01-21    140  |  103  |  6[L]  ----------------------------<  127[H]  4.5   |  21  |  0.9    Ca    8.7      21 Jan 2025 06:51  Phos  2.1     01-20  Mg     1.8     01-21    TPro  6.2  /  Alb  3.8  /  TBili  4.2[H]  /  DBili  x   /  AST  83[H]  /  ALT  24  /  AlkPhos  171[H]  01-21    LIVER FUNCTIONS - ( 21 Jan 2025 06:51 )  Alb: 3.8 g/dL / Pro: 6.2 g/dL / ALK PHOS: 171 U/L / ALT: 24 U/L / AST: 83 U/L / GGT: x                 Urinalysis Basic - ( 21 Jan 2025 06:51 )    Color: x / Appearance: x / SG: x / pH: x  Gluc: 127 mg/dL / Ketone: x  / Bili: x / Urobili: x   Blood: x / Protein: x / Nitrite: x   Leuk Esterase: x / RBC: x / WBC x   Sq Epi: x / Non Sq Epi: x / Bacteria: x        RADIOLOGY & ADDITIONAL TESTS:    RECENT DIAGNOSTIC ORDERS:      MEDICATIONS:  MEDICATIONS  (STANDING):  cetirizine 10 milliGRAM(s) Oral daily  chlorhexidine 2% Cloths 1 Application(s) Topical <User Schedule>  colchicine 0.6 milliGRAM(s) Oral two times a day  cyanocobalamin 1000 MICROGram(s) Oral daily  dextrose 5% + lactated ringers. 1000 milliLiter(s) (150 mL/Hr) IV Continuous <Continuous>  folic acid 1 milliGRAM(s) Oral daily  gabapentin 200 milliGRAM(s) Oral two times a day  magnesium oxide 400 milliGRAM(s) Oral three times a day with meals  methocarbamol 500 milliGRAM(s) Oral daily  metoprolol succinate ER 25 milliGRAM(s) Oral daily  multivitamin 1 Tablet(s) Oral daily  naltrexone 50 milliGRAM(s) Oral daily  pantoprazole    Tablet 40 milliGRAM(s) Oral before breakfast  senna 2 Tablet(s) Oral at bedtime  tamsulosin 0.4 milliGRAM(s) Oral at bedtime  thiamine 100 milliGRAM(s) Oral daily    MEDICATIONS  (PRN):  LORazepam   Injectable 2 milliGRAM(s) IV Push every 2 hours PRN Symptom-triggered: 2 point increase in CIWA -Ar score and a total score of 7 or LESS      HOME MEDICATIONS:  cyanocobalamin 1000 mcg oral tablet (11-09)  Jardiance 10 mg oral tablet (11-09)  metFORMIN 500 mg oral tablet (11-09)  methocarbamol 500 mg oral tablet (11-09)  Toprol-XL 25 mg oral tablet, extended release (11-09)  Tylenol 325 mg oral tablet (11-09)      REVIEW OF SYSTEMS:  All other review of systems is negative unless indicated above.     PHYSICAL EXAM:  PHYSICAL EXAM:  GENERAL: NAD  HEAD:  Atraumatic, Normocephalic  NECK: Supple, No JVD  CHEST/LUNG: Clear to auscultation bilaterally; No wheeze  HEART: Regular rate and rhythm; No murmurs, rubs, or gallops  ABDOMEN: Soft, Nontender, Nondistended; Bowel sounds present  EXTREMITIES:  2+ Peripheral Pulses, No clubbing, cyanosis, or edema  SKIN: No rashes or lesions

## 2025-01-22 ENCOUNTER — TRANSCRIPTION ENCOUNTER (OUTPATIENT)
Age: 65
End: 2025-01-22

## 2025-01-22 LAB
ALBUMIN SERPL ELPH-MCNC: 3.9 G/DL — SIGNIFICANT CHANGE UP (ref 3.5–5.2)
ALP SERPL-CCNC: 196 U/L — HIGH (ref 30–115)
ALT FLD-CCNC: 27 U/L — SIGNIFICANT CHANGE UP (ref 0–41)
ANION GAP SERPL CALC-SCNC: 11 MMOL/L — SIGNIFICANT CHANGE UP (ref 7–14)
APTT BLD: 44.4 SEC — HIGH (ref 27–39.2)
AST SERPL-CCNC: 78 U/L — HIGH (ref 0–41)
BASOPHILS # BLD AUTO: 0.02 K/UL — SIGNIFICANT CHANGE UP (ref 0–0.2)
BASOPHILS # BLD AUTO: 0.02 K/UL — SIGNIFICANT CHANGE UP (ref 0–0.2)
BASOPHILS NFR BLD AUTO: 0.5 % — SIGNIFICANT CHANGE UP (ref 0–1)
BASOPHILS NFR BLD AUTO: 0.5 % — SIGNIFICANT CHANGE UP (ref 0–1)
BILIRUB SERPL-MCNC: 3.8 MG/DL — HIGH (ref 0.2–1.2)
BUN SERPL-MCNC: 4 MG/DL — LOW (ref 10–20)
CALCIUM SERPL-MCNC: 9.1 MG/DL — SIGNIFICANT CHANGE UP (ref 8.4–10.5)
CHLORIDE SERPL-SCNC: 104 MMOL/L — SIGNIFICANT CHANGE UP (ref 98–110)
CO2 SERPL-SCNC: 24 MMOL/L — SIGNIFICANT CHANGE UP (ref 17–32)
CREAT SERPL-MCNC: 0.9 MG/DL — SIGNIFICANT CHANGE UP (ref 0.7–1.5)
D DIMER BLD IA.RAPID-MCNC: 1220 NG/ML DDU — HIGH
EGFR: 95 ML/MIN/1.73M2 — SIGNIFICANT CHANGE UP
EOSINOPHIL # BLD AUTO: 0.02 K/UL — SIGNIFICANT CHANGE UP (ref 0–0.7)
EOSINOPHIL # BLD AUTO: 0.08 K/UL — SIGNIFICANT CHANGE UP (ref 0–0.7)
EOSINOPHIL NFR BLD AUTO: 0.5 % — SIGNIFICANT CHANGE UP (ref 0–8)
EOSINOPHIL NFR BLD AUTO: 2.1 % — SIGNIFICANT CHANGE UP (ref 0–8)
FIBRINOGEN PPP-MCNC: 226 MG/DL — SIGNIFICANT CHANGE UP (ref 200–435)
GLUCOSE SERPL-MCNC: 134 MG/DL — HIGH (ref 70–99)
HCT VFR BLD CALC: 33.1 % — LOW (ref 42–52)
HCT VFR BLD CALC: 34.1 % — LOW (ref 42–52)
HGB BLD-MCNC: 11.7 G/DL — LOW (ref 14–18)
HGB BLD-MCNC: 11.9 G/DL — LOW (ref 14–18)
IMM GRANULOCYTES NFR BLD AUTO: 0.3 % — SIGNIFICANT CHANGE UP (ref 0.1–0.3)
IMM GRANULOCYTES NFR BLD AUTO: 0.3 % — SIGNIFICANT CHANGE UP (ref 0.1–0.3)
INR BLD: 1.65 RATIO — HIGH (ref 0.65–1.3)
LIDOCAIN IGE QN: 52 U/L — SIGNIFICANT CHANGE UP (ref 7–60)
LYMPHOCYTES # BLD AUTO: 0.57 K/UL — LOW (ref 1.2–3.4)
LYMPHOCYTES # BLD AUTO: 0.85 K/UL — LOW (ref 1.2–3.4)
LYMPHOCYTES # BLD AUTO: 15.7 % — LOW (ref 20.5–51.1)
LYMPHOCYTES # BLD AUTO: 22.1 % — SIGNIFICANT CHANGE UP (ref 20.5–51.1)
MAGNESIUM SERPL-MCNC: 1.6 MG/DL — LOW (ref 1.8–2.4)
MCHC RBC-ENTMCNC: 34.9 G/DL — SIGNIFICANT CHANGE UP (ref 32–37)
MCHC RBC-ENTMCNC: 35.3 G/DL — SIGNIFICANT CHANGE UP (ref 32–37)
MCHC RBC-ENTMCNC: 37.4 PG — HIGH (ref 27–31)
MCHC RBC-ENTMCNC: 37.6 PG — HIGH (ref 27–31)
MCV RBC AUTO: 106.4 FL — HIGH (ref 80–94)
MCV RBC AUTO: 107.2 FL — HIGH (ref 80–94)
MONOCYTES # BLD AUTO: 0.22 K/UL — SIGNIFICANT CHANGE UP (ref 0.1–0.6)
MONOCYTES # BLD AUTO: 0.24 K/UL — SIGNIFICANT CHANGE UP (ref 0.1–0.6)
MONOCYTES NFR BLD AUTO: 6 % — SIGNIFICANT CHANGE UP (ref 1.7–9.3)
MONOCYTES NFR BLD AUTO: 6.2 % — SIGNIFICANT CHANGE UP (ref 1.7–9.3)
NEUTROPHILS # BLD AUTO: 2.65 K/UL — SIGNIFICANT CHANGE UP (ref 1.4–6.5)
NEUTROPHILS # BLD AUTO: 2.8 K/UL — SIGNIFICANT CHANGE UP (ref 1.4–6.5)
NEUTROPHILS NFR BLD AUTO: 68.8 % — SIGNIFICANT CHANGE UP (ref 42.2–75.2)
NEUTROPHILS NFR BLD AUTO: 77 % — HIGH (ref 42.2–75.2)
NRBC # BLD: 0 /100 WBCS — SIGNIFICANT CHANGE UP (ref 0–0)
NRBC # BLD: 0 /100 WBCS — SIGNIFICANT CHANGE UP (ref 0–0)
NRBC BLD-RTO: 0 /100 WBCS — SIGNIFICANT CHANGE UP (ref 0–0)
NRBC BLD-RTO: 0 /100 WBCS — SIGNIFICANT CHANGE UP (ref 0–0)
PLATELET # BLD AUTO: 31 K/UL — LOW (ref 130–400)
PLATELET # BLD AUTO: 35 K/UL — LOW (ref 130–400)
PMV BLD: 11.4 FL — HIGH (ref 7.4–10.4)
PMV BLD: 11.5 FL — HIGH (ref 7.4–10.4)
POTASSIUM SERPL-MCNC: 3.8 MMOL/L — SIGNIFICANT CHANGE UP (ref 3.5–5)
POTASSIUM SERPL-SCNC: 3.8 MMOL/L — SIGNIFICANT CHANGE UP (ref 3.5–5)
PROT SERPL-MCNC: 6.5 G/DL — SIGNIFICANT CHANGE UP (ref 6–8)
PROTHROM AB SERPL-ACNC: 19.7 SEC — HIGH (ref 9.95–12.87)
RBC # BLD: 3.11 M/UL — LOW (ref 4.7–6.1)
RBC # BLD: 3.18 M/UL — LOW (ref 4.7–6.1)
RBC # FLD: 15.5 % — HIGH (ref 11.5–14.5)
RBC # FLD: 15.6 % — HIGH (ref 11.5–14.5)
SODIUM SERPL-SCNC: 139 MMOL/L — SIGNIFICANT CHANGE UP (ref 135–146)
WBC # BLD: 3.64 K/UL — LOW (ref 4.8–10.8)
WBC # BLD: 3.85 K/UL — LOW (ref 4.8–10.8)
WBC # FLD AUTO: 3.64 K/UL — LOW (ref 4.8–10.8)
WBC # FLD AUTO: 3.85 K/UL — LOW (ref 4.8–10.8)

## 2025-01-22 PROCEDURE — 99233 SBSQ HOSP IP/OBS HIGH 50: CPT

## 2025-01-22 PROCEDURE — 99232 SBSQ HOSP IP/OBS MODERATE 35: CPT

## 2025-01-22 PROCEDURE — 99222 1ST HOSP IP/OBS MODERATE 55: CPT

## 2025-01-22 PROCEDURE — 76705 ECHO EXAM OF ABDOMEN: CPT | Mod: 26

## 2025-01-22 RX ORDER — MAGNESIUM SULFATE 0.8 MEQ/ML
1 AMPUL (ML) INJECTION ONCE
Refills: 0 | Status: COMPLETED | OUTPATIENT
Start: 2025-01-22 | End: 2025-01-22

## 2025-01-22 RX ADMIN — Medication 20 MILLIGRAM(S): at 08:08

## 2025-01-22 RX ADMIN — Medication 400 MILLIGRAM(S): at 08:08

## 2025-01-22 RX ADMIN — COLCHICINE 0.6 MILLIGRAM(S): 0.6 TABLET ORAL at 05:19

## 2025-01-22 RX ADMIN — GABAPENTIN 200 MILLIGRAM(S): 800 TABLET ORAL at 05:19

## 2025-01-22 RX ADMIN — Medication 10 MILLIGRAM(S): at 11:15

## 2025-01-22 RX ADMIN — Medication 50 MILLIGRAM(S): at 12:27

## 2025-01-22 RX ADMIN — Medication 20 MILLIGRAM(S): at 11:23

## 2025-01-22 RX ADMIN — Medication 500 MILLIGRAM(S): at 11:14

## 2025-01-22 RX ADMIN — ANTISEPTIC SURGICAL SCRUB 1 APPLICATION(S): 0.04 SOLUTION TOPICAL at 05:19

## 2025-01-22 RX ADMIN — COLCHICINE 0.6 MILLIGRAM(S): 0.6 TABLET ORAL at 17:32

## 2025-01-22 RX ADMIN — Medication 2 TABLET(S): at 21:24

## 2025-01-22 RX ADMIN — Medication 400 MILLIGRAM(S): at 14:07

## 2025-01-22 RX ADMIN — Medication 1 TABLET(S): at 11:15

## 2025-01-22 RX ADMIN — GABAPENTIN 200 MILLIGRAM(S): 800 TABLET ORAL at 17:31

## 2025-01-22 RX ADMIN — Medication 400 MILLIGRAM(S): at 17:32

## 2025-01-22 RX ADMIN — TAMSULOSIN HYDROCHLORIDE 0.4 MILLIGRAM(S): 0.4 CAPSULE ORAL at 21:23

## 2025-01-22 RX ADMIN — Medication 100 MILLIGRAM(S): at 11:25

## 2025-01-22 RX ADMIN — Medication 1000 MICROGRAM(S): at 11:15

## 2025-01-22 RX ADMIN — Medication 1 MILLIGRAM(S): at 11:15

## 2025-01-22 RX ADMIN — Medication 25 MILLIGRAM(S): at 05:18

## 2025-01-22 RX ADMIN — Medication 20 MILLIGRAM(S): at 17:32

## 2025-01-22 RX ADMIN — Medication 80 MILLIGRAM(S): at 00:34

## 2025-01-22 RX ADMIN — Medication 80 MILLIGRAM(S): at 14:06

## 2025-01-22 RX ADMIN — PANTOPRAZOLE 40 MILLIGRAM(S): 20 TABLET, DELAYED RELEASE ORAL at 05:19

## 2025-01-22 RX ADMIN — Medication 100 GRAM(S): at 12:57

## 2025-01-22 NOTE — PROGRESS NOTE ADULT - ASSESSMENT
64-year-old man PMH EtOH use disorder, DVT/PE no longer on Coumadin, BPH, hypertension, DL, DM, gout,  presented to the ED with abdominal pain of 1 day duration.    #Alcohol withdrawal  #Lactic acidosis, likely starvation ketosis-resolving   Imaging CTAP:  Steatosis. Mild caudate lobe hypertrophy. Cirrhosis is not   excluded. Atherosclerotic changes. Severe stenosis at the ostium of the   celiac artery which remains patent. The superior mesenteric artery,   bilateral renal arteries and inferior mesenteric artery are widely patent.  Unchanged prominent portacaval and gastrohepatic lymph nodes.  Last drink 1d prior to admission  Lactate trended down to 2.5 from 16  dw critcare  - CIWA protocol with ativan-still used a prn last night   - zofran prn  - fall and aspiration precautions  - PT Eval>>no PT needs     #thrombocytopenia  -likely from ETOH use though now acutely lower 30, trend, heme/onc eval   -DIC panel    # abdominal pain  Possibly related to chronic alcohol use  - GI fu outpatient  - Trial bentyl 20 TID with meals  - PPI BID  -recheck lipase   -check RUQ US     #Transaminitis  #Hx of Cirrhosis  - low Na diet  - hepatology fu>>check ABd US   - Trend LFTs    #metamyelocytes present in differential as well as prominent portocaval and gastrohepatic LN  - o/p oncology fu    #hx DVT/PE   diagnosed in 2022  No longer on coumadin    DVT PPX, Lovenox    PEnding: CIWA, f/u lipase/ABd US, heme/onc eval, trend plt

## 2025-01-22 NOTE — PHYSICAL THERAPY INITIAL EVALUATION ADULT - PLANNED THERAPY INTERVENTIONS, PT EVAL
No PT intervention at this time. Patient indepednent in bed mobility and transfer, supervision in ambulation for safety. Contact PT if status changes.

## 2025-01-22 NOTE — DISCHARGE NOTE NURSING/CASE MANAGEMENT/SOCIAL WORK - FINANCIAL ASSISTANCE
[facility] provides services at a reduced cost to those who are determined to be eligible through [facility]’s financial assistance program. Information regarding [facility]’s financial assistance program can be found by going to [email or web address] or by calling [phone].

## 2025-01-22 NOTE — PROGRESS NOTE ADULT - SUBJECTIVE AND OBJECTIVE BOX
Gastroenterology progress note:       Admitted on: 01-19-25    We are following the patient for: cirrhosis        Interval History:    No acute events overnight. Received one dose of ativan for a CIWA of 5.         PAST MEDICAL & SURGICAL HISTORY:  Alcohol dependence  HTN (hypertension)  Hard of hearing  BPH (benign prostatic hyperplasia)  GERD (gastroesophageal reflux disease)  Pseudogout  History of deep vein thrombosis (DVT) of lower extremity  No significant past surgical history          MEDICATIONS  (STANDING):  cetirizine 10 milliGRAM(s) Oral daily  chlorhexidine 2% Cloths 1 Application(s) Topical <User Schedule>  colchicine 0.6 milliGRAM(s) Oral two times a day  cyanocobalamin 1000 MICROGram(s) Oral daily  dicyclomine 20 milliGRAM(s) Oral three times a day before meals  folic acid 1 milliGRAM(s) Oral daily  gabapentin 200 milliGRAM(s) Oral two times a day  influenza  Vaccine (HIGH DOSE) 0.5 milliLiter(s) IntraMuscular once  magnesium oxide 400 milliGRAM(s) Oral three times a day with meals  methocarbamol 500 milliGRAM(s) Oral daily  metoprolol succinate ER 25 milliGRAM(s) Oral daily  multivitamin 1 Tablet(s) Oral daily  naltrexone 50 milliGRAM(s) Oral daily  pantoprazole    Tablet 40 milliGRAM(s) Oral before breakfast  senna 2 Tablet(s) Oral at bedtime  tamsulosin 0.4 milliGRAM(s) Oral at bedtime  thiamine 100 milliGRAM(s) Oral daily    MEDICATIONS  (PRN):  LORazepam   Injectable 2 milliGRAM(s) IV Push every 2 hours PRN Symptom-triggered: 2 point increase in CIWA -Ar score and a total score of 7 or LESS  simethicone 80 milliGRAM(s) Chew every 6 hours PRN Indigestion      Allergies  shellfish (Hives)  Beef (Hives)  dairy products (Hives)  No Known Drug Allergies      Review of Systems:   Cardiovascular:  No Chest Pain, No Palpitations  Respiratory:  No Cough, No Dyspnea  Gastrointestinal:  As described in HPI  Neuro:  No Syncope, No Dizziness    Physical Examination:  T(C): 37.3 (01-22-25 @ 07:00), Max: 37.3 (01-22-25 @ 07:00)  HR: 92 (01-22-25 @ 07:00) (92 - 97)  BP: 145/73 (01-22-25 @ 07:00) (136/77 - 169/76)  RR: 19 (01-22-25 @ 07:00) (17 - 19)  SpO2: 96% (01-22-25 @ 07:00) (96% - 96%)        GENERAL: AAOx3, no acute distress.  icteric   NECK: Supple, no JVD or thyromegaly  CHEST/LUNG: Clear to auscultation bilaterally; No wheeze, rhonchi, or rales  HEART: Regular rate and rhythm; normal S1, S2, No murmurs.  ABDOMEN: Soft, nontender, nondistended; Bowel sounds present       Data:                        11.7   3.85  )-----------( 35       ( 22 Jan 2025 11:05 )             33.1     Hgb trend:  11.7  01-22-25 @ 11:05  11.9  01-22-25 @ 05:27  12.4  01-21-25 @ 06:51  12.4  01-20-25 @ 06:18        01-22    139  |  104  |  4[L]  ----------------------------<  134[H]  3.8   |  24  |  0.9    Ca    9.1      22 Jan 2025 05:27  Mg     1.6     01-22    TPro  6.5  /  Alb  3.9  /  TBili  3.8[H]  /  DBili  x   /  AST  78[H]  /  ALT  27  /  AlkPhos  196[H]  01-22    Liver panel trend:  TBili 3.8   /   AST 78   /   ALT 27   /   AlkP 196   /   Tptn 6.5   /   Alb 3.9    /   DBili --      01-22  TBili 4.2   /   AST 83   /   ALT 24   /   AlkP 171   /   Tptn 6.2   /   Alb 3.8    /   DBili --      01-21  TBili 4.1   /   AST 80   /   ALT 25   /   AlkP 137   /   Tptn 6.8   /   Alb 4.0    /   DBili --      01-20  TBili 3.3   /   AST 85   /   ALT 25   /   AlkP 127   /   Tptn 6.1   /   Alb 3.8    /   DBili 1.5      01-20  TBili 2.9   /   AST 78   /   ALT 23   /   AlkP 154   /   Tptn 7.1   /   Alb 4.3    /   DBili --      01-19      PT/INR - ( 22 Jan 2025 11:05 )   PT: 19.70 sec;   INR: 1.65 ratio         PTT - ( 22 Jan 2025 11:05 )  PTT:44.4 sec       Radiology:    US Abdomen Upper Quadrant Right:   ACC: [id number] EXAM:  US ABDOMEN RT UPR QUADRANT   ORDERED BY: DOLORES EALSEY     PROCEDURE DATE:  01/22/2025          INTERPRETATION:  CLINICAL INFORMATION: Evaluate for acute abdomen    COMPARISON: CAT scan abdomen pelvis from January 19, 2025    TECHNIQUE: Sonography of the right upper quadrant.    FINDINGS:  Liver: Heterogeneously echogenic liver which may reflect hepatic   steatosis. Liver measures 16.1 cm in length.  Bile ducts: Not well-visualized  Gallbladder: Stones within the gallbladder. Sludge suggested within the   gallbladder.  Pancreas: Poorly visualized.  Right kidney: 9.7 cm in length. No hydronephrosis.  Ascites: None.  IVC: Visualized portions are within normal limits.    IMPRESSION:  Limited exam. Heterogeneously echogenic liver which may reflect hepatic   steatosis reflect underlying hepatocellular disease. Cholelithiasis.        --- End of Report ---            KAVYA BOWER MD; Attending Radiologist  This document has been electronically signed. Jan 22 2025 10:10AM (01-22-25 @ 09:57)

## 2025-01-22 NOTE — PHYSICAL THERAPY INITIAL EVALUATION ADULT - ADDITIONAL COMMENTS
Patient lives in [facility]. Was independent in ADLs and ambulation using rollator.

## 2025-01-22 NOTE — PHYSICAL THERAPY INITIAL EVALUATION ADULT - PHYSICAL ASSIST/NONPHYSICAL ASSIST: STAND/SIT, REHAB EVAL
Patient Returning Call    Reason for call:  [facility] is calling for transportation for patient, patient missed his pickup time and is wanting to have someone callback to verify patient is ready for pickup     Information relayed to patient:  te sent to clinic     Patient has additional questions:  No      Okay to leave a detailed message?: Yes at Cell number on file:    Telephone Information:     [phone] Maude         supervision

## 2025-01-22 NOTE — CONSULT NOTE ADULT - SUBJECTIVE AND OBJECTIVE BOX
Hematology Consult Note    HPI:  HISTORY OF PRESENT ILLNESS & PAST MEDICAL HISTORY  64-year-old man PMH EtOH use disorder, DVT/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     Patient states that he woke up this morning and felt queasy in his stomach.  Patient also reports palpitations as well as two episodes of vomiting.  Reports associated numbness and tingling in his fingertips.  No chest pain or difficulty breathing.  No leg pain or swelling.  No recent fevers.  Patient does admit to drinking half a pint to 1 pint of vodka every day, last drink was yesterday afternoon.  Patient does admit to previous alcohol withdrawals, states that this feels similar.  No other concerns.    It is worth noting the patient presented with a similar picture 2/2 alcoholic ketoacidosis in November.    VITALS & PHYSICAL EXAMINATION  Vitals in the ED  T(F): 98.1, Max: 98.1 (07:34)  HR: 92 (92 - 113)  BP: 130/70 (125/68 - 149/76)  RR: 18 (18 - 18)  SpO2: 98% (93% - 98%)    GEN: NAD  HEENT: NAD  RESP: CTAB  CARD: S1 S2 RRR tachycardic   ABD: soft nondistended. TTP mild lower quadrants   EXT: normal ROM no NANCI  NEURO: AOx4  PSYCH: cooperative appropriate     LABS                           13.9   5.41  )-----------( 109    .6                40.5       139  |  96  |  6   ----------------------------( 158      A  4.4   |  10  |  1.2    Ca: 8.7   Phos: x    Mg: x     Protein, Total: x  / Albumin: x  /  T. Bili x  / D. Bili x  /  AST 78 /  ALT 23  /       VBG pH: 7.18  /  pCO2: 37    /  pO2: 42    / HCO3: 14    /  Lactate: 13.7     VBG pH: 7.14  /  pCO2: 38    /  pO2: 47    / HCO3: 13    /  Lactate: >16.    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 but patent, stable finding from November    (2025 19:12)      Allergies    shellfish (Hives)  Beef (Hives)  dairy products (Hives)  No Known Drug Allergies    Intolerances        MEDICATIONS  (STANDING):  cetirizine 10 milliGRAM(s) Oral daily  chlorhexidine 2% Cloths 1 Application(s) Topical <User Schedule>  colchicine 0.6 milliGRAM(s) Oral two times a day  cyanocobalamin 1000 MICROGram(s) Oral daily  dicyclomine 20 milliGRAM(s) Oral three times a day before meals  folic acid 1 milliGRAM(s) Oral daily  gabapentin 200 milliGRAM(s) Oral two times a day  influenza  Vaccine (HIGH DOSE) 0.5 milliLiter(s) IntraMuscular once  magnesium oxide 400 milliGRAM(s) Oral three times a day with meals  magnesium sulfate  IVPB 1 Gram(s) IV Intermittent once  methocarbamol 500 milliGRAM(s) Oral daily  metoprolol succinate ER 25 milliGRAM(s) Oral daily  multivitamin 1 Tablet(s) Oral daily  naltrexone 50 milliGRAM(s) Oral daily  pantoprazole    Tablet 40 milliGRAM(s) Oral before breakfast  senna 2 Tablet(s) Oral at bedtime  tamsulosin 0.4 milliGRAM(s) Oral at bedtime  thiamine 100 milliGRAM(s) Oral daily    MEDICATIONS  (PRN):  LORazepam   Injectable 2 milliGRAM(s) IV Push every 2 hours PRN Symptom-triggered: 2 point increase in CIWA -Ar score and a total score of 7 or LESS  simethicone 80 milliGRAM(s) Chew every 6 hours PRN Indigestion      PAST MEDICAL & SURGICAL HISTORY:  Alcohol dependence      HTN (hypertension)      Hard of hearing      Seasonal allergies      BPH (benign prostatic hyperplasia)      GERD (gastroesophageal reflux disease)      Pseudogout      History of deep vein thrombosis (DVT) of lower extremity      No significant past surgical history          FAMILY HISTORY:  Family history of gastric cancer  Mom    Family hx of lung cancer  Smoker          SOCIAL HISTORY: No EtOH, no tobacco    REVIEW OF SYSTEMS:    CONSTITUTIONAL: No weakness, fevers or chills  EYES/ENT: No visual changes;  No vertigo or throat pain   NECK: No pain or stiffness  RESPIRATORY: No cough, wheezing, hemoptysis; No shortness of breath  CARDIOVASCULAR: No chest pain or palpitations  GASTROINTESTINAL: No abdominal or epigastric pain. No nausea, vomiting, or hematemesis; No diarrhea or constipation. No melena or hematochezia.  GENITOURINARY: No dysuria, frequency or hematuria  NEUROLOGICAL: No numbness or weakness  SKIN: No itching, burning, rashes, or lesions   All other review of systems is negative unless indicated above.        T(F): 99.1 (25 @ 07:00), Max: 99.1 (25 @ 07:00)  HR: 92 (25 @ 07:00)  BP: 145/73 (25 @ 07:00)  RR: 19 (25 @ 07:00)  SpO2: 96% (25 @ 07:00)  Wt(kg): --    GENERAL: NAD, well-developed  HEAD:  Atraumatic, Normocephalic  EYES: EOMI, PERRLA, conjunctiva and sclera clear  NECK: Supple, No JVD  CHEST/LUNG: Clear to auscultation bilaterally; No wheeze  HEART: Regular rate and rhythm; No murmurs, rubs, or gallops  ABDOMEN: Soft, Nontender, Nondistended; Bowel sounds present  EXTREMITIES:  2+ Peripheral Pulses, No clubbing, cyanosis, or edema  NEUROLOGY: non-focal  SKIN: No rashes or lesions                          11.9   3.64  )-----------( 31       ( 2025 05:27 )             34.1           139  |  104  |  4[L]  ----------------------------<  134[H]  3.8   |  24  |  0.9    Ca    9.1      2025 05:27  Mg     1.6         TPro  6.5  /  Alb  3.9  /  TBili  3.8[H]  /  DBili  x   /  AST  78[H]  /  ALT  27  /  AlkPhos  196[H]        Magnesium: 1.6 mg/dL ( @ 05:27)       Hematology Consult Note    HPI:  HISTORY OF PRESENT ILLNESS & PAST MEDICAL HISTORY  64-year-old man PMH EtOH use disorder, DVT/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     Patient states that he woke up this morning and felt queasy in his stomach.  Patient also reports palpitations as well as two episodes of vomiting.  Reports associated numbness and tingling in his fingertips.  No chest pain or difficulty breathing.  No leg pain or swelling.  No recent fevers.  Patient does admit to drinking half a pint to 1 pint of vodka every day, last drink was yesterday afternoon.  Patient does admit to previous alcohol withdrawals, states that this feels similar.  No other concerns.    It is worth noting the patient presented with a similar picture 2/2 alcoholic ketoacidosis in November.      LABS                           13.9   5.41  )-----------( 109    .6                40.5       139  |  96  |  6   ----------------------------( 158      A  4.4   |  10  |  1.2    Ca: 8.7   Phos: x    Mg: x     Protein, Total: x  / Albumin: x  /  T. Bili x  / D. Bili x  /  AST 78 /  ALT 23  /       VBG pH: 7.18  /  pCO2: 37    /  pO2: 42    / HCO3: 14    /  Lactate: 13.7     VBG pH: 7.14  /  pCO2: 38    /  pO2: 47    / HCO3: 13    /  Lactate: >16.    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 but patent, stable finding from November    (2025 19:12)      Allergies    shellfish (Hives)  Beef (Hives)  dairy products (Hives)  No Known Drug Allergies    Intolerances        MEDICATIONS  (STANDING):  cetirizine 10 milliGRAM(s) Oral daily  chlorhexidine 2% Cloths 1 Application(s) Topical <User Schedule>  colchicine 0.6 milliGRAM(s) Oral two times a day  cyanocobalamin 1000 MICROGram(s) Oral daily  dicyclomine 20 milliGRAM(s) Oral three times a day before meals  folic acid 1 milliGRAM(s) Oral daily  gabapentin 200 milliGRAM(s) Oral two times a day  influenza  Vaccine (HIGH DOSE) 0.5 milliLiter(s) IntraMuscular once  magnesium oxide 400 milliGRAM(s) Oral three times a day with meals  magnesium sulfate  IVPB 1 Gram(s) IV Intermittent once  methocarbamol 500 milliGRAM(s) Oral daily  metoprolol succinate ER 25 milliGRAM(s) Oral daily  multivitamin 1 Tablet(s) Oral daily  naltrexone 50 milliGRAM(s) Oral daily  pantoprazole    Tablet 40 milliGRAM(s) Oral before breakfast  senna 2 Tablet(s) Oral at bedtime  tamsulosin 0.4 milliGRAM(s) Oral at bedtime  thiamine 100 milliGRAM(s) Oral daily    MEDICATIONS  (PRN):  LORazepam   Injectable 2 milliGRAM(s) IV Push every 2 hours PRN Symptom-triggered: 2 point increase in CIWA -Ar score and a total score of 7 or LESS  simethicone 80 milliGRAM(s) Chew every 6 hours PRN Indigestion      PAST MEDICAL & SURGICAL HISTORY:  Alcohol dependence      HTN (hypertension)      Hard of hearing      Seasonal allergies      BPH (benign prostatic hyperplasia)      GERD (gastroesophageal reflux disease)      Pseudogout      History of deep vein thrombosis (DVT) of lower extremity      No significant past surgical history          FAMILY HISTORY:  Family history of gastric cancer  Mom    Family hx of lung cancer  Smoker          SOCIAL HISTORY: No EtOH, no tobacco    REVIEW OF SYSTEMS:  Denies any abdominal pain today        T(F): 99.1 (25 @ 07:00), Max: 99.1 (25 @ 07:00)  HR: 92 (25 @ 07:00)  BP: 145/73 (25 @ 07:00)  RR: 19 (25 @ 07:00)  SpO2: 96% (25 @ 07:00)  Wt(kg): --    GENERAL: NAD, well-developed  CHEST/LUNG: Clear to auscultation bilaterally; No wheeze  HEART: Regular rate and rhythm; No murmurs, rubs, or gallops  ABDOMEN: Soft, Nontender, Nondistended; Bowel sounds present  EXTREMITIES:  2+ Peripheral Pulses, No clubbing, cyanosis, or edema  NEUROLOGY: non-focal  SKIN: No rashes or lesions                          11.9   3.64  )-----------( 31       ( 2025 05:27 )             34.1           139  |  104  |  4[L]  ----------------------------<  134[H]  3.8   |  24  |  0.9    Ca    9.1      2025 05:27  Mg     1.6         TPro  6.5  /  Alb  3.9  /  TBili  3.8[H]  /  DBili  x   /  AST  78[H]  /  ALT  27  /  AlkPhos  196[H]        Magnesium: 1.6 mg/dL ( @ 05:27)

## 2025-01-22 NOTE — PHYSICAL THERAPY INITIAL EVALUATION ADULT - PERTINENT HX OF CURRENT PROBLEM, REHAB EVAL
64-year-old man PMH EtOH use disorder, DVT/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     Patient states that he woke up this morning and felt queasy in his stomach.  Patient also reports palpitations as well as two episodes of vomiting.  Reports associated numbness and tingling in his fingertips.  No chest pain or difficulty breathing.  No leg pain or swelling.  No recent fevers.  Patient does admit to drinking half a pint to 1 pint of vodka every day, last drink was yesterday afternoon.  Patient does admit to previous alcohol withdrawals, states that this feels similar.  No other concerns.

## 2025-01-22 NOTE — CONSULT NOTE ADULT - ASSESSMENT
64-year-old man PMH EtOH use disorder, DVT/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     Patient states that he woke up this morning and felt queasy in his stomach.  Patient also reports palpitations as well as two episodes of vomiting.  Reports associated numbness and tingling in his fingertips.  No chest pain or difficulty breathing.  No leg pain or swelling.  No recent fevers.  Patient does admit to drinking half a pint to 1 pint of vodka every day, last drink was yesterday afternoon.  Patient does admit to previous alcohol withdrawals, states that this feels similar. Heme onc consulted for thrombocytopenia:    # Thrombocytopenia:    In November 2024 platelet was around 60-70  This admission he came in with 109 on D1 and was 49 on D2.  Hb around baseline  Check coag panel, fibrinogen, D dimer, Retics, Hapto, Iron panel, ferritin, Vitamin B12, folate.  HIT antibody is pending.  Thrombocytopenia is likely d/t bone marrow suppression from alcohol use/cirrhosis of liver    # History of DVT/PE  Not on coumadin? anymore  Doppler shows chronic DVT of popliteal   64-year-old man PMH EtOH use disorder, DVT/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     Patient states that he woke up this morning and felt queasy in his stomach.  Patient also reports palpitations as well as two episodes of vomiting.  Reports associated numbness and tingling in his fingertips.  No chest pain or difficulty breathing.  No leg pain or swelling.  No recent fevers.  Patient does admit to drinking half a pint to 1 pint of vodka every day, last drink was yesterday afternoon.  Patient does admit to previous alcohol withdrawals, states that this feels similar. Heme onc consulted for thrombocytopenia:    # Thrombocytopenia:  #Leukopenia  # Cirrhosis of liver  # Active alcohol use  In November 2024 platelet was around 60-70  This admission he came in with 109 on D1 and was 49 on D2.  Hb around baseline  Check coag panel, fibrinogen, D dimer, Retics, Hapto, Iron panel, ferritin, Vitamin B12, folate.  HIT antibody is pending.  Thrombocytopenia is likely d/t bone marrow suppression from alcohol use/cirrhosis of liver    # History of DVT/PE  Not on coumadin? anymore  Doppler shows chronic DVT of popliteal   64-year-old man PMH EtOH use disorder, DVT/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     Patient states that he woke up this morning and felt queasy in his stomach.  Patient also reports palpitations as well as two episodes of vomiting.  Reports associated numbness and tingling in his fingertips.  No chest pain or difficulty breathing.  No leg pain or swelling.  No recent fevers.  Patient does admit to drinking half a pint to 1 pint of vodka every day, last drink was yesterday afternoon.  Patient does admit to previous alcohol withdrawals, states that this feels similar. Heme onc consulted for thrombocytopenia:    # Thrombocytopenia:  #Leukopenia  # Cirrhosis of liver  # Active alcohol use  In November 2024 platelet was around 60-70  This admission he came in with 109 on D1 and was 49 on D2.  Hb around baseline  Check coag panel, fibrinogen, D dimer, Retics, Hapto, Iron panel, ferritin, Vitamin B12, folate.  HIT antibody is pending.  Thrombocytopenia is likely d/t bone marrow suppression from alcohol use/cirrhosis of liver    # History of DVT/PE, recurrent   # Reported history of failure of eliquis in 2022 (Note from 2023 reports this)  Not taking coumadin for last month d/t ?epistaxis  Doppler shows chronic DVT of popliteal   Poor historian, cannot tell me much about his anticoagulation history     64-year-old man PMH EtOH use disorder, DVT/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     Patient states that he woke up this morning and felt queasy in his stomach.  Patient also reports palpitations as well as two episodes of vomiting.  Reports associated numbness and tingling in his fingertips.  No chest pain or difficulty breathing.  No leg pain or swelling.  No recent fevers.  Patient does admit to drinking half a pint to 1 pint of vodka every day, last drink was yesterday afternoon.  Patient does admit to previous alcohol withdrawals, states that this feels similar. Heme onc consulted for thrombocytopenia:    # Thrombocytopenia:  #Leukopenia  # Cirrhosis of liver  # Active alcohol use  In November 2024 platelet was around 60-70  This admission he came in with 109 on D1 and was 49 on D2.  Hb around baseline  Check coag panel, fibrinogen, D dimer, Retics, Hapto, Iron panel, ferritin, Vitamin B12, folate.  HIT antibody is pending.  Thrombocytopenia is likely d/t bone marrow suppression from alcohol use/cirrhosis of liver    # History of DVT/PE, recurrent   # Reported history of failure of eliquis in 2022 (Note from 2023 reports this)  Not taking coumadin for last month d/t ?epistaxis  Doppler shows chronic DVT of popliteal   Poor historian, cannot tell me much about his anticoagulation history    Given reported history of eliquis failure, would resume coumadin once platelet are not dropping/ >50k

## 2025-01-22 NOTE — PROGRESS NOTE ADULT - SUBJECTIVE AND OBJECTIVE BOX
CAT ALSTON 64y Male  MRN#: [id number]     Hospital Day: 3d      SUBJECTIVE  No acute events overnight, pt seen and examined this morning.                                          ----------------------------------------------------------  OBJECTIVE  PAST MEDICAL & SURGICAL HISTORY  Alcohol dependence    HTN (hypertension)    Hard of hearing    Seasonal allergies    BPH (benign prostatic hyperplasia)    GERD (gastroesophageal reflux disease)    Pseudogout    History of deep vein thrombosis (DVT) of lower extremity    No significant past surgical history                                              -----------------------------------------------------------  ALLERGIES:  shellfish (Hives)  Beef (Hives)  dairy products (Hives)  No Known Drug Allergies                                            ------------------------------------------------------------    HOME MEDICATIONS  Home Medications:  cyanocobalamin 1000 mcg oral tablet: 1 tab(s) orally once a day (09 Nov 2024 16:29)  Jardiance 10 mg oral tablet: 1 tab(s) orally once a day (09 Nov 2024 16:29)  metFORMIN 500 mg oral tablet: 1 tab(s) orally 2 times a day (09 Nov 2024 16:29)  methocarbamol 500 mg oral tablet: 1 orally once a day (09 Nov 2024 16:30)  Toprol-XL 25 mg oral tablet, extended release: 1 tab(s) orally once a day (09 Nov 2024 16:30)  Tylenol 325 mg oral tablet: 2 tab(s) orally 2 times a day as needed for  moderate pain (09 Nov 2024 16:29)                           MEDICATIONS:  STANDING MEDICATIONS  cetirizine 10 milliGRAM(s) Oral daily  chlorhexidine 2% Cloths 1 Application(s) Topical <User Schedule>  colchicine 0.6 milliGRAM(s) Oral two times a day  cyanocobalamin 1000 MICROGram(s) Oral daily  dicyclomine 20 milliGRAM(s) Oral three times a day before meals  folic acid 1 milliGRAM(s) Oral daily  gabapentin 200 milliGRAM(s) Oral two times a day  influenza  Vaccine (HIGH DOSE) 0.5 milliLiter(s) IntraMuscular once  magnesium oxide 400 milliGRAM(s) Oral three times a day with meals  methocarbamol 500 milliGRAM(s) Oral daily  metoprolol succinate ER 25 milliGRAM(s) Oral daily  multivitamin 1 Tablet(s) Oral daily  naltrexone 50 milliGRAM(s) Oral daily  pantoprazole    Tablet 40 milliGRAM(s) Oral before breakfast  senna 2 Tablet(s) Oral at bedtime  tamsulosin 0.4 milliGRAM(s) Oral at bedtime  thiamine 100 milliGRAM(s) Oral daily    PRN MEDICATIONS  LORazepam   Injectable 2 milliGRAM(s) IV Push every 2 hours PRN  simethicone 80 milliGRAM(s) Chew every 6 hours PRN                                            ------------------------------------------------------------  VITAL SIGNS: Last 24 Hours  T(C): 37.3 (22 Jan 2025 07:00), Max: 37.3 (22 Jan 2025 07:00)  T(F): 99.1 (22 Jan 2025 07:00), Max: 99.1 (22 Jan 2025 07:00)  HR: 92 (22 Jan 2025 07:00) (92 - 97)  BP: 145/73 (22 Jan 2025 07:00) (136/77 - 169/76)  BP(mean): 107 (21 Jan 2025 23:40) (107 - 107)  RR: 19 (22 Jan 2025 07:00) (17 - 19)  SpO2: 96% (22 Jan 2025 07:00) (96% - 96%)                                             --------------------------------------------------------------  LABS:                        11.7   3.85  )-----------( 35       ( 22 Jan 2025 11:05 )             33.1     01-22    139  |  104  |  4[L]  ----------------------------<  134[H]  3.8   |  24  |  0.9    Ca    9.1      22 Jan 2025 05:27  Mg     1.6     01-22    TPro  6.5  /  Alb  3.9  /  TBili  3.8[H]  /  DBili  x   /  AST  78[H]  /  ALT  27  /  AlkPhos  196[H]  01-22    PT/INR - ( 22 Jan 2025 11:05 )   PT: 19.70 sec;   INR: 1.65 ratio         PTT - ( 22 Jan 2025 11:05 )  PTT:44.4 sec  Urinalysis Basic - ( 22 Jan 2025 05:27 )    Color: x / Appearance: x / SG: x / pH: x  Gluc: 134 mg/dL / Ketone: x  / Bili: x / Urobili: x   Blood: x / Protein: x / Nitrite: x   Leuk Esterase: x / RBC: x / WBC x   Sq Epi: x / Non Sq Epi: x / Bacteria: x                                                            -------------------------------------------------------------  RADIOLOGY:  CXR      CT                                            --------------------------------------------------------------    PHYSICAL EXAM:  GENERAL: NAD, lying in bed comfortably  CHEST/LUNG: Clear to auscultation bilaterally; No rales, rhonchi, wheezing, or rubs. Unlabored respirations  HEART: Regular rate and rhythm; No murmurs, rubs, or gallops  ABDOMEN: Soft, nontender, nondistended  EXTREMITIES:  No clubbing, cyanosis, or edema  NERVOUS SYSTEM:  A&Ox3

## 2025-01-22 NOTE — CONSULT NOTE ADULT - ATTENDING COMMENTS
Trauma/Acute Care Surgery Attending Note Attestation  Patient was seen and examined bedside.  I reviewed the resident/PA note and agreed with above assessment and plan with following additions and corrections.    History as above.     T(C): 36.7 (01-19-25 @ 07:34), Max: 36.7 (01-19-25 @ 07:34)  HR: 95 (01-19-25 @ 19:35) (92 - 113)  BP: 132/68 (01-19-25 @ 19:35) (125/68 - 149/76)  RR: 18 (01-19-25 @ 19:35) (18 - 18)  SpO2: 99% (01-19-25 @ 19:35) (93% - 99%)    I independently performed a medically appropriate exam. The exam was notable for LLQ tenderness on initial exam. Patient's exam is inconsistent. No tenderness when examined during questioning.                          13.9   5.41  )-----------( 109      ( 19 Jan 2025 08:53 )             40.5     01-19    139  |  96[L]  |  6[L]  ----------------------------<  158[H]  4.4   |  10[L]  |  1.2    Ca 8.7; Mg x ; Phos x       ( 19 Jan 2025 08:53 )  Alb: 4.3 g/dL / Pro: 7.1 g/dL / AlkPhos: 154 U/L / ALT: 23 U/L / AST: 78 U/L / GGT:x     / Tbili 2.9 mg/dL/ Dbili x     / Indbili x          Blood Gas Venous - Lactate: 13.7 mmol/L (01-19 @ 14:10)  Blood Gas Venous - Lactate: >16.0 mmol/L (01-19 @ 13:30)  Blood Gas Venous - Lactate: 13.7 mmol/L (01-19 @ 11:55)'    CT A/P reviewed and interpreted by me: no acute surgical pathology identified with special attention paid to colon, small intestine, gallbladder, kidneys, and spleen; celiac calcific stenosis with patent blood flow otherwise no significant vascular abnormalities    Assessment/Plan:  64y Male with PMH as above who presents with abdominal pain. No surgical pathology identified. No acute surgical intervention. Lab work notable for elevated lactate levels and elevated beta hydroxy butyrate associated with high anion gap metabolic acidosis. Recommend fluid resuscitation and medical evaluation for admission.     Taj Shi MD  Trauma/Acute Care Surgery/Surgical Critical Care Attending
The patient was seen. Agree with above.  Pancytopenia is likely due to ETOH and liver cirrhosis.   Supportive care and monitor CBC.  H/O recurrent PE and DVT. Consider to resume anticoagulant when his PLT count is improved.
62 y o  M with hx of pseudogout AUD MISTY cirrhosis PE on AC  presented with abdominal pain and found to be in ketoacidosis.    Drinks a pint of beer daily. No DUI. Alcohol rehab few years ago.   Worked in [employer]? Retired.  Single no kids.  Lives in care home. No family support locally.     No complaints. Abdominal pain resolved. Denies withdrawal symptoms. No hallucinations    Alert oriented  Icteric  Abdomen soft non tender  No asterixis    Decompensated MISTY cirrhosis MELD Na 17   Alcoholic hepatitis   Alcoholic ketoacidosis  No ascites on CT    High MDF but would hold off steroids if in withdrawal  Alcohol rehab - declined  Not a likely LT candidate with poor social support but will review on OP follow up.

## 2025-01-22 NOTE — PROGRESS NOTE ADULT - SUBJECTIVE AND OBJECTIVE BOX
SUBJECTIVE/OVERNIGHT EVENTS  Today is hospital day 3d. This morning patient was seen and examined at bedside.    - complains of acute epigastric pain that started tonight, denies radiation, does not note any aggravating or relieving factors    MEDICATIONS  STANDING MEDICATIONS  cetirizine 10 milliGRAM(s) Oral daily  chlorhexidine 2% Cloths 1 Application(s) Topical <User Schedule>  colchicine 0.6 milliGRAM(s) Oral two times a day  cyanocobalamin 1000 MICROGram(s) Oral daily  dicyclomine 20 milliGRAM(s) Oral three times a day before meals  folic acid 1 milliGRAM(s) Oral daily  gabapentin 200 milliGRAM(s) Oral two times a day  influenza  Vaccine (HIGH DOSE) 0.5 milliLiter(s) IntraMuscular once  magnesium oxide 400 milliGRAM(s) Oral three times a day with meals  magnesium sulfate  IVPB 1 Gram(s) IV Intermittent once  methocarbamol 500 milliGRAM(s) Oral daily  metoprolol succinate ER 25 milliGRAM(s) Oral daily  multivitamin 1 Tablet(s) Oral daily  naltrexone 50 milliGRAM(s) Oral daily  pantoprazole    Tablet 40 milliGRAM(s) Oral before breakfast  senna 2 Tablet(s) Oral at bedtime  tamsulosin 0.4 milliGRAM(s) Oral at bedtime  thiamine 100 milliGRAM(s) Oral daily    PRN MEDICATIONS  LORazepam   Injectable 2 milliGRAM(s) IV Push every 2 hours PRN  simethicone 80 milliGRAM(s) Chew every 6 hours PRN    VITALS  T(F): 99.1 (01-22-25 @ 07:00), Max: 99.1 (01-22-25 @ 07:00)  HR: 92 (01-22-25 @ 07:00) (84 - 97)  BP: 145/73 (01-22-25 @ 07:00) (136/77 - 169/76)  RR: 19 (01-22-25 @ 07:00) (17 - 19)  SpO2: 96% (01-22-25 @ 07:00) (96% - 96%)    PHYSICAL EXAM  GENERAL: NAD, lying in bed comfortably  HEAD:  Atraumatic, normocephalic  EYES: EOMI, PERRLA, conjunctiva and sclera clear  ENT: Moist mucous membranes  NECK: Supple, no JVD  HEART: Regular rate and rhythm, no murmurs, rubs, or gallops  LUNGS: Unlabored respirations.  Clear to auscultation bilaterally, no crackles, wheezing, or rhonchi  ABDOMEN: Soft, nondistended, +BS, epigastric tenderness  EXTREMITIES: No edema  NERVOUS SYSTEM:  A&Ox3, no focal deficits   SKIN: No rashes or lesions    LABS             11.9   3.64  )-----------( 31       ( 01-22-25 @ 05:27 )             34.1     139  |  104  |  4   -------------------------<  134   01-22-25 @ 05:27  3.8  |  24  |  0.9    Ca      9.1     01-22-25 @ 05:27  Mg     1.6     01-22-25 @ 05:27    TPro  6.5  /  Alb  3.9  /  TBili  3.8  /  DBili  x   /  AST  78  /  ALT  27  /  AlkPhos  196  /  GGT  x     01-22-25 @ 05:27        Urinalysis Basic - ( 22 Jan 2025 05:27 )    Color: x / Appearance: x / SG: x / pH: x  Gluc: 134 mg/dL / Ketone: x  / Bili: x / Urobili: x   Blood: x / Protein: x / Nitrite: x   Leuk Esterase: x / RBC: x / WBC x   Sq Epi: x / Non Sq Epi: x / Bacteria: x          IMAGING

## 2025-01-22 NOTE — PROGRESS NOTE ADULT - ASSESSMENT
64-year-old man PMH EtOH use disorder, DVT/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 Admitted for alcoholic ketoacidosis. Patient does admit to drinking half a pint to 1 pint of vodka every day. Patient does admit to previous alcohol withdrawals. Hepatology consulted for cirrhosis.       # Cirrhosis ALD  # Alcoholic hepatitis  # Thrombocytopenia  MELD 3 score 17   MDF score 35   Alcohol lvl 195 on admission   CLD work up negative for hepatitis B C IRA AMA SMA LKM SLA negative, normal Ig levels  A1AT MM ACE normal. HIV RPR negative   Coagulopathy - INR 1.65  PLT count 34   HCC screening - no mass on US abdomen AFP normal. Not done MRI abdomen      Plan   US abdomen to check for ascites  Daily MELD labs   Avoid opioids and NSAIDs  Limit Tylenol to 18467 mg per day   2g Na diet   Variceal screening - will need EGD as OP   Vaccination status - not immune to hepatitis A and B. Advised vaccination.  HCC screening -Needs MRI abdomen as OP / AFP and US every 6 months   LT referral - lives in assisted liver and poor insight. Not a LT candidate as still drinking.  ABO status - O positive

## 2025-01-22 NOTE — PROGRESS NOTE ADULT - ASSESSMENT
64-year-old man PMH EtOH use disorder, DVT/PE no longer on Coumadin, BPH, hypertension, DL, DM, gout,  presented to the ED with abdominal pain of 1 day duration.    #Alcohol withdrawal  #Lactic acidosis, likely starvation ketosis  #High anion gap metabolic acidosis secondary to lactic acidosis  Imaging CTAP:  Steatosis. Mild caudate lobe hypertrophy. Cirrhosis is not   excluded. Atherosclerotic changes. Severe stenosis at the ostium of the   celiac artery which remains patent. The superior mesenteric artery,   bilateral renal arteries and inferior mesenteric artery are widely patent.  Unchanged prominent portacaval and gastrohepatic lymph nodes.  - Last drink 1d prior to admission  - Lactate trended down to 2.5 from 16  - CIWA protocol with ativan  - zofran prn  - check ecg and correct qtc if needed  - fall and aspiration precautions  - PT Eval requested    #Epigastric pain  - complains of acute epigastric pain that started tonight, denies radiation, does not note any aggravating or relieving factors  - f/u repeat lipase  - RUQ sono    #Transaminitis  #Hx of Cirrhosis  - Trend LFTs  - low Na diet  - RUQ sono  - Daily MELD labs  - Limit tylenol to 1.5g/day  - hepatology following      #Thrombocytopenia:  #Leukopenia  - platelets 109 -> 48 -> 34 -> 31  - f/u coag panel, fibrinogen, D dimer, Retics, Hapto, Iron panel, ferritin, Vitamin B12, folate.  HIT antibody is pending.  - Thrombocytopenia is likely d/t bone marrow suppression from alcohol use/cirrhosis of liver  - f/u heme onc      # History of DVT/PE, recurrent   # Reported history of failure of eliquis in 2022 (Note from 2023 reports this)  -Not taking coumadin for last month d/t ?epistaxis  -Doppler shows chronic DVT of popliteal   -pt not aware of his anticoagulation history      #Chronic b/l lower abdominal pain  Possibly related to chronic alcohol use  - GI fu outpatient  - Trial bentyl 20 TID with meals  - PPI BID      #metamyelocytes present in differential as well as prominent portocaval and gastrohepatic LN  - o/p oncology fu    #hx DVT/PE   diagnosed in 2022  No longer on coumadin    DVT PPX, Lovenox    #Progress Note Handoff  Pending (specify): CIWA monitoring  Family discussion: khadar pt regarding plan of care  Disposition: SDU, from home

## 2025-01-22 NOTE — DISCHARGE NOTE NURSING/CASE MANAGEMENT/SOCIAL WORK - PATIENT PORTAL LINK FT
You can access the FollowMyHealth Patient Portal offered by [facility] by registering at the following website: [email or web address]. By joining [employer]’s FollowMyHealth portal, you will also be able to view your health information using other applications (apps) compatible with our system.

## 2025-01-23 LAB
ALBUMIN SERPL ELPH-MCNC: 3.7 G/DL — SIGNIFICANT CHANGE UP (ref 3.5–5.2)
ALP SERPL-CCNC: 135 U/L — HIGH (ref 30–115)
ALT FLD-CCNC: 26 U/L — SIGNIFICANT CHANGE UP (ref 0–41)
ANION GAP SERPL CALC-SCNC: 13 MMOL/L — SIGNIFICANT CHANGE UP (ref 7–14)
APTT BLD: 40.5 SEC — HIGH (ref 27–39.2)
AST SERPL-CCNC: 72 U/L — HIGH (ref 0–41)
BASOPHILS # BLD AUTO: 0.03 K/UL — SIGNIFICANT CHANGE UP (ref 0–0.2)
BASOPHILS NFR BLD AUTO: 0.8 % — SIGNIFICANT CHANGE UP (ref 0–1)
BILIRUB SERPL-MCNC: 5.1 MG/DL — HIGH (ref 0.2–1.2)
BUN SERPL-MCNC: 6 MG/DL — LOW (ref 10–20)
CALCIUM SERPL-MCNC: 8.7 MG/DL — SIGNIFICANT CHANGE UP (ref 8.4–10.5)
CHLORIDE SERPL-SCNC: 104 MMOL/L — SIGNIFICANT CHANGE UP (ref 98–110)
CO2 SERPL-SCNC: 24 MMOL/L — SIGNIFICANT CHANGE UP (ref 17–32)
CREAT SERPL-MCNC: 1 MG/DL — SIGNIFICANT CHANGE UP (ref 0.7–1.5)
EGFR: 84 ML/MIN/1.73M2 — SIGNIFICANT CHANGE UP
EOSINOPHIL # BLD AUTO: 0.18 K/UL — SIGNIFICANT CHANGE UP (ref 0–0.7)
EOSINOPHIL NFR BLD AUTO: 4.6 % — SIGNIFICANT CHANGE UP (ref 0–8)
GLUCOSE SERPL-MCNC: 84 MG/DL — SIGNIFICANT CHANGE UP (ref 70–99)
HCT VFR BLD CALC: 35 % — LOW (ref 42–52)
HEPARIN-PF4 AB RESULT: <0.6 U/ML — SIGNIFICANT CHANGE UP (ref 0–0.9)
HGB BLD-MCNC: 12.3 G/DL — LOW (ref 14–18)
IMM GRANULOCYTES NFR BLD AUTO: 0.3 % — SIGNIFICANT CHANGE UP (ref 0.1–0.3)
INR BLD: 1.5 RATIO — HIGH (ref 0.65–1.3)
IRON SATN MFR SERPL: 33 % — SIGNIFICANT CHANGE UP (ref 15–50)
IRON SATN MFR SERPL: 74 UG/DL — SIGNIFICANT CHANGE UP (ref 35–150)
LYMPHOCYTES # BLD AUTO: 1.08 K/UL — LOW (ref 1.2–3.4)
LYMPHOCYTES # BLD AUTO: 27.3 % — SIGNIFICANT CHANGE UP (ref 20.5–51.1)
MAGNESIUM SERPL-MCNC: 1.8 MG/DL — SIGNIFICANT CHANGE UP (ref 1.8–2.4)
MCHC RBC-ENTMCNC: 35.1 G/DL — SIGNIFICANT CHANGE UP (ref 32–37)
MCHC RBC-ENTMCNC: 37.3 PG — HIGH (ref 27–31)
MCV RBC AUTO: 106.1 FL — HIGH (ref 80–94)
MELD SCORE WITH DIALYSIS: 30 POINTS — SIGNIFICANT CHANGE UP
MELD SCORE WITHOUT DIALYSIS: 17 POINTS — SIGNIFICANT CHANGE UP
MONOCYTES # BLD AUTO: 0.34 K/UL — SIGNIFICANT CHANGE UP (ref 0.1–0.6)
MONOCYTES NFR BLD AUTO: 8.6 % — SIGNIFICANT CHANGE UP (ref 1.7–9.3)
NEUTROPHILS # BLD AUTO: 2.31 K/UL — SIGNIFICANT CHANGE UP (ref 1.4–6.5)
NEUTROPHILS NFR BLD AUTO: 58.4 % — SIGNIFICANT CHANGE UP (ref 42.2–75.2)
NRBC # BLD: 0 /100 WBCS — SIGNIFICANT CHANGE UP (ref 0–0)
NRBC BLD-RTO: 0 /100 WBCS — SIGNIFICANT CHANGE UP (ref 0–0)
PF4 HEPARIN CMPLX AB SER-ACNC: NEGATIVE — SIGNIFICANT CHANGE UP
PLATELET # BLD AUTO: 27 K/UL — LOW (ref 130–400)
PMV BLD: 11.8 FL — HIGH (ref 7.4–10.4)
POTASSIUM SERPL-MCNC: 3.6 MMOL/L — SIGNIFICANT CHANGE UP (ref 3.5–5)
POTASSIUM SERPL-SCNC: 3.6 MMOL/L — SIGNIFICANT CHANGE UP (ref 3.5–5)
PROT SERPL-MCNC: 5.9 G/DL — LOW (ref 6–8)
PROTHROM AB SERPL-ACNC: 17.8 SEC — HIGH (ref 9.95–12.87)
RBC # BLD: 3.3 M/UL — LOW (ref 4.7–6.1)
RBC # BLD: 3.3 M/UL — LOW (ref 4.7–6.1)
RBC # FLD: 15.5 % — HIGH (ref 11.5–14.5)
RETICS #: 57.8 K/UL — SIGNIFICANT CHANGE UP (ref 25–125)
RETICS/RBC NFR: 1.8 % — HIGH (ref 0.5–1.5)
SODIUM SERPL-SCNC: 141 MMOL/L — SIGNIFICANT CHANGE UP (ref 135–146)
TIBC SERPL-MCNC: 226 UG/DL — SIGNIFICANT CHANGE UP (ref 220–430)
UIBC SERPL-MCNC: 152 UG/DL — SIGNIFICANT CHANGE UP (ref 110–370)
WBC # BLD: 3.95 K/UL — LOW (ref 4.8–10.8)
WBC # FLD AUTO: 3.95 K/UL — LOW (ref 4.8–10.8)

## 2025-01-23 PROCEDURE — 99232 SBSQ HOSP IP/OBS MODERATE 35: CPT

## 2025-01-23 PROCEDURE — 93979 VASCULAR STUDY: CPT | Mod: 26

## 2025-01-23 RX ORDER — PREDNISONE 5 MG/1
40 TABLET ORAL DAILY
Refills: 0 | Status: DISCONTINUED | OUTPATIENT
Start: 2025-01-23 | End: 2025-01-27

## 2025-01-23 RX ADMIN — PREDNISONE 40 MILLIGRAM(S): 5 TABLET ORAL at 17:08

## 2025-01-23 RX ADMIN — Medication 100 MILLIGRAM(S): at 12:15

## 2025-01-23 RX ADMIN — Medication 50 MILLIGRAM(S): at 12:14

## 2025-01-23 RX ADMIN — Medication 400 MILLIGRAM(S): at 12:15

## 2025-01-23 RX ADMIN — Medication 20 MILLIGRAM(S): at 17:09

## 2025-01-23 RX ADMIN — Medication 1000 MICROGRAM(S): at 12:14

## 2025-01-23 RX ADMIN — COLCHICINE 0.6 MILLIGRAM(S): 0.6 TABLET ORAL at 06:14

## 2025-01-23 RX ADMIN — PANTOPRAZOLE 40 MILLIGRAM(S): 20 TABLET, DELAYED RELEASE ORAL at 06:14

## 2025-01-23 RX ADMIN — Medication 1 MILLIGRAM(S): at 12:15

## 2025-01-23 RX ADMIN — Medication 400 MILLIGRAM(S): at 09:04

## 2025-01-23 RX ADMIN — Medication 500 MILLIGRAM(S): at 12:16

## 2025-01-23 RX ADMIN — GABAPENTIN 200 MILLIGRAM(S): 800 TABLET ORAL at 17:09

## 2025-01-23 RX ADMIN — Medication 400 MILLIGRAM(S): at 17:09

## 2025-01-23 RX ADMIN — ANTISEPTIC SURGICAL SCRUB 1 APPLICATION(S): 0.04 SOLUTION TOPICAL at 06:14

## 2025-01-23 RX ADMIN — Medication 1 TABLET(S): at 12:16

## 2025-01-23 RX ADMIN — COLCHICINE 0.6 MILLIGRAM(S): 0.6 TABLET ORAL at 17:09

## 2025-01-23 RX ADMIN — TAMSULOSIN HYDROCHLORIDE 0.4 MILLIGRAM(S): 0.4 CAPSULE ORAL at 21:24

## 2025-01-23 RX ADMIN — Medication 20 MILLIGRAM(S): at 12:16

## 2025-01-23 RX ADMIN — Medication 10 MILLIGRAM(S): at 12:16

## 2025-01-23 RX ADMIN — Medication 20 MILLIGRAM(S): at 06:14

## 2025-01-23 RX ADMIN — Medication 25 MILLIGRAM(S): at 06:14

## 2025-01-23 RX ADMIN — GABAPENTIN 200 MILLIGRAM(S): 800 TABLET ORAL at 06:14

## 2025-01-23 NOTE — PROGRESS NOTE ADULT - ASSESSMENT
64-year-old man PMH EtOH use disorder, DVT/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 Admitted for alcoholic ketoacidosis. Patient does admit to drinking half a pint to 1 pint of vodka every day. Patient does admit to previous alcohol withdrawals. Hepatology consulted for cirrhosis.       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 HIV RPR negative   Coagulopathy - INR 1.65  PLT count 34   HCC screening - no mass on US abdomen AFP normal. 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 The superior mesenteric artery,  bilateral renal arteries and inferior mesenteric artery are widely patent.Prominent portacaval lymph nodes measuring up to 1.8 cm. Gastrohepatic lymph node measures 1.0 cm.        Plan   Please start prednisone 40 mg PO QD. C/w Protonix while on prednisone   US abdomen to check for ascites  Daily MELD labs   Avoid opioids and NSAIDs  Limit Tylenol to 70169 mg per day   2g Na diet   Variceal screening - will need EGD as OP   Vaccination status - not immune to hepatitis A and B. Advised vaccination.  HCC screening -Needs MRI abdomen as OP / AFP and US every 6 months   LT referral - lives in assisted liver and poor insight. Not a LT candidate as still drinking.  ABO status - O positive  Vascular team for celiac artery ostium stenosis

## 2025-01-23 NOTE — PROGRESS NOTE ADULT - SUBJECTIVE AND OBJECTIVE BOX
Gastroenterology progress note:     Patient is a 64y old  Male who presents with a chief complaint of abdominal pain      Admitted on: 01-19-25    We are following the patient for: cirrhosis/ alcoholic hepatitis        Interval History:    No acute events overnight.          PAST MEDICAL & SURGICAL HISTORY:  Alcohol dependence      HTN (hypertension)      Hard of hearing      Seasonal allergies      BPH (benign prostatic hyperplasia)      GERD (gastroesophageal reflux disease)      Pseudogout      History of deep vein thrombosis (DVT) of lower extremity      No significant past surgical history          MEDICATIONS  (STANDING):  cetirizine 10 milliGRAM(s) Oral daily  chlorhexidine 2% Cloths 1 Application(s) Topical <User Schedule>  colchicine 0.6 milliGRAM(s) Oral two times a day  cyanocobalamin 1000 MICROGram(s) Oral daily  dicyclomine 20 milliGRAM(s) Oral three times a day before meals  folic acid 1 milliGRAM(s) Oral daily  gabapentin 200 milliGRAM(s) Oral two times a day  magnesium oxide 400 milliGRAM(s) Oral three times a day with meals  methocarbamol 500 milliGRAM(s) Oral daily  metoprolol succinate ER 25 milliGRAM(s) Oral daily  multivitamin 1 Tablet(s) Oral daily  naltrexone 50 milliGRAM(s) Oral daily  pantoprazole    Tablet 40 milliGRAM(s) Oral before breakfast  senna 2 Tablet(s) Oral at bedtime  tamsulosin 0.4 milliGRAM(s) Oral at bedtime  thiamine 100 milliGRAM(s) Oral daily    MEDICATIONS  (PRN):  LORazepam   Injectable 2 milliGRAM(s) IV Push every 2 hours PRN Symptom-triggered: 2 point increase in CIWA -Ar score and a total score of 7 or LESS  simethicone 80 milliGRAM(s) Chew every 6 hours PRN Indigestion      Allergies  shellfish (Hives)  Beef (Hives)  dairy products (Hives)  No Known Drug Allergies      Review of Systems:   Cardiovascular:  No Chest Pain, No Palpitations  Respiratory:  No Cough, No Dyspnea  Gastrointestinal:  As described in HPI  Neuro:  No Syncope, No Dizziness    Physical Examination:  T(C): 37.1 (01-23-25 @ 07:00), Max: 37.1 (01-23-25 @ 02:19)  HR: 75 (01-23-25 @ 07:00) (62 - 114)  BP: 116/74 (01-23-25 @ 07:00) (112/66 - 153/72)  RR: 19 (01-23-25 @ 07:00) (18 - 19)  SpO2: 95% (01-23-25 @ 07:00) (94% - 97%)        GENERAL: AAOx3, no acute distress.  HEAD:  Atraumatic, Normocephalic  EYES: icteric   CHEST/LUNG: Clear to auscultation bilaterally; No wheeze, rhonchi, or rales  HEART: Regular rate and rhythm; normal S1, S2, No murmurs.  ABDOMEN: Soft, nontender, nondistended; Bowel sounds present  NEUROLOGY: tremors   SKIN:  jaundice     Data:                        12.3   3.95  )-----------( 27       ( 23 Jan 2025 05:39 )             35.0     Hgb trend:  12.3  01-23-25 @ 05:39  11.7  01-22-25 @ 11:05  11.9  01-22-25 @ 05:27  12.4  01-21-25 @ 06:51      01-23    141  |  104  |  6[L]  ----------------------------<  84  3.6   |  24  |  1.0    Ca    8.7      23 Jan 2025 05:39  Mg     1.8     01-23    TPro  5.9[L]  /  Alb  3.7  /  TBili  5.1[H]  /  DBili  2.1[H]  /  AST  72[H]  /  ALT  26  /  AlkPhos  135[H]  01-23    Liver panel trend:  TBili 5.1   /   AST 72   /   ALT 26   /   AlkP 135   /   Tptn 5.9   /   Alb 3.7    /   DBili 2.1      01-23  TBili 3.8   /   AST 78   /   ALT 27   /   AlkP 196   /   Tptn 6.5   /   Alb 3.9    /   DBili --      01-22  TBili 4.2   /   AST 83   /   ALT 24   /   AlkP 171   /   Tptn 6.2   /   Alb 3.8    /   DBili --      01-21  TBili 4.1   /   AST 80   /   ALT 25   /   AlkP 137   /   Tptn 6.8   /   Alb 4.0    /   DBili --      01-20  TBili 3.3   /   AST 85   /   ALT 25   /   AlkP 127   /   Tptn 6.1   /   Alb 3.8    /   DBili 1.5      01-20  TBili 2.9   /   AST 78   /   ALT 23   /   AlkP 154   /   Tptn 7.1   /   Alb 4.3    /   DBili --      01-19      PT/INR - ( 23 Jan 2025 05:39 )   PT: 17.80 sec;   INR: 1.50 ratio         PTT - ( 23 Jan 2025 05:39 )  PTT:40.5 sec       Radiology:    US Abdomen Upper Quadrant Right:   ACC: [id number] EXAM:  US ABDOMEN RT UPR QUADRANT   ORDERED BY: DOLORES EASLEY     PROCEDURE DATE:  01/22/2025          INTERPRETATION:  CLINICAL INFORMATION: Evaluate for acute abdomen    COMPARISON: CAT scan abdomen pelvis from January 19, 2025    TECHNIQUE: Sonography of the right upper quadrant.    FINDINGS:  Liver: Heterogeneously echogenic liver which may reflect hepatic   steatosis. Liver measures 16.1 cm in length.  Bile ducts: Not well-visualized  Gallbladder: Stones within the gallbladder. Sludge suggested within the   gallbladder.  Pancreas: Poorly visualized.  Right kidney: 9.7 cm in length. No hydronephrosis.  Ascites: None.  IVC: Visualized portions are within normal limits.    IMPRESSION:  Limited exam. Heterogeneously echogenic liver which may reflect hepatic   steatosis reflect underlying hepatocellular disease. Cholelithiasis.        --- End of Report ---            KAVYA BOWER MD; Attending Radiologist  This document has been electronically signed. Jan 22 2025 10:10AM (01-22-25 @ 09:57)

## 2025-01-23 NOTE — PROGRESS NOTE ADULT - ASSESSMENT
64-year-old man PMH EtOH use disorder, DVT/PE no longer on Coumadin, BPH, hypertension, DL, DM, gout,  presented to the ED with abdominal pain of 1 day duration.    #Alcohol withdrawal  #Lactic acidosis, likely starvation ketosis  #High anion gap metabolic acidosis secondary to lactic acidosis  Imaging CTAP:  Steatosis. Mild caudate lobe hypertrophy. Cirrhosis is not   excluded. Atherosclerotic changes. Severe stenosis at the ostium of the   celiac artery which remains patent. The superior mesenteric artery,   bilateral renal arteries and inferior mesenteric artery are widely patent.  Unchanged prominent portacaval and gastrohepatic lymph nodes.  - Last drink 1d prior to admission  - Lactate trended down to 2.5 from 16  - CIWA protocol with ativan  - zofran prn  - check ecg and correct qtc if needed  - fall and aspiration precautions  - PT Eval requested    #Epigastric pain  - complains of acute epigastric pain that started tonight, denies radiation, does not note any aggravating or relieving factors  - lipase - 52  - RUQ sono - Heterogeneously echogenic liver which may reflect hepatic   steatosis reflect underlying hepatocellular disease. Cholelithiasis.  - pain resolved now    #Transaminitis  #Hx of Cirrhosis  #Alcoholic hepatitis   - Trend LFTs  - low Na diet  - RUQ sono   - Daily MELD labs  - Limit tylenol to 1.5g/day  - Will need EGD as OP for variceal screening  - not immune to hepatitis A and B. Advised vaccination.  - hepatology following      #Thrombocytopenia:  #Leukopenia  #Macrocytic anemia  - platelets 109 -> 48 -> 34 -> 31 -> 27  - f/u coag panel, fibrinogen, D dimer, Retics, Hapto, Iron panel, ferritin, Vitamin B12, folate.  - HIT antibody - negative  - Thrombocytopenia is likely d/t bone marrow suppression from alcohol use/cirrhosis of liver  - f/u heme onc      # History of DVT/PE, recurrent   # Reported history of failure of eliquis in 2022 (Note from 2023 reports this)  -Not taking coumadin for last month d/t ?epistaxis  -Doppler shows chronic DVT of popliteal   -pt not aware of his anticoagulation history      #Chronic b/l lower abdominal pain  Possibly related to chronic alcohol use  - GI fu outpatient  - Trial bentyl 20 TID with meals  - PPI BID      #metamyelocytes present in differential as well as prominent portocaval and gastrohepatic LN  - o/p oncology fu    #hx DVT/PE   diagnosed in 2022  No longer on coumadin    DVT PPX, Lovenox    #Progress Note Handoff  Pending (specify): ANGEL monitoring  Disposition: Med-surg

## 2025-01-23 NOTE — PROGRESS NOTE ADULT - SUBJECTIVE AND OBJECTIVE BOX
SUBJECTIVE/OVERNIGHT EVENTS  Today is hospital day 4d. This morning patient was seen and examined at bedside, resting comfortably in bed. No acute or major events overnight.      MEDICATIONS  STANDING MEDICATIONS  cetirizine 10 milliGRAM(s) Oral daily  chlorhexidine 2% Cloths 1 Application(s) Topical <User Schedule>  colchicine 0.6 milliGRAM(s) Oral two times a day  cyanocobalamin 1000 MICROGram(s) Oral daily  dicyclomine 20 milliGRAM(s) Oral three times a day before meals  folic acid 1 milliGRAM(s) Oral daily  gabapentin 200 milliGRAM(s) Oral two times a day  magnesium oxide 400 milliGRAM(s) Oral three times a day with meals  methocarbamol 500 milliGRAM(s) Oral daily  metoprolol succinate ER 25 milliGRAM(s) Oral daily  multivitamin 1 Tablet(s) Oral daily  naltrexone 50 milliGRAM(s) Oral daily  pantoprazole    Tablet 40 milliGRAM(s) Oral before breakfast  senna 2 Tablet(s) Oral at bedtime  tamsulosin 0.4 milliGRAM(s) Oral at bedtime  thiamine 100 milliGRAM(s) Oral daily    PRN MEDICATIONS  LORazepam   Injectable 2 milliGRAM(s) IV Push every 2 hours PRN  simethicone 80 milliGRAM(s) Chew every 6 hours PRN    VITALS  T(F): 98.8 (01-23-25 @ 07:00), Max: 98.8 (01-23-25 @ 07:00)  HR: 75 (01-23-25 @ 07:00) (62 - 114)  BP: 116/74 (01-23-25 @ 07:00) (112/66 - 153/72)  RR: 19 (01-23-25 @ 07:00) (18 - 19)  SpO2: 95% (01-23-25 @ 07:00) (94% - 97%)    PHYSICAL EXAM  GENERAL: NAD, lying in bed comfortably  HEAD:  Atraumatic, normocephalic  EYES: EOMI, PERRLA, conjunctiva and sclera clear  ENT: Moist mucous membranes  NECK: Supple, no JVD  HEART: Regular rate and rhythm, no murmurs, rubs, or gallops  LUNGS: Unlabored respirations.  Clear to auscultation bilaterally, no crackles, wheezing, or rhonchi  ABDOMEN: Soft, nontender, nondistended, +BS  EXTREMITIES: No edema  NERVOUS SYSTEM:  A&Ox3, no focal deficits   SKIN: No rashes or lesions      LABS             12.3   3.95  )-----------( 27       ( 01-23-25 @ 05:39 )             35.0     141  |  104  |  6   -------------------------<  84   01-23-25 @ 05:39  3.6  |  24  |  1.0    Ca      8.7     01-23-25 @ 05:39  Mg     1.8     01-23-25 @ 05:39    TPro  5.9  /  Alb  3.7  /  TBili  5.1  /  DBili  2.1  /  AST  72  /  ALT  26  /  AlkPhos  135  /  GGT  x     01-23-25 @ 05:39    PT/INR - ( 01-23-25 @ 05:39 )   PT: 17.80 sec[H];   INR: 1.50 ratio[H]  PTT - ( 01-23-25 @ 05:39 )  PTT:40.5 sec      Urinalysis Basic - ( 23 Jan 2025 05:39 )    Color: x / Appearance: x / SG: x / pH: x  Gluc: 84 mg/dL / Ketone: x  / Bili: x / Urobili: x   Blood: x / Protein: x / Nitrite: x   Leuk Esterase: x / RBC: x / WBC x   Sq Epi: x / Non Sq Epi: x / Bacteria: x          IMAGING

## 2025-01-24 LAB
ALBUMIN SERPL ELPH-MCNC: 3.8 G/DL — SIGNIFICANT CHANGE UP (ref 3.5–5.2)
ALP SERPL-CCNC: 125 U/L — HIGH (ref 30–115)
ALT FLD-CCNC: 27 U/L — SIGNIFICANT CHANGE UP (ref 0–41)
ANION GAP SERPL CALC-SCNC: 10 MMOL/L — SIGNIFICANT CHANGE UP (ref 7–14)
AST SERPL-CCNC: 64 U/L — HIGH (ref 0–41)
BASOPHILS # BLD AUTO: 0 K/UL — SIGNIFICANT CHANGE UP (ref 0–0.2)
BASOPHILS NFR BLD AUTO: 0 % — SIGNIFICANT CHANGE UP (ref 0–1)
BILIRUB SERPL-MCNC: 4.5 MG/DL — HIGH (ref 0.2–1.2)
BUN SERPL-MCNC: 12 MG/DL — SIGNIFICANT CHANGE UP (ref 10–20)
CALCIUM SERPL-MCNC: 8.7 MG/DL — SIGNIFICANT CHANGE UP (ref 8.4–10.5)
CHLORIDE SERPL-SCNC: 104 MMOL/L — SIGNIFICANT CHANGE UP (ref 98–110)
CO2 SERPL-SCNC: 23 MMOL/L — SIGNIFICANT CHANGE UP (ref 17–32)
CREAT SERPL-MCNC: 0.9 MG/DL — SIGNIFICANT CHANGE UP (ref 0.7–1.5)
EGFR: 95 ML/MIN/1.73M2 — SIGNIFICANT CHANGE UP
EOSINOPHIL # BLD AUTO: 0 K/UL — SIGNIFICANT CHANGE UP (ref 0–0.7)
EOSINOPHIL NFR BLD AUTO: 0 % — SIGNIFICANT CHANGE UP (ref 0–8)
FERRITIN SERPL-MCNC: 316 NG/ML — SIGNIFICANT CHANGE UP (ref 30–400)
FOLATE SERPL-MCNC: >20 NG/ML — SIGNIFICANT CHANGE UP
GLUCOSE SERPL-MCNC: 148 MG/DL — HIGH (ref 70–99)
HAPTOGLOB SERPL-MCNC: 30 MG/DL — LOW (ref 34–200)
HCT VFR BLD CALC: 35.4 % — LOW (ref 42–52)
HGB BLD-MCNC: 12.4 G/DL — LOW (ref 14–18)
IMM GRANULOCYTES NFR BLD AUTO: 0.4 % — HIGH (ref 0.1–0.3)
LYMPHOCYTES # BLD AUTO: 0.34 K/UL — LOW (ref 1.2–3.4)
LYMPHOCYTES # BLD AUTO: 12.3 % — LOW (ref 20.5–51.1)
MAGNESIUM SERPL-MCNC: 1.9 MG/DL — SIGNIFICANT CHANGE UP (ref 1.8–2.4)
MCHC RBC-ENTMCNC: 35 G/DL — SIGNIFICANT CHANGE UP (ref 32–37)
MCHC RBC-ENTMCNC: 37.8 PG — HIGH (ref 27–31)
MCV RBC AUTO: 107.9 FL — HIGH (ref 80–94)
MONOCYTES # BLD AUTO: 0.14 K/UL — SIGNIFICANT CHANGE UP (ref 0.1–0.6)
MONOCYTES NFR BLD AUTO: 5.1 % — SIGNIFICANT CHANGE UP (ref 1.7–9.3)
NEUTROPHILS # BLD AUTO: 2.27 K/UL — SIGNIFICANT CHANGE UP (ref 1.4–6.5)
NEUTROPHILS NFR BLD AUTO: 82.2 % — HIGH (ref 42.2–75.2)
NRBC # BLD: 0 /100 WBCS — SIGNIFICANT CHANGE UP (ref 0–0)
NRBC BLD-RTO: 0 /100 WBCS — SIGNIFICANT CHANGE UP (ref 0–0)
PLATELET # BLD AUTO: 33 K/UL — LOW (ref 130–400)
PMV BLD: 12.1 FL — HIGH (ref 7.4–10.4)
POTASSIUM SERPL-MCNC: 3.9 MMOL/L — SIGNIFICANT CHANGE UP (ref 3.5–5)
POTASSIUM SERPL-SCNC: 3.9 MMOL/L — SIGNIFICANT CHANGE UP (ref 3.5–5)
PROT SERPL-MCNC: 6.1 G/DL — SIGNIFICANT CHANGE UP (ref 6–8)
RBC # BLD: 3.28 M/UL — LOW (ref 4.7–6.1)
RBC # FLD: 15.6 % — HIGH (ref 11.5–14.5)
SODIUM SERPL-SCNC: 137 MMOL/L — SIGNIFICANT CHANGE UP (ref 135–146)
UNFRACTIONATED HEPARIN INTERPRETATION: SIGNIFICANT CHANGE UP
UNFRACTIONATED HEPARIN RESULT: NEGATIVE — SIGNIFICANT CHANGE UP
UNFRACTIONATED HEPARIN-HIGH DOSE: 1 % — SIGNIFICANT CHANGE UP
UNFRACTIONATED HEPARIN-LOW DOSE: 0 % — SIGNIFICANT CHANGE UP
VIT B12 SERPL-MCNC: 1429 PG/ML — HIGH (ref 232–1245)
WBC # BLD: 2.76 K/UL — LOW (ref 4.8–10.8)
WBC # FLD AUTO: 2.76 K/UL — LOW (ref 4.8–10.8)

## 2025-01-24 PROCEDURE — 99232 SBSQ HOSP IP/OBS MODERATE 35: CPT

## 2025-01-24 PROCEDURE — 76700 US EXAM ABDOM COMPLETE: CPT | Mod: 26

## 2025-01-24 RX ADMIN — Medication 50 MILLIGRAM(S): at 15:30

## 2025-01-24 RX ADMIN — Medication 20 MILLIGRAM(S): at 18:04

## 2025-01-24 RX ADMIN — Medication 400 MILLIGRAM(S): at 15:31

## 2025-01-24 RX ADMIN — Medication 400 MILLIGRAM(S): at 11:18

## 2025-01-24 RX ADMIN — Medication 20 MILLIGRAM(S): at 05:11

## 2025-01-24 RX ADMIN — COLCHICINE 0.6 MILLIGRAM(S): 0.6 TABLET ORAL at 05:11

## 2025-01-24 RX ADMIN — Medication 25 MILLIGRAM(S): at 05:11

## 2025-01-24 RX ADMIN — Medication 20 MILLIGRAM(S): at 11:19

## 2025-01-24 RX ADMIN — ANTISEPTIC SURGICAL SCRUB 1 APPLICATION(S): 0.04 SOLUTION TOPICAL at 05:12

## 2025-01-24 RX ADMIN — Medication 400 MILLIGRAM(S): at 17:17

## 2025-01-24 RX ADMIN — GABAPENTIN 200 MILLIGRAM(S): 800 TABLET ORAL at 05:11

## 2025-01-24 RX ADMIN — Medication 10 MILLIGRAM(S): at 11:18

## 2025-01-24 RX ADMIN — Medication 100 MILLIGRAM(S): at 11:19

## 2025-01-24 RX ADMIN — TAMSULOSIN HYDROCHLORIDE 0.4 MILLIGRAM(S): 0.4 CAPSULE ORAL at 21:44

## 2025-01-24 RX ADMIN — PANTOPRAZOLE 40 MILLIGRAM(S): 20 TABLET, DELAYED RELEASE ORAL at 05:11

## 2025-01-24 RX ADMIN — Medication 500 MILLIGRAM(S): at 11:18

## 2025-01-24 RX ADMIN — PREDNISONE 40 MILLIGRAM(S): 5 TABLET ORAL at 05:11

## 2025-01-24 RX ADMIN — Medication 1 MILLIGRAM(S): at 11:19

## 2025-01-24 RX ADMIN — Medication 1000 MICROGRAM(S): at 11:18

## 2025-01-24 RX ADMIN — Medication 1 TABLET(S): at 11:19

## 2025-01-24 RX ADMIN — COLCHICINE 0.6 MILLIGRAM(S): 0.6 TABLET ORAL at 17:17

## 2025-01-24 RX ADMIN — GABAPENTIN 200 MILLIGRAM(S): 800 TABLET ORAL at 17:17

## 2025-01-24 RX ADMIN — Medication 2 TABLET(S): at 21:44

## 2025-01-24 NOTE — PROGRESS NOTE ADULT - ASSESSMENT
64-year-old man PMH EtOH use disorder, DVT/PE no longer on Coumadin, BPH, hypertension, DL, DM, gout,  presented to the ED with abdominal pain of 1 day duration.    #Alcohol withdrawal  #Lactic acidosis, likely starvation ketosis  #High anion gap metabolic acidosis secondary to lactic acidosis  Imaging CTAP:  Steatosis. Mild caudate lobe hypertrophy. Cirrhosis is not   excluded. Atherosclerotic changes. Severe stenosis at the ostium of the   celiac artery which remains patent. The superior mesenteric artery,   bilateral renal arteries and inferior mesenteric artery are widely patent.  Unchanged prominent portacaval and gastrohepatic lymph nodes.  - Last drink 1d prior to admission  - Lactate trended down to 2.5 from 16  - CIWA protocol with ativan  - zofran prn  - check ecg and correct qtc if needed  - fall and aspiration precautions  - PT Eval - No skilled PT needs    #Epigastric pain  - complains of acute epigastric pain that started tonight, denies radiation, does not note any aggravating or relieving factors  - lipase - 52  - RUQ sono - Heterogeneously echogenic liver which may reflect hepatic   steatosis reflect underlying hepatocellular disease. Cholelithiasis.  - pain resolved now    #Transaminitis  #Hx of Cirrhosis  #Alcoholic hepatitis   - Trend LFTs  - low Na diet  - RUQ sono   - Daily MELD labs  - Limit tylenol to 1.5g/day  - Will need EGD as OP for variceal screening  - not immune to hepatitis A and B. Advised vaccination  - f/u abd US to eval ascites  - MDF score 35   - start prednisone 40mg QD  - hepatology following      #Thrombocytopenia:  #Leukopenia  #Macrocytic anemia  - platelets 109 -> 48 -> 34 -> 31 -> 27 -> 33  - f/u coag panel, fibrinogen, D dimer, Retics, Hapto, Iron panel, ferritin, Vitamin B12, folate.  - HIT antibody - negative  - Thrombocytopenia is likely d/t bone marrow suppression from alcohol use/cirrhosis of liver  - f/u heme onc      # History of DVT/PE, recurrent   # Reported history of failure of eliquis in 2022 (Note from 2023 reports this)  -Not taking coumadin for last month d/t ?epistaxis  -Doppler shows chronic DVT of popliteal   -pt not aware of his anticoagulation history  -hold AC in the setting of thrombocytopenia       #Chronic b/l lower abdominal pain  Possibly related to chronic alcohol use  - GI fu outpatient  - Trial bentyl 20 TID with meals  - PPI BID      #metamyelocytes present in differential as well as prominent portocaval and gastrohepatic LN  - o/p oncology fu      DVT PPX, on hold  #Progress Note Handoff  Pending (specify): CIWA monitoring  Disposition: Med-surg

## 2025-01-24 NOTE — PROGRESS NOTE ADULT - SUBJECTIVE AND OBJECTIVE BOX
SUBJECTIVE/OVERNIGHT EVENTS  Today is hospital day 5d. This morning patient was seen and examined at bedside, resting comfortably in bed. No acute or major events overnight.      MEDICATIONS  STANDING MEDICATIONS  cetirizine 10 milliGRAM(s) Oral daily  chlorhexidine 2% Cloths 1 Application(s) Topical <User Schedule>  colchicine 0.6 milliGRAM(s) Oral two times a day  cyanocobalamin 1000 MICROGram(s) Oral daily  dicyclomine 20 milliGRAM(s) Oral three times a day before meals  folic acid 1 milliGRAM(s) Oral daily  gabapentin 200 milliGRAM(s) Oral two times a day  magnesium oxide 400 milliGRAM(s) Oral three times a day with meals  methocarbamol 500 milliGRAM(s) Oral daily  metoprolol succinate ER 25 milliGRAM(s) Oral daily  multivitamin 1 Tablet(s) Oral daily  naltrexone 50 milliGRAM(s) Oral daily  pantoprazole    Tablet 40 milliGRAM(s) Oral before breakfast  predniSONE   Tablet 40 milliGRAM(s) Oral daily  senna 2 Tablet(s) Oral at bedtime  tamsulosin 0.4 milliGRAM(s) Oral at bedtime  thiamine 100 milliGRAM(s) Oral daily    PRN MEDICATIONS  LORazepam   Injectable 2 milliGRAM(s) IV Push every 2 hours PRN  simethicone 80 milliGRAM(s) Chew every 6 hours PRN    VITALS  T(F): 97.7 (01-24-25 @ 10:20), Max: 98.6 (01-24-25 @ 00:26)  HR: 76 (01-24-25 @ 10:20) (72 - 98)  BP: 122/60 (01-24-25 @ 10:20) (120/72 - 152/81)  RR: 18 (01-24-25 @ 05:03) (18 - 18)  SpO2: 98% (01-24-25 @ 10:20) (94% - 98%)    PHYSICAL EXAM  GENERAL: NAD, lying in bed comfortably  HEAD:  Atraumatic, normocephalic  EYES: EOMI, PERRLA, conjunctiva and sclera clear  ENT: Moist mucous membranes  NECK: Supple, no JVD  HEART: Regular rate and rhythm, no murmurs, rubs, or gallops  LUNGS: Unlabored respirations.  Clear to auscultation bilaterally, no crackles, wheezing, or rhonchi  ABDOMEN: Soft, nontender, nondistended, +BS  EXTREMITIES: No edema  NERVOUS SYSTEM:  A&Ox3, no focal deficits   SKIN: icteric     LABS             12.4   2.76  )-----------( 33       ( 01-24-25 @ 05:07 )             35.4     137  |  104  |  12  -------------------------<  148   01-24-25 @ 05:07  3.9  |  23  |  0.9    Ca      8.7     01-24-25 @ 05:07  Mg     1.9     01-24-25 @ 05:07    TPro  6.1  /  Alb  3.8  /  TBili  4.5  /  DBili  x   /  AST  64  /  ALT  27  /  AlkPhos  125  /  GGT  x     01-24-25 @ 05:07    PT/INR - ( 01-23-25 @ 05:39 )   PT: 17.80 sec[H];   INR: 1.50 ratio[H]  PTT - ( 01-23-25 @ 05:39 )  PTT:40.5 sec      Urinalysis Basic - ( 24 Jan 2025 05:07 )    Color: x / Appearance: x / SG: x / pH: x  Gluc: 148 mg/dL / Ketone: x  / Bili: x / Urobili: x   Blood: x / Protein: x / Nitrite: x   Leuk Esterase: x / RBC: x / WBC x   Sq Epi: x / Non Sq Epi: x / Bacteria: x          IMAGING

## 2025-01-24 NOTE — PROGRESS NOTE ADULT - ASSESSMENT
64-year-old man PMH EtOH use disorder, DVT/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 Admitted for alcoholic ketoacidosis. Patient does admit to drinking half a pint to 1 pint of vodka every day. Patient does admit to previous alcohol withdrawals. Hepatology consulted for cirrhosis.       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 HIV RPR negative   Coagulopathy - INR 1.65  PLT count 34   HCC screening - no mass on US abdomen AFP normal. 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 The superior mesenteric artery,  bilateral renal arteries and inferior mesenteric artery are widely patent.Prominent portacaval lymph nodes measuring up to 1.8 cm. Gastrohepatic lymph node measures 1.0 cm.  US abdomen 1/24/25: no significant ascites         Plan   c/w prednisone 40 mg PO QD. C/w Protonix while on prednisone  Pt will need OP labs in one week to assess if responding to prednisone   Daily MELD labs   Avoid opioids and NSAIDs  Limit Tylenol to 88948 mg per day   2g Na diet   Variceal screening - will need EGD as OP   Vaccination status - not immune to hepatitis A and B. Advised vaccination.  HCC screening -Needs MRI abdomen as OP / AFP and US every 6 months   LT referral - lives in assisted liver and poor insight. Not a LT candidate as still drinking.  ABO status - O positive  Vascular team for celiac artery ostium stenosis

## 2025-01-24 NOTE — PROGRESS NOTE ADULT - SUBJECTIVE AND OBJECTIVE BOX
Gastroenterology progress note:        Admitted on: 01-19-25    We are following the patient for: alcoholic hepatitis        Interval History:    No acute events overnight.       PAST MEDICAL & SURGICAL HISTORY:  Alcohol dependence  HTN (hypertension)  Hard of hearing  Seasonal allergies  BPH (benign prostatic hyperplasia)  GERD (gastroesophageal reflux disease)  Pseudogout  History of deep vein thrombosis (DVT) of lower extremity  No significant past surgical history          MEDICATIONS  (STANDING):  cetirizine 10 milliGRAM(s) Oral daily  chlorhexidine 2% Cloths 1 Application(s) Topical <User Schedule>  colchicine 0.6 milliGRAM(s) Oral two times a day  cyanocobalamin 1000 MICROGram(s) Oral daily  dicyclomine 20 milliGRAM(s) Oral three times a day before meals  folic acid 1 milliGRAM(s) Oral daily  gabapentin 200 milliGRAM(s) Oral two times a day  magnesium oxide 400 milliGRAM(s) Oral three times a day with meals  methocarbamol 500 milliGRAM(s) Oral daily  metoprolol succinate ER 25 milliGRAM(s) Oral daily  multivitamin 1 Tablet(s) Oral daily  naltrexone 50 milliGRAM(s) Oral daily  pantoprazole    Tablet 40 milliGRAM(s) Oral before breakfast  predniSONE   Tablet 40 milliGRAM(s) Oral daily  senna 2 Tablet(s) Oral at bedtime  tamsulosin 0.4 milliGRAM(s) Oral at bedtime  thiamine 100 milliGRAM(s) Oral daily    MEDICATIONS  (PRN):  LORazepam   Injectable 2 milliGRAM(s) IV Push every 2 hours PRN Symptom-triggered: 2 point increase in CIWA -Ar score and a total score of 7 or LESS  simethicone 80 milliGRAM(s) Chew every 6 hours PRN Indigestion      Allergies  shellfish (Hives)  Beef (Hives)  dairy products (Hives)  No Known Drug Allergies      Review of Systems:   Cardiovascular:  No Chest Pain, No Palpitations  Respiratory:  No Cough, No Dyspnea  Gastrointestinal:  As described in HPI  Neuro:  No Syncope, No Dizziness    Physical Examination:  T(C): 36.7 (01-24-25 @ 15:23), Max: 37 (01-24-25 @ 00:26)  HR: 87 (01-24-25 @ 15:23) (72 - 87)  BP: 113/83 (01-24-25 @ 15:23) (113/83 - 144/80)  RR: 18 (01-24-25 @ 15:23) (18 - 18)  SpO2: 97% (01-24-25 @ 15:23) (94% - 98%)        GENERAL: AAOx3, no acute distress.  HEAD:  Atraumatic, Normocephalic  EYES: icteric   NECK: Supple, no JVD or thyromegaly  CHEST/LUNG: Clear to auscultation bilaterally; No wheeze, rhonchi, or rales  HEART: Regular rate and rhythm; normal S1, S2, No murmurs.  ABDOMEN: Soft, nontender, nondistended; Bowel sounds present  NEUROLOGY: No asterixis or tremor.   SKIN:  jaundice     Data:                        12.4   2.76  )-----------( 33       ( 24 Jan 2025 05:07 )             35.4     Hgb trend:  12.4  01-24-25 @ 05:07  12.3  01-23-25 @ 05:39  11.7  01-22-25 @ 11:05  11.9  01-22-25 @ 05:27      01-24    137  |  104  |  12  ----------------------------<  148[H]  3.9   |  23  |  0.9    Ca    8.7      24 Jan 2025 05:07  Mg     1.9     01-24    TPro  6.1  /  Alb  3.8  /  TBili  4.5[H]  /  DBili  x   /  AST  64[H]  /  ALT  27  /  AlkPhos  125[H]  01-24    Liver panel trend:  TBili 4.5   /   AST 64   /   ALT 27   /   AlkP 125   /   Tptn 6.1   /   Alb 3.8    /   DBili --      01-24  TBili 5.1   /   AST 72   /   ALT 26   /   AlkP 135   /   Tptn 5.9   /   Alb 3.7    /   DBili 2.1      01-23  TBili 3.8   /   AST 78   /   ALT 27   /   AlkP 196   /   Tptn 6.5   /   Alb 3.9    /   DBili --      01-22  TBili 4.2   /   AST 83   /   ALT 24   /   AlkP 171   /   Tptn 6.2   /   Alb 3.8    /   DBili --      01-21  TBili 4.1   /   AST 80   /   ALT 25   /   AlkP 137   /   Tptn 6.8   /   Alb 4.0    /   DBili --      01-20  TBili 3.3   /   AST 85   /   ALT 25   /   AlkP 127   /   Tptn 6.1   /   Alb 3.8    /   DBili 1.5      01-20  TBili 2.9   /   AST 78   /   ALT 23   /   AlkP 154   /   Tptn 7.1   /   Alb 4.3    /   DBili --      01-19      PT/INR - ( 23 Jan 2025 05:39 )   PT: 17.80 sec;   INR: 1.50 ratio         PTT - ( 23 Jan 2025 05:39 )  PTT:40.5 sec       Radiology:    US Abdomen Complete:   ACC: [id number] EXAM:  US ABDOMEN COMPLETE   ORDERED BY: ANGY RAMIREZ     PROCEDURE DATE:  01/24/2025          INTERPRETATION:  CLINICAL INFORMATION: Assess ascites    COMPARISON: 1/22/2025    TECHNIQUE: Sonography of the abdomen to evaluate ascites..    Findings/  impression: No significant ascites.    --- End of Report ---            VIVEK SHEA MD; Attending Radiologist  This document has been electronically signed. Jan 24 2025  8:51AM (01-24-25 @ 08:13)

## 2025-01-25 LAB
ALBUMIN SERPL ELPH-MCNC: 3.4 G/DL — LOW (ref 3.5–5.2)
ALP SERPL-CCNC: 127 U/L — HIGH (ref 30–115)
ALT FLD-CCNC: 26 U/L — SIGNIFICANT CHANGE UP (ref 0–41)
ANION GAP SERPL CALC-SCNC: 10 MMOL/L — SIGNIFICANT CHANGE UP (ref 7–14)
ANION GAP SERPL CALC-SCNC: 10 MMOL/L — SIGNIFICANT CHANGE UP (ref 7–14)
APTT BLD: 42.9 SEC — HIGH (ref 27–39.2)
AST SERPL-CCNC: 55 U/L — HIGH (ref 0–41)
BASOPHILS # BLD AUTO: 0.04 K/UL — SIGNIFICANT CHANGE UP (ref 0–0.2)
BASOPHILS NFR BLD AUTO: 0.9 % — SIGNIFICANT CHANGE UP (ref 0–1)
BILIRUB SERPL-MCNC: 3 MG/DL — HIGH (ref 0.2–1.2)
BUN SERPL-MCNC: 15 MG/DL — SIGNIFICANT CHANGE UP (ref 10–20)
BUN SERPL-MCNC: 17 MG/DL — SIGNIFICANT CHANGE UP (ref 10–20)
CALCIUM SERPL-MCNC: 8.6 MG/DL — SIGNIFICANT CHANGE UP (ref 8.4–10.4)
CALCIUM SERPL-MCNC: 8.8 MG/DL — SIGNIFICANT CHANGE UP (ref 8.4–10.5)
CHLORIDE SERPL-SCNC: 102 MMOL/L — SIGNIFICANT CHANGE UP (ref 98–110)
CHLORIDE SERPL-SCNC: 106 MMOL/L — SIGNIFICANT CHANGE UP (ref 98–110)
CO2 SERPL-SCNC: 23 MMOL/L — SIGNIFICANT CHANGE UP (ref 17–32)
CO2 SERPL-SCNC: 25 MMOL/L — SIGNIFICANT CHANGE UP (ref 17–32)
CREAT SERPL-MCNC: 1 MG/DL — SIGNIFICANT CHANGE UP (ref 0.7–1.5)
CREAT SERPL-MCNC: 1 MG/DL — SIGNIFICANT CHANGE UP (ref 0.7–1.5)
EGFR: 84 ML/MIN/1.73M2 — SIGNIFICANT CHANGE UP
EGFR: 84 ML/MIN/1.73M2 — SIGNIFICANT CHANGE UP
EOSINOPHIL # BLD AUTO: 0.04 K/UL — SIGNIFICANT CHANGE UP (ref 0–0.7)
EOSINOPHIL NFR BLD AUTO: 0.9 % — SIGNIFICANT CHANGE UP (ref 0–8)
GLUCOSE BLDC GLUCOMTR-MCNC: 111 MG/DL — HIGH (ref 70–99)
GLUCOSE BLDC GLUCOMTR-MCNC: 93 MG/DL — SIGNIFICANT CHANGE UP (ref 70–99)
GLUCOSE SERPL-MCNC: 90 MG/DL — SIGNIFICANT CHANGE UP (ref 70–99)
GLUCOSE SERPL-MCNC: 94 MG/DL — SIGNIFICANT CHANGE UP (ref 70–99)
HCT VFR BLD CALC: 35.1 % — LOW (ref 42–52)
HGB BLD-MCNC: 12.4 G/DL — LOW (ref 14–18)
IMM GRANULOCYTES NFR BLD AUTO: 0.2 % — SIGNIFICANT CHANGE UP (ref 0.1–0.3)
INR BLD: 1.47 RATIO — HIGH (ref 0.65–1.3)
LYMPHOCYTES # BLD AUTO: 0.99 K/UL — LOW (ref 1.2–3.4)
LYMPHOCYTES # BLD AUTO: 22.1 % — SIGNIFICANT CHANGE UP (ref 20.5–51.1)
MAGNESIUM SERPL-MCNC: 2.1 MG/DL — SIGNIFICANT CHANGE UP (ref 1.8–2.4)
MCHC RBC-ENTMCNC: 35.3 G/DL — SIGNIFICANT CHANGE UP (ref 32–37)
MCHC RBC-ENTMCNC: 37.7 PG — HIGH (ref 27–31)
MCV RBC AUTO: 106.7 FL — HIGH (ref 80–94)
MELD SCORE WITH DIALYSIS: 28 POINTS — SIGNIFICANT CHANGE UP
MELD SCORE WITHOUT DIALYSIS: 15 POINTS — SIGNIFICANT CHANGE UP
MONOCYTES # BLD AUTO: 0.52 K/UL — SIGNIFICANT CHANGE UP (ref 0.1–0.6)
MONOCYTES NFR BLD AUTO: 11.6 % — HIGH (ref 1.7–9.3)
NEUTROPHILS # BLD AUTO: 2.88 K/UL — SIGNIFICANT CHANGE UP (ref 1.4–6.5)
NEUTROPHILS NFR BLD AUTO: 64.3 % — SIGNIFICANT CHANGE UP (ref 42.2–75.2)
NRBC # BLD: 0 /100 WBCS — SIGNIFICANT CHANGE UP (ref 0–0)
NRBC BLD-RTO: 0 /100 WBCS — SIGNIFICANT CHANGE UP (ref 0–0)
PLATELET # BLD AUTO: 51 K/UL — LOW (ref 130–400)
PMV BLD: 12 FL — HIGH (ref 7.4–10.4)
POTASSIUM SERPL-MCNC: 3.4 MMOL/L — LOW (ref 3.5–5)
POTASSIUM SERPL-MCNC: 4.3 MMOL/L — SIGNIFICANT CHANGE UP (ref 3.5–5)
POTASSIUM SERPL-SCNC: 3.4 MMOL/L — LOW (ref 3.5–5)
POTASSIUM SERPL-SCNC: 4.3 MMOL/L — SIGNIFICANT CHANGE UP (ref 3.5–5)
PROT SERPL-MCNC: 6 G/DL — SIGNIFICANT CHANGE UP (ref 6–8)
PROTHROM AB SERPL-ACNC: 17.5 SEC — HIGH (ref 9.95–12.87)
RBC # BLD: 3.29 M/UL — LOW (ref 4.7–6.1)
RBC # FLD: 15.4 % — HIGH (ref 11.5–14.5)
SODIUM SERPL-SCNC: 137 MMOL/L — SIGNIFICANT CHANGE UP (ref 135–146)
SODIUM SERPL-SCNC: 139 MMOL/L — SIGNIFICANT CHANGE UP (ref 135–146)
WBC # BLD: 4.48 K/UL — LOW (ref 4.8–10.8)
WBC # FLD AUTO: 4.48 K/UL — LOW (ref 4.8–10.8)

## 2025-01-25 PROCEDURE — 99232 SBSQ HOSP IP/OBS MODERATE 35: CPT

## 2025-01-25 RX ORDER — POTASSIUM CHLORIDE 750 MG/1
20 TABLET, EXTENDED RELEASE ORAL
Refills: 0 | Status: COMPLETED | OUTPATIENT
Start: 2025-01-25 | End: 2025-01-25

## 2025-01-25 RX ADMIN — Medication 500 MILLIGRAM(S): at 11:16

## 2025-01-25 RX ADMIN — TAMSULOSIN HYDROCHLORIDE 0.4 MILLIGRAM(S): 0.4 CAPSULE ORAL at 21:24

## 2025-01-25 RX ADMIN — Medication 1 MILLIGRAM(S): at 11:16

## 2025-01-25 RX ADMIN — COLCHICINE 0.6 MILLIGRAM(S): 0.6 TABLET ORAL at 05:49

## 2025-01-25 RX ADMIN — POTASSIUM CHLORIDE 20 MILLIEQUIVALENT(S): 750 TABLET, EXTENDED RELEASE ORAL at 11:16

## 2025-01-25 RX ADMIN — Medication 20 MILLIGRAM(S): at 10:51

## 2025-01-25 RX ADMIN — Medication 1 DROP(S): at 18:09

## 2025-01-25 RX ADMIN — GABAPENTIN 200 MILLIGRAM(S): 800 TABLET ORAL at 17:16

## 2025-01-25 RX ADMIN — PREDNISONE 40 MILLIGRAM(S): 5 TABLET ORAL at 05:50

## 2025-01-25 RX ADMIN — COLCHICINE 0.6 MILLIGRAM(S): 0.6 TABLET ORAL at 17:16

## 2025-01-25 RX ADMIN — Medication 25 MILLIGRAM(S): at 05:49

## 2025-01-25 RX ADMIN — Medication 400 MILLIGRAM(S): at 07:53

## 2025-01-25 RX ADMIN — Medication 20 MILLIGRAM(S): at 05:56

## 2025-01-25 RX ADMIN — Medication 2 TABLET(S): at 21:24

## 2025-01-25 RX ADMIN — PANTOPRAZOLE 40 MILLIGRAM(S): 20 TABLET, DELAYED RELEASE ORAL at 05:50

## 2025-01-25 RX ADMIN — Medication 20 MILLIGRAM(S): at 16:42

## 2025-01-25 RX ADMIN — Medication 1 TABLET(S): at 11:15

## 2025-01-25 RX ADMIN — Medication 1000 MICROGRAM(S): at 11:17

## 2025-01-25 RX ADMIN — Medication 10 MILLIGRAM(S): at 11:16

## 2025-01-25 RX ADMIN — Medication 100 MILLIGRAM(S): at 11:16

## 2025-01-25 RX ADMIN — Medication 400 MILLIGRAM(S): at 16:41

## 2025-01-25 RX ADMIN — Medication 50 MILLIGRAM(S): at 11:16

## 2025-01-25 RX ADMIN — POTASSIUM CHLORIDE 20 MILLIEQUIVALENT(S): 750 TABLET, EXTENDED RELEASE ORAL at 10:51

## 2025-01-25 RX ADMIN — GABAPENTIN 200 MILLIGRAM(S): 800 TABLET ORAL at 05:49

## 2025-01-25 RX ADMIN — ANTISEPTIC SURGICAL SCRUB 1 APPLICATION(S): 0.04 SOLUTION TOPICAL at 05:57

## 2025-01-25 RX ADMIN — Medication 400 MILLIGRAM(S): at 11:58

## 2025-01-25 NOTE — PROGRESS NOTE ADULT - ASSESSMENT
64-year-old man PMH EtOH use disorder, DVT/PE no longer on Coumadin, BPH, hypertension, DL, DM, gout,  presented to the ED with abdominal pain of 1 day duration.    #Alcohol withdrawal  #Lactic acidosis, likely starvation ketosis  #High anion gap metabolic acidosis secondary to lactic acidosis  Imaging CTAP:  Steatosis. Mild caudate lobe hypertrophy. Cirrhosis is not   excluded. Atherosclerotic changes. Severe stenosis at the ostium of the   celiac artery which remains patent. The superior mesenteric artery,   bilateral renal arteries and inferior mesenteric artery are widely patent.  Unchanged prominent portacaval and gastrohepatic lymph nodes.  - Last drink 1d prior to admission  - Lactate trended down to 2.5 from 16  - zofran prn  - check ecg and correct qtc if needed  - fall and aspiration precautions  - PT Eval - No skilled PT needs  - d/c [facility] protocol    #Epigastric pain  - complains of acute epigastric pain that started tonight, denies radiation, does not note any aggravating or relieving factors  - lipase - 52  - RUQ sono - Heterogeneously echogenic liver which may reflect hepatic   steatosis reflect underlying hepatocellular disease. Cholelithiasis.  - pain resolved now    #Transaminitis  #Hx of Cirrhosis  #Alcoholic hepatitis   - Trend LFTs  - low Na diet  - RUQ sono   - Daily MELD labs  - Limit tylenol to 1.5g/day  - Will need EGD as OP for variceal screening  - not immune to hepatitis A and B. Advised vaccination  - abd US to eval ascites - No significant ascites  - MDF score 35   - c/w prednisone 40mg QD  -  Will need Lille score checked D4 or 7 and if Lille responder would continue for 28 days followed by rapid taper over 4 days  - hepatology following      #Thrombocytopenia:  #Leukopenia  #Macrocytic anemia  - platelets 109 -> 48 -> 34 -> 31 -> 27 -> 33 -> 51k today  - f/u coag panel, fibrinogen, D dimer, Retics, Hapto, Iron panel, ferritin, Vitamin B12, folate.  - HIT antibody - negative  - Thrombocytopenia is likely d/t bone marrow suppression from alcohol use/cirrhosis of liver  - f/u heme onc      # History of DVT/PE, recurrent   # Reported history of failure of eliquis in 2022 (Note from 2023 reports this)  -Not taking coumadin for last month d/t ?epistaxis  -Doppler shows chronic DVT of popliteal   -pt not aware of his anticoagulation history  -hold AC in the setting of thrombocytopenia       #Chronic b/l lower abdominal pain  Possibly related to chronic alcohol use  - GI fu outpatient  - Trial bentyl 20 TID with meals  - PPI BID      #metamyelocytes present in differential as well as prominent portocaval and gastrohepatic LN  - o/p oncology fu      DVT PPX, on hold  #Progress Note Handoff  Pending (specify): CIWA monitoring  Disposition: Med-surg    pending: clinical improvement, daily LFTs, dc planning

## 2025-01-25 NOTE — PROGRESS NOTE ADULT - SUBJECTIVE AND OBJECTIVE BOX
SUBJECTIVE/OVERNIGHT EVENTS  Today is hospital day 6d. This morning patient was seen and examined at bedside, resting comfortably in bed. No acute or major events overnight.    MEDICATIONS  STANDING MEDICATIONS  cetirizine 10 milliGRAM(s) Oral daily  chlorhexidine 2% Cloths 1 Application(s) Topical <User Schedule>  colchicine 0.6 milliGRAM(s) Oral two times a day  cyanocobalamin 1000 MICROGram(s) Oral daily  dicyclomine 20 milliGRAM(s) Oral three times a day before meals  folic acid 1 milliGRAM(s) Oral daily  gabapentin 200 milliGRAM(s) Oral two times a day  magnesium oxide 400 milliGRAM(s) Oral three times a day with meals  methocarbamol 500 milliGRAM(s) Oral daily  metoprolol succinate ER 25 milliGRAM(s) Oral daily  multivitamin 1 Tablet(s) Oral daily  naltrexone 50 milliGRAM(s) Oral daily  pantoprazole    Tablet 40 milliGRAM(s) Oral before breakfast  potassium chloride    Tablet ER 20 milliEquivalent(s) Oral every 2 hours  predniSONE   Tablet 40 milliGRAM(s) Oral daily  senna 2 Tablet(s) Oral at bedtime  tamsulosin 0.4 milliGRAM(s) Oral at bedtime  thiamine 100 milliGRAM(s) Oral daily    PRN MEDICATIONS  simethicone 80 milliGRAM(s) Chew every 6 hours PRN    VITALS  T(F): 97.7 (01-25-25 @ 07:20), Max: 98.4 (01-25-25 @ 00:30)  HR: 76 (01-25-25 @ 07:20) (76 - 87)  BP: 130/67 (01-25-25 @ 07:20) (113/83 - 142/73)  RR: 18 (01-25-25 @ 07:20) (18 - 18)  SpO2: 96% (01-25-25 @ 07:20) (96% - 97%)    PHYSICAL EXAM      LABS             12.4   4.48  )-----------( 51       ( 01-25-25 @ 06:26 )             35.1     139  |  106  |  17  -------------------------<  94   01-25-25 @ 06:26  3.4  |  23  |  1.0    Ca      8.6     01-25-25 @ 06:26  Mg     2.1     01-25-25 @ 06:26    TPro  6.0  /  Alb  3.4  /  TBili  3.0  /  DBili  1.4  /  AST  55  /  ALT  26  /  AlkPhos  127  /  GGT  x     01-25-25 @ 06:26    PT/INR - ( 01-25-25 @ 06:26 )   PT: 17.50 sec[H];   INR: 1.47 ratio[H]  PTT - ( 01-25-25 @ 06:26 )  PTT:42.9 sec      Urinalysis Basic - ( 25 Jan 2025 06:26 )    Color: x / Appearance: x / SG: x / pH: x  Gluc: 94 mg/dL / Ketone: x  / Bili: x / Urobili: x   Blood: x / Protein: x / Nitrite: x   Leuk Esterase: x / RBC: x / WBC x   Sq Epi: x / Non Sq Epi: x / Bacteria: x          IMAGING

## 2025-01-26 LAB
ALBUMIN SERPL ELPH-MCNC: 3.4 G/DL — LOW (ref 3.5–5.2)
ALP SERPL-CCNC: 128 U/L — HIGH (ref 30–115)
ALT FLD-CCNC: 31 U/L — SIGNIFICANT CHANGE UP (ref 0–41)
ANION GAP SERPL CALC-SCNC: 12 MMOL/L — SIGNIFICANT CHANGE UP (ref 7–14)
APTT BLD: 45.3 SEC — HIGH (ref 27–39.2)
AST SERPL-CCNC: 63 U/L — HIGH (ref 0–41)
BASOPHILS # BLD AUTO: 0.01 K/UL — SIGNIFICANT CHANGE UP (ref 0–0.2)
BASOPHILS NFR BLD AUTO: 0.2 % — SIGNIFICANT CHANGE UP (ref 0–1)
BILIRUB SERPL-MCNC: 2.6 MG/DL — HIGH (ref 0.2–1.2)
BUN SERPL-MCNC: 16 MG/DL — SIGNIFICANT CHANGE UP (ref 10–20)
CALCIUM SERPL-MCNC: 8.5 MG/DL — SIGNIFICANT CHANGE UP (ref 8.4–10.5)
CHLORIDE SERPL-SCNC: 102 MMOL/L — SIGNIFICANT CHANGE UP (ref 98–110)
CO2 SERPL-SCNC: 23 MMOL/L — SIGNIFICANT CHANGE UP (ref 17–32)
CREAT SERPL-MCNC: 0.9 MG/DL — SIGNIFICANT CHANGE UP (ref 0.7–1.5)
EGFR: 95 ML/MIN/1.73M2 — SIGNIFICANT CHANGE UP
EOSINOPHIL # BLD AUTO: 0.04 K/UL — SIGNIFICANT CHANGE UP (ref 0–0.7)
EOSINOPHIL NFR BLD AUTO: 0.9 % — SIGNIFICANT CHANGE UP (ref 0–8)
GLUCOSE SERPL-MCNC: 93 MG/DL — SIGNIFICANT CHANGE UP (ref 70–99)
HCT VFR BLD CALC: 35.9 % — LOW (ref 42–52)
HGB BLD-MCNC: 12.5 G/DL — LOW (ref 14–18)
IMM GRANULOCYTES NFR BLD AUTO: 0.4 % — HIGH (ref 0.1–0.3)
INR BLD: 1.36 RATIO — HIGH (ref 0.65–1.3)
LYMPHOCYTES # BLD AUTO: 1.06 K/UL — LOW (ref 1.2–3.4)
LYMPHOCYTES # BLD AUTO: 23.2 % — SIGNIFICANT CHANGE UP (ref 20.5–51.1)
MAGNESIUM SERPL-MCNC: 1.9 MG/DL — SIGNIFICANT CHANGE UP (ref 1.8–2.4)
MCHC RBC-ENTMCNC: 34.8 G/DL — SIGNIFICANT CHANGE UP (ref 32–37)
MCHC RBC-ENTMCNC: 37.7 PG — HIGH (ref 27–31)
MCV RBC AUTO: 108.1 FL — HIGH (ref 80–94)
MELD SCORE WITH DIALYSIS: 27 POINTS — SIGNIFICANT CHANGE UP
MELD SCORE WITHOUT DIALYSIS: 13 POINTS — SIGNIFICANT CHANGE UP
MONOCYTES # BLD AUTO: 0.53 K/UL — SIGNIFICANT CHANGE UP (ref 0.1–0.6)
MONOCYTES NFR BLD AUTO: 11.6 % — HIGH (ref 1.7–9.3)
NEUTROPHILS # BLD AUTO: 2.9 K/UL — SIGNIFICANT CHANGE UP (ref 1.4–6.5)
NEUTROPHILS NFR BLD AUTO: 63.7 % — SIGNIFICANT CHANGE UP (ref 42.2–75.2)
NRBC # BLD: 0 /100 WBCS — SIGNIFICANT CHANGE UP (ref 0–0)
NRBC BLD-RTO: 0 /100 WBCS — SIGNIFICANT CHANGE UP (ref 0–0)
PLATELET # BLD AUTO: 47 K/UL — LOW (ref 130–400)
PMV BLD: 12 FL — HIGH (ref 7.4–10.4)
POTASSIUM SERPL-MCNC: 3.6 MMOL/L — SIGNIFICANT CHANGE UP (ref 3.5–5)
POTASSIUM SERPL-SCNC: 3.6 MMOL/L — SIGNIFICANT CHANGE UP (ref 3.5–5)
PROT SERPL-MCNC: 5.9 G/DL — LOW (ref 6–8)
PROTHROM AB SERPL-ACNC: 16.1 SEC — HIGH (ref 9.95–12.87)
RBC # BLD: 3.32 M/UL — LOW (ref 4.7–6.1)
RBC # FLD: 15.7 % — HIGH (ref 11.5–14.5)
SODIUM SERPL-SCNC: 137 MMOL/L — SIGNIFICANT CHANGE UP (ref 135–146)
WBC # BLD: 4.56 K/UL — LOW (ref 4.8–10.8)
WBC # FLD AUTO: 4.56 K/UL — LOW (ref 4.8–10.8)

## 2025-01-26 PROCEDURE — 99232 SBSQ HOSP IP/OBS MODERATE 35: CPT

## 2025-01-26 RX ADMIN — Medication 20 MILLIGRAM(S): at 11:13

## 2025-01-26 RX ADMIN — Medication 400 MILLIGRAM(S): at 13:13

## 2025-01-26 RX ADMIN — Medication 20 MILLIGRAM(S): at 17:40

## 2025-01-26 RX ADMIN — Medication 10 MILLIGRAM(S): at 11:13

## 2025-01-26 RX ADMIN — Medication 400 MILLIGRAM(S): at 17:40

## 2025-01-26 RX ADMIN — GABAPENTIN 200 MILLIGRAM(S): 800 TABLET ORAL at 05:43

## 2025-01-26 RX ADMIN — COLCHICINE 0.6 MILLIGRAM(S): 0.6 TABLET ORAL at 17:39

## 2025-01-26 RX ADMIN — Medication 100 MILLIGRAM(S): at 11:14

## 2025-01-26 RX ADMIN — Medication 50 MILLIGRAM(S): at 11:14

## 2025-01-26 RX ADMIN — PANTOPRAZOLE 40 MILLIGRAM(S): 20 TABLET, DELAYED RELEASE ORAL at 05:43

## 2025-01-26 RX ADMIN — Medication 25 MILLIGRAM(S): at 05:43

## 2025-01-26 RX ADMIN — Medication 1000 MICROGRAM(S): at 11:15

## 2025-01-26 RX ADMIN — ANTISEPTIC SURGICAL SCRUB 1 APPLICATION(S): 0.04 SOLUTION TOPICAL at 05:48

## 2025-01-26 RX ADMIN — Medication 500 MILLIGRAM(S): at 11:13

## 2025-01-26 RX ADMIN — Medication 2 TABLET(S): at 21:02

## 2025-01-26 RX ADMIN — GABAPENTIN 200 MILLIGRAM(S): 800 TABLET ORAL at 17:39

## 2025-01-26 RX ADMIN — Medication 1 TABLET(S): at 11:13

## 2025-01-26 RX ADMIN — PREDNISONE 40 MILLIGRAM(S): 5 TABLET ORAL at 05:43

## 2025-01-26 RX ADMIN — Medication 1 DROP(S): at 06:19

## 2025-01-26 RX ADMIN — Medication 1 MILLIGRAM(S): at 11:12

## 2025-01-26 RX ADMIN — Medication 20 MILLIGRAM(S): at 05:43

## 2025-01-26 RX ADMIN — COLCHICINE 0.6 MILLIGRAM(S): 0.6 TABLET ORAL at 05:43

## 2025-01-26 RX ADMIN — TAMSULOSIN HYDROCHLORIDE 0.4 MILLIGRAM(S): 0.4 CAPSULE ORAL at 21:02

## 2025-01-26 RX ADMIN — Medication 400 MILLIGRAM(S): at 07:48

## 2025-01-26 NOTE — PROGRESS NOTE ADULT - ASSESSMENT
# alcoholic hepatitis  bilirubin level improving  MDF- 32  started on steroids- cont for 4 weeks    # alcohol use disorder  # alcohol withdrawal symptoms- improved  ciwa monitoring  catch team consult    # lactic acidosis- resolved    # hepatic steatosis; possible cirrhosis  hepatology following  OP follow up    # thrombocytopenia- due to BM suppression and possible cirrhosis  hem evaluated  monitor plt    # h/o DVT and PE  doppler showing chronic DVT  not on anticoagulation  hem evaluated- reported eliquis failure?; to start coumadin once plt stable  discussed with patient in detail. patient states his platelets were low and was advised to stop medications. discussed risks and benefits. patient prefers to follow up with PCP and to start medications if platelet levels are stable. Discussed thromboembolic risk and my recommendations to start anticoagulation with coumadain once plt levels are more than 50 K, Patient verbalized understanding. But prefers to follow up with PCP    Time spent 40 mnts  Discharge plan possibly on Monday .   PFD

## 2025-01-26 NOTE — PROGRESS NOTE ADULT - SUBJECTIVE AND OBJECTIVE BOX
HPI  Patient is a 64y old Male who presents with a chief complaint of   Currently admitted to medicine with the primary diagnosis of Alcohol withdrawal       Today is hospital day 7d.     INTERVAL HPI / OVERNIGHT EVENTS:  Patient was seen and examined at bedside    Patient Feels better  No new complaints  Denies any complains of chest pain or shortness of breath  Denies any abdominal pain/nausea/vomiting  discussed anticoagulation options      PAST MEDICAL & SURGICAL HISTORY  Alcohol dependence    HTN (hypertension)    Hard of hearing    Seasonal allergies    BPH (benign prostatic hyperplasia)    GERD (gastroesophageal reflux disease)    Pseudogout    History of deep vein thrombosis (DVT) of lower extremity    No significant past surgical history      ALLERGIES  shellfish (Hives)  Beef (Hives)  dairy products (Hives)  No Known Drug Allergies    MEDICATIONS  STANDING MEDICATIONS  cetirizine 10 milliGRAM(s) Oral daily  chlorhexidine 2% Cloths 1 Application(s) Topical <User Schedule>  colchicine 0.6 milliGRAM(s) Oral two times a day  cyanocobalamin 1000 MICROGram(s) Oral daily  dicyclomine 20 milliGRAM(s) Oral three times a day before meals  folic acid 1 milliGRAM(s) Oral daily  gabapentin 200 milliGRAM(s) Oral two times a day  magnesium oxide 400 milliGRAM(s) Oral three times a day with meals  methocarbamol 500 milliGRAM(s) Oral daily  metoprolol succinate ER 25 milliGRAM(s) Oral daily  multivitamin 1 Tablet(s) Oral daily  naltrexone 50 milliGRAM(s) Oral daily  pantoprazole    Tablet 40 milliGRAM(s) Oral before breakfast  predniSONE   Tablet 40 milliGRAM(s) Oral daily  senna 2 Tablet(s) Oral at bedtime  tamsulosin 0.4 milliGRAM(s) Oral at bedtime  thiamine 100 milliGRAM(s) Oral daily    PRN MEDICATIONS  artificial tears (preservative free) Ophthalmic Solution 1 Drop(s) Both EYES three times a day PRN  simethicone 80 milliGRAM(s) Chew every 6 hours PRN    VITALS:  T(F): 98.1  HR: 77  BP: 126/74  RR: 18  SpO2: 97%    PHYSICAL EXAM  GEN: no distress, comfortable  PULM: normal respiration  EXT: No lower extremity edema  NEURO: A&Ox3, moving all extremities    LABS                        12.5   4.56  )-----------( 47       ( 26 Jan 2025 06:17 )             35.9     01-26    137  |  102  |  16  ----------------------------<  93  3.6   |  23  |  0.9    Ca    8.5      26 Jan 2025 06:17  Mg     1.9     01-26    TPro  5.9[L]  /  Alb  3.4[L]  /  TBili  2.6[H]  /  DBili  1.3[H]  /  AST  63[H]  /  ALT  31  /  AlkPhos  128[H]  01-26    PT/INR - ( 26 Jan 2025 06:17 )   PT: 16.10 sec;   INR: 1.36 ratio         PTT - ( 26 Jan 2025 06:17 )  PTT:45.3 sec  Urinalysis Basic - ( 26 Jan 2025 06:17 )    Color: x / Appearance: x / SG: x / pH: x  Gluc: 93 mg/dL / Ketone: x  / Bili: x / Urobili: x   Blood: x / Protein: x / Nitrite: x   Leuk Esterase: x / RBC: x / WBC x   Sq Epi: x / Non Sq Epi: x / Bacteria: x                RADIOLOGY

## 2025-01-27 ENCOUNTER — NON-APPOINTMENT (OUTPATIENT)
Age: 65
End: 2025-01-27

## 2025-01-27 ENCOUNTER — TRANSCRIPTION ENCOUNTER (OUTPATIENT)
Age: 65
End: 2025-01-27

## 2025-01-27 VITALS
TEMPERATURE: 98 F | RESPIRATION RATE: 18 BRPM | OXYGEN SATURATION: 96 % | DIASTOLIC BLOOD PRESSURE: 71 MMHG | SYSTOLIC BLOOD PRESSURE: 118 MMHG | HEART RATE: 88 BPM

## 2025-01-27 LAB
ALBUMIN SERPL ELPH-MCNC: 3.5 G/DL — SIGNIFICANT CHANGE UP (ref 3.5–5.2)
ALP SERPL-CCNC: 134 U/L — HIGH (ref 30–115)
ALT FLD-CCNC: 51 U/L — HIGH (ref 0–41)
ANION GAP SERPL CALC-SCNC: 11 MMOL/L — SIGNIFICANT CHANGE UP (ref 7–14)
APTT BLD: 42.7 SEC — HIGH (ref 27–39.2)
AST SERPL-CCNC: 96 U/L — HIGH (ref 0–41)
BASOPHILS # BLD AUTO: 0.02 K/UL — SIGNIFICANT CHANGE UP (ref 0–0.2)
BASOPHILS NFR BLD AUTO: 0.5 % — SIGNIFICANT CHANGE UP (ref 0–1)
BILIRUB SERPL-MCNC: 2.3 MG/DL — HIGH (ref 0.2–1.2)
BUN SERPL-MCNC: 14 MG/DL — SIGNIFICANT CHANGE UP (ref 10–20)
CALCIUM SERPL-MCNC: 9 MG/DL — SIGNIFICANT CHANGE UP (ref 8.4–10.5)
CHLORIDE SERPL-SCNC: 105 MMOL/L — SIGNIFICANT CHANGE UP (ref 98–110)
CO2 SERPL-SCNC: 23 MMOL/L — SIGNIFICANT CHANGE UP (ref 17–32)
CREAT SERPL-MCNC: 0.9 MG/DL — SIGNIFICANT CHANGE UP (ref 0.7–1.5)
EGFR: 95 ML/MIN/1.73M2 — SIGNIFICANT CHANGE UP
EOSINOPHIL # BLD AUTO: 0.05 K/UL — SIGNIFICANT CHANGE UP (ref 0–0.7)
EOSINOPHIL NFR BLD AUTO: 1.3 % — SIGNIFICANT CHANGE UP (ref 0–8)
GLUCOSE SERPL-MCNC: 87 MG/DL — SIGNIFICANT CHANGE UP (ref 70–99)
HCT VFR BLD CALC: 35.6 % — LOW (ref 42–52)
HGB BLD-MCNC: 12.4 G/DL — LOW (ref 14–18)
IMM GRANULOCYTES NFR BLD AUTO: 0.3 % — SIGNIFICANT CHANGE UP (ref 0.1–0.3)
INR BLD: 1.39 RATIO — HIGH (ref 0.65–1.3)
LYMPHOCYTES # BLD AUTO: 1.2 K/UL — SIGNIFICANT CHANGE UP (ref 1.2–3.4)
LYMPHOCYTES # BLD AUTO: 31.7 % — SIGNIFICANT CHANGE UP (ref 20.5–51.1)
MAGNESIUM SERPL-MCNC: 2.1 MG/DL — SIGNIFICANT CHANGE UP (ref 1.8–2.4)
MCHC RBC-ENTMCNC: 34.8 G/DL — SIGNIFICANT CHANGE UP (ref 32–37)
MCHC RBC-ENTMCNC: 37.3 PG — HIGH (ref 27–31)
MCV RBC AUTO: 107.2 FL — HIGH (ref 80–94)
MELD SCORE WITH DIALYSIS: 27 POINTS — SIGNIFICANT CHANGE UP
MELD SCORE WITHOUT DIALYSIS: 13 POINTS — SIGNIFICANT CHANGE UP
MONOCYTES # BLD AUTO: 0.46 K/UL — SIGNIFICANT CHANGE UP (ref 0.1–0.6)
MONOCYTES NFR BLD AUTO: 12.1 % — HIGH (ref 1.7–9.3)
NEUTROPHILS # BLD AUTO: 2.05 K/UL — SIGNIFICANT CHANGE UP (ref 1.4–6.5)
NEUTROPHILS NFR BLD AUTO: 54.1 % — SIGNIFICANT CHANGE UP (ref 42.2–75.2)
NRBC # BLD: 0 /100 WBCS — SIGNIFICANT CHANGE UP (ref 0–0)
NRBC BLD-RTO: 0 /100 WBCS — SIGNIFICANT CHANGE UP (ref 0–0)
PLATELET # BLD AUTO: 56 K/UL — LOW (ref 130–400)
PMV BLD: 12.7 FL — HIGH (ref 7.4–10.4)
POTASSIUM SERPL-MCNC: 3.7 MMOL/L — SIGNIFICANT CHANGE UP (ref 3.5–5)
POTASSIUM SERPL-SCNC: 3.7 MMOL/L — SIGNIFICANT CHANGE UP (ref 3.5–5)
PROT SERPL-MCNC: 6.2 G/DL — SIGNIFICANT CHANGE UP (ref 6–8)
PROTHROM AB SERPL-ACNC: 16.5 SEC — HIGH (ref 9.95–12.87)
RBC # BLD: 3.32 M/UL — LOW (ref 4.7–6.1)
RBC # FLD: 15.4 % — HIGH (ref 11.5–14.5)
SODIUM SERPL-SCNC: 139 MMOL/L — SIGNIFICANT CHANGE UP (ref 135–146)
WBC # BLD: 3.79 K/UL — LOW (ref 4.8–10.8)
WBC # FLD AUTO: 3.79 K/UL — LOW (ref 4.8–10.8)

## 2025-01-27 PROCEDURE — 99239 HOSP IP/OBS DSCHRG MGMT >30: CPT

## 2025-01-27 PROCEDURE — 99231 SBSQ HOSP IP/OBS SF/LOW 25: CPT

## 2025-01-27 RX ORDER — DICYCLOMINE HCL 20 MG
1 TABLET ORAL
Qty: 15 | Refills: 0
Start: 2025-01-27 | End: 2025-01-31

## 2025-01-27 RX ORDER — PREDNISONE 5 MG/1
2 TABLET ORAL
Qty: 46 | Refills: 0
Start: 2025-01-27 | End: 2025-02-18

## 2025-01-27 RX ADMIN — COLCHICINE 0.6 MILLIGRAM(S): 0.6 TABLET ORAL at 05:53

## 2025-01-27 RX ADMIN — Medication 1 TABLET(S): at 11:15

## 2025-01-27 RX ADMIN — PANTOPRAZOLE 40 MILLIGRAM(S): 20 TABLET, DELAYED RELEASE ORAL at 05:54

## 2025-01-27 RX ADMIN — Medication 25 MILLIGRAM(S): at 05:53

## 2025-01-27 RX ADMIN — Medication 10 MILLIGRAM(S): at 11:14

## 2025-01-27 RX ADMIN — Medication 400 MILLIGRAM(S): at 09:18

## 2025-01-27 RX ADMIN — GABAPENTIN 200 MILLIGRAM(S): 800 TABLET ORAL at 05:52

## 2025-01-27 RX ADMIN — Medication 100 MILLIGRAM(S): at 11:16

## 2025-01-27 RX ADMIN — PREDNISONE 40 MILLIGRAM(S): 5 TABLET ORAL at 05:52

## 2025-01-27 RX ADMIN — Medication 20 MILLIGRAM(S): at 05:54

## 2025-01-27 RX ADMIN — ANTISEPTIC SURGICAL SCRUB 1 APPLICATION(S): 0.04 SOLUTION TOPICAL at 05:57

## 2025-01-27 RX ADMIN — Medication 1 DROP(S): at 09:18

## 2025-01-27 RX ADMIN — Medication 1 MILLIGRAM(S): at 11:16

## 2025-01-27 RX ADMIN — Medication 500 MILLIGRAM(S): at 11:15

## 2025-01-27 RX ADMIN — Medication 1000 MICROGRAM(S): at 11:16

## 2025-01-27 RX ADMIN — Medication 20 MILLIGRAM(S): at 11:15

## 2025-01-27 NOTE — DISCHARGE NOTE PROVIDER - NSCORESITESY/N_GEN_A_CORE_RD
norco  Last visit: 11/15/18  Follow up visit: none  Last refill: 3/12/19    Okay to refill?  MD advise     No

## 2025-01-27 NOTE — PROGRESS NOTE ADULT - ATTENDING COMMENTS
Medicine Attending Addendum  Patient was seen and examined with medicine team.  Nursing records reviewed. I agree with the resident/PA/NP's note including past medical history, home medications, social history, allergies, surgical history, family history, and review of system. I have reviewed relevant vitals, laboratory values, imaging studies, and microbiology.   - Above resident's note was edited by me  - No acute complaints  - On [facility] protocol  - Cont to trend platelets; HIT panel neg. Heme onc the case.  - CT a/p reveals severe celiac artery stenosis -> will get US doppler of abdomen. If patent, outpatient follow up with vascular. If not, inpatient vascular consult  - Follow hepatology recs  - rest of A/P as per detailed housestaff note above except above modifications    Total time spent to complete patient's bedside assessment, reviewed medical chart, discussed medical plan of care with team was 45 minutes with >50% of time spent face to face with patient, discussion with patient/family and/or coordination of care.
# alcoholic hepatitis  MDF- 32  started on steroids- cont for 4 weeks    # alcohol use disorder  # alcohol withdrawal symptoms  improving  ciwa monitoring    # lactic acidosis- resolved    # hepatic steatosis; possible cirrhosis  hepatology following  OP follow up    # thrombocytopenia- due to BM suppression and possible cirrhosis  hem evaluated  monitor plt    # h/o DVT and PE  doppler showing chronic DVT  not on anticoagulation  hem evaluated- reported eliquis failure?; to start coumadin once plt stable- to discuss with patient    Time spent 40 mnts  Discharge plan possibly on Monday
# alcoholic hepatitis  bilirubin level improving  MDF- 31  started on steroids- cont for 4 weeks    # alcohol use disorder  # alcohol withdrawal symptoms- improved  improving  ciwa monitoring  patient counseled on alcohol use    # lactic acidosis- resolved    # hepatic steatosis; possible cirrhosis  hepatology following  OP follow up    # thrombocytopenia- due to BM suppression and possible cirrhosis  hem evaluated  monitor plt    # h/o DVT and PE  doppler showing chronic DVT  not on anticoagulation  hem evaluated- reported eliquis failure?; to start coumadin once plt stable- to discuss with patient    Time spent 40 mnts  Discharge plan possibly on Monday .
62 y o  M with hx of pseudogout AUD MISTY cirrhosis PE on AC  presented with abdominal pain and found to be in ketoacidosis.    Drinks a pint of beer daily. No DUI. Alcohol rehab few years ago.   Worked in [employer]? Retired.  Single no kids.  Lives in care home. No family support locally.     Reports lower abdominal pain.  Alert oriented x 3  Icteric  Abdomen soft non tender  No asterixis  Mild tremors+    Decompensated MISTY cirrhosis MELD 3.0 - 17   Alcoholic hepatitis   Alcoholic ketoacidosis  No ascites on CT  Macrocytic anemia    High MDF - can start prednisone  Alcohol rehab - declined  Will need EGD as OP  Not a likely LT candidate with poor social support but will review on OP follow up.
62 y o  M with hx of pseudogout AUD MISTY cirrhosis PE on AC  presented with abdominal pain and found to be in ketoacidosis.    Drinks a pint of beer daily. No DUI. Alcohol rehab few years ago.   Worked in [employer]? Retired.  Single no kids.  Lives in care home. No family support locally.     Decompensated MISTY cirrhosis MELD 3.0 - 17   Alcoholic hepatitis   Alcoholic ketoacidosis  No ascites on CT  Macrocytic anemia    High MDF - started on prednisone 40 mg daily.  Lille responder. Continue for 28 days followed by rapid taper over 4 days.  Alcohol rehab   Will need EGD as OP  Not a likely LT candidate with poor social support but will review on OP follow up.
62 y o  M with hx of pseudogout AUD MISTY cirrhosis PE on AC  presented with abdominal pain and found to be in ketoacidosis.    Drinks a pint of beer daily. No DUI. Alcohol rehab few years ago.   Worked in [place]? Retired.  Single no kids.  Lives in care home. No family support locally.     No complaints. Received lorazepam.     Alert oriented  Icteric  Abdomen soft non tender  No asterixis    Decompensated MISTY cirrhosis MELD 3.0 - 17   Alcoholic hepatitis   Alcoholic ketoacidosis  No ascites on CT  Macrocytic anemia    High MDF but would hold off steroids if in withdrawal  Alcohol rehab - declined  Will need EGD as OP  Not a likely LT candidate with poor social support but will review on OP follow up.
62 y o  M with hx of pseudogout AUD MISTY cirrhosis PE on AC  presented with abdominal pain and found to be in ketoacidosis.    Drinks a pint of beer daily. No DUI. Alcohol rehab few years ago.   Worked in [place]? Retired.  Single no kids.  Lives in care home. No family support locally.     Reports lower abdominal pain much better  Alert oriented x 3  Icteric  Abdomen soft non tender  No asterixis  Mild tremors+    Decompensated MISTY cirrhosis MELD 3.0 - 17   Alcoholic hepatitis   Alcoholic ketoacidosis  No ascites on CT  Macrocytic anemia    High MDF - started on prednisone 40 mg daily. Will need Lille score checked D4 or 7 and if Lille responder would continue for 28 days followed by rapid taper over 4 days.  Alcohol rehab advised again  Will need EGD as OP  Not a likely LT candidate with poor social support but will review on OP follow up.
IMPRESSION:    Thrombocytopenia   Abd pain - improved  High anion gap metabolic acidosis secondary to lactic acidosis  Similar presentation on prior admission   Acute kidney injury likely prerenal - resolved.   Nausea/vomiting - resolved.   Alcohol abuse disorder  hx of dvt on coumadin, INR noted    Plan as outlined above

## 2025-01-27 NOTE — DISCHARGE NOTE PROVIDER - CARE PROVIDER_API CALL
Eugene Tellez  Internal Medicine  [address] [place] [address]  Phone: [phone]  Fax: [phone]  Established Patient  Follow Up Time: 1 week    Albin Suarez  Internal Medicine  [address] [place] [address]  Phone: [phone]  Fax: [phone]  Follow Up Time: 1 week    Benny Hansen  Medical Oncology  [address] [place] [place] [address]  Phone: [phone]  Fax: [phone]  Follow Up Time: 2 weeks

## 2025-01-27 NOTE — DISCHARGE NOTE PROVIDER - PROVIDER TOKENS
PROVIDER:[TOKEN:[[id number]],FOLLOWUP:[1 week],ESTABLISHEDPATIENT:[T]],PROVIDER:[TOKEN:[[id number]],FOLLOWUP:[1 week]],PROVIDER:[TOKEN:[[id number]],FOLLOWUP:[2 weeks]]

## 2025-01-27 NOTE — PROGRESS NOTE ADULT - SUBJECTIVE AND OBJECTIVE BOX
Hepatology Follow Up Note      Location: [facility] 4B 005 A [facility]  Patient Name: CAT ALSTON  Age: 64y  Gender: Male      Chief Complaint  Patient is a 64y old Male who presents with a chief complaint of   Primary diagnosis of Alcohol use with withdrawal      Reason for Consult  Alcoholic hepatitis        Progress Note  This morning patient was seen and examined at bedside.    Today is hospital day 8d.  Patient is doing fine. No acute events overnight.   Patient's appetite is adequate, and he is tolerating diet and denies nausea or vomiting.   Last bowel movement was soft and was on 01/26.        Vital Signs in the last 24 hours   Vitals Summary T(C): 36.7 (01-27-25 @ 07:00), Max: 36.7 (01-27-25 @ 07:00)  HR: 88 (01-27-25 @ 07:00) (69 - 88)  BP: 118/71 (01-27-25 @ 07:00) (110/68 - 118/71)  RR: 18 (01-27-25 @ 07:00) (18 - 18)  SpO2: 96% (01-27-25 @ 07:00) (95% - 96%)  Vent Data   Intake/ Output   01-26-25 @ 07:01  -  01-27-25 @ 07:00  --------------------------------------------------------  IN: 780 mL / OUT: 0 mL / NET: 780 mL        Physical Exam  * General Appearance: Alert, cooperative, interactive, oriented to time, place, and person, in no acute distress  * Eyes: PERRL, conjunctiva/corneas clear, EOM's intact, fundi benign, both eyes  * Lungs: Good bilateral air entry, normal breath sounds   * Heart: Regular Rate and Rhythm, normal S1 and S2, no audible murmur, rub, or gallop  * Abdomen: Symmetric, non-distended, soft, non-tender, bowel sounds active all four quadrants  * Extremities: no lower extremity pitting edema bilaterally; no asterexis      Investigations   Laboratory Workup      - CBC:                        12.4   3.79  )-----------( 56       ( 27 Jan 2025 06:12 )             35.6       - Hgb Trend:  12.4  01-27-25 @ 06:12  12.5  01-26-25 @ 06:17  12.4  01-25-25 @ 06:26          - Chemistry:  01-27    139  |  105  |  14  ----------------------------<  87  3.7   |  23  |  0.9    Ca    9.0      27 Jan 2025 06:12  Mg     2.1     01-27    TPro  6.2  /  Alb  3.5  /  TBili  2.3[H]  /  DBili  1.1[H]  /  AST  96[H]  /  ALT  51[H]  /  AlkPhos  134[H]  01-27    Liver panel trend:  TBili 2.3   /   AST 96   /   ALT 51   /   AlkP 134   /   Tptn 6.2   /   Alb 3.5    /   DBili 1.1      01-27  TBili 2.6   /   AST 63   /   ALT 31   /   AlkP 128   /   Tptn 5.9   /   Alb 3.4    /   DBili 1.3      01-26  TBili 3.0   /   AST 55   /   ALT 26   /   AlkP 127   /   Tptn 6.0   /   Alb 3.4    /   DBili 1.4      01-25  TBili 4.5   /   AST 64   /   ALT 27   /   AlkP 125   /   Tptn 6.1   /   Alb 3.8    /   DBili --      01-24  TBili 5.1   /   AST 72   /   ALT 26   /   AlkP 135   /   Tptn 5.9   /   Alb 3.7    /   DBili 2.1      01-23  TBili 3.8   /   AST 78   /   ALT 27   /   AlkP 196   /   Tptn 6.5   /   Alb 3.9    /   DBili --      01-22  TBili 4.2   /   AST 83   /   ALT 24   /   AlkP 171   /   Tptn 6.2   /   Alb 3.8    /   DBili --      01-21  TBili 4.1   /   AST 80   /   ALT 25   /   AlkP 137   /   Tptn 6.8   /   Alb 4.0    /   DBili --      01-20  TBili 3.3   /   AST 85   /   ALT 25   /   AlkP 127   /   Tptn 6.1   /   Alb 3.8    /   DBili 1.5      01-20  TBili 2.9   /   AST 78   /   ALT 23   /   AlkP 154   /   Tptn 7.1   /   Alb 4.3    /   DBili --      01-19      - Coagulation Studies:  PT/INR - ( 27 Jan 2025 06:12 )   PT: 16.50 sec;   INR: 1.39 ratio         PTT - ( 27 Jan 2025 06:12 )  PTT:42.7 sec    - ABG:      - Cardiac Markers:        Microbiological Workup  Urinalysis Basic - ( 27 Jan 2025 06:12 )    Color: x / Appearance: x / SG: x / pH: x  Gluc: 87 mg/dL / Ketone: x  / Bili: x / Urobili: x   Blood: x / Protein: x / Nitrite: x   Leuk Esterase: x / RBC: x / WBC x   Sq Epi: x / Non Sq Epi: x / Bacteria: x          Radiological Workup            Current Medications  Standing Medications  cetirizine 10 milliGRAM(s) Oral daily  chlorhexidine 2% Cloths 1 Application(s) Topical <User Schedule>  colchicine 0.6 milliGRAM(s) Oral two times a day  cyanocobalamin 1000 MICROGram(s) Oral daily  dicyclomine 20 milliGRAM(s) Oral three times a day before meals  folic acid 1 milliGRAM(s) Oral daily  gabapentin 200 milliGRAM(s) Oral two times a day  magnesium oxide 400 milliGRAM(s) Oral three times a day with meals  methocarbamol 500 milliGRAM(s) Oral daily  metoprolol succinate ER 25 milliGRAM(s) Oral daily  multivitamin 1 Tablet(s) Oral daily  naltrexone 50 milliGRAM(s) Oral daily  pantoprazole    Tablet 40 milliGRAM(s) Oral before breakfast  predniSONE   Tablet 40 milliGRAM(s) Oral daily  senna 2 Tablet(s) Oral at bedtime  tamsulosin 0.4 milliGRAM(s) Oral at bedtime  thiamine 100 milliGRAM(s) Oral daily    PRN Medications  artificial tears (preservative free) Ophthalmic Solution 1 Drop(s) Both EYES three times a day PRN Dry Eyes  simethicone 80 milliGRAM(s) Chew every 6 hours PRN Indigestion    Singles Doses Administered  (ADM OVERRIDE) 1 each &lt;see task&gt; GiveOnce  (ADM OVERRIDE) 1 each &lt;see task&gt; GiveOnce  (ADM OVERRIDE) 1 each &lt;see task&gt; GiveOnce  (ADM OVERRIDE) 3 each &lt;see task&gt; GiveOnce  (ADM OVERRIDE) 1 each &lt;see task&gt; GiveOnce  (ADM OVERRIDE) 2 each &lt;see task&gt; GiveOnce  (ADM OVERRIDE) 1 each &lt;see task&gt; GiveOnce  (ADM OVERRIDE) 1 each &lt;see task&gt; GiveOnce  (ADM OVERRIDE) 1 each &lt;see task&gt; GiveOnce  diazepam  Injectable 10 milliGRAM(s) IV Push Once  diazepam  Injectable 20 milliGRAM(s) IV Push Once  diazepam  Injectable 10 milliGRAM(s) IV Push Once  folic acid 1 milliGRAM(s) Oral Once  influenza  Vaccine (HIGH DOSE) 0.5 milliLiter(s) IntraMuscular once  lactated ringers Bolus 1000 milliLiter(s) IV Bolus once  magnesium sulfate  IVPB 1 Gram(s) IV Intermittent once  magnesium sulfate  IVPB 2 Gram(s) IV Intermittent every 2 hours  ondansetron Injectable 4 milliGRAM(s) IV Push once  potassium chloride    Tablet ER 20 milliEquivalent(s) Oral every 2 hours  potassium phosphate / sodium phosphate Powder (PHOS-NaK) 1 Packet(s) Oral every 6 hours  sodium chloride 0.9% Bolus 1000 milliLiter(s) IV Bolus once  thiamine 100 milliGRAM(s) Oral Once  thiamine Injectable 200 milliGRAM(s) IV Push Once

## 2025-01-27 NOTE — PROGRESS NOTE ADULT - PROVIDER SPECIALTY LIST ADULT
Hepatology
Hospitalist
Internal Medicine
Critical Care
Hepatology
Hepatology
Hospitalist
Hospitalist
Internal Medicine
Internal Medicine
Hepatology
Internal Medicine

## 2025-01-27 NOTE — DISCHARGE NOTE PROVIDER - NSDCFUADDINST_GEN_ALL_CORE_FT
- outpt follow up with PCP to restart warfarin once platelet count is stable - outpt follow up with PCP to restart warfarin once platelet count is stable  - continue alcohol abstinence  - follow up with hepatologist in 1-2 weeks  follow up with hemato oncologist

## 2025-01-27 NOTE — DISCHARGE NOTE PROVIDER - CARE PROVIDERS DIRECT ADDRESSES
[email or web address]

## 2025-01-27 NOTE — PROGRESS NOTE ADULT - ASSESSMENT
Assessment and Plan  Case of a 64-year-old man with history of EtOH use disorder, DVT/PE no longer on Coumadin, BPH, HTN, DL, DM, pseudo-gout, MISTY cirrhosis, hx of alcohol withdrawal, and questionable thickening of cecum on CT 11/2024 who presented to the ED on 01/19 for evaluation of abdominal pain of 1 day duration, found to have elevated liver enzymes with severe celiac stenosis, admitted for alcoholic ketoacidosis. We are consulted for concern of alcoholic hepatitis.      Decompensated MISTY Cirrhosis with Caudate Hypertrophy MELD 3-17  Alcoholic Hepatitis with MDF 35 (Lille Responder on D4); Alcoholic Ketoacidosis and withdrawal; no HE  No ascites or HCC  Cholelithiasis and Severe Celiac Stenosis  Macrocytic Anemia with TNP; No SPM; Questionable Cecal Thickening on CT from 11/2023  Coagulopathy- Improved  * Hemodynamically stable  * Liver enzymes: 2.9/154/78/23 on 01/19 -> 5.1/135/72/26 on 01/23 -> 2.3/134/96/51 on 01/27  * INR 1.39 on 01/27 (was 5.22 on 11/10)  * Serum alcohol 195 on 01/19; urine drug screen - on 01/20  * Acute Hepatitis A/B/C unremarkable in 03/2023; HIV - 2023  * RUQ US abdomen 01/22 hepatic steatosis; liver 16.1cm; cholelithiasis/sludge; no ascites  * US abdomen 01/24 no ascites  * CT AP IC 01/19 hepatic steatosis; mild caudate hypertrophy can't exclude cirrhosis; severe celiac stenosis; prominent portacaval and GH LNs; no varices or ascites; no SPM    RECOMMENDATIONS  - Counseled about importance of Alcohol Cessation  - Trend Hepatic function panel and INR  - In setting of MDF >32, was started on PO prednisone 40mg QD on 01/23. Lille score on D4 on 01/26 was 0.167 indicating good response. Recommend completing a 28-day course of PO prednisone 40mg QD followed by a rapid taper over 4 days  - Monitor for withdrawal signs. Was on CIWA protocol  - Continue multivitamins, thiamine, folic acid, and cyanocobalamin supplementation  - Avoid hepatotoxic agents  - No Ascites: recommend serial exams and obtain diagnostic tap if ascites develops. Off diuretics  - Monitor for Hepatic encephalopathy: avoid constipation and target BM 3-4 per day. Continue bowel regimen. Avoid sedatives and opioids   - For Esophageal varices screening: recommend outpatient follow up for EGD. Target HR 55-65 bpm  - For HCC screening: recommend outpatient MRI abdomen w/wo contrast. Please f/u as outpatient for US abdomen and AFP every 6 months.  - For Vaccinations: Please f/u in clinic for being uptodate with HAV/HBV/Influenza/Pneumococcal vaccines.  - For severe celiac stenosis: recommend surgery evaluation  - Lifestyle/Dietary modifications: Calorie intake 25-40 Kcal/kg/day; Protein intake: 1.2-1.5 gm/kg/day  - Avoid smoking and NSAIDS; Abstain from alcohol and all illicit drugs  - Avoid use of herbal and dietary supplements due to potential hepatotoxicity. Limit use of acetaminophen to <2 grams per day. Avoid use of nonsteroidal antiinflammatory drugs (NSAIDs) . Avoid eating any unpasteurized dairy products; avoid eating any raw or undercooked eggs, fish, poultry, or meat; and avoid eating raw/steamed oysters or other shellfish to avoid risk of Vibrio infection.  - Trend CBC. Recommend outpatient follow up at our [facility] for colonoscopy in setting of questionable cecal thickening on CT 11/2024 (located at [address] [place] [address]. Phone Number: [phone])  - For liver transplant evaluation: not likely a candidate with poor social support but will review on outpatient follow up  - Follow up with our [facility] located at [address] [place] [address], [facility]. Telephone No: [phone]      Thank you for your consult.  - Please note that plan was communicated with medical team.   - Please reach GI on [phone] during weekdays till 5pm.  - Please call the GI service line after 5pm on Weekdays and anytime on Weekends: [phone].      Robert Jones MD  PGY - 5 Gastroenterology Fellow   [facility]

## 2025-01-27 NOTE — DISCHARGE NOTE PROVIDER - HOSPITAL COURSE
64-year-old man PMH EtOH use disorder, DVT/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     Patient states that he woke up this morning and felt queasy in his stomach.  Patient also reports palpitations as well as two episodes of vomiting.  Reports associated numbness and tingling in his fingertips.  No chest pain or difficulty breathing.  No leg pain or swelling.  No recent fevers.  Patient does admit to drinking half a pint to 1 pint of vodka every day, last drink was yesterday afternoon.  Patient does admit to previous alcohol withdrawals, states that this feels similar.  No other concerns.    It is worth noting the patient presented with a similar picture 2/2 alcoholic ketoacidosis in November.    Vitals in the ED WNL    Labs significant for .6, Serum osm 359, EtOH 195, VBG pH: 7.14  /  pCO2: 38    /  pO2: 47    / HCO3: 13    /  Lactate: >16,   ALT 23, AST 78, T Bili 2.9    Admitted to medicine for Lactic Acidosis and Alcoholic Ketoacidosis on Metformin and for alcohol withdrawal       #Alcohol withdrawal  #Lactic acidosis, likely starvation ketosis  #High anion gap metabolic acidosis secondary to lactic acidosis  Imaging CTAP:  Steatosis. Mild caudate lobe hypertrophy. Cirrhosis is not   excluded. Atherosclerotic changes. Severe stenosis at the ostium of the   celiac artery which remains patent. The superior mesenteric artery,   bilateral renal arteries and inferior mesenteric artery are widely patent.  Unchanged prominent portacaval and gastrohepatic lymph nodes.  - Last drink 1d prior to admission  - Lactate trended down to 2.5 from 16  - zofran prn  - fall and aspiration precautions  - PT Eval - No skilled PT needs  - d/c [facility] protocol    #Epigastric pain  - complains of acute epigastric pain that started tonight, denies radiation, does not note any aggravating or relieving factors  - lipase - 52  - RUQ sono - Heterogeneously echogenic liver which may reflect hepatic   steatosis reflect underlying hepatocellular disease. Cholelithiasis.  - pain resolved now    #Transaminitis  #hepatic steatosis; possible cirrhosis  #Alcoholic hepatitis   - Trend LFTs  - low Na diet  - RUQ sono   - Daily MELD labs  - Limit tylenol to 1.5g/day  - Will need EGD as OP for variceal screening  - not immune to hepatitis A and B. Advised vaccination  - abd US to eval ascites - No significant ascites  - MDF score 32   - c/w prednisone 40mg QD for a total of 4 weeks until 02/20/25  - Will need Lille score checked D4 or 7 and if Lille responder would continue for 28 days followed by rapid taper over 4 days  - hepatology following, OP hepatlogy f/u       #Thrombocytopenia:  #Leukopenia  #Macrocytic anemia  - platelets 109 -> 48 -> 34 -> 31 -> 27 -> 33 -> 51k -> 56k today  - Retics, Hapto, Iron panel, ferritin, Vitamin B12, folate noted  - DIC panel negative  - HIT antibody - negative  - Thrombocytopenia is likely d/t bone marrow suppression from alcohol use/cirrhosis of liver  - heme onc following      # History of DVT/PE, recurrent   # Reported history of failure of eliquis in 2022 (Note from 2023 reports this)  -Not taking coumadin for last month d/t ?epistaxis  -Doppler shows chronic DVT of popliteal   -pt not aware of his anticoagulation history  -hold AC in the setting of thrombocytopenia   - discussed with patient in detail. patient states his platelets were low and was advised to stop medications. discussed risks and benefits. patient prefers to follow up with PCP and to start medications if platelet levels are stable. Discussed thromboembolic risk and my recommendations to start anticoagulation with coumadain once plt levels are more than 50 K, Patient verbalized understanding. But prefers to follow up with PCP  - outpt follow up with PCP to restart warfarin once platelet count is stable        #Chronic b/l lower abdominal pain  Possibly related to chronic alcohol use  - GI fu outpatient  - Trial bentyl 20 TID with meals  - PPI QD      #metamyelocytes present in differential as well as prominent portocaval and gastrohepatic LN  - o/p oncology fu 64-year-old man PMH EtOH use disorder, DVT/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     Patient states that he woke up this morning and felt queasy in his stomach.  Patient also reports palpitations as well as two episodes of vomiting.  Reports associated numbness and tingling in his fingertips.  No chest pain or difficulty breathing.  No leg pain or swelling.  No recent fevers.  Patient does admit to drinking half a pint to 1 pint of vodka every day, last drink was yesterday afternoon.  Patient does admit to previous alcohol withdrawals, states that this feels similar.  No other concerns  Vitals in the ED WNL  Labs significant for .6, Serum osm 359, EtOH 195, VBG pH: 7.14  /  pCO2: 38    /  pO2: 47    / HCO3: 13    /  Lactate: >16,   ALT 23, AST 78, T Bili 2.9    Admitted to medicine for Lactic Acidosis and Alcoholic Ketoacidosis on Metformin and for alcohol withdrawal       #Alcohol withdrawal- resolved  #Lactic acidosis, resolved  #High anion gap metabolic acidosis secondary to lactic acidosis and ketoacidosis due to alcohol  Imaging CTAP:  Steatosis. Mild caudate lobe hypertrophy. Cirrhosis is not   excluded. Atherosclerotic changes. Severe stenosis at the ostium of the   celiac artery which remains patent. The superior mesenteric artery,   bilateral renal arteries and inferior mesenteric artery are widely patent.  Unchanged prominent portacaval and gastrohepatic lymph nodes.  - Last drink 1d prior to admission  - Lactate trended down to 2.5 from 16  - PT Eval - No skilled PT needs  patient monitored on CIWA scale and PRN medications. Patient's symptoms resolved    #Epigastric pain- resolved  - lipase - 52  - RUQ sono - Heterogeneously echogenic liver which may reflect hepatic steatosis reflect underlying hepatocellular disease. Cholelithiasis.    #Transaminitis  #hepatic steatosis; possible cirrhosis  #Alcoholic hepatitis   - Trend LFTs  - low Na diet  - RUQ sono   - Daily MELD labs  - Limit tylenol to 1.5g/day  - Will need EGD as OP for variceal screening  - not immune to hepatitis A and B. Advised vaccination  - abd US to eval ascites - No significant ascites  - MDF score 32   - c/w prednisone 40mg QD for a total of 4 weeks until 02/20/25  - would continue for 28 days followed by rapid taper over 4 days  - hepatology following, OP hepatlogy f/u     #Thrombocytopenia:  #Leukopenia  #Macrocytic anemia  - platelets 109 -> 48 -> 34 -> 31 -> 27 -> 33 -> 51k -> 56k today  - Retics, Hapto, Iron panel, ferritin, Vitamin B12, folate noted  - DIC panel negative  - HIT antibody - negative  - Thrombocytopenia is likely d/t bone marrow suppression from alcohol use/cirrhosis of liver  - heme onc evaluated    # History of DVT/PE, recurrent   # Reported history of failure of eliquis in 2022 (Note from 2023 reports this)  -Not taking coumadin for last month d/t ?epistaxis  -Doppler shows chronic DVT of popliteal   -pt not aware of his anticoagulation history  -hold AC in the setting of thrombocytopenia   - discussed with patient in detail. patient states his platelets were low and was advised to stop medications. discussed risks and benefits. patient prefers to follow up with PCP and to start medications if platelet levels are stable. Discussed thromboembolic risk and my recommendations to start anticoagulation with coumadain once plt levels are more than 50 K, Patient verbalized understanding. But prefers to follow up with PCP  - outpt follow up with PCP to restart warfarin once platelet count is stable    #Chronic b/l lower abdominal pain  Possibly related to chronic alcohol use  - GI fu outpatient  - Trial bentyl 20 TID with meals  - PPI QD    #metamyelocytes present in differential as well as prominent portocaval and gastrohepatic LN  - o/p oncology fu

## 2025-01-27 NOTE — DISCHARGE NOTE PROVIDER - NSDCCPCAREPLAN_GEN_ALL_CORE_FT
PRINCIPAL DISCHARGE DIAGNOSIS  Diagnosis: Alcohol withdrawal  Assessment and Plan of Treatment: You were admitted for lactic acidosis and alcohol withdrawal, triggered by heavy alcohol use. Your lactate levels improved during your stay. Tests also revealed liver disease (possible cirrhosis) and gallstones. You will take prednisone for four weeks for alchohol related liver inflammation until 02/20/25  and will require a follow up with hepatology to monitor for treatment response. You also experienced a drop in your platelet count, likely due to alcohol use and/or liver disease; this will be monitored. You will need to follow up with your PCP regarding restarting a blood thinner (warfarin/Coumadin) for your history of DVT/PE once your platelet count is stable enough.   You should continue a low sodium diet, and limit Tylenol use to 1.5 grams per day. It is vital that you abstain from alcohol use to improve your health.      SECONDARY DISCHARGE DIAGNOSES  Diagnosis: Thrombocytopenia  Assessment and Plan of Treatment: - outpt follow up with PCP to restart warfarin once platelet count is stable     PRINCIPAL DISCHARGE DIAGNOSIS  Diagnosis: Alcohol withdrawal  Assessment and Plan of Treatment: You were admitted for lactic acidosis and alcohol withdrawal, triggered by heavy alcohol use. Your lactate levels improved during your stay. Tests also revealed liver disease (possible cirrhosis) and gallstones. You will take prednisone for four weeks for alchohol related liver inflammation until 02/20/25  and will require a follow up with hepatology to monitor for treatment response. You also experienced a drop in your platelet count, likely due to alcohol use and/or liver disease; this will be monitored. You will need to follow up with your PCP regarding restarting a blood thinner (warfarin/Coumadin) for your history of DVT/PE once your platelet count is stable enough.   You should continue a low sodium diet, and limit Tylenol use to 1.5 grams per day. It is vital that you abstain from alcohol use to improve your health.      SECONDARY DISCHARGE DIAGNOSES  Diagnosis: Thrombocytopenia  Assessment and Plan of Treatment: - outpt follow up with PCP to restart warfarin once platelet count is stable    Diagnosis: Deep vein thrombosis (DVT)  Assessment and Plan of Treatment: You will need to follow up with your PCP regarding restarting a blood thinner (warfarin/Coumadin) for your history of DVT/PE once your platelet count is stable enough.    Diagnosis: Enlarged lymph nodes  Assessment and Plan of Treatment: Unchanged prominent portacaval and gastrohepatic lymph nodes.  Follow up with PCP for monitoring lymph node enlargement

## 2025-01-27 NOTE — DISCHARGE NOTE PROVIDER - NSDCMRMEDTOKEN_GEN_ALL_CORE_FT
colchicine 0.6 mg oral tablet: 1 tab(s) orally 2 times a day  cyanocobalamin 1000 mcg oral tablet: 1 tab(s) orally once a day  dicyclomine 20 mg oral tablet: 1 tab(s) orally 3 times a day (before meals) as needed for  abdominal cramps  Flomax 0.4 mg oral capsule: 1 cap(s) orally once a day (at bedtime)  folic acid 1 mg oral tablet: 1 tab(s) orally once a day  gabapentin 100 mg oral capsule: 2 cap(s) orally 2 times a day  Jardiance 10 mg oral tablet: 1 tab(s) orally once a day  loratadine 10 mg oral tablet: 1 tab(s) orally once a day  metFORMIN 500 mg oral tablet: 1 tab(s) orally 2 times a day  methocarbamol 500 mg oral tablet: 1 orally once a day  Multiple Vitamins oral tablet: 1 tab(s) orally once a day  naltrexone 50 mg oral tablet: 1 tab(s) orally once a day  ocular lubricant preservative-free ophthalmic solution: 1 drop(s) to each affected eye 3 times a day as needed for Dry Eyes  pantoprazole 40 mg oral delayed release tablet: 1 tab(s) orally once a day (before a meal)  predniSONE 20 mg oral tablet: 2 tab(s) orally once a day finish last dose on 2/20/25  thiamine 100 mg oral tablet: 1 tab(s) orally once a day  Toprol-XL 25 mg oral tablet, extended release: 1 tab(s) orally once a day  Tylenol 325 mg oral tablet: 2 tab(s) orally 2 times a day as needed for  moderate pain

## 2025-02-03 DIAGNOSIS — Z91.014 ALLERGY TO MAMMALIAN MEATS: ICD-10-CM

## 2025-02-03 DIAGNOSIS — N40.0 BENIGN PROSTATIC HYPERPLASIA WITHOUT LOWER URINARY TRACT SYMPTOMS: ICD-10-CM

## 2025-02-03 DIAGNOSIS — F10.239 ALCOHOL DEPENDENCE WITH WITHDRAWAL, UNSPECIFIED: ICD-10-CM

## 2025-02-03 DIAGNOSIS — Z86.711 PERSONAL HISTORY OF PULMONARY EMBOLISM: ICD-10-CM

## 2025-02-03 DIAGNOSIS — Z91.011 ALLERGY TO MILK PRODUCTS: ICD-10-CM

## 2025-02-03 DIAGNOSIS — K70.10 ALCOHOLIC HEPATITIS WITHOUT ASCITES: ICD-10-CM
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
## 2025-02-03 DIAGNOSIS — K80.20 CALCULUS OF GALLBLADDER WITHOUT CHOLECYSTITIS WITHOUT OBSTRUCTION: ICD-10-CM

## 2025-02-03 DIAGNOSIS — K70.30 ALCOHOLIC CIRRHOSIS OF LIVER WITHOUT ASCITES: ICD-10-CM

## 2025-02-03 DIAGNOSIS — M10.9 GOUT, UNSPECIFIED: ICD-10-CM

## 2025-02-03 DIAGNOSIS — H91.90 UNSPECIFIED HEARING LOSS, UNSPECIFIED EAR: ICD-10-CM

## 2025-02-03 DIAGNOSIS — I82.533 CHRONIC EMBOLISM AND THROMBOSIS OF POPLITEAL VEIN, BILATERAL: ICD-10-CM

## 2025-02-03 DIAGNOSIS — N17.9 ACUTE KIDNEY FAILURE, UNSPECIFIED: ICD-10-CM

## 2025-02-03 DIAGNOSIS — D69.59 OTHER SECONDARY THROMBOCYTOPENIA: ICD-10-CM

## 2025-02-03 DIAGNOSIS — Z79.84 LONG TERM (CURRENT) USE OF ORAL HYPOGLYCEMIC DRUGS: ICD-10-CM

## 2025-02-03 DIAGNOSIS — K76.0 FATTY (CHANGE OF) LIVER, NOT ELSEWHERE CLASSIFIED: ICD-10-CM

## 2025-02-03 DIAGNOSIS — I10 ESSENTIAL (PRIMARY) HYPERTENSION: ICD-10-CM

## 2025-02-03 DIAGNOSIS — D61.818 OTHER PANCYTOPENIA: ICD-10-CM

## 2025-02-03 DIAGNOSIS — R59.0 LOCALIZED ENLARGED LYMPH NODES: ICD-10-CM

## 2025-02-03 DIAGNOSIS — R74.01 ELEVATION OF LEVELS OF LIVER TRANSAMINASE LEVELS: ICD-10-CM

## 2025-02-03 DIAGNOSIS — Z91.013 ALLERGY TO SEAFOOD: ICD-10-CM

## 2025-02-03 DIAGNOSIS — E78.00 PURE HYPERCHOLESTEROLEMIA, UNSPECIFIED: ICD-10-CM

## 2025-02-03 DIAGNOSIS — E11.9 TYPE 2 DIABETES MELLITUS WITHOUT COMPLICATIONS: ICD-10-CM

## 2025-02-03 DIAGNOSIS — E87.3 ALKALOSIS: ICD-10-CM

## 2025-02-03 DIAGNOSIS — E83.42 HYPOMAGNESEMIA: ICD-10-CM

## 2025-02-03 DIAGNOSIS — E87.20 ACIDOSIS, UNSPECIFIED: ICD-10-CM

## 2025-02-03 DIAGNOSIS — K29.20 ALCOHOLIC GASTRITIS WITHOUT BLEEDING: ICD-10-CM

## 2025-02-03 DIAGNOSIS — D68.4 ACQUIRED COAGULATION FACTOR DEFICIENCY: ICD-10-CM

## 2025-03-05 ENCOUNTER — APPOINTMENT (OUTPATIENT)
Dept: GASTROENTEROLOGY | Facility: CLINIC | Age: 65
End: 2025-03-05

## 2025-03-12 ENCOUNTER — INPATIENT (INPATIENT)
Facility: HOSPITAL | Age: 65
LOS: 7 days | Discharge: HOME | DRG: 897 | End: 2025-03-20
Attending: INTERNAL MEDICINE | Admitting: INTERNAL MEDICINE
Payer: MEDICARE

## 2025-03-12 VITALS
OXYGEN SATURATION: 97 % | HEART RATE: 133 BPM | TEMPERATURE: 98 F | SYSTOLIC BLOOD PRESSURE: 159 MMHG | DIASTOLIC BLOOD PRESSURE: 63 MMHG | WEIGHT: 229.94 LBS | RESPIRATION RATE: 22 BRPM | HEIGHT: 70 IN

## 2025-03-12 DIAGNOSIS — E87.29 OTHER ACIDOSIS: ICD-10-CM

## 2025-03-12 LAB
ALBUMIN SERPL ELPH-MCNC: 3.4 G/DL — LOW (ref 3.5–5.2)
ALBUMIN SERPL ELPH-MCNC: 3.6 G/DL — SIGNIFICANT CHANGE UP (ref 3.5–5.2)
ALP SERPL-CCNC: 105 U/L — SIGNIFICANT CHANGE UP (ref 30–115)
ALP SERPL-CCNC: 137 U/L — HIGH (ref 30–115)
ALT FLD-CCNC: 21 U/L — SIGNIFICANT CHANGE UP (ref 0–41)
ANION GAP SERPL CALC-SCNC: 19 MMOL/L — HIGH (ref 7–14)
ANION GAP SERPL CALC-SCNC: 29 MMOL/L — HIGH (ref 7–14)
APTT BLD: 42.1 SEC — HIGH (ref 27–39.2)
APTT BLD: 45.2 SEC — HIGH (ref 27–39.2)
AST SERPL-CCNC: 72 U/L — HIGH (ref 0–41)
AST SERPL-CCNC: 78 U/L — HIGH (ref 0–41)
B-OH-BUTYR SERPL-SCNC: 1 MMOL/L — HIGH
BASOPHILS NFR BLD AUTO: 2.4 % — HIGH (ref 0–1)
BILIRUB DIRECT SERPL-MCNC: 0.8 MG/DL — HIGH (ref 0–0.3)
BILIRUB INDIRECT FLD-MCNC: 1.5 MG/DL — HIGH (ref 0.2–1.2)
BILIRUB SERPL-MCNC: 2.2 MG/DL — HIGH (ref 0.2–1.2)
BILIRUB SERPL-MCNC: 2.3 MG/DL — HIGH (ref 0.2–1.2)
BUN SERPL-MCNC: 5 MG/DL — LOW (ref 10–20)
BUN SERPL-MCNC: 6 MG/DL — LOW (ref 10–20)
CALCIUM SERPL-MCNC: 8.4 MG/DL — SIGNIFICANT CHANGE UP (ref 8.4–10.5)
CALCIUM SERPL-MCNC: 8.9 MG/DL — SIGNIFICANT CHANGE UP (ref 8.4–10.5)
CHLORIDE SERPL-SCNC: 100 MMOL/L — SIGNIFICANT CHANGE UP (ref 98–110)
CHLORIDE SERPL-SCNC: 102 MMOL/L — SIGNIFICANT CHANGE UP (ref 98–110)
CO2 SERPL-SCNC: 16 MMOL/L — LOW (ref 17–32)
CO2 SERPL-SCNC: 19 MMOL/L — SIGNIFICANT CHANGE UP (ref 17–32)
CREAT SERPL-MCNC: 0.9 MG/DL — SIGNIFICANT CHANGE UP (ref 0.7–1.5)
CREAT SERPL-MCNC: 1.1 MG/DL — SIGNIFICANT CHANGE UP (ref 0.7–1.5)
EGFR: 74 ML/MIN/1.73M2 — SIGNIFICANT CHANGE UP
EGFR: 74 ML/MIN/1.73M2 — SIGNIFICANT CHANGE UP
EGFR: 95 ML/MIN/1.73M2 — SIGNIFICANT CHANGE UP
EGFR: 95 ML/MIN/1.73M2 — SIGNIFICANT CHANGE UP
EOSINOPHIL # BLD AUTO: 0.12 K/UL — SIGNIFICANT CHANGE UP (ref 0–0.7)
EOSINOPHIL NFR BLD AUTO: 1.9 % — SIGNIFICANT CHANGE UP (ref 0–8)
ETHANOL SERPL-MCNC: 215 MG/DL — HIGH
GAS PNL BLDV: SIGNIFICANT CHANGE UP
GAS PNL BLDV: SIGNIFICANT CHANGE UP
GLUCOSE BLDC GLUCOMTR-MCNC: 128 MG/DL — HIGH (ref 70–99)
GLUCOSE BLDC GLUCOMTR-MCNC: 152 MG/DL — HIGH (ref 70–99)
GLUCOSE SERPL-MCNC: 102 MG/DL — HIGH (ref 70–99)
GLUCOSE SERPL-MCNC: 136 MG/DL — HIGH (ref 70–99)
HCT VFR BLD CALC: 37 % — LOW (ref 42–52)
HGB BLD-MCNC: 12.7 G/DL — LOW (ref 14–18)
IMM GRANULOCYTES NFR BLD AUTO: 0.5 % — HIGH (ref 0.1–0.3)
INR BLD: 1.36 RATIO — HIGH (ref 0.65–1.3)
INR BLD: 1.52 RATIO — HIGH (ref 0.65–1.3)
LACTATE SERPL-SCNC: 3.5 MMOL/L — HIGH (ref 0.7–2)
LIDOCAIN IGE QN: 44 U/L — SIGNIFICANT CHANGE UP (ref 7–60)
LYMPHOCYTES # BLD AUTO: 1.4 K/UL — SIGNIFICANT CHANGE UP (ref 1.2–3.4)
LYMPHOCYTES # BLD AUTO: 22.7 % — SIGNIFICANT CHANGE UP (ref 20.5–51.1)
MAGNESIUM SERPL-MCNC: 1.6 MG/DL — LOW (ref 1.8–2.4)
MCHC RBC-ENTMCNC: 34.3 G/DL — SIGNIFICANT CHANGE UP (ref 32–37)
MCHC RBC-ENTMCNC: 37.6 PG — HIGH (ref 27–31)
MCV RBC AUTO: 109.5 FL — HIGH (ref 80–94)
MONOCYTES # BLD AUTO: 0.38 K/UL — SIGNIFICANT CHANGE UP (ref 0.1–0.6)
MONOCYTES NFR BLD AUTO: 6.2 % — SIGNIFICANT CHANGE UP (ref 1.7–9.3)
NEUTROPHILS # BLD AUTO: 4.08 K/UL — SIGNIFICANT CHANGE UP (ref 1.4–6.5)
NEUTROPHILS NFR BLD AUTO: 66.3 % — SIGNIFICANT CHANGE UP (ref 42.2–75.2)
NRBC BLD AUTO-RTO: 0 /100 WBCS — SIGNIFICANT CHANGE UP (ref 0–0)
NT-PROBNP SERPL-SCNC: 304 PG/ML — HIGH (ref 0–300)
PHOSPHATE SERPL-MCNC: 1.6 MG/DL — LOW (ref 2.1–4.9)
PLATELET # BLD AUTO: 127 K/UL — LOW (ref 130–400)
POTASSIUM SERPL-MCNC: 4.1 MMOL/L — SIGNIFICANT CHANGE UP (ref 3.5–5)
POTASSIUM SERPL-SCNC: 3.8 MMOL/L — SIGNIFICANT CHANGE UP (ref 3.5–5)
POTASSIUM SERPL-SCNC: 4.1 MMOL/L — SIGNIFICANT CHANGE UP (ref 3.5–5)
PROT SERPL-MCNC: 5.5 G/DL — LOW (ref 6–8)
PROT SERPL-MCNC: 6.3 G/DL — SIGNIFICANT CHANGE UP (ref 6–8)
PROTHROM AB SERPL-ACNC: 16.1 SEC — HIGH (ref 9.95–12.87)
PROTHROM AB SERPL-ACNC: 18.1 SEC — HIGH (ref 9.95–12.87)
RBC # BLD: 3.38 M/UL — LOW (ref 4.7–6.1)
RBC # FLD: 17 % — HIGH (ref 11.5–14.5)
SODIUM SERPL-SCNC: 140 MMOL/L — SIGNIFICANT CHANGE UP (ref 135–146)
SODIUM SERPL-SCNC: 145 MMOL/L — SIGNIFICANT CHANGE UP (ref 135–146)
TROPONIN T, HIGH SENSITIVITY RESULT: 28 NG/L — HIGH (ref 6–21)
WBC # BLD: 6.16 K/UL — SIGNIFICANT CHANGE UP (ref 4.8–10.8)
WBC # FLD AUTO: 6.16 K/UL — SIGNIFICANT CHANGE UP (ref 4.8–10.8)

## 2025-03-12 PROCEDURE — 36415 COLL VENOUS BLD VENIPUNCTURE: CPT

## 2025-03-12 PROCEDURE — 74177 CT ABD & PELVIS W/CONTRAST: CPT | Mod: 26

## 2025-03-12 PROCEDURE — 99291 CRITICAL CARE FIRST HOUR: CPT

## 2025-03-12 PROCEDURE — 71045 X-RAY EXAM CHEST 1 VIEW: CPT | Mod: 26

## 2025-03-12 PROCEDURE — 93005 ELECTROCARDIOGRAM TRACING: CPT

## 2025-03-12 PROCEDURE — 80048 BASIC METABOLIC PNL TOTAL CA: CPT

## 2025-03-12 PROCEDURE — 97116 GAIT TRAINING THERAPY: CPT | Mod: GP

## 2025-03-12 PROCEDURE — 85610 PROTHROMBIN TIME: CPT

## 2025-03-12 PROCEDURE — 80307 DRUG TEST PRSMV CHEM ANLYZR: CPT

## 2025-03-12 PROCEDURE — 83735 ASSAY OF MAGNESIUM: CPT

## 2025-03-12 PROCEDURE — 80053 COMPREHEN METABOLIC PANEL: CPT

## 2025-03-12 PROCEDURE — 85027 COMPLETE CBC AUTOMATED: CPT

## 2025-03-12 PROCEDURE — 97162 PT EVAL MOD COMPLEX 30 MIN: CPT | Mod: GP

## 2025-03-12 PROCEDURE — 82962 GLUCOSE BLOOD TEST: CPT

## 2025-03-12 PROCEDURE — 71275 CT ANGIOGRAPHY CHEST: CPT | Mod: 26

## 2025-03-12 PROCEDURE — 85025 COMPLETE CBC W/AUTO DIFF WBC: CPT

## 2025-03-12 PROCEDURE — 81001 URINALYSIS AUTO W/SCOPE: CPT

## 2025-03-12 PROCEDURE — 80074 ACUTE HEPATITIS PANEL: CPT

## 2025-03-12 PROCEDURE — 85730 THROMBOPLASTIN TIME PARTIAL: CPT

## 2025-03-12 PROCEDURE — 83605 ASSAY OF LACTIC ACID: CPT

## 2025-03-12 PROCEDURE — 76705 ECHO EXAM OF ABDOMEN: CPT

## 2025-03-12 PROCEDURE — 97530 THERAPEUTIC ACTIVITIES: CPT | Mod: GP

## 2025-03-12 PROCEDURE — 93970 EXTREMITY STUDY: CPT

## 2025-03-12 PROCEDURE — 87640 STAPH A DNA AMP PROBE: CPT

## 2025-03-12 PROCEDURE — 93307 TTE W/O DOPPLER COMPLETE: CPT

## 2025-03-12 PROCEDURE — 87641 MR-STAPH DNA AMP PROBE: CPT

## 2025-03-12 PROCEDURE — 99285 EMERGENCY DEPT VISIT HI MDM: CPT

## 2025-03-12 PROCEDURE — 99223 1ST HOSP IP/OBS HIGH 75: CPT

## 2025-03-12 PROCEDURE — 80076 HEPATIC FUNCTION PANEL: CPT

## 2025-03-12 PROCEDURE — 83036 HEMOGLOBIN GLYCOSYLATED A1C: CPT

## 2025-03-12 PROCEDURE — 84100 ASSAY OF PHOSPHORUS: CPT

## 2025-03-12 RX ORDER — HYPROMELLOSE 0.4 %
1 DROPS OPHTHALMIC (EYE) THREE TIMES A DAY
Refills: 0 | Status: DISCONTINUED | OUTPATIENT
Start: 2025-03-12 | End: 2025-03-20

## 2025-03-12 RX ORDER — B1/B2/B3/B5/B6/B12/VIT C/FOLIC 500-0.5 MG
1 TABLET ORAL DAILY
Refills: 0 | Status: DISCONTINUED | OUTPATIENT
Start: 2025-03-12 | End: 2025-03-20

## 2025-03-12 RX ORDER — DIAZEPAM 2 MG/1
10 TABLET ORAL
Refills: 0 | Status: DISCONTINUED | OUTPATIENT
Start: 2025-03-12 | End: 2025-03-12

## 2025-03-12 RX ORDER — INSULIN LISPRO 100 U/ML
INJECTION, SOLUTION INTRAVENOUS; SUBCUTANEOUS
Refills: 0 | Status: DISCONTINUED | OUTPATIENT
Start: 2025-03-12 | End: 2025-03-17

## 2025-03-12 RX ORDER — DIAZEPAM 2 MG/1
TABLET ORAL
Refills: 0 | Status: COMPLETED | OUTPATIENT
Start: 2025-03-14 | End: 2025-03-15

## 2025-03-12 RX ORDER — SODIUM CHLORIDE 9 G/1000ML
1000 INJECTION, SOLUTION INTRAVENOUS
Refills: 0 | Status: DISCONTINUED | OUTPATIENT
Start: 2025-03-12 | End: 2025-03-12

## 2025-03-12 RX ORDER — DEXTROSE 50 % IN WATER 50 %
15 SYRINGE (ML) INTRAVENOUS ONCE
Refills: 0 | Status: DISCONTINUED | OUTPATIENT
Start: 2025-03-12 | End: 2025-03-12

## 2025-03-12 RX ORDER — FOLIC ACID 1 MG/1
1 TABLET ORAL DAILY
Refills: 0 | Status: DISCONTINUED | OUTPATIENT
Start: 2025-03-12 | End: 2025-03-20

## 2025-03-12 RX ORDER — DEXTROSE 50 % IN WATER 50 %
25 SYRINGE (ML) INTRAVENOUS ONCE
Refills: 0 | Status: DISCONTINUED | OUTPATIENT
Start: 2025-03-12 | End: 2025-03-12

## 2025-03-12 RX ORDER — INSULIN LISPRO 100 U/ML
INJECTION, SOLUTION INTRAVENOUS; SUBCUTANEOUS AT BEDTIME
Refills: 0 | Status: DISCONTINUED | OUTPATIENT
Start: 2025-03-12 | End: 2025-03-17

## 2025-03-12 RX ORDER — DIAZEPAM 2 MG/1
10 TABLET ORAL EVERY 6 HOURS
Refills: 0 | Status: DISCONTINUED | OUTPATIENT
Start: 2025-03-12 | End: 2025-03-13

## 2025-03-12 RX ORDER — SODIUM CHLORIDE 9 G/1000ML
1000 INJECTION, SOLUTION INTRAVENOUS
Refills: 0 | Status: DISCONTINUED | OUTPATIENT
Start: 2025-03-12 | End: 2025-03-13

## 2025-03-12 RX ORDER — DIAZEPAM 2 MG/1
10 TABLET ORAL EVERY 12 HOURS
Refills: 0 | Status: DISCONTINUED | OUTPATIENT
Start: 2025-03-14 | End: 2025-03-15

## 2025-03-12 RX ORDER — FOLIC ACID 1 MG/1
1 TABLET ORAL ONCE
Refills: 0 | Status: COMPLETED | OUTPATIENT
Start: 2025-03-12 | End: 2025-03-12

## 2025-03-12 RX ORDER — KETOROLAC TROMETHAMINE 30 MG/ML
15 INJECTION, SOLUTION INTRAMUSCULAR; INTRAVENOUS ONCE
Refills: 0 | Status: DISCONTINUED | OUTPATIENT
Start: 2025-03-12 | End: 2025-03-12

## 2025-03-12 RX ORDER — CYANOCOBALAMIN 1000 UG/ML
1000 INJECTION INTRAMUSCULAR; SUBCUTANEOUS DAILY
Refills: 0 | Status: DISCONTINUED | OUTPATIENT
Start: 2025-03-12 | End: 2025-03-20

## 2025-03-12 RX ORDER — MAGNESIUM SULFATE 500 MG/ML
2 SYRINGE (ML) INJECTION ONCE
Refills: 0 | Status: COMPLETED | OUTPATIENT
Start: 2025-03-12 | End: 2025-03-12

## 2025-03-12 RX ORDER — ACETAMINOPHEN 500 MG/5ML
650 LIQUID (ML) ORAL EVERY 12 HOURS
Refills: 0 | Status: DISCONTINUED | OUTPATIENT
Start: 2025-03-12 | End: 2025-03-13

## 2025-03-12 RX ORDER — GLUCAGON 3 MG/1
1 POWDER NASAL ONCE
Refills: 0 | Status: DISCONTINUED | OUTPATIENT
Start: 2025-03-12 | End: 2025-03-12

## 2025-03-12 RX ORDER — DEXTROSE 50 % IN WATER 50 %
12.5 SYRINGE (ML) INTRAVENOUS ONCE
Refills: 0 | Status: DISCONTINUED | OUTPATIENT
Start: 2025-03-12 | End: 2025-03-12

## 2025-03-12 RX ORDER — GABAPENTIN 400 MG/1
200 CAPSULE ORAL
Refills: 0 | Status: DISCONTINUED | OUTPATIENT
Start: 2025-03-12 | End: 2025-03-20

## 2025-03-12 RX ORDER — SODIUM CHLORIDE 9 G/1000ML
1000 INJECTION, SOLUTION INTRAVENOUS ONCE
Refills: 0 | Status: COMPLETED | OUTPATIENT
Start: 2025-03-12 | End: 2025-03-12

## 2025-03-12 RX ORDER — CHLORDIAZEPOXIDE HCL 10 MG
25 CAPSULE ORAL ONCE
Refills: 0 | Status: DISCONTINUED | OUTPATIENT
Start: 2025-03-12 | End: 2025-03-12

## 2025-03-12 RX ORDER — METHOCARBAMOL 500 MG/1
500 TABLET, FILM COATED ORAL DAILY
Refills: 0 | Status: DISCONTINUED | OUTPATIENT
Start: 2025-03-12 | End: 2025-03-20

## 2025-03-12 RX ORDER — DIAZEPAM 2 MG/1
10 TABLET ORAL EVERY 8 HOURS
Refills: 0 | Status: DISCONTINUED | OUTPATIENT
Start: 2025-03-13 | End: 2025-03-14

## 2025-03-12 RX ORDER — MIDAZOLAM IN 0.9 % SOD.CHLORID 1 MG/ML
4 PLASTIC BAG, INJECTION (ML) INTRAVENOUS ONCE
Refills: 0 | Status: DISCONTINUED | OUTPATIENT
Start: 2025-03-12 | End: 2025-03-12

## 2025-03-12 RX ORDER — COLCHICINE 0.6 MG/1
0.6 TABLET, FILM COATED ORAL
Refills: 0 | Status: DISCONTINUED | OUTPATIENT
Start: 2025-03-12 | End: 2025-03-12

## 2025-03-12 RX ORDER — NALTREXONE HYDROCHLORIDE 50 MG/1
50 TABLET, FILM COATED ORAL DAILY
Refills: 0 | Status: DISCONTINUED | OUTPATIENT
Start: 2025-03-12 | End: 2025-03-20

## 2025-03-12 RX ORDER — METOPROLOL SUCCINATE 50 MG/1
25 TABLET, EXTENDED RELEASE ORAL DAILY
Refills: 0 | Status: DISCONTINUED | OUTPATIENT
Start: 2025-03-12 | End: 2025-03-20

## 2025-03-12 RX ORDER — COLCHICINE 0.6 MG/1
0.6 TABLET, FILM COATED ORAL DAILY
Refills: 0 | Status: DISCONTINUED | OUTPATIENT
Start: 2025-03-12 | End: 2025-03-20

## 2025-03-12 RX ORDER — TAMSULOSIN HYDROCHLORIDE 0.4 MG/1
0.4 CAPSULE ORAL AT BEDTIME
Refills: 0 | Status: DISCONTINUED | OUTPATIENT
Start: 2025-03-12 | End: 2025-03-20

## 2025-03-12 RX ORDER — ONDANSETRON HCL/PF 4 MG/2 ML
4 VIAL (ML) INJECTION EVERY 8 HOURS
Refills: 0 | Status: DISCONTINUED | OUTPATIENT
Start: 2025-03-12 | End: 2025-03-13

## 2025-03-12 RX ADMIN — FOLIC ACID 1 MILLIGRAM(S): 1 TABLET ORAL at 05:49

## 2025-03-12 RX ADMIN — Medication 25 GRAM(S): at 06:35

## 2025-03-12 RX ADMIN — Medication 25 MILLIGRAM(S): at 10:34

## 2025-03-12 RX ADMIN — Medication 105 MILLIGRAM(S): at 21:22

## 2025-03-12 RX ADMIN — GABAPENTIN 200 MILLIGRAM(S): 400 CAPSULE ORAL at 17:29

## 2025-03-12 RX ADMIN — Medication 105 MILLIGRAM(S): at 15:21

## 2025-03-12 RX ADMIN — DIAZEPAM 10 MILLIGRAM(S): 2 TABLET ORAL at 21:23

## 2025-03-12 RX ADMIN — Medication 105 MILLIGRAM(S): at 05:35

## 2025-03-12 RX ADMIN — SODIUM CHLORIDE 100 MILLILITER(S): 9 INJECTION, SOLUTION INTRAVENOUS at 12:32

## 2025-03-12 RX ADMIN — TAMSULOSIN HYDROCHLORIDE 0.4 MILLIGRAM(S): 0.4 CAPSULE ORAL at 21:23

## 2025-03-12 RX ADMIN — SODIUM CHLORIDE 100 MILLILITER(S): 9 INJECTION, SOLUTION INTRAVENOUS at 19:46

## 2025-03-12 RX ADMIN — SODIUM CHLORIDE 1000 MILLILITER(S): 9 INJECTION, SOLUTION INTRAVENOUS at 05:34

## 2025-03-12 RX ADMIN — COLCHICINE 0.6 MILLIGRAM(S): 0.6 TABLET, FILM COATED ORAL at 21:23

## 2025-03-12 RX ADMIN — KETOROLAC TROMETHAMINE 15 MILLIGRAM(S): 30 INJECTION, SOLUTION INTRAMUSCULAR; INTRAVENOUS at 08:42

## 2025-03-12 RX ADMIN — SODIUM CHLORIDE 100 MILLILITER(S): 9 INJECTION, SOLUTION INTRAVENOUS at 12:25

## 2025-03-12 RX ADMIN — METHOCARBAMOL 500 MILLIGRAM(S): 500 TABLET, FILM COATED ORAL at 21:22

## 2025-03-12 RX ADMIN — NALTREXONE HYDROCHLORIDE 50 MILLIGRAM(S): 50 TABLET, FILM COATED ORAL at 21:23

## 2025-03-12 RX ADMIN — Medication 4 MILLIGRAM(S): at 05:35

## 2025-03-12 RX ADMIN — METOPROLOL SUCCINATE 25 MILLIGRAM(S): 50 TABLET, EXTENDED RELEASE ORAL at 21:23

## 2025-03-12 RX ADMIN — DIAZEPAM 10 MILLIGRAM(S): 2 TABLET ORAL at 15:59

## 2025-03-12 NOTE — H&P ADULT - HISTORY OF PRESENT ILLNESS
64y M pmh Alcohol Use Disorder, DVT/PE, BPH, HTN, HLD, DMII, gout presenting from [facility] with palpitations.     Patient reported general malaise, palpitations, nausea and chest discomfort that started yesterday afternoon. Patient reports drinking alcohol. Denies any vomiting, diarrhea, constipation, abdominal pain, chills, tremors, confusion, hallucinations/dellusions or seizure like activity.     ROS -ve for fever, cough, rhinorrhea, rash, dizziness, lightheadedness, HA, hematuria, dysuria, melena, hematochezia, joint pain, myalgias, shortness of breath falls or other trauma.    Vitals in the ED  T 98.3    /63  RR 22 SpO2 97 On RA     Significant Labs  wbc 6.16 hgb 12.7 plt 127  Na 145 K 3.8 BUN/Cr 6/1.1  trop 38>28  INR 1.36, albumin 3.6   AGAP 29, lactate 11.9>9.3  vbg 7.44/26/94/18  BHB 1.0, Mg 1.3, , AST/ALT 72/21  Blood alcohol 215    CTA AP  Bibasilar subsegmental atelectasis. Thoracic aorta calcifications. Coronary artery calcifications. Mild bilateral gynecomastia. Hepatic Steatosis. Mild caudate lobe hypertrophy, unchanged. Cholelithiasis. Symmetric renal enhancement. No hydronephrosis. Colonic diverticulosis.  Atherosclerotic calcifications within the aorta and its branches. Stenosis at the ostium of the celiac artery. Similar mild   stranding the in the region of the proximal GARRISON. Prominent bilateral inguinal lymph nodes, nonspecific, may be reactive. Relatively unchanged prominent portacaval and gastrohepatic and peripancreatic lymph nodes. Right posterior fifth rib chronic fracture deformity.    ECG sinus tachycardia  CXR no consolidation/opacification     Patient received 2L LR, librium, toradol, versed, magnesium, thiamine and folic acid  in the ED. Admitted to medicine for management.

## 2025-03-12 NOTE — H&P ADULT - ATTENDING COMMENTS
This is a 64-year-old man PMH EtOH use disorder, DVT/PE no longer on Coumadin, BPH, hypertension, DL, DM, gout, presenting to our hospital for Chest pain and palpitations.     1. Chest pain associated with palpitations likely due to alcoholic ketoacidosis and withdrawal resolving  - Admit to SDU           - ECG:WNL   - Trop delta negative     2. Alcohol abuse complicated by alcoholic withdrawal,  alcoholic ketoacidosis, alcohol hepatitis and suspecting thiamine and folic acid def   - Start CIWA protocol   - High dose thiamine in case it's contributing to lactic acidosis   - Start IVF with dextrose/LR at 100 ml/hr   - DF score :25.7 no need for steroid       3. hx of DVT/PE not on coumadin     4. Severe Lactic acidosis likely due to metformin and thiamine def   - Stop metformin  - IVF  - High dose Thiamine 500mg IV q8hr    - Repeat LA at 16:00      5. BPH   - Cont home meds    6. Hypertension   - Cont home meds     7. DVT px This is a 64-year-old man PMH EtOH use disorder, DVT/PE no longer on Coumadin, BPH, hypertension, DL, DM, gout, presenting to our hospital for Chest pain and palpitations.     1. Chest pain associated with palpitations likely due to alcoholic ketoacidosis and withdrawal resolving  - Admit to SDU           - ECG:WNL   - Trop delta negative     2. Alcohol abuse complicated by alcoholic withdrawal,  alcoholic ketoacidosis, alcohol hepatitis and suspecting thiamine and folic acid def   - Start CIWA protocol   - High dose thiamine in case it's contributing to lactic acidosis   - Start IVF with dextrose/LR at 100 ml/hr   - DF score :25.7 no need for steroid       3. hx of DVT/PE  * Was briefly off coumadin when pt<50K  - Cont coumadin daily INR     4. Severe Lactic acidosis likely due to metformin and thiamine def   - Stop metformin  - IVF  - High dose Thiamine 500mg IV q8hr    - Repeat LA at 16:00      5. BPH   - Cont home meds    6. Hypertension   - Cont home meds     7. DVT px

## 2025-03-12 NOTE — ED PROVIDER NOTE - CLINICAL SUMMARY MEDICAL DECISION MAKING FREE TEXT BOX
C 4-year-old male with alcohol use disorder presents to the emergency department for palpitations and chest discomfort.  In the emergency department screening exam, labs and imaging, concern for dehydration and ketoacidosis given elevated anion gap.  Patient not demonstrating signs of sepsis and is hemodynamically stable, lactate elevation likely due to metabolic issues versus sign of infection or ischemia.  Patient had electrolyte repletion in the ED, fluids, supportive care, Librium, vitamin replenishment and will be admitted to stepdown unit after discussion with ICU fellow.  Patient aware of disposition plan at time of admission

## 2025-03-12 NOTE — ED PROVIDER NOTE - OBJECTIVE STATEMENT
64-year-old man with chronic EtOH use disorder, DVT/PE on Coumadin, BPH, hypertension, DL, DM, gout,  Patient presents for evaluation not feeling well, palpitations.  Patient states he had intermittent chest discomfort.  Patient states he had nausea but is able to tolerate food today.  Patient denied abdominal pain but on exam was having some left lower quadrant abdominal pain.  Patient denies urinary symptoms.  Patient denies trauma.  Patient states he drank earlier today.  No calf pain, shortness of breath.  Patient endorses med compliance at facility.Noted patient was admitted in January for similar story.  Patient was found to have alcoholic ketoacidosis on metformin and had progressive alcohol withdrawal.  Labs there showed initial lactate of 16 which downtrended to 2.5.

## 2025-03-12 NOTE — ED ADULT TRIAGE NOTE - CHIEF COMPLAINT QUOTE
He's from [facility], He's not feeling good. He says he's nauseous since yesterday and having palpitations - EMS  Patient denies pain, denies vomiting, reports indigestion feeling. Last alcohol drink patient reports in the afternoon

## 2025-03-12 NOTE — ED ADULT NURSE NOTE - PRO INTERPRETER NEED 2
Continue to monitor blood pressure. Follow up if consistently > 140/90. Recommend diet and exercise. Recommend limit sodium especially sodas.    English

## 2025-03-12 NOTE — H&P ADULT - ASSESSMENT
64y M pmh Alcohol Use Disorder, DVT/PE, BPH, HTN, HLD, DMII, gout presenting from [facility] with palpitations admitted for acute alcohol intoxication and lactic acidosis.     #Alcohol use disorder  #Acute alcohol withdrawal    #Metabolic syndrome  #Hepatic steatosis  #DMII  #HLD    #HTN    #BPH    #Gout    #Hx DVT/PE      #HANDOFF    # Misc  - DVT Prophylaxis: lovenox  - GI Prophylaxis: none  - Diet: DASH/CC  - Activity: IAT  - Code Status: Full   - Dispo: CHRISTIE Harrell  PGY3, Internal Medicine   [facility]   64y M pmh Alcohol Use Disorder, DVT/PE, BPH, HTN, HLD, DMII, gout presenting from [facility] with palpitations admitted for acute alcohol intoxication and lactic acidosis.     #Alcohol use disorder  #Acute alcohol withdrawal  - increased alcohol use over the past week - last drink 8PM 3/11/25 - 1 bottle of vodka - blood alcohol 215 on admission   - PAWS 5, CIWA 7 - high risk for severe w/d  - started valium taper 10mg w/ PRN valium 10mg for CIWA >8  - cw high dose thiamine for 3d iso lactic acidosis   - cw home B12, folate, MV - previous levels wnl  - cw IVF D5 LR @ 100cc  - addiction/catch eval - patient endorsing willingness to try additional outpatient rehab/medication therapy   - cw naltrexone 50mg qd and gabapentin 200mg bid   - trend lactate, bhb 1  - keep Mg >2, K>4    #Metabolic syndrome  #Hepatic steatosis  #DMII  #HLD  - previous admission for alcoholic hepatitis sp prednisolone  - LFTs improving  - , AST 72, ALT 21  - no abdominal pain   - CTAP with hepatic steatosis and cholelithiasis   - fu GI/Hep op - needs screening EGD, Hep A/B vaccination   - holding home metformin/jardiance  - ISS for now goal -180  - CC/DASH diet      #HTN  - cw toprol 25mg qd    #BPH  - cw flomax 0.4mg qhs    #Gout  - cw colchicine qd  - has tylenol 650mg bid PRN for moderate pain     #Hx DVT/PE  #Thrombocytopenia  -  previously < 50 on last admission - 2/2 alcoholic BM suppression   - Duplex 1/20/25 - b/l chronic popliteal DVT  - on previous dc was off coumadin b/c of platelet <50 - per [facility]'s documentation was restarted on coumadin   - resume coumadin 4mg  Sun/Tues/Thurs  - daily INR, monitor PLT - no active bleeding/petechiae at this time    #HANDOFF  - monitor w/d sxs - deescalate therapy when necessary   - Addiction/CATCH f/u   - trend INR/PLT  - needs op GI/Hep f/u     # Misc  - DVT Prophylaxis:coumadin  - GI Prophylaxis: none  - Diet: DASH/CC  - Activity: IAT  - Code Status: Full   - Dispo: SDU    Serafin Di Omkar  PGY3, Internal Medicine   [facility]

## 2025-03-12 NOTE — ED PROVIDER NOTE - PROGRESS NOTE DETAILS
Patient is currently refusing CT saying that he has had multiple CTs in the past.  Discussed diagnoses we would like to rule out or find with CT including PE, intra-abdominal pathology.  Patient states he would like to wait for rest of blood work to resulted and get medical management with fluids pt now amenable for CT. CT called/notified [facility]- pt endorsed to Dr. Mccormick- f/u rpt vbg, reassess, dispo

## 2025-03-12 NOTE — H&P ADULT - NSHPLABSRESULTS_GEN_ALL_CORE
12.7   6.16  )-----------( 127      ( 12 Mar 2025 05:05 )             37.0       03-12    145  |  100  |  6[L]  ----------------------------<  102[H]  3.8   |  16[L]  |  1.1    Ca    8.9      12 Mar 2025 05:05  Mg     1.3     03-12    TPro  6.3  /  Alb  3.6  /  TBili  2.2[H]  /  DBili  x   /  AST  72[H]  /  ALT  21  /  AlkPhos  137[H]  03-12              Urinalysis Basic - ( 12 Mar 2025 05:05 )    Color: x / Appearance: x / SG: x / pH: x  Gluc: 102 mg/dL / Ketone: x  / Bili: x / Urobili: x   Blood: x / Protein: x / Nitrite: x   Leuk Esterase: x / RBC: x / WBC x   Sq Epi: x / Non Sq Epi: x / Bacteria: x        PT/INR - ( 12 Mar 2025 05:05 )   PT: 16.10 sec;   INR: 1.36 ratio         PTT - ( 12 Mar 2025 05:05 )  PTT:45.2 sec    Lactate Trend            CAPILLARY BLOOD GLUCOSE      POCT Blood Glucose.: 104 mg/dL (12 Mar 2025 04:04)

## 2025-03-12 NOTE — ED PROVIDER NOTE - PHYSICAL EXAMINATION
vs sinus tach   gen- NAD, aaox3  card-sinus tach  lungs-ctab, no wheezing or rhonchi  abd-soft, mild llq tenderness, no guarding or rebound  neuro- full str/sensation, cn ii-xii grossly intact, normal coordination   LE- no calf pain/swelling/tenderness b/l

## 2025-03-12 NOTE — H&P ADULT - NSHPPHYSICALEXAM_GEN_ALL_CORE
PHYSICAL EXAM:  GEN: NAD, Resting comfortably in bed, cooperative  PULM: Clear to auscultation bilaterally, No wheezing, rales, rhonchi  CVS: Regular rate and rhythm, S1-S2, no murmurs, heaves, thrills  ABD: Soft, non-tender, non-distended, no guarding, BS +  EXT: No edema/cyanosis, extremities warm/dry, peripheral pulses palpable   NEURO: A&Ox3, no focal deficits PHYSICAL EXAM:  GEN: NAD, Resting comfortably in bed, cooperative, cool, not clammy or diaphoretic  PULM: Clear to auscultation bilaterally, No wheezing, rales, rhonchi  CVS: Regular rate and rhythm, S1-S2, no murmurs, heaves, thrills  ABD: Soft, non-tender, non-distended, no guarding, BS +, no ascites, jaundice or umbilical varices  EXT: No edema/cyanosis, extremities warm/dry, peripheral pulses palpable, b/l venous stasis dermatitis   NEURO: A&Ox3, no focal deficits, follows commands, answers appropriately, +ve withdrawal tremor

## 2025-03-12 NOTE — ED ADULT NURSE NOTE - OBJECTIVE STATEMENT
He's from [facility], He's not feeling good. He says he's nauseous since yesterday and having palpitations - EMS  Patient denies pain, denies vomiting, reports indigestion feeling. Last alcohol drink patient reports in the afternoon

## 2025-03-12 NOTE — ED ADULT NURSE NOTE - CHIEF COMPLAINT QUOTE
He's from [facility], He's not feeling good. He says he's nauseous since yesterday and having palpitations - EMS  Patient denies pain, denies vomiting, reports indigestion feeling. Last alcohol drink patient reports in the afternoon

## 2025-03-12 NOTE — ED ADULT NURSE NOTE - NSFALLHARMRISKINTERV_ED_ALL_ED
Assistance OOB with selected safe patient handling equipment if applicable/Communicate risk of Fall with Harm to all staff, patient, and family/Provide visual cue: red socks, yellow wristband, yellow gown, etc/Reinforce activity limits and safety measures with patient and family/Bed in lowest position, wheels locked, appropriate side rails in place/Call bell, personal items and telephone in reach/Instruct patient to call for assistance before getting out of bed/chair/stretcher/Non-slip footwear applied when patient is off stretcher/[place] to call system/Physically safe environment - no spills, clutter or unnecessary equipment/Purposeful Proactive Rounding/Room/bathroom lighting operational, light cord in reach

## 2025-03-13 DIAGNOSIS — F10.239 ALCOHOL DEPENDENCE WITH WITHDRAWAL, UNSPECIFIED: ICD-10-CM

## 2025-03-13 DIAGNOSIS — E51.2 WERNICKE'S ENCEPHALOPATHY: ICD-10-CM

## 2025-03-13 DIAGNOSIS — K76.0 FATTY (CHANGE OF) LIVER, NOT ELSEWHERE CLASSIFIED: ICD-10-CM

## 2025-03-13 LAB
A1C WITH ESTIMATED AVERAGE GLUCOSE RESULT: 5.5 % — SIGNIFICANT CHANGE UP (ref 4–5.6)
ALBUMIN SERPL ELPH-MCNC: 3.1 G/DL — LOW (ref 3.5–5.2)
ALP SERPL-CCNC: 101 U/L — SIGNIFICANT CHANGE UP (ref 30–115)
ALT FLD-CCNC: 18 U/L — SIGNIFICANT CHANGE UP (ref 0–41)
ANION GAP SERPL CALC-SCNC: 10 MMOL/L — SIGNIFICANT CHANGE UP (ref 7–14)
APPEARANCE UR: CLEAR — SIGNIFICANT CHANGE UP
APTT BLD: 42.6 SEC — HIGH (ref 27–39.2)
AST SERPL-CCNC: 63 U/L — HIGH (ref 0–41)
BASOPHILS # BLD AUTO: 0.04 K/UL — SIGNIFICANT CHANGE UP (ref 0–0.2)
BASOPHILS NFR BLD AUTO: 1.6 % — HIGH (ref 0–1)
BILIRUB SERPL-MCNC: 3.4 MG/DL — HIGH (ref 0.2–1.2)
BILIRUB UR-MCNC: NEGATIVE — SIGNIFICANT CHANGE UP
BUN SERPL-MCNC: 5 MG/DL — LOW (ref 10–20)
CALCIUM SERPL-MCNC: 8.3 MG/DL — LOW (ref 8.4–10.5)
CHLORIDE SERPL-SCNC: 106 MMOL/L — SIGNIFICANT CHANGE UP (ref 98–110)
CO2 SERPL-SCNC: 28 MMOL/L — SIGNIFICANT CHANGE UP (ref 17–32)
COLOR SPEC: ABNORMAL
CREAT SERPL-MCNC: 0.9 MG/DL — SIGNIFICANT CHANGE UP (ref 0.7–1.5)
DIFF PNL FLD: ABNORMAL
EGFR: 95 ML/MIN/1.73M2 — SIGNIFICANT CHANGE UP
EGFR: 95 ML/MIN/1.73M2 — SIGNIFICANT CHANGE UP
EOSINOPHIL # BLD AUTO: 0.04 K/UL — SIGNIFICANT CHANGE UP (ref 0–0.7)
EOSINOPHIL NFR BLD AUTO: 1.6 % — SIGNIFICANT CHANGE UP (ref 0–8)
ESTIMATED AVERAGE GLUCOSE: 111 MG/DL — SIGNIFICANT CHANGE UP (ref 68–114)
GLUCOSE BLDC GLUCOMTR-MCNC: 104 MG/DL — HIGH (ref 70–99)
GLUCOSE BLDC GLUCOMTR-MCNC: 122 MG/DL — HIGH (ref 70–99)
GLUCOSE BLDC GLUCOMTR-MCNC: 127 MG/DL — HIGH (ref 70–99)
GLUCOSE SERPL-MCNC: 125 MG/DL — HIGH (ref 70–99)
GLUCOSE UR QL: 250 MG/DL
HCT VFR BLD CALC: 31.7 % — LOW (ref 42–52)
HGB BLD-MCNC: 11 G/DL — LOW (ref 14–18)
IMM GRANULOCYTES NFR BLD AUTO: 0.4 % — HIGH (ref 0.1–0.3)
INR BLD: 1.57 RATIO — HIGH (ref 0.65–1.3)
KETONES UR-MCNC: ABNORMAL MG/DL
LEUKOCYTE ESTERASE UR-ACNC: NEGATIVE — SIGNIFICANT CHANGE UP
LYMPHOCYTES # BLD AUTO: 0.88 K/UL — LOW (ref 1.2–3.4)
LYMPHOCYTES # BLD AUTO: 34.6 % — SIGNIFICANT CHANGE UP (ref 20.5–51.1)
MCHC RBC-ENTMCNC: 34.7 G/DL — SIGNIFICANT CHANGE UP (ref 32–37)
MCHC RBC-ENTMCNC: 37.7 PG — HIGH (ref 27–31)
MCV RBC AUTO: 108.6 FL — HIGH (ref 80–94)
MONOCYTES # BLD AUTO: 0.13 K/UL — SIGNIFICANT CHANGE UP (ref 0.1–0.6)
MONOCYTES NFR BLD AUTO: 5.1 % — SIGNIFICANT CHANGE UP (ref 1.7–9.3)
MRSA PCR RESULT.: POSITIVE
NEUTROPHILS # BLD AUTO: 1.44 K/UL — SIGNIFICANT CHANGE UP (ref 1.4–6.5)
NEUTROPHILS NFR BLD AUTO: 56.7 % — SIGNIFICANT CHANGE UP (ref 42.2–75.2)
NITRITE UR-MCNC: NEGATIVE — SIGNIFICANT CHANGE UP
NRBC BLD AUTO-RTO: 0 /100 WBCS — SIGNIFICANT CHANGE UP (ref 0–0)
PH UR: 6.5 — SIGNIFICANT CHANGE UP (ref 5–8)
PHOSPHATE SERPL-MCNC: 1.6 MG/DL — LOW (ref 2.1–4.9)
PLATELET # BLD AUTO: 54 K/UL — LOW (ref 130–400)
POTASSIUM SERPL-MCNC: 3.5 MMOL/L — SIGNIFICANT CHANGE UP (ref 3.5–5)
PROT SERPL-MCNC: 4.9 G/DL — LOW (ref 6–8)
PROT UR-MCNC: NEGATIVE MG/DL — SIGNIFICANT CHANGE UP
PROTHROM AB SERPL-ACNC: 18.7 SEC — HIGH (ref 9.95–12.87)
RBC # BLD: 2.92 M/UL — LOW (ref 4.7–6.1)
RBC # FLD: 16.6 % — HIGH (ref 11.5–14.5)
SODIUM SERPL-SCNC: 144 MMOL/L — SIGNIFICANT CHANGE UP (ref 135–146)
SP GR SPEC: 1.01 — SIGNIFICANT CHANGE UP (ref 1–1.03)
UROBILINOGEN FLD QL: 1 MG/DL — SIGNIFICANT CHANGE UP (ref 0.2–1)
WBC # BLD: 2.54 K/UL — LOW (ref 4.8–10.8)
WBC # FLD AUTO: 2.54 K/UL — LOW (ref 4.8–10.8)

## 2025-03-13 PROCEDURE — 99233 SBSQ HOSP IP/OBS HIGH 50: CPT

## 2025-03-13 PROCEDURE — 99222 1ST HOSP IP/OBS MODERATE 55: CPT

## 2025-03-13 RX ORDER — ACETAMINOPHEN 500 MG/5ML
650 LIQUID (ML) ORAL EVERY 6 HOURS
Refills: 0 | Status: DISCONTINUED | OUTPATIENT
Start: 2025-03-13 | End: 2025-03-20

## 2025-03-13 RX ORDER — MUPIROCIN CALCIUM 20 MG/G
1 CREAM TOPICAL
Refills: 0 | Status: DISCONTINUED | OUTPATIENT
Start: 2025-03-13 | End: 2025-03-19

## 2025-03-13 RX ORDER — INFLUENZA A VIRUS A/IDAHO/07/2018 (H1N1) ANTIGEN (MDCK CELL DERIVED, PROPIOLACTONE INACTIVATED, INFLUENZA A VIRUS A/INDIANA/08/2018 (H3N2) ANTIGEN (MDCK CELL DERIVED, PROPIOLACTONE INACTIVATED), INFLUENZA B VIRUS B/SINGAPORE/INFTT-16-0610/2016 ANTIGEN (MDCK CELL DERIVED, PROPIOLACTONE INACTIVATED), INFLUENZA B VIRUS B/IOWA/06/2017 ANTIGEN (MDCK CELL DERIVED, PROPIOLACTONE INACTIVATED) 15; 15; 15; 15 UG/.5ML; UG/.5ML; UG/.5ML; UG/.5ML
0.5 INJECTION, SUSPENSION INTRAMUSCULAR ONCE
Refills: 0 | Status: DISCONTINUED | OUTPATIENT
Start: 2025-03-13 | End: 2025-03-20

## 2025-03-13 RX ORDER — ENOXAPARIN SODIUM 100 MG/ML
100 INJECTION SUBCUTANEOUS EVERY 12 HOURS
Refills: 0 | Status: DISCONTINUED | OUTPATIENT
Start: 2025-03-13 | End: 2025-03-15

## 2025-03-13 RX ORDER — SODIUM PHOSPHATE,DIBASIC DIHYD
30 POWDER (GRAM) MISCELLANEOUS ONCE
Refills: 0 | Status: COMPLETED | OUTPATIENT
Start: 2025-03-13 | End: 2025-03-13

## 2025-03-13 RX ORDER — MELATONIN 5 MG
3 TABLET ORAL AT BEDTIME
Refills: 0 | Status: DISCONTINUED | OUTPATIENT
Start: 2025-03-13 | End: 2025-03-20

## 2025-03-13 RX ORDER — SODIUM CHLORIDE 9 G/1000ML
1000 INJECTION, SOLUTION INTRAVENOUS
Refills: 0 | Status: DISCONTINUED | OUTPATIENT
Start: 2025-03-13 | End: 2025-03-16

## 2025-03-13 RX ORDER — ONDANSETRON HCL/PF 4 MG/2 ML
4 VIAL (ML) INJECTION EVERY 6 HOURS
Refills: 0 | Status: DISCONTINUED | OUTPATIENT
Start: 2025-03-13 | End: 2025-03-20

## 2025-03-13 RX ORDER — MAGNESIUM SULFATE 500 MG/ML
2 SYRINGE (ML) INJECTION ONCE
Refills: 0 | Status: COMPLETED | OUTPATIENT
Start: 2025-03-13 | End: 2025-03-13

## 2025-03-13 RX ADMIN — METOPROLOL SUCCINATE 25 MILLIGRAM(S): 50 TABLET, EXTENDED RELEASE ORAL at 06:10

## 2025-03-13 RX ADMIN — NALTREXONE HYDROCHLORIDE 50 MILLIGRAM(S): 50 TABLET, FILM COATED ORAL at 12:55

## 2025-03-13 RX ADMIN — MUPIROCIN CALCIUM 1 APPLICATION(S): 20 CREAM TOPICAL at 18:43

## 2025-03-13 RX ADMIN — Medication 85 MILLIMOLE(S): at 07:14

## 2025-03-13 RX ADMIN — Medication 105 MILLIGRAM(S): at 06:10

## 2025-03-13 RX ADMIN — Medication 10 MILLIGRAM(S): at 12:56

## 2025-03-13 RX ADMIN — CYANOCOBALAMIN 1000 MICROGRAM(S): 1000 INJECTION INTRAMUSCULAR; SUBCUTANEOUS at 12:56

## 2025-03-13 RX ADMIN — Medication 3 MILLIGRAM(S): at 21:47

## 2025-03-13 RX ADMIN — Medication 40 MILLIGRAM(S): at 06:10

## 2025-03-13 RX ADMIN — Medication 105 MILLIGRAM(S): at 14:40

## 2025-03-13 RX ADMIN — Medication 105 MILLIGRAM(S): at 21:48

## 2025-03-13 RX ADMIN — DIAZEPAM 10 MILLIGRAM(S): 2 TABLET ORAL at 09:05

## 2025-03-13 RX ADMIN — METHOCARBAMOL 500 MILLIGRAM(S): 500 TABLET, FILM COATED ORAL at 12:55

## 2025-03-13 RX ADMIN — GABAPENTIN 200 MILLIGRAM(S): 400 CAPSULE ORAL at 06:10

## 2025-03-13 RX ADMIN — DIAZEPAM 10 MILLIGRAM(S): 2 TABLET ORAL at 14:43

## 2025-03-13 RX ADMIN — TAMSULOSIN HYDROCHLORIDE 0.4 MILLIGRAM(S): 0.4 CAPSULE ORAL at 21:47

## 2025-03-13 RX ADMIN — DIAZEPAM 10 MILLIGRAM(S): 2 TABLET ORAL at 21:47

## 2025-03-13 RX ADMIN — Medication 1 TABLET(S): at 12:56

## 2025-03-13 RX ADMIN — COLCHICINE 0.6 MILLIGRAM(S): 0.6 TABLET, FILM COATED ORAL at 12:56

## 2025-03-13 RX ADMIN — DIAZEPAM 10 MILLIGRAM(S): 2 TABLET ORAL at 02:27

## 2025-03-13 RX ADMIN — ENOXAPARIN SODIUM 100 MILLIGRAM(S): 100 INJECTION SUBCUTANEOUS at 18:43

## 2025-03-13 RX ADMIN — FOLIC ACID 1 MILLIGRAM(S): 1 TABLET ORAL at 12:56

## 2025-03-13 RX ADMIN — Medication 25 GRAM(S): at 06:25

## 2025-03-13 RX ADMIN — GABAPENTIN 200 MILLIGRAM(S): 400 CAPSULE ORAL at 18:46

## 2025-03-13 RX ADMIN — SODIUM CHLORIDE 75 MILLILITER(S): 9 INJECTION, SOLUTION INTRAVENOUS at 18:45

## 2025-03-13 RX ADMIN — Medication 1 APPLICATION(S): at 06:23

## 2025-03-13 NOTE — MEDICAL STUDENT CONSULT NOTE (EDUCATION) - SUBJECTIVE AND OBJECTIVE BOX
Pt interviewed, examined and EMR chart reviewed.    64y male with hx of alcohol use disorder and tobacco use disorder (in remission), PMH of DVT/PE, BPH, HTN, HLD, DMII, Gout, and Decompensated liver cirrhosis (MELD 3-17) and alcoholic hepatitis, and PPH of anxiety who presents with complaints of palpitations, chest discomfort ("pounding in my chest"), and nausea, admitted for alcoholic ketoacidosis. He reports nausea and palpitations a few hours after drinking 1 bottle of vodka without eating. Addiction Medicine consulted for assistance with alcohol use disorder and management of alcohol withdrawal.    He states that he has been drinking alcohol since his teenage years, initially a few cans of beer a day to currently 0.5-1 pint of vodka daily for the past 5 years. He adds that he attempted abstinence 5 years ago, when he attended the [facility] detox program and counseling, but resumed drinking afterwards. Patient has numerous prior hospitalizations for alcohol withdrawal. He informs the team of his awareness that drinking alcohol is harmful in the presence of his liver cirrhosis, but acknowledges that he experiences "cravings" and would like a medication to eliminate the cravings.  He denies ICU stays, DTs, or seizures due to alcohol withdrawal. He endorses drinking alcohol without food occasionally, gait instability when sober, requiring a walker for walking support. He denies nausea, vomiting, diaphoresis, tactile disturbances, or headache. He states that he is usually (for many years) restless in his legs only, where he shakes his legs constantly when he is sitting still, and is relieved with walking. CIWA = 1.    Regarding tobacco use disorder, in remission, he reports that began smoking cigarettes from the age of 16 years and quit "cold-turkey" at the age 42 years when he realized the toll on his breathing and episodes of coughing phlegm.    He reports having anxiety but is unsure of the medication he takes for it. He states that he last received mental heatlh counseling while he was at the detox program 5 years ago. He denies psychiatric hospitalizations, depression, suicide attempt, self-harm, AVH, SI, or HI.     Patient denies benzodiazepines, cocaine and other amphetamines, cannabinoids, ectasy/luis miguel, GHB, ketamine, PCP, inhalants or misuse of other illicit substances, supplements or prescriptions/medications.     [place]:      SOCIAL HISTORY: He lives at the [facility]. He states that he is not close with his family and hasn't spoken to them in a long time. He has friends at his residence, most of whom also drink alcohol. He spends money to buy "cheap" alcohol, costing $7-10. He uses a walker to move around as he is unsteady on his feet.    REVIEW OF SYSTEMS: per HPI     MEDICATIONS  (STANDING):  cetirizine 10 milliGRAM(s) Oral daily  chlorhexidine 2% Cloths 1 Application(s) Topical <User Schedule>  colchicine 0.6 milliGRAM(s) Oral daily  cyanocobalamin 1000 MICROGram(s) Oral daily  dextrose 5% + lactated ringers. 1000 milliLiter(s) (100 mL/Hr) IV Continuous <Continuous>  diazepam    Tablet   Oral   diazepam    Tablet 10 milliGRAM(s) Oral every 8 hours  enoxaparin Injectable 100 milliGRAM(s) SubCutaneous every 12 hours  folic acid 1 milliGRAM(s) Oral daily  gabapentin 200 milliGRAM(s) Oral two times a day  influenza  Vaccine (HIGH DOSE) 0.5 milliLiter(s) IntraMuscular once  insulin lispro (ADMELOG) corrective regimen sliding scale   SubCutaneous three times a day before meals  insulin lispro (ADMELOG) corrective regimen sliding scale   SubCutaneous at bedtime  methocarbamol 500 milliGRAM(s) Oral daily  metoprolol succinate ER 25 milliGRAM(s) Oral daily  multivitamin 1 Tablet(s) Oral daily  mupirocin 2% Nasal 1 Application(s) Both Nostrils two times a day  naltrexone 50 milliGRAM(s) Oral daily  pantoprazole    Tablet 40 milliGRAM(s) Oral before breakfast  tamsulosin 0.4 milliGRAM(s) Oral at bedtime  thiamine IVPB 500 milliGRAM(s) IV Intermittent three times a day    MEDICATIONS  (PRN):  acetaminophen     Tablet .. 650 milliGRAM(s) Oral every 12 hours PRN Moderate Pain (4 - 6)  artificial  tears Solution 1 Drop(s) Both EYES three times a day PRN Dry Eyes  diazepam    Tablet 10 milliGRAM(s) Oral every 2 hours PRN CIWA 8-14  ondansetron Injectable 4 milliGRAM(s) IV Push every 8 hours PRN Nausea and/or Vomiting      Vital Signs Last 24 Hrs  T(C): 36.4 (13 Mar 2025 07:20), Max: 36.9 (12 Mar 2025 20:00)  T(F): 97.5 (13 Mar 2025 07:20), Max: 98.4 (12 Mar 2025 20:00)  HR: 81 (13 Mar 2025 07:20) (74 - 98)  BP: 143/70 (13 Mar 2025 07:20) (117/71 - 146/71)  BP(mean): 90 (13 Mar 2025 03:00) (90 - 96)  RR: 18 (13 Mar 2025 07:20) (18 - 20)  SpO2: 92% (13 Mar 2025 07:20) (92% - 99%)    Parameters below as of 13 Mar 2025 07:20  Patient On (Oxygen Delivery Method): room air        PHYSICAL EXAM:    Constitutional: NAD, well-groomed, well-developed  HEENT: PERRLA, EOMI, horizontal and vertical nystagmus  Respiratory: no increased work of breathing  Neurological: A/O x 3, no focal deficits    MENTAL STATUS EXAM:  Appearance: calm, in hospital attire   Appearance in relation to age: looks stated age      Hygiene/Grooming: fair grooming   Attitude toward examiner: fully cooperative  Alertness: alert  Orientation: oriented to time, place and person  Posture: lying in bed  Gait: not evaluated  Behavior and psychomotor activity: normal  Mood: euthymic  Affect: full range and reactivity      Speech: slurred, fluent and spontaneous; normal rate, rhythm, volume and tone   Perceptions: denies hallucinations  Thought process: logical, linear and goal-directed    Thought content: no delusions or particular preoccupation, denies SI/HI  Associations: no loosening of associations   Impulse Control: aware of socially acceptable behavior  Insight: aware of need to change behaviors to remain sober, wants support to decrease cravings for alcohol  Judgment: understands consequences of alcohol use, including harm to liver, but demonstrating moderate motivation to engage in alcohol cessation    LABS:                        11.0   2.54  )-----------( 54       ( 13 Mar 2025 04:32 )             31.7     03-13    144  |  106  |  5[L]  ----------------------------<  125[H]  3.5   |  28  |  0.9    Ca    8.3[L]      13 Mar 2025 04:32  Phos  1.6     03-13  Mg     1.7     03-13    TPro  4.9[L]  /  Alb  3.1[L]  /  TBili  3.4[H]  /  DBili  x   /  AST  63[H]  /  ALT  18  /  AlkPhos  101  03-13    PT/INR - ( 13 Mar 2025 04:32 )   PT: 18.70 sec;   INR: 1.57 ratio         PTT - ( 13 Mar 2025 04:32 )  PTT:42.6 sec  Urinalysis Basic - ( 13 Mar 2025 09:24 )    Color: Orange / Appearance: Clear / S.013 / pH: x  Gluc: x / Ketone: Trace mg/dL  / Bili: Negative / Urobili: 1.0 mg/dL   Blood: x / Protein: Negative mg/dL / Nitrite: Negative   Leuk Esterase: Negative / RBC: 2 /HPF / WBC 0 /HPF   Sq Epi: x / Non Sq Epi: 0 /HPF / Bacteria: Negative /HPF      Drug Screen Urine:  Alcohol Level  Alcohol, Blood: 215 mg/dL (25 @ 05:05)      Urinalysis with Rflx Culture (collected 25 @ 09:24)  Urine Appearance: Clear  Color: Orange  Specific Gravity: 1.013  pH Urine: 6.5  Protein, Urine: Negative mg/dL  Glucose Qualitative, Urine: 250 mg/dL  Ketone - Urine: Trace mg/dL  Blood, Urine: Non Hemolyzed Trace  Bilirubin: Negative  Urobilinogen: 1.0 mg/dL  Leukocyte Esterase Concentration: Negative  Nitrite: Negative      RADIOLOGY & ADDITIONAL STUDIES:  ACC: [id number] EXAM:  CT ANGIO CHEST PULM ART WAWIC   ORDERED BY:   ANTHONY VANG     ACC: [id number] EXAM:  CT ABDOMEN AND PELVIS IC   ORDERED BY: ANTHONY VANG     PROCEDURE DATE:  2025          INTERPRETATION:  CLINICAL INFORMATION:Left lower quadrant abdominal   pain, chest discomfort pain, tachycardia history of PE    COMPARISON: CT abdomen pelvis 2025 CT angiography chest 2024    CONTRAST/COMPLICATIONS:  IV Contrast: IV contrast documented in unlinked concurrent exam   (accession [id number]), Omnipaque 350 (accession [id number])  100 cc   administered   0 cc discarded  Oral Contrast: NONE  .    PROCEDURE:  CT Angiography of the Chest was performed followed by portal venous phase   imaging of the Abdomen and Pelvis.  Sagittal and coronal reformats were performed as well as 3D (MIP)   reconstructions.    FINDINGS:  CHEST:  LUNGS/PLEURA/AIRWAYS:: Trachea is clear. No acute airspace consolidation.   No pleural effusion or pneumothorax. Bibasilar subsegmental atelectasis.  VESSELS: Thoracic aorta calcifications. Coronary artery calcifications.   No CT evidence of pulmonary embolus.  HEART: Heart size is normal. No pericardial effusion.  MEDIASTINUM AND DAVID: No lymphadenopathy.  CHEST WALL AND LOWER NECK: Mild bilateral gynecomastia.    ABDOMEN AND PELVIS:  LIVER: Steatosis. Mild caudate lobe hypertrophy, unchanged.  BILE DUCTS: Normal caliber.  GALLBLADDER: Cholelithiasis.  SPLEEN: Within normal limits.  PANCREAS: Within normal limits.  ADRENALS: Within normal limits.  KIDNEYS/URETERS: Symmetric enhancement. No hydronephrosis. Subcentimeter   hypodensities too small to characterize.    BLADDER: Within normal limits.  REPRODUCTIVE ORGANS: Appears within normal limits.    BOWEL: No bowel obstruction. Colonic diverticulosis. Appendix is not   visualized. No evidence of inflammation in the pericecal region.  PERITONEUM/RETROPERITONEUM: Within normal limits.  VESSELS: Atherosclerotic calcifications within the aorta and its   branches. Stenosis at the ostium of the celiac artery. Similar mild   stranding the in the region of the proximal GARRISON.  LYMPH NODES: Prominent bilateral inguinal lymph nodes, nonspecific, may   be reactive. Relatively unchanged prominent portacaval and gastrohepatic   and peripancreatic lymph nodes.  ABDOMINAL WALL: Within normal limits.  BONES: Degenerative changes. Right posterior fifth rib chronic fracture   deformity.      IMPRESSION:  1.  No CT evidence of acute pulmonary embolus.    2.  No CT evidence of acute thoracic or abdominopelvic pathology.    3.  Stable chronic findings as described above.        --- End of Report ---

## 2025-03-13 NOTE — MEDICAL STUDENT CONSULT NOTE (EDUCATION) - RECOMMENDATION 2
Patient endorses having followed with a psychiatrist/therapist 5 years ago for anxiety and feeling restless in legs when sitting still and relieved by walking. Furthermore, he reports gait instability requiring the use of a walker for support.  -continue gabapentin 200mg BID

## 2025-03-13 NOTE — MEDICAL STUDENT CONSULT NOTE (EDUCATION) - RECOMMENDATION 1
Patient is not experiencing acute alcohol withdrawal symptoms (CIWA 1), thus likely not in acute alcohol withdrawal. However, there is clinical concern for Wernicke encephalopathy (ataxia and ophthalmoplegia). Additionally, patient expressed interest in medication treatment to decrease cravings for alcohol.  -continue current Valium taper with PRNs for alcohol withdrawal symptoms  ----10mg q6 for 4 doses; 10mg q8 for 3 doses; 10mg q12 for 2 doses  ----10mg q2 PRN for CIWA 8-14  -continue vitamins supplementation (thiamine, folate, magnesium, vit B12)

## 2025-03-13 NOTE — CHART NOTE - NSCHARTNOTEFT_GEN_A_CORE
Transfer Note  ---------------------------    Transfer from: CEU  Transfer to:  ( x ) Medicine    (  ) Telemetry    (  ) RCU    (  ) Palliative    (  ) Stroke Unit    (  ) _______________  HOSPITAL COURSE    64y M pmh Alcohol Use Disorder, DVT/PE, BPH, HTN, HLD, DMII, gout presenting from [facility] with palpitations.   Patient reported general malaise, palpitations, nausea and chest discomfort that started day before ER visit.   Pt was admitted for Etoh withdrawal to SDU, was stable, downgraded to medical floor.   Today pt feels OK, denies any specific complaints, asking for PT.     OBJECTIVE --  Vital Signs Last 24 Hrs  T(C): 36.6 (13 Mar 2025 12:00), Max: 36.9 (12 Mar 2025 20:00)  T(F): 97.8 (13 Mar 2025 12:00), Max: 98.4 (12 Mar 2025 20:00)  HR: 78 (13 Mar 2025 12:00) (74 - 98)  BP: 133/67 (13 Mar 2025 12:00) (117/71 - 146/71)  BP(mean): 90 (13 Mar 2025 03:00) (90 - 96)  RR: 18 (13 Mar 2025 12:00) (18 - 20)  SpO2: 97% (13 Mar 2025 12:00) (92% - 99%)    Parameters below as of 13 Mar 2025 07:20  Patient On (Oxygen Delivery Method): room air        I&O's Summary      MEDICATIONS  (STANDING):  cetirizine 10 milliGRAM(s) Oral daily  chlorhexidine 2% Cloths 1 Application(s) Topical <User Schedule>  colchicine 0.6 milliGRAM(s) Oral daily  cyanocobalamin 1000 MICROGram(s) Oral daily  dextrose 5% + lactated ringers. 1000 milliLiter(s) (100 mL/Hr) IV Continuous <Continuous>  diazepam    Tablet   Oral   diazepam    Tablet 10 milliGRAM(s) Oral every 8 hours  enoxaparin Injectable 100 milliGRAM(s) SubCutaneous every 12 hours  folic acid 1 milliGRAM(s) Oral daily  gabapentin 200 milliGRAM(s) Oral two times a day  influenza  Vaccine (HIGH DOSE) 0.5 milliLiter(s) IntraMuscular once  insulin lispro (ADMELOG) corrective regimen sliding scale   SubCutaneous three times a day before meals  insulin lispro (ADMELOG) corrective regimen sliding scale   SubCutaneous at bedtime  methocarbamol 500 milliGRAM(s) Oral daily  metoprolol succinate ER 25 milliGRAM(s) Oral daily  multivitamin 1 Tablet(s) Oral daily  mupirocin 2% Nasal 1 Application(s) Both Nostrils two times a day  naltrexone 50 milliGRAM(s) Oral daily  pantoprazole    Tablet 40 milliGRAM(s) Oral before breakfast  tamsulosin 0.4 milliGRAM(s) Oral at bedtime  thiamine IVPB 500 milliGRAM(s) IV Intermittent three times a day    MEDICATIONS  (PRN):  acetaminophen     Tablet .. 650 milliGRAM(s) Oral every 12 hours PRN Moderate Pain (4 - 6)  artificial  tears Solution 1 Drop(s) Both EYES three times a day PRN Dry Eyes  diazepam    Tablet 10 milliGRAM(s) Oral every 2 hours PRN CIWA 8-14  ondansetron Injectable 4 milliGRAM(s) IV Push every 8 hours PRN Nausea and/or Vomiting        LABS                                            11.0                  Neurophils% (auto):   x      (03-13 @ 04:32):    2.54 )-----------(54           Lymphocytes% (auto):  x                                             31.7                   Eosinphils% (auto):   x        Manual%: Neutrophils x    ; Lymphocytes x    ; Eosinophils x    ; Bands%: x    ; Blasts x                                    144    |  106    |  5                   Calcium: 8.3   / iCa: x      (03-13 @ 04:32)    ----------------------------<  125       Magnesium: 1.7                              3.5     |  28     |  0.9              Phosphorous: 1.6      TPro  4.9    /  Alb  3.1    /  TBili  3.4    /  DBili  x      /  AST  63     /  ALT  18     /  AlkPhos  101    13 Mar 2025 04:32    ( 03-13 @ 04:32 )   PT: 18.70 sec;   INR: 1.57 ratio  aPTT: 42.6 sec          ASSESSMENT & PLAN:   64y M pmh Alcohol Use Disorder, DVT/PE, BPH, HTN, HLD, DMII, gout presenting from [facility] with palpitations admitted for acute alcohol intoxication and lactic acidosis.       #Alcohol use disorder/alcohol intoxication/  Lactic acidosis / suspected thiamine and folate deficiency   - Alcohol level  215 on admission   - increased alcohol use over the past week - last drink 8PM 3/11/25 - 1 bottle of vodka   - c/w  CIWA protocol, addiction medicine consult   - started on  valium taper 10mg w/ PRN valium 10mg for CIWA >8  - cw  thiamine,  B12, folate, MV   - c/w 75 cc/hr LR for another 24 hrs. Lactate has normalized.  - pt is agreeable outpatient rehab/medication therapy   - cw naltrexone 50mg qd and gabapentin 200mg bid   - keep Mg >2, K>4    #Metabolic syndrome/ Hepatic steatosis/ DMII/ HLD  - CC/DASH diet    - previous admission for alcoholic hepatitis treated with  prednisolone  - monitor LFTs, encourage weight loss   - CTAP with hepatic steatosis and cholelithiasis   - fu GI/Hep op   -  metformin/jardiance held on admission   - ISS for now goal -180      #HTN  - cw toprol 25mg qd    #BPH  - cw flomax 0.4mg qhs    #Gout  - cw colchicine qd  - has tylenol 650mg bid PRN for moderate pain     #Hx DVT/PE/ Thrombocytopenia  -thrombocytopenia - 2/2 alcoholic BM suppression   - Duplex 1/20/25 - b/l chronic popliteal DVT  - repeat Doppler   - on previous dc was off coumadin b/c of platelet <50 - per Savir's documentation was restarted on coumadin. Unclear why on coumadin and if there is a specific need for him to be on coumadin -- for now start on therapeutic lovenox until can clarify why patient was on coumadin. This will likely require a deep dive into the patient's medical history and contacting his outpatient provider's.  - Monitor platelets daily    # Misc  - DVT Prophylaxis: therapeutic lovenox  - GI Prophylaxis: none  - Diet: DASH/CC  - Activity: IAT  - Code Status: Full     #Progress Note Handoff  Pending (specify):  c/w IV fluids LR @75cc for another 24 hrs, f/u addiction medicine eval, OOB to chair, PT eval, monitor and replete electrolytes, supportive care , c/w CIWA . F/u LE duplex.  Disposition: DG Transfer Note  ---------------------------    Transfer from: CEU  Transfer to:  ( x ) Medicine    (  ) Telemetry    (  ) RCU    (  ) Palliative    (  ) Stroke Unit    (  ) _______________  HOSPITAL COURSE    64y M pmh Alcohol Use Disorder, DVT/PE, BPH, HTN, HLD, DMII, gout presenting from [facility] with palpitations.   Patient reported general malaise, palpitations, nausea and chest discomfort that started day before ER visit.   Pt was admitted for Etoh withdrawal to SDU, was stable, downgraded to medical floor.   Today pt feels OK, denies any specific complaints, asking for PT.     OBJECTIVE --  Vital Signs Last 24 Hrs  T(C): 36.6 (13 Mar 2025 12:00), Max: 36.9 (12 Mar 2025 20:00)  T(F): 97.8 (13 Mar 2025 12:00), Max: 98.4 (12 Mar 2025 20:00)  HR: 78 (13 Mar 2025 12:00) (74 - 98)  BP: 133/67 (13 Mar 2025 12:00) (117/71 - 146/71)  BP(mean): 90 (13 Mar 2025 03:00) (90 - 96)  RR: 18 (13 Mar 2025 12:00) (18 - 20)  SpO2: 97% (13 Mar 2025 12:00) (92% - 99%)    Parameters below as of 13 Mar 2025 07:20  Patient On (Oxygen Delivery Method): room air        I&O's Summary      MEDICATIONS  (STANDING):  cetirizine 10 milliGRAM(s) Oral daily  chlorhexidine 2% Cloths 1 Application(s) Topical <User Schedule>  colchicine 0.6 milliGRAM(s) Oral daily  cyanocobalamin 1000 MICROGram(s) Oral daily  dextrose 5% + lactated ringers. 1000 milliLiter(s) (100 mL/Hr) IV Continuous <Continuous>  diazepam    Tablet   Oral   diazepam    Tablet 10 milliGRAM(s) Oral every 8 hours  enoxaparin Injectable 100 milliGRAM(s) SubCutaneous every 12 hours  folic acid 1 milliGRAM(s) Oral daily  gabapentin 200 milliGRAM(s) Oral two times a day  influenza  Vaccine (HIGH DOSE) 0.5 milliLiter(s) IntraMuscular once  insulin lispro (ADMELOG) corrective regimen sliding scale   SubCutaneous three times a day before meals  insulin lispro (ADMELOG) corrective regimen sliding scale   SubCutaneous at bedtime  methocarbamol 500 milliGRAM(s) Oral daily  metoprolol succinate ER 25 milliGRAM(s) Oral daily  multivitamin 1 Tablet(s) Oral daily  mupirocin 2% Nasal 1 Application(s) Both Nostrils two times a day  naltrexone 50 milliGRAM(s) Oral daily  pantoprazole    Tablet 40 milliGRAM(s) Oral before breakfast  tamsulosin 0.4 milliGRAM(s) Oral at bedtime  thiamine IVPB 500 milliGRAM(s) IV Intermittent three times a day    MEDICATIONS  (PRN):  acetaminophen     Tablet .. 650 milliGRAM(s) Oral every 12 hours PRN Moderate Pain (4 - 6)  artificial  tears Solution 1 Drop(s) Both EYES three times a day PRN Dry Eyes  diazepam    Tablet 10 milliGRAM(s) Oral every 2 hours PRN CIWA 8-14  ondansetron Injectable 4 milliGRAM(s) IV Push every 8 hours PRN Nausea and/or Vomiting        LABS                                            11.0                  Neurophils% (auto):   x      (03-13 @ 04:32):    2.54 )-----------(54           Lymphocytes% (auto):  x                                             31.7                   Eosinphils% (auto):   x        Manual%: Neutrophils x    ; Lymphocytes x    ; Eosinophils x    ; Bands%: x    ; Blasts x                                    144    |  106    |  5                   Calcium: 8.3   / iCa: x      (03-13 @ 04:32)    ----------------------------<  125       Magnesium: 1.7                              3.5     |  28     |  0.9              Phosphorous: 1.6      TPro  4.9    /  Alb  3.1    /  TBili  3.4    /  DBili  x      /  AST  63     /  ALT  18     /  AlkPhos  101    13 Mar 2025 04:32    ( 03-13 @ 04:32 )   PT: 18.70 sec;   INR: 1.57 ratio  aPTT: 42.6 sec          ASSESSMENT & PLAN:   64y M pmh Alcohol Use Disorder, DVT/PE, BPH, HTN, HLD, DMII, gout presenting from [facility] with palpitations admitted for acute alcohol intoxication and lactic acidosis.       #Alcohol use disorder/alcohol intoxication/  Lactic acidosis / suspected thiamine and folate deficiency   - Alcohol level  215 on admission   - increased alcohol use over the past week - last drink 8PM 3/11/25 - 1 bottle of vodka   - c/w  CIWA protocol, addiction medicine consult   - started on  valium taper 10mg w/ PRN valium 10mg for CIWA >8  - cw  thiamine,  B12, folate, MV   - c/w 75 cc/hr LR for another 24 hrs. Lactate has normalized.  - pt is agreeable outpatient rehab/medication therapy   - cw naltrexone 50mg qd and gabapentin 200mg bid   - keep Mg >2, K>4  -obtain screening TTE to look for cardiomyopathy.    #Metabolic syndrome/ Hepatic steatosis/ DMII/ HLD  - CC/DASH diet    - previous admission for alcoholic hepatitis treated with  prednisolone  - monitor LFTs, encourage weight loss   - CTAP with hepatic steatosis and cholelithiasis   - fu GI/Hep op   -  metformin/jardiance held on admission   - ISS for now goal -180      #HTN  - cw toprol 25mg qd    #BPH  - cw flomax 0.4mg qhs    #Gout  - cw colchicine qd  - has tylenol 650mg bid PRN for moderate pain     #Hx DVT/PE/ Thrombocytopenia  -thrombocytopenia - 2/2 alcoholic BM suppression   - Duplex 1/20/25 - b/l chronic popliteal DVT  - repeat Doppler   - on previous dc was off coumadin b/c of platelet <50 - per Sylvia's documentation was restarted on coumadin. Unclear why on coumadin and if there is a specific need for him to be on coumadin -- for now start on therapeutic lovenox until can clarify why patient was on coumadin. This will likely require a deep dive into the patient's medical history and contacting his outpatient provider's.  - Monitor platelets daily    # Misc  - DVT Prophylaxis: therapeutic lovenox  - GI Prophylaxis: none  - Diet: DASH/CC  - Activity: IAT  - Code Status: Full     #Progress Note Handoff  Pending (specify):  c/w IV fluids LR @75cc for another 24 hrs, f/u addiction medicine eval, OOB to chair, PT eval, monitor and replete electrolytes, supportive care , c/w CIWA . F/u LE duplex. F/u TTE. Figure out if patient specifically needs to be on coumadin or not.  Disposition: TF Transfer Note  ---------------------------    Transfer from: CEU  Transfer to:  ( x ) Medicine    (  ) Telemetry    (  ) RCU    (  ) Palliative    (  ) Stroke Unit    (  ) _______________  HOSPITAL COURSE    64y M pmh Alcohol Use Disorder, DVT/PE, BPH, HTN, HLD, DMII, gout presenting from [facility] with palpitations.   Patient reported general malaise, palpitations, nausea and chest discomfort that started day before ER visit.   Pt was admitted for Etoh withdrawal to SDU, was stable, downgraded to medical floor.   Today pt feels OK, denies any specific complaints, asking for PT.     OBJECTIVE --  Vital Signs Last 24 Hrs  T(C): 36.6 (13 Mar 2025 12:00), Max: 36.9 (12 Mar 2025 20:00)  T(F): 97.8 (13 Mar 2025 12:00), Max: 98.4 (12 Mar 2025 20:00)  HR: 78 (13 Mar 2025 12:00) (74 - 98)  BP: 133/67 (13 Mar 2025 12:00) (117/71 - 146/71)  BP(mean): 90 (13 Mar 2025 03:00) (90 - 96)  RR: 18 (13 Mar 2025 12:00) (18 - 20)  SpO2: 97% (13 Mar 2025 12:00) (92% - 99%)    Parameters below as of 13 Mar 2025 07:20  Patient On (Oxygen Delivery Method): room air        I&O's Summary      MEDICATIONS  (STANDING):  cetirizine 10 milliGRAM(s) Oral daily  chlorhexidine 2% Cloths 1 Application(s) Topical <User Schedule>  colchicine 0.6 milliGRAM(s) Oral daily  cyanocobalamin 1000 MICROGram(s) Oral daily  dextrose 5% + lactated ringers. 1000 milliLiter(s) (100 mL/Hr) IV Continuous <Continuous>  diazepam    Tablet   Oral   diazepam    Tablet 10 milliGRAM(s) Oral every 8 hours  enoxaparin Injectable 100 milliGRAM(s) SubCutaneous every 12 hours  folic acid 1 milliGRAM(s) Oral daily  gabapentin 200 milliGRAM(s) Oral two times a day  influenza  Vaccine (HIGH DOSE) 0.5 milliLiter(s) IntraMuscular once  insulin lispro (ADMELOG) corrective regimen sliding scale   SubCutaneous three times a day before meals  insulin lispro (ADMELOG) corrective regimen sliding scale   SubCutaneous at bedtime  methocarbamol 500 milliGRAM(s) Oral daily  metoprolol succinate ER 25 milliGRAM(s) Oral daily  multivitamin 1 Tablet(s) Oral daily  mupirocin 2% Nasal 1 Application(s) Both Nostrils two times a day  naltrexone 50 milliGRAM(s) Oral daily  pantoprazole    Tablet 40 milliGRAM(s) Oral before breakfast  tamsulosin 0.4 milliGRAM(s) Oral at bedtime  thiamine IVPB 500 milliGRAM(s) IV Intermittent three times a day    MEDICATIONS  (PRN):  acetaminophen     Tablet .. 650 milliGRAM(s) Oral every 12 hours PRN Moderate Pain (4 - 6)  artificial  tears Solution 1 Drop(s) Both EYES three times a day PRN Dry Eyes  diazepam    Tablet 10 milliGRAM(s) Oral every 2 hours PRN CIWA 8-14  ondansetron Injectable 4 milliGRAM(s) IV Push every 8 hours PRN Nausea and/or Vomiting        LABS                                            11.0                  Neurophils% (auto):   x      (03-13 @ 04:32):    2.54 )-----------(54           Lymphocytes% (auto):  x                                             31.7                   Eosinphils% (auto):   x        Manual%: Neutrophils x    ; Lymphocytes x    ; Eosinophils x    ; Bands%: x    ; Blasts x                                    144    |  106    |  5                   Calcium: 8.3   / iCa: x      (03-13 @ 04:32)    ----------------------------<  125       Magnesium: 1.7                              3.5     |  28     |  0.9              Phosphorous: 1.6      TPro  4.9    /  Alb  3.1    /  TBili  3.4    /  DBili  x      /  AST  63     /  ALT  18     /  AlkPhos  101    13 Mar 2025 04:32    ( 03-13 @ 04:32 )   PT: 18.70 sec;   INR: 1.57 ratio  aPTT: 42.6 sec          ASSESSMENT & PLAN:   64y M pmh Alcohol Use Disorder, DVT/PE, BPH, HTN, HLD, DMII, gout presenting from [facility] with palpitations admitted for acute alcohol intoxication and lactic acidosis.       #Alcohol use disorder/alcohol intoxication/  Lactic acidosis / suspected thiamine and folate deficiency   - Alcohol level  215 on admission   - increased alcohol use over the past week - last drink 8PM 3/11/25 - 1 bottle of vodka   - c/w  CIWA protocol, addiction medicine consult   - started on  valium taper 10mg w/ PRN valium 10mg for CIWA >8  - cw  thiamine,  B12, folate, MV   - c/w 75 cc/hr LR for another 24 hrs. Lactate has normalized.  - pt is agreeable outpatient rehab/medication therapy   - cw naltrexone 50mg qd and gabapentin 200mg bid   - keep Mg >2, K>4  -obtain screening TTE to look for cardiomyopathy.  - Patient should be monitored by PCP or GI specialist annually for noninvasive liver monitoring given that up to 20% of patients with steatosis develop fibrosis.  - If imaging concerning for cirrhosis, patient should be monitored with noninvasive imaging and AFP every 6 months for HCC screening       #Metabolic syndrome/ Hepatic steatosis/ DMII/ HLD  - CC/DASH diet    - previous admission for alcoholic hepatitis treated with  prednisolone  - monitor LFTs, encourage weight loss   - CTAP with hepatic steatosis and cholelithiasis   - fu GI/Hep op   -  metformin/jardiance held on admission   - ISS for now goal -180      #HTN  - cw toprol 25mg qd    #BPH  - cw flomax 0.4mg qhs    #Gout  - cw colchicine qd  - has tylenol 650mg bid PRN for moderate pain     #Hx DVT/PE/ Thrombocytopenia  -thrombocytopenia - 2/2 alcoholic BM suppression   - Duplex 1/20/25 - b/l chronic popliteal DVT  - repeat Doppler   - on previous dc was off coumadin b/c of platelet <50 - per Sylvia's documentation was restarted on coumadin. Unclear why on coumadin and if there is a specific need for him to be on coumadin -- for now start on therapeutic lovenox until can clarify why patient was on coumadin. This will likely require a deep dive into the patient's medical history and contacting his outpatient provider's.  - Monitor platelets daily    # Misc  - DVT Prophylaxis: therapeutic lovenox  - GI Prophylaxis: none  - Diet: DASH/CC  - Activity: IAT  - Code Status: Full     #Progress Note Handoff  Pending (specify):  c/w IV fluids LR @75cc for another 24 hrs, f/u addiction medicine eval, OOB to chair, PT eval, monitor and replete electrolytes, supportive care , c/w CIWA . F/u LE duplex. F/u TTE. Figure out if patient specifically needs to be on coumadin or not.  Disposition: TF

## 2025-03-13 NOTE — CONSULT NOTE ADULT - ASSESSMENT
IMPRESSION:    Alcohol Withdrawal  HAGMA  AKA  Lactic Acidosis, downtrending   Sinus Tachycardia  Chronic Thrombocytopenia   Alcohol Use disorder  HO VTE on Coumadin    PLAN:    CNS: CIWA protocol.  thiamine, folic acid. CATCH/Addiction evaluation. drug and serum toxicology      HEENT:  Oral care    PULMONARY:  HOB @ 45 degrees, aspiration precautions, on room air CT Chest unremarkable     CARDIOVASCULAR:  D51/4NS. trend CE, TTE     GI: GI prophylaxis.  Feeding as tolerated.  Bowel regimen PRN.  CT Abdomen/pelvis noted. Lipase negative. Trend LFTs    RENAL:  F/u  lytes.  Correct as needed. replete magnesium, check phosphate, trend LA    INFECTIOUS DISEASE: f/u cultures, monitor off antibotics    HEMATOLOGICAL: LE duplex, clarify if patient remains on coumadin    ENDOCRINE:  Follow up FS. Insulin protocol if needed.     MUSCULOSKELETAL: AAT    SDU monitoring   
  64y male with hx of alcohol use disorder and tobacco use disorder (in remission), PMH of DVT/PE, BPH, HTN, HLD, DMII, Gout, and Decompensated liver cirrhosis (MELD 3-17) and alcoholic hepatitis, and PPH of anxiety who presents with complaints of palpitations, chest discomfort ("pounding in my chest"), and nausea, admitted for alcoholic ketoacidosis. He reports nausea and palpitations a few hours after drinking 1 bottle of vodka without eating. Addiction Medicine consulted for assistance with alcohol use disorder and management of alcohol withdrawal.    #Alcohol withdrawal:  - CIWA low on assessment, agree with current Valium taper as ordered today. Will continue to evaluate daily to determine if dosing adjustments need to be made  - for breakthrough symptoms of withdrawal, recommend Diazepam 10 mg Q4h prn CIWA >7   - keep Mg>2, K>4   - continue folate and multivitamin, agree with high dose thiamine recommendation  - hold benzodiazepines for sedation/respiratory depression    - please offer PRN medications for supportive management of withdrawal including:  - zofran q6 hr prn nausea (hold for QTc>500)  - tylenol 650 mg q6h prn mild/moderate pain  - melatonin 3 mg nightly prn for insomnia  - CATCH team to assist with substance use aftercare options for outpatient programs and will have ongoing discussions regarding medications for AUD such as naltrexone tomorrow with the addiction medicine team    #Wernicke's Encephalopathy:   - given concerns for ongoing ophthalmoplegia and hx of gait instability clinical concern for Wernicke's encephalopathy. Would recommend IV thiamine 500 mg q 8h for 3 days, followed by 250 mg IV daily up to additional 5 days per [employer] recommendations.   - Can also opt to switch to oral thiamine 100 mg daily after 3 days of IV repletion.   - keep Mg >2, K>4     #Alcohol induced Hepatic Steatosis:  - RUQ from previous admission with hepatic steatosis  - counseling for alcohol cessation as above with CATCH   - would recommend testing for additional hepatology testing including Hepatitis B surface antigen (HBsAg), hepatitis B surface antibody (anti-HBs), total hepatitis B core antibody (anti-HBc), Hep A IGG and Hep C Antibody  - patient should be monitored by PCP or GI specialist annually for noninvasive liver monitoring given that up to 20% of patients with steatosis develop fibrosis.  - if following up imaging concerning for cirrhosis, patient should be monitored with noninvasive imaging and AFP every 6 months for HCC screening     Thank you for this consult. Rest of care per primary. Addiction medicine will continue to follow. Please reach out with any questions or concerns via TEAMS.

## 2025-03-13 NOTE — PROGRESS NOTE ADULT - SUBJECTIVE AND OBJECTIVE BOX
64y M pmh Alcohol Use Disorder, DVT/PE, BPH, HTN, HLD, DMII, gout presenting from [facility] with palpitations.   Patient reported general malaise, palpitations, nausea and chest discomfort that started day before ER visit.   Pt was admitted for Etoh withdrawal to SDU, was stable, downgraded to medical floor.   Today pt feels OK, denies any specific complaints, asking for PT.     PAST MEDICAL & SURGICAL HISTORY:  Alcohol dependence  HTN (hypertension)  Hard of hearing  Seasonal allergies  BPH (benign prostatic hyperplasia)  GERD (gastroesophageal reflux disease)  Pseudogout  History of deep vein thrombosis (DVT) of lower extremity  No significant past surgical history      Vital Signs Last 24 Hrs  T(C): 36.4 (13 Mar 2025 07:20), Max: 36.9 (12 Mar 2025 20:00)  T(F): 97.5 (13 Mar 2025 07:20), Max: 98.4 (12 Mar 2025 20:00)  HR: 81 (13 Mar 2025 07:20) (74 - 98)  BP: 143/70 (13 Mar 2025 07:20) (117/71 - 146/71)  BP(mean): 90 (13 Mar 2025 03:00) (90 - 96)  RR: 18 (13 Mar 2025 07:20) (18 - 20)  SpO2: 92% (13 Mar 2025 07:20) (92% - 99%)    Parameters below as of 13 Mar 2025 07:20  Patient On (Oxygen Delivery Method): room air      PHYSICAL EXAM:  GENERAL: NAD, pleasant   HEAD:  Atraumatic, Normocephalic  NECK: Supple, No JVD, Normal thyroid  NERVOUS SYSTEM:  slight tremor noted ,  Alert & Oriented X3, Good concentration; Motor Strength 5/5 B/L upper and lower extremities; DTRs 2+ intact and symmetric  CHEST/LUNG: decreased BS at bases   HEART: S1, S2   ABDOMEN: Soft, Nontender, Nondistended; Bowel sounds present  EXTREMITIES:  static dermatitis, no  calf tenderness , no edema       LABS:                        11.0   2.54  )-----------( 54       ( 13 Mar 2025 04:32 )             31.7     03-13    144  |  106  |  5[L]  ----------------------------<  125[H]  3.5   |  28  |  0.9    Ca    8.3[L]      13 Mar 2025 04:32  Phos  1.6     03-13  Mg     1.7     03-13    TPro  4.9[L]  /  Alb  3.1[L]  /  TBili  3.4[H]  /  DBili  x   /  AST  63[H]  /  ALT  18  /  AlkPhos  101  03-13    PT/INR - ( 13 Mar 2025 04:32 )   PT: 18.70 sec;   INR: 1.57 ratio         PTT - ( 13 Mar 2025 04:32 )  PTT:42.6 sec  Urinalysis Basic - ( 13 Mar 2025 09:24 )    Color: Orange / Appearance: Clear / S.013 / pH: x  Gluc: x / Ketone: Trace mg/dL  / Bili: Negative / Urobili: 1.0 mg/dL   Blood: x / Protein: Negative mg/dL / Nitrite: Negative   Leuk Esterase: Negative / RBC: 2 /HPF / WBC 0 /HPF   Sq Epi: x / Non Sq Epi: 0 /HPF / Bacteria: Negative /HPF        Urinalysis with Rflx Culture (collected 13 Mar 2025 09:24)      RADIOLOGY & ADDITIONAL TESTS:    < from: CT Angio Chest PE Protocol w/ IV Cont (25 @ 06:46) >  IMPRESSION:  1.  No CT evidence of acute pulmonary embolus.    2.  No CT evidence of acute thoracic or abdominopelvic pathology.    3.  Stable chronic findings as described above.    < end of copied text >    < from: TTE Echo Complete w/ Contrast w/ Doppler (22 @ 13:12) >  Summary:   1. Technically difficult study.   2. Normal global left ventricular systolic function.   3. LV Ejection Fraction by Johnson's Method with a biplane EF of 61 %.   4. Probably normal RV function.   5. Endocardial visualization was enhanced with intravenous echo contrast.   6. Compared to the previous study 2022, the findings are similar.    MEDICATIONS  (STANDING):  cetirizine 10 milliGRAM(s) Oral daily  chlorhexidine 2% Cloths 1 Application(s) Topical <User Schedule>  colchicine 0.6 milliGRAM(s) Oral daily  cyanocobalamin 1000 MICROGram(s) Oral daily  dextrose 5% + lactated ringers. 1000 milliLiter(s) (100 mL/Hr) IV Continuous <Continuous>  diazepam    Tablet   Oral   diazepam    Tablet 10 milliGRAM(s) Oral every 8 hours  enoxaparin Injectable 100 milliGRAM(s) SubCutaneous every 12 hours  folic acid 1 milliGRAM(s) Oral daily  gabapentin 200 milliGRAM(s) Oral two times a day  influenza  Vaccine (HIGH DOSE) 0.5 milliLiter(s) IntraMuscular once  insulin lispro (ADMELOG) corrective regimen sliding scale   SubCutaneous three times a day before meals  insulin lispro (ADMELOG) corrective regimen sliding scale   SubCutaneous at bedtime  methocarbamol 500 milliGRAM(s) Oral daily  metoprolol succinate ER 25 milliGRAM(s) Oral daily  multivitamin 1 Tablet(s) Oral daily  mupirocin 2% Nasal 1 Application(s) Both Nostrils two times a day  naltrexone 50 milliGRAM(s) Oral daily  pantoprazole    Tablet 40 milliGRAM(s) Oral before breakfast  tamsulosin 0.4 milliGRAM(s) Oral at bedtime  thiamine IVPB 500 milliGRAM(s) IV Intermittent three times a day    MEDICATIONS  (PRN):  acetaminophen     Tablet .. 650 milliGRAM(s) Oral every 12 hours PRN Moderate Pain (4 - 6)  artificial  tears Solution 1 Drop(s) Both EYES three times a day PRN Dry Eyes  diazepam    Tablet 10 milliGRAM(s) Oral every 2 hours PRN CIWA 8-14  ondansetron Injectable 4 milliGRAM(s) IV Push every 8 hours PRN Nausea and/or Vomiting

## 2025-03-13 NOTE — CONSULT NOTE ADULT - SUBJECTIVE AND OBJECTIVE BOX
Patient is a 64y old  Male who presents with a chief complaint of Palpitations (12 Mar 2025 12:47)      HPI:  64y M pmh Alcohol Use Disorder, DVT/PE, BPH, HTN, HLD, DMII, gout presenting from [facility] with palpitations.     Patient reported general malaise, palpitations, nausea and chest discomfort that started yesterday afternoon. Patient reports drinking alcohol. Denies any vomiting, diarrhea, constipation, abdominal pain, chills, tremors, confusion, hallucinations/dellusions or seizure like activity.     ROS -ve for fever, cough, rhinorrhea, rash, dizziness, lightheadedness, HA, hematuria, dysuria, melena, hematochezia, joint pain, myalgias, shortness of breath falls or other trauma.      Patient received 2L LR, librium, toradol, versed, magnesium, thiamine and folic acid  in the ED. Admitted to medicine for management.     (12 Mar 2025 12:47)      PAST MEDICAL & SURGICAL HISTORY:  Alcohol dependence      HTN (hypertension)      Hard of hearing      Seasonal allergies      BPH (benign prostatic hyperplasia)      GERD (gastroesophageal reflux disease)      Pseudogout      History of deep vein thrombosis (DVT) of lower extremity      No significant past surgical history          FAMILY HISTORY:  Family history of gastric cancer  Mom    Family hx of lung cancer  Smoker      :  No known cardiovacular family hisotry     Review Of Systems:     All ROS are negative except per HPI       Allergies    shellfish (Hives)  dairy products (Hives)  No Known Drug Allergies  Beef (Hives)    Intolerances        ICU Vital Signs Last 24 Hrs  T(C): 36.7 (12 Mar 2025 06:30), Max: 36.8 (12 Mar 2025 03:14)  T(F): 98.1 (12 Mar 2025 06:30), Max: 98.3 (12 Mar 2025 03:14)  HR: 107 (12 Mar 2025 06:30) (106 - 133)  BP: 122/73 (12 Mar 2025 06:30) (122/73 - 159/63)  RR: 18 (12 Mar 2025 06:30) (18 - 22)  SpO2: 97% (12 Mar 2025 06:30) (92% - 97%)    O2 Parameters below as of 12 Mar 2025 06:30  Patient On (Oxygen Delivery Method): room air            CONSTITUTIONAL:  Disheveled   NAD    ENT:   Airway patent,   Mouth with normal mucosa.   No thrush      CARDIAC:   Normal rate,   Regular rhythm.      RESPIRATORY:   No wheezing  Bilateral BS   Not tachypneic,  No use of accessory muscles    GASTROINTESTINAL:  Abdomen soft,   Non-tender,   No guarding,   + BS      NEUROLOGICAL:   Alert and oriented       SKIN:   Skin normal color for race                LABS:                          12.7   6.16  )-----------( 127      ( 12 Mar 2025 05:05 )             37.0                                               03-12    145  |  100  |  6[L]  ----------------------------<  102[H]  3.8   |  16[L]  |  1.1    Ca    8.9      12 Mar 2025 05:05  Mg     1.3     03-12    TPro  6.3  /  Alb  3.6  /  TBili  2.2[H]  /  DBili  x   /  AST  72[H]  /  ALT  21  /  AlkPhos  137[H]  03-12      PT/INR - ( 12 Mar 2025 05:05 )   PT: 16.10 sec;   INR: 1.36 ratio         PTT - ( 12 Mar 2025 05:05 )  PTT:45.2 sec                                       Urinalysis Basic - ( 12 Mar 2025 05:05 )    Color: x / Appearance: x / SG: x / pH: x  Gluc: 102 mg/dL / Ketone: x  / Bili: x / Urobili: x   Blood: x / Protein: x / Nitrite: x   Leuk Esterase: x / RBC: x / WBC x   Sq Epi: x / Non Sq Epi: x / Bacteria: x                                                  LIVER FUNCTIONS - ( 12 Mar 2025 05:05 )  Alb: 3.6 g/dL / Pro: 6.3 g/dL / ALK PHOS: 137 U/L / ALT: 21 U/L / AST: 72 U/L / GGT: x                                                MEDICATIONS  (STANDING):  dextrose 5% + lactated ringers. 1000 milliLiter(s) (100 mL/Hr) IV Continuous <Continuous>  thiamine IVPB 500 milliGRAM(s) IV Intermittent three times a day    MEDICATIONS  (PRN):        
  Pt interviewed, examined and EMR chart reviewed.    HPI as written by student doctor Kyle with my supervision:     64y male with hx of alcohol use disorder and tobacco use disorder (in remission), PMH of DVT/PE, BPH, HTN, HLD, DMII, Gout, and Decompensated liver cirrhosis (MELD 3-17) and alcoholic hepatitis, and PPH of anxiety who presents with complaints of palpitations, chest discomfort ("pounding in my chest"), and nausea, admitted for alcoholic ketoacidosis. He reports nausea and palpitations a few hours after drinking 1 bottle of vodka without eating. Addiction Medicine consulted for assistance with alcohol use disorder and management of alcohol withdrawal.    He states that he has been drinking alcohol since his teenage years, initially a few cans of beer a day to currently 0.5-1 pint of vodka daily for the past 5 years. He adds that he attempted abstinence 5 years ago, when he attended the [facility] detox program and counseling, but resumed drinking afterwards. Patient has numerous prior hospitalizations for alcohol withdrawal. He informs the team of his awareness that drinking alcohol is harmful in the presence of his liver cirrhosis, but acknowledges that he experiences "cravings" and would like a medication to eliminate the cravings.  He denies ICU stays, DTs, or seizures due to alcohol withdrawal. He endorses drinking alcohol without food occasionally, gait instability when sober, requiring a walker for walking support. He denies nausea, vomiting, diaphoresis, tactile disturbances, or headache. He states that he is usually (for many years) restless in his legs only, where he shakes his legs constantly when he is sitting still, and is relieved with walking. CIWA = 1.    Regarding tobacco use disorder, in remission, he reports that began smoking cigarettes from the age of 16 years and quit "cold-turkey" at the age 42 years when he realized the toll on his breathing and episodes of coughing phlegm.    He reports having anxiety but is unsure of the medication he takes for it. He states that he last received mental heatlh counseling while he was at the detox program 5 years ago. He denies psychiatric hospitalizations, depression, suicide attempt, self-harm, AVH, SI, or HI.     Patient denies benzodiazepines, cocaine and other amphetamines, cannabinoids, ectasy/luis miguel, GHB, ketamine, PCP, inhalants or misuse of other illicit substances, supplements or prescriptions/medications.     [facility] ISTOP:   This report was requested by: Lauren Mann | Reference #: [id number]  There are no results for the search terms that you entered.     SOCIAL HISTORY: He lives at the [facility]. He states that he is not close with his family and hasn't spoken to them in a long time. He has friends at his residence, most of whom also drink alcohol. He spends money to buy "cheap" alcohol, costing $7-10. He uses a walker to move around as he is unsteady on his feet.    REVIEW OF SYSTEMS:per HPI     MEDICATIONS  (STANDING):  cetirizine 10 milliGRAM(s) Oral daily  chlorhexidine 2% Cloths 1 Application(s) Topical <User Schedule>  colchicine 0.6 milliGRAM(s) Oral daily  cyanocobalamin 1000 MICROGram(s) Oral daily  diazepam    Tablet   Oral   diazepam    Tablet 10 milliGRAM(s) Oral every 8 hours  enoxaparin Injectable 100 milliGRAM(s) SubCutaneous every 12 hours  folic acid 1 milliGRAM(s) Oral daily  gabapentin 200 milliGRAM(s) Oral two times a day  influenza  Vaccine (HIGH DOSE) 0.5 milliLiter(s) IntraMuscular once  insulin lispro (ADMELOG) corrective regimen sliding scale   SubCutaneous three times a day before meals  insulin lispro (ADMELOG) corrective regimen sliding scale   SubCutaneous at bedtime  lactated ringers. 1000 milliLiter(s) (75 mL/Hr) IV Continuous <Continuous>  methocarbamol 500 milliGRAM(s) Oral daily  metoprolol succinate ER 25 milliGRAM(s) Oral daily  multivitamin 1 Tablet(s) Oral daily  mupirocin 2% Nasal 1 Application(s) Both Nostrils two times a day  naltrexone 50 milliGRAM(s) Oral daily  pantoprazole    Tablet 40 milliGRAM(s) Oral before breakfast  tamsulosin 0.4 milliGRAM(s) Oral at bedtime  thiamine IVPB 500 milliGRAM(s) IV Intermittent three times a day    MEDICATIONS  (PRN):  acetaminophen     Tablet .. 650 milliGRAM(s) Oral every 12 hours PRN Moderate Pain (4 - 6)  artificial  tears Solution 1 Drop(s) Both EYES three times a day PRN Dry Eyes  diazepam    Tablet 10 milliGRAM(s) Oral every 2 hours PRN CIWA 8-14  ondansetron Injectable 4 milliGRAM(s) IV Push every 8 hours PRN Nausea and/or Vomiting      Vital Signs Last 24 Hrs  T(C): 36.6 (13 Mar 2025 12:00), Max: 36.9 (12 Mar 2025 20:00)  T(F): 97.8 (13 Mar 2025 12:00), Max: 98.4 (12 Mar 2025 20:00)  HR: 78 (13 Mar 2025 12:00) (74 - 98)  BP: 133/67 (13 Mar 2025 12:00) (117/71 - 146/71)  BP(mean): 90 (13 Mar 2025 03:00) (90 - 96)  RR: 18 (13 Mar 2025 12:00) (18 - 20)  SpO2: 97% (13 Mar 2025 12:00) (92% - 99%)    Parameters below as of 13 Mar 2025 07:20  Patient On (Oxygen Delivery Method): room air    PHYSICAL EXAM:    Constitutional: NAD, well-groomed, well-developed  HEENT: PERRLA, EOMI  Neck: No LAD, No JVD  Respiratory: no increased work of breathing  Neurological: A/O x 3, no focal deficits, tremors to touch only     MENTAL STATUS EXAM:  Appearance: calm, in hospital attire   Appearance in relation to age: looks stated age      Hygiene/Grooming: fair grooming   Attitude toward examiner: fully cooperative  Alertness: alert  Orientation: oriented to time, place and person  Posture: lying in bed  Gait: not evaluated  Behavior and psychomotor activity: normal  Mood: euthymic  Affect: full range and reactivity      Speech: fluent and spontaneous; normal rate, rhythm, volume and tone   Perceptions: denies hallucinations  Thought process: logical, linear and goal-directed    Thought content: no delusions or particular preoccupation, denies SI/HI  Associations: no loosening of associations   Impulse Control: aware of socially acceptable behavior  Insight: poor  Judgment: limited, endorses wanting to stop alcohol but precontemplative for recovery support     LABS:                        11.0   2.54  )-----------( 54       ( 13 Mar 2025 04:32 )             31.7     03-13    144  |  106  |  5[L]  ----------------------------<  125[H]  3.5   |  28  |  0.9    Ca    8.3[L]      13 Mar 2025 04:32  Phos  1.6     -  Mg     1.7     -13    TPro  4.9[L]  /  Alb  3.1[L]  /  TBili  3.4[H]  /  DBili  x   /  AST  63[H]  /  ALT  18  /  AlkPhos  101  03-13    PT/INR - ( 13 Mar 2025 04:32 )   PT: 18.70 sec;   INR: 1.57 ratio         PTT - ( 13 Mar 2025 04:32 )  PTT:42.6 sec  Urinalysis Basic - ( 13 Mar 2025 09:24 )    Color: Orange / Appearance: Clear / S.013 / pH: x  Gluc: x / Ketone: Trace mg/dL  / Bili: Negative / Urobili: 1.0 mg/dL   Blood: x / Protein: Negative mg/dL / Nitrite: Negative   Leuk Esterase: Negative / RBC: 2 /HPF / WBC 0 /HPF   Sq Epi: x / Non Sq Epi: 0 /HPF / Bacteria: Negative /HPF      Drug Screen Urine:  Alcohol, Blood: 215 mg/dL (25 @ 05:05)      Urinalysis with Rflx Culture (collected 25 @ 09:24)        RADIOLOGY & ADDITIONAL STUDIES:  < from: CT Angio Chest PE Protocol w/ IV Cont (25 @ 06:46) >    IMPRESSION:  1.  No CT evidence of acute pulmonary embolus.    2.  No CT evidence of acute thoracic or abdominopelvic pathology.    3.  Stable chronic findings as described above.      < end of copied text >  < from: Xray Chest 1 View- PORTABLE-Urgent (25 @ 06:02) >    IMPRESSION:    1. Low lung volumes, without radiographic evidence of acute   cardiopulmonary disease.    < end of copied text >

## 2025-03-13 NOTE — PROGRESS NOTE ADULT - ASSESSMENT
64y M pmh Alcohol Use Disorder, DVT/PE, BPH, HTN, HLD, DMII, gout presenting from [facility] with palpitations admitted for acute alcohol intoxication and lactic acidosis.       A/P   #Alcohol use disorder/alcohol intoxication/  Lactic acidosis / suspected thiamine and folate deficiency   - Alcohol level  215 on admission   - increased alcohol use over the past week - last drink 8PM 3/11/25 - 1 bottle of vodka   - c/w  CIWA protocol, addiction medicine consult   - started on  valium taper 10mg w/ PRN valium 10mg for CIWA >8  - cw  thiamine,  B12, folate, MV   - c/w IV fluids   - pt is agreeable outpatient rehab/medication therapy   - cw naltrexone 50mg qd and gabapentin 200mg bid   - trend lactate  - keep Mg >2, K>4    #Metabolic syndrome/ Hepatic steatosis/ DMII/ HLD  - CC/DASH diet    - previous admission for alcoholic hepatitis treated with  prednisolone  - monitor LFTs, encourage weight loss   - CTAP with hepatic steatosis and cholelithiasis   - fu GI/Hep op   -  metformin/jardiance held on admission   - ISS for now goal -180      #HTN  - cw toprol 25mg qd    #BPH  - cw flomax 0.4mg qhs    #Gout  - cw colchicine qd  - has tylenol 650mg bid PRN for moderate pain     #Hx DVT/PE/ Thrombocytopenia  -thrombocytopenia - 2/2 alcoholic BM suppression   - Duplex 1/20/25 - b/l chronic popliteal DVT  - repeat Doppler   - on previous dc was off coumadin b/c of platelet <50 - per [facility]'s documentation was restarted on coumadin   - start therapeutic Lovenox     # Misc  - DVT Prophylaxis:coumadin  - GI Prophylaxis: none  - Diet: DASH/CC  - Activity: IAT  - Code Status: Full     #Progress Note Handoff  Pending (specify):  c/w IV fluids, repeat LA in AM, addiction medicine eval, OOB to chair, PT eval, c/w IV fluids, monitor and replete electrolytes, supportive care , c/w CIWA   Family discussion: I spoke with pt, he agreed with a plan of care   Disposition: DGTF

## 2025-03-13 NOTE — PATIENT PROFILE ADULT - FUNCTIONAL ASSESSMENT - BASIC MOBILITY 6.
3-calculated by average/Not able to assess (calculate score using [facility] averaging method)

## 2025-03-13 NOTE — PROGRESS NOTE ADULT - ATTENDING COMMENTS
events noted, presented alcohol withdrawal, high LA, pan CT reviewed, not compliant with coumadin, CIWA/ LE doppler, IVF, plan as above

## 2025-03-13 NOTE — PROGRESS NOTE ADULT - ASSESSMENT
IMPRESSION:    Alcohol Withdrawal  HAGMA  AKA  Lactic Acidosis, downtrending   Sinus Tachycardia  Chronic Thrombocytopenia   Alcohol Use disorder  HO VTE on Coumadin    PLAN:    CNS: CIWA protocol.  thiamine, folic acid. CATCH/Addiction evaluation. drug and serum toxicology      HEENT:  Oral care    PULMONARY:  HOB @ 45 degrees, aspiration precautions, on room air CT Chest unremarkable     CARDIOVASCULAR:  D51/4NS. trend CE, TTE     GI: GI prophylaxis.  Feeding as tolerated.  Bowel regimen PRN.  CT Abdomen/pelvis noted. Lipase negative. Trend LFTs    RENAL:  F/u  lytes.  Correct as needed. replete magnesium, check phosphate, trend LA    INFECTIOUS DISEASE: f/u cultures, monitor off antibotics    HEMATOLOGICAL: LE duplex, clarify if patient remains on coumadin    ENDOCRINE:  Follow up FS. Insulin protocol if needed.     MUSCULOSKELETAL: AAT    SDU monitoring    IMPRESSION:    Alcohol Withdrawal  HAGMA  ANDRZEJ  Lactic Acidosis, downtrending   Sinus Tachycardia  Chronic Thrombocytopenia   Alcohol Use disorder  HO VTE on Coumadin    PLAN:    CNS: CIWA protocol.  Thiamine, folic acid.  CATCH/Addiction evaluation.  Drug and serum toxicology.      HEENT:  Oral care.  OP dental FU    PULMONARY:  HOB @ 45 degrees.  Aspiration precautions.  On room air.  CT Chest unremarkable     CARDIOVASCULAR:  D51/4NS.  Trend CE.  TTE.  Repeat LA.    GI: GI prophylaxis.  Feeding as tolerated.  Bowel regimen PRN.  CT Abdomen/pelvis noted.  Lipase negative.  Trend LFTs    RENAL:  F/u  lytes.  Correct as needed.  Replete magnesium and phosphate.      INFECTIOUS DISEASE: f/u cultures, monitor off antibiotics.    HEMATOLOGICAL: LE duplex.  Patient has not been taking coumadin.  Ascertain need for lifelong AC.  Lovenox for now.  Trend platelets.    ENDOCRINE:  Follow up FS. Insulin protocol if needed.     MUSCULOSKELETAL: AAT.  PT.    DGTF IMPRESSION:    Alcohol Withdrawal  HAGMA resolved  ANDRZEJ resolved  Lactic Acidosis, downtrending   pancytopenia  Alcohol Use disorder  HO VTE on Coumadin    PLAN:    CNS: CIWA protocol.  Thiamine, folic acid.  CATCH/Addiction evaluation.  Drug and serum toxicology.      HEENT:  Oral care.  OP dental FU    PULMONARY:  HOB @ 45 degrees.  Aspiration precautions.  On room air.  CT Chest unremarkable     CARDIOVASCULAR: DC ivf if tolerates po  Trend CE.  TTE.  Repeat LA.    GI: GI prophylaxis.  Feeding as tolerated.  Bowel regimen PRN.  CT Abdomen/pelvis noted.  Lipase negative.  Trend LFTs    RENAL:  F/u  lytes.  Correct as needed.  Replete magnesium and phosphate.      INFECTIOUS DISEASE: f/u cultures, monitor off antibiotics.    HEMATOLOGICAL: LE duplex.  Patient has not been taking coumadin.  Ascertain need for lifelong AC.  LE doppler    ENDOCRINE:  Follow up FS. Insulin protocol if needed.     MUSCULOSKELETAL: AAT.  PT.    DGTF

## 2025-03-13 NOTE — PATIENT PROFILE ADULT - FALL HARM RISK - HARM RISK INTERVENTIONS
Assistance with ambulation/Assistance OOB with selected safe patient handling equipment/Communicate Risk of Fall with Harm to all staff/Discuss with provider need for PT consult/Monitor for mental status changes/Monitor gait and stability/Provide patient with walking aids - walker, cane, crutches/Reinforce activity limits and safety measures with patient and family/Review medications for side effects contributing to fall risk/Sit up slowly, dangle for a short time, stand at bedside before walking/Tailored Fall Risk Interventions/Toileting schedule using arm’s reach rule for commode and bathroom/Use of alarms - bed, chair and/or voice tab/Visual Cue: Yellow wristband and red socks/Bed in lowest position, wheels locked, appropriate side rails in place/Call bell, personal items and telephone in reach/Instruct patient to call for assistance before getting out of bed or chair/Non-slip footwear when patient is out of bed/[place] to call system/Physically safe environment - no spills, clutter or unnecessary equipment/Purposeful Proactive Rounding/Room/bathroom lighting operational, light cord in reach

## 2025-03-13 NOTE — PROGRESS NOTE ADULT - SUBJECTIVE AND OBJECTIVE BOX
Patient is a 64y old  Male who presents with a chief complaint of Palpitations (12 Mar 2025 13:52)      Over Night Events:        ROS:     All ROS are negative except HPI         PHYSICAL EXAM    ICU Vital Signs Last 24 Hrs  T(C): 36.6 (13 Mar 2025 04:00), Max: 36.9 (12 Mar 2025 20:00)  T(F): 97.8 (13 Mar 2025 04:00), Max: 98.4 (12 Mar 2025 20:00)  HR: 74 (13 Mar 2025 04:00) (74 - 98)  BP: 135/60 (13 Mar 2025 04:00) (117/71 - 146/71)  BP(mean): 90 (13 Mar 2025 03:00) (90 - 96)  ABP: --  ABP(mean): --  RR: 18 (13 Mar 2025 04:00) (18 - 20)  SpO2: 94% (13 Mar 2025 04:00) (93% - 99%)    O2 Parameters below as of 13 Mar 2025 03:00  Patient On (Oxygen Delivery Method): room air            CONSTITUTIONAL:  NAD    ENT:   Airway patent,   Mouth with normal mucosa.   No thrush    EYES:   Pupils equal,   Round and reactive to light.    CARDIAC:   Normal rate,   Regular rhythm.    No edema    RESPIRATORY:   No wheezing  Bilateral BS  Normal chest expansion  Not tachypneic,  No use of accessory muscles    GASTROINTESTINAL:  Abdomen soft,   Non-tender,   No guarding,   + BS    MUSCULOSKELETAL:   Range of motion is not limited,  No clubbing, cyanosis    NEUROLOGICAL:   Alert and oriented   No motor  deficits.    SKIN:   Skin normal color for race,   Warm and dry and intact.   No evidence of rash.          LABS:                            11.0   2.54  )-----------( 54       ( 13 Mar 2025 04:32 )             31.7                                               03-13    144  |  106  |  5[L]  ----------------------------<  125[H]  3.5   |  28  |  0.9    Ca    8.3[L]      13 Mar 2025 04:32  Phos  1.6     03-13  Mg     1.7     03-13    TPro  4.9[L]  /  Alb  3.1[L]  /  TBili  3.4[H]  /  DBili  x   /  AST  63[H]  /  ALT  18  /  AlkPhos  101  03-13      PT/INR - ( 13 Mar 2025 04:32 )   PT: 18.70 sec;   INR: 1.57 ratio         PTT - ( 13 Mar 2025 04:32 )  PTT:42.6 sec                                       Urinalysis Basic - ( 13 Mar 2025 04:32 )    Color: x / Appearance: x / SG: x / pH: x  Gluc: 125 mg/dL / Ketone: x  / Bili: x / Urobili: x   Blood: x / Protein: x / Nitrite: x   Leuk Esterase: x / RBC: x / WBC x   Sq Epi: x / Non Sq Epi: x / Bacteria: x                                                  LIVER FUNCTIONS - ( 13 Mar 2025 04:32 )  Alb: 3.1 g/dL / Pro: 4.9 g/dL / ALK PHOS: 101 U/L / ALT: 18 U/L / AST: 63 U/L / GGT: x                                                                                                                                       MEDICATIONS  (STANDING):  cetirizine 10 milliGRAM(s) Oral daily  chlorhexidine 2% Cloths 1 Application(s) Topical <User Schedule>  colchicine 0.6 milliGRAM(s) Oral daily  cyanocobalamin 1000 MICROGram(s) Oral daily  dextrose 5% + lactated ringers. 1000 milliLiter(s) (100 mL/Hr) IV Continuous <Continuous>  diazepam    Tablet   Oral   diazepam    Tablet 10 milliGRAM(s) Oral every 6 hours  diazepam    Tablet 10 milliGRAM(s) Oral every 8 hours  folic acid 1 milliGRAM(s) Oral daily  gabapentin 200 milliGRAM(s) Oral two times a day  influenza  Vaccine (HIGH DOSE) 0.5 milliLiter(s) IntraMuscular once  insulin lispro (ADMELOG) corrective regimen sliding scale   SubCutaneous three times a day before meals  insulin lispro (ADMELOG) corrective regimen sliding scale   SubCutaneous at bedtime  methocarbamol 500 milliGRAM(s) Oral daily  metoprolol succinate ER 25 milliGRAM(s) Oral daily  multivitamin 1 Tablet(s) Oral daily  naltrexone 50 milliGRAM(s) Oral daily  pantoprazole    Tablet 40 milliGRAM(s) Oral before breakfast  tamsulosin 0.4 milliGRAM(s) Oral at bedtime  thiamine IVPB 500 milliGRAM(s) IV Intermittent three times a day  warfarin 4 milliGRAM(s) Oral once    MEDICATIONS  (PRN):  acetaminophen     Tablet .. 650 milliGRAM(s) Oral every 12 hours PRN Moderate Pain (4 - 6)  artificial  tears Solution 1 Drop(s) Both EYES three times a day PRN Dry Eyes  diazepam    Tablet 10 milliGRAM(s) Oral every 2 hours PRN CIWA 8-14  ondansetron Injectable 4 milliGRAM(s) IV Push every 8 hours PRN Nausea and/or Vomiting      New X-rays reviewed:                                                                                  ECHO    CXR interpreted by me:       Patient is a 64y old  Male who presents with a chief complaint of Palpitations (12 Mar 2025 13:52)      Over Night Events:  NO events overnight.  Last CIWA 4.        ROS:     All ROS are negative except HPI         PHYSICAL EXAM    ICU Vital Signs Last 24 Hrs  T(C): 36.6 (13 Mar 2025 04:00), Max: 36.9 (12 Mar 2025 20:00)  T(F): 97.8 (13 Mar 2025 04:00), Max: 98.4 (12 Mar 2025 20:00)  HR: 74 (13 Mar 2025 04:00) (74 - 98)  BP: 135/60 (13 Mar 2025 04:00) (117/71 - 146/71)  BP(mean): 90 (13 Mar 2025 03:00) (90 - 96)  ABP: --  ABP(mean): --  RR: 18 (13 Mar 2025 04:00) (18 - 20)  SpO2: 94% (13 Mar 2025 04:00) (93% - 99%)    O2 Parameters below as of 13 Mar 2025 03:00  Patient On (Oxygen Delivery Method): room air            CONSTITUTIONAL:  NAD    ENT:   Airway patent,   Mouth with normal mucosa.   No thrush    EYES:   Pupils equal,   Round and reactive to light.    CARDIAC:   Normal rate,   Regular rhythm.    No edema    RESPIRATORY:   No wheezing  Bilateral BS  Normal chest expansion  Not tachypneic,  No use of accessory muscles    GASTROINTESTINAL:  Abdomen soft,   Non-tender,   No guarding,   + BS    MUSCULOSKELETAL:   Range of motion is not limited,  No clubbing, cyanosis    NEUROLOGICAL:   Alert and oriented   No motor  deficits.    SKIN:   Skin normal color for race,   Warm and dry and intact.   No evidence of rash.          LABS:                            11.0   2.54  )-----------( 54       ( 13 Mar 2025 04:32 )             31.7                                               03-13    144  |  106  |  5[L]  ----------------------------<  125[H]  3.5   |  28  |  0.9    Ca    8.3[L]      13 Mar 2025 04:32  Phos  1.6     03-13  Mg     1.7     03-13    TPro  4.9[L]  /  Alb  3.1[L]  /  TBili  3.4[H]  /  DBili  x   /  AST  63[H]  /  ALT  18  /  AlkPhos  101  03-13      PT/INR - ( 13 Mar 2025 04:32 )   PT: 18.70 sec;   INR: 1.57 ratio         PTT - ( 13 Mar 2025 04:32 )  PTT:42.6 sec                                       Urinalysis Basic - ( 13 Mar 2025 04:32 )    Color: x / Appearance: x / SG: x / pH: x  Gluc: 125 mg/dL / Ketone: x  / Bili: x / Urobili: x   Blood: x / Protein: x / Nitrite: x   Leuk Esterase: x / RBC: x / WBC x   Sq Epi: x / Non Sq Epi: x / Bacteria: x                                                  LIVER FUNCTIONS - ( 13 Mar 2025 04:32 )  Alb: 3.1 g/dL / Pro: 4.9 g/dL / ALK PHOS: 101 U/L / ALT: 18 U/L / AST: 63 U/L / GGT: x                                                                                                                                       MEDICATIONS  (STANDING):  cetirizine 10 milliGRAM(s) Oral daily  chlorhexidine 2% Cloths 1 Application(s) Topical <User Schedule>  colchicine 0.6 milliGRAM(s) Oral daily  cyanocobalamin 1000 MICROGram(s) Oral daily  dextrose 5% + lactated ringers. 1000 milliLiter(s) (100 mL/Hr) IV Continuous <Continuous>  diazepam    Tablet   Oral   diazepam    Tablet 10 milliGRAM(s) Oral every 6 hours  diazepam    Tablet 10 milliGRAM(s) Oral every 8 hours  folic acid 1 milliGRAM(s) Oral daily  gabapentin 200 milliGRAM(s) Oral two times a day  influenza  Vaccine (HIGH DOSE) 0.5 milliLiter(s) IntraMuscular once  insulin lispro (ADMELOG) corrective regimen sliding scale   SubCutaneous three times a day before meals  insulin lispro (ADMELOG) corrective regimen sliding scale   SubCutaneous at bedtime  methocarbamol 500 milliGRAM(s) Oral daily  metoprolol succinate ER 25 milliGRAM(s) Oral daily  multivitamin 1 Tablet(s) Oral daily  naltrexone 50 milliGRAM(s) Oral daily  pantoprazole    Tablet 40 milliGRAM(s) Oral before breakfast  tamsulosin 0.4 milliGRAM(s) Oral at bedtime  thiamine IVPB 500 milliGRAM(s) IV Intermittent three times a day  warfarin 4 milliGRAM(s) Oral once    MEDICATIONS  (PRN):  acetaminophen     Tablet .. 650 milliGRAM(s) Oral every 12 hours PRN Moderate Pain (4 - 6)  artificial  tears Solution 1 Drop(s) Both EYES three times a day PRN Dry Eyes  diazepam    Tablet 10 milliGRAM(s) Oral every 2 hours PRN CIWA 8-14  ondansetron Injectable 4 milliGRAM(s) IV Push every 8 hours PRN Nausea and/or Vomiting      New X-rays reviewed:                                                                                  ECHO    CXR interpreted by me:       Patient is a 64y old  Male who presents with a chief complaint of Palpitations (12 Mar 2025 13:52)      Over Night Events:  NO events overnight.  Last CIWA 4.      PHYSICAL EXAM    ICU Vital Signs Last 24 Hrs  T(C): 36.6 (13 Mar 2025 04:00), Max: 36.9 (12 Mar 2025 20:00)  T(F): 97.8 (13 Mar 2025 04:00), Max: 98.4 (12 Mar 2025 20:00)  HR: 74 (13 Mar 2025 04:00) (74 - 98)  BP: 135/60 (13 Mar 2025 04:00) (117/71 - 146/71)  BP(mean): 90 (13 Mar 2025 03:00) (90 - 96)  RR: 18 (13 Mar 2025 04:00) (18 - 20)  SpO2: 94% (13 Mar 2025 04:00) (93% - 99%)    O2 Parameters below as of 13 Mar 2025 03:00  Patient On (Oxygen Delivery Method): room air            CONSTITUTIONAL:  comfortable    CARDIAC:   Normal rate,   Regular rhythm.    No edema    RESPIRATORY:   No wheezing  Bilateral BS  Normal chest expansion  Not tachypneic,  No use of accessory muscles    GASTROINTESTINAL:  Abdomen soft,   Non-tender,   No guarding,   + BS    MUSCULOSKELETAL:   Range of motion is not limited,  No clubbing, cyanosis    NEUROLOGICAL:   Alert and oriented             LABS:                            11.0   2.54  )-----------( 54       ( 13 Mar 2025 04:32 )             31.7                                               03-13    144  |  106  |  5[L]  ----------------------------<  125[H]  3.5   |  28  |  0.9    Ca    8.3[L]      13 Mar 2025 04:32  Phos  1.6     03-13  Mg     1.7     03-13    TPro  4.9[L]  /  Alb  3.1[L]  /  TBili  3.4[H]  /  DBili  x   /  AST  63[H]  /  ALT  18  /  AlkPhos  101  03-13      PT/INR - ( 13 Mar 2025 04:32 )   PT: 18.70 sec;   INR: 1.57 ratio         PTT - ( 13 Mar 2025 04:32 )  PTT:42.6 sec                                       Urinalysis Basic - ( 13 Mar 2025 04:32 )    Color: x / Appearance: x / SG: x / pH: x  Gluc: 125 mg/dL / Ketone: x  / Bili: x / Urobili: x   Blood: x / Protein: x / Nitrite: x   Leuk Esterase: x / RBC: x / WBC x   Sq Epi: x / Non Sq Epi: x / Bacteria: x                                                  LIVER FUNCTIONS - ( 13 Mar 2025 04:32 )  Alb: 3.1 g/dL / Pro: 4.9 g/dL / ALK PHOS: 101 U/L / ALT: 18 U/L / AST: 63 U/L / GGT: x                                                                                                                                       MEDICATIONS  (STANDING):  cetirizine 10 milliGRAM(s) Oral daily  chlorhexidine 2% Cloths 1 Application(s) Topical <User Schedule>  colchicine 0.6 milliGRAM(s) Oral daily  cyanocobalamin 1000 MICROGram(s) Oral daily  dextrose 5% + lactated ringers. 1000 milliLiter(s) (100 mL/Hr) IV Continuous <Continuous>  diazepam    Tablet   Oral   diazepam    Tablet 10 milliGRAM(s) Oral every 6 hours  diazepam    Tablet 10 milliGRAM(s) Oral every 8 hours  folic acid 1 milliGRAM(s) Oral daily  gabapentin 200 milliGRAM(s) Oral two times a day  influenza  Vaccine (HIGH DOSE) 0.5 milliLiter(s) IntraMuscular once  insulin lispro (ADMELOG) corrective regimen sliding scale   SubCutaneous three times a day before meals  insulin lispro (ADMELOG) corrective regimen sliding scale   SubCutaneous at bedtime  methocarbamol 500 milliGRAM(s) Oral daily  metoprolol succinate ER 25 milliGRAM(s) Oral daily  multivitamin 1 Tablet(s) Oral daily  naltrexone 50 milliGRAM(s) Oral daily  pantoprazole    Tablet 40 milliGRAM(s) Oral before breakfast  tamsulosin 0.4 milliGRAM(s) Oral at bedtime  thiamine IVPB 500 milliGRAM(s) IV Intermittent three times a day  warfarin 4 milliGRAM(s) Oral once    MEDICATIONS  (PRN):  acetaminophen     Tablet .. 650 milliGRAM(s) Oral every 12 hours PRN Moderate Pain (4 - 6)  artificial  tears Solution 1 Drop(s) Both EYES three times a day PRN Dry Eyes  diazepam    Tablet 10 milliGRAM(s) Oral every 2 hours PRN CIWA 8-14  ondansetron Injectable 4 milliGRAM(s) IV Push every 8 hours PRN Nausea and/or Vomiting

## 2025-03-14 ENCOUNTER — RESULT REVIEW (OUTPATIENT)
Age: 65
End: 2025-03-14

## 2025-03-14 LAB
ANION GAP SERPL CALC-SCNC: 11 MMOL/L — SIGNIFICANT CHANGE UP (ref 7–14)
BUN SERPL-MCNC: 4 MG/DL — LOW (ref 10–20)
CALCIUM SERPL-MCNC: 8.4 MG/DL — SIGNIFICANT CHANGE UP (ref 8.4–10.5)
CHLORIDE SERPL-SCNC: 105 MMOL/L — SIGNIFICANT CHANGE UP (ref 98–110)
CO2 SERPL-SCNC: 27 MMOL/L — SIGNIFICANT CHANGE UP (ref 17–32)
EGFR: 84 ML/MIN/1.73M2 — SIGNIFICANT CHANGE UP
GLUCOSE BLDC GLUCOMTR-MCNC: 103 MG/DL — HIGH (ref 70–99)
GLUCOSE BLDC GLUCOMTR-MCNC: 112 MG/DL — HIGH (ref 70–99)
GLUCOSE BLDC GLUCOMTR-MCNC: 120 MG/DL — HIGH (ref 70–99)
GLUCOSE BLDC GLUCOMTR-MCNC: 123 MG/DL — HIGH (ref 70–99)
GLUCOSE SERPL-MCNC: 103 MG/DL — HIGH (ref 70–99)
HCT VFR BLD CALC: 31.8 % — LOW (ref 42–52)
HGB BLD-MCNC: 11 G/DL — LOW (ref 14–18)
INR BLD: 1.66 RATIO — HIGH (ref 0.65–1.3)
MAGNESIUM SERPL-MCNC: 1.7 MG/DL — LOW (ref 1.8–2.4)
MCHC RBC-ENTMCNC: 34.6 G/DL — SIGNIFICANT CHANGE UP (ref 32–37)
MCHC RBC-ENTMCNC: 37.2 PG — HIGH (ref 27–31)
MCV RBC AUTO: 107.4 FL — HIGH (ref 80–94)
NRBC BLD AUTO-RTO: 0 /100 WBCS — SIGNIFICANT CHANGE UP (ref 0–0)
PHOSPHATE SERPL-MCNC: 2.1 MG/DL — SIGNIFICANT CHANGE UP (ref 2.1–4.9)
PLATELET # BLD AUTO: 45 K/UL — LOW (ref 130–400)
PMV BLD: 11.2 FL — HIGH (ref 7.4–10.4)
POTASSIUM SERPL-MCNC: 3.2 MMOL/L — LOW (ref 3.5–5)
POTASSIUM SERPL-SCNC: 3.2 MMOL/L — LOW (ref 3.5–5)
PROTHROM AB SERPL-ACNC: 19.8 SEC — HIGH (ref 9.95–12.87)
RBC # BLD: 2.96 M/UL — LOW (ref 4.7–6.1)
RBC # FLD: 16.3 % — HIGH (ref 11.5–14.5)
SODIUM SERPL-SCNC: 143 MMOL/L — SIGNIFICANT CHANGE UP (ref 135–146)
WBC # BLD: 3.25 K/UL — LOW (ref 4.8–10.8)
WBC # FLD AUTO: 3.25 K/UL — LOW (ref 4.8–10.8)

## 2025-03-14 PROCEDURE — 93970 EXTREMITY STUDY: CPT | Mod: 26

## 2025-03-14 PROCEDURE — 93306 TTE W/DOPPLER COMPLETE: CPT | Mod: 26

## 2025-03-14 PROCEDURE — 99232 SBSQ HOSP IP/OBS MODERATE 35: CPT

## 2025-03-14 PROCEDURE — 99233 SBSQ HOSP IP/OBS HIGH 50: CPT

## 2025-03-14 RX ORDER — DEXTROMETHORPHAN HBR, GUAIFENESIN 200 MG/10ML
600 LIQUID ORAL EVERY 12 HOURS
Refills: 0 | Status: DISCONTINUED | OUTPATIENT
Start: 2025-03-14 | End: 2025-03-20

## 2025-03-14 RX ORDER — MAGNESIUM SULFATE 500 MG/ML
2 SYRINGE (ML) INJECTION ONCE
Refills: 0 | Status: COMPLETED | OUTPATIENT
Start: 2025-03-14 | End: 2025-03-14

## 2025-03-14 RX ADMIN — Medication 1 TABLET(S): at 11:53

## 2025-03-14 RX ADMIN — Medication 10 MILLIGRAM(S): at 11:53

## 2025-03-14 RX ADMIN — Medication 40 MILLIGRAM(S): at 05:28

## 2025-03-14 RX ADMIN — Medication 100 MILLIGRAM(S): at 17:27

## 2025-03-14 RX ADMIN — CYANOCOBALAMIN 1000 MICROGRAM(S): 1000 INJECTION INTRAMUSCULAR; SUBCUTANEOUS at 11:53

## 2025-03-14 RX ADMIN — SODIUM CHLORIDE 75 MILLILITER(S): 9 INJECTION, SOLUTION INTRAVENOUS at 10:40

## 2025-03-14 RX ADMIN — GABAPENTIN 200 MILLIGRAM(S): 400 CAPSULE ORAL at 17:24

## 2025-03-14 RX ADMIN — ENOXAPARIN SODIUM 100 MILLIGRAM(S): 100 INJECTION SUBCUTANEOUS at 05:28

## 2025-03-14 RX ADMIN — FOLIC ACID 1 MILLIGRAM(S): 1 TABLET ORAL at 11:52

## 2025-03-14 RX ADMIN — Medication 105 MILLIGRAM(S): at 05:27

## 2025-03-14 RX ADMIN — Medication 3 MILLIGRAM(S): at 21:55

## 2025-03-14 RX ADMIN — DIAZEPAM 10 MILLIGRAM(S): 2 TABLET ORAL at 05:28

## 2025-03-14 RX ADMIN — GABAPENTIN 200 MILLIGRAM(S): 400 CAPSULE ORAL at 05:28

## 2025-03-14 RX ADMIN — COLCHICINE 0.6 MILLIGRAM(S): 0.6 TABLET, FILM COATED ORAL at 11:53

## 2025-03-14 RX ADMIN — Medication 40 MILLIEQUIVALENT(S): at 11:52

## 2025-03-14 RX ADMIN — Medication 25 GRAM(S): at 11:54

## 2025-03-14 RX ADMIN — MUPIROCIN CALCIUM 1 APPLICATION(S): 20 CREAM TOPICAL at 17:23

## 2025-03-14 RX ADMIN — TAMSULOSIN HYDROCHLORIDE 0.4 MILLIGRAM(S): 0.4 CAPSULE ORAL at 21:55

## 2025-03-14 RX ADMIN — Medication 1 APPLICATION(S): at 05:32

## 2025-03-14 RX ADMIN — METOPROLOL SUCCINATE 25 MILLIGRAM(S): 50 TABLET, EXTENDED RELEASE ORAL at 05:33

## 2025-03-14 RX ADMIN — ENOXAPARIN SODIUM 100 MILLIGRAM(S): 100 INJECTION SUBCUTANEOUS at 17:23

## 2025-03-14 RX ADMIN — DIAZEPAM 10 MILLIGRAM(S): 2 TABLET ORAL at 17:24

## 2025-03-14 RX ADMIN — MUPIROCIN CALCIUM 1 APPLICATION(S): 20 CREAM TOPICAL at 05:28

## 2025-03-14 RX ADMIN — METHOCARBAMOL 500 MILLIGRAM(S): 500 TABLET, FILM COATED ORAL at 11:53

## 2025-03-14 RX ADMIN — NALTREXONE HYDROCHLORIDE 50 MILLIGRAM(S): 50 TABLET, FILM COATED ORAL at 11:54

## 2025-03-14 NOTE — MEDICAL STUDENT PROGRESS NOTE(EDUCATION) - PLAN 1
-continue with current Valium taper as ordered  -Valium 10mg q4h PRN CIWA >7  -continue naltrexone 50mg  -continue thiamine, folate, and multivitamin supplementation  -trazodone 50mg qhs STANDING for sleep  -CATCH team continue encouragement for outpatient mental health counseling and substance use management

## 2025-03-14 NOTE — PROGRESS NOTE ADULT - SUBJECTIVE AND OBJECTIVE BOX
CC: Palpitations (13 Mar 2025 14:28)    INTERVAL HPI/OVERNIGHT EVENTS:  Patient seen and examined at bedside. Pt reports improvement in nausea and chest discomfort. Wants to work with PT.     Vital Signs Last 24 Hrs  T(C): 36.9 (14 Mar 2025 05:00), Max: 37.4 (13 Mar 2025 20:42)  T(F): 98.5 (14 Mar 2025 05:00), Max: 99.4 (13 Mar 2025 20:42)  HR: 89 (14 Mar 2025 05:00) (78 - 89)  BP: 118/70 (14 Mar 2025 05:00) (118/70 - 135/76)  BP(mean): 96 (13 Mar 2025 14:39) (96 - 96)  RR: 18 (14 Mar 2025 05:00) (18 - 19)  SpO2: 96% (14 Mar 2025 05:00) (94% - 97%)    Parameters below as of 13 Mar 2025 20:42  Patient On (Oxygen Delivery Method): room air      PHYSICAL EXAM:  GEN: NAD, Resting comfortably in bed, cooperative, cool, not clammy or diaphoretic  PULM: Clear to auscultation bilaterally, No wheezing, rales, rhonchi  CVS: Regular rate and rhythm, S1-S2, no murmurs, heaves, thrills  ABD: Soft, non-tender, non-distended, no guarding, BS +, no ascites, jaundice or umbilical varices  EXT: No edema/cyanosis, extremities warm/dry, peripheral pulses palpable, b/l venous stasis dermatitis   NEURO: A&Ox3, no focal deficits, follows commands, answers appropriately, +ve withdrawal tremor    I&O's Detail                                11.0   3.25  )-----------( 45       ( 14 Mar 2025 04:44 )             31.8     13 Mar 2025 04:32    144    |  106    |  5      ----------------------------<  125    3.5     |  28     |  0.9      Ca    8.3        13 Mar 2025 04:32  Phos  1.6       13 Mar 2025 04:32  Mg     1.7       13 Mar 2025 04:32    TPro  4.9    /  Alb  3.1    /  TBili  3.4    /  DBili  x      /  AST  63     /  ALT  18     /  AlkPhos  101    13 Mar 2025 04:32    PT/INR - ( 14 Mar 2025 04:44 )   PT: 19.80 sec;   INR: 1.66 ratio         PTT - ( 13 Mar 2025 04:32 )  PTT:42.6 sec  CAPILLARY BLOOD GLUCOSE      POCT Blood Glucose.: 104 mg/dL (13 Mar 2025 21:07)  POCT Blood Glucose.: 127 mg/dL (13 Mar 2025 16:41)  POCT Blood Glucose.: 133 mg/dL (13 Mar 2025 11:51)  POCT Blood Glucose.: 122 mg/dL (13 Mar 2025 07:52)    LIVER FUNCTIONS - ( 13 Mar 2025 04:32 )  Alb: 3.1 g/dL / Pro: 4.9 g/dL / ALK PHOS: 101 U/L / ALT: 18 U/L / AST: 63 U/L / GGT: x           Urinalysis Basic - ( 13 Mar 2025 09:24 )    Color: Orange / Appearance: Clear / S.013 / pH: x  Gluc: x / Ketone: Trace mg/dL  / Bili: Negative / Urobili: 1.0 mg/dL   Blood: x / Protein: Negative mg/dL / Nitrite: Negative   Leuk Esterase: Negative / RBC: 2 /HPF / WBC 0 /HPF   Sq Epi: x / Non Sq Epi: 0 /HPF / Bacteria: Negative /HPF        MEDICATIONS  (STANDING):  cetirizine 10 milliGRAM(s) Oral daily  chlorhexidine 2% Cloths 1 Application(s) Topical <User Schedule>  colchicine 0.6 milliGRAM(s) Oral daily  cyanocobalamin 1000 MICROGram(s) Oral daily  diazepam    Tablet   Oral   diazepam    Tablet 10 milliGRAM(s) Oral every 12 hours  enoxaparin Injectable 100 milliGRAM(s) SubCutaneous every 12 hours  folic acid 1 milliGRAM(s) Oral daily  gabapentin 200 milliGRAM(s) Oral two times a day  influenza  Vaccine (HIGH DOSE) 0.5 milliLiter(s) IntraMuscular once  insulin lispro (ADMELOG) corrective regimen sliding scale   SubCutaneous three times a day before meals  insulin lispro (ADMELOG) corrective regimen sliding scale   SubCutaneous at bedtime  lactated ringers. 1000 milliLiter(s) (75 mL/Hr) IV Continuous <Continuous>  melatonin 3 milliGRAM(s) Oral at bedtime  methocarbamol 500 milliGRAM(s) Oral daily  metoprolol succinate ER 25 milliGRAM(s) Oral daily  multivitamin 1 Tablet(s) Oral daily  mupirocin 2% Nasal 1 Application(s) Both Nostrils two times a day  naltrexone 50 milliGRAM(s) Oral daily  pantoprazole    Tablet 40 milliGRAM(s) Oral before breakfast  tamsulosin 0.4 milliGRAM(s) Oral at bedtime  thiamine IVPB 500 milliGRAM(s) IV Intermittent three times a day    MEDICATIONS  (PRN):  acetaminophen     Tablet .. 650 milliGRAM(s) Oral every 6 hours PRN Moderate Pain (4 - 6)  artificial  tears Solution 1 Drop(s) Both EYES three times a day PRN Dry Eyes  diazepam    Tablet 10 milliGRAM(s) Oral every 2 hours PRN CIWA 8-14  ondansetron Injectable 4 milliGRAM(s) IV Push every 6 hours PRN Nausea and/or Vomiting      RADIOLOGY & ADDITIONAL TESTS:

## 2025-03-14 NOTE — PHYSICAL THERAPY INITIAL EVALUATION ADULT - LEVEL OF INDEPENDENCE: STAND/SIT, REHAB EVAL
Breathing spontaneous and unlabored. Breath sounds clear and equal bilaterally with regular rhythm. close CGA

## 2025-03-14 NOTE — PHYSICAL THERAPY INITIAL EVALUATION ADULT - GENERAL OBSERVATIONS, REHAB EVAL
13:55-14:21. Pt encountered semifowler in bed sleeing in NAD, awoken by PT, no complaints, agreeable to PT.

## 2025-03-14 NOTE — PROVIDER CONTACT NOTE (OTHER) - BACKGROUND
pt noncompliant- does not pee into urinal or cup for sample despite several attempts at reeducation
Pt is scheduled for thiamine IVPB as a 2 pm scheduled med

## 2025-03-14 NOTE — PROGRESS NOTE ADULT - ASSESSMENT
64y male with hx of alcohol use disorder and tobacco use disorder (in remission), PMH of DVT/PE, BPH, HTN, HLD, DMII, Gout, and Decompensated liver cirrhosis (MELD 3-17) and alcoholic hepatitis, and PPH of anxiety who presents with complaints of palpitations, chest discomfort ("pounding in my chest"), and nausea, admitted for alcoholic ketoacidosis. He reports nausea and palpitations a few hours after drinking 1 bottle of vodka without eating. Addiction Medicine consulted for assistance with alcohol use disorder and management of alcohol withdrawal.    #Alcohol withdrawal:  - CIWA continues to be low on assessment, agree with current Valium taper, ending tomorrow.   - keep Mg>2, K>4   - continue folate and multivitamin, agree with high dose thiamine recommendation  - hold benzodiazepines for sedation/respiratory depression    - please offer PRN medications for supportive management of withdrawal including:  - zofran q6 hr prn nausea (hold for QTc>500)  - tylenol 650 mg q6h prn mild/moderate pain  - melatonin 3 mg nightly prn for insomnia  - Patient denied referrals to outpatient addiction referrals from [facility], but is amenable to continuing oral naltrexone, continue 50 mg daily with food upon discharge.     #Wernicke's Encephalopathy:   - given concerns for ongoing ophthalmoplegia and hx of gait instability clinical concern for Wernicke's encephalopathy. Would recommend IV thiamine 500 mg q 8h for 3 days, followed by 250 mg IV daily up to additional 5 days per [employer] recommendations.   - Can also opt to switch to oral thiamine 100 mg daily for discharge.   - keep Mg >2, K>4     #Alcohol induced Hepatic Steatosis:  - RUQ from previous admission with hepatic steatosis  - counseling for alcohol cessation as above with [facility]   - would recommend testing for additional hepatology testing including Hepatitis B surface antigen (HBsAg), hepatitis B surface antibody (anti-HBs), total hepatitis B core antibody (anti-HBc), Hep A IGG and Hep C Antibody  - patient should be monitored by PCP or GI specialist annually for noninvasive liver monitoring given that up to 20% of patients with steatosis develop fibrosis.  - if following up imaging concerning for cirrhosis, patient should be monitored with noninvasive imaging and AFP every 6 months for HCC screening     Thank you for this consult. Rest of care per primary. Addiction medicine will sign off. Please reach out with any questions or concerns via TEAMS.

## 2025-03-14 NOTE — PROVIDER CONTACT NOTE (MEDICATION) - SITUATION
[facility] PH[facility]ICAL [facility] - DAILY TREATMENT NOTE    Patient Name: Fred Bolton        Date: 2020  : 1944   yes Patient  Verified  Visit #:     Insurance: Payor: [employer] / Plan: [place] PART [place] & B / Product Type: Medicare /      In time: 910 Out time: 940   Total Treatment Time: 30     Medicare/BCBS Time Tracking (below)   Total Timed Codes (min):  10 1:1 Treatment Time:  10     TREATMENT AREA =  Right knee pain [M25.561]    SUBJECTIVE  Pain Level (on 0 to 10 scale):  4  / 10   Medication Changes/New allergies or changes in medical history, any new surgeries or procedures?    no  If yes, update Summary List   Subjective Functional Status/Changes:  []  No changes reported     See ie          OBJECTIVE      10 min Manual Therapy: Pat mobs all directions, knee prom, hs stretch   Rationale:      decrease pain, increase ROM, increase tissue extensibility and decrease trigger points to improve patient's ability to complete adls         Billed With/As:   [] TE   [] TA   [] Neuro   [] Self Care Patient Education: [x] Review HEP    [] Progressed/Changed HEP based on:   [] positioning   [] body mechanics   [] transfers   [] heat/ice application    [] other:      Other Objective/Functional Measures:    Noted increase in pain following MT - advised ot ice at home and demo verbal understanding  See ie     Post Treatment Pain Level (on 0 to 10) scale:    10     ASSESSMENT  Assessment/Changes in Function:     See ie     []  See Progress Note/Recertification   Patient will continue to benefit from skilled PT services to modify and progress therapeutic interventions, address functional mobility deficits, address ROM deficits, address strength deficits, analyze and address soft tissue restrictions, analyze and cue movement patterns, analyze and modify body mechanics/ergonomics, assess and modify postural abnormalities, address imbalance/dizziness and instruct in home and community integration to
attain remaining goals. Progress toward goals / Updated goals:    See ie     PLAN  []  Upgrade activities as tolerated yes Continue plan of care   []  Discharge due to :    []  Other:      Therapist: Josie Jimenez PT    Date: 2/25/2020 Time: 9:44 AM     No future appointments.
pt had needed a new IV for IVPB thiamine, Dr. Lo had changed the medication to PO
pt due for lovenox at 6 pm

## 2025-03-14 NOTE — PHYSICAL THERAPY INITIAL EVALUATION ADULT - REHAB POTENTIAL, PT EVAL
[facility] Occupational [facility]  [address] [address]  Phone: [phone]  Fax: [phone]  [facility] [employer]: [email or web address]    Pt Name: Mateo Elizalde  Injury Date: 02/14/2023   Employee ID:  Date of Treatment: 03/17/2023   Company: [employer]      Appointment Time: 01:00 PM Arrived: 12:25 PM   Provider: Eli Badillo PA-C Time Out:01:00 PM     Office Treatment:   1. Encounter related to worker's compensation claim    2. Closed displaced fracture of distal phalanx of left ring finger with routine healing, subsequent encounter    3. Mallet finger of left finger(s)          Patient Instructions: Attention not to aggravate affected area, Use splint as directed      Restrictions: Regular Duty     Return Appointment:    3/21/2023 at 11:15 AM     SH       good, to achieve stated therapy goals

## 2025-03-14 NOTE — PROGRESS NOTE ADULT - ASSESSMENT
64y M pmh Alcohol Use Disorder, DVT/PE, BPH, HTN, HLD, DMII, gout presenting from [facility] with palpitations admitted for acute alcohol intoxication and lactic acidosis.     #Alcohol intoxication in setting of alcohol use disorder  #Lactic Acidosis  - Alcohol level  215 on admission   - increased alcohol use over the past week - last drink 8PM 3/11/25 - 1 bottle of vodka   - c/w  CIWA protocol, addiction medicine consult   - started on  valium taper 10mg w/ PRN valium 10mg for CIWA >8  - cw  thiamine,  B12, folate, MV   - lactate 3.5--> 1.7 s/p IVF   - cw naltrexone 50mg qd and gabapentin 200mg bid   - obtain screening TTE to look for cardiomyopathy.  - fu addiction medicine/HUMA eval, pt agreeable to outpatient rehab program     #Hx DVT/PE/ Thrombocytopenia  - Duplex 1/20/25 - b/l chronic popliteal DVT  - repeat Duplex pending  - on previous dc was off coumadin b/c of platelet <50 - per [facility] documentation was restarted on coumadin. Unclear why on coumadin and if there is a specific need for him to be on coumadin  - on lovenox 100mg BID for now    #Metabolic syndrome/ Hepatic steatosis  - CC/DASH diet    - previous admission for alcoholic hepatitis treated with  prednisolone  - CTAP with hepatic steatosis and cholelithiasis   - monitor LFTs, encourage weight loss   - Patient should be monitored by PCP or GI specialist annually for noninvasive liver monitoring given that up to 20% of patients with steatosis develop fibrosis.  - If imaging concerning for cirrhosis, patient should be monitored with noninvasive imaging and AFP every 6 months for HCC screening     #DM2  -  metformin/jardiance held on admission   - ISS, -180    #HTN  - cw toprol 25mg qd    #BPH  - cw flomax 0.4mg qhs    #Gout  - cw colchicine qd  - has tylenol 650mg bid PRN for moderate pain       DVT Prophylaxis: therapeutic lovenox  GI Prophylaxis: none  Diet: DASH/CC  Activity: IAT     64y M pmh Alcohol Use Disorder, DVT/PE, BPH, HTN, HLD, DMII, gout presenting from [facility] with palpitations admitted for acute alcohol intoxication and lactic acidosis.     #Alcohol intoxication in setting of alcohol use disorder  #Lactic Acidosis  - Alcohol level  215 on admission   - increased alcohol use over the past week - last drink 8PM 3/11/25 - 1 bottle of vodka   - c/w  CIWA protocol, addiction medicine consult   - started on  valium taper 10mg w/ PRN valium 10mg for CIWA >8  - cw  thiamine,  B12, folate, MV   - lactate 3.5--> 1.7 s/p IVF   - cw naltrexone 50mg qd and gabapentin 200mg bid   - TTE: LVEF 60%, no evidence of cardiomyopathy   - fu addiction medicine/CATCH eval, pt agreeable to outpatient rehab program     #Hx DVT/PE/ Thrombocytopenia  - Duplex 1/20/25 - b/l chronic popliteal DVT  - repeat duplex 3/14: Chronic appearing right popliteal vein DVT and left common femoral vein, deep femoral vein, and popliteal vein DVTs  - on previous dc was off coumadin b/c of platelet <50 - per [facility]'s documentation was restarted on coumadin. Unclear why on coumadin and if there is a specific need for him to be on coumadin  - on lovenox 100mg BID for now    #Metabolic syndrome/ Hepatic steatosis  - CC/DASH diet    - previous admission for alcoholic hepatitis treated with  prednisolone  - CTAP with hepatic steatosis and cholelithiasis   - monitor LFTs, encourage weight loss   - Patient should be monitored by PCP or GI specialist annually for noninvasive liver monitoring given that up to 20% of patients with steatosis develop fibrosis.  - If imaging concerning for cirrhosis, patient should be monitored with noninvasive imaging and AFP every 6 months for HCC screening     #DM2  -  metformin/jardiance held on admission   - ISS, -180    #HTN  - cw toprol 25mg qd    #BPH  - cw flomax 0.4mg qhs    #Gout  - cw colchicine qd  - has tylenol 650mg bid PRN for moderate pain       DVT Prophylaxis: therapeutic lovenox  GI Prophylaxis: none  Diet: DASH/CC  Activity: IAT

## 2025-03-14 NOTE — MEDICAL STUDENT PROGRESS NOTE(EDUCATION) - PLAN 3
-CATCH - support and counseling for alcohol cessation  -hepatitis testing for Hep A, B, and C; check if patient vaccinated  -recommend follow up with Hepatology/GI specialist  -regular monitoring for steatosis and development of fibrosis and screening for HCC

## 2025-03-14 NOTE — PROVIDER CONTACT NOTE (MEDICATION) - BACKGROUND
explaijned to Dr. Toure that pt did not receive today's dose of thiamine because th emed was changed to PO (med is due tomorrow on MAR)
pt hemoglobin dropped from 12.7-11

## 2025-03-14 NOTE — PHYSICAL THERAPY INITIAL EVALUATION ADULT - PERTINENT HX OF CURRENT PROBLEM, REHAB EVAL
64y M admitted for palpitations. Pmh Alcohol Use Disorder, DVT/PE, BPH, HTN, HLD, DMII, gout presenting from [facility] with palpitations.Patient reported general malaise, palpitations, nausea and chest discomfort that started yesterday afternoon. Patient reports drinking alcohol. Denies any vomiting, diarrhea, constipation, abdominal pain, chills, tremors, confusion, hallucinations or seizure like activity. ROS -ve for fever, cough, rhinorrhea, rash, dizziness, lightheadedness, HA, hematuria, dysuria, melena, hematochezia, joint pain, myalgias, shortness of breath falls or other trauma.

## 2025-03-14 NOTE — PHYSICAL THERAPY INITIAL EVALUATION ADULT - GAIT DISTANCE, PT EVAL
6 steps bed to chair, did not amb further at this time 2* to lighheadness. BP taken R /69mmHg, HR 65 bpm

## 2025-03-14 NOTE — MEDICAL STUDENT PROGRESS NOTE(EDUCATION) - SUBJECTIVE AND OBJECTIVE BOX
Pt interviewed, examined and EMR chart reviewed.    64y male with hx of alcohol use disorder and tobacco use disorder (in remission), PMH of DVT/PE, BPH, HTN, HLD, DMII, Gout, and Decompensated liver cirrhosis (MELD 3-17) and alcoholic hepatitis, and PPH of anxiety who presents with complaints of palpitations, chest discomfort ("pounding in my chest"), and nausea, admitted for alcoholic ketoacidosis. He reports nausea and palpitations a few hours after drinking 1 bottle of vodka without eating. Addiction Medicine consulted for assistance with alcohol use disorder and management of alcohol withdrawal.    Patient seen in AM, lying comfortably on bed. He endorses poor sleep due to numerous interruptions during the night and is teary when describing his difficulty falling asleep despite trying to read or watch TV. He states his anxiety makes me him want to move and walk around, rather than spending the entire day laying on the bed and watching TV. He endorses tremors ("shakes"), diaphoresis, anxiety, and restlessness. He denies headache, nausea, vomiting, tactile disturbances, or AVH. CIWA = 5.     Discussed options for outpatient rehab or mental health counseling, which patient declines. He also declines engaging in group meetings, as he had attended Alcoholics Anonymous meetings in the past, which were "all talking" and "boring." He mentions that he will "think about" individual counseling for alcohol use and remains amenable to the naltrexone for management of alcohol cravings.      [facility] ISTOP:   This report was requested by: Lauren Mann | Reference #: [id number]  There are no results for the search terms that you entered.     SOCIAL HISTORY: He lives at the [facility]. He states that he is not close with his family and hasn't spoken to them in a long time. He has friends at his residence, most of whom also drink alcohol. He spends money to buy "cheap" alcohol, costing $7-10. He uses a walker to move around as he is unsteady on his feet.    REVIEW OF SYSTEMS:per HPI     MEDICATIONS  (STANDING):  cetirizine 10 milliGRAM(s) Oral daily  chlorhexidine 2% Cloths 1 Application(s) Topical <User Schedule>  colchicine 0.6 milliGRAM(s) Oral daily  cyanocobalamin 1000 MICROGram(s) Oral daily  diazepam    Tablet   Oral   diazepam    Tablet 10 milliGRAM(s) Oral every 8 hours  enoxaparin Injectable 100 milliGRAM(s) SubCutaneous every 12 hours  folic acid 1 milliGRAM(s) Oral daily  gabapentin 200 milliGRAM(s) Oral two times a day  influenza  Vaccine (HIGH DOSE) 0.5 milliLiter(s) IntraMuscular once  insulin lispro (ADMELOG) corrective regimen sliding scale   SubCutaneous three times a day before meals  insulin lispro (ADMELOG) corrective regimen sliding scale   SubCutaneous at bedtime  lactated ringers. 1000 milliLiter(s) (75 mL/Hr) IV Continuous <Continuous>  methocarbamol 500 milliGRAM(s) Oral daily  metoprolol succinate ER 25 milliGRAM(s) Oral daily  multivitamin 1 Tablet(s) Oral daily  mupirocin 2% Nasal 1 Application(s) Both Nostrils two times a day  naltrexone 50 milliGRAM(s) Oral daily  pantoprazole    Tablet 40 milliGRAM(s) Oral before breakfast  tamsulosin 0.4 milliGRAM(s) Oral at bedtime  thiamine IVPB 500 milliGRAM(s) IV Intermittent three times a day    MEDICATIONS  (PRN):  acetaminophen     Tablet .. 650 milliGRAM(s) Oral every 12 hours PRN Moderate Pain (4 - 6)  artificial  tears Solution 1 Drop(s) Both EYES three times a day PRN Dry Eyes  diazepam    Tablet 10 milliGRAM(s) Oral every 2 hours PRN CIWA 8-14  ondansetron Injectable 4 milliGRAM(s) IV Push every 8 hours PRN Nausea and/or Vomiting      Vital Signs Last 24 Hrs  T(C): 36.6 (13 Mar 2025 12:00), Max: 36.9 (12 Mar 2025 20:00)  T(F): 97.8 (13 Mar 2025 12:00), Max: 98.4 (12 Mar 2025 20:00)  HR: 78 (13 Mar 2025 12:00) (74 - 98)  BP: 133/67 (13 Mar 2025 12:00) (117/71 - 146/71)  BP(mean): 90 (13 Mar 2025 03:00) (90 - 96)  RR: 18 (13 Mar 2025 12:00) (18 - 20)  SpO2: 97% (13 Mar 2025 12:00) (92% - 99%)    Parameters below as of 13 Mar 2025 07:20  Patient On (Oxygen Delivery Method): room air    PHYSICAL EXAM:    Constitutional: NAD, well-groomed, well-developed  HEENT: PERRLA, EOMI, horizontal and vertical nystagmus  Respiratory: no increased work of breathing  Neurological: A/O x 3, no focal deficits, tremors to touch only     MENTAL STATUS EXAM:  Appearance: calm, in hospital attire   Appearance in relation to age: looks stated age      Hygiene/Grooming: fair grooming   Attitude toward examiner: fully cooperative  Alertness: alert  Orientation: oriented to time, place and person  Posture: lying in bed  Gait: not evaluated  Behavior and psychomotor activity: normal  Mood: euthymic  Affect: full range and reactivity      Speech: fluent and spontaneous; normal rate, rhythm, volume and tone   Perceptions: denies hallucinations  Thought process: logical, linear and goal-directed    Thought content: no delusions or particular preoccupation, denies SI/HI  Associations: no loosening of associations   Impulse Control: aware of socially acceptable behavior  Insight: poor  Judgment: limited, endorses wanting to stop alcohol but precontemplative for recovery support     LABS:                        11.0   2.54  )-----------( 54       ( 13 Mar 2025 04:32 )             31.7     03-13    144  |  106  |  5[L]  ----------------------------<  125[H]  3.5   |  28  |  0.9    Ca    8.3[L]      13 Mar 2025 04:32  Phos  1.6     03-13  Mg     1.7     03-13    TPro  4.9[L]  /  Alb  3.1[L]  /  TBili  3.4[H]  /  DBili  x   /  AST  63[H]  /  ALT  18  /  AlkPhos  101  03-13    PT/INR - ( 13 Mar 2025 04:32 )   PT: 18.70 sec;   INR: 1.57 ratio         PTT - ( 13 Mar 2025 04:32 )  PTT:42.6 sec  Urinalysis Basic - ( 13 Mar 2025 09:24 )    Color: Orange / Appearance: Clear / S.013 / pH: x  Gluc: x / Ketone: Trace mg/dL  / Bili: Negative / Urobili: 1.0 mg/dL   Blood: x / Protein: Negative mg/dL / Nitrite: Negative   Leuk Esterase: Negative / RBC: 2 /HPF / WBC 0 /HPF   Sq Epi: x / Non Sq Epi: 0 /HPF / Bacteria: Negative /HPF      Drug Screen Urine:  Alcohol, Blood: 215 mg/dL (25 @ 05:05)      Urinalysis with Rflx Culture (collected 25 @ 09:24)        RADIOLOGY & ADDITIONAL STUDIES:  < from: CT Angio Chest PE Protocol w/ IV Cont (25 @ 06:46) >    IMPRESSION:  1.  No CT evidence of acute pulmonary embolus.    2.  No CT evidence of acute thoracic or abdominopelvic pathology.    3.  Stable chronic findings as described above.      < end of copied text >  < from: Xray Chest 1 View- PORTABLE-Urgent (25 @ 06:02) >    IMPRESSION:    1. Low lung volumes, without radiographic evidence of acute   cardiopulmonary disease.    < end of copied text >   20

## 2025-03-14 NOTE — MEDICAL STUDENT PROGRESS NOTE(EDUCATION) - TRANSITIONS OF CARE/DISPOSITION: (SDOH, ANTICIPATED DC NEEDS)
Post-discharge, patient will return to his residence at [facility]. If interested, he can join an outpatient substance use counseling program. He will continue to take the naltrexone 50mg to help with the alcohol cravings. Follow-up appointments should be made with Addiction Medicine to monitor abstinence progress and with the GI specialist/Hepatology for liver monitoring and assessment.

## 2025-03-14 NOTE — PROGRESS NOTE ADULT - SUBJECTIVE AND OBJECTIVE BOX
Pt interviewed, examined and EMR chart reviewed.    64y male with hx of alcohol use disorder and tobacco use disorder (in remission), PMH of DVT/PE, BPH, HTN, HLD, DMII, Gout, and Decompensated liver cirrhosis (MELD 3-17) and alcoholic hepatitis, and PPH of anxiety who presents with complaints of palpitations, chest discomfort ("pounding in my chest"), and nausea, admitted for alcoholic ketoacidosis. He reports nausea and palpitations a few hours after drinking 1 bottle of vodka without eating. Addiction Medicine consulted for assistance with alcohol use disorder and management of alcohol withdrawal.    Patient was seen this AM, resting comfortably in bed. Patient states that he is feeling a little better, denies HA, N/V, diaphoresis, tactile disturbances, AH/ VH, SI or HI. States this tremors are better too but he has not been getting good sleep at night. Patient tearful towards end of interview, states "I get restless when I am just sitting here and I did not get good sleep." Writer asked if patient had anxieties about going home soon but he denied. Patient denied interest in AA meetings or counseling support but is open to being on naltrexone to decrease alcohol.     REVIEW OF SYSTEMS: per HPI     MEDICATIONS  (STANDING):  cetirizine 10 milliGRAM(s) Oral daily  chlorhexidine 2% Cloths 1 Application(s) Topical <User Schedule>  colchicine 0.6 milliGRAM(s) Oral daily  cyanocobalamin 1000 MICROGram(s) Oral daily  diazepam    Tablet   Oral   diazepam    Tablet 10 milliGRAM(s) Oral every 12 hours  enoxaparin Injectable 100 milliGRAM(s) SubCutaneous every 12 hours  folic acid 1 milliGRAM(s) Oral daily  gabapentin 200 milliGRAM(s) Oral two times a day  influenza  Vaccine (HIGH DOSE) 0.5 milliLiter(s) IntraMuscular once  insulin lispro (ADMELOG) corrective regimen sliding scale   SubCutaneous three times a day before meals  insulin lispro (ADMELOG) corrective regimen sliding scale   SubCutaneous at bedtime  lactated ringers. 1000 milliLiter(s) (75 mL/Hr) IV Continuous <Continuous>  melatonin 3 milliGRAM(s) Oral at bedtime  methocarbamol 500 milliGRAM(s) Oral daily  metoprolol succinate ER 25 milliGRAM(s) Oral daily  multivitamin 1 Tablet(s) Oral daily  mupirocin 2% Nasal 1 Application(s) Both Nostrils two times a day  naltrexone 50 milliGRAM(s) Oral daily  pantoprazole    Tablet 40 milliGRAM(s) Oral before breakfast  tamsulosin 0.4 milliGRAM(s) Oral at bedtime  thiamine IVPB 500 milliGRAM(s) IV Intermittent three times a day    MEDICATIONS  (PRN):  acetaminophen     Tablet .. 650 milliGRAM(s) Oral every 6 hours PRN Moderate Pain (4 - 6)  artificial  tears Solution 1 Drop(s) Both EYES three times a day PRN Dry Eyes  diazepam    Tablet 10 milliGRAM(s) Oral every 2 hours PRN CIWA 8-14  ondansetron Injectable 4 milliGRAM(s) IV Push every 6 hours PRN Nausea and/or Vomiting    MEDICATIONS  (STANDING):  cetirizine 10 milliGRAM(s) Oral daily  chlorhexidine 2% Cloths 1 Application(s) Topical <User Schedule>  colchicine 0.6 milliGRAM(s) Oral daily  cyanocobalamin 1000 MICROGram(s) Oral daily  diazepam    Tablet   Oral   diazepam    Tablet 10 milliGRAM(s) Oral every 12 hours  enoxaparin Injectable 100 milliGRAM(s) SubCutaneous every 12 hours  folic acid 1 milliGRAM(s) Oral daily  gabapentin 200 milliGRAM(s) Oral two times a day  influenza  Vaccine (HIGH DOSE) 0.5 milliLiter(s) IntraMuscular once  insulin lispro (ADMELOG) corrective regimen sliding scale   SubCutaneous three times a day before meals  insulin lispro (ADMELOG) corrective regimen sliding scale   SubCutaneous at bedtime  lactated ringers. 1000 milliLiter(s) (75 mL/Hr) IV Continuous <Continuous>  melatonin 3 milliGRAM(s) Oral at bedtime  methocarbamol 500 milliGRAM(s) Oral daily  metoprolol succinate ER 25 milliGRAM(s) Oral daily  multivitamin 1 Tablet(s) Oral daily  mupirocin 2% Nasal 1 Application(s) Both Nostrils two times a day  naltrexone 50 milliGRAM(s) Oral daily  pantoprazole    Tablet 40 milliGRAM(s) Oral before breakfast  tamsulosin 0.4 milliGRAM(s) Oral at bedtime  thiamine IVPB 500 milliGRAM(s) IV Intermittent three times a day    MEDICATIONS  (PRN):  acetaminophen     Tablet .. 650 milliGRAM(s) Oral every 6 hours PRN Moderate Pain (4 - 6)  artificial  tears Solution 1 Drop(s) Both EYES three times a day PRN Dry Eyes  diazepam    Tablet 10 milliGRAM(s) Oral every 2 hours PRN CIWA 8-14  ondansetron Injectable 4 milliGRAM(s) IV Push every 6 hours PRN Nausea and/or Vomiting      PHYSICAL EXAM:    Constitutional: NAD, well-groomed, well-developed  HEENT: PERRLA, EOMI  Neck: No LAD, No JVD  Respiratory: no increased work of breathing  Neurological: A/O x 3, no focal deficits, tremors to touch only     MENTAL STATUS EXAM:  Appearance: calm, in hospital attire   Appearance in relation to age: looks stated age      Hygiene/Grooming: fair grooming   Attitude toward examiner: fully cooperative  Alertness: alert  Orientation: oriented to time, place and person  Posture: lying in bed  Gait: not evaluated  Behavior and psychomotor activity: normal  Mood: euthymic  Affect: full range and reactivity      Speech: fluent and spontaneous; normal rate, rhythm, volume and tone   Perceptions: denies hallucinations  Thought process: logical, linear and goal-directed    Thought content: no delusions or particular preoccupation, denies SI/HI  Associations: no loosening of associations   Impulse Control: aware of socially acceptable behavior  Insight: poor  Judgment: limited, endorses wanting to stop alcohol but precontemplative for recovery support     LABS:                        11.0   2.54  )-----------( 54       ( 13 Mar 2025 04:32 )             31.7     03-13    144  |  106  |  5[L]  ----------------------------<  125[H]  3.5   |  28  |  0.9    Ca    8.3[L]      13 Mar 2025 04:32  Phos  1.6     03-13  Mg     1.7     03-13    TPro  4.9[L]  /  Alb  3.1[L]  /  TBili  3.4[H]  /  DBili  x   /  AST  63[H]  /  ALT  18  /  AlkPhos  101  03-13    PT/INR - ( 13 Mar 2025 04:32 )   PT: 18.70 sec;   INR: 1.57 ratio         PTT - ( 13 Mar 2025 04:32 )  PTT:42.6 sec  Urinalysis Basic - ( 13 Mar 2025 09:24 )    Color: Orange / Appearance: Clear / S.013 / pH: x  Gluc: x / Ketone: Trace mg/dL  / Bili: Negative / Urobili: 1.0 mg/dL   Blood: x / Protein: Negative mg/dL / Nitrite: Negative   Leuk Esterase: Negative / RBC: 2 /HPF / WBC 0 /HPF   Sq Epi: x / Non Sq Epi: 0 /HPF / Bacteria: Negative /HPF      Drug Screen Urine:  Alcohol, Blood: 215 mg/dL (25 @ 05:05)      Urinalysis with Rflx Culture (collected 25 @ 09:24)        RADIOLOGY & ADDITIONAL STUDIES:  < from: CT Angio Chest PE Protocol w/ IV Cont (25 @ 06:46) >    IMPRESSION:  1.  No CT evidence of acute pulmonary embolus.    2.  No CT evidence of acute thoracic or abdominopelvic pathology.    3.  Stable chronic findings as described above.      < end of copied text >  < from: Xray Chest 1 View- PORTABLE-Urgent (25 @ 06:02) >    IMPRESSION:    1. Low lung volumes, without radiographic evidence of acute   cardiopulmonary disease.    < end of copied text >

## 2025-03-14 NOTE — MEDICAL STUDENT PROGRESS NOTE(EDUCATION) - PLAN 2
-clinical concern for Wernicke Encephalopathy due to ongoing ophthalmoplegia and gait instability  -continue high-dose thiamine supplementation  -maintain Mg >2 and K>4

## 2025-03-14 NOTE — PROVIDER CONTACT NOTE (OTHER) - SITUATION
Pt needs an IV, line symptomatic and two RNs tried to place an IV
pt has a pending urine for a toxicology urine sample

## 2025-03-14 NOTE — PROGRESS NOTE ADULT - ASSESSMENT
64y M pmh Alcohol Use Disorder, DVT/PE, BPH, HTN, HLD, DMII, gout presenting from [facility] with palpitations admitted for acute alcohol intoxication and lactic acidosis.     #Alcohol intoxication in setting of alcohol use disorder    impending withdrawal .   #Lactic Acidosis  - Alcohol level  215 on admission   - increased alcohol use over the past week - last drink 8PM 3/11/25 - 1 bottle of vodka   - c/w  CIWA protocol, addiction medicine input noted and appreciated.   - started on  valium taper 10mg w/ PRN valium 10mg for CIWA >8  - cw  thiamine,  B12, folate, MV   - lactate 3.5--> 1.7 s/p IVF   - cw naltrexone 50mg qd and gabapentin 200mg bid   - TTE: LVEF 60%, no evidence of cardiomyopathy     #Hx DVT/PE/ Thrombocytopenia  - Duplex 1/20/25 - b/l chronic popliteal DVT  - repeat duplex 3/14: Chronic appearing right popliteal vein DVT and left common femoral vein, deep femoral vein, and popliteal vein DVTs  - on previous dc was off coumadin b/c of platelet <50 - per [facility]'s documentation was restarted on coumadin.  - on lovenox 100mg BID for now, monitor CBC closely  -  >> 54 >> 45 today.     #Metabolic syndrome/ Hepatic steatosis  - CC/DASH diet    - previous admission for alcoholic hepatitis treated with  prednisolone  - CTAP with hepatic steatosis and cholelithiasis   - monitor LFTs, encourage weight loss   - Patient should be monitored by PCP or GI specialist annually for noninvasive liver monitoring.     # Suspected Thiamine deficiency/ Wernicke Encephalopathy     >>started on Thiamine ivpb     #DM2  -  metformin/jardiance held on admission   - ISS, -180    #HTN  - cw toprol 25mg qd    #BPH  - cw flomax 0.4mg qhs    #Gout  - cw colchicine qd  - has tylenol 650mg bid PRN for moderate pain     DVT Prophylaxis: therapeutic lovenox  GI Prophylaxis: none  Diet: DASH/CC  Activity: IAT    Pending: CIWA/ Thrombocytopenia /   Dispo: TBD

## 2025-03-14 NOTE — PROGRESS NOTE ADULT - SUBJECTIVE AND OBJECTIVE BOX
AILYNJEIMY CAT  64y  Male      Patient is a 64y old  Male who presents with a chief complaint of Palpitations.    INTERVAL HPI/OVERNIGHT EVENTS:      ******************************* REVIEW OF SYSTEMS:**********************************************    All other review of systems negative    *********************** VITALS ******************************************    T(F): 98.5 (03-14-25 @ 05:00)  HR: 101 (03-14-25 @ 11:46) (84 - 101)  BP: 124/77 (03-14-25 @ 11:46) (118/70 - 135/76)  RR: 18 (03-14-25 @ 05:00) (18 - 19)  SpO2: 96% (03-14-25 @ 05:00) (94% - 96%)            ******************************** PHYSICAL EXAM:**************************************************  GENERAL: NAD    PSYCH: no agitation, baseline mentation  HEENT:     NERVOUS SYSTEM:  Alert & Oriented X3,     PULMONARY: SALO, CTA    CARDIOVASCULAR: S1S2 RRR    GI: Soft, NT, ND; BS present.    EXTREMITIES:  2+ Peripheral Pulses, No clubbing, cyanosis, or edema    LYMPH: No lymphadenopathy noted    SKIN: No rashes or lesions      **************************** LABS *******************************************************                          11.0   3.25  )-----------( 45       ( 14 Mar 2025 04:44 )             31.8     03-14    143  |  105  |  4[L]  ----------------------------<  103[H]  3.2[L]   |  27  |  1.0    Ca    8.4      14 Mar 2025 04:44  Phos  2.1     03-14  Mg     1.7     03-14    TPro  4.9[L]  /  Alb  3.1[L]  /  TBili  3.4[H]  /  DBili  x   /  AST  63[H]  /  ALT  18  /  AlkPhos  101  03-13      Urinalysis Basic - ( 14 Mar 2025 04:44 )    Color: x / Appearance: x / SG: x / pH: x  Gluc: 103 mg/dL / Ketone: x  / Bili: x / Urobili: x   Blood: x / Protein: x / Nitrite: x   Leuk Esterase: x / RBC: x / WBC x   Sq Epi: x / Non Sq Epi: x / Bacteria: x      PT/INR - ( 14 Mar 2025 04:44 )   PT: 19.80 sec;   INR: 1.66 ratio         PTT - ( 13 Mar 2025 04:32 )  PTT:42.6 sec  Lactate Trend  03-13 @ 11:35 Lactate:1.7   03-12 @ 20:18 Lactate:3.5         CAPILLARY BLOOD GLUCOSE      POCT Blood Glucose.: 112 mg/dL (14 Mar 2025 11:39)          **************************Active Medications *******************************************  shellfish (Hives)  dairy products (Hives)  No Known Drug Allergies  Beef (Hives)      acetaminophen     Tablet .. 650 milliGRAM(s) Oral every 6 hours PRN  artificial  tears Solution 1 Drop(s) Both EYES three times a day PRN  cetirizine 10 milliGRAM(s) Oral daily  chlorhexidine 2% Cloths 1 Application(s) Topical <User Schedule>  colchicine 0.6 milliGRAM(s) Oral daily  cyanocobalamin 1000 MICROGram(s) Oral daily  diazepam    Tablet 10 milliGRAM(s) Oral every 2 hours PRN  diazepam    Tablet   Oral   diazepam    Tablet 10 milliGRAM(s) Oral every 12 hours  enoxaparin Injectable 100 milliGRAM(s) SubCutaneous every 12 hours  folic acid 1 milliGRAM(s) Oral daily  gabapentin 200 milliGRAM(s) Oral two times a day  influenza  Vaccine (HIGH DOSE) 0.5 milliLiter(s) IntraMuscular once  insulin lispro (ADMELOG) corrective regimen sliding scale   SubCutaneous three times a day before meals  insulin lispro (ADMELOG) corrective regimen sliding scale   SubCutaneous at bedtime  lactated ringers. 1000 milliLiter(s) IV Continuous <Continuous>  melatonin 3 milliGRAM(s) Oral at bedtime  methocarbamol 500 milliGRAM(s) Oral daily  metoprolol succinate ER 25 milliGRAM(s) Oral daily  multivitamin 1 Tablet(s) Oral daily  mupirocin 2% Nasal 1 Application(s) Both Nostrils two times a day  naltrexone 50 milliGRAM(s) Oral daily  ondansetron Injectable 4 milliGRAM(s) IV Push every 6 hours PRN  pantoprazole    Tablet 40 milliGRAM(s) Oral before breakfast  tamsulosin 0.4 milliGRAM(s) Oral at bedtime  thiamine IVPB 500 milliGRAM(s) IV Intermittent three times a day      ***************************************************  RADIOLOGY & ADDITIONAL TESTS:    Imaging Personally Reviewed:  [ ] YES  [ ] NO    HEALTH ISSUES - PROBLEM Dx:  Alcohol dependence with withdrawal    Wernicke's encephalopathy    Hepatic steatosis

## 2025-03-15 LAB
ALBUMIN SERPL ELPH-MCNC: 2.9 G/DL — LOW (ref 3.5–5.2)
ALP SERPL-CCNC: 115 U/L — SIGNIFICANT CHANGE UP (ref 30–115)
ALT FLD-CCNC: 18 U/L — SIGNIFICANT CHANGE UP (ref 0–41)
ANION GAP SERPL CALC-SCNC: 10 MMOL/L — SIGNIFICANT CHANGE UP (ref 7–14)
BASOPHILS # BLD AUTO: 0.06 K/UL — SIGNIFICANT CHANGE UP (ref 0–0.2)
BASOPHILS NFR BLD AUTO: 1.2 % — HIGH (ref 0–1)
BILIRUB SERPL-MCNC: 3.5 MG/DL — HIGH (ref 0.2–1.2)
BUN SERPL-MCNC: 3 MG/DL — LOW (ref 10–20)
CALCIUM SERPL-MCNC: 7.9 MG/DL — LOW (ref 8.4–10.5)
CHLORIDE SERPL-SCNC: 106 MMOL/L — SIGNIFICANT CHANGE UP (ref 98–110)
CO2 SERPL-SCNC: 24 MMOL/L — SIGNIFICANT CHANGE UP (ref 17–32)
CREAT SERPL-MCNC: 0.8 MG/DL — SIGNIFICANT CHANGE UP (ref 0.7–1.5)
EGFR: 98 ML/MIN/1.73M2 — SIGNIFICANT CHANGE UP
EGFR: 98 ML/MIN/1.73M2 — SIGNIFICANT CHANGE UP
EOSINOPHIL NFR BLD AUTO: 2.9 % — SIGNIFICANT CHANGE UP (ref 0–8)
GLUCOSE BLDC GLUCOMTR-MCNC: 105 MG/DL — HIGH (ref 70–99)
GLUCOSE BLDC GLUCOMTR-MCNC: 110 MG/DL — HIGH (ref 70–99)
GLUCOSE BLDC GLUCOMTR-MCNC: 115 MG/DL — HIGH (ref 70–99)
HAV IGM SER-ACNC: SIGNIFICANT CHANGE UP
HBV CORE IGM SER-ACNC: SIGNIFICANT CHANGE UP
HBV SURFACE AG SER-ACNC: SIGNIFICANT CHANGE UP
HCT VFR BLD CALC: 32.9 % — LOW (ref 42–52)
HCV AB S/CO SERPL IA: 0.28 S/CO — SIGNIFICANT CHANGE UP (ref 0–0.79)
HCV AB SERPL-IMP: SIGNIFICANT CHANGE UP
HGB BLD-MCNC: 11.1 G/DL — LOW (ref 14–18)
IMM GRANULOCYTES NFR BLD AUTO: 0.2 % — SIGNIFICANT CHANGE UP (ref 0.1–0.3)
INR BLD: 1.46 RATIO — HIGH (ref 0.65–1.3)
LYMPHOCYTES # BLD AUTO: 1.24 K/UL — SIGNIFICANT CHANGE UP (ref 1.2–3.4)
LYMPHOCYTES # BLD AUTO: 25.7 % — SIGNIFICANT CHANGE UP (ref 20.5–51.1)
MAGNESIUM SERPL-MCNC: 1.6 MG/DL — LOW (ref 1.8–2.4)
MCHC RBC-ENTMCNC: 33.7 G/DL — SIGNIFICANT CHANGE UP (ref 32–37)
MCHC RBC-ENTMCNC: 37 PG — HIGH (ref 27–31)
MCV RBC AUTO: 109.7 FL — HIGH (ref 80–94)
MELD SCORE WITH DIALYSIS: 29 POINTS — SIGNIFICANT CHANGE UP
MELD SCORE WITHOUT DIALYSIS: 15 POINTS — SIGNIFICANT CHANGE UP
MONOCYTES # BLD AUTO: 0.27 K/UL — SIGNIFICANT CHANGE UP (ref 0.1–0.6)
MONOCYTES NFR BLD AUTO: 5.6 % — SIGNIFICANT CHANGE UP (ref 1.7–9.3)
NEUTROPHILS # BLD AUTO: 3.11 K/UL — SIGNIFICANT CHANGE UP (ref 1.4–6.5)
NEUTROPHILS NFR BLD AUTO: 64.4 % — SIGNIFICANT CHANGE UP (ref 42.2–75.2)
NRBC BLD AUTO-RTO: 0 /100 WBCS — SIGNIFICANT CHANGE UP (ref 0–0)
PLATELET # BLD AUTO: 41 K/UL — LOW (ref 130–400)
PMV BLD: 11 FL — HIGH (ref 7.4–10.4)
POTASSIUM SERPL-SCNC: 3.6 MMOL/L — SIGNIFICANT CHANGE UP (ref 3.5–5)
PROT SERPL-MCNC: 5.3 G/DL — LOW (ref 6–8)
PROTHROM AB SERPL-ACNC: 17.4 SEC — HIGH (ref 9.95–12.87)
RBC # BLD: 3 M/UL — LOW (ref 4.7–6.1)
RBC # FLD: 16.7 % — HIGH (ref 11.5–14.5)
SODIUM SERPL-SCNC: 140 MMOL/L — SIGNIFICANT CHANGE UP (ref 135–146)
WBC # BLD: 4.83 K/UL — SIGNIFICANT CHANGE UP (ref 4.8–10.8)
WBC # FLD AUTO: 4.83 K/UL — SIGNIFICANT CHANGE UP (ref 4.8–10.8)

## 2025-03-15 PROCEDURE — 99233 SBSQ HOSP IP/OBS HIGH 50: CPT

## 2025-03-15 RX ORDER — ENOXAPARIN SODIUM 100 MG/ML
100 INJECTION SUBCUTANEOUS EVERY 24 HOURS
Refills: 0 | Status: DISCONTINUED | OUTPATIENT
Start: 2025-03-16 | End: 2025-03-19

## 2025-03-15 RX ORDER — MAGNESIUM OXIDE 400 MG
400 TABLET ORAL
Refills: 0 | Status: DISCONTINUED | OUTPATIENT
Start: 2025-03-15 | End: 2025-03-18

## 2025-03-15 RX ADMIN — GABAPENTIN 200 MILLIGRAM(S): 400 CAPSULE ORAL at 06:02

## 2025-03-15 RX ADMIN — Medication 1 APPLICATION(S): at 06:04

## 2025-03-15 RX ADMIN — CYANOCOBALAMIN 1000 MICROGRAM(S): 1000 INJECTION INTRAMUSCULAR; SUBCUTANEOUS at 12:01

## 2025-03-15 RX ADMIN — Medication 1 TABLET(S): at 12:01

## 2025-03-15 RX ADMIN — COLCHICINE 0.6 MILLIGRAM(S): 0.6 TABLET, FILM COATED ORAL at 12:01

## 2025-03-15 RX ADMIN — DIAZEPAM 10 MILLIGRAM(S): 2 TABLET ORAL at 06:02

## 2025-03-15 RX ADMIN — Medication 40 MILLIGRAM(S): at 06:02

## 2025-03-15 RX ADMIN — Medication 10 MILLIGRAM(S): at 12:00

## 2025-03-15 RX ADMIN — ENOXAPARIN SODIUM 100 MILLIGRAM(S): 100 INJECTION SUBCUTANEOUS at 06:02

## 2025-03-15 RX ADMIN — MUPIROCIN CALCIUM 1 APPLICATION(S): 20 CREAM TOPICAL at 17:34

## 2025-03-15 RX ADMIN — FOLIC ACID 1 MILLIGRAM(S): 1 TABLET ORAL at 12:01

## 2025-03-15 RX ADMIN — METHOCARBAMOL 500 MILLIGRAM(S): 500 TABLET, FILM COATED ORAL at 12:01

## 2025-03-15 RX ADMIN — Medication 3 MILLIGRAM(S): at 21:47

## 2025-03-15 RX ADMIN — NALTREXONE HYDROCHLORIDE 50 MILLIGRAM(S): 50 TABLET, FILM COATED ORAL at 12:01

## 2025-03-15 RX ADMIN — MUPIROCIN CALCIUM 1 APPLICATION(S): 20 CREAM TOPICAL at 06:02

## 2025-03-15 RX ADMIN — Medication 100 MILLIGRAM(S): at 12:00

## 2025-03-15 RX ADMIN — Medication 400 MILLIGRAM(S): at 12:01

## 2025-03-15 RX ADMIN — TAMSULOSIN HYDROCHLORIDE 0.4 MILLIGRAM(S): 0.4 CAPSULE ORAL at 21:47

## 2025-03-15 RX ADMIN — METOPROLOL SUCCINATE 25 MILLIGRAM(S): 50 TABLET, EXTENDED RELEASE ORAL at 06:02

## 2025-03-15 RX ADMIN — Medication 400 MILLIGRAM(S): at 17:34

## 2025-03-15 RX ADMIN — GABAPENTIN 200 MILLIGRAM(S): 400 CAPSULE ORAL at 17:34

## 2025-03-15 RX ADMIN — Medication 400 MILLIGRAM(S): at 09:04

## 2025-03-15 NOTE — PROGRESS NOTE ADULT - ASSESSMENT
64y M pmh Alcohol Use Disorder, DVT/PE, BPH, HTN, HLD, DMII, gout presenting from [facility] with palpitations admitted for acute alcohol intoxication and lactic acidosis.     #Alcohol intoxication in setting of alcohol use disorder    impending withdrawal .   #Lactic Acidosis  - Alcohol level  215 on admission   - increased alcohol use over the past week - last drink 8PM 3/11/25 - 1 bottle of vodka   - c/w  CIWA protocol, addiction medicine input noted and appreciated.   - started on  valium taper 10mg w/ PRN valium 10mg for CIWA >8  - cw  thiamine,  B12, folate, MV   - lactate 3.5--> 1.7 s/p IVF   - cw naltrexone 50mg qd and gabapentin 200mg bid   - TTE: LVEF 60%, no evidence of cardiomyopathy     #Hx DVT/PE/ Thrombocytopenia  - Duplex 1/20/25 - b/l chronic popliteal DVT  - repeat duplex 3/14: Chronic appearing right popliteal vein DVT and left common femoral vein, deep femoral vein, and popliteal vein DVTs  - on previous dc was off coumadin b/c of platelet <50 - per [facility]'s documentation was restarted on coumadin.  - on lovenox 100mg BID > will be lowered to once daily.   -  >> 54 >> 45 >> 41  today.     #Metabolic syndrome/ Hepatic steatosis  - CC/DASH diet    - previous admission for alcoholic hepatitis treated with  prednisolone  - CTAP with hepatic steatosis and cholelithiasis   - monitor LFTs, encourage weight loss   - Patient should be monitored by PCP or GI specialist annually for noninvasive liver monitoring.     # Suspected Thiamine deficiency/ Wernicke Encephalopathy     >>started on Thiamine ivpb     #DM2  -  metformin/jardiance held on admission   - ISS, -180    #HTN  - cw toprol 25mg qd    #BPH  - cw flomax 0.4mg qhs    #Gout  - cw colchicine qd  - has tylenol 650mg bid PRN for moderate pain     DVT Prophylaxis: therapeutic lovenox  GI Prophylaxis: none  Diet: DASH/CC  Activity: IAT    Pending: CIWA/ Thrombocytopenia /   Dispo: TBD

## 2025-03-15 NOTE — PHARMACOTHERAPY INTERVENTION NOTE - COMMENTS
CAT [id number]    Indication:  INR Goal:  Home Dose:  Bridge Therapy:    Current Medications:  acetaminophen     Tablet .. 650 milliGRAM(s) Oral every 6 hours PRN  artificial  tears Solution 1 Drop(s) Both EYES three times a day PRN  cetirizine 10 milliGRAM(s) Oral daily  chlorhexidine 2% Cloths 1 Application(s) Topical <User Schedule>  colchicine 0.6 milliGRAM(s) Oral daily  cyanocobalamin 1000 MICROGram(s) Oral daily  diazepam    Tablet 10 milliGRAM(s) Oral every 2 hours PRN  folic acid 1 milliGRAM(s) Oral daily  gabapentin 200 milliGRAM(s) Oral two times a day  guaiFENesin  milliGRAM(s) Oral every 12 hours PRN  influenza  Vaccine (HIGH DOSE) 0.5 milliLiter(s) IntraMuscular once  insulin lispro (ADMELOG) corrective regimen sliding scale   SubCutaneous three times a day before meals  insulin lispro (ADMELOG) corrective regimen sliding scale   SubCutaneous at bedtime  lactated ringers. 1000 milliLiter(s) IV Continuous <Continuous>  magnesium oxide 400 milliGRAM(s) Oral three times a day with meals  melatonin 3 milliGRAM(s) Oral at bedtime  methocarbamol 500 milliGRAM(s) Oral daily  metoprolol succinate ER 25 milliGRAM(s) Oral daily  multivitamin 1 Tablet(s) Oral daily  mupirocin 2% Nasal 1 Application(s) Both Nostrils two times a day  naltrexone 50 milliGRAM(s) Oral daily  ondansetron Injectable 4 milliGRAM(s) IV Push every 6 hours PRN  pantoprazole    Tablet 40 milliGRAM(s) Oral before breakfast  tamsulosin 0.4 milliGRAM(s) Oral at bedtime  thiamine 100 milliGRAM(s) Oral daily      hemoglobin 11.1 g/dL (03-15-25 @ 04:53)    hematocrit 32.9 % (03-15-25 @ 04:53)    PLT: 41 K/uL (03-15-25 @ 04:53)    GFR:98 mL/min/1.73m2 (03-15-25 @ 04:53)      Drug Interactions:     INR trend  1.36 ratio (03-12-25 @ 05:05)  1.52 ratio (03-12-25 @ 21:48)  1.57 ratio (03-13-25 @ 04:32)  1.66 ratio (03-14-25 @ 04:44)  1.46 ratio (03-15-25 @ 04:53)      Warfarin administration history:        1. INR today is:               [ X  ] below goal ----- This is likely due to                                            [   ] at goal                                            [   ] above goal ------ This is likely due to        2. Recommend holding Warfarin today due to low platelets  3. Obtain INR tomorrow AM     CAT [id number]    Indication: VTE  INR Goal: 2-3  Home Dose: 2.5mg daily except 3.75mg sun/tue/thurs  Bridge Therapy:    Current Medications:  acetaminophen     Tablet .. 650 milliGRAM(s) Oral every 6 hours PRN  artificial  tears Solution 1 Drop(s) Both EYES three times a day PRN  cetirizine 10 milliGRAM(s) Oral daily  chlorhexidine 2% Cloths 1 Application(s) Topical <User Schedule>  colchicine 0.6 milliGRAM(s) Oral daily  cyanocobalamin 1000 MICROGram(s) Oral daily  diazepam    Tablet 10 milliGRAM(s) Oral every 2 hours PRN  folic acid 1 milliGRAM(s) Oral daily  gabapentin 200 milliGRAM(s) Oral two times a day  guaiFENesin  milliGRAM(s) Oral every 12 hours PRN  influenza  Vaccine (HIGH DOSE) 0.5 milliLiter(s) IntraMuscular once  insulin lispro (ADMELOG) corrective regimen sliding scale   SubCutaneous three times a day before meals  insulin lispro (ADMELOG) corrective regimen sliding scale   SubCutaneous at bedtime  lactated ringers. 1000 milliLiter(s) IV Continuous <Continuous>  magnesium oxide 400 milliGRAM(s) Oral three times a day with meals  melatonin 3 milliGRAM(s) Oral at bedtime  methocarbamol 500 milliGRAM(s) Oral daily  metoprolol succinate ER 25 milliGRAM(s) Oral daily  multivitamin 1 Tablet(s) Oral daily  mupirocin 2% Nasal 1 Application(s) Both Nostrils two times a day  naltrexone 50 milliGRAM(s) Oral daily  ondansetron Injectable 4 milliGRAM(s) IV Push every 6 hours PRN  pantoprazole    Tablet 40 milliGRAM(s) Oral before breakfast  tamsulosin 0.4 milliGRAM(s) Oral at bedtime  thiamine 100 milliGRAM(s) Oral daily      hemoglobin 11.1 g/dL (03-15-25 @ 04:53)    hematocrit 32.9 % (03-15-25 @ 04:53)    PLT: 41 K/uL (03-15-25 @ 04:53)    GFR:98 mL/min/1.73m2 (03-15-25 @ 04:53)      Drug Interactions:     INR trend  1.36 ratio (03-12-25 @ 05:05)  1.52 ratio (03-12-25 @ 21:48)  1.57 ratio (03-13-25 @ 04:32)  1.66 ratio (03-14-25 @ 04:44)  1.46 ratio (03-15-25 @ 04:53)      Warfarin administration history:        1. INR today is:               [ X  ] below goal ----- This is likely due to                                            [   ] at goal                                            [   ] above goal ------ This is likely due to        2. Recommend holding Warfarin today due to low platelets  3. Obtain INR tomorrow AM

## 2025-03-15 NOTE — PROGRESS NOTE ADULT - SUBJECTIVE AND OBJECTIVE BOX
AILYNJEIMY CAT  64y  Male      Patient is a 64y old  Male who presents with a chief complaint of Palpitations.       INTERVAL HPI/OVERNIGHT EVENTS:      ******************************* REVIEW OF SYSTEMS:**********************************************    All other review of systems negative    *********************** VITALS ******************************************    T(F): 97.8 (03-15-25 @ 11:30)  HR: 82 (03-15-25 @ 11:30) (82 - 98)  BP: 103/63 (03-15-25 @ 11:30) (101/64 - 128/68)  RR: 18 (03-15-25 @ 11:30) (18 - 18)  SpO2: 95% (03-15-25 @ 11:30) (95% - 98%)            ******************************** PHYSICAL EXAM:**************************************************  GENERAL: NAD    PSYCH: no agitation, baseline mentation  HEENT:     NERVOUS SYSTEM:  Alert & Oriented X3,  PULMONARY: SALO, CTA    CARDIOVASCULAR: S1S2 RRR    GI: Soft, NT, ND; BS present.    EXTREMITIES:  2+ Peripheral Pulses, No clubbing, cyanosis, or edema    LYMPH: No lymphadenopathy noted    SKIN: No rashes or lesions      **************************** LABS *******************************************************                          11.1   4.83  )-----------( 41       ( 15 Mar 2025 04:53 )             32.9     03-15    140  |  106  |  3[L]  ----------------------------<  109[H]  3.6   |  24  |  0.8    Ca    7.9[L]      15 Mar 2025 04:53  Phos  2.1     03-14  Mg     1.6     03-15    TPro  5.3[L]  /  Alb  2.9[L]  /  TBili  3.5[H]  /  DBili  x   /  AST  61[H]  /  ALT  18  /  AlkPhos  115  03-15      Urinalysis Basic - ( 15 Mar 2025 04:53 )    Color: x / Appearance: x / SG: x / pH: x  Gluc: 109 mg/dL / Ketone: x  / Bili: x / Urobili: x   Blood: x / Protein: x / Nitrite: x   Leuk Esterase: x / RBC: x / WBC x   Sq Epi: x / Non Sq Epi: x / Bacteria: x      PT/INR - ( 15 Mar 2025 04:53 )   PT: 17.40 sec;   INR: 1.46 ratio           Lactate Trend  03-13 @ 11:35 Lactate:1.7   03-12 @ 20:18 Lactate:3.5         CAPILLARY BLOOD GLUCOSE      POCT Blood Glucose.: 114 mg/dL (15 Mar 2025 11:29)          **************************Active Medications *******************************************  shellfish (Hives)  dairy products (Hives)  No Known Drug Allergies  Beef (Hives)      acetaminophen     Tablet .. 650 milliGRAM(s) Oral every 6 hours PRN  artificial  tears Solution 1 Drop(s) Both EYES three times a day PRN  cetirizine 10 milliGRAM(s) Oral daily  chlorhexidine 2% Cloths 1 Application(s) Topical <User Schedule>  colchicine 0.6 milliGRAM(s) Oral daily  cyanocobalamin 1000 MICROGram(s) Oral daily  diazepam    Tablet 10 milliGRAM(s) Oral every 2 hours PRN  folic acid 1 milliGRAM(s) Oral daily  gabapentin 200 milliGRAM(s) Oral two times a day  guaiFENesin  milliGRAM(s) Oral every 12 hours PRN  influenza  Vaccine (HIGH DOSE) 0.5 milliLiter(s) IntraMuscular once  insulin lispro (ADMELOG) corrective regimen sliding scale   SubCutaneous three times a day before meals  insulin lispro (ADMELOG) corrective regimen sliding scale   SubCutaneous at bedtime  lactated ringers. 1000 milliLiter(s) IV Continuous <Continuous>  magnesium oxide 400 milliGRAM(s) Oral three times a day with meals  melatonin 3 milliGRAM(s) Oral at bedtime  methocarbamol 500 milliGRAM(s) Oral daily  metoprolol succinate ER 25 milliGRAM(s) Oral daily  multivitamin 1 Tablet(s) Oral daily  mupirocin 2% Nasal 1 Application(s) Both Nostrils two times a day  naltrexone 50 milliGRAM(s) Oral daily  ondansetron Injectable 4 milliGRAM(s) IV Push every 6 hours PRN  pantoprazole    Tablet 40 milliGRAM(s) Oral before breakfast  tamsulosin 0.4 milliGRAM(s) Oral at bedtime  thiamine 100 milliGRAM(s) Oral daily      ***************************************************  RADIOLOGY & ADDITIONAL TESTS:    Imaging Personally Reviewed:  [ ] YES  [ ] NO    HEALTH ISSUES - PROBLEM Dx:  Alcohol dependence with withdrawal    Wernicke's encephalopathy    Hepatic steatosis

## 2025-03-16 LAB
ALBUMIN SERPL ELPH-MCNC: 3 G/DL — LOW (ref 3.5–5.2)
ALP SERPL-CCNC: 121 U/L — HIGH (ref 30–115)
ALT FLD-CCNC: 19 U/L — SIGNIFICANT CHANGE UP (ref 0–41)
ANION GAP SERPL CALC-SCNC: 12 MMOL/L — SIGNIFICANT CHANGE UP (ref 7–14)
APTT BLD: 53.5 SEC — HIGH (ref 27–39.2)
BASOPHILS NFR BLD AUTO: 1.3 % — HIGH (ref 0–1)
BILIRUB SERPL-MCNC: 3.2 MG/DL — HIGH (ref 0.2–1.2)
BUN SERPL-MCNC: 5 MG/DL — LOW (ref 10–20)
CALCIUM SERPL-MCNC: 8 MG/DL — LOW (ref 8.4–10.5)
CHLORIDE SERPL-SCNC: 106 MMOL/L — SIGNIFICANT CHANGE UP (ref 98–110)
CO2 SERPL-SCNC: 22 MMOL/L — SIGNIFICANT CHANGE UP (ref 17–32)
CREAT SERPL-MCNC: 0.8 MG/DL — SIGNIFICANT CHANGE UP (ref 0.7–1.5)
EGFR: 98 ML/MIN/1.73M2 — SIGNIFICANT CHANGE UP
EGFR: 98 ML/MIN/1.73M2 — SIGNIFICANT CHANGE UP
EOSINOPHIL # BLD AUTO: 0.19 K/UL — SIGNIFICANT CHANGE UP (ref 0–0.7)
GLUCOSE BLDC GLUCOMTR-MCNC: 107 MG/DL — HIGH (ref 70–99)
GLUCOSE BLDC GLUCOMTR-MCNC: 114 MG/DL — HIGH (ref 70–99)
GLUCOSE BLDC GLUCOMTR-MCNC: 98 MG/DL — SIGNIFICANT CHANGE UP (ref 70–99)
GLUCOSE SERPL-MCNC: 96 MG/DL — SIGNIFICANT CHANGE UP (ref 70–99)
HGB BLD-MCNC: 11.1 G/DL — LOW (ref 14–18)
IMM GRANULOCYTES NFR BLD AUTO: 0.2 % — SIGNIFICANT CHANGE UP (ref 0.1–0.3)
INR BLD: 1.38 RATIO — HIGH (ref 0.65–1.3)
LYMPHOCYTES # BLD AUTO: 1.41 K/UL — SIGNIFICANT CHANGE UP (ref 1.2–3.4)
LYMPHOCYTES # BLD AUTO: 30.5 % — SIGNIFICANT CHANGE UP (ref 20.5–51.1)
MAGNESIUM SERPL-MCNC: 1.7 MG/DL — LOW (ref 1.8–2.4)
MCHC RBC-ENTMCNC: 35.1 G/DL — SIGNIFICANT CHANGE UP (ref 32–37)
MCHC RBC-ENTMCNC: 38.3 PG — HIGH (ref 27–31)
MCV RBC AUTO: 109 FL — HIGH (ref 80–94)
MONOCYTES # BLD AUTO: 0.35 K/UL — SIGNIFICANT CHANGE UP (ref 0.1–0.6)
MONOCYTES NFR BLD AUTO: 7.6 % — SIGNIFICANT CHANGE UP (ref 1.7–9.3)
NEUTROPHILS # BLD AUTO: 2.61 K/UL — SIGNIFICANT CHANGE UP (ref 1.4–6.5)
NEUTROPHILS NFR BLD AUTO: 56.3 % — SIGNIFICANT CHANGE UP (ref 42.2–75.2)
NRBC BLD AUTO-RTO: 0 /100 WBCS — SIGNIFICANT CHANGE UP (ref 0–0)
PLATELET # BLD AUTO: 42 K/UL — LOW (ref 130–400)
PMV BLD: 12.1 FL — HIGH (ref 7.4–10.4)
POTASSIUM SERPL-MCNC: 3.8 MMOL/L — SIGNIFICANT CHANGE UP (ref 3.5–5)
POTASSIUM SERPL-SCNC: 3.8 MMOL/L — SIGNIFICANT CHANGE UP (ref 3.5–5)
PROT SERPL-MCNC: 5.4 G/DL — LOW (ref 6–8)
PROTHROM AB SERPL-ACNC: 16.4 SEC — HIGH (ref 9.95–12.87)
RBC # BLD: 2.9 M/UL — LOW (ref 4.7–6.1)
RBC # FLD: 16.4 % — HIGH (ref 11.5–14.5)
SODIUM SERPL-SCNC: 140 MMOL/L — SIGNIFICANT CHANGE UP (ref 135–146)
WBC # BLD: 4.63 K/UL — LOW (ref 4.8–10.8)
WBC # FLD AUTO: 4.63 K/UL — LOW (ref 4.8–10.8)

## 2025-03-16 PROCEDURE — 99233 SBSQ HOSP IP/OBS HIGH 50: CPT

## 2025-03-16 RX ADMIN — Medication 400 MILLIGRAM(S): at 08:26

## 2025-03-16 RX ADMIN — Medication 1 APPLICATION(S): at 06:08

## 2025-03-16 RX ADMIN — ENOXAPARIN SODIUM 100 MILLIGRAM(S): 100 INJECTION SUBCUTANEOUS at 06:41

## 2025-03-16 RX ADMIN — METOPROLOL SUCCINATE 25 MILLIGRAM(S): 50 TABLET, EXTENDED RELEASE ORAL at 06:07

## 2025-03-16 RX ADMIN — Medication 400 MILLIGRAM(S): at 11:50

## 2025-03-16 RX ADMIN — TAMSULOSIN HYDROCHLORIDE 0.4 MILLIGRAM(S): 0.4 CAPSULE ORAL at 21:06

## 2025-03-16 RX ADMIN — NALTREXONE HYDROCHLORIDE 50 MILLIGRAM(S): 50 TABLET, FILM COATED ORAL at 11:50

## 2025-03-16 RX ADMIN — Medication 1 TABLET(S): at 11:50

## 2025-03-16 RX ADMIN — MUPIROCIN CALCIUM 1 APPLICATION(S): 20 CREAM TOPICAL at 17:58

## 2025-03-16 RX ADMIN — GABAPENTIN 200 MILLIGRAM(S): 400 CAPSULE ORAL at 06:08

## 2025-03-16 RX ADMIN — METHOCARBAMOL 500 MILLIGRAM(S): 500 TABLET, FILM COATED ORAL at 11:50

## 2025-03-16 RX ADMIN — Medication 3 MILLIGRAM(S): at 21:06

## 2025-03-16 RX ADMIN — Medication 100 MILLIGRAM(S): at 11:50

## 2025-03-16 RX ADMIN — MUPIROCIN CALCIUM 1 APPLICATION(S): 20 CREAM TOPICAL at 06:08

## 2025-03-16 RX ADMIN — Medication 400 MILLIGRAM(S): at 17:58

## 2025-03-16 RX ADMIN — Medication 10 MILLIGRAM(S): at 11:49

## 2025-03-16 RX ADMIN — Medication 40 MILLIGRAM(S): at 06:07

## 2025-03-16 RX ADMIN — GABAPENTIN 200 MILLIGRAM(S): 400 CAPSULE ORAL at 17:58

## 2025-03-16 RX ADMIN — FOLIC ACID 1 MILLIGRAM(S): 1 TABLET ORAL at 11:50

## 2025-03-16 RX ADMIN — CYANOCOBALAMIN 1000 MICROGRAM(S): 1000 INJECTION INTRAMUSCULAR; SUBCUTANEOUS at 11:50

## 2025-03-16 RX ADMIN — COLCHICINE 0.6 MILLIGRAM(S): 0.6 TABLET, FILM COATED ORAL at 11:50

## 2025-03-16 NOTE — PHARMACOTHERAPY INTERVENTION NOTE - COMMENTS
CAT [id number]    Indication:  INR Goal:  Home Dose:  Bridge Therapy:enoxaparin Injectable 100 milliGRAM(s) every 24 hours      Current Medications:  acetaminophen     Tablet .. 650 milliGRAM(s) Oral every 6 hours PRN  artificial  tears Solution 1 Drop(s) Both EYES three times a day PRN  cetirizine 10 milliGRAM(s) Oral daily  chlorhexidine 2% Cloths 1 Application(s) Topical <User Schedule>  colchicine 0.6 milliGRAM(s) Oral daily  cyanocobalamin 1000 MICROGram(s) Oral daily  diazepam    Tablet 10 milliGRAM(s) Oral every 2 hours PRN  enoxaparin Injectable 100 milliGRAM(s) SubCutaneous every 24 hours  folic acid 1 milliGRAM(s) Oral daily  gabapentin 200 milliGRAM(s) Oral two times a day  guaiFENesin  milliGRAM(s) Oral every 12 hours PRN  influenza  Vaccine (HIGH DOSE) 0.5 milliLiter(s) IntraMuscular once  insulin lispro (ADMELOG) corrective regimen sliding scale   SubCutaneous three times a day before meals  insulin lispro (ADMELOG) corrective regimen sliding scale   SubCutaneous at bedtime  magnesium oxide 400 milliGRAM(s) Oral three times a day with meals  melatonin 3 milliGRAM(s) Oral at bedtime  methocarbamol 500 milliGRAM(s) Oral daily  metoprolol succinate ER 25 milliGRAM(s) Oral daily  multivitamin 1 Tablet(s) Oral daily  mupirocin 2% Nasal 1 Application(s) Both Nostrils two times a day  naltrexone 50 milliGRAM(s) Oral daily  ondansetron Injectable 4 milliGRAM(s) IV Push every 6 hours PRN  pantoprazole    Tablet 40 milliGRAM(s) Oral before breakfast  tamsulosin 0.4 milliGRAM(s) Oral at bedtime  thiamine 100 milliGRAM(s) Oral daily      hemoglobin 11.1 g/dL (03-16-25 @ 06:58)    hematocrit 31.6 % (03-16-25 @ 06:58)    PLT: 42 K/uL (03-16-25 @ 06:58)    GFR:98 mL/min/1.73m2 (03-16-25 @ 06:58)      Drug Interactions:     INR trend  1.36 ratio (03-12-25 @ 05:05)  1.52 ratio (03-12-25 @ 21:48)  1.57 ratio (03-13-25 @ 04:32)  1.66 ratio (03-14-25 @ 04:44)  1.46 ratio (03-15-25 @ 04:53)      Warfarin administration history:        1. INR today is:               [ X  ] below goal ----- This is likely due to holding warfarin due to low platelet count                                            [   ] at goal                                            [   ] above goal ------ This is likely due to        2. Recommend holding warfarin due to low platelet count    3. Obtain INR today and tomorrow AM

## 2025-03-16 NOTE — PROGRESS NOTE ADULT - ASSESSMENT
64y M pmh Alcohol Use Disorder, DVT/PE, BPH, HTN, HLD, DMII, gout presenting from [facility] with palpitations admitted for acute alcohol intoxication and lactic acidosis.     #Alcohol intoxication in setting of alcohol use disorder    impending withdrawal but, clinically remains stable so far.   #Lactic Acidosis  - Alcohol level  215 on admission   - increased alcohol use over the past week - last drink 8PM 3/11/25 - 1 bottle of vodka   - c/w  CIWA protocol, addiction medicine input noted and appreciated.   - started on  valium taper 10mg w/ PRN valium 10mg for CIWA >8  - cw  thiamine,  B12, folate, MV   - lactate 3.5--> 1.7 s/p IVF   - cw naltrexone 50mg qd and gabapentin 200mg bid   - TTE: LVEF 60%, no evidence of cardiomyopathy     #Hx DVT/PE/ Thrombocytopenia  - Duplex 1/20/25 - b/l chronic popliteal DVT  - repeat duplex 3/14: Chronic appearing right popliteal vein DVT and left common femoral vein, deep femoral vein, and popliteal vein DVTs  - on previous dc was off coumadin b/c of platelet <50 - per [facility]'s documentation was restarted on coumadin.  - on lovenox 100mg BID >  lowered to once daily.   -  >> 54 >> 45 >> 41 >> 42k  today.     #Metabolic syndrome/ Hepatic steatosis  - CC/DASH diet    - previous admission for alcoholic hepatitis treated with  prednisolone  - CTAP with hepatic steatosis and cholelithiasis   - monitor LFTs, encourage weight loss   - Patient should be monitored by PCP or GI specialist annually for noninvasive liver monitoring.     # Suspected Thiamine deficiency/ Wernicke Encephalopathy     >>started on Thiamine ivpb     #DM2  -  metformin/jardiance held on admission   - ISS, -180    #HTN  - cw toprol 25mg qd    #BPH  - cw flomax 0.4mg qhs    #Gout  - cw colchicine qd  - has tylenol 650mg bid PRN for moderate pain     DVT Prophylaxis: therapeutic lovenox  GI Prophylaxis: none  Diet: DASH/CC  Activity: IAT    Pending: CIWA/ Thrombocytopenia /? bridging to coumadin    Dispo: TBD

## 2025-03-16 NOTE — PROGRESS NOTE ADULT - SUBJECTIVE AND OBJECTIVE BOX
AILYNJEIMY CAT  64y  Male      Patient is a 64y old  Male who presents with a chief complaint of Palpitations.     INTERVAL HPI/OVERNIGHT EVENTS:      ******************************* REVIEW OF SYSTEMS:**********************************************    All other review of systems negative    *********************** VITALS ******************************************    T(F): 97.5 (03-16-25 @ 11:30)  HR: 88 (03-16-25 @ 11:30) (75 - 88)  BP: 116/70 (03-16-25 @ 11:30) (96/66 - 118/74)  RR: 18 (03-16-25 @ 11:30) (18 - 18)  SpO2: 99% (03-16-25 @ 11:30) (95% - 99%)            ******************************** PHYSICAL EXAM:**************************************************  GENERAL: NAD    PSYCH: no agitation, baseline mentation  HEENT:     NERVOUS SYSTEM:  Alert & Oriented X3,   PULMONARY: SALO, CTA    CARDIOVASCULAR: S1S2 RRR    GI: Soft, NT, ND; BS present.    EXTREMITIES:  2+ Peripheral Pulses, No clubbing, cyanosis, or edema    LYMPH: No lymphadenopathy noted    SKIN: No rashes or lesions      **************************** LABS *******************************************************                          11.1   4.63  )-----------( 42       ( 16 Mar 2025 06:58 )             31.6     03-16    140  |  106  |  5[L]  ----------------------------<  96  3.8   |  22  |  0.8    Ca    8.0[L]      16 Mar 2025 06:58  Mg     1.7     03-16    TPro  5.4[L]  /  Alb  3.0[L]  /  TBili  3.2[H]  /  DBili  x   /  AST  54[H]  /  ALT  19  /  AlkPhos  121[H]  03-16      Urinalysis Basic - ( 16 Mar 2025 06:58 )    Color: x / Appearance: x / SG: x / pH: x  Gluc: 96 mg/dL / Ketone: x  / Bili: x / Urobili: x   Blood: x / Protein: x / Nitrite: x   Leuk Esterase: x / RBC: x / WBC x   Sq Epi: x / Non Sq Epi: x / Bacteria: x      PT/INR - ( 15 Mar 2025 04:53 )   PT: 17.40 sec;   INR: 1.46 ratio           Lactate Trend  03-13 @ 11:35 Lactate:1.7   03-12 @ 20:18 Lactate:3.5         CAPILLARY BLOOD GLUCOSE      POCT Blood Glucose.: 98 mg/dL (16 Mar 2025 07:48)          **************************Active Medications *******************************************  shellfish (Hives)  dairy products (Hives)  No Known Drug Allergies  Beef (Hives)      acetaminophen     Tablet .. 650 milliGRAM(s) Oral every 6 hours PRN  artificial  tears Solution 1 Drop(s) Both EYES three times a day PRN  cetirizine 10 milliGRAM(s) Oral daily  chlorhexidine 2% Cloths 1 Application(s) Topical <User Schedule>  colchicine 0.6 milliGRAM(s) Oral daily  cyanocobalamin 1000 MICROGram(s) Oral daily  diazepam    Tablet 10 milliGRAM(s) Oral every 2 hours PRN  enoxaparin Injectable 100 milliGRAM(s) SubCutaneous every 24 hours  folic acid 1 milliGRAM(s) Oral daily  gabapentin 200 milliGRAM(s) Oral two times a day  guaiFENesin  milliGRAM(s) Oral every 12 hours PRN  influenza  Vaccine (HIGH DOSE) 0.5 milliLiter(s) IntraMuscular once  insulin lispro (ADMELOG) corrective regimen sliding scale   SubCutaneous three times a day before meals  insulin lispro (ADMELOG) corrective regimen sliding scale   SubCutaneous at bedtime  magnesium oxide 400 milliGRAM(s) Oral three times a day with meals  melatonin 3 milliGRAM(s) Oral at bedtime  methocarbamol 500 milliGRAM(s) Oral daily  metoprolol succinate ER 25 milliGRAM(s) Oral daily  multivitamin 1 Tablet(s) Oral daily  mupirocin 2% Nasal 1 Application(s) Both Nostrils two times a day  naltrexone 50 milliGRAM(s) Oral daily  ondansetron Injectable 4 milliGRAM(s) IV Push every 6 hours PRN  pantoprazole    Tablet 40 milliGRAM(s) Oral before breakfast  tamsulosin 0.4 milliGRAM(s) Oral at bedtime  thiamine 100 milliGRAM(s) Oral daily      ***************************************************  RADIOLOGY & ADDITIONAL TESTS:    Imaging Personally Reviewed:  [ ] YES  [ ] NO    HEALTH ISSUES - PROBLEM Dx:  Alcohol dependence with withdrawal    Wernicke's encephalopathy    Hepatic steatosis

## 2025-03-17 LAB
ALBUMIN SERPL ELPH-MCNC: 2.9 G/DL — LOW (ref 3.5–5.2)
ALP SERPL-CCNC: 111 U/L — SIGNIFICANT CHANGE UP (ref 30–115)
ALT FLD-CCNC: 17 U/L — SIGNIFICANT CHANGE UP (ref 0–41)
ANION GAP SERPL CALC-SCNC: 11 MMOL/L — SIGNIFICANT CHANGE UP (ref 7–14)
AST SERPL-CCNC: 48 U/L — HIGH (ref 0–41)
BASOPHILS # BLD AUTO: 0.06 K/UL — SIGNIFICANT CHANGE UP (ref 0–0.2)
BASOPHILS NFR BLD AUTO: 1.4 % — HIGH (ref 0–1)
BILIRUB SERPL-MCNC: 2.6 MG/DL — HIGH (ref 0.2–1.2)
BUN SERPL-MCNC: 8 MG/DL — LOW (ref 10–20)
CALCIUM SERPL-MCNC: 8.3 MG/DL — LOW (ref 8.4–10.5)
CHLORIDE SERPL-SCNC: 107 MMOL/L — SIGNIFICANT CHANGE UP (ref 98–110)
CO2 SERPL-SCNC: 23 MMOL/L — SIGNIFICANT CHANGE UP (ref 17–32)
CREAT SERPL-MCNC: 0.8 MG/DL — SIGNIFICANT CHANGE UP (ref 0.7–1.5)
EGFR: 98 ML/MIN/1.73M2 — SIGNIFICANT CHANGE UP
EGFR: 98 ML/MIN/1.73M2 — SIGNIFICANT CHANGE UP
EOSINOPHIL # BLD AUTO: 0.12 K/UL — SIGNIFICANT CHANGE UP (ref 0–0.7)
EOSINOPHIL NFR BLD AUTO: 2.9 % — SIGNIFICANT CHANGE UP (ref 0–8)
GLUCOSE BLDC GLUCOMTR-MCNC: 100 MG/DL — HIGH (ref 70–99)
GLUCOSE BLDC GLUCOMTR-MCNC: 101 MG/DL — HIGH (ref 70–99)
GLUCOSE SERPL-MCNC: 93 MG/DL — SIGNIFICANT CHANGE UP (ref 70–99)
HCT VFR BLD CALC: 32.1 % — LOW (ref 42–52)
HGB BLD-MCNC: 10.8 G/DL — LOW (ref 14–18)
IMM GRANULOCYTES NFR BLD AUTO: 0.2 % — SIGNIFICANT CHANGE UP (ref 0.1–0.3)
LYMPHOCYTES # BLD AUTO: 1.26 K/UL — SIGNIFICANT CHANGE UP (ref 1.2–3.4)
LYMPHOCYTES # BLD AUTO: 30.2 % — SIGNIFICANT CHANGE UP (ref 20.5–51.1)
MAGNESIUM SERPL-MCNC: 1.6 MG/DL — LOW (ref 1.8–2.4)
MCHC RBC-ENTMCNC: 33.6 G/DL — SIGNIFICANT CHANGE UP (ref 32–37)
MCHC RBC-ENTMCNC: 37.5 PG — HIGH (ref 27–31)
MCV RBC AUTO: 111.5 FL — HIGH (ref 80–94)
MONOCYTES # BLD AUTO: 0.34 K/UL — SIGNIFICANT CHANGE UP (ref 0.1–0.6)
MONOCYTES NFR BLD AUTO: 8.2 % — SIGNIFICANT CHANGE UP (ref 1.7–9.3)
NEUTROPHILS NFR BLD AUTO: 57.1 % — SIGNIFICANT CHANGE UP (ref 42.2–75.2)
NRBC BLD AUTO-RTO: 0 /100 WBCS — SIGNIFICANT CHANGE UP (ref 0–0)
PLATELET # BLD AUTO: 40 K/UL — LOW (ref 130–400)
PMV BLD: 12.3 FL — HIGH (ref 7.4–10.4)
POTASSIUM SERPL-MCNC: 3.9 MMOL/L — SIGNIFICANT CHANGE UP (ref 3.5–5)
POTASSIUM SERPL-SCNC: 3.9 MMOL/L — SIGNIFICANT CHANGE UP (ref 3.5–5)
PROT SERPL-MCNC: 5.4 G/DL — LOW (ref 6–8)
RBC # FLD: 16.7 % — HIGH (ref 11.5–14.5)
SODIUM SERPL-SCNC: 141 MMOL/L — SIGNIFICANT CHANGE UP (ref 135–146)
WBC # BLD: 4.17 K/UL — LOW (ref 4.8–10.8)
WBC # FLD AUTO: 4.17 K/UL — LOW (ref 4.8–10.8)

## 2025-03-17 PROCEDURE — 99232 SBSQ HOSP IP/OBS MODERATE 35: CPT

## 2025-03-17 RX ORDER — MAGNESIUM SULFATE 500 MG/ML
2 SYRINGE (ML) INJECTION ONCE
Refills: 0 | Status: DISCONTINUED | OUTPATIENT
Start: 2025-03-17 | End: 2025-03-18

## 2025-03-17 RX ADMIN — Medication 400 MILLIGRAM(S): at 12:20

## 2025-03-17 RX ADMIN — MUPIROCIN CALCIUM 1 APPLICATION(S): 20 CREAM TOPICAL at 06:09

## 2025-03-17 RX ADMIN — GABAPENTIN 200 MILLIGRAM(S): 400 CAPSULE ORAL at 06:09

## 2025-03-17 RX ADMIN — GABAPENTIN 200 MILLIGRAM(S): 400 CAPSULE ORAL at 17:25

## 2025-03-17 RX ADMIN — ENOXAPARIN SODIUM 100 MILLIGRAM(S): 100 INJECTION SUBCUTANEOUS at 06:09

## 2025-03-17 RX ADMIN — Medication 400 MILLIGRAM(S): at 17:27

## 2025-03-17 RX ADMIN — FOLIC ACID 1 MILLIGRAM(S): 1 TABLET ORAL at 12:20

## 2025-03-17 RX ADMIN — NALTREXONE HYDROCHLORIDE 50 MILLIGRAM(S): 50 TABLET, FILM COATED ORAL at 12:20

## 2025-03-17 RX ADMIN — METHOCARBAMOL 500 MILLIGRAM(S): 500 TABLET, FILM COATED ORAL at 12:20

## 2025-03-17 RX ADMIN — COLCHICINE 0.6 MILLIGRAM(S): 0.6 TABLET, FILM COATED ORAL at 12:20

## 2025-03-17 RX ADMIN — TAMSULOSIN HYDROCHLORIDE 0.4 MILLIGRAM(S): 0.4 CAPSULE ORAL at 21:33

## 2025-03-17 RX ADMIN — Medication 1 TABLET(S): at 12:20

## 2025-03-17 RX ADMIN — Medication 400 MILLIGRAM(S): at 08:49

## 2025-03-17 RX ADMIN — Medication 100 MILLIGRAM(S): at 12:20

## 2025-03-17 RX ADMIN — Medication 40 MILLIGRAM(S): at 06:09

## 2025-03-17 RX ADMIN — Medication 10 MILLIGRAM(S): at 12:20

## 2025-03-17 RX ADMIN — CYANOCOBALAMIN 1000 MICROGRAM(S): 1000 INJECTION INTRAMUSCULAR; SUBCUTANEOUS at 12:20

## 2025-03-17 RX ADMIN — Medication 3 MILLIGRAM(S): at 21:33

## 2025-03-17 RX ADMIN — Medication 1 APPLICATION(S): at 06:12

## 2025-03-17 RX ADMIN — METOPROLOL SUCCINATE 25 MILLIGRAM(S): 50 TABLET, EXTENDED RELEASE ORAL at 06:09

## 2025-03-17 NOTE — PROGRESS NOTE ADULT - SUBJECTIVE AND OBJECTIVE BOX
CC: Palpitations (13 Mar 2025 14:28)    INTERVAL HPI/OVERNIGHT EVENTS:  Patient seen and examined at bedside. Not having tremors, nausea, or chest pain. Denies any new complaints. VSS.     Vital Signs Last 24 Hrs  T(C): 36.9 (14 Mar 2025 05:00), Max: 37.4 (13 Mar 2025 20:42)  T(F): 98.5 (14 Mar 2025 05:00), Max: 99.4 (13 Mar 2025 20:42)  HR: 89 (14 Mar 2025 05:00) (78 - 89)  BP: 118/70 (14 Mar 2025 05:00) (118/70 - 135/76)  BP(mean): 96 (13 Mar 2025 14:39) (96 - 96)  RR: 18 (14 Mar 2025 05:00) (18 - 19)  SpO2: 96% (14 Mar 2025 05:00) (94% - 97%)    Parameters below as of 13 Mar 2025 20:42  Patient On (Oxygen Delivery Method): room air      PHYSICAL EXAM:  GEN: NAD, Resting comfortably in bed, cooperative, cool, not clammy or diaphoretic  PULM: Clear to auscultation bilaterally, No wheezing, rales, rhonchi  CVS: Regular rate and rhythm, S1-S2, no murmurs, heaves, thrills  ABD: Soft, non-tender, non-distended, no guarding, BS +, no ascites, jaundice or umbilical varices  EXT: No edema/cyanosis, extremities warm/dry, peripheral pulses palpable, b/l venous stasis dermatitis   NEURO: A&Ox3, no focal deficits, follows commands, answers appropriately, +ve withdrawal tremor    I&O's Detail                                11.0   3.25  )-----------( 45       ( 14 Mar 2025 04:44 )             31.8     13 Mar 2025 04:32    144    |  106    |  5      ----------------------------<  125    3.5     |  28     |  0.9      Ca    8.3        13 Mar 2025 04:32  Phos  1.6       13 Mar 2025 04:32  Mg     1.7       13 Mar 2025 04:32    TPro  4.9    /  Alb  3.1    /  TBili  3.4    /  DBili  x      /  AST  63     /  ALT  18     /  AlkPhos  101    13 Mar 2025 04:32    PT/INR - ( 14 Mar 2025 04:44 )   PT: 19.80 sec;   INR: 1.66 ratio         PTT - ( 13 Mar 2025 04:32 )  PTT:42.6 sec  CAPILLARY BLOOD GLUCOSE      POCT Blood Glucose.: 104 mg/dL (13 Mar 2025 21:07)  POCT Blood Glucose.: 127 mg/dL (13 Mar 2025 16:41)  POCT Blood Glucose.: 133 mg/dL (13 Mar 2025 11:51)  POCT Blood Glucose.: 122 mg/dL (13 Mar 2025 07:52)    LIVER FUNCTIONS - ( 13 Mar 2025 04:32 )  Alb: 3.1 g/dL / Pro: 4.9 g/dL / ALK PHOS: 101 U/L / ALT: 18 U/L / AST: 63 U/L / GGT: x           Urinalysis Basic - ( 13 Mar 2025 09:24 )    Color: Orange / Appearance: Clear / S.013 / pH: x  Gluc: x / Ketone: Trace mg/dL  / Bili: Negative / Urobili: 1.0 mg/dL   Blood: x / Protein: Negative mg/dL / Nitrite: Negative   Leuk Esterase: Negative / RBC: 2 /HPF / WBC 0 /HPF   Sq Epi: x / Non Sq Epi: 0 /HPF / Bacteria: Negative /HPF        MEDICATIONS  (STANDING):  cetirizine 10 milliGRAM(s) Oral daily  chlorhexidine 2% Cloths 1 Application(s) Topical <User Schedule>  colchicine 0.6 milliGRAM(s) Oral daily  cyanocobalamin 1000 MICROGram(s) Oral daily  diazepam    Tablet   Oral   diazepam    Tablet 10 milliGRAM(s) Oral every 12 hours  enoxaparin Injectable 100 milliGRAM(s) SubCutaneous every 12 hours  folic acid 1 milliGRAM(s) Oral daily  gabapentin 200 milliGRAM(s) Oral two times a day  influenza  Vaccine (HIGH DOSE) 0.5 milliLiter(s) IntraMuscular once  insulin lispro (ADMELOG) corrective regimen sliding scale   SubCutaneous three times a day before meals  insulin lispro (ADMELOG) corrective regimen sliding scale   SubCutaneous at bedtime  lactated ringers. 1000 milliLiter(s) (75 mL/Hr) IV Continuous <Continuous>  melatonin 3 milliGRAM(s) Oral at bedtime  methocarbamol 500 milliGRAM(s) Oral daily  metoprolol succinate ER 25 milliGRAM(s) Oral daily  multivitamin 1 Tablet(s) Oral daily  mupirocin 2% Nasal 1 Application(s) Both Nostrils two times a day  naltrexone 50 milliGRAM(s) Oral daily  pantoprazole    Tablet 40 milliGRAM(s) Oral before breakfast  tamsulosin 0.4 milliGRAM(s) Oral at bedtime  thiamine IVPB 500 milliGRAM(s) IV Intermittent three times a day    MEDICATIONS  (PRN):  acetaminophen     Tablet .. 650 milliGRAM(s) Oral every 6 hours PRN Moderate Pain (4 - 6)  artificial  tears Solution 1 Drop(s) Both EYES three times a day PRN Dry Eyes  diazepam    Tablet 10 milliGRAM(s) Oral every 2 hours PRN CIWA 8-14  ondansetron Injectable 4 milliGRAM(s) IV Push every 6 hours PRN Nausea and/or Vomiting      RADIOLOGY & ADDITIONAL TESTS:

## 2025-03-17 NOTE — PROGRESS NOTE ADULT - ASSESSMENT
64y M pmh Alcohol Use Disorder, DVT/PE, BPH, HTN, HLD, DMII, gout presenting from [facility] with palpitations admitted for acute alcohol intoxication and lactic acidosis.     #Alcohol intoxication in setting of alcohol use disorder  #Lactic Acidosis  - Alcohol level  215 on admission   - increased alcohol use over the past week - last drink 8PM 3/11/25 - 1 bottle of vodka   - c/w  CIWA protocol, addiction medicine consult   - started on  valium taper 10mg (now done) w/ PRN valium 10mg for CIWA >8  - cw  thiamine,  B12, folate, MV   - lactate 3.5--> 1.7 s/p IVF   - cw naltrexone 50mg qd and gabapentin 200mg bid   - TTE: LVEF 60%, no evidence of cardiomyopathy   - addiction medicine/CATCH team following    #Hx DVT/PE/ Thrombocytopenia  - Duplex 1/20/25 - b/l chronic popliteal DVT  - repeat duplex 3/14: Chronic appearing right popliteal vein DVT and left common femoral vein, deep femoral vein, and popliteal vein DVTs  - on previous dc was off coumadin b/c of platelet <50 - per [facility] documentation was restarted on coumadin. Unclear why on coumadin and if there is a specific need for him to be on coumadin  - on lovenox 100mg BID --> switched to 100mg QD due to dropping platelets   - platelets now in 40s     #Metabolic syndrome/ Hepatic steatosis  - CC/DASH diet    - previous admission for alcoholic hepatitis treated with  prednisolone  - CTAP with hepatic steatosis and cholelithiasis   - monitor LFTs, encourage weight loss   - Patient should be monitored by PCP or GI specialist annually for noninvasive liver monitoring given that up to 20% of patients with steatosis develop fibrosis.  - If imaging concerning for cirrhosis, patient should be monitored with noninvasive imaging and AFP every 6 months for HCC screening     #DM2  -  metformin/jardiance held on admission   - ISS, -180    #HTN  - cw toprol 25mg qd    #BPH  - cw flomax 0.4mg qhs    #Gout  - cw colchicine qd  - has tylenol 650mg bid PRN for moderate pain       DVT Prophylaxis: therapeutic lovenox  GI Prophylaxis: none  Diet: DASH/CC  Activity: IAT

## 2025-03-17 NOTE — PROGRESS NOTE ADULT - ASSESSMENT
64y M pmh Alcohol Use Disorder, DVT/PE, BPH, HTN, HLD, DMII, gout presenting from [facility] with palpitations admitted for acute alcohol intoxication and lactic acidosis.     #Alcohol intoxication in setting of alcohol use disorder    impending withdrawal but, clinically remains stable so far.   #Lactic Acidosis  - Alcohol level  215 on admission   - increased alcohol use over the past week - last drink 8PM 3/11/25 - 1 bottle of vodka   - c/w  CIWA protocol, addiction medicine input noted and appreciated.   - started on  valium taper 10mg w/ PRN valium 10mg for CIWA >8  - cw  thiamine,  B12, folate, MV   - lactate 3.5--> 1.7 s/p IVF   - cw naltrexone 50mg qd and gabapentin 200mg bid   - TTE: LVEF 60%, no evidence of cardiomyopathy     #Hx DVT/PE/ Thrombocytopenia  - Duplex 1/20/25 - b/l chronic popliteal DVT  - repeat duplex 3/14: Chronic appearing right popliteal vein DVT and left common femoral vein, deep femoral vein, and popliteal vein DVTs  - on previous dc was off coumadin b/c of platelet <50 - per [facility]'s documentation was restarted on coumadin.  - on lovenox 100mg BID >  lowered to once daily.   -  >> 54 >> 45 >> 41 >> 42k >> 40k  today.   - will contact PMD re: Coumadin therapy for DVT /     #Metabolic syndrome/ Hepatic steatosis  - CC/DASH diet    - previous admission for alcoholic hepatitis treated with  prednisolone  - CTAP with hepatic steatosis and cholelithiasis   - monitor LFTs, encourage weight loss   - Patient should be monitored by PCP or GI specialist annually for noninvasive liver monitoring.     # Suspected Thiamine deficiency/ Wernicke Encephalopathy     >>started on Thiamine ivpb     #DM2  -  metformin/jardiance held on admission   - ISS, -180    #HTN  - cw toprol 25mg qd    #BPH  - cw flomax 0.4mg qhs    #Gout  - cw colchicine qd  - has tylenol 650mg bid PRN for moderate pain     DVT Prophylaxis: therapeutic lovenox  GI Prophylaxis: none  Diet: DASH/CC  Activity: IAT    Pending: Thrombocytopenia /? bridging to coumadin    Dispo: TBD

## 2025-03-17 NOTE — PROGRESS NOTE ADULT - SUBJECTIVE AND OBJECTIVE BOX
AILYNJEIMY CAT  64y  Male      Patient is a 64y old  Male who presents with a chief complaint of Palpitations.    INTERVAL HPI/OVERNIGHT EVENTS:      ******************************* REVIEW OF SYSTEMS:**********************************************    All other review of systems negative    *********************** VITALS ******************************************    T(F): 97.9 (03-17-25 @ 05:39)  HR: 87 (03-17-25 @ 05:39) (73 - 87)  BP: 122/71 (03-17-25 @ 05:39) (108/68 - 122/71)  RR: 18 (03-17-25 @ 05:39) (18 - 18)  SpO2: 97% (03-17-25 @ 05:39) (97% - 97%)            ******************************** PHYSICAL EXAM:**************************************************  GENERAL: NAD    PSYCH: no agitation, baseline mentation  HEENT:     NERVOUS SYSTEM:  Alert & Oriented X3,     PULMONARY: SALO, CTA    CARDIOVASCULAR: S1S2 RRR    GI: Soft, NT, ND; BS present.    EXTREMITIES:  2+ Peripheral Pulses, No clubbing, cyanosis, or edema    LYMPH: No lymphadenopathy noted    SKIN: No rashes or lesions      **************************** LABS *******************************************************                          10.8   4.17  )-----------( 40       ( 17 Mar 2025 05:34 )             32.1     03-17    141  |  107  |  8[L]  ----------------------------<  93  3.9   |  23  |  0.8    Ca    8.3[L]      17 Mar 2025 05:34  Mg     1.6     03-17    TPro  5.4[L]  /  Alb  2.9[L]  /  TBili  2.6[H]  /  DBili  x   /  AST  48[H]  /  ALT  17  /  AlkPhos  111  03-17      Urinalysis Basic - ( 17 Mar 2025 05:34 )    Color: x / Appearance: x / SG: x / pH: x  Gluc: 93 mg/dL / Ketone: x  / Bili: x / Urobili: x   Blood: x / Protein: x / Nitrite: x   Leuk Esterase: x / RBC: x / WBC x   Sq Epi: x / Non Sq Epi: x / Bacteria: x      PT/INR - ( 16 Mar 2025 18:12 )   PT: 16.40 sec;   INR: 1.38 ratio         PTT - ( 16 Mar 2025 18:12 )  PTT:53.5 sec  Lactate Trend  03-13 @ 11:35 Lactate:1.7   03-12 @ 20:18 Lactate:3.5         CAPILLARY BLOOD GLUCOSE      POCT Blood Glucose.: 100 mg/dL (17 Mar 2025 11:21)          **************************Active Medications *******************************************  shellfish (Hives)  dairy products (Hives)  No Known Drug Allergies  Beef (Hives)      acetaminophen     Tablet .. 650 milliGRAM(s) Oral every 6 hours PRN  artificial  tears Solution 1 Drop(s) Both EYES three times a day PRN  cetirizine 10 milliGRAM(s) Oral daily  chlorhexidine 2% Cloths 1 Application(s) Topical <User Schedule>  colchicine 0.6 milliGRAM(s) Oral daily  cyanocobalamin 1000 MICROGram(s) Oral daily  diazepam    Tablet 10 milliGRAM(s) Oral every 2 hours PRN  enoxaparin Injectable 100 milliGRAM(s) SubCutaneous every 24 hours  folic acid 1 milliGRAM(s) Oral daily  gabapentin 200 milliGRAM(s) Oral two times a day  guaiFENesin  milliGRAM(s) Oral every 12 hours PRN  influenza  Vaccine (HIGH DOSE) 0.5 milliLiter(s) IntraMuscular once  magnesium oxide 400 milliGRAM(s) Oral three times a day with meals  melatonin 3 milliGRAM(s) Oral at bedtime  methocarbamol 500 milliGRAM(s) Oral daily  metoprolol succinate ER 25 milliGRAM(s) Oral daily  multivitamin 1 Tablet(s) Oral daily  mupirocin 2% Nasal 1 Application(s) Both Nostrils two times a day  naltrexone 50 milliGRAM(s) Oral daily  ondansetron Injectable 4 milliGRAM(s) IV Push every 6 hours PRN  pantoprazole    Tablet 40 milliGRAM(s) Oral before breakfast  tamsulosin 0.4 milliGRAM(s) Oral at bedtime  thiamine 100 milliGRAM(s) Oral daily      ***************************************************  RADIOLOGY & ADDITIONAL TESTS:    Imaging Personally Reviewed:  [ ] YES  [ ] NO    HEALTH ISSUES - PROBLEM Dx:  Alcohol dependence with withdrawal    Wernicke's encephalopathy    Hepatic steatosis

## 2025-03-17 NOTE — PHARMACOTHERAPY INTERVENTION NOTE - COMMENTS
64yMale    Indication: VTE  INR Goal: 2-3  Home Dose: Warfarin 2.5mg daily except 3.75mg sun/tue/thurs  Bridge Therapy:enoxaparin Injectable 100 milliGRAM(s) every 24 hours      Current Medications:  acetaminophen     Tablet .. 650 milliGRAM(s) Oral every 6 hours PRN  artificial  tears Solution 1 Drop(s) Both EYES three times a day PRN  cetirizine 10 milliGRAM(s) Oral daily  chlorhexidine 2% Cloths 1 Application(s) Topical <User Schedule>  colchicine 0.6 milliGRAM(s) Oral daily  cyanocobalamin 1000 MICROGram(s) Oral daily  diazepam    Tablet 10 milliGRAM(s) Oral every 2 hours PRN  enoxaparin Injectable 100 milliGRAM(s) SubCutaneous every 24 hours  folic acid 1 milliGRAM(s) Oral daily  gabapentin 200 milliGRAM(s) Oral two times a day  guaiFENesin  milliGRAM(s) Oral every 12 hours PRN  influenza  Vaccine (HIGH DOSE) 0.5 milliLiter(s) IntraMuscular once  magnesium oxide 400 milliGRAM(s) Oral three times a day with meals  magnesium sulfate  IVPB 2 Gram(s) IV Intermittent once  melatonin 3 milliGRAM(s) Oral at bedtime  methocarbamol 500 milliGRAM(s) Oral daily  metoprolol succinate ER 25 milliGRAM(s) Oral daily  multivitamin 1 Tablet(s) Oral daily  mupirocin 2% Nasal 1 Application(s) Both Nostrils two times a day  naltrexone 50 milliGRAM(s) Oral daily  ondansetron Injectable 4 milliGRAM(s) IV Push every 6 hours PRN  pantoprazole    Tablet 40 milliGRAM(s) Oral before breakfast  tamsulosin 0.4 milliGRAM(s) Oral at bedtime  thiamine 100 milliGRAM(s) Oral daily      hemoglobin 10.8 g/dL (03-17-25 @ 05:34)    hematocrit 32.1 % (03-17-25 @ 05:34)    PLT: 40 K/uL (03-17-25 @ 05:34)    GFR:98 mL/min/1.73m2 (03-17-25 @ 05:34)      Drug Interactions:     INR trend  1.36 ratio (03-12-25 @ 05:05)  1.52 ratio (03-12-25 @ 21:48)  1.57 ratio (03-13-25 @ 04:32)  1.66 ratio (03-14-25 @ 04:44)  1.46 ratio (03-15-25 @ 04:53)  1.38 ratio (03-16-25 @ 18:12)      Warfarin administration history:        1. INR today is: No lab available              [   ] below goal ----- This is likely due to                                            [   ] at goal                                            [   ] above goal ------ This is likely due to        2. Spoke with MD, currently holding warfarin due to low platelets  3. Obtain INR tomorrow AM     64yMale    Indication: VTE  INR Goal: 2-3  Home Dose: Warfarin 2.5mg daily except 3.75mg sun/tue/thurs  Bridge Therapy:enoxaparin Injectable 100 milliGRAM(s) every 24 hours      Current Medications:  acetaminophen     Tablet .. 650 milliGRAM(s) Oral every 6 hours PRN  artificial  tears Solution 1 Drop(s) Both EYES three times a day PRN  cetirizine 10 milliGRAM(s) Oral daily  chlorhexidine 2% Cloths 1 Application(s) Topical <User Schedule>  colchicine 0.6 milliGRAM(s) Oral daily  cyanocobalamin 1000 MICROGram(s) Oral daily  diazepam    Tablet 10 milliGRAM(s) Oral every 2 hours PRN  enoxaparin Injectable 100 milliGRAM(s) SubCutaneous every 24 hours  folic acid 1 milliGRAM(s) Oral daily  gabapentin 200 milliGRAM(s) Oral two times a day  guaiFENesin  milliGRAM(s) Oral every 12 hours PRN  influenza  Vaccine (HIGH DOSE) 0.5 milliLiter(s) IntraMuscular once  magnesium oxide 400 milliGRAM(s) Oral three times a day with meals  magnesium sulfate  IVPB 2 Gram(s) IV Intermittent once  melatonin 3 milliGRAM(s) Oral at bedtime  methocarbamol 500 milliGRAM(s) Oral daily  metoprolol succinate ER 25 milliGRAM(s) Oral daily  multivitamin 1 Tablet(s) Oral daily  mupirocin 2% Nasal 1 Application(s) Both Nostrils two times a day  naltrexone 50 milliGRAM(s) Oral daily  ondansetron Injectable 4 milliGRAM(s) IV Push every 6 hours PRN  pantoprazole    Tablet 40 milliGRAM(s) Oral before breakfast  tamsulosin 0.4 milliGRAM(s) Oral at bedtime  thiamine 100 milliGRAM(s) Oral daily      hemoglobin 10.8 g/dL (03-17-25 @ 05:34)    hematocrit 32.1 % (03-17-25 @ 05:34)    PLT: 40 K/uL (03-17-25 @ 05:34)    GFR:98 mL/min/1.73m2 (03-17-25 @ 05:34)      Drug Interactions:     INR trend  1.36 ratio (03-12-25 @ 05:05)  1.52 ratio (03-12-25 @ 21:48)  1.57 ratio (03-13-25 @ 04:32)  1.66 ratio (03-14-25 @ 04:44)  1.46 ratio (03-15-25 @ 04:53)  1.38 ratio (03-16-25 @ 18:12)      Warfarin administration history:        1. INR today is: No lab available              [   ] below goal ----- This is likely due to                                            [   ] at goal                                            [   ] above goal ------ This is likely due to        2. Spoke with MD, currently holding warfarin due to low platelets per MD  3. Obtain INR tomorrow AM

## 2025-03-18 LAB
ALBUMIN SERPL ELPH-MCNC: 3.1 G/DL — LOW (ref 3.5–5.2)
ALP SERPL-CCNC: 118 U/L — HIGH (ref 30–115)
ALT FLD-CCNC: 17 U/L — SIGNIFICANT CHANGE UP (ref 0–41)
ANION GAP SERPL CALC-SCNC: 9 MMOL/L — SIGNIFICANT CHANGE UP (ref 7–14)
BASOPHILS # BLD AUTO: 0.07 K/UL — SIGNIFICANT CHANGE UP (ref 0–0.2)
BASOPHILS NFR BLD AUTO: 1.6 % — HIGH (ref 0–1)
BILIRUB SERPL-MCNC: 2.1 MG/DL — HIGH (ref 0.2–1.2)
BUN SERPL-MCNC: 9 MG/DL — LOW (ref 10–20)
CALCIUM SERPL-MCNC: 8.5 MG/DL — SIGNIFICANT CHANGE UP (ref 8.4–10.5)
CHLORIDE SERPL-SCNC: 107 MMOL/L — SIGNIFICANT CHANGE UP (ref 98–110)
CO2 SERPL-SCNC: 24 MMOL/L — SIGNIFICANT CHANGE UP (ref 17–32)
CREAT SERPL-MCNC: 1 MG/DL — SIGNIFICANT CHANGE UP (ref 0.7–1.5)
EGFR: 84 ML/MIN/1.73M2 — SIGNIFICANT CHANGE UP
EGFR: 84 ML/MIN/1.73M2 — SIGNIFICANT CHANGE UP
EOSINOPHIL # BLD AUTO: 0.2 K/UL — SIGNIFICANT CHANGE UP (ref 0–0.7)
EOSINOPHIL NFR BLD AUTO: 4.6 % — SIGNIFICANT CHANGE UP (ref 0–8)
GLUCOSE BLDC GLUCOMTR-MCNC: 103 MG/DL — HIGH (ref 70–99)
GLUCOSE BLDC GLUCOMTR-MCNC: 152 MG/DL — HIGH (ref 70–99)
GLUCOSE BLDC GLUCOMTR-MCNC: 86 MG/DL — SIGNIFICANT CHANGE UP (ref 70–99)
GLUCOSE BLDC GLUCOMTR-MCNC: 98 MG/DL — SIGNIFICANT CHANGE UP (ref 70–99)
GLUCOSE SERPL-MCNC: 89 MG/DL — SIGNIFICANT CHANGE UP (ref 70–99)
HCT VFR BLD CALC: 33 % — LOW (ref 42–52)
HGB BLD-MCNC: 11.2 G/DL — LOW (ref 14–18)
IMM GRANULOCYTES NFR BLD AUTO: 0.5 % — HIGH (ref 0.1–0.3)
LYMPHOCYTES # BLD AUTO: 1.63 K/UL — SIGNIFICANT CHANGE UP (ref 1.2–3.4)
LYMPHOCYTES # BLD AUTO: 37.7 % — SIGNIFICANT CHANGE UP (ref 20.5–51.1)
MAGNESIUM SERPL-MCNC: 1.7 MG/DL — LOW (ref 1.8–2.4)
MCHC RBC-ENTMCNC: 33.9 G/DL — SIGNIFICANT CHANGE UP (ref 32–37)
MCHC RBC-ENTMCNC: 38 PG — HIGH (ref 27–31)
MCV RBC AUTO: 111.9 FL — HIGH (ref 80–94)
MONOCYTES # BLD AUTO: 0.42 K/UL — SIGNIFICANT CHANGE UP (ref 0.1–0.6)
MONOCYTES NFR BLD AUTO: 9.7 % — HIGH (ref 1.7–9.3)
NEUTROPHILS # BLD AUTO: 1.98 K/UL — SIGNIFICANT CHANGE UP (ref 1.4–6.5)
NEUTROPHILS NFR BLD AUTO: 45.9 % — SIGNIFICANT CHANGE UP (ref 42.2–75.2)
NRBC BLD AUTO-RTO: 0 /100 WBCS — SIGNIFICANT CHANGE UP (ref 0–0)
PMV BLD: 12.7 FL — HIGH (ref 7.4–10.4)
POTASSIUM SERPL-MCNC: 3.8 MMOL/L — SIGNIFICANT CHANGE UP (ref 3.5–5)
POTASSIUM SERPL-SCNC: 3.8 MMOL/L — SIGNIFICANT CHANGE UP (ref 3.5–5)
PROT SERPL-MCNC: 5.4 G/DL — LOW (ref 6–8)
RBC # BLD: 2.95 M/UL — LOW (ref 4.7–6.1)
RBC # FLD: 17.1 % — HIGH (ref 11.5–14.5)
SODIUM SERPL-SCNC: 140 MMOL/L — SIGNIFICANT CHANGE UP (ref 135–146)
WBC # BLD: 4.32 K/UL — LOW (ref 4.8–10.8)
WBC # FLD AUTO: 4.32 K/UL — LOW (ref 4.8–10.8)

## 2025-03-18 PROCEDURE — 99232 SBSQ HOSP IP/OBS MODERATE 35: CPT

## 2025-03-18 RX ORDER — MAGNESIUM SULFATE 500 MG/ML
2 SYRINGE (ML) INJECTION ONCE
Refills: 0 | Status: COMPLETED | OUTPATIENT
Start: 2025-03-18 | End: 2025-03-18

## 2025-03-18 RX ADMIN — Medication 400 MILLIGRAM(S): at 08:27

## 2025-03-18 RX ADMIN — GABAPENTIN 200 MILLIGRAM(S): 400 CAPSULE ORAL at 17:51

## 2025-03-18 RX ADMIN — METOPROLOL SUCCINATE 25 MILLIGRAM(S): 50 TABLET, EXTENDED RELEASE ORAL at 05:46

## 2025-03-18 RX ADMIN — MUPIROCIN CALCIUM 1 APPLICATION(S): 20 CREAM TOPICAL at 05:49

## 2025-03-18 RX ADMIN — Medication 1 TABLET(S): at 11:58

## 2025-03-18 RX ADMIN — MUPIROCIN CALCIUM 1 APPLICATION(S): 20 CREAM TOPICAL at 17:47

## 2025-03-18 RX ADMIN — CYANOCOBALAMIN 1000 MICROGRAM(S): 1000 INJECTION INTRAMUSCULAR; SUBCUTANEOUS at 11:58

## 2025-03-18 RX ADMIN — Medication 3 MILLIGRAM(S): at 21:47

## 2025-03-18 RX ADMIN — Medication 25 GRAM(S): at 09:49

## 2025-03-18 RX ADMIN — FOLIC ACID 1 MILLIGRAM(S): 1 TABLET ORAL at 11:59

## 2025-03-18 RX ADMIN — GABAPENTIN 200 MILLIGRAM(S): 400 CAPSULE ORAL at 05:46

## 2025-03-18 RX ADMIN — Medication 1 APPLICATION(S): at 05:49

## 2025-03-18 RX ADMIN — Medication 100 MILLIGRAM(S): at 11:58

## 2025-03-18 RX ADMIN — ENOXAPARIN SODIUM 100 MILLIGRAM(S): 100 INJECTION SUBCUTANEOUS at 05:46

## 2025-03-18 RX ADMIN — Medication 10 MILLIGRAM(S): at 11:58

## 2025-03-18 RX ADMIN — COLCHICINE 0.6 MILLIGRAM(S): 0.6 TABLET, FILM COATED ORAL at 11:59

## 2025-03-18 RX ADMIN — Medication 40 MILLIGRAM(S): at 05:47

## 2025-03-18 RX ADMIN — TAMSULOSIN HYDROCHLORIDE 0.4 MILLIGRAM(S): 0.4 CAPSULE ORAL at 21:47

## 2025-03-18 RX ADMIN — NALTREXONE HYDROCHLORIDE 50 MILLIGRAM(S): 50 TABLET, FILM COATED ORAL at 11:58

## 2025-03-18 RX ADMIN — METHOCARBAMOL 500 MILLIGRAM(S): 500 TABLET, FILM COATED ORAL at 11:58

## 2025-03-18 NOTE — PROGRESS NOTE ADULT - ASSESSMENT
64y M pmh Alcohol Use Disorder, DVT/PE, BPH, HTN, HLD, DMII, gout presenting from [facility] with palpitations admitted for acute alcohol intoxication and lactic acidosis.     #Alcohol intoxication in setting of alcohol use disorder  #Lactic Acidosis  - Alcohol level  215 on admission   - increased alcohol use over the past week - last drink 8PM 3/11/25 - 1 bottle of vodka   - c/w  CIWA protocol, addiction medicine consult   - started on  valium taper 10mg (now done) w/ PRN valium 10mg for CIWA >8  - cw  thiamine,  B12, folate, MV   - lactate 3.5--> 1.7 s/p IVF   - cw naltrexone 50mg qd and gabapentin 200mg bid   - TTE: LVEF 60%, no evidence of cardiomyopathy     #Hx DVT/PE/ Thrombocytopenia  - Duplex 1/20/25 - b/l chronic popliteal DVT  - repeat duplex 3/14: Chronic appearing right popliteal vein DVT and left common femoral vein, deep femoral vein, and popliteal vein DVTs  - on previous dc was off coumadin b/c of platelet <50 - per [facility]'s documentation was restarted on coumadin. Unclear why on coumadin and if there is a specific need for him to be on coumadin  - on lovenox 100mg BID --> switched to 100mg QD due to dropping platelets   - platelets now in 50s  - will try to reach out to patient's PMD to establish a plan to restart AC outpatient when platelets improve     #Metabolic syndrome/ Hepatic steatosis  - CC/DASH diet    - previous admission for alcoholic hepatitis treated with  prednisolone  - CTAP with hepatic steatosis and cholelithiasis   - monitor LFTs, encourage weight loss   - Patient should be monitored by PCP or GI specialist annually for noninvasive liver monitoring given that up to 20% of patients with steatosis develop fibrosis.  - If imaging concerning for cirrhosis, patient should be monitored with noninvasive imaging and AFP every 6 months for HCC screening     #DM2  -  metformin/jardiance held on admission   - ISS, -180    #HTN  - cw toprol 25mg qd    #BPH  - cw flomax 0.4mg qhs    #Gout  - cw colchicine qd  - has tylenol 650mg bid PRN for moderate pain       DVT Prophylaxis: therapeutic lovenox  GI Prophylaxis: none  Diet: DASH/CC  Activity: IAT

## 2025-03-18 NOTE — PROGRESS NOTE ADULT - SUBJECTIVE AND OBJECTIVE BOX
GAMALIEL CAT  64y  Male      Patient is a 64y old  Male who presents with a chief complaint of Palpitations.      INTERVAL HPI/OVERNIGHT EVENTS:      ******************************* REVIEW OF SYSTEMS:**********************************************    All other review of systems negative    *********************** VITALS ******************************************    T(F): 98.4 (03-18-25 @ 05:03)  HR: 73 (03-18-25 @ 05:03) (73 - 82)  BP: 100/64 (03-18-25 @ 05:03) (100/64 - 118/71)  RR: 19 (03-18-25 @ 05:03) (18 - 19)  SpO2: 95% (03-18-25 @ 05:03) (95% - 97%)            ******************************** PHYSICAL EXAM:**************************************************  GENERAL: NAD    PSYCH: no agitation, baseline mentation  HEENT:     NERVOUS SYSTEM:  Alert & Oriented X3,     PULMONARY: SALO, CTA    CARDIOVASCULAR: S1S2 RRR    GI: Soft, NT, ND; BS present.    EXTREMITIES:  2+ Peripheral Pulses, No clubbing, cyanosis, or edema    LYMPH: No lymphadenopathy noted    SKIN: No rashes or lesions      **************************** LABS *******************************************************                          11.2   4.32  )-----------( 50       ( 18 Mar 2025 05:27 )             33.0     03-18    140  |  107  |  9[L]  ----------------------------<  89  3.8   |  24  |  1.0    Ca    8.5      18 Mar 2025 05:27  Mg     1.7     03-18    TPro  5.4[L]  /  Alb  3.1[L]  /  TBili  2.1[H]  /  DBili  x   /  AST  52[H]  /  ALT  17  /  AlkPhos  118[H]  03-18      Urinalysis Basic - ( 18 Mar 2025 05:27 )    Color: x / Appearance: x / SG: x / pH: x  Gluc: 89 mg/dL / Ketone: x  / Bili: x / Urobili: x   Blood: x / Protein: x / Nitrite: x   Leuk Esterase: x / RBC: x / WBC x   Sq Epi: x / Non Sq Epi: x / Bacteria: x      PT/INR - ( 16 Mar 2025 18:12 )   PT: 16.40 sec;   INR: 1.38 ratio         PTT - ( 16 Mar 2025 18:12 )  PTT:53.5 sec  Lactate Trend        CAPILLARY BLOOD GLUCOSE      POCT Blood Glucose.: 152 mg/dL (18 Mar 2025 11:22)          **************************Active Medications *******************************************  shellfish (Hives)  dairy products (Hives)  No Known Drug Allergies  Beef (Hives)      acetaminophen     Tablet .. 650 milliGRAM(s) Oral every 6 hours PRN  artificial  tears Solution 1 Drop(s) Both EYES three times a day PRN  cetirizine 10 milliGRAM(s) Oral daily  chlorhexidine 2% Cloths 1 Application(s) Topical <User Schedule>  colchicine 0.6 milliGRAM(s) Oral daily  cyanocobalamin 1000 MICROGram(s) Oral daily  diazepam    Tablet 10 milliGRAM(s) Oral every 2 hours PRN  enoxaparin Injectable 100 milliGRAM(s) SubCutaneous every 24 hours  folic acid 1 milliGRAM(s) Oral daily  gabapentin 200 milliGRAM(s) Oral two times a day  guaiFENesin  milliGRAM(s) Oral every 12 hours PRN  influenza  Vaccine (HIGH DOSE) 0.5 milliLiter(s) IntraMuscular once  melatonin 3 milliGRAM(s) Oral at bedtime  methocarbamol 500 milliGRAM(s) Oral daily  metoprolol succinate ER 25 milliGRAM(s) Oral daily  multivitamin 1 Tablet(s) Oral daily  mupirocin 2% Nasal 1 Application(s) Both Nostrils two times a day  naltrexone 50 milliGRAM(s) Oral daily  ondansetron Injectable 4 milliGRAM(s) IV Push every 6 hours PRN  pantoprazole    Tablet 40 milliGRAM(s) Oral before breakfast  tamsulosin 0.4 milliGRAM(s) Oral at bedtime  thiamine 100 milliGRAM(s) Oral daily      ***************************************************  RADIOLOGY & ADDITIONAL TESTS:    Imaging Personally Reviewed:  [ ] YES  [ ] NO    HEALTH ISSUES - PROBLEM Dx:  Alcohol dependence with withdrawal    Wernicke's encephalopathy    Hepatic steatosis

## 2025-03-18 NOTE — PHARMACOTHERAPY INTERVENTION NOTE - COMMENTS
64yMale    Indication: VTE  INR Goal: 2-3  Home Dose: Warfarin 2.5mg daily except 3.75mg on sun/tue/thurs  Bridge Therapy:enoxaparin Injectable 100 milliGRAM(s) every 24 hours      Current Medications:  acetaminophen     Tablet .. 650 milliGRAM(s) Oral every 6 hours PRN  artificial  tears Solution 1 Drop(s) Both EYES three times a day PRN  cetirizine 10 milliGRAM(s) Oral daily  chlorhexidine 2% Cloths 1 Application(s) Topical <User Schedule>  colchicine 0.6 milliGRAM(s) Oral daily  cyanocobalamin 1000 MICROGram(s) Oral daily  diazepam    Tablet 10 milliGRAM(s) Oral every 2 hours PRN  enoxaparin Injectable 100 milliGRAM(s) SubCutaneous every 24 hours  folic acid 1 milliGRAM(s) Oral daily  gabapentin 200 milliGRAM(s) Oral two times a day  guaiFENesin  milliGRAM(s) Oral every 12 hours PRN  influenza  Vaccine (HIGH DOSE) 0.5 milliLiter(s) IntraMuscular once  melatonin 3 milliGRAM(s) Oral at bedtime  methocarbamol 500 milliGRAM(s) Oral daily  metoprolol succinate ER 25 milliGRAM(s) Oral daily  multivitamin 1 Tablet(s) Oral daily  mupirocin 2% Nasal 1 Application(s) Both Nostrils two times a day  naltrexone 50 milliGRAM(s) Oral daily  ondansetron Injectable 4 milliGRAM(s) IV Push every 6 hours PRN  pantoprazole    Tablet 40 milliGRAM(s) Oral before breakfast  tamsulosin 0.4 milliGRAM(s) Oral at bedtime  thiamine 100 milliGRAM(s) Oral daily      hemoglobin 11.2 g/dL (03-18-25 @ 05:27)    hematocrit 33.0 % (03-18-25 @ 05:27)    PLT: 50 K/uL (03-18-25 @ 05:27)    GFR:84 mL/min/1.73m2 (03-18-25 @ 05:27)      Drug Interactions:     INR trend  1.36 ratio (03-12-25 @ 05:05)  1.52 ratio (03-12-25 @ 21:48)  1.57 ratio (03-13-25 @ 04:32)  1.66 ratio (03-14-25 @ 04:44)  1.46 ratio (03-15-25 @ 04:53)  1.38 ratio (03-16-25 @ 18:12)      Warfarin administration history:        1. INR today is: No result           [   ] below goal ----- This is likely due to                                            [   ] at goal                                            [   ] above goal ------ This is likely due to        2. Warfarin being held per MD due to low platelets  3. Obtain INR tomorrow AM

## 2025-03-18 NOTE — PROGRESS NOTE ADULT - ASSESSMENT
64y M pmh Alcohol Use Disorder, DVT/PE, BPH, HTN, HLD, DMII, gout presenting from [facility] with palpitations admitted for acute alcohol intoxication and lactic acidosis.     #Alcohol intoxication in setting of alcohol use disorder    impending withdrawal but, clinically remains stable so far.   #Lactic Acidosis  - Alcohol level  215 on admission   - increased alcohol use over the past week - last drink 8PM 3/11/25 - 1 bottle of vodka   - c/w  CIWA protocol, addiction medicine input noted and appreciated.   - started on  valium taper 10mg w/ PRN valium 10mg for CIWA >8  - cw  thiamine,  B12, folate, MV   - lactate 3.5--> 1.7 s/p IVF   - cw naltrexone 50mg qd and gabapentin 200mg bid   - TTE: LVEF 60%, no evidence of cardiomyopathy     #Hx DVT/PE/ Thrombocytopenia  - Duplex 1/20/25 - b/l chronic popliteal DVT  - repeat duplex 3/14: Chronic appearing right popliteal vein DVT and left common femoral vein, deep femoral vein, and popliteal vein DVTs  - on previous dc was off coumadin b/c of platelet <50 - per [facility]'s documentation was restarted on coumadin.  - on lovenox 100mg BID >  lowered to once daily.   -  >> 54 >> 45 >> 41 >> 42k >> 40k >> 50k  today.   - will contact PMD re: Coumadin therapy for DVT /     #Metabolic syndrome/ Hepatic steatosis  - CC/DASH diet    - previous admission for alcoholic hepatitis treated with  prednisolone  - CTAP with hepatic steatosis and cholelithiasis   - monitor LFTs, encourage weight loss   - Patient should be monitored by PCP or GI specialist annually for noninvasive liver monitoring.     # Suspected Thiamine deficiency/ Wernicke Encephalopathy     >>started on Thiamine ivpb     #DM2  -  metformin/jardiance held on admission   - ISS, -180    #HTN  - cw toprol 25mg qd    #BPH  - cw flomax 0.4mg qhs    #Gout  - cw colchicine qd  - has tylenol 650mg bid PRN for moderate pain     DVT Prophylaxis: therapeutic lovenox  GI Prophylaxis: none  Diet: DASH/CC  Activity: IAT    Please contact PMD( Dr Tellez) as soon as he is available re: coumadin management as outpt.  Pending: Thrombocytopenia /? bridging to coumadin    Dispo: TBD

## 2025-03-18 NOTE — PROGRESS NOTE ADULT - SUBJECTIVE AND OBJECTIVE BOX
CC: Palpitations (13 Mar 2025 14:28)    INTERVAL HPI/OVERNIGHT EVENTS:  Patient seen and examined at bedside. Not having tremors, nausea, or chest pain. Pt resting comfortably in bed. No rash or signs of overt bleeding.     Vital Signs Last 24 Hrs  T(C): 36.9 (14 Mar 2025 05:00), Max: 37.4 (13 Mar 2025 20:42)  T(F): 98.5 (14 Mar 2025 05:00), Max: 99.4 (13 Mar 2025 20:42)  HR: 89 (14 Mar 2025 05:00) (78 - 89)  BP: 118/70 (14 Mar 2025 05:00) (118/70 - 135/76)  BP(mean): 96 (13 Mar 2025 14:39) (96 - 96)  RR: 18 (14 Mar 2025 05:00) (18 - 19)  SpO2: 96% (14 Mar 2025 05:00) (94% - 97%)    Parameters below as of 13 Mar 2025 20:42  Patient On (Oxygen Delivery Method): room air      PHYSICAL EXAM:  GEN: NAD, Resting comfortably in bed, cooperative, cool, not clammy or diaphoretic  PULM: Clear to auscultation bilaterally, No wheezing, rales, rhonchi  CVS: Regular rate and rhythm, S1-S2, no murmurs, heaves, thrills  ABD: Soft, non-tender, non-distended, no guarding, BS +, no ascites, jaundice or umbilical varices  EXT: No edema/cyanosis, extremities warm/dry, peripheral pulses palpable, b/l venous stasis dermatitis   NEURO: A&Ox3, no focal deficits, follows commands, answers appropriately, +ve withdrawal tremor    I&O's Detail                                11.0   3.25  )-----------( 45       ( 14 Mar 2025 04:44 )             31.8     13 Mar 2025 04:32    144    |  106    |  5      ----------------------------<  125    3.5     |  28     |  0.9      Ca    8.3        13 Mar 2025 04:32  Phos  1.6       13 Mar 2025 04:32  Mg     1.7       13 Mar 2025 04:32    TPro  4.9    /  Alb  3.1    /  TBili  3.4    /  DBili  x      /  AST  63     /  ALT  18     /  AlkPhos  101    13 Mar 2025 04:32    PT/INR - ( 14 Mar 2025 04:44 )   PT: 19.80 sec;   INR: 1.66 ratio         PTT - ( 13 Mar 2025 04:32 )  PTT:42.6 sec  CAPILLARY BLOOD GLUCOSE      POCT Blood Glucose.: 104 mg/dL (13 Mar 2025 21:07)  POCT Blood Glucose.: 127 mg/dL (13 Mar 2025 16:41)  POCT Blood Glucose.: 133 mg/dL (13 Mar 2025 11:51)  POCT Blood Glucose.: 122 mg/dL (13 Mar 2025 07:52)    LIVER FUNCTIONS - ( 13 Mar 2025 04:32 )  Alb: 3.1 g/dL / Pro: 4.9 g/dL / ALK PHOS: 101 U/L / ALT: 18 U/L / AST: 63 U/L / GGT: x           Urinalysis Basic - ( 13 Mar 2025 09:24 )    Color: Orange / Appearance: Clear / S.013 / pH: x  Gluc: x / Ketone: Trace mg/dL  / Bili: Negative / Urobili: 1.0 mg/dL   Blood: x / Protein: Negative mg/dL / Nitrite: Negative   Leuk Esterase: Negative / RBC: 2 /HPF / WBC 0 /HPF   Sq Epi: x / Non Sq Epi: 0 /HPF / Bacteria: Negative /HPF        MEDICATIONS  (STANDING):  cetirizine 10 milliGRAM(s) Oral daily  chlorhexidine 2% Cloths 1 Application(s) Topical <User Schedule>  colchicine 0.6 milliGRAM(s) Oral daily  cyanocobalamin 1000 MICROGram(s) Oral daily  diazepam    Tablet   Oral   diazepam    Tablet 10 milliGRAM(s) Oral every 12 hours  enoxaparin Injectable 100 milliGRAM(s) SubCutaneous every 12 hours  folic acid 1 milliGRAM(s) Oral daily  gabapentin 200 milliGRAM(s) Oral two times a day  influenza  Vaccine (HIGH DOSE) 0.5 milliLiter(s) IntraMuscular once  insulin lispro (ADMELOG) corrective regimen sliding scale   SubCutaneous three times a day before meals  insulin lispro (ADMELOG) corrective regimen sliding scale   SubCutaneous at bedtime  lactated ringers. 1000 milliLiter(s) (75 mL/Hr) IV Continuous <Continuous>  melatonin 3 milliGRAM(s) Oral at bedtime  methocarbamol 500 milliGRAM(s) Oral daily  metoprolol succinate ER 25 milliGRAM(s) Oral daily  multivitamin 1 Tablet(s) Oral daily  mupirocin 2% Nasal 1 Application(s) Both Nostrils two times a day  naltrexone 50 milliGRAM(s) Oral daily  pantoprazole    Tablet 40 milliGRAM(s) Oral before breakfast  tamsulosin 0.4 milliGRAM(s) Oral at bedtime  thiamine IVPB 500 milliGRAM(s) IV Intermittent three times a day    MEDICATIONS  (PRN):  acetaminophen     Tablet .. 650 milliGRAM(s) Oral every 6 hours PRN Moderate Pain (4 - 6)  artificial  tears Solution 1 Drop(s) Both EYES three times a day PRN Dry Eyes  diazepam    Tablet 10 milliGRAM(s) Oral every 2 hours PRN CIWA 8-14  ondansetron Injectable 4 milliGRAM(s) IV Push every 6 hours PRN Nausea and/or Vomiting      RADIOLOGY & ADDITIONAL TESTS:

## 2025-03-19 LAB
ALBUMIN SERPL ELPH-MCNC: 3.1 G/DL — LOW (ref 3.5–5.2)
ALP SERPL-CCNC: 114 U/L — SIGNIFICANT CHANGE UP (ref 30–115)
ALT FLD-CCNC: 19 U/L — SIGNIFICANT CHANGE UP (ref 0–41)
ANION GAP SERPL CALC-SCNC: 10 MMOL/L — SIGNIFICANT CHANGE UP (ref 7–14)
AST SERPL-CCNC: 53 U/L — HIGH (ref 0–41)
BASOPHILS # BLD AUTO: 0.06 K/UL — SIGNIFICANT CHANGE UP (ref 0–0.2)
BASOPHILS NFR BLD AUTO: 1.6 % — HIGH (ref 0–1)
BUN SERPL-MCNC: 8 MG/DL — LOW (ref 10–20)
CALCIUM SERPL-MCNC: 8.7 MG/DL — SIGNIFICANT CHANGE UP (ref 8.4–10.5)
CHLORIDE SERPL-SCNC: 108 MMOL/L — SIGNIFICANT CHANGE UP (ref 98–110)
CO2 SERPL-SCNC: 24 MMOL/L — SIGNIFICANT CHANGE UP (ref 17–32)
CREAT SERPL-MCNC: 0.9 MG/DL — SIGNIFICANT CHANGE UP (ref 0.7–1.5)
DRUG SCREEN, SERUM: SIGNIFICANT CHANGE UP
EGFR: 95 ML/MIN/1.73M2 — SIGNIFICANT CHANGE UP
EGFR: 95 ML/MIN/1.73M2 — SIGNIFICANT CHANGE UP
EOSINOPHIL NFR BLD AUTO: 4.9 % — SIGNIFICANT CHANGE UP (ref 0–8)
GLUCOSE BLDC GLUCOMTR-MCNC: 100 MG/DL — HIGH (ref 70–99)
GLUCOSE BLDC GLUCOMTR-MCNC: 104 MG/DL — HIGH (ref 70–99)
GLUCOSE BLDC GLUCOMTR-MCNC: 104 MG/DL — HIGH (ref 70–99)
GLUCOSE BLDC GLUCOMTR-MCNC: 142 MG/DL — HIGH (ref 70–99)
GLUCOSE SERPL-MCNC: 87 MG/DL — SIGNIFICANT CHANGE UP (ref 70–99)
HCT VFR BLD CALC: 33.8 % — LOW (ref 42–52)
HGB BLD-MCNC: 11.6 G/DL — LOW (ref 14–18)
IMM GRANULOCYTES NFR BLD AUTO: 0.3 % — SIGNIFICANT CHANGE UP (ref 0.1–0.3)
LYMPHOCYTES # BLD AUTO: 35.4 % — SIGNIFICANT CHANGE UP (ref 20.5–51.1)
MAGNESIUM SERPL-MCNC: 1.7 MG/DL — LOW (ref 1.8–2.4)
MCHC RBC-ENTMCNC: 34.3 G/DL — SIGNIFICANT CHANGE UP (ref 32–37)
MCHC RBC-ENTMCNC: 37.8 PG — HIGH (ref 27–31)
MCV RBC AUTO: 110.1 FL — HIGH (ref 80–94)
MONOCYTES # BLD AUTO: 0.43 K/UL — SIGNIFICANT CHANGE UP (ref 0.1–0.6)
MONOCYTES NFR BLD AUTO: 11.8 % — HIGH (ref 1.7–9.3)
NEUTROPHILS # BLD AUTO: 1.67 K/UL — SIGNIFICANT CHANGE UP (ref 1.4–6.5)
NEUTROPHILS NFR BLD AUTO: 46 % — SIGNIFICANT CHANGE UP (ref 42.2–75.2)
PLATELET # BLD AUTO: 55 K/UL — LOW (ref 130–400)
PMV BLD: 12.8 FL — HIGH (ref 7.4–10.4)
POTASSIUM SERPL-MCNC: 3.8 MMOL/L — SIGNIFICANT CHANGE UP (ref 3.5–5)
POTASSIUM SERPL-SCNC: 3.8 MMOL/L — SIGNIFICANT CHANGE UP (ref 3.5–5)
PROT SERPL-MCNC: 5.6 G/DL — LOW (ref 6–8)
RBC # BLD: 3.07 M/UL — LOW (ref 4.7–6.1)
RBC # FLD: 16.9 % — HIGH (ref 11.5–14.5)
SODIUM SERPL-SCNC: 142 MMOL/L — SIGNIFICANT CHANGE UP (ref 135–146)
WBC # BLD: 3.64 K/UL — LOW (ref 4.8–10.8)
WBC # FLD AUTO: 3.64 K/UL — LOW (ref 4.8–10.8)

## 2025-03-19 PROCEDURE — 93010 ELECTROCARDIOGRAM REPORT: CPT

## 2025-03-19 PROCEDURE — 99232 SBSQ HOSP IP/OBS MODERATE 35: CPT

## 2025-03-19 PROCEDURE — 76705 ECHO EXAM OF ABDOMEN: CPT | Mod: 26

## 2025-03-19 RX ORDER — MAGNESIUM SULFATE 500 MG/ML
2 SYRINGE (ML) INJECTION ONCE
Refills: 0 | Status: COMPLETED | OUTPATIENT
Start: 2025-03-19 | End: 2025-03-19

## 2025-03-19 RX ADMIN — FOLIC ACID 1 MILLIGRAM(S): 1 TABLET ORAL at 12:05

## 2025-03-19 RX ADMIN — Medication 1 TABLET(S): at 12:05

## 2025-03-19 RX ADMIN — COLCHICINE 0.6 MILLIGRAM(S): 0.6 TABLET, FILM COATED ORAL at 12:11

## 2025-03-19 RX ADMIN — Medication 25 GRAM(S): at 12:06

## 2025-03-19 RX ADMIN — Medication 10 MILLIGRAM(S): at 12:04

## 2025-03-19 RX ADMIN — Medication 40 MILLIGRAM(S): at 05:32

## 2025-03-19 RX ADMIN — NALTREXONE HYDROCHLORIDE 50 MILLIGRAM(S): 50 TABLET, FILM COATED ORAL at 12:05

## 2025-03-19 RX ADMIN — Medication 3 MILLIGRAM(S): at 21:30

## 2025-03-19 RX ADMIN — GABAPENTIN 200 MILLIGRAM(S): 400 CAPSULE ORAL at 05:32

## 2025-03-19 RX ADMIN — TAMSULOSIN HYDROCHLORIDE 0.4 MILLIGRAM(S): 0.4 CAPSULE ORAL at 21:30

## 2025-03-19 RX ADMIN — Medication 100 MILLIGRAM(S): at 12:05

## 2025-03-19 RX ADMIN — MUPIROCIN CALCIUM 1 APPLICATION(S): 20 CREAM TOPICAL at 18:46

## 2025-03-19 RX ADMIN — METOPROLOL SUCCINATE 25 MILLIGRAM(S): 50 TABLET, EXTENDED RELEASE ORAL at 05:32

## 2025-03-19 RX ADMIN — CYANOCOBALAMIN 1000 MICROGRAM(S): 1000 INJECTION INTRAMUSCULAR; SUBCUTANEOUS at 12:06

## 2025-03-19 RX ADMIN — Medication 1 APPLICATION(S): at 05:23

## 2025-03-19 RX ADMIN — METHOCARBAMOL 500 MILLIGRAM(S): 500 TABLET, FILM COATED ORAL at 12:05

## 2025-03-19 RX ADMIN — GABAPENTIN 200 MILLIGRAM(S): 400 CAPSULE ORAL at 18:46

## 2025-03-19 NOTE — PROGRESS NOTE ADULT - SUBJECTIVE AND OBJECTIVE BOX
pt seen and examined.     My notes supersede resident's notes in case of discrepancy       ROS: no cp, no sob, no n/v, no fever    Vital Signs Last 24 Hrs  T(C): 36.4 (19 Mar 2025 05:00), Max: 36.6 (18 Mar 2025 13:17)  T(F): 97.6 (19 Mar 2025 05:00), Max: 97.9 (18 Mar 2025 13:17)  HR: 69 (19 Mar 2025 05:00) (69 - 79)  BP: 130/79 (19 Mar 2025 05:00) (101/63 - 130/79)  BP(mean): 76 (18 Mar 2025 21:15) (76 - 76)  RR: 18 (19 Mar 2025 05:00) (18 - 20)  SpO2: 92% (19 Mar 2025 05:00) (92% - 97%)    Parameters below as of 19 Mar 2025 05:00  Patient On (Oxygen Delivery Method): room air    physical exam  constitutional NAD, AAOX3, Respiratory  lungs CTA, CVS heart RRR, GI: abdomen Soft NT, ND, BS+, skin: intact  neuro exam no focal deficit     MEDICATIONS  (STANDING):  cetirizine 10 milliGRAM(s) Oral daily  chlorhexidine 2% Cloths 1 Application(s) Topical <User Schedule>  colchicine 0.6 milliGRAM(s) Oral daily  cyanocobalamin 1000 MICROGram(s) Oral daily  folic acid 1 milliGRAM(s) Oral daily  gabapentin 200 milliGRAM(s) Oral two times a day  influenza  Vaccine (HIGH DOSE) 0.5 milliLiter(s) IntraMuscular once  melatonin 3 milliGRAM(s) Oral at bedtime  methocarbamol 500 milliGRAM(s) Oral daily  metoprolol succinate ER 25 milliGRAM(s) Oral daily  multivitamin 1 Tablet(s) Oral daily  mupirocin 2% Nasal 1 Application(s) Both Nostrils two times a day  naltrexone 50 milliGRAM(s) Oral daily  pantoprazole    Tablet 40 milliGRAM(s) Oral before breakfast  tamsulosin 0.4 milliGRAM(s) Oral at bedtime  thiamine 100 milliGRAM(s) Oral daily    MEDICATIONS  (PRN):  acetaminophen     Tablet .. 650 milliGRAM(s) Oral every 6 hours PRN Moderate Pain (4 - 6)  artificial  tears Solution 1 Drop(s) Both EYES three times a day PRN Dry Eyes  diazepam    Tablet 10 milliGRAM(s) Oral every 2 hours PRN CIWA 8-14  guaiFENesin  milliGRAM(s) Oral every 12 hours PRN phlegm  ondansetron Injectable 4 milliGRAM(s) IV Push every 6 hours PRN Nausea and/or Vomiting                        11.6   3.64  )-----------( 55       ( 19 Mar 2025 05:43 )             33.8     03-19    142  |  108  |  8[L]  ----------------------------<  87  3.8   |  24  |  0.9    Ca    8.7      19 Mar 2025 05:43  Mg     1.7     03-19    TPro  5.6[L]  /  Alb  3.1[L]  /  TBili  1.9[H]  /  DBili  x   /  AST  53[H]  /  ALT  19  /  AlkPhos  114  03-19  Activated Partial Thromboplastin Time: 53.5(03.16.25 @ 18:12)  INR: 1.38 (03.16.25 @ 18:12)    < from: [facility] Duplex Lower Ext Vein Scan, Bilat (03.14.25 @ 09:22) >  Chronic appearing right popliteal vein DVT and left common femoral vein,   deep femoral vein, and popliteal vein DVTs    < end of copied text >    Ferritin: 316 ng/mL [30 - 400] (01-23-25 @ 05:39)  D-Dimer Assay, Quantitative: 1220 ng/mL DDU (01-22-25 @ 11:05)    Lactate, Blood: 1.7 mmol/L (03-13-25 @ 11:35)  Lactate, Blood: 3.5 mmol/L (03-12-25 @ 20:18)  Blood Gas Venous - Lactate: 9.3 mmol/L (03-12-25 @ 09:34)  Blood Gas Venous - Lactate: 11.9 mmol/L (03-12-25 @ 04:35)    a/p  64y M pmh Alcohol Use Disorder, DVT/PE, BPH, HTN, HLD, DMII, gout presenting from [facility] with palpitations admitted for acute alcohol intoxication and lactic acidosis.     Alcohol, Blood: 215 mg/dL (03.12.25 @ 05:05)    # alcohol intoxication and lactic acidosis ( now improved 11.9>>>1.7)   hx of alcohol use disorder, ?wernicke encephalopathy and hepatic steatosis due to chronic etoh use   no sign of withdrawal now, had tremor in am per team   cont naltrexone and gabapentin     # chronic dvt   in view of  thrombocytopenia , pt is high risk for bleeding, risk of ac overweights benefits.   ac stopped, pt is not a candidate for ac     # hx of wernicke encephalopathy, suspected thiamin def, cont thiamin     # DM   A1C with Estimated Average Glucose Result: 5.5 (03.13.25 @ 04:32)  CAPILLARY BLOOD GLUCOSE  POCT Blood Glucose.: 104 mg/dL (19 Mar 2025 11:37)  POCT Blood Glucose.: 100 mg/dL (19 Mar 2025 07:34)  POCT Blood Glucose.: 103 mg/dL (18 Mar 2025 20:59)  POCT Blood Glucose.: 98 mg/dL (18 Mar 2025 16:51)    # HTN cont toprol 25 daily     # gout, cont Colchicine  # BPH cont Flomax  # mag deficiency, replace      #Progress Note Handoff    Pending :  anticipate dc in am if labs stable   Family discussion: dw pt   Disposition: adult home   code status: full code

## 2025-03-19 NOTE — PROGRESS NOTE ADULT - SUBJECTIVE AND OBJECTIVE BOX
SUBJECTIVE/OVERNIGHT EVENTS  Today is hospital day 7d. This morning patient was seen and examined at bedside, complain of L. sided chest discomfort, denies palpitations, SOB. No acute or major events overnight.      CODE STATUS: FULL      MEDICATIONS  STANDING MEDICATIONS  cetirizine 10 milliGRAM(s) Oral daily  chlorhexidine 2% Cloths 1 Application(s) Topical <User Schedule>  colchicine 0.6 milliGRAM(s) Oral daily  cyanocobalamin 1000 MICROGram(s) Oral daily  folic acid 1 milliGRAM(s) Oral daily  gabapentin 200 milliGRAM(s) Oral two times a day  influenza  Vaccine (HIGH DOSE) 0.5 milliLiter(s) IntraMuscular once  melatonin 3 milliGRAM(s) Oral at bedtime  methocarbamol 500 milliGRAM(s) Oral daily  metoprolol succinate ER 25 milliGRAM(s) Oral daily  multivitamin 1 Tablet(s) Oral daily  mupirocin 2% Nasal 1 Application(s) Both Nostrils two times a day  naltrexone 50 milliGRAM(s) Oral daily  pantoprazole    Tablet 40 milliGRAM(s) Oral before breakfast  tamsulosin 0.4 milliGRAM(s) Oral at bedtime  thiamine 100 milliGRAM(s) Oral daily    PRN MEDICATIONS  acetaminophen     Tablet .. 650 milliGRAM(s) Oral every 6 hours PRN  artificial  tears Solution 1 Drop(s) Both EYES three times a day PRN  diazepam    Tablet 10 milliGRAM(s) Oral every 2 hours PRN  guaiFENesin  milliGRAM(s) Oral every 12 hours PRN  ondansetron Injectable 4 milliGRAM(s) IV Push every 6 hours PRN    VITALS  T(F): 97.6 (03-19-25 @ 05:00), Max: 97.9 (03-18-25 @ 13:17)  HR: 69 (03-19-25 @ 05:00) (69 - 79)  BP: 130/79 (03-19-25 @ 05:00) (101/63 - 130/79)  RR: 18 (03-19-25 @ 05:00) (18 - 20)  SpO2: 92% (03-19-25 @ 05:00) (92% - 97%)  POCT Blood Glucose.: 100 mg/dL (03-19-25 @ 07:34)  POCT Blood Glucose.: 103 mg/dL (03-18-25 @ 20:59)  POCT Blood Glucose.: 98 mg/dL (03-18-25 @ 16:51)  POCT Blood Glucose.: 152 mg/dL (03-18-25 @ 11:22)    PHYSICAL EXAM  GENERAL: NAD, cooperative  HEAD:  Atraumatic, normocephalic  EYES: EOMI, PERRL  NECK: Supple, trachea midline, no JVD  HEART: Regular rate and rhythm  LUNGS: Clear to auscultation bilaterally, no crackles, wheezing, or rhonchi  ABDOMEN: Soft, R. Abd TTP, nondistended, +BS, no jaundice or umbilical ascites  EXTREMITIES: 2+ peripheral pulses bilaterally. No clubbing, cyanosis, or edema  NERVOUS SYSTEM:  A&Ox3, moving all extremities, no focal deficits, mild tremor on R. hand    (  ) Indwelling Will Catheter   Date inserted:    Reason (  ) Critical illness     (  ) urinary retention    (  ) Accurate Ins/Outs Monitoring     (  ) CMO patient    (  ) Central Line  Date inserted:  Location: (  ) Right IJ   (  ) Left IJ   (  ) Right Fem   (  ) Left Fem    (  ) SPC  (  ) pigtail  (  ) PEG tube  (  ) colostomy  (  ) jejunostomy  (  ) U-Dall    LABS             11.6   3.64  )-----------( 55       ( 03-19-25 @ 05:43 )             33.8     142  |  108  |  8   -------------------------<  87   03-19-25 @ 05:43  3.8  |  24  |  0.9    Ca      8.7     03-19-25 @ 05:43  Mg     1.7     03-19-25 @ 05:43    TPro  5.6  /  Alb  3.1  /  TBili  1.9  /  DBili  x   /  AST  53  /  ALT  19  /  AlkPhos  114  /  GGT  x     03-19-25 @ 05:43        Urinalysis Basic - ( 19 Mar 2025 05:43 )    Color: x / Appearance: x / SG: x / pH: x  Gluc: 87 mg/dL / Ketone: x  / Bili: x / Urobili: x   Blood: x / Protein: x / Nitrite: x   Leuk Esterase: x / RBC: x / WBC x   Sq Epi: x / Non Sq Epi: x / Bacteria: x          IMAGING

## 2025-03-19 NOTE — PROGRESS NOTE ADULT - ASSESSMENT
64y M pmh Alcohol Use Disorder, DVT/PE, BPH, HTN, HLD, DMII, gout presenting from [facility] with palpitations admitted for acute alcohol intoxication and lactic acidosis.     #Alcohol intoxication in setting of alcohol use disorder  #Lactic Acidosis  - Alcohol level  215 on admission   - increased alcohol use over the past week - last drink 8PM 3/11/25 - 1 bottle of vodka   - c/w  CIWA protocol, addiction medicine consult   - completed valium taper 10mg w/ PRN valium 10mg for CIWA >8  - cw  thiamine,  B12, folate, MV   - lactate 3.5--> 1.7 s/p IVF   - cw naltrexone 50mg qd and gabapentin 200mg bid   - Counseled for alcohol cessation  - TTE: LVEF 60%, no evidence of cardiomyopathy   - Trops 38 > 28  - F/u EKG    #Hx DVT/PE/ Thrombocytopenia  - Duplex 1/20/25 - b/l chronic popliteal DVT  - repeat duplex 3/14: Chronic appearing right popliteal vein DVT and left common femoral vein, deep femoral vein, and popliteal vein DVTs  - on previous dc was off coumadin b/c of platelet <50 - per [facility] documentation was restarted on coumadin. Unclear why on coumadin and if there is a specific need for him to be on coumadin  - on lovenox 100mg BID --> switched to 100mg QD due to dropping platelets now holding lovenox  - Hgb 11.6  -  >> 54 >> 45 >> 41 >> 42k >> 40k >> 50k    - will try to reach out to patient's PMD to establish a plan to restart AC outpatient when platelets improve     #Metabolic syndrome/ Hepatic steatosis  - CC/DASH diet    - previous admission for alcoholic hepatitis treated with  prednisolone  - CTAP with hepatic steatosis and cholelithiasis   - hepatitis panel neg  - monitor LFTs, encourage weight loss   - F/u Repeat RUQ US  - Patient should be monitored by PCP or GI specialist annually for noninvasive liver monitoring given that up to 20% of patients with steatosis develop fibrosis.  - If imaging concerning for cirrhosis, patient should be monitored with noninvasive imaging and AFP every 6 months for HCC screening     # Suspected Thiamine deficiency/ Wernicke Encephalopathy     >>c/w Thiamine 100mg qd    #DM2  -  metformin/jardiance held on admission   - ISS, -180    #HTN  - cw toprol 25mg qd    #BPH  - cw flomax 0.4mg qhs    #Gout  - cw colchicine qd  - has tylenol 650mg bid PRN for moderate pain       DVT Prophylaxis: held therapeutic lovenox  GI Prophylaxis: none  Diet: DASH/CC  Activity: IAT

## 2025-03-19 NOTE — PROGRESS NOTE ADULT - REASON FOR ADMISSION
Palpitations

## 2025-03-19 NOTE — PROGRESS NOTE ADULT - PROVIDER SPECIALTY LIST ADULT
Hospitalist
Hospitalist
Internal Medicine
Internal Medicine
Hospitalist
Hospitalist
Critical Care
Hospitalist
Internal Medicine
Internal Medicine
Addiction Medicine

## 2025-03-20 ENCOUNTER — TRANSCRIPTION ENCOUNTER (OUTPATIENT)
Age: 65
End: 2025-03-20

## 2025-03-20 VITALS
DIASTOLIC BLOOD PRESSURE: 76 MMHG | RESPIRATION RATE: 18 BRPM | OXYGEN SATURATION: 96 % | HEART RATE: 78 BPM | TEMPERATURE: 98 F | SYSTOLIC BLOOD PRESSURE: 124 MMHG

## 2025-03-20 LAB
ALBUMIN SERPL ELPH-MCNC: 3.3 G/DL — LOW (ref 3.5–5.2)
ALP SERPL-CCNC: 109 U/L — SIGNIFICANT CHANGE UP (ref 30–115)
ALT FLD-CCNC: 22 U/L — SIGNIFICANT CHANGE UP (ref 0–41)
ANION GAP SERPL CALC-SCNC: 9 MMOL/L — SIGNIFICANT CHANGE UP (ref 7–14)
BASOPHILS # BLD AUTO: 0.07 K/UL — SIGNIFICANT CHANGE UP (ref 0–0.2)
BASOPHILS NFR BLD AUTO: 2 % — HIGH (ref 0–1)
BILIRUB SERPL-MCNC: 2.2 MG/DL — HIGH (ref 0.2–1.2)
BUN SERPL-MCNC: 8 MG/DL — LOW (ref 10–20)
CALCIUM SERPL-MCNC: 8.9 MG/DL — SIGNIFICANT CHANGE UP (ref 8.4–10.5)
CHLORIDE SERPL-SCNC: 108 MMOL/L — SIGNIFICANT CHANGE UP (ref 98–110)
CO2 SERPL-SCNC: 25 MMOL/L — SIGNIFICANT CHANGE UP (ref 17–32)
CREAT SERPL-MCNC: 0.9 MG/DL — SIGNIFICANT CHANGE UP (ref 0.7–1.5)
EGFR: 95 ML/MIN/1.73M2 — SIGNIFICANT CHANGE UP
EGFR: 95 ML/MIN/1.73M2 — SIGNIFICANT CHANGE UP
EOSINOPHIL # BLD AUTO: 0.23 K/UL — SIGNIFICANT CHANGE UP (ref 0–0.7)
EOSINOPHIL NFR BLD AUTO: 6.7 % — SIGNIFICANT CHANGE UP (ref 0–8)
GLUCOSE BLDC GLUCOMTR-MCNC: 106 MG/DL — HIGH (ref 70–99)
GLUCOSE SERPL-MCNC: 84 MG/DL — SIGNIFICANT CHANGE UP (ref 70–99)
HCT VFR BLD CALC: 33.4 % — LOW (ref 42–52)
HGB BLD-MCNC: 11.6 G/DL — LOW (ref 14–18)
IMM GRANULOCYTES NFR BLD AUTO: 0.3 % — SIGNIFICANT CHANGE UP (ref 0.1–0.3)
LYMPHOCYTES # BLD AUTO: 1.1 K/UL — LOW (ref 1.2–3.4)
LYMPHOCYTES # BLD AUTO: 31.9 % — SIGNIFICANT CHANGE UP (ref 20.5–51.1)
MAGNESIUM SERPL-MCNC: 1.7 MG/DL — LOW (ref 1.8–2.4)
MCHC RBC-ENTMCNC: 34.7 G/DL — SIGNIFICANT CHANGE UP (ref 32–37)
MCHC RBC-ENTMCNC: 38.2 PG — HIGH (ref 27–31)
MCV RBC AUTO: 109.9 FL — HIGH (ref 80–94)
MONOCYTES # BLD AUTO: 0.48 K/UL — SIGNIFICANT CHANGE UP (ref 0.1–0.6)
MONOCYTES NFR BLD AUTO: 13.9 % — HIGH (ref 1.7–9.3)
NEUTROPHILS # BLD AUTO: 1.56 K/UL — SIGNIFICANT CHANGE UP (ref 1.4–6.5)
NEUTROPHILS NFR BLD AUTO: 45.2 % — SIGNIFICANT CHANGE UP (ref 42.2–75.2)
NRBC BLD AUTO-RTO: 0 /100 WBCS — SIGNIFICANT CHANGE UP (ref 0–0)
PMV BLD: 12.7 FL — HIGH (ref 7.4–10.4)
POTASSIUM SERPL-MCNC: 3.9 MMOL/L — SIGNIFICANT CHANGE UP (ref 3.5–5)
POTASSIUM SERPL-SCNC: 3.9 MMOL/L — SIGNIFICANT CHANGE UP (ref 3.5–5)
PROT SERPL-MCNC: 5.6 G/DL — LOW (ref 6–8)
RBC # BLD: 3.04 M/UL — LOW (ref 4.7–6.1)
RBC # FLD: 17 % — HIGH (ref 11.5–14.5)
SODIUM SERPL-SCNC: 142 MMOL/L — SIGNIFICANT CHANGE UP (ref 135–146)
WBC # BLD: 3.45 K/UL — LOW (ref 4.8–10.8)
WBC # FLD AUTO: 3.45 K/UL — LOW (ref 4.8–10.8)

## 2025-03-20 PROCEDURE — 99239 HOSP IP/OBS DSCHRG MGMT >30: CPT

## 2025-03-20 RX ORDER — EMPAGLIFLOZIN 25 MG/1
1 TABLET, FILM COATED ORAL
Qty: 30 | Refills: 3
Start: 2025-03-20 | End: 2025-07-17

## 2025-03-20 RX ORDER — MAGNESIUM SULFATE 500 MG/ML
2 SYRINGE (ML) INJECTION ONCE
Refills: 0 | Status: COMPLETED | OUTPATIENT
Start: 2025-03-20 | End: 2025-03-20

## 2025-03-20 RX ORDER — MAGNESIUM OXIDE 400 MG
1 TABLET ORAL
Qty: 30 | Refills: 3
Start: 2025-03-20 | End: 2025-07-17

## 2025-03-20 RX ORDER — CYANOCOBALAMIN 1000 UG/ML
1 INJECTION INTRAMUSCULAR; SUBCUTANEOUS
Qty: 30 | Refills: 3
Start: 2025-03-20 | End: 2025-07-17

## 2025-03-20 RX ORDER — METOPROLOL SUCCINATE 50 MG/1
1 TABLET, EXTENDED RELEASE ORAL
Qty: 30 | Refills: 3
Start: 2025-03-20 | End: 2025-07-17

## 2025-03-20 RX ORDER — METFORMIN HYDROCHLORIDE 850 MG/1
1 TABLET ORAL
Qty: 60 | Refills: 3
Start: 2025-03-20 | End: 2025-07-17

## 2025-03-20 RX ADMIN — Medication 1 TABLET(S): at 12:27

## 2025-03-20 RX ADMIN — GABAPENTIN 200 MILLIGRAM(S): 400 CAPSULE ORAL at 17:25

## 2025-03-20 RX ADMIN — CYANOCOBALAMIN 1000 MICROGRAM(S): 1000 INJECTION INTRAMUSCULAR; SUBCUTANEOUS at 12:28

## 2025-03-20 RX ADMIN — COLCHICINE 0.6 MILLIGRAM(S): 0.6 TABLET, FILM COATED ORAL at 12:19

## 2025-03-20 RX ADMIN — Medication 10 MILLIGRAM(S): at 12:27

## 2025-03-20 RX ADMIN — NALTREXONE HYDROCHLORIDE 50 MILLIGRAM(S): 50 TABLET, FILM COATED ORAL at 12:27

## 2025-03-20 RX ADMIN — FOLIC ACID 1 MILLIGRAM(S): 1 TABLET ORAL at 12:28

## 2025-03-20 RX ADMIN — METOPROLOL SUCCINATE 25 MILLIGRAM(S): 50 TABLET, EXTENDED RELEASE ORAL at 05:41

## 2025-03-20 RX ADMIN — GABAPENTIN 200 MILLIGRAM(S): 400 CAPSULE ORAL at 05:41

## 2025-03-20 RX ADMIN — Medication 25 GRAM(S): at 10:38

## 2025-03-20 RX ADMIN — METHOCARBAMOL 500 MILLIGRAM(S): 500 TABLET, FILM COATED ORAL at 12:27

## 2025-03-20 RX ADMIN — Medication 40 MILLIGRAM(S): at 05:46

## 2025-03-20 RX ADMIN — Medication 1 APPLICATION(S): at 05:43

## 2025-03-20 RX ADMIN — Medication 100 MILLIGRAM(S): at 12:27

## 2025-03-20 NOTE — DISCHARGE NOTE NURSING/CASE MANAGEMENT/SOCIAL WORK - PATIENT PORTAL LINK FT
You can access the FollowMyHealth Patient Portal offered by [facility] by registering at the following website: [email or web address]. By joining [facility]’s FollowMyHealth portal, you will also be able to view your health information using other applications (apps) compatible with our system.

## 2025-03-20 NOTE — DISCHARGE NOTE PROVIDER - NSDCCPCAREPLAN_GEN_ALL_CORE_FT
PRINCIPAL DISCHARGE DIAGNOSIS  Diagnosis: Alcoholic ketoacidosis  Assessment and Plan of Treatment: You presented with fatigue, malaise, and palpitations. You were found to have alcohol ketoacidosis, secondary to your recent alcohol intake. You were admitted to the hospital and received IV fluids and started on vitamin supplementation. Your lactic acid was noted to be high too and it trended down. You will be discharged today but you will have to refrain from drinking alcohol.  Follow up with your PCP Dr. Tellez within a month from discharge for continuous monitoring.     PRINCIPAL DISCHARGE DIAGNOSIS  Diagnosis: Alcoholic ketoacidosis  Assessment and Plan of Treatment: You presented with fatigue, malaise, and palpitations. You were found to have alcohol ketoacidosis, secondary to your recent alcohol intake. You were admitted to the hospital and received IV fluids and started on vitamin supplementation. Your lactic acid was noted to be high too and it trended down. You will be discharged today but you will have to refrain from drinking alcohol.  Follow up with your PCP Dr. Tellez within a month from discharge for continuous monitoring.  if you have any sign of bleeding, dizziness, agitation, or any other concerning symptoms please seek immediate medical care

## 2025-03-20 NOTE — DISCHARGE NOTE PROVIDER - NSDCFUADDAPPT_GEN_ALL_CORE_FT
Do you need a primary care doctor or follow-up with a specialist? Our care coordinators will help you find providers near you and schedule any follow-up care visits.    Monday-Friday: 9am-5pm    Call our [facility] team: [phone]

## 2025-03-20 NOTE — DISCHARGE NOTE PROVIDER - CARE PROVIDER_API CALL
Eugene Tellez  Internal Medicine  [address] [place] [address]  Phone: [phone]  Fax: [phone]  Established Patient  Follow Up Time: 1 month

## 2025-03-20 NOTE — DISCHARGE NOTE NURSING/CASE MANAGEMENT/SOCIAL WORK - NSFLUVACAGEDISCH_IMM_ALL_CORE
CT CHEST WO CONTRAST    (Results Pending)   CT ABDOMEN PELVIS WO CONTRAST Additional Contrast? Oral    (Results Pending)         Assessment/Plan:     Fall vs syncope, unclear etiology; hypoglycemia vs mechanical fall  Patient with fall and found down for 24h. Unclear etiology of fall- possibilities include hypoglycemia, mechanical fall, cardiac arrhythmia, infection. CTH, CT Orbits unremarkable. -Repeat EKG stable  -CT C-spine pending  -Drug screen pending    SIRS possibly 2/2 preseptal cellulitis  Unclear source- CXR, UA unremarkable. Possibly preseptal cellulitis vs given lower extremity wounds. Presenting with , Temp 101.1, WBC 21.7, Lactic acid 2. Given 30cc/kg bolus. -Broaden to vanc, cefepime  -IVF  -Blood x2, urine cultures pending  -CT C/A/P pending     NABIL 2/2 Rhabdomyolysis  Baseline Cr < 1. Possibly pre-renal vs ATN 2/2 rhabdomyolysis. Creatinine uptrending, low UOP. -Add bicarb 75 to fluids, ; titrate to 250 cc/hr UOP  -Trend CK q6h   -Avoid nephrotoxic medications  -Urine studies pending  -Nephrology consulted for possible CRRT, appreciate recs  -Strict I/O     Hyperkalemia  Patient with hyperkalemia 4/29 to 6.1. EKG stable. Received calcium gluconate. -F/u K  -Nephrology consulted for possible CRRT, appreciate recs    NSTEMI 2  Patient with troponin 0.15, EKG without ST changes. Denies chest pain.  -Troponins stable x 3  -Repeat EKG stable     COVID-19 rule out  Given fevers, COVID-19 sent. Patient denies URI symptoms including cough, fever, shortness of breath, myalgias. -COVID sent to [address] plus precautions     Chronic Medical Diagnoses:  HTN: Continue home valsartan  DM2: Holding home meds, SSI, hypoglycemia protocol.  Repeat Hemoglobin A1c  Parkinson disease: Continue home sinemet, primidone, ropinirole    Code Status: Full Code  FEN: DIET CARB CONTROL;  PPX: SQH  DISPO: GABE Cat, PGY-1  04/29/20  10:13 AM    This patient has been staffed and discussed with Adult

## 2025-03-20 NOTE — DISCHARGE NOTE NURSING/CASE MANAGEMENT/SOCIAL WORK - NSTRANSFERBELONGINGSRESP_GEN_A_NUR
Clemente Chapin (:  1973) is a 51 y.o. male,Established patient, here for evaluation of the following chief complaint(s): Other (pre-op physical surgery 10/29/2021 bilateral HIP replacements at [facility])         ASSESSMENT/PLAN:  1. Preop examination    HTN controlled  Cleared for surgery  No other conerns    Return if symptoms worsen or fail to improve. Subjective   SUBJECTIVE/OBJECTIVE:  HPI    Preop. Bilateral total hip replacement. Dr. Aster Durbin at [facility] on 10/29/2021. EKG, Blood work and chest xray normal.    No prior surgeries. Denies cp or sob. Denies any past heart problems. Hx of htn and controlled on meds  Non smoker  No concerns. >4 mets without any SOB  Denies JAYLYN    Review of Systems   Constitutional: Negative for activity change, appetite change, chills, diaphoresis, fatigue, fever and unexpected weight change. HENT: Negative for congestion, ear pain, postnasal drip, sinus pressure and sore throat. Eyes: Negative for visual disturbance. Respiratory: Negative for cough, chest tightness, shortness of breath, wheezing and stridor. Cardiovascular: Negative for chest pain, palpitations and leg swelling. Gastrointestinal: Negative for abdominal distention, abdominal pain, constipation, diarrhea, nausea and vomiting. Endocrine: Negative for polydipsia, polyphagia and polyuria. Genitourinary: Negative for decreased urine volume, difficulty urinating, dysuria, flank pain, frequency, hematuria and urgency. Musculoskeletal: Positive for arthralgias. Negative for back pain, gait problem, joint swelling, myalgias and neck pain. Skin: Negative for color change, pallor and rash. Neurological: Negative for dizziness, syncope, weakness, light-headedness, numbness and headaches. Hematological: Negative for adenopathy. Psychiatric/Behavioral: Negative for behavioral problems, self-injury and sleep disturbance. The patient is not nervous/anxious. Objective   Physical Exam  Vitals reviewed. Constitutional:       General: He is not in acute distress. Appearance: Normal appearance. He is well-developed. HENT:      Head: Normocephalic. Right Ear: External ear normal.      Left Ear: External ear normal.      Nose: Nose normal.      Right Sinus: No maxillary sinus tenderness. Left Sinus: No maxillary sinus tenderness. Mouth/Throat:      Pharynx: Uvula midline. Neck:      Trachea: Trachea normal.   Cardiovascular:      Rate and Rhythm: Normal rate and regular rhythm. Heart sounds: Normal heart sounds. No murmur heard. Pulmonary:      Effort: Pulmonary effort is normal. No respiratory distress. Breath sounds: Normal breath sounds. No decreased breath sounds, wheezing, rhonchi or rales. Chest:      Chest wall: No tenderness. Abdominal:      General: There is no distension. Palpations: Abdomen is soft. There is no mass. Tenderness: There is no abdominal tenderness. Musculoskeletal:         General: Tenderness present. No deformity. Normal range of motion. Cervical back: Normal range of motion and neck supple. Lymphadenopathy:      Cervical: No cervical adenopathy. Skin:     General: Skin is warm and dry. Neurological:      Mental Status: He is alert and oriented to person, place, and time. Motor: No abnormal muscle tone. Coordination: Coordination normal.      Gait: Gait normal.   Psychiatric:         Mood and Affect: Mood normal.         Behavior: Behavior normal.         Thought Content: Thought content normal.         Judgment: Judgment normal.            On this date 10/7/2021 I have spent 41 minutes reviewing previous notes, test results and face to face with the patient discussing the diagnosis and importance of compliance with the treatment plan as well as documenting on the day of the visit. An electronic signature was used to authenticate this note.     --AYE Lee - CNP yes

## 2025-03-20 NOTE — DISCHARGE NOTE PROVIDER - ATTENDING DISCHARGE PHYSICAL EXAMINATION:
T(F): 98.3 (03-20-25 @ 04:59), Max: 98.3 (03-20-25 @ 04:59)  HR: 72 (03-20-25 @ 04:59) (72 - 93)  BP: 125/74 (03-20-25 @ 04:59) (123/75 - 125/74)  RR: 18 (03-20-25 @ 04:59) (17 - 18)  SpO2: 95% (03-20-25 @ 04:59) (95% - 97%)  Physical exam:   constitutional NAD, AAOX3, Respiratory  lungs CTA, CVS heart RRR, GI: abdomen Soft NT, ND, BS+, skin: intact  neuro exam Motor, sensory and CN normal, no deficit , chronic fine tremor (all the time, also at rest, not related to drinking per pt)

## 2025-03-20 NOTE — DISCHARGE NOTE PROVIDER - NSDCMRMEDTOKEN_GEN_ALL_CORE_FT
colchicine 0.6 mg oral tablet: 1 tab(s) orally 2 times a day  cyanocobalamin 1000 mcg oral tablet: 1 tab(s) orally once a day  Flomax 0.4 mg oral capsule: 1 cap(s) orally once a day (at bedtime)  folic acid 1 mg oral tablet: 1 tab(s) orally once a day  gabapentin 100 mg oral capsule: 2 cap(s) orally 2 times a day  Jardiance 10 mg oral tablet: 1 tab(s) orally once a day  magnesium oxide 400 mg oral tablet: 1 tab(s) orally once a day  metFORMIN 500 mg oral tablet: 1 tab(s) orally 2 times a day  methocarbamol 500 mg oral tablet: 1 orally once a day  Multiple Vitamins oral tablet: 1 tab(s) orally once a day  naltrexone 50 mg oral tablet: 1 tab(s) orally once a day  ocular lubricant preservative-free ophthalmic solution: 1 drop(s) to each affected eye 3 times a day as needed for Dry Eyes  thiamine 100 mg oral tablet: 1 tab(s) orally once a day  Toprol-XL 25 mg oral tablet, extended release: 1 tab(s) orally once a day

## 2025-03-20 NOTE — DISCHARGE NOTE NURSING/CASE MANAGEMENT/SOCIAL WORK - NSDCFUADDAPPT_GEN_ALL_CORE_FT
Do you need a primary care doctor or follow-up with a specialist? Our care coordinators will help you find providers near you and schedule any follow-up care visits.    Monday-Friday: 9am-5pm    Call our [facility] team: [phone]

## 2025-03-20 NOTE — DISCHARGE NOTE NURSING/CASE MANAGEMENT/SOCIAL WORK - FINANCIAL ASSISTANCE
[facility] provides services at a reduced cost to those who are determined to be eligible through [facility]’s financial assistance program. Information regarding [facility]’s financial assistance program can be found by going to [email or web address] or by calling [phone].

## 2025-03-20 NOTE — DISCHARGE NOTE PROVIDER - HOSPITAL COURSE
64y M pmh Alcohol Use Disorder, DVT/PE, BPH, HTN, HLD, DMII, gout presenting from [facility] with palpitations.     Patient reported general malaise, palpitations, nausea and chest discomfort that started 1 day ptp. Patient reports drinking alcohol. Denies any vomiting, diarrhea, constipation, abdominal pain, chills, tremors, confusion, hallucinations/delusions or seizure like activity.     ROS -ve for fever, cough, rhinorrhea, rash, dizziness, lightheadedness, HA, hematuria, dysuria, melena, hematochezia, joint pain, myalgias, shortness of breath falls or other trauma.    Vitals in the ED  T 98.3    /63  RR 22 SpO2 97 On RA     Significant Labs  wbc 6.16 hgb 12.7 plt 127  Na 145 K 3.8 BUN/Cr 6/1.1  trop 38>28  INR 1.36, albumin 3.6   AGAP 29, lactate 11.9>9.3  vbg 7.44/26/94/18  BHB 1.0, Mg 1.3, , AST/ALT 72/21  Blood alcohol 215    CTA AP  Bibasilar subsegmental atelectasis. Thoracic aorta calcifications. Coronary artery calcifications. Mild bilateral gynecomastia. Hepatic Steatosis. Mild caudate lobe hypertrophy, unchanged. Cholelithiasis. Symmetric renal enhancement. No hydronephrosis. Colonic diverticulosis.  Atherosclerotic calcifications within the aorta and its branches. Stenosis at the ostium of the celiac artery. Similar mild   stranding the in the region of the proximal GARRISON. Prominent bilateral inguinal lymph nodes, nonspecific, may be reactive. Relatively unchanged prominent portacaval and gastrohepatic and peripancreatic lymph nodes. Right posterior fifth rib chronic fracture deformity.    ECG sinus tachycardia  CXR no consolidation/opacification     Patient received 2L LR, librium, toradol, versed, magnesium, thiamine and folic acid  in the ED. Admitted to medicine for management.      # alcohol intoxication and lactic acidosis  hx of alcohol use disorder, ?wernicke encephalopathy and hepatic steatosis due to chronic etoh use   no sign of withdrawal  cont naltrexone and gabapentin  Lactate trended down from 11.9 to 1.7  RUQ US: Cholelithiasis. No sonographic evidence of acute cholecystitis. Partially visualized right pleural effusion.    #chronic dvt   in view of  thrombocytopenia, pt is high risk for bleeding, risk of ac overweights benefits.   ac stopped, pt is not a candidate for ac     # hx of wernicke encephalopathy, suspected thiamin def, started on thiamine     # DM   A1C with Estimated Average Glucose Result: 5.5 (03.13.25 @ 04:32)  CAPILLARY BLOOD GLUCOSE  POCT Blood Glucose.: 104 mg/dL (19 Mar 2025 11:37)  POCT Blood Glucose.: 100 mg/dL (19 Mar 2025 07:34)  POCT Blood Glucose.: 103 mg/dL (18 Mar 2025 20:59)  POCT Blood Glucose.: 98 mg/dL (18 Mar 2025 16:51)    # HTN cont toprol 25 daily     # gout, cont Colchicine  # BPH cont Flomax  # mag deficiency, replenished    Discussion of discharge plan of care, including discharge diagnoses, medication reconciliation, and follow-ups was conducted with Dr. Good on 3/20/2025 and discharge was approved.   64y M pmh Alcohol Use Disorder, DVT/PE, BPH, HTN, HLD, DMII, gout presenting from [facility] with palpitations.     Patient reported general malaise, palpitations, nausea and chest discomfort that started 1 day ptp. Patient reports drinking alcohol. Denies any vomiting, diarrhea, constipation, abdominal pain, chills, tremors, confusion, hallucinations/delusions or seizure like activity.     ROS -ve for fever, cough, rhinorrhea, rash, dizziness, lightheadedness, HA, hematuria, dysuria, melena, hematochezia, joint pain, myalgias, shortness of breath falls or other trauma.    Vitals in the ED  T 98.3    /63  RR 22 SpO2 97 On RA     Significant Labs  wbc 6.16 hgb 12.7 plt 127  Na 145 K 3.8 BUN/Cr 6/1.1  trop 38>28  INR 1.36, albumin 3.6   AGAP 29, lactate 11.9>9.3  vbg 7.44/26/94/18  BHB 1.0, Mg 1.3, , AST/ALT 72/21  Blood alcohol 215    CTA AP  Bibasilar subsegmental atelectasis. Thoracic aorta calcifications. Coronary artery calcifications. Mild bilateral gynecomastia. Hepatic Steatosis. Mild caudate lobe hypertrophy, unchanged. Cholelithiasis. Symmetric renal enhancement. No hydronephrosis. Colonic diverticulosis.  Atherosclerotic calcifications within the aorta and its branches. Stenosis at the ostium of the celiac artery. Similar mild   stranding the in the region of the proximal GARRISON. Prominent bilateral inguinal lymph nodes, nonspecific, may be reactive. Relatively unchanged prominent portacaval and gastrohepatic and peripancreatic lymph nodes. Right posterior fifth rib chronic fracture deformity.    ECG sinus tachycardia  CXR no consolidation/opacification     Patient received 2L LR, librium, toradol, versed, magnesium, thiamine and folic acid  in the ED. Admitted to medicine for management.      # alcohol intoxication and lactic acidosis  hx of alcohol use disorder, ?wernicke encephalopathy and hepatic steatosis due to chronic etoh use   no sign of withdrawal  cont naltrexone and gabapentin    Lactate, Blood: 1.7 mmol/L (03-13-25 @ 11:35)  Lactate, Blood: 3.5 mmol/L (03-12-25 @ 20:18)  Blood Gas Venous - Lactate: 9.3 mmol/L (03-12-25 @ 09:34)  Blood Gas Venous - Lactate: 11.9 mmol/L (03-12-25 @ 04:35)    RUQ US: Cholelithiasis. No sonographic evidence of acute cholecystitis. Partially visualized right pleural effusion.    #chronic dvt   in view of  thrombocytopenia, pt is high risk for bleeding, risk of ac overweights benefits.   ac stopped, pt is not a candidate for ac     # hx of wernicke encephalopathy, suspected thiamin def, started on thiamine     # DM   A1C with Estimated Average Glucose Result: 5.5 (03.13.25 @ 04:32)  CAPILLARY BLOOD GLUCOSE  POCT Blood Glucose.: 104 mg/dL (19 Mar 2025 11:37)  POCT Blood Glucose.: 100 mg/dL (19 Mar 2025 07:34)  POCT Blood Glucose.: 103 mg/dL (18 Mar 2025 20:59)  POCT Blood Glucose.: 98 mg/dL (18 Mar 2025 16:51)    # HTN cont toprol 25 daily     # gout, cont Colchicine  # BPH cont Flomax  # mag deficiency, replenished

## 2025-03-25 DIAGNOSIS — I82.533 CHRONIC EMBOLISM AND THROMBOSIS OF POPLITEAL VEIN, BILATERAL: ICD-10-CM

## 2025-03-25 DIAGNOSIS — K21.9 GASTRO-ESOPHAGEAL REFLUX DISEASE WITHOUT ESOPHAGITIS: ICD-10-CM

## 2025-03-25 DIAGNOSIS — K70.9 ALCOHOLIC LIVER DISEASE, UNSPECIFIED: ICD-10-CM

## 2025-03-25 DIAGNOSIS — E88.810 METABOLIC SYNDROME: ICD-10-CM

## 2025-03-25 DIAGNOSIS — K76.0 FATTY (CHANGE OF) LIVER, NOT ELSEWHERE CLASSIFIED: ICD-10-CM

## 2025-03-25 DIAGNOSIS — I82.512 CHRONIC EMBOLISM AND THROMBOSIS OF LEFT FEMORAL VEIN: ICD-10-CM

## 2025-03-25 DIAGNOSIS — N40.0 BENIGN PROSTATIC HYPERPLASIA WITHOUT LOWER URINARY TRACT SYMPTOMS: ICD-10-CM

## 2025-03-25 DIAGNOSIS — K80.20 CALCULUS OF GALLBLADDER WITHOUT CHOLECYSTITIS WITHOUT OBSTRUCTION: ICD-10-CM

## 2025-03-25 DIAGNOSIS — Z91.018 ALLERGY TO OTHER FOODS: ICD-10-CM

## 2025-03-25 DIAGNOSIS — E11.9 TYPE 2 DIABETES MELLITUS WITHOUT COMPLICATIONS: ICD-10-CM

## 2025-03-25 DIAGNOSIS — H91.90 UNSPECIFIED HEARING LOSS, UNSPECIFIED EAR: ICD-10-CM

## 2025-03-25 DIAGNOSIS — Z79.84 LONG TERM (CURRENT) USE OF ORAL HYPOGLYCEMIC DRUGS: ICD-10-CM

## 2025-03-25 DIAGNOSIS — I10 ESSENTIAL (PRIMARY) HYPERTENSION: ICD-10-CM

## 2025-03-25 DIAGNOSIS — E87.20 ACIDOSIS, UNSPECIFIED: ICD-10-CM

## 2025-03-25 DIAGNOSIS — F10.239 ALCOHOL DEPENDENCE WITH WITHDRAWAL, UNSPECIFIED: ICD-10-CM

## 2025-03-25 DIAGNOSIS — E83.42 HYPOMAGNESEMIA: ICD-10-CM
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
## 2025-03-25 DIAGNOSIS — D61.818 OTHER PANCYTOPENIA: ICD-10-CM

## 2025-03-25 DIAGNOSIS — D69.6 THROMBOCYTOPENIA, UNSPECIFIED: ICD-10-CM

## 2025-03-25 DIAGNOSIS — F17.211 NICOTINE DEPENDENCE, CIGARETTES, IN REMISSION: ICD-10-CM

## 2025-03-25 DIAGNOSIS — M10.9 GOUT, UNSPECIFIED: ICD-10-CM

## 2025-03-25 DIAGNOSIS — Z91.011 ALLERGY TO MILK PRODUCTS: ICD-10-CM

## 2025-03-25 DIAGNOSIS — Z79.01 LONG TERM (CURRENT) USE OF ANTICOAGULANTS: ICD-10-CM

## 2025-03-25 DIAGNOSIS — E51.2 WERNICKE'S ENCEPHALOPATHY: ICD-10-CM

## 2025-03-25 DIAGNOSIS — F10.229 ALCOHOL DEPENDENCE WITH INTOXICATION, UNSPECIFIED: ICD-10-CM

## 2025-04-07 NOTE — ED ADULT NURSE NOTE - CAS TRG GENERAL AIRWAY, MLM
Subjective   Patient ID: Samra Garcia is a 20 y.o. female who presents for followup sureswab.    Recurring vaginal discharge with odor and irritation.  Last 2 cultures were positive in October and january.  Symptoms since she had sex last spring.  She has tried probiotics and boric acid.  Will send off another vaginal culture.  Had questions whether this could be HPV related.  Discussed that these are not typical symptoms of HPV.  Does have a history of Gardnerella.  Has not been sexually active since last spring.  Await culture results and then further recommendations to follow        Reinier Mensah MD 03/24/25 2:50 P    Culture negative,      QUEST SURESWAB(R) ADVANCED VAGINITIS PLUS, TMA  Order: [id number]   Status: Final result       Visible to patient: Yes (seen)       Next appt: None    0 Result Notes       1 Follow-up Encounter     Component  Ref Range & Units 2 wk ago  SURESWAB(R) ADV BACTERIAL VAGINOSIS (BV), TMA  NEGATIVE NEGATIVE  CANDIDA SPECIES  NOT DETECTED NOT DETECTED  CANDIDA GLABRATA  NOT DETECTED NOT DETECTED  Comment:    Candida species C. albicans, C. tropicalis,  C. parapsilosis, and/or C. dubliniensis can be detected,  but not differentiated, in the Candida spp. result.  TRICHOMONAS VAGINALIS (TV), TMA  NOT DETECTED NOT DETECTED  CHLAMYDIA TRACHOMATIS RNA, TMA, UROGENITAL  NOT DETECTED NOT DETECTED  NEISSERIA GONORRHOEAE RNA, TMA, UROGENITAL  NOT DETECTED NOT DETECTED  Comment: For additional information, please refer to  https://education.[employer]!/faq/QRK454  (This link is being provided for information/  educational purposes only.)  Resulting Agency [employer] [facility]          Specimen Collected: 03/24/25 14:54            Review of Systems   Genitourinary:  Positive for vaginal discharge.       Objective   Physical Exam  Constitutional:       Appearance: Normal appearance. She is normal weight.   Neurological:      Mental Status: She is alert.          Assessment/Plan   Ongoing vaginal irritation since sex May/2024  Hx molluscum contagiosum/gardnerella  Recent cultures negative  Refer to [facility]         Reinier Mensah MD 04/07/25 3:50 PM    Patent

## 2025-04-11 NOTE — ED ADULT NURSE NOTE - NSFALLRSKASSESSTYPE_ED_ALL_ED
"Caller: Andrea Dumont    Relationship: Self    Best call back number: [phone]    Requested Prescriptions:   Requested Prescriptions     Pending Prescriptions Disp Refills    Semaglutide-Weight Management (Wegovy) 0.25 MG/0.5ML solution auto-injector 2 mL 0     Sig: Inject 0.5 mL under the skin into the appropriate area as directed 1 (One) Time Per Week.        Pharmacy where request should be sent: [facility] \"COMPOUNDS ONLY\" - [address] [phone] [place] [phone]      Last office visit with prescribing clinician: 2/14/2025   Last telemedicine visit with prescribing clinician: Visit date not found   Next office visit with prescribing clinician: 5/16/2025     Does the patient have less than a 3 day supply:  [] Yes  [x] No    Ricardo Brady Rep   04/11/25 13:51 EDT   "
Initial (On Arrival)

## 2025-04-13 ENCOUNTER — INPATIENT (INPATIENT)
Facility: HOSPITAL | Age: 65
LOS: 4 days | Discharge: HOME | DRG: 641 | End: 2025-04-18
Attending: INTERNAL MEDICINE | Admitting: STUDENT IN AN ORGANIZED HEALTH CARE EDUCATION/TRAINING PROGRAM
Payer: MEDICARE

## 2025-04-13 VITALS
WEIGHT: 246.04 LBS | RESPIRATION RATE: 17 BRPM | SYSTOLIC BLOOD PRESSURE: 143 MMHG | TEMPERATURE: 98 F | OXYGEN SATURATION: 98 % | DIASTOLIC BLOOD PRESSURE: 75 MMHG | HEART RATE: 132 BPM | HEIGHT: 70 IN

## 2025-04-13 DIAGNOSIS — E87.29 OTHER ACIDOSIS: ICD-10-CM

## 2025-04-13 LAB
ALBUMIN SERPL ELPH-MCNC: 3.4 G/DL — LOW (ref 3.5–5.2)
ALBUMIN SERPL ELPH-MCNC: 4 G/DL — SIGNIFICANT CHANGE UP (ref 3.5–5.2)
ALP SERPL-CCNC: 136 U/L — HIGH (ref 30–115)
ALP SERPL-CCNC: 161 U/L — HIGH (ref 30–115)
ALT FLD-CCNC: 19 U/L — SIGNIFICANT CHANGE UP (ref 0–41)
ALT FLD-CCNC: 24 U/L — SIGNIFICANT CHANGE UP (ref 0–41)
ANION GAP SERPL CALC-SCNC: 20 MMOL/L — HIGH (ref 7–14)
ANION GAP SERPL CALC-SCNC: 29 MMOL/L — HIGH (ref 7–14)
ANISOCYTOSIS BLD QL: SIGNIFICANT CHANGE UP
AST SERPL-CCNC: 72 U/L — HIGH (ref 0–41)
AST SERPL-CCNC: 79 U/L — HIGH (ref 0–41)
B-OH-BUTYR SERPL-SCNC: 0.9 MMOL/L — HIGH
BASE EXCESS BLDV CALC-SCNC: -6.4 MMOL/L — LOW (ref -2–3)
BASOPHILS # BLD AUTO: 0 K/UL — SIGNIFICANT CHANGE UP (ref 0–0.2)
BASOPHILS NFR BLD AUTO: 0 % — SIGNIFICANT CHANGE UP (ref 0–1)
BILIRUB DIRECT SERPL-MCNC: 1.2 MG/DL — HIGH (ref 0–0.3)
BILIRUB INDIRECT FLD-MCNC: 2.1 MG/DL — HIGH (ref 0.2–1.2)
BILIRUB SERPL-MCNC: 3.2 MG/DL — HIGH (ref 0.2–1.2)
BILIRUB SERPL-MCNC: 3.3 MG/DL — HIGH (ref 0.2–1.2)
BUN SERPL-MCNC: 4 MG/DL — LOW (ref 10–20)
BUN SERPL-MCNC: 5 MG/DL — LOW (ref 10–20)
BURR CELLS BLD QL SMEAR: PRESENT — SIGNIFICANT CHANGE UP
CA-I SERPL-SCNC: 1.03 MMOL/L — LOW (ref 1.15–1.33)
CALCIUM SERPL-MCNC: 8.2 MG/DL — LOW (ref 8.4–10.5)
CALCIUM SERPL-MCNC: 9.1 MG/DL — SIGNIFICANT CHANGE UP (ref 8.4–10.5)
CHLORIDE SERPL-SCNC: 104 MMOL/L — SIGNIFICANT CHANGE UP (ref 98–110)
CHLORIDE SERPL-SCNC: 99 MMOL/L — SIGNIFICANT CHANGE UP (ref 98–110)
CO2 SERPL-SCNC: 16 MMOL/L — LOW (ref 17–32)
CO2 SERPL-SCNC: 17 MMOL/L — SIGNIFICANT CHANGE UP (ref 17–32)
CREAT SERPL-MCNC: 1 MG/DL — SIGNIFICANT CHANGE UP (ref 0.7–1.5)
CREAT SERPL-MCNC: 1.3 MG/DL — SIGNIFICANT CHANGE UP (ref 0.7–1.5)
EGFR: 61 ML/MIN/1.73M2 — SIGNIFICANT CHANGE UP
EGFR: 61 ML/MIN/1.73M2 — SIGNIFICANT CHANGE UP
EGFR: 84 ML/MIN/1.73M2 — SIGNIFICANT CHANGE UP
EGFR: 84 ML/MIN/1.73M2 — SIGNIFICANT CHANGE UP
EOSINOPHIL # BLD AUTO: 0 K/UL — SIGNIFICANT CHANGE UP (ref 0–0.7)
EOSINOPHIL NFR BLD AUTO: 0 % — SIGNIFICANT CHANGE UP (ref 0–8)
ETHANOL SERPL-MCNC: 253 MG/DL — HIGH
GAS PNL BLDV: 138 MMOL/L — SIGNIFICANT CHANGE UP (ref 136–145)
GAS PNL BLDV: SIGNIFICANT CHANGE UP
GIANT PLATELETS BLD QL SMEAR: PRESENT — SIGNIFICANT CHANGE UP
GLUCOSE BLDC GLUCOMTR-MCNC: 112 MG/DL — HIGH (ref 70–99)
GLUCOSE SERPL-MCNC: 104 MG/DL — HIGH (ref 70–99)
GLUCOSE SERPL-MCNC: 117 MG/DL — HIGH (ref 70–99)
HCO3 BLDV-SCNC: 18 MMOL/L — LOW (ref 22–29)
HCT VFR BLD CALC: 37 % — LOW (ref 42–52)
HCT VFR BLDA CALC: 41 % — SIGNIFICANT CHANGE UP (ref 39–51)
HGB BLD CALC-MCNC: 13.7 G/DL — SIGNIFICANT CHANGE UP (ref 12.6–17.4)
HGB BLD-MCNC: 12.8 G/DL — LOW (ref 14–18)
LACTATE BLDV-MCNC: 12.2 MMOL/L — CRITICAL HIGH (ref 0.5–2)
LACTATE SERPL-SCNC: 10.3 MMOL/L — CRITICAL HIGH (ref 0.7–2)
LACTATE SERPL-SCNC: 6.2 MMOL/L — CRITICAL HIGH (ref 0.7–2)
LIDOCAIN IGE QN: 23 U/L — SIGNIFICANT CHANGE UP (ref 7–60)
LYMPHOCYTES # BLD AUTO: 0.74 K/UL — LOW (ref 1.2–3.4)
LYMPHOCYTES # BLD AUTO: 13.3 % — LOW (ref 20.5–51.1)
MACROCYTES BLD QL: SIGNIFICANT CHANGE UP
MAGNESIUM SERPL-MCNC: 1.3 MG/DL — LOW (ref 1.8–2.4)
MAGNESIUM SERPL-MCNC: 1.6 MG/DL — LOW (ref 1.8–2.4)
MANUAL SMEAR VERIFICATION: SIGNIFICANT CHANGE UP
MCHC RBC-ENTMCNC: 34.6 G/DL — SIGNIFICANT CHANGE UP (ref 32–37)
MCHC RBC-ENTMCNC: 36.5 PG — HIGH (ref 27–31)
MCV RBC AUTO: 105.4 FL — HIGH (ref 80–94)
METAMYELOCYTES # FLD: 0.9 % — HIGH (ref 0–0)
METAMYELOCYTES NFR BLD: 0.9 % — HIGH (ref 0–0)
MONOCYTES # BLD AUTO: 0.29 K/UL — SIGNIFICANT CHANGE UP (ref 0.1–0.6)
MONOCYTES NFR BLD AUTO: 5.3 % — SIGNIFICANT CHANGE UP (ref 1.7–9.3)
NEUTROPHILS # BLD AUTO: 4.38 K/UL — SIGNIFICANT CHANGE UP (ref 1.4–6.5)
NEUTROPHILS NFR BLD AUTO: 78.7 % — HIGH (ref 42.2–75.2)
OVALOCYTES BLD QL SMEAR: SLIGHT — SIGNIFICANT CHANGE UP
PCO2 BLDV: 33 MMHG — LOW (ref 42–55)
PH BLDV: 7.35 — SIGNIFICANT CHANGE UP (ref 7.32–7.43)
PHOSPHATE SERPL-MCNC: 1.8 MG/DL — LOW (ref 2.1–4.9)
PLAT MORPH BLD: ABNORMAL
PLATELET # BLD AUTO: 77 K/UL — LOW (ref 130–400)
PMV BLD: 10.8 FL — HIGH (ref 7.4–10.4)
PO2 BLDV: 66 MMHG — HIGH (ref 25–45)
POIKILOCYTOSIS BLD QL AUTO: SIGNIFICANT CHANGE UP
POLYCHROMASIA BLD QL SMEAR: SLIGHT — SIGNIFICANT CHANGE UP
POTASSIUM BLDV-SCNC: 4.1 MMOL/L — SIGNIFICANT CHANGE UP (ref 3.5–5.1)
POTASSIUM SERPL-MCNC: 4.1 MMOL/L — SIGNIFICANT CHANGE UP (ref 3.5–5)
POTASSIUM SERPL-MCNC: 4.1 MMOL/L — SIGNIFICANT CHANGE UP (ref 3.5–5)
POTASSIUM SERPL-SCNC: 4.1 MMOL/L — SIGNIFICANT CHANGE UP (ref 3.5–5)
POTASSIUM SERPL-SCNC: 4.1 MMOL/L — SIGNIFICANT CHANGE UP (ref 3.5–5)
PROT SERPL-MCNC: 6.3 G/DL — SIGNIFICANT CHANGE UP (ref 6–8)
PROT SERPL-MCNC: 7.1 G/DL — SIGNIFICANT CHANGE UP (ref 6–8)
RBC # BLD: 3.51 M/UL — LOW (ref 4.7–6.1)
RBC # FLD: 15.3 % — HIGH (ref 11.5–14.5)
RBC BLD AUTO: ABNORMAL
SAO2 % BLDV: 90.3 % — HIGH (ref 67–88)
SMUDGE CELLS # BLD: PRESENT — SIGNIFICANT CHANGE UP
SODIUM SERPL-SCNC: 141 MMOL/L — SIGNIFICANT CHANGE UP (ref 135–146)
SODIUM SERPL-SCNC: 144 MMOL/L — SIGNIFICANT CHANGE UP (ref 135–146)
TROPONIN T, HIGH SENSITIVITY RESULT: 28 NG/L — HIGH (ref 6–21)
TROPONIN T, HIGH SENSITIVITY RESULT: 33 NG/L — HIGH (ref 6–21)
VARIANT LYMPHS # BLD: 1.8 % — SIGNIFICANT CHANGE UP (ref 0–5)
VARIANT LYMPHS NFR BLD MANUAL: 1.8 % — SIGNIFICANT CHANGE UP (ref 0–5)
WBC # BLD: 5.56 K/UL — SIGNIFICANT CHANGE UP (ref 4.8–10.8)
WBC # FLD AUTO: 5.56 K/UL — SIGNIFICANT CHANGE UP (ref 4.8–10.8)

## 2025-04-13 PROCEDURE — 83605 ASSAY OF LACTIC ACID: CPT

## 2025-04-13 PROCEDURE — 80048 BASIC METABOLIC PNL TOTAL CA: CPT

## 2025-04-13 PROCEDURE — 99222 1ST HOSP IP/OBS MODERATE 55: CPT

## 2025-04-13 PROCEDURE — 82607 VITAMIN B-12: CPT

## 2025-04-13 PROCEDURE — 82746 ASSAY OF FOLIC ACID SERUM: CPT

## 2025-04-13 PROCEDURE — 0225U NFCT DS DNA&RNA 21 SARSCOV2: CPT

## 2025-04-13 PROCEDURE — 83735 ASSAY OF MAGNESIUM: CPT

## 2025-04-13 PROCEDURE — 80076 HEPATIC FUNCTION PANEL: CPT

## 2025-04-13 PROCEDURE — 87641 MR-STAPH DNA AMP PROBE: CPT

## 2025-04-13 PROCEDURE — 87040 BLOOD CULTURE FOR BACTERIA: CPT

## 2025-04-13 PROCEDURE — 93970 EXTREMITY STUDY: CPT

## 2025-04-13 PROCEDURE — 82962 GLUCOSE BLOOD TEST: CPT

## 2025-04-13 PROCEDURE — 93010 ELECTROCARDIOGRAM REPORT: CPT

## 2025-04-13 PROCEDURE — 84484 ASSAY OF TROPONIN QUANT: CPT

## 2025-04-13 PROCEDURE — 82247 BILIRUBIN TOTAL: CPT

## 2025-04-13 PROCEDURE — 85025 COMPLETE CBC W/AUTO DIFF WBC: CPT

## 2025-04-13 PROCEDURE — 71045 X-RAY EXAM CHEST 1 VIEW: CPT | Mod: 26

## 2025-04-13 PROCEDURE — 87640 STAPH A DNA AMP PROBE: CPT

## 2025-04-13 PROCEDURE — 84100 ASSAY OF PHOSPHORUS: CPT

## 2025-04-13 PROCEDURE — 85730 THROMBOPLASTIN TIME PARTIAL: CPT

## 2025-04-13 PROCEDURE — 85610 PROTHROMBIN TIME: CPT

## 2025-04-13 PROCEDURE — 82248 BILIRUBIN DIRECT: CPT

## 2025-04-13 PROCEDURE — 84145 PROCALCITONIN (PCT): CPT

## 2025-04-13 PROCEDURE — 97162 PT EVAL MOD COMPLEX 30 MIN: CPT | Mod: GP

## 2025-04-13 PROCEDURE — 93970 EXTREMITY STUDY: CPT | Mod: 26

## 2025-04-13 PROCEDURE — 93005 ELECTROCARDIOGRAM TRACING: CPT

## 2025-04-13 PROCEDURE — 80053 COMPREHEN METABOLIC PANEL: CPT

## 2025-04-13 PROCEDURE — 36415 COLL VENOUS BLD VENIPUNCTURE: CPT

## 2025-04-13 PROCEDURE — 99291 CRITICAL CARE FIRST HOUR: CPT | Mod: FS

## 2025-04-13 RX ORDER — GLUCAGON 3 MG/1
1 POWDER NASAL ONCE
Refills: 0 | Status: DISCONTINUED | OUTPATIENT
Start: 2025-04-13 | End: 2025-04-18

## 2025-04-13 RX ORDER — SODIUM CHLORIDE 9 G/1000ML
1000 INJECTION, SOLUTION INTRAVENOUS
Refills: 0 | Status: DISCONTINUED | OUTPATIENT
Start: 2025-04-13 | End: 2025-04-13

## 2025-04-13 RX ORDER — METFORMIN HYDROCHLORIDE 850 MG/1
1 TABLET ORAL
Refills: 0 | DISCHARGE

## 2025-04-13 RX ORDER — ACETAMINOPHEN 500 MG/5ML
2 LIQUID (ML) ORAL
Refills: 0 | DISCHARGE

## 2025-04-13 RX ORDER — ACETAMINOPHEN 500 MG/5ML
650 LIQUID (ML) ORAL ONCE
Refills: 0 | Status: COMPLETED | OUTPATIENT
Start: 2025-04-13 | End: 2025-04-13

## 2025-04-13 RX ORDER — DEXTROSE 50 % IN WATER 50 %
25 SYRINGE (ML) INTRAVENOUS ONCE
Refills: 0 | Status: DISCONTINUED | OUTPATIENT
Start: 2025-04-13 | End: 2025-04-18

## 2025-04-13 RX ORDER — NALTREXONE HYDROCHLORIDE 50 MG/1
50 TABLET, FILM COATED ORAL DAILY
Refills: 0 | Status: DISCONTINUED | OUTPATIENT
Start: 2025-04-13 | End: 2025-04-14

## 2025-04-13 RX ORDER — FOLIC ACID 1 MG/1
1 TABLET ORAL DAILY
Refills: 0 | Status: DISCONTINUED | OUTPATIENT
Start: 2025-04-13 | End: 2025-04-18

## 2025-04-13 RX ORDER — DEXTROSE 50 % IN WATER 50 %
15 SYRINGE (ML) INTRAVENOUS ONCE
Refills: 0 | Status: DISCONTINUED | OUTPATIENT
Start: 2025-04-13 | End: 2025-04-18

## 2025-04-13 RX ORDER — SODIUM CHLORIDE 9 G/1000ML
1000 INJECTION, SOLUTION INTRAVENOUS
Refills: 0 | Status: DISCONTINUED | OUTPATIENT
Start: 2025-04-13 | End: 2025-04-18

## 2025-04-13 RX ORDER — INFLUENZA A VIRUS A/IDAHO/07/2018 (H1N1) ANTIGEN (MDCK CELL DERIVED, PROPIOLACTONE INACTIVATED, INFLUENZA A VIRUS A/INDIANA/08/2018 (H3N2) ANTIGEN (MDCK CELL DERIVED, PROPIOLACTONE INACTIVATED), INFLUENZA B VIRUS B/SINGAPORE/INFTT-16-0610/2016 ANTIGEN (MDCK CELL DERIVED, PROPIOLACTONE INACTIVATED), INFLUENZA B VIRUS B/IOWA/06/2017 ANTIGEN (MDCK CELL DERIVED, PROPIOLACTONE INACTIVATED) 15; 15; 15; 15 UG/.5ML; UG/.5ML; UG/.5ML; UG/.5ML
0.5 INJECTION, SUSPENSION INTRAMUSCULAR ONCE
Refills: 0 | Status: DISCONTINUED | OUTPATIENT
Start: 2025-04-13 | End: 2025-04-18

## 2025-04-13 RX ORDER — ONDANSETRON HCL/PF 4 MG/2 ML
4 VIAL (ML) INJECTION EVERY 8 HOURS
Refills: 0 | Status: DISCONTINUED | OUTPATIENT
Start: 2025-04-13 | End: 2025-04-18

## 2025-04-13 RX ORDER — DIAZEPAM 2 MG/1
TABLET ORAL
Refills: 0 | Status: DISCONTINUED | OUTPATIENT
Start: 2025-04-13 | End: 2025-04-13

## 2025-04-13 RX ORDER — LORAZEPAM 4 MG/ML
2 VIAL (ML) INJECTION
Refills: 0 | Status: DISCONTINUED | OUTPATIENT
Start: 2025-04-13 | End: 2025-04-18

## 2025-04-13 RX ORDER — DEXTROSE 50 % IN WATER 50 %
12.5 SYRINGE (ML) INTRAVENOUS ONCE
Refills: 0 | Status: DISCONTINUED | OUTPATIENT
Start: 2025-04-13 | End: 2025-04-18

## 2025-04-13 RX ORDER — FOLIC ACID 1 MG/1
1 TABLET ORAL ONCE
Refills: 0 | Status: COMPLETED | OUTPATIENT
Start: 2025-04-13 | End: 2025-04-13

## 2025-04-13 RX ORDER — TAMSULOSIN HYDROCHLORIDE 0.4 MG/1
0.4 CAPSULE ORAL AT BEDTIME
Refills: 0 | Status: DISCONTINUED | OUTPATIENT
Start: 2025-04-13 | End: 2025-04-18

## 2025-04-13 RX ORDER — GABAPENTIN 400 MG/1
1 CAPSULE ORAL
Refills: 0 | DISCHARGE

## 2025-04-13 RX ORDER — MAGNESIUM SULFATE 500 MG/ML
2 SYRINGE (ML) INJECTION ONCE
Refills: 0 | Status: COMPLETED | OUTPATIENT
Start: 2025-04-13 | End: 2025-04-13

## 2025-04-13 RX ORDER — COLCHICINE 0.6 MG/1
0.6 TABLET, FILM COATED ORAL
Refills: 0 | Status: DISCONTINUED | OUTPATIENT
Start: 2025-04-13 | End: 2025-04-13

## 2025-04-13 RX ORDER — FOLIC ACID 1 MG/1
1 TABLET ORAL
Refills: 0 | DISCHARGE

## 2025-04-13 RX ORDER — LORAZEPAM 4 MG/ML
2 VIAL (ML) INJECTION EVERY 4 HOURS
Refills: 0 | Status: DISCONTINUED | OUTPATIENT
Start: 2025-04-13 | End: 2025-04-14

## 2025-04-13 RX ORDER — METOPROLOL SUCCINATE 50 MG/1
25 TABLET, EXTENDED RELEASE ORAL DAILY
Refills: 0 | Status: DISCONTINUED | OUTPATIENT
Start: 2025-04-13 | End: 2025-04-18

## 2025-04-13 RX ORDER — HEPARIN SODIUM 1000 [USP'U]/ML
5000 INJECTION INTRAVENOUS; SUBCUTANEOUS EVERY 12 HOURS
Refills: 0 | Status: DISCONTINUED | OUTPATIENT
Start: 2025-04-13 | End: 2025-04-14

## 2025-04-13 RX ORDER — LIDOCAINE HYDROCHLORIDE 20 MG/ML
1 JELLY TOPICAL ONCE
Refills: 0 | Status: COMPLETED | OUTPATIENT
Start: 2025-04-13 | End: 2025-04-13

## 2025-04-13 RX ORDER — LORAZEPAM 4 MG/ML
2 VIAL (ML) INJECTION
Refills: 0 | Status: DISCONTINUED | OUTPATIENT
Start: 2025-04-13 | End: 2025-04-13

## 2025-04-13 RX ORDER — NALTREXONE HYDROCHLORIDE 50 MG/1
1 TABLET, FILM COATED ORAL
Refills: 0 | DISCHARGE

## 2025-04-13 RX ORDER — METOPROLOL SUCCINATE 50 MG/1
1 TABLET, EXTENDED RELEASE ORAL
Refills: 0 | DISCHARGE

## 2025-04-13 RX ORDER — EMPAGLIFLOZIN 25 MG/1
1 TABLET, FILM COATED ORAL
Refills: 0 | DISCHARGE

## 2025-04-13 RX ORDER — DIAZEPAM 2 MG/1
10 TABLET ORAL EVERY 6 HOURS
Refills: 0 | Status: DISCONTINUED | OUTPATIENT
Start: 2025-04-13 | End: 2025-04-13

## 2025-04-13 RX ORDER — B1/B2/B3/B5/B6/B12/VIT C/FOLIC 500-0.5 MG
1 TABLET ORAL DAILY
Refills: 0 | Status: DISCONTINUED | OUTPATIENT
Start: 2025-04-13 | End: 2025-04-18

## 2025-04-13 RX ORDER — CYANOCOBALAMIN 1000 UG/ML
1000 INJECTION INTRAMUSCULAR; SUBCUTANEOUS DAILY
Refills: 0 | Status: DISCONTINUED | OUTPATIENT
Start: 2025-04-13 | End: 2025-04-18

## 2025-04-13 RX ORDER — METHOCARBAMOL 500 MG/1
500 TABLET, FILM COATED ORAL DAILY
Refills: 0 | Status: DISCONTINUED | OUTPATIENT
Start: 2025-04-13 | End: 2025-04-18

## 2025-04-13 RX ORDER — LORAZEPAM 4 MG/ML
VIAL (ML) INJECTION
Refills: 0 | Status: DISCONTINUED | OUTPATIENT
Start: 2025-04-13 | End: 2025-04-14

## 2025-04-13 RX ORDER — B1/B2/B3/B5/B6/B12/VIT C/FOLIC 500-0.5 MG
1 TABLET ORAL
Refills: 0 | DISCHARGE

## 2025-04-13 RX ORDER — INSULIN LISPRO 100 U/ML
INJECTION, SOLUTION INTRAVENOUS; SUBCUTANEOUS
Refills: 0 | Status: DISCONTINUED | OUTPATIENT
Start: 2025-04-13 | End: 2025-04-18

## 2025-04-13 RX ORDER — CYANOCOBALAMIN 1000 UG/ML
1 INJECTION INTRAMUSCULAR; SUBCUTANEOUS
Refills: 0 | DISCHARGE

## 2025-04-13 RX ORDER — LORATADINE 5 MG/5ML
1 SOLUTION ORAL
Refills: 0 | DISCHARGE

## 2025-04-13 RX ORDER — COLCHICINE 0.6 MG/1
1 TABLET, FILM COATED ORAL
Refills: 0 | DISCHARGE

## 2025-04-13 RX ORDER — SODIUM CHLORIDE 9 G/1000ML
1000 INJECTION, SOLUTION INTRAVENOUS
Refills: 0 | Status: DISCONTINUED | OUTPATIENT
Start: 2025-04-13 | End: 2025-04-14

## 2025-04-13 RX ORDER — LORAZEPAM 4 MG/ML
1.5 VIAL (ML) INJECTION EVERY 4 HOURS
Refills: 0 | Status: DISCONTINUED | OUTPATIENT
Start: 2025-04-14 | End: 2025-04-14

## 2025-04-13 RX ORDER — DIAZEPAM 2 MG/1
5 TABLET ORAL ONCE
Refills: 0 | Status: DISCONTINUED | OUTPATIENT
Start: 2025-04-13 | End: 2025-04-13

## 2025-04-13 RX ORDER — TAMSULOSIN HYDROCHLORIDE 0.4 MG/1
1 CAPSULE ORAL
Refills: 0 | DISCHARGE

## 2025-04-13 RX ORDER — METHOCARBAMOL 500 MG/1
1 TABLET, FILM COATED ORAL
Refills: 0 | DISCHARGE

## 2025-04-13 RX ORDER — GABAPENTIN 400 MG/1
200 CAPSULE ORAL
Refills: 0 | Status: DISCONTINUED | OUTPATIENT
Start: 2025-04-13 | End: 2025-04-18

## 2025-04-13 RX ADMIN — Medication 1000 MILLILITER(S): at 09:17

## 2025-04-13 RX ADMIN — DIAZEPAM 10 MILLIGRAM(S): 2 TABLET ORAL at 12:54

## 2025-04-13 RX ADMIN — LIDOCAINE HYDROCHLORIDE 1 PATCH: 20 JELLY TOPICAL at 21:59

## 2025-04-13 RX ADMIN — Medication 650 MILLIGRAM(S): at 23:16

## 2025-04-13 RX ADMIN — Medication 105 MILLIGRAM(S): at 21:58

## 2025-04-13 RX ADMIN — Medication 2 MILLIGRAM(S): at 20:56

## 2025-04-13 RX ADMIN — Medication 2 MILLIGRAM(S): at 16:03

## 2025-04-13 RX ADMIN — Medication 25 GRAM(S): at 12:54

## 2025-04-13 RX ADMIN — Medication 2 MILLIGRAM(S): at 23:15

## 2025-04-13 RX ADMIN — GABAPENTIN 200 MILLIGRAM(S): 400 CAPSULE ORAL at 17:22

## 2025-04-13 RX ADMIN — DIAZEPAM 5 MILLIGRAM(S): 2 TABLET ORAL at 08:06

## 2025-04-13 RX ADMIN — Medication 1 TABLET(S): at 12:57

## 2025-04-13 RX ADMIN — TAMSULOSIN HYDROCHLORIDE 0.4 MILLIGRAM(S): 0.4 CAPSULE ORAL at 21:56

## 2025-04-13 RX ADMIN — Medication 650 MILLIGRAM(S): at 23:46

## 2025-04-13 RX ADMIN — Medication 1000 MILLILITER(S): at 08:02

## 2025-04-13 RX ADMIN — SODIUM CHLORIDE 1000 MILLILITER(S): 9 INJECTION, SOLUTION INTRAVENOUS at 09:38

## 2025-04-13 RX ADMIN — FOLIC ACID 1 MILLIGRAM(S): 1 TABLET ORAL at 08:11

## 2025-04-13 RX ADMIN — SODIUM CHLORIDE 100 MILLILITER(S): 9 INJECTION, SOLUTION INTRAVENOUS at 12:49

## 2025-04-13 RX ADMIN — CYANOCOBALAMIN 1000 MICROGRAM(S): 1000 INJECTION INTRAMUSCULAR; SUBCUTANEOUS at 12:57

## 2025-04-13 RX ADMIN — Medication 100 MILLIGRAM(S): at 08:11

## 2025-04-13 RX ADMIN — METHOCARBAMOL 500 MILLIGRAM(S): 500 TABLET, FILM COATED ORAL at 12:57

## 2025-04-13 RX ADMIN — NALTREXONE HYDROCHLORIDE 50 MILLIGRAM(S): 50 TABLET, FILM COATED ORAL at 14:42

## 2025-04-13 RX ADMIN — Medication 105 MILLIGRAM(S): at 16:00

## 2025-04-13 RX ADMIN — Medication 25 GRAM(S): at 09:05

## 2025-04-13 RX ADMIN — Medication 650 MILLIGRAM(S): at 12:57

## 2025-04-13 RX ADMIN — HEPARIN SODIUM 5000 UNIT(S): 1000 INJECTION INTRAVENOUS; SUBCUTANEOUS at 17:22

## 2025-04-13 RX ADMIN — Medication 40 MILLIGRAM(S): at 12:54

## 2025-04-13 NOTE — ED ADULT NURSE NOTE - NSFALLHARMRISKINTERV_ED_ALL_ED

## 2025-04-13 NOTE — H&P ADULT - NSHPPHYSICALEXAM_GEN_ALL_CORE
LOS:     VITALS:   T(C): 36.9 (04-13-25 @ 07:38), Max: 36.9 (04-13-25 @ 06:58)  HR: 116 (04-13-25 @ 10:33) (116 - 133)  BP: 132/62 (04-13-25 @ 10:33) (132/62 - 146/65)  RR: 18 (04-13-25 @ 10:33) (17 - 19)  SpO2: 97% (04-13-25 @ 10:33) (95% - 98%)    GENERAL: NAD, lying in bed comfortably  HEAD:  Atraumatic, Normocephalic  EYES: EOMI, PERRLA, conjunctiva and sclera clear  ENT: Moist mucous membranes  NECK: Supple, No JVD  CHEST/LUNG: Clear to auscultation bilaterally; No rales, rhonchi, wheezing, or rubs. Unlabored respirations  HEART: Regular rate and rhythm; No murmurs, rubs, or gallops  ABDOMEN: BSx4; Soft, nontender, nondistended  EXTREMITIES:  2+ Peripheral Pulses, brisk capillary refill. No clubbing, cyanosis, or edema  NERVOUS SYSTEM:  A&Ox3, no focal deficits   SKIN: No rashes or lesions LOS:     VITALS:   T(C): 36.9 (04-13-25 @ 07:38), Max: 36.9 (04-13-25 @ 06:58)  HR: 116 (04-13-25 @ 10:33) (116 - 133)  BP: 132/62 (04-13-25 @ 10:33) (132/62 - 146/65)  RR: 18 (04-13-25 @ 10:33) (17 - 19)  SpO2: 97% (04-13-25 @ 10:33) (95% - 98%)    GENERAL: sitting in bed c/o chest discomfort  HEAD:  Atraumatic, Normocephalic  EYES: EOMI, PERRLA, conjunctiva and sclera clear  ENT: Moist mucous membranes  CHEST/LUNG: Clear to auscultation bilaterally; No rales, rhonchi, wheezing, or rubs. Unlabored respirations  HEART: Regular rate and rhythm; No murmurs, rubs, or gallops  ABDOMEN: BSx4; Soft, nondistended  EXTREMITIES:  2+ Peripheral Pulses, brisk capillary refill. No clubbing, cyanosis, or edema  NERVOUS SYSTEM:  A&Ox3, no focal deficits

## 2025-04-13 NOTE — H&P ADULT - HISTORY OF PRESENT ILLNESS
64-year-old male with history of alcohol abuse, DVT/PE, hypertension, high cholesterol, diabetes, gout brought in by ambulance from [facility] [place] residence complaining of chest discomfort/palpitations after drinking 1 pint of vodka this morning.  Patient denies any shortness of breath, nausea, vomiting, diarrhea, abdominal pain, fever, chills, cough or weakness.    ED vitals: T 98.4, , /75, SpO2 98% on RA, RR 17  Labs: wbc 12.8, .4, platelet 77, CO2 16, AG 29, Tbili 3.2, HIQ837, ast 79, alt 24, trop33, BHB 0.9,   VBG pH 7.35, pCO2 33 HCO3 18, Lactate 12.2  blood alcohol level 253  EKG- sinus tachycardia  CXR unremarkable    Patient admitted to SDU for further management

## 2025-04-13 NOTE — ED ADULT TRIAGE NOTE - CHIEF COMPLAINT QUOTE
pt bibems from [facility] c/o chest pain x1 day. denies any n/v/d. pt endorses drinking "some alcohol" today. pt with hx of ETOH. FS 97 in triage

## 2025-04-13 NOTE — H&P ADULT - ATTENDING COMMENTS
My note supersedes all residents notes that I sign, My correction for their notes are in my notes   Pt seen in ED main 3-b   On exam  General: awake, alert, NAD, chronic ill appearance  Lungs:  clear to ausculations b/l, normal resp effort  Heart: regular ryhthm   Abdomen: soft, non tender non distended  Ext: no edema, can move all  his extremities , mild hand shaking on outstretched hands    64-year-old male with history of alcohol abuse, DVT/PE, hypertension, high cholesterol, diabetes, gout brought in by ambulance from [facility] residence complaining of chest discomfort/palpitations after drinking 1 pint of vodka this morning.  Patient denies any shortness of breath, nausea, vomiting, diarrhea, abdominal pain, fever, chills, cough or weakness.    [] Alcohol Ketoacidosis/ Lactic acidosis   [] Alcohol  Withdrawal  [] Chest pain  [] Possible Folate and thiamin deficiency   [] Hypomagnesemia   [] Abnormal LFTs liklky alcohol induced   -VBG pH 7.35, pCO2 33 HCO3 18, Lactate 12.2 --> 10.3 ( similar values in Jan and March )    -AG 29, Tbili 3.2, DGN211, ast 79, alt 24, trop33  --> 28  ( similar to last month) , BHB 0.9  -blood alcohol level 253  -EKG- Sinus tach, low voltage ( similar to last month)   during my encounter the patient stated that chest pain has been resolved   -s/p 3L bolus NS,  -c/w IVF  -thiamine 500mg IV for 3-5days and folic acid  CIWA ,trend lactate  , CIWAmultivitamin,  monitot electrolytes and vitals signs. Falls, seizure and aspiration precautions.  -addiction and catch team  give Mg and monitor    SDU   monitor LFts and INR daily - prior RUQ and hepatitis panel  last months were negative - repeat work up if not improving   check U TOX     #Hx DVT/PE/ Thrombocytopenia  - Duplex 1/20/25 - b/l chronic popliteal DVT  - repeat duplex 3/14: Chronic appearing right popliteal vein DVT and left common femoral vein, deep femoral vein, and popliteal vein DVTs  - on previous dc was off coumadin b/c of platelet <50 - per [facility] documentation was restarted on coumadin. Unclear why on coumadin and if there is a specific need for him to be on coumadin  -was on AC on previous admission but discontinued due to decreasing platelets  -thrombocytopenia could be induced alcohol use  - Hgb 11.6  -  >> 54 >> 45 >> 41 >> 42k >> 40k >> 50k  >55>53>77  -trend platelet count   -dvt ppx  -repeat duplex    #ANDRZEJ, likely prerenal  -cr 1.3, baseline 0.9-1  -c/w IVF  -trend cr   monitor electrolytes   strickt I/Os  avoid nephrotoxins   trend creatinine  consult nephrology if not improving or if lactic is not improving as well given metformin hx     #HTN  -c/w toprol 25mg    #DM  -on metformin 500mg bid   -FS goal 140-180  -ISS     # gout,   -on Colchicine  -hold for now due to andrzej and lactic acidosis    # BPH cont Flomax    #GI ppx  -pantoprazole IV    DVTppx: heparin  Dispo: SDU My note supersedes all residents notes that I sign, My correction for their notes are in my notes   Pt seen in ED main 3-b   On exam  General: awake, alert, NAD, chronic ill appearance  Lungs:  clear to ausculations b/l, normal resp effort  Heart: regular ryhthm   Abdomen: soft, non tender non distended  Ext: no edema, can move all  his extremities , mild hand shaking on outstretched hands    64-year-old male with history of alcohol abuse, DVT/PE, hypertension, high cholesterol, diabetes, gout brought in by ambulance from [facility] residence complaining of chest discomfort/palpitations after drinking 1 pint of vodka this morning.  Patient denies any shortness of breath, nausea, vomiting, diarrhea, abdominal pain, fever, chills, cough or weakness.    [] Alcohol Ketoacidosis/ Lactic acidosis   [] Alcohol  Withdrawal  [] Chest pain  [] Possible Folate and thiamin deficiency   [] Hypomagnesemia   [] Abnormal LFTs liklky alcohol induced   -VBG pH 7.35, pCO2 33 HCO3 18, Lactate 12.2 --> 10.3 ( similar values in Jan and March )    -AG 29, Tbili 3.2, WDB327, ast 79, alt 24, trop33  --> 28  ( similar to last month) , BHB 0.9  -blood alcohol level 253  -EKG- Sinus tach, low voltage ( similar to last month)   during my encounter the patient stated that chest pain has been resolved   -s/p 3L bolus NS,  -c/w IVF  -thiamine 500mg IV for 3-5days and folic acid  CIWA ,trend lactate  , CIWAmultivitamin,  monitot electrolytes and vitals signs. Falls, seizure and aspiration precautions.  -addiction and catch team  give Mg and monitor    SDU   monitor LFts and INR daily - prior RUQ and hepatitis panel  last months were negative - repeat work up if not improving   check U TOX     #Hx DVT/PE/ Thrombocytopenia  - Duplex 1/20/25 - b/l chronic popliteal DVT  - repeat duplex 3/14: Chronic appearing right popliteal vein DVT and left common femoral vein, deep femoral vein, and popliteal vein DVTs  - on previous dc was off coumadin b/c of platelet <50 - per [facility]'s documentation was restarted on coumadin. Unclear why on coumadin and if there is a specific need for him to be on coumadin  -was on AC on previous admission but discontinued due to decreasing platelets  -thrombocytopenia could be induced alcohol use  - Hgb 11.6  -  >> 54 >> 45 >> 41 >> 42k >> 40k >> 50k  >55>53>77  -trend platelet count   -dvt ppx  -repeat duplex  echo last month 3/2025 -  EF of 60%. . Normal right ventricular size and function.  No significant valve disease.. Compared to study dated 12/22/22, there is no significant change.      #ANDRZEJ, likely prerenal  -cr 1.3, baseline 0.9-1  -c/w IVF  -trend cr   monitor electrolytes   strickt I/Os  avoid nephrotoxins   trend creatinine  consult nephrology if not improving or if lactic is not improving as well given metformin hx     #HTN  -c/w toprol 25mg    #DM  -on metformin 500mg bid   -FS goal 140-180  -ISS     # gout,   -on Colchicine  -hold for now due to andrzej and lactic acidosis    # BPH cont Flomax    #GI ppx  -pantoprazole IV    DVTppx: heparin  Dispo: SDU

## 2025-04-13 NOTE — ED PROVIDER NOTE - PHYSICAL EXAMINATION
Vital Signs: I have reviewed the initial vital signs.  Constitutional: NAD, well-nourished, appears stated age, no acute distress.[place]  HEENT: Airway patent, dry MM, no erythema/swelling/deformity of oral structures. EOMI, PERRLA.  CV: tachycardic, 2+ b/l DP and radial pulses equal.  Lungs: BCTA, no increased WOB.  ABD: NT, ND, no guarding or rebound, no pulsatile mass, no hernias.   MSK: Neck supple, nontender, nl ROM, no stepoff. Chest nontender. Back nontender in TLS spine or to b/l bony structures or flanks. Ext nontender, nl rom, no deformity.   INTEG: Skin warm, dry, no rash.  NEURO: A&Ox3, normal strength, nl sensation throughout, normal speech. +tremor  PSYCH: Calm, cooperative, normal affect and interaction.

## 2025-04-13 NOTE — PATIENT PROFILE ADULT - FALL HARM RISK - HARM RISK INTERVENTIONS
Assistance with ambulation/Assistance OOB with selected safe patient handling equipment/Communicate Risk of Fall with Harm to all staff/Discuss with provider need for PT consult/Monitor for mental status changes/Monitor gait and stability/Provide patient with walking aids - walker, cane, crutches/Reinforce activity limits and safety measures with patient and family/Review medications for side effects contributing to fall risk/Sit up slowly, dangle for a short time, stand at bedside before walking/Tailored Fall Risk Interventions/Toileting schedule using arm’s reach rule for commode and bathroom/Use of alarms - bed, chair and/or voice tab/Visual Cue: Yellow wristband and red socks/Bed in lowest position, wheels locked, appropriate side rails in place/Call bell, personal items and telephone in reach/Instruct patient to call for assistance before getting out of bed or chair/Non-slip footwear when patient is out of bed/[place] to call system/Physically safe environment - no spills, clutter or unnecessary equipment/Purposeful Proactive Rounding/Room/bathroom lighting operational, light cord in reach

## 2025-04-13 NOTE — ED PROVIDER NOTE - ATTENDING APP SHARED VISIT CONTRIBUTION OF CARE
I have personally performed a history and physical exam on this patient and personally directed the management of the patient. Attending Statement: I have personally provided the amount of critical care time documented below excluding time spent on separate procedures.     Critical Care Time Spent (min) Must be 30 or more minutes to qualify: 35.

## 2025-04-13 NOTE — CONSULT NOTE ADULT - ASSESSMENT
64y male with hx of alcohol use disorder and tobacco use disorder (in remission), PMH of DVT/PE, BPH, HTN, HLD, DMII, Gout, and Decompensated liver cirrhosis (MELD 3-17) and alcoholic hepatitis, and PPH of anxiety who presents with complaints of palpitations, chest discomfort ("pounding in my chest"), and nausea, admitted for alcoholic ketoacidosis. He reports nausea and palpitations a few hours after drinking 1 bottle of vodka without eating. Addiction Medicine consulted for assistance with alcohol use disorder and management of alcohol withdrawal.    #Alcohol withdrawal:  - CIWA low on assessment, agree with current Valium taper as ordered today. Will continue to evaluate daily to determine if dosing adjustments need to be made  - for breakthrough symptoms of withdrawal, recommend Diazepam 10 mg Q4h prn CIWA >7   - keep Mg>2, K>4   - continue folate and multivitamin, agree with high dose thiamine recommendation  - hold benzodiazepines for sedation/respiratory depression    - please offer PRN medications for supportive management of withdrawal including:  - zofran q6 hr prn nausea (hold for QTc>500)  - tylenol 650 mg q6h prn mild/moderate pain  - melatonin 3 mg nightly prn for insomnia  - CATCH team to assist with substance use aftercare options for outpatient programs and will have ongoing discussions regarding medications for AUD such as naltrexone tomorrow with the addiction medicine team    #Wernicke's Encephalopathy:   - given concerns for ongoing ophthalmoplegia and hx of gait instability clinical concern for Wernicke's encephalopathy. Would recommend IV thiamine 500 mg q 8h for 3 days, followed by 250 mg IV daily up to additional 5 days per [employer] recommendations.   - Can also opt to switch to oral thiamine 100 mg daily after 3 days of IV repletion.   - keep Mg >2, K>4     #Alcohol induced Hepatic Steatosis:  - RUQ from previous admission with hepatic steatosis  - counseling for alcohol cessation as above with [facility]   - would recommend testing for additional hepatology testing including Hepatitis B surface antigen (HBsAg), hepatitis B surface antibody (anti-HBs), total hepatitis B core antibody (anti-HBc), Hep A IGG and Hep C Antibody  - patient should be monitored by PCP or GI specialist annually for noninvasive liver monitoring given that up to 20% of patients with steatosis develop fibrosis.  - if following up imaging concerning for cirrhosis, patient should be monitored with noninvasive imaging and AFP every 6 months for HCC screening     Thank you for this consult. Rest of care per primary. Addiction medicine will continue to follow. Please reach out with any questions or concerns via TEAMS.    64y male with hx of alcohol use disorder and tobacco use disorder (in remission), PMH of DVT/PE, BPH, HTN, HLD, DMII, Gout, and Decompensated liver cirrhosis (MELD 3-17) and alcoholic hepatitis, and PPH of anxiety who presents with complaints of palpitations, chest discomfort ("pounding in my chest"), and nausea, admitted for alcoholic ketoacidosis. He reports nausea and palpitations a few hours after drinking 1 bottle of vodka without eating. Addiction Medicine consulted for assistance with alcohol use disorder and management of alcohol withdrawal.    #Alcohol withdrawal:  - CIWA is 5 on assessment, agree with current Valium taper as ordered today. Will continue to evaluate daily to determine if dosing adjustments need to be made  - for breakthrough symptoms of withdrawal, recommend Diazepam 10 mg Q4h prn CIWA >7   - keep Mg>2, K>4   - continue folate and multivitamin  - hold benzodiazepines for sedation/respiratory depression    - please offer PRN medications for supportive management of withdrawal including:  - zofran q6 hr prn nausea (hold for QTc>500)  - tylenol 650 mg q6h prn mild/moderate pain  - melatonin 3 mg nightly prn for insomnia  - CATCH team to assist with substance use aftercare options for outpatient programs and will have ongoing discussions regarding medications for AUD such as naltrexone tomorrow with the addiction medicine team    #Wernicke's Encephalopathy prevention:  - Would recommend IV thiamine 500 mg q 8h for 3 days, followed by 250 mg IV daily up to additional 5 days per [employer] recommendations.   - Can also opt to switch to oral thiamine 100 mg daily after 3 days of IV repletion.   - keep Mg >2, K>4     #Alcohol induced Hepatic Steatosis:  - CTAP with IV contrast in 3/12/25 showed liver steatosis, mild caudate lobe hypertrophy, unchanged.   - counseling for alcohol cessation as above with [facility]   - 3/14/25 HepBcore IgM, Hep B surface antigen negative, Hep A IgM negative, Hep C ratio 0.28, Hep C virus nonreactive   - patient should be monitored by PCP or GI specialist annually for noninvasive liver monitoring given that up to 20% of patients with steatosis develop fibrosis.  - if following up imaging concerning for cirrhosis, patient should be monitored with noninvasive imaging and AFP every 6 months for HCC screening     Thank you for this consult. Rest of care per primary. Addiction medicine will continue to follow. Please reach out with any questions or concerns via TEAMS.       >>>>>>>>>>>>INCOMPLETE NOTE>>>>>>>>>>>>INCOMPLETE NOTE>>>>>>>>>>>>INCOMPLETE NOTE>>>>>>>>>>>>INCOMPLETE NOTE>>>>>>>>>>>>INCOMPLETE NOTE   This is a 64y man with hx of alcohol use disorder and tobacco use disorder (in remission), PMH of DVT/PE, BPH, HTN, HLD, DMII, Gout, and Decompensated liver cirrhosis and alcoholic hepatitis, and PPH of anxiety who was brought in by ambulance from [facility]'s residence complaining of chest discomfort/palpitations. Addiction Medicine consulted for assistance with alcohol use disorder and management of alcohol withdrawal.     #Alcohol withdrawal:  - CIWA is 5 on assessment  - Recommend switching to Ativan standing taper (2mg PO q4h for 6 doses, 1.5mg PO q4h for 6 doses, 1mg PO q4h for 6 doses, 0.5mg PO q4h for 6 doses) due to elevated liver enzymes   - for breakthrough symptoms of withdrawal, recommend Ativan 2mg PO q2h PRN for CIWA >7   - keep Mg>2, K>4   - continue folate and multivitamin  - hold benzodiazepines for sedation/respiratory depression   - CATCH team to assist with substance use aftercare options for outpatient programs and will have ongoing discussions regarding medications for AUD such as naltrexone tomorrow with the addiction medicine team    #Wernicke's Encephalopathy prevention:  - Would recommend IV thiamine 500 mg q 8h for 3 days, followed by 250 mg IV daily up to additional 5 days per [employer] recommendations.   - Can also opt to switch to oral thiamine 100 mg daily after 3 days of IV repletion.   - keep Mg >2, K>4     #Alcohol induced Hepatic Steatosis:  - CTAP with IV contrast in 3/12/25 showed liver steatosis, mild caudate lobe hypertrophy, unchanged.   - counseling for alcohol cessation as above with [facility]   - 3/14/25 HepBcore IgM, Hep B surface antigen negative, Hep A IgM negative, Hep C ratio 0.28, Hep C virus nonreactive   - patient should be monitored by PCP or GI specialist annually for noninvasive liver monitoring given that up to 20% of patients with steatosis develop fibrosis.  - if following up imaging concerning for cirrhosis, patient should be monitored with noninvasive imaging and AFP every 6 months for HCC screening     Discussed with Dr. Mcdaniel

## 2025-04-13 NOTE — CONSULT NOTE ADULT - SUBJECTIVE AND OBJECTIVE BOX
Pt interviewed, examined and EMR chart reviewed.    64yMale with hx of     Addiction Medicine consulte for assistance with opioid withdrawal and methadone managment.     Opioids  Heroin:  First use any opioid:   Synthetic or semisynthetic opioids:    Last use  Age became problematic:   Overdoses:  Methadone Opioid Agonist Therapy:   Buprenorphine Opioid Agonist Therapy:     Patient denies alcohol use, benzodiazepines, cocaine and other amphetamines, cannabinoids, ectasy/luis miguel, GHB, ketamine, PCP, inhalants or misue of other illicit substances, supplements or prescriptions/medications.     Patient denies increased anxiety, depression, SI/HI, AH/VH.     [place]      SOCIAL HISTORY:    REVIEW OF SYSTEMS:per HPI     MEDICATIONS  (STANDING):  cyanocobalamin 1000 MICROGram(s) Oral daily  dextrose 5% + sodium chloride 0.9%. 1000 milliLiter(s) (100 mL/Hr) IV Continuous <Continuous>  dextrose 5%. 1000 milliLiter(s) (100 mL/Hr) IV Continuous <Continuous>  dextrose 5%. 1000 milliLiter(s) (50 mL/Hr) IV Continuous <Continuous>  dextrose 50% Injectable 25 Gram(s) IV Push once  dextrose 50% Injectable 12.5 Gram(s) IV Push once  dextrose 50% Injectable 25 Gram(s) IV Push once  diazepam    Tablet 10 milliGRAM(s) Oral every 6 hours  diazepam    Tablet   Oral   folic acid 1 milliGRAM(s) Oral daily  gabapentin 200 milliGRAM(s) Oral two times a day  glucagon  Injectable 1 milliGRAM(s) IntraMuscular once  heparin   Injectable 5000 Unit(s) SubCutaneous every 12 hours  insulin lispro (ADMELOG) corrective regimen sliding scale   SubCutaneous three times a day before meals  methocarbamol 500 milliGRAM(s) Oral daily  metoprolol succinate ER 25 milliGRAM(s) Oral daily  multivitamin 1 Tablet(s) Oral daily  naltrexone 50 milliGRAM(s) Oral daily  pantoprazole  Injectable 40 milliGRAM(s) IV Push daily  tamsulosin 0.4 milliGRAM(s) Oral at bedtime    MEDICATIONS  (PRN):  dextrose Oral Gel 15 Gram(s) Oral once PRN Blood Glucose LESS THAN 70 milliGRAM(s)/deciliter  ondansetron Injectable 4 milliGRAM(s) IV Push every 8 hours PRN Nausea and/or Vomiting      Vital Signs Last 24 Hrs  T(C): 36.9 (13 Apr 2025 07:38), Max: 36.9 (13 Apr 2025 06:58)  T(F): 98.4 (13 Apr 2025 07:38), Max: 98.4 (13 Apr 2025 06:58)  HR: 112 (13 Apr 2025 11:00) (112 - 133)  BP: 140/66 (13 Apr 2025 11:00) (132/62 - 146/65)  BP(mean): 92 (13 Apr 2025 07:38) (92 - 92)  RR: 20 (13 Apr 2025 11:00) (17 - 20)  SpO2: 97% (13 Apr 2025 11:00) (95% - 98%)    Parameters below as of 13 Apr 2025 11:00  Patient On (Oxygen Delivery Method): room air        PHYSICAL EXAM:    Constitutional: NAD, well-groomed, well-developed  HEENT: PERRLA, EOMI  Neck: No LAD, No JVD  Respiratory: no increased work of breathing  Cardiovascular: S1 and S2, RRR  Gastrointestinal: soft, NT/ND  Extremities: No peripheral edema  Neurological: A/O x 3, no focal deficits    MENTAL STATUS EXAM:  Appearance: calm, in hospital attire   Appearance in relation to age: looks stated age      Hygiene/Grooming: fair grooming   Attitude toward examiner: fully cooperative  Alertness: alert  Orientation: oriented to time, place and person  Posture: lying in bed  Gait: not evaluated  Behavior and psychomotor activity: normal  Mood: euthymic  Affect: full range and reactivity      Speech: fluent and spontaneous; normal rate, rhythm, volume and tone   Perceptions: denies hallucinations  Thought process: logical, linear and goal-directed    Thought content: no delusions or particular preoccupation, denies SI/HI  Associations: no loosening of associations   Impulse Control: aware of socially acceptable behavior  Insight: aware of need to change behaviors to remain sober  Judgment: understands consequences of drug use    LABS:                        12.8   5.56  )-----------( 77       ( 13 Apr 2025 08:13 )             37.0     04-13    144  |  99  |  5[L]  ----------------------------<  104[H]  4.1   |  16[L]  |  1.3    Ca    9.1      13 Apr 2025 08:13  Mg     1.3     04-13    TPro  7.1  /  Alb  4.0  /  TBili  3.2[H]  /  DBili  x   /  AST  79[H]  /  ALT  24  /  AlkPhos  161[H]  04-13      Urinalysis Basic - ( 13 Apr 2025 08:13 )    Color: x / Appearance: x / SG: x / pH: x  Gluc: 104 mg/dL / Ketone: x  / Bili: x / Urobili: x   Blood: x / Protein: x / Nitrite: x   Leuk Esterase: x / RBC: x / WBC x   Sq Epi: x / Non Sq Epi: x / Bacteria: x      Drug Screen Urine:  Alcohol Level  Alcohol, Blood: 253 mg/dL (04-13-25 @ 08:13)        RADIOLOGY & ADDITIONAL STUDIES:     Pt interviewed, examined and EMR chart reviewed.    This is a 64y man with hx of alcohol use disorder and tobacco use disorder (in remission), PMH of DVT/PE, BPH, HTN, HLD, DMII, Gout, and Decompensated liver cirrhosis and alcoholic hepatitis, and PPH of anxiety who was brought in by ambulance from [facility], [place] residence complaining of chest discomfort/palpitations. Addiction Medicine consulted for assistance with alcohol use disorder and management of alcohol withdrawal.     Patient states he has been drinking about 0.5-1.0 pints of vodka everyday since about 3 months ago, since he was discharged from this hospital. This morning at around 1AM, he drank about 0.5-1 pints of vodka, and could not sleep. He developed nausea, vomited, and had chest pressure. He told the staff at [facility], [place] and they called the ambulance. He denies any triggering event that encouraged him to drink, denies feeling depressed or anxious, and denies any SI/AVH. Patient denies benzodiazepines, cocaine and other amphetamines, cannabinoids, ectasy/luis miguel, GHB, ketamine, PCP, inhalants or misuse of other illicit substances, supplements or prescriptions/medications.     The below is per chart review from 3/13/25 addiction consult note:     He states that he has been drinking alcohol since his teenage years, initially a few cans of beer a day to currently 0.5-1 pint of vodka daily for the past 5 years. He adds that he attempted abstinence 5 years ago, when he attended the [facility] detox program and counseling, but resumed drinking afterwards. Patient has numerous prior hospitalizations for alcohol withdrawal. He informs the team of his awareness that drinking alcohol is harmful in the presence of his liver cirrhosis, but acknowledges that he experiences "cravings" and would like a medication to eliminate the cravings.  He denies ICU stays, DTs, or seizures due to alcohol withdrawal. He endorses drinking alcohol without food occasionally, gait instability when sober, requiring a walker for walking support. He denies nausea, vomiting, diaphoresis, tactile disturbances, or headache. He states that he is usually (for many years) restless in his legs only, where he shakes his legs constantly when he is sitting still, and is relieved with walking. CIWA = 1.    Regarding tobacco use disorder, in remission, he reports that began smoking cigarettes from the age of 16 years and quit "cold-turkey" at the age 42 years when he realized the toll on his breathing and episodes of coughing phlegm.    He reports having anxiety but is unsure of the medication he takes for it. He states that he last received mental heatlh counseling while he was at the detox program 5 years ago. He denies psychiatric hospitalizations, depression, suicide attempt, self-harm, AVH, SI, or HI.     [facility] ISTOP:      SOCIAL HISTORY:    REVIEW OF SYSTEMS:per HPI     MEDICATIONS  (STANDING):  cyanocobalamin 1000 MICROGram(s) Oral daily  dextrose 5% + sodium chloride 0.9%. 1000 milliLiter(s) (100 mL/Hr) IV Continuous <Continuous>  dextrose 5%. 1000 milliLiter(s) (100 mL/Hr) IV Continuous <Continuous>  dextrose 5%. 1000 milliLiter(s) (50 mL/Hr) IV Continuous <Continuous>  dextrose 50% Injectable 25 Gram(s) IV Push once  dextrose 50% Injectable 12.5 Gram(s) IV Push once  dextrose 50% Injectable 25 Gram(s) IV Push once  diazepam    Tablet 10 milliGRAM(s) Oral every 6 hours  diazepam    Tablet   Oral   folic acid 1 milliGRAM(s) Oral daily  gabapentin 200 milliGRAM(s) Oral two times a day  glucagon  Injectable 1 milliGRAM(s) IntraMuscular once  heparin   Injectable 5000 Unit(s) SubCutaneous every 12 hours  insulin lispro (ADMELOG) corrective regimen sliding scale   SubCutaneous three times a day before meals  methocarbamol 500 milliGRAM(s) Oral daily  metoprolol succinate ER 25 milliGRAM(s) Oral daily  multivitamin 1 Tablet(s) Oral daily  naltrexone 50 milliGRAM(s) Oral daily  pantoprazole  Injectable 40 milliGRAM(s) IV Push daily  tamsulosin 0.4 milliGRAM(s) Oral at bedtime    MEDICATIONS  (PRN):  dextrose Oral Gel 15 Gram(s) Oral once PRN Blood Glucose LESS THAN 70 milliGRAM(s)/deciliter  ondansetron Injectable 4 milliGRAM(s) IV Push every 8 hours PRN Nausea and/or Vomiting      Vital Signs Last 24 Hrs  T(C): 36.9 (13 Apr 2025 07:38), Max: 36.9 (13 Apr 2025 06:58)  T(F): 98.4 (13 Apr 2025 07:38), Max: 98.4 (13 Apr 2025 06:58)  HR: 112 (13 Apr 2025 11:00) (112 - 133)  BP: 140/66 (13 Apr 2025 11:00) (132/62 - 146/65)  BP(mean): 92 (13 Apr 2025 07:38) (92 - 92)  RR: 20 (13 Apr 2025 11:00) (17 - 20)  SpO2: 97% (13 Apr 2025 11:00) (95% - 98%)    Parameters below as of 13 Apr 2025 11:00  Patient On (Oxygen Delivery Method): room air        PHYSICAL EXAM:    Constitutional: NAD, well-groomed, well-developed  HEENT: PERRLA, EOMI  Neck: No LAD, No JVD  Respiratory: no increased work of breathing  Cardiovascular: S1 and S2, RRR  Gastrointestinal: soft, NT/ND  Extremities: No peripheral edema  Neurological: A/O x 3, no focal deficits    MENTAL STATUS EXAM:  Appearance: calm, in hospital attire   Appearance in relation to age: looks stated age      Hygiene/Grooming: fair grooming   Attitude toward examiner: fully cooperative  Alertness: alert  Orientation: oriented to time, place and person  Posture: lying in bed  Gait: not evaluated  Behavior and psychomotor activity: normal  Mood: euthymic  Affect: full range and reactivity      Speech: fluent and spontaneous; normal rate, rhythm, volume and tone   Perceptions: denies hallucinations  Thought process: logical, linear and goal-directed    Thought content: no delusions or particular preoccupation, denies SI/HI  Associations: no loosening of associations   Impulse Control: aware of socially acceptable behavior  Insight: aware of need to change behaviors to remain sober  Judgment: understands consequences of drug use    LABS:                        12.8   5.56  )-----------( 77       ( 13 Apr 2025 08:13 )             37.0     04-13    144  |  99  |  5[L]  ----------------------------<  104[H]  4.1   |  16[L]  |  1.3    Ca    9.1      13 Apr 2025 08:13  Mg     1.3     04-13    TPro  7.1  /  Alb  4.0  /  TBili  3.2[H]  /  DBili  x   /  AST  79[H]  /  ALT  24  /  AlkPhos  161[H]  04-13      Urinalysis Basic - ( 13 Apr 2025 08:13 )    Color: x / Appearance: x / SG: x / pH: x  Gluc: 104 mg/dL / Ketone: x  / Bili: x / Urobili: x   Blood: x / Protein: x / Nitrite: x   Leuk Esterase: x / RBC: x / WBC x   Sq Epi: x / Non Sq Epi: x / Bacteria: x      Drug Screen Urine:  Alcohol Level  Alcohol, Blood: 253 mg/dL (04-13-25 @ 08:13)        RADIOLOGY & ADDITIONAL STUDIES:     Pt interviewed, examined and EMR chart reviewed.    This is a 64y man with hx of alcohol use disorder and tobacco use disorder (in remission), PMH of DVT/PE, BPH, HTN, HLD, DMII, Gout, and Decompensated liver cirrhosis and alcoholic hepatitis, and PPH of anxiety who was brought in by ambulance from [facility] residence complaining of chest discomfort/palpitations. Addiction Medicine consulted for assistance with alcohol use disorder and management of alcohol withdrawal.     Patient was hard of hearing.   Patient states he has been drinking about 0.5-1.0 pints of vodka everyday since about 3 months ago, since he was discharged from this hospital. This morning at around 1AM, he drank about 0.5-1 pints of vodka, and could not sleep. He developed nausea, vomited, and had chest pressure. He told the staff at [facility] and they called the ambulance. He denies any triggering event that encouraged him to drink, denies feeling depressed or anxious, and denies any SI/AVH. Patient denies benzodiazepines, cocaine and other amphetamines, cannabinoids, ectasy/luis miguel, GHB, ketamine, PCP, inhalants or misuse of other illicit substances, supplements or prescriptions/medications.     The below is per chart review from 3/13/25 addiction consult note:     He states that he has been drinking alcohol since his teenage years, initially a few cans of beer a day to currently 0.5-1 pint of vodka daily for the past 5 years. He adds that he attempted abstinence 5 years ago, when he attended the [facility] detox program and counseling, but resumed drinking afterwards. Patient has numerous prior hospitalizations for alcohol withdrawal. He informs the team of his awareness that drinking alcohol is harmful in the presence of his liver cirrhosis, but acknowledges that he experiences "cravings" and would like a medication to eliminate the cravings.  He denies ICU stays, DTs, or seizures due to alcohol withdrawal. He endorses drinking alcohol without food occasionally, gait instability when sober, requiring a walker for walking support. He denies nausea, vomiting, diaphoresis, tactile disturbances, or headache. He states that he is usually (for many years) restless in his legs only, where he shakes his legs constantly when he is sitting still, and is relieved with walking. CIWA = 1.    Regarding tobacco use disorder, in remission, he reports that began smoking cigarettes from the age of 16 years and quit "cold-turkey" at the age 42 years when he realized the toll on his breathing and episodes of coughing phlegm.    He reports having anxiety but is unsure of the medication he takes for it. He states that he last received mental heatlh counseling while he was at the detox program 5 years ago. He denies psychiatric hospitalizations, depression, suicide attempt, self-harm, AVH, SI, or HI.     [place]     Search Terms: Nigel Noonan, 1960Search Date: 04/13/2025 14:15:36 PM  Searching on behalf of: [facility] - [facility]  The Drug Utilization Report below displays all of the controlled substance prescriptions, if any, that your patient has filled in the last twelve months. The information displayed on this report is compiled from pharmacy submissions to the Department, and accurately reflects the information as submitted by the pharmacies.    This report was requested by: Lisy Mccain | Reference #: [id number]    There are no results for the search terms that you entered.     SOCIAL HISTORY:  He lives at the [facility].     REVIEW OF SYSTEMS:per HPI     MEDICATIONS  (STANDING):  cyanocobalamin 1000 MICROGram(s) Oral daily  dextrose 5% + sodium chloride 0.9%. 1000 milliLiter(s) (100 mL/Hr) IV Continuous <Continuous>  dextrose 5%. 1000 milliLiter(s) (100 mL/Hr) IV Continuous <Continuous>  dextrose 5%. 1000 milliLiter(s) (50 mL/Hr) IV Continuous <Continuous>  dextrose 50% Injectable 25 Gram(s) IV Push once  dextrose 50% Injectable 12.5 Gram(s) IV Push once  dextrose 50% Injectable 25 Gram(s) IV Push once  diazepam    Tablet 10 milliGRAM(s) Oral every 6 hours  diazepam    Tablet   Oral   folic acid 1 milliGRAM(s) Oral daily  gabapentin 200 milliGRAM(s) Oral two times a day  glucagon  Injectable 1 milliGRAM(s) IntraMuscular once  heparin   Injectable 5000 Unit(s) SubCutaneous every 12 hours  insulin lispro (ADMELOG) corrective regimen sliding scale   SubCutaneous three times a day before meals  methocarbamol 500 milliGRAM(s) Oral daily  metoprolol succinate ER 25 milliGRAM(s) Oral daily  multivitamin 1 Tablet(s) Oral daily  naltrexone 50 milliGRAM(s) Oral daily  pantoprazole  Injectable 40 milliGRAM(s) IV Push daily  tamsulosin 0.4 milliGRAM(s) Oral at bedtime    MEDICATIONS  (PRN):  dextrose Oral Gel 15 Gram(s) Oral once PRN Blood Glucose LESS THAN 70 milliGRAM(s)/deciliter  ondansetron Injectable 4 milliGRAM(s) IV Push every 8 hours PRN Nausea and/or Vomiting      Vital Signs Last 24 Hrs  T(C): 36.9 (13 Apr 2025 07:38), Max: 36.9 (13 Apr 2025 06:58)  T(F): 98.4 (13 Apr 2025 07:38), Max: 98.4 (13 Apr 2025 06:58)  HR: 112 (13 Apr 2025 11:00) (112 - 133)  BP: 140/66 (13 Apr 2025 11:00) (132/62 - 146/65)  BP(mean): 92 (13 Apr 2025 07:38) (92 - 92)  RR: 20 (13 Apr 2025 11:00) (17 - 20)  SpO2: 97% (13 Apr 2025 11:00) (95% - 98%)    Parameters below as of 13 Apr 2025 11:00  Patient On (Oxygen Delivery Method): room air      PHYSICAL EXAM:    Constitutional: NAD  HEENT: PERRLA, EOMI  Respiratory: no increased work of breathing  Neurological: A/O x 3, no focal deficits    MENTAL STATUS EXAM:  Appearance: calm, in street clothing  Appearance in relation to age: looks stated age      Hygiene/Grooming: fair grooming   Attitude toward examiner: fully cooperative  Alertness: alert  Orientation: oriented to time, place and person  Posture: lying in bed  Gait: not evaluated  Behavior and psychomotor activity: normal  Mood: euthymic  Affect: full range and reactivity      Speech: fluent and spontaneous; normal rate, rhythm, volume and tone   Perceptions: denies hallucinations  Thought process: logical, linear and goal-directed    Thought content: no delusions or particular preoccupation, denies SI/HI  Associations: no loosening of associations   Impulse Control: aware of socially acceptable behavior  Insight: fair  Judgment: fair    LABS:                        12.8   5.56  )-----------( 77       ( 13 Apr 2025 08:13 )             37.0     04-13    144  |  99  |  5[L]  ----------------------------<  104[H]  4.1   |  16[L]  |  1.3    Ca    9.1      13 Apr 2025 08:13  Mg     1.3     04-13    TPro  7.1  /  Alb  4.0  /  TBili  3.2[H]  /  DBili  x   /  AST  79[H]  /  ALT  24  /  AlkPhos  161[H]  04-13      Urinalysis Basic - ( 13 Apr 2025 08:13 )    Color: x / Appearance: x / SG: x / pH: x  Gluc: 104 mg/dL / Ketone: x  / Bili: x / Urobili: x   Blood: x / Protein: x / Nitrite: x   Leuk Esterase: x / RBC: x / WBC x   Sq Epi: x / Non Sq Epi: x / Bacteria: x      Drug Screen Urine:  Alcohol Level  Alcohol, Blood: 253 mg/dL (04-13-25 @ 08:13)        RADIOLOGY & ADDITIONAL STUDIES:    CT AP w/ IV con 3/12/25   LIVER: Steatosis. Mild caudate lobe hypertrophy, unchanged.    US abdomen RUQ 1/22/25: Limited exam. Heterogeneously echogenic liver which may reflect hepatic   steatosis reflect underlying hepatocellular disease. Cholelithiasis.    US abdomen RUQ 3/19/25: Cholelithiasis. No sonographic evidence of acute cholecystitis.    Partially visualized right pleural effusion.Liver within normal limits              Pt interviewed, examined and EMR chart reviewed.    This is a 64y man with hx of alcohol use disorder and tobacco use disorder (in remission), PMH of DVT/PE, BPH, HTN, HLD, DMII, Gout, and Decompensated liver cirrhosis and alcoholic hepatitis, and PPH of anxiety who was brought in by ambulance from [facility] residence complaining of chest discomfort/palpitations. Addiction Medicine consulted for assistance with alcohol use disorder and management of alcohol withdrawal.     Patient was hard of hearing.   Patient states he has been drinking about 0.5-1.0 pints of vodka everyday since about 3 months ago, since he was discharged from this hospital. This morning at around 1AM, he drank about 0.5-1 pints of vodka, and could not sleep. He developed nausea, vomited, and had chest pressure. He told the staff at [facility] and they called the ambulance. He denies any triggering event that encouraged him to drink, denies feeling depressed or anxious, and denies any SI/AVH. Patient denies benzodiazepines, cocaine and other amphetamines, cannabinoids, ectasy/luis miguel, GHB, ketamine, PCP, inhalants or misuse of other illicit substances, supplements or prescriptions/medications.     The below is per chart review from 3/13/25 addiction consult note:     He states that he has been drinking alcohol since his teenage years, initially a few cans of beer a day to currently 0.5-1 pint of vodka daily for the past 5 years. He adds that he attempted abstinence 5 years ago, when he attended the [facility] detox program and counseling, but resumed drinking afterwards. Patient has numerous prior hospitalizations for alcohol withdrawal. He informs the team of his awareness that drinking alcohol is harmful in the presence of his liver cirrhosis, but acknowledges that he experiences "cravings" and would like a medication to eliminate the cravings.  He denies ICU stays, DTs, or seizures due to alcohol withdrawal. He endorses drinking alcohol without food occasionally, gait instability when sober, requiring a walker for walking support. He denies nausea, vomiting, diaphoresis, tactile disturbances, or headache. He states that he is usually (for many years) restless in his legs only, where he shakes his legs constantly when he is sitting still, and is relieved with walking. CIWA = 1.    Regarding tobacco use disorder, in remission, he reports that began smoking cigarettes from the age of 16 years and quit "cold-turkey" at the age 42 years when he realized the toll on his breathing and episodes of coughing phlegm.    He reports having anxiety but is unsure of the medication he takes for it. He states that he last received mental heatlh counseling while he was at the detox program 5 years ago. He denies psychiatric hospitalizations, depression, suicide attempt, self-harm, AVH, SI, or HI.     [place]     Search Terms: Nigel Noonan, 1960Search Date: 04/13/2025 14:15:36 PM  Searching on behalf of: [facility] - [facility]  The Drug Utilization Report below displays all of the controlled substance prescriptions, if any, that your patient has filled in the last twelve months. The information displayed on this report is compiled from pharmacy submissions to the Department, and accurately reflects the information as submitted by the pharmacies.    This report was requested by: Lisy Mccain | Reference #: [id number]    There are no results for the search terms that you entered.     SOCIAL HISTORY:  He lives at the [facility]'s Residence.     REVIEW OF SYSTEMS:per HPI     MEDICATIONS  (STANDING):  cyanocobalamin 1000 MICROGram(s) Oral daily  dextrose 5% + sodium chloride 0.9%. 1000 milliLiter(s) (100 mL/Hr) IV Continuous <Continuous>  dextrose 5%. 1000 milliLiter(s) (100 mL/Hr) IV Continuous <Continuous>  dextrose 5%. 1000 milliLiter(s) (50 mL/Hr) IV Continuous <Continuous>  dextrose 50% Injectable 25 Gram(s) IV Push once  dextrose 50% Injectable 12.5 Gram(s) IV Push once  dextrose 50% Injectable 25 Gram(s) IV Push once  diazepam    Tablet 10 milliGRAM(s) Oral every 6 hours  diazepam    Tablet   Oral   folic acid 1 milliGRAM(s) Oral daily  gabapentin 200 milliGRAM(s) Oral two times a day  glucagon  Injectable 1 milliGRAM(s) IntraMuscular once  heparin   Injectable 5000 Unit(s) SubCutaneous every 12 hours  insulin lispro (ADMELOG) corrective regimen sliding scale   SubCutaneous three times a day before meals  methocarbamol 500 milliGRAM(s) Oral daily  metoprolol succinate ER 25 milliGRAM(s) Oral daily  multivitamin 1 Tablet(s) Oral daily  naltrexone 50 milliGRAM(s) Oral daily  pantoprazole  Injectable 40 milliGRAM(s) IV Push daily  tamsulosin 0.4 milliGRAM(s) Oral at bedtime    MEDICATIONS  (PRN):  dextrose Oral Gel 15 Gram(s) Oral once PRN Blood Glucose LESS THAN 70 milliGRAM(s)/deciliter  ondansetron Injectable 4 milliGRAM(s) IV Push every 8 hours PRN Nausea and/or Vomiting      Vital Signs Last 24 Hrs  T(C): 36.9 (13 Apr 2025 07:38), Max: 36.9 (13 Apr 2025 06:58)  T(F): 98.4 (13 Apr 2025 07:38), Max: 98.4 (13 Apr 2025 06:58)  HR: 112 (13 Apr 2025 11:00) (112 - 133)  BP: 140/66 (13 Apr 2025 11:00) (132/62 - 146/65)  BP(mean): 92 (13 Apr 2025 07:38) (92 - 92)  RR: 20 (13 Apr 2025 11:00) (17 - 20)  SpO2: 97% (13 Apr 2025 11:00) (95% - 98%)    Parameters below as of 13 Apr 2025 11:00  Patient On (Oxygen Delivery Method): room air      PHYSICAL EXAM:    Constitutional: NAD  HEENT: PERRLA, EOMI  Respiratory: no increased work of breathing  Neurological: A/O x 3, no focal deficits    MENTAL STATUS EXAM:  Appearance: calm, in street clothing  Appearance in relation to age: looks stated age      Hygiene/Grooming: fair grooming   Attitude toward examiner: fully cooperative  Alertness: alert  Orientation: oriented to time, place and person  Posture: lying in bed  Gait: not evaluated  Behavior and psychomotor activity: mild tremors on outstretched arms   Mood: euthymic  Affect: full range and reactivity      Speech: fluent and spontaneous; normal rate, rhythm, volume and tone   Perceptions: denies hallucinations  Thought process: logical, linear and goal-directed    Thought content: no delusions or particular preoccupation, denies SI/HI  Associations: no loosening of associations   Impulse Control: aware of socially acceptable behavior  Insight: fair  Judgment: fair    LABS:                        12.8   5.56  )-----------( 77       ( 13 Apr 2025 08:13 )             37.0     04-13    144  |  99  |  5[L]  ----------------------------<  104[H]  4.1   |  16[L]  |  1.3    Ca    9.1      13 Apr 2025 08:13  Mg     1.3     04-13    TPro  7.1  /  Alb  4.0  /  TBili  3.2[H]  /  DBili  x   /  AST  79[H]  /  ALT  24  /  AlkPhos  161[H]  04-13      Urinalysis Basic - ( 13 Apr 2025 08:13 )    Color: x / Appearance: x / SG: x / pH: x  Gluc: 104 mg/dL / Ketone: x  / Bili: x / Urobili: x   Blood: x / Protein: x / Nitrite: x   Leuk Esterase: x / RBC: x / WBC x   Sq Epi: x / Non Sq Epi: x / Bacteria: x      Drug Screen Urine:  Alcohol Level  Alcohol, Blood: 253 mg/dL (04-13-25 @ 08:13)        RADIOLOGY & ADDITIONAL STUDIES:    CT AP w/ IV con 3/12/25   LIVER: Steatosis. Mild caudate lobe hypertrophy, unchanged.    US abdomen RUQ 1/22/25: Limited exam. Heterogeneously echogenic liver which may reflect hepatic   steatosis reflect underlying hepatocellular disease. Cholelithiasis.    US abdomen RUQ 3/19/25: Cholelithiasis. No sonographic evidence of acute cholecystitis.    Partially visualized right pleural effusion.Liver within normal limits

## 2025-04-13 NOTE — ED ADULT TRIAGE NOTE - NS ED TRIAGE AVPU SCALE
Alert-The patient is alert, awake and responds to voice. The patient is oriented to time, place, and person. The triage nurse is able to obtain subjective information. 28-Feb-2024 06:49

## 2025-04-13 NOTE — ED PROVIDER NOTE - NS ED MD DISPO SPECIAL CONSIDERATION1
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 She Had CABG last year and L leg bypass and has had hopsitalizations for wound infections. R leg stent placed recently during hospitalization and possible bypass in that leg in the future.  Her blood pressures are better on midodrine.  Recent EGD neg.  Currently denies bleeding or dark stools. Hgb lower-lost blood with the terat,emts and unable to return blood.           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     SOCIAL HISTORY: She smokes one and half pack a day, which is down from three packs a day.      REVIEW OF SYSTEMS: Negative. No chest pain, no shortness of breath.  PHYSICAL EXAMINATION: 109/51   T: 98  DW:47.5 kg W: 49.2 kg P:71    GENERAL: The patient does not appear to be in any acute distress.  CARDIOVASCULAR: Rate and rhythm regular.  LUNGS: Clear to auscultation bilaterally.  ABD:  Slightly tender-distended.  EXTREMITIES: Access stable. Gonzales ext with trace to 1+ edema. L LE healing well.  R leg with ulcers healing well.  acess stable. No erythema.        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     Labs pending.  1. ESRD. Kt/v 2.3  UF of 0.5L-2.0L.  Fluid retsriction emphasized. cramps better with increase in DW as she is close to 47.5 kg. Changed K to 1 k bath-low K diet. K better. Relax K restriction slightly as K better.  2. Hypertension. Blood pressures are stable on metoprolol.  On midodrine.   3. Anemia: lower.   Increase mircera to 150 twice a week.  had egd recently but no colonoscopy. Rec setting up colonoscopy as hgb more stable now.  Small dose of iron with high ferritin(stable) had C/o dark stools in the past- Gi appoint ASAP  for colonoscopy.   Gi does not want to do colonoscopy with her instability in the past.   4. Nutrition: albumin ishigher. She is on LiquaCel. High protein diet discussed.  5. Renal osteodystrophy: phos high but better-low phos diet emphasized on fosrenol 2 packs with each meal. Off carafate started by Gi but held b/c of higher aluminum level. PTH high-on  calcitriol 0.25 mcg 5 times a week. . Off Lasix as she does not urinate much. aluminum level improved off carafate.   C/o hematuria -although doesn't make urine-streaks of blood-rec urology eval although it could be from ruptured cysts. Heavy smoker-would need cystoscopy although it was neg from 2-3 years ago per pt. She would like to hold off until other issues are taken care of. Discussed the risks.   L arm access-recently had angioplasty.     plt ct low stable -hematology eval in hospital they thought it was due to abx. If no improvement, will need to refer to hematology.    None

## 2025-04-13 NOTE — ED PROVIDER NOTE - OBJECTIVE STATEMENT
64-year-old male with history of alcohol abuse, DVT/PE, hypertension, high cholesterol, diabetes, gout brought in by ambulance from [facility]'s residence complaining of chest discomfort/palpitations after drinking 1 pint of vodka this morning.  Patient denies any shortness of breath, nausea, vomiting, diarrhea, abdominal pain, fever, chills, cough or weakness.

## 2025-04-13 NOTE — H&P ADULT - ASSESSMENT
64-year-old male with history of alcohol abuse, DVT/PE, hypertension, high cholesterol, diabetes, gout brought in by ambulance from [facility]'s residence complaining of chest discomfort/palpitations after drinking 1 pint of vodka this morning.  Patient denies any shortness of breath, nausea, vomiting, diarrhea, abdominal pain, fever, chills, cough or weakness.    ED vitals: T 98.4, , /75, SpO2 98% on RA, RR 17  Labs: wbc 12.8, .4, platelet 77, CO2 16, AG 29, Tbili 3.2, [id number], ast 79, alt 24, trop33, BHB 0.9,   VBG pH 7.35, pCO2 33 HCO3 18, Lactate 12.2  blood alcohol level 253  EKG- sinus tachycardia  CXR unremarkable    Patient admitted to SDU for further management   64-year-old male with history of alcohol abuse, DVT/PE, hypertension, high cholesterol, diabetes, gout brought in by ambulance from [facility]'s residence complaining of chest discomfort/palpitations after drinking 1 pint of vodka this morning.  Patient denies any shortness of breath, nausea, vomiting, diarrhea, abdominal pain, fever, chills, cough or weakness.    ED vitals: T 98.4, , /75, SpO2 98% on RA, RR 17  Labs: wbc 12.8, .4, platelet 77, CO2 16, AG 29, Tbili 3.2, [id number], ast 79, alt 24, trop33, BHB 0.9,   VBG pH 7.35, pCO2 33 HCO3 18, Lactate 12.2  blood alcohol level 253  EKG- sinus tachycardia  CXR unremarkable    #Alcohol Ketoacidosis  #Chest pain  -VBG pH 7.35, pCO2 33 HCO3 18, Lactate 12.2   -AG 29, Tbili 3.2, [id number], ast 79, alt 24, trop33, BHB 0.9  -blood alcohol level 253  -EKG- sinus tachycardia  -s/p 3L bolus NS  -trop 33>28  -c/w IVF  -thiamine 100mg IV for 3-5days and folic acid    #HTN  #HLD  #DM  #Gout   64-year-old male with history of alcohol abuse, DVT/PE, hypertension, high cholesterol, diabetes, gout brought in by ambulance from [facility]'s residence complaining of chest discomfort/palpitations after drinking 1 pint of vodka this morning.  Patient denies any shortness of breath, nausea, vomiting, diarrhea, abdominal pain, fever, chills, cough or weakness.    #Alcohol Ketoacidosis  #Alcohol Withdrawal  #Chest pain  -VBG pH 7.35, pCO2 33 HCO3 18, Lactate 12.2   -AG 29, Tbili 3.2, [id number], ast 79, alt 24, trop33, BHB 0.9  -blood alcohol level 253  -EKG- sinus tachycardia  -s/p 3L bolus NS  -trop 33>28  -c/w IVF  -thiamine 100mg IV for 3-5days and folic acid  -trend lactate  -[facility] protocol  -addiction and catch team    #Hx DVT/PE/ Thrombocytopenia  - Duplex 1/20/25 - b/l chronic popliteal DVT  - repeat duplex 3/14: Chronic appearing right popliteal vein DVT and left common femoral vein, deep femoral vein, and popliteal vein DVTs  - on previous dc was off coumadin b/c of platelet <50 - per [facility] documentation was restarted on coumadin. Unclear why on coumadin and if there is a specific need for him to be on coumadin  -was on AC on previous admission but discontinued due to decreasing platelets  -thrombocytopenia could be induced alcohol use  - Hgb 11.6  -  >> 54 >> 45 >> 41 >> 42k >> 40k >> 50k  >55>53>77  -trend platelet count   -dvt ppx    #HTN  -c/w toprol 25mg    #DM  -on metformin 500mg bid   -FS goal 140-180  -ISS     # gout, cont Colchicine    # BPH cont Flomax    #GI ppx  -pantoprazole IV    DVTppx: lovenox   Diet: DASH  Dispo: SDU   64-year-old male with history of alcohol abuse, DVT/PE, hypertension, high cholesterol, diabetes, gout brought in by ambulance from [facility] residence complaining of chest discomfort/palpitations after drinking 1 pint of vodka this morning.  Patient denies any shortness of breath, nausea, vomiting, diarrhea, abdominal pain, fever, chills, cough or weakness.    #Alcohol Ketoacidosis  #Alcohol Withdrawal  #Chest pain  -VBG pH 7.35, pCO2 33 HCO3 18, Lactate 12.2   -AG 29, Tbili 3.2, [id number], ast 79, alt 24, trop33, BHB 0.9  -blood alcohol level 253  -EKG- sinus tachycardia  -s/p 3L bolus NS  -trop 33>28  -c/w IVF  -thiamine 500mg IV for 3-5days and folic acid  -valium taper  -trend lactate  -CIWA protocol  -addiction and catch team    #Hx DVT/PE/ Thrombocytopenia  - Duplex 1/20/25 - b/l chronic popliteal DVT  - repeat duplex 3/14: Chronic appearing right popliteal vein DVT and left common femoral vein, deep femoral vein, and popliteal vein DVTs  - on previous dc was off coumadin b/c of platelet <50 - per [facility] documentation was restarted on coumadin. Unclear why on coumadin and if there is a specific need for him to be on coumadin  -was on AC on previous admission but discontinued due to decreasing platelets  -thrombocytopenia could be induced alcohol use  - Hgb 11.6  -  >> 54 >> 45 >> 41 >> 42k >> 40k >> 50k  >55>53>77  -trend platelet count   -dvt ppx  -repeat duplez=x    #ANDRZEJ, likely prerenal  -cr 1.3, baseline 0.9-1  -c/w IVF  -trend cr     #HTN  -c/w toprol 25mg    #DM  -on metformin 500mg bid   -FS goal 140-180  -ISS     # gout,   -on Colchicine  -hold for now due to andrzej and lactic acidosis    # BPH cont Flomax    #GI ppx  -pantoprazole IV    DVTppx: heparin  Diet: DASH  Dispo: SDU

## 2025-04-13 NOTE — ED PROVIDER NOTE - PROGRESS NOTE DETAILS
HR improved from 130's to 110-115.  CIWA score 4 for tremors only.  DW ICU team. Will admit StepDown Unit for AKA.  Repeat Lactate pending.

## 2025-04-14 DIAGNOSIS — E51.2 WERNICKE'S ENCEPHALOPATHY: ICD-10-CM

## 2025-04-14 DIAGNOSIS — F10.239 ALCOHOL DEPENDENCE WITH WITHDRAWAL, UNSPECIFIED: ICD-10-CM

## 2025-04-14 DIAGNOSIS — K76.0 FATTY (CHANGE OF) LIVER, NOT ELSEWHERE CLASSIFIED: ICD-10-CM

## 2025-04-14 LAB
ALBUMIN SERPL ELPH-MCNC: 3.3 G/DL — LOW (ref 3.5–5.2)
ALP SERPL-CCNC: 134 U/L — HIGH (ref 30–115)
ALT FLD-CCNC: 20 U/L — SIGNIFICANT CHANGE UP (ref 0–41)
ANION GAP SERPL CALC-SCNC: 12 MMOL/L — SIGNIFICANT CHANGE UP (ref 7–14)
AST SERPL-CCNC: 66 U/L — HIGH (ref 0–41)
BASOPHILS # BLD AUTO: 0.04 K/UL — SIGNIFICANT CHANGE UP (ref 0–0.2)
BASOPHILS NFR BLD AUTO: 1.4 % — HIGH (ref 0–1)
BILIRUB SERPL-MCNC: 4.9 MG/DL — HIGH (ref 0.2–1.2)
BUN SERPL-MCNC: 5 MG/DL — LOW (ref 10–20)
CALCIUM SERPL-MCNC: 8.5 MG/DL — SIGNIFICANT CHANGE UP (ref 8.4–10.5)
CHLORIDE SERPL-SCNC: 103 MMOL/L — SIGNIFICANT CHANGE UP (ref 98–110)
CO2 SERPL-SCNC: 24 MMOL/L — SIGNIFICANT CHANGE UP (ref 17–32)
CREAT SERPL-MCNC: 0.9 MG/DL — SIGNIFICANT CHANGE UP (ref 0.7–1.5)
EGFR: 95 ML/MIN/1.73M2 — SIGNIFICANT CHANGE UP
EGFR: 95 ML/MIN/1.73M2 — SIGNIFICANT CHANGE UP
EOSINOPHIL # BLD AUTO: 0.05 K/UL — SIGNIFICANT CHANGE UP (ref 0–0.7)
EOSINOPHIL NFR BLD AUTO: 1.8 % — SIGNIFICANT CHANGE UP (ref 0–8)
GLUCOSE BLDC GLUCOMTR-MCNC: 132 MG/DL — HIGH (ref 70–99)
GLUCOSE BLDC GLUCOMTR-MCNC: 160 MG/DL — HIGH (ref 70–99)
GLUCOSE BLDC GLUCOMTR-MCNC: 164 MG/DL — HIGH (ref 70–99)
GLUCOSE SERPL-MCNC: 132 MG/DL — HIGH (ref 70–99)
HCT VFR BLD CALC: 31.8 % — LOW (ref 42–52)
HGB BLD-MCNC: 11.1 G/DL — LOW (ref 14–18)
IMM GRANULOCYTES NFR BLD AUTO: 0.4 % — HIGH (ref 0.1–0.3)
LACTATE SERPL-SCNC: 1.6 MMOL/L — SIGNIFICANT CHANGE UP (ref 0.7–2)
LYMPHOCYTES # BLD AUTO: 0.77 K/UL — LOW (ref 1.2–3.4)
LYMPHOCYTES # BLD AUTO: 27 % — SIGNIFICANT CHANGE UP (ref 20.5–51.1)
MAGNESIUM SERPL-MCNC: 2.5 MG/DL — HIGH (ref 1.8–2.4)
MCHC RBC-ENTMCNC: 34.9 G/DL — SIGNIFICANT CHANGE UP (ref 32–37)
MCHC RBC-ENTMCNC: 36.8 PG — HIGH (ref 27–31)
MCV RBC AUTO: 105.3 FL — HIGH (ref 80–94)
MONOCYTES # BLD AUTO: 0.17 K/UL — SIGNIFICANT CHANGE UP (ref 0.1–0.6)
MONOCYTES NFR BLD AUTO: 6 % — SIGNIFICANT CHANGE UP (ref 1.7–9.3)
MRSA PCR RESULT.: NEGATIVE — SIGNIFICANT CHANGE UP
NEUTROPHILS # BLD AUTO: 1.81 K/UL — SIGNIFICANT CHANGE UP (ref 1.4–6.5)
NEUTROPHILS NFR BLD AUTO: 63.4 % — SIGNIFICANT CHANGE UP (ref 42.2–75.2)
NRBC BLD AUTO-RTO: 0 /100 WBCS — SIGNIFICANT CHANGE UP (ref 0–0)
PHOSPHATE SERPL-MCNC: 1.3 MG/DL — LOW (ref 2.1–4.9)
PLATELET # BLD AUTO: 33 K/UL — LOW (ref 130–400)
PMV BLD: 11.8 FL — HIGH (ref 7.4–10.4)
POTASSIUM SERPL-MCNC: 3.6 MMOL/L — SIGNIFICANT CHANGE UP (ref 3.5–5)
POTASSIUM SERPL-SCNC: 3.6 MMOL/L — SIGNIFICANT CHANGE UP (ref 3.5–5)
PROT SERPL-MCNC: 5.7 G/DL — LOW (ref 6–8)
RBC # BLD: 3.02 M/UL — LOW (ref 4.7–6.1)
RBC # FLD: 15.2 % — HIGH (ref 11.5–14.5)
SODIUM SERPL-SCNC: 139 MMOL/L — SIGNIFICANT CHANGE UP (ref 135–146)
TROPONIN T, HIGH SENSITIVITY RESULT: 27 NG/L — HIGH (ref 6–21)
WBC # BLD: 2.85 K/UL — LOW (ref 4.8–10.8)
WBC # FLD AUTO: 2.85 K/UL — LOW (ref 4.8–10.8)

## 2025-04-14 PROCEDURE — 99233 SBSQ HOSP IP/OBS HIGH 50: CPT

## 2025-04-14 PROCEDURE — 99232 SBSQ HOSP IP/OBS MODERATE 35: CPT

## 2025-04-14 RX ORDER — ACETAMINOPHEN 500 MG/5ML
650 LIQUID (ML) ORAL EVERY 6 HOURS
Refills: 0 | Status: DISCONTINUED | OUTPATIENT
Start: 2025-04-14 | End: 2025-04-18

## 2025-04-14 RX ORDER — MAGNESIUM SULFATE 500 MG/ML
2 SYRINGE (ML) INJECTION ONCE
Refills: 0 | Status: COMPLETED | OUTPATIENT
Start: 2025-04-14 | End: 2025-04-14

## 2025-04-14 RX ORDER — PIPERACILLIN-TAZO-DEXTROSE,ISO 3.375G/5
3.38 IV SOLUTION, PIGGYBACK PREMIX FROZEN(ML) INTRAVENOUS ONCE
Refills: 0 | Status: COMPLETED | OUTPATIENT
Start: 2025-04-14 | End: 2025-04-14

## 2025-04-14 RX ORDER — SOD PHOS DI, MONO/K PHOS MONO 250 MG
1 TABLET ORAL ONCE
Refills: 0 | Status: COMPLETED | OUTPATIENT
Start: 2025-04-14 | End: 2025-04-14

## 2025-04-14 RX ORDER — SODIUM CHLORIDE 9 G/1000ML
1000 INJECTION, SOLUTION INTRAVENOUS
Refills: 0 | Status: DISCONTINUED | OUTPATIENT
Start: 2025-04-14 | End: 2025-04-18

## 2025-04-14 RX ORDER — PIPERACILLIN-TAZO-DEXTROSE,ISO 3.375G/5
3.38 IV SOLUTION, PIGGYBACK PREMIX FROZEN(ML) INTRAVENOUS EVERY 8 HOURS
Refills: 0 | Status: DISCONTINUED | OUTPATIENT
Start: 2025-04-14 | End: 2025-04-17

## 2025-04-14 RX ORDER — POTASSIUM PHOSPHATE, MONOBASIC POTASSIUM PHOSPHATE, DIBASIC INJECTION, 236; 224 MG/ML; MG/ML
30 SOLUTION, CONCENTRATE INTRAVENOUS ONCE
Refills: 0 | Status: COMPLETED | OUTPATIENT
Start: 2025-04-14 | End: 2025-04-14

## 2025-04-14 RX ORDER — SOD PHOS DI, MONO/K PHOS MONO 250 MG
1 TABLET ORAL THREE TIMES A DAY
Refills: 0 | Status: COMPLETED | OUTPATIENT
Start: 2025-04-14 | End: 2025-04-16

## 2025-04-14 RX ORDER — NALTREXONE HYDROCHLORIDE 50 MG/1
50 TABLET, FILM COATED ORAL DAILY
Refills: 0 | Status: DISCONTINUED | OUTPATIENT
Start: 2025-04-15 | End: 2025-04-18

## 2025-04-14 RX ADMIN — INSULIN LISPRO 1: 100 INJECTION, SOLUTION INTRAVENOUS; SUBCUTANEOUS at 08:04

## 2025-04-14 RX ADMIN — Medication 2 MILLIGRAM(S): at 04:51

## 2025-04-14 RX ADMIN — Medication 25 GRAM(S): at 03:08

## 2025-04-14 RX ADMIN — SODIUM CHLORIDE 100 MILLILITER(S): 9 INJECTION, SOLUTION INTRAVENOUS at 21:45

## 2025-04-14 RX ADMIN — Medication 1 APPLICATION(S): at 13:30

## 2025-04-14 RX ADMIN — METHOCARBAMOL 500 MILLIGRAM(S): 500 TABLET, FILM COATED ORAL at 13:30

## 2025-04-14 RX ADMIN — Medication 105 MILLIGRAM(S): at 14:00

## 2025-04-14 RX ADMIN — Medication 1 PACKET(S): at 03:33

## 2025-04-14 RX ADMIN — GABAPENTIN 200 MILLIGRAM(S): 400 CAPSULE ORAL at 05:33

## 2025-04-14 RX ADMIN — Medication 105 MILLIGRAM(S): at 21:45

## 2025-04-14 RX ADMIN — SODIUM CHLORIDE 100 MILLILITER(S): 9 INJECTION, SOLUTION INTRAVENOUS at 09:20

## 2025-04-14 RX ADMIN — GABAPENTIN 200 MILLIGRAM(S): 400 CAPSULE ORAL at 18:37

## 2025-04-14 RX ADMIN — TAMSULOSIN HYDROCHLORIDE 0.4 MILLIGRAM(S): 0.4 CAPSULE ORAL at 21:46

## 2025-04-14 RX ADMIN — Medication 650 MILLIGRAM(S): at 11:11

## 2025-04-14 RX ADMIN — Medication 2 MILLIGRAM(S): at 08:05

## 2025-04-14 RX ADMIN — LIDOCAINE HYDROCHLORIDE 1 PATCH: 20 JELLY TOPICAL at 09:00

## 2025-04-14 RX ADMIN — Medication 1 PACKET(S): at 14:36

## 2025-04-14 RX ADMIN — LIDOCAINE HYDROCHLORIDE 1 PATCH: 20 JELLY TOPICAL at 07:33

## 2025-04-14 RX ADMIN — NALTREXONE HYDROCHLORIDE 50 MILLIGRAM(S): 50 TABLET, FILM COATED ORAL at 13:30

## 2025-04-14 RX ADMIN — FOLIC ACID 1 MILLIGRAM(S): 1 TABLET ORAL at 13:30

## 2025-04-14 RX ADMIN — Medication 1 PACKET(S): at 22:37

## 2025-04-14 RX ADMIN — Medication 650 MILLIGRAM(S): at 11:01

## 2025-04-14 RX ADMIN — Medication 25 GRAM(S): at 13:36

## 2025-04-14 RX ADMIN — Medication 1 TABLET(S): at 13:31

## 2025-04-14 RX ADMIN — Medication 105 MILLIGRAM(S): at 05:34

## 2025-04-14 RX ADMIN — Medication 40 MILLIGRAM(S): at 13:31

## 2025-04-14 RX ADMIN — CYANOCOBALAMIN 1000 MICROGRAM(S): 1000 INJECTION INTRAMUSCULAR; SUBCUTANEOUS at 13:31

## 2025-04-14 RX ADMIN — HEPARIN SODIUM 5000 UNIT(S): 1000 INJECTION INTRAVENOUS; SUBCUTANEOUS at 05:33

## 2025-04-14 RX ADMIN — Medication 25 GRAM(S): at 21:45

## 2025-04-14 RX ADMIN — Medication 200 GRAM(S): at 09:19

## 2025-04-14 RX ADMIN — METOPROLOL SUCCINATE 25 MILLIGRAM(S): 50 TABLET, EXTENDED RELEASE ORAL at 05:34

## 2025-04-14 RX ADMIN — POTASSIUM PHOSPHATE, MONOBASIC POTASSIUM PHOSPHATE, DIBASIC INJECTION, 83.33 MILLIMOLE(S): 236; 224 SOLUTION, CONCENTRATE INTRAVENOUS at 11:01

## 2025-04-14 NOTE — PROGRESS NOTE ADULT - ASSESSMENT
64-year-old male with history of alcohol abuse, DVT/PE, hypertension, high cholesterol, diabetes, gout brought in by ambulance from [facility] residence complaining of chest discomfort/palpitations after drinking 1 pint of vodka this morning.  Patient denies any shortness of breath, nausea, vomiting, diarrhea, abdominal pain, fever, chills, cough or weakness.    A/P   # Alcohol Ketoacidosis/ Lactic acidosis /  Alcohol abuse / intoxication/ suspected thiamine and folate deficiency    - IV hydration for 24 hours  - pt is intaxicated, monitor for sings of withdrawal in 48- 72 hours   - consulted by addiction medicine, recommendations noted:   - c/w CIWA protocol and Ativan PRN   - prescribe naltrexone 50 mg daily with be given with food   - keep Mg>2, K>4   - continue folate and multivitamin  - repeat LA level, trend bicarb   - pt was consulted regarding addiction/ abstinence        # High grade fever  - check RVP, check procalcitonin level  - started on empirical Abx for suspected aspiration   - monitor fever curve, Tylenol PRN for high grade fever  - send blood Cx on fever spike   - supportive care, IV hydration      # Abnormal LFTs / alcohol induced liver injury   - monitor LFTs   - RUQ US noted   - avoid hepatotoxic medications   - encourage abstinence      #Hx DVT/PE/ Thrombocytopenia  - Duplex : DVT in right popliteal vein. Chronic DVT in left common femoral and popliteal veins.  - on previous dc was off coumadin b/c of platelet <50  - thrombocytopenia could be induced alcohol use  - trend platelet count , trending down  - hold SQ Heparin     #ANDRZEJ, likely prerenal/ h/o BPH   -cr 1.3, baseline 0.9-1  - resolved, IV hydration for 24 hours   - c/w Flomax   - monitor BUN/cr and urine output     #HTN  -c/w toprol 25mg    #DM  - CC diet   -on metformin 500mg bid at home   -FS goal 140-180  -ISS     # gout,   -on Colchicine at home   - held on admission     #GI ppx  -pantoprazole IV    #Progress Note Handoff  Pending (specify):   IV hydration, send infectious work up, check RVP, trend fever curve, repeat LA in AM, c/w CIWA, replete phosphorus, supportive care, d/c SQ Heparin ( worsening thrombocytopenia), c/w CWIA, prescribe naltrexone 50 mg daily, supportive care   Family discussion: I spoke with pt, he agreed with a plan of care   Disposition: [facility]  64-year-old male with history of alcohol abuse, DVT/PE, hypertension, high cholesterol, diabetes, gout brought in by ambulance from [facility] residence complaining of chest discomfort/palpitations after drinking 1 pint of vodka this morning.  Patient denies any shortness of breath, nausea, vomiting, diarrhea, abdominal pain, fever, chills, cough or weakness.    A/P   # Alcohol Ketoacidosis/ Lactic acidosis /  Alcohol abuse / intoxication/ suspected thiamine and folate deficiency    - IV hydration for 24 hours  - pt is intaxicated, monitor for sings of withdrawal in 48- 72 hours   - consulted by addiction medicine, recommendations noted:   - c/w CIWA protocol and Ativan PRN   - prescribe naltrexone 50 mg daily with be given with food   - keep Mg>2, K>4   - continue folate and multivitamin  - repeat LA level, trend bicarb   - pt was consulted regarding addiction/ abstinence        # High grade fever  - check RVP, check procalcitonin level  - started on empirical Abx for suspected aspiration   - monitor fever curve, Tylenol PRN for high grade fever  - send blood Cx on fever spike   - supportive care, IV hydration      # Abnormal LFTs / alcohol induced liver injury   - monitor LFTs   - RUQ US noted   - avoid hepatotoxic medications   - encourage abstinence      #Hx DVT/PE/ Thrombocytopenia  - Duplex : DVT in right popliteal vein. Chronic DVT in left common femoral and popliteal veins.  - on previous dc was off coumadin b/c of platelet <50  - thrombocytopenia could be induced alcohol use  - trend platelet count , trending down  - hold SQ Heparin, consult vascular surgery     #ANDRZEJ, likely prerenal/ h/o BPH   -cr 1.3, baseline 0.9-1  - resolved, IV hydration for 24 hours   - c/w Flomax   - monitor BUN/cr and urine output     #HTN  -c/w toprol 25mg    #DM  - CC diet   -on metformin 500mg bid at home   -FS goal 140-180  -ISS     # gout,   -on Colchicine at home   - held on admission     #GI ppx  -pantoprazole IV    #Progress Note Handoff  Pending (specify):   IV hydration, send infectious work up, check RVP, trend fever curve, repeat LA in AM, c/w CIWA, replete phosphorus, supportive care, d/c SQ Heparin ( worsening thrombocytopenia), consult vascular surgery, c/w CWIA, prescribe naltrexone 50 mg daily, supportive care   Family discussion: I spoke with pt, he agreed with a plan of care   Disposition: DGTF

## 2025-04-14 NOTE — CONSULT NOTE ADULT - SUBJECTIVE AND OBJECTIVE BOX
Patient is a 64y old  Male who presents with a chief complaint of alcohol withdrawal (13 Apr 2025 13:57)      HPI:  64-year-old male with history of alcohol abuse, DVT/PE, hypertension, high cholesterol, diabetes, gout brought in by ambulance from [facility]'s residence complaining of chest discomfort/palpitations after drinking 1 pint of vodka this morning.  Patient denies any shortness of breath, nausea, vomiting, diarrhea, abdominal pain, fever, chills, cough or weakness.    ED vitals: T 98.4, , /75, SpO2 98% on RA, RR 17  Labs: wbc 12.8, .4, platelet 77, CO2 16, AG 29, Tbili 3.2, [id number], ast 79, alt 24, trop33, BHB 0.9,   VBG pH 7.35, pCO2 33 HCO3 18, Lactate 12.2  blood alcohol level 253  EKG- sinus tachycardia  CXR unremarkable    Patient admitted to SDU for further management (13 Apr 2025 10:58)      PAST MEDICAL & SURGICAL HISTORY:  Alcohol dependence      HTN (hypertension)      Hard of hearing      Seasonal allergies      BPH (benign prostatic hyperplasia)      GERD (gastroesophageal reflux disease)      Pseudogout      History of deep vein thrombosis (DVT) of lower extremity      No significant past surgical history          SOCIAL HX:   Smoking       denies                  ETOH        yes                    Other    FAMILY HISTORY:  Family history of gastric cancer  Mom    Family hx of lung cancer  Smoker      :  No known cardiovacular family hisotry     Review Of Systems:     All ROS are negative except per HPI       Allergies    shellfish (Hives)  Beef (Hives)  dairy products (Hives)  No Known Drug Allergies    Intolerances          PHYSICAL EXAM    ICU Vital Signs Last 24 Hrs  T(C): 36.3 (14 Apr 2025 04:00), Max: 37 (13 Apr 2025 20:00)  T(F): 97.3 (14 Apr 2025 04:00), Max: 98.6 (13 Apr 2025 20:00)  HR: 91 (14 Apr 2025 04:00) (91 - 118)  BP: 149/72 (14 Apr 2025 04:00) (128/59 - 149/72)  BP(mean): 104 (14 Apr 2025 04:00) (85 - 104)  ABP: --  ABP(mean): --  RR: 18 (14 Apr 2025 04:00) (18 - 20)  SpO2: 95% (14 Apr 2025 04:00) (95% - 99%)    O2 Parameters below as of 13 Apr 2025 21:30  Patient On (Oxygen Delivery Method): room air            CONSTITUTIONAL:  Well nourished.   NAD    ENT:   Airway patent,   Mouth with normal mucosa.   No thrush      CARDIAC:   Normal rate,   Regular rhythm.    No edema      Vascular:   normal systolic impulse  no bruits    RESPIRATORY:   No wheezing  Bilateral BS   Not tachypneic,  No use of accessory muscles    GASTROINTESTINAL:  Abdomen soft,   Non-tender,   No guarding,   + BS      NEUROLOGICAL:   Alert and oriented   mild bilateral UE tremors    SKIN:   Skin normal color for race,   No evidence of rash.              04-13-25 @ 07:01  -  04-14-25 @ 07:00  --------------------------------------------------------  IN:    dextrose 5% + sodium chloride 0.9%: 1200 mL    Oral Fluid: 250 mL  Total IN: 1450 mL    OUT:  Total OUT: 0 mL    Total NET: 1450 mL          LABS:                          11.1   2.85  )-----------( x        ( 14 Apr 2025 06:20 )             31.8                                               04-13    141  |  104  |  4[L]  ----------------------------<  117[H]  4.1   |  17  |  1.0    Ca    8.2[L]      13 Apr 2025 17:01  Phos  1.3     04-14  Mg     2.5     04-14    TPro  6.3  /  Alb  3.4[L]  /  TBili  3.3[H]  /  DBili  1.2[H]  /  AST  72[H]  /  ALT  19  /  AlkPhos  136[H]  04-13                                             Urinalysis Basic - ( 13 Apr 2025 17:01 )    Color: x / Appearance: x / SG: x / pH: x  Gluc: 117 mg/dL / Ketone: x  / Bili: x / Urobili: x   Blood: x / Protein: x / Nitrite: x   Leuk Esterase: x / RBC: x / WBC x   Sq Epi: x / Non Sq Epi: x / Bacteria: x                                                  LIVER FUNCTIONS - ( 13 Apr 2025 17:01 )  Alb: 3.4 g/dL / Pro: 6.3 g/dL / ALK PHOS: 136 U/L / ALT: 19 U/L / AST: 72 U/L / GGT: x                                                                                                                                       X-Rays reviewed                   MEDICATIONS  (STANDING):  chlorhexidine 2% Cloths 1 Application(s) Topical daily  cyanocobalamin 1000 MICROGram(s) Oral daily  dextrose 5% + sodium chloride 0.9%. 1000 milliLiter(s) (100 mL/Hr) IV Continuous <Continuous>  dextrose 5%. 1000 milliLiter(s) (50 mL/Hr) IV Continuous <Continuous>  dextrose 5%. 1000 milliLiter(s) (100 mL/Hr) IV Continuous <Continuous>  dextrose 50% Injectable 25 Gram(s) IV Push once  dextrose 50% Injectable 12.5 Gram(s) IV Push once  dextrose 50% Injectable 25 Gram(s) IV Push once  folic acid 1 milliGRAM(s) Oral daily  gabapentin 200 milliGRAM(s) Oral two times a day  glucagon  Injectable 1 milliGRAM(s) IntraMuscular once  heparin   Injectable 5000 Unit(s) SubCutaneous every 12 hours  influenza  Vaccine (HIGH DOSE) 0.5 milliLiter(s) IntraMuscular once  insulin lispro (ADMELOG) corrective regimen sliding scale   SubCutaneous three times a day before meals  LORazepam     Tablet 2 milliGRAM(s) Oral every 4 hours  LORazepam     Tablet 1.5 milliGRAM(s) Oral every 4 hours  LORazepam     Tablet   Oral   methocarbamol 500 milliGRAM(s) Oral daily  metoprolol succinate ER 25 milliGRAM(s) Oral daily  multivitamin 1 Tablet(s) Oral daily  naltrexone 50 milliGRAM(s) Oral daily  pantoprazole  Injectable 40 milliGRAM(s) IV Push daily  tamsulosin 0.4 milliGRAM(s) Oral at bedtime  thiamine IVPB 500 milliGRAM(s) IV Intermittent every 8 hours    MEDICATIONS  (PRN):  dextrose Oral Gel 15 Gram(s) Oral once PRN Blood Glucose LESS THAN 70 milliGRAM(s)/deciliter  LORazepam     Tablet 2 milliGRAM(s) Oral every 2 hours PRN ciwa >7  ondansetron Injectable 4 milliGRAM(s) IV Push every 8 hours PRN Nausea and/or Vomiting         Patient is a 64y old  Male who presents with a chief complaint of CP (13 Apr 2025 13:57)    64-year-old male with history of alcohol abuse, DVT/PE, hypertension, high cholesterol, diabetes, gout brought in by ambulance from [facility]'s residence complaining of chest discomfort/palpitations after drinking 1 pint of vodka this morning.  Patient denies any shortness of breath, nausea, vomiting, diarrhea, abdominal pain, fever, chills, cough or weakness.    ED vitals: T 98.4, , /75, SpO2 98% on RA, RR 17  Labs: wbc 12.8, .4, platelet 77, CO2 16, AG 29, Tbili 3.2, NFC689, ast 79, alt 24, trop33, BHB 0.9,   VBG pH 7.35, pCO2 33 HCO3 18, Lactate 12.2  blood alcohol level 253  EKG- sinus tachycardia  CXR unremarkable    Patient admitted to SDU for further management (13 Apr 2025 10:58) Called to evaluate      PAST MEDICAL & SURGICAL HISTORY:  Alcohol dependence      HTN (hypertension)      Hard of hearing      Seasonal allergies      BPH (benign prostatic hyperplasia)      GERD (gastroesophageal reflux disease)      Pseudogout      History of deep vein thrombosis (DVT) of lower extremity      No significant past surgical history          SOCIAL HX:   Smoking       denies                  ETOH        yes                   FAMILY HISTORY:  Family history of gastric cancer  Mom    Family hx of lung cancer  Smoker      :  No known cardiovacular family hisotry     Review Of Systems:     All ROS are negative except per HPI       Allergies    shellfish (Hives)  Beef (Hives)  dairy products (Hives)  No Known Drug Allergies    Intolerances          PHYSICAL EXAM    ICU Vital Signs Last 24 Hrs  T(C): 36.3 (14 Apr 2025 04:00), Max: 37 (13 Apr 2025 20:00)  T(F): 97.3 (14 Apr 2025 04:00), Max: 98.6 (13 Apr 2025 20:00)  HR: 91 (14 Apr 2025 04:00) (91 - 118)  BP: 149/72 (14 Apr 2025 04:00) (128/59 - 149/72)  BP(mean): 104 (14 Apr 2025 04:00) (85 - 104)  RR: 18 (14 Apr 2025 04:00) (18 - 20)  SpO2: 95% (14 Apr 2025 04:00) (95% - 99%)    O2 Parameters below as of 13 Apr 2025 21:30  Patient On (Oxygen Delivery Method): room air            CONSTITUTIONAL:  ill lookimg      CARDIAC:   LAUREN 2.6      RESPIRATORY:   dec bs both bases    GASTROINTESTINAL:  Abdomen soft,   Non-tender,   No guarding,   + BS      NEUROLOGICAL:   Alert and oriented   mild bilateral UE tremors              04-13-25 @ 07:01  -  04-14-25 @ 07:00  --------------------------------------------------------  IN:    dextrose 5% + sodium chloride 0.9%: 1200 mL    Oral Fluid: 250 mL  Total IN: 1450 mL    OUT:  Total OUT: 0 mL    Total NET: 1450 mL          LABS:                          11.1   2.85  )-----------( x        ( 14 Apr 2025 06:20 )             31.8                                               04-13    141  |  104  |  4[L]  ----------------------------<  117[H]  4.1   |  17  |  1.0    Ca    8.2[L]      13 Apr 2025 17:01  Phos  1.3     04-14  Mg     2.5     04-14    TPro  6.3  /  Alb  3.4[L]  /  TBili  3.3[H]  /  DBili  1.2[H]  /  AST  72[H]  /  ALT  19  /  AlkPhos  136[H]  04-13                                             Urinalysis Basic - ( 13 Apr 2025 17:01 )    Color: x / Appearance: x / SG: x / pH: x  Gluc: 117 mg/dL / Ketone: x  / Bili: x / Urobili: x   Blood: x / Protein: x / Nitrite: x   Leuk Esterase: x / RBC: x / WBC x   Sq Epi: x / Non Sq Epi: x / Bacteria: x                                                  LIVER FUNCTIONS - ( 13 Apr 2025 17:01 )  Alb: 3.4 g/dL / Pro: 6.3 g/dL / ALK PHOS: 136 U/L / ALT: 19 U/L / AST: 72 U/L / GGT: x                                                                                                                                       MEDICATIONS  (STANDING):  chlorhexidine 2% Cloths 1 Application(s) Topical daily  cyanocobalamin 1000 MICROGram(s) Oral daily  dextrose 5% + sodium chloride 0.9%. 1000 milliLiter(s) (100 mL/Hr) IV Continuous <Continuous>  dextrose 5%. 1000 milliLiter(s) (50 mL/Hr) IV Continuous <Continuous>  dextrose 5%. 1000 milliLiter(s) (100 mL/Hr) IV Continuous <Continuous>  dextrose 50% Injectable 25 Gram(s) IV Push once  dextrose 50% Injectable 12.5 Gram(s) IV Push once  dextrose 50% Injectable 25 Gram(s) IV Push once  folic acid 1 milliGRAM(s) Oral daily  gabapentin 200 milliGRAM(s) Oral two times a day  glucagon  Injectable 1 milliGRAM(s) IntraMuscular once  heparin   Injectable 5000 Unit(s) SubCutaneous every 12 hours  influenza  Vaccine (HIGH DOSE) 0.5 milliLiter(s) IntraMuscular once  insulin lispro (ADMELOG) corrective regimen sliding scale   SubCutaneous three times a day before meals  LORazepam     Tablet 2 milliGRAM(s) Oral every 4 hours  LORazepam     Tablet 1.5 milliGRAM(s) Oral every 4 hours  LORazepam     Tablet   Oral   methocarbamol 500 milliGRAM(s) Oral daily  metoprolol succinate ER 25 milliGRAM(s) Oral daily  multivitamin 1 Tablet(s) Oral daily  naltrexone 50 milliGRAM(s) Oral daily  pantoprazole  Injectable 40 milliGRAM(s) IV Push daily  tamsulosin 0.4 milliGRAM(s) Oral at bedtime  thiamine IVPB 500 milliGRAM(s) IV Intermittent every 8 hours    MEDICATIONS  (PRN):  dextrose Oral Gel 15 Gram(s) Oral once PRN Blood Glucose LESS THAN 70 milliGRAM(s)/deciliter  LORazepam     Tablet 2 milliGRAM(s) Oral every 2 hours PRN ciwa >7  ondansetron Injectable 4 milliGRAM(s) IV Push every 8 hours PRN Nausea and/or Vomiting

## 2025-04-14 NOTE — PROGRESS NOTE ADULT - SUBJECTIVE AND OBJECTIVE BOX
Patient is a 64y old Male who presents with a chief complaint of chest pain/ alcohol withdrawal (14 Apr 2025 10:56)      No acute or major events overnight. This AM patient febrile to 39. Patient was seen at bedside. Patient is AOx3, following commands. Reports feeling tired but otherwise ROS was negative  Patient is HD stable, afebrile, Saturating well on RA.   Patient tolerates PO and has BM    ALLERGIES:  shellfish (Hives)  Beef (Hives)  dairy products (Hives)  No Known Drug Allergies    MEDICATIONS:  STANDING MEDICATIONS  chlorhexidine 2% Cloths 1 Application(s) Topical daily  cyanocobalamin 1000 MICROGram(s) Oral daily  dextrose 5%. 1000 milliLiter(s) IV Continuous <Continuous>  dextrose 5%. 1000 milliLiter(s) IV Continuous <Continuous>  dextrose 50% Injectable 25 Gram(s) IV Push once  dextrose 50% Injectable 12.5 Gram(s) IV Push once  dextrose 50% Injectable 25 Gram(s) IV Push once  folic acid 1 milliGRAM(s) Oral daily  gabapentin 200 milliGRAM(s) Oral two times a day  glucagon  Injectable 1 milliGRAM(s) IntraMuscular once  influenza  Vaccine (HIGH DOSE) 0.5 milliLiter(s) IntraMuscular once  insulin lispro (ADMELOG) corrective regimen sliding scale   SubCutaneous three times a day before meals  lactated ringers. 1000 milliLiter(s) IV Continuous <Continuous>  methocarbamol 500 milliGRAM(s) Oral daily  metoprolol succinate ER 25 milliGRAM(s) Oral daily  multivitamin 1 Tablet(s) Oral daily  pantoprazole  Injectable 40 milliGRAM(s) IV Push daily  piperacillin/tazobactam IVPB.. 3.375 Gram(s) IV Intermittent every 8 hours  potassium phosphate / sodium phosphate Powder (PHOS-NaK) 1 Packet(s) Oral three times a day  tamsulosin 0.4 milliGRAM(s) Oral at bedtime  thiamine IVPB 500 milliGRAM(s) IV Intermittent every 8 hours    PRN MEDICATIONS  acetaminophen     Tablet .. 650 milliGRAM(s) Oral every 6 hours PRN  dextrose Oral Gel 15 Gram(s) Oral once PRN  LORazepam     Tablet 2 milliGRAM(s) Oral every 2 hours PRN  ondansetron Injectable 4 milliGRAM(s) IV Push every 8 hours PRN      VITALS LAST 24H:  Vital Signs Last 24 Hrs  T(C): 36.6 (14 Apr 2025 16:00), Max: 39.1 (14 Apr 2025 08:41)  T(F): 97.9 (14 Apr 2025 16:00), Max: 102.4 (14 Apr 2025 08:41)  HR: 97 (14 Apr 2025 16:00) (88 - 97)  BP: 122/67 (14 Apr 2025 16:00) (122/67 - 149/72)  BP(mean): 90 (14 Apr 2025 16:00) (85 - 104)  RR: 18 (14 Apr 2025 16:00) (18 - 20)  SpO2: 94% (14 Apr 2025 16:00) (94% - 98%)    Parameters below as of 14 Apr 2025 16:00  Patient On (Oxygen Delivery Method): room air        PHYSICAL EXAM:  GENERAL: NAD, lying in bed comfortably  CHEST/LUNG: Clear to auscultation bilaterally  HEART: Regular rate and rhythm, no murmurs  ABDOMEN: Soft, nontender, nondistended, Bowel sounds present  EXTREMITIES:  2+ Peripheral Pulses ,no edema  NERVOUS SYSTEM:  A&Ox3, no focal deficits, nystagmus    LABS:                        11.1   2.85  )-----------( 33       ( 14 Apr 2025 06:20 )             31.8     04-14    139  |  103  |  5[L]  ----------------------------<  132[H]  3.6   |  24  |  0.9    Ca    8.5      14 Apr 2025 06:20  Phos  1.3     04-14  Mg     2.5     04-14    TPro  5.7[L]  /  Alb  3.3[L]  /  TBili  4.9[H]  /  DBili  x   /  AST  66[H]  /  ALT  20  /  AlkPhos  134[H]  04-14      Urinalysis Basic - ( 14 Apr 2025 06:20 )    Color: x / Appearance: x / SG: x / pH: x  Gluc: 132 mg/dL / Ketone: x  / Bili: x / Urobili: x   Blood: x / Protein: x / Nitrite: x   Leuk Esterase: x / RBC: x / WBC x   Sq Epi: x / Non Sq Epi: x / Bacteria: x        Lactate, Blood: 1.6 mmol/L (04-14-25 @ 06:20)          RADIOLOGY:  CXR  Xray Chest 1 View- PORTABLE-Urgent:   ACC: [id number] EXAM:  XR CHEST PORTABLE URGENT 1V   ORDERED BY: ROLANDO ESCALANTE     PROCEDURE DATE:  04/13/2025          INTERPRETATION:  CLINICAL HISTORY: Pain    COMPARISON: March 12, 2025.    TECHNIQUE: Portable frontal chest radiograph. Low lungvolume.    FINDINGS:    Support devices: None.    Cardiac/mediastinum/hilum: Stable.    Lung parenchyma/Pleura: No focal parenchymal opacities, pleural   effusions, or pneumothorax.    Skeleton/soft tissues: Stable.      IMPRESSION: Low lung volume.    No radiographic evidence of acute cardiopulmonary disease.    --- End of Report ---            RICHARD HERNANDEZ MD; Attending Radiologist  This document has been electronically signed. Apr 13 2025  8:35AM (04-13-25 @ 08:31)      CT

## 2025-04-14 NOTE — CONSULT NOTE ADULT - ATTENDING COMMENTS
events noted, presented with CP, sp alcohol intake, level noted, LA resolved sp IVF, HO DVT no AC, repeat doppler reviewed, thamine, folic acid, vascular eval, plan as above

## 2025-04-14 NOTE — PROGRESS NOTE ADULT - ASSESSMENT
64-year-old male with history of alcohol abuse, DVT/PE, hypertension, high cholesterol, diabetes, gout brought in by ambulance from [facility] residence complaining of chest discomfort/palpitations after drinking 1 pint of vodka this morning.  Patient denies any shortness of breath, nausea, vomiting, diarrhea, abdominal pain, fever, chills, cough or weakness.    A/P   # Alcohol Lactic acidosis  # Alcohol abuse / intoxication  - IV hydration LR for 24 hours  - pt is intaxicated, monitor for sings of withdrawal in 48- 72 hours   - c/w CIWA protocol and Ativan PRN   - prescribe naltrexone 50 mg daily with be given with food   - keep Mg>2, K>4   - continue folate and multivitamin  - lactic acidosis resolved    # High grade fever  - check RVP, check procalcitonin level  - Started on zosyn for empiric coverage of aspiration pneumonia  - Send blood culture if fever  - supportive care, IV hydration      # Abnormal LFTs / alcoholic hepatitis?  # alcohol induced liver injury   - monitor LFTs   - alcoholic steatosis on previous RUQ US  - encourage abstinence    - MELD score daily. Maddrey score    #Hx DVT/PE  # Thrombocytopenia  - Duplex : DVT in right popliteal vein. Chronic DVT in left common femoral and popliteal veins.  - on previous dc was off coumadin b/c of platelet <50  - thrombocytopenia could be induced alcohol use  - trend platelet count , trending down  - hold SQ Heparin  - F/u consult vascular surgery     #ANDRZEJ, likely prerenal/ h/o BPH   -cr 1.3, baseline 0.9-1  - resolved, IV hydration for 24 hours   - c/w Flomax   - monitor BUN/cr and urine output     #HTN  -c/w toprol 25mg    #DM  - CC diet   -on metformin 500mg bid at home   -FS goal 140-180  -ISS     # gout,   -on Colchicine at home   - held on admission     #GI ppx  -pantoprazole IV    DVT PPX: No DVT ppx for now due to low plt  GI PPX: pantoprazole  DIET: DASH/CC  ACTIVITY: AT  CODE STATUS: Full  DISPOSITION: SDU    PENDING:

## 2025-04-14 NOTE — PROGRESS NOTE ADULT - SUBJECTIVE AND OBJECTIVE BOX
Pt interviewed, examined and EMR chart reviewed.    This is a 64y man with hx of alcohol use disorder and tobacco use disorder (in remission), PMH of DVT/PE, BPH, HTN, HLD, DMII, Gout, and Decompensated liver cirrhosis and alcoholic hepatitis, and PPH of anxiety who was brought in by ambulance from [facility] complaining of chest discomfort/palpitations. Addiction Medicine consulted for assistance with alcohol use disorder and management of alcohol withdrawal.     No acute overnight events. Patient required 0 mg ativan prn overnight or any medications for symptom control. Patient was seen this AM, resting in bed in no acute distress with the CATCH and addiction team. Patient AAOx3 to person, place and time, and situation, relaying the events of increased alcohol intake that brought him to the hospital from [facility]. Patient states that he sneaks in alcohol under his clothes, and has a hard time quitting on his own. Patient verbalized interest in medication to help with alcohol cravings. Writer reiterated naltrexone for MAT for AUD to help curb cravings for alcohol, decrease binge consumption and maintainance abstinence as part of an abstinence recovery plan. Writer stated that this was the same recommendation made one month ago during his admission and queries patient to strongly consider behavioral interventions instead of just medications to engage him in recovery. Patient seems amenable to counseling, however due to hearing impairments, therapeutic dialogue could not be had. Patient admits to mild HA, but denies diaphoresis, N/V, tactile disturbances, anxiety, denies AH/VH SI or HI. mild tremors on assessment CIWA 3    REVIEW OF SYSTEMS:per HPI     MEDICATIONS  (STANDING):  chlorhexidine 2% Cloths 1 Application(s) Topical daily  cyanocobalamin 1000 MICROGram(s) Oral daily  dextrose 5%. 1000 milliLiter(s) (50 mL/Hr) IV Continuous <Continuous>  dextrose 5%. 1000 milliLiter(s) (100 mL/Hr) IV Continuous <Continuous>  dextrose 50% Injectable 25 Gram(s) IV Push once  dextrose 50% Injectable 12.5 Gram(s) IV Push once  dextrose 50% Injectable 25 Gram(s) IV Push once  folic acid 1 milliGRAM(s) Oral daily  gabapentin 200 milliGRAM(s) Oral two times a day  glucagon  Injectable 1 milliGRAM(s) IntraMuscular once  heparin   Injectable 5000 Unit(s) SubCutaneous every 12 hours  influenza  Vaccine (HIGH DOSE) 0.5 milliLiter(s) IntraMuscular once  insulin lispro (ADMELOG) corrective regimen sliding scale   SubCutaneous three times a day before meals  lactated ringers. 1000 milliLiter(s) (100 mL/Hr) IV Continuous <Continuous>  methocarbamol 500 milliGRAM(s) Oral daily  metoprolol succinate ER 25 milliGRAM(s) Oral daily  multivitamin 1 Tablet(s) Oral daily  naltrexone 50 milliGRAM(s) Oral daily  pantoprazole  Injectable 40 milliGRAM(s) IV Push daily  piperacillin/tazobactam IVPB.- 3.375 Gram(s) IV Intermittent once  piperacillin/tazobactam IVPB.. 3.375 Gram(s) IV Intermittent every 8 hours  tamsulosin 0.4 milliGRAM(s) Oral at bedtime  thiamine IVPB 500 milliGRAM(s) IV Intermittent every 8 hours    MEDICATIONS  (PRN):  acetaminophen     Tablet .. 650 milliGRAM(s) Oral every 6 hours PRN Temp greater or equal to 38.5C (101.3F)  dextrose Oral Gel 15 Gram(s) Oral once PRN Blood Glucose LESS THAN 70 milliGRAM(s)/deciliter  LORazepam     Tablet 2 milliGRAM(s) Oral every 2 hours PRN ciwa >7  ondansetron Injectable 4 milliGRAM(s) IV Push every 8 hours PRN Nausea and/or Vomiting    Vital Signs Last 24 Hrs  T(C): 39.1 (14 Apr 2025 08:41), Max: 39.1 (14 Apr 2025 08:41)  T(F): 102.4 (14 Apr 2025 08:41), Max: 102.4 (14 Apr 2025 08:41)  HR: 92 (14 Apr 2025 08:41) (91 - 105)  BP: 149/72 (14 Apr 2025 04:00) (128/59 - 149/72)  BP(mean): 104 (14 Apr 2025 04:00) (85 - 104)  RR: 19 (14 Apr 2025 08:41) (18 - 20)  SpO2: 95% (14 Apr 2025 08:41) (95% - 99%)    Parameters below as of 14 Apr 2025 08:41  Patient On (Oxygen Delivery Method): room air        PHYSICAL EXAM:    Constitutional: NAD  HEENT: PERRLA, EOMI  Respiratory: no increased work of breathing  Neurological: A/O x 3, no focal deficits    MENTAL STATUS EXAM:  Appearance: calm, in street clothing  Appearance in relation to age: looks stated age      Hygiene/Grooming: fair grooming   Attitude toward examiner: fully cooperative  Alertness: alert  Orientation: oriented to time, place and person  Posture: lying in bed  Gait: not evaluated  Behavior and psychomotor activity: mild tremors on outstretched arms   Mood: euthymic  Affect: full range and reactivity      Speech: fluent and spontaneous; normal rate, rhythm, volume and tone   Perceptions: denies hallucinations  Thought process: logical, linear and goal-directed    Thought content: no delusions or particular preoccupation, denies SI/HI  Associations: no loosening of associations   Impulse Control: aware of socially acceptable behavior  Insight: fair  Judgment: fair    LABS:  Lactate, Blood (04.14.25 @ 06:20)   Lactate, Blood: 1.6 mmol/L  Phosphorus (04.14.25 @ 06:20)   Phosphorus: 1.3 mg/dL  Magnesium (04.14.25 @ 06:20)   Magnesium: 2.5 mg/dL  Comprehensive Metabolic Panel in AM (04.14.25 @ 06:20)   Sodium: 139 mmol/L  Potassium: 3.6 mmol/L  Chloride: 103 mmol/L  Carbon Dioxide: 24 mmol/L  Anion Gap: 12 mmol/L  Blood Urea Nitrogen: 5 mg/dL  Creatinine: 0.9: Icteric. Interpret with caution mg/dL  Glucose: 132 mg/dL  Calcium: 8.5 mg/dL  Protein Total: 5.7 g/dL  Albumin: 3.3 g/dL  Bilirubin Total: 4.9 mg/dL  Alkaline Phosphatase: 134 U/L  Aspartate Aminotransferase (AST/SGOT): 66 U/L  Alanine Aminotransferase (ALT/SGPT): 20 U/L  eGFR: 95: The estimated glomerular filtration rate (eGFR) calculation is based on   the 2021 CKD-EPI creatinine equation, which is validated in male and   female population 18 years of age and older (N Engl J Med 2021;   [id number]). mL/min/1.73m2  Complete Blood Count + Automated Diff in AM (04.14.25 @ 06:20)   Auto NRBC: 0 /100 WBCs  WBC Count: 2.85 K/uL  RBC Count: 3.02 M/uL  Hemoglobin: 11.1 g/dL  Hematocrit: 31.8 %  Mean Cell Volume: 105.3 fL  Mean Cell Hemoglobin: 36.8 pg  Mean Cell Hemoglobin Conc: 34.9 g/dL  Red Cell Distrib Width: 15.2 %  Platelet Count - Automated: 33: Smear Reviewed, Result Confirmed. K/uL  MPV: 11.8 fL  Neutrophil #: 1.81 K/uL  Lymphocyte #: 0.77 K/uL  Monocyte #: 0.17 K/uL  Eosinophil #: 0.05 K/uL  Basophil #: 0.04 K/uL  Neutrophil %: 63.4: Differential percentages must be correlated with absolute numbers for   clinical significance. %  Lymphocyte %: 27.0 %  Monocyte %: 6.0 %  Eosinophil %: 1.8 %  Basophil %: 1.4 %  Auto Immature Granulocyte %: 0.4: (Includes meta, myelo and promyelocytes). Mild elevations in immature   granulocytes may be seen with many inflammatory processes and pregnancy;   clinical correlation suggested. %    Urinalysis Basic - ( 13 Apr 2025 08:13 )  Color: x / Appearance: x / SG: x / pH: x  Gluc: 104 mg/dL / Ketone: x  / Bili: x / Urobili: x   Blood: x / Protein: x / Nitrite: x   Leuk Esterase: x / RBC: x / WBC x   Sq Epi: x / Non Sq Epi: x / Bacteria: x      Drug Screen Urine:  Alcohol Level  Alcohol, Blood: 253 mg/dL (04-13-25 @ 08:13)        RADIOLOGY & ADDITIONAL STUDIES:    CT AP w/ IV con 3/12/25   LIVER: Steatosis. Mild caudate lobe hypertrophy, unchanged.    US abdomen RUQ 1/22/25: Limited exam. Heterogeneously echogenic liver which may reflect hepatic   steatosis reflect underlying hepatocellular disease. Cholelithiasis.    US abdomen RUQ 3/19/25: Cholelithiasis. No sonographic evidence of acute cholecystitis.    Partially visualized right pleural effusion.Liver within normal limits

## 2025-04-14 NOTE — PROGRESS NOTE ADULT - SUBJECTIVE AND OBJECTIVE BOX
64-year-old male with history of alcohol abuse, DVT/PE, hypertension, high cholesterol, diabetes, gout brought in by ambulance from [facility]'s residence complaining of chest discomfort/palpitations after drinking 1 pint of vodka this morning.  Patient denies any shortness of breath, nausea, vomiting, diarrhea, abdominal pain, fever, chills, cough or weakness.  He was found to have ketoacidosis due to Etoh intoxication electrolytes misbalance and weakness.   Today pt is c/o constipation and weakness, spiking high grade fever.     PAST MEDICAL & SURGICAL HISTORY:  Alcohol dependence  HTN (hypertension)  Hard of hearing  Seasonal allergies  BPH (benign prostatic hyperplasia)  GERD (gastroesophageal reflux disease)  Pseudogout  History of deep vein thrombosis (DVT) of lower extremity  No significant past surgical history      Vital Signs Last 24 Hrs  T(C): 36.5 (14 Apr 2025 11:30), Max: 39.1 (14 Apr 2025 08:41)  T(F): 97.7 (14 Apr 2025 11:30), Max: 102.4 (14 Apr 2025 08:41)  HR: 88 (14 Apr 2025 11:30) (88 - 105)  BP: 136/82 (14 Apr 2025 11:30) (128/59 - 149/72)  BP(mean): 104 (14 Apr 2025 11:30) (85 - 104)  RR: 19 (14 Apr 2025 11:30) (18 - 20)  SpO2: 98% (14 Apr 2025 11:30) (95% - 99%)    Parameters below as of 14 Apr 2025 11:30  Patient On (Oxygen Delivery Method): room air      PHYSICAL EXAM:  GENERAL: NAD, obese   HEAD:  Atraumatic, Normocephalic  NECK: Supple, No JVD, Normal thyroid  NERVOUS SYSTEM:  Alert & Oriented X3, slow speech , answering questions, following commands   CHEST/LUNG: decreased BS at bases   HEART: Regular rate and rhythm; No murmurs, rubs, or gallops  ABDOMEN: Soft, Nontender, Nondistended; Bowel sounds present  EXTREMITIES:  2+ Peripheral Pulses, No clubbing, cyanosis, or edema        LABS:                        11.1   2.85  )-----------( 33       ( 14 Apr 2025 06:20 )             31.8     04-14    139  |  103  |  5[L]  ----------------------------<  132[H]  3.6   |  24  |  0.9    Ca    8.5      14 Apr 2025 06:20  Phos  1.3     04-14  Mg     2.5     04-14    TPro  5.7[L]  /  Alb  3.3[L]  /  TBili  4.9[H]  /  DBili  x   /  AST  66[H]  /  ALT  20  /  AlkPhos  134[H]  04-14      Urinalysis Basic - ( 14 Apr 2025 06:20 )    Color: x / Appearance: x / SG: x / pH: x  Gluc: 132 mg/dL / Ketone: x  / Bili: x / Urobili: x   Blood: x / Protein: x / Nitrite: x   Leuk Esterase: x / RBC: x / WBC x   Sq Epi: x / Non Sq Epi: x / Bacteria: x      RADIOLOGY & ADDITIONAL TESTS:  < from: VA Duplex Lower Ext Vein Scan, Bilat (04.13.25 @ 18:35) >  IMPRESSION:  DVT in right popliteal vein.    Chronic DVT in left common femoral and popliteal veins.    < end of copied text >  < from: TTE Echo Complete w/ Contrast w/o Doppler (03.14.25 @ 08:16) >    Summary:   1. Technically difficult study.   2. Normal left ventricular internal cavity size.   3. Normal global left ventricular systolic function.   4. LV Ejection Fraction by Johnson's Method with a biplane EF of 60%.   5. Normal right ventricular size and function.   6. No significant valve disease.   7. Compared to study dated 12/22/22, there is no significant change.   8. Endocardial visualization was enhanced with intravenous echo contrast.    MEDICATIONS  (STANDING):  chlorhexidine 2% Cloths 1 Application(s) Topical daily  cyanocobalamin 1000 MICROGram(s) Oral daily  dextrose 5%. 1000 milliLiter(s) (50 mL/Hr) IV Continuous <Continuous>  dextrose 5%. 1000 milliLiter(s) (100 mL/Hr) IV Continuous <Continuous>  dextrose 50% Injectable 25 Gram(s) IV Push once  dextrose 50% Injectable 12.5 Gram(s) IV Push once  dextrose 50% Injectable 25 Gram(s) IV Push once  folic acid 1 milliGRAM(s) Oral daily  gabapentin 200 milliGRAM(s) Oral two times a day  glucagon  Injectable 1 milliGRAM(s) IntraMuscular once  influenza  Vaccine (HIGH DOSE) 0.5 milliLiter(s) IntraMuscular once  insulin lispro (ADMELOG) corrective regimen sliding scale   SubCutaneous three times a day before meals  lactated ringers. 1000 milliLiter(s) (100 mL/Hr) IV Continuous <Continuous>  methocarbamol 500 milliGRAM(s) Oral daily  metoprolol succinate ER 25 milliGRAM(s) Oral daily  multivitamin 1 Tablet(s) Oral daily  pantoprazole  Injectable 40 milliGRAM(s) IV Push daily  piperacillin/tazobactam IVPB.- 3.375 Gram(s) IV Intermittent once  piperacillin/tazobactam IVPB.. 3.375 Gram(s) IV Intermittent every 8 hours  potassium phosphate / sodium phosphate Powder (PHOS-NaK) 1 Packet(s) Oral three times a day  tamsulosin 0.4 milliGRAM(s) Oral at bedtime  thiamine IVPB 500 milliGRAM(s) IV Intermittent every 8 hours    MEDICATIONS  (PRN):  acetaminophen     Tablet .. 650 milliGRAM(s) Oral every 6 hours PRN Temp greater or equal to 38.5C (101.3F)  dextrose Oral Gel 15 Gram(s) Oral once PRN Blood Glucose LESS THAN 70 milliGRAM(s)/deciliter  LORazepam     Tablet 2 milliGRAM(s) Oral every 2 hours PRN ciwa >7  ondansetron Injectable 4 milliGRAM(s) IV Push every 8 hours PRN Nausea and/or Vomiting

## 2025-04-14 NOTE — PROGRESS NOTE ADULT - ASSESSMENT
This is a 64y man with hx of alcohol use disorder and tobacco use disorder (in remission), PMH of DVT/PE, BPH, HTN, HLD, DMII, Gout, and Decompensated liver cirrhosis and alcoholic hepatitis, and PPH of anxiety who was brought in by ambulance from [facility]'s residence complaining of chest discomfort/palpitations. Addiction Medicine consulted for assistance with alcohol use disorder and management of alcohol withdrawal.     #Alcohol withdrawal:  - CIWA is 3 on assessment, patient's withdrawal symptoms improving on Ativan regimen over the weekend. Patient may be continued to be monitored on CIWA only for breakthrough symptoms of withdrawal, recommend Ativan 2mg PO q2h PRN for CIWA >7 for the next 24 hours.   - writer and CATCH Team counseled patient with motivational techniques to illicit recovery motives for patient. Strongly recommending outpatient behavioral therapy for patient in addition to MAT for AUD with oral naltrexone.  - patient amenable to oral naltrexone treatment, Naltrexone is an opioid antagonist that is FDA approved for alcohol use disorder by targeting alcohol cravings and decreasing binge consumption and total number of drinking days, and has been effective for patients abstaining from alcohol. Please prescribe naltrexone 50 mg daily with be given with food   - keep Mg>2, K>4   - continue folate and multivitamin  - hold benzodiazepines for sedation/respiratory depression   - CATCH team to assist with substance use aftercare options for patient with ongoing conversation tomorrow, patient may be amenable to outpatient counseling which in combination with medication has superior outcomes for fewer relapses and decreasing binge consumption.     #Wernicke's Encephalopathy prevention:  - Would recommend IV thiamine 500 mg q 8h for 3 days, followed by 250 mg IV daily up to additional 5 days per [employer] recommendations.   - Can also opt to switch to oral thiamine 100 mg daily after 3 days of IV repletion.   - keep Mg >2, K>4     #Alcohol induced Hepatic Steatosis:  - CTAP with IV contrast in 3/12/25 showed liver steatosis, mild caudate lobe hypertrophy, unchanged.   - counseling for alcohol cessation as above with CATCH. Patient does not appear to have good insight into his liver disease and will require ongoing counseling.   - 3/14/25 HepBcore IgM, Hep B surface antigen negative, Hep A IgM negative, Hep C ratio 0.28, Hep C virus nonreactive   - patient should be monitored by PCP or GI specialist annually for noninvasive liver monitoring given that up to 20% of patients with steatosis develop fibrosis.  - if following up imaging concerning for cirrhosis, patient should be monitored with noninvasive imaging and AFP every 6 months for HCC screening     Thank you for this consult. Rest of care per primary. Addiction medicine will continue to follow. Please reach out with any questions or concerns via TEAMS.

## 2025-04-14 NOTE — CONSULT NOTE ADULT - ASSESSMENT
IMPRESSION:  EtOH abuse  Lactate acidosis resolved  ANDRZEJ improved  transaminitis  HO DVT PE & Thrombocytopenia not on AC    PLAN:    CNS: CIWA protocol with ativan. thiamine folate multivitamin. Catch team follow up    HEENT: Oral care    PULMONARY:  HOB @ 45 degrees.  Keep Pulse Ox 92-96%    CARDIOVASCULAR: keep even fluid balance. Keep MAP >65    GI: GI prophylaxis.  Feeding.  Bowel regimen     RENAL:  Follow up lytes.  Correct as needed. replete phos    INFECTIOUS DISEASE: Follow up cultures    HEMATOLOGICAL:  DVT prophylaxis with SCDs. LE duplex    ENDOCRINE:  Follow up FS.  Insulin protocol if needed.    MUSCULOSKELETAL: PT/OT    LINES/DRAINS:  PIV    ADVANCED DIRECTIVES:  FULL CODE    DISPO: Floor       IMPRESSION:  EtOH abuse  Lactate acidosis resolved  ANDRZEJ improved  transaminitis  HO DVT PE & Thrombocytopenia not on AC    PLAN:    CNS: CIWA protocol with ativan PRN. thiamine folate multivitamin. Catch team follow up    HEENT: Oral care    PULMONARY:  HOB @ 45 degrees.  Keep Pulse Ox 92-96%    CARDIOVASCULAR: keep even fluid balance. Keep MAP >65. d/c IVF    GI: GI prophylaxis.  Feeding.  Bowel regimen     RENAL:  Follow up lytes.  Correct as needed. replete phos    INFECTIOUS DISEASE: 2 sets of cultures. Zosyn, 1 dose of vancomycin. UA . Full RVP panel. MRSA nares. procalcitonin    HEMATOLOGICAL:  DVT prophylaxis with heparin sub q. LE duplex noted chronic DVTs    ENDOCRINE:  Follow up FS.  Insulin protocol if needed.    MUSCULOSKELETAL: PT/OT    LINES/DRAINS:  PIV    ADVANCED DIRECTIVES:  FULL CODE    DISPO: Floor       IMPRESSION:    Alcohol withdrwawal  EtOH abuse  Lactate acidosis resolved  ANDRZEJ improved  transaminitis  HO DVT PE & Thrombocytopenia not on AC    PLAN:    CNS: CIWA protocol with ativan PRN. thiamine folate multivitamin. Catch team follow up    HEENT: Oral care    PULMONARY:  HOB @ 45 degrees.  Keep Pulse Ox 92-96%, aspiration precaution    CARDIOVASCULAR: keep even fluid balance. Keep MAP >65. d/c IVF    GI: GI prophylaxis.  Feeding.  Bowel regimen     RENAL:  Follow up lytes.  Correct as needed. replete phos    INFECTIOUS DISEASE: 2 sets of cultures. Full RVP panel. MRSA nares. procalcitonin    HEMATOLOGICAL:  DVT prophylaxis with heparin sub q. LE duplex noted chronic DVTs    ENDOCRINE:  Follow up FS.  Insulin protocol if needed.    MUSCULOSKELETAL: PT/OT    LINES/DRAINS:  PIV    ADVANCED DIRECTIVES:  FULL CODE         IMPRESSION:    Alcohol withdrwawal  EtOH abuse  Lactate acidosis resolved  ANDRZEJ improved  transaminitis  HO DVT PE & Thrombocytopenia not on AC    PLAN:    CNS: CIWA protocol with ativan PRN. thiamine folate multivitamin. Catch team follow up    HEENT: Oral care    PULMONARY:  HOB @ 45 degrees.  Keep Pulse Ox 92-96%, aspiration precaution    CARDIOVASCULAR: keep even fluid balance. Keep MAP >65. d/c IVF    GI: GI prophylaxis.  Feeding.  Bowel regimen     RENAL:  Follow up lytes.  Correct as needed. replete phos    INFECTIOUS DISEASE: 2 sets of cultures. Full RVP panel. MRSA nares. procalcitonin    HEMATOLOGICAL:  DVT prophylaxis with heparin sub q. LE duplex noted chronic DVTs    ENDOCRINE:  Follow up FS.  Insulin protocol if needed.    MUSCULOSKELETAL: PT/OT    LINES/DRAINS:  PIV    ADVANCED DIRECTIVES:  FULL CODE    SDU

## 2025-04-15 LAB
ALBUMIN SERPL ELPH-MCNC: 3.1 G/DL — LOW (ref 3.5–5.2)
ALP SERPL-CCNC: 137 U/L — HIGH (ref 30–115)
ALT FLD-CCNC: 19 U/L — SIGNIFICANT CHANGE UP (ref 0–41)
ANION GAP SERPL CALC-SCNC: 9 MMOL/L — SIGNIFICANT CHANGE UP (ref 7–14)
APTT BLD: 40.4 SEC — HIGH (ref 27–39.2)
AST SERPL-CCNC: 58 U/L — HIGH (ref 0–41)
BASOPHILS # BLD AUTO: 0.03 K/UL — SIGNIFICANT CHANGE UP (ref 0–0.2)
BASOPHILS NFR BLD AUTO: 1 % — SIGNIFICANT CHANGE UP (ref 0–1)
BILIRUB SERPL-MCNC: 4.5 MG/DL — HIGH (ref 0.2–1.2)
BUN SERPL-MCNC: 4 MG/DL — LOW (ref 10–20)
CALCIUM SERPL-MCNC: 8.5 MG/DL — SIGNIFICANT CHANGE UP (ref 8.4–10.5)
CHLORIDE SERPL-SCNC: 106 MMOL/L — SIGNIFICANT CHANGE UP (ref 98–110)
CO2 SERPL-SCNC: 26 MMOL/L — SIGNIFICANT CHANGE UP (ref 17–32)
CREAT SERPL-MCNC: 1.1 MG/DL — SIGNIFICANT CHANGE UP (ref 0.7–1.5)
EGFR: 74 ML/MIN/1.73M2 — SIGNIFICANT CHANGE UP
EGFR: 74 ML/MIN/1.73M2 — SIGNIFICANT CHANGE UP
EOSINOPHIL # BLD AUTO: 0.17 K/UL — SIGNIFICANT CHANGE UP (ref 0–0.7)
EOSINOPHIL NFR BLD AUTO: 5.4 % — SIGNIFICANT CHANGE UP (ref 0–8)
FOLATE SERPL-MCNC: 15.2 NG/ML — SIGNIFICANT CHANGE UP
GLUCOSE BLDC GLUCOMTR-MCNC: 119 MG/DL — HIGH (ref 70–99)
GLUCOSE BLDC GLUCOMTR-MCNC: 129 MG/DL — HIGH (ref 70–99)
GLUCOSE BLDC GLUCOMTR-MCNC: 140 MG/DL — HIGH (ref 70–99)
GLUCOSE BLDC GLUCOMTR-MCNC: 98 MG/DL — SIGNIFICANT CHANGE UP (ref 70–99)
GLUCOSE SERPL-MCNC: 110 MG/DL — HIGH (ref 70–99)
HCT VFR BLD CALC: 33.4 % — LOW (ref 42–52)
HGB BLD-MCNC: 11.6 G/DL — LOW (ref 14–18)
IMM GRANULOCYTES NFR BLD AUTO: 0.3 % — SIGNIFICANT CHANGE UP (ref 0.1–0.3)
INR BLD: 1.58 RATIO — HIGH (ref 0.65–1.3)
LYMPHOCYTES # BLD AUTO: 0.9 K/UL — LOW (ref 1.2–3.4)
LYMPHOCYTES # BLD AUTO: 28.8 % — SIGNIFICANT CHANGE UP (ref 20.5–51.1)
MAGNESIUM SERPL-MCNC: 2 MG/DL — SIGNIFICANT CHANGE UP (ref 1.8–2.4)
MCHC RBC-ENTMCNC: 34.7 G/DL — SIGNIFICANT CHANGE UP (ref 32–37)
MCHC RBC-ENTMCNC: 36.8 PG — HIGH (ref 27–31)
MCV RBC AUTO: 106 FL — HIGH (ref 80–94)
MELD SCORE WITH DIALYSIS: 31 POINTS — SIGNIFICANT CHANGE UP
MELD SCORE WITHOUT DIALYSIS: 18 POINTS — SIGNIFICANT CHANGE UP
MONOCYTES # BLD AUTO: 0.17 K/UL — SIGNIFICANT CHANGE UP (ref 0.1–0.6)
MONOCYTES NFR BLD AUTO: 5.4 % — SIGNIFICANT CHANGE UP (ref 1.7–9.3)
NEUTROPHILS # BLD AUTO: 1.84 K/UL — SIGNIFICANT CHANGE UP (ref 1.4–6.5)
NEUTROPHILS NFR BLD AUTO: 59.1 % — SIGNIFICANT CHANGE UP (ref 42.2–75.2)
NRBC BLD AUTO-RTO: 0 /100 WBCS — SIGNIFICANT CHANGE UP (ref 0–0)
PHOSPHATE SERPL-MCNC: 2.3 MG/DL — SIGNIFICANT CHANGE UP (ref 2.1–4.9)
PLATELET # BLD AUTO: 27 K/UL — LOW (ref 130–400)
PMV BLD: 11.6 FL — HIGH (ref 7.4–10.4)
POTASSIUM SERPL-MCNC: 3.3 MMOL/L — LOW (ref 3.5–5)
POTASSIUM SERPL-SCNC: 3.3 MMOL/L — LOW (ref 3.5–5)
PROCALCITONIN SERPL-MCNC: 0.11 NG/ML — HIGH (ref 0.02–0.1)
PROT SERPL-MCNC: 5.5 G/DL — LOW (ref 6–8)
PROTHROM AB SERPL-ACNC: 18.8 SEC — HIGH (ref 9.95–12.87)
RAPID RVP RESULT: SIGNIFICANT CHANGE UP
RBC # BLD: 3.15 M/UL — LOW (ref 4.7–6.1)
RBC # FLD: 14.8 % — HIGH (ref 11.5–14.5)
SARS-COV-2 RNA SPEC QL NAA+PROBE: SIGNIFICANT CHANGE UP
SODIUM SERPL-SCNC: 141 MMOL/L — SIGNIFICANT CHANGE UP (ref 135–146)
VIT B12 SERPL-MCNC: 1961 PG/ML — HIGH (ref 232–1245)
WBC # BLD: 3.12 K/UL — LOW (ref 4.8–10.8)
WBC # FLD AUTO: 3.12 K/UL — LOW (ref 4.8–10.8)

## 2025-04-15 PROCEDURE — 99233 SBSQ HOSP IP/OBS HIGH 50: CPT

## 2025-04-15 PROCEDURE — 99232 SBSQ HOSP IP/OBS MODERATE 35: CPT

## 2025-04-15 RX ADMIN — Medication 1 PACKET(S): at 21:16

## 2025-04-15 RX ADMIN — GABAPENTIN 200 MILLIGRAM(S): 400 CAPSULE ORAL at 18:10

## 2025-04-15 RX ADMIN — TAMSULOSIN HYDROCHLORIDE 0.4 MILLIGRAM(S): 0.4 CAPSULE ORAL at 21:16

## 2025-04-15 RX ADMIN — CYANOCOBALAMIN 1000 MICROGRAM(S): 1000 INJECTION INTRAMUSCULAR; SUBCUTANEOUS at 11:55

## 2025-04-15 RX ADMIN — Medication 25 GRAM(S): at 06:10

## 2025-04-15 RX ADMIN — Medication 50 MILLIEQUIVALENT(S): at 13:05

## 2025-04-15 RX ADMIN — Medication 50 MILLIEQUIVALENT(S): at 08:05

## 2025-04-15 RX ADMIN — SODIUM CHLORIDE 100 MILLILITER(S): 9 INJECTION, SOLUTION INTRAVENOUS at 08:28

## 2025-04-15 RX ADMIN — METOPROLOL SUCCINATE 25 MILLIGRAM(S): 50 TABLET, EXTENDED RELEASE ORAL at 06:09

## 2025-04-15 RX ADMIN — Medication 40 MILLIGRAM(S): at 11:53

## 2025-04-15 RX ADMIN — Medication 1 APPLICATION(S): at 11:53

## 2025-04-15 RX ADMIN — Medication 105 MILLIGRAM(S): at 16:05

## 2025-04-15 RX ADMIN — GABAPENTIN 200 MILLIGRAM(S): 400 CAPSULE ORAL at 06:09

## 2025-04-15 RX ADMIN — Medication 105 MILLIGRAM(S): at 21:46

## 2025-04-15 RX ADMIN — Medication 1 TABLET(S): at 11:54

## 2025-04-15 RX ADMIN — Medication 105 MILLIGRAM(S): at 06:09

## 2025-04-15 RX ADMIN — FOLIC ACID 1 MILLIGRAM(S): 1 TABLET ORAL at 11:54

## 2025-04-15 RX ADMIN — Medication 1 PACKET(S): at 16:05

## 2025-04-15 RX ADMIN — Medication 25 GRAM(S): at 15:08

## 2025-04-15 RX ADMIN — Medication 25 GRAM(S): at 21:16

## 2025-04-15 RX ADMIN — METHOCARBAMOL 500 MILLIGRAM(S): 500 TABLET, FILM COATED ORAL at 11:53

## 2025-04-15 RX ADMIN — Medication 1 PACKET(S): at 06:11

## 2025-04-15 NOTE — CONSULT NOTE ADULT - SUBJECTIVE AND OBJECTIVE BOX
VASCULAR SURGERY CONSULT NOTE      HPI:  64-year-old male with history of alcohol abuse, DVT/PE, hypertension, high cholesterol, diabetes, gout brought in by ambulance from [facility]'s residence complaining of chest discomfort/palpitations after drinking 1 pint of vodka this morning.  Patient denies any shortness of breath, nausea, vomiting, diarrhea, abdominal pain, fever, chills, cough or weakness.    Vascular surgery was consulted for bilateral lower extremity DVT. Venous duplex revealed DVT in the right popliteal vein and chronic DVT in the left common femoral and popliteal veins. Patient reports history of DVT for the last 5 years, denies any pain associated with it. Was on warfarin prior but stopped a few weeks ago after he started getting nose bleeds. Able to ambulate with a walker and was a former smoker who quit 20 years ago. Used to follow with a vascular surgeon on [facility] outpatient but has not followed up in 4 years.     ED vitals: T 98.4, , /75, SpO2 98% on RA, RR 17  Labs: wbc 12.8, .4, platelet 77, CO2 16, AG 29, Tbili 3.2, [id number], ast 79, alt 24, trop33, BHB 0.9,   VBG pH 7.35, pCO2 33 HCO3 18, Lactate 12.2  blood alcohol level 253  EKG- sinus tachycardia  CXR unremarkable    Patient admitted to SDU for further management (13 Apr 2025 10:58)        PAST MEDICAL & SURGICAL HISTORY:  Alcohol dependence      HTN (hypertension)      Hard of hearing      Seasonal allergies      BPH (benign prostatic hyperplasia)      GERD (gastroesophageal reflux disease)      Pseudogout      History of deep vein thrombosis (DVT) of lower extremity      No significant past surgical history        shellfish (Hives)  Beef (Hives)  dairy products (Hives)  No Known Drug Allergies    Home Medications:  Claritin 10 mg oral tablet: 1 tab(s) orally once a day (13 Apr 2025 11:57)  colchicine 0.6 mg oral tablet: 1 tab(s) orally 2 times a day (13 Apr 2025 11:57)  dicyclomine 20 mg oral tablet: 1 tab(s) orally every 8 hours as needed for abdominal cramps (13 Apr 2025 11:57)  folic acid 1 mg oral tablet: 1 tab(s) orally once a day (13 Apr 2025 12:02)  Jardiance 10 mg oral tablet: 1 tab(s) orally once a day (13 Apr 2025 11:57)  methocarbamol 500 mg oral tablet: 1 orally once a day (09 Nov 2024 16:30)  Protonix 40 mg oral delayed release tablet: 1 tab(s) orally once a day (13 Apr 2025 11:57)  Tylenol 325 mg oral tablet: 2 tab(s) orally every 8 hours as needed for  mild pain (13 Apr 2025 11:57)    No permtinent family history of PVD    REVIEW OF SYSTEMS:  GENERAL:                                         negative  SKIN:                                                 negative  OPTHALMOLOGIC:                          negative  ENMT:                                               negative  RESPIRATORY AND THORAX:        negative  CARDIOVASCULAR:                         negative  GASTROINTESTINAL:                       negative  NEPHROLOGY:                                  negative  MUSCULOSKELETAL:                       negative  NEUROLOGIC:                                   negative  PSYCHIATRIC:                                    negative  HEMATOLOGY/LYMPHATICS:         negative  ENDOCRINE:                                     negative  ALLERGIC/IMMUNOLOGIC:            negative    12 point ROS otherwise normal except as stated in HPI  FHx: none  SHX:  [ ]  smoking     [ ] alcohol use    PHYSICAL EXAM  Vital Signs Last 24 Hrs  T(C): 37.1 (15 Apr 2025 11:00), Max: 37.1 (15 Apr 2025 11:00)  T(F): 98.8 (15 Apr 2025 11:00), Max: 98.8 (15 Apr 2025 11:00)  HR: 98 (15 Apr 2025 11:00) (86 - 98)  BP: 127/70 (15 Apr 2025 11:00) (109/86 - 127/70)  BP(mean): 92 (15 Apr 2025 11:00) (81 - 92)  RR: 18 (15 Apr 2025 11:00) (18 - 18)  SpO2: 97% (15 Apr 2025 11:00) (94% - 100%)    Parameters below as of 15 Apr 2025 08:00  Patient On (Oxygen Delivery Method): nasal cannula        Appearance: Normal	  Cardiovascular: RRR  Respiratory: Symmetric and equal chest rise and fall  Gastrointestinal:  Soft, Non-tender, nondistended  Skin: No rashes, No ecchymoses, No cyanosis  Extremities: Normal range of motion, No clubbing, cyanosis or edema, no erythema           MEDICATIONS:   MEDICATIONS  (STANDING):  chlorhexidine 2% Cloths 1 Application(s) Topical daily  cyanocobalamin 1000 MICROGram(s) Oral daily  dextrose 5%. 1000 milliLiter(s) (100 mL/Hr) IV Continuous <Continuous>  dextrose 5%. 1000 milliLiter(s) (50 mL/Hr) IV Continuous <Continuous>  dextrose 50% Injectable 25 Gram(s) IV Push once  dextrose 50% Injectable 12.5 Gram(s) IV Push once  dextrose 50% Injectable 25 Gram(s) IV Push once  folic acid 1 milliGRAM(s) Oral daily  gabapentin 200 milliGRAM(s) Oral two times a day  glucagon  Injectable 1 milliGRAM(s) IntraMuscular once  influenza  Vaccine (HIGH DOSE) 0.5 milliLiter(s) IntraMuscular once  insulin lispro (ADMELOG) corrective regimen sliding scale   SubCutaneous three times a day before meals  lactated ringers. 1000 milliLiter(s) (100 mL/Hr) IV Continuous <Continuous>  methocarbamol 500 milliGRAM(s) Oral daily  metoprolol succinate ER 25 milliGRAM(s) Oral daily  multivitamin 1 Tablet(s) Oral daily  naltrexone 50 milliGRAM(s) Oral daily  pantoprazole  Injectable 40 milliGRAM(s) IV Push daily  piperacillin/tazobactam IVPB.. 3.375 Gram(s) IV Intermittent every 8 hours  potassium chloride  20 mEq/100 mL IVPB 20 milliEquivalent(s) IV Intermittent every 2 hours  potassium phosphate / sodium phosphate Powder (PHOS-NaK) 1 Packet(s) Oral three times a day  tamsulosin 0.4 milliGRAM(s) Oral at bedtime  thiamine IVPB 500 milliGRAM(s) IV Intermittent every 8 hours    MEDICATIONS  (PRN):  acetaminophen     Tablet .. 650 milliGRAM(s) Oral every 6 hours PRN Temp greater or equal to 38.5C (101.3F)  dextrose Oral Gel 15 Gram(s) Oral once PRN Blood Glucose LESS THAN 70 milliGRAM(s)/deciliter  LORazepam     Tablet 2 milliGRAM(s) Oral every 2 hours PRN ciwa >7  ondansetron Injectable 4 milliGRAM(s) IV Push every 8 hours PRN Nausea and/or Vomiting      LAB/STUDIES:                        11.6   3.12  )-----------( 27       ( 15 Apr 2025 04:45 )             33.4     04-15    141  |  106  |  4[L]  ----------------------------<  110[H]  3.3[L]   |  26  |  1.1    Ca    8.5      15 Apr 2025 04:45  Phos  2.3     04-15  Mg     2.0     04-15    TPro  5.5[L]  /  Alb  3.1[L]  /  TBili  4.5[H]  /  DBili  1.7[H]  /  AST  58[H]  /  ALT  19  /  AlkPhos  137[H]  04-15    PT/INR - ( 15 Apr 2025 04:45 )   PT: 18.80 sec;   INR: 1.58 ratio         PTT - ( 15 Apr 2025 04:45 )  PTT:40.4 sec  LIVER FUNCTIONS - ( 15 Apr 2025 04:45 )  Alb: 3.1 g/dL / Pro: 5.5 g/dL / ALK PHOS: 137 U/L / ALT: 19 U/L / AST: 58 U/L / GGT: x             Urinalysis Basic - ( 15 Apr 2025 04:45 )    Color: x / Appearance: x / SG: x / pH: x  Gluc: 110 mg/dL / Ketone: x  / Bili: x / Urobili: x   Blood: x / Protein: x / Nitrite: x   Leuk Esterase: x / RBC: x / WBC x   Sq Epi: x / Non Sq Epi: x / Bacteria: x                    IMAGING:  < from: [facility] Duplex Lower Ext Vein Scan, Bilat (04.13.25 @ 18:35) >  IMPRESSION:  DVT in right popliteal vein.    Chronic DVT in left common femoral and popliteal veins.    < end of copied text >       VASCULAR SURGERY CONSULT NOTE      HPI:  64-year-old male with history of alcohol abuse, DVT/PE, hypertension, high cholesterol, diabetes, gout brought in by ambulance from [facility]'s residence complaining of chest discomfort/palpitations after drinking 1 pint of vodka this morning.  Patient denies any shortness of breath, nausea, vomiting, diarrhea, abdominal pain, fever, chills, cough or weakness.    Vascular surgery was consulted for bilateral lower extremity DVT. Venous duplex revealed DVT in the right popliteal vein and chronic DVT in the left common femoral and popliteal veins. Patient reports history of DVT for the last 5 years, denies any pain associated with it. Was on warfarin prior but stopped a few weeks ago after he started getting nose bleeds. Able to ambulate with a walker and was a former smoker who quit 20 years ago. Used to follow with a vascular surgeon on [facility] outpatient but has not followed up in 4 years.     ED vitals: T 98.4, , /75, SpO2 98% on RA, RR 17  Labs: wbc 12.8, .4, platelet 77, CO2 16, AG 29, Tbili 3.2, [id number], ast 79, alt 24, trop33, BHB 0.9,   VBG pH 7.35, pCO2 33 HCO3 18, Lactate 12.2  blood alcohol level 253  EKG- sinus tachycardia  CXR unremarkable    Patient admitted to SDU for further management (13 Apr 2025 10:58)        PAST MEDICAL & SURGICAL HISTORY:  Alcohol dependence      HTN (hypertension)      Hard of hearing      Seasonal allergies      BPH (benign prostatic hyperplasia)      GERD (gastroesophageal reflux disease)      Pseudogout      History of deep vein thrombosis (DVT) of lower extremity      No significant past surgical history        shellfish (Hives)  Beef (Hives)  dairy products (Hives)  No Known Drug Allergies    Home Medications:  Claritin 10 mg oral tablet: 1 tab(s) orally once a day (13 Apr 2025 11:57)  colchicine 0.6 mg oral tablet: 1 tab(s) orally 2 times a day (13 Apr 2025 11:57)  dicyclomine 20 mg oral tablet: 1 tab(s) orally every 8 hours as needed for abdominal cramps (13 Apr 2025 11:57)  folic acid 1 mg oral tablet: 1 tab(s) orally once a day (13 Apr 2025 12:02)  Jardiance 10 mg oral tablet: 1 tab(s) orally once a day (13 Apr 2025 11:57)  methocarbamol 500 mg oral tablet: 1 orally once a day (09 Nov 2024 16:30)  Protonix 40 mg oral delayed release tablet: 1 tab(s) orally once a day (13 Apr 2025 11:57)  Tylenol 325 mg oral tablet: 2 tab(s) orally every 8 hours as needed for  mild pain (13 Apr 2025 11:57)    No permtinent family history of PVD    REVIEW OF SYSTEMS:  GENERAL:                                         negative  SKIN:                                                 negative  OPTHALMOLOGIC:                          negative  ENMT:                                               negative  RESPIRATORY AND THORAX:        negative  CARDIOVASCULAR:                         negative  GASTROINTESTINAL:                       negative  NEPHROLOGY:                                  negative  MUSCULOSKELETAL:                       negative  NEUROLOGIC:                                   negative  PSYCHIATRIC:                                    negative  HEMATOLOGY/LYMPHATICS:         negative  ENDOCRINE:                                     negative  ALLERGIC/IMMUNOLOGIC:            negative    12 point ROS otherwise normal except as stated in HPI  FHx: none  SHX:  [ ]  smoking     [ ] alcohol use    PHYSICAL EXAM  Vital Signs Last 24 Hrs  T(C): 37.1 (15 Apr 2025 11:00), Max: 37.1 (15 Apr 2025 11:00)  T(F): 98.8 (15 Apr 2025 11:00), Max: 98.8 (15 Apr 2025 11:00)  HR: 98 (15 Apr 2025 11:00) (86 - 98)  BP: 127/70 (15 Apr 2025 11:00) (109/86 - 127/70)  BP(mean): 92 (15 Apr 2025 11:00) (81 - 92)  RR: 18 (15 Apr 2025 11:00) (18 - 18)  SpO2: 97% (15 Apr 2025 11:00) (94% - 100%)    Parameters below as of 15 Apr 2025 08:00  Patient On (Oxygen Delivery Method): nasal cannula        Appearance: Normal	  Cardiovascular: RRR  Respiratory: Symmetric and equal chest rise and fall  Gastrointestinal:  Soft, Non-tender, nondistended  Skin: No rashes, No ecchymoses, No cyanosis  Extremities: Normal range of motion, No clubbing, cyanosis or edema, no erythema           MEDICATIONS:   MEDICATIONS  (STANDING):  chlorhexidine 2% Cloths 1 Application(s) Topical daily  cyanocobalamin 1000 MICROGram(s) Oral daily  dextrose 5%. 1000 milliLiter(s) (100 mL/Hr) IV Continuous <Continuous>  dextrose 5%. 1000 milliLiter(s) (50 mL/Hr) IV Continuous <Continuous>  dextrose 50% Injectable 25 Gram(s) IV Push once  dextrose 50% Injectable 12.5 Gram(s) IV Push once  dextrose 50% Injectable 25 Gram(s) IV Push once  folic acid 1 milliGRAM(s) Oral daily  gabapentin 200 milliGRAM(s) Oral two times a day  glucagon  Injectable 1 milliGRAM(s) IntraMuscular once  influenza  Vaccine (HIGH DOSE) 0.5 milliLiter(s) IntraMuscular once  insulin lispro (ADMELOG) corrective regimen sliding scale   SubCutaneous three times a day before meals  lactated ringers. 1000 milliLiter(s) (100 mL/Hr) IV Continuous <Continuous>  methocarbamol 500 milliGRAM(s) Oral daily  metoprolol succinate ER 25 milliGRAM(s) Oral daily  multivitamin 1 Tablet(s) Oral daily  naltrexone 50 milliGRAM(s) Oral daily  pantoprazole  Injectable 40 milliGRAM(s) IV Push daily  piperacillin/tazobactam IVPB.. 3.375 Gram(s) IV Intermittent every 8 hours  potassium chloride  20 mEq/100 mL IVPB 20 milliEquivalent(s) IV Intermittent every 2 hours  potassium phosphate / sodium phosphate Powder (PHOS-NaK) 1 Packet(s) Oral three times a day  tamsulosin 0.4 milliGRAM(s) Oral at bedtime  thiamine IVPB 500 milliGRAM(s) IV Intermittent every 8 hours    MEDICATIONS  (PRN):  acetaminophen     Tablet .. 650 milliGRAM(s) Oral every 6 hours PRN Temp greater or equal to 38.5C (101.3F)  dextrose Oral Gel 15 Gram(s) Oral once PRN Blood Glucose LESS THAN 70 milliGRAM(s)/deciliter  LORazepam     Tablet 2 milliGRAM(s) Oral every 2 hours PRN ciwa >7  ondansetron Injectable 4 milliGRAM(s) IV Push every 8 hours PRN Nausea and/or Vomiting      LAB/STUDIES:                        11.6   3.12  )-----------( 27       ( 15 Apr 2025 04:45 )             33.4     04-15    141  |  106  |  4[L]  ----------------------------<  110[H]  3.3[L]   |  26  |  1.1    Ca    8.5      15 Apr 2025 04:45  Phos  2.3     04-15  Mg     2.0     04-15    TPro  5.5[L]  /  Alb  3.1[L]  /  TBili  4.5[H]  /  DBili  1.7[H]  /  AST  58[H]  /  ALT  19  /  AlkPhos  137[H]  04-15    PT/INR - ( 15 Apr 2025 04:45 )   PT: 18.80 sec;   INR: 1.58 ratio         PTT - ( 15 Apr 2025 04:45 )  PTT:40.4 sec  LIVER FUNCTIONS - ( 15 Apr 2025 04:45 )  Alb: 3.1 g/dL / Pro: 5.5 g/dL / ALK PHOS: 137 U/L / ALT: 19 U/L / AST: 58 U/L / GGT: x             Urinalysis Basic - ( 15 Apr 2025 04:45 )    Color: x / Appearance: x / SG: x / pH: x  Gluc: 110 mg/dL / Ketone: x  / Bili: x / Urobili: x   Blood: x / Protein: x / Nitrite: x   Leuk Esterase: x / RBC: x / WBC x   Sq Epi: x / Non Sq Epi: x / Bacteria: x                    IMAGING:  < from: [facility] Duplex Lower Ext Vein Scan, Bilat (04.13.25 @ 18:35) >  IMPRESSION:  DVT in right popliteal vein.    Chronic DVT in left common femoral and popliteal veins.    < end of copied text >

## 2025-04-15 NOTE — CONSULT NOTE ADULT - ASSESSMENT
Assessment  64-year-old male with history of alcohol abuse, DVT/PE, hypertension, high cholesterol, diabetes, gout brought in by ambulance from [facility] residence complaining of chest discomfort/palpitations after drinking 1 pint of vodka this morning. Vascular surgery was consulted for bilateral lower extremity DVT. Venous duplex revealed DVT in the right popliteal vein and chronic DVT in the left common femoral and popliteal veins    Assessment:   Assessment  64-year-old male with history of alcohol abuse, DVT/PE, hypertension, high cholesterol, diabetes, gout brought in by ambulance from [facility] residence complaining of chest discomfort/palpitations after drinking 1 pint of vodka this morning. Vascular surgery was consulted for bilateral lower extremity DVT. Venous duplex revealed DVT in the right popliteal vein and chronic DVT in the left common femoral and popliteal veins    Assessment:  1) New DVT while off anticoagulation--> Recommend restarting therapeutic anticoagulation  2) ACE wrap of bilateral legs   3) No acute vascular surgery intervention  Assessment  64-year-old male with history of alcohol abuse, DVT/PE, hypertension, high cholesterol, diabetes, gout brought in by ambulance from [facility] residence complaining of chest discomfort/palpitations after drinking 1 pint of vodka this morning. Vascular surgery was consulted for bilateral lower extremity DVT. Venous duplex revealed DVT in the right popliteal vein and chronic DVT in the left common femoral and popliteal veins    Assessment:  1) No need for AC. DVTs are chronic  2) ACE wrap of bilateral legs   3) No acute vascular surgery intervention

## 2025-04-15 NOTE — PROGRESS NOTE ADULT - SUBJECTIVE AND OBJECTIVE BOX
64-year-old male with history of alcohol abuse, DVT/PE, hypertension, high cholesterol, diabetes, gout brought in by ambulance from [facility]'s residence complaining of chest discomfort/palpitations after drinking 1 pint of vodka this morning.  Patient denies any shortness of breath, nausea, vomiting, diarrhea, abdominal pain, fever, chills, cough or weakness.  He was found to have ketoacidosis due to Etoh intoxication electrolytes misbalance and weakness.   Today pt feels better, denies any specific complaints.     PAST MEDICAL & SURGICAL HISTORY:  Alcohol dependence  HTN (hypertension)  Hard of hearing  Seasonal allergies  BPH (benign prostatic hyperplasia)  GERD (gastroesophageal reflux disease)  Pseudogout  History of deep vein thrombosis (DVT) of lower extremity  No significant past surgical history      Vital Signs Last 24 Hrs  T(C): 37.1 (15 Apr 2025 11:00), Max: 37.1 (15 Apr 2025 11:00)  T(F): 98.8 (15 Apr 2025 11:00), Max: 98.8 (15 Apr 2025 11:00)  HR: 98 (15 Apr 2025 11:00) (86 - 98)  BP: 127/70 (15 Apr 2025 11:00) (109/86 - 127/70)  BP(mean): 92 (15 Apr 2025 11:00) (81 - 92)  RR: 18 (15 Apr 2025 11:00) (18 - 18)  SpO2: 97% (15 Apr 2025 11:00) (94% - 100%)    Parameters below as of 15 Apr 2025 08:00  Patient On (Oxygen Delivery Method): nasal cannula          PHYSICAL EXAM:  GENERAL: NAD, obese   HEAD:  Atraumatic, Normocephalic  NECK: Supple, No JVD, Normal thyroid  NERVOUS SYSTEM:  Alert & Oriented X3, slow speech , answering questions, following commands   CHEST/LUNG: decreased BS at bases   HEART: Regular rate and rhythm; No murmurs, rubs, or gallops  ABDOMEN: Soft, Nontender, Nondistended; Bowel sounds present  EXTREMITIES:  2+ Peripheral Pulses, No clubbing, cyanosis, or edema        LABS:                                   11.6   3.12  )-----------( 27       ( 15 Apr 2025 04:45 )             33.4   04-15    141  |  106  |  4[L]  ----------------------------<  110[H]  3.3[L]   |  26  |  1.1    Ca    8.5      15 Apr 2025 04:45  Phos  2.3     04-15  Mg     2.0     04-15    TPro  5.5[L]  /  Alb  3.1[L]  /  TBili  4.5[H]  /  DBili  1.7[H]  /  AST  58[H]  /  ALT  19  /  AlkPhos  137[H]  04-15      Procalcitonin: 0.11:   Urinalysis Basic - ( 14 Apr 2025 06:20 )    Color: x / Appearance: x / SG: x / pH: x  Gluc: 132 mg/dL / Ketone: x  / Bili: x / Urobili: x   Blood: x / Protein: x / Nitrite: x   Leuk Esterase: x / RBC: x / WBC x   Sq Epi: x / Non Sq Epi: x / Bacteria: x      RADIOLOGY & ADDITIONAL TESTS:  < from: VA Duplex Lower Ext Vein Scan, Bilat (04.13.25 @ 18:35) >  IMPRESSION:  DVT in right popliteal vein.    Chronic DVT in left common femoral and popliteal veins.    < end of copied text >  < from: TTE Echo Complete w/ Contrast w/o Doppler (03.14.25 @ 08:16) >    Summary:   1. Technically difficult study.   2. Normal left ventricular internal cavity size.   3. Normal global left ventricular systolic function.   4. LV Ejection Fraction by Johnson's Method with a biplane EF of 60%.   5. Normal right ventricular size and function.   6. No significant valve disease.   7. Compared to study dated 12/22/22, there is no significant change.   8. Endocardial visualization was enhanced with intravenous echo contrast.    MEDICATIONS  (STANDING):  chlorhexidine 2% Cloths 1 Application(s) Topical daily  cyanocobalamin 1000 MICROGram(s) Oral daily  dextrose 5%. 1000 milliLiter(s) (100 mL/Hr) IV Continuous <Continuous>  dextrose 5%. 1000 milliLiter(s) (50 mL/Hr) IV Continuous <Continuous>  dextrose 50% Injectable 25 Gram(s) IV Push once  dextrose 50% Injectable 12.5 Gram(s) IV Push once  dextrose 50% Injectable 25 Gram(s) IV Push once  folic acid 1 milliGRAM(s) Oral daily  gabapentin 200 milliGRAM(s) Oral two times a day  glucagon  Injectable 1 milliGRAM(s) IntraMuscular once  influenza  Vaccine (HIGH DOSE) 0.5 milliLiter(s) IntraMuscular once  insulin lispro (ADMELOG) corrective regimen sliding scale   SubCutaneous three times a day before meals  lactated ringers. 1000 milliLiter(s) (100 mL/Hr) IV Continuous <Continuous>  methocarbamol 500 milliGRAM(s) Oral daily  metoprolol succinate ER 25 milliGRAM(s) Oral daily  multivitamin 1 Tablet(s) Oral daily  naltrexone 50 milliGRAM(s) Oral daily  pantoprazole  Injectable 40 milliGRAM(s) IV Push daily  piperacillin/tazobactam IVPB.. 3.375 Gram(s) IV Intermittent every 8 hours  potassium chloride  20 mEq/100 mL IVPB 20 milliEquivalent(s) IV Intermittent every 2 hours  potassium phosphate / sodium phosphate Powder (PHOS-NaK) 1 Packet(s) Oral three times a day  tamsulosin 0.4 milliGRAM(s) Oral at bedtime  thiamine IVPB 500 milliGRAM(s) IV Intermittent every 8 hours    MEDICATIONS  (PRN):  acetaminophen     Tablet .. 650 milliGRAM(s) Oral every 6 hours PRN Temp greater or equal to 38.5C (101.3F)  dextrose Oral Gel 15 Gram(s) Oral once PRN Blood Glucose LESS THAN 70 milliGRAM(s)/deciliter  LORazepam     Tablet 2 milliGRAM(s) Oral every 2 hours PRN ciwa >7  ondansetron Injectable 4 milliGRAM(s) IV Push every 8 hours PRN Nausea and/or Vomiting

## 2025-04-15 NOTE — PROGRESS NOTE ADULT - ASSESSMENT
64-year-old male with history of alcohol abuse, DVT/PE, hypertension, high cholesterol, diabetes, gout brought in by ambulance from [facility] residence complaining of chest discomfort/palpitations after drinking 1 pint of vodka this morning.  Patient denies any shortness of breath, nausea, vomiting, diarrhea, abdominal pain, fever, chills, cough or weakness.    A/P   # Alcohol Ketoacidosis/ Lactic acidosis /  Alcohol abuse / intoxication/ suspected thiamine and folate deficiency    -  c/w IV hydration for one more day, check bladder scan ( Cr is trending up)   - monitor for sings of withdrawal   - consulted by addiction medicine, recommendations noted:   - c/w CIWA protocol and Ativan PRN   - c/w  naltrexone 50 mg daily with be given with food   - keep Mg>2, K>4   - continue folate and multivitamin  - replete potassium   - pt was consulted regarding addiction/ abstinence        # High grade fever  - resolved now   -  RVP is negative ,  procalcitonin level noted   - started on  Abx for suspected aspiration, continue for now   - monitor fever curve  - supportive care, aspiration precautions      # Abnormal LFTs / alcohol induced liver injury   - monitor LFTs   - RUQ US noted   - avoid hepatotoxic medications   - encourage abstinence      #Hx DVT/PE/ Thrombocytopenia  - Duplex : DVT in right popliteal vein. Chronic DVT in left common femoral and popliteal veins.  - on previous dc was off coumadin b/c of platelet <50  - thrombocytopenia could be induced alcohol use  - trend platelet count , trending down  -  SQ Heparin held , consult vascular surgery     #ANDRZEJ, likely prerenal/ h/o BPH   - c/w  IV hydration for one more day  - check bladder scan   - c/w Flomax   - monitor BUN/cr and urine output     #HTN  -c/w toprol 25mg    #DM  - CC diet   -on metformin 500mg bid at home   -FS goal 140-180  -ISS     # gout,   -on Colchicine at home   - held on admission     #GI ppx  -pantoprazole IV    #Progress Note Handoff  Pending (specify):   IV hydration for one more day, check bladder scan,  c/w CIWA, replete potassium, c/w Abx for suspected aspiration,  c/w CWIA,  supportive care   Family discussion: I spoke with pt, he agreed with a plan of care   Disposition: DGTF

## 2025-04-15 NOTE — DIETITIAN INITIAL EVALUATION ADULT - PERTINENT LABORATORY DATA
04-15    141  |  106  |  4[L]  ----------------------------<  110[H]  3.3[L]   |  26  |  1.1    Ca    8.5      15 Apr 2025 04:45  Phos  2.3     04-15  Mg     2.0     04-15    TPro  5.5[L]  /  Alb  3.1[L]  /  TBili  4.5[H]  /  DBili  1.7[H]  /  AST  58[H]  /  ALT  19  /  AlkPhos  137[H]  04-15  POCT Blood Glucose.: 129 mg/dL (04-15-25 @ 11:17)  A1C with Estimated Average Glucose Result: 5.5 % (03-13-25 @ 04:32)  A1C with Estimated Average Glucose Result: 5.5 % (11-10-24 @ 06:45)

## 2025-04-15 NOTE — PROGRESS NOTE ADULT - SUBJECTIVE AND OBJECTIVE BOX
Pt interviewed, examined and EMR chart reviewed.    This is a 64y man with hx of alcohol use disorder and tobacco use disorder (in remission), PMH of DVT/PE, BPH, HTN, HLD, DMII, Gout, and Decompensated liver cirrhosis and alcoholic hepatitis, and PPH of anxiety who was brought in by ambulance from [facility] residence complaining of chest discomfort/palpitations. Addiction Medicine consulted for assistance with alcohol use disorder and management of alcohol withdrawal.     No acute overnight events. Patient was seen this AM, resting in bed in no acute distress with the [facility] and addiction team. Patient AAOx3 to person, place and time, and situation, Patient continues to be amenable to naltrexone for MAT for AUD to help curb cravings for alcohol, decrease binge consumption and maintenance of abstinence as part of an abstinence recovery plan. Patient also amenable to outpatient counseling, [facility] counselor to send a referral packet to [facility]. Patient denies HA, diaphoresis, N/V, tactile disturbances, anxiety, denies AH/VH SI or HI.     REVIEW OF SYSTEMS:per HPI     MEDICATIONS  (STANDING):  chlorhexidine 2% Cloths 1 Application(s) Topical daily  cyanocobalamin 1000 MICROGram(s) Oral daily  dextrose 5%. 1000 milliLiter(s) (100 mL/Hr) IV Continuous <Continuous>  dextrose 5%. 1000 milliLiter(s) (50 mL/Hr) IV Continuous <Continuous>  dextrose 50% Injectable 25 Gram(s) IV Push once  dextrose 50% Injectable 12.5 Gram(s) IV Push once  dextrose 50% Injectable 25 Gram(s) IV Push once  folic acid 1 milliGRAM(s) Oral daily  gabapentin 200 milliGRAM(s) Oral two times a day  glucagon  Injectable 1 milliGRAM(s) IntraMuscular once  influenza  Vaccine (HIGH DOSE) 0.5 milliLiter(s) IntraMuscular once  insulin lispro (ADMELOG) corrective regimen sliding scale   SubCutaneous three times a day before meals  lactated ringers. 1000 milliLiter(s) (100 mL/Hr) IV Continuous <Continuous>  methocarbamol 500 milliGRAM(s) Oral daily  metoprolol succinate ER 25 milliGRAM(s) Oral daily  multivitamin 1 Tablet(s) Oral daily  naltrexone 50 milliGRAM(s) Oral daily  pantoprazole  Injectable 40 milliGRAM(s) IV Push daily  piperacillin/tazobactam IVPB.. 3.375 Gram(s) IV Intermittent every 8 hours  potassium phosphate / sodium phosphate Powder (PHOS-NaK) 1 Packet(s) Oral three times a day  tamsulosin 0.4 milliGRAM(s) Oral at bedtime  thiamine IVPB 500 milliGRAM(s) IV Intermittent every 8 hours    MEDICATIONS  (PRN):  acetaminophen     Tablet .. 650 milliGRAM(s) Oral every 6 hours PRN Temp greater or equal to 38.5C (101.3F)  dextrose Oral Gel 15 Gram(s) Oral once PRN Blood Glucose LESS THAN 70 milliGRAM(s)/deciliter  LORazepam     Tablet 2 milliGRAM(s) Oral every 2 hours PRN ciwa >7  ondansetron Injectable 4 milliGRAM(s) IV Push every 8 hours PRN Nausea and/or Vomiting    Vital Signs Last 24 Hrs  T(C): 37.1 (15 Apr 2025 11:00), Max: 37.1 (15 Apr 2025 11:00)  T(F): 98.8 (15 Apr 2025 11:00), Max: 98.8 (15 Apr 2025 11:00)  HR: 98 (15 Apr 2025 11:00) (86 - 98)  BP: 127/70 (15 Apr 2025 11:00) (109/86 - 127/70)  BP(mean): 92 (15 Apr 2025 11:00) (81 - 92)  RR: 18 (15 Apr 2025 11:00) (18 - 18)  SpO2: 97% (15 Apr 2025 11:00) (94% - 100%)    Parameters below as of 15 Apr 2025 08:00  Patient On (Oxygen Delivery Method): nasal cannula    PHYSICAL EXAM:    Constitutional: NAD  HEENT: PERRLA, EOMI  Respiratory: no increased work of breathing  Neurological: A/O x 3, no focal deficits    MENTAL STATUS EXAM:  Appearance: calm, in street clothing  Appearance in relation to age: looks stated age      Hygiene/Grooming: fair grooming   Attitude toward examiner: fully cooperative  Alertness: alert  Orientation: oriented to time, place and person  Posture: lying in bed  Gait: not evaluated  Behavior and psychomotor activity: mild tremors on outstretched arms   Mood: euthymic  Affect: full range and reactivity      Speech: fluent and spontaneous; normal rate, rhythm, volume and tone   Perceptions: denies hallucinations  Thought process: logical, linear and goal-directed    Thought content: no delusions or particular preoccupation, denies SI/HI  Associations: no loosening of associations   Impulse Control: aware of socially acceptable behavior  Insight: fair  Judgment: fair    LABS:  Lactate, Blood (04.14.25 @ 06:20)   Lactate, Blood: 1.6 mmol/L  Phosphorus (04.14.25 @ 06:20)   Phosphorus: 1.3 mg/dL  Magnesium (04.14.25 @ 06:20)   Magnesium: 2.5 mg/dL  Comprehensive Metabolic Panel in AM (04.14.25 @ 06:20)   Sodium: 139 mmol/L  Potassium: 3.6 mmol/L  Chloride: 103 mmol/L  Carbon Dioxide: 24 mmol/L  Anion Gap: 12 mmol/L  Blood Urea Nitrogen: 5 mg/dL  Creatinine: 0.9: Icteric. Interpret with caution mg/dL  Glucose: 132 mg/dL  Calcium: 8.5 mg/dL  Protein Total: 5.7 g/dL  Albumin: 3.3 g/dL  Bilirubin Total: 4.9 mg/dL  Alkaline Phosphatase: 134 U/L  Aspartate Aminotransferase (AST/SGOT): 66 U/L  Alanine Aminotransferase (ALT/SGPT): 20 U/L  eGFR: 95: The estimated glomerular filtration rate (eGFR) calculation is based on   the 2021 CKD-EPI creatinine equation, which is validated in male and   female population 18 years of age and older (N Engl J Med 2021;   [id number]). mL/min/1.73m2  Complete Blood Count + Automated Diff in AM (04.14.25 @ 06:20)   Auto NRBC: 0 /100 WBCs  WBC Count: 2.85 K/uL  RBC Count: 3.02 M/uL  Hemoglobin: 11.1 g/dL  Hematocrit: 31.8 %  Mean Cell Volume: 105.3 fL  Mean Cell Hemoglobin: 36.8 pg  Mean Cell Hemoglobin Conc: 34.9 g/dL  Red Cell Distrib Width: 15.2 %  Platelet Count - Automated: 33: Smear Reviewed, Result Confirmed. K/uL  MPV: 11.8 fL  Neutrophil #: 1.81 K/uL  Lymphocyte #: 0.77 K/uL  Monocyte #: 0.17 K/uL  Eosinophil #: 0.05 K/uL  Basophil #: 0.04 K/uL  Neutrophil %: 63.4: Differential percentages must be correlated with absolute numbers for   clinical significance. %  Lymphocyte %: 27.0 %  Monocyte %: 6.0 %  Eosinophil %: 1.8 %  Basophil %: 1.4 %  Auto Immature Granulocyte %: 0.4: (Includes meta, myelo and promyelocytes). Mild elevations in immature   granulocytes may be seen with many inflammatory processes and pregnancy;   clinical correlation suggested. %    Urinalysis Basic - ( 13 Apr 2025 08:13 )  Color: x / Appearance: x / SG: x / pH: x  Gluc: 104 mg/dL / Ketone: x  / Bili: x / Urobili: x   Blood: x / Protein: x / Nitrite: x   Leuk Esterase: x / RBC: x / WBC x   Sq Epi: x / Non Sq Epi: x / Bacteria: x      Drug Screen Urine:  Alcohol Level  Alcohol, Blood: 253 mg/dL (04-13-25 @ 08:13)        RADIOLOGY & ADDITIONAL STUDIES:    CT AP w/ IV con 3/12/25   LIVER: Steatosis. Mild caudate lobe hypertrophy, unchanged.    US abdomen RUQ 1/22/25: Limited exam. Heterogeneously echogenic liver which may reflect hepatic   steatosis reflect underlying hepatocellular disease. Cholelithiasis.    US abdomen RUQ 3/19/25: Cholelithiasis. No sonographic evidence of acute cholecystitis.    Partially visualized right pleural effusion.Liver within normal limits

## 2025-04-15 NOTE — DIETITIAN INITIAL EVALUATION ADULT - ORAL INTAKE PTA/DIET HISTORY
Patient with good PO intake and appetite PTA. Patient reported he does not eat beef, shellfish, dairy or fruit. No cultural/Mandaeism preferences reported. NKFAs. Denies use of supplements PTA.  .8 kg per patient vs .6 kg (dosing wt 4/13) -- denies recent weight loss or significant weight changes, reported some recent weight gain.

## 2025-04-15 NOTE — PROGRESS NOTE ADULT - SUBJECTIVE AND OBJECTIVE BOX
Patient is a 64y old  Male who presents with a chief complaint of chest pain/ alcohol withdrawal (14 Apr 2025 10:56)      Over Night Events:  Patient seen and examined. no drips. tolerating diet. sating well on RA      ROS:  See HPI    PHYSICAL EXAM    ICU Vital Signs Last 24 Hrs  T(C): 36.9 (15 Apr 2025 04:00), Max: 39.1 (14 Apr 2025 08:41)  T(F): 98.4 (15 Apr 2025 04:00), Max: 102.4 (14 Apr 2025 08:41)  HR: 95 (15 Apr 2025 04:00) (86 - 97)  BP: 109/86 (15 Apr 2025 04:00) (109/86 - 136/82)  BP(mean): 81 (15 Apr 2025 00:00) (81 - 104)  ABP: --  ABP(mean): --  RR: 18 (15 Apr 2025 04:00) (18 - 19)  SpO2: 100% (15 Apr 2025 04:00) (94% - 100%)    O2 Parameters below as of 14 Apr 2025 16:00  Patient On (Oxygen Delivery Method): room air            General: NAD              Lymph Nodes: NO cervical LN   Lungs: Bilateral BS  Cardiovascular: Regular   Abdomen: Soft, Positive BS  Extremities: No clubbing   Skin: warm   Neurological: a&o x3  Musculoskeletal: move all ext     I&O's Detail      LABS:                          11.6   3.12  )-----------( 27       ( 15 Apr 2025 04:45 )             33.4         15 Apr 2025 04:45    141    |  106    |  4      ----------------------------<  110    3.3     |  26     |  1.1      Ca    8.5        15 Apr 2025 04:45  Phos  2.3       15 Apr 2025 04:45  Mg     2.0       15 Apr 2025 04:45    TPro  5.5    /  Alb  3.1    /  TBili  4.5    /  DBili  1.7    /  AST  58     /  ALT  19     /  AlkPhos  137    15 Apr 2025 04:45  Amylase x     lipase x                                                 PT/INR - ( 15 Apr 2025 04:45 )   PT: 18.80 sec;   INR: 1.58 ratio         PTT - ( 15 Apr 2025 04:45 )  PTT:40.4 sec                                       Urinalysis Basic - ( 15 Apr 2025 04:45 )    Color: x / Appearance: x / SG: x / pH: x  Gluc: 110 mg/dL / Ketone: x  / Bili: x / Urobili: x   Blood: x / Protein: x / Nitrite: x   Leuk Esterase: x / RBC: x / WBC x   Sq Epi: x / Non Sq Epi: x / Bacteria: x        Lactate, Blood: 1.6 mmol/L (04-14-25 @ 06:20)  Lactate, Blood: 6.2 mmol/L (04-13-25 @ 17:01)  Lactate, Blood: 10.3 mmol/L (04-13-25 @ 10:36)                                                                                                                                               MEDICATIONS  (STANDING):  chlorhexidine 2% Cloths 1 Application(s) Topical daily  cyanocobalamin 1000 MICROGram(s) Oral daily  dextrose 5%. 1000 milliLiter(s) (100 mL/Hr) IV Continuous <Continuous>  dextrose 5%. 1000 milliLiter(s) (50 mL/Hr) IV Continuous <Continuous>  dextrose 50% Injectable 25 Gram(s) IV Push once  dextrose 50% Injectable 12.5 Gram(s) IV Push once  dextrose 50% Injectable 25 Gram(s) IV Push once  folic acid 1 milliGRAM(s) Oral daily  gabapentin 200 milliGRAM(s) Oral two times a day  glucagon  Injectable 1 milliGRAM(s) IntraMuscular once  influenza  Vaccine (HIGH DOSE) 0.5 milliLiter(s) IntraMuscular once  insulin lispro (ADMELOG) corrective regimen sliding scale   SubCutaneous three times a day before meals  lactated ringers. 1000 milliLiter(s) (100 mL/Hr) IV Continuous <Continuous>  methocarbamol 500 milliGRAM(s) Oral daily  metoprolol succinate ER 25 milliGRAM(s) Oral daily  multivitamin 1 Tablet(s) Oral daily  naltrexone 50 milliGRAM(s) Oral daily  pantoprazole  Injectable 40 milliGRAM(s) IV Push daily  piperacillin/tazobactam IVPB.. 3.375 Gram(s) IV Intermittent every 8 hours  potassium chloride  20 mEq/100 mL IVPB 20 milliEquivalent(s) IV Intermittent every 2 hours  potassium phosphate / sodium phosphate Powder (PHOS-NaK) 1 Packet(s) Oral three times a day  tamsulosin 0.4 milliGRAM(s) Oral at bedtime  thiamine IVPB 500 milliGRAM(s) IV Intermittent every 8 hours    MEDICATIONS  (PRN):  acetaminophen     Tablet .. 650 milliGRAM(s) Oral every 6 hours PRN Temp greater or equal to 38.5C (101.3F)  dextrose Oral Gel 15 Gram(s) Oral once PRN Blood Glucose LESS THAN 70 milliGRAM(s)/deciliter  LORazepam     Tablet 2 milliGRAM(s) Oral every 2 hours PRN ciwa >7  ondansetron Injectable 4 milliGRAM(s) IV Push every 8 hours PRN Nausea and/or Vomiting               Patient is a 64y old  Male who presents with a chief complaint of chest pain/ alcohol withdrawal (14 Apr 2025 10:56)      Over Night Events:  Patient seen and examined. no drips. tolerating diet. sating well on RA    PHYSICAL EXAM    ICU Vital Signs Last 24 Hrs  T(C): 36.9 (15 Apr 2025 04:00), Max: 39.1 (14 Apr 2025 08:41)  T(F): 98.4 (15 Apr 2025 04:00), Max: 102.4 (14 Apr 2025 08:41)  HR: 95 (15 Apr 2025 04:00) (86 - 97)  BP: 109/86 (15 Apr 2025 04:00) (109/86 - 136/82)  BP(mean): 81 (15 Apr 2025 00:00) (81 - 104)  RR: 18 (15 Apr 2025 04:00) (18 - 19)  SpO2: 100% (15 Apr 2025 04:00) (94% - 100%)    O2 Parameters below as of 14 Apr 2025 16:00  Patient On (Oxygen Delivery Method): room air            General: comfortable        Lungs: good air entry  Cardiovascular: Regular   Abdomen: Soft, Positive BS  Extremities: No clubbing   Skin: warm   Neurological: a&o x3  Musculoskeletal: move all ext     I&O's Detail      LABS:                          11.6   3.12  )-----------( 27       ( 15 Apr 2025 04:45 )             33.4         15 Apr 2025 04:45    141    |  106    |  4      ----------------------------<  110    3.3     |  26     |  1.1      Ca    8.5        15 Apr 2025 04:45  Phos  2.3       15 Apr 2025 04:45  Mg     2.0       15 Apr 2025 04:45    TPro  5.5    /  Alb  3.1    /  TBili  4.5    /  DBili  1.7    /  AST  58     /  ALT  19     /  AlkPhos  137    15 Apr 2025 04:45  Amylase x     lipase x                                                 PT/INR - ( 15 Apr 2025 04:45 )   PT: 18.80 sec;   INR: 1.58 ratio         PTT - ( 15 Apr 2025 04:45 )  PTT:40.4 sec                                       Urinalysis Basic - ( 15 Apr 2025 04:45 )    Color: x / Appearance: x / SG: x / pH: x  Gluc: 110 mg/dL / Ketone: x  / Bili: x / Urobili: x   Blood: x / Protein: x / Nitrite: x   Leuk Esterase: x / RBC: x / WBC x   Sq Epi: x / Non Sq Epi: x / Bacteria: x        Lactate, Blood: 1.6 mmol/L (04-14-25 @ 06:20)  Lactate, Blood: 6.2 mmol/L (04-13-25 @ 17:01)  Lactate, Blood: 10.3 mmol/L (04-13-25 @ 10:36)                                                                                                                                               MEDICATIONS  (STANDING):  chlorhexidine 2% Cloths 1 Application(s) Topical daily  cyanocobalamin 1000 MICROGram(s) Oral daily  dextrose 5%. 1000 milliLiter(s) (100 mL/Hr) IV Continuous <Continuous>  dextrose 5%. 1000 milliLiter(s) (50 mL/Hr) IV Continuous <Continuous>  dextrose 50% Injectable 25 Gram(s) IV Push once  dextrose 50% Injectable 12.5 Gram(s) IV Push once  dextrose 50% Injectable 25 Gram(s) IV Push once  folic acid 1 milliGRAM(s) Oral daily  gabapentin 200 milliGRAM(s) Oral two times a day  glucagon  Injectable 1 milliGRAM(s) IntraMuscular once  influenza  Vaccine (HIGH DOSE) 0.5 milliLiter(s) IntraMuscular once  insulin lispro (ADMELOG) corrective regimen sliding scale   SubCutaneous three times a day before meals  lactated ringers. 1000 milliLiter(s) (100 mL/Hr) IV Continuous <Continuous>  methocarbamol 500 milliGRAM(s) Oral daily  metoprolol succinate ER 25 milliGRAM(s) Oral daily  multivitamin 1 Tablet(s) Oral daily  naltrexone 50 milliGRAM(s) Oral daily  pantoprazole  Injectable 40 milliGRAM(s) IV Push daily  piperacillin/tazobactam IVPB.. 3.375 Gram(s) IV Intermittent every 8 hours  potassium chloride  20 mEq/100 mL IVPB 20 milliEquivalent(s) IV Intermittent every 2 hours  potassium phosphate / sodium phosphate Powder (PHOS-NaK) 1 Packet(s) Oral three times a day  tamsulosin 0.4 milliGRAM(s) Oral at bedtime  thiamine IVPB 500 milliGRAM(s) IV Intermittent every 8 hours    MEDICATIONS  (PRN):  acetaminophen     Tablet .. 650 milliGRAM(s) Oral every 6 hours PRN Temp greater or equal to 38.5C (101.3F)  dextrose Oral Gel 15 Gram(s) Oral once PRN Blood Glucose LESS THAN 70 milliGRAM(s)/deciliter  LORazepam     Tablet 2 milliGRAM(s) Oral every 2 hours PRN ciwa >7  ondansetron Injectable 4 milliGRAM(s) IV Push every 8 hours PRN Nausea and/or Vomiting

## 2025-04-15 NOTE — DIETITIAN INITIAL EVALUATION ADULT - OTHER INFO
64-year-old male with history of alcohol abuse, DVT/PE, hypertension, high cholesterol, diabetes, gout brought in by ambulance from [facility] complaining of chest discomfort/palpitations after drinking 1 pint of vodka this morning.  Alcohol withdrawal  EtOH abuse  Lactate acidosis resolved  ANDRZEJ improved  transaminitis  Chronic b/l DVT PE & Thrombocytopenia not on AC

## 2025-04-15 NOTE — PROGRESS NOTE ADULT - ASSESSMENT
IMPRESSION:    Alcohol withdrwawal  EtOH abuse  Lactate acidosis resolved  ANDRZEJ improved  transaminitis  HO DVT PE & Thrombocytopenia not on AC    PLAN:    CNS: CIWA protocol with ativan PRN. thiamine folate multivitamin. Catch team follow up    HEENT: Oral care    PULMONARY:  HOB @ 45 degrees.  Keep Pulse Ox 92-96%, aspiration precaution    CARDIOVASCULAR: keep even fluid balance. Keep MAP >65.     GI: GI prophylaxis.  Feeding.  Bowel regimen     RENAL:  Follow up lytes.  Correct as needed.     INFECTIOUS DISEASE: 2 sets of cultures. Full RVP panel. MRSA nares. procalcitonin 0.11    HEMATOLOGICAL:  DVT prophylaxis with heparin sub q. LE duplex noted chronic DVTs    ENDOCRINE:  Follow up FS.  Insulin protocol if needed.    MUSCULOSKELETAL: PT/OT    LINES/DRAINS:  PIV    ADVANCED DIRECTIVES:  FULL CODE    Dispo: general medical floor   IMPRESSION:    Alcohol withdrwawal  EtOH abuse  Lactate acidosis resolved  ANDRZEJ improved  transaminitis  Chronic b/l DVT PE & Thrombocytopenia not on AC    PLAN:    CNS: CIWA protocol with ativan PRN. thiamine folate multivitamin. Catch team follow up    HEENT: Oral care    PULMONARY:  HOB @ 45 degrees.  Keep Pulse Ox 92-96%, aspiration precaution    CARDIOVASCULAR: keep even fluid balance. Keep MAP >65.     GI: GI prophylaxis.  Feeding.  Bowel regimen     RENAL:  Follow up lytes.  Correct as needed.     INFECTIOUS DISEASE: 2 sets of cultures. Full RVP panel. MRSA nares. procalcitonin 0.11    HEMATOLOGICAL:  hold chemical DVT prophylaxis due to thrombocytopenia. LE duplex noted chronic DVTs    ENDOCRINE:  Follow up FS.  Insulin protocol if needed.    MUSCULOSKELETAL: PT/OT    LINES/DRAINS:  PIV    ADVANCED DIRECTIVES:  FULL CODE    Dispo: general medical floor   IMPRESSION:    Alcohol withdrawal  EtOH abuse  Lactate acidosis resolved  ANDRZEJ improved  transaminitis  Chronic b/l DVT PE & Thrombocytopenia not on AC    PLAN:    CNS: CIWA protocol with ativan PRN. thiamine folate multivitamin. Catch team follow up    HEENT: Oral care    PULMONARY:  HOB @ 45 degrees.  Keep Pulse Ox 92-96%, aspiration precaution    CARDIOVASCULAR: keep even fluid balance. Keep MAP >65.     GI: GI prophylaxis.  Feeding.  Bowel regimen     RENAL:  Follow up lytes.  Correct as needed.     INFECTIOUS DISEASE: 2 sets of cultures. MRSA nares. procalcitonin 0.11    HEMATOLOGICAL: DVT prophylaxis. LE duplex noted chronic DVTs    ENDOCRINE:  Follow up FS.  Insulin protocol if needed.    MUSCULOSKELETAL: PT/OT    LINES/DRAINS:  PIV    ADVANCED DIRECTIVES:  FULL CODE    Dispo: general medical floor

## 2025-04-15 NOTE — DIETITIAN INITIAL EVALUATION ADULT - INCREASED NUTRIENT NEEDS
MRSA SCREEN - PCR (NASAL) [[id number]] (Order [id number])   Microbiology    Released By: Swapnil Cedeno RN (auto-released)   Authorizing: Nancy Griffin MD    Date: 8/7/2017   Department: [facility] [address]             8/7/2017  6:52 AM - [facility] [employer]       Lab and Collection      MRSA SCREEN - PCR (NASAL) (Order #[id number]) on 8/7/2017 - Lab and Collection Information       Specimen Information      Nasal          Collected: 8/7/2017 [id number] kcal/protein

## 2025-04-15 NOTE — DIETITIAN INITIAL EVALUATION ADULT - NS FNS DIET ORDER
Diet, Regular:   Consistent Carbohydrate {Evening Snack} (CSTCHOSN)  DASH/TLC {Sodium & Cholesterol Restricted} (DASH) (04-13-25 @ 20:24) [Active]

## 2025-04-15 NOTE — PROGRESS NOTE ADULT - TIME BILLING
time spent on review of labs, imaging studies, old records, obtaining history, personally examining patient, counselling and communicating with patient, entering orders for medications/tests/etc, discussions with other health care providers, documentation in electronic health records, independent interpretation of labs, imaging/procedure results and care coordination.    Time spent excludes teaching
time spent on review of labs, imaging studies, old records, obtaining history, personally examining patient, counselling and communicating with patient, entering orders for medications/tests/etc, discussions with other health care providers, documentation in electronic health records, independent interpretation of labs, imaging/procedure results and care coordination.    Time spent excludes teaching

## 2025-04-15 NOTE — PROGRESS NOTE ADULT - ASSESSMENT
This is a 64y man with hx of alcohol use disorder and tobacco use disorder (in remission), PMH of DVT/PE, BPH, HTN, HLD, DMII, Gout, and Decompensated liver cirrhosis and alcoholic hepatitis, and PPH of anxiety who was brought in by ambulance from [facility] complaining of chest discomfort/palpitations. Addiction Medicine consulted for assistance with alcohol use disorder and management of alcohol withdrawal.     #Alcohol withdrawal:  - CIWA is 3 on assessment, patient's withdrawal symptoms improving on Ativan regimen over the weekend. Patient may be continued to be monitored on CIWA only for breakthrough symptoms of withdrawal, recommend Ativan 2mg PO q2h PRN for CIWA >7 for the next 24 hours.   - writer and CATCH Team counseled patient with motivational techniques to illicit recovery motives for patient. Strongly recommending outpatient behavioral therapy for patient in addition to MAT for AUD with oral naltrexone.  - patient amenable to oral naltrexone treatment, Naltrexone is an opioid antagonist that is FDA approved for alcohol use disorder by targeting alcohol cravings and decreasing binge consumption and total number of drinking days, and has been effective for patients abstaining from alcohol. Please prescribe naltrexone 50 mg daily with be given with food, as well as at discharge so that he can continue to receive it at [facility].   - keep Mg>2, K>4   - continue folate and multivitamin  - hold benzodiazepines for sedation/respiratory depression   - CATCH team to assist with substance use aftercare options for patient with ongoing conversation tomorrow, patient amenable to outpatient counseling which in combination with medication has superior outcomes for fewer relapses and decreasing binge consumption. Referral to [facility] sent. Per [facility] staff must call [facility] directly to schedule an intake for patient.     #Wernicke's Encephalopathy prevention:  - Would recommend IV thiamine 500 mg q 8h for 3 days, followed by 250 mg IV daily up to additional 5 days per [employer] recommendations.   -continue oral thiamine 100 mg daily   - keep Mg >2, K>4     #Alcohol induced Hepatic Steatosis:  - CTAP with IV contrast in 3/12/25 showed liver steatosis, mild caudate lobe hypertrophy, unchanged.   - counseling for alcohol cessation as above with CATCH. Patient does not appear to have good insight into his liver disease and will require ongoing counseling.   - 3/14/25 HepBcore IgM, Hep B surface antigen negative, Hep A IgM negative, Hep C ratio 0.28, Hep C virus nonreactive   - patient should be monitored by PCP or GI specialist annually for noninvasive liver monitoring given that up to 20% of patients with steatosis develop fibrosis.  - if following up imaging concerning for cirrhosis, patient should be monitored with noninvasive imaging and AFP every 6 months for HCC screening     Thank you for this consult. Rest of care per primary. Addiction medicine will sign off. Please reach out with any questions or concerns via TEAMS.

## 2025-04-15 NOTE — DIETITIAN INITIAL EVALUATION ADULT - PERSON TAUGHT/METHOD
encouraged importance of adequate PO intake in the setting of current hospitalization./verbal instruction/patient instructed

## 2025-04-15 NOTE — DIETITIAN INITIAL EVALUATION ADULT - PERTINENT MEDS FT
MEDICATIONS  (STANDING):  chlorhexidine 2% Cloths 1 Application(s) Topical daily  cyanocobalamin 1000 MICROGram(s) Oral daily  dextrose 5%. 1000 milliLiter(s) (100 mL/Hr) IV Continuous <Continuous>  dextrose 5%. 1000 milliLiter(s) (50 mL/Hr) IV Continuous <Continuous>  dextrose 50% Injectable 25 Gram(s) IV Push once  dextrose 50% Injectable 12.5 Gram(s) IV Push once  dextrose 50% Injectable 25 Gram(s) IV Push once  folic acid 1 milliGRAM(s) Oral daily  gabapentin 200 milliGRAM(s) Oral two times a day  glucagon  Injectable 1 milliGRAM(s) IntraMuscular once  influenza  Vaccine (HIGH DOSE) 0.5 milliLiter(s) IntraMuscular once  insulin lispro (ADMELOG) corrective regimen sliding scale   SubCutaneous three times a day before meals  lactated ringers. 1000 milliLiter(s) (100 mL/Hr) IV Continuous <Continuous>  methocarbamol 500 milliGRAM(s) Oral daily  metoprolol succinate ER 25 milliGRAM(s) Oral daily  multivitamin 1 Tablet(s) Oral daily  naltrexone 50 milliGRAM(s) Oral daily  pantoprazole  Injectable 40 milliGRAM(s) IV Push daily  piperacillin/tazobactam IVPB.. 3.375 Gram(s) IV Intermittent every 8 hours  potassium chloride  20 mEq/100 mL IVPB 20 milliEquivalent(s) IV Intermittent every 2 hours  potassium phosphate / sodium phosphate Powder (PHOS-NaK) 1 Packet(s) Oral three times a day  tamsulosin 0.4 milliGRAM(s) Oral at bedtime  thiamine IVPB 500 milliGRAM(s) IV Intermittent every 8 hours    MEDICATIONS  (PRN):  acetaminophen     Tablet .. 650 milliGRAM(s) Oral every 6 hours PRN Temp greater or equal to 38.5C (101.3F)  dextrose Oral Gel 15 Gram(s) Oral once PRN Blood Glucose LESS THAN 70 milliGRAM(s)/deciliter  LORazepam     Tablet 2 milliGRAM(s) Oral every 2 hours PRN ciwa >7  ondansetron Injectable 4 milliGRAM(s) IV Push every 8 hours PRN Nausea and/or Vomiting

## 2025-04-15 NOTE — DIETITIAN INITIAL EVALUATION ADULT - ADD RECOMMEND
Nutrition Intervention: Meals and Snacks, Coordination of Care  RD to monitor PO intake, Need for nutrition supplement, GI function, Nutrition Labs, Electrolytes, NFPF, Weights    RD to follow at moderate nutrition risk.

## 2025-04-16 LAB
ALBUMIN SERPL ELPH-MCNC: 3.2 G/DL — LOW (ref 3.5–5.2)
ALP SERPL-CCNC: 164 U/L — HIGH (ref 30–115)
ALT FLD-CCNC: 16 U/L — SIGNIFICANT CHANGE UP (ref 0–41)
ANION GAP SERPL CALC-SCNC: 9 MMOL/L — SIGNIFICANT CHANGE UP (ref 7–14)
AST SERPL-CCNC: 46 U/L — HIGH (ref 0–41)
BASOPHILS # BLD AUTO: 0.05 K/UL — SIGNIFICANT CHANGE UP (ref 0–0.2)
BASOPHILS NFR BLD AUTO: 1.2 % — HIGH (ref 0–1)
BILIRUB SERPL-MCNC: 3.2 MG/DL — HIGH (ref 0.2–1.2)
BUN SERPL-MCNC: 7 MG/DL — LOW (ref 10–20)
CALCIUM SERPL-MCNC: 8.3 MG/DL — LOW (ref 8.4–10.5)
CHLORIDE SERPL-SCNC: 105 MMOL/L — SIGNIFICANT CHANGE UP (ref 98–110)
CO2 SERPL-SCNC: 24 MMOL/L — SIGNIFICANT CHANGE UP (ref 17–32)
CREAT SERPL-MCNC: 1.1 MG/DL — SIGNIFICANT CHANGE UP (ref 0.7–1.5)
EGFR: 74 ML/MIN/1.73M2 — SIGNIFICANT CHANGE UP
EGFR: 74 ML/MIN/1.73M2 — SIGNIFICANT CHANGE UP
EOSINOPHIL # BLD AUTO: 0.28 K/UL — SIGNIFICANT CHANGE UP (ref 0–0.7)
EOSINOPHIL NFR BLD AUTO: 6.5 % — SIGNIFICANT CHANGE UP (ref 0–8)
GLUCOSE BLDC GLUCOMTR-MCNC: 105 MG/DL — HIGH (ref 70–99)
GLUCOSE BLDC GLUCOMTR-MCNC: 130 MG/DL — HIGH (ref 70–99)
GLUCOSE BLDC GLUCOMTR-MCNC: 156 MG/DL — HIGH (ref 70–99)
GLUCOSE BLDC GLUCOMTR-MCNC: 191 MG/DL — HIGH (ref 70–99)
GLUCOSE SERPL-MCNC: 104 MG/DL — HIGH (ref 70–99)
HCT VFR BLD CALC: 30.7 % — LOW (ref 42–52)
HGB BLD-MCNC: 10.8 G/DL — LOW (ref 14–18)
IMM GRANULOCYTES NFR BLD AUTO: 0 % — LOW (ref 0.1–0.3)
LYMPHOCYTES # BLD AUTO: 1.15 K/UL — LOW (ref 1.2–3.4)
LYMPHOCYTES # BLD AUTO: 26.9 % — SIGNIFICANT CHANGE UP (ref 20.5–51.1)
MAGNESIUM SERPL-MCNC: 1.4 MG/DL — LOW (ref 1.8–2.4)
MCHC RBC-ENTMCNC: 35.2 G/DL — SIGNIFICANT CHANGE UP (ref 32–37)
MCHC RBC-ENTMCNC: 36.5 PG — HIGH (ref 27–31)
MCV RBC AUTO: 103.7 FL — HIGH (ref 80–94)
MONOCYTES # BLD AUTO: 0.27 K/UL — SIGNIFICANT CHANGE UP (ref 0.1–0.6)
MONOCYTES NFR BLD AUTO: 6.3 % — SIGNIFICANT CHANGE UP (ref 1.7–9.3)
NEUTROPHILS # BLD AUTO: 2.53 K/UL — SIGNIFICANT CHANGE UP (ref 1.4–6.5)
NEUTROPHILS NFR BLD AUTO: 59.1 % — SIGNIFICANT CHANGE UP (ref 42.2–75.2)
NRBC BLD AUTO-RTO: 0 /100 WBCS — SIGNIFICANT CHANGE UP (ref 0–0)
PHOSPHATE SERPL-MCNC: 2.6 MG/DL — SIGNIFICANT CHANGE UP (ref 2.1–4.9)
PLATELET # BLD AUTO: 26 K/UL — LOW (ref 130–400)
PMV BLD: 12.6 FL — HIGH (ref 7.4–10.4)
POTASSIUM SERPL-MCNC: 3.8 MMOL/L — SIGNIFICANT CHANGE UP (ref 3.5–5)
POTASSIUM SERPL-SCNC: 3.8 MMOL/L — SIGNIFICANT CHANGE UP (ref 3.5–5)
PROT SERPL-MCNC: 5.9 G/DL — LOW (ref 6–8)
RBC # BLD: 2.96 M/UL — LOW (ref 4.7–6.1)
RBC # FLD: 14.5 % — SIGNIFICANT CHANGE UP (ref 11.5–14.5)
SODIUM SERPL-SCNC: 138 MMOL/L — SIGNIFICANT CHANGE UP (ref 135–146)
WBC # BLD: 4.28 K/UL — LOW (ref 4.8–10.8)
WBC # FLD AUTO: 4.28 K/UL — LOW (ref 4.8–10.8)

## 2025-04-16 PROCEDURE — 99233 SBSQ HOSP IP/OBS HIGH 50: CPT

## 2025-04-16 RX ORDER — MAGNESIUM SULFATE 500 MG/ML
2 SYRINGE (ML) INJECTION ONCE
Refills: 0 | Status: COMPLETED | OUTPATIENT
Start: 2025-04-16 | End: 2025-04-16

## 2025-04-16 RX ORDER — POLYETHYLENE GLYCOL 3350 17 G/17G
17 POWDER, FOR SOLUTION ORAL DAILY
Refills: 0 | Status: DISCONTINUED | OUTPATIENT
Start: 2025-04-16 | End: 2025-04-18

## 2025-04-16 RX ORDER — SENNA 187 MG
2 TABLET ORAL AT BEDTIME
Refills: 0 | Status: DISCONTINUED | OUTPATIENT
Start: 2025-04-16 | End: 2025-04-18

## 2025-04-16 RX ADMIN — NALTREXONE HYDROCHLORIDE 50 MILLIGRAM(S): 50 TABLET, FILM COATED ORAL at 12:12

## 2025-04-16 RX ADMIN — CYANOCOBALAMIN 1000 MICROGRAM(S): 1000 INJECTION INTRAMUSCULAR; SUBCUTANEOUS at 12:11

## 2025-04-16 RX ADMIN — Medication 1 PACKET(S): at 05:52

## 2025-04-16 RX ADMIN — Medication 1 TABLET(S): at 12:12

## 2025-04-16 RX ADMIN — Medication 2 TABLET(S): at 21:11

## 2025-04-16 RX ADMIN — INSULIN LISPRO 1: 100 INJECTION, SOLUTION INTRAVENOUS; SUBCUTANEOUS at 17:12

## 2025-04-16 RX ADMIN — FOLIC ACID 1 MILLIGRAM(S): 1 TABLET ORAL at 12:11

## 2025-04-16 RX ADMIN — Medication 1 APPLICATION(S): at 12:15

## 2025-04-16 RX ADMIN — Medication 25 GRAM(S): at 21:12

## 2025-04-16 RX ADMIN — Medication 25 GRAM(S): at 15:43

## 2025-04-16 RX ADMIN — GABAPENTIN 200 MILLIGRAM(S): 400 CAPSULE ORAL at 05:52

## 2025-04-16 RX ADMIN — METOPROLOL SUCCINATE 25 MILLIGRAM(S): 50 TABLET, EXTENDED RELEASE ORAL at 05:52

## 2025-04-16 RX ADMIN — METHOCARBAMOL 500 MILLIGRAM(S): 500 TABLET, FILM COATED ORAL at 12:12

## 2025-04-16 RX ADMIN — Medication 25 GRAM(S): at 05:58

## 2025-04-16 RX ADMIN — Medication 105 MILLIGRAM(S): at 04:35

## 2025-04-16 RX ADMIN — GABAPENTIN 200 MILLIGRAM(S): 400 CAPSULE ORAL at 17:09

## 2025-04-16 RX ADMIN — Medication 105 MILLIGRAM(S): at 17:09

## 2025-04-16 RX ADMIN — POLYETHYLENE GLYCOL 3350 17 GRAM(S): 17 POWDER, FOR SOLUTION ORAL at 12:12

## 2025-04-16 RX ADMIN — Medication 25 GRAM(S): at 09:26

## 2025-04-16 RX ADMIN — TAMSULOSIN HYDROCHLORIDE 0.4 MILLIGRAM(S): 0.4 CAPSULE ORAL at 21:11

## 2025-04-16 RX ADMIN — Medication 40 MILLIGRAM(S): at 12:11

## 2025-04-16 NOTE — PROGRESS NOTE ADULT - SUBJECTIVE AND OBJECTIVE BOX
SUBJECTIVE/OVERNIGHT EVENTS  Today is hospital day 3d. This morning patient was seen and examined at bedside, resting comfortably in bed. No acute or major events overnight.    HOSPITAL COURSE  Day 1:   Day 2:   Day 3:     CODE STATUS:    FAMILY COMMUNICATION  Contact date:  Name of person contacted:  Relationship to patient:  Communication details:    MEDICATIONS  STANDING MEDICATIONS  chlorhexidine 2% Cloths 1 Application(s) Topical daily  cyanocobalamin 1000 MICROGram(s) Oral daily  dextrose 5%. 1000 milliLiter(s) IV Continuous <Continuous>  dextrose 5%. 1000 milliLiter(s) IV Continuous <Continuous>  dextrose 50% Injectable 25 Gram(s) IV Push once  dextrose 50% Injectable 12.5 Gram(s) IV Push once  dextrose 50% Injectable 25 Gram(s) IV Push once  folic acid 1 milliGRAM(s) Oral daily  gabapentin 200 milliGRAM(s) Oral two times a day  glucagon  Injectable 1 milliGRAM(s) IntraMuscular once  influenza  Vaccine (HIGH DOSE) 0.5 milliLiter(s) IntraMuscular once  insulin lispro (ADMELOG) corrective regimen sliding scale   SubCutaneous three times a day before meals  lactated ringers. 1000 milliLiter(s) IV Continuous <Continuous>  methocarbamol 500 milliGRAM(s) Oral daily  metoprolol succinate ER 25 milliGRAM(s) Oral daily  multivitamin 1 Tablet(s) Oral daily  naltrexone 50 milliGRAM(s) Oral daily  pantoprazole  Injectable 40 milliGRAM(s) IV Push daily  piperacillin/tazobactam IVPB.. 3.375 Gram(s) IV Intermittent every 8 hours  polyethylene glycol 3350 17 Gram(s) Oral daily  senna 2 Tablet(s) Oral at bedtime  tamsulosin 0.4 milliGRAM(s) Oral at bedtime  thiamine IVPB 500 milliGRAM(s) IV Intermittent every 8 hours    PRN MEDICATIONS  acetaminophen     Tablet .. 650 milliGRAM(s) Oral every 6 hours PRN  dextrose Oral Gel 15 Gram(s) Oral once PRN  LORazepam     Tablet 2 milliGRAM(s) Oral every 2 hours PRN  ondansetron Injectable 4 milliGRAM(s) IV Push every 8 hours PRN    VITALS  T(F): 98.9 (04-16-25 @ 04:25), Max: 98.9 (04-16-25 @ 04:25)  HR: 97 (04-16-25 @ 04:25) (94 - 97)  BP: 105/65 (04-16-25 @ 04:25) (105/65 - 133/77)  RR: 19 (04-16-25 @ 04:25) (18 - 20)  SpO2: 97% (04-16-25 @ 04:25) (94% - 97%)  POCT Blood Glucose.: 130 mg/dL (04-16-25 @ 10:54)  POCT Blood Glucose.: 105 mg/dL (04-16-25 @ 07:55)  POCT Blood Glucose.: 98 mg/dL (04-15-25 @ 21:23)  POCT Blood Glucose.: 140 mg/dL (04-15-25 @ 16:51)  POCT Blood Glucose.: 129 mg/dL (04-15-25 @ 11:17)    PHYSICAL EXAM  CONSTITUTIONAL: Well groomed, no apparent distress  EYES: No conjunctival or scleral injection, non-icteric  ENMT: Oral mucosa with moist membranes.  NECK: Supple, symmetric   RESP: No respiratory distress, no use of accessory muscles; CTA b/l, no WRR  CV: RRR, +S1S2, no MRG; no JVD; no peripheral edema  GI: Soft, tenderness on palpation in umbilical region, distended, no rebound, no guarding; no palpable masses  MSK: Normal ROM without pain, normal muscle strength/tone  SKIN: No rashes or ulcers noted  NEURO: Grossly intact  PSYCH: Appropriate insight/judgment; A+O x 3, mood and affect appropriate, recent/remote memory intact    (  ) Indwelling Will Catheter   Date insterted:    Reason (  ) Critical illness     (  ) urinary retention    (  ) Accurate Ins/Outs Monitoring     (  ) CMO patient    (  ) Central Line  Date inserted:  Location: (  ) Right IJ   (  ) Left IJ   (  ) Right Fem   (  ) Left Fem    (  ) SPC  (  ) pigtail  (  ) PEG tube  (  ) colostomy  (  ) jejunostomy  (  ) U-Dall    LABS             10.8   4.28  )-----------( 26       ( 04-16-25 @ 06:50 )             30.7     138  |  105  |  7   -------------------------<  104   04-16-25 @ 06:50  3.8  |  24  |  1.1    Ca      8.3     04-16-25 @ 06:50  Phos   2.6     04-16-25 @ 06:50  Mg     1.4     04-16-25 @ 06:50    TPro  5.9  /  Alb  3.2  /  TBili  3.2  /  DBili  1.4  /  AST  46  /  ALT  16  /  AlkPhos  164  /  GGT  x     04-16-25 @ 06:50    PT/INR - ( 04-15-25 @ 04:45 )   PT: 18.80 sec[H];   INR: 1.58 ratio[H]  PTT - ( 04-15-25 @ 04:45 )  PTT:40.4 sec    Troponin T, High Sensitivity Result: 27 ng/L (04-14-25 @ 00:30)    Urinalysis Basic - ( 16 Apr 2025 06:50 )    Color: x / Appearance: x / SG: x / pH: x  Gluc: 104 mg/dL / Ketone: x  / Bili: x / Urobili: x   Blood: x / Protein: x / Nitrite: x   Leuk Esterase: x / RBC: x / WBC x   Sq Epi: x / Non Sq Epi: x / Bacteria: x          Culture - Blood (collected 14 Apr 2025 12:24)  Source: Blood Blood  Preliminary Report (15 Apr 2025 22:01):    No growth at 24 hours      IMAGING

## 2025-04-16 NOTE — PROGRESS NOTE ADULT - SUBJECTIVE AND OBJECTIVE BOX
GAMALIELCAT  64y Male    INTERVAL HPI/OVERNIGHT EVENTS:    pt seen on AM rounds today  mild tremor  some cough with yellow phlegm  abdominal pain now improved  awaiting BM  no fever, N/V    T(F): 98.2 (04-16-25 @ 11:19), Max: 98.9 (04-16-25 @ 04:25)  HR: 80 (04-16-25 @ 11:19) (80 - 97)  BP: 109/65 (04-16-25 @ 11:19) (105/65 - 133/77)  RR: 18 (04-16-25 @ 11:19) (18 - 20)  SpO2: 97% (04-16-25 @ 04:25) (94% - 97%)  I&O's Summary    15 Apr 2025 07:01  -  16 Apr 2025 07:00  --------------------------------------------------------  IN: 0 mL / OUT: 1050 mL / NET: -1050 mL      CAPILLARY BLOOD GLUCOSE      POCT Blood Glucose.: 130 mg/dL (16 Apr 2025 10:54)  POCT Blood Glucose.: 105 mg/dL (16 Apr 2025 07:55)  POCT Blood Glucose.: 98 mg/dL (15 Apr 2025 21:23)  POCT Blood Glucose.: 140 mg/dL (15 Apr 2025 16:51)        PHYSICAL EXAM:  GENERAL: NAD  HEAD:  Normocephalic  EYES:  conjunctiva and sclera clear  ENMT: Moist mucous membranes  NERVOUS SYSTEM:  Alert, awake, Good concentration, mild tremor  CHEST/LUNG: CTA b/l  HEART: Regular rate and rhythm  ABDOMEN: Soft, mildly tender in lower quadrants and epigastric region, no guarding, distended; Bowel sounds present  EXTREMITIES:   No edema LE  SKIN: warm, dry    Consultant(s) Notes Reviewed:  [x ] YES  [ ] NO  Care Discussed with Consultants/Other Providers [ x] YES  [ ] NO    MEDICATIONS  (STANDING):  chlorhexidine 2% Cloths 1 Application(s) Topical daily  cyanocobalamin 1000 MICROGram(s) Oral daily  dextrose 5%. 1000 milliLiter(s) (100 mL/Hr) IV Continuous <Continuous>  dextrose 5%. 1000 milliLiter(s) (50 mL/Hr) IV Continuous <Continuous>  dextrose 50% Injectable 25 Gram(s) IV Push once  dextrose 50% Injectable 12.5 Gram(s) IV Push once  dextrose 50% Injectable 25 Gram(s) IV Push once  folic acid 1 milliGRAM(s) Oral daily  gabapentin 200 milliGRAM(s) Oral two times a day  glucagon  Injectable 1 milliGRAM(s) IntraMuscular once  influenza  Vaccine (HIGH DOSE) 0.5 milliLiter(s) IntraMuscular once  insulin lispro (ADMELOG) corrective regimen sliding scale   SubCutaneous three times a day before meals  lactated ringers. 1000 milliLiter(s) (100 mL/Hr) IV Continuous <Continuous>  methocarbamol 500 milliGRAM(s) Oral daily  metoprolol succinate ER 25 milliGRAM(s) Oral daily  multivitamin 1 Tablet(s) Oral daily  naltrexone 50 milliGRAM(s) Oral daily  pantoprazole  Injectable 40 milliGRAM(s) IV Push daily  piperacillin/tazobactam IVPB.. 3.375 Gram(s) IV Intermittent every 8 hours  polyethylene glycol 3350 17 Gram(s) Oral daily  senna 2 Tablet(s) Oral at bedtime  tamsulosin 0.4 milliGRAM(s) Oral at bedtime  thiamine IVPB 500 milliGRAM(s) IV Intermittent every 8 hours    MEDICATIONS  (PRN):  acetaminophen     Tablet .. 650 milliGRAM(s) Oral every 6 hours PRN Temp greater or equal to 38.5C (101.3F)  dextrose Oral Gel 15 Gram(s) Oral once PRN Blood Glucose LESS THAN 70 milliGRAM(s)/deciliter  LORazepam     Tablet 2 milliGRAM(s) Oral every 2 hours PRN ciwa >7  ondansetron Injectable 4 milliGRAM(s) IV Push every 8 hours PRN Nausea and/or Vomiting      LABS:                        10.8   4.28  )-----------( 26       ( 16 Apr 2025 06:50 )             30.7     04-16    138  |  105  |  7[L]  ----------------------------<  104[H]  3.8   |  24  |  1.1    Ca    8.3[L]      16 Apr 2025 06:50  Phos  2.6     04-16  Mg     1.4     04-16    TPro  5.9[L]  /  Alb  3.2[L]  /  TBili  3.2[H]  /  DBili  1.4[H]  /  AST  46[H]  /  ALT  16  /  AlkPhos  164[H]  04-16    PT/INR - ( 15 Apr 2025 04:45 )   PT: 18.80 sec;   INR: 1.58 ratio         PTT - ( 15 Apr 2025 04:45 )  PTT:40.4 sec    Culture - Blood (collected 14 Apr 2025 12:24)  Source: Blood Blood  Preliminary Report (15 Apr 2025 22:01):    No growth at 24 hours      Lactate, Blood: 1.6 mmol/L (04-14 @ 06:20)  Lactate, Blood: 6.2 mmol/L (04-13 @ 17:01)  Lactate, Blood: 10.3 mmol/L (04-13 @ 10:36)          RADIOLOGY & ADDITIONAL TESTS:    Imaging or report Personally Reviewed:  [x ] YES  [ ] NO    < from: [facility] Duplex Lower Ext Vein Scan, Bilat (04.13.25 @ 18:35) >  Chronic DVT in the right popliteal vein consistent with prior exam.    < end of copied text >      < from: VA Duplex Lower Ext Vein Scan, Bilat (04.13.25 @ 18:35) >  IMPRESSION:  DVT in right popliteal vein.    Chronic DVT in left common femoral and popliteal veins.      < end of copied text >        < from: Xray Chest 1 View- PORTABLE-Urgent (04.13.25 @ 08:31) >  IMPRESSION: Low lung volume.    No radiographic evidence of acute cardiopulmonary disease.    < end of copied text >          < from: TTE Echo Complete w/ Contrast w/o Doppler (03.14.25 @ 08:16) >  Summary:   1. Technically difficult study.   2. Normal left ventricular internal cavity size.   3. Normal global left ventricular systolic function.   4. LV Ejection Fraction by Johnson's Method with a biplane EF of 60%.   5. Normal right ventricular size and function.   6. No significant valve disease.   7. Compared to study dated 12/22/22, there is no significant change.   8. Endocardial visualization was enhanced with intravenous echo contrast.    < end of copied text >              Case discussed with residents and RN on rounds today    Care discussed with pt

## 2025-04-16 NOTE — PROGRESS NOTE ADULT - ASSESSMENT
65 y/o man with PMH of alcohol use disorder, hepatic steatosis (? cirrhosis), DVT/PE, HTN, high cholesterol, DM type 2 and gout was brought in by ambulance from [facility] complaining of chest discomfort/palpitations after drinking 1 pint of vodka this morning.  Patient denies any shortness of breath, nausea, vomiting, diarrhea, abdominal pain, fever, chills, cough or weakness. He was admitted to SDU for alcohol withdrawal with ketoacidosis, lactic acidosis and thrombocytopenia. He was later downgraded to the medical floor.  previous notes reviewed by me    1. Alcohol Ketoacidosis, Lactic acidosis, alcohol use disorder, suspected thiamine and folate deficiency    now clinically improved - acidosis resolved  continue CIWA monitoring - about 1 this morning  addiction medicine consult and f/u appreciated  naltrexone 50 mg daily with be given with food   ativan prn withdrawal symptoms  continue thiamine, folic acid and multivitamin  CATCH team following - pt will f/u with [facility] as outpt  PO hydration  encourage PO intake  repleted for hypokalemia and hypomagnesemia     2. Fever of 102.4 on 4/14  now resolved  + cough  CXR: NAPD  procal 0.11  RVP negative  UA negative for infection  started on zosyn for suspected aspiration (had crackles on lung exam) - can de-escalate to Augmentin if remains stable  aspiration precautions     3. Transaminitis  alcohol steatosis  cirrhosis per chart review - pt was seen by hepatology in January 2025 - needs outpt f/u including HCC screening   pt counseled on alcohol cessation     4. Thrombocytopenia   pt with history of thrombocytopenia - seen by HemOnc in January 2025 - likely due to alcohol use  monitor for improvement and for bleeding  daily CBC for now     5. Hx DVT/PE  chronic DVTs on venous duplex and no need for anticoagulation per vascular surgery  PT consult - OOB and ambulation with supervision    6. ANDRZEJ resolved - likely prerenal  encourage PO intake  s/p IV hydration  continue flomax for BPH    7. HTN  -c/w metoprolol succinate    8. DM type 2  continue carb consistent diet, monitor FS  FS acceptable  on insulin sliding scale    7. Gout   on Colchicine at home     8. GI ppx  pantoprazole PO      full code  guarded prognosis  high risk pt    Progress Note Handoff  Pending (specify): CIWA monitoring, labs including CBC in AM, monitor for bleeding, PT consult  Family discussion: pt in agreement with plan of care   Disposition: [facility] (adult home)

## 2025-04-16 NOTE — PROGRESS NOTE ADULT - ASSESSMENT
64-year-old male with history of alcohol abuse, DVT/PE, hypertension, high cholesterol, diabetes, gout brought in by ambulance from [facility] residence complaining of chest discomfort/palpitations after drinking 1 pint of vodka this morning. Admitted for alcoholic lactic acidosis which resolved.    # Alcohol Lactic acidosis- Resolved  # Alcohol abuse / intoxication- resolved  - CIWA 4   - monitor for sings of withdrawal  - Continue CIWA protocol and Ativan PRN   - Continue naltrexone 50 mg daily with be given with food   - Continue folate, thiamine, and multivitamin  - F/u PT eval    # High grade fever-resolved  - RVP negative, procal 0.11  - Continue IV Zosyn for empiric coverage of aspiration pneumonia  - supportive care     # Abnormal LFTs / alcoholic hepatitis?  # alcohol induced liver injury   # Pancytopenia  - monitor LFTs   - alcoholic steatosis on previous RUQ US  - encourage abstinence    - MELD score daily. Maddrey score  - Pancytopenia likely secondary to alcohol abuse. Patient was seen on previous admission in jan 2025 by heme/onc and work up was normal    #Hx DVT/PE  # Thrombocytopenia  - Plt 4/16: 26  - Duplex : DVT in right popliteal vein. Chronic DVT in left common femoral and popliteal veins.  - on previous dc was off coumadin b/c of platelet <50  - thrombocytopenia could be induced alcohol use  - Continue to hold SQ Heparin  - Vascular surgery 4/16: No need for AC because DVTs are chronic.    #ANDRZEJ, likely prerenal/ h/o BPH   -cr 1.3, baseline 0.9-1  - c/w Flomax   - monitor BUN/cr and urine output     #HTN  - BP:109/65  -Continue toprol 25mg    #DM  - CC diet   -on metformin 500mg bid at home   -FS goal 140-180  -ISS     # gout   - on Colchicine at home   - holding while inpatient    #GI ppx  - Continue pantoprazole IV    #Constipation  - Start senna and mirlax   64-year-old male with history of alcohol abuse, DVT/PE, hypertension, high cholesterol, diabetes, gout brought in by ambulance from [facility] residence complaining of chest discomfort/palpitations after drinking 1 pint of vodka this morning. Admitted for alcoholic lactic acidosis which resolved.    # Alcohol Lactic acidosis- Resolved  # Alcohol abuse / intoxication- resolved  - CIWA 4   - monitor for sings of withdrawal  - Continue CIWA protocol and Ativan PRN   - Continue naltrexone 50 mg daily with be given with food   - Continue folate, thiamine, and multivitamin  - F/u PT eval    # High grade fever-resolved  - RVP negative, procal 0.11  - Continue IV Zosyn for empiric coverage of aspiration pneumonia  - supportive care     # Abnormal LFTs / alcoholic hepatitis?  # alcohol induced liver injury   # Pancytopenia  - monitor LFTs   - alcoholic steatosis on previous RUQ US  - encourage abstinence    - MELD score daily. Maddrey score  - Pancytopenia likely secondary to alcohol abuse. Patient was seen on previous admission in jan 2025 by heme/onc and work up was normal    #Hx DVT/PE  # Thrombocytopenia  - Plt 4/16: 26  - Duplex : DVT in right popliteal vein. Chronic DVT in left common femoral and popliteal veins.  - on previous dc was off coumadin b/c of platelet <50  - thrombocytopenia could be induced alcohol use  - Continue to hold SQ Heparin  - Vascular surgery 4/16: No need for AC because DVTs are chronic.    #ANDRZEJ, likely prerenal/ h/o BPH   -cr 1.3, baseline 0.9-1  - c/w Flomax   - monitor BUN/cr and urine output     #HTN  - BP:109/65  -Continue toprol 25mg    #DM  - CC diet   -on metformin 500mg bid at home   -FS goal 140-180  -ISS     # gout   - on Colchicine at home   - holding while inpatient    #GI ppx  - Continue pantoprazole IV    #Constipation  - Start senna and mirlax   64-year-old male with history of alcohol abuse, DVT/PE, hypertension, high cholesterol, diabetes, gout brought in by ambulance from [place] residence complaining of chest discomfort/palpitations after drinking 1 pint of vodka this morning. Admitted for alcoholic lactic acidosis which resolved.    # Alcohol Lactic acidosis- Resolved  # Alcohol abuse / intoxication- resolved  - CIWA 1   - monitor for sings of withdrawal  - Continue CIWA protocol and Ativan PRN   - Continue naltrexone 50 mg daily with be given with food   - Continue folate, thiamine, and multivitamin  - F/u PT eval    # High grade fever-resolved  - RVP negative, procal 0.11  - Continue IV Zosyn for empiric coverage of aspiration pneumonia  - supportive care     # Abnormal LFTs / alcoholic hepatitis?  # alcohol induced liver injury   # Pancytopenia  - monitor LFTs   - alcoholic steatosis on previous RUQ US  - encourage abstinence    - MELD score daily. Maddrey score  - Pancytopenia likely secondary to alcohol abuse. Patient was seen on previous admission in jan 2025 by heme/onc and work up was normal    #Hx DVT/PE  # Thrombocytopenia  - Plt 4/16: 26  - Duplex : DVT in right popliteal vein. Chronic DVT in left common femoral and popliteal veins.  - on previous dc was off coumadin b/c of platelet <50  - thrombocytopenia could be induced alcohol use  - Continue to hold SQ Heparin  - Vascular surgery 4/16: No need for AC because DVTs are chronic.    #ANDRZEJ, likely prerenal/ h/o BPH   -cr 1.3, baseline 0.9-1  - c/w Flomax   - monitor BUN/cr and urine output     #HTN  - BP:109/65  -Continue toprol 25mg    #DM  - CC diet   -on metformin 500mg bid at home   -FS goal 140-180  -ISS     # gout   - on Colchicine at home   - holding while inpatient    #GI ppx  - Continue pantoprazole IV    #Constipation  - Start senna and mirlax

## 2025-04-17 LAB
ALBUMIN SERPL ELPH-MCNC: 3.3 G/DL — LOW (ref 3.5–5.2)
ALP SERPL-CCNC: 129 U/L — HIGH (ref 30–115)
ALT FLD-CCNC: 15 U/L — SIGNIFICANT CHANGE UP (ref 0–41)
ANION GAP SERPL CALC-SCNC: 9 MMOL/L — SIGNIFICANT CHANGE UP (ref 7–14)
AST SERPL-CCNC: 43 U/L — HIGH (ref 0–41)
BASOPHILS # BLD AUTO: 0.03 K/UL — SIGNIFICANT CHANGE UP (ref 0–0.2)
BASOPHILS NFR BLD AUTO: 0.7 % — SIGNIFICANT CHANGE UP (ref 0–1)
BILIRUB SERPL-MCNC: 3.7 MG/DL — HIGH (ref 0.2–1.2)
BUN SERPL-MCNC: 8 MG/DL — LOW (ref 10–20)
CALCIUM SERPL-MCNC: 8.6 MG/DL — SIGNIFICANT CHANGE UP (ref 8.4–10.5)
CHLORIDE SERPL-SCNC: 107 MMOL/L — SIGNIFICANT CHANGE UP (ref 98–110)
CO2 SERPL-SCNC: 24 MMOL/L — SIGNIFICANT CHANGE UP (ref 17–32)
CREAT SERPL-MCNC: 1 MG/DL — SIGNIFICANT CHANGE UP (ref 0.7–1.5)
EGFR: 84 ML/MIN/1.73M2 — SIGNIFICANT CHANGE UP
EGFR: 84 ML/MIN/1.73M2 — SIGNIFICANT CHANGE UP
EOSINOPHIL # BLD AUTO: 0.29 K/UL — SIGNIFICANT CHANGE UP (ref 0–0.7)
EOSINOPHIL NFR BLD AUTO: 7.1 % — SIGNIFICANT CHANGE UP (ref 0–8)
GLUCOSE BLDC GLUCOMTR-MCNC: 105 MG/DL — HIGH (ref 70–99)
GLUCOSE BLDC GLUCOMTR-MCNC: 106 MG/DL — HIGH (ref 70–99)
GLUCOSE BLDC GLUCOMTR-MCNC: 121 MG/DL — HIGH (ref 70–99)
GLUCOSE BLDC GLUCOMTR-MCNC: 98 MG/DL — SIGNIFICANT CHANGE UP (ref 70–99)
GLUCOSE SERPL-MCNC: 94 MG/DL — SIGNIFICANT CHANGE UP (ref 70–99)
HCT VFR BLD CALC: 30.6 % — LOW (ref 42–52)
HGB BLD-MCNC: 10.8 G/DL — LOW (ref 14–18)
IMM GRANULOCYTES NFR BLD AUTO: 0.2 % — SIGNIFICANT CHANGE UP (ref 0.1–0.3)
INR BLD: 1.59 RATIO — HIGH (ref 0.65–1.3)
LYMPHOCYTES # BLD AUTO: 1.17 K/UL — LOW (ref 1.2–3.4)
LYMPHOCYTES # BLD AUTO: 28.7 % — SIGNIFICANT CHANGE UP (ref 20.5–51.1)
MAGNESIUM SERPL-MCNC: 1.6 MG/DL — LOW (ref 1.8–2.4)
MCHC RBC-ENTMCNC: 35.3 G/DL — SIGNIFICANT CHANGE UP (ref 32–37)
MCHC RBC-ENTMCNC: 37.4 PG — HIGH (ref 27–31)
MCV RBC AUTO: 105.9 FL — HIGH (ref 80–94)
MELD SCORE WITH DIALYSIS: 30 POINTS — SIGNIFICANT CHANGE UP
MELD SCORE WITHOUT DIALYSIS: 17 POINTS — SIGNIFICANT CHANGE UP
MONOCYTES # BLD AUTO: 0.21 K/UL — SIGNIFICANT CHANGE UP (ref 0.1–0.6)
MONOCYTES NFR BLD AUTO: 5.1 % — SIGNIFICANT CHANGE UP (ref 1.7–9.3)
NEUTROPHILS # BLD AUTO: 2.37 K/UL — SIGNIFICANT CHANGE UP (ref 1.4–6.5)
NEUTROPHILS NFR BLD AUTO: 58.2 % — SIGNIFICANT CHANGE UP (ref 42.2–75.2)
NRBC BLD AUTO-RTO: 0 /100 WBCS — SIGNIFICANT CHANGE UP (ref 0–0)
PHOSPHATE SERPL-MCNC: 2.6 MG/DL — SIGNIFICANT CHANGE UP (ref 2.1–4.9)
PLATELET # BLD AUTO: 30 K/UL — LOW (ref 130–400)
PMV BLD: 12 FL — HIGH (ref 7.4–10.4)
POTASSIUM SERPL-MCNC: 3.9 MMOL/L — SIGNIFICANT CHANGE UP (ref 3.5–5)
POTASSIUM SERPL-SCNC: 3.9 MMOL/L — SIGNIFICANT CHANGE UP (ref 3.5–5)
PROT SERPL-MCNC: 5.6 G/DL — LOW (ref 6–8)
PROTHROM AB SERPL-ACNC: 18.9 SEC — HIGH (ref 9.95–12.87)
RBC # BLD: 2.89 M/UL — LOW (ref 4.7–6.1)
RBC # FLD: 15 % — HIGH (ref 11.5–14.5)
SODIUM SERPL-SCNC: 140 MMOL/L — SIGNIFICANT CHANGE UP (ref 135–146)
WBC # BLD: 4.08 K/UL — LOW (ref 4.8–10.8)
WBC # FLD AUTO: 4.08 K/UL — LOW (ref 4.8–10.8)

## 2025-04-17 PROCEDURE — 99233 SBSQ HOSP IP/OBS HIGH 50: CPT

## 2025-04-17 RX ORDER — SENNA 187 MG
2 TABLET ORAL
Qty: 60 | Refills: 0
Start: 2025-04-17 | End: 2025-05-16

## 2025-04-17 RX ORDER — AMOXICILLIN AND CLAVULANATE POTASSIUM 500; 125 MG/1; MG/1
1 TABLET, FILM COATED ORAL
Refills: 0 | Status: DISCONTINUED | OUTPATIENT
Start: 2025-04-17 | End: 2025-04-17

## 2025-04-17 RX ORDER — LACTULOSE 10 G/15ML
20 SOLUTION ORAL ONCE
Refills: 0 | Status: COMPLETED | OUTPATIENT
Start: 2025-04-17 | End: 2025-04-17

## 2025-04-17 RX ORDER — MAGNESIUM SULFATE 500 MG/ML
2 SYRINGE (ML) INJECTION ONCE
Refills: 0 | Status: COMPLETED | OUTPATIENT
Start: 2025-04-17 | End: 2025-04-17

## 2025-04-17 RX ORDER — POLYETHYLENE GLYCOL 3350 17 G/17G
17 POWDER, FOR SOLUTION ORAL
Qty: 1 | Refills: 0
Start: 2025-04-17 | End: 2025-05-16

## 2025-04-17 RX ORDER — MELATONIN 5 MG
1 TABLET ORAL AT BEDTIME
Refills: 0 | Status: DISCONTINUED | OUTPATIENT
Start: 2025-04-17 | End: 2025-04-18

## 2025-04-17 RX ADMIN — TAMSULOSIN HYDROCHLORIDE 0.4 MILLIGRAM(S): 0.4 CAPSULE ORAL at 21:17

## 2025-04-17 RX ADMIN — Medication 25 GRAM(S): at 10:46

## 2025-04-17 RX ADMIN — Medication 40 MILLIGRAM(S): at 11:36

## 2025-04-17 RX ADMIN — Medication 1 APPLICATION(S): at 17:17

## 2025-04-17 RX ADMIN — CYANOCOBALAMIN 1000 MICROGRAM(S): 1000 INJECTION INTRAMUSCULAR; SUBCUTANEOUS at 11:36

## 2025-04-17 RX ADMIN — Medication 1 TABLET(S): at 11:37

## 2025-04-17 RX ADMIN — Medication 25 GRAM(S): at 06:23

## 2025-04-17 RX ADMIN — FOLIC ACID 1 MILLIGRAM(S): 1 TABLET ORAL at 11:37

## 2025-04-17 RX ADMIN — NALTREXONE HYDROCHLORIDE 50 MILLIGRAM(S): 50 TABLET, FILM COATED ORAL at 11:36

## 2025-04-17 RX ADMIN — LACTULOSE 20 GRAM(S): 10 SOLUTION ORAL at 10:45

## 2025-04-17 RX ADMIN — Medication 1 MILLIGRAM(S): at 21:16

## 2025-04-17 RX ADMIN — Medication 2 TABLET(S): at 21:16

## 2025-04-17 RX ADMIN — METOPROLOL SUCCINATE 25 MILLIGRAM(S): 50 TABLET, EXTENDED RELEASE ORAL at 06:28

## 2025-04-17 RX ADMIN — GABAPENTIN 200 MILLIGRAM(S): 400 CAPSULE ORAL at 17:17

## 2025-04-17 RX ADMIN — GABAPENTIN 200 MILLIGRAM(S): 400 CAPSULE ORAL at 06:21

## 2025-04-17 RX ADMIN — METHOCARBAMOL 500 MILLIGRAM(S): 500 TABLET, FILM COATED ORAL at 11:37

## 2025-04-17 NOTE — PHYSICAL THERAPY INITIAL EVALUATION ADULT - PERTINENT HX OF CURRENT PROBLEM, REHAB EVAL
History of Present Illness:   64-year-old male with history of alcohol abuse, DVT/PE, hypertension, high cholesterol, diabetes, gout brought in by ambulance from [place] residence complaining of chest discomfort/palpitations after drinking 1 pint of vodka this morning.  Patient denies any shortness of breath, nausea, vomiting, diarrhea, abdominal pain, fever, chills, cough or weakness.

## 2025-04-17 NOTE — PROGRESS NOTE ADULT - ASSESSMENT
64-year-old male with history of alcohol abuse, DVT/PE, hypertension, high cholesterol, diabetes, gout brought in by ambulance from [facility]'s residence complaining of chest discomfort/palpitations after drinking 1 pint of vodka this morning. Admitted for alcoholic lactic acidosis which resolved.    # Alcohol Lactic acidosis- Resolved  # Alcohol abuse / intoxication- resolved  - CIWA 3   - monitor for sings of withdrawal  - Continue CIWA protocol and Ativan PRN   - Continue naltrexone 50 mg daily with be given with food   - Continue folate, thiamine, and multivitamin  - F/u PT eval    # High grade fever-resolved  - RVP negative, procal 0.11  - Continue IV Zosyn for empiric coverage of aspiration pneumonia  - supportive care     # Abnormal LFTs / alcoholic hepatitis?  # alcohol induced liver injury   # Pancytopenia  - monitor LFTs   - alcoholic steatosis on previous RUQ US  - encourage abstinence    - MELD score daily. Maddrey score  - Pancytopenia likely secondary to alcohol abuse. Patient was seen on previous admission in jan 2025 by heme/onc and work up was normal    #Hx DVT/PE  # Thrombocytopenia- improving  - Plt 4/17: 30  - Duplex : DVT in right popliteal vein. Chronic DVT in left common femoral and popliteal veins.  - on previous dc was off coumadin b/c of platelet <50  - thrombocytopenia could be induced alcohol use  - Continue to hold SQ Heparin  - Vascular surgery 4/16: No need for AC because DVTs are chronic.    #ANDRZEJ, likely prerenal/ h/o BPH- Resolved  -cr 1., baseline 0.9-1  - c/w Flomax     #HTN  - BP:109/65  -Continue toprol 25mg    #DM  - CC diet   -on metformin 500mg bid at home   -FS goal 140-180  -ISS     # gout   - on Colchicine at home   - holding while inpatient    #GI ppx  - Continue pantoprazole IV    #Constipation  - Continue senna and mirlax  - Start lactulose  - Monitor BM    Hypomagnesemia  - 1.6  - s/p repletion  - F/U AM Mag   64-year-old male with history of alcohol abuse, DVT/PE, hypertension, high cholesterol, diabetes, gout brought in by ambulance from [facility]'s residence complaining of chest discomfort/palpitations after drinking 1 pint of vodka this morning. Admitted for alcoholic lactic acidosis which resolved.    # Alcohol Lactic acidosis- Resolved  # Alcohol abuse / intoxication- resolved  - CIWA 3   - monitor for sings of withdrawal  - Continue CIWA protocol and Ativan PRN   - Continue naltrexone 50 mg daily with be given with food   - Continue folate, thiamine, and multivitamin  - F/u PT eval    # High grade fever-resolved  - RVP negative, procal 0.11  - S/p IV Zosyn for empiric coverage of aspiration pneumonia  - Start PO Augmentin  - supportive care     # Abnormal LFTs / alcoholic hepatitis?  # alcohol induced liver injury   # Pancytopenia  - monitor LFTs   - alcoholic steatosis on previous RUQ US  - encourage abstinence    - MELD score daily. Maddrey score  - Pancytopenia likely secondary to alcohol abuse. Patient was seen on previous admission in jan 2025 by heme/onc and work up was normal    #Hx DVT/PE  # Thrombocytopenia- improving  - Plt 4/17: 30  - Duplex : DVT in right popliteal vein. Chronic DVT in left common femoral and popliteal veins.  - on previous dc was off coumadin b/c of platelet <50  - thrombocytopenia could be induced alcohol use  - Continue to hold SQ Heparin  - Vascular surgery 4/16: No need for AC because DVTs are chronic.    #ANDRZEJ, likely prerenal/ h/o BPH- Resolved  -cr 1., baseline 0.9-1  - c/w Flomax     #HTN  - BP:109/65  -Continue toprol 25mg    #DM  - CC diet   -on metformin 500mg bid at home   -FS goal 140-180  -ISS     # gout   - on Colchicine at home   - holding while inpatient    #GI ppx  - Continue pantoprazole IV    #Constipation  - Continue senna and mirlax  - Start lactulose  - Monitor BM    Hypomagnesemia  - 1.6  - s/p repletion  - F/U AM Mag

## 2025-04-17 NOTE — PROGRESS NOTE ADULT - PROVIDER SPECIALTY LIST ADULT
Critical Care
Hospitalist
Internal Medicine
Hospitalist
Internal Medicine
Internal Medicine
Addiction Medicine
Addiction Medicine

## 2025-04-17 NOTE — PHYSICAL THERAPY INITIAL EVALUATION ADULT - GAIT DEVIATIONS NOTED, PT EVAL
Sinusitis or upper respiratory "cold" symptoms usually respond to symptomatic treatment  These conditions can last from 7 to 14 days with the worst symptoms occurring about day 4, 5, and 6  Symptomatic treatment includes tylenol or ibuprofen-like medication , 2 tablets every 4 hours for 48 hours; flonase spray in each nostril twice a day for 7 days: claritin 10 mg daily for 7 days (antihistamine): OTC cough suppressant if needed; rest: lots of fluids  If you have been prescribed antibiotic, take as directed and finish entire course  If high fever >101 occurs, or if symptoms worsen significantly, contact the office  Rx sent to pharmacy for antibiotic and nose spray       Lidex cream, apply for about 7 days, if not improved, refer to dermatology
decreased magnolia

## 2025-04-17 NOTE — PHYSICAL THERAPY INITIAL EVALUATION ADULT - GENERAL OBSERVATIONS, REHAB EVAL
11:00-11:25 pt encountered in bed in [facility], IV RUE locked by RN.  Patient performed bed mobility, transfers, and ambulated with supervision.  There is no further skilled need for PT at this time.

## 2025-04-17 NOTE — PROGRESS NOTE ADULT - ASSESSMENT
63 y/o man with PMH of alcohol use disorder, hepatic steatosis (? cirrhosis), DVT/PE, HTN, high cholesterol, DM type 2 and gout was brought in by ambulance from [facility]'s residence complaining of chest discomfort/palpitations after drinking 1 pint of vodka this morning.  Patient denies any shortness of breath, nausea, vomiting, diarrhea, abdominal pain, fever, chills, cough or weakness. He was admitted to SDU for alcohol withdrawal with ketoacidosis, lactic acidosis and thrombocytopenia. He was later downgraded to the medical floor.  previous notes reviewed by me    1. Alcohol Ketoacidosis, Lactic acidosis, alcohol use disorder, suspected thiamine and folate deficiency    now clinically improved - acidosis resolved  continue CIWA monitoring - at 2 this morning  pt did not require any Ativan prn  addiction medicine consult and f/u appreciated  naltrexone 50 mg daily to be given with food   ativan prn withdrawal symptoms  continue thiamine, folic acid and multivitamin  CATCH team following - pt will f/u with [facility] as outpt  PO hydration  encourage PO intake  repleted for hypokalemia and hypomagnesemia     2. Fever of 102.4 on 4/14  now resolved  + cough  CXR: [facility]  procal 0.11  RVP negative  UA negative for infection  started on zosyn for suspected aspiration (had crackles on lung exam) - likely had aspiration pneumonitis - CXR clear - hold abx and monitor  aspiration precautions     3. Transaminitis  alcohol steatosis  cirrhosis per chart review - pt was seen by hepatology in January 2025 - needs outpt f/u including HCC screening   pt counseled on alcohol cessation     4. Thrombocytopenia   pt with history of thrombocytopenia - seen by HemOnc in January 2025 - likely due to alcohol use  monitor for improvement and for bleeding  daily CBC for now     5. Hx DVT/PE  chronic DVTs on venous duplex and no need for anticoagulation per vascular surgery  PT consulted - OOB and ambulation with supervision    6. ANDRZEJ resolved - likely prerenal  encourage PO intake  s/p IV hydration  continue flomax for BPH    7. HTN  -c/w metoprolol succinate    8. DM type 2  continue carb consistent diet, monitor FS  FS acceptable  on insulin sliding scale    7. Gout   on Colchicine at home     8. GI ppx  pantoprazole PO    9. Constipation  on miralax, senna and lactulose added      full code  guarded prognosis but slowly improving  high risk pt    Progress Note Handoff  Pending (specify): CIWA monitoring, labs including CBC in AM, monitor for bleeding, PT consult  Family discussion: pt in agreement with plan of care   Disposition: [facility]'s residence (adult home) possibly in 24hr if pt able to ambulate safely, platelet count is improving and pt has a BM  otherwise pt will go to [facility] on Monday, 4/21 if medically stable

## 2025-04-17 NOTE — PROGRESS NOTE ADULT - SUBJECTIVE AND OBJECTIVE BOX
SUBJECTIVE/OVERNIGHT EVENTS  Today is hospital day 4d. This morning patient was seen and examined at bedside, resting comfortably in bed. No acute or major events overnight.    HOSPITAL COURSE  Day 1:   Day 2:   Day 3:     CODE STATUS:    FAMILY COMMUNICATION  Contact date:  Name of person contacted:  Relationship to patient:  Communication details:    MEDICATIONS  STANDING MEDICATIONS  amoxicillin  875 milliGRAM(s)/clavulanate 1 Tablet(s) Oral two times a day  chlorhexidine 2% Cloths 1 Application(s) Topical daily  cyanocobalamin 1000 MICROGram(s) Oral daily  dextrose 5%. 1000 milliLiter(s) IV Continuous <Continuous>  dextrose 5%. 1000 milliLiter(s) IV Continuous <Continuous>  dextrose 50% Injectable 25 Gram(s) IV Push once  dextrose 50% Injectable 12.5 Gram(s) IV Push once  dextrose 50% Injectable 25 Gram(s) IV Push once  folic acid 1 milliGRAM(s) Oral daily  gabapentin 200 milliGRAM(s) Oral two times a day  glucagon  Injectable 1 milliGRAM(s) IntraMuscular once  influenza  Vaccine (HIGH DOSE) 0.5 milliLiter(s) IntraMuscular once  insulin lispro (ADMELOG) corrective regimen sliding scale   SubCutaneous three times a day before meals  lactated ringers. 1000 milliLiter(s) IV Continuous <Continuous>  methocarbamol 500 milliGRAM(s) Oral daily  metoprolol succinate ER 25 milliGRAM(s) Oral daily  multivitamin 1 Tablet(s) Oral daily  naltrexone 50 milliGRAM(s) Oral daily  pantoprazole  Injectable 40 milliGRAM(s) IV Push daily  polyethylene glycol 3350 17 Gram(s) Oral daily  senna 2 Tablet(s) Oral at bedtime  tamsulosin 0.4 milliGRAM(s) Oral at bedtime    PRN MEDICATIONS  acetaminophen     Tablet .. 650 milliGRAM(s) Oral every 6 hours PRN  dextrose Oral Gel 15 Gram(s) Oral once PRN  LORazepam     Tablet 2 milliGRAM(s) Oral every 2 hours PRN  ondansetron Injectable 4 milliGRAM(s) IV Push every 8 hours PRN    VITALS  T(F): 98.1 (04-17-25 @ 11:14), Max: 98.3 (04-17-25 @ 06:42)  HR: 77 (04-17-25 @ 11:14) (77 - 85)  BP: 114/68 (04-17-25 @ 11:14) (114/68 - 131/72)  RR: 20 (04-17-25 @ 11:14) (18 - 20)  SpO2: 97% (04-17-25 @ 06:42) (97% - 97%)  POCT Blood Glucose.: 121 mg/dL (04-17-25 @ 10:57)  POCT Blood Glucose.: 98 mg/dL (04-17-25 @ 07:29)  POCT Blood Glucose.: 191 mg/dL (04-16-25 @ 21:04)  POCT Blood Glucose.: 156 mg/dL (04-16-25 @ 16:40)    PHYSICAL EXAM  CONSTITUTIONAL: Well groomed, no apparent distress  EYES: No conjunctival or scleral injection, non-icteric  ENMT: Oral mucosa with moist membranes.  NECK: Supple, symmetric   RESP: No respiratory distress, no use of accessory muscles; CTA b/l, no WRR  CV: RRR, +S1S2, no MRG; no JVD; no peripheral edema  GI: Soft, non-tender, distended, no rebound, no guarding; no palpable masses  MSK: Normal ROM without pain, normal muscle strength/tone  SKIN: No rashes or ulcers noted  NEURO: Grossly intact  PSYCH: Appropriate insight/judgment; A+O x 3, mood and affect appropriate, recent/remote memory intact    (  ) Indwelling Will Catheter   Date insterted:    Reason (  ) Critical illness     (  ) urinary retention    (  ) Accurate Ins/Outs Monitoring     (  ) CMO patient    (  ) Central Line  Date inserted:  Location: (  ) Right IJ   (  ) Left IJ   (  ) Right Fem   (  ) Left Fem    (  ) SPC  (  ) pigtail  (  ) PEG tube  (  ) colostomy  (  ) jejunostomy  (  ) U-Dall    LABS             10.8   4.08  )-----------( 30       ( 04-17-25 @ 06:11 )             30.6     140  |  107  |  8   -------------------------<  94   04-17-25 @ 06:11  3.9  |  24  |  1.0    Ca      8.6     04-17-25 @ 06:11  Phos   2.6     04-17-25 @ 06:11  Mg     1.6     04-17-25 @ 06:11    TPro  5.6  /  Alb  3.3  /  TBili  3.7  /  DBili  1.8  /  AST  43  /  ALT  15  /  AlkPhos  129  /  GGT  x     04-17-25 @ 06:11    PT/INR - ( 04-17-25 @ 06:11 )   PT: 18.90 sec[H];   INR: 1.59 ratio[H]      Urinalysis Basic - ( 17 Apr 2025 06:11 )    Color: x / Appearance: x / SG: x / pH: x  Gluc: 94 mg/dL / Ketone: x  / Bili: x / Urobili: x   Blood: x / Protein: x / Nitrite: x   Leuk Esterase: x / RBC: x / WBC x   Sq Epi: x / Non Sq Epi: x / Bacteria: x          Culture - Blood (collected 14 Apr 2025 12:24)  Source: Blood Blood  Preliminary Report (16 Apr 2025 22:01):    No growth at 48 Hours      IMAGING

## 2025-04-17 NOTE — PROGRESS NOTE ADULT - SUBJECTIVE AND OBJECTIVE BOX
AILYNCAT MUNSON  64y Male    INTERVAL HPI/OVERNIGHT EVENTS:    awaiting BM  no fever  no headache when I saw him this morning  + cough    T(F): 98.1 (04-17-25 @ 11:14), Max: 98.3 (04-17-25 @ 06:42)  HR: 77 (04-17-25 @ 11:14) (77 - 85)  BP: 114/68 (04-17-25 @ 11:14) (114/68 - 131/72)  RR: 20 (04-17-25 @ 11:14) (18 - 20)  SpO2: 97% (04-17-25 @ 06:42) (97% - 97%)  I&O's Summary    17 Apr 2025 07:01  -  17 Apr 2025 12:16  --------------------------------------------------------  IN: 210 mL / OUT: 0 mL / NET: 210 mL      CAPILLARY BLOOD GLUCOSE      POCT Blood Glucose.: 121 mg/dL (17 Apr 2025 10:57)  POCT Blood Glucose.: 98 mg/dL (17 Apr 2025 07:29)  POCT Blood Glucose.: 191 mg/dL (16 Apr 2025 21:04)  POCT Blood Glucose.: 156 mg/dL (16 Apr 2025 16:40)        PHYSICAL EXAM:  GENERAL: NAD  HEAD:  Normocephalic  EYES:  conjunctiva and sclera clear  ENMT: Moist mucous membranes  NERVOUS SYSTEM:  Alert, awake, Good concentration, mild tremor on exam with outstretched hands  CHEST/LUNG: CTA b/l  HEART: Regular rate and rhythm  ABDOMEN: Soft, Nontender, Nondistended; Bowel sounds present  EXTREMITIES:   No edema LE  SKIN: warm dry    Consultant(s) Notes Reviewed:  [x ] YES  [ ] NO  Care Discussed with Consultants/Other Providers [ x] YES  [ ] NO    MEDICATIONS  (STANDING):  amoxicillin  875 milliGRAM(s)/clavulanate 1 Tablet(s) Oral two times a day  chlorhexidine 2% Cloths 1 Application(s) Topical daily  cyanocobalamin 1000 MICROGram(s) Oral daily  dextrose 5%. 1000 milliLiter(s) (50 mL/Hr) IV Continuous <Continuous>  dextrose 5%. 1000 milliLiter(s) (100 mL/Hr) IV Continuous <Continuous>  dextrose 50% Injectable 25 Gram(s) IV Push once  dextrose 50% Injectable 12.5 Gram(s) IV Push once  dextrose 50% Injectable 25 Gram(s) IV Push once  folic acid 1 milliGRAM(s) Oral daily  gabapentin 200 milliGRAM(s) Oral two times a day  glucagon  Injectable 1 milliGRAM(s) IntraMuscular once  influenza  Vaccine (HIGH DOSE) 0.5 milliLiter(s) IntraMuscular once  insulin lispro (ADMELOG) corrective regimen sliding scale   SubCutaneous three times a day before meals  lactated ringers. 1000 milliLiter(s) (100 mL/Hr) IV Continuous <Continuous>  melatonin 1 milliGRAM(s) Oral at bedtime  methocarbamol 500 milliGRAM(s) Oral daily  metoprolol succinate ER 25 milliGRAM(s) Oral daily  multivitamin 1 Tablet(s) Oral daily  naltrexone 50 milliGRAM(s) Oral daily  pantoprazole  Injectable 40 milliGRAM(s) IV Push daily  polyethylene glycol 3350 17 Gram(s) Oral daily  senna 2 Tablet(s) Oral at bedtime  tamsulosin 0.4 milliGRAM(s) Oral at bedtime    MEDICATIONS  (PRN):  acetaminophen     Tablet .. 650 milliGRAM(s) Oral every 6 hours PRN Temp greater or equal to 38.5C (101.3F)  dextrose Oral Gel 15 Gram(s) Oral once PRN Blood Glucose LESS THAN 70 milliGRAM(s)/deciliter  LORazepam     Tablet 2 milliGRAM(s) Oral every 2 hours PRN ciwa >7  ondansetron Injectable 4 milliGRAM(s) IV Push every 8 hours PRN Nausea and/or Vomiting      LABS:                        10.8   4.08  )-----------( 30       ( 17 Apr 2025 06:11 )             30.6     04-17    140  |  107  |  8[L]  ----------------------------<  94  3.9   |  24  |  1.0    Ca    8.6      17 Apr 2025 06:11  Phos  2.6     04-17  Mg     1.6     04-17    TPro  5.6[L]  /  Alb  3.3[L]  /  TBili  3.7[H]  /  DBili  1.8[H]  /  AST  43[H]  /  ALT  15  /  AlkPhos  129[H]  04-17    PT/INR - ( 17 Apr 2025 06:11 )   PT: 18.90 sec;   INR: 1.59 ratio             Culture - Blood (collected 14 Apr 2025 12:24)  Source: Blood Blood  Preliminary Report (16 Apr 2025 22:01):    No growth at 48 Hours      Lactate, Blood: 1.6 mmol/L (04-14 @ 06:20)  Lactate, Blood: 6.2 mmol/L (04-13 @ 17:01)              Case discussed with residents and RN on rounds today    Care discussed with pt

## 2025-04-18 ENCOUNTER — TRANSCRIPTION ENCOUNTER (OUTPATIENT)
Age: 65
End: 2025-04-18

## 2025-04-18 VITALS
TEMPERATURE: 98 F | HEART RATE: 83 BPM | DIASTOLIC BLOOD PRESSURE: 66 MMHG | SYSTOLIC BLOOD PRESSURE: 118 MMHG | RESPIRATION RATE: 20 BRPM

## 2025-04-18 LAB
ALBUMIN SERPL ELPH-MCNC: 3 G/DL — LOW (ref 3.5–5.2)
ALP SERPL-CCNC: 119 U/L — HIGH (ref 30–115)
ALT FLD-CCNC: 14 U/L — SIGNIFICANT CHANGE UP (ref 0–41)
ANION GAP SERPL CALC-SCNC: 10 MMOL/L — SIGNIFICANT CHANGE UP (ref 7–14)
AST SERPL-CCNC: 43 U/L — HIGH (ref 0–41)
BASOPHILS # BLD AUTO: 0.03 K/UL — SIGNIFICANT CHANGE UP (ref 0–0.2)
BASOPHILS NFR BLD AUTO: 0.7 % — SIGNIFICANT CHANGE UP (ref 0–1)
BILIRUB SERPL-MCNC: 2.6 MG/DL — HIGH (ref 0.2–1.2)
BUN SERPL-MCNC: 9 MG/DL — LOW (ref 10–20)
CALCIUM SERPL-MCNC: 9.1 MG/DL — SIGNIFICANT CHANGE UP (ref 8.4–10.5)
CHLORIDE SERPL-SCNC: 106 MMOL/L — SIGNIFICANT CHANGE UP (ref 98–110)
CO2 SERPL-SCNC: 21 MMOL/L — SIGNIFICANT CHANGE UP (ref 17–32)
CREAT SERPL-MCNC: 1 MG/DL — SIGNIFICANT CHANGE UP (ref 0.7–1.5)
EGFR: 84 ML/MIN/1.73M2 — SIGNIFICANT CHANGE UP
EGFR: 84 ML/MIN/1.73M2 — SIGNIFICANT CHANGE UP
EOSINOPHIL # BLD AUTO: 0.28 K/UL — SIGNIFICANT CHANGE UP (ref 0–0.7)
EOSINOPHIL NFR BLD AUTO: 6.9 % — SIGNIFICANT CHANGE UP (ref 0–8)
GLUCOSE BLDC GLUCOMTR-MCNC: 94 MG/DL — SIGNIFICANT CHANGE UP (ref 70–99)
GLUCOSE BLDC GLUCOMTR-MCNC: 97 MG/DL — SIGNIFICANT CHANGE UP (ref 70–99)
GLUCOSE SERPL-MCNC: 96 MG/DL — SIGNIFICANT CHANGE UP (ref 70–99)
HCT VFR BLD CALC: 29.5 % — LOW (ref 42–52)
HGB BLD-MCNC: 10.4 G/DL — LOW (ref 14–18)
IMM GRANULOCYTES NFR BLD AUTO: 0.5 % — HIGH (ref 0.1–0.3)
LYMPHOCYTES # BLD AUTO: 1.07 K/UL — LOW (ref 1.2–3.4)
LYMPHOCYTES # BLD AUTO: 26.4 % — SIGNIFICANT CHANGE UP (ref 20.5–51.1)
MAGNESIUM SERPL-MCNC: 1.5 MG/DL — LOW (ref 1.8–2.4)
MCHC RBC-ENTMCNC: 35.3 G/DL — SIGNIFICANT CHANGE UP (ref 32–37)
MCHC RBC-ENTMCNC: 37.1 PG — HIGH (ref 27–31)
MCV RBC AUTO: 105.4 FL — HIGH (ref 80–94)
MONOCYTES # BLD AUTO: 0.32 K/UL — SIGNIFICANT CHANGE UP (ref 0.1–0.6)
MONOCYTES NFR BLD AUTO: 7.9 % — SIGNIFICANT CHANGE UP (ref 1.7–9.3)
NEUTROPHILS # BLD AUTO: 2.33 K/UL — SIGNIFICANT CHANGE UP (ref 1.4–6.5)
NEUTROPHILS NFR BLD AUTO: 57.6 % — SIGNIFICANT CHANGE UP (ref 42.2–75.2)
NRBC BLD AUTO-RTO: 0 /100 WBCS — SIGNIFICANT CHANGE UP (ref 0–0)
PHOSPHATE SERPL-MCNC: 3 MG/DL — SIGNIFICANT CHANGE UP (ref 2.1–4.9)
PLATELET # BLD AUTO: 33 K/UL — LOW (ref 130–400)
PMV BLD: 11.9 FL — HIGH (ref 7.4–10.4)
POTASSIUM SERPL-MCNC: 3.9 MMOL/L — SIGNIFICANT CHANGE UP (ref 3.5–5)
POTASSIUM SERPL-SCNC: 3.9 MMOL/L — SIGNIFICANT CHANGE UP (ref 3.5–5)
PROT SERPL-MCNC: 5.4 G/DL — LOW (ref 6–8)
RBC # BLD: 2.8 M/UL — LOW (ref 4.7–6.1)
RBC # FLD: 14.8 % — HIGH (ref 11.5–14.5)
SODIUM SERPL-SCNC: 137 MMOL/L — SIGNIFICANT CHANGE UP (ref 135–146)
WBC # BLD: 4.05 K/UL — LOW (ref 4.8–10.8)
WBC # FLD AUTO: 4.05 K/UL — LOW (ref 4.8–10.8)

## 2025-04-18 PROCEDURE — 99239 HOSP IP/OBS DSCHRG MGMT >30: CPT

## 2025-04-18 RX ORDER — NALTREXONE HYDROCHLORIDE 50 MG/1
1 TABLET, FILM COATED ORAL
Qty: 30 | Refills: 0
Start: 2025-04-18 | End: 2025-05-17

## 2025-04-18 RX ORDER — MAGNESIUM SULFATE 500 MG/ML
2 SYRINGE (ML) INJECTION
Refills: 0 | Status: COMPLETED | OUTPATIENT
Start: 2025-04-18 | End: 2025-04-18

## 2025-04-18 RX ORDER — POLYETHYLENE GLYCOL 3350 17 G/17G
17 POWDER, FOR SOLUTION ORAL
Qty: 1 | Refills: 0
Start: 2025-04-18 | End: 2025-05-17

## 2025-04-18 RX ORDER — MAGNESIUM OXIDE 400 MG
1 TABLET ORAL
Qty: 14 | Refills: 0
Start: 2025-04-18 | End: 2025-05-01

## 2025-04-18 RX ORDER — SENNA 187 MG
2 TABLET ORAL
Qty: 60 | Refills: 0
Start: 2025-04-18 | End: 2025-05-17

## 2025-04-18 RX ADMIN — Medication 40 MILLIGRAM(S): at 11:40

## 2025-04-18 RX ADMIN — CYANOCOBALAMIN 1000 MICROGRAM(S): 1000 INJECTION INTRAMUSCULAR; SUBCUTANEOUS at 11:41

## 2025-04-18 RX ADMIN — METOPROLOL SUCCINATE 25 MILLIGRAM(S): 50 TABLET, EXTENDED RELEASE ORAL at 05:27

## 2025-04-18 RX ADMIN — GABAPENTIN 200 MILLIGRAM(S): 400 CAPSULE ORAL at 05:27

## 2025-04-18 RX ADMIN — METHOCARBAMOL 500 MILLIGRAM(S): 500 TABLET, FILM COATED ORAL at 11:41

## 2025-04-18 RX ADMIN — Medication 25 GRAM(S): at 13:50

## 2025-04-18 RX ADMIN — Medication 25 GRAM(S): at 11:38

## 2025-04-18 RX ADMIN — NALTREXONE HYDROCHLORIDE 50 MILLIGRAM(S): 50 TABLET, FILM COATED ORAL at 12:09

## 2025-04-18 RX ADMIN — FOLIC ACID 1 MILLIGRAM(S): 1 TABLET ORAL at 11:41

## 2025-04-18 RX ADMIN — Medication 1 TABLET(S): at 11:41

## 2025-04-18 NOTE — DISCHARGE NOTE PROVIDER - CARE PROVIDER_API CALL
Eugene Tellez  Internal Medicine  [address] [place] [address]  Phone: [phone]  Fax: [phone]  Established Patient  Follow Up Time: 2 weeks    Fredy Mcgrath)  Hematology  [address] [place] [address]  Phone: [phone]  Fax: [phone]  Follow Up Time: 2 weeks

## 2025-04-18 NOTE — DISCHARGE NOTE NURSING/CASE MANAGEMENT/SOCIAL WORK - PATIENT PORTAL LINK FT
You can access the FollowMyHealth Patient Portal offered by [facility] by registering at the following website: [email or web address]. By joining [facility]’s FollowMyHealth portal, you will also be able to view your health information using other applications (apps) compatible with our system.

## 2025-04-18 NOTE — DISCHARGE NOTE PROVIDER - HOSPITAL COURSE
64-year-old male with history of alcohol abuse, DVT/PE, hypertension, high cholesterol, diabetes, gout brought in by ambulance from [facility]'s residence complaining of chest discomfort/palpitations after drinking 1 pint of vodka this morning. Admitted for alcoholic lactic acidosis which resolved.    # Alcohol Lactic acidosis- Resolved  # Alcohol abuse / intoxication- resolved  - CIWA protocol and Ativan PRN while inpatient  - Continue naltrexone 50 mg daily with be given with food   - Continue folate, thiamine, and multivitamin  - PT eval 4/18: No skilled PT needs    # High grade fever-resolved  - RVP negative, procal 0.11  - S/p IV Zosyn for empiric coverage of aspiration pneumonia  - s/p PO Augmentin  - supportive care     # Abnormal LFTs / alcoholic hepatitis?  # alcohol induced liver injury   # Pancytopenia  - monitor LFTs   - alcoholic steatosis on previous RUQ US  - encourage abstinence      #Hx DVT/PE  # Thrombocytopenia- improving  - Plt 4/17: 30  - Duplex : DVT in right popliteal vein. Chronic DVT in left common femoral and popliteal veins.  - on previous dc was off coumadin b/c of platelet <50  - thrombocytopenia could be induced alcohol use  - Vascular surgery 4/16: No need for AC because DVTs are chronic  - F/u hematologist and PCP.    #ANDRZEJ, likely prerenal/ h/o BPH- Resolved  -cr 1., baseline 0.9-1  - c/w Flomax     #HTN  - BP:109/65  -Continue toprol 25mg    #DM  - CC diet   - Continue metformin 500mg bid at home       # gout   - on Colchicine at home   - held while inpatient  - Restart on discharge    #Constipation  - Continue senna and mirlax  - s/p lactulose    #Hypomagnesemia  - 1.6  - s/p repletion    Patient is medically stable for discharge.    64-year-old male with history of alcohol abuse, DVT/PE, hypertension, high cholesterol, diabetes, gout brought in by ambulance from [facility] residence complaining of chest discomfort/palpitations after drinking 1 pint of vodka this morning. Admitted for alcoholic lactic acidosis which resolved.    # Alcohol Lactic acidosis- Resolved  # Alcohol abuse / intoxication- resolved  - CIWA protocol and Ativan PRN while inpatient  - Continue naltrexone 50 mg daily with be given with food   - Continue folate, thiamine, and multivitamin  - PT eval 4/18: No skilled PT needs    # High grade fever-resolved  - RVP negative, procal 0.11  - S/p IV Zosyn for empiric coverage of aspiration pneumonia  - s/p PO Augmentin  - supportive care     # Abnormal LFTs / alcoholic hepatitis?  # alcohol induced liver injury   # Pancytopenia  - monitor LFTs   - alcoholic steatosis on previous RUQ US  - encourage abstinence      #Hx DVT/PE  # Thrombocytopenia- improving  - Plt 4/17: 30  - Duplex : DVT in right popliteal vein. Chronic DVT in left common femoral and popliteal veins.  - on previous dc was off coumadin b/c of platelet <50  - thrombocytopenia could be induced alcohol use  - Vascular surgery 4/16: No need for AC because DVTs are chronic  - F/u hematologist and PCP.    #ANDRZEJ, likely prerenal/ h/o BPH- Resolved  -cr 1., baseline 0.9-1  - c/w Flomax     #HTN  - BP:109/65  -Continue toprol 25mg    #DM  - CC diet   - Continue metformin 500mg bid at home     # gout   - on Colchicine at home   - held while inpatient  - Restart on discharge    #Constipation  - Continue senna and mirlax  - s/p lactulose    #Hypomagnesemia  - 1.6  - s/p repletion    Patient is medically stable for discharge. Discharge discussed and approved by Dr. Castro.

## 2025-04-18 NOTE — DISCHARGE NOTE PROVIDER - NSDCCPCAREPLAN_GEN_ALL_CORE_FT
PRINCIPAL DISCHARGE DIAGNOSIS  Diagnosis: Alcoholic ketoacidosis  Assessment and Plan of Treatment: You were admitted for alcohol lactic acidosis. You had a lactate of 12 which improved. You were given IV zosyn for 2 days due to suspicion that you aspirated. Chest Xray was negative. You expressed interest in wanting to stop drinking alcohol and were given resources by the CATCH team. You were started on naltrexone which you will continue after discharge. Please follow up with your PCP and hematologist.

## 2025-04-18 NOTE — DISCHARGE NOTE PROVIDER - PROVIDER TOKENS
PROVIDER:[TOKEN:[[id number]],FOLLOWUP:[2 weeks],ESTABLISHEDPATIENT:[T]],PROVIDER:[TOKEN:[[id number]],FOLLOWUP:[2 weeks]]

## 2025-04-18 NOTE — DISCHARGE NOTE PROVIDER - NSDCMRMEDTOKEN_GEN_ALL_CORE_FT
Claritin 10 mg oral tablet: 1 tab(s) orally once a day  colchicine 0.6 mg oral tablet: 1 tab(s) orally 2 times a day  cyanocobalamin 1000 mcg oral tablet: 1 tab(s) orally once a day  dicyclomine 20 mg oral tablet: 1 tab(s) orally every 8 hours as needed for abdominal cramps  Flomax 0.4 mg oral capsule: 1 cap(s) orally once a day (at bedtime)  folic acid 1 mg oral tablet: 1 tab(s) orally once a day  gabapentin 100 mg oral capsule: 2 cap(s) orally 2 times a day  Jardiance 10 mg oral tablet: 1 tab(s) orally once a day  metFORMIN 500 mg oral tablet: 1 tab(s) orally 2 times a day  methocarbamol 500 mg oral tablet: 1 orally once a day  Multiple Vitamins oral tablet: 1 tab(s) orally once a day  naltrexone 50 mg oral tablet: 1 tab(s) orally once a day  polyethylene glycol 3350 oral powder for reconstitution: 17 gram(s) orally once a day  Protonix 40 mg oral delayed release tablet: 1 tab(s) orally once a day  senna leaf extract oral tablet: 2 tab(s) orally once a day (at bedtime)  thiamine 100 mg oral tablet: 1 tab(s) orally once a day  Toprol-XL 25 mg oral tablet, extended release: 1 tab(s) orally once a day  Tylenol 325 mg oral tablet: 2 tab(s) orally every 8 hours as needed for  mild pain

## 2025-04-18 NOTE — CHART NOTE - NSCHARTNOTEFT_GEN_A_CORE
Transfer note    From: CEU  To: Med surg      64-year-old male with history of alcohol abuse, DVT/PE, hypertension, high cholesterol, diabetes, gout brought in by ambulance from [facility] residence complaining of chest discomfort/palpitations after drinking 1 pint of vodka this morning.  Patient denies any shortness of breath, nausea, vomiting, diarrhea, abdominal pain, fever, chills, cough or weakness.    ED vitals: T 98.4, , /75, SpO2 98% on RA, RR 17  Labs: wbc 12.8, .4, platelet 77, CO2 16, AG 29, Tbili 3.2, ZXE584, ast 79, alt 24, trop33, BHB 0.9,   VBG pH 7.35, pCO2 33 HCO3 18, Lactate 12.2  blood alcohol level 253  EKG- sinus tachycardia  CXR unremarkable    Patient admitted to SDU for further management    CEU course    Course was uncomplicated. Patient was on ATIVAN PRN. CIWA around 4. No ativan required. Patient on thiamine, folate and B12. Last drink on day of admission. Today is D2.  Alcoholic lactic acidosis resolved  Addiction medicine consulted. Patient agrees to alcohol cessation. Natrexone ordered to help with cravings.  Patient spiked a fever on 4/14, patient has crackles at the right base. Patient complains of cough. Xray on admission was normal. RVP was negative. Patient started on zosyn for aspiration pneumonia.  Patient was also found to have chronic DVTs bilaterally. unable to give ac due to low platelets. Vascular surgery consulted. F/u recs  Pancytopenia likely secondary to alcohol abuse. Heme/onc saw the patient on last admission and panel was negative.    Follow up  - F/u vascular surgery for DVTs.  - monitor for withdrawal  - monitor for fever and sepsis      A/p  64-year-old male with history of alcohol abuse, DVT/PE, hypertension, high cholesterol, diabetes, gout brought in by ambulance from [facility] residence complaining of chest discomfort/palpitations after drinking 1 pint of vodka this morning.  admitted for alcoholic lactic acidosis    A/P   # Alcohol Lactic acidosis  # Alcohol abuse / intoxication  - IV hydration LR for 24 hours  - monitor for sings of withdrawal  - c/w CIWA protocol and Ativan PRN   - prescribe naltrexone 50 mg daily with be given with food   - Replete electrolytes as needed  - continue folate and multivitamin  - lactic acidosis resolved    # High grade fever  - RVP negative, procal 0.11  - Started on zosyn for empiric coverage of aspiration pneumonia  - Send blood culture if fever  - supportive care, IV hydration      # Abnormal LFTs / alcoholic hepatitis?  # alcohol induced liver injury   # Pancytopenia  - monitor LFTs   - alcoholic steatosis on previous RUQ US  - encourage abstinence    - MELD score daily. Maddrey score  - Pancytopenia likely secondary to alcohol abuse. Patient was seen on previous admission in jan 2025 by heme/onc and work up was normal    #Hx DVT/PE  # Thrombocytopenia  - Duplex : DVT in right popliteal vein. Chronic DVT in left common femoral and popliteal veins.  - on previous dc was off coumadin b/c of platelet <50  - thrombocytopenia could be induced alcohol use  - trend platelet count , trending down  - hold SQ Heparin  - F/u consult vascular surgery     #ANDRZEJ, likely prerenal/ h/o BPH   -cr 1.3, baseline 0.9-1  - resolved, IV hydration for 24 hours   - c/w Flomax   - monitor BUN/cr and urine output     #HTN  -c/w toprol 25mg    #DM  - CC diet   -on metformin 500mg bid at home   -FS goal 140-180  -ISS     # gout,   -on Colchicine at home   - held on admission     #GI ppx  -pantoprazole IV    DVT PPX: No DVT ppx for now due to low plt  GI PPX: pantoprazole  DIET: DASH/CC  ACTIVITY: AT  CODE STATUS: Full  DISPOSITION: Med
stable to dc today with close follow up with PMD
DVTs are chronic- no need for AC

## 2025-04-18 NOTE — DISCHARGE NOTE PROVIDER - CARE PROVIDERS DIRECT ADDRESSES
[email or web address]

## 2025-04-19 LAB
CULTURE RESULTS: SIGNIFICANT CHANGE UP
SPECIMEN SOURCE: SIGNIFICANT CHANGE UP

## 2025-04-21 ENCOUNTER — NON-APPOINTMENT (OUTPATIENT)
Age: 65
End: 2025-04-21

## 2025-04-22 DIAGNOSIS — I10 ESSENTIAL (PRIMARY) HYPERTENSION: ICD-10-CM
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
## 2025-04-22 DIAGNOSIS — F10.239 ALCOHOL DEPENDENCE WITH WITHDRAWAL, UNSPECIFIED: ICD-10-CM

## 2025-04-22 DIAGNOSIS — Z79.84 LONG TERM (CURRENT) USE OF ORAL HYPOGLYCEMIC DRUGS: ICD-10-CM

## 2025-04-22 DIAGNOSIS — I82.532 CHRONIC EMBOLISM AND THROMBOSIS OF LEFT POPLITEAL VEIN: ICD-10-CM

## 2025-04-22 DIAGNOSIS — D69.6 THROMBOCYTOPENIA, UNSPECIFIED: ICD-10-CM

## 2025-04-22 DIAGNOSIS — K76.0 FATTY (CHANGE OF) LIVER, NOT ELSEWHERE CLASSIFIED: ICD-10-CM

## 2025-04-22 DIAGNOSIS — M1A.9XX0 CHRONIC GOUT, UNSPECIFIED, WITHOUT TOPHUS (TOPHI): ICD-10-CM

## 2025-04-22 DIAGNOSIS — D61.818 OTHER PANCYTOPENIA: ICD-10-CM

## 2025-04-22 DIAGNOSIS — I82.531 CHRONIC EMBOLISM AND THROMBOSIS OF RIGHT POPLITEAL VEIN: ICD-10-CM

## 2025-04-22 DIAGNOSIS — Z91.011 ALLERGY TO MILK PRODUCTS: ICD-10-CM

## 2025-04-22 DIAGNOSIS — E83.42 HYPOMAGNESEMIA: ICD-10-CM

## 2025-04-22 DIAGNOSIS — N17.9 ACUTE KIDNEY FAILURE, UNSPECIFIED: ICD-10-CM

## 2025-04-22 DIAGNOSIS — Z79.01 LONG TERM (CURRENT) USE OF ANTICOAGULANTS: ICD-10-CM

## 2025-04-22 DIAGNOSIS — E11.9 TYPE 2 DIABETES MELLITUS WITHOUT COMPLICATIONS: ICD-10-CM

## 2025-04-22 DIAGNOSIS — R74.01 ELEVATION OF LEVELS OF LIVER TRANSAMINASE LEVELS: ICD-10-CM

## 2025-04-22 DIAGNOSIS — E87.6 HYPOKALEMIA: ICD-10-CM

## 2025-04-22 DIAGNOSIS — E87.29 OTHER ACIDOSIS: ICD-10-CM

## 2025-04-22 DIAGNOSIS — I82.512 CHRONIC EMBOLISM AND THROMBOSIS OF LEFT FEMORAL VEIN: ICD-10-CM

## 2025-04-22 DIAGNOSIS — K59.00 CONSTIPATION, UNSPECIFIED: ICD-10-CM

## 2025-04-22 DIAGNOSIS — E51.2 WERNICKE'S ENCEPHALOPATHY: ICD-10-CM

## 2025-04-22 DIAGNOSIS — Z91.018 ALLERGY TO OTHER FOODS: ICD-10-CM

## 2025-04-22 DIAGNOSIS — J69.0 PNEUMONITIS DUE TO INHALATION OF FOOD AND VOMIT: ICD-10-CM

## 2025-05-02 NOTE — PATIENT PROFILE ADULT - BRADEN SCORE
[FreeTextEntry1] : COPD (chronic obstructive pulmonary disease) (208) (J44.9)  #severe COPD/emphysema #Active smoker #Productive cough - confirmed on PFT test 03/2024 - not hospitalized for COPD since last visit - continue triple therapy -> Symbicort, Spiriva - counseled on importance of stopping smoking, cut down from 1 pack a day to 6 cigarettes. Advised to continue cutting down - Patient now has home oxygen as needed - Pulm rehab referred, however patient is refusing - repeat CT chest ordered in Jan 2025, cancer screening - Last CT chest was in Jan 2024, per patient has repeat scheduled for June 2025. Advised to make sure she does not miss it.   Follow up in 6 months or PRN
19

## 2025-05-22 ENCOUNTER — RESULT REVIEW (OUTPATIENT)
Age: 65
End: 2025-05-22

## 2025-05-22 ENCOUNTER — INPATIENT (INPATIENT)
Facility: HOSPITAL | Age: 65
LOS: 7 days | Discharge: HOME | DRG: 432 | End: 2025-05-30
Attending: STUDENT IN AN ORGANIZED HEALTH CARE EDUCATION/TRAINING PROGRAM | Admitting: INTERNAL MEDICINE
Payer: MEDICARE

## 2025-05-22 VITALS
HEIGHT: 70 IN | HEART RATE: 127 BPM | OXYGEN SATURATION: 96 % | RESPIRATION RATE: 18 BRPM | DIASTOLIC BLOOD PRESSURE: 70 MMHG | TEMPERATURE: 99 F | WEIGHT: 169.98 LBS | SYSTOLIC BLOOD PRESSURE: 126 MMHG

## 2025-05-22 DIAGNOSIS — R11.2 NAUSEA WITH VOMITING, UNSPECIFIED: ICD-10-CM

## 2025-05-22 LAB
ALBUMIN SERPL ELPH-MCNC: 3.4 G/DL — LOW (ref 3.5–5.2)
ALBUMIN SERPL ELPH-MCNC: 3.8 G/DL — SIGNIFICANT CHANGE UP (ref 3.5–5.2)
ALBUMIN SERPL ELPH-MCNC: 4 G/DL — SIGNIFICANT CHANGE UP (ref 3.5–5.2)
ALP SERPL-CCNC: 140 U/L — HIGH (ref 30–115)
ALP SERPL-CCNC: 158 U/L — HIGH (ref 30–115)
ALP SERPL-CCNC: 167 U/L — HIGH (ref 30–115)
ALT FLD-CCNC: 22 U/L — SIGNIFICANT CHANGE UP (ref 0–41)
ALT FLD-CCNC: 23 U/L — SIGNIFICANT CHANGE UP (ref 0–41)
ALT FLD-CCNC: 86 U/L — HIGH (ref 0–41)
ANION GAP SERPL CALC-SCNC: 38 MMOL/L — HIGH (ref 7–14)
ANION GAP SERPL CALC-SCNC: 38 MMOL/L — HIGH (ref 7–14)
ANION GAP SERPL CALC-SCNC: 39 MMOL/L — HIGH (ref 7–14)
ANION GAP SERPL CALC-SCNC: 45 MMOL/L — HIGH (ref 7–14)
APPEARANCE UR: CLEAR — SIGNIFICANT CHANGE UP
APTT BLD: 40.3 SEC — HIGH (ref 27–39.2)
AST SERPL-CCNC: 298 U/L — HIGH (ref 0–41)
AST SERPL-CCNC: 71 U/L — HIGH (ref 0–41)
AST SERPL-CCNC: 94 U/L — HIGH (ref 0–41)
B-OH-BUTYR SERPL-SCNC: 1.3 MMOL/L — HIGH
BACTERIA # UR AUTO: NEGATIVE /HPF — SIGNIFICANT CHANGE UP
BASE EXCESS BLDV CALC-SCNC: -17.8 MMOL/L — LOW (ref -2–3)
BASE EXCESS BLDV CALC-SCNC: -18.1 MMOL/L — LOW (ref -2–3)
BASE EXCESS BLDV CALC-SCNC: -21 MMOL/L — LOW (ref -2–3)
BASOPHILS # BLD AUTO: 0.07 K/UL — SIGNIFICANT CHANGE UP (ref 0–0.2)
BASOPHILS NFR BLD AUTO: 0.7 % — SIGNIFICANT CHANGE UP (ref 0–1)
BILIRUB DIRECT SERPL-MCNC: 0.8 MG/DL — HIGH (ref 0–0.3)
BILIRUB DIRECT SERPL-MCNC: 1.6 MG/DL — HIGH (ref 0–0.3)
BILIRUB INDIRECT FLD-MCNC: 1.4 MG/DL — HIGH (ref 0.2–1.2)
BILIRUB INDIRECT FLD-MCNC: 2.3 MG/DL — HIGH (ref 0.2–1.2)
BILIRUB SERPL-MCNC: 3 MG/DL — HIGH (ref 0.2–1.2)
BILIRUB SERPL-MCNC: 3.1 MG/DL — HIGH (ref 0.2–1.2)
BILIRUB SERPL-MCNC: 3.9 MG/DL — HIGH (ref 0.2–1.2)
BILIRUB UR-MCNC: NEGATIVE — SIGNIFICANT CHANGE UP
BLD GP AB SCN SERPL QL: SIGNIFICANT CHANGE UP
BUN SERPL-MCNC: 5 MG/DL — LOW (ref 10–20)
BUN SERPL-MCNC: 6 MG/DL — LOW (ref 10–20)
CA-I SERPL-SCNC: 1.07 MMOL/L — LOW (ref 1.15–1.33)
CA-I SERPL-SCNC: 1.09 MMOL/L — LOW (ref 1.15–1.33)
CA-I SERPL-SCNC: 1.09 MMOL/L — LOW (ref 1.15–1.33)
CALCIUM SERPL-MCNC: 8.7 MG/DL — SIGNIFICANT CHANGE UP (ref 8.4–10.5)
CALCIUM SERPL-MCNC: 9.4 MG/DL — SIGNIFICANT CHANGE UP (ref 8.4–10.5)
CALCIUM SERPL-MCNC: 9.4 MG/DL — SIGNIFICANT CHANGE UP (ref 8.4–10.5)
CALCIUM SERPL-MCNC: 9.5 MG/DL — SIGNIFICANT CHANGE UP (ref 8.4–10.5)
CAST: 12 /LPF — HIGH (ref 0–4)
CHLORIDE SERPL-SCNC: 88 MMOL/L — LOW (ref 98–110)
CHLORIDE SERPL-SCNC: 93 MMOL/L — LOW (ref 98–110)
CHLORIDE SERPL-SCNC: 95 MMOL/L — LOW (ref 98–110)
CHLORIDE SERPL-SCNC: 96 MMOL/L — LOW (ref 98–110)
CO2 SERPL-SCNC: 10 MMOL/L — LOW (ref 17–32)
CO2 SERPL-SCNC: 6 MMOL/L — CRITICAL LOW (ref 17–32)
CO2 SERPL-SCNC: 8 MMOL/L — CRITICAL LOW (ref 17–32)
CO2 SERPL-SCNC: 8 MMOL/L — CRITICAL LOW (ref 17–32)
COLOR SPEC: YELLOW — SIGNIFICANT CHANGE UP
CREAT ?TM UR-MCNC: 79 MG/DL — SIGNIFICANT CHANGE UP
CREAT SERPL-MCNC: 1.6 MG/DL — HIGH (ref 0.7–1.5)
CREAT SERPL-MCNC: 1.7 MG/DL — HIGH (ref 0.7–1.5)
CREAT SERPL-MCNC: 1.8 MG/DL — HIGH (ref 0.7–1.5)
CREAT SERPL-MCNC: 1.8 MG/DL — HIGH (ref 0.7–1.5)
DIFF PNL FLD: NEGATIVE — SIGNIFICANT CHANGE UP
EGFR: 41 ML/MIN/1.73M2 — LOW
EGFR: 44 ML/MIN/1.73M2 — LOW
EGFR: 44 ML/MIN/1.73M2 — LOW
EGFR: 48 ML/MIN/1.73M2 — LOW
EGFR: 48 ML/MIN/1.73M2 — LOW
EOSINOPHIL # BLD AUTO: 0.03 K/UL — SIGNIFICANT CHANGE UP (ref 0–0.7)
EOSINOPHIL NFR BLD AUTO: 0.3 % — SIGNIFICANT CHANGE UP (ref 0–8)
FENTANYL UR QL: NEGATIVE — SIGNIFICANT CHANGE UP
GAS PNL BLDA: SIGNIFICANT CHANGE UP
GAS PNL BLDV: 139 MMOL/L — SIGNIFICANT CHANGE UP (ref 136–145)
GAS PNL BLDV: 139 MMOL/L — SIGNIFICANT CHANGE UP (ref 136–145)
GAS PNL BLDV: 140 MMOL/L — SIGNIFICANT CHANGE UP (ref 136–145)
GAS PNL BLDV: SIGNIFICANT CHANGE UP
GLUCOSE BLDC GLUCOMTR-MCNC: 114 MG/DL — HIGH (ref 70–99)
GLUCOSE BLDC GLUCOMTR-MCNC: 160 MG/DL — HIGH (ref 70–99)
GLUCOSE BLDC GLUCOMTR-MCNC: 206 MG/DL — HIGH (ref 70–99)
GLUCOSE BLDC GLUCOMTR-MCNC: 239 MG/DL — HIGH (ref 70–99)
GLUCOSE SERPL-MCNC: 108 MG/DL — HIGH (ref 70–99)
GLUCOSE SERPL-MCNC: 116 MG/DL — HIGH (ref 70–99)
GLUCOSE SERPL-MCNC: 132 MG/DL — HIGH (ref 70–99)
GLUCOSE SERPL-MCNC: 178 MG/DL — HIGH (ref 70–99)
GLUCOSE UR QL: NEGATIVE MG/DL — SIGNIFICANT CHANGE UP
HCO3 BLDV-SCNC: 10 MMOL/L — CRITICAL LOW (ref 22–29)
HCO3 BLDV-SCNC: 10 MMOL/L — CRITICAL LOW (ref 22–29)
HCO3 BLDV-SCNC: 8 MMOL/L — CRITICAL LOW (ref 22–29)
HCT VFR BLD CALC: 38.7 % — LOW (ref 42–52)
HCT VFR BLDA CALC: 34 % — LOW (ref 39–51)
HCT VFR BLDA CALC: 37 % — LOW (ref 39–51)
HCT VFR BLDA CALC: 38 % — LOW (ref 39–51)
HGB BLD CALC-MCNC: 11.3 G/DL — LOW (ref 12.6–17.4)
HGB BLD CALC-MCNC: 12.3 G/DL — LOW (ref 12.6–17.4)
HGB BLD CALC-MCNC: 12.8 G/DL — SIGNIFICANT CHANGE UP (ref 12.6–17.4)
HGB BLD-MCNC: 13 G/DL — LOW (ref 14–18)
IMM GRANULOCYTES NFR BLD AUTO: 0.4 % — HIGH (ref 0.1–0.3)
INR BLD: 1.42 RATIO — HIGH (ref 0.65–1.3)
KETONES UR QL: 15 MG/DL
LACTATE BLDV-MCNC: >16 MMOL/L — CRITICAL HIGH (ref 0.5–2)
LACTATE SERPL-SCNC: 21.6 MMOL/L — CRITICAL HIGH (ref 0.7–2)
LACTATE SERPL-SCNC: 27.1 MMOL/L — CRITICAL HIGH (ref 0.7–2)
LEUKOCYTE ESTERASE UR-ACNC: NEGATIVE — SIGNIFICANT CHANGE UP
LIDOCAIN IGE QN: 27 U/L — SIGNIFICANT CHANGE UP (ref 7–60)
LYMPHOCYTES # BLD AUTO: 0.77 K/UL — LOW (ref 1.2–3.4)
LYMPHOCYTES # BLD AUTO: 7.7 % — LOW (ref 20.5–51.1)
MAGNESIUM SERPL-MCNC: 2.5 MG/DL — HIGH (ref 1.8–2.4)
MCHC RBC-ENTMCNC: 33.6 G/DL — SIGNIFICANT CHANGE UP (ref 32–37)
MCHC RBC-ENTMCNC: 36.7 PG — HIGH (ref 27–31)
MCV RBC AUTO: 109.3 FL — HIGH (ref 80–94)
MONOCYTES # BLD AUTO: 0.4 K/UL — SIGNIFICANT CHANGE UP (ref 0.1–0.6)
MONOCYTES NFR BLD AUTO: 4 % — SIGNIFICANT CHANGE UP (ref 1.7–9.3)
MRSA PCR RESULT.: NEGATIVE — SIGNIFICANT CHANGE UP
NEUTROPHILS # BLD AUTO: 8.73 K/UL — HIGH (ref 1.4–6.5)
NEUTROPHILS NFR BLD AUTO: 86.9 % — HIGH (ref 42.2–75.2)
NITRITE UR-MCNC: NEGATIVE — SIGNIFICANT CHANGE UP
NRBC BLD AUTO-RTO: 0 /100 WBCS — SIGNIFICANT CHANGE UP (ref 0–0)
OSMOLALITY SERPL: 340 MOS/KG — HIGH (ref 280–301)
OSMOLALITY UR: 375 MOS/KG — SIGNIFICANT CHANGE UP (ref 50–1200)
PCO2 BLDV: 28 MMHG — LOW (ref 42–55)
PCO2 BLDV: 29 MMHG — LOW (ref 42–55)
PCO2 BLDV: 30 MMHG — LOW (ref 42–55)
PCP SPEC-MCNC: SIGNIFICANT CHANGE UP
PCP SPEC-MCNC: SIGNIFICANT CHANGE UP
PH BLDV: 7.05 — CRITICAL LOW (ref 7.32–7.43)
PH BLDV: 7.14 — CRITICAL LOW (ref 7.32–7.43)
PH BLDV: 7.14 — CRITICAL LOW (ref 7.32–7.43)
PH UR: 5.5 — SIGNIFICANT CHANGE UP (ref 5–8)
PLATELET # BLD AUTO: 106 K/UL — LOW (ref 130–400)
PMV BLD: 11.8 FL — HIGH (ref 7.4–10.4)
PO2 BLDV: 51 MMHG — HIGH (ref 25–45)
PO2 BLDV: 55 MMHG — HIGH (ref 25–45)
PO2 BLDV: 68 MMHG — HIGH (ref 25–45)
POTASSIUM BLDV-SCNC: 4.5 MMOL/L — SIGNIFICANT CHANGE UP (ref 3.5–5.1)
POTASSIUM BLDV-SCNC: 4.9 MMOL/L — SIGNIFICANT CHANGE UP (ref 3.5–5.1)
POTASSIUM BLDV-SCNC: 5.6 MMOL/L — HIGH (ref 3.5–5.1)
POTASSIUM SERPL-MCNC: 4.4 MMOL/L — SIGNIFICANT CHANGE UP (ref 3.5–5)
POTASSIUM SERPL-MCNC: 5 MMOL/L — SIGNIFICANT CHANGE UP (ref 3.5–5)
POTASSIUM SERPL-MCNC: 5.3 MMOL/L — HIGH (ref 3.5–5)
POTASSIUM SERPL-MCNC: 5.9 MMOL/L — HIGH (ref 3.5–5)
POTASSIUM SERPL-SCNC: 4.4 MMOL/L — SIGNIFICANT CHANGE UP (ref 3.5–5)
POTASSIUM SERPL-SCNC: 5 MMOL/L — SIGNIFICANT CHANGE UP (ref 3.5–5)
POTASSIUM SERPL-SCNC: 5.3 MMOL/L — HIGH (ref 3.5–5)
POTASSIUM SERPL-SCNC: 5.9 MMOL/L — HIGH (ref 3.5–5)
POTASSIUM UR-SCNC: 43 MMOL/L — SIGNIFICANT CHANGE UP
PROT ?TM UR-MCNC: 22 MG/DL — SIGNIFICANT CHANGE UP
PROT SERPL-MCNC: 5.8 G/DL — LOW (ref 6–8)
PROT SERPL-MCNC: 7.1 G/DL — SIGNIFICANT CHANGE UP (ref 6–8)
PROT SERPL-MCNC: 7.4 G/DL — SIGNIFICANT CHANGE UP (ref 6–8)
PROT UR-MCNC: 30 MG/DL
PROT/CREAT UR-RTO: 0.3 RATIO — HIGH (ref 0–0.2)
PROTHROM AB SERPL-ACNC: 16.9 SEC — HIGH (ref 9.95–12.87)
RBC # BLD: 3.54 M/UL — LOW (ref 4.7–6.1)
RBC # FLD: 16.4 % — HIGH (ref 11.5–14.5)
RBC CASTS # UR COMP ASSIST: 1 /HPF — SIGNIFICANT CHANGE UP (ref 0–4)
SAO2 % BLDV: 65.8 % — LOW (ref 67–88)
SAO2 % BLDV: 72.7 % — SIGNIFICANT CHANGE UP (ref 67–88)
SAO2 % BLDV: 87.4 % — SIGNIFICANT CHANGE UP (ref 67–88)
SODIUM SERPL-SCNC: 136 MMOL/L — SIGNIFICANT CHANGE UP (ref 135–146)
SODIUM SERPL-SCNC: 141 MMOL/L — SIGNIFICANT CHANGE UP (ref 135–146)
SODIUM SERPL-SCNC: 143 MMOL/L — SIGNIFICANT CHANGE UP (ref 135–146)
SODIUM SERPL-SCNC: 144 MMOL/L — SIGNIFICANT CHANGE UP (ref 135–146)
SODIUM UR-SCNC: 41 MMOL/L — SIGNIFICANT CHANGE UP
SP GR SPEC: 1.02 — SIGNIFICANT CHANGE UP (ref 1–1.03)
SQUAMOUS # UR AUTO: 2 /HPF — SIGNIFICANT CHANGE UP (ref 0–5)
TROPONIN T, HIGH SENSITIVITY RESULT: 37 NG/L — HIGH (ref 6–21)
TROPONIN T, HIGH SENSITIVITY RESULT: 58 NG/L — CRITICAL HIGH (ref 6–21)
UROBILINOGEN FLD QL: 1 MG/DL — SIGNIFICANT CHANGE UP (ref 0.2–1)
UUN UR-MCNC: 124 MG/DL — SIGNIFICANT CHANGE UP
WBC # BLD: 10.04 K/UL — SIGNIFICANT CHANGE UP (ref 4.8–10.8)
WBC # FLD AUTO: 10.04 K/UL — SIGNIFICANT CHANGE UP (ref 4.8–10.8)
WBC UR QL: 0 /HPF — SIGNIFICANT CHANGE UP (ref 0–5)

## 2025-05-22 PROCEDURE — 70450 CT HEAD/BRAIN W/O DYE: CPT | Mod: 26

## 2025-05-22 PROCEDURE — 82010 KETONE BODYS QUAN: CPT

## 2025-05-22 PROCEDURE — 74177 CT ABD & PELVIS W/CONTRAST: CPT | Mod: 26

## 2025-05-22 PROCEDURE — 80346 BENZODIAZEPINES1-12: CPT

## 2025-05-22 PROCEDURE — 80074 ACUTE HEPATITIS PANEL: CPT

## 2025-05-22 PROCEDURE — 83010 ASSAY OF HAPTOGLOBIN QUANT: CPT

## 2025-05-22 PROCEDURE — 84484 ASSAY OF TROPONIN QUANT: CPT

## 2025-05-22 PROCEDURE — 83540 ASSAY OF IRON: CPT

## 2025-05-22 PROCEDURE — 83036 HEMOGLOBIN GLYCOSYLATED A1C: CPT

## 2025-05-22 PROCEDURE — 80375 DRUG/SUBSTANCE NOS 1-3: CPT

## 2025-05-22 PROCEDURE — 83935 ASSAY OF URINE OSMOLALITY: CPT

## 2025-05-22 PROCEDURE — 36415 COLL VENOUS BLD VENIPUNCTURE: CPT

## 2025-05-22 PROCEDURE — 82248 BILIRUBIN DIRECT: CPT

## 2025-05-22 PROCEDURE — 93306 TTE W/DOPPLER COMPLETE: CPT

## 2025-05-22 PROCEDURE — 85018 HEMOGLOBIN: CPT

## 2025-05-22 PROCEDURE — 84100 ASSAY OF PHOSPHORUS: CPT

## 2025-05-22 PROCEDURE — 85025 COMPLETE CBC W/AUTO DIFF WBC: CPT

## 2025-05-22 PROCEDURE — 82330 ASSAY OF CALCIUM: CPT

## 2025-05-22 PROCEDURE — 93306 TTE W/DOPPLER COMPLETE: CPT | Mod: 26

## 2025-05-22 PROCEDURE — 83930 ASSAY OF BLOOD OSMOLALITY: CPT

## 2025-05-22 PROCEDURE — 84133 ASSAY OF URINE POTASSIUM: CPT

## 2025-05-22 PROCEDURE — 87086 URINE CULTURE/COLONY COUNT: CPT

## 2025-05-22 PROCEDURE — 85014 HEMATOCRIT: CPT

## 2025-05-22 PROCEDURE — 83615 LACTATE (LD) (LDH) ENZYME: CPT

## 2025-05-22 PROCEDURE — 87340 HEPATITIS B SURFACE AG IA: CPT

## 2025-05-22 PROCEDURE — 80320 DRUG SCREEN QUANTALCOHOLS: CPT

## 2025-05-22 PROCEDURE — 82962 GLUCOSE BLOOD TEST: CPT

## 2025-05-22 PROCEDURE — 85045 AUTOMATED RETICULOCYTE COUNT: CPT

## 2025-05-22 PROCEDURE — 86850 RBC ANTIBODY SCREEN: CPT

## 2025-05-22 PROCEDURE — 87640 STAPH A DNA AMP PROBE: CPT

## 2025-05-22 PROCEDURE — 82550 ASSAY OF CK (CPK): CPT

## 2025-05-22 PROCEDURE — 81001 URINALYSIS AUTO W/SCOPE: CPT

## 2025-05-22 PROCEDURE — 86706 HEP B SURFACE ANTIBODY: CPT

## 2025-05-22 PROCEDURE — 97530 THERAPEUTIC ACTIVITIES: CPT | Mod: GP

## 2025-05-22 PROCEDURE — 71275 CT ANGIOGRAPHY CHEST: CPT | Mod: 26

## 2025-05-22 PROCEDURE — 71045 X-RAY EXAM CHEST 1 VIEW: CPT | Mod: 26

## 2025-05-22 PROCEDURE — 84300 ASSAY OF URINE SODIUM: CPT

## 2025-05-22 PROCEDURE — 76705 ECHO EXAM OF ABDOMEN: CPT

## 2025-05-22 PROCEDURE — 93970 EXTREMITY STUDY: CPT

## 2025-05-22 PROCEDURE — 86803 HEPATITIS C AB TEST: CPT

## 2025-05-22 PROCEDURE — 82570 ASSAY OF URINE CREATININE: CPT

## 2025-05-22 PROCEDURE — 82693 ASSAY OF ETHYLENE GLYCOL: CPT

## 2025-05-22 PROCEDURE — 84295 ASSAY OF SERUM SODIUM: CPT

## 2025-05-22 PROCEDURE — 83735 ASSAY OF MAGNESIUM: CPT

## 2025-05-22 PROCEDURE — 99291 CRITICAL CARE FIRST HOUR: CPT

## 2025-05-22 PROCEDURE — 85027 COMPLETE CBC AUTOMATED: CPT

## 2025-05-22 PROCEDURE — 80053 COMPREHEN METABOLIC PANEL: CPT

## 2025-05-22 PROCEDURE — 82140 ASSAY OF AMMONIA: CPT

## 2025-05-22 PROCEDURE — 87641 MR-STAPH DNA AMP PROBE: CPT

## 2025-05-22 PROCEDURE — 80321 ALCOHOLS BIOMARKERS 1OR 2: CPT

## 2025-05-22 PROCEDURE — 99223 1ST HOSP IP/OBS HIGH 75: CPT | Mod: 24,25

## 2025-05-22 PROCEDURE — 93970 EXTREMITY STUDY: CPT | Mod: 26

## 2025-05-22 PROCEDURE — 84156 ASSAY OF PROTEIN URINE: CPT

## 2025-05-22 PROCEDURE — 80307 DRUG TEST PRSMV CHEM ANLYZR: CPT

## 2025-05-22 PROCEDURE — 86704 HEP B CORE ANTIBODY TOTAL: CPT

## 2025-05-22 PROCEDURE — 82803 BLOOD GASES ANY COMBINATION: CPT

## 2025-05-22 PROCEDURE — 76705 ECHO EXAM OF ABDOMEN: CPT | Mod: 26

## 2025-05-22 PROCEDURE — 85730 THROMBOPLASTIN TIME PARTIAL: CPT

## 2025-05-22 PROCEDURE — 93010 ELECTROCARDIOGRAM REPORT: CPT | Mod: 76

## 2025-05-22 PROCEDURE — 82728 ASSAY OF FERRITIN: CPT

## 2025-05-22 PROCEDURE — 80048 BASIC METABOLIC PNL TOTAL CA: CPT

## 2025-05-22 PROCEDURE — 85049 AUTOMATED PLATELET COUNT: CPT

## 2025-05-22 PROCEDURE — 97116 GAIT TRAINING THERAPY: CPT | Mod: GP

## 2025-05-22 PROCEDURE — 97162 PT EVAL MOD COMPLEX 30 MIN: CPT | Mod: GP

## 2025-05-22 PROCEDURE — 85610 PROTHROMBIN TIME: CPT

## 2025-05-22 PROCEDURE — 90935 HEMODIALYSIS ONE EVALUATION: CPT

## 2025-05-22 PROCEDURE — 84540 ASSAY OF URINE/UREA-N: CPT

## 2025-05-22 PROCEDURE — 84132 ASSAY OF SERUM POTASSIUM: CPT

## 2025-05-22 PROCEDURE — 83550 IRON BINDING TEST: CPT

## 2025-05-22 PROCEDURE — 0241U: CPT

## 2025-05-22 PROCEDURE — 86901 BLOOD TYPING SEROLOGIC RH(D): CPT

## 2025-05-22 PROCEDURE — 80076 HEPATIC FUNCTION PANEL: CPT

## 2025-05-22 PROCEDURE — 86900 BLOOD TYPING SEROLOGIC ABO: CPT

## 2025-05-22 PROCEDURE — 83605 ASSAY OF LACTIC ACID: CPT

## 2025-05-22 PROCEDURE — 80354 DRUG SCREENING FENTANYL: CPT

## 2025-05-22 PROCEDURE — 82390 ASSAY OF CERULOPLASMIN: CPT

## 2025-05-22 RX ORDER — FOLIC ACID 1 MG/1
1 TABLET ORAL DAILY
Refills: 0 | Status: DISCONTINUED | OUTPATIENT
Start: 2025-05-22 | End: 2025-05-30

## 2025-05-22 RX ORDER — SODIUM BICARBONATE 1 MEQ/ML
0.29 SYRINGE (ML) INTRAVENOUS
Qty: 150 | Refills: 0 | Status: DISCONTINUED | OUTPATIENT
Start: 2025-05-22 | End: 2025-05-23

## 2025-05-22 RX ORDER — PIPERACILLIN-TAZO-DEXTROSE,ISO 3.375G/5
3.38 IV SOLUTION, PIGGYBACK PREMIX FROZEN(ML) INTRAVENOUS ONCE
Refills: 0 | Status: COMPLETED | OUTPATIENT
Start: 2025-05-23 | End: 2025-05-23

## 2025-05-22 RX ORDER — PIPERACILLIN-TAZO-DEXTROSE,ISO 3.375G/5
3.38 IV SOLUTION, PIGGYBACK PREMIX FROZEN(ML) INTRAVENOUS ONCE
Refills: 0 | Status: COMPLETED | OUTPATIENT
Start: 2025-05-22 | End: 2025-05-22

## 2025-05-22 RX ORDER — CYANOCOBALAMIN 1000 UG/ML
1000 INJECTION INTRAMUSCULAR; SUBCUTANEOUS DAILY
Refills: 0 | Status: DISCONTINUED | OUTPATIENT
Start: 2025-05-22 | End: 2025-05-30

## 2025-05-22 RX ORDER — MAGNESIUM, ALUMINUM HYDROXIDE 200-200 MG
30 TABLET,CHEWABLE ORAL ONCE
Refills: 0 | Status: COMPLETED | OUTPATIENT
Start: 2025-05-22 | End: 2025-05-22

## 2025-05-22 RX ORDER — SENNA 187 MG
2 TABLET ORAL AT BEDTIME
Refills: 0 | Status: DISCONTINUED | OUTPATIENT
Start: 2025-05-22 | End: 2025-05-30

## 2025-05-22 RX ORDER — SODIUM CHLORIDE 9 G/1000ML
1000 INJECTION, SOLUTION INTRAVENOUS
Refills: 0 | Status: DISCONTINUED | OUTPATIENT
Start: 2025-05-22 | End: 2025-05-22

## 2025-05-22 RX ORDER — TAMSULOSIN HYDROCHLORIDE 0.4 MG/1
0.4 CAPSULE ORAL AT BEDTIME
Refills: 0 | Status: DISCONTINUED | OUTPATIENT
Start: 2025-05-22 | End: 2025-05-30

## 2025-05-22 RX ORDER — FOMEPIZOLE 1 G/ML
1050 INJECTION, SOLUTION INTRAVENOUS ONCE
Refills: 0 | Status: COMPLETED | OUTPATIENT
Start: 2025-05-22 | End: 2025-05-22

## 2025-05-22 RX ORDER — GABAPENTIN 400 MG/1
200 CAPSULE ORAL AT BEDTIME
Refills: 0 | Status: DISCONTINUED | OUTPATIENT
Start: 2025-05-22 | End: 2025-05-30

## 2025-05-22 RX ORDER — SODIUM CHLORIDE 9 G/1000ML
1000 INJECTION, SOLUTION INTRAVENOUS
Refills: 0 | Status: DISCONTINUED | OUTPATIENT
Start: 2025-05-22 | End: 2025-05-30

## 2025-05-22 RX ORDER — FOMEPIZOLE 1 G/ML
700 INJECTION, SOLUTION INTRAVENOUS EVERY 12 HOURS
Refills: 0 | Status: DISCONTINUED | OUTPATIENT
Start: 2025-05-23 | End: 2025-05-23

## 2025-05-22 RX ORDER — COLCHICINE 0.6 MG/1
0.6 TABLET, FILM COATED ORAL
Refills: 0 | Status: DISCONTINUED | OUTPATIENT
Start: 2025-05-22 | End: 2025-05-22

## 2025-05-22 RX ORDER — DEXTROSE 50 % IN WATER 50 %
15 SYRINGE (ML) INTRAVENOUS ONCE
Refills: 0 | Status: DISCONTINUED | OUTPATIENT
Start: 2025-05-22 | End: 2025-05-30

## 2025-05-22 RX ORDER — ONDANSETRON HCL/PF 4 MG/2 ML
4 VIAL (ML) INJECTION ONCE
Refills: 0 | Status: COMPLETED | OUTPATIENT
Start: 2025-05-22 | End: 2025-05-22

## 2025-05-22 RX ORDER — PYRIDOXINE HCL (VITAMIN B6) 25 MG
50 TABLET ORAL EVERY 12 HOURS
Refills: 0 | Status: DISCONTINUED | OUTPATIENT
Start: 2025-05-22 | End: 2025-05-30

## 2025-05-22 RX ORDER — B1/B2/B3/B5/B6/B12/VIT C/FOLIC 500-0.5 MG
1 TABLET ORAL DAILY
Refills: 0 | Status: DISCONTINUED | OUTPATIENT
Start: 2025-05-22 | End: 2025-05-30

## 2025-05-22 RX ORDER — METOPROLOL SUCCINATE 50 MG/1
25 TABLET, EXTENDED RELEASE ORAL DAILY
Refills: 0 | Status: DISCONTINUED | OUTPATIENT
Start: 2025-05-22 | End: 2025-05-23

## 2025-05-22 RX ORDER — FOMEPIZOLE 1 G/ML
15 INJECTION, SOLUTION INTRAVENOUS ONCE
Refills: 0 | Status: DISCONTINUED | OUTPATIENT
Start: 2025-05-22 | End: 2025-05-22

## 2025-05-22 RX ORDER — NOREPINEPHRINE BITARTRATE 8 MG
0.05 SOLUTION INTRAVENOUS
Qty: 8 | Refills: 0 | Status: DISCONTINUED | OUTPATIENT
Start: 2025-05-22 | End: 2025-05-24

## 2025-05-22 RX ORDER — PIPERACILLIN-TAZO-DEXTROSE,ISO 3.375G/5
3.38 IV SOLUTION, PIGGYBACK PREMIX FROZEN(ML) INTRAVENOUS EVERY 8 HOURS
Refills: 0 | Status: DISCONTINUED | OUTPATIENT
Start: 2025-05-23 | End: 2025-05-23

## 2025-05-22 RX ORDER — SODIUM BICARBONATE 1 MEQ/ML
2.14 SYRINGE (ML) INTRAVENOUS
Qty: 150 | Refills: 0 | Status: DISCONTINUED | OUTPATIENT
Start: 2025-05-22 | End: 2025-05-25

## 2025-05-22 RX ORDER — CYANOCOBALAMIN 1000 UG/ML
1 INJECTION INTRAMUSCULAR; SUBCUTANEOUS
Refills: 0 | DISCHARGE

## 2025-05-22 RX ORDER — MAGNESIUM OXIDE 400 MG
400 TABLET ORAL DAILY
Refills: 0 | Status: DISCONTINUED | OUTPATIENT
Start: 2025-05-22 | End: 2025-05-30

## 2025-05-22 RX ORDER — HEPARIN SODIUM 1000 [USP'U]/ML
5000 INJECTION INTRAVENOUS; SUBCUTANEOUS EVERY 8 HOURS
Refills: 0 | Status: DISCONTINUED | OUTPATIENT
Start: 2025-05-22 | End: 2025-05-25

## 2025-05-22 RX ORDER — LORAZEPAM 4 MG/ML
4 VIAL (ML) INJECTION
Refills: 0 | Status: DISCONTINUED | OUTPATIENT
Start: 2025-05-22 | End: 2025-05-24

## 2025-05-22 RX ORDER — NALTREXONE HYDROCHLORIDE 50 MG/1
50 TABLET, FILM COATED ORAL DAILY
Refills: 0 | Status: DISCONTINUED | OUTPATIENT
Start: 2025-05-22 | End: 2025-05-30

## 2025-05-22 RX ORDER — GABAPENTIN 400 MG/1
200 CAPSULE ORAL
Refills: 0 | Status: DISCONTINUED | OUTPATIENT
Start: 2025-05-22 | End: 2025-05-22

## 2025-05-22 RX ORDER — INSULIN LISPRO 100 U/ML
INJECTION, SOLUTION INTRAVENOUS; SUBCUTANEOUS EVERY 6 HOURS
Refills: 0 | Status: DISCONTINUED | OUTPATIENT
Start: 2025-05-22 | End: 2025-05-30

## 2025-05-22 RX ORDER — METHOCARBAMOL 500 MG/1
500 TABLET, FILM COATED ORAL
Refills: 0 | Status: DISCONTINUED | OUTPATIENT
Start: 2025-05-22 | End: 2025-05-30

## 2025-05-22 RX ORDER — NOREPINEPHRINE BITARTRATE 8 MG
0.05 SOLUTION INTRAVENOUS
Qty: 16 | Refills: 0 | Status: DISCONTINUED | OUTPATIENT
Start: 2025-05-22 | End: 2025-05-22

## 2025-05-22 RX ORDER — FOMEPIZOLE 1 G/ML
10 INJECTION, SOLUTION INTRAVENOUS EVERY 4 HOURS
Refills: 0 | Status: DISCONTINUED | OUTPATIENT
Start: 2025-05-22 | End: 2025-05-22

## 2025-05-22 RX ORDER — DIAZEPAM 5 MG/1
5 TABLET ORAL ONCE
Refills: 0 | Status: DISCONTINUED | OUTPATIENT
Start: 2025-05-22 | End: 2025-05-22

## 2025-05-22 RX ORDER — POLYETHYLENE GLYCOL 3350 17 G/17G
17 POWDER, FOR SOLUTION ORAL DAILY
Refills: 0 | Status: DISCONTINUED | OUTPATIENT
Start: 2025-05-22 | End: 2025-05-30

## 2025-05-22 RX ORDER — LORAZEPAM 4 MG/ML
2 VIAL (ML) INJECTION
Refills: 0 | Status: DISCONTINUED | OUTPATIENT
Start: 2025-05-22 | End: 2025-05-24

## 2025-05-22 RX ORDER — GABAPENTIN 400 MG/1
1 CAPSULE ORAL
Refills: 0 | DISCHARGE

## 2025-05-22 RX ADMIN — Medication 105 MILLIGRAM(S): at 21:49

## 2025-05-22 RX ADMIN — Medication 150 MEQ/KG/HR: at 09:19

## 2025-05-22 RX ADMIN — Medication 1000 MILLILITER(S): at 05:42

## 2025-05-22 RX ADMIN — HEPARIN SODIUM 5000 UNIT(S): 1000 INJECTION INTRAVENOUS; SUBCUTANEOUS at 15:11

## 2025-05-22 RX ADMIN — HEPARIN SODIUM 5000 UNIT(S): 1000 INJECTION INTRAVENOUS; SUBCUTANEOUS at 21:50

## 2025-05-22 RX ADMIN — GABAPENTIN 200 MILLIGRAM(S): 400 CAPSULE ORAL at 21:50

## 2025-05-22 RX ADMIN — METOPROLOL SUCCINATE 25 MILLIGRAM(S): 50 TABLET, EXTENDED RELEASE ORAL at 11:28

## 2025-05-22 RX ADMIN — Medication 40 MILLIGRAM(S): at 11:28

## 2025-05-22 RX ADMIN — Medication 30 MILLILITER(S): at 11:27

## 2025-05-22 RX ADMIN — Medication 1000 MEQ/KG/HR: at 14:37

## 2025-05-22 RX ADMIN — Medication 10 MILLIGRAM(S): at 12:09

## 2025-05-22 RX ADMIN — Medication 25 GRAM(S): at 19:46

## 2025-05-22 RX ADMIN — METHOCARBAMOL 500 MILLIGRAM(S): 500 TABLET, FILM COATED ORAL at 18:16

## 2025-05-22 RX ADMIN — INSULIN LISPRO 4: 100 INJECTION, SOLUTION INTRAVENOUS; SUBCUTANEOUS at 23:18

## 2025-05-22 RX ADMIN — Medication 4 MILLIGRAM(S): at 04:59

## 2025-05-22 RX ADMIN — Medication 1 TABLET(S): at 11:28

## 2025-05-22 RX ADMIN — CYANOCOBALAMIN 1000 MICROGRAM(S): 1000 INJECTION INTRAMUSCULAR; SUBCUTANEOUS at 11:28

## 2025-05-22 RX ADMIN — DIAZEPAM 5 MILLIGRAM(S): 5 TABLET ORAL at 08:24

## 2025-05-22 RX ADMIN — FOMEPIZOLE 202.1 MILLIGRAM(S): 1 INJECTION, SOLUTION INTRAVENOUS at 15:20

## 2025-05-22 RX ADMIN — Medication 200 MILLIGRAM(S): at 05:45

## 2025-05-22 RX ADMIN — NOREPINEPHRINE BITARTRATE 6.57 MICROGRAM(S)/KG/MIN: 8 SOLUTION at 16:35

## 2025-05-22 RX ADMIN — POLYETHYLENE GLYCOL 3350 17 GRAM(S): 17 POWDER, FOR SOLUTION ORAL at 11:27

## 2025-05-22 RX ADMIN — INSULIN LISPRO 4: 100 INJECTION, SOLUTION INTRAVENOUS; SUBCUTANEOUS at 18:21

## 2025-05-22 RX ADMIN — Medication 400 MILLIGRAM(S): at 11:28

## 2025-05-22 RX ADMIN — Medication 105 MILLIGRAM(S): at 16:11

## 2025-05-22 RX ADMIN — INSULIN LISPRO 2: 100 INJECTION, SOLUTION INTRAVENOUS; SUBCUTANEOUS at 12:23

## 2025-05-22 RX ADMIN — Medication 2 MILLIGRAM(S): at 14:58

## 2025-05-22 RX ADMIN — FOLIC ACID 1 MILLIGRAM(S): 1 TABLET ORAL at 11:28

## 2025-05-22 RX ADMIN — Medication 2 TABLET(S): at 21:49

## 2025-05-22 RX ADMIN — TAMSULOSIN HYDROCHLORIDE 0.4 MILLIGRAM(S): 0.4 CAPSULE ORAL at 21:49

## 2025-05-22 RX ADMIN — Medication 50 MILLIGRAM(S): at 18:16

## 2025-05-22 RX ADMIN — Medication 20 MILLIGRAM(S): at 04:59

## 2025-05-22 RX ADMIN — Medication 1 APPLICATION(S): at 21:50

## 2025-05-22 RX ADMIN — NALTREXONE HYDROCHLORIDE 50 MILLIGRAM(S): 50 TABLET, FILM COATED ORAL at 12:09

## 2025-05-22 RX ADMIN — Medication 200 GRAM(S): at 17:16

## 2025-05-22 NOTE — PHARMACOTHERAPY INTERVENTION NOTE - COMMENTS
ANDRZEJ iHD, d/w team to evaluate colchicine 0.6mg q12h (pseudogout), hold for now  -gabapentin 200mg po q12h, d/w team, recommended changing to 200mg hs
toxicology consult, initiating fomepizole
CAT ALSTON 64y Male    Assessed patient medications for high-risk medications:    Benzodiazepines  Opioids  High-anticholinergic medications  Sedatives/ sleep medications  Muscle relaxants  Tricyclic antidepressants  Antipsychotics    cetirizine 10 milliGRAM(s) Oral daily  chlorhexidine 2% Cloths 1 Application(s) Topical <User Schedule>  cyanocobalamin 1000 MICROGram(s) Oral daily  dextrose 5%. 1000 milliLiter(s) IV Continuous <Continuous>  dextrose Oral Gel 15 Gram(s) Oral once PRN  folic acid 1 milliGRAM(s) Oral daily  fomepizole IVPB 1050 milliGRAM(s) IV Intermittent once  gabapentin 200 milliGRAM(s) Oral at bedtime  heparin   Injectable 5000 Unit(s) SubCutaneous every 8 hours  insulin lispro (ADMELOG) corrective regimen sliding scale   SubCutaneous every 6 hours  LORazepam     Tablet 2 milliGRAM(s) Oral every 2 hours PRN  LORazepam     Tablet 4 milliGRAM(s) Oral every 2 hours PRN  magnesium oxide 400 milliGRAM(s) Oral daily  methocarbamol 500 milliGRAM(s) Oral <User Schedule>  metoprolol succinate ER 25 milliGRAM(s) Oral daily  multivitamin 1 Tablet(s) Oral daily  naltrexone 50 milliGRAM(s) Oral daily  norepinephrine Infusion 0.05 MICROgram(s)/kG/Min IV Continuous <Continuous>  pantoprazole    Tablet 40 milliGRAM(s) Oral before breakfast  piperacillin/tazobactam IVPB. 3.375 Gram(s) IV Intermittent once  piperacillin/tazobactam IVPB.- 3.375 Gram(s) IV Intermittent once  polyethylene glycol 3350 17 Gram(s) Oral daily  pyridoxine 50 milliGRAM(s) Oral every 12 hours  senna 2 Tablet(s) Oral at bedtime  sodium bicarbonate  Infusion 0.292 mEq/kG/Hr IV Continuous <Continuous>  sodium bicarbonate  Infusion 2.14 mEq/kG/Hr IV Continuous <Continuous>  tamsulosin 0.4 milliGRAM(s) Oral at bedtime  thiamine IVPB 500 milliGRAM(s) IV Intermittent every 8 hours      [  x  ] Team informed of high risk medications   [     ] None of the above medications identified.   [      ] Discussed with prescriber and no interventions at this time    Discussed with prescriber, if appropriate, to consider:   [      ]HOLDING   [   x  ]REDUCING-gabapentin to 200mg hs  [   x  ]CONTINUE- methocarbamol, home med, controlled, no TAMRA, will monitor  -lorazepam for CIWA, will monitor  
levophed 16 mg/250 ml, peripheral access, d/w team to change to 8 mg/250 ml
IVF D5-NS bolus & 150 ml/hr, pt receiving D5 + 150meq sodium bicarb bolus 1L & maintenance @ 150 ml/hr, d/w med team to clarify IVF regimen, d/c D5-NS
fomepizole 15mg IV x1, d/w team, 15mg/kg, recommended 1050mg IV x1 stat
pt received fomepizole 1050mg IV post-HD @ 15:30, recommended 700mg (10mg/kg) IV q12h x4 doses start 5/23 03:00,  med team will follow up w/ toxicology

## 2025-05-22 NOTE — H&P ADULT - HISTORY OF PRESENT ILLNESS
63 y/o male with a PMHx of alcohol use disorder, presenting to the ED after 1 day of chest pain nausea/vomiting     Patient stated that, yesterday morning, he started vomiting, which continued all day long and got worse, so had come to ED for evaluation. Patient is c/o chest pain, started after vomiting several times. Denies trauma. Denies HA, but is c/o intermittent episodes of lightheadedness. Denies sob/abd pain. Denies  symptoms. Patient is a poor historian and is hard of hearing. Patient could not provide any further details on his health/illness, and most of the information is obtained from reviewing his previous admission/discharge summaries. No trauma. Patient stated that, he drinks alcohol regularly and last drink was yesterday. Denies f/c/diarrhea. Denies OD on medications. Denies drinking toxic alcohols recently (Methanol/ethylene glycol related products).  Denies OD on Metformin. Denies h/o Liver cirrhosis. Denies urinary symptoms and rash. NO travel.     In the ED:  - Vitals: T HR BP SpO2 RR  - Labs: WBC Hgb PLT Na K Cr  - Imaging: 64-year-old male with history of alcohol abuse, DVT/PE, hypertension, high cholesterol, diabetes, gout brought in by ambulance from [facility]'s residence complaining of chest discomfort/palpitations after drinking 1 pint of vodka this morning. Admitted for alcoholic lactic acidosis which resolved.      63 y/o male with a PMHx of alcohol use disorder, presenting to the ED after 1 day of chest pain nausea/vomiting     Patient stated that, yesterday morning, he started vomiting, which continued all day long and got worse, so had come to ED for evaluation. Patient is c/o chest pain, started after vomiting several times. Denies trauma. Denies HA, but is c/o intermittent episodes of lightheadedness. Denies sob/abd pain. Denies  symptoms. Patient is a poor historian and is hard of hearing. Patient could not provide any further details on his health/illness, and most of the information is obtained from reviewing his previous admission/discharge summaries. No trauma. Patient stated that, he drinks alcohol regularly and last drink was yesterday. Denies f/c/diarrhea. Denies OD on medications. Denies drinking toxic alcohols recently (Methanol/ethylene glycol related products).  Denies OD on Metformin. Denies h/o Liver cirrhosis. Denies urinary symptoms and rash. NO travel.     In the ED:  - Vitals: T 37.2  /70 SpO2 96% RR 18  - Labs: WBC Hgb PLT Na K Cr  - Imaging: 63 y/o male with a PMHx of alcohol use disorder, chronic DVT/PE, HTN, DLD, Diabetes, gout brought in by ambulance from [facility] residence presenting to the ED after 1 day of chest pain nausea/vomiting     Patient stated that, yesterday morning, he started vomiting, which continued all day long and got worse, so had come to ED for evaluation. Patient is c/o chest pain, started after vomiting several times. Denies trauma. Denies HA, but is c/o intermittent episodes of lightheadedness. Denies sob/abd pain. Denies  symptoms. Patient is a poor historian and is hard of hearing. Patient could not provide any further details on his health/illness, and most of the information is obtained from reviewing his previous admission/discharge summaries. No trauma. Patient stated that, he drinks alcohol regularly and last drink was yesterday. Denies f/c/diarrhea. Denies OD on medications. Denies drinking toxic alcohols recently (Methanol/ethylene glycol related products).  Denies OD on Metformin.  Denies urinary symptoms, reports that he hasn't made urine in two days.  NO travel, no rash    In the ED:  - Vitals: T 37.2  /70 SpO2 96% RR 18  - Labs: WBC 10.04 Hgb 13.0   Na 141 K 5.9 (hemolyzed, repeat 4.7)  Cr 1.6 CO2 8 AG 38 Lactate 21.6   - Imaging:  _ CT Head: No evidence of acute intracranial pathology.  _  CT Chest and Abdomen: No evidence of central pulmonary embolus. Apparent generalized colonic wall thickening unclear if on the basis of  underdistention or representing colitis.

## 2025-05-22 NOTE — PHARMACOTHERAPY INTERVENTION NOTE - INTERVENTION TYPE RECOOMEND
Dose Adjustment - Renal Dose
Dose Adjustment - Renal Dose
Therapy duplication
Dose Adjustment - Renal Dose

## 2025-05-22 NOTE — ED PROVIDER NOTE - PHYSICAL EXAMINATION
Patient is awake, alert, answering questions appropriately. Patient vomiting actively on arrival to ED.   ?coffee ground vomitus.   Patient appears comfortable and appears not in distress.   Lungs: CTA, no wheezing, no crackles.  Abd: +BS, +mild RLQ tenderness to deep palpation, ND, soft, no hernias noted.   Back: No swelling, no redness, no midline vertebral column tenderness, no saddle anasthesia, distally NVI.  CNS: awake, alert, o x 3, no focal neurologic deficits.

## 2025-05-22 NOTE — PATIENT PROFILE ADULT - FALL HARM RISK - HARM RISK INTERVENTIONS
Assistance with ambulation/Assistance OOB with selected safe patient handling equipment/Communicate Risk of Fall with Harm to all staff/Monitor for mental status changes/Monitor gait and stability/Reinforce activity limits and safety measures with patient and family/Tailored Fall Risk Interventions/Toileting schedule using arm’s reach rule for commode and bathroom/Use of alarms - bed, chair and/or voice tab/Visual Cue: Yellow wristband and red socks/Bed in lowest position, wheels locked, appropriate side rails in place/Call bell, personal items and telephone in reach/Instruct patient to call for assistance before getting out of bed or chair/Non-slip footwear when patient is out of bed/Duff to call system/Physically safe environment - no spills, clutter or unnecessary equipment/Purposeful Proactive Rounding/Room/bathroom lighting operational, light cord in reach

## 2025-05-22 NOTE — ED PROVIDER NOTE - PROGRESS NOTE DETAILS
Patient labs reviewed and ICU Fellow on call consulted and is approved to ICU. I spoke with ICU resident and transferred the care, agreed to f/u the CT scan results, when available.   Patient is awake, alert, appears comfortable, answering questions with a smile. Discussed with ICU; will hold off on abx at this  time, since patient does not have fever or WBC count. Patient is tachycardic and has elevated Lactate, but has no s/s of infection at this time.

## 2025-05-22 NOTE — H&P ADULT - ASSESSMENT
IMPRESSION:      PLAN:    CNS:   avoid oversedation     HEENT: oral care    PULMONARY: HOB 45 degrees, incentive joon, NIV PRN and QHS, wean o2 as tolerated goal >88%, CXR noted, duonebs PRN, ABG now    CARDIOVASCULAR: avoid volume overload, ECHO, Rosemarie, BNP, Keep MAP> 65    GI: GI prophylaxis. NPO while on NIV, aspiration precautions    RENAL: HD per renal, Bladder scan, still makes some urine, monitor lytes and correct as needed     INFECTIOUS DISEASE: cefepime, vanc, UA, Ucx, BCX, MRSA swab, RVP, procal    HEMATOLOGICAL:  DVT prophylaxis, monitor H&H, dimer    ENDOCRINE:  Follow up FS.  Insulin protocol if needed.    MUSCULOSKELETAL: AAT, PT/OT    MICU for now   IMPRESSION:  Lactic acidosis - JENNIFER vs Alcoholic lactic acidosis  High anion gap metabolic acidosis  Colitis  Diabetes  HTN/ DLD  Chronic DVT/PE - not on anticoagulation    PLAN:    CNS:  avoid oversedation. CIWA symptom-triggered    HEENT: oral care    PULMONARY: HOB 45 degrees, incentive joon,  CT chest noted, ABG now    CARDIOVASCULAR: avoid volume overload, ECHO, Rosemarie, BNP, Keep MAP> 65    GI: GI prophylaxis. NPO while on NIV, aspiration precautions    RENAL: HD per renal, Bladder scan, still makes some urine, monitor lytes and correct as needed     INFECTIOUS DISEASE: cefepime, vanc, UA, Ucx, BCX, MRSA swab, RVP, procal    HEMATOLOGICAL:  DVT prophylaxis, monitor H&H, dimer    ENDOCRINE:  Follow up FS.  Insulin protocol if needed.    MUSCULOSKELETAL: AAT, PT/OT    MICU for now   IMPRESSION:  Lactic acidosis - JENNIFER vs Alcoholic lactic acidosis  High anion gap metabolic acidosis  Colitis  Diabetes  HTN/ DLD  Chronic DVT/PE - not on anticoagulation    PLAN:    CNS:  avoid oversedation. CIWA symptom-triggered    HEENT: oral care    PULMONARY: HOB 45 degrees, incentive joon,  CT chest noted, ABG now    CARDIOVASCULAR: avoid volume overload, ECHO, Rosemarie, BNP, Keep MAP> 65    GI: GI prophylaxis. NPO for now. Surgery recs appreciated    RENAL: HD per renal for acidosis, Bladder scan, monitor lytes and correct as needed     INFECTIOUS DISEASE: F/U B Cx    HEMATOLOGICAL:  DVT prophylaxis, monitor H&H,      ENDOCRINE:  Follow up FS.  Insulin protocol if needed.    MUSCULOSKELETAL: AAT, PT/OT    MICU    IMPRESSION:  Lactic acidosis - JENNIFER vs Alcoholic lactic acidosis  High anion gap metabolic acidosis  Colitis  Diabetes  HTN/ DLD  Chronic DVT/PE - not on anticoagulation    PLAN:    CNS:  avoid oversedation. CIWA symptom-triggered, c/w Naltrexone    HEENT: oral care    PULMONARY: HOB 45 degrees, incentive joon,  CT chest noted, ABG now    CARDIOVASCULAR: resuscitation with bicarb 150 cc/hr, trending trops  Keep MAP> 65    GI: GI prophylaxis. NPO for now. Surgery recs appreciated    RENAL: HD per renal for acidosis, Bladder scan, monitor lytes and correct as needed     INFECTIOUS DISEASE: F/U B Cx    HEMATOLOGICAL:  DVT prophylaxis, monitor H&H,      ENDOCRINE:  Follow up FS.  Insulin protocol if needed.    MUSCULOSKELETAL: AAT, PT/OT    MICU

## 2025-05-22 NOTE — CONSULT NOTE ADULT - ASSESSMENT
Toxicology being consulted for elevation osmolar gap, elevated anion gap metabolic acidosis, lactic acidosis. Pt is currently on Metformin with ANDRZEJ. Pt also has long standing alcohol use disorder and was reported to have drank vodka, he reports he bought and not homemade. Pt is s/p 1 dose of Fomepizole 15 mg/kg and 1 round of HD. Currently receiving sodium bicarbonate therapy. Presentation could be likely from toxic alcohol versus metformin associated lactic acidosis versus alcoholic/starvation ketoacidosis.     Recommendations  - Supportive care, continue MICU evaluation and management   - Recommend dextrose bolus followed by dextrose infusion at 150 cc's/hr to treat AKA.   - Recommend 500 mg IV Thiamine, 1 mg IV Folic acid   - Please repeat beta-hydroxybutyrate, CMP, serum osmolarity, serum ethanol, blood gas with lactic acid to determine if patient needs further HD or further fomepizole  - Further medical and/or psychiatric care per primary team    Thank you for involving us in the care of this patient. Assessment and plan discussed with toxicology attending Dr. Josemanuel Willams. Please do not hesitate to reach out to the toxicology team for any further questions or concerns.    The On-Call Toxicology Fellow can be reached 24/7 via Pager #[phone]  Please send a 10 digit call back # as [place] cover multiple hospitals    Boom Taylor MD  Toxicology Fellow  PGY-5 Toxicology being consulted for elevation osmolar gap, elevated anion gap metabolic acidosis, lactic acidosis. Pt is currently on Metformin with ANDRZEJ. Pt also has long standing alcohol use disorder and was reported to have drank vodka, he reports he bought and not homemade. Pt is s/p 1 dose of Fomepizole 15 mg/kg and 1 round of HD. Currently receiving sodium bicarbonate therapy. Presentation could be likely from toxic alcohol versus metformin associated lactic acidosis versus alcoholic/starvation ketoacidosis.     Recommendations  - Supportive care, continue MICU evaluation and management   - Recommend dextrose bolus followed by dextrose infusion at 150 cc's/hr to treat AKA.   - Recommend 500 mg IV Thiamine, 1 mg IV Folic acid   - Please repeat beta-hydroxybutyrate, CMP, serum osmolarity, serum ethanol, blood gas with lactic acid to determine if patient needs further HD or further fomepizole  - Further medical and/or psychiatric care per primary team    Thank you for involving us in the care of this patient. Assessment and plan discussed with toxicology attending Dr. Josemanuel Willams. Please do not hesitate to reach out to the toxicology team for any further questions or concerns.    The On-Call Toxicology Fellow can be reached 24/7 via Pager #[phone]  Please send a 10 digit call back # as [place] cover multiple hospitals  Boom Taylor MD  Toxicology Fellow  PGY-5    Medical Toxicology Attending Attestation Note - Josemanuel Willams MD  I have discussed the plan of care for this patient. I have made amendments to the documentation where necessary, and agree with history, and plan as documented by the fellow.   Presented with an elevated osm gap, AG metabolic acidosis and elevated lactate, hx of cirrhosis, alcohol use disorder, candy emeds reviwed, on colchizine adn metformin  Received HD for presumed toxic alcohol vs JENNIFER  continue fomepizole, will reassess need based on repeat osm and anion gap post dialysis.   Lacate elevation multifactorial, can not rule out thimaine deficiency form AUD, metformin toxicity 2/2 ANDRZEJ, ethylene glycol ingestion, ischemic etiology. No evidence of leukocytosis and less liekly colchicine overdose.   Supportive care per MICU  Transaminitis - please start NAC due to benefit noted in non APAP induced transaminitis, can not rule out ischemic vs alcohol hepatitis, Please obtain APAP levels and CPK  thank you for the consult, will continue to follow. Please call with quetsions.

## 2025-05-22 NOTE — PATIENT PROFILE ADULT - NSPRESCRALCAMT_GEN_A_NUR
5 or 6 Solaraze Pregnancy And Lactation Text: This medication is Pregnancy Category B and is considered safe. There is some data to suggest avoiding during the third trimester. It is unknown if this medication is excreted in breast milk.

## 2025-05-22 NOTE — ED PROVIDER NOTE - OBJECTIVE STATEMENT
Patient is c/o nausea/vomiting x one day. Patient stated that, yesterday morning, he started vomiting, which continued all day long and got worse, so had come to ED for evaluation. Patient is c/o chest pain, started after vomiting several times. Denies trauma. Denies HA, but is c/o intermittent episodes of lightheadedness. Denies sob/abd pain. Denies  symptoms. Patient is a poor historian and is hard of hearing. Patient could not provide any further details on his health/illness, and most of the information is obtained from reviewing his previous admission/discharge summaries. No trauma. Patient stated that, he drinks alcohol regularly and last drink was yesterday. Denies f/c/diarrhea. Denies OD on medications. Denies drinking toxic alcohols recently (Methanol/ethylene glycol related products).  Denies OD on Metformin. Denies h/o Liver cirrhosis. Denies urinary symptoms and rash. NO travel. Patient is c/o nausea/vomiting and chest pain.

## 2025-05-22 NOTE — H&P ADULT - NSHPPHYSICALEXAM_GEN_ALL_CORE
Patient is awake, alert, answering questions appropriately. Patient vomiting actively on arrival to ED.   	?coffee ground vomitus.   	Patient appears comfortable and appears not in distress.   	Lungs: CTA, no wheezing, no crackles.  	Abd: +BS, +mild RLQ tenderness to deep palpation, ND, soft, no hernias noted.   	Back: No swelling, no redness, no midline vertebral column tenderness, no saddle anasthesia, distally NVI.  CNS: awake, alert, o x 3, no focal neurologic deficits. Patient is awake, alert, answering questions appropriately.     Patient appears comfortable and appears not in distress.   Cardiac: Tachycardic, normal S1 and S2, no audible murmurs  Lungs: CTA, no wheezing, no crackles.  Abd: +BS, +mild RLQ tenderness to deep palpation, ND, soft, no hernias noted.   Back: No swelling, no redness, no midline vertebral column tenderness,    CNS: awake, alert, o x 3, no focal neurologic deficits.    Vital Signs Last 24 Hrs  T(C): 35.9 (22 May 2025 10:19), Max: 37.2 (22 May 2025 04:05)  T(F): 96.7 (22 May 2025 10:19), Max: 99 (22 May 2025 04:05)  HR: 135 (22 May 2025 10:19) (127 - 135)  BP: 134/90 (22 May 2025 10:19) (120/57 - 145/63)  BP(mean): 105 (22 May 2025 10:19) (105 - 105)  RR: 20 (22 May 2025 10:19) (18 - 20)  SpO2: 98% (22 May 2025 10:19) (96% - 98%)    Parameters below as of 22 May 2025 10:19  Patient On (Oxygen Delivery Method): room air

## 2025-05-22 NOTE — ED PROVIDER NOTE - EKG/XRAY ADDITIONAL INFORMATION
Chest X-rays reviewed and interpreted by me Dr. Grant and shows no actue findings. No Pneumothorax, no free air, no effusions, and these findings discussed with patient.  EKG shows sinus tachycardia, no significant ST changes noted and no STEMI.

## 2025-05-22 NOTE — CONSULT NOTE ADULT - SUBJECTIVE AND OBJECTIVE BOX
GENERAL SURGERY CONSULT NOTE    Patient: CAT ALSTON , 64y ([birth year])Male   MRN: [id number]  Location: [address]  Visit: 05-22-25 Inpatient  Date: 05-22-25 @ 11:30    HPI:  65 y/o male with a PMHx of alcohol use disorder, chronic DVT/PE, HTN, DLD, Diabetes, gout brought in by ambulance from [facility] presenting to the ED after 1 day of chest pain nausea/vomiting. Patient stated that, yesterday morning, he started vomiting, which continued all day long and got worse, so had come to ED for evaluation. Patient is c/o chest pain, started after vomiting several times. Denies trauma. Denies HA, but is c/o intermittent episodes of lightheadedness. Denies sob/abd pain. Denies  symptoms. Patient is a poor historian and is hard of hearing. Patient could not provide any further details on his health/illness, and most of the information is obtained from reviewing his previous admission/discharge summaries. No trauma. Patient stated that, he drinks alcohol regularly and last drink was yesterday. Denies f/c/diarrhea. Denies OD on medications. Denies drinking toxic alcohols recently (Methanol/ethylene glycol related products).  Denies OD on Metformin.  Denies urinary symptoms, reports that he hasn't made urine in two days.  NO travel, no rash    In the ED:  - Vitals: T 37.2  /70 SpO2 96% RR 18  - Labs: WBC 10.04 Hgb 13.0   Na 141 K 5.9 (hemolyzed, repeat 4.7)  Cr 1.6 CO2 8 AG 38 Lactate 21.6   - Imaging:  _ CT Head: No evidence of acute intracranial pathology.  _  CT Chest and Abdomen: No evidence of central pulmonary embolus. Apparent generalized colonic wall thickening unclear if on the basis of  underdistention or representing colitis.    General surgery consulted for findings on CT AP consistent with generalized colonic wall thickening unclear if on the basis of underdistention or representing colitis. Pt abdomen soft, mildly tender with palpation, non distended. Reports passing gas and bm and denies nausea, vomiting or bloody BM. Of note pt has reducible umbilical hernia. Of note pt has not had a colonoscopy in the past. In the ED Vitals: T 37.2  /70 SpO2 96% RR 18, Labs: WBC 10.04 Hgb 13.0   Na 141 K 5.9 (hemolyzed, repeat 4.7)  Cr 1.6 CO2 8 AG 38 Lactate 21.6.        PAST MEDICAL & SURGICAL HISTORY:  Alcohol dependence      HTN (hypertension)      Hard of hearing      Seasonal allergies      BPH (benign prostatic hyperplasia)      GERD (gastroesophageal reflux disease)      Pseudogout      History of deep vein thrombosis (DVT) of lower extremity      No significant past surgical history          Home Medications:  Claritin 10 mg oral tablet: 1 tab(s) orally once a day (13 Apr 2025 11:57)  colchicine 0.6 mg oral tablet: 1 tab(s) orally 2 times a day (13 Apr 2025 11:57)  cyanocobalamin 1000 mcg oral tablet: 1 tab(s) orally once a day (22 May 2025 10:48)  folic acid 1 mg oral tablet: 1 tab(s) orally once a day (13 Apr 2025 12:02)  Jardiance 10 mg oral tablet: 1 tab(s) orally once a day (13 Apr 2025 11:57)  methocarbamol 500 mg oral tablet: 1 orally once a day (09 Nov 2024 16:30)  Protonix 40 mg oral delayed release tablet: 1 tab(s) orally once a day (13 Apr 2025 11:57)        VITALS:  T(F): 96.7 (05-22-25 @ 10:19), Max: 99 (05-22-25 @ 04:05)  HR: 135 (05-22-25 @ 10:19) (127 - 135)  BP: 134/90 (05-22-25 @ 10:19) (120/57 - 145/63)  RR: 20 (05-22-25 @ 10:19) (18 - 20)  SpO2: 98% (05-22-25 @ 10:19) (96% - 98%)    PHYSICAL EXAM:  General: NAD, AAOx3, calm and cooperative  HEENT: NCAT, SUNNY, EOMI, Trachea ML, Neck supple  Cardiac: RRR S1, S2, no Murmurs, rubs or gallops  Respiratory: CTAB, normal respiratory effort, breath sounds equal BL, no wheeze, rhonchi or crackles  Abdomen: Soft, non-distended, non-tender, no rebound, no guarding. +BS.  Rectal: Good tone, +stool, no blood, no lisa-anal masses/lesions, no fistulas, fissures, hemorrhoids  Musculoskeletal: Strength 5/5 BL UE/LE, ROM intact, compartments soft  Neuro: Sensation grossly intact and equal throughout, no focal deficits  Vascular: Pulses 2+ throughout, extremities well perfused  Skin: Warm/dry, normal color, no jaundice  Incision/wound: healing well, dressings in place, clean, dry and intact    MEDICATIONS  (STANDING):  cetirizine 10 milliGRAM(s) Oral daily  chlorhexidine 2% Cloths 1 Application(s) Topical <User Schedule>  colchicine 0.6 milliGRAM(s) Oral two times a day  cyanocobalamin 1000 MICROGram(s) Oral daily  dextrose 5%. 1000 milliLiter(s) (50 mL/Hr) IV Continuous <Continuous>  folic acid 1 milliGRAM(s) Oral daily  gabapentin 200 milliGRAM(s) Oral two times a day  heparin   Injectable 5000 Unit(s) SubCutaneous every 8 hours  insulin lispro (ADMELOG) corrective regimen sliding scale   SubCutaneous every 6 hours  magnesium oxide 400 milliGRAM(s) Oral daily  methocarbamol 500 milliGRAM(s) Oral <User Schedule>  metoprolol succinate ER 25 milliGRAM(s) Oral daily  multivitamin 1 Tablet(s) Oral daily  naltrexone 50 milliGRAM(s) Oral daily  pantoprazole    Tablet 40 milliGRAM(s) Oral before breakfast  polyethylene glycol 3350 17 Gram(s) Oral daily  senna 2 Tablet(s) Oral at bedtime  sodium bicarbonate  Infusion 0.292 mEq/kG/Hr (150 mL/Hr) IV Continuous <Continuous>  tamsulosin 0.4 milliGRAM(s) Oral at bedtime  thiamine IVPB 500 milliGRAM(s) IV Intermittent every 8 hours    MEDICATIONS  (PRN):  dextrose Oral Gel 15 Gram(s) Oral once PRN Blood Glucose LESS THAN 70 milliGRAM(s)/deciliter  LORazepam     Tablet 2 milliGRAM(s) Oral every 2 hours PRN CIWA 8-14  LORazepam     Tablet 4 milliGRAM(s) Oral every 2 hours PRN CIWA 15 or greater, or seizure      LAB/STUDIES:                        13.0   10.04 )-----------( 106      ( 22 May 2025 04:50 )             38.7     05-22    143  |  96[L]  |  5[L]  ----------------------------<  108[H]  4.4   |  8[LL]  |  1.7[H]    Ca    9.4      22 May 2025 06:20  Mg     2.5     05-22    TPro  7.1  /  Alb  3.8  /  TBili  3.0[H]  /  DBili  1.6[H]  /  AST  71[H]  /  ALT  22  /  AlkPhos  158[H]  05-22    PT/INR - ( 22 May 2025 04:50 )   PT: 16.90 sec;   INR: 1.42 ratio         PTT - ( 22 May 2025 04:50 )  PTT:40.3 sec  LIVER FUNCTIONS - ( 22 May 2025 06:20 )  Alb: 3.8 g/dL / Pro: 7.1 g/dL / ALK PHOS: 158 U/L / ALT: 22 U/L / AST: 71 U/L / GGT: x           Urinalysis Basic - ( 22 May 2025 06:20 )    Color: x / Appearance: x / SG: x / pH: x  Gluc: 108 mg/dL / Ketone: x  / Bili: x / Urobili: x   Blood: x / Protein: x / Nitrite: x   Leuk Esterase: x / RBC: x / WBC x   Sq Epi: x / Non Sq Epi: x / Bacteria: x                  IMAGING:      ACCESS DEVICES:  [ ] Peripheral IV  [ ] Central Venous Line	[ ] R	[ ] L	[ ] IJ	[ ] Fem	[ ] SC	Placed:   [ ] Arterial Line		[ ] R	[ ] L	[ ] Fem	[ ] Rad	[ ] Ax	Placed:   [ ] PICC:					[ ] Mediport  [ ] Urinary Catheter, Date Placed:     ASSESSMENT:  65 y/o male with a PMHx of alcohol use disorder, chronic DVT/PE, HTN, DLD, Diabetes, gout brought in by ambulance from [facility]'s residence presenting to the ED after 1 day of chest pain nausea/vomiting. Patient stated that, yesterday morning, he started vomiting, which continued all day long and got worse, so had come to ED for evaluation. Patient is c/o chest pain, started after vomiting several times. Denies trauma. Denies HA, but is c/o intermittent episodes of lightheadedness. Denies sob/abd pain. Denies  symptoms. Patient is a poor historian and is hard of hearing. Patient could not provide any further details on his health/illness, and most of the information is obtained from reviewing his previous admission/discharge summaries. No trauma. Patient stated that, he drinks alcohol regularly and last drink was yesterday. Denies f/c/diarrhea. Denies OD on medications. Denies drinking toxic alcohols recently (Methanol/ethylene glycol related products).  Denies OD on Metformin.  Denies urinary symptoms, reports that he hasn't made urine in two days.  NO travel, no rash.     General surgery consulted for findings on CT AP consistent with generalized colonic wall thickening unclear if on the basis of underdistention or representing colitis. Pt abdomen soft, mildly tender with palpation, non distended. Reports passing gas and bm and denies nausea, vomiting or bloody BM. Of note pt has reducible umbilical hernia. Of note pt has never had a colonoscopy. In the ED Vitals: T 37.2  /70 SpO2 96% RR 18, Labs: WBC 10.04 Hgb 13.0   Na 141 K 5.9 (hemolyzed, repeat 4.7)  Cr 1.6 CO2 8 AG 38 Lactate 21.6.      PLAN:  - no acute surgical intervention  - aggressive resuscitation   - Recommend Will for strict I&O monitoring   - Trend LFTs   - Trend Lactate  - recommend RUQ US for evaluation of Liver  - recommend Colonoscopy as outpatient as patient has never had a colonoscopy  - surgery to follow    Lines/Tubes: PIV, Midline, Central Line, A-Line, Chest tubes, Warren/ZITA drains, Will Catheter.    Above plan discussed with Attending Surgeon Dr. Givens , patient, patient family, and Primary team  05-22-25 @ 11:30

## 2025-05-22 NOTE — ED ADULT TRIAGE NOTE - CHIEF COMPLAINT QUOTE
BIBA from [facility] for chest discomfort x1 day also c/o n/v. Hx of etoh, last drink yesterday BIBA from [facility] for chest discomfort x1 day also c/o n/v. Hx of etoh, last drink yesterday.  in triage

## 2025-05-22 NOTE — H&P ADULT - NSHPLABSRESULTS_GEN_ALL_CORE
13.0   10.04 )-----------( 106      ( 22 May 2025 04:50 )             38.7       05-22    143  |  96[L]  |  5[L]  ----------------------------<  108[H]  4.4   |  8[LL]  |  1.7[H]    Ca    9.4      22 May 2025 06:20  Mg     2.5     05-22    TPro  7.1  /  Alb  3.8  /  TBili  3.0[H]  /  DBili  1.6[H]  /  AST  71[H]  /  ALT  22  /  AlkPhos  158[H]  05-22 13.0   10.04 )-----------( 106      ( 22 May 2025 04:50 )             38.7       05-22    143  |  96[L]  |  5[L]  ----------------------------<  108[H]  4.4   |  8[LL]  |  1.7[H]    Ca    9.4      22 May 2025 06:20  Mg     2.5     05-22    TPro  7.1  /  Alb  3.8  /  TBili  3.0[H]  /  DBili  1.6[H]  /  AST  71[H]  /  ALT  22  /  AlkPhos  158[H]  05-22      CT CHEST ABDOMEN    CHEST:    PULMONARY EMBOLUS: Limited evaluation due to motion. No evidence of central pulmonary embolus..    LUNGS/PLEURA/AIRWAYS: Limited evaluation dueto motion. Atelectatic changes. No pulmonary consolidation pleural effusion or pneumothorax..    MEDIASTINUM/THORACIC NODES: No lymphadenopathy..    HEART/GREAT VESSELS: Coronary and aortic calcifications. No pericardial effusion..    ABDOMEN/PELVIS:    HEPATOBILIARY: Hepatic steatosis. Cholelithiasis..    SPLEEN: Unremarkable.    PANCREAS: Unremarkable.    ADRENAL GLANDS: Unremarkable.    KIDNEYS: No hydronephrosis..    ABDOMINOPELVIC NODES: Unremarkable.    PELVIC ORGANS: Distended bladder.    PERITONEUM/MESENTERY/BOWEL: Apparent generalized colonic wall thickening unclear if on the basis of underdistention or representing colitis.   Colonic diverticulosis. No evidence of bowel obstruction free air or fluid collection..    BONES/SOFT TISSUES: [Right rib deformity. Osseous degenerative changes..    OTHER: Atherosclerotic calcifications.    IMPRESSION:    No evidence of central pulmonary embolus.    Apparent generalized colonic wall thickening unclear if on the basis of underdistention or representing colitis.    See body of the report for further findings.    --- End of Report ---

## 2025-05-22 NOTE — ED ADULT NURSE REASSESSMENT NOTE - NS ED NURSE REASSESS COMMENT FT1
pt awake in bed. tele monitor on board an din place. No overnight events. Call bell within reach. No overnight events. Went to vascular pt awake in bed. tele monitor on board an din place. No overnight events. Call bell within reach. No overnight events. Went to vascular. No c/o N/V

## 2025-05-22 NOTE — PROCEDURAL SAFETY CHECKLIST WITH OR WITHOUT SEDATION - NSSIDESITEMARKD_GEN_ALL_CORE
January 1, 2024       Janette Baker MD  [address]  [address] [address]  Via Fax: [phone]      Patient: Dulce Andrade   YOB: 2016   Date of Visit: 12/28/2023       Dear Dr. Baker:    I saw your patient, Dulce Andrade, for an evaluation. Below are my notes for this visit with her.    If you have questions, please do not hesitate to call me.      Sincerely,        Valeria Garcia MD        CC: No Recipients  Valeria Garcia MD  1/1/2024  3:00 PM  Signed    PEDIATRIC NEUROLOGY   Dulce Andrade is a 7 year old female who is being evaluated via live interactive two-way video.    The patient has been informed that their consent to treat includes permission to submit claims to their insurance on their behalf for the services received.  Clinic Location: [address]    Patient Location: home  she was accompanied by Mother and Father    A comprehensive physical exam could not be performed due to the limitations of telehealth.  Results should be interpreted accordingly.    Patient Name: Dulce Andrade   MRN: [id number]    Date of Birth / Age / Sex: 2016 / 7 year old / female   Encounter Date: 12/28/23      I am seeing Dulce Andrade today in consultation for autism at the request of Janette Baker MD.    Age:      7 year old      Chief Complaint:    Chief Complaint   Patient presents with   • Autism   • Office Visit   • Follow-up            12/28/23    Since last visit, she has continued to see speech therapy with no change in speech. She continues with [facility] as well. When she speaks it seems she tries to move her lips but can't get words out. She was unable to get EEG as there was an insurance concern.     History of Present Illness:   Dulce Andrade is a 7 year old girl who presents today for evaluation of autism and speech delay. Parents report that initial symptoms started at around 1yo and included a loss of  speech where she was initially saying mama and aleksey, yum yum for food, and hello and much more interactive and social. Around this time, she stopped making eye contact and being interactive and she also lost words. She was diagnosed with autism around this time. At this time, she is in [facility] and gets speech therapy. She also has hippotherapy, pool-based therapy, PT, and OT. Regarding the autism evaluation, she has had a routine EEG previously due to regression that was normal and captured a small amount of sleep. An MRI was ordered because of macrocephaly and autism but not done yet. She has had improvements in her understanding but continues not to talk and parents want to know if there is anything else that can be done. Genetic testing was sent in recently.      Development:   Gross motor: walks up and downs stairs, runs  Fine motor:  potty trained, feeds with spoon/fork   Language: understanding seems much better now,   Social: claps hands, stereotypies but no hand ringing    Review of Laboratory Data, Images and Studies, and Medical Records:  Laboratory data, images and diagnostic studies and medical records were reviewed and relevant information is discussed in the assessment and plan.    Family History:             Developmental delay   None     Learning disabilities   None     Migraine headache   None     Epilepsy    None     Neuromuscular disease   None     Movement disorders   None     Early stroke (under age 40 years) None  Family History   Problem Relation Age of Onset   • Patient is unaware of any medical problems Mother    • Heart disease Father         aortic dissection surgery   • Kidney disease Neg Hx        Physical Examination:  Vital signs:  Visit Vitals  Ht 4' 7.51\" (1.41 m)   Wt (!) 43.2 kg (95 lb 4 oz)   BMI 21.73 kg/m²     Head circumference  57 cm  HC percentile No head circumference on file for this encounter.    General appearance:   Alert, well-looking  Dysmorphisms, Asymmetries:  No  dysmorphic features and no asymmetries  Skin, Spine, Skull:   No hypo- or hyperpigmented macules       Normal spine and skull  Ears, Nose, Mouth, Throat:  No nasal discharge  Neurologic:  Behavior, Mental status:  Interactive and cooperative       Hand stereotypies seen, nonverbal   Cranial nerves: II:  SHARA. Normal vision     III, IV, VI: Normal EOM, no ptosis     V:  Normal facial sensation     VII:  No facial weakness     VIII:  Normal hearing     IX, X:  Normal swallowing and phonation     XI:  Normal shoulder shrug     XII:  Tongue protrudes in midline  Motor:  Normal muscle bulk, strength and tone in arms and legs    No tremor or dystonia in arms or legs    Normal gait  Coordination: Normal reach for objects   Reflexes:   Right Left   Biceps:  2+ 2+   Triceps:  2+ 2+   Brachioradialis: 2+ 2+   Patella:   2+ 2+   Achilles tendon: 2+ 2+   Plantars:  Flexor Flexor  Sensory: Normal light touch, vibration sense             Birth History  Vaginal   Full Term        Past Medical History:   Negative for traumatic brain injury, concussion, CNS infection, episodes of unexplained alteration in consciousness or loss of consciousness  Past Medical History:   Diagnosis Date   • Non-verbal learning disorder           Past Surgical History: No past surgical history on file.         Medications:   No current outpatient medications on file.     No current facility-administered medications for this visit.        Assessment:     Dulce Andrade is a 7 year old who presents for follow up for care for autism and regression along with macrocephaly. Parents with concerns about continued speech problem and want to know how they can help her speak considering her regression. Discussed that obtaining a longer period of sleep in an EEG would be helpful to determine if ESES is contributing to symptoms as she previously had words and has not regained them for years. Patient also with macrocephaly along with this regression. Parents both  report they have large heads so expect that this may be familial but given the extent of the speech regression along with these findings, MRI would be warranted. Reviewed possibility of speech apraxia and resources sent to family on evaluation previously.     PLAN:  We reviewed our impressions regarding the diagnosis of ASD with the family, and discussed some of the possible causes of ASDs and common misconceptions about causes of ASD. Discussed the spectrum of autism and the importance of setting reasonable goals for the future while appreciating Dulce's strengths.   1-2 hr EEG planned to capture longer period of sleep, if unable to capture with routine eeg, will consider 48 hour eeg though parents concerned about Dulce keeping leads on at home. Family to reach out if continued issues with insurance  MRI brain without contrast for macrocephaly along with speech concerns   Speech apraxia resources provided   Neuropsych referral for assessment of nonverbal IQ   Reviewed with family that if they do not feel she is making progress with [facility], could try partial day with school with IEP along with [facility] for the other half of the day.   Trial melatonin 1-2mg at bedtime for sleep concerns with early rising   Discussed following up to monitor progress. Please bring updated school and therapy progress reports to this visit.    - Follow-up in 3-4 months      On 12/28/23 I spent 40 minutes on this encounter, including time spent in review of records, direct communication with the patient and family, coordination of care and documentation.      Valeria Garcia MD  Pediatric Neurology  [facility]      done

## 2025-05-22 NOTE — ED PROVIDER NOTE - NSCAREINITIATED _GEN_ER
AS NEEDED FOR ANXIETY Neeru Grant(Attending)

## 2025-05-22 NOTE — CONSULT NOTE ADULT - SUBJECTIVE AND OBJECTIVE BOX
Patient is a 64y old  Male who presents with a chief complaint of Chest pain (22 May 2025 10:16)      HPI:  63 y/o male with a PMHx of alcohol use disorder, chronic DVT/PE, HTN, DLD, Diabetes, gout brought in by ambulance from [facility] presenting to the ED after 1 day of chest pain nausea/vomiting     Patient stated that, yesterday morning, he started vomiting, which continued all day long and got worse, so had come to ED for evaluation. Patient is c/o chest pain, started after vomiting several times. Denies trauma. Denies HA, but is c/o intermittent episodes of lightheadedness. Denies sob/abd pain. Denies  symptoms. Patient is a poor historian and is hard of hearing. Patient could not provide any further details on his health/illness, and most of the information is obtained from reviewing his previous admission/discharge summaries. No trauma. Patient stated that, he drinks alcohol regularly and last drink was yesterday. Denies f/c/diarrhea. Denies OD on medications. Denies drinking toxic alcohols recently (Methanol/ethylene glycol related products).  Denies OD on Metformin.  Denies urinary symptoms, reports that he hasn't made urine in two days.  NO travel, no rash    In the ED:  - Vitals: T 37.2  /70 SpO2 96% RR 18  - Labs: WBC 10.04 Hgb 13.0   Na 141 K 5.9 (hemolyzed, repeat 4.7)  Cr 1.6 CO2 8 AG 38 Lactate 21.6   - Imaging:  _ CT Head: No evidence of acute intracranial pathology.  _  CT Chest and Abdomen: No evidence of central pulmonary embolus. Apparent generalized colonic wall thickening unclear if on the basis of  underdistention or representing colitis.   (22 May 2025 09:55)      PAST MEDICAL & SURGICAL HISTORY:  Alcohol dependence      HTN (hypertension)      Hard of hearing      Seasonal allergies      BPH (benign prostatic hyperplasia)      GERD (gastroesophageal reflux disease)      Pseudogout      History of deep vein thrombosis (DVT) of lower extremity      No significant past surgical history          SOCIAL HX:   Smoking                         ETOH                            Other    FAMILY HISTORY:  Family history of gastric cancer  Mom    Family hx of lung cancer  Smoker      :  No known cardiovacular family hisotry     Review Of Systems:     All ROS are negative except per HPI       Allergies    shellfish (Hives)  No Known Drug Allergies  Beef (Hives)  dairy products (Hives)    Intolerances          PHYSICAL EXAM    ICU Vital Signs Last 24 Hrs  T(C): 35.9 (22 May 2025 10:19), Max: 37.2 (22 May 2025 04:05)  T(F): 96.7 (22 May 2025 10:19), Max: 99 (22 May 2025 04:05)  HR: 135 (22 May 2025 10:19) (127 - 135)  BP: 134/90 (22 May 2025 10:19) (120/57 - 145/63)  BP(mean): 105 (22 May 2025 10:19) (105 - 105)  ABP: --  ABP(mean): --  RR: 20 (22 May 2025 10:19) (18 - 20)  SpO2: 98% (22 May 2025 10:19) (96% - 98%)    O2 Parameters below as of 22 May 2025 10:19  Patient On (Oxygen Delivery Method): room air              ENT: Airway patent,  EYES: Pupils equal, Round and reactive to light.  CARDIAC: Normal rate, Regular rhythm.    RESPIRATORY: No wheezing, Bilateral BS, Not tachypneic, No use of accessory muscles  GASTROINTESTINAL: Abdomen soft, Non-tender, No guarding,   NEUROLOGICAL: Alert and oriented   SKIN: Skin normal color for race, Warm and dry and intact.       05-22-25 @ 07:01  -  05-22-25 @ 11:39  --------------------------------------------------------  IN:    Sodium Bicarbonate: 150 mL  Total IN: 150 mL    OUT:  Total OUT: 0 mL    Total NET: 150 mL          LABS:                          13.0   10.04 )-----------( 106      ( 22 May 2025 04:50 )             38.7                                               05-22    143  |  96[L]  |  5[L]  ----------------------------<  108[H]  4.4   |  8[LL]  |  1.7[H]    Ca    9.4      22 May 2025 06:20  Mg     2.5     05-22    TPro  7.1  /  Alb  3.8  /  TBili  3.0[H]  /  DBili  1.6[H]  /  AST  71[H]  /  ALT  22  /  AlkPhos  158[H]  05-22      PT/INR - ( 22 May 2025 04:50 )   PT: 16.90 sec;   INR: 1.42 ratio         PTT - ( 22 May 2025 04:50 )  PTT:40.3 sec                                       Urinalysis Basic - ( 22 May 2025 06:20 )    Color: x / Appearance: x / SG: x / pH: x  Gluc: 108 mg/dL / Ketone: x  / Bili: x / Urobili: x   Blood: x / Protein: x / Nitrite: x   Leuk Esterase: x / RBC: x / WBC x   Sq Epi: x / Non Sq Epi: x / Bacteria: x                                                  LIVER FUNCTIONS - ( 22 May 2025 06:20 )  Alb: 3.8 g/dL / Pro: 7.1 g/dL / ALK PHOS: 158 U/L / ALT: 22 U/L / AST: 71 U/L / GGT: x                                                                                                                                   ABG - ( 22 May 2025 11:23 )  pH, Arterial: 7.13  pH, Blood: x     /  pCO2: <19   /  pO2: 112   / HCO3: 5     / Base Excess: -21.8 /  SaO2: 99.0                  MEDICATIONS  (STANDING):  cetirizine 10 milliGRAM(s) Oral daily  chlorhexidine 2% Cloths 1 Application(s) Topical <User Schedule>  colchicine 0.6 milliGRAM(s) Oral two times a day  cyanocobalamin 1000 MICROGram(s) Oral daily  dextrose 5%. 1000 milliLiter(s) (50 mL/Hr) IV Continuous <Continuous>  folic acid 1 milliGRAM(s) Oral daily  gabapentin 200 milliGRAM(s) Oral two times a day  heparin   Injectable 5000 Unit(s) SubCutaneous every 8 hours  insulin lispro (ADMELOG) corrective regimen sliding scale   SubCutaneous every 6 hours  magnesium oxide 400 milliGRAM(s) Oral daily  methocarbamol 500 milliGRAM(s) Oral <User Schedule>  metoprolol succinate ER 25 milliGRAM(s) Oral daily  multivitamin 1 Tablet(s) Oral daily  naltrexone 50 milliGRAM(s) Oral daily  pantoprazole    Tablet 40 milliGRAM(s) Oral before breakfast  polyethylene glycol 3350 17 Gram(s) Oral daily  senna 2 Tablet(s) Oral at bedtime  sodium bicarbonate  Infusion 0.292 mEq/kG/Hr (150 mL/Hr) IV Continuous <Continuous>  tamsulosin 0.4 milliGRAM(s) Oral at bedtime  thiamine IVPB 500 milliGRAM(s) IV Intermittent every 8 hours    MEDICATIONS  (PRN):  dextrose Oral Gel 15 Gram(s) Oral once PRN Blood Glucose LESS THAN 70 milliGRAM(s)/deciliter  LORazepam     Tablet 2 milliGRAM(s) Oral every 2 hours PRN CIWA 8-14  LORazepam     Tablet 4 milliGRAM(s) Oral every 2 hours PRN CIWA 15 or greater, or seizure

## 2025-05-22 NOTE — CONSULT NOTE ADULT - ASSESSMENT
IMPRESSION:    Severe lactic acidosis / possible JENNIFER  Generalized colonic wall thickening, possible colitis   HO alcohol use disorder,   HO chronic DVT/PE,   HO HTN, DLD, Diabetes,   HO gout    PLAN    CNS: Avoid sedation. Pain control. CIWA monitoring, Benzo PRN for withdrawal, c/w folate, thiamine, MVI, Urine and Serum drug screen.     HEENT: Oral care    PULMONARY: HOB @ 45 degrees, aspiration precautions. On RA.     CARDIAC: ECHO in 3/25 EF 60%.  oD5 Bicarb at 150 cc/hr. Off levophed.  MAP is adequate >65.    GASTROENTEROLOGY:  NPO for now. surgery eval. serial abd exams    RENAL: Nephro on board. will need to start RRT  check metformin level  checl serum osmolarity if high osmolar gap will need fomepizole    INFECTIOUS: FU cultures. Zosyn for now    HEMATOLOGY: DVT ppx. Monitor CBC and Coags.    ENDOCRINOLOGY: Check FS, insulin protocol as needed.    MUSCULOSKELETAL: Bed rest    DISPOSITION: MICU   IMPRESSION:    Severe lactic acidosis / possible JENNIFER  Generalized colonic wall thickening, possible colitis   HO alcohol use disorder   HO chronic DVT/PE,   HO HTN, DLD, Diabetes,   HO gout    PLAN    CNS: Avoid sedation. Pain control. CIWA monitoring, Benzo PRN for withdrawal, c/w folate, thiamine, MVI, Urine and Serum drug screen.     HEENT: Oral care    PULMONARY: HOB @ 45 degrees, aspiration precautions. On RA. Gas noted; severe metabolic acidosis. Repeat after HD.     CARDIAC: ECHO in 3/25 EF 60%.  D5 Bicarb at 150 cc/hr for now. Off levophed.  MAP is adequate >65.    GASTROENTEROLOGY:  NPO for now. Oral diet once clinically improved. RUQ sono, Trend LFTs.    RENAL: Will need RRT; suspect metformin toxicity. Check level. D5w with bicarb infusion for now.     INFECTIOUS: FU cultures. Zosyn for now.     HEMATOLOGY: Duplex with chronic DVTs in the popliteals. No PE on CTA.  Monitor CBC and Coags.    ENDOCRINOLOGY: Check FS, insulin protocol as needed.    MUSCULOSKELETAL: Bed rest    DISPOSITION: MICU

## 2025-05-22 NOTE — CONSULT NOTE ADULT - SUBJECTIVE AND OBJECTIVE BOX
MEDICAL TOXICOLOGY CONSULT    HPI:  65 y/o male with a PMHx of alcohol use disorder, chronic DVT/PE, HTN, DLD, Diabetes, gout brought in by ambulance from [facility] presenting to the ED after 1 day of chest pain nausea/vomiting     Patient stated that, yesterday morning, he started vomiting, which continued all day long and got worse, so had come to ED for evaluation. Patient is c/o chest pain, started after vomiting several times. Denies trauma. Denies HA, but is c/o intermittent episodes of lightheadedness. Denies sob/abd pain. Denies  symptoms. Patient is a poor historian and is hard of hearing. Patient could not provide any further details on his health/illness, and most of the information is obtained from reviewing his previous admission/discharge summaries. No trauma. Patient stated that, he drinks alcohol regularly and last drink was yesterday. Denies f/c/diarrhea. Denies OD on medications. Denies drinking toxic alcohols recently (Methanol/ethylene glycol related products).  Denies OD on Metformin.  Denies urinary symptoms, reports that he hasn't made urine in two days.  NO travel, no rash    In the ED:  - Vitals: T 37.2  /70 SpO2 96% RR 18  - Labs: WBC 10.04 Hgb 13.0   Na 141 K 5.9 (hemolyzed, repeat 4.7)  Cr 1.6 CO2 8 AG 38 Lactate 21.6   - Imaging:  _ CT Head: No evidence of acute intracranial pathology.  _  CT Chest and Abdomen: No evidence of central pulmonary embolus. Apparent generalized colonic wall thickening unclear if on the basis of  underdistention or representing colitis.        PAST MEDICAL & SURGICAL HISTORY:  Alcohol dependence      HTN (hypertension)      Hard of hearing      Seasonal allergies      BPH (benign prostatic hyperplasia)      GERD (gastroesophageal reflux disease)      Pseudogout      History of deep vein thrombosis (DVT) of lower extremity      No significant past surgical history          MEDICATION HISTORY:  cetirizine 10 milliGRAM(s) Oral daily  chlorhexidine 2% Cloths 1 Application(s) Topical <User Schedule>  cyanocobalamin 1000 MICROGram(s) Oral daily  dextrose 5%. 1000 milliLiter(s) IV Continuous <Continuous>  dextrose Oral Gel 15 Gram(s) Oral once PRN  folic acid 1 milliGRAM(s) Oral daily  fomepizole IVPB 700 milliGRAM(s) IV Intermittent every 12 hours  gabapentin 200 milliGRAM(s) Oral at bedtime  heparin   Injectable 5000 Unit(s) SubCutaneous every 8 hours  insulin lispro (ADMELOG) corrective regimen sliding scale   SubCutaneous every 6 hours  LORazepam     Tablet 2 milliGRAM(s) Oral every 2 hours PRN  LORazepam     Tablet 4 milliGRAM(s) Oral every 2 hours PRN  magnesium oxide 400 milliGRAM(s) Oral daily  methocarbamol 500 milliGRAM(s) Oral <User Schedule>  metoprolol succinate ER 25 milliGRAM(s) Oral daily  multivitamin 1 Tablet(s) Oral daily  naltrexone 50 milliGRAM(s) Oral daily  norepinephrine Infusion 0.05 MICROgram(s)/kG/Min IV Continuous <Continuous>  pantoprazole    Tablet 40 milliGRAM(s) Oral before breakfast  piperacillin/tazobactam IVPB.- 3.375 Gram(s) IV Intermittent once  piperacillin/tazobactam IVPB.. 3.375 Gram(s) IV Intermittent every 8 hours  polyethylene glycol 3350 17 Gram(s) Oral daily  pyridoxine 50 milliGRAM(s) Oral every 12 hours  senna 2 Tablet(s) Oral at bedtime  sodium bicarbonate  Infusion 0.292 mEq/kG/Hr IV Continuous <Continuous>  sodium bicarbonate  Infusion 2.14 mEq/kG/Hr IV Continuous <Continuous>  tamsulosin 0.4 milliGRAM(s) Oral at bedtime  thiamine IVPB 500 milliGRAM(s) IV Intermittent every 8 hours      FAMILY HISTORY:  Family history of gastric cancer  Mom    Family hx of lung cancer  Smoker          REVIEW OF SYSTEMS:   As per ED provider      Vital Signs Last 24 Hrs  T(C): 37 (22 May 2025 20:00), Max: 37.2 (22 May 2025 04:05)  T(F): 98.6 (22 May 2025 20:00), Max: 99 (22 May 2025 04:05)  HR: 93 (22 May 2025 22:00) (93 - 135)  BP: 102/55 (22 May 2025 22:00) (79/44 - 145/63)  BP(mean): 75 (22 May 2025 22:00) (55 - 105)  RR: 22 (22 May 2025 22:00) (18 - 28)  SpO2: 94% (22 May 2025 22:00) (94% - 99%)    Parameters below as of 22 May 2025 19:00  Patient On (Oxygen Delivery Method): room air        SIGNIFICANT LABORATORY STUDIES:                        13.0   10.04 )-----------( 106      ( 22 May 2025 04:50 )             38.7       05-22    136  |  88[L]  |  5[L]  ----------------------------<  178[H]  5.3[H]   |  10[L]  |  1.8[H]    Ca    8.7      22 May 2025 16:00  Mg     2.5     05-22    TPro  5.8[L]  /  Alb  3.4[L]  /  TBili  3.9[H]  /  DBili  x   /  AST  298[H]  /  ALT  86[H]  /  AlkPhos  140[H]  05-22      PT/INR - ( 22 May 2025 04:50 )   PT: 16.90 sec;   INR: 1.42 ratio         PTT - ( 22 May 2025 04:50 )  PTT:40.3 sec    Gluc: 178 mg/dL  Anion Gap: 38[H] 05-22 @ 16:00  Anion Gap: 45[H] 05-22 @ 11:00  Osmolality Serum:  340[H]  05-22 @ 11:00  Anion Gap: 39[H] 05-22 @ 06:20  Anion Gap: 38[H] 05-22 @ 04:50

## 2025-05-22 NOTE — ED ADULT NURSE NOTE - CHIEF COMPLAINT QUOTE
BIBA from [facility] for chest discomfort x1 day also c/o n/v. Hx of etoh, last drink yesterday.  in triage

## 2025-05-22 NOTE — CONSULT NOTE ADULT - SUBJECTIVE AND OBJECTIVE BOX
HPI:  65 y/o male with a PMHx of alcohol use disorder, presenting to the ED after 1 day of chest pain nausea/vomiting. Patient stated that, yesterday morning, he started vomiting, which continued all day long and got worse, so had come to ED for evaluation. Patient is c/o chest pain, started after vomiting several times. Nephrology was consulted for acidosis and ANDRZEJ. Patient denies headache, shortness of breath, or urinary symptoms. Patient stated that, he drinks alcohol regularly and last drink was yesterday. Denies OD on medications. Denies drinking toxic alcohols recently (Methanol/ethylene glycol related products).  Denies OD on Metformin. Denies h/o Liver cirrhosis.    In the ED:  - Labs: WBC 10.04, Cr: 1.6 (baseline 1.0), BUN 5, Na+ 141, K+ 5.9, CL- 95, CO2 8  - Venous Blood Gas: Lactate 16.0, pH: 7.14, Na 139, K 5.6, Ca 1.09, pCO2 29, pO2 68, HCO3 10  - Venous Blood Gas: Lactate 16.0, pH: 7.05, Na 139, K 4.9, Ca 1.07, pCO2 30, pO2 51, HCO3 8    Given 1L NS bolus and sodium bicarbonate infusion 150 Meqs    Home Medications:  Claritin 10 mg oral tablet: 1 tab(s) orally once a day (13 Apr 2025 11:57)  colchicine 0.6 mg oral tablet: 1 tab(s) orally 2 times a day (13 Apr 2025 11:57)  dicyclomine 20 mg oral tablet: 1 tab(s) orally every 8 hours as needed for abdominal cramps (13 Apr 2025 11:57)  folic acid 1 mg oral tablet: 1 tab(s) orally once a day (13 Apr 2025 12:02)  Jardiance 10 mg oral tablet: 1 tab(s) orally once a day (13 Apr 2025 11:57)  methocarbamol 500 mg oral tablet: 1 orally once a day (09 Nov 2024 16:30)  Protonix 40 mg oral delayed release tablet: 1 tab(s) orally once a day (13 Apr 2025 11:57)  Tylenol 325 mg oral tablet: 2 tab(s) orally every 8 hours as needed for  mild pain (13 Apr 2025 11:57)    MEDICATIONS  (STANDING):  chlorhexidine 2% Cloths 1 Application(s) Topical <User Schedule>  heparin   Injectable 5000 Unit(s) SubCutaneous every 8 hours  sodium bicarbonate  Infusion 0.292 mEq/kG/Hr (150 mL/Hr) IV Continuous <Continuous>  thiamine IVPB 500 milliGRAM(s) IV Intermittent every 8 hours    Vital Signs Last 24 Hrs  T(C): 36.8 (22 May 2025 08:26), Max: 37.2 (22 May 2025 04:05)  T(F): 98.3 (22 May 2025 08:26), Max: 99 (22 May 2025 04:05)  HR: 130 (22 May 2025 08:26) (127 - 133)  BP: 120/57 (22 May 2025 08:26) (120/57 - 145/63)  BP(mean): --  RR: 20 (22 May 2025 08:26) (18 - 20)  SpO2: 98% (22 May 2025 08:26) (96% - 98%)  Patient On (Oxygen Delivery Method): room air    T(C): 36.8 (05-22-25 @ 08:26), Max: 37.2 (05-22-25 @ 04:05)  HR: 130 (05-22-25 @ 08:26) (127 - 133)  BP: 120/57 (05-22-25 @ 08:26) (120/57 - 145/63)  RR: 20 (05-22-25 @ 08:26) (18 - 20)  SpO2: 98% (05-22-25 @ 08:26) (96% - 98%)    Physical Exam  CONSTITUTIONAL: Well groomed  EYES:No conjunctival or scleral injection, non-icteric  RESP: No respiratory distress, normal breath sounds  CV: Normal +S1S2, no additional heart sounds, no peripheral edema  GI: Endorsed some mild pain on palpitation  SKIN: No pitting edema    Labs:                        13.0   10.04 )-----------( 106      ( 22 May 2025 04:50 )             38.7     143  |  96[L]  |  5[L]  ----------------------------<  108[H]  4.4   |  8[LL]  |  1.7[H]    Ca    9.4      22 May 2025 06:20  Mg     2.5     05-22    TPro  7.1  /  Alb  3.8  /  TBili  3.0[H]  /  DBili  1.6[H]  /  AST  71[H]  /  ALT  22  /  AlkPhos  158[H]  05-22    Venous Blood Gas: Lactate 16.0, pH: 7.14, Na 139, K 5.6, Ca 1.09, pCO2 29, pO2 68, HCO3 10  Venous Blood Gas: Lactate 16.0, pH: 7.05, Na 139, K 4.9, Ca 1.07, pCO2 30, pO2 51, HCO3 8      Imaging:  CT Abdomen/Pelvis w/ IV contrast:  No hydronephrosis  Apparent generalized colonic wall thickening unclear if on the basis of   underdistention or representing colitis.

## 2025-05-22 NOTE — ED PROVIDER NOTE - DIFFERENTIAL DIAGNOSIS
Differential Diagnosis Electrolyte abnormalities, dehydration, ANDRZEJ, hyperglycemia, hypoglycemia, symptomatic anemia.  r/o cardiomegaly; r/o cardiac arrhythmia.

## 2025-05-22 NOTE — ED ADULT NURSE NOTE - HOW OFTEN DO YOU HAVE A DRINK CONTAINING ALCOHOL?
Four or more times a week Price (Do Not Change): 0.00 Instructions: This plan will send the code [id number] to the PM system.  DO NOT or CHANGE the price. Detail Level: Simple

## 2025-05-22 NOTE — CONSULT NOTE ADULT - ASSESSMENT
Patient is a 63 y/o male with a PMHx of alcohol use disorder, presenting to the ED after 1 day of chest pain nausea/vomiting. Nephrology was consulted for acidosis and ANDRZEJ. Patient endorses mild abdominal pain but denies headache, shortness of breath, or urinary symptoms. Labs indicated ANDRZEJ with his Cr of 1.6 (baseline), and his venous blood gas indicating a mixed acid-base disorder of metabolic acidosis concurrent with metabolic alkalosis (HCO3 higher than expected) and respiratory acidosis (CO2 higher than expected).     #Mixed Acid base disorder (metabolic acidosis concurrent with metabolic alkalosis and respiratory acidosis)  - pH 7.05  - Metabolic acidosis with HCO3 of 8  - pCO2 of 30, expected value of 18-22 from winter's formula -> respiratory acidosis   - HCO3 8, expected value of 0, -> metabolic alkalosis   -   Patient is a 65 y/o male with a PMHx of alcohol use disorder, presenting to the ED after 1 day of chest pain nausea/vomiting. Nephrology was consulted for acidosis and ANDRZEJ. Patient endorses mild abdominal pain but denies headache, shortness of breath, or urinary symptoms. Labs indicated ANDRZEJ with his Cr of 1.6 (baseline), and his venous blood gas indicating a mixed acid-base disorder of metabolic acidosis concurrent with respiratory acidosis (CO2 higher than expected).     #Mixed Acid base disorder (metabolic acidosis concurrent with metabolic alkalosis and respiratory acidosis)  - pH 7.05  - Metabolic acidosis with HCO3 of 8  - pCO2 of 30, expected value of 18-22 from winter's formula -> respiratory acidosis    Patient is a 65 y/o male with a PMHx of alcohol use disorder, presenting to the ED after 1 day of chest pain nausea/vomiting. Nephrology was consulted for acidosis and ANDRZEJ. Patient endorses mild abdominal pain but denies headache, shortness of breath, or urinary symptoms. Labs indicated ANDRZEJ with his Cr of 1.6 (baseline), and his venous blood gas indicating a mixed acid-base disorder of metabolic acidosis concurrent with respiratory acidosis (CO2 higher than expected) with a high lactate indicating a possible involvement of his metformin medication.     #Mixed Acid base disorder 2/2 Metformin associated lactate acidosis (metabolic acidosis concurrent with respiratory acidosis)  - pH 7.05 with HCO3 of 8 ->  Metabolic acidosis   - pCO2 of 30, expected value of 18-22 from winter's formula -> respiratory acidosis   - continue with IVF  - May need dialysis if IVF doesn't improve acidosis state    Patient is a 65 y/o male with a PMHx of alcohol use disorder, presenting to the ED after 1 day of chest pain nausea/vomiting. Nephrology was consulted for acidosis and ANDRZEJ. Patient endorses mild abdominal pain but denies headache, shortness of breath, or urinary symptoms. Labs indicated ANDRZEJ with his Cr of 1.6 (baseline), and his venous blood gas indicating a mixed acid-base disorder of metabolic acidosis concurrent with respiratory acidosis (CO2 higher than expected) and metabolic alkalosis, with a high lactate indicating a possible involvement of his metformin medication.     #Mixed Acid base disorder 2/2 Metformin associated lactate acidosis (metabolic acidosis concurrent with respiratory acidosis and metabolic alkalosis)  - pH 7.05 with HCO3 of 8 ->  Metabolic acidosis   - pCO2 of 30, expected value of 18-22 from winter's formula -> respiratory acidosis   - HCO3 of 8, expected value of 0 with delta-delta formula -> metabolic alkalosis   - continue with IVF  - May need dialysis if IVF doesn't improve acidosis state

## 2025-05-23 LAB
ALBUMIN SERPL ELPH-MCNC: 3.1 G/DL — LOW (ref 3.5–5.2)
ALBUMIN SERPL ELPH-MCNC: 3.3 G/DL — LOW (ref 3.5–5.2)
ALBUMIN SERPL ELPH-MCNC: 3.4 G/DL — LOW (ref 3.5–5.2)
ALP SERPL-CCNC: 105 U/L — SIGNIFICANT CHANGE UP (ref 30–115)
ALP SERPL-CCNC: 112 U/L — SIGNIFICANT CHANGE UP (ref 30–115)
ALP SERPL-CCNC: 124 U/L — HIGH (ref 30–115)
ALT FLD-CCNC: 185 U/L — HIGH (ref 0–41)
ALT FLD-CCNC: 196 U/L — HIGH (ref 0–41)
ALT FLD-CCNC: 205 U/L — HIGH (ref 0–41)
AMMONIA BLD-MCNC: 82 UMOL/L — HIGH (ref 11–55)
ANION GAP SERPL CALC-SCNC: 11 MMOL/L — SIGNIFICANT CHANGE UP (ref 7–14)
ANION GAP SERPL CALC-SCNC: 13 MMOL/L — SIGNIFICANT CHANGE UP (ref 7–14)
ANION GAP SERPL CALC-SCNC: 17 MMOL/L — HIGH (ref 7–14)
APAP SERPL-MCNC: <5 UG/ML — LOW (ref 10–30)
APPEARANCE UR: CLEAR — SIGNIFICANT CHANGE UP
APTT BLD: 57.8 SEC — HIGH (ref 27–39.2)
AST SERPL-CCNC: 1106 U/L — HIGH (ref 0–41)
AST SERPL-CCNC: 1250 U/L — HIGH (ref 0–41)
AST SERPL-CCNC: 766 U/L — HIGH (ref 0–41)
B-OH-BUTYR SERPL-SCNC: <0.2 MMOL/L — SIGNIFICANT CHANGE UP
BACTERIA # UR AUTO: NEGATIVE /HPF — SIGNIFICANT CHANGE UP
BASE EXCESS BLDA CALC-SCNC: 13.2 MMOL/L — HIGH (ref -2–3)
BASOPHILS # BLD AUTO: 0.04 K/UL — SIGNIFICANT CHANGE UP (ref 0–0.2)
BASOPHILS NFR BLD AUTO: 0.6 % — SIGNIFICANT CHANGE UP (ref 0–1)
BILIRUB SERPL-MCNC: 4.3 MG/DL — HIGH (ref 0.2–1.2)
BILIRUB SERPL-MCNC: 4.5 MG/DL — HIGH (ref 0.2–1.2)
BILIRUB SERPL-MCNC: 4.8 MG/DL — HIGH (ref 0.2–1.2)
BILIRUB UR-MCNC: ABNORMAL
BUN SERPL-MCNC: 11 MG/DL — SIGNIFICANT CHANGE UP (ref 10–20)
BUN SERPL-MCNC: 13 MG/DL — SIGNIFICANT CHANGE UP (ref 10–20)
BUN SERPL-MCNC: 9 MG/DL — LOW (ref 10–20)
CALCIUM SERPL-MCNC: 7.9 MG/DL — LOW (ref 8.4–10.5)
CALCIUM SERPL-MCNC: 8.1 MG/DL — LOW (ref 8.4–10.5)
CALCIUM SERPL-MCNC: 8.3 MG/DL — LOW (ref 8.4–10.5)
CAST: 12 /LPF — HIGH (ref 0–4)
CHLORIDE SERPL-SCNC: 89 MMOL/L — LOW (ref 98–110)
CHLORIDE SERPL-SCNC: 91 MMOL/L — LOW (ref 98–110)
CHLORIDE SERPL-SCNC: 95 MMOL/L — LOW (ref 98–110)
CK SERPL-CCNC: 436 U/L — HIGH (ref 0–225)
CO2 SERPL-SCNC: 25 MMOL/L — SIGNIFICANT CHANGE UP (ref 17–32)
CO2 SERPL-SCNC: 30 MMOL/L — SIGNIFICANT CHANGE UP (ref 17–32)
CO2 SERPL-SCNC: 33 MMOL/L — HIGH (ref 17–32)
COLOR SPEC: SIGNIFICANT CHANGE UP
CREAT SERPL-MCNC: 2.4 MG/DL — HIGH (ref 0.7–1.5)
CREAT SERPL-MCNC: 2.4 MG/DL — HIGH (ref 0.7–1.5)
CREAT SERPL-MCNC: 2.6 MG/DL — HIGH (ref 0.7–1.5)
DIFF PNL FLD: ABNORMAL
EGFR: 27 ML/MIN/1.73M2 — LOW
EGFR: 27 ML/MIN/1.73M2 — LOW
EGFR: 29 ML/MIN/1.73M2 — LOW
EOSINOPHIL # BLD AUTO: 0.07 K/UL — SIGNIFICANT CHANGE UP (ref 0–0.7)
EOSINOPHIL NFR BLD AUTO: 1.1 % — SIGNIFICANT CHANGE UP (ref 0–8)
ETHANOL SERPL-MCNC: <10 MG/DL — SIGNIFICANT CHANGE UP
FLUAV AG NPH QL: SIGNIFICANT CHANGE UP
FLUBV AG NPH QL: SIGNIFICANT CHANGE UP
GAS PNL BLDA: SIGNIFICANT CHANGE UP
GLUCOSE BLDC GLUCOMTR-MCNC: 130 MG/DL — HIGH (ref 70–99)
GLUCOSE BLDC GLUCOMTR-MCNC: 146 MG/DL — HIGH (ref 70–99)
GLUCOSE BLDC GLUCOMTR-MCNC: 151 MG/DL — HIGH (ref 70–99)
GLUCOSE BLDC GLUCOMTR-MCNC: 178 MG/DL — HIGH (ref 70–99)
GLUCOSE SERPL-MCNC: 130 MG/DL — HIGH (ref 70–99)
GLUCOSE SERPL-MCNC: 176 MG/DL — HIGH (ref 70–99)
GLUCOSE SERPL-MCNC: 91 MG/DL — SIGNIFICANT CHANGE UP (ref 70–99)
GLUCOSE UR QL: NEGATIVE MG/DL — SIGNIFICANT CHANGE UP
HCO3 BLDA-SCNC: 37 MMOL/L — HIGH (ref 21–28)
HCT VFR BLD CALC: 29.6 % — LOW (ref 42–52)
HCV AB S/CO SERPL IA: 0.06 COI — SIGNIFICANT CHANGE UP
HCV AB SERPL-IMP: SIGNIFICANT CHANGE UP
HGB BLD-MCNC: 10.4 G/DL — LOW (ref 14–18)
IMM GRANULOCYTES NFR BLD AUTO: 0.5 % — HIGH (ref 0.1–0.3)
INR BLD: 2.75 RATIO — HIGH (ref 0.65–1.3)
KETONES UR QL: 15 MG/DL
LACTATE SERPL-SCNC: 1.7 MMOL/L — SIGNIFICANT CHANGE UP (ref 0.7–2)
LACTATE SERPL-SCNC: 4 MMOL/L — CRITICAL HIGH (ref 0.7–2)
LEUKOCYTE ESTERASE UR-ACNC: ABNORMAL
LYMPHOCYTES # BLD AUTO: 1.26 K/UL — SIGNIFICANT CHANGE UP (ref 1.2–3.4)
LYMPHOCYTES # BLD AUTO: 20.2 % — LOW (ref 20.5–51.1)
MAGNESIUM SERPL-MCNC: 1.6 MG/DL — LOW (ref 1.8–2.4)
MCHC RBC-ENTMCNC: 35.1 G/DL — SIGNIFICANT CHANGE UP (ref 32–37)
MCHC RBC-ENTMCNC: 36.7 PG — HIGH (ref 27–31)
MCV RBC AUTO: 104.6 FL — HIGH (ref 80–94)
MONOCYTES # BLD AUTO: 0.21 K/UL — SIGNIFICANT CHANGE UP (ref 0.1–0.6)
MONOCYTES NFR BLD AUTO: 3.4 % — SIGNIFICANT CHANGE UP (ref 1.7–9.3)
NEUTROPHILS # BLD AUTO: 4.64 K/UL — SIGNIFICANT CHANGE UP (ref 1.4–6.5)
NEUTROPHILS NFR BLD AUTO: 74.2 % — SIGNIFICANT CHANGE UP (ref 42.2–75.2)
NITRITE UR-MCNC: NEGATIVE — SIGNIFICANT CHANGE UP
NRBC BLD AUTO-RTO: 0 /100 WBCS — SIGNIFICANT CHANGE UP (ref 0–0)
OSMOLALITY SERPL: 280 MOS/KG — SIGNIFICANT CHANGE UP (ref 280–301)
OSMOLALITY SERPL: 281 MOS/KG — SIGNIFICANT CHANGE UP (ref 280–301)
PCO2 BLDA: 41 MMHG — SIGNIFICANT CHANGE UP (ref 35–48)
PH BLDA: 7.56 — HIGH (ref 7.35–7.45)
PH UR: 5 — SIGNIFICANT CHANGE UP (ref 5–8)
PLATELET # BLD AUTO: 32 K/UL — LOW (ref 130–400)
PMV BLD: 12.3 FL — HIGH (ref 7.4–10.4)
PO2 BLDA: 66 MMHG — LOW (ref 83–108)
POTASSIUM SERPL-MCNC: 3.3 MMOL/L — LOW (ref 3.5–5)
POTASSIUM SERPL-MCNC: 4 MMOL/L — SIGNIFICANT CHANGE UP (ref 3.5–5)
POTASSIUM SERPL-MCNC: 4.1 MMOL/L — SIGNIFICANT CHANGE UP (ref 3.5–5)
POTASSIUM SERPL-SCNC: 3.3 MMOL/L — LOW (ref 3.5–5)
POTASSIUM SERPL-SCNC: 4 MMOL/L — SIGNIFICANT CHANGE UP (ref 3.5–5)
POTASSIUM SERPL-SCNC: 4.1 MMOL/L — SIGNIFICANT CHANGE UP (ref 3.5–5)
PROT SERPL-MCNC: 5.1 G/DL — LOW (ref 6–8)
PROT SERPL-MCNC: 5.5 G/DL — LOW (ref 6–8)
PROT SERPL-MCNC: 5.7 G/DL — LOW (ref 6–8)
PROT UR-MCNC: 30 MG/DL
PROTHROM AB SERPL-ACNC: 33.1 SEC — HIGH (ref 9.95–12.87)
RBC # BLD: 2.83 M/UL — LOW (ref 4.7–6.1)
RBC # FLD: 15.8 % — HIGH (ref 11.5–14.5)
RBC CASTS # UR COMP ASSIST: 2 /HPF — SIGNIFICANT CHANGE UP (ref 0–4)
RSV RNA NPH QL NAA+NON-PROBE: SIGNIFICANT CHANGE UP
SAO2 % BLDA: 95.9 % — SIGNIFICANT CHANGE UP (ref 94–98)
SARS-COV-2 RNA SPEC QL NAA+PROBE: SIGNIFICANT CHANGE UP
SODIUM SERPL-SCNC: 132 MMOL/L — LOW (ref 135–146)
SODIUM SERPL-SCNC: 135 MMOL/L — SIGNIFICANT CHANGE UP (ref 135–146)
SODIUM SERPL-SCNC: 137 MMOL/L — SIGNIFICANT CHANGE UP (ref 135–146)
SOURCE RESPIRATORY: SIGNIFICANT CHANGE UP
SP GR SPEC: >1.03 — HIGH (ref 1–1.03)
SQUAMOUS # UR AUTO: 2 /HPF — SIGNIFICANT CHANGE UP (ref 0–5)
TROPONIN T, HIGH SENSITIVITY RESULT: 60 NG/L — CRITICAL HIGH (ref 6–21)
TROPONIN T, HIGH SENSITIVITY RESULT: 79 NG/L — CRITICAL HIGH (ref 6–21)
UROBILINOGEN FLD QL: 1 MG/DL — SIGNIFICANT CHANGE UP (ref 0.2–1)
WBC # BLD: 6.25 K/UL — SIGNIFICANT CHANGE UP (ref 4.8–10.8)
WBC # FLD AUTO: 6.25 K/UL — SIGNIFICANT CHANGE UP (ref 4.8–10.8)
WBC UR QL: 6 /HPF — HIGH (ref 0–5)

## 2025-05-23 PROCEDURE — 99291 CRITICAL CARE FIRST HOUR: CPT

## 2025-05-23 PROCEDURE — 99223 1ST HOSP IP/OBS HIGH 75: CPT

## 2025-05-23 PROCEDURE — 99232 SBSQ HOSP IP/OBS MODERATE 35: CPT

## 2025-05-23 RX ORDER — ACETYLCYSTEINE 200 MG/ML
3.5 INHALANT RESPIRATORY (INHALATION) ONCE
Refills: 0 | Status: COMPLETED | OUTPATIENT
Start: 2025-05-23 | End: 2025-05-23

## 2025-05-23 RX ORDER — PIPERACILLIN-TAZO-DEXTROSE,ISO 3.375G/5
3.38 IV SOLUTION, PIGGYBACK PREMIX FROZEN(ML) INTRAVENOUS EVERY 12 HOURS
Refills: 0 | Status: DISCONTINUED | OUTPATIENT
Start: 2025-05-24 | End: 2025-05-30

## 2025-05-23 RX ORDER — LACTULOSE 10 G/15ML
20 SOLUTION ORAL
Refills: 0 | Status: DISCONTINUED | OUTPATIENT
Start: 2025-05-23 | End: 2025-05-30

## 2025-05-23 RX ORDER — ACETYLCYSTEINE 200 MG/ML
7 INHALANT RESPIRATORY (INHALATION) ONCE
Refills: 0 | Status: COMPLETED | OUTPATIENT
Start: 2025-05-23 | End: 2025-05-23

## 2025-05-23 RX ORDER — MAGNESIUM, ALUMINUM HYDROXIDE 200-200 MG
30 TABLET,CHEWABLE ORAL EVERY 4 HOURS
Refills: 0 | Status: DISCONTINUED | OUTPATIENT
Start: 2025-05-23 | End: 2025-05-30

## 2025-05-23 RX ORDER — SALINE 7; 19 G/118ML; G/118ML
1 ENEMA RECTAL
Refills: 0 | Status: DISCONTINUED | OUTPATIENT
Start: 2025-05-23 | End: 2025-05-23

## 2025-05-23 RX ORDER — MAGNESIUM SULFATE 500 MG/ML
2 SYRINGE (ML) INJECTION ONCE
Refills: 0 | Status: COMPLETED | OUTPATIENT
Start: 2025-05-23 | End: 2025-05-23

## 2025-05-23 RX ORDER — ACETYLCYSTEINE 200 MG/ML
11 INHALANT RESPIRATORY (INHALATION) ONCE
Refills: 0 | Status: COMPLETED | OUTPATIENT
Start: 2025-05-23 | End: 2025-05-23

## 2025-05-23 RX ADMIN — Medication 500 MILLILITER(S): at 15:16

## 2025-05-23 RX ADMIN — ACETYLCYSTEINE 129.38 GRAM(S): 200 INHALANT RESPIRATORY (INHALATION) at 05:41

## 2025-05-23 RX ADMIN — HEPARIN SODIUM 5000 UNIT(S): 1000 INJECTION INTRAVENOUS; SUBCUTANEOUS at 05:07

## 2025-05-23 RX ADMIN — FOMEPIZOLE 201.4 MILLIGRAM(S): 1 INJECTION, SOLUTION INTRAVENOUS at 02:33

## 2025-05-23 RX ADMIN — ACETYLCYSTEINE 64.69 GRAM(S): 200 INHALANT RESPIRATORY (INHALATION) at 10:18

## 2025-05-23 RX ADMIN — HEPARIN SODIUM 5000 UNIT(S): 1000 INJECTION INTRAVENOUS; SUBCUTANEOUS at 13:22

## 2025-05-23 RX ADMIN — POLYETHYLENE GLYCOL 3350 17 GRAM(S): 17 POWDER, FOR SOLUTION ORAL at 12:05

## 2025-05-23 RX ADMIN — NALTREXONE HYDROCHLORIDE 50 MILLIGRAM(S): 50 TABLET, FILM COATED ORAL at 12:08

## 2025-05-23 RX ADMIN — INSULIN LISPRO 2: 100 INJECTION, SOLUTION INTRAVENOUS; SUBCUTANEOUS at 12:28

## 2025-05-23 RX ADMIN — Medication 10 MILLIGRAM(S): at 12:08

## 2025-05-23 RX ADMIN — Medication 40 MILLIGRAM(S): at 06:57

## 2025-05-23 RX ADMIN — METHOCARBAMOL 500 MILLIGRAM(S): 500 TABLET, FILM COATED ORAL at 17:08

## 2025-05-23 RX ADMIN — HEPARIN SODIUM 5000 UNIT(S): 1000 INJECTION INTRAVENOUS; SUBCUTANEOUS at 23:48

## 2025-05-23 RX ADMIN — Medication 1 TABLET(S): at 12:08

## 2025-05-23 RX ADMIN — LACTULOSE 20 GRAM(S): 10 SOLUTION ORAL at 17:09

## 2025-05-23 RX ADMIN — Medication 50 MILLIGRAM(S): at 17:08

## 2025-05-23 RX ADMIN — INSULIN LISPRO 2: 100 INJECTION, SOLUTION INTRAVENOUS; SUBCUTANEOUS at 05:43

## 2025-05-23 RX ADMIN — Medication 25 GRAM(S): at 06:44

## 2025-05-23 RX ADMIN — ACETYLCYSTEINE 305 GRAM(S): 200 INHALANT RESPIRATORY (INHALATION) at 04:32

## 2025-05-23 RX ADMIN — Medication 50 MILLIGRAM(S): at 05:07

## 2025-05-23 RX ADMIN — Medication 105 MILLIGRAM(S): at 13:22

## 2025-05-23 RX ADMIN — Medication 400 MILLIGRAM(S): at 12:08

## 2025-05-23 RX ADMIN — METOPROLOL SUCCINATE 25 MILLIGRAM(S): 50 TABLET, EXTENDED RELEASE ORAL at 05:42

## 2025-05-23 RX ADMIN — CYANOCOBALAMIN 1000 MICROGRAM(S): 1000 INJECTION INTRAMUSCULAR; SUBCUTANEOUS at 12:08

## 2025-05-23 RX ADMIN — Medication 1 APPLICATION(S): at 05:42

## 2025-05-23 RX ADMIN — Medication 30 MILLILITER(S): at 12:53

## 2025-05-23 RX ADMIN — Medication 1000 MILLILITER(S): at 10:39

## 2025-05-23 RX ADMIN — Medication 25 GRAM(S): at 13:22

## 2025-05-23 RX ADMIN — Medication 105 MILLIGRAM(S): at 05:07

## 2025-05-23 RX ADMIN — Medication 25 GRAM(S): at 08:45

## 2025-05-23 RX ADMIN — Medication 25 GRAM(S): at 05:06

## 2025-05-23 RX ADMIN — FOLIC ACID 1 MILLIGRAM(S): 1 TABLET ORAL at 12:08

## 2025-05-23 RX ADMIN — TAMSULOSIN HYDROCHLORIDE 0.4 MILLIGRAM(S): 0.4 CAPSULE ORAL at 23:47

## 2025-05-23 RX ADMIN — Medication 105 MILLIGRAM(S): at 23:48

## 2025-05-23 RX ADMIN — FOMEPIZOLE 201.4 MILLIGRAM(S): 1 INJECTION, SOLUTION INTRAVENOUS at 14:44

## 2025-05-23 RX ADMIN — GABAPENTIN 200 MILLIGRAM(S): 400 CAPSULE ORAL at 23:47

## 2025-05-23 NOTE — CONSULT NOTE ADULT - ASSESSMENT
Assessment and Plan  Case of a 64 year old male patient with a PMHx of alcohol use disorder, chronic DVT/PE, HTN, DLD, Diabetes, gout brought in by ambulance from [facility] to the ED on 05/22 for evaluation of chest pain, nausea, and vomiting in absence of diarrhea, found to have elevated liver enzymes with concern for colitis on imaging. We are consulted for above findings.      Elevated liver enzymes: Likely Multifactorial  Suspect Alcoholic Hepatitis in Setting of Alcohol Use disorder (MDF score 101.9); Alcohol withdrawal  R/O Ischemic hepatitis in setting of hypotension; r/o ischemic colitis with lactic acidosis/colitis/hypotension despite absence of hematochezia; no clinical evidence of colitis  R/O DILI  Unlikely residential in absence of confusion; elevated INR coagulopathy (chronic DVTs coumadin)  Hepatic Steatosis; Cholelithiasis  Chronic macrocytic anemia suspected thiamine/folate deficiency; no overt bleeding; chronic TPN  * Had hypotension to 79/40 mmHg on 05/22 PM  * Labs: WBC 6.25, Hb 10.4, Plt 32, Na 135, K 4.1, BUN 11, Cr 2.6 on 05/23  * Liver enzymes: 3.1/167/94/23 on 05/22 ->4.8/124/1250/205 on 05/23 (previous: 2.2/137/72/21 in 03/2025)  * INR 1.42 on 05/22 -> 2.75 on 05/23  * Acute Hepatitis Panel (03.14.25 @ 04:44)   Hepatitis C Virus Interpretation: Nonreact   Hepatitis B Core IgM Antibody: Nonreact   Hepatitis B Surface Antigen: Nonreact   Hepatitis A IgM Antibody: Nonreact  *  noted -> repeat  * Acetaminophen Level, Serum: <5.0 ug/mL (05.23.25 @ 11:20)  * Alcohol, Blood: <10 mg/dL (05.23.25 @ 04:15)  * Benzodiazepine, Urine: Positive (05.22.25 @ 14:19)  * TTE noted with normal RV function  * US Abdomen Upper Quadrant Right (05.22.25 @ 19:43) Cholelithiasis without evidence for cholecystitis. Diffuse hepatic steatosis.  * CT Abdomen and Pelvis w/ IV Cont (05.22.25 @ 06:55) Apparent generalized colonic wall thickening unclear if on the basis of  underdistention or representing colitis.  * No EGD colonoscopy in chart  * FHx of gastric cancer    RECOMMENDATIONS  - Trend Liver enzymes and INR. Avoid vitamin K in setting of chronic DVT/PE  - Avoid hepatotoxic agents  - Counseled about importance of alcohol cessation. Recommend SBIRT, CATCH, Addiction Medicine teams evaluation. Check Peth level. Serum alcohol -  - For alcoholic hepatitis: MDF >>>32 -> will hold steroids in setting of concern for colitis  - Monitor closely for alcohol withdrawal. Continue CIWA protocol: currently on Ativan PRN  - Continue multivitamins QD and folic acid 1mg QD; continue IV thiamine 500mg Q8h for 3 days then PO 100mg QD thereafter  - Check Hepatitis A IgM, Hepatitis B core IgM, B  core Ab total, B surface Ag, B surface Ab, HCV antibody, HCV RNA, Anti HEV  - Check Iron studies and ceruloplasmin  - Check IRA, AMA, anti-Smooth Muscle Ab type 1, Anti liver-kidney microsomal Ab, Anti-soluble liver Ag, immunoglobulin panel, A1AT phenotype  - Check CMV PCR, EBV PCR, HSV IgM  - Obtain Quantiferon level  - Check Serum Drug screen and Utox; tylenol level  - Please obtain RUQ sono with Doppler  - In setting of concern for colitis  --> Trend LA;  Monitor MAP and keep > 65 mmHg  --> Surgery evaluation appreciated in setting of lactic acidosis, hypotension, and concern for ischemic colitis: no concern for ischemia per surgery; no intervention  --> Monitor BM. In case of diarrhea, would check stool cx, GI PCR, stool ova/parasites, and C difficile. ESR and CRP in setting of concern for colitis  --> Check blood cultures and get ID evaluation in case of fever  - Follow up with our [facility] (located at [address] [place] [address], [address]. Telephone No: [phone])  - Follow up with our [facility] for anemia workup/FHx of GI cancer (located at [address] [place], [place], [address]. Telephone No: [phone])      Thank you for your consult.  - Please note that plan was communicated with medical team.   - Please reach GI on [phone] during weekdays till 5pm.  - Please call the GI service line after 5pm on Weekdays and anytime on Weekends: [phone].      Robert Jones MD  PGY - 5 Gastroenterology Fellow   [facility]     Assessment and Plan  Case of a 64 year old male patient with a PMHx of alcohol use disorder, chronic DVT/PE, HTN, DLD, Diabetes, gout brought in by ambulance from [facility] to the ED on 05/22 for evaluation of chest pain, nausea, and vomiting in absence of diarrhea, found to have elevated liver enzymes with concern for colitis on imaging. We are consulted for above findings.      Elevated liver enzymes: Likely Multifactorial  Ischemic hepatitis in setting of hypotension; r/o ischemic colitis with lactic acidosis/colitis/hypotension despite absence of hematochezia; no clinical evidence of colitis  Suspect Alcoholic Hepatitis in Setting of Alcohol Use disorder (MDF score 101.9); Alcohol withdrawal  R/O DILI  Unlikely [facility] in absence of confusion; elevated INR coagulopathy (chronic DVTs coumadin)  Hepatic Steatosis; Cholelithiasis  Chronic macrocytic anemia suspected thiamine/folate deficiency; no overt bleeding; chronic TPN  * Had hypotension to 79/40 mmHg on 05/22 PM  * Labs: WBC 6.25, Hb 10.4, Plt 32, Na 135, K 4.1, BUN 11, Cr 2.6 on 05/23  * Liver enzymes: 3.1/167/94/23 on 05/22 ->4.8/124/1250/205 on 05/23 (previous: 2.2/137/72/21 in 03/2025)  * INR 1.42 on 05/22 -> 2.75 on 05/23  * Acute Hepatitis Panel (03.14.25 @ 04:44)   Hepatitis C Virus Interpretation: Nonreact   Hepatitis B Core IgM Antibody: Nonreact   Hepatitis B Surface Antigen: Nonreact   Hepatitis A IgM Antibody: Nonreact  *  noted -> repeat  * Acetaminophen Level, Serum: <5.0 ug/mL (05.23.25 @ 11:20)  * Alcohol, Blood: <10 mg/dL (05.23.25 @ 04:15)  * Benzodiazepine, Urine: Positive (05.22.25 @ 14:19)  * TTE noted with normal RV function  * US Abdomen Upper Quadrant Right (05.22.25 @ 19:43) Cholelithiasis without evidence for cholecystitis. Diffuse hepatic steatosis.  * CT Abdomen and Pelvis w/ IV Cont (05.22.25 @ 06:55) Apparent generalized colonic wall thickening unclear if on the basis of  underdistention or representing colitis.  * No EGD colonoscopy in chart  * FHx of gastric cancer    RECOMMENDATIONS  - Trend Liver enzymes and INR. Avoid vitamin K in setting of chronic DVT/PE  - Avoid hepatotoxic agents  - Counseled about importance of alcohol cessation. Recommend SBIRT, CATCH, Addiction Medicine teams evaluation. Check Peth level. Serum alcohol -  - Can continue NAC for 24 hours; doubt of benefit   - For HAGMA: s/p bicarb drip. Trend LA  - For alcoholic hepatitis: MDF >>>32 -> will hold steroids in setting of concern for colitis  - Monitor closely for alcohol withdrawal. Continue CIWA protocol: currently on Ativan PRN  - Continue multivitamins QD and folic acid 1mg QD; continue IV thiamine 500mg Q8h for 3 days then PO 100mg QD thereafter  - Check Hepatitis A IgM, Hepatitis B core IgM, B  core Ab total, B surface Ag, B surface Ab, HCV antibody, HCV RNA, Anti HEV  - Check Iron studies and ceruloplasmin  - Check CMV PCR, EBV PCR, HSV IgM  - Obtain Quantiferon level  - Check Serum Drug screen and Utox; tylenol level  - In setting of concern for colitis  --> Trend LA;  Monitor MAP and keep > 65 mmHg  --> Surgery evaluation appreciated in setting of lactic acidosis, hypotension, and concern for ischemic colitis: no concern for ischemia per surgery; no intervention  --> Monitor BM. In case of diarrhea, would check stool cx, GI PCR, stool ova/parasites, and C difficile. ESR and CRP in setting of concern for colitis  --> Check blood cultures and get ID evaluation in case of fever  - Follow up with our [facility] for fibroscan (located at [address] [place], [place], [address]. Telephone No: [phone])  - Follow up with our [facility] for anemia workup/FHx of GI cancer (located at [address] [place], [place], [address]. Telephone No: [phone])      Thank you for your consult.  - Please note that plan was communicated with medical team.   - Please reach GI on [phone] during weekdays till 5pm.  - Please call the GI service line after 5pm on Weekdays and anytime on Weekends: [phone].      Robert Jones MD  PGY - 5 Gastroenterology Fellow   [facility]

## 2025-05-23 NOTE — PROGRESS NOTE ADULT - ASSESSMENT
Patient is a 65 y/o male with a PMHx of alcohol use disorder, presenting to the ED after 1 day of chest pain nausea/vomiting. Nephrology was consulted for acidosis and ANDRZEJ. Patient endorses mild abdominal pain but denies headache, shortness of breath, or urinary symptoms. Labs indicated ANDRZEJ with his Cr of 1.6 (baseline), and his venous blood gas indicating a mixed acid-base disorder of metabolic acidosis concurrent with respiratory acidosis (CO2 higher than expected) and metabolic alkalosis, with a high lactate indicating a possible involvement of his metformin medication.     #Mixed Acid base disorder 2/2 Metformin associated lactate acidosis (metabolic acidosis concurrent with respiratory acidosis and metabolic alkalosis)  - sp HD yesterday   - DC bicarb drip / follow metformin levels   - LR at 75 cc per hour   - keep U dall until AM and DC if no further HD planned   - check CK levels follow LFT  - check IP     renal team will follow

## 2025-05-23 NOTE — PROGRESS NOTE ADULT - SUBJECTIVE AND OBJECTIVE BOX
GENERAL SURGERY PROGRESS NOTE    Patient: CAT ALSTON , 64y ([birth year])Male   MRN: [id number]  Location: [address]  Visit: 05-22-25 Inpatient  Date: 05-23-25 @ 09:21    Hospital Day #: 2  Post-Op Day #:    Procedure/Dx/Injuries: c/s to rule out ischemic colitis     Events of past 24 hours: lactate improved. coming down on pressors    PAST MEDICAL & SURGICAL HISTORY:  Alcohol dependence  HTN (hypertension)  Hard of hearing  Seasonal allergies  BPH (benign prostatic hyperplasia)  GERD (gastroesophageal reflux disease)  Pseudogout  History of deep vein thrombosis (DVT) of lower extremity      No significant past surgical history      Vitals:   T(F): 98.4 (05-23-25 @ 08:00), Max: 99 (05-23-25 @ 00:00)  HR: 80 (05-23-25 @ 09:00)  BP: 101/58 (05-23-25 @ 09:00)  RR: 21 (05-23-25 @ 09:00)  SpO2: 95% (05-23-25 @ 09:00)      Diet, NPO:   Except Medications  With Ice Chips/Sips of Water      Fluids:     I & O's:    05-22-25 @ 07:01  -  05-23-25 @ 07:00  --------------------------------------------------------  IN:    IV PiggyBack: 1550 mL    IV PiggyBack: 125 mL    Norepinephrine: 52.1 mL    Sodium Bicarbonate: 2250 mL    Sodium Bicarbonate: 1000 mL  Total IN: 4977.1 mL    OUT:    Intermittent Catheterization - Urethral (mL): 1295 mL    Other (mL): 1000 mL    Voided (mL): 100 mL  Total OUT: 2395 mL    Total NET: 2582.1 mL      PHYSICAL EXAM:  General: NAD, AAOx3, calm and cooperative  HEENT: NCAT, SUNNY, EOMI, Trachea ML, Neck supple  Cardiac: RRR S1, S2, no Murmurs, rubs or gallops  Respiratory: CTAB, normal respiratory effort  Abdomen: Soft, non-distended, non-tender, no rebound, no guarding. +BS.  Vascular: Pulses 2+ throughout, extremities well perfused  Skin: Warm/dry, normal color, no jaundice      MEDICATIONS  (STANDING):  acetylcysteine IVPB 7 Gram(s) IV Intermittent once  cetirizine 10 milliGRAM(s) Oral daily  chlorhexidine 2% Cloths 1 Application(s) Topical <User Schedule>  cyanocobalamin 1000 MICROGram(s) Oral daily  dextrose 5%. 1000 milliLiter(s) (50 mL/Hr) IV Continuous <Continuous>  folic acid 1 milliGRAM(s) Oral daily  fomepizole IVPB 700 milliGRAM(s) IV Intermittent every 12 hours  gabapentin 200 milliGRAM(s) Oral at bedtime  heparin   Injectable 5000 Unit(s) SubCutaneous every 8 hours  insulin lispro (ADMELOG) corrective regimen sliding scale   SubCutaneous every 6 hours  magnesium oxide 400 milliGRAM(s) Oral daily  methocarbamol 500 milliGRAM(s) Oral <User Schedule>  multivitamin 1 Tablet(s) Oral daily  naltrexone 50 milliGRAM(s) Oral daily  norepinephrine Infusion 0.05 MICROgram(s)/kG/Min (6.57 mL/Hr) IV Continuous <Continuous>  pantoprazole    Tablet 40 milliGRAM(s) Oral before breakfast  piperacillin/tazobactam IVPB.. 3.375 Gram(s) IV Intermittent every 8 hours  polyethylene glycol 3350 17 Gram(s) Oral daily  pyridoxine 50 milliGRAM(s) Oral every 12 hours  senna 2 Tablet(s) Oral at bedtime  sodium bicarbonate  Infusion 2.14 mEq/kG/Hr (1000 mL/Hr) IV Continuous <Continuous>  tamsulosin 0.4 milliGRAM(s) Oral at bedtime  thiamine IVPB 500 milliGRAM(s) IV Intermittent every 8 hours    MEDICATIONS  (PRN):  dextrose Oral Gel 15 Gram(s) Oral once PRN Blood Glucose LESS THAN 70 milliGRAM(s)/deciliter  LORazepam     Tablet 2 milliGRAM(s) Oral every 2 hours PRN CIWA 8-14  LORazepam     Tablet 4 milliGRAM(s) Oral every 2 hours PRN CIWA 15 or greater, or seizure      DVT PROPHYLAXIS: heparin   Injectable 5000 Unit(s) SubCutaneous every 8 hours    GI PROPHYLAXIS: pantoprazole    Tablet 40 milliGRAM(s) Oral before breakfast    ANTICOAGULATION:   ANTIBIOTICS:  piperacillin/tazobactam IVPB.. 3.375 Gram(s)    LAB/STUDIES:  Labs:  CAPILLARY BLOOD GLUCOSE      POCT Blood Glucose.: 178 mg/dL (23 May 2025 05:35)  POCT Blood Glucose.: 206 mg/dL (22 May 2025 23:15)  POCT Blood Glucose.: 239 mg/dL (22 May 2025 18:15)  POCT Blood Glucose.: 160 mg/dL (22 May 2025 12:18)                          10.4   6.25  )-----------( 32       ( 23 May 2025 04:15 )             29.6       Auto Immature Granulocyte %: 0.5 % (05-23-25 @ 04:15)    05-23    135  |  91[L]  |  11  ----------------------------<  91  4.1   |  33[H]  |  2.6[H]      Calcium: 8.1 mg/dL (05-23-25 @ 04:15)    LFTs:             5.7  | 4.8  | 1250     ------------------[124     ( 23 May 2025 04:15 )  3.4  | x    | 205         Lipase:x      Amylase:x         Blood Gas Arterial, Lactate: 2.8 mmol/L (05-23-25 @ 02:33)  Lactate, Blood: 4.0 mmol/L (05-23-25 @ 00:23)  Blood Gas Arterial, Lactate: >16.0 mmol/L (05-22-25 @ 11:23)  Lactate, Blood: 27.1 mmol/L (05-22-25 @ 11:00)  Blood Gas Venous - Lactate: >16.0 mmol/L (05-22-25 @ 08:14)  Blood Gas Venous - Lactate: >16.0 mmol/L (05-22-25 @ 05:22)  Blood Gas Venous - Lactate: >16.0 mmol/L (05-22-25 @ 04:53)  Lactate, Blood: 21.6 mmol/L (05-22-25 @ 04:50)    ABG - ( 23 May 2025 02:33 )  pH: 7.60  /  pCO2: 40    /  pO2: 60    / HCO3: 39    / Base Excess: 16.2  /  SaO2: 94.5        ABG - ( 22 May 2025 11:23 )  pH: 7.13  /  pCO2: <19   /  pO2: 112   / HCO3: 5     / Base Excess: -21.8 /  SaO2: 99.0        Coags:     16.90  ----< 1.42    ( 22 May 2025 04:50 )     40.3         Alcohol, Blood: <10 mg/dL (05-23-25 @ 04:15)  Drug Screen W/PCP, Urine: Done (05-22-25 @ 14:19)  Drug Screen W/PCP, Urine: Done (05-22-25 @ 14:19)    Urinalysis Basic - ( 23 May 2025 04:15 )    Color: x / Appearance: x / SG: x / pH: x  Gluc: 91 mg/dL / Ketone: x  / Bili: x / Urobili: x   Blood: x / Protein: x / Nitrite: x   Leuk Esterase: x / RBC: x / WBC x   Sq Epi: x / Non Sq Epi: x / Bacteria: x    Alcohol, Blood: <10 mg/dL (05-23-25 @ 04:15)  Drug Screen W/PCP, Urine: Done (05-22-25 @ 14:19)  Drug Screen W/PCP, Urine: Done (05-22-25 @ 14:19)      IMAGING:  < from: CT Abdomen and Pelvis w/ IV Cont (05.22.25 @ 06:55) >  No evidence of central pulmonary embolus.    Apparent generalized colonic wall thickening unclear if on the basis of   underdistention or representing colitis.      < end of copied text >  < from: US Abdomen Upper Quadrant Right (05.22.25 @ 19:43) >  Cholelithiasis without evidence for cholecystitis.    Diffuse hepatic steatosis.    Partially imaged right pleural effusion.    < end of copied text >

## 2025-05-23 NOTE — PROGRESS NOTE ADULT - ASSESSMENT
63 y/o male with a PMHx of alcohol use disorder, chronic DVT/PE, HTN, DLD, Diabetes, gout brought in by ambulance from [facility] presenting to the ED after 1 day of chest pain nausea/vomiting. Patient stated that, yesterday morning, he started vomiting, which continued all day long and got worse, so had come to ED for evaluation. Patient is c/o chest pain, started after vomiting several times.   General surgery consulted for findings on CT AP consistent with generalized colonic wall thickening unclear if on the basis of underdistention or representing colitis. Pt abdomen soft, mildly tender with palpation, non distended. Reports passing gas and bm and denies nausea, vomiting or bloody BM.     Plan:   No suspicion for ischemia based on imaging and physical exam   Needs strict Is and Os. fluid resuscitation   re call as needed. care per MICU

## 2025-05-23 NOTE — PROGRESS NOTE ADULT - SUBJECTIVE AND OBJECTIVE BOX
SUBJECTIVE/OVERNIGHT EVENTS  Today is hospital day 1d. This morning patient was seen and examined at bedside, resting comfortably in bed. No acute or major events overnight.    PMH    Nausea and vomiting    LEFT ARM PAIN; BILATERAL PULMONARY EMBOLISM    Acute embolism and thrombosis of left calf muscular vein    Acute embolism and thrombosis of left femoral vein    Acute embolism and thrombosis of left tibial vein    Acute embolism and thrombosis of popliteal vein, bilateral    Acute embolism and thrombosis of unspecified deep veins of lower extremity, bilateral    Alcohol dependence, uncomplicated    Allergy to mammalian meats    Allergy to milk products    Allergy to seafood    Anxiety disorder, unspecified    Bee allergy status    Benign prostatic hyperplasia without lower urinary tract symptoms    Chronic embolism and thrombosis of unspecified vein    Chronic kidney disease, stage 2 (mild)    Contact with and (suspected) exposure to covid-19    Deficiency of other specified B group vitamins    Essential (primary) hypertension    Gastro-esophageal reflux disease without esophagitis    Gout, unspecified    Hypertensive chronic kidney disease with stage 1 through stage 4 chronic kidney disease, or unspecified chronic kidney disease    Long term (current) use of anticoagulants    Open wound of right lower extremity, initial encounter    Other chest pain    Other chondrocalcinosis, left elbow    Other chondrocalcinosis, left wrist    Other chondrocalcinosis, right elbow    Other chondrocalcinosis, unspecified site    Other pulmonary embolism without acute cor pulmonale    Other seasonal allergic rhinitis    Pain in left arm    Pain in right foot    Personal history of nicotine dependence    Personal history of other mental and behavioral disorders    Personal history of other venous thrombosis and embolism    Personal history of pulmonary embolism    Phlebitis and thrombophlebitis of other sites    Plantar fascial fibromatosis    Polyneuropathy, unspecified    Presence of alcohol in blood, level not specified    Shortness of breath    Thiamine deficiency, unspecified    Unspecified atrial fibrillation    Weakness    Tobacco use and exposure    Acute embolism and thrombosis of unspecified deep veins of unspecified lower extremity    No pertinent family history in first degree relatives    Family history of gastric cancer    Family hx of lung cancer    Handoff    MEWS Score    No pertinent past medical history    Alcohol dependence    Vertigo    Anemia    Tobacco dependency    HTN (hypertension)    Hard of hearing    Gout    Seasonal allergies    CKD (chronic kidney disease)    BPH (benign prostatic hyperplasia)    GERD (gastroesophageal reflux disease)    Pseudogout    History of deep vein thrombosis (DVT) of lower extremity    Nausea and vomiting    No significant past surgical history    CHEST PAIN AND INTOX    33    Chest pain    [id number]        MEDICATIONS  STANDING MEDICATIONS  cetirizine 10 milliGRAM(s) Oral daily  chlorhexidine 2% Cloths 1 Application(s) Topical <User Schedule>  cyanocobalamin 1000 MICROGram(s) Oral daily  dextrose 5%. 1000 milliLiter(s) IV Continuous <Continuous>  folic acid 1 milliGRAM(s) Oral daily  fomepizole IVPB 700 milliGRAM(s) IV Intermittent every 12 hours  gabapentin 200 milliGRAM(s) Oral at bedtime  heparin   Injectable 5000 Unit(s) SubCutaneous every 8 hours  insulin lispro (ADMELOG) corrective regimen sliding scale   SubCutaneous every 6 hours  magnesium oxide 400 milliGRAM(s) Oral daily  methocarbamol 500 milliGRAM(s) Oral <User Schedule>  multivitamin 1 Tablet(s) Oral daily  naltrexone 50 milliGRAM(s) Oral daily  norepinephrine Infusion 0.05 MICROgram(s)/kG/Min IV Continuous <Continuous>  pantoprazole    Tablet 40 milliGRAM(s) Oral before breakfast  piperacillin/tazobactam IVPB.. 3.375 Gram(s) IV Intermittent every 8 hours  polyethylene glycol 3350 17 Gram(s) Oral daily  pyridoxine 50 milliGRAM(s) Oral every 12 hours  senna 2 Tablet(s) Oral at bedtime  sodium bicarbonate  Infusion 2.14 mEq/kG/Hr IV Continuous <Continuous>  tamsulosin 0.4 milliGRAM(s) Oral at bedtime  thiamine IVPB 500 milliGRAM(s) IV Intermittent every 8 hours    PRN MEDICATIONS  aluminum hydroxide/magnesium hydroxide/simethicone Suspension 30 milliLiter(s) Oral every 4 hours PRN  dextrose Oral Gel 15 Gram(s) Oral once PRN  LORazepam     Tablet 2 milliGRAM(s) Oral every 2 hours PRN  LORazepam     Tablet 4 milliGRAM(s) Oral every 2 hours PRN    VITALS  T(F): 98.8 (05-23-25 @ 12:01), Max: 99 (05-23-25 @ 00:00)  HR: 77 (05-23-25 @ 12:01) (76 - 116)  BP: 126/59 (05-23-25 @ 12:01) (79/44 - 126/59)  RR: 19 (05-23-25 @ 12:01) (19 - 31)  SpO2: 96% (05-23-25 @ 12:01) (91% - 99%)  POCT Blood Glucose.: 151 mg/dL (05-23-25 @ 12:25)  POCT Blood Glucose.: 178 mg/dL (05-23-25 @ 05:35)  POCT Blood Glucose.: 206 mg/dL (05-22-25 @ 23:15)  POCT Blood Glucose.: 239 mg/dL (05-22-25 @ 18:15)    PHYSICAL EXAM  GENERAL: NAD, lying in bed comfortably - lethargic   HEAD:  Atraumatic, normocephalic  HEART: Regular rate and rhythm  LUNGS: Unlabored respirations.  Clear to auscultation bilaterally, no crackles  ABDOMEN: Soft, mild tenderness generalized, nondistended, +BS  EXTREMITIES: 2+ peripheral pulses bilaterally. No clubbing, cyanosis, or edema  NERVOUS SYSTEM:  A&Ox3, moving all extremities, no focal deficits     LABS             10.4   6.25  )-----------( 32       ( 05-23-25 @ 04:15 )             29.6     135  |  91  |  11  -------------------------<  91   05-23-25 @ 04:15  4.1  |  33  |  2.6    Ca      8.1     05-23-25 @ 04:15  Mg     1.6     05-23-25 @ 04:15    TPro  5.7  /  Alb  3.4  /  TBili  4.8  /  DBili  x   /  AST  1250  /  ALT  205  /  AlkPhos  124  /  GGT  x     05-23-25 @ 04:15    PT/INR - ( 05-23-25 @ 11:20 )   PT: 33.10 sec[H];   INR: 2.75 ratio[H]  PTT - ( 05-23-25 @ 11:20 )  PTT:57.8 sec    Troponin T, High Sensitivity Result: 58 ng/L (05-22-25 @ 11:00)  Troponin T, High Sensitivity Result: 37 ng/L (05-22-25 @ 04:50)    Urinalysis Basic - ( 23 May 2025 04:15 )    Color: x / Appearance: x / SG: x / pH: x  Gluc: 91 mg/dL / Ketone: x  / Bili: x / Urobili: x   Blood: x / Protein: x / Nitrite: x   Leuk Esterase: x / RBC: x / WBC x   Sq Epi: x / Non Sq Epi: x / Bacteria: x      ABG - ( 23 May 2025 02:33 )  pH, Arterial: 7.60  pH, Blood: x     /  pCO2: 40    /  pO2: 60    / HCO3: 39    / Base Excess: 16.2  /  SaO2: 94.5

## 2025-05-23 NOTE — CONSULT NOTE ADULT - SUBJECTIVE AND OBJECTIVE BOX
Hepatology Initial Consult Note      Location: [facility] [address] ([facility])  Patient Name: CAT ALSTON  Age: 64y  Gender: Male      Chief Complaint  Patient is a 64y old Male who presents with a chief complaint of Chest pain (23 May 2025 13:56)  Primary diagnosis of Nausea and vomiting      Reason for Consult  Liver enzymes  Colitis      History of Present Illness  64 year old male patient with a PMHx of alcohol use disorder, chronic DVT/PE, HTN, DLD, Diabetes, gout brought in by ambulance from [facility]'s residence to the ED on 05/22 for evaluation of chest pain, nausea, and vomiting in absence of diarrhea, found to have elevated liver enzymes with concern for colitis on imaging. We are consulted for above findings.  Summary:  * Had hypotension to 79/40 mmHg on 05/22 PM  * Labs: WBC 6.25, Hb 10.4, Plt 32, Na 135, K 4.1, BUN 11, Cr 2.6 on 05/23  * Liver enzymes: 3.1/167/94/23 on 05/22 ->4.8/124/1250/205 on 05/23 (previous: 2.2/137/72/21 in 03/2025)  * INR 1.42 on 05/22 -> 2.75 on 05/23  * Acute Hepatitis Panel (03.14.25 @ 04:44)   Hepatitis C Virus Interpretation: Nonreact   Hepatitis B Core IgM Antibody: Nonreact   Hepatitis B Surface Antigen: Nonreact   Hepatitis A IgM Antibody: Nonreact  *  noted -> repeat  * Acetaminophen Level, Serum: <5.0 ug/mL (05.23.25 @ 11:20)  * Alcohol, Blood: <10 mg/dL (05.23.25 @ 04:15)  * Benzodiazepine, Urine: Positive (05.22.25 @ 14:19)  * TTE noted with normal RV function  * US Abdomen Upper Quadrant Right (05.22.25 @ 19:43) Cholelithiasis without evidence for cholecystitis. Diffuse hepatic steatosis.  * CT Abdomen and Pelvis w/ IV Cont (05.22.25 @ 06:55) Apparent generalized colonic wall thickening unclear if on the basis of  underdistention or representing colitis.  * No EGD colonoscopy in chart  * FHx of gastric cancer            Prior EGD:  no prior      Prior Colonoscopy:  no prior      Past Medical and Surgical History:  Alcohol dependence    HTN (hypertension)    Hard of hearing    Seasonal allergies    BPH (benign prostatic hyperplasia)    GERD (gastroesophageal reflux disease)    Pseudogout    History of deep vein thrombosis (DVT) of lower extremity    No significant past surgical history        Home Medications:  Home Medications:  Claritin 10 mg oral tablet: 1 tab(s) orally once a day (13 Apr 2025 11:57)  colchicine 0.6 mg oral tablet: 1 tab(s) orally 2 times a day (13 Apr 2025 11:57)  cyanocobalamin 1000 mcg oral tablet: 1 tab(s) orally once a day (22 May 2025 10:48)  folic acid 1 mg oral tablet: 1 tab(s) orally once a day (13 Apr 2025 12:02)  Jardiance 10 mg oral tablet: 1 tab(s) orally once a day (13 Apr 2025 11:57)  methocarbamol 500 mg oral tablet: 1 orally once a day (09 Nov 2024 16:30)  Protonix 40 mg oral delayed release tablet: 1 tab(s) orally once a day (13 Apr 2025 11:57)      Social History:  Tobacco: denies  Alcohol: as below  Drugs: denies        Allergies:  shellfish (Hives)  No Known Drug Allergies  Beef (Hives)  dairy products (Hives)      Family History:  Family history of gastric cancer  Mom  Family hx of lung cancer  Smoker            Vital Signs in the last 24 hours   Vitals Summary T(C): 37.1 (05-23-25 @ 16:00), Max: 37.2 (05-23-25 @ 00:00)  HR: 75 (05-23-25 @ 19:00) (75 - 95)  BP: 130/67 (05-23-25 @ 19:00) (81/60 - 138/71)  RR: 20 (05-23-25 @ 19:00) (18 - 31)  SpO2: 94% (05-23-25 @ 19:00) (91% - 96%)  Vent Data   Intake/ Output   05-22-25 @ 07:01  -  05-23-25 @ 07:00  --------------------------------------------------------  IN: 4977.1 mL / OUT: 2395 mL / NET: 2582.1 mL    05-23-25 @ 07:01  -  05-23-25 @ 19:51  --------------------------------------------------------  IN: 2905 mL / OUT: 1700 mL / NET: 1205 mL        Physical Exam  * General Appearance: Alert, cooperative, interactive, oriented to time, place, and person, in no acute distress  * Eyes: icterus  * Lungs: Good bilateral air entry, normal breath sounds  * Heart: Regular Rate and Rhythm, normal S1 and S2, no audible murmur, rub, or gallop  * Abdomen: Symmetric, non-distended, soft, non-tender, bowel sounds active all four quadrants, no masses, no organomegaly (no hepatosplenomegaly)  * Extremities: mild tremor      Investigations   Laboratory Workup      - CBC:                        10.4   6.25  )-----------( 32       ( 23 May 2025 04:15 )             29.6       - Hgb Trend:  10.4  05-23-25 @ 04:15  13.0  05-22-25 @ 04:50          - Chemistry:  05-23    135  |  91[L]  |  11  ----------------------------<  91  4.1   |  33[H]  |  2.6[H]    Ca    8.1[L]      23 May 2025 04:15  Mg     1.6     05-23    TPro  5.7[L]  /  Alb  3.4[L]  /  TBili  4.8[H]  /  DBili  x   /  AST  1250[H]  /  ALT  205[H]  /  AlkPhos  124[H]  05-23    Liver panel trend:  TBili 4.8   /   AST 1250   /      /   AlkP 124   /   Tptn 5.7   /   Alb 3.4    /   DBili --      05-23  TBili 4.3   /   AST 1106   /      /   AlkP 105   /   Tptn 5.1   /   Alb 3.3    /   DBili --      05-23  TBili 3.9   /      /   ALT 86   /   AlkP 140   /   Tptn 5.8   /   Alb 3.4    /   DBili --      05-22  TBili 3.0   /   AST 71   /   ALT 22   /   AlkP 158   /   Tptn 7.1   /   Alb 3.8    /   DBili 1.6      05-22  TBili 3.1   /   AST 94   /   ALT 23   /   AlkP 167   /   Tptn 7.4   /   Alb 4.0    /   DBili 0.8      05-22      - Coagulation Studies:  PT/INR - ( 23 May 2025 11:20 )   PT: 33.10 sec;   INR: 2.75 ratio         PTT - ( 23 May 2025 11:20 )  PTT:57.8 sec    - ABG:  ABG - ( 23 May 2025 14:45 )  pH, Arterial: 7.56  pH, Blood: x     /  pCO2: 41    /  pO2: 66    / HCO3: 37    / Base Excess: 13.2  /  SaO2: 95.9                - Cardiac Markers:        Microbiological Workup  Urinalysis Basic - ( 23 May 2025 04:15 )    Color: x / Appearance: x / SG: x / pH: x  Gluc: 91 mg/dL / Ketone: x  / Bili: x / Urobili: x   Blood: x / Protein: x / Nitrite: x   Leuk Esterase: x / RBC: x / WBC x   Sq Epi: x / Non Sq Epi: x / Bacteria: x        Culture - Blood (collected 22 May 2025 05:40)  Source: Blood Blood-Venous  Preliminary Report (23 May 2025 14:01):    No growth at 24 hours    Culture - Blood (collected 22 May 2025 05:40)  Source: Blood Blood-Venous  Preliminary Report (23 May 2025 14:01):    No growth at 24 hours        Radiological Workup    US Abdomen Upper Quadrant Right:   ACC: [id number] EXAM:  US ABDOMEN RT UPR QUADRANT   ORDERED BY: YARI SCHROEDER     PROCEDURE DATE:  05/22/2025          INTERPRETATION:  CLINICAL INFORMATION: Nausea and vomiting    COMPARISON: March 9, 2025    TECHNIQUE: Sonography of the right upper quadrant.    FINDINGS:  Liver: Increased echogenicity.  Bile ducts: Normal caliber. Common bile duct measures 4 mm.  Gallbladder: Cholelithiasis.  Pancreas: Visualized portions are within normal limits.  Right kidney: 9.8 cm. No hydronephrosis.  Ascites: None. Partially imaged right pleural effusion.  IVC: Visualized portions are within normal limits.    IMPRESSION:    Cholelithiasis without evidence for cholecystitis.    Diffuse hepatic steatosis.    Partially imaged right pleural effusion.    --- End of Report ---      Current Medications  Standing Medications  cetirizine 10 milliGRAM(s) Oral daily  chlorhexidine 2% Cloths 1 Application(s) Topical <User Schedule>  cyanocobalamin 1000 MICROGram(s) Oral daily  dextrose 5%. 1000 milliLiter(s) (50 mL/Hr) IV Continuous <Continuous>  folic acid 1 milliGRAM(s) Oral daily  fomepizole IVPB 700 milliGRAM(s) IV Intermittent every 12 hours  gabapentin 200 milliGRAM(s) Oral at bedtime  heparin   Injectable 5000 Unit(s) SubCutaneous every 8 hours  insulin lispro (ADMELOG) corrective regimen sliding scale   SubCutaneous every 6 hours  lactulose Syrup 20 Gram(s) Oral two times a day  magnesium oxide 400 milliGRAM(s) Oral daily  methocarbamol 500 milliGRAM(s) Oral <User Schedule>  multivitamin 1 Tablet(s) Oral daily  naltrexone 50 milliGRAM(s) Oral daily  norepinephrine Infusion 0.05 MICROgram(s)/kG/Min (6.57 mL/Hr) IV Continuous <Continuous>  pantoprazole    Tablet 40 milliGRAM(s) Oral before breakfast  polyethylene glycol 3350 17 Gram(s) Oral daily  pyridoxine 50 milliGRAM(s) Oral every 12 hours  senna 2 Tablet(s) Oral at bedtime  sodium bicarbonate  Infusion 2.14 mEq/kG/Hr (1000 mL/Hr) IV Continuous <Continuous>  tamsulosin 0.4 milliGRAM(s) Oral at bedtime  thiamine IVPB 500 milliGRAM(s) IV Intermittent every 8 hours    PRN Medications  aluminum hydroxide/magnesium hydroxide/simethicone Suspension 30 milliLiter(s) Oral every 4 hours PRN Dyspepsia  dextrose Oral Gel 15 Gram(s) Oral once PRN Blood Glucose LESS THAN 70 milliGRAM(s)/deciliter  LORazepam     Tablet 2 milliGRAM(s) Oral every 2 hours PRN CIWA 8-14  LORazepam     Tablet 4 milliGRAM(s) Oral every 2 hours PRN CIWA 15 or greater, or seizure    Singles Doses Administered  (ADM OVERRIDE) 1 each &lt;see task&gt; GiveOnce  (ADM OVERRIDE) 1 each &lt;see task&gt; GiveOnce  acetylcysteine IVPB 11 Gram(s) IV Intermittent once  acetylcysteine IVPB 3.5 Gram(s) IV Intermittent once  acetylcysteine IVPB 7 Gram(s) IV Intermittent once  aluminum hydroxide/magnesium hydroxide/simethicone Suspension 30 milliLiter(s) Oral once PRN  diazepam  Injectable 5 milliGRAM(s) IV Push once  famotidine Injectable 20 milliGRAM(s) IV Push once  fomepizole IVPB 1050 milliGRAM(s) IV Intermittent once  magnesium sulfate  IVPB 2 Gram(s) IV Intermittent once  magnesium sulfate  IVPB 2 Gram(s) IV Intermittent once  ondansetron Injectable 4 milliGRAM(s) IV Push once  piperacillin/tazobactam IVPB. 3.375 Gram(s) IV Intermittent once  piperacillin/tazobactam IVPB.- 3.375 Gram(s) IV Intermittent once  piperacillin/tazobactam IVPB.- 3.375 Gram(s) IV Intermittent once  sodium chloride 0.9% Bolus 1000 milliLiter(s) IV Bolus once  sodium chloride 0.9% Bolus 500 milliLiter(s) IV Bolus once  sodium chloride 0.9% Bolus 1000 milliLiter(s) IV Bolus once  thiamine Injectable 200 milliGRAM(s) IntraMuscular Once

## 2025-05-23 NOTE — PROGRESS NOTE ADULT - SUBJECTIVE AND OBJECTIVE BOX
Patient is a 64y old  Male who presents with a chief complaint of Chest pain (22 May 2025 14:45)        Over Night Events: ON Ra.  On Levophed .  SP HD yesterday         ROS:     All ROS are negative except HPI         PHYSICAL EXAM    ICU Vital Signs Last 24 Hrs  T(C): 37.2 (23 May 2025 04:00), Max: 37.2 (23 May 2025 00:00)  T(F): 99 (23 May 2025 04:00), Max: 99 (23 May 2025 00:00)  HR: 83 (23 May 2025 08:00) (82 - 135)  BP: 106/54 (23 May 2025 08:00) (79/44 - 134/90)  BP(mean): 76 (23 May 2025 08:00) (10 - 105)  ABP: --  ABP(mean): --  RR: 19 (23 May 2025 08:00) (19 - 31)  SpO2: 93% (23 May 2025 08:00) (91% - 99%)    O2 Parameters below as of 23 May 2025 08:00  Patient On (Oxygen Delivery Method): room air            CONSTITUTIONAL:   NAD    ENT:   Airway patent,   Mouth with normal mucosa.     EYES:   Pupils equal,   Round and reactive to light.    CARDIAC:   Normal rate,   Regular rhythm.      RESPIRATORY:   No wheezing  Bilateral BS  Normal chest expansion  Not tachypneic,  No use of accessory muscles    GASTROINTESTINAL:  Abdomen soft,   Non-tender,   No guarding,   + BS    MUSCULOSKELETAL:   Range of motion is not limited,  No clubbing, cyanosis    NEUROLOGICAL:   Alert and oriented   No motor  deficits.    SKIN:   Skin normal color for race,   Warm and dry  No evidence of rash.      05-22-25 @ 07:01  -  05-23-25 @ 07:00  --------------------------------------------------------  IN:    IV PiggyBack: 1550 mL    IV PiggyBack: 125 mL    Norepinephrine: 52.1 mL    Sodium Bicarbonate: 2250 mL    Sodium Bicarbonate: 1000 mL  Total IN: 4977.1 mL    OUT:    Intermittent Catheterization - Urethral (mL): 1295 mL    Other (mL): 1000 mL    Voided (mL): 100 mL  Total OUT: 2395 mL    Total NET: 2582.1 mL      05-23-25 @ 07:01  -  05-23-25 @ 08:26  --------------------------------------------------------  IN:    IV PiggyBack: 125 mL    Norepinephrine: 7.8 mL  Total IN: 132.8 mL    OUT:  Total OUT: 0 mL    Total NET: 132.8 mL          LABS:                            10.4   6.25  )-----------( 32       ( 23 May 2025 04:15 )             29.6                                               05-23    135  |  91[L]  |  11  ----------------------------<  91  4.1   |  33[H]  |  2.6[H]    Ca    8.1[L]      23 May 2025 04:15  Mg     1.6     05-23    TPro  5.7[L]  /  Alb  3.4[L]  /  TBili  4.8[H]  /  DBili  x   /  AST  1250[H]  /  ALT  205[H]  /  AlkPhos  124[H]  05-23      PT/INR - ( 22 May 2025 04:50 )   PT: 16.90 sec;   INR: 1.42 ratio         PTT - ( 22 May 2025 04:50 )  PTT:40.3 sec                                       Urinalysis Basic - ( 23 May 2025 04:15 )    Color: x / Appearance: x / SG: x / pH: x  Gluc: 91 mg/dL / Ketone: x  / Bili: x / Urobili: x   Blood: x / Protein: x / Nitrite: x   Leuk Esterase: x / RBC: x / WBC x   Sq Epi: x / Non Sq Epi: x / Bacteria: x                                                  LIVER FUNCTIONS - ( 23 May 2025 04:15 )  Alb: 3.4 g/dL / Pro: 5.7 g/dL / ALK PHOS: 124 U/L / ALT: 205 U/L / AST: 1250 U/L / GGT: x                                                                                                                                   ABG - ( 23 May 2025 02:33 )  pH, Arterial: 7.60  pH, Blood: x     /  pCO2: 40    /  pO2: 60    / HCO3: 39    / Base Excess: 16.2  /  SaO2: 94.5                MEDICATIONS  (STANDING):  acetylcysteine IVPB 7 Gram(s) IV Intermittent once  cetirizine 10 milliGRAM(s) Oral daily  chlorhexidine 2% Cloths 1 Application(s) Topical <User Schedule>  cyanocobalamin 1000 MICROGram(s) Oral daily  dextrose 5%. 1000 milliLiter(s) (50 mL/Hr) IV Continuous <Continuous>  folic acid 1 milliGRAM(s) Oral daily  fomepizole IVPB 700 milliGRAM(s) IV Intermittent every 12 hours  gabapentin 200 milliGRAM(s) Oral at bedtime  heparin   Injectable 5000 Unit(s) SubCutaneous every 8 hours  insulin lispro (ADMELOG) corrective regimen sliding scale   SubCutaneous every 6 hours  magnesium oxide 400 milliGRAM(s) Oral daily  methocarbamol 500 milliGRAM(s) Oral <User Schedule>  metoprolol succinate ER 25 milliGRAM(s) Oral daily  multivitamin 1 Tablet(s) Oral daily  naltrexone 50 milliGRAM(s) Oral daily  norepinephrine Infusion 0.05 MICROgram(s)/kG/Min (6.57 mL/Hr) IV Continuous <Continuous>  pantoprazole    Tablet 40 milliGRAM(s) Oral before breakfast  piperacillin/tazobactam IVPB.. 3.375 Gram(s) IV Intermittent every 8 hours  polyethylene glycol 3350 17 Gram(s) Oral daily  pyridoxine 50 milliGRAM(s) Oral every 12 hours  senna 2 Tablet(s) Oral at bedtime  sodium bicarbonate  Infusion 2.14 mEq/kG/Hr (1000 mL/Hr) IV Continuous <Continuous>  tamsulosin 0.4 milliGRAM(s) Oral at bedtime  thiamine IVPB 500 milliGRAM(s) IV Intermittent every 8 hours    MEDICATIONS  (PRN):  dextrose Oral Gel 15 Gram(s) Oral once PRN Blood Glucose LESS THAN 70 milliGRAM(s)/deciliter  LORazepam     Tablet 2 milliGRAM(s) Oral every 2 hours PRN CIWA 8-14  LORazepam     Tablet 4 milliGRAM(s) Oral every 2 hours PRN CIWA 15 or greater, or seizure      New X-rays reviewed:                                                                                  ECHO

## 2025-05-23 NOTE — PROGRESS NOTE ADULT - ASSESSMENT
#Severe lactic acidosis / possible JENNIFER  #HO alcohol use disorder  #Generalized colonic wall thickening, possible colitis   - lactate high 27 on admission   - s/p fomepizole  - s/p bicarb drip  - s/p NAC   - s/p HD on 5/22  - ANGEL protocol - standing and prn   - thiamine, folate, multivitamin   - drug screen positive only for benzodiazepines  - on zosyn      #HO chronic DVT/PE  - duplex redemonstrating chronic DVT    #HO HTN, DLD, Diabetes     #HO gout    #Diet DASH  #Activity IAT  #GI  #DVT ppx heparin subq  #Pending - f/u tox

## 2025-05-23 NOTE — PROGRESS NOTE ADULT - NS ATTEND AMEND GEN_ALL_CORE FT
ACS Attending  Note Attestation    Patient is examined and evaluated at the bedside with the residents/PAs. Treatment plan discussed with the team, nurses, and consulting physicians and consulting teams. Medications, radiological studies and all other relevant studies reviewed. Time devoted to teaching and to any procedures I billed separately is not included.    Nigel Noonan is a 64 year old male with a PMHx of alcohol use disorder, chronic DVT/PE, HTN, DLD, Diabetes, gout brought in by ambulance from [facility]'s residence presenting to the ED after 1 day of chest pain nausea/vomiting. Patient stated that, yesterday morning, he started vomiting, which continued all day long and got worse, so had come to ED for evaluation. Patient is c/o chest pain, started after vomiting several times.   General surgery consulted for findings on CT AP consistent with generalized colonic wall thickening unclear if on the basis of underdistention or representing colitis. Pt abdomen soft, mildly tender with palpation, non distended. Reports passing gas and bm and denies nausea, vomiting or bloody BM.     Vitals:   T(F): 98.4 (05-23-25 @ 08:00), Max: 99 (05-23-25 @ 00:00)  HR: 80 (05-23-25 @ 09:00)  BP: 101/58 (05-23-25 @ 09:00)  RR: 21 (05-23-25 @ 09:00)  SpO2: 95% (05-23-25 @ 09:00)    PHYSICAL EXAM:  General: NAD, AAOx3, calm and cooperative  HEENT: NCAT, SUNNY, EOMI, Trachea ML, Neck supple  Cardiac: RRR S1, S2, no Murmurs, rubs or gallops  Respiratory: CTAB, normal respiratory effort  Abdomen: Soft, non-distended, non-tender, no rebound, no guarding. +BS.  Vascular: Pulses 2+ throughout, extremities well perfused  Skin: Warm/dry, normal color, no jaundice    Plan:   No suspicion for ischemia based on imaging and physical exam   Needs strict Is and Os. fluid resuscitation   re call as needed. care per MICU    [x ]  35   minutes spent on total encounter. The necessity of the time above spent during the encounter on this date of service as described above.  [id number]  Michael Rodriguez MD  Trauma/ACS/Surgical Critical Care Attending

## 2025-05-23 NOTE — PROGRESS NOTE ADULT - SUBJECTIVE AND OBJECTIVE BOX
Nephrology progress note    THIS IS AN INCOMPLETE NOTE . FULL NOTE TO FOLLOW SHORTLY    Patient is seen and examined, events over the last 24 h noted .    Allergies:  shellfish (Hives)  No Known Drug Allergies  Beef (Hives)  dairy products (Hives)    Hospital Medications:   MEDICATIONS  (STANDING):  acetylcysteine IVPB 7 Gram(s) IV Intermittent once  cetirizine 10 milliGRAM(s) Oral daily  chlorhexidine 2% Cloths 1 Application(s) Topical <User Schedule>  cyanocobalamin 1000 MICROGram(s) Oral daily  dextrose 5%. 1000 milliLiter(s) (50 mL/Hr) IV Continuous <Continuous>  folic acid 1 milliGRAM(s) Oral daily  fomepizole IVPB 700 milliGRAM(s) IV Intermittent every 12 hours  gabapentin 200 milliGRAM(s) Oral at bedtime  heparin   Injectable 5000 Unit(s) SubCutaneous every 8 hours  insulin lispro (ADMELOG) corrective regimen sliding scale   SubCutaneous every 6 hours  magnesium oxide 400 milliGRAM(s) Oral daily  methocarbamol 500 milliGRAM(s) Oral <User Schedule>  multivitamin 1 Tablet(s) Oral daily  naltrexone 50 milliGRAM(s) Oral daily  norepinephrine Infusion 0.05 MICROgram(s)/kG/Min (6.57 mL/Hr) IV Continuous <Continuous>  pantoprazole    Tablet 40 milliGRAM(s) Oral before breakfast  piperacillin/tazobactam IVPB.. 3.375 Gram(s) IV Intermittent every 8 hours  polyethylene glycol 3350 17 Gram(s) Oral daily  pyridoxine 50 milliGRAM(s) Oral every 12 hours  senna 2 Tablet(s) Oral at bedtime  sodium bicarbonate  Infusion 2.14 mEq/kG/Hr (1000 mL/Hr) IV Continuous <Continuous>  tamsulosin 0.4 milliGRAM(s) Oral at bedtime  thiamine IVPB 500 milliGRAM(s) IV Intermittent every 8 hours        VITALS:  T(F): 99 (05-23-25 @ 04:00), Max: 99 (05-23-25 @ 00:00)  HR: 83 (05-23-25 @ 08:00)  BP: 106/54 (05-23-25 @ 08:00)  RR: 19 (05-23-25 @ 08:00)  SpO2: 93% (05-23-25 @ 08:00)  Wt(kg): --    05-22 @ 07:01  -  05-23 @ 07:00  --------------------------------------------------------  IN: 4977.1 mL / OUT: 2395 mL / NET: 2582.1 mL    05-23 @ 07:01  -  05-23 @ 08:30  --------------------------------------------------------  IN: 132.8 mL / OUT: 0 mL / NET: 132.8 mL      Height (cm): 177.8 (05-22 @ 10:19)  Weight (kg): 70.1 (05-22 @ 10:19)  BMI (kg/m2): 22.2 (05-22 @ 10:19)  BSA (m2): 1.87 (05-22 @ 10:19)    PHYSICAL EXAM:  Constitutional: NAD  HEENT: anicteric sclera, oropharynx clear, MMM  Neck: No JVD  Respiratory: CTAB, no wheezes, rales or rhonchi  Cardiovascular: S1, S2, RRR  Gastrointestinal: BS+, soft, NT/ND  Extremities: No cyanosis or clubbing. No peripheral edema  :  No castellon.   Skin: No rashes    LABS:  05-23    135  |  91[L]  |  11  ----------------------------<  91  4.1   |  33[H]  |  2.6[H]    Ca    8.1[L]      23 May 2025 04:15  Mg     1.6     05-23    TPro  5.7[L]  /  Alb  3.4[L]  /  TBili  4.8[H]  /  DBili      /  AST  1250[H]  /  ALT  205[H]  /  AlkPhos  124[H]  05-23                          10.4   6.25  )-----------( 32       ( 23 May 2025 04:15 )             29.6       Urine Studies:  Urinalysis Basic - ( 23 May 2025 04:15 )    Color:  / Appearance:  / SG:  / pH:   Gluc: 91 mg/dL / Ketone:   / Bili:  / Urobili:    Blood:  / Protein:  / Nitrite:    Leuk Esterase:  / RBC:  / WBC    Sq Epi:  / Non Sq Epi:  / Bacteria:       Sodium, Random Urine: 41.0 mmoL/L (05-22 @ 14:19)  Creatinine, Random Urine: 79 mg/dL (05-22 @ 14:19)  Protein/Creatinine Ratio Calculation: 0.3 Ratio (05-22 @ 14:19)  Osmolality, Random Urine: 375 mos/kg (05-22 @ 14:19)  Potassium, Random Urine: 43 mmol/L (05-22 @ 14:19)      Iron 74, TIBC 226, %sat 33      [01-23-25 @ 05:39]  Ferritin 316      [01-23-25 @ 05:39]    HBsAb <3.0      [03-22-23 @ 11:43]  HBsAb Nonreact      [03-22-23 @ 11:43]  HBsAg Nonreact      [03-14-25 @ 04:44]  HCV 0.28, Nonreact      [03-14-25 @ 04:44]  HIV Nonreact      [03-16-23 @ 08:00]    IRA: titer Negative, pattern --      [03-17-23 @ 10:52]  dsDNA <12      [03-17-23 @ 10:52]  Free Light Chains: kappa 2.45, lambda 2.70, ratio = 0.91      [03-22 @ 11:43]      RADIOLOGY & ADDITIONAL STUDIES:   Nephrology progress note    Patient is seen and examined, events over the last 24 h noted .  Lying in bed     Allergies:  shellfish (Hives)  No Known Drug Allergies  Beef (Hives)  dairy products (Hives)    Hospital Medications:   MEDICATIONS  (STANDING):  acetylcysteine IVPB 7 Gram(s) IV Intermittent once  cetirizine 10 milliGRAM(s) Oral daily  cyanocobalamin 1000 MICROGram(s) Oral daily  dextrose 5%. 1000 milliLiter(s) (50 mL/Hr) IV Continuous <Continuous>  folic acid 1 milliGRAM(s) Oral daily  fomepizole IVPB 700 milliGRAM(s) IV Intermittent every 12 hours  gabapentin 200 milliGRAM(s) Oral at bedtime  heparin   Injectable 5000 Unit(s) SubCutaneous every 8 hours  insulin lispro (ADMELOG) corrective regimen sliding scale   SubCutaneous every 6 hours  magnesium oxide 400 milliGRAM(s) Oral daily  methocarbamol 500 milliGRAM(s) Oral <User Schedule>  multivitamin 1 Tablet(s) Oral daily  naltrexone 50 milliGRAM(s) Oral daily  norepinephrine Infusion 0.05 MICROgram(s)/kG/Min (6.57 mL/Hr) IV Continuous <Continuous>  pantoprazole    Tablet 40 milliGRAM(s) Oral before breakfast  piperacillin/tazobactam IVPB.. 3.375 Gram(s) IV Intermittent every 8 hours  polyethylene glycol 3350 17 Gram(s) Oral daily  pyridoxine 50 milliGRAM(s) Oral every 12 hours  senna 2 Tablet(s) Oral at bedtime  sodium bicarbonate  Infusion 2.14 mEq/kG/Hr (1000 mL/Hr) IV Continuous <Continuous>  tamsulosin 0.4 milliGRAM(s) Oral at bedtime  thiamine IVPB 500 milliGRAM(s) IV Intermittent every 8 hours        VITALS:  T(F): 99 (05-23-25 @ 04:00), Max: 99 (05-23-25 @ 00:00)  HR: 83 (05-23-25 @ 08:00)  BP: 106/54 (05-23-25 @ 08:00)  RR: 19 (05-23-25 @ 08:00)  SpO2: 93% (05-23-25 @ 08:00)      05-22 @ 07:01  -  05-23 @ 07:00  --------------------------------------------------------  IN: 4977.1 mL / OUT: 2395 mL / NET: 2582.1 mL    05-23 @ 07:01  -  05-23 @ 08:30  --------------------------------------------------------  IN: 132.8 mL / OUT: 0 mL / NET: 132.8 mL      Height (cm): 177.8 (05-22 @ 10:19)  Weight (kg): 70.1 (05-22 @ 10:19)  BMI (kg/m2): 22.2 (05-22 @ 10:19)  BSA (m2): 1.87 (05-22 @ 10:19)    PHYSICAL EXAM:  Constitutional: NAD  Respiratory: CTAB,   Cardiovascular: S1, S2, RRR  Gastrointestinal: BS+, soft, NT/ND  Extremities: No cyanosis or clubbing. No peripheral edema  :  No castellon.   Skin: No rashes    LABS:  05-23    135  |  91[L]  |  11  ----------------------------<  91  4.1   |  33[H]  |  2.6[H]    Ca    8.1[L]      23 May 2025 04:15  Mg     1.6     05-23    TPro  5.7[L]  /  Alb  3.4[L]  /  TBili  4.8[H]  /  DBili      /  AST  1250[H]  /  ALT  205[H]  /  AlkPhos  124[H]  05-23                          10.4   6.25  )-----------( 32       ( 23 May 2025 04:15 )             29.6       Urine Studies:  Urinalysis Basic - ( 23 May 2025 04:15 )    Color:  / Appearance:  / SG:  / pH:   Gluc: 91 mg/dL / Ketone:   / Bili:  / Urobili:    Blood:  / Protein:  / Nitrite:    Leuk Esterase:  / RBC:  / WBC    Sq Epi:  / Non Sq Epi:  / Bacteria:       Sodium, Random Urine: 41.0 mmoL/L (05-22 @ 14:19)  Creatinine, Random Urine: 79 mg/dL (05-22 @ 14:19)  Protein/Creatinine Ratio Calculation: 0.3 Ratio (05-22 @ 14:19)  Osmolality, Random Urine: 375 mos/kg (05-22 @ 14:19)  Potassium, Random Urine: 43 mmol/L (05-22 @ 14:19)      Iron 74, TIBC 226, %sat 33      [01-23-25 @ 05:39]  Ferritin 316      [01-23-25 @ 05:39]    HBsAb <3.0      [03-22-23 @ 11:43]  HBsAb Nonreact      [03-22-23 @ 11:43]  HBsAg Nonreact      [03-14-25 @ 04:44]  HCV 0.28, Nonreact      [03-14-25 @ 04:44]  HIV Nonreact      [03-16-23 @ 08:00]    IRA: titer Negative, pattern --      [03-17-23 @ 10:52]  dsDNA <12      [03-17-23 @ 10:52]  Free Light Chains: kappa 2.45, lambda 2.70, ratio = 0.91      [03-22 @ 11:43]      RADIOLOGY & ADDITIONAL STUDIES:

## 2025-05-23 NOTE — PROGRESS NOTE ADULT - SUBJECTIVE AND OBJECTIVE BOX
64 Y M h/o alcohol use disorder, DM on metformin initially presenting with biarconate of 8, anion gap in 30s, creatinine 1.8, lactate 27, osm gap in 40s, s/p fomepizole and HD, treatment with thiamine, folic acid, dextrose, Repeat BW s/p dialysis show resolved acidosis; however, continued transaminitis and martha.

## 2025-05-23 NOTE — PROGRESS NOTE ADULT - ASSESSMENT
IMPRESSION:    Severe lactic acidosis / possible JENNIFER.  SP HD  Generalized colonic wall thickening, possible colitis   ANRDZEJ   Shockon Levophed   HO alcohol use disorder   HO chronic DVT/PE,   HO HTN, DLD, Diabetes,   HO gout    PLAN    CNS:  CIWA monitoring, Benzo PRN for withdrawal, c/w folate, thiamine, MVI, Drug screen noted      HEENT: Oral care    PULMONARY: HOB @ 45 degrees, aspiration precautions. On RA. repeat ABG.  OP CT chest NC     CARDIAC: ECHO in 3/25 EF 60%.  NS hydration for now.  GDFR.  Wean levophed.  Hold antihypertensives     GASTROENTEROLOGY:  Feeding.  GI prophylaxis.   RUQ sono noted, Trend LFTs.  Hep panel.  FU wit Tox regarding NAC     RENAL:  Monitor lytes.  Correct as needed,  Bladder scans.  Refused Will     INFECTIOUS: FU cultures. Zosyn for now. Procal.  Cdiff and GI PCR    HEMATOLOGY: Duplex with chronic DVTs in the popliteals. No PE on CTA.  Monitor CBC and Coags.  DVT prophylaxis.  SP vascular eval     ENDOCRINOLOGY: Check FS, insulin protocol as needed.    MUSCULOSKELETAL: Bed chair.  PT OT     DISPOSITION: MICU

## 2025-05-23 NOTE — PROGRESS NOTE ADULT - ASSESSMENT
Assessment	  Concern for polypharmacy exposure, transaminitis likely 2/2 shock liver, resolved acidosis.    Recommendations:  Treatment:   1)  Rrecommend continuing 3rd bag indefinitely until LFTs downtrending 50% of peak.  2)  No indication for further dialysis, or fomepizole at this time    All plans discussed with primary managing team.    Thank you for the consultation. Please reach out via Teams with any questions.  Robel Jovel MD, Medical Toxicology Fellow   Assessment	  Concern for polypharmacy exposure, transaminitis likely 2/2 shock liver, resolved acidosis.    Recommendations:  Treatment:   1)  Recommend continuing 3rd bag indefinitely until LFTs downtrending 50% of peak.  2)  No indication for further dialysis, or fomepizole at this time    All plans discussed with primary managing team.    Thank you for the consultation. Please reach out via Teams with any questions.  Robel Jovel MD, Medical Toxicology Fellow    Medical Toxicology Attending Attestation Note - Josemanuel Willams MD    I have discussed the plan of care for this patient. I have made amendments to the documentation where necessary, and agree with history, and plan as documented by the fellow.

## 2025-05-24 DIAGNOSIS — T50.901A POISONING BY UNSPECIFIED DRUGS, MEDICAMENTS AND BIOLOGICAL SUBSTANCES, ACCIDENTAL (UNINTENTIONAL), INITIAL ENCOUNTER: ICD-10-CM

## 2025-05-24 LAB
A1C WITH ESTIMATED AVERAGE GLUCOSE RESULT: 4.9 % — SIGNIFICANT CHANGE UP (ref 4–5.6)
ALBUMIN SERPL ELPH-MCNC: 3.2 G/DL — LOW (ref 3.5–5.2)
ALP SERPL-CCNC: 119 U/L — HIGH (ref 30–115)
ALT FLD-CCNC: 186 U/L — HIGH (ref 0–41)
ANION GAP SERPL CALC-SCNC: 12 MMOL/L — SIGNIFICANT CHANGE UP (ref 7–14)
APTT BLD: 54.3 SEC — HIGH (ref 27–39.2)
AST SERPL-CCNC: 587 U/L — HIGH (ref 0–41)
BASOPHILS # BLD AUTO: 0.04 K/UL — SIGNIFICANT CHANGE UP (ref 0–0.2)
BASOPHILS NFR BLD AUTO: 1.2 % — HIGH (ref 0–1)
BILIRUB SERPL-MCNC: 4.5 MG/DL — HIGH (ref 0.2–1.2)
BUN SERPL-MCNC: 12 MG/DL — SIGNIFICANT CHANGE UP (ref 10–20)
CALCIUM SERPL-MCNC: 8.4 MG/DL — SIGNIFICANT CHANGE UP (ref 8.4–10.5)
CHLORIDE SERPL-SCNC: 98 MMOL/L — SIGNIFICANT CHANGE UP (ref 98–110)
CO2 SERPL-SCNC: 30 MMOL/L — SIGNIFICANT CHANGE UP (ref 17–32)
CREAT SERPL-MCNC: 1.9 MG/DL — HIGH (ref 0.7–1.5)
CULTURE RESULTS: NO GROWTH — SIGNIFICANT CHANGE UP
EGFR: 39 ML/MIN/1.73M2 — LOW
EGFR: 39 ML/MIN/1.73M2 — LOW
EOSINOPHIL # BLD AUTO: 0.05 K/UL — SIGNIFICANT CHANGE UP (ref 0–0.7)
EOSINOPHIL NFR BLD AUTO: 1.5 % — SIGNIFICANT CHANGE UP (ref 0–8)
ESTIMATED AVERAGE GLUCOSE: 94 MG/DL — SIGNIFICANT CHANGE UP (ref 68–114)
GLUCOSE BLDC GLUCOMTR-MCNC: 128 MG/DL — HIGH (ref 70–99)
GLUCOSE BLDC GLUCOMTR-MCNC: 134 MG/DL — HIGH (ref 70–99)
GLUCOSE BLDC GLUCOMTR-MCNC: 147 MG/DL — HIGH (ref 70–99)
GLUCOSE BLDC GLUCOMTR-MCNC: 186 MG/DL — HIGH (ref 70–99)
GLUCOSE SERPL-MCNC: 120 MG/DL — HIGH (ref 70–99)
HBV CORE AB SER-ACNC: SIGNIFICANT CHANGE UP
HBV SURFACE AB SER-ACNC: ABNORMAL
HBV SURFACE AG SER-ACNC: SIGNIFICANT CHANGE UP
HCT VFR BLD CALC: 32.9 % — LOW (ref 42–52)
HGB BLD-MCNC: 11.9 G/DL — LOW (ref 14–18)
IMM GRANULOCYTES NFR BLD AUTO: 0.6 % — HIGH (ref 0.1–0.3)
INR BLD: 2.81 RATIO — HIGH (ref 0.65–1.3)
LYMPHOCYTES # BLD AUTO: 0.87 K/UL — LOW (ref 1.2–3.4)
LYMPHOCYTES # BLD AUTO: 25.3 % — SIGNIFICANT CHANGE UP (ref 20.5–51.1)
MAGNESIUM SERPL-MCNC: 2.3 MG/DL — SIGNIFICANT CHANGE UP (ref 1.8–2.4)
MCHC RBC-ENTMCNC: 36.2 G/DL — SIGNIFICANT CHANGE UP (ref 32–37)
MCHC RBC-ENTMCNC: 36.8 PG — HIGH (ref 27–31)
MCV RBC AUTO: 101.9 FL — HIGH (ref 80–94)
MONOCYTES # BLD AUTO: 0.1 K/UL — SIGNIFICANT CHANGE UP (ref 0.1–0.6)
MONOCYTES NFR BLD AUTO: 2.9 % — SIGNIFICANT CHANGE UP (ref 1.7–9.3)
NEUTROPHILS # BLD AUTO: 2.36 K/UL — SIGNIFICANT CHANGE UP (ref 1.4–6.5)
NEUTROPHILS NFR BLD AUTO: 68.5 % — SIGNIFICANT CHANGE UP (ref 42.2–75.2)
NRBC BLD AUTO-RTO: 0 /100 WBCS — SIGNIFICANT CHANGE UP (ref 0–0)
PLATELET # BLD AUTO: 19 K/UL — CRITICAL LOW (ref 130–400)
PMV BLD: 12.7 FL — HIGH (ref 7.4–10.4)
POTASSIUM SERPL-MCNC: 2.9 MMOL/L — LOW (ref 3.5–5)
POTASSIUM SERPL-SCNC: 2.9 MMOL/L — LOW (ref 3.5–5)
PROT SERPL-MCNC: 5.4 G/DL — LOW (ref 6–8)
PROTHROM AB SERPL-ACNC: 33.9 SEC — HIGH (ref 9.95–12.87)
RBC # BLD: 3.23 M/UL — LOW (ref 4.7–6.1)
RBC # FLD: 15.9 % — HIGH (ref 11.5–14.5)
SODIUM SERPL-SCNC: 140 MMOL/L — SIGNIFICANT CHANGE UP (ref 135–146)
SPECIMEN SOURCE: SIGNIFICANT CHANGE UP
WBC # BLD: 3.44 K/UL — LOW (ref 4.8–10.8)
WBC # FLD AUTO: 3.44 K/UL — LOW (ref 4.8–10.8)

## 2025-05-24 PROCEDURE — 99233 SBSQ HOSP IP/OBS HIGH 50: CPT | Mod: GC

## 2025-05-24 PROCEDURE — 99232 SBSQ HOSP IP/OBS MODERATE 35: CPT

## 2025-05-24 PROCEDURE — 99451 NTRPROF PH1/NTRNET/EHR 5/>: CPT

## 2025-05-24 RX ADMIN — Medication 30 MILLILITER(S): at 21:08

## 2025-05-24 RX ADMIN — GABAPENTIN 200 MILLIGRAM(S): 400 CAPSULE ORAL at 21:09

## 2025-05-24 RX ADMIN — HEPARIN SODIUM 5000 UNIT(S): 1000 INJECTION INTRAVENOUS; SUBCUTANEOUS at 13:13

## 2025-05-24 RX ADMIN — CYANOCOBALAMIN 1000 MICROGRAM(S): 1000 INJECTION INTRAMUSCULAR; SUBCUTANEOUS at 12:05

## 2025-05-24 RX ADMIN — Medication 1 APPLICATION(S): at 05:20

## 2025-05-24 RX ADMIN — HEPARIN SODIUM 5000 UNIT(S): 1000 INJECTION INTRAVENOUS; SUBCUTANEOUS at 21:08

## 2025-05-24 RX ADMIN — Medication 50 MILLIGRAM(S): at 05:19

## 2025-05-24 RX ADMIN — Medication 105 MILLIGRAM(S): at 21:09

## 2025-05-24 RX ADMIN — NALTREXONE HYDROCHLORIDE 50 MILLIGRAM(S): 50 TABLET, FILM COATED ORAL at 12:04

## 2025-05-24 RX ADMIN — Medication 25 GRAM(S): at 17:28

## 2025-05-24 RX ADMIN — Medication 50 MILLIEQUIVALENT(S): at 13:17

## 2025-05-24 RX ADMIN — Medication 2 TABLET(S): at 21:09

## 2025-05-24 RX ADMIN — INSULIN LISPRO 2: 100 INJECTION, SOLUTION INTRAVENOUS; SUBCUTANEOUS at 17:28

## 2025-05-24 RX ADMIN — Medication 25 GRAM(S): at 05:20

## 2025-05-24 RX ADMIN — FOLIC ACID 1 MILLIGRAM(S): 1 TABLET ORAL at 12:05

## 2025-05-24 RX ADMIN — Medication 400 MILLIGRAM(S): at 12:05

## 2025-05-24 RX ADMIN — Medication 30 MILLILITER(S): at 17:27

## 2025-05-24 RX ADMIN — TAMSULOSIN HYDROCHLORIDE 0.4 MILLIGRAM(S): 0.4 CAPSULE ORAL at 21:09

## 2025-05-24 RX ADMIN — LACTULOSE 20 GRAM(S): 10 SOLUTION ORAL at 05:19

## 2025-05-24 RX ADMIN — METHOCARBAMOL 500 MILLIGRAM(S): 500 TABLET, FILM COATED ORAL at 17:28

## 2025-05-24 RX ADMIN — Medication 50 MILLIEQUIVALENT(S): at 12:05

## 2025-05-24 RX ADMIN — Medication 50 MILLIEQUIVALENT(S): at 10:08

## 2025-05-24 RX ADMIN — HEPARIN SODIUM 5000 UNIT(S): 1000 INJECTION INTRAVENOUS; SUBCUTANEOUS at 05:20

## 2025-05-24 RX ADMIN — Medication 50 MILLIGRAM(S): at 17:28

## 2025-05-24 RX ADMIN — Medication 105 MILLIGRAM(S): at 05:20

## 2025-05-24 RX ADMIN — Medication 1 TABLET(S): at 12:05

## 2025-05-24 RX ADMIN — Medication 10 MILLIGRAM(S): at 12:04

## 2025-05-24 RX ADMIN — POLYETHYLENE GLYCOL 3350 17 GRAM(S): 17 POWDER, FOR SOLUTION ORAL at 12:04

## 2025-05-24 RX ADMIN — Medication 40 MILLIGRAM(S): at 06:10

## 2025-05-24 NOTE — CHART NOTE - NSCHARTNOTEFT_GEN_A_CORE
Transfer from: [facility]    Transfer to: Floor    Sign out given to:     HPI / HOSPITAL COURSE:  65 y/o male with a PMHx of alcohol use disorder, chronic DVT/PE, HTN, DLD, Diabetes, gout brought in by ambulance from [facility] who presented to the ED on 05/22/2025 after 1 day of chest pain nausea/vomiting. Found to have a profound lactic acidosis with a lactate to 21.6 and anion gap to 38, elevated osmolar gap who was admitted to the MICU and started on urgent HD with a working diagnosis of JENNIFER vs. alcoholic lactic acidosis vs. colitis w/ ischemic bowel. Surgery consulted and stated the acidosis was not from colitis and the patient was started on emperic Zosyn without any other intervention from surgical perspective. Toxicology followed and by exclusion diagnosis of JENNIFER was made w/ ANDRZEJ.     Patient was treated with 1 round of bicarb drip, HD, fomepizole, thiamine, folic acid, dextrose. Patient subsequently developed transaminitis up to AST/ALT 1250/204, likely 2/2 shock liver. The acidosis resolved and patient now has a normal lactate.     5/22 imaging:   CT head: No acute intracranial pathology.   CT C/A/P: No PE. Generalized colonic wall thickening c/f colitis.   RUQ US: Cholelithiasis without evidence of cholecystitis, diffuse hepatic steatosis.        ASSESSMENT & PLAN:   65 y/o male with a PMHx of alcohol use disorder, chronic DVT/PE, HTN, DLD, Diabetes, gout brought in by ambulance from [facility] who presented to the ED on 05/22/2025 after 1 day of chest pain nausea/vomiting found to have a profound lactic acidosis with lactate up to 21 and admitted for JENNIFER with ANDRZEJ. S/p HD, HD, fomepizole, thiamine, folic acid, dextrose for treatment. Course c/b shock liver.     # Metformin associated lactic acidosis   - Lactic acidosis resolved   - Per tox: no further need for HD or fomepizole   - Monitor LFTs and continue fluid resuscitation until LFTs downtrending to 50% of peak     # Transaminitis  - Hepatology following   - Trend liver enzymes, INR   - Alcohol cessation     # Colitis  - Continue Zosyn for 7 day total course   - Tolerating a regular diet  - Monitor bowel movements    # ANDRZEJ   - LR @ 75 cc/hr per nephrology   - Gill removed 5/24  - Check CK levels  - Check inorganic phosphate  - Crt downtrenidng  - Nephro continuing to follow     # ETOH abuse  - CIWA protocol         Briana Grewal, DO   Emergency Medicine PGY1

## 2025-05-24 NOTE — PROGRESS NOTE ADULT - SUBJECTIVE AND OBJECTIVE BOX
CAT ALSTON 64y Male  MRN#: [id number]     Hospital Day: 2d    SUBJECTIVE     No overnight events.                                             ----------------------------------------------------------  OBJECTIVE  PAST MEDICAL & SURGICAL HISTORY  Alcohol dependence    HTN (hypertension)    Hard of hearing    Seasonal allergies    BPH (benign prostatic hyperplasia)    GERD (gastroesophageal reflux disease)    Pseudogout    History of deep vein thrombosis (DVT) of lower extremity    No significant past surgical history                                              -----------------------------------------------------------  ALLERGIES:  shellfish (Hives)  No Known Drug Allergies  Beef (Hives)  dairy products (Hives)                                            ------------------------------------------------------------    HOME MEDICATIONS  Home Medications:  Claritin 10 mg oral tablet: 1 tab(s) orally once a day (13 Apr 2025 11:57)  colchicine 0.6 mg oral tablet: 1 tab(s) orally 2 times a day (13 Apr 2025 11:57)  cyanocobalamin 1000 mcg oral tablet: 1 tab(s) orally once a day (22 May 2025 10:48)  folic acid 1 mg oral tablet: 1 tab(s) orally once a day (13 Apr 2025 12:02)  Jardiance 10 mg oral tablet: 1 tab(s) orally once a day (13 Apr 2025 11:57)  methocarbamol 500 mg oral tablet: 1 orally once a day (09 Nov 2024 16:30)  Protonix 40 mg oral delayed release tablet: 1 tab(s) orally once a day (13 Apr 2025 11:57)                           MEDICATIONS:  STANDING MEDICATIONS  cetirizine 10 milliGRAM(s) Oral daily  chlorhexidine 2% Cloths 1 Application(s) Topical <User Schedule>  cyanocobalamin 1000 MICROGram(s) Oral daily  dextrose 5%. 1000 milliLiter(s) IV Continuous <Continuous>  folic acid 1 milliGRAM(s) Oral daily  gabapentin 200 milliGRAM(s) Oral at bedtime  heparin   Injectable 5000 Unit(s) SubCutaneous every 8 hours  insulin lispro (ADMELOG) corrective regimen sliding scale   SubCutaneous every 6 hours  lactulose Syrup 20 Gram(s) Oral two times a day  magnesium oxide 400 milliGRAM(s) Oral daily  methocarbamol 500 milliGRAM(s) Oral <User Schedule>  multivitamin 1 Tablet(s) Oral daily  naltrexone 50 milliGRAM(s) Oral daily  norepinephrine Infusion 0.05 MICROgram(s)/kG/Min IV Continuous <Continuous>  pantoprazole    Tablet 40 milliGRAM(s) Oral before breakfast  piperacillin/tazobactam IVPB.. 3.375 Gram(s) IV Intermittent every 12 hours  polyethylene glycol 3350 17 Gram(s) Oral daily  pyridoxine 50 milliGRAM(s) Oral every 12 hours  senna 2 Tablet(s) Oral at bedtime  sodium bicarbonate  Infusion 2.14 mEq/kG/Hr IV Continuous <Continuous>  tamsulosin 0.4 milliGRAM(s) Oral at bedtime  thiamine IVPB 500 milliGRAM(s) IV Intermittent every 8 hours    PRN MEDICATIONS  aluminum hydroxide/magnesium hydroxide/simethicone Suspension 30 milliLiter(s) Oral every 4 hours PRN  dextrose Oral Gel 15 Gram(s) Oral once PRN  LORazepam     Tablet 2 milliGRAM(s) Oral every 2 hours PRN  LORazepam     Tablet 4 milliGRAM(s) Oral every 2 hours PRN                                            ------------------------------------------------------------  VITAL SIGNS: Last 24 Hours  T(C): 37.1 (24 May 2025 00:00), Max: 37.1 (23 May 2025 12:01)  T(F): 98.8 (24 May 2025 00:00), Max: 98.8 (23 May 2025 12:01)  HR: 77 (24 May 2025 01:00) (75 - 88)  BP: 125/59 (24 May 2025 01:00) (87/51 - 159/65)  BP(mean): 85 (24 May 2025 01:00) (64 - 100)  RR: 17 (24 May 2025 01:00) (17 - 31)  SpO2: 95% (24 May 2025 01:00) (91% - 96%)      05-22-25 @ 07:01  -  05-23-25 @ 07:00  --------------------------------------------------------  IN: 4977.1 mL / OUT: 2395 mL / NET: 2582.1 mL    05-23-25 @ 07:01  -  05-24-25 @ 04:03  --------------------------------------------------------  IN: 3342.5 mL / OUT: 2600 mL / NET: 742.5 mL                                             --------------------------------------------------------------  LABS:                        10.4   6.25  )-----------( 32       ( 23 May 2025 04:15 )             29.6     05-23    137  |  95[L]  |  13  ----------------------------<  130[H]  3.3[L]   |  25  |  2.4[H]    Ca    8.3[L]      23 May 2025 16:00  Mg     1.6     05-23    TPro  5.5[L]  /  Alb  3.1[L]  /  TBili  4.5[H]  /  DBili  x   /  AST  766[H]  /  ALT  185[H]  /  AlkPhos  112  05-23    PT/INR - ( 23 May 2025 11:20 )   PT: 33.10 sec;   INR: 2.75 ratio         PTT - ( 23 May 2025 11:20 )  PTT:57.8 sec  ABG - ( 23 May 2025 14:45 )  pH, Arterial: 7.56  pH, Blood: x     /  pCO2: 41    /  pO2: 66    / HCO3: 37    / Base Excess: 13.2  /  SaO2: 95.9              Lactate, Blood: 1.7 mmol/L (05-23-25 @ 11:20)        Culture - Blood (collected 22 May 2025 05:40)  Source: Blood Blood-Venous  Preliminary Report (23 May 2025 14:01):    No growth at 24 hours    Culture - Blood (collected 22 May 2025 05:40)  Source: Blood Blood-Venous  Preliminary Report (23 May 2025 14:01):    No growth at 24 hours

## 2025-05-24 NOTE — PROGRESS NOTE ADULT - ASSESSMENT
IMPRESSION:    Severe lactic acidosis / possible JENNIFER.  SP HD  Generalized colonic wall thickening, possible colitis   ANDRZEJ   Shockon Levophed   HO alcohol use disorder   HO chronic DVT/PE,   HO HTN, DLD, Diabetes,   HO gout    PLAN    CNS:  CIWA monitoring, Benzo PRN for withdrawal, c/w folate, thiamine, MVI, Drug screen noted      HEENT: Oral care    PULMONARY: HOB @ 45 degrees, aspiration precautions. On RA. repeat ABG.  OP CT chest NC     CARDIAC: ECHO in 3/25 EF 60%.  NS hydration for now.  GDFR.  Wean levophed.  Hold antihypertensives     GASTROENTEROLOGY:  Feeding.  GI prophylaxis.   RUQ sono noted, Trend LFTs.  Hep panel.  FU wit Tox regarding NAC     RENAL:  Monitor lytes.  Correct as needed,  Bladder scans.  Refused Will     INFECTIOUS: FU cultures. Zosyn for now. Procal.  Cdiff and GI PCR    HEMATOLOGY: Duplex with chronic DVTs in the popliteals. No PE on CTA.  Monitor CBC and Coags.  DVT prophylaxis.  SP vascular eval     ENDOCRINOLOGY: Check FS, insulin protocol as needed.    MUSCULOSKELETAL: Bed chair.  PT OT     DISPOSITION: MICU    ********************************* INCOMPLETE NOTE ********************************     IMPRESSION:    Severe lactic acidosis / possible JENNIFER.  SP HD  Generalized colonic wall thickening, possible colitis   ANDRZEJ   Shock, resolved   HO alcohol use disorder   HO chronic DVT/PE,   HO HTN, DLD, Diabetes,   HO gout    PLAN    CNS:  CIWA monitoring, Benzo PRN for withdrawal, has not received in ~48h.  c/w folate, thiamine, MVI, Drug screen noted.  Stopped fomepizole.    HEENT: Oral care    PULMONARY: HOB @ 45 degrees, aspiration precautions. On RA. repeat ABG.  OP CT chest NC     CARDIAC: ECHO in 3/25 EF 60%.  Stop standing IVF. GDFR.  Off levophed >24h.  Hold antihypertensives     GASTROENTEROLOGY:  Feeding.  GI prophylaxis not indicated.   RUQ sono noted, Trend LFTs.  Hep panel.  FU wit Tox regarding NAC     RENAL:  Monitor lytes.  Correct as needed,  Bladder scans.  Refused Will.  Remove HD cath today.    INFECTIOUS: FU cultures. Zosyn for now. Procal.  Cdiff and GI PCR    HEMATOLOGY: Duplex with chronic DVTs in the popliteals. No PE on CTA.  Monitor CBC and Coags.  DVT prophylaxis.  SP vascular eval.  Pancytopenia with chronic thrombocytopenia, send citrated platelet.  May need hematology eval.    ENDOCRINOLOGY: Check FS, insulin protocol as needed.    MUSCULOSKELETAL: OOBAT. PT OT       DISPOSITION: [facility]  LINES: fem HD cath, remove today, PIV  CODE: FULL

## 2025-05-24 NOTE — PROGRESS NOTE ADULT - SUBJECTIVE AND OBJECTIVE BOX
Patient is a 64y old  Male who presents with a chief complaint of Chest pain (24 May 2025 04:01)        Over Night Events:        ROS:     All ROS are negative except HPI   Unable to assess ROS: patient intubated.        PHYSICAL EXAM    ICU Vital Signs Last 24 Hrs  T(C): 36.4 (24 May 2025 04:00), Max: 37.1 (23 May 2025 12:01)  T(F): 97.5 (24 May 2025 04:00), Max: 98.8 (23 May 2025 12:01)  HR: 92 (24 May 2025 06:00) (75 - 92)  BP: 139/84 (24 May 2025 06:00) (96/52 - 159/65)  BP(mean): 105 (24 May 2025 06:00) (70 - 105)  ABP: --  ABP(mean): --  RR: 31 (24 May 2025 06:00) (13 - 31)  SpO2: 95% (24 May 2025 06:00) (92% - 96%)    O2 Parameters below as of 24 May 2025 07:00  Patient On (Oxygen Delivery Method): room air            Constitutional: no acute distress, well nourished well developed  Neuro: moving all 4 limbs spontaneously, no facial droop or dysarthria  HEENT: NCAT, anicteric  Neck: no visible lymphadenopathy or goiter  Pulm: no respiratory distress. clear to auscultation bilaterally  Cardiac: extremities appear pink and well-perfused.  regular rhythm and rate, no murmur detected  Abdomen: non-distended  Extremities: no peripheral edema      05-23-25 @ 07:01  -  05-24-25 @ 07:00  --------------------------------------------------------  IN:    IV PiggyBack: 375 mL    IV PiggyBack: 1850 mL    IV PiggyBack: 1125 mL    Norepinephrine: 55 mL  Total IN: 3405 mL    OUT:    Voided (mL): 3350 mL  Total OUT: 3350 mL    Total NET: 55 mL          LABS:                            10.4   6.25  )-----------( 32       ( 23 May 2025 04:15 )             29.6                                               05-23    137  |  95[L]  |  13  ----------------------------<  130[H]  3.3[L]   |  25  |  2.4[H]    Ca    8.3[L]      23 May 2025 16:00  Mg     1.6     05-23    TPro  5.5[L]  /  Alb  3.1[L]  /  TBili  4.5[H]  /  DBili  x   /  AST  766[H]  /  ALT  185[H]  /  AlkPhos  112  05-23      PT/INR - ( 23 May 2025 11:20 )   PT: 33.10 sec;   INR: 2.75 ratio         PTT - ( 23 May 2025 11:20 )  PTT:57.8 sec                                       Urinalysis Basic - ( 23 May 2025 16:00 )    Color: x / Appearance: x / SG: x / pH: x  Gluc: 130 mg/dL / Ketone: x  / Bili: x / Urobili: x   Blood: x / Protein: x / Nitrite: x   Leuk Esterase: x / RBC: x / WBC x   Sq Epi: x / Non Sq Epi: x / Bacteria: x                                                  LIVER FUNCTIONS - ( 23 May 2025 16:00 )  Alb: 3.1 g/dL / Pro: 5.5 g/dL / ALK PHOS: 112 U/L / ALT: 185 U/L / AST: 766 U/L / GGT: x                                                  Culture - Blood (collected 22 May 2025 05:40)  Source: Blood Blood-Venous  Preliminary Report (23 May 2025 14:01):    No growth at 24 hours    Culture - Blood (collected 22 May 2025 05:40)  Source: Blood Blood-Venous  Preliminary Report (23 May 2025 14:01):    No growth at 24 hours                                                                                       ABG - ( 23 May 2025 14:45 )  pH, Arterial: 7.56  pH, Blood: x     /  pCO2: 41    /  pO2: 66    / HCO3: 37    / Base Excess: 13.2  /  SaO2: 95.9                MEDICATIONS  (STANDING):  cetirizine 10 milliGRAM(s) Oral daily  chlorhexidine 2% Cloths 1 Application(s) Topical <User Schedule>  cyanocobalamin 1000 MICROGram(s) Oral daily  dextrose 5%. 1000 milliLiter(s) (50 mL/Hr) IV Continuous <Continuous>  folic acid 1 milliGRAM(s) Oral daily  gabapentin 200 milliGRAM(s) Oral at bedtime  heparin   Injectable 5000 Unit(s) SubCutaneous every 8 hours  insulin lispro (ADMELOG) corrective regimen sliding scale   SubCutaneous every 6 hours  lactulose Syrup 20 Gram(s) Oral two times a day  magnesium oxide 400 milliGRAM(s) Oral daily  methocarbamol 500 milliGRAM(s) Oral <User Schedule>  multivitamin 1 Tablet(s) Oral daily  naltrexone 50 milliGRAM(s) Oral daily  norepinephrine Infusion 0.05 MICROgram(s)/kG/Min (6.57 mL/Hr) IV Continuous <Continuous>  pantoprazole    Tablet 40 milliGRAM(s) Oral before breakfast  piperacillin/tazobactam IVPB.. 3.375 Gram(s) IV Intermittent every 12 hours  polyethylene glycol 3350 17 Gram(s) Oral daily  pyridoxine 50 milliGRAM(s) Oral every 12 hours  senna 2 Tablet(s) Oral at bedtime  sodium bicarbonate  Infusion 2.14 mEq/kG/Hr (1000 mL/Hr) IV Continuous <Continuous>  tamsulosin 0.4 milliGRAM(s) Oral at bedtime  thiamine IVPB 500 milliGRAM(s) IV Intermittent every 8 hours    MEDICATIONS  (PRN):  aluminum hydroxide/magnesium hydroxide/simethicone Suspension 30 milliLiter(s) Oral every 4 hours PRN Dyspepsia  dextrose Oral Gel 15 Gram(s) Oral once PRN Blood Glucose LESS THAN 70 milliGRAM(s)/deciliter  LORazepam     Tablet 2 milliGRAM(s) Oral every 2 hours PRN CIWA 8-14  LORazepam     Tablet 4 milliGRAM(s) Oral every 2 hours PRN CIWA 15 or greater, or seizure      New X-rays reviewed:       ECHO reviewed    CXR interpreted by me:       Patient is a 64y old  Male who presents with a chief complaint of Chest pain (24 May 2025 04:01)        Over Night Events:    No events overnight  no AG this AM  LFT trending down    ROS:     All ROS are negative except HPI       PHYSICAL EXAM    ICU Vital Signs Last 24 Hrs  T(C): 36.4 (24 May 2025 04:00), Max: 37.1 (23 May 2025 12:01)  T(F): 97.5 (24 May 2025 04:00), Max: 98.8 (23 May 2025 12:01)  HR: 92 (24 May 2025 06:00) (75 - 92)  BP: 139/84 (24 May 2025 06:00) (96/52 - 159/65)  BP(mean): 105 (24 May 2025 06:00) (70 - 105)  ABP: --  ABP(mean): --  RR: 31 (24 May 2025 06:00) (13 - 31)  SpO2: 95% (24 May 2025 06:00) (92% - 96%)    O2 Parameters below as of 24 May 2025 07:00  Patient On (Oxygen Delivery Method): room air            Constitutional: no acute distress, well nourished well developed  Neuro: moving all 4 limbs spontaneously, no facial droop or dysarthria  HEENT: NCAT, anicteric  Neck: no visible lymphadenopathy or goiter  Pulm: no respiratory distress. clear to auscultation bilaterally  Cardiac: extremities appear pink and well-perfused.  regular rhythm and rate, no murmur detected  Abdomen: non-distended  Extremities: no peripheral edema      05-23-25 @ 07:01  -  05-24-25 @ 07:00  --------------------------------------------------------  IN:    IV PiggyBack: 375 mL    IV PiggyBack: 1850 mL    IV PiggyBack: 1125 mL    Norepinephrine: 55 mL  Total IN: 3405 mL    OUT:    Voided (mL): 3350 mL  Total OUT: 3350 mL    Total NET: 55 mL          LABS:                            10.4   6.25  )-----------( 32       ( 23 May 2025 04:15 )             29.6                                               05-23    137  |  95[L]  |  13  ----------------------------<  130[H]  3.3[L]   |  25  |  2.4[H]    Ca    8.3[L]      23 May 2025 16:00  Mg     1.6     05-23    TPro  5.5[L]  /  Alb  3.1[L]  /  TBili  4.5[H]  /  DBili  x   /  AST  766[H]  /  ALT  185[H]  /  AlkPhos  112  05-23      PT/INR - ( 23 May 2025 11:20 )   PT: 33.10 sec;   INR: 2.75 ratio         PTT - ( 23 May 2025 11:20 )  PTT:57.8 sec                                       Urinalysis Basic - ( 23 May 2025 16:00 )    Color: x / Appearance: x / SG: x / pH: x  Gluc: 130 mg/dL / Ketone: x  / Bili: x / Urobili: x   Blood: x / Protein: x / Nitrite: x   Leuk Esterase: x / RBC: x / WBC x   Sq Epi: x / Non Sq Epi: x / Bacteria: x                                                  LIVER FUNCTIONS - ( 23 May 2025 16:00 )  Alb: 3.1 g/dL / Pro: 5.5 g/dL / ALK PHOS: 112 U/L / ALT: 185 U/L / AST: 766 U/L / GGT: x                                                  Culture - Blood (collected 22 May 2025 05:40)  Source: Blood Blood-Venous  Preliminary Report (23 May 2025 14:01):    No growth at 24 hours    Culture - Blood (collected 22 May 2025 05:40)  Source: Blood Blood-Venous  Preliminary Report (23 May 2025 14:01):    No growth at 24 hours                                                                                       ABG - ( 23 May 2025 14:45 )  pH, Arterial: 7.56  pH, Blood: x     /  pCO2: 41    /  pO2: 66    / HCO3: 37    / Base Excess: 13.2  /  SaO2: 95.9                MEDICATIONS  (STANDING):  cetirizine 10 milliGRAM(s) Oral daily  chlorhexidine 2% Cloths 1 Application(s) Topical <User Schedule>  cyanocobalamin 1000 MICROGram(s) Oral daily  dextrose 5%. 1000 milliLiter(s) (50 mL/Hr) IV Continuous <Continuous>  folic acid 1 milliGRAM(s) Oral daily  gabapentin 200 milliGRAM(s) Oral at bedtime  heparin   Injectable 5000 Unit(s) SubCutaneous every 8 hours  insulin lispro (ADMELOG) corrective regimen sliding scale   SubCutaneous every 6 hours  lactulose Syrup 20 Gram(s) Oral two times a day  magnesium oxide 400 milliGRAM(s) Oral daily  methocarbamol 500 milliGRAM(s) Oral <User Schedule>  multivitamin 1 Tablet(s) Oral daily  naltrexone 50 milliGRAM(s) Oral daily  norepinephrine Infusion 0.05 MICROgram(s)/kG/Min (6.57 mL/Hr) IV Continuous <Continuous>  pantoprazole    Tablet 40 milliGRAM(s) Oral before breakfast  piperacillin/tazobactam IVPB.. 3.375 Gram(s) IV Intermittent every 12 hours  polyethylene glycol 3350 17 Gram(s) Oral daily  pyridoxine 50 milliGRAM(s) Oral every 12 hours  senna 2 Tablet(s) Oral at bedtime  sodium bicarbonate  Infusion 2.14 mEq/kG/Hr (1000 mL/Hr) IV Continuous <Continuous>  tamsulosin 0.4 milliGRAM(s) Oral at bedtime  thiamine IVPB 500 milliGRAM(s) IV Intermittent every 8 hours    MEDICATIONS  (PRN):  aluminum hydroxide/magnesium hydroxide/simethicone Suspension 30 milliLiter(s) Oral every 4 hours PRN Dyspepsia  dextrose Oral Gel 15 Gram(s) Oral once PRN Blood Glucose LESS THAN 70 milliGRAM(s)/deciliter  LORazepam     Tablet 2 milliGRAM(s) Oral every 2 hours PRN CIWA 8-14  LORazepam     Tablet 4 milliGRAM(s) Oral every 2 hours PRN CIWA 15 or greater, or seizure

## 2025-05-24 NOTE — PROGRESS NOTE ADULT - SUBJECTIVE AND OBJECTIVE BOX
Nephrology progress note    Patient is seen and examined, events over the last 24 h noted.    Allergies:  shellfish (Hives)  No Known Drug Allergies  Beef (Hives)  dairy products (Hives)    Hospital Medications:   MEDICATIONS  (STANDING):  cetirizine 10 milliGRAM(s) Oral daily  chlorhexidine 2% Cloths 1 Application(s) Topical <User Schedule>  cyanocobalamin 1000 MICROGram(s) Oral daily  dextrose 5%. 1000 milliLiter(s) (50 mL/Hr) IV Continuous <Continuous>  folic acid 1 milliGRAM(s) Oral daily  gabapentin 200 milliGRAM(s) Oral at bedtime  heparin   Injectable 5000 Unit(s) SubCutaneous every 8 hours  insulin lispro (ADMELOG) corrective regimen sliding scale   SubCutaneous every 6 hours  lactulose Syrup 20 Gram(s) Oral two times a day  magnesium oxide 400 milliGRAM(s) Oral daily  methocarbamol 500 milliGRAM(s) Oral <User Schedule>  multivitamin 1 Tablet(s) Oral daily  naltrexone 50 milliGRAM(s) Oral daily  norepinephrine Infusion 0.05 MICROgram(s)/kG/Min (6.57 mL/Hr) IV Continuous <Continuous>  pantoprazole    Tablet 40 milliGRAM(s) Oral before breakfast  piperacillin/tazobactam IVPB.. 3.375 Gram(s) IV Intermittent every 12 hours  polyethylene glycol 3350 17 Gram(s) Oral daily  pyridoxine 50 milliGRAM(s) Oral every 12 hours  senna 2 Tablet(s) Oral at bedtime  sodium bicarbonate  Infusion 2.14 mEq/kG/Hr (1000 mL/Hr) IV Continuous <Continuous>  tamsulosin 0.4 milliGRAM(s) Oral at bedtime  thiamine IVPB 500 milliGRAM(s) IV Intermittent every 8 hours        VITALS:  T(F): 97.8 (05-24-25 @ 08:00), Max: 98.8 (05-23-25 @ 12:01)  HR: 94 (05-24-25 @ 09:00)  BP: 135/61 (05-24-25 @ 09:00)  RR: 18 (05-24-25 @ 09:00)  SpO2: 97% (05-24-25 @ 09:00)  Wt(kg): --    05-22 @ 07:01  -  05-23 @ 07:00  --------------------------------------------------------  IN: 4977.1 mL / OUT: 2395 mL / NET: 2582.1 mL    05-23 @ 07:01  -  05-24 @ 07:00  --------------------------------------------------------  IN: 3405 mL / OUT: 3350 mL / NET: 55 mL          PHYSICAL EXAM:  Constitutional: NAD  Respiratory: CTAB,   Cardiovascular: S1, S2, RRR  Gastrointestinal: BS+, soft, NT/ND  Extremities: No cyanosis or clubbing. No peripheral edema  :  No castellon.   Skin: No rashes      LABS:  05-24    140  |  98  |  12  ----------------------------<  120[H]  2.9[L]   |  30  |  1.9[H]    Ca    8.4      24 May 2025 07:00  Mg     2.3     05-24    TPro  5.4[L]  /  Alb  3.2[L]  /  TBili  4.5[H]  /  DBili      /  AST  587[H]  /  ALT  186[H]  /  AlkPhos  119[H]  05-24                          11.9   3.44  )-----------( 19       ( 24 May 2025 07:00 )             32.9       Urine Studies:  Urinalysis Basic - ( 24 May 2025 07:00 )    Color:  / Appearance:  / SG:  / pH:   Gluc: 120 mg/dL / Ketone:   / Bili:  / Urobili:    Blood:  / Protein:  / Nitrite:    Leuk Esterase:  / RBC:  / WBC    Sq Epi:  / Non Sq Epi:  / Bacteria:       Sodium, Random Urine: 41.0 mmoL/L (05-22 @ 14:19)  Creatinine, Random Urine: 79 mg/dL (05-22 @ 14:19)  Protein/Creatinine Ratio Calculation: 0.3 Ratio (05-22 @ 14:19)  Osmolality, Random Urine: 375 mos/kg (05-22 @ 14:19)  Potassium, Random Urine: 43 mmol/L (05-22 @ 14:19)    RADIOLOGY & ADDITIONAL STUDIES:

## 2025-05-24 NOTE — PROGRESS NOTE ADULT - ASSESSMENT
65 y/o male with a PMHx of alcohol use disorder, presenting to the ED after 1 day of chest pain nausea/vomiting. Nephrology was consulted for acidosis and ANDRZEJ. Patient endorses mild abdominal pain but denies headache, shortness of breath, or urinary symptoms. Labs indicated ANDRZEJ with his Cr of 1.6 (baseline), and his venous blood gas indicating a mixed acid-base disorder of metabolic acidosis concurrent with respiratory acidosis (CO2 higher than expected) and metabolic alkalosis, with a high lactate indicating a possible involvement of his metformin medication.     #Mixed Acid base disorder 2/2 JENNIFER  metabolic acidosis concurrent with respiratory acidosis and metabolic alkalosis  nonoliguric, creat improving off HD  - sp HD 05/22  - DC bicarb drip, change to LR @ 75 cc/hr  - follow metformin levels   - may d/c [place], no further HD planned   - check CK levels follow LFT  - check IP     Nephrology will follow

## 2025-05-25 LAB
ALBUMIN SERPL ELPH-MCNC: 3.1 G/DL — LOW (ref 3.5–5.2)
ALP SERPL-CCNC: 120 U/L — HIGH (ref 30–115)
ALT FLD-CCNC: 138 U/L — HIGH (ref 0–41)
ANION GAP SERPL CALC-SCNC: 10 MMOL/L — SIGNIFICANT CHANGE UP (ref 7–14)
AST SERPL-CCNC: 281 U/L — HIGH (ref 0–41)
BILIRUB SERPL-MCNC: 4.8 MG/DL — HIGH (ref 0.2–1.2)
BUN SERPL-MCNC: 12 MG/DL — SIGNIFICANT CHANGE UP (ref 10–20)
CALCIUM SERPL-MCNC: 8.1 MG/DL — LOW (ref 8.4–10.5)
CHLORIDE SERPL-SCNC: 94 MMOL/L — LOW (ref 98–110)
CO2 SERPL-SCNC: 27 MMOL/L — SIGNIFICANT CHANGE UP (ref 17–32)
CREAT SERPL-MCNC: 1.3 MG/DL — SIGNIFICANT CHANGE UP (ref 0.7–1.5)
EGFR: 61 ML/MIN/1.73M2 — SIGNIFICANT CHANGE UP
EGFR: 61 ML/MIN/1.73M2 — SIGNIFICANT CHANGE UP
GLUCOSE BLDC GLUCOMTR-MCNC: 128 MG/DL — HIGH (ref 70–99)
GLUCOSE BLDC GLUCOMTR-MCNC: 132 MG/DL — HIGH (ref 70–99)
GLUCOSE BLDC GLUCOMTR-MCNC: 133 MG/DL — HIGH (ref 70–99)
GLUCOSE BLDC GLUCOMTR-MCNC: 146 MG/DL — HIGH (ref 70–99)
GLUCOSE BLDC GLUCOMTR-MCNC: 154 MG/DL — HIGH (ref 70–99)
GLUCOSE BLDC GLUCOMTR-MCNC: 166 MG/DL — HIGH (ref 70–99)
GLUCOSE SERPL-MCNC: 124 MG/DL — HIGH (ref 70–99)
HCT VFR BLD CALC: 23.3 % — LOW (ref 42–52)
HGB BLD-MCNC: 8.4 G/DL — LOW (ref 14–18)
INR BLD: 2.22 RATIO — HIGH (ref 0.65–1.3)
MCHC RBC-ENTMCNC: 36.1 G/DL — SIGNIFICANT CHANGE UP (ref 32–37)
MCHC RBC-ENTMCNC: 36.4 PG — HIGH (ref 27–31)
MCV RBC AUTO: 100.9 FL — HIGH (ref 80–94)
NRBC BLD AUTO-RTO: 0 /100 WBCS — SIGNIFICANT CHANGE UP (ref 0–0)
PLATELET # BLD AUTO: 20 K/UL — LOW (ref 130–400)
PMV BLD: 12.5 FL — HIGH (ref 7.4–10.4)
POTASSIUM SERPL-MCNC: 3 MMOL/L — LOW (ref 3.5–5)
POTASSIUM SERPL-SCNC: 3 MMOL/L — LOW (ref 3.5–5)
PROT SERPL-MCNC: 4.8 G/DL — LOW (ref 6–8)
PROTHROM AB SERPL-ACNC: 26.6 SEC — HIGH (ref 9.95–12.87)
RBC # BLD: 2.31 M/UL — LOW (ref 4.7–6.1)
RBC # FLD: 15.6 % — HIGH (ref 11.5–14.5)
SODIUM SERPL-SCNC: 131 MMOL/L — LOW (ref 135–146)
WBC # BLD: 3.85 K/UL — LOW (ref 4.8–10.8)
WBC # FLD AUTO: 3.85 K/UL — LOW (ref 4.8–10.8)

## 2025-05-25 PROCEDURE — 99233 SBSQ HOSP IP/OBS HIGH 50: CPT

## 2025-05-25 PROCEDURE — 99222 1ST HOSP IP/OBS MODERATE 55: CPT

## 2025-05-25 PROCEDURE — 99232 SBSQ HOSP IP/OBS MODERATE 35: CPT

## 2025-05-25 RX ADMIN — LACTULOSE 20 GRAM(S): 10 SOLUTION ORAL at 05:19

## 2025-05-25 RX ADMIN — Medication 50 MILLIGRAM(S): at 17:35

## 2025-05-25 RX ADMIN — Medication 1 TABLET(S): at 11:30

## 2025-05-25 RX ADMIN — TAMSULOSIN HYDROCHLORIDE 0.4 MILLIGRAM(S): 0.4 CAPSULE ORAL at 21:26

## 2025-05-25 RX ADMIN — Medication 40 MILLIEQUIVALENT(S): at 17:35

## 2025-05-25 RX ADMIN — Medication 105 MILLIGRAM(S): at 05:19

## 2025-05-25 RX ADMIN — Medication 30 MILLILITER(S): at 05:19

## 2025-05-25 RX ADMIN — LACTULOSE 20 GRAM(S): 10 SOLUTION ORAL at 17:35

## 2025-05-25 RX ADMIN — INSULIN LISPRO 2: 100 INJECTION, SOLUTION INTRAVENOUS; SUBCUTANEOUS at 11:25

## 2025-05-25 RX ADMIN — HEPARIN SODIUM 5000 UNIT(S): 1000 INJECTION INTRAVENOUS; SUBCUTANEOUS at 05:19

## 2025-05-25 RX ADMIN — Medication 1 APPLICATION(S): at 05:24

## 2025-05-25 RX ADMIN — NALTREXONE HYDROCHLORIDE 50 MILLIGRAM(S): 50 TABLET, FILM COATED ORAL at 11:30

## 2025-05-25 RX ADMIN — METHOCARBAMOL 500 MILLIGRAM(S): 500 TABLET, FILM COATED ORAL at 17:37

## 2025-05-25 RX ADMIN — Medication 2 TABLET(S): at 21:26

## 2025-05-25 RX ADMIN — GABAPENTIN 200 MILLIGRAM(S): 400 CAPSULE ORAL at 21:26

## 2025-05-25 RX ADMIN — Medication 25 GRAM(S): at 17:37

## 2025-05-25 RX ADMIN — Medication 30 MILLILITER(S): at 21:26

## 2025-05-25 RX ADMIN — POLYETHYLENE GLYCOL 3350 17 GRAM(S): 17 POWDER, FOR SOLUTION ORAL at 11:30

## 2025-05-25 RX ADMIN — Medication 400 MILLIGRAM(S): at 11:30

## 2025-05-25 RX ADMIN — Medication 50 MILLIGRAM(S): at 05:19

## 2025-05-25 RX ADMIN — Medication 25 GRAM(S): at 05:20

## 2025-05-25 RX ADMIN — Medication 40 MILLIEQUIVALENT(S): at 21:26

## 2025-05-25 RX ADMIN — FOLIC ACID 1 MILLIGRAM(S): 1 TABLET ORAL at 11:31

## 2025-05-25 RX ADMIN — CYANOCOBALAMIN 1000 MICROGRAM(S): 1000 INJECTION INTRAMUSCULAR; SUBCUTANEOUS at 11:30

## 2025-05-25 RX ADMIN — HEPARIN SODIUM 5000 UNIT(S): 1000 INJECTION INTRAVENOUS; SUBCUTANEOUS at 14:37

## 2025-05-25 RX ADMIN — Medication 10 MILLIGRAM(S): at 11:31

## 2025-05-25 NOTE — PROGRESS NOTE ADULT - ASSESSMENT
63 y/o male with a PMHx of alcohol use disorder, chronic DVT/PE, HTN, DLD, Diabetes, gout brought in by ambulance from [facility] presenting to the ED after 1 day of chest pain nausea/vomiting     Patient stated that, yesterday morning, he started vomiting, which continued all day long and got worse, so had come to ED for evaluation. Patient is c/o chest pain, started after vomiting several times. Denies trauma. Denies HA, but is c/o intermittent episodes of lightheadedness. Denies sob/abd pain. Denies  symptoms. Patient is a poor historian and is hard of hearing. Patient could not provide any further details on his health/illness, and most of the information is obtained from reviewing his previous admission/discharge summaries. No trauma. Patient stated that, he drinks alcohol regularly and last drink was yesterday. Denies f/c/diarrhea. Denies OD on medications. Denies drinking toxic alcohols recently (Methanol/ethylene glycol related products).  Denies OD on Metformin.  Denies urinary symptoms, reports that he hasn't made urine in two days.  NO travel, no rash.    #Severe lactic acidosis resolved  lactate high 27 on admission   - s/p fomepizole  - s/p bicarb drip  - s/p NAC   - s/p HD on 5/22   - creat today is 1.3    #HO alcohol use disorder  [facility] protocol - standing and prn   - thiamine, folate, multivitamin   - drug screen positive only for benzodiazepines    #Generalized colonic wall thickening, possible colitis   - - - on zosyn   # Hypokalemia-- replete orally  #HO chronic DVT/PE  - duplex redemonstrating chronic DVT    #HO  Diabetes on metformin at home  # pancytopenia-- related to alcohol use  seen by hematology-- hemolysis w/u pending  severe thrombocytopenia  # Cirrhosis of liver-- sec to alcohol use with high INR-- give vit K  #HO gout   on colchicine  #Diet DASH  #Activity IAT  #GI  #DVT ppx heparin subq  #Pending -  will go to [facility] on tuesday   65 y/o male with a PMHx of alcohol use disorder, chronic DVT/PE, HTN, DLD, Diabetes, gout brought in by ambulance from [facility] presenting to the ED after 1 day of chest pain nausea/vomiting     Patient stated that, yesterday morning, he started vomiting, which continued all day long and got worse, so had come to ED for evaluation. Patient is c/o chest pain, started after vomiting several times. Denies trauma. Denies HA, but is c/o intermittent episodes of lightheadedness. Denies sob/abd pain. Denies  symptoms. Patient is a poor historian and is hard of hearing. Patient could not provide any further details on his health/illness, and most of the information is obtained from reviewing his previous admission/discharge summaries. No trauma. Patient stated that, he drinks alcohol regularly and last drink was yesterday. Denies f/c/diarrhea. Denies OD on medications. Denies drinking toxic alcohols recently (Methanol/ethylene glycol related products).  Denies OD on Metformin.  Denies urinary symptoms, reports that he hasn't made urine in two days.  NO travel, no rash.    #Severe lactic acidosis resolved  lactate high 27 on admission   - s/p fomepizole  - s/p bicarb drip  - s/p NAC   - s/p HD on 5/22   - creat today is 1.3    #HO alcohol use disorder  [facility] protocol - standing and prn   - thiamine, folate, multivitamin   - drug screen positive only for benzodiazepines    #Generalized colonic wall thickening, possible colitis   - - - on zosyn   # Hypokalemia-- replete orally  #HO chronic DVT/PE  - duplex redemonstrating chronic DVT    #HO  Diabetes on metformin at home  # pancytopenia-- related to alcohol use  seen by hematology-- hemolysis w/u pending  severe thrombocytopenia  # Cirrhosis of liver-- sec to alcohol use with high INR-- give vit K  #HO gout   on colchicine  #Diet DASH  #Activity IAT  #GI  #DVT ppx -- cannot get heparin due to low platelets  #Pending -  will go to [facility] on tuesday

## 2025-05-25 NOTE — CONSULT NOTE ADULT - ATTENDING COMMENTS
Seen on 5/22/25    ACS Attending Note Attestation    Patient is examined and evaluated at the bedside with the residents/PAs. Treatment plan discussed with the team, nurses, and consulting physicians and consulting teams. Medications, radiological studies and all other relevant studies reviewed. I reviewed the resident/PA note and agreed with above assessment and plan with following additions and corrections. Time devoted to teaching and to any procedures I billed separately is not included.     denies abdominal pain, has history of alcohol abuse      Physical Exam:   I independently performed a medically appropriate exam. The exam was notable for    Abd: soft non tender, not distended    Labs: I have reviewed the labs on system    Radiology: I have reviewed and interpreted the imaging  CT shows decompressed bowel loops, no obstruction, the colon is decompressed, there is a concern for colitis due to the wall thickening, however, it is unlikely to be the case, there is air and fecal matter in the rectum, no lisa colonic fat stranding    Assessment:   lactic acidosis, alcohol abuse  unlikely to have ischemic colitis     Plan:	  no surgical intervention   recommend adequate hydration, I:O monitoring, repeat LA  measuring base deficit to help with assessment of perfusion   outpatient colonoscopy  remaining as per primary team   we will continue to follow     [ 75]       minutes spent on total encounter. The necessity of the time above spent during the encounter on this date of service as described above.    Dari Givens MD  Trauma/ACS/Surgical Critical care Attending
64 y o  M with hx of pseudogout AUD MISTY cirrhosis PE on AC admitted with chest pain and vomiting seen for cirrhosis with elevated liver enzymes.     No abdominal pain or bloody stool. No hallucinations.   Was drinking a pint of beer daily. Reports drinking 1/2 pint now. No DUI. Alcohol rehab few years ago.   Worked in [employer]? Retired.  Single no kids.  Lives in assisted living. No family support locally.     Alert oriented in place person month and year  Icterus+  Abdomen soft non tender  No asterixis   Tremors +    Hypotension ? distributive shock  Decompensated MISTY cirrhosis with jaundice MELD 3.0 - 32  Ischemic hepatitis with alcoholic hepatitis   Coagulopathy ? was on AC  Alcohol withdrawal  Macrocytic anemia      NAC trial reasonable though doubtful benefit.   High MDF but will hold off on prednisone with hypotension and active withdrawal  Alcohol withdrawal protocol   Monitor liver tests INR  Will need EGD as OP  Not a LT candidate with ongoing alcohol use despite several encounters advising stopping alcohol use.
Impression and plan above have been altered and are my own.
Patient seen and examined independently   # has mixed resp HAGMA and metabolic alkalosis  reported to be on metformin as OP   patient has worsening acidosis despite bicarb drip   high likelihood to have JENNIFER   will need to start RRT  please obtain U dall    check metformin level  checl serum osmolarity if high osmolar gap will need fomepizole  CT scan noted have surgery eval please   discussed with MICU team
The patient was seen. Agree with above.  Chronic pancytopenia, most likely due to alcohol abuse.   Labs and CT reviewed. Transaminitis.  CT AP showed apparent generalized colonic wall thickening, colitis? on ABX   Supportive care.  Discussed to stop or reduce alcohol drink.

## 2025-05-25 NOTE — PROGRESS NOTE ADULT - ASSESSMENT
63 y/o male with a PMHx of alcohol use disorder, presenting to the ED after 1 day of chest pain nausea/vomiting.  Labs indicated ANDRZEJ with Cr 1.6 (at baseline), and his venous blood gas indicating a mixed acid-base disorder of metabolic acidosis concurrent with respiratory acidosis (CO2 higher than expected) and metabolic alkalosis, with a high lactate indicating a possible involvement of his metformin medication.  Nephrology was consulted for acidosis and ANDRZEJ.    #Mixed Acid base disorder 2/2 JENNIFER  metabolic acidosis concurrent with respiratory acidosis and metabolic alkalosis  nonoliguric, creat improving off HD  s/p HD 05/22, has not required further RRT    udall removed 05/24  - DC IV fluids, encourage oral rehydration  - follow metformin levels   - check IP  - strict I/O charting  - dose medications for eGFR <30  - avoid nephrotoxins:  NSAIDS, IV contrast, Aminoglycosides, fleet enemas  - Low K, Low Phos diet  - no acute indication for RRT at this time    Nephrology will follow

## 2025-05-25 NOTE — PROGRESS NOTE ADULT - SUBJECTIVE AND OBJECTIVE BOX
Nephrology progress note    Patient is seen and examined, events over the last 24 h noted.  Renal function stable off HD, nonoliguric, acidosis resolved.    Allergies:  shellfish (Hives)  No Known Drug Allergies  Beef (Hives)  dairy products (Hives)    Hospital Medications:   MEDICATIONS  (STANDING):  cetirizine 10 milliGRAM(s) Oral daily  chlorhexidine 2% Cloths 1 Application(s) Topical <User Schedule>  cyanocobalamin 1000 MICROGram(s) Oral daily  dextrose 5%. 1000 milliLiter(s) (50 mL/Hr) IV Continuous <Continuous>  folic acid 1 milliGRAM(s) Oral daily  gabapentin 200 milliGRAM(s) Oral at bedtime  heparin   Injectable 5000 Unit(s) SubCutaneous every 8 hours  insulin lispro (ADMELOG) corrective regimen sliding scale   SubCutaneous every 6 hours  lactulose Syrup 20 Gram(s) Oral two times a day  magnesium oxide 400 milliGRAM(s) Oral daily  methocarbamol 500 milliGRAM(s) Oral <User Schedule>  multivitamin 1 Tablet(s) Oral daily  naltrexone 50 milliGRAM(s) Oral daily  piperacillin/tazobactam IVPB.. 3.375 Gram(s) IV Intermittent every 12 hours  polyethylene glycol 3350 17 Gram(s) Oral daily  pyridoxine 50 milliGRAM(s) Oral every 12 hours  senna 2 Tablet(s) Oral at bedtime  sodium bicarbonate  Infusion 2.14 mEq/kG/Hr (1000 mL/Hr) IV Continuous <Continuous>  tamsulosin 0.4 milliGRAM(s) Oral at bedtime        VITALS:  T(F): 98.2 (05-25-25 @ 08:00), Max: 99.1 (05-24-25 @ 13:57)  HR: 105 (05-25-25 @ 08:58)  BP: 169/78 (05-25-25 @ 08:00)  RR: 17 (05-25-25 @ 08:00)  SpO2: 98% (05-25-25 @ 08:00)  Wt(kg): --    05-23 @ 07:01  -  05-24 @ 07:00  --------------------------------------------------------  IN: 3405 mL / OUT: 3350 mL / NET: 55 mL    05-24 @ 07:01  -  05-25 @ 07:00  --------------------------------------------------------  IN: 0 mL / OUT: 1401 mL / NET: -1401 mL          PHYSICAL EXAM:  Constitutional: NAD  HEENT: anicteric sclera, oropharynx clear, MMM  Neck: No JVD  Respiratory: CTAB, no wheezes, rales or rhonchi  Cardiovascular: S1, S2, RRR  Gastrointestinal: BS+, soft, NT/ND  Extremities: No cyanosis or clubbing. No peripheral edema  :  No castellon.   Skin: No rashes    LABS:  05-24    140  |  98  |  12  ----------------------------<  120[H]  2.9[L]   |  30  |  1.9[H]    Ca    8.4      24 May 2025 07:00  Mg     2.3     05-24    TPro  5.4[L]  /  Alb  3.2[L]  /  TBili  4.5[H]  /  DBili      /  AST  587[H]  /  ALT  186[H]  /  AlkPhos  119[H]  05-24                          11.9   3.44  )-----------( 19       ( 24 May 2025 07:00 )             32.9       Urine Studies:  Urinalysis Basic - ( 24 May 2025 07:00 )    Color:  / Appearance:  / SG:  / pH:   Gluc: 120 mg/dL / Ketone:   / Bili:  / Urobili:    Blood:  / Protein:  / Nitrite:    Leuk Esterase:  / RBC:  / WBC    Sq Epi:  / Non Sq Epi:  / Bacteria:       Sodium, Random Urine: 41.0 mmoL/L (05-22 @ 14:19)  Creatinine, Random Urine: 79 mg/dL (05-22 @ 14:19)  Protein/Creatinine Ratio Calculation: 0.3 Ratio (05-22 @ 14:19)  Osmolality, Random Urine: 375 mos/kg (05-22 @ 14:19)  Potassium, Random Urine: 43 mmol/L (05-22 @ 14:19)    RADIOLOGY & ADDITIONAL STUDIES:

## 2025-05-25 NOTE — CONSULT NOTE ADULT - SUBJECTIVE AND OBJECTIVE BOX
Detail Level: Detailed Size Of Lesion In Cm (Optional): 1.5 X Size Of Lesion In Cm (Optional): 1.2 Hematology Consult Note    HPI:  63 y/o male with a PMHx of alcohol use disorder, chronic DVT/PE, HTN, DLD, Diabetes, gout brought in by ambulance from [facility] residence presenting to the ED after 1 day of chest pain nausea/vomiting     Patient stated that, yesterday morning, he started vomiting, which continued all day long and got worse, so had come to ED for evaluation. Patient is c/o chest pain, started after vomiting several times. Denies trauma. Denies HA, but is c/o intermittent episodes of lightheadedness. Denies sob/abd pain. Denies  symptoms. Patient is a poor historian and is hard of hearing. Patient could not provide any further details on his health/illness, and most of the information is obtained from reviewing his previous admission/discharge summaries. No trauma. Patient stated that, he drinks alcohol regularly and last drink was yesterday. Denies f/c/diarrhea. Denies OD on medications. Denies drinking toxic alcohols recently (Methanol/ethylene glycol related products).  Denies OD on Metformin.  Denies urinary symptoms, reports that he hasn't made urine in two days.  NO travel, no rash    In the ED:  - Vitals: T 37.2  /70 SpO2 96% RR 18  - Labs: WBC 10.04 Hgb 13.0   Na 141 K 5.9 (hemolyzed, repeat 4.7)  Cr 1.6 CO2 8 AG 38 Lactate 21.6   - Imaging:  _ CT Head: No evidence of acute intracranial pathology.  _  CT Chest and Abdomen: No evidence of central pulmonary embolus. Apparent generalized colonic wall thickening unclear if on the basis of  underdistention or representing colitis.   (22 May 2025 09:55)      Allergies    shellfish (Hives)  No Known Drug Allergies  Beef (Hives)  dairy products (Hives)    Intolerances        MEDICATIONS  (STANDING):  cetirizine 10 milliGRAM(s) Oral daily  chlorhexidine 2% Cloths 1 Application(s) Topical <User Schedule>  cyanocobalamin 1000 MICROGram(s) Oral daily  dextrose 5%. 1000 milliLiter(s) (50 mL/Hr) IV Continuous <Continuous>  folic acid 1 milliGRAM(s) Oral daily  gabapentin 200 milliGRAM(s) Oral at bedtime  heparin   Injectable 5000 Unit(s) SubCutaneous every 8 hours  insulin lispro (ADMELOG) corrective regimen sliding scale   SubCutaneous every 6 hours  lactulose Syrup 20 Gram(s) Oral two times a day  magnesium oxide 400 milliGRAM(s) Oral daily  methocarbamol 500 milliGRAM(s) Oral <User Schedule>  multivitamin 1 Tablet(s) Oral daily  naltrexone 50 milliGRAM(s) Oral daily  piperacillin/tazobactam IVPB.. 3.375 Gram(s) IV Intermittent every 12 hours  polyethylene glycol 3350 17 Gram(s) Oral daily  pyridoxine 50 milliGRAM(s) Oral every 12 hours  senna 2 Tablet(s) Oral at bedtime  sodium bicarbonate  Infusion 2.14 mEq/kG/Hr (1000 mL/Hr) IV Continuous <Continuous>  tamsulosin 0.4 milliGRAM(s) Oral at bedtime    MEDICATIONS  (PRN):  aluminum hydroxide/magnesium hydroxide/simethicone Suspension 30 milliLiter(s) Oral every 4 hours PRN Dyspepsia  dextrose Oral Gel 15 Gram(s) Oral once PRN Blood Glucose LESS THAN 70 milliGRAM(s)/deciliter      PAST MEDICAL & SURGICAL HISTORY:  Alcohol dependence      HTN (hypertension)      Hard of hearing      Seasonal allergies      BPH (benign prostatic hyperplasia)      GERD (gastroesophageal reflux disease)      Pseudogout      History of deep vein thrombosis (DVT) of lower extremity      No significant past surgical history          FAMILY HISTORY:  Family history of gastric cancer  Mom    Family hx of lung cancer  Smoker          SOCIAL HISTORY: No EtOH, no tobacco    REVIEW OF SYSTEMS:    CONSTITUTIONAL: No weakness, fevers or chills  EYES/ENT: No visual changes;  No vertigo or throat pain   NECK: No pain or stiffness  RESPIRATORY: No cough, wheezing, hemoptysis; No shortness of breath  CARDIOVASCULAR: No chest pain or palpitations  GASTROINTESTINAL: No abdominal or epigastric pain. No nausea, vomiting, or hematemesis; No diarrhea or constipation. No melena or hematochezia.  GENITOURINARY: No dysuria, frequency or hematuria  NEUROLOGICAL: No numbness or weakness  SKIN: No itching, burning, rashes, or lesions   All other review of systems is negative unless indicated above.        T(F): 98.7 (05-25-25 @ 00:12), Max: 99.1 (05-24-25 @ 13:57)  HR: 92 (05-25-25 @ 05:00)  BP: 131/69 (05-25-25 @ 05:00)  RR: 18 (05-25-25 @ 00:12)  SpO2: 98% (05-25-25 @ 00:12)  Wt(kg): --    GENERAL: NAD, well-developed  HEAD:  Atraumatic, Normocephalic  EYES: EOMI, PERRLA, conjunctiva and sclera clear  NECK: Supple, No JVD  CHEST/LUNG: Clear to auscultation bilaterally; No wheeze  HEART: Regular rate and rhythm; No murmurs, rubs, or gallops  ABDOMEN: Soft, Nontender, Nondistended; Bowel sounds present  EXTREMITIES:  2+ Peripheral Pulses, No clubbing, cyanosis, or edema  NEUROLOGY: non-focal  SKIN: No rashes or lesions                          11.9   3.44  )-----------( 19       ( 24 May 2025 07:00 )             32.9       05-24    140  |  98  |  12  ----------------------------<  120[H]  2.9[L]   |  30  |  1.9[H]    Ca    8.4      24 May 2025 07:00  Mg     2.3     05-24    TPro  5.4[L]  /  Alb  3.2[L]  /  TBili  4.5[H]  /  DBili  x   /  AST  587[H]  /  ALT  186[H]  /  AlkPhos  119[H]  05-24           Size Of Lesion: 3mm Hematology Consult Note    HPI:  65 y/o male with a PMHx of alcohol use disorder, chronic DVT/PE, HTN, DLD, Diabetes, gout brought in by ambulance from [facility] presenting to the ED after 1 day of chest pain nausea/vomiting     Patient stated that, yesterday morning, he started vomiting, which continued all day long and got worse, so had come to ED for evaluation. Patient is c/o chest pain, started after vomiting several times. Denies trauma. Denies HA, but is c/o intermittent episodes of lightheadedness. Denies sob/abd pain. Denies  symptoms. Patient is a poor historian and is hard of hearing. Patient could not provide any further details on his health/illness, and most of the information is obtained from reviewing his previous admission/discharge summaries. No trauma. Patient stated that, he drinks alcohol regularly and last drink was yesterday. Denies f/c/diarrhea. Denies OD on medications. Denies drinking toxic alcohols recently (Methanol/ethylene glycol related products).  Denies OD on Metformin.  Denies urinary symptoms, reports that he hasn't made urine in two days.  NO travel, no rash    In the ED:  - Vitals: T 37.2  /70 SpO2 96% RR 18  - Labs: WBC 10.04 Hgb 13.0   Na 141 K 5.9 (hemolyzed, repeat 4.7)  Cr 1.6 CO2 8 AG 38 Lactate 21.6   - Imaging:  _ CT Head: No evidence of acute intracranial pathology.  _  CT Chest and Abdomen: No evidence of central pulmonary embolus. Apparent generalized colonic wall thickening unclear if on the basis of  underdistention or representing colitis.   (22 May 2025 09:55)      Allergies    shellfish (Hives)  No Known Drug Allergies  Beef (Hives)  dairy products (Hives)    Intolerances        MEDICATIONS  (STANDING):  cetirizine 10 milliGRAM(s) Oral daily  chlorhexidine 2% Cloths 1 Application(s) Topical <User Schedule>  cyanocobalamin 1000 MICROGram(s) Oral daily  dextrose 5%. 1000 milliLiter(s) (50 mL/Hr) IV Continuous <Continuous>  folic acid 1 milliGRAM(s) Oral daily  gabapentin 200 milliGRAM(s) Oral at bedtime  heparin   Injectable 5000 Unit(s) SubCutaneous every 8 hours  insulin lispro (ADMELOG) corrective regimen sliding scale   SubCutaneous every 6 hours  lactulose Syrup 20 Gram(s) Oral two times a day  magnesium oxide 400 milliGRAM(s) Oral daily  methocarbamol 500 milliGRAM(s) Oral <User Schedule>  multivitamin 1 Tablet(s) Oral daily  naltrexone 50 milliGRAM(s) Oral daily  piperacillin/tazobactam IVPB.. 3.375 Gram(s) IV Intermittent every 12 hours  polyethylene glycol 3350 17 Gram(s) Oral daily  pyridoxine 50 milliGRAM(s) Oral every 12 hours  senna 2 Tablet(s) Oral at bedtime  sodium bicarbonate  Infusion 2.14 mEq/kG/Hr (1000 mL/Hr) IV Continuous <Continuous>  tamsulosin 0.4 milliGRAM(s) Oral at bedtime    MEDICATIONS  (PRN):  aluminum hydroxide/magnesium hydroxide/simethicone Suspension 30 milliLiter(s) Oral every 4 hours PRN Dyspepsia  dextrose Oral Gel 15 Gram(s) Oral once PRN Blood Glucose LESS THAN 70 milliGRAM(s)/deciliter      PAST MEDICAL & SURGICAL HISTORY:  Alcohol dependence      HTN (hypertension)      Hard of hearing      Seasonal allergies      BPH (benign prostatic hyperplasia)      GERD (gastroesophageal reflux disease)      Pseudogout      History of deep vein thrombosis (DVT) of lower extremity      No significant past surgical history          FAMILY HISTORY:  Family history of gastric cancer  Mom    Family hx of lung cancer  Smoker          SOCIAL HISTORY: No EtOH, no tobacco    REVIEW OF SYSTEMS:  feels well  denies any bleeding   c/o pain at the site of IV line on left arm         T(F): 98.7 (05-25-25 @ 00:12), Max: 99.1 (05-24-25 @ 13:57)  HR: 92 (05-25-25 @ 05:00)  BP: 131/69 (05-25-25 @ 05:00)  RR: 18 (05-25-25 @ 00:12)  SpO2: 98% (05-25-25 @ 00:12)  Wt(kg): --    Constitutional: NAD  Respiratory: CTAB,   Cardiovascular: S1, S2, RRR  Gastrointestinal: BS+, soft, NT/ND  Extremities: No cyanosis or clubbing. No peripheral edema  :  No castellon.   Skin: No rashes                          11.9   3.44  )-----------( 19       ( 24 May 2025 07:00 )             32.9       05-24    140  |  98  |  12  ----------------------------<  120[H]  2.9[L]   |  30  |  1.9[H]    Ca    8.4      24 May 2025 07:00  Mg     2.3     05-24    TPro  5.4[L]  /  Alb  3.2[L]  /  TBili  4.5[H]  /  DBili  x   /  AST  587[H]  /  ALT  186[H]  /  AlkPhos  119[H]  05-24

## 2025-05-25 NOTE — PROGRESS NOTE ADULT - SUBJECTIVE AND OBJECTIVE BOX
SUBJECTIVE:    Patient is a 64y old Male who presents with a chief complaint of Chest pain (25 May 2025 10:15)    Currently admitted to medicine with the primary diagnosis of Nausea and vomiting       Today is hospital day 3d.     PAST MEDICAL & SURGICAL HISTORY  Alcohol dependence    HTN (hypertension)    Hard of hearing    Seasonal allergies    BPH (benign prostatic hyperplasia)    GERD (gastroesophageal reflux disease)    Pseudogout    History of deep vein thrombosis (DVT) of lower extremity    No significant past surgical history      ALLERGIES:  shellfish (Hives)  No Known Drug Allergies  Beef (Hives)  dairy products (Hives)    MEDICATIONS:  STANDING MEDICATIONS  cetirizine 10 milliGRAM(s) Oral daily  chlorhexidine 2% Cloths 1 Application(s) Topical <User Schedule>  cyanocobalamin 1000 MICROGram(s) Oral daily  dextrose 5%. 1000 milliLiter(s) IV Continuous <Continuous>  folic acid 1 milliGRAM(s) Oral daily  gabapentin 200 milliGRAM(s) Oral at bedtime  heparin   Injectable 5000 Unit(s) SubCutaneous every 8 hours  insulin lispro (ADMELOG) corrective regimen sliding scale   SubCutaneous every 6 hours  lactulose Syrup 20 Gram(s) Oral two times a day  magnesium oxide 400 milliGRAM(s) Oral daily  methocarbamol 500 milliGRAM(s) Oral <User Schedule>  multivitamin 1 Tablet(s) Oral daily  naltrexone 50 milliGRAM(s) Oral daily  piperacillin/tazobactam IVPB.. 3.375 Gram(s) IV Intermittent every 12 hours  polyethylene glycol 3350 17 Gram(s) Oral daily  pyridoxine 50 milliGRAM(s) Oral every 12 hours  senna 2 Tablet(s) Oral at bedtime  sodium bicarbonate  Infusion 2.14 mEq/kG/Hr IV Continuous <Continuous>  tamsulosin 0.4 milliGRAM(s) Oral at bedtime    PRN MEDICATIONS  aluminum hydroxide/magnesium hydroxide/simethicone Suspension 30 milliLiter(s) Oral every 4 hours PRN  dextrose Oral Gel 15 Gram(s) Oral once PRN    VITALS:   T(F): 98.2  HR: 105  BP: 169/78  RR: 17  SpO2: 98%    LABS:                        8.4    3.85  )-----------( 20       ( 25 May 2025 11:12 )             23.3     05-25    131[L]  |  94[L]  |  12  ----------------------------<  124[H]  3.0[L]   |  27  |  1.3    Ca    8.1[L]      25 May 2025 11:12  Mg     2.3     05-24    TPro  4.8[L]  /  Alb  3.1[L]  /  TBili  4.8[H]  /  DBili  x   /  AST  281[H]  /  ALT  138[H]  /  AlkPhos  120[H]  05-25    PT/INR - ( 25 May 2025 11:12 )   PT: 26.60 sec;   INR: 2.22 ratio         PTT - ( 24 May 2025 07:00 )  PTT:54.3 sec  Urinalysis Basic - ( 25 May 2025 11:12 )    Color: x / Appearance: x / SG: x / pH: x  Gluc: 124 mg/dL / Ketone: x  / Bili: x / Urobili: x   Blood: x / Protein: x / Nitrite: x   Leuk Esterase: x / RBC: x / WBC x   Sq Epi: x / Non Sq Epi: x / Bacteria: x      ABG - ( 23 May 2025 14:45 )  pH, Arterial: 7.56  pH, Blood: x     /  pCO2: 41    /  pO2: 66    / HCO3: 37    / Base Excess: 13.2  /  SaO2: 95.9                  Culture - Urine (collected 23 May 2025 03:50)  Source: Clean Catch Clean Catch (Midstream)  Final Report (24 May 2025 15:36):    No growth          RADIOLOGY:    PHYSICAL EXAM:  GEN: No acute distress  LUNGS: Clear to auscultation bilaterally   HEART: S1/S2 present. RRR.   ABD/ GI: Soft, non-tender, non-distended. Bowel sounds present  EXT: NC/NC/NE/2+PP/FELDER  NEURO: AAOX3

## 2025-05-25 NOTE — CONSULT NOTE ADULT - ASSESSMENT
63 y/o male with a PMHx of alcohol use disorder, chronic DVT/PE, HTN, DLD, Diabetes, gout brought in by ambulance from [facility] presenting to the ED after 1 day of chest pain nausea/vomiting     Admitted to MICU initially for profound lactic acidosis, attributed to Metformin associated LA,ANDRZEJ requiring urgent HD,colitis on ABx.    #Pancytopenia   #Chronic thrombocytopenia   Labs in april also reflect pancytopenia (WBC 4k, hb 10-11, plt 30K)   Elevated PT, PTT  T bili 3.1   on FA and vit b12 at home   has alcohol use disorder.     #H/o PE Dx 2022   #Chronic DVT  DUplex Chronic DVT in bilateral popliteal veins.    #Multifactorial transamnitis   #Coagulopathy  Evaluated by GI: Avoid vitamin K in setting of chronic DVT/PE    #?colitis   CT A/p Apparent generalized colonic wall thickening unclear if on the basis of   underdistention or representing colitis.  on ABX     RECOMMENDATIONS:               65 y/o male with a PMHx of alcohol use disorder, chronic DVT/PE, HTN, DLD, Diabetes, gout brought in by ambulance from [facility] presenting to the ED after 1 day of chest pain nausea/vomiting     Admitted to MICU initially for profound lactic acidosis, attributed to Metformin associated LA,ANDRZEJ requiring urgent HD,colitis on ABx.    #Pancytopenia   #Chronic thrombocytopenia   Labs in april also reflect pancytopenia (WBC 4k, hb 10-11, plt 30K)   Elevated PT, PTT  T bili 3.1   on FA and vit b12 at home   has alcohol use disorder. Last drink: 2 days prior to presentation- vodka. drinks daily   b12 >1000, folate WNL    #H/o PE Dx 2022   #Chronic DVT  DUplex Chronic DVT in bilateral popliteal veins.    #Multifactorial transaminitis  hypotension/ALD  #Coagulopathy  Evaluated by GI: Avoid vitamin K in setting of chronic DVT/PE    #?colitis   CT A/p Apparent generalized colonic wall thickening unclear if on the basis of   underdistention or representing colitis.  on ABX     RECOMMENDATIONS:  Chronic pancytopenia- 2/2 alcohol use and alcohol liver disease. Strongly counselled him to abstain from alcohol.  F/up GI  C/w antibiotics   Check RC, LDH, haptoglobin.   Transfuse for plts <10K, hb <7.

## 2025-05-26 LAB
ALBUMIN SERPL ELPH-MCNC: 3 G/DL — LOW (ref 3.5–5.2)
ALP SERPL-CCNC: 126 U/L — HIGH (ref 30–115)
ALT FLD-CCNC: 113 U/L — HIGH (ref 0–41)
ANION GAP SERPL CALC-SCNC: 9 MMOL/L — SIGNIFICANT CHANGE UP (ref 7–14)
APTT BLD: 41.7 SEC — HIGH (ref 27–39.2)
AST SERPL-CCNC: 190 U/L — HIGH (ref 0–41)
BASOPHILS # BLD AUTO: 0.07 K/UL — SIGNIFICANT CHANGE UP (ref 0–0.2)
BASOPHILS NFR BLD AUTO: 1.8 % — HIGH (ref 0–1)
BILIRUB SERPL-MCNC: 5.5 MG/DL — HIGH (ref 0.2–1.2)
BUN SERPL-MCNC: 9 MG/DL — LOW (ref 10–20)
CALCIUM SERPL-MCNC: 8.6 MG/DL — SIGNIFICANT CHANGE UP (ref 8.4–10.5)
CHLORIDE SERPL-SCNC: 98 MMOL/L — SIGNIFICANT CHANGE UP (ref 98–110)
CO2 SERPL-SCNC: 28 MMOL/L — SIGNIFICANT CHANGE UP (ref 17–32)
CREAT SERPL-MCNC: 1.2 MG/DL — SIGNIFICANT CHANGE UP (ref 0.7–1.5)
EGFR: 67 ML/MIN/1.73M2 — SIGNIFICANT CHANGE UP
EGFR: 67 ML/MIN/1.73M2 — SIGNIFICANT CHANGE UP
EOSINOPHIL # BLD AUTO: 0.35 K/UL — SIGNIFICANT CHANGE UP (ref 0–0.7)
EOSINOPHIL NFR BLD AUTO: 8.8 % — HIGH (ref 0–8)
GLUCOSE BLDC GLUCOMTR-MCNC: 101 MG/DL — HIGH (ref 70–99)
GLUCOSE BLDC GLUCOMTR-MCNC: 116 MG/DL — HIGH (ref 70–99)
GLUCOSE BLDC GLUCOMTR-MCNC: 142 MG/DL — HIGH (ref 70–99)
GLUCOSE BLDC GLUCOMTR-MCNC: 145 MG/DL — HIGH (ref 70–99)
GLUCOSE BLDC GLUCOMTR-MCNC: 147 MG/DL — HIGH (ref 70–99)
GLUCOSE BLDC GLUCOMTR-MCNC: 161 MG/DL — HIGH (ref 70–99)
GLUCOSE SERPL-MCNC: 132 MG/DL — HIGH (ref 70–99)
HAPTOGLOB SERPL-MCNC: 30 MG/DL — LOW (ref 34–200)
HCT VFR BLD CALC: 23.2 % — LOW (ref 42–52)
HGB BLD-MCNC: 8.4 G/DL — LOW (ref 14–18)
INR BLD: 1.81 RATIO — HIGH (ref 0.65–1.3)
IRON SATN MFR SERPL: 29 % — SIGNIFICANT CHANGE UP (ref 15–50)
IRON SATN MFR SERPL: 51 UG/DL — SIGNIFICANT CHANGE UP (ref 35–150)
LDH SERPL L TO P-CCNC: 239 U/L — SIGNIFICANT CHANGE UP (ref 50–242)
LYMPHOCYTES # BLD AUTO: 0.81 K/UL — LOW (ref 1.2–3.4)
LYMPHOCYTES # BLD AUTO: 20.4 % — LOW (ref 20.5–51.1)
MAGNESIUM SERPL-MCNC: 2.4 MG/DL — SIGNIFICANT CHANGE UP (ref 1.8–2.4)
MCHC RBC-ENTMCNC: 36.2 G/DL — SIGNIFICANT CHANGE UP (ref 32–37)
MCHC RBC-ENTMCNC: 36.8 PG — HIGH (ref 27–31)
MCV RBC AUTO: 101.8 FL — HIGH (ref 80–94)
MONOCYTES # BLD AUTO: 0.28 K/UL — SIGNIFICANT CHANGE UP (ref 0.1–0.6)
MONOCYTES NFR BLD AUTO: 7.1 % — SIGNIFICANT CHANGE UP (ref 1.7–9.3)
NEUTROPHILS # BLD AUTO: 2.46 K/UL — SIGNIFICANT CHANGE UP (ref 1.4–6.5)
NEUTROPHILS NFR BLD AUTO: 61.9 % — SIGNIFICANT CHANGE UP (ref 42.2–75.2)
PLATELET # BLD AUTO: 24 K/UL — LOW (ref 130–400)
PMV BLD: 12.7 FL — HIGH (ref 7.4–10.4)
POTASSIUM SERPL-MCNC: 3.5 MMOL/L — SIGNIFICANT CHANGE UP (ref 3.5–5)
POTASSIUM SERPL-SCNC: 3.5 MMOL/L — SIGNIFICANT CHANGE UP (ref 3.5–5)
PROT SERPL-MCNC: 4.9 G/DL — LOW (ref 6–8)
PROTHROM AB SERPL-ACNC: 21.6 SEC — HIGH (ref 9.95–12.87)
RBC # BLD: 2.28 M/UL — LOW (ref 4.7–6.1)
RBC # BLD: 2.28 M/UL — LOW (ref 4.7–6.1)
RBC # FLD: 15.9 % — HIGH (ref 11.5–14.5)
RETICS #: 35.6 K/UL — SIGNIFICANT CHANGE UP (ref 25–125)
RETICS/RBC NFR: 1.6 % — HIGH (ref 0.5–1.5)
SODIUM SERPL-SCNC: 135 MMOL/L — SIGNIFICANT CHANGE UP (ref 135–146)
TIBC SERPL-MCNC: 176 UG/DL — LOW (ref 220–430)
UIBC SERPL-MCNC: 125 UG/DL — SIGNIFICANT CHANGE UP (ref 110–370)
WBC # BLD: 3.98 K/UL — LOW (ref 4.8–10.8)
WBC # FLD AUTO: 3.98 K/UL — LOW (ref 4.8–10.8)

## 2025-05-26 PROCEDURE — 99233 SBSQ HOSP IP/OBS HIGH 50: CPT

## 2025-05-26 RX ORDER — LANOLIN/MINERAL OIL/PETROLATUM
1 OINTMENT (GRAM) OPHTHALMIC (EYE) ONCE
Refills: 0 | Status: COMPLETED | OUTPATIENT
Start: 2025-05-26 | End: 2025-05-27

## 2025-05-26 RX ADMIN — CYANOCOBALAMIN 1000 MICROGRAM(S): 1000 INJECTION INTRAMUSCULAR; SUBCUTANEOUS at 12:14

## 2025-05-26 RX ADMIN — Medication 2 TABLET(S): at 21:52

## 2025-05-26 RX ADMIN — Medication 25 GRAM(S): at 17:48

## 2025-05-26 RX ADMIN — NALTREXONE HYDROCHLORIDE 50 MILLIGRAM(S): 50 TABLET, FILM COATED ORAL at 12:14

## 2025-05-26 RX ADMIN — TAMSULOSIN HYDROCHLORIDE 0.4 MILLIGRAM(S): 0.4 CAPSULE ORAL at 21:52

## 2025-05-26 RX ADMIN — Medication 1 APPLICATION(S): at 05:53

## 2025-05-26 RX ADMIN — METHOCARBAMOL 500 MILLIGRAM(S): 500 TABLET, FILM COATED ORAL at 17:49

## 2025-05-26 RX ADMIN — FOLIC ACID 1 MILLIGRAM(S): 1 TABLET ORAL at 12:14

## 2025-05-26 RX ADMIN — Medication 10 MILLIGRAM(S): at 12:14

## 2025-05-26 RX ADMIN — LACTULOSE 20 GRAM(S): 10 SOLUTION ORAL at 05:28

## 2025-05-26 RX ADMIN — Medication 100 MILLIGRAM(S): at 12:16

## 2025-05-26 RX ADMIN — Medication 1 TABLET(S): at 12:15

## 2025-05-26 RX ADMIN — Medication 50 MILLIGRAM(S): at 05:28

## 2025-05-26 RX ADMIN — Medication 25 GRAM(S): at 05:29

## 2025-05-26 RX ADMIN — Medication 400 MILLIGRAM(S): at 12:14

## 2025-05-26 RX ADMIN — Medication 40 MILLIEQUIVALENT(S): at 13:23

## 2025-05-26 RX ADMIN — Medication 50 MILLIGRAM(S): at 17:49

## 2025-05-26 RX ADMIN — GABAPENTIN 200 MILLIGRAM(S): 400 CAPSULE ORAL at 21:52

## 2025-05-26 NOTE — PROGRESS NOTE ADULT - ASSESSMENT
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 Diffuse hepatic steatosis.  -CT Abdomen and Pelvis w/ IV Cont (05.22.25 @ 06:55) Apparent generalized colonic wall thickening unclear if on the basis of  underdistention or representing colitis.  - No EGD colonoscopy in chart  - s/p fomepizole, bicarb drip, NAC, s/p HD on 5/22   - creat today is 1.2  - hepatology:  (MDF score 101.9), Trend Liver enzymes and INR. Avoid vitamin K in setting of chronic DVT/PE. Avoid hepatotoxic agents. Hold off on steroids for alcoholic hepatitis in setting of suspected colitis. INR improving.   - now off CIWA, no signs of withdrawal.  - hold metformin on dc, monitor cmp for AG and HCO3.   Pending: Check Hepatitis A IgM, Hepatitis B core IgM, B  core Ab total, B surface Ag, B surface Ab, HCV antibody, HCV RNA, Anti HEV. Check Iron studies and ceruloplasmin Check CMV PCR, EBV PCR, HSV IgM. Obtain Quantiferon level    #Worsening Pancytopenia  - has hx of pancytopenia now worsening.   - Hematology: Chronic pancytopenia- 2/2 alcohol use and alcohol liver disease. Strongly counselled him to abstain from alcohol. C/w antibiotics. Check RC, LDH, haptoglobin.   Transfuse for plts <10K, hb <7. f/u repeat bili      #HO alcohol use disorder  [facility] protocol - standing and prn   - thiamine, folate, multivitamin   - drug screen positive only for benzodiazepines    #Generalized colonic wall thickening, possible colitis   on zosyn end date 5/29.   # Hypokalemia-- replete orally  #HO chronic DVT/PE  - duplex redemonstrating chronic DVT    #HO  Diabetes on metformin at home  # pancytopenia-- related to alcohol use  seen by hematology-- hemolysis w/u pending  severe thrombocytopenia    #HO gout   on colchicine  #Diet DASH  #Activity IAT  #GI  #DVT ppx -- cannot get heparin due to low platelets  #Pending -  will go to [facility] on Tuesday

## 2025-05-26 NOTE — PROGRESS NOTE ADULT - ASSESSMENT
65 y/o male with a PMHx of alcohol use disorder, chronic DVT/PE, HTN, DLD, Diabetes, gout brought in by ambulance from [facility] presenting to the ED after 1 day of chest pain nausea/vomiting     Patient stated that, yesterday morning, he started vomiting, which continued all day long and got worse, so had come to ED for evaluation. Patient is c/o chest pain, started after vomiting several times. Denies trauma. Denies HA, but is c/o intermittent episodes of lightheadedness. Denies sob/abd pain. Denies  symptoms. Patient is a poor historian and is hard of hearing. Patient could not provide any further details on his health/illness, and most of the information is obtained from reviewing his previous admission/discharge summaries. No trauma. Patient stated that, he drinks alcohol regularly and last drink was yesterday. Denies f/c/diarrhea. Denies OD on medications. Denies drinking toxic alcohols recently (Methanol/ethylene glycol related products).  Denies OD on Metformin.  Denies urinary symptoms, reports that he hasn't made urine in two days.  NO travel, no rash.    #Severe lactic acidosis resolved  lactate high 27 on admission   - s/p fomepizole  - s/p bicarb drip  - s/p NAC   - s/p HD on 5/22   - creat today is 1.2    #HO alcohol use disorder  [facility] protocol - completed   - thiamine, folate, multivitamin   - drug screen positive only for benzodiazepines    #Generalized colonic wall thickening, possible colitis   - - - on zosyn   # Hypokalemia-- replete orally  #HO chronic DVT/PE  - duplex redemonstrating chronic DVT-- should not give Vit K as per hepatology    #HO  Diabetes on metformin at home-- hba1c is 4.9  # pancytopenia-- related to alcohol use  seen by hematology-- hemolysis w/u pending  severe thrombocytopenia  # Cirrhosis of liver-- sec to alcohol use with high INR--  #HO gout   on colchicine  #Diet DASH  #Activity IAT  #GI  #DVT ppx -- cannot get heparin due to low platelets  #Pending -  will go to [facility] -- hemolysis w/u pending

## 2025-05-26 NOTE — PROGRESS NOTE ADULT - SUBJECTIVE AND OBJECTIVE BOX
Nephrology Progress Note    CAT ALSTON  [id number]  64y  Male    S:  Patient is seen and examined, events over the last 24h noted.    O:  Allergies:  shellfish (Hives)  No Known Drug Allergies  Beef (Hives)  dairy products (Hives)    Hospital Medications:   MEDICATIONS  (STANDING):  cetirizine 10 milliGRAM(s) Oral daily  chlorhexidine 2% Cloths 1 Application(s) Topical <User Schedule>  cyanocobalamin 1000 MICROGram(s) Oral daily  dextrose 5%. 1000 milliLiter(s) (50 mL/Hr) IV Continuous <Continuous>  folic acid 1 milliGRAM(s) Oral daily  gabapentin 200 milliGRAM(s) Oral at bedtime  insulin lispro (ADMELOG) corrective regimen sliding scale   SubCutaneous every 6 hours  lactulose Syrup 20 Gram(s) Oral two times a day  magnesium oxide 400 milliGRAM(s) Oral daily  methocarbamol 500 milliGRAM(s) Oral <User Schedule>  multivitamin 1 Tablet(s) Oral daily  naltrexone 50 milliGRAM(s) Oral daily  piperacillin/tazobactam IVPB.. 3.375 Gram(s) IV Intermittent every 12 hours  polyethylene glycol 3350 17 Gram(s) Oral daily  potassium chloride    Tablet ER 40 milliEquivalent(s) Oral once  pyridoxine 50 milliGRAM(s) Oral every 12 hours  senna 2 Tablet(s) Oral at bedtime  tamsulosin 0.4 milliGRAM(s) Oral at bedtime  thiamine 100 milliGRAM(s) Oral daily    MEDICATIONS  (PRN):  aluminum hydroxide/magnesium hydroxide/simethicone Suspension 30 milliLiter(s) Oral every 4 hours PRN Dyspepsia  dextrose Oral Gel 15 Gram(s) Oral once PRN Blood Glucose LESS THAN 70 milliGRAM(s)/deciliter    Home Medications:  Claritin 10 mg oral tablet: 1 tab(s) orally once a day (13 Apr 2025 11:57)  colchicine 0.6 mg oral tablet: 1 tab(s) orally 2 times a day (13 Apr 2025 11:57)  cyanocobalamin 1000 mcg oral tablet: 1 tab(s) orally once a day (22 May 2025 10:48)  folic acid 1 mg oral tablet: 1 tab(s) orally once a day (13 Apr 2025 12:02)  Jardiance 10 mg oral tablet: 1 tab(s) orally once a day (13 Apr 2025 11:57)  methocarbamol 500 mg oral tablet: 1 orally once a day (09 Nov 2024 16:30)  Protonix 40 mg oral delayed release tablet: 1 tab(s) orally once a day (13 Apr 2025 11:57)      VITALS:  Daily     Daily   T(F): 98.1 (05-26-25 @ 08:00), Max: 99 (05-25-25 @ 15:11)  HR: 99 (05-26-25 @ 08:00)  BP: 118/69 (05-26-25 @ 08:00)  RR: 17 (05-26-25 @ 08:00)  SpO2: 98% (05-26-25 @ 08:00)  Wt(kg): --  I&O's Detail    25 May 2025 07:01  -  26 May 2025 07:00  --------------------------------------------------------  IN:    Oral Fluid: 1200 mL  Total IN: 1200 mL    OUT:    Stool (mL): 1 mL    Voided (mL): 1300 mL  Total OUT: 1301 mL    Total NET: -101 mL        I&O's Summary    25 May 2025 07:01  -  26 May 2025 07:00  --------------------------------------------------------  IN: 1200 mL / OUT: 1301 mL / NET: -101 mL          PHYSICAL EXAM:  Gen: NAD  Chest: b/l breath sounds  Abd: soft  Extremities: no edema      LABS:    05-26    135  |  98  |  9[L]  ----------------------------<  132[H]  3.5   |  28  |  1.2    Ca    8.6      26 May 2025 05:45  Mg     2.4     05-26    TPro  4.9[L]  /  Alb  3.0[L]  /  TBili  5.5[H]  /  DBili      /  AST  190[H]  /  ALT  113[H]  /  AlkPhos  126[H]  05-26    Phosphorus: 3.0 mg/dL (04-18-25 @ 06:10)  Phosphorus: 2.6 mg/dL (04-17-25 @ 06:11)                        8.4    3.98  )-----------( 24       ( 26 May 2025 05:45 )             23.2     Mean Cell Volume: 101.8 fL (05-26-25 @ 05:45)    % Saturation, Iron: 29 % (05-26-25 @ 11:32)      Creatinine trend:  Creatinine: 1.2 mg/dL (05-26-25 @ 05:45)  Creatinine: 1.3 mg/dL (05-25-25 @ 11:12)  Creatinine: 1.9 mg/dL (05-24-25 @ 07:00)  Creatinine: 2.4 mg/dL (05-23-25 @ 16:00)  Creatinine: 2.6 mg/dL (05-23-25 @ 04:15)  Creatinine: 2.4 mg/dL (05-23-25 @ 00:23)  Creatinine: 1.8 mg/dL (05-22-25 @ 16:00)

## 2025-05-26 NOTE — PROGRESS NOTE ADULT - SUBJECTIVE AND OBJECTIVE BOX
SUBJECTIVE:    Patient is a 64y old Male who presents with a chief complaint of Chest pain (26 May 2025 13:16)    Currently admitted to medicine with the primary diagnosis of Nausea and vomiting       Today is hospital day 4d.     PAST MEDICAL & SURGICAL HISTORY  Alcohol dependence    HTN (hypertension)    Hard of hearing    Seasonal allergies    BPH (benign prostatic hyperplasia)    GERD (gastroesophageal reflux disease)    Pseudogout    History of deep vein thrombosis (DVT) of lower extremity    No significant past surgical history      ALLERGIES:  shellfish (Hives)  No Known Drug Allergies  Beef (Hives)  dairy products (Hives)    MEDICATIONS:  STANDING MEDICATIONS  cetirizine 10 milliGRAM(s) Oral daily  chlorhexidine 2% Cloths 1 Application(s) Topical <User Schedule>  cyanocobalamin 1000 MICROGram(s) Oral daily  dextrose 5%. 1000 milliLiter(s) IV Continuous <Continuous>  folic acid 1 milliGRAM(s) Oral daily  gabapentin 200 milliGRAM(s) Oral at bedtime  insulin lispro (ADMELOG) corrective regimen sliding scale   SubCutaneous every 6 hours  lactulose Syrup 20 Gram(s) Oral two times a day  magnesium oxide 400 milliGRAM(s) Oral daily  methocarbamol 500 milliGRAM(s) Oral <User Schedule>  multivitamin 1 Tablet(s) Oral daily  naltrexone 50 milliGRAM(s) Oral daily  piperacillin/tazobactam IVPB.. 3.375 Gram(s) IV Intermittent every 12 hours  polyethylene glycol 3350 17 Gram(s) Oral daily  pyridoxine 50 milliGRAM(s) Oral every 12 hours  senna 2 Tablet(s) Oral at bedtime  tamsulosin 0.4 milliGRAM(s) Oral at bedtime  thiamine 100 milliGRAM(s) Oral daily    PRN MEDICATIONS  aluminum hydroxide/magnesium hydroxide/simethicone Suspension 30 milliLiter(s) Oral every 4 hours PRN  dextrose Oral Gel 15 Gram(s) Oral once PRN    VITALS:   T(F): 98.1  HR: 99  BP: 118/69  RR: 17  SpO2: 98%    LABS:                        8.4    3.98  )-----------( 24       ( 26 May 2025 05:45 )             23.2     05-26    135  |  98  |  9[L]  ----------------------------<  132[H]  3.5   |  28  |  1.2    Ca    8.6      26 May 2025 05:45  Mg     2.4     05-26    TPro  4.9[L]  /  Alb  3.0[L]  /  TBili  5.5[H]  /  DBili  x   /  AST  190[H]  /  ALT  113[H]  /  AlkPhos  126[H]  05-26    PT/INR - ( 26 May 2025 05:45 )   PT: 21.60 sec;   INR: 1.81 ratio         PTT - ( 26 May 2025 05:45 )  PTT:41.7 sec  Urinalysis Basic - ( 26 May 2025 05:45 )    Color: x / Appearance: x / SG: x / pH: x  Gluc: 132 mg/dL / Ketone: x  / Bili: x / Urobili: x   Blood: x / Protein: x / Nitrite: x   Leuk Esterase: x / RBC: x / WBC x   Sq Epi: x / Non Sq Epi: x / Bacteria: x                RADIOLOGY:    PHYSICAL EXAM:  GEN: No acute distress  LUNGS: Clear to auscultation bilaterally   HEART: S1/S2 present. RRR.   ABD/ GI: Soft, non-tender, non-distended. Bowel sounds present  EXT: NC/NC/NE/2+PP/FELDER  NEURO: AAOX3

## 2025-05-26 NOTE — PROGRESS NOTE ADULT - ASSESSMENT
63 y/o male with a PMHx of alcohol use disorder, presenting to the ED after 1 day of chest pain nausea/vomiting.  Labs indicated ANDRZEJ with Cr 1.6 (at baseline), and his venous blood gas indicating a mixed acid-base disorder of metabolic acidosis concurrent with respiratory acidosis (CO2 higher than expected) and metabolic alkalosis, with a high lactate indicating a possible involvement of his metformin medication.  Nephrology was consulted for acidosis and ANDRZEJ.    #Mixed Acid base disorder 2/2 JENNIFER  metabolic acidosis concurrent with respiratory acidosis and metabolic alkalosis  nonoliguric, creat improving off HD  s/p HD 05/22, has not required further RRT    udall removed 05/24  Monitor LFTs/hepatology f/u    Nephrology signing off. Recall as needed

## 2025-05-26 NOTE — PROGRESS NOTE ADULT - SUBJECTIVE AND OBJECTIVE BOX
SUBJECTIVE:  HPI:  63 y/o male with a PMHx of alcohol use disorder, chronic DVT/PE, HTN, DLD, Diabetes, gout brought in by ambulance from [facility] presenting to the ED after 1 day of chest pain nausea/vomiting. Patient stated that  he started vomiting, which continued all day long and got worse, so had come to ED for evaluation. Patient is c/o chest pain, started after vomiting several times. Denies trauma. Denies HA, but is c/o intermittent episodes of lightheadedness. Denies sob/abd pain. Denies  symptoms. Patient is a poor historian and is hard of hearing. Patient could not provide any further details on his health/illness, and most of the information is obtained from reviewing his previous admission/discharge summaries. No trauma. Patient stated that, he drinks alcohol regularly and last drink was yesterday. Denies f/c/diarrhea. Denies OD on medications. Denies drinking toxic alcohols recently (Methanol/ethylene glycol related products).  Denies OD on Metformin.  Denies urinary symptoms, reports that he hasn't made urine in two days.  NO travel, no rash    In the ED:  - Vitals: T 37.2  /70 SpO2 96% RR 18  - Labs: WBC 10.04 Hgb 13.0   Na 141 K 5.9 (hemolyzed, repeat 4.7)  Cr 1.6 CO2 8 AG 38 Lactate 21.6   - Imaging:  _ CT Head: No evidence of acute intracranial pathology.  _  CT Chest and Abdomen: No evidence of central pulmonary embolus. Apparent generalized colonic wall thickening unclear if on the basis of  underdistention or representing colitis.   (22 May 2025 09:55)    Patient found to have severe HAGMA 2/2 lactic acidosis from JENNIFER vs cirrhosis, required HD once 5/22, now improved.   Patient is currently AAO*4, denies any active complaints. Asking if he could be seen by PT to get out of bed.        Today is hospital day 4d.     PAST MEDICAL & SURGICAL HISTORY  Alcohol dependence    HTN (hypertension)    Hard of hearing    Seasonal allergies    BPH (benign prostatic hyperplasia)    GERD (gastroesophageal reflux disease)    Pseudogout    History of deep vein thrombosis (DVT) of lower extremity    No significant past surgical history        ALLERGIES:  shellfish (Hives)  No Known Drug Allergies  Beef (Hives)  dairy products (Hives)    MEDICATIONS:  ACTIVE MEDICATIONS  aluminum hydroxide/magnesium hydroxide/simethicone Suspension 30 milliLiter(s) Oral every 4 hours PRN  cetirizine 10 milliGRAM(s) Oral daily  chlorhexidine 2% Cloths 1 Application(s) Topical <User Schedule>  cyanocobalamin 1000 MICROGram(s) Oral daily  dextrose 5%. 1000 milliLiter(s) IV Continuous <Continuous>  dextrose Oral Gel 15 Gram(s) Oral once PRN  folic acid 1 milliGRAM(s) Oral daily  gabapentin 200 milliGRAM(s) Oral at bedtime  insulin lispro (ADMELOG) corrective regimen sliding scale   SubCutaneous every 6 hours  lactulose Syrup 20 Gram(s) Oral two times a day  magnesium oxide 400 milliGRAM(s) Oral daily  methocarbamol 500 milliGRAM(s) Oral <User Schedule>  multivitamin 1 Tablet(s) Oral daily  naltrexone 50 milliGRAM(s) Oral daily  piperacillin/tazobactam IVPB.. 3.375 Gram(s) IV Intermittent every 12 hours  polyethylene glycol 3350 17 Gram(s) Oral daily  potassium chloride   Powder 40 milliEquivalent(s) Oral once  pyridoxine 50 milliGRAM(s) Oral every 12 hours  senna 2 Tablet(s) Oral at bedtime  tamsulosin 0.4 milliGRAM(s) Oral at bedtime      VITALS:   T(F): 98.1  HR: 99  BP: 118/69  RR: 17  SpO2: 98%    LABS:                        8.4    3.98  )-----------( 24       ( 26 May 2025 05:45 )             23.2     05-26    135  |  98  |  9[L]  ----------------------------<  132[H]  3.5   |  28  |  1.2    Ca    8.6      26 May 2025 05:45  Mg     2.4     05-26    TPro  4.9[L]  /  Alb  3.0[L]  /  TBili  5.5[H]  /  DBili  x   /  AST  190[H]  /  ALT  113[H]  /  AlkPhos  126[H]  05-26    PT/INR - ( 26 May 2025 05:45 )   PT: 21.60 sec;   INR: 1.81 ratio         PTT - ( 26 May 2025 05:45 )  PTT:41.7 sec  Urinalysis Basic - ( 26 May 2025 05:45 )    Color: x / Appearance: x / SG: x / pH: x  Gluc: 132 mg/dL / Ketone: x  / Bili: x / Urobili: x   Blood: x / Protein: x / Nitrite: x   Leuk Esterase: x / RBC: x / WBC x   Sq Epi: x / Non Sq Epi: x / Bacteria: x                    PHYSICAL EXAM:  GEN: No acute distress  LUNGS: Clear to auscultation bilaterally   HEART: S1/S2 present.    ABD: Soft, non-tender, non-distended.   EXT: No pedal edema  NEURO: AAOX3

## 2025-05-27 LAB
ALBUMIN SERPL ELPH-MCNC: 3 G/DL — LOW (ref 3.5–5.2)
ALBUMIN SERPL ELPH-MCNC: 3.1 G/DL — LOW (ref 3.5–5.2)
ALBUMIN SERPL ELPH-MCNC: 3.1 G/DL — LOW (ref 3.5–5.2)
ALP SERPL-CCNC: 140 U/L — HIGH (ref 30–115)
ALP SERPL-CCNC: 142 U/L — HIGH (ref 30–115)
ALP SERPL-CCNC: 157 U/L — HIGH (ref 30–115)
ALT FLD-CCNC: 73 U/L — HIGH (ref 0–41)
ALT FLD-CCNC: 82 U/L — HIGH (ref 0–41)
ALT FLD-CCNC: 88 U/L — HIGH (ref 0–41)
ANION GAP SERPL CALC-SCNC: 10 MMOL/L — SIGNIFICANT CHANGE UP (ref 7–14)
ANION GAP SERPL CALC-SCNC: 9 MMOL/L — SIGNIFICANT CHANGE UP (ref 7–14)
APTT BLD: 33.9 SEC — SIGNIFICANT CHANGE UP (ref 27–39.2)
APTT BLD: 44.6 SEC — HIGH (ref 27–39.2)
AST SERPL-CCNC: 108 U/L — HIGH (ref 0–41)
AST SERPL-CCNC: 115 U/L — HIGH (ref 0–41)
AST SERPL-CCNC: 93 U/L — HIGH (ref 0–41)
BASOPHILS # BLD AUTO: 0.03 K/UL — SIGNIFICANT CHANGE UP (ref 0–0.2)
BASOPHILS # BLD AUTO: 0.04 K/UL — SIGNIFICANT CHANGE UP (ref 0–0.2)
BASOPHILS NFR BLD AUTO: 0.7 % — SIGNIFICANT CHANGE UP (ref 0–1)
BASOPHILS NFR BLD AUTO: 1.1 % — HIGH (ref 0–1)
BILIRUB DIRECT SERPL-MCNC: 2.6 MG/DL — HIGH (ref 0–0.3)
BILIRUB INDIRECT FLD-MCNC: 2.2 MG/DL — HIGH (ref 0.2–1.2)
BILIRUB SERPL-MCNC: 4.2 MG/DL — HIGH (ref 0.2–1.2)
BILIRUB SERPL-MCNC: 4.8 MG/DL — HIGH (ref 0.2–1.2)
BILIRUB SERPL-MCNC: 5.2 MG/DL — HIGH (ref 0.2–1.2)
BUN SERPL-MCNC: 8 MG/DL — LOW (ref 10–20)
BUN SERPL-MCNC: 8 MG/DL — LOW (ref 10–20)
CALCIUM SERPL-MCNC: 8.4 MG/DL — SIGNIFICANT CHANGE UP (ref 8.4–10.5)
CALCIUM SERPL-MCNC: 8.5 MG/DL — SIGNIFICANT CHANGE UP (ref 8.4–10.5)
CHLORIDE SERPL-SCNC: 101 MMOL/L — SIGNIFICANT CHANGE UP (ref 98–110)
CHLORIDE SERPL-SCNC: 102 MMOL/L — SIGNIFICANT CHANGE UP (ref 98–110)
CO2 SERPL-SCNC: 22 MMOL/L — SIGNIFICANT CHANGE UP (ref 17–32)
CO2 SERPL-SCNC: 24 MMOL/L — SIGNIFICANT CHANGE UP (ref 17–32)
CREAT SERPL-MCNC: 1 MG/DL — SIGNIFICANT CHANGE UP (ref 0.7–1.5)
CREAT SERPL-MCNC: 1.1 MG/DL — SIGNIFICANT CHANGE UP (ref 0.7–1.5)
CULTURE RESULTS: SIGNIFICANT CHANGE UP
CULTURE RESULTS: SIGNIFICANT CHANGE UP
EGFR: 74 ML/MIN/1.73M2 — SIGNIFICANT CHANGE UP
EGFR: 74 ML/MIN/1.73M2 — SIGNIFICANT CHANGE UP
EGFR: 84 ML/MIN/1.73M2 — SIGNIFICANT CHANGE UP
EGFR: 84 ML/MIN/1.73M2 — SIGNIFICANT CHANGE UP
EOSINOPHIL # BLD AUTO: 0.24 K/UL — SIGNIFICANT CHANGE UP (ref 0–0.7)
EOSINOPHIL # BLD AUTO: 0.35 K/UL — SIGNIFICANT CHANGE UP (ref 0–0.7)
EOSINOPHIL NFR BLD AUTO: 6.7 % — SIGNIFICANT CHANGE UP (ref 0–8)
EOSINOPHIL NFR BLD AUTO: 8.6 % — HIGH (ref 0–8)
ETHYLENE GLYCOL SERPLBLD-MCNC: <5 MG/DL — SIGNIFICANT CHANGE UP
FERRITIN SERPL-MCNC: 537 NG/ML — HIGH (ref 30–400)
GLUCOSE BLDC GLUCOMTR-MCNC: 119 MG/DL — HIGH (ref 70–99)
GLUCOSE BLDC GLUCOMTR-MCNC: 131 MG/DL — HIGH (ref 70–99)
GLUCOSE BLDC GLUCOMTR-MCNC: 168 MG/DL — HIGH (ref 70–99)
GLUCOSE SERPL-MCNC: 123 MG/DL — HIGH (ref 70–99)
GLUCOSE SERPL-MCNC: 165 MG/DL — HIGH (ref 70–99)
HCT VFR BLD CALC: 23.3 % — LOW (ref 42–52)
HCT VFR BLD CALC: 23.4 % — LOW (ref 42–52)
HGB BLD-MCNC: 8.3 G/DL — LOW (ref 14–18)
HGB BLD-MCNC: 8.3 G/DL — LOW (ref 14–18)
IMM GRANULOCYTES NFR BLD AUTO: 0.5 % — HIGH (ref 0.1–0.3)
IMM GRANULOCYTES NFR BLD AUTO: 0.6 % — HIGH (ref 0.1–0.3)
INR BLD: 1.64 RATIO — HIGH (ref 0.65–1.3)
INR BLD: 1.67 RATIO — HIGH (ref 0.65–1.3)
LYMPHOCYTES # BLD AUTO: 0.96 K/UL — LOW (ref 1.2–3.4)
LYMPHOCYTES # BLD AUTO: 1.26 K/UL — SIGNIFICANT CHANGE UP (ref 1.2–3.4)
LYMPHOCYTES # BLD AUTO: 26.8 % — SIGNIFICANT CHANGE UP (ref 20.5–51.1)
LYMPHOCYTES # BLD AUTO: 31.1 % — SIGNIFICANT CHANGE UP (ref 20.5–51.1)
MAGNESIUM SERPL-MCNC: 1.9 MG/DL — SIGNIFICANT CHANGE UP (ref 1.8–2.4)
MAGNESIUM SERPL-MCNC: 1.9 MG/DL — SIGNIFICANT CHANGE UP (ref 1.8–2.4)
MCHC RBC-ENTMCNC: 35.5 G/DL — SIGNIFICANT CHANGE UP (ref 32–37)
MCHC RBC-ENTMCNC: 35.6 G/DL — SIGNIFICANT CHANGE UP (ref 32–37)
MCHC RBC-ENTMCNC: 36.1 PG — HIGH (ref 27–31)
MCHC RBC-ENTMCNC: 36.1 PG — HIGH (ref 27–31)
MCV RBC AUTO: 101.3 FL — HIGH (ref 80–94)
MCV RBC AUTO: 101.7 FL — HIGH (ref 80–94)
METHANOL BLD-MCNC: <.01 G/DL — SIGNIFICANT CHANGE UP (ref 0–0.01)
MONOCYTES # BLD AUTO: 0.6 K/UL — SIGNIFICANT CHANGE UP (ref 0.1–0.6)
MONOCYTES # BLD AUTO: 0.64 K/UL — HIGH (ref 0.1–0.6)
MONOCYTES NFR BLD AUTO: 14.8 % — HIGH (ref 1.7–9.3)
MONOCYTES NFR BLD AUTO: 17.9 % — HIGH (ref 1.7–9.3)
NEUTROPHILS # BLD AUTO: 1.68 K/UL — SIGNIFICANT CHANGE UP (ref 1.4–6.5)
NEUTROPHILS # BLD AUTO: 1.79 K/UL — SIGNIFICANT CHANGE UP (ref 1.4–6.5)
NEUTROPHILS NFR BLD AUTO: 44.3 % — SIGNIFICANT CHANGE UP (ref 42.2–75.2)
NEUTROPHILS NFR BLD AUTO: 46.9 % — SIGNIFICANT CHANGE UP (ref 42.2–75.2)
NRBC BLD AUTO-RTO: 0 /100 WBCS — SIGNIFICANT CHANGE UP (ref 0–0)
NRBC BLD AUTO-RTO: 0 /100 WBCS — SIGNIFICANT CHANGE UP (ref 0–0)
PHOSPHATE SERPL-MCNC: 0.8 MG/DL — LOW (ref 2.1–4.9)
PLATELET # BLD AUTO: 33 K/UL — LOW (ref 130–400)
PLATELET # BLD AUTO: 39 K/UL — LOW (ref 130–400)
PMV BLD: 13 FL — HIGH (ref 7.4–10.4)
PMV BLD: 13.3 FL — HIGH (ref 7.4–10.4)
POTASSIUM SERPL-MCNC: 3.6 MMOL/L — SIGNIFICANT CHANGE UP (ref 3.5–5)
POTASSIUM SERPL-MCNC: 3.6 MMOL/L — SIGNIFICANT CHANGE UP (ref 3.5–5)
POTASSIUM SERPL-SCNC: 3.6 MMOL/L — SIGNIFICANT CHANGE UP (ref 3.5–5)
POTASSIUM SERPL-SCNC: 3.6 MMOL/L — SIGNIFICANT CHANGE UP (ref 3.5–5)
PROT SERPL-MCNC: 5.1 G/DL — LOW (ref 6–8)
PROT SERPL-MCNC: 5.2 G/DL — LOW (ref 6–8)
PROT SERPL-MCNC: 5.6 G/DL — LOW (ref 6–8)
PROTHROM AB SERPL-ACNC: 19.6 SEC — HIGH (ref 9.95–12.87)
PROTHROM AB SERPL-ACNC: 19.9 SEC — HIGH (ref 9.95–12.87)
RBC # BLD: 2.3 M/UL — LOW (ref 4.7–6.1)
RBC # BLD: 2.3 M/UL — LOW (ref 4.7–6.1)
RBC # FLD: 16.4 % — HIGH (ref 11.5–14.5)
RBC # FLD: 16.7 % — HIGH (ref 11.5–14.5)
SODIUM SERPL-SCNC: 133 MMOL/L — LOW (ref 135–146)
SODIUM SERPL-SCNC: 135 MMOL/L — SIGNIFICANT CHANGE UP (ref 135–146)
SPECIMEN SOURCE: SIGNIFICANT CHANGE UP
SPECIMEN SOURCE: SIGNIFICANT CHANGE UP
WBC # BLD: 3.58 K/UL — LOW (ref 4.8–10.8)
WBC # BLD: 4.05 K/UL — LOW (ref 4.8–10.8)
WBC # FLD AUTO: 3.58 K/UL — LOW (ref 4.8–10.8)
WBC # FLD AUTO: 4.05 K/UL — LOW (ref 4.8–10.8)

## 2025-05-27 PROCEDURE — 99232 SBSQ HOSP IP/OBS MODERATE 35: CPT

## 2025-05-27 PROCEDURE — 99497 ADVNCD CARE PLAN 30 MIN: CPT

## 2025-05-27 PROCEDURE — 99233 SBSQ HOSP IP/OBS HIGH 50: CPT

## 2025-05-27 RX ORDER — SODIUM PHOSPHATE,DIBASIC DIHYD
30 POWDER (GRAM) MISCELLANEOUS ONCE
Refills: 0 | Status: COMPLETED | OUTPATIENT
Start: 2025-05-27 | End: 2025-05-27

## 2025-05-27 RX ADMIN — TAMSULOSIN HYDROCHLORIDE 0.4 MILLIGRAM(S): 0.4 CAPSULE ORAL at 21:26

## 2025-05-27 RX ADMIN — FOLIC ACID 1 MILLIGRAM(S): 1 TABLET ORAL at 12:22

## 2025-05-27 RX ADMIN — CYANOCOBALAMIN 1000 MICROGRAM(S): 1000 INJECTION INTRAMUSCULAR; SUBCUTANEOUS at 12:22

## 2025-05-27 RX ADMIN — Medication 2 TABLET(S): at 21:24

## 2025-05-27 RX ADMIN — Medication 25 GRAM(S): at 05:00

## 2025-05-27 RX ADMIN — Medication 50 MILLIGRAM(S): at 17:37

## 2025-05-27 RX ADMIN — LACTULOSE 20 GRAM(S): 10 SOLUTION ORAL at 17:36

## 2025-05-27 RX ADMIN — INSULIN LISPRO 2: 100 INJECTION, SOLUTION INTRAVENOUS; SUBCUTANEOUS at 12:21

## 2025-05-27 RX ADMIN — GABAPENTIN 200 MILLIGRAM(S): 400 CAPSULE ORAL at 21:25

## 2025-05-27 RX ADMIN — METHOCARBAMOL 500 MILLIGRAM(S): 500 TABLET, FILM COATED ORAL at 17:40

## 2025-05-27 RX ADMIN — Medication 1 TABLET(S): at 12:23

## 2025-05-27 RX ADMIN — Medication 1 DROP(S): at 05:10

## 2025-05-27 RX ADMIN — Medication 1 APPLICATION(S): at 05:13

## 2025-05-27 RX ADMIN — Medication 25 GRAM(S): at 17:33

## 2025-05-27 RX ADMIN — Medication 85 MILLIMOLE(S): at 13:59

## 2025-05-27 RX ADMIN — Medication 100 MILLIGRAM(S): at 12:22

## 2025-05-27 RX ADMIN — POLYETHYLENE GLYCOL 3350 17 GRAM(S): 17 POWDER, FOR SOLUTION ORAL at 14:01

## 2025-05-27 RX ADMIN — Medication 10 MILLIGRAM(S): at 13:56

## 2025-05-27 RX ADMIN — Medication 400 MILLIGRAM(S): at 12:22

## 2025-05-27 RX ADMIN — NALTREXONE HYDROCHLORIDE 50 MILLIGRAM(S): 50 TABLET, FILM COATED ORAL at 13:57

## 2025-05-27 RX ADMIN — Medication 50 MILLIGRAM(S): at 05:11

## 2025-05-27 NOTE — DISCHARGE NOTE PROVIDER - ATTENDING DISCHARGE PHYSICAL EXAMINATION:
Patient feeling better today, denies any signs of bleeding. Pancytopenia, H/H and platelet trending up. Chronic DVT, noted to be not on AC as per vascular surgery during prior admission. Patient currently hemodynamically stable for discharge with follow up outpatient PCP and hem/onc.     GENERAL: NAD, lying in bed comfortably  HEAD:  Atraumatic, normocephalic  EYES: EOMI, PERRL  NECK: Supple, trachea midline, no JVD  HEART: Regular rate and rhythm  LUNGS: Unlabored respirations.  Clear to auscultation bilaterally, no crackles, wheezing, or rhonchi  ABDOMEN: Soft, nontender, nondistended, +BS  EXTREMITIES: 2+ peripheral pulses bilaterally. No clubbing, cyanosis, or edema  NERVOUS SYSTEM:  A&Ox3, moving all extremities, no focal deficits

## 2025-05-27 NOTE — PROGRESS NOTE ADULT - ATTENDING COMMENTS
64 y o  M with hx of pseudogout AUD MISTY cirrhosis PE on AC admitted with chest pain and vomiting seen for cirrhosis with elevated liver enzymes.     No abdominal pain or bloody stool. No hallucinations.   Was drinking a pint of beer daily. Reports drinking 1/2 pint now. No DUI. Alcohol rehab few years ago.   Worked in [employer]? Retired.  Single no kids.  Lives in assisted living. No family support locally.     No complaints> Does not want to do alcohol rehab. Non committal on stopping alcohol.  Looks well  Alert oriented in place person month and year  Icterus+  Abdomen soft non tender  No asterixis       Hypotension - resolved   Ischemic hepatitis with alcoholic hepatitis   Decompensated MISTY cirrhosis with jaundice MELD 3.0 - 32  Coagulopathy ? was on AC  Alcohol withdrawal  Macrocytic anemia    High MDF but will hold off on prednisone as patient does not plan to stop alcohol  Will review as OP.   Monitor liver tests INR  Will need EGD as OP  Not a LT candidate with ongoing alcohol use despite several encounters advising stopping alcohol use. 64 y o  M with hx of pseudogout AUD MISTY cirrhosis PE on AC admitted with chest pain and vomiting seen for cirrhosis with elevated liver enzymes.     No abdominal pain or bloody stool. No hallucinations.   Was drinking a pint of beer daily. Reports drinking 1/2 pint now. No DUI. Alcohol rehab few years ago.   Worked in [employer]? Retired.  Single no kids.  Lives in assisted living. No family support locally.     No complaints> Does not want to do alcohol rehab. Non committal on stopping alcohol.  Looks well  Alert oriented in place person month and year  Icterus+  Abdomen soft non tender  No asterixis       Hypotension - resolved   Ischemic hepatitis with alcoholic hepatitis - liver tests markedly better  Decompensated MISTY cirrhosis with jaundice MELD 3.0 - 32  Coagulopathy ? was on AC  Alcohol withdrawal  Macrocytic anemia    High MDF but will hold off on prednisone as patient does not plan to stop alcohol  Will review as OP.   Monitor liver tests INR  Will need EGD as OP  Not a LT candidate with ongoing alcohol use despite several encounters advising stopping alcohol use.

## 2025-05-27 NOTE — PROGRESS NOTE ADULT - SUBJECTIVE AND OBJECTIVE BOX
SUBJECTIVE:    Patient is a 64y old Male who presents with a chief complaint of Chest pain (27 May 2025 13:05)    Currently admitted to medicine with the primary diagnosis of Nausea and vomiting       Today is hospital day 5d.     PAST MEDICAL & SURGICAL HISTORY  Alcohol dependence    HTN (hypertension)    Hard of hearing    Seasonal allergies    BPH (benign prostatic hyperplasia)    GERD (gastroesophageal reflux disease)    Pseudogout    History of deep vein thrombosis (DVT) of lower extremity    No significant past surgical history      ALLERGIES:  shellfish (Hives)  No Known Drug Allergies  Beef (Hives)  dairy products (Hives)    MEDICATIONS:  STANDING MEDICATIONS  cetirizine 10 milliGRAM(s) Oral daily  chlorhexidine 2% Cloths 1 Application(s) Topical <User Schedule>  cyanocobalamin 1000 MICROGram(s) Oral daily  dextrose 5%. 1000 milliLiter(s) IV Continuous <Continuous>  folic acid 1 milliGRAM(s) Oral daily  gabapentin 200 milliGRAM(s) Oral at bedtime  insulin lispro (ADMELOG) corrective regimen sliding scale   SubCutaneous every 6 hours  lactulose Syrup 20 Gram(s) Oral two times a day  magnesium oxide 400 milliGRAM(s) Oral daily  methocarbamol 500 milliGRAM(s) Oral <User Schedule>  multivitamin 1 Tablet(s) Oral daily  naltrexone 50 milliGRAM(s) Oral daily  piperacillin/tazobactam IVPB.. 3.375 Gram(s) IV Intermittent every 12 hours  polyethylene glycol 3350 17 Gram(s) Oral daily  pyridoxine 50 milliGRAM(s) Oral every 12 hours  senna 2 Tablet(s) Oral at bedtime  tamsulosin 0.4 milliGRAM(s) Oral at bedtime  thiamine 100 milliGRAM(s) Oral daily    PRN MEDICATIONS  aluminum hydroxide/magnesium hydroxide/simethicone Suspension 30 milliLiter(s) Oral every 4 hours PRN  dextrose Oral Gel 15 Gram(s) Oral once PRN    VITALS:   T(F): 98.9  HR: 96  BP: 110/62  RR: 18  SpO2: 97%    LABS:                        8.3    3.58  )-----------( 33       ( 27 May 2025 12:13 )             23.4     05-27    133[L]  |  101  |  8[L]  ----------------------------<  165[H]  3.6   |  22  |  1.0    Ca    8.5      27 May 2025 09:14  Phos  0.8     05-27  Mg     1.9     05-27    TPro  5.1[L]  /  Alb  3.1[L]  /  TBili  4.8[H]  /  DBili  2.6[H]  /  AST  108[H]  /  ALT  82[H]  /  AlkPhos  140[H]  05-27    PT/INR - ( 27 May 2025 09:14 )   PT: 19.90 sec;   INR: 1.67 ratio         PTT - ( 27 May 2025 09:14 )  PTT:44.6 sec  Urinalysis Basic - ( 27 May 2025 09:14 )    Color: x / Appearance: x / SG: x / pH: x  Gluc: 165 mg/dL / Ketone: x  / Bili: x / Urobili: x   Blood: x / Protein: x / Nitrite: x   Leuk Esterase: x / RBC: x / WBC x   Sq Epi: x / Non Sq Epi: x / Bacteria: x                RADIOLOGY:    PHYSICAL EXAM:  GEN: No acute distress  LUNGS: Clear to auscultation bilaterally   HEART: S1/S2 present. RRR.   ABD/ GI: Soft, non-tender, non-distended. Bowel sounds present  EXT: NC/NC/NE/2+PP/FELDER  NEURO: AAOX3

## 2025-05-27 NOTE — PROGRESS NOTE ADULT - ASSESSMENT
64 year old male patient with a PMHx of alcohol use disorder, chronic DVT/PE, HTN, DLD, Diabetes, gout brought in by ambulance from [facility] to the ED on 05/22 for evaluation of chest pain, nausea, and vomiting. Hepatology following for decompensated cirrhosis.       Decompensated MISTY cirrhosis with jaundice  MELD 3.0 - 32  Ischemic hepatitis with alcoholic hepatitis , MDF 33.4   Alcohol withdrawal  chronic DVTs on coumadin  Chronic macrocytic anemia suspected thiamine/folate deficiency; no overt bleeding  * Acute Hepatitis Panel (03.14.25 @ 04:44)   Hepatitis C Virus Interpretation: Nonreact   Hepatitis B Core IgM Antibody: Nonreact   Hepatitis B Surface Antigen: Nonreact   Hepatitis A IgM Antibody: Nonreact  *  noted -> repeat  * Acetaminophen Level, Serum: <5.0 ug/mL (05.23.25 @ 11:20)  * Alcohol, Blood: <10 mg/dL (05.23.25 @ 04:15)  * Benzodiazepine, Urine: Positive (05.22.25 @ 14:19)  * TTE noted with normal RV function  * US Abdomen Upper Quadrant Right (05.22.25 @ 19:43) Cholelithiasis without evidence for cholecystitis. Diffuse hepatic steatosis.  * CT Abdomen and Pelvis w/ IV Cont (05.22.25 @ 06:55) Apparent generalized colonic wall thickening unclear if on the basis of  underdistention or representing colitis.  * No EGD colonoscopy in chart  * FHx of gastric cancer    RECOMMENDATIONS  - MDF> 32; however, will hold off on prednisone as pt is poorly compliant   - Trend Liver enzymes and INR. Avoid vitamin K in setting of chronic DVT/PE  - Avoid hepatotoxic agents  - Counseled about importance of alcohol cessation. Recommend SBIRT, CATCH, Addiction Medicine teams evaluation. Check Peth level. Serum alcohol -  - Monitor closely for alcohol withdrawal. Continue CIWA protocol: currently on Ativan PRN  - Continue multivitamins QD and folic acid 1mg QD; continue IV thiamine 500mg Q8h for 3 days then PO 100mg QD thereafter  - Follow up with our [facility] for fibroscan (located at [address] [place], [place], [address]. Telephone No: [phone])  - Follow up with our [facility] for anemia workup/FHx of GI cancer (located at [address] [place], [address]. Telephone No: [phone])

## 2025-05-27 NOTE — DISCHARGE NOTE PROVIDER - CARE PROVIDER_API CALL
Fredy Mcgrath)  Hematology  [address] [place] [address]  Phone: [phone]  Fax: [phone]  Follow Up Time: 1 month    Anna Payton  Nephrology  [address] [facility] [address]  [place] [address]  Phone: [phone]  Fax: [phone]  Follow Up Time: 2 weeks    Albin Suarez  Internal Medicine  [address] [place] [address]  Phone: [phone]  Fax: [phone]  Follow Up Time: 2 weeks   Fredy Mcgrath)  Hematology  [address] [place] [address]  Phone: [phone]  Fax: [phone]  Follow Up Time: 1 month    Anna Payton  Nephrology  [address] [facility] [place] [address]  [place] [address]  Phone: [phone]  Fax: [phone]  Follow Up Time: 2 weeks    Albin Suarez  Internal Medicine  [address] [place] [address]  Phone: [phone]  Fax: [phone]  Follow Up Time: 2 weeks    Eugene Tellez  Internal Medicine  [address] [place] [address]  Phone: [phone]  Fax: [phone]  Follow Up Time: 1 week

## 2025-05-27 NOTE — PHYSICAL THERAPY INITIAL EVALUATION ADULT - PERTINENT HX OF CURRENT PROBLEM, REHAB EVAL
65 y/o male with a PMHx of alcohol use disorder, chronic DVT/PE, HTN, DLD, Diabetes, gout brought in by ambulance from [facility] residence presenting to the ED after 1 day of chest pain nausea/vomiting     Patient stated that, yesterday morning, he started vomiting, which continued all day long and got worse, so had come to ED for evaluation. Patient is c/o chest pain, started after vomiting several times. Denies trauma. Denies HA, but is c/o intermittent episodes of lightheadedness. Denies sob/abd pain. Denies  symptoms. Patient is a poor historian and is hard of hearing. Patient could not provide any further details on his health/illness, and most of the information is obtained from reviewing his previous admission/discharge summaries. No trauma. Patient stated that, he drinks alcohol regularly and last drink was yesterday. Denies f/c/diarrhea. Denies OD on medications. Denies drinking toxic alcohols recently (Methanol/ethylene glycol related products).  Denies OD on Metformin.  Denies urinary symptoms, reports that he hasn't made urine in two days.  NO travel, no rash

## 2025-05-27 NOTE — PROGRESS NOTE ADULT - ASSESSMENT
63 y/o male with a PMHx of alcohol use disorder, chronic DVT/PE, HTN, DLD, Diabetes, gout brought in by ambulance from [facility] presenting to the ED after 1 day of chest pain nausea/vomiting     Patient stated that, yesterday morning, he started vomiting, which continued all day long and got worse, so had come to ED for evaluation. Patient is c/o chest pain, started after vomiting several times. Denies trauma. Denies HA, but is c/o intermittent episodes of lightheadedness. Denies sob/abd pain. Denies  symptoms. Patient is a poor historian and is hard of hearing. Patient could not provide any further details on his health/illness, and most of the information is obtained from reviewing his previous admission/discharge summaries. No trauma. Patient stated that, he drinks alcohol regularly and last drink was yesterday. Denies f/c/diarrhea. Denies OD on medications. Denies drinking toxic alcohols recently (Methanol/ethylene glycol related products).  Denies OD on Metformin.  Denies urinary symptoms, reports that he hasn't made urine in two days.  NO travel, no rash.    #Severe lactic acidosis resolved  lactate high 27 on admission   - s/p fomepizole  - s/p bicarb drip  - s/p NAC   - s/p HD on 5/22   - creat today is 1.0    #HO alcohol use disorder  [facility] protocol - completed   - thiamine, folate, multivitamin   - drug screen positive only for benzodiazepines    #Generalized colonic wall thickening, possible colitis   - - - on zosyn -- day 5 can stop after 6-7 days    # Hypokalemia-- replete orally  #HO chronic DVT/PE  - duplex redemonstrating chronic DVT-- should not give Vit K as per hepatology  IR for IVC filter      #HO  Diabetes on metformin at home-- hba1c is 4.9  # pancytopenia-- related to alcohol use  seen by hematology-- hemolysis w/u haptoglobin  decreased at 30 and , angel 4.8  severe thrombocytopenia-- 20-->24-->33   small hematoma in lt arm hb is stable    # Cirrhosis of liver-- sec to alcohol use with high INR--  transamitis trending down    #HO gout   on colchicine  #Diet DASH  #Activity IAT  #GI  #DVT ppx -- cannot get heparin due to low platelets  #Pending -  [facility] -- IR for IVC filter

## 2025-05-27 NOTE — CONSULT NOTE ADULT - CONSULT REASON
Elevated liver enzymes
IVC
Colonic wall thickening, r/o colitis
Lactic acidosis
pancytopenia
ANDRZEJ   Severe acidosis
Possible Toxic alcohol

## 2025-05-27 NOTE — CONSULT NOTE ADULT - SUBJECTIVE AND OBJECTIVE BOX
INTERVENTIONAL RADIOLOGY CONSULT:     Procedure Requested:     HPI:  65 y/o male with a PMHx of alcohol use disorder, chronic DVT/PE, HTN, DLD, Diabetes, gout brought in by ambulance from [facility] presenting to the ED after 1 day of chest pain nausea/vomiting     Patient stated that, yesterday morning, he started vomiting, which continued all day long and got worse, so had come to ED for evaluation. Patient is c/o chest pain, started after vomiting several times. Denies trauma. Denies HA, but is c/o intermittent episodes of lightheadedness. Denies sob/abd pain. Denies  symptoms. Patient is a poor historian and is hard of hearing. Patient could not provide any further details on his health/illness, and most of the information is obtained from reviewing his previous admission/discharge summaries. No trauma. Patient stated that, he drinks alcohol regularly and last drink was yesterday. Denies f/c/diarrhea. Denies OD on medications. Denies drinking toxic alcohols recently (Methanol/ethylene glycol related products).  Denies OD on Metformin.  Denies urinary symptoms, reports that he hasn't made urine in two days.  NO travel, no rash    In the ED:  - Vitals: T 37.2  /70 SpO2 96% RR 18  - Labs: WBC 10.04 Hgb 13.0   Na 141 K 5.9 (hemolyzed, repeat 4.7)  Cr 1.6 CO2 8 AG 38 Lactate 21.6   - Imaging:  _ CT Head: No evidence of acute intracranial pathology.  _  CT Chest and Abdomen: No evidence of central pulmonary embolus. Apparent generalized colonic wall thickening unclear if on the basis of  underdistention or representing colitis.   (22 May 2025 09:55)      PAST MEDICAL & SURGICAL HISTORY:  Alcohol dependence      HTN (hypertension)      Hard of hearing      Seasonal allergies      BPH (benign prostatic hyperplasia)      GERD (gastroesophageal reflux disease)      Pseudogout      History of deep vein thrombosis (DVT) of lower extremity      No significant past surgical history          MEDICATIONS  (STANDING):  cetirizine 10 milliGRAM(s) Oral daily  chlorhexidine 2% Cloths 1 Application(s) Topical <User Schedule>  cyanocobalamin 1000 MICROGram(s) Oral daily  dextrose 5%. 1000 milliLiter(s) (50 mL/Hr) IV Continuous <Continuous>  folic acid 1 milliGRAM(s) Oral daily  gabapentin 200 milliGRAM(s) Oral at bedtime  insulin lispro (ADMELOG) corrective regimen sliding scale   SubCutaneous every 6 hours  lactulose Syrup 20 Gram(s) Oral two times a day  magnesium oxide 400 milliGRAM(s) Oral daily  methocarbamol 500 milliGRAM(s) Oral <User Schedule>  multivitamin 1 Tablet(s) Oral daily  naltrexone 50 milliGRAM(s) Oral daily  piperacillin/tazobactam IVPB.. 3.375 Gram(s) IV Intermittent every 12 hours  polyethylene glycol 3350 17 Gram(s) Oral daily  pyridoxine 50 milliGRAM(s) Oral every 12 hours  senna 2 Tablet(s) Oral at bedtime  sodium phosphate 30 milliMole(s)/500 mL IVPB 30 milliMole(s) IV Intermittent once  tamsulosin 0.4 milliGRAM(s) Oral at bedtime  thiamine 100 milliGRAM(s) Oral daily    MEDICATIONS  (PRN):  aluminum hydroxide/magnesium hydroxide/simethicone Suspension 30 milliLiter(s) Oral every 4 hours PRN Dyspepsia  dextrose Oral Gel 15 Gram(s) Oral once PRN Blood Glucose LESS THAN 70 milliGRAM(s)/deciliter      Allergies    shellfish (Hives)  No Known Drug Allergies  Beef (Hives)  dairy products (Hives)    Intolerances          FAMILY HISTORY:  Family history of gastric cancer  Mom    Family hx of lung cancer  Smoker          Physical Exam:   Vital Signs Last 24 Hrs  T(C): 37.1 (27 May 2025 07:30), Max: 37.1 (27 May 2025 07:30)  T(F): 98.7 (27 May 2025 07:30), Max: 98.7 (27 May 2025 07:30)  HR: 91 (27 May 2025 07:30) (91 - 107)  BP: 135/76 (27 May 2025 07:30) (109/74 - 135/76)  BP(mean): --  RR: 19 (27 May 2025 07:30) (18 - 19)  SpO2: 98% (27 May 2025 07:30) (97% - 99%)    Parameters below as of 27 May 2025 07:30  Patient On (Oxygen Delivery Method): room air          Labs:                         8.3    3.58  )-----------( 33       ( 27 May 2025 12:13 )             23.4     05-27    133[L]  |  101  |  8[L]  ----------------------------<  165[H]  3.6   |  22  |  1.0    Ca    8.5      27 May 2025 09:14  Phos  0.8     05-27  Mg     1.9     05-27    TPro  5.1[L]  /  Alb  3.1[L]  /  TBili  4.8[H]  /  DBili  2.6[H]  /  AST  108[H]  /  ALT  82[H]  /  AlkPhos  140[H]  05-27    PT/INR - ( 27 May 2025 09:14 )   PT: 19.90 sec;   INR: 1.67 ratio         PTT - ( 27 May 2025 09:14 )  PTT:44.6 sec    Pertinent labs:                      8.3    3.58  )-----------( 33       ( 27 May 2025 12:13 )             23.4       05-27    133[L]  |  101  |  8[L]  ----------------------------<  165[H]  3.6   |  22  |  1.0    Ca    8.5      27 May 2025 09:14  Phos  0.8     05-27  Mg     1.9     05-27    TPro  5.1[L]  /  Alb  3.1[L]  /  TBili  4.8[H]  /  DBili  2.6[H]  /  AST  108[H]  /  ALT  82[H]  /  AlkPhos  140[H]  05-27      PT/INR - ( 27 May 2025 09:14 )   PT: 19.90 sec;   INR: 1.67 ratio         PTT - ( 27 May 2025 09:14 )  PTT:44.6 sec    Radiology & Additional Studies:     Radiology imaging reviewed.       ASSESSMENT AND PLAN:  -Patient scheduled for tomorrow for an IVC Filter 5/28/25  -NPO after midnight  -draw CBC/CMP/Coags prior to procedure      Please call Interventional Radiology with questions or concerns:   - M-F [phone]: [phone]   - All other hours: [phone]

## 2025-05-27 NOTE — DISCHARGE NOTE PROVIDER - NSDCMRMEDTOKEN_GEN_ALL_CORE_FT
Claritin 10 mg oral tablet: 1 tab(s) orally once a day  colchicine 0.6 mg oral tablet: 1 tab(s) orally 2 times a day  cyanocobalamin 1000 mcg oral tablet: 1 tab(s) orally once a day  Flomax 0.4 mg oral capsule: 1 cap(s) orally once a day (at bedtime)  folic acid 1 mg oral tablet: 1 tab(s) orally once a day  gabapentin 100 mg oral capsule: 2 cap(s) orally 2 times a day  Jardiance 10 mg oral tablet: 1 tab(s) orally once a day  magnesium oxide 400 mg oral tablet: 1 tab(s) orally once a day  methocarbamol 500 mg oral tablet: 1 orally once a day  Multiple Vitamins oral tablet: 1 tab(s) orally once a day  naltrexone 50 mg oral tablet: 1 tab(s) orally once a day  polyethylene glycol 3350 oral powder for reconstitution: 17 gram(s) orally once a day  Protonix 40 mg oral delayed release tablet: 1 tab(s) orally once a day  senna leaf extract oral tablet: 2 tab(s) orally once a day (at bedtime)  thiamine 100 mg oral tablet: 1 tab(s) orally once a day  Toprol-XL 25 mg oral tablet, extended release: 1 tab(s) orally once a day

## 2025-05-27 NOTE — PROGRESS NOTE ADULT - SUBJECTIVE AND OBJECTIVE BOX
Gastroenterology progress note:     Patient is a 64y old  Male who presents with a chief complaint of Chest pain (27 May 2025 17:07)       Admitted on: 05-22-25        Interval History:    No acute events overnight.       PAST MEDICAL & SURGICAL HISTORY:  Alcohol dependence  HTN (hypertension)  Hard of hearing  Seasonal allergies  BPH (benign prostatic hyperplasia)  GERD (gastroesophageal reflux disease)  Pseudogout   deep vein thrombosis (DVT) of lower extremity  No significant past surgical history          MEDICATIONS  (STANDING):  cetirizine 10 milliGRAM(s) Oral daily  chlorhexidine 2% Cloths 1 Application(s) Topical <User Schedule>  cyanocobalamin 1000 MICROGram(s) Oral daily  dextrose 5%. 1000 milliLiter(s) (50 mL/Hr) IV Continuous <Continuous>  folic acid 1 milliGRAM(s) Oral daily  gabapentin 200 milliGRAM(s) Oral at bedtime  insulin lispro (ADMELOG) corrective regimen sliding scale   SubCutaneous every 6 hours  lactulose Syrup 20 Gram(s) Oral two times a day  magnesium oxide 400 milliGRAM(s) Oral daily  methocarbamol 500 milliGRAM(s) Oral <User Schedule>  multivitamin 1 Tablet(s) Oral daily  naltrexone 50 milliGRAM(s) Oral daily  piperacillin/tazobactam IVPB.. 3.375 Gram(s) IV Intermittent every 12 hours  polyethylene glycol 3350 17 Gram(s) Oral daily  pyridoxine 50 milliGRAM(s) Oral every 12 hours  senna 2 Tablet(s) Oral at bedtime  tamsulosin 0.4 milliGRAM(s) Oral at bedtime  thiamine 100 milliGRAM(s) Oral daily    MEDICATIONS  (PRN):  aluminum hydroxide/magnesium hydroxide/simethicone Suspension 30 milliLiter(s) Oral every 4 hours PRN Dyspepsia  dextrose Oral Gel 15 Gram(s) Oral once PRN Blood Glucose LESS THAN 70 milliGRAM(s)/deciliter      Allergies  shellfish (Hives)  No Known Drug Allergies  Beef (Hives)  dairy products (Hives)        Physical Examination:  T(C): 37.2 (05-27-25 @ 15:12), Max: 37.2 (05-27-25 @ 15:12)  HR: 96 (05-27-25 @ 15:12) (91 - 99)  BP: 110/62 (05-27-25 @ 15:12) (109/74 - 135/76)  RR: 18 (05-27-25 @ 15:12) (18 - 19)  SpO2: 97% (05-27-25 @ 15:12) (97% - 99%)      05-26-25 @ 07:01  -  05-27-25 @ 07:00  --------------------------------------------------------  IN: 990 mL / OUT: 0 mL / NET: 990 mL        Alert oriented in place person month and year  Icterus+  Abdomen soft non tender  No asterixis   Tremors +     Data:                        8.3    3.58  )-----------( 33       ( 27 May 2025 12:13 )             23.4     Hgb trend:  8.3  05-27-25 @ 12:13  8.4  05-26-25 @ 05:45  8.4  05-25-25 @ 11:12        05-27    133[L]  |  101  |  8[L]  ----------------------------<  165[H]  3.6   |  22  |  1.0    Ca    8.5      27 May 2025 09:14  Phos  0.8     05-27  Mg     1.9     05-27    TPro  5.1[L]  /  Alb  3.1[L]  /  TBili  4.8[H]  /  DBili  2.6[H]  /  AST  108[H]  /  ALT  82[H]  /  AlkPhos  140[H]  05-27    Liver panel trend:  TBili 4.8   /      /   ALT 82   /   AlkP 140   /   Tptn 5.1   /   Alb 3.1    /   DBili 2.6      05-27  TBili 5.2   /      /   ALT 88   /   AlkP 142   /   Tptn 5.6   /   Alb 3.1    /   DBili --      05-27  TBili 5.5   /      /      /   AlkP 126   /   Tptn 4.9   /   Alb 3.0    /   DBili --      05-26  TBili 4.8   /      /      /   AlkP 120   /   Tptn 4.8   /   Alb 3.1    /   DBili --      05-25  TBili 4.5   /      /      /   AlkP 119   /   Tptn 5.4   /   Alb 3.2    /   DBili --      05-24  TBili 4.5   /      /      /   AlkP 112   /   Tptn 5.5   /   Alb 3.1    /   DBili --      05-23  TBili 4.8   /   AST 1250   /      /   AlkP 124   /   Tptn 5.7   /   Alb 3.4    /   DBili --      05-23  TBili 4.3   /   AST 1106   /      /   AlkP 105   /   Tptn 5.1   /   Alb 3.3    /   DBili --      05-23  TBili 3.9   /      /   ALT 86   /   AlkP 140   /   Tptn 5.8   /   Alb 3.4    /   DBili --      05-22  TBili 3.0   /   AST 71   /   ALT 22   /   AlkP 158   /   Tptn 7.1   /   Alb 3.8    /   DBili 1.6      05-22  TBili 3.1   /   AST 94   /   ALT 23   /   AlkP 167   /   Tptn 7.4   /   Alb 4.0    /   DBili 0.8      05-22      PT/INR - ( 27 May 2025 09:14 )   PT: 19.90 sec;   INR: 1.67 ratio         PTT - ( 27 May 2025 09:14 )  PTT:44.6 sec

## 2025-05-27 NOTE — DISCHARGE NOTE PROVIDER - CARE PROVIDERS DIRECT ADDRESSES
,[email or web address]

## 2025-05-27 NOTE — PROGRESS NOTE ADULT - ASSESSMENT
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 Diffuse hepatic steatosis.  -CT Abdomen and Pelvis w/ IV Cont (05.22.25 @ 06:55) Apparent generalized colonic wall thickening unclear if on the basis of  underdistention or representing colitis.  - No EGD colonoscopy in chart  - s/p fomepizole, bicarb drip, NAC, s/p HD on 5/22   - creat today is 1  - hepatology:  (MDF score 101.9), Trend Liver enzymes and INR. Avoid vitamin K in setting of chronic DVT/PE. Avoid hepatotoxic agents. Hold off on steroids for alcoholic hepatitis in setting of suspected colitis. INR improving. bilirubin now stabilized at around 5  - now off CIWA, no signs of withdrawal.  - hold metformin on dc, monitor cmp for AG and HCO3.   Pending: Check Hepatitis A IgM, Hepatitis B core IgM, B  core Ab total, B surface Ag, B surface Ab, HCV antibody, HCV RNA, Anti HEV. Check Iron studies and ceruloplasmin Check CMV PCR, EBV PCR, HSV IgM. Obtain Quantiferon level    #Worsening Pancytopenia  - has hx of pancytopenia now worsening.   - Hematology: Chronic pancytopenia- 2/2 alcohol use and alcohol liver disease. Strongly counselled him to abstain from alcohol. C/w antibiotics. RC low, LDH normal, haptoglobin low.   Transfuse for plts <10K, hb <7.      #HO alcohol use disorder  [facility] protocol - standing and prn   - thiamine, folate, multivitamin   - drug screen positive only for benzodiazepines    #Generalized colonic wall thickening, possible colitis   on zosyn end date 5/29.   # Hypokalemia-- replete orally  #HO chronic DVT/PE  - duplex redemonstrating chronic DVT    #HO  Diabetes on metformin at home  # pancytopenia-- related to alcohol use  seen by hematology-- hemolysis w/u pending  severe thrombocytopenia    #HO gout  on colchicine    #Diet DASH  #Activity IAT  #GI  #DVT ppx -- cannot get heparin due to low platelets  #Pending - dispo planning home PT vs [facility]. trend bili/LFT.

## 2025-05-27 NOTE — PROGRESS NOTE ADULT - SUBJECTIVE AND OBJECTIVE BOX
SUBJECTIVE:  HPI:  65 y/o male with a PMHx of alcohol use disorder, chronic DVT/PE, HTN, DLD, Diabetes, gout brought in by ambulance from [facility] residence presenting to the ED after 1 day of chest pain nausea/vomiting. Patient stated that  he started vomiting, which continued all day long and got worse, so had come to ED for evaluation. Patient is c/o chest pain, started after vomiting several times. Denies trauma. Denies HA, but is c/o intermittent episodes of lightheadedness. Denies sob/abd pain. Denies  symptoms. Patient is a poor historian and is hard of hearing. Patient could not provide any further details on his health/illness, and most of the information is obtained from reviewing his previous admission/discharge summaries. No trauma. Patient stated that, he drinks alcohol regularly and last drink was yesterday. Denies f/c/diarrhea. Denies OD on medications. Denies drinking toxic alcohols recently (Methanol/ethylene glycol related products).  Denies OD on Metformin.  Denies urinary symptoms, reports that he hasn't made urine in two days.  NO travel, no rash    In the ED:  - Vitals: T 37.2  /70 SpO2 96% RR 18  - Labs: WBC 10.04 Hgb 13.0   Na 141 K 5.9 (hemolyzed, repeat 4.7)  Cr 1.6 CO2 8 AG 38 Lactate 21.6   - Imaging:  _ CT Head: No evidence of acute intracranial pathology.  _  CT Chest and Abdomen: No evidence of central pulmonary embolus. Apparent generalized colonic wall thickening unclear if on the basis of  underdistention or representing colitis.   (22 May 2025 09:55)    Patient found to have severe HAGMA 2/2 lactic acidosis from JENNIFER vs cirrhosis, required HD once 5/22, now improved.   Patient is currently AAO*4, denies any active complaints. Asking if he could be seen by PT to get out of bed.     Today is hospital day 5d.     PAST MEDICAL & SURGICAL HISTORY  Alcohol dependence    HTN (hypertension)    Hard of hearing    Seasonal allergies    BPH (benign prostatic hyperplasia)    GERD (gastroesophageal reflux disease)    Pseudogout    History of deep vein thrombosis (DVT) of lower extremity    No significant past surgical history        ALLERGIES:  shellfish (Hives)  No Known Drug Allergies  Beef (Hives)  dairy products (Hives)    MEDICATIONS:  ACTIVE MEDICATIONS  aluminum hydroxide/magnesium hydroxide/simethicone Suspension 30 milliLiter(s) Oral every 4 hours PRN  cetirizine 10 milliGRAM(s) Oral daily  chlorhexidine 2% Cloths 1 Application(s) Topical <User Schedule>  cyanocobalamin 1000 MICROGram(s) Oral daily  dextrose 5%. 1000 milliLiter(s) IV Continuous <Continuous>  dextrose Oral Gel 15 Gram(s) Oral once PRN  folic acid 1 milliGRAM(s) Oral daily  gabapentin 200 milliGRAM(s) Oral at bedtime  insulin lispro (ADMELOG) corrective regimen sliding scale   SubCutaneous every 6 hours  lactulose Syrup 20 Gram(s) Oral two times a day  magnesium oxide 400 milliGRAM(s) Oral daily  methocarbamol 500 milliGRAM(s) Oral <User Schedule>  multivitamin 1 Tablet(s) Oral daily  naltrexone 50 milliGRAM(s) Oral daily  piperacillin/tazobactam IVPB.. 3.375 Gram(s) IV Intermittent every 12 hours  polyethylene glycol 3350 17 Gram(s) Oral daily  pyridoxine 50 milliGRAM(s) Oral every 12 hours  senna 2 Tablet(s) Oral at bedtime  sodium phosphate 30 milliMole(s)/500 mL IVPB 30 milliMole(s) IV Intermittent once  tamsulosin 0.4 milliGRAM(s) Oral at bedtime  thiamine 100 milliGRAM(s) Oral daily      VITALS:   T(F): 98.7  HR: 91  BP: 135/76  RR: 19  SpO2: 98%    LABS:                        8.4    3.98  )-----------( 24       ( 26 May 2025 05:45 )             23.2     05-27    133[L]  |  101  |  8[L]  ----------------------------<  165[H]  3.6   |  22  |  1.0    Ca    8.5      27 May 2025 09:14  Phos  0.8     05-27  Mg     1.9     05-27    TPro  5.6[L]  /  Alb  3.1[L]  /  TBili  5.2[H]  /  DBili  x   /  AST  115[H]  /  ALT  88[H]  /  AlkPhos  142[H]  05-27    PT/INR - ( 27 May 2025 09:14 )   PT: 19.90 sec;   INR: 1.67 ratio         PTT - ( 27 May 2025 09:14 )  PTT:44.6 sec  Urinalysis Basic - ( 27 May 2025 09:14 )    Color: x / Appearance: x / SG: x / pH: x  Gluc: 165 mg/dL / Ketone: x  / Bili: x / Urobili: x   Blood: x / Protein: x / Nitrite: x   Leuk Esterase: x / RBC: x / WBC x   Sq Epi: x / Non Sq Epi: x / Bacteria: x                    PHYSICAL EXAM:  GEN: No acute distress  LUNGS: Clear to auscultation bilaterally   HEART: S1/S2 present.    ABD: Soft, non-tender, non-distended.   EXT: No pedal edema  NEURO: AAOX3

## 2025-05-27 NOTE — DISCHARGE NOTE PROVIDER - PROVIDER TOKENS
PROVIDER:[TOKEN:[[id number]],FOLLOWUP:[1 month]],PROVIDER:[TOKEN:[[id number]],FOLLOWUP:[2 weeks]],PROVIDER:[TOKEN:[[id number]],FOLLOWUP:[2 weeks]] PROVIDER:[TOKEN:[[id number]],FOLLOWUP:[1 month]],PROVIDER:[TOKEN:[[id number]],FOLLOWUP:[2 weeks]],PROVIDER:[TOKEN:[[id number]],FOLLOWUP:[2 weeks]],PROVIDER:[TOKEN:[[id number]],FOLLOWUP:[1 week]]

## 2025-05-27 NOTE — DISCHARGE NOTE PROVIDER - HOSPITAL COURSE
63 y/o male with a PMHx of alcohol use disorder, chronic DVT/PE, HTN, DLD, Diabetes, gout brought in by ambulance from [facility] presenting to the ED after 1 day of chest pain nausea/vomiting. Patient stated that  he started vomiting, which continued all day long and got worse, so had come to ED for evaluation. Patient is c/o chest pain, started after vomiting several times. Denies trauma. Denies HA, but is c/o intermittent episodes of lightheadedness. Denies sob/abd pain. Denies  symptoms. Patient is a poor historian and is hard of hearing. Patient could not provide any further details on his health/illness, and most of the information is obtained from reviewing his previous admission/discharge summaries. No trauma. Patient stated that, he drinks alcohol regularly and last drink was yesterday. Denies f/c/diarrhea. Denies OD on medications. Denies drinking toxic alcohols recently (Methanol/ethylene glycol related products).  Denies OD on Metformin.  Denies urinary symptoms, reports that he hasn't made urine in two days.  NO travel, no rash    In the ED:  - Vitals: T 37.2  /70 SpO2 96% RR 18  - Labs: WBC 10.04 Hgb 13.0   Na 141 K 5.9 (hemolyzed, repeat 4.7)  Cr 1.6 CO2 8 AG 38 Lactate 21.6   - Imaging:  _ CT Head: No evidence of acute intracranial pathology.  _  CT Chest and Abdomen: No evidence of central pulmonary embolus. Apparent generalized colonic wall thickening unclear if on the basis of  underdistention or representing colitis.   (22 May 2025 09:55)    Hospital Course:    Patient found to have severe HAGMA 2/2 lactic acidosis from JENNIFER vs cirrhosis, required HD once 5/22, now improved. He also finished a course of zosyn for colitis. He has worsening pancytopenia which is presumed to be from cirrhosis and will have to f/u with heme/onc. Will also have to follow up with hepatology and nephrology. Will have to be dced without AC despite chronic DVT/PE due to severe thrombocytopenia plt in 20s IR consulted for IVC filter. 64M PMHx of alcohol use disorder, chronic DVT/PE, HTN, DLD, Diabetes, gout brought in by ambulance from [facility] presented to the ED after 1 day of chest pain nausea/vomiting. Patient stated that he started vomiting, which continued all day long and got worse, so came to ED for evaluation. Chest pain, started after vomiting several times. Denied trauma. Denied HA, but was complaining of intermittent episodes of lightheadedness. Denied sob/abd pain. Denied  symptoms. Patient is a poor historian and is hard of hearing. Patient could not provide any further details on his health/illness, and most of the information was obtained from reviewing his previous admission/discharge summaries. No trauma. Patient stated that, he drinks alcohol regularly and last drink was the day before presentation. Denied f/c/diarrhea. Denied OD on medications. Denied drinking toxic alcohols recently (Methanol/ethylene glycol related products).  Denied OD on Metformin.  Denied urinary symptoms, reported that he hasn't made urine in two days.  NO travel, no rash    In the ED:  - Vitals: T 37.2  /70 SpO2 96% RR 18  - Labs: WBC 10.04 Hgb 13.0   Na 141 K 5.9 (hemolyzed, repeat 4.7)  Cr 1.6 CO2 8 AG 38 Lactate 21.6   - Imaging:  _ CT Head: No evidence of acute intracranial pathology.  _  CT Chest and Abdomen: No evidence of central pulmonary embolus. Apparent generalized colonic wall thickening unclear if on the basis of  underdistention or representing colitis.   (22 May 2025 09:55)    Hospital Course:    Patient found to have severe HAGMA 2/2 lactic acidosis from JENNIFER vs cirrhosis, required HD once 5/22, now improved. He should follow up outpatient with nephrology. He also finished a course of zosyn for colitis. He had pancytopenia that initially worsened and then improved which is presumed to be from cirrhosis and will have to f/u outpatient with heme/onc and hepatology. The patient was also recommended for IVC filter but did not want the procedure. Psych determined that the patient does not have capacity to make this decision. However, the chronic DVTs were the improved since last admission and vascular last admission did not recommend any AC for the chronic DVTs. Therefore given the risk of bleeding with low platelets vs benefits, the pt will not be discharged on AC. Will have to be dced without AC despite chronic DVT/PE due to severe thrombocytopenia plt in 20s IR consulted for IVC filter.     Follow ups:  - PCP  - hepatology  - Heme/onc  - Nephrology    Discussion of discharge plan of care, including discharge diagnoses, medication reconciliation, and follow-ups was conducted with Dr. Coto on 5/30/25 and discharge was approved.   64M PMHx of alcohol use disorder, chronic DVT/PE, HTN, DLD, Diabetes, gout brought in by ambulance from [facility] presented to the ED after 1 day of chest pain nausea/vomiting. Patient stated that he started vomiting, which continued all day long and got worse, so came to ED for evaluation. Chest pain, started after vomiting several times. Denied trauma. Denied HA, but was complaining of intermittent episodes of lightheadedness. Denied sob/abd pain. Denied  symptoms. Patient is a poor historian and is hard of hearing. Patient could not provide any further details on his health/illness, and most of the information was obtained from reviewing his previous admission/discharge summaries. No trauma. Patient stated that, he drinks alcohol regularly and last drink was the day before presentation. Denied f/c/diarrhea. Denied OD on medications. Denied drinking toxic alcohols recently (Methanol/ethylene glycol related products).  Denied OD on Metformin.  Denied urinary symptoms, reported that he hasn't made urine in two days.  NO travel, no rash    In the ED:  - Vitals: T 37.2  /70 SpO2 96% RR 18  - Labs: WBC 10.04 Hgb 13.0   Na 141 K 5.9 (hemolyzed, repeat 4.7)  Cr 1.6 CO2 8 AG 38 Lactate 21.6   - Imaging:  _ CT Head: No evidence of acute intracranial pathology.  _  CT Chest and Abdomen: No evidence of central pulmonary embolus. Apparent generalized colonic wall thickening unclear if on the basis of  underdistention or representing colitis.   (22 May 2025 09:55)    Hospital Course:    Patient found to have severe HAGMA 2/2 lactic acidosis from JENNIFER vs cirrhosis, required HD once 5/22, now improved. He should follow up outpatient with nephrology. He also finished a course of zosyn for colitis. He had pancytopenia that initially worsened and then improved which is presumed to be from cirrhosis and will have to f/u outpatient with heme/onc and hepatology. The patient was also recommended for IVC filter but did not want the procedure. Psych determined that the patient does not have capacity to make this decision. However, the chronic DVTs were improved since last admission- there were no acute DVTs and vascular last admission did not recommend any AC for the chronic DVTs. Therefore given the risk of bleeding with low platelets vs benefits, the pt will not be discharged on AC. He should follow up outpatient with Heme/onc.     Follow ups:  - PCP  - hepatology  - Heme/onc  - Nephrology    Discussion of discharge plan of care, including discharge diagnoses, medication reconciliation, and follow-ups was conducted with Dr. Coto on 5/30/25 and discharge was approved.

## 2025-05-27 NOTE — DISCHARGE NOTE PROVIDER - NSDCCPCAREPLAN_GEN_ALL_CORE_FT
PRINCIPAL DISCHARGE DIAGNOSIS  Diagnosis: Nausea and vomiting  Assessment and Plan of Treatment: You came in with nausea and vomiting and were found to have metabolic acidosis which means that your body had too much acidity. This was likely secondary to metformin in combination with your liver disease. You underwent one session of dialysis and were seen by the liver doctors and hematology/oncology teams. You should follow up outpatient with these teams and should stop taking metformin.   Please followup with your primary care doctor within 1-2 weeks of discharge. If you have any new or recurrent symptoms, please call your doctor or go to the ED        SECONDARY DISCHARGE DIAGNOSES  Diagnosis: DVT, lower extremity  Assessment and Plan of Treatment: You have chronic deep vein thromboses in your legs. These clots were present for a long time and vascular surgery did not recommend that you take blood thinner medications.   Please follow up outpatient with the hematology/oncology team.

## 2025-05-27 NOTE — CONSULT NOTE ADULT - PROVIDER SPECIALTY LIST ADULT
Nephrology
Toxicology (Non-Face-To-Face)
Intervent Radiology
Surgery
Critical Care
Heme/Onc
Hepatology

## 2025-05-28 LAB
ALBUMIN SERPL ELPH-MCNC: 3.2 G/DL — LOW (ref 3.5–5.2)
ALP SERPL-CCNC: 134 U/L — HIGH (ref 30–115)
ALT FLD-CCNC: 64 U/L — HIGH (ref 0–41)
ANION GAP SERPL CALC-SCNC: 7 MMOL/L — SIGNIFICANT CHANGE UP (ref 7–14)
AST SERPL-CCNC: 75 U/L — HIGH (ref 0–41)
BASOPHILS # BLD AUTO: 0.04 K/UL — SIGNIFICANT CHANGE UP (ref 0–0.2)
BASOPHILS NFR BLD AUTO: 1.3 % — HIGH (ref 0–1)
BILIRUB SERPL-MCNC: 4.3 MG/DL — HIGH (ref 0.2–1.2)
BUN SERPL-MCNC: 8 MG/DL — LOW (ref 10–20)
CALCIUM SERPL-MCNC: 8.5 MG/DL — SIGNIFICANT CHANGE UP (ref 8.4–10.5)
CERULOPLASMIN SERPL-MCNC: 19 MG/DL — SIGNIFICANT CHANGE UP (ref 15–30)
CHLORIDE SERPL-SCNC: 104 MMOL/L — SIGNIFICANT CHANGE UP (ref 98–110)
CO2 SERPL-SCNC: 24 MMOL/L — SIGNIFICANT CHANGE UP (ref 17–32)
CREAT SERPL-MCNC: 1.1 MG/DL — SIGNIFICANT CHANGE UP (ref 0.7–1.5)
EGFR: 74 ML/MIN/1.73M2 — SIGNIFICANT CHANGE UP
EGFR: 74 ML/MIN/1.73M2 — SIGNIFICANT CHANGE UP
EOSINOPHIL # BLD AUTO: 0.33 K/UL — SIGNIFICANT CHANGE UP (ref 0–0.7)
EOSINOPHIL NFR BLD AUTO: 10.9 % — HIGH (ref 0–8)
FERRITIN SERPL-MCNC: 453 NG/ML — HIGH (ref 30–400)
GLUCOSE BLDC GLUCOMTR-MCNC: 110 MG/DL — HIGH (ref 70–99)
GLUCOSE BLDC GLUCOMTR-MCNC: 113 MG/DL — HIGH (ref 70–99)
GLUCOSE BLDC GLUCOMTR-MCNC: 125 MG/DL — HIGH (ref 70–99)
GLUCOSE BLDC GLUCOMTR-MCNC: 135 MG/DL — HIGH (ref 70–99)
GLUCOSE BLDC GLUCOMTR-MCNC: 150 MG/DL — HIGH (ref 70–99)
GLUCOSE SERPL-MCNC: 100 MG/DL — HIGH (ref 70–99)
HAV IGM SER-ACNC: SIGNIFICANT CHANGE UP
HBV CORE IGM SER-ACNC: SIGNIFICANT CHANGE UP
HBV SURFACE AG SER-ACNC: SIGNIFICANT CHANGE UP
HCT VFR BLD CALC: 21.5 % — LOW (ref 42–52)
HCV AB S/CO SERPL IA: 0.23 S/CO — SIGNIFICANT CHANGE UP (ref 0–0.79)
HCV AB SERPL-IMP: SIGNIFICANT CHANGE UP
HGB BLD-MCNC: 7.7 G/DL — LOW (ref 14–18)
IMM GRANULOCYTES NFR BLD AUTO: 0.7 % — HIGH (ref 0.1–0.3)
LYMPHOCYTES # BLD AUTO: 1.03 K/UL — LOW (ref 1.2–3.4)
LYMPHOCYTES # BLD AUTO: 34 % — SIGNIFICANT CHANGE UP (ref 20.5–51.1)
MAGNESIUM SERPL-MCNC: 1.8 MG/DL — SIGNIFICANT CHANGE UP (ref 1.8–2.4)
MCHC RBC-ENTMCNC: 35.8 G/DL — SIGNIFICANT CHANGE UP (ref 32–37)
MCHC RBC-ENTMCNC: 36.2 PG — HIGH (ref 27–31)
MCV RBC AUTO: 100.9 FL — HIGH (ref 80–94)
MONOCYTES # BLD AUTO: 0.48 K/UL — SIGNIFICANT CHANGE UP (ref 0.1–0.6)
MONOCYTES NFR BLD AUTO: 15.8 % — HIGH (ref 1.7–9.3)
NEUTROPHILS # BLD AUTO: 1.13 K/UL — LOW (ref 1.4–6.5)
NEUTROPHILS NFR BLD AUTO: 37.3 % — LOW (ref 42.2–75.2)
NRBC BLD AUTO-RTO: 0 /100 WBCS — SIGNIFICANT CHANGE UP (ref 0–0)
PETH 16:0/18:1: >400 NG/ML — HIGH
PETH 16:0/18:2: >400 NG/ML — HIGH
PETH COMMENTS: SIGNIFICANT CHANGE UP
PHOSPHATE SERPL-MCNC: 1.4 MG/DL — LOW (ref 2.1–4.9)
PLATELET # BLD AUTO: 39 K/UL — LOW (ref 130–400)
PMV BLD: 12 FL — HIGH (ref 7.4–10.4)
POTASSIUM SERPL-MCNC: 3.4 MMOL/L — LOW (ref 3.5–5)
POTASSIUM SERPL-SCNC: 3.4 MMOL/L — LOW (ref 3.5–5)
PROT SERPL-MCNC: 4.8 G/DL — LOW (ref 6–8)
RBC # BLD: 2.13 M/UL — LOW (ref 4.7–6.1)
RBC # FLD: 17.1 % — HIGH (ref 11.5–14.5)
SODIUM SERPL-SCNC: 135 MMOL/L — SIGNIFICANT CHANGE UP (ref 135–146)
WBC # BLD: 3.03 K/UL — LOW (ref 4.8–10.8)
WBC # FLD AUTO: 3.03 K/UL — LOW (ref 4.8–10.8)

## 2025-05-28 PROCEDURE — 99232 SBSQ HOSP IP/OBS MODERATE 35: CPT

## 2025-05-28 PROCEDURE — 99233 SBSQ HOSP IP/OBS HIGH 50: CPT | Mod: GC

## 2025-05-28 RX ORDER — POTASSIUM PHOSPHATE, MONOBASIC POTASSIUM PHOSPHATE, DIBASIC INJECTION, 236; 224 MG/ML; MG/ML
30 SOLUTION, CONCENTRATE INTRAVENOUS ONCE
Refills: 0 | Status: COMPLETED | OUTPATIENT
Start: 2025-05-28 | End: 2025-05-28

## 2025-05-28 RX ADMIN — Medication 50 MILLIGRAM(S): at 05:45

## 2025-05-28 RX ADMIN — NALTREXONE HYDROCHLORIDE 50 MILLIGRAM(S): 50 TABLET, FILM COATED ORAL at 12:29

## 2025-05-28 RX ADMIN — METHOCARBAMOL 500 MILLIGRAM(S): 500 TABLET, FILM COATED ORAL at 18:03

## 2025-05-28 RX ADMIN — TAMSULOSIN HYDROCHLORIDE 0.4 MILLIGRAM(S): 0.4 CAPSULE ORAL at 21:23

## 2025-05-28 RX ADMIN — Medication 25 GRAM(S): at 05:45

## 2025-05-28 RX ADMIN — Medication 10 MILLIGRAM(S): at 12:28

## 2025-05-28 RX ADMIN — FOLIC ACID 1 MILLIGRAM(S): 1 TABLET ORAL at 12:28

## 2025-05-28 RX ADMIN — Medication 25 GRAM(S): at 18:03

## 2025-05-28 RX ADMIN — Medication 400 MILLIGRAM(S): at 12:29

## 2025-05-28 RX ADMIN — POLYETHYLENE GLYCOL 3350 17 GRAM(S): 17 POWDER, FOR SOLUTION ORAL at 12:28

## 2025-05-28 RX ADMIN — GABAPENTIN 200 MILLIGRAM(S): 400 CAPSULE ORAL at 21:23

## 2025-05-28 RX ADMIN — Medication 50 MILLIGRAM(S): at 18:05

## 2025-05-28 RX ADMIN — Medication 40 MILLIEQUIVALENT(S): at 14:40

## 2025-05-28 RX ADMIN — Medication 1 TABLET(S): at 12:28

## 2025-05-28 RX ADMIN — Medication 1 APPLICATION(S): at 05:45

## 2025-05-28 RX ADMIN — LACTULOSE 20 GRAM(S): 10 SOLUTION ORAL at 18:04

## 2025-05-28 RX ADMIN — POTASSIUM PHOSPHATE, MONOBASIC POTASSIUM PHOSPHATE, DIBASIC INJECTION, 83.33 MILLIMOLE(S): 236; 224 SOLUTION, CONCENTRATE INTRAVENOUS at 15:46

## 2025-05-28 RX ADMIN — Medication 100 MILLIGRAM(S): at 12:29

## 2025-05-28 RX ADMIN — CYANOCOBALAMIN 1000 MICROGRAM(S): 1000 INJECTION INTRAMUSCULAR; SUBCUTANEOUS at 12:28

## 2025-05-28 NOTE — PROGRESS NOTE ADULT - ATTENDING COMMENTS
64 y o  M with hx of pseudogout AUD MISTY cirrhosis PE on AC admitted with chest pain and vomiting seen for cirrhosis with elevated liver enzymes.     No abdominal pain or bloody stool. No hallucinations.   Was drinking a pint of beer daily. Reports drinking 1/2 pint now. No DUI. Alcohol rehab few years ago.   Worked in [employer]? Retired.  Single no kids.  Lives in assisted living. No family support locally.     No complaints> Does not want to do alcohol rehab. Non committal on stopping alcohol.  Looks well  Alert oriented in place person month and year  Icterus+  Abdomen soft non tender  No asterixis       Hypotension - resolved   Ischemic hepatitis with alcoholic hepatitis - liver tests markedly better  Decompensated MISTY cirrhosis with jaundice MELD 3.0 - 32  Coagulopathy ? was on AC  Alcohol withdrawal  Macrocytic anemia    High MDF but will hold off on prednisone as patient does not plan to stop alcohol and has poor follow up  Will review as OP.   Monitor liver tests INR  Will need EGD as OP  Not a LT candidate with ongoing alcohol use despite several encounters advising stopping alcohol use.

## 2025-05-28 NOTE — PROGRESS NOTE ADULT - SUBJECTIVE AND OBJECTIVE BOX
SUBJECTIVE:  HPI:  65 y/o male with a PMHx of alcohol use disorder, chronic DVT/PE, HTN, DLD, Diabetes, gout brought in by ambulance from [facility] presenting to the ED after 1 day of chest pain nausea/vomiting. Patient stated that  he started vomiting, which continued all day long and got worse, so had come to ED for evaluation. Patient is c/o chest pain, started after vomiting several times. Denies trauma. Denies HA, but is c/o intermittent episodes of lightheadedness. Denies sob/abd pain. Denies  symptoms. Patient is a poor historian and is hard of hearing. Patient could not provide any further details on his health/illness, and most of the information is obtained from reviewing his previous admission/discharge summaries. No trauma. Patient stated that, he drinks alcohol regularly and last drink was yesterday. Denies f/c/diarrhea. Denies OD on medications. Denies drinking toxic alcohols recently (Methanol/ethylene glycol related products).  Denies OD on Metformin.  Denies urinary symptoms, reports that he hasn't made urine in two days.  NO travel, no rash    In the ED:  - Vitals: T 37.2  /70 SpO2 96% RR 18  - Labs: WBC 10.04 Hgb 13.0   Na 141 K 5.9 (hemolyzed, repeat 4.7)  Cr 1.6 CO2 8 AG 38 Lactate 21.6   - Imaging:  _ CT Head: No evidence of acute intracranial pathology.  _  CT Chest and Abdomen: No evidence of central pulmonary embolus. Apparent generalized colonic wall thickening unclear if on the basis of  underdistention or representing colitis.   (22 May 2025 09:55)    Patient found to have severe HAGMA 2/2 lactic acidosis from JENNIFER vs cirrhosis, required HD once 5/22, now improved.   Patient is currently AAO*4, denies any active complaints. Asking if he could be seen by PT to get out of bed.      Today is hospital day 6d.     PAST MEDICAL & SURGICAL HISTORY  Alcohol dependence    HTN (hypertension)    Hard of hearing    Seasonal allergies    BPH (benign prostatic hyperplasia)    GERD (gastroesophageal reflux disease)    Pseudogout    History of deep vein thrombosis (DVT) of lower extremity    No significant past surgical history        ALLERGIES:  shellfish (Hives)  No Known Drug Allergies  Beef (Hives)  dairy products (Hives)    MEDICATIONS:  ACTIVE MEDICATIONS  aluminum hydroxide/magnesium hydroxide/simethicone Suspension 30 milliLiter(s) Oral every 4 hours PRN  cetirizine 10 milliGRAM(s) Oral daily  chlorhexidine 2% Cloths 1 Application(s) Topical <User Schedule>  cyanocobalamin 1000 MICROGram(s) Oral daily  dextrose 5%. 1000 milliLiter(s) IV Continuous <Continuous>  dextrose Oral Gel 15 Gram(s) Oral once PRN  folic acid 1 milliGRAM(s) Oral daily  gabapentin 200 milliGRAM(s) Oral at bedtime  insulin lispro (ADMELOG) corrective regimen sliding scale   SubCutaneous every 6 hours  lactulose Syrup 20 Gram(s) Oral two times a day  magnesium oxide 400 milliGRAM(s) Oral daily  methocarbamol 500 milliGRAM(s) Oral <User Schedule>  multivitamin 1 Tablet(s) Oral daily  naltrexone 50 milliGRAM(s) Oral daily  piperacillin/tazobactam IVPB.. 3.375 Gram(s) IV Intermittent every 12 hours  polyethylene glycol 3350 17 Gram(s) Oral daily  pyridoxine 50 milliGRAM(s) Oral every 12 hours  senna 2 Tablet(s) Oral at bedtime  tamsulosin 0.4 milliGRAM(s) Oral at bedtime  thiamine 100 milliGRAM(s) Oral daily      VITALS:   T(F): 98.1  HR: 96  BP: 128/75  RR: 18  SpO2: 97%    LABS:                        7.7    3.03  )-----------( 39       ( 28 May 2025 07:55 )             21.5     05-28    135  |  104  |  8[L]  ----------------------------<  100[H]  3.4[L]   |  24  |  1.1    Ca    8.5      28 May 2025 07:55  Phos  1.4     05-28  Mg     1.8     05-28    TPro  4.8[L]  /  Alb  3.2[L]  /  TBili  4.3[H]  /  DBili  2.4[H]  /  AST  75[H]  /  ALT  64[H]  /  AlkPhos  134[H]  05-28    PT/INR - ( 27 May 2025 21:45 )   PT: 19.60 sec;   INR: 1.64 ratio         PTT - ( 27 May 2025 21:45 )  PTT:33.9 sec  Urinalysis Basic - ( 28 May 2025 07:55 )    Color: x / Appearance: x / SG: x / pH: x  Gluc: 100 mg/dL / Ketone: x  / Bili: x / Urobili: x   Blood: x / Protein: x / Nitrite: x   Leuk Esterase: x / RBC: x / WBC x   Sq Epi: x / Non Sq Epi: x / Bacteria: x                    PHYSICAL EXAM:  GEN: No acute distress  LUNGS: Clear to auscultation bilaterally   HEART: S1/S2 present.    ABD: Soft, non-tender, non-distended.   EXT: No pedal edema  NEURO: AAOX3

## 2025-05-28 NOTE — PROGRESS NOTE ADULT - SUBJECTIVE AND OBJECTIVE BOX
INTERVENTIONAL RADIOLOGY    Procedure Requested: IVC Filter    HPI:  63 y/o male with a PMHx of alcohol use disorder, chronic DVT/PE, HTN, DLD, Diabetes, gout brought in by ambulance from [facility] residence presenting to the ED after 1 day of chest pain nausea/vomiting     Patient stated that, yesterday morning, he started vomiting, which continued all day long and got worse, so had come to ED for evaluation. Patient is c/o chest pain, started after vomiting several times. Denies trauma. Denies HA, but is c/o intermittent episodes of lightheadedness. Denies sob/abd pain. Denies  symptoms. Patient is a poor historian and is hard of hearing. Patient could not provide any further details on his health/illness, and most of the information is obtained from reviewing his previous admission/discharge summaries. No trauma. Patient stated that, he drinks alcohol regularly and last drink was yesterday. Denies f/c/diarrhea. Denies OD on medications. Denies drinking toxic alcohols recently (Methanol/ethylene glycol related products).  Denies OD on Metformin.  Denies urinary symptoms, reports that he hasn't made urine in two days.  NO travel, no rash    In the ED:  - Vitals: T 37.2  /70 SpO2 96% RR 18  - Labs: WBC 10.04 Hgb 13.0   Na 141 K 5.9 (hemolyzed, repeat 4.7)  Cr 1.6 CO2 8 AG 38 Lactate 21.6   - Imaging:  _ CT Head: No evidence of acute intracranial pathology.  _  CT Chest and Abdomen: No evidence of central pulmonary embolus. Apparent generalized colonic wall thickening unclear if on the basis of  underdistention or representing colitis.   (22 May 2025 09:55)      PAST MEDICAL & SURGICAL HISTORY:  Alcohol dependence      HTN (hypertension)      Hard of hearing      Seasonal allergies    BPH (benign prostatic hyperplasia)    GERD (gastroesophageal reflux disease)    Pseudogout    History of deep vein thrombosis (DVT) of lower extremity    No significant past surgical history        MEDICATIONS  (STANDING):  cetirizine 10 milliGRAM(s) Oral daily  chlorhexidine 2% Cloths 1 Application(s) Topical <User Schedule>  cyanocobalamin 1000 MICROGram(s) Oral daily  dextrose 5%. 1000 milliLiter(s) (50 mL/Hr) IV Continuous <Continuous>  folic acid 1 milliGRAM(s) Oral daily  gabapentin 200 milliGRAM(s) Oral at bedtime  insulin lispro (ADMELOG) corrective regimen sliding scale   SubCutaneous every 6 hours  lactulose Syrup 20 Gram(s) Oral two times a day  magnesium oxide 400 milliGRAM(s) Oral daily  methocarbamol 500 milliGRAM(s) Oral <User Schedule>  multivitamin 1 Tablet(s) Oral daily  naltrexone 50 milliGRAM(s) Oral daily  piperacillin/tazobactam IVPB.. 3.375 Gram(s) IV Intermittent every 12 hours  polyethylene glycol 3350 17 Gram(s) Oral daily  pyridoxine 50 milliGRAM(s) Oral every 12 hours  senna 2 Tablet(s) Oral at bedtime  tamsulosin 0.4 milliGRAM(s) Oral at bedtime  thiamine 100 milliGRAM(s) Oral daily    MEDICATIONS  (PRN):  aluminum hydroxide/magnesium hydroxide/simethicone Suspension 30 milliLiter(s) Oral every 4 hours PRN Dyspepsia  dextrose Oral Gel 15 Gram(s) Oral once PRN Blood Glucose LESS THAN 70 milliGRAM(s)/deciliter      Allergies    shellfish (Hives)  No Known Drug Allergies  Beef (Hives)  dairy products (Hives)    Intolerances      FAMILY HISTORY:  Family history of gastric cancer  Mom    Family hx of lung cancer  Smoker      Physical Exam:   Vital Signs Last 24 Hrs  T(C): 36.7 (28 May 2025 11:16), Max: 37.2 (27 May 2025 15:12)  T(F): 98.1 (28 May 2025 11:16), Max: 98.9 (27 May 2025 15:12)  HR: 96 (28 May 2025 11:16) (96 - 101)  BP: 128/75 (28 May 2025 11:16) (110/62 - 128/75)  BP(mean): --  RR: 18 (28 May 2025 11:16) (18 - 18)  SpO2: 97% (28 May 2025 11:16) (95% - 97%)    Parameters below as of 28 May 2025 08:00  Patient On (Oxygen Delivery Method): room air          Labs:                         7.7    3.03  )-----------( 39       ( 28 May 2025 07:55 )             21.5     05-28    135  |  104  |  8[L]  ----------------------------<  100[H]  3.4[L]   |  24  |  1.1    Ca    8.5      28 May 2025 07:55  Phos  1.4     05-28  Mg     1.8     05-28    TPro  4.8[L]  /  Alb  3.2[L]  /  TBili  4.3[H]  /  DBili  2.4[H]  /  AST  75[H]  /  ALT  64[H]  /  AlkPhos  134[H]  05-28    PT/INR - ( 27 May 2025 21:45 )   PT: 19.60 sec;   INR: 1.64 ratio         PTT - ( 27 May 2025 21:45 )  PTT:33.9 sec    Pertinent labs:                      7.7    3.03  )-----------( 39       ( 28 May 2025 07:55 )             21.5       05-28    135  |  104  |  8[L]  ----------------------------<  100[H]  3.4[L]   |  24  |  1.1    Ca    8.5      28 May 2025 07:55  Phos  1.4     05-28  Mg     1.8     05-28    TPro  4.8[L]  /  Alb  3.2[L]  /  TBili  4.3[H]  /  DBili  2.4[H]  /  AST  75[H]  /  ALT  64[H]  /  AlkPhos  134[H]  05-28      PT/INR - ( 27 May 2025 21:45 )   PT: 19.60 sec;   INR: 1.64 ratio         PTT - ( 27 May 2025 21:45 )  PTT:33.9 sec    Radiology & Additional Studies:     Radiology imaging reviewed.       ASSESSMENT AND PLAN:  -Patient refusing IVC filter. Patient is made aware of all risks involved.   -IR to be reconsulted if needed.     Please call Interventional Radiology with questions or concerns:   - M-F [phone]: [phone]   - All other hours: [phone]

## 2025-05-28 NOTE — PROGRESS NOTE ADULT - ATTENDING COMMENTS
Patient feeling better today, denies any signs of bleeding. ANDRZEJ, improved after HD, continue to monitor cr. Pancytopenia, H/H and platelet count stable continue to monitor. Chronic PE/DVT, off anticoagulation due to low platelet count and anemia, refusing IVC filter risk including recurrent DVT/PE discussed understand but still refusing.

## 2025-05-28 NOTE — PROGRESS NOTE ADULT - SUBJECTIVE AND OBJECTIVE BOX
Gastroenterology progress note:     Patient is a 65y old  Male who presents with a chief complaint of Chest pain (28 May 2025 12:14)       Admitted on: 05-22-25        Interval History:    No acute events overnight.         PAST MEDICAL & SURGICAL HISTORY:  Alcohol dependence  HTN (hypertension)  Hard of hearing  Seasonal allergies  BPH (benign prostatic hyperplasia)  GERD (gastroesophageal reflux disease)  Pseudogout  History of deep vein thrombosis (DVT) of lower extremity  No significant past surgical history          MEDICATIONS  (STANDING):  cetirizine 10 milliGRAM(s) Oral daily  chlorhexidine 2% Cloths 1 Application(s) Topical <User Schedule>  cyanocobalamin 1000 MICROGram(s) Oral daily  dextrose 5%. 1000 milliLiter(s) (50 mL/Hr) IV Continuous <Continuous>  folic acid 1 milliGRAM(s) Oral daily  gabapentin 200 milliGRAM(s) Oral at bedtime  insulin lispro (ADMELOG) corrective regimen sliding scale   SubCutaneous every 6 hours  lactulose Syrup 20 Gram(s) Oral two times a day  magnesium oxide 400 milliGRAM(s) Oral daily  methocarbamol 500 milliGRAM(s) Oral <User Schedule>  multivitamin 1 Tablet(s) Oral daily  naltrexone 50 milliGRAM(s) Oral daily  piperacillin/tazobactam IVPB.. 3.375 Gram(s) IV Intermittent every 12 hours  polyethylene glycol 3350 17 Gram(s) Oral daily  pyridoxine 50 milliGRAM(s) Oral every 12 hours  senna 2 Tablet(s) Oral at bedtime  tamsulosin 0.4 milliGRAM(s) Oral at bedtime  thiamine 100 milliGRAM(s) Oral daily    MEDICATIONS  (PRN):  aluminum hydroxide/magnesium hydroxide/simethicone Suspension 30 milliLiter(s) Oral every 4 hours PRN Dyspepsia  dextrose Oral Gel 15 Gram(s) Oral once PRN Blood Glucose LESS THAN 70 milliGRAM(s)/deciliter      Allergies  shellfish (Hives)  No Known Drug Allergies  Beef (Hives)  dairy products (Hives)      Review of Systems:   Cardiovascular:  No Chest Pain, No Palpitations  Respiratory:  No Cough, No Dyspnea  Gastrointestinal:  As described in HPI  Neuro:  No Syncope, No Dizziness    Physical Examination:  T(C): 36.8 (05-28-25 @ 15:26), Max: 37.1 (05-28-25 @ 01:00)  HR: 97 (05-28-25 @ 15:26) (96 - 101)  BP: 104/64 (05-28-25 @ 15:26) (104/64 - 128/75)  RR: 18 (05-28-25 @ 15:26) (18 - 18)  SpO2: 96% (05-28-25 @ 15:26) (95% - 97%)  Weight (kg): 70.1 (05-28-25 @ 01:25)    05-27-25 @ 07:01  -  05-28-25 @ 07:00  --------------------------------------------------------  IN: 0 mL / OUT: 400 mL / NET: -400 mL    05-28-25 @ 07:01  -  05-28-25 @ 19:49  --------------------------------------------------------  IN: 240 mL / OUT: 0 mL / NET: 240 mL        Looks well  Alert oriented in place person month and year  Icterus+  Abdomen soft non tender  No asterixis      Data:                        7.7    3.03  )-----------( 39       ( 28 May 2025 07:55 )             21.5     Hgb trend:  7.7  05-28-25 @ 07:55  8.3  05-27-25 @ 21:45  8.3  05-27-25 @ 12:13  8.4  05-26-25 @ 05:45        05-28    135  |  104  |  8[L]  ----------------------------<  100[H]  3.4[L]   |  24  |  1.1    Ca    8.5      28 May 2025 07:55  Phos  1.4     05-28  Mg     1.8     05-28    TPro  4.8[L]  /  Alb  3.2[L]  /  TBili  4.3[H]  /  DBili  2.4[H]  /  AST  75[H]  /  ALT  64[H]  /  AlkPhos  134[H]  05-28    Liver panel trend:  TBili 4.3   /   AST 75   /   ALT 64   /   AlkP 134   /   Tptn 4.8   /   Alb 3.2    /   DBili 2.4      05-28  TBili 4.2   /   AST 93   /   ALT 73   /   AlkP 157   /   Tptn 5.2   /   Alb 3.0    /   DBili --      05-27  TBili 4.8   /      /   ALT 82   /   AlkP 140   /   Tptn 5.1   /   Alb 3.1    /   DBili 2.6      05-27  TBili 5.2   /      /   ALT 88   /   AlkP 142   /   Tptn 5.6   /   Alb 3.1    /   DBili --      05-27  TBili 5.5   /      /      /   AlkP 126   /   Tptn 4.9   /   Alb 3.0    /   DBili --      05-26  TBili 4.8   /      /      /   AlkP 120   /   Tptn 4.8   /   Alb 3.1    /   DBili --      05-25  TBili 4.5   /      /      /   AlkP 119   /   Tptn 5.4   /   Alb 3.2    /   DBili --      05-24  TBili 4.5   /      /      /   AlkP 112   /   Tptn 5.5   /   Alb 3.1    /   DBili --      05-23  TBili 4.8   /   AST 1250   /      /   AlkP 124   /   Tptn 5.7   /   Alb 3.4    /   DBili --      05-23  TBili 4.3   /   AST 1106   /      /   AlkP 105   /   Tptn 5.1   /   Alb 3.3    /   DBili --      05-23  TBili 3.9   /      /   ALT 86   /   AlkP 140   /   Tptn 5.8   /   Alb 3.4    /   DBili --      05-22  TBili 3.0   /   AST 71   /   ALT 22   /   AlkP 158   /   Tptn 7.1   /   Alb 3.8    /   DBili 1.6      05-22  TBili 3.1   /   AST 94   /   ALT 23   /   AlkP 167   /   Tptn 7.4   /   Alb 4.0    /   DBili 0.8      05-22      PT/INR - ( 27 May 2025 21:45 )   PT: 19.60 sec;   INR: 1.64 ratio         PTT - ( 27 May 2025 21:45 )  PTT:33.9 sec

## 2025-05-28 NOTE — PROGRESS NOTE ADULT - ASSESSMENT
64 year old male patient with a PMHx of alcohol use disorder, chronic DVT/PE, HTN, DLD, Diabetes, gout brought in by ambulance from [facility] to the ED on 05/22 for evaluation of chest pain, nausea, and vomiting. Hepatology following for decompensated cirrhosis.       #Decompensated MISTY cirrhosis with jaundice  MELD 3.0 - 32  #Ischemic hepatitis with alcoholic hepatitis , MDF 33.4   #Alcohol withdrawal  #chronic DVTs on coumadin  #Chronic macrocytic anemia suspected thiamine/folate deficiency; no overt bleeding  * Acute Hepatitis Panel (03.14.25 @ 04:44)   Hepatitis C Virus Interpretation: Nonreact   Hepatitis B Core IgM Antibody: Nonreact   Hepatitis B Surface Antigen: Nonreact   Hepatitis A IgM Antibody: Nonreact  *  noted -> repeat  * Acetaminophen Level, Serum: <5.0 ug/mL (05.23.25 @ 11:20)  * Alcohol, Blood: <10 mg/dL (05.23.25 @ 04:15)  * Benzodiazepine, Urine: Positive (05.22.25 @ 14:19)  * TTE noted with normal RV function  * US Abdomen Upper Quadrant Right (05.22.25 @ 19:43) Cholelithiasis without evidence for cholecystitis. Diffuse hepatic steatosis.  * CT Abdomen and Pelvis w/ IV Cont (05.22.25 @ 06:55) Apparent generalized colonic wall thickening unclear if on the basis of  underdistention or representing colitis.  * No EGD colonoscopy in chart  * FHx of gastric cancer    RECOMMENDATIONS  - MDF> 32; however, will hold off on prednisone due to poor compliance  - Trend Liver enzymes and INR.   - Avoid hepatotoxic agents  - Counseled about importance of alcohol cessation. Recommend SBIRT, CATCH, Addiction Medicine teams evaluation. Check Peth level. Serum alcohol -  - Monitor closely for alcohol withdrawal. Continue CIWA protocol: currently on Ativan PRN  - Continue multivitamins QD and folic acid 1mg QD; continue IV thiamine 500mg Q8h for 3 days then PO 100mg QD thereafter  - Follow up with our [facility] for fibroscan (located at [address] [place] [address], [address]. Telephone No: [phone])  - Follow up with our [facility] for anemia workup/FHx of GI cancer (located at [address] [place] [address], [id number]. Telephone No: [phone])

## 2025-05-28 NOTE — PROGRESS NOTE ADULT - ASSESSMENT
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 Diffuse hepatic steatosis.  -CT Abdomen and Pelvis w/ IV Cont (05.22.25 @ 06:55) Apparent generalized colonic wall thickening unclear if on the basis of  underdistention or representing colitis.  - No EGD colonoscopy in chart  - s/p fomepizole, bicarb drip, NAC, s/p HD on 5/22   - creat today is 1  - hepatology:  (MDF score 101.9), Trend Liver enzymes and INR. Avoid vitamin K in setting of chronic DVT/PE. Avoid hepatotoxic agents. Hold off on steroids for alcoholic hepatitis in setting of suspected colitis. INR improving. bilirubin now stabilized at around 5  - now off CIWA, no signs of withdrawal.  - hold metformin on dc, monitor cmp for AG and HCO3.   Pending: Check Hepatitis A IgM, Hepatitis B core IgM, B  core Ab total, B surface Ag, B surface Ab, HCV antibody, HCV RNA, Anti HEV. Check Iron studies and ceruloplasmin Check CMV PCR, EBV PCR, HSV IgM. Obtain Quantiferon level  - outpatient hepatology f/u.     #Worsening Pancytopenia  #Recurrent DVT/PE  - has hx of pancytopenia now worsening.   - Hematology: Chronic pancytopenia- 2/2 alcohol use and alcohol liver disease. Strongly counselled him to abstain from alcohol. C/w antibiotics. RC low, LDH normal, haptoglobin low.   Transfuse for plts <10K, hb <7.  - can not be on AC due to thrombocytopenia, refused IVC filter today 5/28.   - outpatient follow up      #HO alcohol use disorder  CIWA protocol - standing and prn   - thiamine, folate, multivitamin   - drug screen positive only for benzodiazepines    #Generalized colonic wall thickening, possible colitis   on zosyn end date 5/29.   # Hypokalemia-- replete orally  #HO chronic DVT/PE  - duplex redemonstrating chronic DVT    #HO  Diabetes on metformin at home  # pancytopenia-- related to alcohol use  seen by hematology-- hemolysis w/u pending  severe thrombocytopenia    #HO gout  on colchicine    #Diet DASH  #Activity IAT  #GI  #DVT ppx -- cannot get heparin due to low platelets  #Pending - dispo planning [facility]

## 2025-05-29 DIAGNOSIS — Z00.8 ENCOUNTER FOR OTHER GENERAL EXAMINATION: ICD-10-CM

## 2025-05-29 DIAGNOSIS — F10.10 ALCOHOL ABUSE, UNCOMPLICATED: ICD-10-CM

## 2025-05-29 LAB
ALBUMIN SERPL ELPH-MCNC: 3.1 G/DL — LOW (ref 3.5–5.2)
ALP SERPL-CCNC: 138 U/L — HIGH (ref 30–115)
ALT FLD-CCNC: 56 U/L — HIGH (ref 0–41)
ANION GAP SERPL CALC-SCNC: 9 MMOL/L — SIGNIFICANT CHANGE UP (ref 7–14)
AST SERPL-CCNC: 70 U/L — HIGH (ref 0–41)
BASOPHILS # BLD AUTO: 0.04 K/UL — SIGNIFICANT CHANGE UP (ref 0–0.2)
BASOPHILS NFR BLD AUTO: 1.1 % — HIGH (ref 0–1)
BILIRUB SERPL-MCNC: 4.5 MG/DL — HIGH (ref 0.2–1.2)
BUN SERPL-MCNC: 8 MG/DL — LOW (ref 10–20)
CALCIUM SERPL-MCNC: 8.5 MG/DL — SIGNIFICANT CHANGE UP (ref 8.4–10.5)
CHLORIDE SERPL-SCNC: 105 MMOL/L — SIGNIFICANT CHANGE UP (ref 98–110)
CO2 SERPL-SCNC: 24 MMOL/L — SIGNIFICANT CHANGE UP (ref 17–32)
CREAT SERPL-MCNC: 1 MG/DL — SIGNIFICANT CHANGE UP (ref 0.7–1.5)
EGFR: 83 ML/MIN/1.73M2 — SIGNIFICANT CHANGE UP
EGFR: 83 ML/MIN/1.73M2 — SIGNIFICANT CHANGE UP
EOSINOPHIL # BLD AUTO: 0.41 K/UL — SIGNIFICANT CHANGE UP (ref 0–0.7)
EOSINOPHIL NFR BLD AUTO: 11.3 % — HIGH (ref 0–8)
GLUCOSE BLDC GLUCOMTR-MCNC: 107 MG/DL — HIGH (ref 70–99)
GLUCOSE BLDC GLUCOMTR-MCNC: 107 MG/DL — HIGH (ref 70–99)
GLUCOSE BLDC GLUCOMTR-MCNC: 118 MG/DL — HIGH (ref 70–99)
GLUCOSE BLDC GLUCOMTR-MCNC: 141 MG/DL — HIGH (ref 70–99)
GLUCOSE SERPL-MCNC: 88 MG/DL — SIGNIFICANT CHANGE UP (ref 70–99)
HCT VFR BLD CALC: 23 % — LOW (ref 42–52)
HGB BLD-MCNC: 8.3 G/DL — LOW (ref 14–18)
IMM GRANULOCYTES NFR BLD AUTO: 0.3 % — SIGNIFICANT CHANGE UP (ref 0.1–0.3)
LYMPHOCYTES # BLD AUTO: 1.49 K/UL — SIGNIFICANT CHANGE UP (ref 1.2–3.4)
LYMPHOCYTES # BLD AUTO: 41.2 % — SIGNIFICANT CHANGE UP (ref 20.5–51.1)
MAGNESIUM SERPL-MCNC: 2.2 MG/DL — SIGNIFICANT CHANGE UP (ref 1.8–2.4)
MCHC RBC-ENTMCNC: 36.1 G/DL — SIGNIFICANT CHANGE UP (ref 32–37)
MCHC RBC-ENTMCNC: 37.4 PG — HIGH (ref 27–31)
MCV RBC AUTO: 103.6 FL — HIGH (ref 80–94)
MONOCYTES # BLD AUTO: 0.6 K/UL — SIGNIFICANT CHANGE UP (ref 0.1–0.6)
MONOCYTES NFR BLD AUTO: 16.6 % — HIGH (ref 1.7–9.3)
NEUTROPHILS # BLD AUTO: 1.07 K/UL — LOW (ref 1.4–6.5)
NEUTROPHILS NFR BLD AUTO: 29.5 % — LOW (ref 42.2–75.2)
NRBC BLD AUTO-RTO: 0 /100 WBCS — SIGNIFICANT CHANGE UP (ref 0–0)
PHOSPHATE SERPL-MCNC: 2.6 MG/DL — SIGNIFICANT CHANGE UP (ref 2.1–4.9)
PLATELET # BLD AUTO: 55 K/UL — LOW (ref 130–400)
PMV BLD: 12.1 FL — HIGH (ref 7.4–10.4)
POTASSIUM SERPL-MCNC: 3.9 MMOL/L — SIGNIFICANT CHANGE UP (ref 3.5–5)
POTASSIUM SERPL-SCNC: 3.9 MMOL/L — SIGNIFICANT CHANGE UP (ref 3.5–5)
PROT SERPL-MCNC: 5.3 G/DL — LOW (ref 6–8)
RBC # BLD: 2.22 M/UL — LOW (ref 4.7–6.1)
RBC # FLD: 18.5 % — HIGH (ref 11.5–14.5)
SODIUM SERPL-SCNC: 138 MMOL/L — SIGNIFICANT CHANGE UP (ref 135–146)
WBC # BLD: 3.62 K/UL — LOW (ref 4.8–10.8)
WBC # FLD AUTO: 3.62 K/UL — LOW (ref 4.8–10.8)

## 2025-05-29 PROCEDURE — 99232 SBSQ HOSP IP/OBS MODERATE 35: CPT

## 2025-05-29 PROCEDURE — 99222 1ST HOSP IP/OBS MODERATE 55: CPT

## 2025-05-29 PROCEDURE — 99232 SBSQ HOSP IP/OBS MODERATE 35: CPT | Mod: GC

## 2025-05-29 RX ORDER — ENOXAPARIN SODIUM 100 MG/ML
40 INJECTION SUBCUTANEOUS EVERY 24 HOURS
Refills: 0 | Status: DISCONTINUED | OUTPATIENT
Start: 2025-05-29 | End: 2025-05-30

## 2025-05-29 RX ORDER — LANOLIN/MINERAL OIL/PETROLATUM
1 OINTMENT (GRAM) OPHTHALMIC (EYE) DAILY
Refills: 0 | Status: DISCONTINUED | OUTPATIENT
Start: 2025-05-29 | End: 2025-05-30

## 2025-05-29 RX ADMIN — Medication 50 MILLIGRAM(S): at 05:07

## 2025-05-29 RX ADMIN — Medication 10 MILLIGRAM(S): at 11:55

## 2025-05-29 RX ADMIN — ENOXAPARIN SODIUM 40 MILLIGRAM(S): 100 INJECTION SUBCUTANEOUS at 15:30

## 2025-05-29 RX ADMIN — POLYETHYLENE GLYCOL 3350 17 GRAM(S): 17 POWDER, FOR SOLUTION ORAL at 11:55

## 2025-05-29 RX ADMIN — FOLIC ACID 1 MILLIGRAM(S): 1 TABLET ORAL at 11:55

## 2025-05-29 RX ADMIN — Medication 25 GRAM(S): at 17:03

## 2025-05-29 RX ADMIN — Medication 1 APPLICATION(S): at 05:10

## 2025-05-29 RX ADMIN — Medication 400 MILLIGRAM(S): at 11:54

## 2025-05-29 RX ADMIN — Medication 50 MILLIGRAM(S): at 17:04

## 2025-05-29 RX ADMIN — GABAPENTIN 200 MILLIGRAM(S): 400 CAPSULE ORAL at 21:29

## 2025-05-29 RX ADMIN — Medication 100 MILLIGRAM(S): at 11:54

## 2025-05-29 RX ADMIN — LACTULOSE 20 GRAM(S): 10 SOLUTION ORAL at 17:04

## 2025-05-29 RX ADMIN — CYANOCOBALAMIN 1000 MICROGRAM(S): 1000 INJECTION INTRAMUSCULAR; SUBCUTANEOUS at 11:55

## 2025-05-29 RX ADMIN — NALTREXONE HYDROCHLORIDE 50 MILLIGRAM(S): 50 TABLET, FILM COATED ORAL at 11:55

## 2025-05-29 RX ADMIN — METHOCARBAMOL 500 MILLIGRAM(S): 500 TABLET, FILM COATED ORAL at 17:04

## 2025-05-29 RX ADMIN — TAMSULOSIN HYDROCHLORIDE 0.4 MILLIGRAM(S): 0.4 CAPSULE ORAL at 21:29

## 2025-05-29 RX ADMIN — Medication 1 DROP(S): at 17:04

## 2025-05-29 RX ADMIN — Medication 1 TABLET(S): at 11:55

## 2025-05-29 RX ADMIN — Medication 25 GRAM(S): at 05:07

## 2025-05-29 RX ADMIN — LACTULOSE 20 GRAM(S): 10 SOLUTION ORAL at 05:07

## 2025-05-29 NOTE — BH CONSULTATION LIAISON ASSESSMENT NOTE - HPI (INCLUDE ILLNESS QUALITY, SEVERITY, DURATION, TIMING, CONTEXT, MODIFYING FACTORS, ASSOCIATED SIGNS AND SYMPTOMS)
63 y/o male, single, no dependents, lives at [facility], AUD, denies all other substances, no previous SA/NSSIB/psychiatric admissions, with a PMHx of alcohol use disorder, chronic DVT/PE, HTN, DLD, Diabetes, gout brought in by ambulance from [facility] residence presenting to the ED after 1 day of chest pain nausea/vomiting. Patient stated that he started vomiting, which continued all day long and got worse, so had come to ED for evaluation. Patient is c/o chest pain, started after vomiting several times. Denies trauma. Denies HA, but is c/o intermittent episodes of lightheadedness. Denies sob/abd pain. Denies  symptoms. Patient is a poor historian and is hard of hearing. Patient could not provide any further details on his health/illness, and most of the information is obtained from reviewing his previous admission/discharge summaries. No trauma. Patient stated that, he drinks alcohol regularly and last drink was yesterday. Denies f/c/diarrhea. Denies OD on medications. Denies drinking toxic alcohols recently (Methanol/ethylene glycol related products). Denies OD on Metformin.  Denies urinary symptoms, reports that he hasn't made urine in two days.  NO travel, no rash.    When psychiatry team entered room for interview, patient was resting comfortably in bed. He was pleasant, cooperative, and amenable to interview. He is AAOx4. Patient has no significant past psychiatric history other than AUD. He exhibits no current/past symptoms of depression, ravinder, or psychosis. No current/past SI/HI. Topic of IVC filter discussed at length. Patient states he understands what the IVC filter is, why he requires it (recurrent DVT/PE and high risk for acute PE), that there are no alternatives (platelet count too low for blood thinners), the risks/benefits of getting and refusing the IVC filter including death as the most significant risk of not getting the filter. Despite saying he understands all of these concepts and reviewing each with him, patient states he does not want the filter as "I don't want anything foreign inside my body." Patient therefore demonstrates illogical thinking and likely does not understand the true risks and benefits of the above - he therefore lacks this decision making capacity.    Patient states there is no family/friends to call as collateral. He graduated from high school and worked as a . Other than alcohol, denies cigarettes/marijuana/all other drug use. 65 y/o male, single, no dependents, lives at [facility] AUD, denies all other substances, no previous SA/NSSIB/psychiatric admissions, with a PMHx of alcohol use disorder, chronic DVT/PE, HTN, DLD, Diabetes, gout brought in by ambulance from [facility] residence presenting to the ED after 1 day of chest pain nausea/vomiting. Patient stated that he started vomiting, which continued all day long and got worse, so had come to ED for evaluation. Patient is c/o chest pain, started after vomiting several times. Denies trauma. Denies HA, but is c/o intermittent episodes of lightheadedness. Denies sob/abd pain. Denies  symptoms. Patient is a poor historian and is hard of hearing. Patient could not provide any further details on his health/illness, and most of the information is obtained from reviewing his previous admission/discharge summaries. No trauma. Patient stated that, he drinks alcohol regularly and last drink was yesterday. Denies f/c/diarrhea. Denies OD on medications. Denies drinking toxic alcohols recently (Methanol/ethylene glycol related products). Denies OD on Metformin.  Denies urinary symptoms, reports that he hasn't made urine in two days.  NO travel, no rash.    When psychiatry team entered room for interview, patient was resting comfortably in bed. He was pleasant, cooperative, and amenable to interview. He is AAOx4. Patient has no significant past psychiatric history other than AUD. He exhibits no current/past symptoms of depression, ravinder, or psychosis. No current/past SI/HI. Topic of IVC filter discussed at length. Patient states he understands what the IVC filter is, why he requires it (recurrent DVT/PE and high risk for acute PE), that there are no alternatives (platelet count too low for blood thinners), the risks/benefits of getting and refusing the IVC filter including death as the most significant risk of not getting the filter. Despite saying he understands all of these concepts and reviewing each with him, patient states he does not want the filter as "I don't want anything foreign inside my body." Patient therefore demonstrates illogical thinking and likely does not understand the true risks and benefits of the above (i.e. he wasn't aware of stroke as a possible risk of not getting IVC filter and what that clinical scenario might entail) - he therefore lacks this decision making capacity.    Patient states there is no family/friends to call as collateral. He graduated from high school and worked as a . Other than alcohol, denies cigarettes/marijuana/all other drug use.

## 2025-05-29 NOTE — PROGRESS NOTE ADULT - ATTENDING COMMENTS
64 y o  M with hx of pseudogout AUD MISTY cirrhosis PE on AC admitted with chest pain and vomiting seen for cirrhosis with elevated liver enzymes.     No abdominal pain or bloody stool. No hallucinations.   Was drinking a pint of beer daily. Reports drinking 1/2 pint now. No DUI. Alcohol rehab few years ago.   Worked in [employer]? Retired.  Single no kids.  Lives in assisted living. No family support locally.     No complaints> Does not want to do alcohol rehab. Non committal on stopping alcohol.  Looks well  Alert oriented in place person month and year  Icterus+  Abdomen soft non tender  No asterixis       Hypotension - resolved   Ischemic hepatitis with alcoholic hepatitis - liver tests markedly better from initial days but now plateaued.   Decompensated MISTY cirrhosis with jaundice MELD 3.0 - 32  Coagulopathy ? was on AC  Alcohol withdrawal  Macrocytic anemia    High MDF but will hold off on prednisone as patient does not plan to stop alcohol and has poor follow up  Will review as OP.   Will need EGD as OP  Not a LT candidate with ongoing alcohol use despite several encounters advising stopping alcohol use.

## 2025-05-29 NOTE — BH CONSULTATION LIAISON ASSESSMENT NOTE - NSBHCONSULTRECOMMENDOTHER_PSY_A_CORE FT
-Counseled patient on alcohol cessation and consequences  -Counseled patient on medical issues/consequences of refusing treatment.  -Patient does not demonstrate capacity to refuse IVC filter. Please consult ethics or continue to re-educate patient about treatment.

## 2025-05-29 NOTE — BH CONSULTATION LIAISON ASSESSMENT NOTE - NSBHTIMEACTIVITIESPERFORMED_PSY_A_CORE
Preparing to see the patient, counseling or educating patient, documentation, care coordination, reviewing separately obtained history, communicating with other health care professionals

## 2025-05-29 NOTE — PROGRESS NOTE ADULT - SUBJECTIVE AND OBJECTIVE BOX
Gastroenterology progress note:     Patient is a 65y old  Male who presents with a chief complaint of Chest pain (29 May 2025 11:49)       Admitted on: 05-22-25       Interval History:    No acute events overnight.         PAST MEDICAL & SURGICAL HISTORY:  Alcohol dependence  HTN (hypertension)  Hard of hearing  Seasonal allergies  BPH (benign prostatic hyperplasia)  GERD (gastroesophageal reflux disease)  Pseudogout  History of deep vein thrombosis (DVT) of lower extremity  No significant past surgical history          MEDICATIONS  (STANDING):  cetirizine 10 milliGRAM(s) Oral daily  chlorhexidine 2% Cloths 1 Application(s) Topical <User Schedule>  cyanocobalamin 1000 MICROGram(s) Oral daily  dextrose 5%. 1000 milliLiter(s) (50 mL/Hr) IV Continuous <Continuous>  enoxaparin Injectable 40 milliGRAM(s) SubCutaneous every 24 hours  folic acid 1 milliGRAM(s) Oral daily  gabapentin 200 milliGRAM(s) Oral at bedtime  insulin lispro (ADMELOG) corrective regimen sliding scale   SubCutaneous every 6 hours  lactulose Syrup 20 Gram(s) Oral two times a day  magnesium oxide 400 milliGRAM(s) Oral daily  methocarbamol 500 milliGRAM(s) Oral <User Schedule>  multivitamin 1 Tablet(s) Oral daily  naltrexone 50 milliGRAM(s) Oral daily  piperacillin/tazobactam IVPB.. 3.375 Gram(s) IV Intermittent every 12 hours  polyethylene glycol 3350 17 Gram(s) Oral daily  pyridoxine 50 milliGRAM(s) Oral every 12 hours  senna 2 Tablet(s) Oral at bedtime  tamsulosin 0.4 milliGRAM(s) Oral at bedtime  thiamine 100 milliGRAM(s) Oral daily    MEDICATIONS  (PRN):  aluminum hydroxide/magnesium hydroxide/simethicone Suspension 30 milliLiter(s) Oral every 4 hours PRN Dyspepsia  dextrose Oral Gel 15 Gram(s) Oral once PRN Blood Glucose LESS THAN 70 milliGRAM(s)/deciliter      Allergies  shellfish (Hives)  No Known Drug Allergies  Beef (Hives)  dairy products (Hives)      Review of Systems:   Cardiovascular:  No Chest Pain, No Palpitations  Respiratory:  No Cough, No Dyspnea  Gastrointestinal:  As described in HPI  Skin:  Jaundice  Neuro:  No Syncope, No Dizziness    Physical Examination:  T(C): 37.2 (05-29-25 @ 08:30), Max: 37.2 (05-29-25 @ 08:30)  HR: 103 (05-29-25 @ 08:30) (97 - 103)  BP: 112/68 (05-29-25 @ 08:30) (104/64 - 116/63)  RR: 20 (05-29-25 @ 08:30) (18 - 20)  SpO2: 98% (05-29-25 @ 08:30) (96% - 98%)      05-28-25 @ 07:01  -  05-29-25 @ 07:00  --------------------------------------------------------  IN: 240 mL / OUT: 850 mL / NET: -610 mL    05-29-25 @ 07:01  -  05-29-25 @ 14:52  --------------------------------------------------------  IN: 350 mL / OUT: 0 mL / NET: 350 mL        Looks well  Alert oriented in place person month and year  Icterus+  Abdomen soft non tender  No asterixis      Data:                        8.3    3.62  )-----------( 55       ( 29 May 2025 06:45 )             23.0     Hgb trend:  8.3  05-29-25 @ 06:45  7.7  05-28-25 @ 07:55  8.3  05-27-25 @ 21:45  8.3  05-27-25 @ 12:13        05-29    138  |  105  |  8[L]  ----------------------------<  88  3.9   |  24  |  1.0    Ca    8.5      29 May 2025 06:45  Phos  2.6     05-29  Mg     2.2     05-29    TPro  5.3[L]  /  Alb  3.1[L]  /  TBili  4.5[H]  /  DBili  x   /  AST  70[H]  /  ALT  56[H]  /  AlkPhos  138[H]  05-29    Liver panel trend:  TBili 4.5   /   AST 70   /   ALT 56   /   AlkP 138   /   Tptn 5.3   /   Alb 3.1    /   DBili --      05-29  TBili 4.3   /   AST 75   /   ALT 64   /   AlkP 134   /   Tptn 4.8   /   Alb 3.2    /   DBili 2.4      05-28  TBili 4.2   /   AST 93   /   ALT 73   /   AlkP 157   /   Tptn 5.2   /   Alb 3.0    /   DBili --      05-27  TBili 4.8   /      /   ALT 82   /   AlkP 140   /   Tptn 5.1   /   Alb 3.1    /   DBili 2.6      05-27  TBili 5.2   /      /   ALT 88   /   AlkP 142   /   Tptn 5.6   /   Alb 3.1    /   DBili --      05-27  TBili 5.5   /      /      /   AlkP 126   /   Tptn 4.9   /   Alb 3.0    /   DBili --      05-26  TBili 4.8   /      /      /   AlkP 120   /   Tptn 4.8   /   Alb 3.1    /   DBili --      05-25  TBili 4.5   /      /      /   AlkP 119   /   Tptn 5.4   /   Alb 3.2    /   DBili --      05-24  TBili 4.5   /      /      /   AlkP 112   /   Tptn 5.5   /   Alb 3.1    /   DBili --      05-23  TBili 4.8   /   AST 1250   /      /   AlkP 124   /   Tptn 5.7   /   Alb 3.4    /   DBili --      05-23  TBili 4.3   /   AST 1106   /      /   AlkP 105   /   Tptn 5.1   /   Alb 3.3    /   DBili --      05-23  TBili 3.9   /      /   ALT 86   /   AlkP 140   /   Tptn 5.8   /   Alb 3.4    /   DBili --      05-22  TBili 3.0   /   AST 71   /   ALT 22   /   AlkP 158   /   Tptn 7.1   /   Alb 3.8    /   DBili 1.6      05-22  TBili 3.1   /   AST 94   /   ALT 23   /   AlkP 167   /   Tptn 7.4   /   Alb 4.0    /   DBili 0.8      05-22      PT/INR - ( 27 May 2025 21:45 )   PT: 19.60 sec;   INR: 1.64 ratio         PTT - ( 27 May 2025 21:45 )  PTT:33.9 sec

## 2025-05-29 NOTE — PROGRESS NOTE ADULT - SUBJECTIVE AND OBJECTIVE BOX
SUBJECTIVE:  63 y/o male with a PMHx of alcohol use disorder, chronic DVT/PE, HTN, DLD, Diabetes, gout brought in by ambulance from [facility] presenting to the ED after 1 day of chest pain nausea/vomiting. Patient stated that  he started vomiting, which continued all day long and got worse, so had come to ED for evaluation. Patient is c/o chest pain, started after vomiting several times. Denies trauma. Denies HA, but is c/o intermittent episodes of lightheadedness. Denies sob/abd pain. Denies  symptoms. Patient is a poor historian and is hard of hearing. Patient could not provide any further details on his health/illness, and most of the information is obtained from reviewing his previous admission/discharge summaries. No trauma. Patient stated that, he drinks alcohol regularly and last drink was yesterday. Denies f/c/diarrhea. Denies OD on medications. Denies drinking toxic alcohols recently (Methanol/ethylene glycol related products).  Denies OD on Metformin.  Denies urinary symptoms, reports that he hasn't made urine in two days.  NO travel, no rash    In the ED:  - Vitals: T 37.2  /70 SpO2 96% RR 18  - Labs: WBC 10.04 Hgb 13.0   Na 141 K 5.9 (hemolyzed, repeat 4.7)  Cr 1.6 CO2 8 AG 38 Lactate 21.6   - Imaging:  _ CT Head: No evidence of acute intracranial pathology.  _  CT Chest and Abdomen: No evidence of central pulmonary embolus. Apparent generalized colonic wall thickening unclear if on the basis of  underdistention or representing colitis.   (22 May 2025 09:55)    Patient found to have severe HAGMA 2/2 lactic acidosis from JENNIFER vs cirrhosis, required HD once 5/22, now improved.   Patient is currently AAO*4, denies any active complaints. Asking if he could be seen by PT to get out of bed.             Today is hospital day 7d.     PAST MEDICAL & SURGICAL HISTORY  Alcohol dependence    HTN (hypertension)    Hard of hearing    Seasonal allergies    BPH (benign prostatic hyperplasia)    GERD (gastroesophageal reflux disease)    Pseudogout    History of deep vein thrombosis (DVT) of lower extremity    No significant past surgical history        ALLERGIES:  shellfish (Hives)  No Known Drug Allergies  Beef (Hives)  dairy products (Hives)    MEDICATIONS:  ACTIVE MEDICATIONS  aluminum hydroxide/magnesium hydroxide/simethicone Suspension 30 milliLiter(s) Oral every 4 hours PRN  cetirizine 10 milliGRAM(s) Oral daily  chlorhexidine 2% Cloths 1 Application(s) Topical <User Schedule>  cyanocobalamin 1000 MICROGram(s) Oral daily  dextrose 5%. 1000 milliLiter(s) IV Continuous <Continuous>  dextrose Oral Gel 15 Gram(s) Oral once PRN  folic acid 1 milliGRAM(s) Oral daily  gabapentin 200 milliGRAM(s) Oral at bedtime  insulin lispro (ADMELOG) corrective regimen sliding scale   SubCutaneous every 6 hours  lactulose Syrup 20 Gram(s) Oral two times a day  magnesium oxide 400 milliGRAM(s) Oral daily  methocarbamol 500 milliGRAM(s) Oral <User Schedule>  multivitamin 1 Tablet(s) Oral daily  naltrexone 50 milliGRAM(s) Oral daily  piperacillin/tazobactam IVPB.. 3.375 Gram(s) IV Intermittent every 12 hours  polyethylene glycol 3350 17 Gram(s) Oral daily  pyridoxine 50 milliGRAM(s) Oral every 12 hours  senna 2 Tablet(s) Oral at bedtime  tamsulosin 0.4 milliGRAM(s) Oral at bedtime  thiamine 100 milliGRAM(s) Oral daily      VITALS:   T(F): 98.9  HR: 103  BP: 112/68  RR: 20  SpO2: 98%    LABS:                        8.3    3.62  )-----------( 55       ( 29 May 2025 06:45 )             23.0     05-29    138  |  105  |  8[L]  ----------------------------<  88  3.9   |  24  |  1.0    Ca    8.5      29 May 2025 06:45  Phos  2.6     05-29  Mg     2.2     05-29    TPro  5.3[L]  /  Alb  3.1[L]  /  TBili  4.5[H]  /  DBili  x   /  AST  70[H]  /  ALT  56[H]  /  AlkPhos  138[H]  05-29    PT/INR - ( 27 May 2025 21:45 )   PT: 19.60 sec;   INR: 1.64 ratio         PTT - ( 27 May 2025 21:45 )  PTT:33.9 sec  Urinalysis Basic - ( 29 May 2025 06:45 )    Color: x / Appearance: x / SG: x / pH: x  Gluc: 88 mg/dL / Ketone: x  / Bili: x / Urobili: x   Blood: x / Protein: x / Nitrite: x   Leuk Esterase: x / RBC: x / WBC x   Sq Epi: x / Non Sq Epi: x / Bacteria: x                    PHYSICAL EXAM:  GEN: No acute distress  LUNGS: Clear to auscultation bilaterally   HEART: S1/S2 present.    ABD: Soft, non-tender, non-distended.   EXT: No pedal edema  NEURO: AAOX3

## 2025-05-29 NOTE — PROGRESS NOTE ADULT - ASSESSMENT
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 Diffuse hepatic steatosis.  -CT Abdomen and Pelvis w/ IV Cont (05.22.25 @ 06:55) Apparent generalized colonic wall thickening unclear if on the basis of  underdistention or representing colitis.  - No EGD colonoscopy in chart  - s/p fomepizole, bicarb drip, NAC, s/p HD on 5/22   - creat today is 1  - hepatology:  (MDF score 101.9), Trend Liver enzymes and INR. Avoid vitamin K in setting of chronic DVT/PE. Avoid hepatotoxic agents. Hold off on steroids for alcoholic hepatitis in setting of suspected colitis. INR improving. bilirubin now stabilized at around 5  - now off CIWA, no signs of withdrawal.  - hold metformin on dc, monitor cmp for AG and HCO3.   Pending: Check Hepatitis A IgM, Hepatitis B core IgM, B  core Ab total, B surface Ag, B surface Ab, HCV antibody, HCV RNA, Anti HEV. Check Iron studies and ceruloplasmin Check CMV PCR, EBV PCR, HSV IgM. Obtain Quantiferon level  - outpatient hepatology f/u.     #Worsening Pancytopenia  #Recurrent DVT/PE  #Lacks caapcity?  - has hx of pancytopenia now worsening.   - Hematology: Chronic pancytopenia- 2/2 alcohol use and alcohol liver disease. Strongly counselled him to abstain from alcohol. C/w antibiotics. RC low, LDH normal, haptoglobin low.   Transfuse for plts <10K, hb <7.  - can not be on AC due to thrombocytopenia, refused IVC filter today 5/28. f/u psych eval for capacity  - outpatient follow up      #HO alcohol use disorder  [facility] protocol - standing and prn   - thiamine, folate, multivitamin   - drug screen positive only for benzodiazepines    #Generalized colonic wall thickening, possible colitis   on zosyn end date 5/29.   # Hypokalemia-- replete orally  #HO chronic DVT/PE  - duplex redemonstrating chronic DVT    #HO  Diabetes on metformin at home  # pancytopenia-- related to alcohol use  seen by hematology-- hemolysis w/u pending  severe thrombocytopenia    #HO gout  on colchicine    #Diet DASH  #Activity IAT  #GI  #DVT ppx -- cannot get heparin due to low platelets  #Pending - f/u psych for capacity eval for ivc filter, dispo planning [facility]         #Severe lactic acidosis resolved suspected from MAHA vs cirhosis   #ANDRZEJ  # Cirrhosis 2/2 alcohol use disorder   -TTE noted with normal RV function  -US Abdomen Upper Quadrant Right (05.22.25 @ 19:43) Cholelithiasis without evidence for cholecystitis. Diffuse hepatic steatosis.  -CT Abdomen and Pelvis w/ IV Cont (05.22.25 @ 06:55) Apparent generalized colonic wall thickening unclear if on the basis of  underdistention or representing colitis.  - No EGD colonoscopy in chart  - s/p fomepizole, bicarb drip, NAC, s/p HD on 5/22   - creat today is 1  - hepatology:  (MDF score 101.9), Trend Liver enzymes and INR. Avoid vitamin K in setting of chronic DVT/PE. Avoid hepatotoxic agents. Hold off on steroids for alcoholic hepatitis in setting of suspected colitis. INR improving. bilirubin now stabilized at around 5  - now off CIWA, no signs of withdrawal.  - hold metformin on dc, monitor cmp for AG and HCO3.   Pending: Check Hepatitis A IgM, Hepatitis B core IgM, B  core Ab total, B surface Ag, B surface Ab, HCV antibody, HCV RNA, Anti HEV. Check Iron studies and ceruloplasmin Check CMV PCR, EBV PCR, HSV IgM. Obtain Quantiferon level  - outpatient hepatology f/u.     #Worsening Pancytopenia  #Recurrent DVT/PE  #Lacks capcity?  - has hx of pancytopenia now worsening.   - Hematology: Chronic pancytopenia- 2/2 alcohol use and alcohol liver disease. Strongly counselled him to abstain from alcohol. C/w antibiotics. RC low, LDH normal, haptoglobin low.   Transfuse for plts <10K, hb <7.  - can not be on AC due to thrombocytopenia but now improved so restarted proph AC, refused IVC filter 5/28. psych eval confirming no capacity. will contact heme/onc if safe to restart full dose AC vs ethics for ivc filter approval.   - outpatient follow up      #HO alcohol use disorder  [facility] protocol - standing and prn   - thiamine, folate, multivitamin   - drug screen positive only for benzodiazepines    #Generalized colonic wall thickening, possible colitis   on zosyn end date 5/29.   # Hypokalemia-- replete orally  #HO chronic DVT/PE  - duplex redemonstrating chronic DVT    #HO  Diabetes on metformin at home  # pancytopenia-- related to alcohol use  seen by hematology-- hemolysis w/u pending  severe thrombocytopenia    #HO gout  on colchicine    #Diet DASH  #Activity IAT  #GI  #DVT ppx -- cannot get heparin due to low platelets  #Pending - f/u psych for capacity eval for ivc filter, dispo planning [facility]

## 2025-05-29 NOTE — PROGRESS NOTE ADULT - ASSESSMENT
64 year old male patient with a PMHx of alcohol use disorder, chronic DVT/PE, HTN, DLD, Diabetes, gout brought in by ambulance from [facility] to the ED on 05/22 for evaluation of chest pain, nausea, and vomiting. Hepatology following for decompensated cirrhosis.       #Decompensated MISTY cirrhosis with jaundice  MELD 3.0 - 32  #Ischemic hepatitis with alcoholic hepatitis , MDF 33.4   #Alcohol withdrawal  #chronic DVTs on coumadin  #Chronic macrocytic anemia suspected thiamine/folate deficiency; no overt bleeding  * Acute Hepatitis Panel (03.14.25 @ 04:44)   Hepatitis C Virus Interpretation: Nonreact   Hepatitis B Core IgM Antibody: Nonreact   Hepatitis B Surface Antigen: Nonreact   Hepatitis A IgM Antibody: Nonreact  *  noted -> repeat  * Acetaminophen Level, Serum: <5.0 ug/mL (05.23.25 @ 11:20)  * Alcohol, Blood: <10 mg/dL (05.23.25 @ 04:15)  * Benzodiazepine, Urine: Positive (05.22.25 @ 14:19)  * TTE noted with normal RV function  * US Abdomen Upper Quadrant Right (05.22.25 @ 19:43) Cholelithiasis without evidence for cholecystitis. Diffuse hepatic steatosis.  * CT Abdomen and Pelvis w/ IV Cont (05.22.25 @ 06:55) Apparent generalized colonic wall thickening unclear if on the basis of  underdistention or representing colitis.  * No EGD colonoscopy in chart  * FHx of gastric cancer    RECOMMENDATIONS  - MDF> 32; however, will hold off on prednisone due to poor compliance  - Trend Liver enzymes and INR.  - EGD as OP    - Avoid hepatotoxic agents  - Counseled about importance of alcohol cessation. Recommend SBIRT, CATCH, Addiction Medicine teams evaluation. Check Peth level. Serum alcohol -  - Monitor closely for alcohol withdrawal. Continue CIWA protocol: currently on Ativan PRN  - Continue multivitamins QD and folic acid 1mg QD; continue IV thiamine 500mg Q8h for 3 days then PO 100mg QD thereafter  - Not a LT candidate with ongoing alcohol use despite several encounters advising stopping alcohol use.   - Follow up with our [facility] for fibroscan (located at [address] [place] [address], [address]. Telephone No: [phone])  - Follow up with our [facility] for anemia workup/FHx of GI cancer (located at [address], [place], [address]. Telephone No: [phone])

## 2025-05-29 NOTE — BH CONSULTATION LIAISON ASSESSMENT NOTE - NSBHATTESTCOMMENTATTENDFT_PSY_A_CORE
I saw and evaluated the patient today 05-29-25 with resident during key/critical portions of the visit.  Nursing/primary team/consultant records reviewed. I agree with the resident's note including past psych history, home medications, social history, allergies, surgical history, family history, and review of system. I have reviewed relevant vitals, laboratory values, imaging studies, and microbiology.  I agree with the trainee's findings and assessment and plan as documented in the trainee's note.     - Above resident's note was edited by me     - agree with rest of A/P as per detailed resident note, unless noted below.     Patient fundamentally has very limited understanding into his medical issues. He has a very simplistic conceptualization of his medical condition, causes, and understanding of physiology/disease course/disease progression. He is does not have insight into the complexity of the liver disease, DVT/risk of clot in lungs/brain/etc. He superficially acknowledges bad outcomes and endorse wanting to live, but does not offer a logically or reasonable understanding of his thought processes nor is there a safe alternative treatment regimen available at this time.

## 2025-05-29 NOTE — BH CONSULTATION LIAISON ASSESSMENT NOTE - NSBHCHARTREVIEWVS_PSY_A_CORE FT
Vital Signs Last 24 Hrs  T(C): 37.2 (29 May 2025 08:30), Max: 37.2 (29 May 2025 08:30)  T(F): 98.9 (29 May 2025 08:30), Max: 98.9 (29 May 2025 08:30)  HR: 103 (29 May 2025 08:30) (97 - 103)  BP: 112/68 (29 May 2025 08:30) (104/64 - 116/63)  BP(mean): --  RR: 20 (29 May 2025 08:30) (18 - 20)  SpO2: 98% (29 May 2025 08:30) (96% - 98%)    Parameters below as of 29 May 2025 08:30  Patient On (Oxygen Delivery Method): room air

## 2025-05-29 NOTE — BH CONSULTATION LIAISON ASSESSMENT NOTE - SUMMARY
63 y/o male, single, no dependents, lives at [facility], AUD, denies all other substances, no previous SA/NSSIB/psychiatric admissions, with a PMHx of alcohol use disorder, chronic DVT/PE, HTN, DLD, Diabetes, gout brought in by ambulance from [facility] residence presenting to the ED after 1 day of chest pain nausea/vomiting. Patient stated that he started vomiting, which continued all day long and got worse, so had come to ED for evaluation. Patient is c/o chest pain, started after vomiting several times. Denies trauma. Denies HA, but is c/o intermittent episodes of lightheadedness. Denies sob/abd pain. Denies  symptoms. Patient is a poor historian and is hard of hearing. Patient could not provide any further details on his health/illness, and most of the information is obtained from reviewing his previous admission/discharge summaries. No trauma. Patient stated that, he drinks alcohol regularly and last drink was yesterday. Denies f/c/diarrhea. Denies OD on medications. Denies drinking toxic alcohols recently (Methanol/ethylene glycol related products). Denies OD on Metformin.  Denies urinary symptoms, reports that he hasn't made urine in two days.  NO travel, no rash.    Patient is not acutely manic, psychotic, or depressed. No SI/HI/AVH. AAO x 4. Topic of IVC filter discussed at length. Patient states he understands what the IVC filter is, why he requires it (recurrent DVT/PE and high risk for acute PE), that there are no alternatives (platelet count too low for blood thinners), the risks/benefits of getting and refusing the IVC filter including death as the most significant risk of not getting the filter. Despite saying he understands all of these concepts and reviewing each with him, patient states he does not want the filter as "I don't want anything foreign inside my body." Patient therefore demonstrates illogical thinking and likely does not understand the true risks and benefits of the above - he therefore lacks this decision making capacity.    Case discussed with referring team. Referring team will investigate whether blood thinners may be an option at this point. In any case, the patient lacks capacity and ethics will need to be involved for placement of IVC filter. 65 y/o male, single, no dependents, lives at [facility], AUD, denies all other substances, no previous SA/NSSIB/psychiatric admissions, with a PMHx of alcohol use disorder, chronic DVT/PE, HTN, DLD, Diabetes, gout brought in by ambulance from [facility] residence presenting to the ED after 1 day of chest pain nausea/vomiting. Patient stated that he started vomiting, which continued all day long and got worse, so had come to ED for evaluation. Patient is c/o chest pain, started after vomiting several times. Denies trauma. Denies HA, but is c/o intermittent episodes of lightheadedness. Denies sob/abd pain. Denies  symptoms. Patient is a poor historian and is hard of hearing. Patient could not provide any further details on his health/illness, and most of the information is obtained from reviewing his previous admission/discharge summaries. No trauma. Patient stated that, he drinks alcohol regularly and last drink was yesterday. Denies f/c/diarrhea. Denies OD on medications. Denies drinking toxic alcohols recently (Methanol/ethylene glycol related products). Denies OD on Metformin.  Denies urinary symptoms, reports that he hasn't made urine in two days.  NO travel, no rash.    Patient is not acutely manic, psychotic, or depressed. No SI/HI/AVH. AAO x 4. Topic of IVC filter discussed at length. Patient states he understands what the IVC filter is, why he requires it (recurrent DVT/PE and high risk for acute PE), that there are no alternatives (platelet count too low for blood thinners), the risks/benefits of getting and refusing the IVC filter including death as the most significant risk of not getting the filter. Despite saying he understands all of these concepts and reviewing each with him, patient states he does not want the filter as "I don't want anything foreign inside my body." Patient therefore demonstrates illogical thinking and likely does not understand the true risks and benefits of the above (i.e. he wasn't aware of stroke as a possible risk of not getting IVC filter and what that clinical scenario might entail) - he therefore lacks this decision making capacity.    Case discussed with referring team. Referring team will investigate whether blood thinners may be an option at this point. In any case, the patient lacks capacity and ethics will need to be involved for placement of IVC filter. Psychiatry is signing off at this point. Please call if there are new questions/concerns.

## 2025-05-29 NOTE — PROGRESS NOTE ADULT - ATTENDING COMMENTS
Patient feeling better today, denies any signs of bleeding. Pancytopenia, H/H and platelet trending up. Chronic PE/DVT, off anticoagulation due to low platelet count and anemia, refusing IVC filter risk including recurrent DVT/PE discussed understand but still refusing, will pysch for capacity. Continue home medications.

## 2025-05-29 NOTE — BH CONSULTATION LIAISON ASSESSMENT NOTE - CURRENT MEDICATION
MEDICATIONS  (STANDING):  cetirizine 10 milliGRAM(s) Oral daily  chlorhexidine 2% Cloths 1 Application(s) Topical <User Schedule>  cyanocobalamin 1000 MICROGram(s) Oral daily  dextrose 5%. 1000 milliLiter(s) (50 mL/Hr) IV Continuous <Continuous>  folic acid 1 milliGRAM(s) Oral daily  gabapentin 200 milliGRAM(s) Oral at bedtime  insulin lispro (ADMELOG) corrective regimen sliding scale   SubCutaneous every 6 hours  lactulose Syrup 20 Gram(s) Oral two times a day  magnesium oxide 400 milliGRAM(s) Oral daily  methocarbamol 500 milliGRAM(s) Oral <User Schedule>  multivitamin 1 Tablet(s) Oral daily  naltrexone 50 milliGRAM(s) Oral daily  piperacillin/tazobactam IVPB.. 3.375 Gram(s) IV Intermittent every 12 hours  polyethylene glycol 3350 17 Gram(s) Oral daily  pyridoxine 50 milliGRAM(s) Oral every 12 hours  senna 2 Tablet(s) Oral at bedtime  tamsulosin 0.4 milliGRAM(s) Oral at bedtime  thiamine 100 milliGRAM(s) Oral daily    MEDICATIONS  (PRN):  aluminum hydroxide/magnesium hydroxide/simethicone Suspension 30 milliLiter(s) Oral every 4 hours PRN Dyspepsia  dextrose Oral Gel 15 Gram(s) Oral once PRN Blood Glucose LESS THAN 70 milliGRAM(s)/deciliter

## 2025-05-29 NOTE — ED ADULT NURSE NOTE - NS_NURSE_DISC_TEACHING_YN_ED_ALL_ED
PATIENTIQ:  PatientIQ helps [facility] stay in touch with you to know how you're feeling, and provides education and care instructions to you at various time points.   Your answers help your care team track your progress to provide the best care possible. PatientIQ will contact you pre-op and post-op via email or text with:  Educational Videos and Care Instructions  Questionnaires About How You're Feeling    Your participation provides you valuable education and helps [facility] continue to provide quality care to all patients. Thank you   Yes

## 2025-05-30 ENCOUNTER — TRANSCRIPTION ENCOUNTER (OUTPATIENT)
Age: 65
End: 2025-05-30

## 2025-05-30 VITALS — TEMPERATURE: 98 F | SYSTOLIC BLOOD PRESSURE: 125 MMHG | DIASTOLIC BLOOD PRESSURE: 63 MMHG

## 2025-05-30 DIAGNOSIS — R07.9 CHEST PAIN, UNSPECIFIED: ICD-10-CM

## 2025-05-30 LAB
ALBUMIN SERPL ELPH-MCNC: 3 G/DL — LOW (ref 3.5–5.2)
ALP SERPL-CCNC: 135 U/L — HIGH (ref 30–115)
ALT FLD-CCNC: 45 U/L — HIGH (ref 0–41)
ANION GAP SERPL CALC-SCNC: 10 MMOL/L — SIGNIFICANT CHANGE UP (ref 7–14)
AST SERPL-CCNC: 62 U/L — HIGH (ref 0–41)
BASOPHILS # BLD AUTO: 0.04 K/UL — SIGNIFICANT CHANGE UP (ref 0–0.2)
BASOPHILS NFR BLD AUTO: 1.2 % — HIGH (ref 0–1)
BILIRUB SERPL-MCNC: 4.4 MG/DL — HIGH (ref 0.2–1.2)
BUN SERPL-MCNC: 6 MG/DL — LOW (ref 10–20)
CALCIUM SERPL-MCNC: 8.5 MG/DL — SIGNIFICANT CHANGE UP (ref 8.4–10.5)
CHLORIDE SERPL-SCNC: 105 MMOL/L — SIGNIFICANT CHANGE UP (ref 98–110)
CO2 SERPL-SCNC: 22 MMOL/L — SIGNIFICANT CHANGE UP (ref 17–32)
CREAT SERPL-MCNC: 0.9 MG/DL — SIGNIFICANT CHANGE UP (ref 0.7–1.5)
EGFR: 94 ML/MIN/1.73M2 — SIGNIFICANT CHANGE UP
EGFR: 94 ML/MIN/1.73M2 — SIGNIFICANT CHANGE UP
EOSINOPHIL # BLD AUTO: 0.26 K/UL — SIGNIFICANT CHANGE UP (ref 0–0.7)
EOSINOPHIL NFR BLD AUTO: 7.8 % — SIGNIFICANT CHANGE UP (ref 0–8)
GLUCOSE BLDC GLUCOMTR-MCNC: 104 MG/DL — HIGH (ref 70–99)
GLUCOSE BLDC GLUCOMTR-MCNC: 107 MG/DL — HIGH (ref 70–99)
GLUCOSE BLDC GLUCOMTR-MCNC: 153 MG/DL — HIGH (ref 70–99)
GLUCOSE SERPL-MCNC: 100 MG/DL — HIGH (ref 70–99)
HCT VFR BLD CALC: 23 % — LOW (ref 42–52)
HGB BLD-MCNC: 8.2 G/DL — LOW (ref 14–18)
IMM GRANULOCYTES NFR BLD AUTO: 0.3 % — SIGNIFICANT CHANGE UP (ref 0.1–0.3)
LYMPHOCYTES # BLD AUTO: 1.27 K/UL — SIGNIFICANT CHANGE UP (ref 1.2–3.4)
LYMPHOCYTES # BLD AUTO: 38 % — SIGNIFICANT CHANGE UP (ref 20.5–51.1)
MAGNESIUM SERPL-MCNC: 1.8 MG/DL — SIGNIFICANT CHANGE UP (ref 1.8–2.4)
MCHC RBC-ENTMCNC: 35.7 G/DL — SIGNIFICANT CHANGE UP (ref 32–37)
MCHC RBC-ENTMCNC: 37.1 PG — HIGH (ref 27–31)
MCV RBC AUTO: 104.1 FL — HIGH (ref 80–94)
MONOCYTES # BLD AUTO: 0.43 K/UL — SIGNIFICANT CHANGE UP (ref 0.1–0.6)
MONOCYTES NFR BLD AUTO: 12.9 % — HIGH (ref 1.7–9.3)
NEUTROPHILS # BLD AUTO: 1.33 K/UL — LOW (ref 1.4–6.5)
NEUTROPHILS NFR BLD AUTO: 39.8 % — LOW (ref 42.2–75.2)
NRBC BLD AUTO-RTO: 0 /100 WBCS — SIGNIFICANT CHANGE UP (ref 0–0)
PHOSPHATE SERPL-MCNC: 2.6 MG/DL — SIGNIFICANT CHANGE UP (ref 2.1–4.9)
PLATELET # BLD AUTO: 65 K/UL — LOW (ref 130–400)
PMV BLD: 11.7 FL — HIGH (ref 7.4–10.4)
POTASSIUM SERPL-MCNC: 3.6 MMOL/L — SIGNIFICANT CHANGE UP (ref 3.5–5)
POTASSIUM SERPL-SCNC: 3.6 MMOL/L — SIGNIFICANT CHANGE UP (ref 3.5–5)
PROT SERPL-MCNC: 5.2 G/DL — LOW (ref 6–8)
RBC # BLD: 2.21 M/UL — LOW (ref 4.7–6.1)
RBC # FLD: 19.3 % — HIGH (ref 11.5–14.5)
SODIUM SERPL-SCNC: 137 MMOL/L — SIGNIFICANT CHANGE UP (ref 135–146)
WBC # BLD: 3.34 K/UL — LOW (ref 4.8–10.8)
WBC # FLD AUTO: 3.34 K/UL — LOW (ref 4.8–10.8)

## 2025-05-30 PROCEDURE — 99239 HOSP IP/OBS DSCHRG MGMT >30: CPT

## 2025-05-30 PROCEDURE — 99232 SBSQ HOSP IP/OBS MODERATE 35: CPT

## 2025-05-30 RX ADMIN — CYANOCOBALAMIN 1000 MICROGRAM(S): 1000 INJECTION INTRAMUSCULAR; SUBCUTANEOUS at 12:08

## 2025-05-30 RX ADMIN — NALTREXONE HYDROCHLORIDE 50 MILLIGRAM(S): 50 TABLET, FILM COATED ORAL at 12:09

## 2025-05-30 RX ADMIN — Medication 10 MILLIGRAM(S): at 12:09

## 2025-05-30 RX ADMIN — FOLIC ACID 1 MILLIGRAM(S): 1 TABLET ORAL at 12:09

## 2025-05-30 RX ADMIN — INSULIN LISPRO 2: 100 INJECTION, SOLUTION INTRAVENOUS; SUBCUTANEOUS at 12:08

## 2025-05-30 RX ADMIN — LACTULOSE 20 GRAM(S): 10 SOLUTION ORAL at 05:10

## 2025-05-30 RX ADMIN — Medication 100 MILLIGRAM(S): at 12:08

## 2025-05-30 RX ADMIN — Medication 1 TABLET(S): at 12:08

## 2025-05-30 RX ADMIN — Medication 400 MILLIGRAM(S): at 12:08

## 2025-05-30 RX ADMIN — POLYETHYLENE GLYCOL 3350 17 GRAM(S): 17 POWDER, FOR SOLUTION ORAL at 12:09

## 2025-05-30 RX ADMIN — Medication 50 MILLIGRAM(S): at 05:11

## 2025-05-30 RX ADMIN — Medication 25 GRAM(S): at 05:10

## 2025-05-30 RX ADMIN — Medication 1 APPLICATION(S): at 05:11

## 2025-05-30 NOTE — PROGRESS NOTE ADULT - SUBJECTIVE AND OBJECTIVE BOX
CAT ALSTON 65y Male  MRN#: [id number]   Hospital Day: 8d    SUBJECTIVE  Patient is a 65y old Male who presents with a chief complaint of Chest pain (  Currently admitted to medicine with the primary diagnosis of Nausea and vomiting      INTERVAL HPI AND OVERNIGHT EVENTS:  Patient was examined and seen at bedside.   No new events    REVIEW OF SYMPTOMS:  No bleeding    OBJECTIVE  PAST MEDICAL & SURGICAL HISTORY  Alcohol dependence  HTN (hypertension)  Hard of hearing  Seasonal allergies  BPH (benign prostatic hyperplasia)  GERD (gastroesophageal reflux disease)  Pseudogout  History of deep vein thrombosis (DVT) of lower extremity  No significant past surgical history      ALLERGIES:  shellfish (Hives)  No Known Drug Allergies  Beef (Hives)  dairy products (Hives)    MEDICATIONS:  STANDING MEDICATIONS  cetirizine 10 milliGRAM(s) Oral daily  chlorhexidine 2% Cloths 1 Application(s) Topical <User Schedule>  cyanocobalamin 1000 MICROGram(s) Oral daily  dextrose 5%. 1000 milliLiter(s) IV Continuous <Continuous>  enoxaparin Injectable 40 milliGRAM(s) SubCutaneous every 24 hours  folic acid 1 milliGRAM(s) Oral daily  gabapentin 200 milliGRAM(s) Oral at bedtime  insulin lispro (ADMELOG) corrective regimen sliding scale   SubCutaneous every 6 hours  lactulose Syrup 20 Gram(s) Oral two times a day  magnesium oxide 400 milliGRAM(s) Oral daily  methocarbamol 500 milliGRAM(s) Oral <User Schedule>  multivitamin 1 Tablet(s) Oral daily  naltrexone 50 milliGRAM(s) Oral daily  polyethylene glycol 3350 17 Gram(s) Oral daily  pyridoxine 50 milliGRAM(s) Oral every 12 hours  senna 2 Tablet(s) Oral at bedtime  tamsulosin 0.4 milliGRAM(s) Oral at bedtime  thiamine 100 milliGRAM(s) Oral daily    PRN MEDICATIONS  aluminum hydroxide/magnesium hydroxide/simethicone Suspension 30 milliLiter(s) Oral every 4 hours PRN  artificial  tears Solution 1 Drop(s) Both EYES daily PRN  dextrose Oral Gel 15 Gram(s) Oral once PRN      VITAL SIGNS: Last 24 Hours  T(C): 36.8 (30 May 2025 08:00), Max: 37.2 (30 May 2025 00:00)  T(F): 98.3 (30 May 2025 08:00), Max: 98.9 (30 May 2025 00:00)  HR: 91 (30 May 2025 00:00) (91 - 91)  BP: 125/63 (30 May 2025 08:00) (125/63 - 130/61)  BP(mean): --  RR: 18 (30 May 2025 00:00) (18 - 18)  SpO2: 96% (30 May 2025 00:00) (96% - 96%)    LABS:                        8.2    3.34  )-----------( 65       ( 30 May 2025 06:53 )             23.0     05-30    137  |  105  |  6[L]  ----------------------------<  100[H]  3.6   |  22  |  0.9    Ca    8.5      30 May 2025 06:53  Phos  2.6     05-30  Mg     1.8     05-30    TPro  5.2[L]  /  Alb  3.0[L]  /  TBili  4.4[H]  /  DBili  x   /  AST  62[H]  /  ALT  45[H]  /  AlkPhos  135[H]  05-30      Urinalysis Basic - ( 30 May 2025 06:53 )    Color: x / Appearance: x / SG: x / pH: x  Gluc: 100 mg/dL / Ketone: x  / Bili: x / Urobili: x   Blood: x / Protein: x / Nitrite: x   Leuk Esterase: x / RBC: x / WBC x   Sq Epi: x / Non Sq Epi: x / Bacteria: x    PHYSICAL EXAM:  CONSTITUTIONAL: No acute distress  PULMONARY: Clear to auscultation bilaterally  CARDIOVASCULAR: Regular rate and rhythm  GASTROINTESTINAL: Soft, non-tender  MUSCULOSKELETAL: No edema  NEUROLOGY: non-focal  SKIN: No rashes or lesions; warm and dry

## 2025-05-30 NOTE — PROGRESS NOTE ADULT - PROVIDER SPECIALTY LIST ADULT
Critical Care
Hospitalist
Internal Medicine
Internal Medicine
Nephrology
Heme/Onc
Hepatology
Hepatology
Hospitalist
Hospitalist
Internal Medicine
Internal Medicine
Intervent Radiology
MICU
Nephrology
Hepatology
MICU
Nephrology
Surgery
Critical Care
Internal Medicine
Nephrology
Toxicology (Non-Face-To-Face)

## 2025-05-30 NOTE — PROGRESS NOTE ADULT - SUBJECTIVE AND OBJECTIVE BOX
SUBJECTIVE/OVERNIGHT EVENTS  Today is hospital day 8d. This morning patient was seen and examined at bedside, resting comfortably in bed. No acute or major events overnight.    CODE STATUS:    MEDICATIONS  STANDING MEDICATIONS  cetirizine 10 milliGRAM(s) Oral daily  chlorhexidine 2% Cloths 1 Application(s) Topical <User Schedule>  cyanocobalamin 1000 MICROGram(s) Oral daily  dextrose 5%. 1000 milliLiter(s) IV Continuous <Continuous>  enoxaparin Injectable 40 milliGRAM(s) SubCutaneous every 24 hours  folic acid 1 milliGRAM(s) Oral daily  gabapentin 200 milliGRAM(s) Oral at bedtime  insulin lispro (ADMELOG) corrective regimen sliding scale   SubCutaneous every 6 hours  lactulose Syrup 20 Gram(s) Oral two times a day  magnesium oxide 400 milliGRAM(s) Oral daily  methocarbamol 500 milliGRAM(s) Oral <User Schedule>  multivitamin 1 Tablet(s) Oral daily  naltrexone 50 milliGRAM(s) Oral daily  piperacillin/tazobactam IVPB.. 3.375 Gram(s) IV Intermittent every 12 hours  polyethylene glycol 3350 17 Gram(s) Oral daily  pyridoxine 50 milliGRAM(s) Oral every 12 hours  senna 2 Tablet(s) Oral at bedtime  tamsulosin 0.4 milliGRAM(s) Oral at bedtime  thiamine 100 milliGRAM(s) Oral daily    PRN MEDICATIONS  aluminum hydroxide/magnesium hydroxide/simethicone Suspension 30 milliLiter(s) Oral every 4 hours PRN  artificial  tears Solution 1 Drop(s) Both EYES daily PRN  dextrose Oral Gel 15 Gram(s) Oral once PRN    VITALS  T(F): 98.3 (05-30-25 @ 08:00), Max: 98.9 (05-30-25 @ 00:00)  HR: 91 (05-30-25 @ 00:00) (91 - 91)  BP: 125/63 (05-30-25 @ 08:00) (125/63 - 130/61)  RR: 18 (05-30-25 @ 00:00) (18 - 18)  SpO2: 96% (05-30-25 @ 00:00) (96% - 97%)  POCT Blood Glucose.: 104 mg/dL (05-30-25 @ 07:32)  POCT Blood Glucose.: 107 mg/dL (05-30-25 @ 06:01)  POCT Blood Glucose.: 118 mg/dL (05-29-25 @ 16:40)  POCT Blood Glucose.: 141 mg/dL (05-29-25 @ 11:31)    PHYSICAL EXAM  General: NAD, non-toxic appearing  HEENT: EOMi, no scleral icterus  CV: RRR, normal s1 and s2  Lungs: normal respiratory effort, CTAB  Abd: Soft, nontender, nondistended  Ext: no edema, warm, well perfused  Psych: A+Ox3, appropriate affect  Neuro: grossly non-focal, moving all extremities spontaneously  Skin: no rashes or lesions     LABS             8.2    3.34  )-----------( 65       ( 05-30-25 @ 06:53 )             23.0     137  |  105  |  6   -------------------------<  100   05-30-25 @ 06:53  3.6  |  22  |  0.9    Ca      8.5     05-30-25 @ 06:53  Phos   2.6     05-30-25 @ 06:53  Mg     1.8     05-30-25 @ 06:53    TPro  5.2  /  Alb  3.0  /  TBili  4.4  /  DBili  x   /  AST  62  /  ALT  45  /  AlkPhos  135  /  GGT  x     05-30-25 @ 06:53        Urinalysis Basic - ( 30 May 2025 06:53 )    Color: x / Appearance: x / SG: x / pH: x  Gluc: 100 mg/dL / Ketone: x  / Bili: x / Urobili: x   Blood: x / Protein: x / Nitrite: x   Leuk Esterase: x / RBC: x / WBC x   Sq Epi: x / Non Sq Epi: x / Bacteria: x          IMAGING   SUBJECTIVE/OVERNIGHT EVENTS  Today is hospital day 8d. This morning patient was seen and examined at bedside, resting comfortably in bed. No acute or major events overnight.  Reports he has daily BMs    CODE STATUS: Full code     MEDICATIONS  STANDING MEDICATIONS  cetirizine 10 milliGRAM(s) Oral daily  chlorhexidine 2% Cloths 1 Application(s) Topical <User Schedule>  cyanocobalamin 1000 MICROGram(s) Oral daily  dextrose 5%. 1000 milliLiter(s) IV Continuous <Continuous>  enoxaparin Injectable 40 milliGRAM(s) SubCutaneous every 24 hours  folic acid 1 milliGRAM(s) Oral daily  gabapentin 200 milliGRAM(s) Oral at bedtime  insulin lispro (ADMELOG) corrective regimen sliding scale   SubCutaneous every 6 hours  lactulose Syrup 20 Gram(s) Oral two times a day  magnesium oxide 400 milliGRAM(s) Oral daily  methocarbamol 500 milliGRAM(s) Oral <User Schedule>  multivitamin 1 Tablet(s) Oral daily  naltrexone 50 milliGRAM(s) Oral daily  piperacillin/tazobactam IVPB.. 3.375 Gram(s) IV Intermittent every 12 hours  polyethylene glycol 3350 17 Gram(s) Oral daily  pyridoxine 50 milliGRAM(s) Oral every 12 hours  senna 2 Tablet(s) Oral at bedtime  tamsulosin 0.4 milliGRAM(s) Oral at bedtime  thiamine 100 milliGRAM(s) Oral daily    PRN MEDICATIONS  aluminum hydroxide/magnesium hydroxide/simethicone Suspension 30 milliLiter(s) Oral every 4 hours PRN  artificial  tears Solution 1 Drop(s) Both EYES daily PRN  dextrose Oral Gel 15 Gram(s) Oral once PRN    VITALS  T(F): 98.3 (05-30-25 @ 08:00), Max: 98.9 (05-30-25 @ 00:00)  HR: 91 (05-30-25 @ 00:00) (91 - 91)  BP: 125/63 (05-30-25 @ 08:00) (125/63 - 130/61)  RR: 18 (05-30-25 @ 00:00) (18 - 18)  SpO2: 96% (05-30-25 @ 00:00) (96% - 97%)  POCT Blood Glucose.: 104 mg/dL (05-30-25 @ 07:32)  POCT Blood Glucose.: 107 mg/dL (05-30-25 @ 06:01)  POCT Blood Glucose.: 118 mg/dL (05-29-25 @ 16:40)  POCT Blood Glucose.: 141 mg/dL (05-29-25 @ 11:31)    PHYSICAL EXAM  General: NAD, non-toxic appearing  HEENT: EOMi, no scleral icterus, has missing teeth  CV: RRR, normal s1 and s2  Lungs: normal respiratory effort, CTAB  Abd: distended, Soft, nontender  Ext: b/l LE edema, warm, well perfused  Psych: A+Ox3, appropriate affect  Neuro: grossly non-focal, moving all extremities spontaneously      LABS             8.2    3.34  )-----------( 65       ( 05-30-25 @ 06:53 )             23.0     137  |  105  |  6   -------------------------<  100   05-30-25 @ 06:53  3.6  |  22  |  0.9    Ca      8.5     05-30-25 @ 06:53  Phos   2.6     05-30-25 @ 06:53  Mg     1.8     05-30-25 @ 06:53    TPro  5.2  /  Alb  3.0  /  TBili  4.4  /  DBili  x   /  AST  62  /  ALT  45  /  AlkPhos  135  /  GGT  x     05-30-25 @ 06:53        Urinalysis Basic - ( 30 May 2025 06:53 )    Color: x / Appearance: x / SG: x / pH: x  Gluc: 100 mg/dL / Ketone: x  / Bili: x / Urobili: x   Blood: x / Protein: x / Nitrite: x   Leuk Esterase: x / RBC: x / WBC x   Sq Epi: x / Non Sq Epi: x / Bacteria: x

## 2025-05-30 NOTE — PROGRESS NOTE ADULT - REASON FOR ADMISSION
Chest pain
Pt persnts to the ED for CC of fall yesterday and generalized weakness. Pt states he has been unable to eat in the last three weeks due to a GI infection which has lost 20lbs in stated time. Pt denies any LOC from fall. Pt dose notes he hit his head during the fall. Pt notes black diarrhea as of today. No SOB or CP. No know fevers.    
Chest pain

## 2025-05-30 NOTE — DISCHARGE NOTE NURSING/CASE MANAGEMENT/SOCIAL WORK - PATIENT PORTAL LINK FT
You can access the FollowMyHealth Patient Portal offered by [facility] by registering at the following website: [email or web address]. By joining [facility]’s FollowMyHealth portal, you will also be able to view your health information using other applications (apps) compatible with our system.

## 2025-05-30 NOTE — DISCHARGE NOTE NURSING/CASE MANAGEMENT/SOCIAL WORK - FINANCIAL ASSISTANCE
[facility] provides services at a reduced cost to those who are determined to be eligible through [facility]’s financial assistance program. Information regarding [facility]’s financial assistance program can be found by going to [email or web address] or by calling [phone].

## 2025-05-30 NOTE — PROGRESS NOTE ADULT - ASSESSMENT
63 y/o male with a PMHx of alcohol use disorder, chronic DVT/PE, HTN, DLD, Diabetes, gout brought in by ambulance from Sylvia's residence presenting to the ED after 1 day of chest pain nausea/vomiting     #Pancytopenia   #Chronic thrombocytopenia   Labs in april also reflect pancytopenia (WBC 4k, hb 10-11, plt 30K)   Elevated PT, PTT  T bili 3.1   on FA and vit b12 at home   has alcohol use disorder. Last drink: 2 days prior to presentation- vodka. drinks daily   b12 >1000, folate WNL    #H/o PE Dx 2022   #Chronic DVT  DUplex Chronic DVT in bilateral popliteal veins.    #Multifactorial transaminitis  hypotension/ALD  #Coagulopathy  Evaluated by GI: Avoid vitamin K in setting of chronic DVT/PE    #?colitis   CT A/p Apparent generalized colonic wall thickening unclear if on the basis of   underdistention or representing colitis.  on ABX     RECOMMENDATIONS:  Chronic pancytopenia- 2/2 alcohol use and alcohol liver disease. Strongly counselled him to abstain from alcohol, also recommend Detox rehab if patient agreeable  Platelet count currently improving.    Regarding his anticoagulation, looks like recently he hasn't been on anticoagulation. Reviewed prior records, in 2022 he had a DVT, was discharged on eliquis and came back with PE, there was concern for eliquis failure. So he was started on coumadin, which looks like he was on for a while. Generally for recurrent or unprovoked DVT, recommendation is to continue long term anticoagulation as long as they are able to tolerate. He has chronic thrombocytopenia, which likely drops further with alcohol use. Currently platelet counts has imroved to >50k however when he is discharged, not sure how long it will hold on if he starts drinking. If platelet counts remain consistently high >50k can start anticogaulation (ideally coumadin with his history of eliquis failure), he will need close monitoring of his outpatient labs and continue complete alcohol abstinence. I would encourage primary team to weigh on the risk vs benefits of starting anticoagulation for Mr Noonan before starting the anticoagulation.

## 2025-05-30 NOTE — PROGRESS NOTE ADULT - ASSESSMENT
#Pancytopenia- improving: likely iso of cirrhosis   #Recurrent DVT/PE  - Hematology: Chronic pancytopenia- 2/2 alcohol use and alcohol liver disease. Strongly counselled him to abstain from alcohol. C/w antibiotics. RC low, LDH normal, haptoglobin low.   Transfuse for plts <10K, hb <7.  - Initially was recommended for IVC filter as plts<50 and will likely drop again outpt if pt continues to consume alcohol  - Pt refused: Psych- determined pt does not have capacity to make this decision    - Plts improved >50: Plan to restart full AC: f/u heme/onc- pt had possible eliquis failure in past and may need coumadin: Heme/onc will write official recs   - outpatient follow up    #Severe lactic acidosis resolved suspected from MAHA vs cirhosis - Resolved   #ANDRZEJ: Resolved   # Cirrhosis 2/2 alcohol use disorder   -TTE noted with normal RV function  -US Abdomen Upper Quadrant Right (05.22.25 @ 19:43) Cholelithiasis without evidence for cholecystitis. Diffuse hepatic steatosis.  -CT Abdomen and Pelvis w/ IV Cont (05.22.25 @ 06:55) Apparent generalized colonic wall thickening unclear if on the basis of  underdistention or representing colitis.  - No EGD colonoscopy in chart  - s/p fomepizole, bicarb drip, NAC, s/p HD on 5/22  - hepatology:  (MDF score 101.9), Trend Liver enzymes and INR. Avoid vitamin K in setting of chronic DVT/PE. Avoid hepatotoxic agents. Hold off on steroids as pt noncompliant with medications. INR improving. Bilirubin stabilized  - Consider holding metformin on dc, monitor cmp for AG and HCO3.   - hep panel neg, iron studies noted, ceruloplasmin 19  - f/u CMV, EBV, HSV, quantiferon   - outpatient hepatology f/u  - c/w senna, lactulose      #HO alcohol use disorder  - now off CIWA, no signs of withdrawal  - thiamine, folate, multivitamin, B12, b6  - drug screen positive only for benzodiazepines    #Generalized colonic wall thickening, possible colitis   sp zosyn - had planned end date 5/29: Dced antibx    #HO  Diabetes on metformin at home  - c/w ISS, gabapentin     #Diet DASH/cc  #Activity IAT  #DVT ppx -- eliquis vs coumadin: f/u heme/onc    #Pending   - f/u heme/onc: eliquis vs coumadin  - f/u CMV, EBV, HSV, quantiferon   dispo planning [facility] (per psych lacks capacity for some decision making)     #Pancytopenia- improving: likely iso of cirrhosis   #Chronic DVT/no longer has PE  - Hematology: Chronic pancytopenia- 2/2 alcohol use and alcohol liver disease. Strongly counselled him to abstain from alcohol. C/w antibiotics. RC low, LDH normal, haptoglobin low.   Transfuse for plts <10K, hb <7.  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 Diffuse hepatic steatosis.  -CT Abdomen and Pelvis w/ IV Cont (05.22.25 @ 06:55) Apparent generalized colonic wall thickening unclear if on the basis of  underdistention or representing colitis.  - No EGD colonoscopy in chart  - s/p fomepizole, bicarb drip, NAC, s/p HD on 5/22  - hepatology:  (MDF score 101.9), Trend Liver enzymes and INR. Avoid vitamin K in setting of chronic DVT/PE. Avoid hepatotoxic agents. Hold off on steroids as pt noncompliant with medications. INR improving. Bilirubin stabilized  - Consider holding metformin on dc, monitor cmp for AG and HCO3.   - hep panel neg, iron studies noted, ceruloplasmin 19  - f/u CMV, EBV, HSV, QuantiFeron   - outpatient hepatology f/u  - c/w senna, lactulose      #HO alcohol use disorder  - now off CIWA, no signs of withdrawal  - thiamine, folate, multivitamin, B12, b6  - drug screen positive only for benzodiazepines    #Generalized colonic wall thickening, possible colitis   sp zosyn - had planned end date 5/29: Dced antibx    #HO  Diabetes on metformin at home  - c/w ISS, gabapentin     #Diet DASH/cc  #Activity IAT  #DVT ppx -- eliquis vs coumadin: f/u heme/onc    #Pending   - f/u CMV, EBV, HSV, quantiferon   dispo planning [facility] (per psych lacks capacity for some decision making)

## 2025-06-02 ENCOUNTER — NON-APPOINTMENT (OUTPATIENT)
Age: 65
End: 2025-06-02

## 2025-06-02 LAB
AMPHETAMINES UR QL SCN: NEGATIVE NG/ML — SIGNIFICANT CHANGE UP
BARBITURATES UR QL SCN: NEGATIVE NG/ML — SIGNIFICANT CHANGE UP
BARBITURATES UR-MCNC: NEGATIVE NG/ML — SIGNIFICANT CHANGE UP
BENZODIAZ UR QL: NEGATIVE NG/ML — SIGNIFICANT CHANGE UP
BZE UR QL: NEGATIVE NG/ML — SIGNIFICANT CHANGE UP
CANNABINOIDS UR QL SCN: NEGATIVE NG/ML — SIGNIFICANT CHANGE UP
ETHANOL CONFIRMATION, URINE RESULT: 0.22 % — SIGNIFICANT CHANGE UP
ETHANOL UR QL CFM: POSITIVE
ETHANOL UR-MCNC: SIGNIFICANT CHANGE UP %
METHADONE UR QL SCN: NEGATIVE NG/ML — SIGNIFICANT CHANGE UP
METHAQUALONE UR QL: NEGATIVE NG/ML — SIGNIFICANT CHANGE UP
METHAQUALONE UR-MCNC: NEGATIVE NG/ML — SIGNIFICANT CHANGE UP
OPIATES UR QL: NEGATIVE NG/ML — SIGNIFICANT CHANGE UP
PCP UR QL: NEGATIVE NG/ML — SIGNIFICANT CHANGE UP
PROPOXYPH UR QL: NEGATIVE NG/ML — SIGNIFICANT CHANGE UP

## 2025-06-05 LAB
METFORMIN SERPL-MCNC: SIGNIFICANT CHANGE UP MCG/ML
METFORMIN SERPL-MCNC: SIGNIFICANT CHANGE UP MCG/ML

## 2025-06-06 DIAGNOSIS — K80.20 CALCULUS OF GALLBLADDER WITHOUT CHOLECYSTITIS WITHOUT OBSTRUCTION: ICD-10-CM

## 2025-06-06 DIAGNOSIS — E51.9 THIAMINE DEFICIENCY, UNSPECIFIED: ICD-10-CM

## 2025-06-06 DIAGNOSIS — D68.9 COAGULATION DEFECT, UNSPECIFIED: ICD-10-CM

## 2025-06-06 DIAGNOSIS — E78.5 HYPERLIPIDEMIA, UNSPECIFIED: ICD-10-CM

## 2025-06-06 DIAGNOSIS — F41.9 ANXIETY DISORDER, UNSPECIFIED: ICD-10-CM

## 2025-06-06 DIAGNOSIS — Z87.891 PERSONAL HISTORY OF NICOTINE DEPENDENCE: ICD-10-CM

## 2025-06-06 DIAGNOSIS — I48.91 UNSPECIFIED ATRIAL FIBRILLATION: ICD-10-CM

## 2025-06-06 DIAGNOSIS — N40.0 BENIGN PROSTATIC HYPERPLASIA WITHOUT LOWER URINARY TRACT SYMPTOMS: ICD-10-CM

## 2025-06-06 DIAGNOSIS — N17.9 ACUTE KIDNEY FAILURE, UNSPECIFIED: ICD-10-CM

## 2025-06-06 DIAGNOSIS — M11.221: ICD-10-CM

## 2025-06-06 DIAGNOSIS — I27.82 CHRONIC PULMONARY EMBOLISM: ICD-10-CM

## 2025-06-06 DIAGNOSIS — K52.9 NONINFECTIVE GASTROENTERITIS AND COLITIS, UNSPECIFIED: ICD-10-CM

## 2025-06-06 DIAGNOSIS — I82.533 CHRONIC EMBOLISM AND THROMBOSIS OF POPLITEAL VEIN, BILATERAL: ICD-10-CM

## 2025-06-06 DIAGNOSIS — K72.00 ACUTE AND SUBACUTE HEPATIC FAILURE WITHOUT COMA: ICD-10-CM

## 2025-06-06 DIAGNOSIS — Z91.011 ALLERGY TO MILK PRODUCTS: ICD-10-CM

## 2025-06-06 DIAGNOSIS — Z11.52 ENCOUNTER FOR SCREENING FOR COVID-19: ICD-10-CM

## 2025-06-06 DIAGNOSIS — K70.10 ALCOHOLIC HEPATITIS WITHOUT ASCITES: ICD-10-CM

## 2025-06-06 DIAGNOSIS — R57.8 OTHER SHOCK: ICD-10-CM

## 2025-06-06 DIAGNOSIS — Z79.899 OTHER LONG TERM (CURRENT) DRUG THERAPY: ICD-10-CM

## 2025-06-06 DIAGNOSIS — E87.6 HYPOKALEMIA: ICD-10-CM

## 2025-06-06 DIAGNOSIS — I12.9 HYPERTENSIVE CHRONIC KIDNEY DISEASE WITH STAGE 1 THROUGH STAGE 4 CHRONIC KIDNEY DISEASE, OR UNSPECIFIED CHRONIC KIDNEY DISEASE: ICD-10-CM

## 2025-06-06 DIAGNOSIS — Z53.20 PROCEDURE AND TREATMENT NOT CARRIED OUT BECAUSE OF PATIENT'S DECISION FOR UNSPECIFIED REASONS: ICD-10-CM

## 2025-06-06 DIAGNOSIS — N18.2 CHRONIC KIDNEY DISEASE, STAGE 2 (MILD): ICD-10-CM

## 2025-06-06 DIAGNOSIS — E87.20 ACIDOSIS, UNSPECIFIED: ICD-10-CM

## 2025-06-06 DIAGNOSIS — F10.230 ALCOHOL DEPENDENCE WITH WITHDRAWAL, UNCOMPLICATED: ICD-10-CM

## 2025-06-06 DIAGNOSIS — E83.42 HYPOMAGNESEMIA: ICD-10-CM

## 2025-06-06 DIAGNOSIS — E11.22 TYPE 2 DIABETES MELLITUS WITH DIABETIC CHRONIC KIDNEY DISEASE: ICD-10-CM

## 2025-06-06 DIAGNOSIS — E11.42 TYPE 2 DIABETES MELLITUS WITH DIABETIC POLYNEUROPATHY: ICD-10-CM

## 2025-06-06 DIAGNOSIS — Z91.018 ALLERGY TO OTHER FOODS: ICD-10-CM

## 2025-06-06 DIAGNOSIS — E87.4 MIXED DISORDER OF ACID-BASE BALANCE: ICD-10-CM

## 2025-06-06 DIAGNOSIS — T38.3X5A ADVERSE EFFECT OF INSULIN AND ORAL HYPOGLYCEMIC [ANTIDIABETIC] DRUGS, INITIAL ENCOUNTER: ICD-10-CM

## 2025-06-06 DIAGNOSIS — K70.30 ALCOHOLIC CIRRHOSIS OF LIVER WITHOUT ASCITES: ICD-10-CM

## 2025-06-06 DIAGNOSIS — K21.9 GASTRO-ESOPHAGEAL REFLUX DISEASE WITHOUT ESOPHAGITIS: ICD-10-CM

## 2025-06-06 DIAGNOSIS — D61.818 OTHER PANCYTOPENIA: ICD-10-CM

## 2025-06-06 DIAGNOSIS — M11.222: ICD-10-CM

## 2025-06-06 DIAGNOSIS — D53.9 NUTRITIONAL ANEMIA, UNSPECIFIED: ICD-10-CM

## 2025-06-06 DIAGNOSIS — E53.9 VITAMIN B DEFICIENCY, UNSPECIFIED: ICD-10-CM

## 2025-06-06 DIAGNOSIS — K76.0 FATTY (CHANGE OF) LIVER, NOT ELSEWHERE CLASSIFIED: ICD-10-CM

## 2025-06-06 DIAGNOSIS — M10.9 GOUT, UNSPECIFIED: ICD-10-CM

## 2025-06-06 DIAGNOSIS — M11.232 OTHER CHONDROCALCINOSIS, LEFT WRIST: ICD-10-CM

## 2025-09-07 ENCOUNTER — EMERGENCY (EMERGENCY)
Facility: HOSPITAL | Age: 65
LOS: 0 days | Discharge: HOME | End: 2025-09-07
Attending: EMERGENCY MEDICINE
Payer: MEDICARE

## 2025-09-07 VITALS
OXYGEN SATURATION: 98 % | HEART RATE: 112 BPM | SYSTOLIC BLOOD PRESSURE: 143 MMHG | RESPIRATION RATE: 19 BRPM | DIASTOLIC BLOOD PRESSURE: 62 MMHG | TEMPERATURE: 99 F

## 2025-09-07 VITALS
SYSTOLIC BLOOD PRESSURE: 146 MMHG | OXYGEN SATURATION: 98 % | RESPIRATION RATE: 18 BRPM | TEMPERATURE: 99 F | HEART RATE: 98 BPM | DIASTOLIC BLOOD PRESSURE: 78 MMHG

## 2025-09-07 DIAGNOSIS — K52.9 NONINFECTIVE GASTROENTERITIS AND COLITIS, UNSPECIFIED: ICD-10-CM

## 2025-09-07 DIAGNOSIS — R11.0 NAUSEA: ICD-10-CM

## 2025-09-07 DIAGNOSIS — Z91.013 ALLERGY TO SEAFOOD: ICD-10-CM

## 2025-09-07 DIAGNOSIS — R17 UNSPECIFIED JAUNDICE: ICD-10-CM

## 2025-09-07 DIAGNOSIS — Z91.011 ALLERGY TO MILK PRODUCTS: ICD-10-CM

## 2025-09-07 DIAGNOSIS — R07.9 CHEST PAIN, UNSPECIFIED: ICD-10-CM

## 2025-09-07 LAB
ALBUMIN SERPL ELPH-MCNC: 4 G/DL — SIGNIFICANT CHANGE UP (ref 3.5–5.2)
ALP SERPL-CCNC: 160 U/L — HIGH (ref 30–115)
ALT FLD-CCNC: 14 U/L — SIGNIFICANT CHANGE UP (ref 0–41)
ANION GAP SERPL CALC-SCNC: 30 MMOL/L — HIGH (ref 7–14)
AST SERPL-CCNC: 49 U/L — HIGH (ref 0–41)
BASOPHILS # BLD AUTO: 0.08 K/UL — SIGNIFICANT CHANGE UP (ref 0–0.2)
BASOPHILS NFR BLD AUTO: 1.3 % — HIGH (ref 0–1)
BILIRUB SERPL-MCNC: 3.2 MG/DL — HIGH (ref 0.2–1.2)
BUN SERPL-MCNC: 9 MG/DL — LOW (ref 10–20)
CALCIUM SERPL-MCNC: 9 MG/DL — SIGNIFICANT CHANGE UP (ref 8.4–10.5)
CHLORIDE SERPL-SCNC: 101 MMOL/L — SIGNIFICANT CHANGE UP (ref 98–110)
CO2 SERPL-SCNC: 13 MMOL/L — LOW (ref 17–32)
CREAT SERPL-MCNC: 1.3 MG/DL — SIGNIFICANT CHANGE UP (ref 0.7–1.5)
EGFR: 61 ML/MIN/1.73M2 — SIGNIFICANT CHANGE UP
EGFR: 61 ML/MIN/1.73M2 — SIGNIFICANT CHANGE UP
EOSINOPHIL # BLD AUTO: 0 K/UL — SIGNIFICANT CHANGE UP (ref 0–0.7)
EOSINOPHIL NFR BLD AUTO: 0 % — SIGNIFICANT CHANGE UP (ref 0–8)
GLUCOSE SERPL-MCNC: 106 MG/DL — HIGH (ref 70–99)
HCT VFR BLD CALC: 33.3 % — LOW (ref 42–52)
HGB BLD-MCNC: 10.6 G/DL — LOW (ref 14–18)
IMM GRANULOCYTES NFR BLD AUTO: 0.6 % — HIGH (ref 0.1–0.3)
LYMPHOCYTES # BLD AUTO: 1.08 K/UL — LOW (ref 1.2–3.4)
LYMPHOCYTES # BLD AUTO: 17 % — LOW (ref 20.5–51.1)
MCHC RBC-ENTMCNC: 30.5 PG — SIGNIFICANT CHANGE UP (ref 27–31)
MCHC RBC-ENTMCNC: 31.8 G/DL — LOW (ref 32–37)
MCV RBC AUTO: 96 FL — HIGH (ref 80–94)
MONOCYTES # BLD AUTO: 0.28 K/UL — SIGNIFICANT CHANGE UP (ref 0.1–0.6)
MONOCYTES NFR BLD AUTO: 4.4 % — SIGNIFICANT CHANGE UP (ref 1.7–9.3)
NEUTROPHILS # BLD AUTO: 4.89 K/UL — SIGNIFICANT CHANGE UP (ref 1.4–6.5)
NEUTROPHILS NFR BLD AUTO: 76.7 % — HIGH (ref 42.2–75.2)
NRBC BLD AUTO-RTO: 0 /100 WBCS — SIGNIFICANT CHANGE UP (ref 0–0)
PLATELET # BLD AUTO: 80 K/UL — LOW (ref 130–400)
PMV BLD: 10.9 FL — HIGH (ref 7.4–10.4)
POTASSIUM SERPL-MCNC: 4.3 MMOL/L — SIGNIFICANT CHANGE UP (ref 3.5–5)
POTASSIUM SERPL-SCNC: 4.3 MMOL/L — SIGNIFICANT CHANGE UP (ref 3.5–5)
PROT SERPL-MCNC: 7 G/DL — SIGNIFICANT CHANGE UP (ref 6–8)
RBC # BLD: 3.47 M/UL — LOW (ref 4.7–6.1)
RBC # FLD: 18.7 % — HIGH (ref 11.5–14.5)
SODIUM SERPL-SCNC: 144 MMOL/L — SIGNIFICANT CHANGE UP (ref 135–146)
TROPONIN T, HIGH SENSITIVITY RESULT: 29 NG/L — HIGH
TROPONIN T, HIGH SENSITIVITY RESULT: 30 NG/L — HIGH
WBC # BLD: 6.37 K/UL — SIGNIFICANT CHANGE UP (ref 4.8–10.8)
WBC # FLD AUTO: 6.37 K/UL — SIGNIFICANT CHANGE UP (ref 4.8–10.8)

## 2025-09-07 PROCEDURE — 83690 ASSAY OF LIPASE: CPT

## 2025-09-07 PROCEDURE — 74177 CT ABD & PELVIS W/CONTRAST: CPT | Mod: 26

## 2025-09-07 PROCEDURE — 99285 EMERGENCY DEPT VISIT HI MDM: CPT | Mod: FS

## 2025-09-07 PROCEDURE — 84484 ASSAY OF TROPONIN QUANT: CPT

## 2025-09-07 PROCEDURE — 71045 X-RAY EXAM CHEST 1 VIEW: CPT

## 2025-09-07 PROCEDURE — 71045 X-RAY EXAM CHEST 1 VIEW: CPT | Mod: 26

## 2025-09-07 PROCEDURE — 93005 ELECTROCARDIOGRAM TRACING: CPT

## 2025-09-07 PROCEDURE — 99285 EMERGENCY DEPT VISIT HI MDM: CPT | Mod: 25

## 2025-09-07 PROCEDURE — 36415 COLL VENOUS BLD VENIPUNCTURE: CPT

## 2025-09-07 PROCEDURE — 74177 CT ABD & PELVIS W/CONTRAST: CPT

## 2025-09-07 PROCEDURE — 80053 COMPREHEN METABOLIC PANEL: CPT

## 2025-09-07 PROCEDURE — 93010 ELECTROCARDIOGRAM REPORT: CPT

## 2025-09-07 PROCEDURE — 85025 COMPLETE CBC W/AUTO DIFF WBC: CPT

## 2025-09-07 RX ORDER — AMOXICILLIN AND CLAVULANATE POTASSIUM 500; 125 MG/1; MG/1
1 TABLET, FILM COATED ORAL
Qty: 14 | Refills: 0
Start: 2025-09-07 | End: 2025-09-13

## 2025-09-07 RX ORDER — CHLORDIAZEPOXIDE HCL 10 MG
25 CAPSULE ORAL ONCE
Refills: 0 | Status: DISCONTINUED | OUTPATIENT
Start: 2025-09-07 | End: 2025-09-07

## 2025-09-07 RX ADMIN — Medication 25 MILLIGRAM(S): at 15:55

## 2025-09-08 ENCOUNTER — EMERGENCY (EMERGENCY)
Facility: HOSPITAL | Age: 65
LOS: 0 days | Discharge: HOME | End: 2025-09-08
Attending: EMERGENCY MEDICINE
Payer: MEDICARE

## 2025-09-08 VITALS
HEART RATE: 88 BPM | RESPIRATION RATE: 19 BRPM | SYSTOLIC BLOOD PRESSURE: 136 MMHG | DIASTOLIC BLOOD PRESSURE: 82 MMHG | HEIGHT: 70 IN | OXYGEN SATURATION: 97 % | TEMPERATURE: 98 F | WEIGHT: 225.97 LBS

## 2025-09-08 DIAGNOSIS — R07.89 OTHER CHEST PAIN: ICD-10-CM

## 2025-09-08 DIAGNOSIS — R11.0 NAUSEA: ICD-10-CM

## 2025-09-08 DIAGNOSIS — K52.9 NONINFECTIVE GASTROENTERITIS AND COLITIS, UNSPECIFIED: ICD-10-CM

## 2025-09-08 PROCEDURE — 99283 EMERGENCY DEPT VISIT LOW MDM: CPT | Mod: 25

## 2025-09-08 PROCEDURE — 93010 ELECTROCARDIOGRAM REPORT: CPT

## 2025-09-08 PROCEDURE — 99284 EMERGENCY DEPT VISIT MOD MDM: CPT

## 2025-09-08 PROCEDURE — 93005 ELECTROCARDIOGRAM TRACING: CPT
